# Patient Record
Sex: FEMALE | Race: WHITE | NOT HISPANIC OR LATINO | Employment: OTHER | URBAN - METROPOLITAN AREA
[De-identification: names, ages, dates, MRNs, and addresses within clinical notes are randomized per-mention and may not be internally consistent; named-entity substitution may affect disease eponyms.]

---

## 2017-03-23 ENCOUNTER — OFFICE VISIT (OUTPATIENT)
Dept: FAMILY MEDICINE | Facility: CLINIC | Age: 77
End: 2017-03-23
Payer: COMMERCIAL

## 2017-03-23 VITALS
HEART RATE: 82 BPM | OXYGEN SATURATION: 95 % | DIASTOLIC BLOOD PRESSURE: 84 MMHG | BODY MASS INDEX: 32.18 KG/M2 | SYSTOLIC BLOOD PRESSURE: 128 MMHG | TEMPERATURE: 98.6 F | HEIGHT: 64 IN | WEIGHT: 188.5 LBS

## 2017-03-23 DIAGNOSIS — I10 HYPERTENSION GOAL BP (BLOOD PRESSURE) < 140/90: ICD-10-CM

## 2017-03-23 DIAGNOSIS — M85.80 OSTEOPENIA: ICD-10-CM

## 2017-03-23 DIAGNOSIS — E78.5 HYPERLIPIDEMIA LDL GOAL <130: ICD-10-CM

## 2017-03-23 DIAGNOSIS — R00.2 PALPITATIONS: ICD-10-CM

## 2017-03-23 DIAGNOSIS — E66.9 OBESITY, UNSPECIFIED OBESITY SEVERITY, UNSPECIFIED OBESITY TYPE: ICD-10-CM

## 2017-03-23 DIAGNOSIS — Z00.00 ROUTINE GENERAL MEDICAL EXAMINATION AT A HEALTH CARE FACILITY: Primary | ICD-10-CM

## 2017-03-23 LAB
ALBUMIN SERPL-MCNC: 3.4 G/DL (ref 3.4–5)
ALP SERPL-CCNC: 66 U/L (ref 40–150)
ALT SERPL W P-5'-P-CCNC: 24 U/L (ref 0–50)
ANION GAP SERPL CALCULATED.3IONS-SCNC: 10 MMOL/L (ref 3–14)
AST SERPL W P-5'-P-CCNC: 16 U/L (ref 0–45)
BILIRUB SERPL-MCNC: 0.4 MG/DL (ref 0.2–1.3)
BUN SERPL-MCNC: 16 MG/DL (ref 7–30)
CALCIUM SERPL-MCNC: 9.4 MG/DL (ref 8.5–10.1)
CHLORIDE SERPL-SCNC: 103 MMOL/L (ref 94–109)
CHOLEST SERPL-MCNC: 174 MG/DL
CO2 SERPL-SCNC: 27 MMOL/L (ref 20–32)
CREAT SERPL-MCNC: 0.71 MG/DL (ref 0.52–1.04)
GFR SERPL CREATININE-BSD FRML MDRD: 79 ML/MIN/1.7M2
GLUCOSE SERPL-MCNC: 88 MG/DL (ref 70–99)
HDLC SERPL-MCNC: 61 MG/DL
LDLC SERPL CALC-MCNC: 91 MG/DL
NONHDLC SERPL-MCNC: 113 MG/DL
POTASSIUM SERPL-SCNC: 3.7 MMOL/L (ref 3.4–5.3)
PROT SERPL-MCNC: 7.5 G/DL (ref 6.8–8.8)
SODIUM SERPL-SCNC: 140 MMOL/L (ref 133–144)
TRIGL SERPL-MCNC: 109 MG/DL

## 2017-03-23 PROCEDURE — 99397 PER PM REEVAL EST PAT 65+ YR: CPT | Performed by: FAMILY MEDICINE

## 2017-03-23 PROCEDURE — 80061 LIPID PANEL: CPT | Performed by: FAMILY MEDICINE

## 2017-03-23 PROCEDURE — 80053 COMPREHEN METABOLIC PANEL: CPT | Performed by: FAMILY MEDICINE

## 2017-03-23 PROCEDURE — 36415 COLL VENOUS BLD VENIPUNCTURE: CPT | Performed by: FAMILY MEDICINE

## 2017-03-23 RX ORDER — SIMVASTATIN 20 MG
20 TABLET ORAL AT BEDTIME
Qty: 90 TABLET | Refills: 3 | Status: SHIPPED | OUTPATIENT
Start: 2017-03-23 | End: 2018-04-18

## 2017-03-23 RX ORDER — METOPROLOL SUCCINATE 50 MG/1
50 TABLET, EXTENDED RELEASE ORAL DAILY
Qty: 30 TABLET | Refills: 1 | Status: SHIPPED | OUTPATIENT
Start: 2017-03-23 | End: 2017-04-04

## 2017-03-23 RX ORDER — LISINOPRIL/HYDROCHLOROTHIAZIDE 10-12.5 MG
1 TABLET ORAL DAILY
Qty: 90 TABLET | Refills: 3 | Status: ON HOLD | OUTPATIENT
Start: 2017-03-23 | End: 2018-03-09

## 2017-03-23 NOTE — LETTER
[facility]   [address] [place] [address]  [phone]      March 24, 2017      Hamida Verma  [address] [facility] [address]          Dear Ms. Verma,    The results of your recent lab tests were within normal limits. Enclosed is a copy of these results.  If you have any further questions or problems, please contact our office.    Results for orders placed or performed in visit on 03/23/17   Comprehensive metabolic panel (BMP + Alb, Alk Phos, ALT, AST, Total. Bili, TP)   Result Value Ref Range    Sodium 140 133 - 144 mmol/L    Potassium 3.7 3.4 - 5.3 mmol/L    Chloride 103 94 - 109 mmol/L    Carbon Dioxide 27 20 - 32 mmol/L    Anion Gap 10 3 - 14 mmol/L    Glucose 88 70 - 99 mg/dL    Urea Nitrogen 16 7 - 30 mg/dL    Creatinine 0.71 0.52 - 1.04 mg/dL    GFR Estimate 79 >60 mL/min/1.7m2    GFR Estimate If Black >90   GFR Calc   >60 mL/min/1.7m2    Calcium 9.4 8.5 - 10.1 mg/dL    Bilirubin Total 0.4 0.2 - 1.3 mg/dL    Albumin 3.4 3.4 - 5.0 g/dL    Protein Total 7.5 6.8 - 8.8 g/dL    Alkaline Phosphatase 66 40 - 150 U/L    ALT 24 0 - 50 U/L    AST 16 0 - 45 U/L   Lipid panel reflex to direct LDL   Result Value Ref Range    Cholesterol 174 <200 mg/dL    Triglycerides 109 <150 mg/dL    HDL Cholesterol 61 >49 mg/dL    LDL Cholesterol Calculated 91 <100 mg/dL    Non HDL Cholesterol 113 <130 mg/dL         Sincerely,      Mikala Philip MD/nr

## 2017-03-23 NOTE — MR AVS SNAPSHOT
After Visit Summary   3/23/2017    Hamida Verma    MRN: [id number]           Patient Information     Date Of Birth          1940        Visit Information        Provider Department      3/23/2017 8:20 AM Mikala Philip MD [facility]        Today's Diagnoses     Routine general medical examination at a health care facility    -  1    Hyperlipidemia LDL goal <130        Hypertension goal BP (blood pressure) < 140/90        Palpitations        Osteopenia        Obesity, unspecified obesity severity, unspecified obesity type          Care Instructions    1. Start Metoprolol 50 MG daily  2. If you have an episode of increased heart rate try counting your pulse for a minute so we know how fast your heart is beating.  3. Schedule a bone density scan.  4. Once you complete your Advanced Directive bring it back to the clinic so we can keep a copy on file.  5. Follow up with me in one month.      Preventive Health Recommendations    Female Ages 65 +    Yearly exam:     See your health care provider every year in order to  o Review health changes.   o Discuss preventive care.    o Review your medicines if your doctor has prescribed any.      You no longer need a yearly Pap test unless you've had an abnormal Pap test in the past 10 years. If you have vaginal symptoms, such as bleeding or discharge, be sure to talk with your provider about a Pap test.      Every 1 to 2 years, have a mammogram.  If you are over 69, talk with your health care provider about whether or not you want to continue having screening mammograms.      Every 10 years, have a colonoscopy. Or, have a yearly FIT test (stool test). These exams will check for colon cancer.       Have a cholesterol test every 5 years, or more often if your doctor advises it.       Have a diabetes test (fasting glucose) every three years. If you are at risk for diabetes, you should have this test more often.       At age 65, have a bone  density scan (DEXA) to check for osteoporosis (brittle bone disease).    Shots:    Get a flu shot each year.    Get a tetanus shot every 10 years.    Talk to your doctor about your pneumonia vaccines. There are now two you should receive - Pneumovax (PPSV 23) and Prevnar (PCV 13).    Talk to your doctor about the shingles vaccine.    Talk to your doctor about the hepatitis B vaccine.    Nutrition:     Eat at least 5 servings of fruits and vegetables each day.      Eat whole-grain bread, whole-wheat pasta and brown rice instead of white grains and rice.      Talk to your provider about Calcium and Vitamin D.     Lifestyle    Exercise at least 150 minutes a week (30 minutes a day, 5 days a week). This will help you control your weight and prevent disease.      Limit alcohol to one drink per day.      No smoking.       Wear sunscreen to prevent skin cancer.       See your dentist twice a year for an exam and cleaning.      See your eye doctor every 1 to 2 years to screen for conditions such as glaucoma, macular degeneration and cataracts.        Follow-ups after your visit        Future tests that were ordered for you today     Open Future Orders        Priority Expected Expires Ordered    DX Hip/Pelvis/Spine Routine  3/23/2018 3/23/2017    Lipid with Reflex to Direct LDL Routine 4/22/2017 5/2/2017 3/23/2017            Who to contact     If you have questions or need follow up information about today's clinic visit or your schedule please contact [facility] directly at [phone].  Normal or non-critical lab and imaging results will be communicated to you by MyChart, letter or phone within 4 business days after the clinic has received the results. If you do not hear from us within 7 days, please contact the clinic through MyChart or phone. If you have a critical or abnormal lab result, we will notify you by phone as soon as possible.  Submit refill requests through [employer] or call your pharmacy and  "they will forward the refill request to us. Please allow 3 business days for your refill to be completed.          Additional Information About Your Visit        [employer] Information     [employer] lets you send messages to your doctor, view your test results, renew your prescriptions, schedule appointments and more. To sign up, go to [email or web address] . Click on \"Log in\" on the left side of the screen, which will take you to the Welcome page. Then click on \"Sign up Now\" on the right side of the page.     You will be asked to enter the access code listed below, as well as some personal information. Please follow the directions to create your username and password.     Your access code is: [email or web address]  Expires: 2017  8:43 AM     Your access code will  in 90 days. If you need help or a new code, please call your [place] clinic or [phone].        Care EveryWhere ID     This is your [facility] EveryWhere ID. This could be used by other organizations to access your [place] medical records  [id number]        Your Vitals Were     Pulse Temperature Height Pulse Oximetry BMI (Body Mass Index)       82 98.6  F (37  C) (Oral) 5' 4\" (1.626 m) 95% 32.36 kg/m2        Blood Pressure from Last 3 Encounters:   17 128/84   16 126/78   05/07/15 118/80    Weight from Last 3 Encounters:   17 188 lb 8 oz (85.5 kg)   16 191 lb (86.6 kg)   05/07/15 187 lb 4 oz (84.9 kg)              We Performed the Following     Comprehensive metabolic panel (BMP + Alb, Alk Phos, ALT, AST, Total. Bili, TP)     Lipid panel reflex to direct LDL          Today's Medication Changes          These changes are accurate as of: 3/23/17  8:43 AM.  If you have any questions, ask your nurse or doctor.               Start taking these medicines.        Dose/Directions    metoprolol 50 MG 24 hr tablet   Commonly known as:  TOPROL-XL   Used for:  Hypertension goal BP (blood pressure) < 140/90, Palpitations   Started by:  House, " Mikala COELLO MD        Dose:  50 mg   Take 1 tablet (50 mg) by mouth daily   Quantity:  30 tablet   Refills:  1         These medicines have changed or have updated prescriptions.        Dose/Directions    lisinopril-hydrochlorothiazide 10-12.5 MG per tablet   Commonly known as:  PRINZIDE/ZESTORETIC   This may have changed:  additional instructions   Used for:  Hypertension goal BP (blood pressure) < 140/90   Changed by:  Mikala Philip MD        Dose:  1 tablet   Take 1 tablet by mouth daily Profile Rx: patient will contact pharmacy when needed   Quantity:  90 tablet   Refills:  3       simvastatin 20 MG tablet   Commonly known as:  ZOCOR   This may have changed:  additional instructions   Used for:  Hyperlipidemia LDL goal <130   Changed by:  Mikala Philip MD        Dose:  20 mg   Take 1 tablet (20 mg) by mouth At Bedtime Profile Rx: patient will contact pharmacy when needed   Quantity:  90 tablet   Refills:  3            Where to get your medicines      These medications were sent to [place] [address]  [facility] [address] [facility] [address]     Phone:  [phone]     lisinopril-hydrochlorothiazide 10-12.5 MG per tablet    metoprolol 50 MG 24 hr tablet    simvastatin 20 MG tablet                Primary Care Provider Office Phone # Fax #    Mikala Philip [phone]       [facility] [address]  [facility] [address]        Thank you!     Thank you for choosing [facility]  for your care. Our goal is always to provide you with excellent care. Hearing back from our patients is one way we can continue to improve our services. Please take a few minutes to complete the written survey that you may receive in the mail after your visit with us. Thank you!             Your Updated Medication List - Protect others around you: Learn how to safely use, store and throw away your medicines at www.disposemymeds.org.          This list is  accurate as of: 3/23/17  8:43 AM.  Always use your most recent med list.                   Brand Name Dispense Instructions for use    ASPIR-81 PO      1 TABLET DAILY       B-12 1000 MCG Caps      Take  by mouth.       CALCIUM + D PO      1 tab daily       cholecalciferol 1000 UNITS capsule    vitamin  -D     Take 1 capsule by mouth daily.       FISH OIL      900 mg daily       FLAX SEED OIL PO      1 capsule daily       fluticasone 50 MCG/ACT spray    FLONASE    1 Package    Spray 1-2 sprays into both nostrils daily       lisinopril-hydrochlorothiazide 10-12.5 MG per tablet    PRINZIDE/ZESTORETIC    90 tablet    Take 1 tablet by mouth daily Profile Rx: patient will contact pharmacy when needed       metoprolol 50 MG 24 hr tablet    TOPROL-XL    30 tablet    Take 1 tablet (50 mg) by mouth daily       Milk Thistle 200 MG Caps      Take  by mouth.       olopatadine 0.1 % ophthalmic solution    PATANOL    5 mL    Place 1 drop into both eyes 2 times daily       potassium & sodium phosphates 278-164-250 MG/75ML Solr      Take  by mouth.       simvastatin 20 MG tablet    ZOCOR    90 tablet    Take 1 tablet (20 mg) by mouth At Bedtime Profile Rx: patient will contact pharmacy when needed

## 2017-03-23 NOTE — PATIENT INSTRUCTIONS
1. Start Metoprolol 50 MG daily  2. If you have an episode of increased heart rate try counting your pulse for a minute so we know how fast your heart is beating.  3. Schedule a bone density scan.  4. Once you complete your Advanced Directive bring it back to the clinic so we can keep a copy on file.  5. Follow up with me in one month.      Preventive Health Recommendations    Female Ages 65 +    Yearly exam:     See your health care provider every year in order to  o Review health changes.   o Discuss preventive care.    o Review your medicines if your doctor has prescribed any.      You no longer need a yearly Pap test unless you've had an abnormal Pap test in the past 10 years. If you have vaginal symptoms, such as bleeding or discharge, be sure to talk with your provider about a Pap test.      Every 1 to 2 years, have a mammogram.  If you are over 69, talk with your health care provider about whether or not you want to continue having screening mammograms.      Every 10 years, have a colonoscopy. Or, have a yearly FIT test (stool test). These exams will check for colon cancer.       Have a cholesterol test every 5 years, or more often if your doctor advises it.       Have a diabetes test (fasting glucose) every three years. If you are at risk for diabetes, you should have this test more often.       At age 65, have a bone density scan (DEXA) to check for osteoporosis (brittle bone disease).    Shots:    Get a flu shot each year.    Get a tetanus shot every 10 years.    Talk to your doctor about your pneumonia vaccines. There are now two you should receive - Pneumovax (PPSV 23) and Prevnar (PCV 13).    Talk to your doctor about the shingles vaccine.    Talk to your doctor about the hepatitis B vaccine.    Nutrition:     Eat at least 5 servings of fruits and vegetables each day.      Eat whole-grain bread, whole-wheat pasta and brown rice instead of white grains and rice.      Talk to your provider about Calcium  and Vitamin D.     Lifestyle    Exercise at least 150 minutes a week (30 minutes a day, 5 days a week). This will help you control your weight and prevent disease.      Limit alcohol to one drink per day.      No smoking.       Wear sunscreen to prevent skin cancer.       See your dentist twice a year for an exam and cleaning.      See your eye doctor every 1 to 2 years to screen for conditions such as glaucoma, macular degeneration and cataracts.

## 2017-03-23 NOTE — NURSING NOTE
"Chief Complaint   Patient presents with     Physical     pt is fasting        Initial /84 (Cuff Size: Adult Large)  Pulse 82  Temp 98.6  F (37  C) (Oral)  Ht 5' 4\" (1.626 m)  Wt 188 lb 8 oz (85.5 kg)  SpO2 95%  BMI 32.36 kg/m2 Estimated body mass index is 32.36 kg/(m^2) as calculated from the following:    Height as of this encounter: 5' 4\" (1.626 m).    Weight as of this encounter: 188 lb 8 oz (85.5 kg).  Medication Reconciliation: complete     Alicia Day, NURIS      "

## 2017-03-23 NOTE — PROGRESS NOTES
SUBJECTIVE:                                                            Hamida Verma is a 76 year old female who presents for Preventive Visit.  Are you in the first 12 months of your Medicare Part B coverage?  No    Healthy Habits:    Do you get at least three servings of calcium containing foods daily (dairy, green leafy vegetables, etc.)? no, taking calcium and/or vitamin D supplement: yes -     Amount of exercise or daily activities, outside of work: 7 day(s) per week    Problems taking medications regularly No    Medication side effects: No    Have you had an eye exam in the past two years? yes    Do you see a dentist twice per year? no    Do you have sleep apnea, excessive snoring or daytime drowsiness?no    COGNITIVE SCREEN  1) Repeat 3 items (Banana, Sunrise, Chair)    2) Clock draw: ABNORMAL hands in wrong spot  3) 3 item recall: Recalls 2 objects   Results: ABNORMAL clock, 1-2 items recalled: PROBABLE COGNITIVE IMPAIRMENT, **INFORM PROVIDER**  Mini-CogTM Copyright NOEMI An. Licensed by the author for use in [facility] StemSave; reprinted with permission (bony@[facility]). All rights reserved.      Hyperlipidemia Follow-Up      Rate your low fat/cholesterol diet?: fair    Taking statin?  Yes, no muscle aches from statin    Other lipid medications/supplements?:  Flax seed     Hypertension Follow-up      Outpatient blood pressures are not being checked.    Low Salt Diet: not monitoring salt a couple shakes        Grief - Patient has been going through some grief losing her son to a heart attack in January and her sister had a stroke. She is coping ok.     Palpitations - Notes occasionally her heart starts racing very fast lasting roughly a half hour at the most. She has not checked her pulse. Denies lightheadedness, chest pain, pressure, SOB. It has just start in January and has happened maybe three times since. Does not feel like an irregular heart beat.         Reviewed and updated as needed this visit  by clinical staff  Reviewed and updated as needed this visit by Provider    Social History   Substance Use Topics     Smoking status: Former Smoker     Quit date: 9/1/2000     Smokeless tobacco: Never Used      Comment: quit 6yrs ago     Alcohol use No     The patient does not drink >3 drinks per day nor >7 drinks per week.    Today's PHQ-2 Score:   PHQ-2 ( 1999 Pfizer) 3/23/2017 4/28/2014   Q1: Little interest or pleasure in doing things 0 0   Q2: Feeling down, depressed or hopeless 0 0   PHQ-2 Score 0 0     Do you feel safe in your environment - Yes    Do you have a Health Care Directive?: No: Advance care planning reviewed with patient; information given to patient to review.    Current providers sharing in care for this patient include:   Patient Care Team:  Mikala Philip MD as PCP - General (Family Practice)      Hearing impairment: Yes, Difficulty understanding soft or whispered speech.    Ability to successfully perform activities of daily living: Yes, no assistance needed     Fall risk:  Fallen 2 or more times in the past year?: No  Any fall with injury in the past year?: No    Home safety:  lack of grab bars in the bathroom    The following health maintenance items are reviewed in Epic and correct as of today:  Health Maintenance   Topic Date Due     ADVANCE DIRECTIVE PLANNING Q5 YRS (NO INBASKET)  07/20/2016     CMP Q1 YR (NO INBASKET)  05/23/2017     FALL RISK ASSESSMENT  05/23/2017     LIPID MONITORING Q1 YEAR( NO INBASKET)  05/23/2017     INFLUENZA VACCINE (SYSTEM ASSIGNED)  09/01/2017     TETANUS IMMUNIZATION (SYSTEM ASSIGNED)  05/07/2025     DEXA SCAN SCREENING (SYSTEM ASSIGNED)  Completed     PNEUMOCOCCAL  Completed     Pneumonia Vaccine: completed   Mammogram Screening: Patient over age 75, has elected to stop mammography screening.  History of abnormal Pap smear: NO - Hysterectomy    ROS:   ROS: 10 point ROS neg other than the symptoms noted above in the HPI.    This document serves as a record  of the services and decisions personally performed and made by Mikala Philip MD. It was created on his/her behalf by Elena Hinkle, trained medical scribe. The creation of this document is based the provider's statements to the medical scribes.    Scribe Elena Hinkle, March 23, 2017    BP Readings from Last 3 Encounters:   03/23/17 128/84   05/23/16 126/78   05/07/15 118/80    Wt Readings from Last 3 Encounters:   03/23/17 85.5 kg (188 lb 8 oz)   05/23/16 86.6 kg (191 lb)   05/07/15 84.9 kg (187 lb 4 oz)        Patient Active Problem List   Diagnosis     Symptomatic menopausal or female climacteric states     HYPERLIPIDEMIA LDL GOAL <130     Hypertension goal BP (blood pressure) < 140/90     Knee pain     Obesity     Past Surgical History:   Procedure Laterality Date     HYSTERECTOMY, VAGINAL         Social History   Substance Use Topics     Smoking status: Former Smoker     Quit date: 9/1/2000     Smokeless tobacco: Never Used      Comment: quit 6yrs ago     Alcohol use No     Family History   Problem Relation Age of Onset     CEREBROVASCULAR DISEASE Mother      Eye Disorder Mother      Myocardial Infarction Mother      C.A.D. Father      heart attack     Alcohol/Drug Father      alcohol     Blood Disease Sister      lupus     Depression Sister      DIABETES No family hx of      Breast Cancer No family hx of      Cancer - colorectal No family hx of          Current Outpatient Prescriptions   Medication Sig Dispense Refill     lisinopril-hydrochlorothiazide (PRINZIDE/ZESTORETIC) 10-12.5 MG per tablet Take 1 tablet by mouth daily Profile Rx: patient will contact pharmacy when needed 90 tablet 3     simvastatin (ZOCOR) 20 MG tablet Take 1 tablet (20 mg) by mouth At Bedtime Profile Rx: patient will contact pharmacy when needed 90 tablet 3     Cyanocobalamin (B-12) 1000 MCG CAPS Take  by mouth.       FISH  mg daily        cholecalciferol (VITAMIN  -D) 1000 UNIT capsule Take 1 capsule by mouth daily.        "potassium & sodium phosphates 278-164-250 MG/75ML SOLR Take  by mouth.       Milk Thistle 200 MG CAPS Take  by mouth.       CALCIUM + D OR 1 tab daily       FLAX SEED OIL OR 1 capsule daily       ASPIR-81 OR 1 TABLET DAILY       [DISCONTINUED] simvastatin (ZOCOR) 20 MG tablet Take 1 tablet (20 mg) by mouth At Bedtime 90 tablet 3     [DISCONTINUED] lisinopril-hydrochlorothiazide (PRINZIDE,ZESTORETIC) 10-12.5 MG per tablet Take 1 tablet by mouth daily 90 tablet 3     olopatadine (PATANOL) 0.1 % ophthalmic solution Place 1 drop into both eyes 2 times daily (Patient not taking: Reported on 3/23/2017) 5 mL 3     fluticasone (FLONASE) 50 MCG/ACT nasal spray Spray 1-2 sprays into both nostrils daily (Patient not taking: Reported on 3/23/2017) 1 Package 11     Allergies   Allergen Reactions     Strawberry Flavor      Recent Labs   Lab Test  05/23/16   1056  05/07/15   0824  11/28/14   0806   11/12/14   0814  04/28/14   0952   07/07/09   1359   LDL  103*  104   --    --    --   100   < >   --    HDL  66  70   --    --    --   60   < >   --    TRIG  104  102   --    --    --   122   < >   --    ALT  26   --   22   --   24  30   < >   --    CR  0.77   --   0.75   < >  0.80  0.84   < >   --    GFRESTIMATED  73   --   75   < >  69  66   < >   --    GFRESTBLACK  88   --   >90   GFR Calc     < >  84  80   < >   --    POTASSIUM  4.1   --   4.0   < >  3.1*  4.2   < >   --    TSH   --    --    --    --    --    --    --   0.67    < > = values in this interval not displayed.      OBJECTIVE:                                                            /84 (Cuff Size: Adult Large)  Pulse 82  Temp 98.6  F (37  C) (Oral)  Ht 1.626 m (5' 4\")  Wt 85.5 kg (188 lb 8 oz)  SpO2 95%  BMI 32.36 kg/m2 Estimated body mass index is 32.36 kg/(m^2) as calculated from the following:    Height as of this encounter: 1.626 m (5' 4\").    Weight as of this encounter: 85.5 kg (188 lb 8 oz).  EXAM:   GENERAL APPEARANCE: healthy, " alert and no distress  EYES: Eyes grossly normal to inspection, PERRL and conjunctivae and sclerae normal. Right side cerumen impaction  HENT: ear canals and TM's normal, nose and mouth without ulcers or lesions, oropharynx clear and oral mucous membranes moist  NECK: no adenopathy, no asymmetry, masses, or scars and thyroid normal to palpation  RESP: lungs clear to auscultation - no rales, rhonchi or wheezes  BREAST: normal without masses, tenderness or nipple discharge and no palpable axillary masses or adenopathy  CV: regular rate and rhythm, normal S1 S2, no S3 or S4, no murmur, click or rub, no peripheral edema and peripheral pulses strong  ABDOMEN: soft, nontender, no hepatosplenomegaly, no masses and bowel sounds normal  MS: no musculoskeletal defects are noted and gait is age appropriate without ataxia  SKIN: no suspicious lesions or rashes  NEURO: Normal strength and tone, sensory exam grossly normal, mentation intact and speech normal  PSYCH: mentation appears normal and affect normal/bright    ASSESSMENT / PLAN:                                                            1. Routine general medical examination at a health care facility  She does not want to repeat mammograms for now.     2. Hyperlipidemia LDL goal <130   stable   - Lipid with Reflex to Direct LDL; Future  - simvastatin (ZOCOR) 20 MG tablet; Take 1 tablet (20 mg) by mouth At Bedtime Profile Rx: patient will contact pharmacy when needed  Dispense: 90 tablet; Refill: 3  - Lipid panel reflex to direct LDL    3. Hypertension goal BP (blood pressure) < 140/90  Controlled. Will add metoprolol today give her episodes of palpitations (otherwise asymptomatic and has not felt irregular, she does not want to see cardiology at this time).   - Comprehensive metabolic panel (BMP + Alb, Alk Phos, ALT, AST, Total. Bili, TP)  - lisinopril-hydrochlorothiazide (PRINZIDE/ZESTORETIC) 10-12.5 MG per tablet; Take 1 tablet by mouth daily Profile Rx: patient will  "contact pharmacy when needed  Dispense: 90 tablet; Refill: 3  - metoprolol (TOPROL-XL) 50 MG 24 hr tablet; Take 1 tablet (50 mg) by mouth daily  Dispense: 30 tablet; Refill: 1    4. Palpitations  episodes of palpitations (otherwise asymptomatic and has not felt irregular, she does not want to see cardiology at this time).   - metoprolol (TOPROL-XL) 50 MG 24 hr tablet; Take 1 tablet (50 mg) by mouth daily  Dispense: 30 tablet; Refill: 1    5. Osteopenia  She will schedule DEXA     6. Obesity, unspecified obesity severity, unspecified obesity type     She will f/u with me in a few weeks.     End of Life Planning:  Patient currently has an advanced directive: No.  I have verified the patient's ablity to prepare an advanced directive/make health care decisions.  Literature was provided to assist patient in preparing an advanced directive.    COUNSELING:  Reviewed preventive health counseling, as reflected in patient instructions  Estimated body mass index is 32.36 kg/(m^2) as calculated from the following:    Height as of this encounter: 1.626 m (5' 4\").    Weight as of this encounter: 85.5 kg (188 lb 8 oz).  Weight management plan: diet and exercise   reports that she quit smoking about 16 years ago. She has never used smokeless tobacco.      Appropriate preventive services were discussed with this patient, including applicable screening as appropriate for cardiovascular disease, diabetes, osteopenia/osteoporosis, and glaucoma.  As appropriate for age/gender, discussed screening for colorectal cancer, prostate cancer, breast cancer, and cervical cancer. Checklist reviewing preventive services available has been given to the patient.    Reviewed patients plan of care and provided an AVS. The Basic Care Plan (routine screening as documented in Health Maintenance) for Hamida meets the Care Plan requirement. This Care Plan has been established and reviewed with the Patient.    Counseling Resources:  ATP IV " Guidelines  Pooled Cohorts Equation Calculator  Breast Cancer Risk Calculator  FRAX Risk Assessment  ICSI Preventive Guidelines  Dietary Guidelines for Americans, 2010  USDA's MyPlate  ASA Prophylaxis  Lung CA Screening    The information in this document, created by the medical scribe for me, accurately reflects the services I personally performed and the decisions made by me. I have reviewed and approved this document for accuracy prior to leaving the patient care area. 03/23/17    Mikala Philip MD  [facility]

## 2017-03-28 ENCOUNTER — RADIANT APPOINTMENT (OUTPATIENT)
Dept: BONE DENSITY | Facility: CLINIC | Age: 77
End: 2017-03-28
Attending: FAMILY MEDICINE
Payer: COMMERCIAL

## 2017-03-28 DIAGNOSIS — M85.80 OSTEOPENIA: ICD-10-CM

## 2017-03-28 PROCEDURE — 77085 DXA BONE DENSITY AXL VRT FX: CPT | Performed by: INTERNAL MEDICINE

## 2017-04-04 ENCOUNTER — OFFICE VISIT (OUTPATIENT)
Dept: FAMILY MEDICINE | Facility: CLINIC | Age: 77
End: 2017-04-04
Payer: COMMERCIAL

## 2017-04-04 VITALS
HEART RATE: 66 BPM | WEIGHT: 189.25 LBS | BODY MASS INDEX: 32.31 KG/M2 | RESPIRATION RATE: 16 BRPM | TEMPERATURE: 97.8 F | DIASTOLIC BLOOD PRESSURE: 73 MMHG | OXYGEN SATURATION: 99 % | SYSTOLIC BLOOD PRESSURE: 139 MMHG | HEIGHT: 64 IN

## 2017-04-04 DIAGNOSIS — I10 HYPERTENSION GOAL BP (BLOOD PRESSURE) < 140/90: Primary | ICD-10-CM

## 2017-04-04 DIAGNOSIS — E78.5 HYPERLIPIDEMIA LDL GOAL <130: ICD-10-CM

## 2017-04-04 DIAGNOSIS — M81.0 OSTEOPOROSIS: ICD-10-CM

## 2017-04-04 DIAGNOSIS — R00.2 PALPITATIONS: ICD-10-CM

## 2017-04-04 LAB
DEPRECATED CALCIDIOL+CALCIFEROL SERPL-MC: 37 UG/L (ref 20–75)
ERYTHROCYTE [DISTWIDTH] IN BLOOD BY AUTOMATED COUNT: 13.8 % (ref 10–15)
HCT VFR BLD AUTO: 41.4 % (ref 35–47)
HGB BLD-MCNC: 13.1 G/DL (ref 11.7–15.7)
MCH RBC QN AUTO: 27.7 PG (ref 26.5–33)
MCHC RBC AUTO-ENTMCNC: 31.6 G/DL (ref 31.5–36.5)
MCV RBC AUTO: 88 FL (ref 78–100)
PHOSPHATE SERPL-MCNC: 2.9 MG/DL (ref 2.5–4.5)
PLATELET # BLD AUTO: 455 10E9/L (ref 150–450)
RBC # BLD AUTO: 4.73 10E12/L (ref 3.8–5.2)
WBC # BLD AUTO: 7 10E9/L (ref 4–11)

## 2017-04-04 PROCEDURE — 84100 ASSAY OF PHOSPHORUS: CPT | Performed by: FAMILY MEDICINE

## 2017-04-04 PROCEDURE — 85027 COMPLETE CBC AUTOMATED: CPT | Performed by: FAMILY MEDICINE

## 2017-04-04 PROCEDURE — 36415 COLL VENOUS BLD VENIPUNCTURE: CPT | Performed by: FAMILY MEDICINE

## 2017-04-04 PROCEDURE — 99214 OFFICE O/P EST MOD 30 MIN: CPT | Performed by: FAMILY MEDICINE

## 2017-04-04 PROCEDURE — 82306 VITAMIN D 25 HYDROXY: CPT | Performed by: FAMILY MEDICINE

## 2017-04-04 RX ORDER — METOPROLOL SUCCINATE 50 MG/1
50 TABLET, EXTENDED RELEASE ORAL DAILY
Qty: 90 TABLET | Refills: 3 | Status: SHIPPED | OUTPATIENT
Start: 2017-04-04 | End: 2018-03-06

## 2017-04-04 RX ORDER — ALENDRONATE SODIUM 70 MG/1
70 TABLET ORAL
Qty: 12 TABLET | Refills: 3 | Status: SHIPPED | OUTPATIENT
Start: 2017-04-04 | End: 2018-04-18

## 2017-04-04 NOTE — LETTER
[facility]   [address] [place]   [address]  [phone]    April 6, 2017      Hamida Verma  [address] [facility] [address]              Dear Ms. Verma,    Your lab results have returned.     Your phosphorus and vitamin D levels are normal.    Your blood counts are normal except for the platelet count was just a tiny bit high. Sometime this happens with recent or current illness.  This is not of serious concern at this time, but should be rechecked with your next lab tests.     Results for orders placed or performed in visit on 04/04/17   Vitamin D Deficiency   Result Value Ref Range    Vitamin D Deficiency screening 37 20 - 75 ug/L   Phosphorus   Result Value Ref Range    Phosphorus 2.9 2.5 - 4.5 mg/dL   CBC with platelets   Result Value Ref Range    WBC 7.0 4.0 - 11.0 10e9/L    RBC Count 4.73 3.8 - 5.2 10e12/L    Hemoglobin 13.1 11.7 - 15.7 g/dL    Hematocrit 41.4 35.0 - 47.0 %    MCV 88 78 - 100 fl    MCH 27.7 26.5 - 33.0 pg    MCHC 31.6 31.5 - 36.5 g/dL    RDW 13.8 10.0 - 15.0 %    Platelet Count 455 (H) 150 - 450 10e9/L           Sincerely,    Mikala Philip MD/nr

## 2017-04-04 NOTE — PROGRESS NOTES
"  SUBJECTIVE:                                                    Hamida Verma is a 76 year old female who presents to clinic today for the following health issues:    Hyperlipidemia Follow-Up      Rate your low fat/cholesterol diet?: not monitoring fat; eats what she likes to eat but not as much     Taking statin?  Yes, no muscle aches from statin    Other lipid medications/supplements?:  Flax seed     Hypertension Follow-up      Outpatient blood pressures are not being checked.    Low Salt Diet: no added salt/ on rare occasion       Amount of exercise or physical activity: 6-7 days/week for an average of 30-45 minutes    Problems taking medications regularly: No    Medication side effects: none    Clinic on 3/23/117:   \"Hypertension goal BP (blood pressure) < 140/90  Controlled. Will add metoprolol today give her episodes of palpitations (otherwise asymptomatic and has not felt irregular, she does not want to see cardiology at this time).   4. Palpitations  episodes of palpitations (otherwise asymptomatic and has not felt irregular, she does not want to see cardiology at this time).\"      No palpitations since last visit. Tolerating metoprolol well.          Problem list and histories reviewed & adjusted, as indicated.  Additional history: as documented    Patient Active Problem List   Diagnosis     Symptomatic menopausal or female climacteric states     HYPERLIPIDEMIA LDL GOAL <130     Hypertension goal BP (blood pressure) < 140/90     Knee pain     Obesity     Past Surgical History:   Procedure Laterality Date     HYSTERECTOMY, VAGINAL         Social History   Substance Use Topics     Smoking status: Former Smoker     Quit date: 9/1/2000     Smokeless tobacco: Never Used      Comment: quit 6yrs ago     Alcohol use No     Family History   Problem Relation Age of Onset     CEREBROVASCULAR DISEASE Mother      Eye Disorder Mother      Myocardial Infarction Mother      C.A.D. Father      heart attack     " Alcohol/Drug Father      alcohol     Blood Disease Sister      lupus     Depression Sister      DIABETES No family hx of      Breast Cancer No family hx of      Cancer - colorectal No family hx of          Current Outpatient Prescriptions   Medication Sig Dispense Refill     lisinopril-hydrochlorothiazide (PRINZIDE/ZESTORETIC) 10-12.5 MG per tablet Take 1 tablet by mouth daily Profile Rx: patient will contact pharmacy when needed 90 tablet 3     simvastatin (ZOCOR) 20 MG tablet Take 1 tablet (20 mg) by mouth At Bedtime Profile Rx: patient will contact pharmacy when needed 90 tablet 3     metoprolol (TOPROL-XL) 50 MG 24 hr tablet Take 1 tablet (50 mg) by mouth daily 30 tablet 1     olopatadine (PATANOL) 0.1 % ophthalmic solution Place 1 drop into both eyes 2 times daily 5 mL 3     fluticasone (FLONASE) 50 MCG/ACT nasal spray Spray 1-2 sprays into both nostrils daily 1 Package 11     Cyanocobalamin (B-12) 1000 MCG CAPS Take  by mouth.       FISH  mg daily        cholecalciferol (VITAMIN  -D) 1000 UNIT capsule Take 1 capsule by mouth daily.       potassium & sodium phosphates 278-164-250 MG/75ML SOLR Take  by mouth.       Milk Thistle 200 MG CAPS Take  by mouth.       CALCIUM + D OR 1 tab daily       FLAX SEED OIL OR 1 capsule daily       ASPIR-81 OR 1 TABLET DAILY       Allergies   Allergen Reactions     Strawberry Flavor      Recent Labs   Lab Test  03/23/17   0804  05/23/16   1056  05/07/15   0824  11/28/14   0806   07/07/09   1359   LDL  91  103*  104   --    < >   --    HDL  61  66  70   --    < >   --    TRIG  109  104  102   --    < >   --    ALT  24  26   --   22   < >   --    CR  0.71  0.77   --   0.75   < >   --    GFRESTIMATED  79  73   --   75   < >   --    GFRESTBLACK  >90   GFR Calc    88   --   >90   GFR Calc     < >   --    POTASSIUM  3.7  4.1   --   4.0   < >   --    TSH   --    --    --    --    --   0.67    < > = values in this interval not displayed.      BP  "Readings from Last 3 Encounters:   17 139/73   17 128/84   16 126/78    Wt Readings from Last 3 Encounters:   17 85.8 kg (189 lb 4 oz)   17 85.5 kg (188 lb 8 oz)   16 86.6 kg (191 lb)        Reviewed and updated as needed this visit by clinical staff  Reviewed and updated as needed this visit by Provider    ROS:  Denies chest pain or SOB. See above.    This document serves as a record of the services and decisions personally performed and made by Mikala Philip MD. It was created on his/her behalf by Elena Hinkle, trained medical scribe. The creation of this document is based the provider's statements to the medical scribes.    Scribe Elena Hinkle, 2017  OBJECTIVE:                                                    /73  Pulse 66  Temp 97.8  F (36.6  C) (Oral)  Resp 16  Ht 1.626 m (5' 4\")  Wt 85.8 kg (189 lb 4 oz)  SpO2 99%  BMI 32.48 kg/m2  Body mass index is 32.48 kg/(m^2).  GENERAL: healthy, alert and no distress    Diagnostic Test Results:  Results for orders placed or performed in visit on 17   DX Hip/Pelvis/Spine w Lat Fraction [place]    Narrative    BONE DENSITOMETRY  [facility]  [address] [place] [id number]  3/28/2017      PATIENT: Hamida Verma  CHART: [id number]   : 1940  AGE: 76 year old  SEX: female   REFERRING PROVIDER: Mikala Philip MD        PROCEDURE: Bone density scanning was performed using DXA technology of the   lumbar spine and hip. Scanning was performed on a Lunar Prodigy scanner.   Reporting is completed in the form of a T-score. The T-score represents   the standard deviation from peak bone mass based on a young healthy adult.     REFERENCE T-SCORES:   Normal -1.0 and greater   Osteopenia Between -1.0 and -2.5   Osteoporosis -2.5 and less       RISK FACTORS: Post-menopausal, Follow-up osteopenia  CURRENT TREATMENT: Calcium with Vitamin D     FINDINGS:  Lumbar Spine (L1-L4) T-score: " -1.9  Left Femoral Neck   T-score: -2.6  Right Femoral Neck   T-score: -2.4      Lumbar (L1-L4) BMD: 0.963 Previous: 0.979   Total Hip Mean BMD: 0.747 Previous: 0.793     Comparison is made to another DXA performed on the same Lunar Prodigy   machine on 3/19/2013.     LATERAL VERTEBRAL ASSESSMENT  Procedure:  Vertebral fracture assessment was performed in the lateral   decubitus position using a [employer] Prodigy  densitometer.  Indications for VFA: T-score of -1.0 or worse and age (female>69)  Confounding factors for VFA: Arthritis/degenerative disc disease and rib   shadows.  The LVA scan is interpretable from T10 to L4.    VFA Findings: Using the semi-quantitative analysis of [employer] there was   evidence of no spinal deformity  VFA Impression: Hamida Verma has no vertebral fractures identified on   the VFA.   Further evaluation may be warranted due to presence of confounding factors       IMPRESSION  Osteoporosis  Consider medication intervention    Compared to previous bone densitometry performed on this patient, there is   the suggestion of no significant change of the lumbar spine, and possibly   significant worsening of the (total) hip.      Jason Perez MD           ASSESSMENT/PLAN:                                                    1. Hypertension goal BP (blood pressure) < 140/90  Well controlled. Continue lisinopril-hctz 10-12.5 daily  - metoprolol (TOPROL-XL) 50 MG 24 hr tablet; Take 1 tablet (50 mg) by mouth daily  Dispense: 90 tablet; Refill: 3    2. Hyperlipidemia LDL goal <130  Stable. Continue simvastatin 20mg     3. Palpitations  Controlled. Pt has not had any palpitations since starting the metoprolol.  - metoprolol (TOPROL-XL) 50 MG 24 hr tablet; Take 1 tablet (50 mg) by mouth daily  Dispense: 90 tablet; Refill: 3    4. Osteoporosis  Reviewed DEXA today with pt. Fosamax recommended. Risks/benefits and instructions for taking were discussed. She will start Fosamax. Labs today vitamin D, phos,  CBC. She recently had a normal cmp  - alendronate (FOSAMAX) 70 MG tablet; Take 1 tablet (70 mg) by mouth every 7 days Take 60 minutes before am meal with 8 oz. water. Remain upright for 30 minutes.  Dispense: 12 tablet; Refill: 3    F/u in 6 months.     Patient Instructions   1) Continue your metoprolol  2) Start the fosamax once weekly  3) I will let you know of the results from the labs today. If your vitamin D level is low, I will recommend increasing your daily dose.       The information in this document, created by the medical scribe for me, accurately reflects the services I personally performed and the decisions made by me. I have reviewed and approved this document for accuracy prior to leaving the patient care area. 04/04/17    Mikala Philip MD  [facility]

## 2017-04-04 NOTE — MR AVS SNAPSHOT
"              After Visit Summary   4/4/2017    Hamida Verma    MRN: [id number]           Patient Information     Date Of Birth          1940        Visit Information        Provider Department      4/4/2017 8:00 AM Mikala Philip MD [facility]        Today's Diagnoses     Hypertension goal BP (blood pressure) < 140/90    -  1    Hyperlipidemia LDL goal <130        Palpitations        Osteoporosis          Care Instructions    1) Continue your metoprolol  2) Start the fosamax once weekly  3) I will let you know of the results from the labs today. If your vitamin D level is low, I will recommend increasing your daily dose.         Follow-ups after your visit        Who to contact     If you have questions or need follow up information about today's clinic visit or your schedule please contact [facility] directly at [phone].  Normal or non-critical lab and imaging results will be communicated to you by [employer], letter or phone within 4 business days after the clinic has received the results. If you do not hear from us within 7 days, please contact the clinic through MyChart or phone. If you have a critical or abnormal lab result, we will notify you by phone as soon as possible.  Submit refill requests through [email or web address] or call your pharmacy and they will forward the refill request to us. Please allow 3 business days for your refill to be completed.          Additional Information About Your Visit        MyChart Information     [email or web address] lets you send messages to your doctor, view your test results, renew your prescriptions, schedule appointments and more. To sign up, go to [email or web address] . Click on \"Log in\" on the left side of the screen, which will take you to the Welcome page. Then click on \"Sign up Now\" on the right side of the page.     You will be asked to enter the access code listed below, as well as some personal information. Please follow the directions to " "create your username and password.     Your access code is: [email or web address]  Expires: 2017  8:43 AM     Your access code will  in 90 days. If you need help or a new code, please call your [facility] or [phone].        Care EveryWhere ID     This is your Care EveryWhere ID. This could be used by other organizations to access your [facility] medical records  [id number]        Your Vitals Were     Pulse Temperature Respirations Height Pulse Oximetry BMI (Body Mass Index)    66 97.8  F (36.6  C) (Oral) 16 5' 4\" (1.626 m) 99% 32.48 kg/m2       Blood Pressure from Last 3 Encounters:   17 139/73   17 128/84   16 126/78    Weight from Last 3 Encounters:   17 189 lb 4 oz (85.8 kg)   17 188 lb 8 oz (85.5 kg)   16 191 lb (86.6 kg)              We Performed the Following     CBC with platelets     Phosphorus     Vitamin D Deficiency          Today's Medication Changes          These changes are accurate as of: 17  8:22 AM.  If you have any questions, ask your nurse or doctor.               Start taking these medicines.        Dose/Directions    alendronate 70 MG tablet   Commonly known as:  FOSAMAX   Used for:  Osteoporosis   Started by:  Mikala Philip MD        Dose:  70 mg   Take 1 tablet (70 mg) by mouth every 7 days Take 60 minutes before am meal with 8 oz. water. Remain upright for 30 minutes.   Quantity:  12 tablet   Refills:  3            Where to get your medicines      These medications were sent to [facility] - [place], [place] - [address]  [address] [facility] [address]     Phone:  [phone]     alendronate 70 MG tablet    metoprolol 50 MG 24 hr tablet                Primary Care Provider Office Phone # Fax #    Mikala Philip [phone]       [facility] [address]  [facility] [address]        Thank you!     Thank you for choosing [facility]  for your care. Our goal is always to " provide you with excellent care. Hearing back from our patients is one way we can continue to improve our services. Please take a few minutes to complete the written survey that you may receive in the mail after your visit with us. Thank you!             Your Updated Medication List - Protect others around you: Learn how to safely use, store and throw away your medicines at www.disposemymeds.org.          This list is accurate as of: 4/4/17  8:22 AM.  Always use your most recent med list.                   Brand Name Dispense Instructions for use    alendronate 70 MG tablet    FOSAMAX    12 tablet    Take 1 tablet (70 mg) by mouth every 7 days Take 60 minutes before am meal with 8 oz. water. Remain upright for 30 minutes.       ASPIR-81 PO      1 TABLET DAILY       B-12 1000 MCG Caps      Take  by mouth.       CALCIUM + D PO      1 tab daily       cholecalciferol 1000 UNITS capsule    vitamin  -D     Take 1 capsule by mouth daily.       FISH OIL      900 mg daily       FLAX SEED OIL PO      1 capsule daily       fluticasone 50 MCG/ACT spray    FLONASE    1 Package    Spray 1-2 sprays into both nostrils daily       lisinopril-hydrochlorothiazide 10-12.5 MG per tablet    PRINZIDE/ZESTORETIC    90 tablet    Take 1 tablet by mouth daily Profile Rx: patient will contact pharmacy when needed       metoprolol 50 MG 24 hr tablet    TOPROL-XL    90 tablet    Take 1 tablet (50 mg) by mouth daily       Milk Thistle 200 MG Caps      Take  by mouth.       olopatadine 0.1 % ophthalmic solution    PATANOL    5 mL    Place 1 drop into both eyes 2 times daily       potassium & sodium phosphates 278-164-250 MG/75ML Solr      Take  by mouth.       simvastatin 20 MG tablet    ZOCOR    90 tablet    Take 1 tablet (20 mg) by mouth At Bedtime Profile Rx: patient will contact pharmacy when needed

## 2017-04-04 NOTE — PATIENT INSTRUCTIONS
1) Continue your metoprolol  2) Start the fosamax once weekly  3) I will let you know of the results from the labs today. If your vitamin D level is low, I will recommend increasing your daily dose.

## 2017-10-12 ENCOUNTER — OFFICE VISIT (OUTPATIENT)
Dept: FAMILY MEDICINE | Facility: CLINIC | Age: 77
End: 2017-10-12
Payer: COMMERCIAL

## 2017-10-12 VITALS
OXYGEN SATURATION: 99 % | SYSTOLIC BLOOD PRESSURE: 125 MMHG | HEART RATE: 77 BPM | RESPIRATION RATE: 16 BRPM | WEIGHT: 190.75 LBS | DIASTOLIC BLOOD PRESSURE: 82 MMHG | TEMPERATURE: 97.8 F | BODY MASS INDEX: 32.56 KG/M2 | HEIGHT: 64 IN

## 2017-10-12 DIAGNOSIS — R79.89 ELEVATED PLATELET COUNT: ICD-10-CM

## 2017-10-12 DIAGNOSIS — E78.5 HYPERLIPIDEMIA LDL GOAL <130: ICD-10-CM

## 2017-10-12 DIAGNOSIS — Z00.00 MEDICARE ANNUAL WELLNESS VISIT, SUBSEQUENT: Primary | ICD-10-CM

## 2017-10-12 DIAGNOSIS — E66.9 OBESITY WITHOUT SERIOUS COMORBIDITY, UNSPECIFIED CLASSIFICATION, UNSPECIFIED OBESITY TYPE: ICD-10-CM

## 2017-10-12 DIAGNOSIS — M79.671 PAIN OF RIGHT HEEL: ICD-10-CM

## 2017-10-12 DIAGNOSIS — M81.0 OSTEOPOROSIS WITHOUT CURRENT PATHOLOGICAL FRACTURE, UNSPECIFIED OSTEOPOROSIS TYPE: ICD-10-CM

## 2017-10-12 DIAGNOSIS — I10 HYPERTENSION GOAL BP (BLOOD PRESSURE) < 140/90: ICD-10-CM

## 2017-10-12 DIAGNOSIS — R00.2 PALPITATIONS: ICD-10-CM

## 2017-10-12 PROCEDURE — 99397 PER PM REEVAL EST PAT 65+ YR: CPT | Performed by: FAMILY MEDICINE

## 2017-10-12 RX ORDER — METOPROLOL SUCCINATE 50 MG/1
TABLET, EXTENDED RELEASE ORAL
Qty: 30 TABLET | Refills: 11 | Status: SHIPPED | OUTPATIENT
Start: 2017-10-12 | End: 2018-03-06

## 2017-10-12 NOTE — NURSING NOTE
"Chief Complaint   Patient presents with     Wellness Visit       Initial /82 (BP Location: Left arm, Patient Position: Sitting, Cuff Size: Adult Regular)  Pulse 77  Temp 97.8  F (36.6  C) (Oral)  Resp 16  Ht 5' 4\" (1.626 m)  Wt 190 lb 12 oz (86.5 kg)  LMP  (LMP Unknown)  SpO2 99%  Breastfeeding? No  BMI 32.74 kg/m2 Estimated body mass index is 32.74 kg/(m^2) as calculated from the following:    Height as of this encounter: 5' 4\" (1.626 m).    Weight as of this encounter: 190 lb 12 oz (86.5 kg).  Medication Reconciliation: complete     Kiki Marshall MA      "

## 2017-10-12 NOTE — PROGRESS NOTES
SUBJECTIVE:   Hamida Verma is a 77 year old female who presents for Preventive Visit.  Are you in the first 12 months of your Medicare coverage?  No    Physical   Annual:     Getting at least 3 servings of Calcium per day::  Yes    Bi-annual eye exam::  Yes    Dental care twice a year::  NO    Sleep apnea or symptoms of sleep apnea::  None    Diet::  Regular (no restrictions)    Frequency of exercise::  6-7 days/week    Duration of exercise::  15-30 minutes    Taking medications regularly::  No    Barriers to taking medications::  None    Additional concerns today::  YES    COGNITIVE SCREEN  1) Repeat 3 items (Banana, Sunrise, Chair)    2) Clock draw: NORMAL  3) 3 item recall: Recalls 3 objects  Results: 3 items recalled: COGNITIVE IMPAIRMENT LESS LIKELY  Mini-CogTM Copyright S Nataliya. Licensed by the author for use in [facility]; reprinted with permission (bony@[facility]). All rights reserved.        Right heel pain - For a week and a half to two weeks she has been experiencing left heel pain. She purchased shoe inserts which relieves the pain for the mots part. The longer she is on her feet or towards the end of the day pain returns.    Fosamax - She had troubles taking Fosamax. It was not that she did not tolerate the medication but has had troubles incorporating it into her routine. She has coffee every morning and finds herself drinking her coffee before taking the pill. She has tried putting her pills on top of the  as a reminder and that did not work. Patient does not necessarily want to start injections.     Heart palpitations - She is tolerating metoprolol well and has not had any heart palpitations since being on the medication.     Temperature intolerance - After waking up to pee at night she notices that she feels very hot when getting back into bed. She does not eat prior to bed or drink alcohol. They feel like hot flashes and have been ongoing. Denies feeling sweaty or running  a fever. She sleeps with the window open and has a fan going.      Reviewed and updated as needed this visit by clinical staff  Tobacco  Allergies  Meds  Med Hx  Surg Hx  Fam Hx  Soc Hx      Reviewed and updated as needed this visit by Provider        Social History   Substance Use Topics     Smoking status: Former Smoker     Quit date: 9/1/2000     Smokeless tobacco: Never Used      Comment: quit 6yrs ago     Alcohol use No     The patient does not drink >3 drinks per day nor >7 drinks per week.    Today's PHQ-2 Score:   PHQ-2 ( 1999 Pf[employer]) 3/23/2017   Q1: Little interest or pleasure in doing things 0   Q2: Feeling down, depressed or hopeless 0   PHQ-2 Score 0     Do you feel safe in your environment - Yes    Do you have a Health Care Directive?: No: Advance care planning reviewed with patient; information given to patient to review.    Current providers sharing in care for this patient include: Patient Care Team:  Mikala Philip MD as PCP - General (Family Practice)      Hearing impairment: No    Ability to successfully perform activities of daily living: Yes, no assistance needed     Fall risk:       Home safety:  none identified    The following health maintenance items are reviewed in Epic and correct as of today:  Health Maintenance   Topic Date Due     ADVANCE DIRECTIVE PLANNING Q5 YRS  07/20/2016     INFLUENZA VACCINE (SYSTEM ASSIGNED)  09/01/2017     CMP Q1 YR  03/23/2018     FALL RISK ASSESSMENT  03/23/2018     LIPID MONITORING Q1 YEAR  03/23/2018     TETANUS IMMUNIZATION (SYSTEM ASSIGNED)  05/07/2025     DEXA SCAN SCREENING (SYSTEM ASSIGNED)  Completed     PNEUMOCOCCAL  Completed     BP Readings from Last 3 Encounters:   10/12/17 125/82   04/04/17 139/73   03/23/17 128/84    Wt Readings from Last 3 Encounters:   10/12/17 86.5 kg (190 lb 12 oz)   04/04/17 85.8 kg (189 lb 4 oz)   03/23/17 85.5 kg (188 lb 8 oz)        Patient Active Problem List   Diagnosis     Symptomatic menopausal or female  climacteric states     HYPERLIPIDEMIA LDL GOAL <130     Hypertension goal BP (blood pressure) < 140/90     Knee pain     Obesity     Past Surgical History:   Procedure Laterality Date     HYSTERECTOMY, VAGINAL         Social History   Substance Use Topics     Smoking status: Former Smoker     Quit date: 9/1/2000     Smokeless tobacco: Never Used      Comment: quit 6yrs ago     Alcohol use No     Family History   Problem Relation Age of Onset     CEREBROVASCULAR DISEASE Mother      Eye Disorder Mother      Myocardial Infarction Mother      C.A.D. Father      heart attack     Alcohol/Drug Father      alcohol     Blood Disease Sister      lupus     Depression Sister      DIABETES No family hx of      Breast Cancer No family hx of      Cancer - colorectal No family hx of          Current Outpatient Prescriptions   Medication Sig Dispense Refill     metoprolol (TOPROL-XL) 50 MG 24 hr tablet Take 1 tablet (50 mg) by mouth daily 90 tablet 3     lisinopril-hydrochlorothiazide (PRINZIDE/ZESTORETIC) 10-12.5 MG per tablet Take 1 tablet by mouth daily Profile Rx: patient will contact pharmacy when needed 90 tablet 3     simvastatin (ZOCOR) 20 MG tablet Take 1 tablet (20 mg) by mouth At Bedtime Profile Rx: patient will contact pharmacy when needed 90 tablet 3     olopatadine (PATANOL) 0.1 % ophthalmic solution Place 1 drop into both eyes 2 times daily 5 mL 3     fluticasone (FLONASE) 50 MCG/ACT nasal spray Spray 1-2 sprays into both nostrils daily 1 Package 11     Cyanocobalamin (B-12) 1000 MCG CAPS Take  by mouth.       FISH  mg daily        cholecalciferol (VITAMIN  -D) 1000 UNIT capsule Take 1 capsule by mouth daily.       potassium & sodium phosphates 278-164-250 MG/75ML SOLR Take  by mouth.       Milk Thistle 200 MG CAPS Take  by mouth.       CALCIUM + D OR 1 tab daily       FLAX SEED OIL OR 1 capsule daily       ASPIR-81 OR 1 TABLET DAILY       alendronate (FOSAMAX) 70 MG tablet Take 1 tablet (70 mg) by mouth every  "7 days Take 60 minutes before am meal with 8 oz. water. Remain upright for 30 minutes. (Patient not taking: Reported on 10/12/2017) 12 tablet 3     Allergies   Allergen Reactions     Strawberry Flavor      Recent Labs   Lab Test  03/23/17   0804  05/23/16   1056  05/07/15   0824  11/28/14   0806   LDL  91  103*  104   --    HDL  61  66  70   --    TRIG  109  104  102   --    ALT  24  26   --   22   CR  0.71  0.77   --   0.75   GFRESTIMATED  79  73   --   75   GFRESTBLACK  >90   GFR Calc    88   --   >90   GFR Calc     POTASSIUM  3.7  4.1   --   4.0      Pneumonia Vaccine: COMPLETED  Mammogram Screening: Patient over age 75, has elected to stop mammography screening.  History of abnormal Pap smear: NO - age 65 - see link Cervical Cytology Screening Guidelines Last 3 Pap Results: No results found for: PAP    ROS:   ROS: 10 point ROS neg other than the symptoms noted above in the HPI.    This document serves as a record of the services and decisions personally performed and made by Mikala Philip MD. It was created on his/her behalf by Elena Hinkle, trained medical scribe. The creation of this document is based the provider's statements to the medical scribes.    Scribe Elena Hinkle, October 12, 2017  OBJECTIVE:   /82 (BP Location: Left arm, Patient Position: Sitting, Cuff Size: Adult Regular)  Pulse 77  Temp 97.8  F (36.6  C) (Oral)  Resp 16  Ht 1.626 m (5' 4\")  Wt 86.5 kg (190 lb 12 oz)  LMP  (LMP Unknown)  SpO2 99%  Breastfeeding? No  BMI 32.74 kg/m2 Estimated body mass index is 32.74 kg/(m^2) as calculated from the following:    Height as of this encounter: 1.626 m (5' 4\").    Weight as of this encounter: 86.5 kg (190 lb 12 oz).  EXAM:   GENERAL APPEARANCE: healthy, alert and no distress  EYES: Eyes grossly normal to inspection, PERRL and conjunctivae and sclerae normal  HENT: ear canals and TM's normal, nose and mouth without ulcers or lesions, oropharynx clear " and oral mucous membranes moist  NECK: no adenopathy, no asymmetry, masses, or scars and thyroid normal to palpation  RESP: lungs clear to auscultation - no rales, rhonchi or wheezes  CV: regular rate and rhythm, normal S1 S2, no S3 or S4, no murmur, click or rub, no peripheral edema and peripheral pulses strong  ABDOMEN: soft, nontender, no hepatosplenomegaly, no masses and bowel sounds normal  MS: no musculoskeletal defects are noted and gait is age appropriate without ataxia  SKIN: no suspicious lesions or rashes  NEURO: Normal strength and tone, sensory exam grossly normal, mentation intact and speech normal  PSYCH: mentation appears normal and affect normal/bright  ASSESSMENT / PLAN:   1. Medicare annual wellness visit, subsequent  Pt declines mammo. Fasting labs in the spring. Health maintenance utd     2. Hypertension goal BP (blood pressure) < 140/90  Controlled. Continues metoprolol 50 mg and lisinopril-HCTZ 10-12.5 mg daily.     3. Hyperlipidemia LDL goal <130  Stable. Continues simvastatin 20 mg daily.    4. Obesity without serious comorbidity, unspecified classification, unspecified obesity type  Diet and exercise    5. Elevated platelet count (H)  Only very slightly elevated last check. Suspect reactive. Discussed recheck now vs when she returns for labs in the spring. She will wait until other labs due in the spring.     6. Pain of right heel  Improving with orthotics. Discussed if pain continues to be bothersome or worsens she can schedule with Dr. Vanessa Podiatry for further evaluation.     7. Osteoporosis without current pathological fracture, unspecified osteoporosis type  Stable. She has difficulties remembering to avoid drinking or eating anything on the day that she needs to take her Fosamax dose. Discussed a plan to help her remember to take the med. Continues on Fosamax 70 mg weekly.     Patient Instructions   1. If your heel pain persists schedule with Dr. Vanessa Podiatry.  2. The day before you  are supposed to take your Fosamax have a reminder in your calendar to unplug your . Set your pills on the .   3. Follow up with me in the spring. We will check your fasting labs then.       Preventive Health Recommendations    Female Ages 65 +    Yearly exam:     See your health care provider every year in order to  o Review health changes.   o Discuss preventive care.    o Review your medicines if your doctor has prescribed any.      You no longer need a yearly Pap test unless you've had an abnormal Pap test in the past 10 years. If you have vaginal symptoms, such as bleeding or discharge, be sure to talk with your provider about a Pap test.      Every 1 to 2 years, have a mammogram.  If you are over 69, talk with your health care provider about whether or not you want to continue having screening mammograms.      Every 10 years, have a colonoscopy. Or, have a yearly FIT test (stool test). These exams will check for colon cancer.       Have a cholesterol test every 5 years, or more often if your doctor advises it.       Have a diabetes test (fasting glucose) every three years. If you are at risk for diabetes, you should have this test more often.       At age 65, have a bone density scan (DEXA) to check for osteoporosis (brittle bone disease).    Shots:    Get a flu shot each year.    Get a tetanus shot every 10 years.    Talk to your doctor about your pneumonia vaccines. There are now two you should receive - Pneumovax (PPSV 23) and Prevnar (PCV 13).    Talk to your doctor about the shingles vaccine.    Talk to your doctor about the hepatitis B vaccine.    Nutrition:     Eat at least 5 servings of fruits and vegetables each day.      Eat whole-grain bread, whole-wheat pasta and brown rice instead of white grains and rice.      Talk to your provider about Calcium and Vitamin D.     Lifestyle    Exercise at least 150 minutes a week (30 minutes a day, 5 days a week). This will help you control  "your weight and prevent disease.      Limit alcohol to one drink per day.      No smoking.       Wear sunscreen to prevent skin cancer.       See your dentist twice a year for an exam and cleaning.      See your eye doctor every 1 to 2 years to screen for conditions such as glaucoma, macular degeneration and cataracts.         End of Life Planning:  Patient currently has an advanced directive: No.  I have verified the patient's ablity to prepare an advanced directive/make health care decisions.  Literature was provided to assist patient in preparing an advanced directive.    COUNSELING:  Reviewed preventive health counseling, as reflected in patient instructions  Estimated body mass index is 32.74 kg/(m^2) as calculated from the following:    Height as of this encounter: 1.626 m (5' 4\").    Weight as of this encounter: 86.5 kg (190 lb 12 oz).  Weight management plan: Discussed healthy diet and exercise guidelines and patient will follow up in 12 months in clinic to re-evaluate.   reports that she quit smoking about 17 years ago. She has never used smokeless tobacco.  Appropriate preventive services were discussed with this patient, including applicable screening as appropriate for cardiovascular disease, diabetes, osteopenia/osteoporosis, and glaucoma.  As appropriate for age/gender, discussed screening for colorectal cancer, prostate cancer, breast cancer, and cervical cancer. Checklist reviewing preventive services available has been given to the patient.    Reviewed patients plan of care and provided an AVS. The Basic Care Plan (routine screening as documented in Health Maintenance) for Hamida meets the Care Plan requirement. This Care Plan has been established and reviewed with the Patient.    Counseling Resources:  ATP IV Guidelines  Pooled Cohorts Equation Calculator  Breast Cancer Risk Calculator  FRAX Risk Assessment  ICSI Preventive Guidelines  Dietary Guidelines for Americans, 2010  USDA's MyPlate  ASA " Prophylaxis  Lung CA Screening    The information in this document, created by the medical scribe for me, accurately reflects the services I personally performed and the decisions made by me. I have reviewed and approved this document for accuracy. 10/12/17    Mikala Philip MD  [facility]

## 2017-10-12 NOTE — MR AVS SNAPSHOT
After Visit Summary   10/12/2017    Hamida Verma    MRN: [id number]           Patient Information     Date Of Birth          1940        Visit Information        Provider Department      10/12/2017 8:20 AM Mikala Philip MD [facility]        Today's Diagnoses     Medicare annual wellness visit, subsequent    -  1    Hypertension goal BP (blood pressure) < 140/90        Hyperlipidemia LDL goal <130        Obesity without serious comorbidity, unspecified classification, unspecified obesity type        Elevated platelet count (H)        Pain of right heel        Osteoporosis without current pathological fracture, unspecified osteoporosis type        Need for prophylactic vaccination and inoculation against influenza          Care Instructions    1. If your heel pain persists schedule with Dr. Vanessa Podiatry.  2. The day before you are supposed to take your Fosamax have a reminder in your calendar to unplug your . Set your pills on the .   3. Follow up with me in the spring. We will check your fasting labs then.       Preventive Health Recommendations    Female Ages 65 +    Yearly exam:     See your health care provider every year in order to  o Review health changes.   o Discuss preventive care.    o Review your medicines if your doctor has prescribed any.      You no longer need a yearly Pap test unless you've had an abnormal Pap test in the past 10 years. If you have vaginal symptoms, such as bleeding or discharge, be sure to talk with your provider about a Pap test.      Every 1 to 2 years, have a mammogram.  If you are over 69, talk with your health care provider about whether or not you want to continue having screening mammograms.      Every 10 years, have a colonoscopy. Or, have a yearly FIT test (stool test). These exams will check for colon cancer.       Have a cholesterol test every 5 years, or more often if your doctor advises it.       Have a  diabetes test (fasting glucose) every three years. If you are at risk for diabetes, you should have this test more often.       At age 65, have a bone density scan (DEXA) to check for osteoporosis (brittle bone disease).    Shots:    Get a flu shot each year.    Get a tetanus shot every 10 years.    Talk to your doctor about your pneumonia vaccines. There are now two you should receive - Pneumovax (PPSV 23) and Prevnar (PCV 13).    Talk to your doctor about the shingles vaccine.    Talk to your doctor about the hepatitis B vaccine.    Nutrition:     Eat at least 5 servings of fruits and vegetables each day.      Eat whole-grain bread, whole-wheat pasta and brown rice instead of white grains and rice.      Talk to your provider about Calcium and Vitamin D.     Lifestyle    Exercise at least 150 minutes a week (30 minutes a day, 5 days a week). This will help you control your weight and prevent disease.      Limit alcohol to one drink per day.      No smoking.       Wear sunscreen to prevent skin cancer.       See your dentist twice a year for an exam and cleaning.      See your eye doctor every 1 to 2 years to screen for conditions such as glaucoma, macular degeneration and cataracts.          Follow-ups after your visit        Who to contact     If you have questions or need follow up information about today's clinic visit or your schedule please contact [facility] directly at [phone].  Normal or non-critical lab and imaging results will be communicated to you by MyChart, letter or phone within 4 business days after the clinic has received the results. If you do not hear from us within 7 days, please contact the clinic through MyChart or phone. If you have a critical or abnormal lab result, we will notify you by phone as soon as possible.  Submit refill requests through [employer] or call your pharmacy and they will forward the refill request to us. Please allow 3 business days for your refill to be  "completed.          Additional Information About Your Visit        [employer] Information     [employer] lets you send messages to your doctor, view your test results, renew your prescriptions, schedule appointments and more. To sign up, go to [email or web address] . Click on \"Log in\" on the left side of the screen, which will take you to the Welcome page. Then click on \"Sign up Now\" on the right side of the page.     You will be asked to enter the access code listed below, as well as some personal information. Please follow the directions to create your username and password.     Your access code is: [email or web address]  Expires: 1/10/2018  9:02 AM     Your access code will  in 90 days. If you need help or a new code, please call your [place] clinic or [phone].        Care EveryWhere ID     This is your Care EveryWhere ID. This could be used by other organizations to access your [place] medical records  [id number]        Your Vitals Were     Pulse Temperature Respirations Height Last Period Pulse Oximetry    77 97.8  F (36.6  C) (Oral) 16 5' 4\" (1.626 m) (LMP Unknown) 99%    Breastfeeding? BMI (Body Mass Index)                No 32.74 kg/m2           Blood Pressure from Last 3 Encounters:   10/12/17 125/82   17 139/73   17 128/84    Weight from Last 3 Encounters:   10/12/17 190 lb 12 oz (86.5 kg)   17 189 lb 4 oz (85.8 kg)   17 188 lb 8 oz (85.5 kg)              Today, you had the following     No orders found for display       Primary Care Provider Office Phone # Fax #    Mikala Philip [phone] [address] [facility] [address]        Equal Access to Services     [facility] FRANK : Sushant Garcia, madelaine potts, carlos ramírez. So [facility] [phone].    ATENCIÓN: Si habla español, tiene a ivy disposición servicios gratuitos de asistencia lingüística. Llame al [phone].    We comply with " applicable federal civil rights laws and [place] laws. We do not discriminate on the basis of race, color, national origin, age, disability, sex, sexual orientation, or gender identity.            Thank you!     Thank you for choosing [facility]  for your care. Our goal is always to provide you with excellent care. Hearing back from our patients is one way we can continue to improve our services. Please take a few minutes to complete the written survey that you may receive in the mail after your visit with us. Thank you!             Your Updated Medication List - Protect others around you: Learn how to safely use, store and throw away your medicines at www.disposemymeds.org.          This list is accurate as of: 10/12/17  9:02 AM.  Always use your most recent med list.                   Brand Name Dispense Instructions for use Diagnosis    alendronate 70 MG tablet    FOSAMAX    12 tablet    Take 1 tablet (70 mg) by mouth every 7 days Take 60 minutes before am meal with 8 oz. water. Remain upright for 30 minutes.    Osteoporosis       ASPIR-81 PO      1 TABLET DAILY        B-12 1000 MCG Caps      Take  by mouth.        CALCIUM + D PO      1 tab daily        cholecalciferol 1000 UNITS capsule    vitamin  -D     Take 1 capsule by mouth daily.        FISH OIL      900 mg daily        FLAX SEED OIL PO      1 capsule daily        fluticasone 50 MCG/ACT spray    FLONASE    1 Package    Spray 1-2 sprays into both nostrils daily    Seasonal allergic rhinitis       lisinopril-hydrochlorothiazide 10-12.5 MG per tablet    PRINZIDE/ZESTORETIC    90 tablet    Take 1 tablet by mouth daily Profile Rx: patient will contact pharmacy when needed    Hypertension goal BP (blood pressure) < 140/90       metoprolol 50 MG 24 hr tablet    TOPROL-XL    90 tablet    Take 1 tablet (50 mg) by mouth daily    Hypertension goal BP (blood pressure) < 140/90, Palpitations       Milk Thistle 200 MG Caps      Take  by mouth.         olopatadine 0.1 % ophthalmic solution    PATANOL    5 mL    Place 1 drop into both eyes 2 times daily    Allergic conjunctivitis, bilateral       potassium & sodium phosphates 278-164-250 MG/75ML Solr      Take  by mouth.        simvastatin 20 MG tablet    ZOCOR    90 tablet    Take 1 tablet (20 mg) by mouth At Bedtime Profile Rx: patient will contact pharmacy when needed    Hyperlipidemia LDL goal <130

## 2017-10-12 NOTE — TELEPHONE ENCOUNTER
Metoprolol      Last Written Prescription Date: 4/4/17  Last Fill Quantity: 90, # refills: 3  Last Office Visit with [facility], [facility] or [facility] prescribing provider: 10/12/17  jose sherman         Potassium   Date Value Ref Range Status   03/23/2017 3.7 3.4 - 5.3 mmol/L Final     Creatinine   Date Value Ref Range Status   03/23/2017 0.71 0.52 - 1.04 mg/dL Final     BP Readings from Last 3 Encounters:   10/12/17 125/82   04/04/17 139/73   03/23/17 128/84

## 2017-11-16 NOTE — PATIENT INSTRUCTIONS
1. If your heel pain persists schedule with Dr. Vanessa Podiatry.  2. The day before you are supposed to take your Fosamax have a reminder in your calendar to unplug your . Set your pills on the .   3. Follow up with me in the spring. We will check your fasting labs then.       Preventive Health Recommendations    Female Ages 65 +    Yearly exam:     See your health care provider every year in order to  o Review health changes.   o Discuss preventive care.    o Review your medicines if your doctor has prescribed any.      You no longer need a yearly Pap test unless you've had an abnormal Pap test in the past 10 years. If you have vaginal symptoms, such as bleeding or discharge, be sure to talk with your provider about a Pap test.      Every 1 to 2 years, have a mammogram.  If you are over 69, talk with your health care provider about whether or not you want to continue having screening mammograms.      Every 10 years, have a colonoscopy. Or, have a yearly FIT test (stool test). These exams will check for colon cancer.       Have a cholesterol test every 5 years, or more often if your doctor advises it.       Have a diabetes test (fasting glucose) every three years. If you are at risk for diabetes, you should have this test more often.       At age 65, have a bone density scan (DEXA) to check for osteoporosis (brittle bone disease).    Shots:    Get a flu shot each year.    Get a tetanus shot every 10 years.    Talk to your doctor about your pneumonia vaccines. There are now two you should receive - Pneumovax (PPSV 23) and Prevnar (PCV 13).    Talk to your doctor about the shingles vaccine.    Talk to your doctor about the hepatitis B vaccine.    Nutrition:     Eat at least 5 servings of fruits and vegetables each day.      Eat whole-grain bread, whole-wheat pasta and brown rice instead of white grains and rice.      Talk to your provider about Calcium and Vitamin D.     Lifestyle    Exercise at  least 150 minutes a week (30 minutes a day, 5 days a week). This will help you control your weight and prevent disease.      Limit alcohol to one drink per day.      No smoking.       Wear sunscreen to prevent skin cancer.       See your dentist twice a year for an exam and cleaning.      See your eye doctor every 1 to 2 years to screen for conditions such as glaucoma, macular degeneration and cataracts.   61.2

## 2017-11-30 ENCOUNTER — ALLIED HEALTH/NURSE VISIT (OUTPATIENT)
Dept: NURSING | Facility: CLINIC | Age: 77
End: 2017-11-30
Payer: COMMERCIAL

## 2017-11-30 VITALS
DIASTOLIC BLOOD PRESSURE: 80 MMHG | HEART RATE: 62 BPM | HEIGHT: 64 IN | BODY MASS INDEX: 32.52 KG/M2 | SYSTOLIC BLOOD PRESSURE: 124 MMHG | WEIGHT: 190.5 LBS

## 2017-11-30 DIAGNOSIS — I10 HYPERTENSION GOAL BP (BLOOD PRESSURE) < 150/90: Primary | ICD-10-CM

## 2017-11-30 PROCEDURE — 99207 ZZC NO CHARGE LOS: CPT

## 2017-11-30 NOTE — PATIENT INSTRUCTIONS
Lifestyle changes that can help control high blood pressure:  Even though PGEN is a study to test effectiveness of genetically guided medications for managing high blood pressure, there are several things you can do to ensure your blood pressure stays in good control:    Maintain a healthy weight (BMI<26). A modest amount of weight loss can be helpful    Limit salt intake to under 2400mg daily    Follow the DASH diet (lean meats, low salt, whole grains, lots of fruits/vegies)    Stay active, try to get in 30 minutes of exercise daily.    Manage your daily stress.    Do not smoke cigarettes (or cut back)    Limit alcohol (2 drinks/day for men, 1 drink/day for women)    Patient verbalized understanding of this plan and is will NOT be continuing with this research study

## 2017-11-30 NOTE — MR AVS SNAPSHOT
After Visit Summary   11/30/2017    Hamida Verma    MRN: [id number]           Patient Information     Date Of Birth          1940        Visit Information        Provider Department      11/30/2017 9:30 AM Provider, Alejandro Guillen [facility]        Today's Diagnoses     Hypertension goal BP (blood pressure) < 150/90    -  1      Care Instructions    Lifestyle changes that can help control high blood pressure:  Even though PGEN is a study to test effectiveness of genetically guided medications for managing high blood pressure, there are several things you can do to ensure your blood pressure stays in good control:    Maintain a healthy weight (BMI<26). A modest amount of weight loss can be helpful    Limit salt intake to under 2400mg daily    Follow the DASH diet (lean meats, low salt, whole grains, lots of fruits/vegies)    Stay active, try to get in 30 minutes of exercise daily.    Manage your daily stress.    Do not smoke cigarettes (or cut back)    Limit alcohol (2 drinks/day for men, 1 drink/day for women)    Patient verbalized understanding of this plan and is will NOT be continuing with this research study          Follow-ups after your visit        Who to contact     If you have questions or need follow up information about today's clinic visit or your schedule please contact [facility] directly at [phone].  Normal or non-critical lab and imaging results will be communicated to you by MyChart, letter or phone within 4 business days after the clinic has received the results. If you do not hear from us within 7 days, please contact the clinic through [employer] or phone. If you have a critical or abnormal lab result, we will notify you by phone as soon as possible.  Submit refill requests through [employer] or call your pharmacy and they will forward the refill request to us. Please allow 3 business days for your refill to be completed.          Additional Information  "About Your Visit        MyChart Information     [employer] lets you send messages to your doctor, view your test results, renew your prescriptions, schedule appointments and more. To sign up, go to [email or web address] . Click on \"Log in\" on the left side of the screen, which will take you to the Welcome page. Then click on \"Sign up Now\" on the right side of the page.     You will be asked to enter the access code listed below, as well as some personal information. Please follow the directions to create your username and password.     Your access code is: [email or web address]  Expires: 1/10/2018  8:02 AM     Your access code will  in 90 days. If you need help or a new code, please call your [facility] clinic or [phone].        Care EveryWhere ID     This is your Care EveryWhere ID. This could be used by other organizations to access your [facility] medical records  [id number]        Your Vitals Were     Pulse Height Last Period BMI (Body Mass Index)          62 5' 4\" (1.626 m) (LMP Unknown) 32.7 kg/m2         Blood Pressure from Last 3 Encounters:   17 124/80   10/12/17 125/82   17 139/73    Weight from Last 3 Encounters:   17 190 lb 8 oz (86.4 kg)   10/12/17 190 lb 12 oz (86.5 kg)   17 189 lb 4 oz (85.8 kg)              Today, you had the following     No orders found for display       Primary Care Provider Office Phone # Fax #    Mikala Philip [phone] [address]  [facility] [address]        Equal Access to Services     [facility] : Hadii adams rueda Sodonny, warosalieda luqadaha, qaybta kaalmada kennedi, carlos carvajal . So [facility] [phone].    ATENCIÓN: Si habla español, tiene a ivy disposición servicios gratuitos de asistencia lingüística. Llame al [phone].    We comply with applicable federal civil rights laws and [place] laws. We do not discriminate on the basis of race, color, national origin, age, disability, sex, " sexual orientation, or gender identity.            Thank you!     Thank you for choosing [facility]  for your care. Our goal is always to provide you with excellent care. Hearing back from our patients is one way we can continue to improve our services. Please take a few minutes to complete the written survey that you may receive in the mail after your visit with us. Thank you!             Your Updated Medication List - Protect others around you: Learn how to safely use, store and throw away your medicines at www.disposemymeds.org.          This list is accurate as of: 11/30/17  9:42 AM.  Always use your most recent med list.                   Brand Name Dispense Instructions for use Diagnosis    alendronate 70 MG tablet    FOSAMAX    12 tablet    Take 1 tablet (70 mg) by mouth every 7 days Take 60 minutes before am meal with 8 oz. water. Remain upright for 30 minutes.    Osteoporosis       ASPIR-81 PO      1 TABLET DAILY        B-12 1000 MCG Caps      Take  by mouth.        CALCIUM + D PO      1 tab daily        cholecalciferol 1000 UNITS capsule    vitamin  -D     Take 1 capsule by mouth daily.        FISH OIL      900 mg daily        FLAX SEED OIL PO      1 capsule daily        fluticasone 50 MCG/ACT spray    FLONASE    1 Package    Spray 1-2 sprays into both nostrils daily    Seasonal allergic rhinitis       lisinopril-hydrochlorothiazide 10-12.5 MG per tablet    PRINZIDE/ZESTORETIC    90 tablet    Take 1 tablet by mouth daily Profile Rx: patient will contact pharmacy when needed    Hypertension goal BP (blood pressure) < 140/90       * metoprolol 50 MG 24 hr tablet    TOPROL-XL    90 tablet    Take 1 tablet (50 mg) by mouth daily    Hypertension goal BP (blood pressure) < 140/90, Palpitations       * metoprolol 50 MG 24 hr tablet    TOPROL-XL    30 tablet    TAKE ONE TABLET BY MOUTH EVERY DAY    Hypertension goal BP (blood pressure) < 140/90, Palpitations       Milk Thistle 200 MG Caps      Take   by mouth.        olopatadine 0.1 % ophthalmic solution    PATANOL    5 mL    Place 1 drop into both eyes 2 times daily    Allergic conjunctivitis, bilateral       potassium & sodium phosphates 278-164-250 MG/75ML Solr      Take  by mouth.        simvastatin 20 MG tablet    ZOCOR    90 tablet    Take 1 tablet (20 mg) by mouth At Bedtime Profile Rx: patient will contact pharmacy when needed    Hyperlipidemia LDL goal <130       * Notice:  This list has 2 medication(s) that are the same as other medications prescribed for you. Read the directions carefully, and ask your doctor or other care provider to review them with you.

## 2018-03-01 ENCOUNTER — TELEPHONE (OUTPATIENT)
Dept: FAMILY MEDICINE | Facility: CLINIC | Age: 78
End: 2018-03-01

## 2018-03-01 NOTE — TELEPHONE ENCOUNTER
Pt called with 3 days of symptoms: loose cough, clear runny nose, no fever. Denies SOB, difficulty breathing, pain with inspiration. Has been taking multi-symptom cold relief cough syrup. Advised pt does not sound serious, no influenza. Probably viral URI. Discussed use of steam for symptom relief; advised not combining medications with multi-symptom formula.    Pt to call back if symptoms worsen.    DANIEL LyN, RN  [facility]

## 2018-03-06 ENCOUNTER — APPOINTMENT (OUTPATIENT)
Dept: GENERAL RADIOLOGY | Facility: CLINIC | Age: 78
DRG: 310 | End: 2018-03-06
Attending: EMERGENCY MEDICINE
Payer: COMMERCIAL

## 2018-03-06 ENCOUNTER — HOSPITAL ENCOUNTER (INPATIENT)
Facility: CLINIC | Age: 78
LOS: 3 days | Discharge: HOME | DRG: 310 | End: 2018-03-09
Attending: EMERGENCY MEDICINE | Admitting: INTERNAL MEDICINE
Payer: COMMERCIAL

## 2018-03-06 DIAGNOSIS — I48.91 ATRIAL FIBRILLATION WITH RAPID VENTRICULAR RESPONSE (H): ICD-10-CM

## 2018-03-06 DIAGNOSIS — I10 HYPERTENSION GOAL BP (BLOOD PRESSURE) < 150/90: ICD-10-CM

## 2018-03-06 DIAGNOSIS — J18.9 PNEUMONIA OF BOTH LUNGS DUE TO INFECTIOUS ORGANISM, UNSPECIFIED PART OF LUNG: ICD-10-CM

## 2018-03-06 DIAGNOSIS — R00.0 TACHYCARDIA: ICD-10-CM

## 2018-03-06 LAB
ANION GAP SERPL CALCULATED.3IONS-SCNC: 7 MMOL/L (ref 3–14)
BASOPHILS # BLD AUTO: 0.1 10E9/L (ref 0–0.2)
BASOPHILS NFR BLD AUTO: 0.6 %
BUN SERPL-MCNC: 20 MG/DL (ref 7–30)
CALCIUM SERPL-MCNC: 9 MG/DL (ref 8.5–10.1)
CHLORIDE SERPL-SCNC: 99 MMOL/L (ref 94–109)
CO2 SERPL-SCNC: 29 MMOL/L (ref 20–32)
CREAT SERPL-MCNC: 0.91 MG/DL (ref 0.52–1.04)
DIFFERENTIAL METHOD BLD: ABNORMAL
EOSINOPHIL # BLD AUTO: 0.1 10E9/L (ref 0–0.7)
EOSINOPHIL NFR BLD AUTO: 0.5 %
ERYTHROCYTE [DISTWIDTH] IN BLOOD BY AUTOMATED COUNT: 13 % (ref 10–15)
ERYTHROCYTE [DISTWIDTH] IN BLOOD BY AUTOMATED COUNT: 13.1 % (ref 10–15)
GFR SERPL CREATININE-BSD FRML MDRD: 59 ML/MIN/1.7M2
GLUCOSE SERPL-MCNC: 119 MG/DL (ref 70–99)
HCT VFR BLD AUTO: 38 % (ref 35–47)
HCT VFR BLD AUTO: 40.2 % (ref 35–47)
HGB BLD-MCNC: 12.6 G/DL (ref 11.7–15.7)
HGB BLD-MCNC: 13.6 G/DL (ref 11.7–15.7)
IMM GRANULOCYTES # BLD: 0.2 10E9/L (ref 0–0.4)
IMM GRANULOCYTES NFR BLD: 1.5 %
INTERPRETATION ECG - MUSE: NORMAL
LACTATE BLD-SCNC: 1.4 MMOL/L (ref 0.7–2)
LYMPHOCYTES # BLD AUTO: 1.3 10E9/L (ref 0.8–5.3)
LYMPHOCYTES NFR BLD AUTO: 9 %
MCH RBC QN AUTO: 28.5 PG (ref 26.5–33)
MCH RBC QN AUTO: 28.9 PG (ref 26.5–33)
MCHC RBC AUTO-ENTMCNC: 33.2 G/DL (ref 31.5–36.5)
MCHC RBC AUTO-ENTMCNC: 33.8 G/DL (ref 31.5–36.5)
MCV RBC AUTO: 85 FL (ref 78–100)
MCV RBC AUTO: 86 FL (ref 78–100)
MONOCYTES # BLD AUTO: 1.3 10E9/L (ref 0–1.3)
MONOCYTES NFR BLD AUTO: 9.3 %
NEUTROPHILS # BLD AUTO: 11.1 10E9/L (ref 1.6–8.3)
NEUTROPHILS NFR BLD AUTO: 79.1 %
NRBC # BLD AUTO: 0 10*3/UL
NRBC BLD AUTO-RTO: 0 /100
PLATELET # BLD AUTO: 552 10E9/L (ref 150–450)
PLATELET # BLD AUTO: 620 10E9/L (ref 150–450)
POTASSIUM SERPL-SCNC: 3.4 MMOL/L (ref 3.4–5.3)
PROCALCITONIN SERPL-MCNC: 0.13 NG/ML
RBC # BLD AUTO: 4.42 10E12/L (ref 3.8–5.2)
RBC # BLD AUTO: 4.71 10E12/L (ref 3.8–5.2)
SODIUM SERPL-SCNC: 135 MMOL/L (ref 133–144)
TROPONIN I SERPL-MCNC: <0.015 UG/L (ref 0–0.04)
TSH SERPL DL<=0.005 MIU/L-ACNC: 0.54 MU/L (ref 0.4–4)
WBC # BLD AUTO: 14 10E9/L (ref 4–11)
WBC # BLD AUTO: 15 10E9/L (ref 4–11)

## 2018-03-06 PROCEDURE — 99221 1ST HOSP IP/OBS SF/LOW 40: CPT | Mod: AI | Performed by: INTERNAL MEDICINE

## 2018-03-06 PROCEDURE — 21400004 ZZH R&B CCU CSC INTERMEDIATE

## 2018-03-06 PROCEDURE — 99207 ZZC CDG-HISTORY COMP: MEETS EXP. PROBLEM FOCUSED-DOWN CODED LACK OF ROS: CPT | Performed by: INTERNAL MEDICINE

## 2018-03-06 PROCEDURE — 25000125 ZZHC RX 250: Performed by: EMERGENCY MEDICINE

## 2018-03-06 PROCEDURE — 84145 PROCALCITONIN (PCT): CPT | Performed by: EMERGENCY MEDICINE

## 2018-03-06 PROCEDURE — 84443 ASSAY THYROID STIM HORMONE: CPT | Performed by: EMERGENCY MEDICINE

## 2018-03-06 PROCEDURE — 71046 X-RAY EXAM CHEST 2 VIEWS: CPT

## 2018-03-06 PROCEDURE — 25000128 H RX IP 250 OP 636

## 2018-03-06 PROCEDURE — 96365 THER/PROPH/DIAG IV INF INIT: CPT

## 2018-03-06 PROCEDURE — 99285 EMERGENCY DEPT VISIT HI MDM: CPT | Mod: 25

## 2018-03-06 PROCEDURE — 96368 THER/DIAG CONCURRENT INF: CPT

## 2018-03-06 PROCEDURE — 25000132 ZZH RX MED GY IP 250 OP 250 PS 637: Performed by: INTERNAL MEDICINE

## 2018-03-06 PROCEDURE — 96361 HYDRATE IV INFUSION ADD-ON: CPT

## 2018-03-06 PROCEDURE — 36415 COLL VENOUS BLD VENIPUNCTURE: CPT | Performed by: INTERNAL MEDICINE

## 2018-03-06 PROCEDURE — 85027 COMPLETE CBC AUTOMATED: CPT | Performed by: INTERNAL MEDICINE

## 2018-03-06 PROCEDURE — 96375 TX/PRO/DX INJ NEW DRUG ADDON: CPT

## 2018-03-06 PROCEDURE — 80048 BASIC METABOLIC PNL TOTAL CA: CPT | Performed by: EMERGENCY MEDICINE

## 2018-03-06 PROCEDURE — 27210995 ZZH RX 272: Performed by: EMERGENCY MEDICINE

## 2018-03-06 PROCEDURE — 93005 ELECTROCARDIOGRAM TRACING: CPT

## 2018-03-06 PROCEDURE — 85025 COMPLETE CBC W/AUTO DIFF WBC: CPT | Performed by: EMERGENCY MEDICINE

## 2018-03-06 PROCEDURE — 25000128 H RX IP 250 OP 636: Performed by: EMERGENCY MEDICINE

## 2018-03-06 PROCEDURE — 84484 ASSAY OF TROPONIN QUANT: CPT | Performed by: EMERGENCY MEDICINE

## 2018-03-06 PROCEDURE — 83605 ASSAY OF LACTIC ACID: CPT | Performed by: INTERNAL MEDICINE

## 2018-03-06 RX ORDER — OMEGA-3-ACID ETHYL ESTERS 1 G/1
2 CAPSULE, LIQUID FILLED ORAL DAILY
Status: DISCONTINUED | OUTPATIENT
Start: 2018-03-07 | End: 2018-03-09 | Stop reason: HOSPADM

## 2018-03-06 RX ORDER — DILTIAZEM HYDROCHLORIDE 5 MG/ML
20 INJECTION INTRAVENOUS ONCE
Status: COMPLETED | OUTPATIENT
Start: 2018-03-06 | End: 2018-03-06

## 2018-03-06 RX ORDER — POLYETHYLENE GLYCOL 3350 17 G/17G
17 POWDER, FOR SOLUTION ORAL DAILY PRN
Status: DISCONTINUED | OUTPATIENT
Start: 2018-03-06 | End: 2018-03-09 | Stop reason: HOSPADM

## 2018-03-06 RX ORDER — PROCHLORPERAZINE MALEATE 5 MG
5 TABLET ORAL EVERY 6 HOURS PRN
Status: DISCONTINUED | OUTPATIENT
Start: 2018-03-06 | End: 2018-03-09 | Stop reason: HOSPADM

## 2018-03-06 RX ORDER — OMEGA-3-ACID ETHYL ESTERS 1 G/1
2 CAPSULE, LIQUID FILLED ORAL DAILY
COMMUNITY
End: 2018-04-18

## 2018-03-06 RX ORDER — ONDANSETRON 4 MG/1
4 TABLET, ORALLY DISINTEGRATING ORAL EVERY 6 HOURS PRN
Status: DISCONTINUED | OUTPATIENT
Start: 2018-03-06 | End: 2018-03-09 | Stop reason: HOSPADM

## 2018-03-06 RX ORDER — AZITHROMYCIN 250 MG/1
250 TABLET, FILM COATED ORAL DAILY
Status: DISCONTINUED | OUTPATIENT
Start: 2018-03-07 | End: 2018-03-07

## 2018-03-06 RX ORDER — METHYLSULFONYLMETHANE 500 MG
1 CAPSULE ORAL DAILY
COMMUNITY
End: 2019-12-18

## 2018-03-06 RX ORDER — ACETAMINOPHEN 325 MG/1
650 TABLET ORAL EVERY 4 HOURS PRN
Status: DISCONTINUED | OUTPATIENT
Start: 2018-03-06 | End: 2018-03-09 | Stop reason: HOSPADM

## 2018-03-06 RX ORDER — SIMVASTATIN 20 MG
20 TABLET ORAL AT BEDTIME
Status: DISCONTINUED | OUTPATIENT
Start: 2018-03-07 | End: 2018-03-09 | Stop reason: HOSPADM

## 2018-03-06 RX ORDER — LISINOPRIL/HYDROCHLOROTHIAZIDE 10-12.5 MG
1 TABLET ORAL DAILY
Status: DISCONTINUED | OUTPATIENT
Start: 2018-03-07 | End: 2018-03-08

## 2018-03-06 RX ORDER — ONDANSETRON 2 MG/ML
4 INJECTION INTRAMUSCULAR; INTRAVENOUS EVERY 6 HOURS PRN
Status: DISCONTINUED | OUTPATIENT
Start: 2018-03-06 | End: 2018-03-09 | Stop reason: HOSPADM

## 2018-03-06 RX ORDER — AMOXICILLIN 250 MG
1 CAPSULE ORAL 2 TIMES DAILY PRN
Status: DISCONTINUED | OUTPATIENT
Start: 2018-03-06 | End: 2018-03-09 | Stop reason: HOSPADM

## 2018-03-06 RX ORDER — AMOXICILLIN 250 MG
2 CAPSULE ORAL 2 TIMES DAILY PRN
Status: DISCONTINUED | OUTPATIENT
Start: 2018-03-06 | End: 2018-03-09 | Stop reason: HOSPADM

## 2018-03-06 RX ORDER — PROCHLORPERAZINE 25 MG
12.5 SUPPOSITORY, RECTAL RECTAL EVERY 12 HOURS PRN
Status: DISCONTINUED | OUTPATIENT
Start: 2018-03-06 | End: 2018-03-09 | Stop reason: HOSPADM

## 2018-03-06 RX ORDER — FLUTICASONE PROPIONATE 50 MCG
1-2 SPRAY, SUSPENSION (ML) NASAL DAILY PRN
COMMUNITY
End: 2019-01-05

## 2018-03-06 RX ORDER — NALOXONE HYDROCHLORIDE 0.4 MG/ML
.1-.4 INJECTION, SOLUTION INTRAMUSCULAR; INTRAVENOUS; SUBCUTANEOUS
Status: DISCONTINUED | OUTPATIENT
Start: 2018-03-06 | End: 2018-03-09 | Stop reason: HOSPADM

## 2018-03-06 RX ADMIN — DILTIAZEM HYDROCHLORIDE 10 MG/HR: 5 INJECTION INTRAVENOUS at 21:14

## 2018-03-06 RX ADMIN — ACETAMINOPHEN 650 MG: 325 TABLET, FILM COATED ORAL at 23:38

## 2018-03-06 RX ADMIN — DILTIAZEM HYDROCHLORIDE 20 MG: 5 INJECTION INTRAVENOUS at 19:19

## 2018-03-06 RX ADMIN — AZITHROMYCIN MONOHYDRATE 500 MG: 500 INJECTION, POWDER, LYOPHILIZED, FOR SOLUTION INTRAVENOUS at 18:54

## 2018-03-06 RX ADMIN — SODIUM CHLORIDE 1000 ML: 9 INJECTION, SOLUTION INTRAVENOUS at 17:51

## 2018-03-06 RX ADMIN — Medication 4000 UNITS: at 21:24

## 2018-03-06 RX ADMIN — HEPARIN SODIUM 800 UNITS/HR: 10000 INJECTION, SOLUTION INTRAVENOUS at 21:29

## 2018-03-06 RX ADMIN — CEFTRIAXONE SODIUM 2 G: 10 INJECTION, POWDER, FOR SOLUTION INTRAVENOUS at 18:50

## 2018-03-06 ASSESSMENT — ENCOUNTER SYMPTOMS
LIGHT-HEADEDNESS: 1
SORE THROAT: 0
COUGH: 1
DIZZINESS: 1
RHINORRHEA: 1
FEVER: 0
SHORTNESS OF BREATH: 1

## 2018-03-06 NOTE — IP AVS SNAPSHOT
MRN:[id number]                      After Visit Summary   3/6/2018    Hamida Verma    MRN: [id number]           Thank you!     Thank you for choosing [facility] for your care. Our goal is always to provide you with excellent care. Hearing back from our patients is one way we can continue to improve our services. Please take a few minutes to complete the written survey that you may receive in the mail after you visit with us. Thank you!        Patient Information     Date Of Birth          1940        Designated Caregiver       Most Recent Value    Caregiver    Will someone help with your care after discharge? yes    Name of designated caregiver constanza    Phone number of caregiver n/a [pt does not remember]    Caregiver address see demographic sheet      About your hospital stay     You were admitted on:  March 6, 2018 You last received care in the:  [facility]    You were discharged on:  March 9, 2018        Reason for your hospital stay       Atrial fibrillation                  Who to Call     For medical emergencies, please call 911.  For non-urgent questions about your medical care, please call your primary care provider or clinic, [phone]          Attending Provider     Provider Specialty    Rosamaria Zapien MD Emergency Medicine    Osiris Hill DO Internal Medicine       Primary Care Provider Office Phone # Fax #    Mikala Philip [phone]      After Care Instructions     Activity       Your activity upon discharge: activity as tolerated            Diet       Follow this diet upon discharge: Orders Placed This Encounter      Low Saturated Fat Na <2400 mg                  Follow-up Appointments     Follow-up and recommended labs and tests        Follow up with primary care provider, Mikala Philip MD, within 7 days to evaluate medication change and for hospital follow- up.  No follow up labs or test are  "needed.    Should follow-up with cardiology as planned.  It appears they would like an exercise stress test in 1 week and follow-up in clinic in 2 weeks                  Your next 10 appointments already scheduled     Mar 23, 2018  2:30 PM CDT   Return Visit with NEYL Rosario CNP   [facility] ([facility])    [id number]  [facility] [address] [phone]              Additional Services     Follow-Up with Cardiac Advanced Practice Provider           Follow-Up with Cardiologist                 Future tests that were ordered for you     Exercise Stress test       New flecanide, afib                  Further instructions from your care team       1. Follow-up with cardiology nurse practitioner in 2 week with ECG @[phone]  2. Follow with Dr. Roby Mallory in 3 months @[phone]  If you do not hear from them about this appointment please call [phone].    Pending Results     Date and Time Order Name Status Description    3/9/2018 1400 EKG 12-lead, tracing only Preliminary             Statement of Approval     Ordered          03/09/18 1406  I have reviewed and agree with all the recommendations and orders detailed in this document.  EFFECTIVE NOW     Approved and electronically signed by:  Ruben John DO             Admission Information     Date & Time Provider Department Dept. Phone    3/6/2018 Osiris Hill DO [facility] [phone]      Your Vitals Were     Blood Pressure Pulse Temperature Respirations Height Weight    131/74 (BP Location: Left arm) 143 99.2  F (37.3  C) (Oral) 16 1.626 m (5' 4\") 85.8 kg (189 lb 3.2 oz)    Last Period Pulse Oximetry BMI (Body Mass Index)             (LMP Unknown) 91% 32.48 kg/m2         MyChart Information     [employer] lets you send messages to your doctor, view your test results, renew your prescriptions, schedule appointments " "and more. To sign up, go to [email or web address] . Click on \"Log in\" on the left side of the screen, which will take you to the Welcome page. Then click on \"Sign up Now\" on the right side of the page.     You will be asked to enter the access code listed below, as well as some personal information. Please follow the directions to create your username and password.     Your access code is: [email or web address]  Expires: 2018  1:02 PM     Your access code will  in 90 days. If you need help or a new code, please call your [place] clinic or [phone].        Care EveryWhere ID     This is your Care EveryWhere ID. This could be used by other organizations to access your [place] medical records  [id number]        Equal Access to Services     NESSA MARIE : Sushant Garcia, madelaine potts, chance kolb, carlos carvajal . So [facility] [place] [phone].    ATENCIÓN: Si habla español, tiene a ivy disposición servicios gratuitos de asistencia lingüística. Siomara al [phone].    We comply with applicable federal civil rights laws and [place] laws. We do not discriminate on the basis of race, color, national origin, age, disability, sex, sexual orientation, or gender identity.               Review of your medicines      START taking        Dose / Directions    apixaban ANTICOAGULANT 5 MG tablet   Commonly known as:  ELIQUIS        Dose:  5 mg   Take 1 tablet (5 mg) by mouth 2 times daily   Quantity:  60 tablet   Refills:  0       flecainide 100 MG tablet   Commonly known as:  TAMBOCOR        Dose:  100 mg   Take 1 tablet (100 mg) by mouth every 12 hours   Quantity:  60 tablet   Refills:  0       metoprolol succinate 100 MG 24 hr tablet   Commonly known as:  TOPROL-XL        Dose:  100 mg   Take 1 tablet (100 mg) by mouth 2 times daily   Quantity:  60 tablet   Refills:  0         CONTINUE these medicines which have NOT CHANGED        Dose / Directions    alendronate 70 " MG tablet   Commonly known as:  FOSAMAX   Used for:  Osteoporosis        Dose:  70 mg   Take 1 tablet (70 mg) by mouth every 7 days Take 60 minutes before am meal with 8 oz. water. Remain upright for 30 minutes.   Quantity:  12 tablet   Refills:  3       cholecalciferol 1000 UNITS capsule   Commonly known as:  vitamin  -D        Dose:  1 capsule   Take 1 capsule by mouth daily.   Refills:  0       FLAX SEED OIL PO        1 capsule daily   Refills:  0       fluticasone 50 MCG/ACT spray   Commonly known as:  FLONASE        Dose:  1-2 spray   Spray 1-2 sprays into both nostrils daily as needed for rhinitis or allergies   Refills:  0       HAIR SKIN NAILS PO        Dose:  1 tablet   Take 1 tablet by mouth At Bedtime   Refills:  0       Milk Thistle 200 MG Caps        Dose:  1 capsule   Take 1 capsule by mouth daily   Refills:  0        MG Caps        Dose:  1 capsule   Take 1 capsule by mouth daily   Refills:  0       omega-3 acid ethyl esters 1 G capsule   Commonly known as:  Lovaza        Dose:  2 g   Take 2 g by mouth daily   Refills:  0       potassium & sodium phosphates 278-164-250 MG/75ML Solr        Dose:  1 tablet   Take 1 tablet by mouth daily   Refills:  0       simvastatin 20 MG tablet   Commonly known as:  ZOCOR   Used for:  Hyperlipidemia LDL goal <130        Dose:  20 mg   Take 1 tablet (20 mg) by mouth At Bedtime Profile Rx: patient will contact pharmacy when needed   Quantity:  90 tablet   Refills:  3         STOP taking     ASPIR-81 PO           lisinopril-hydrochlorothiazide 10-12.5 MG per tablet   Commonly known as:  PRINZIDE/ZESTORETIC                Where to get your medicines      These medications were sent to [facility] - [address]  [address] [facility] [address], [place] [address]     Phone:  [phone]     apixaban ANTICOAGULANT 5 MG tablet    flecainide 100 MG tablet    metoprolol succinate 100 MG 24 hr tablet                Protect others around you:  Learn how to safely use, store and throw away your medicines at www.disposemymeds.org.             Medication List: This is a list of all your medications and when to take them. Check marks below indicate your daily home schedule. Keep this list as a reference.      Medications           Morning Afternoon Evening Bedtime As Needed    alendronate 70 MG tablet   Commonly known as:  FOSAMAX   Take 1 tablet (70 mg) by mouth every 7 days Take 60 minutes before am meal with 8 oz. water. Remain upright for 30 minutes.   Next Dose Due:  Resume home meds                                apixaban ANTICOAGULANT 5 MG tablet   Commonly known as:  ELIQUIS   Take 1 tablet (5 mg) by mouth 2 times daily   Last time this was given:  5 mg on 3/9/2018  8:02 AM   Next Dose Due:  3/9/18                                      cholecalciferol 1000 UNITS capsule   Commonly known as:  vitamin  -D   Take 1 capsule by mouth daily.   Next Dose Due:  Resume home meds                                FLAX SEED OIL PO   1 capsule daily   Next Dose Due:  Resume home meds                                flecainide 100 MG tablet   Commonly known as:  TAMBOCOR   Take 1 tablet (100 mg) by mouth every 12 hours   Last time this was given:  200 mg on 3/9/2018  8:05 AM   Next Dose Due:  Tonight at 8pm                                      fluticasone 50 MCG/ACT spray   Commonly known as:  FLONASE   Spray 1-2 sprays into both nostrils daily as needed for rhinitis or allergies   Next Dose Due:  Resume home meds                                HAIR SKIN NAILS PO   Take 1 tablet by mouth At Bedtime   Next Dose Due:  Resume home meds                                metoprolol succinate 100 MG 24 hr tablet   Commonly known as:  TOPROL-XL   Take 1 tablet (100 mg) by mouth 2 times daily   Last time this was given:  100 mg on 3/9/2018  8:02 AM   Next Dose Due:  Tonight                                       Milk Thistle 200 MG Caps   Take 1 capsule by mouth daily   Next Dose  Due:  Resume home meds                                 MG Caps   Take 1 capsule by mouth daily   Next Dose Due:  Resume home meds                                omega-3 acid ethyl esters 1 G capsule   Commonly known as:  Lovaza   Take 2 g by mouth daily   Last time this was given:  2 g on 3/9/2018 10:00 AM   Next Dose Due:  3/10/18                                   potassium & sodium phosphates 278-164-250 MG/75ML Solr   Take 1 tablet by mouth daily   Next Dose Due:  Resume home meds                                simvastatin 20 MG tablet   Commonly known as:  ZOCOR   Take 1 tablet (20 mg) by mouth At Bedtime Profile Rx: patient will contact pharmacy when needed   Last time this was given:  20 mg on 3/8/2018  9:08 PM   Next Dose Due:  3/9/18

## 2018-03-06 NOTE — ED PROVIDER NOTES
History     Chief Complaint:  Shortness of Breath and Dizziness    HPI   Hamida Verma is a 77 year old female with history of hypertension and hyperlipidemia who presents to the emergency department today for evaluation of shortness of breath and dizziness. The patient reports persistent influenza like symptoms of severe productive cough and rhinorrhea for the last two weeks. In the last few days she also developed some shortness of breath. She called her primary care clinic who advised the patient to take some OTC cough medications. Today, the patient states she became very lightheaded and dizzy when she used the restroom. Her family at bedside states the patient has complained of this dizziness and lightheadedness for the last three days, and kept saying it would subside on its own. Today when the patient had her dizziness episode they found the patient to be tachycardic to 104, and noted her blood pressure to be much lower than her baseline. The patient also endorses some pain to her back from the coughing. She states she has been drinking water and fluids as normal at home. The patient denies any fever, sore throat, or urinary symptoms.     Allergies:  No Known Drug Allergies      Medications:    metoprolol (TOPROL-XL) 50 MG 24 hr tablet  alendronate (FOSAMAX) 70 MG tablet  metoprolol (TOPROL-XL) 50 MG 24 hr tablet    Past Medical History:    Hypertension   Hyperlipidemia     Past Surgical History:    Hysterectomy     Family History:    Cerebrovascular disease  Eye disorder  CAD  MI  Lupus     Social History:  The patient was accompanied to the ED by her family.  Smoking Status: Former smoker  Smokeless Tobacco: No  Alcohol Use: No    Marital Status:       Review of Systems   Constitutional: Negative for fever.   HENT: Positive for rhinorrhea. Negative for sore throat.    Respiratory: Positive for cough and shortness of breath.    Genitourinary: Negative.    Neurological: Positive for dizziness and  "light-headedness.   All other systems reviewed and are negative.    Physical Exam     Patient Vitals for the past 24 hrs:   BP Temp Temp src Heart Rate Resp SpO2 Height Weight   03/06/18 1836 134/79 - - 161 18 97 % - -   03/06/18 1819 121/86 - - 152 18 96 % - -   03/06/18 1728 103/79 98.8  F (37.1  C) Oral 87 18 97 % 1.626 m (5' 4\") 81.6 kg (180 lb)      Physical Exam  General: Resting on the bed.  Ears, Nose, Throat:  External ears normal.  Nose normal.    Eyes:  Conjunctivae clear.  Pupils are equal, round, and reactive.   Neck: Normal range of motion.  Neck supple.   CV: Regular rate and rhythm.  No murmurs.      Respiratory: Effort normal and breath sounds normal except mild right upper lobe changes on ausculation.  No wheezing or crackles.   Gastrointestinal: Soft.  No distension. There is no tenderness.  There is no rigidity, no rebound and no guarding.   Musculoskeletal: non tender non edematous   Neuro: Alert. Moving all extremities appropriately.  Normal speech.    Skin: Skin is warm and dry.  No rash noted.     Emergency Department Course     ECG:  Indication: Shortness of breath and Dizziness.  Completed at 1814.  Read at 1833.   Atrial fibrillation with rapid ventricular response with premature ventricular or aberrantly conducted complexes. Nonspecific T wave abnormality. Abnormal ECG.   Rate 157 bpm. KS interval 8. QRS duration 80. QT/QTc 290/468. P-R-T axes * 23 -37.     Imaging:  Radiology findings were communicated with the patient who voiced understanding of the findings.    XR Chest 2 Views:  IMPRESSION: Mild interstitial opacities in the left perihilar region,  also right upper and lower lobes. This may represent asymmetric  pulmonary edema, although pneumonia is not excluded. No significant  pleural effusion. No pneumothorax.  Report per radiology     Laboratory:  Laboratory findings were communicated with the patient who voiced understanding of the findings.    CBC: WBC 14.0 (H), HGB 13.6, PLT " 620 (H)  BMP: Glucose 119 (H), GFR Estimate 59 (L), o/w WNL (Creatinine 0.91)   Troponin (Collected 1744): <0.015   TSH with free T4 reflex: 0.524   Procalcitonin: 0.13    Interventions:  1751 NS Bolus 1,000mL IV   1850 Rocephin 2g IV   1854 Zithromax 500mg IV   1919 Cardizem 20mg IV      Emergency Department Course:  Nursing notes and vitals reviewed.  1739 I performed an exam of the patient as documented above.   EKG obtained in the ED, see results above.    IV was inserted and blood was drawn for laboratory testing, results above.   The patient was sent for a chest x-ray while in the emergency department, results above.    1832 I rechecked the patient and updated her on her blood work results thus far.   1934 I spoke with Dr. Hill of the Hospitalist service regarding patient's presentation, findings, and plan of care.   1941 I rechecked the patient.   I discussed the treatment plan with the patient. They expressed understanding of this plan and consented to admission. I discussed the patient with Dr. Hill, who will admit the patient to a monitored bed for further evaluation and treatment. I personally reviewed the laboratory and imaging results with the Patient and answered all related questions prior to admission.    Impression & Plan      Medical Decision Making:  Hamida Verma is a 77 year old female with history of hypertension presenting with cough and nasal congestion. She also endorses some shortness of breath. Vitals notable for tachycardia but satting normally and afebrile. Broad differential pursued including but not limited to pneumonia, effusions, pneumothorax, ACS, arrhythmia, electrolyte/metabolic abnormality, etc. Overall the patient is well appearing and nontoxic. There is some irregular breath sounds appreciable in the right middle lobe. Chest x-ray is obtained showing mild interstitial opacities in the left perihilar and left and right upper and lower lobes. This may represent edema versus  pneumonia. Clinically patient has productive cough and signs of symptoms suggestive of pneumonia, therefore will be treated as pneumonia. CBC notable for WBC of 14, which is also consistent with a clinical pneumonia. Patient is not hypoxic, nor having a fever therefore does not sepsis or septic shock criteria. BNP shows no acute electrolyte renal or metabolic abnormality. Procalcitonin is 0.13, which would make a local bacterial infection possible which would be consistent with a pneumonia. TSH is WNL. Troponin is normal. Patient has no chest pain. EKG does show afib with RVR. Patient has not previously had a-fib with RVR. Attempted one liter of fluid hydration with no change in rate. The patient was given diltiazem bolus followed by drip. Rate control was improved significantly from rates in the 170s to the low 120s. Goal for heart rate control being less than 110. Continued to treat HR aggressively as it was afib with RVR.  Intervened with dilt as noted above.  Patient's blood pressure is tolerating this well. She is otherwise clinically feeling improved. Patient will be admitted to [facility] with Dr. Hill. Dr. Hill graciously accepted. No other acute events under my care.     Critical Care time was 32 minutes for this patient excluding procedures.      Diagnosis:    ICD-10-CM    1. Tachycardia R00.0 CBC with platelets     Lactic acid level STAT   2. Atrial fibrillation with rapid ventricular response (H) I48.91    3. Hypertension goal BP (blood pressure) < 150/90 I10    4. Pneumonia of both lungs due to infectious organism, unspecified part of lung J18.9        Disposition:  Admitted under Dr. Hill.       Scribe Disclosure:  Abebe YU, am serving as a scribe at 5:35 PM on 3/6/2018 to document services personally performed by Rosamaria Zapien MD based on my observations and the provider's statements to me.    3/6/2018    EMERGENCY DEPARTMENT       Rosamaria Zapien MD  03/06/18 5996

## 2018-03-06 NOTE — IP AVS SNAPSHOT
[facility]    [address] [place] [address]., [address]    [place] [address]    Phone:  [phone]                                       After Visit Summary   3/6/2018    Hamida Verma    MRN: [id number]           After Visit Summary Signature Page     I have received my discharge instructions, and my questions have been answered. I have discussed any challenges I see with this plan with the nurse or doctor.    ..........................................................................................................................................  Patient/Patient Representative Signature      ..........................................................................................................................................  Patient Representative Print Name and Relationship to Patient    ..................................................               ................................................  Date                                            Time    ..........................................................................................................................................  Reviewed by Signature/Title    ...................................................              ..............................................  Date                                                            Time

## 2018-03-07 ENCOUNTER — APPOINTMENT (OUTPATIENT)
Dept: CARDIOLOGY | Facility: CLINIC | Age: 78
DRG: 310 | End: 2018-03-07
Attending: INTERNAL MEDICINE
Payer: COMMERCIAL

## 2018-03-07 LAB
ANION GAP SERPL CALCULATED.3IONS-SCNC: 8 MMOL/L (ref 3–14)
BUN SERPL-MCNC: 13 MG/DL (ref 7–30)
CALCIUM SERPL-MCNC: 8.3 MG/DL (ref 8.5–10.1)
CHLORIDE SERPL-SCNC: 104 MMOL/L (ref 94–109)
CO2 SERPL-SCNC: 26 MMOL/L (ref 20–32)
CREAT SERPL-MCNC: 0.82 MG/DL (ref 0.52–1.04)
ERYTHROCYTE [DISTWIDTH] IN BLOOD BY AUTOMATED COUNT: 13.2 % (ref 10–15)
GFR SERPL CREATININE-BSD FRML MDRD: 68 ML/MIN/1.7M2
GLUCOSE SERPL-MCNC: 115 MG/DL (ref 70–99)
HCT VFR BLD AUTO: 34.2 % (ref 35–47)
HGB BLD-MCNC: 11.5 G/DL (ref 11.7–15.7)
LACTATE BLD-SCNC: 0.7 MMOL/L (ref 0.7–2)
LMWH PPP CHRO-ACNC: <0.1 IU/ML
LMWH PPP CHRO-ACNC: <0.1 IU/ML
MCH RBC QN AUTO: 28.8 PG (ref 26.5–33)
MCHC RBC AUTO-ENTMCNC: 33.6 G/DL (ref 31.5–36.5)
MCV RBC AUTO: 86 FL (ref 78–100)
PLATELET # BLD AUTO: 463 10E9/L (ref 150–450)
POTASSIUM SERPL-SCNC: 3.1 MMOL/L (ref 3.4–5.3)
POTASSIUM SERPL-SCNC: 3.7 MMOL/L (ref 3.4–5.3)
RBC # BLD AUTO: 4 10E12/L (ref 3.8–5.2)
SODIUM SERPL-SCNC: 138 MMOL/L (ref 133–144)
WBC # BLD AUTO: 12.6 10E9/L (ref 4–11)

## 2018-03-07 PROCEDURE — 36415 COLL VENOUS BLD VENIPUNCTURE: CPT | Performed by: INTERNAL MEDICINE

## 2018-03-07 PROCEDURE — 80048 BASIC METABOLIC PNL TOTAL CA: CPT | Performed by: INTERNAL MEDICINE

## 2018-03-07 PROCEDURE — 99222 1ST HOSP IP/OBS MODERATE 55: CPT | Performed by: INTERNAL MEDICINE

## 2018-03-07 PROCEDURE — 21400004 ZZH R&B CCU CSC INTERMEDIATE

## 2018-03-07 PROCEDURE — 25000132 ZZH RX MED GY IP 250 OP 250 PS 637: Performed by: INTERNAL MEDICINE

## 2018-03-07 PROCEDURE — 84132 ASSAY OF SERUM POTASSIUM: CPT | Performed by: INTERNAL MEDICINE

## 2018-03-07 PROCEDURE — 93306 TTE W/DOPPLER COMPLETE: CPT | Mod: 26 | Performed by: INTERNAL MEDICINE

## 2018-03-07 PROCEDURE — 85027 COMPLETE CBC AUTOMATED: CPT | Performed by: INTERNAL MEDICINE

## 2018-03-07 PROCEDURE — 25000128 H RX IP 250 OP 636: Performed by: INTERNAL MEDICINE

## 2018-03-07 PROCEDURE — 25000125 ZZHC RX 250: Performed by: INTERNAL MEDICINE

## 2018-03-07 PROCEDURE — 99233 SBSQ HOSP IP/OBS HIGH 50: CPT | Performed by: INTERNAL MEDICINE

## 2018-03-07 PROCEDURE — 93306 TTE W/DOPPLER COMPLETE: CPT

## 2018-03-07 PROCEDURE — 83605 ASSAY OF LACTIC ACID: CPT | Performed by: INTERNAL MEDICINE

## 2018-03-07 PROCEDURE — 85520 HEPARIN ASSAY: CPT | Performed by: INTERNAL MEDICINE

## 2018-03-07 RX ORDER — POTASSIUM CHLORIDE 7.45 MG/ML
10 INJECTION INTRAVENOUS
Status: DISCONTINUED | OUTPATIENT
Start: 2018-03-07 | End: 2018-03-09 | Stop reason: HOSPADM

## 2018-03-07 RX ORDER — METOPROLOL TARTRATE 25 MG/1
25 TABLET, FILM COATED ORAL
Status: DISCONTINUED | OUTPATIENT
Start: 2018-03-07 | End: 2018-03-07

## 2018-03-07 RX ORDER — MAGNESIUM SULFATE HEPTAHYDRATE 40 MG/ML
4 INJECTION, SOLUTION INTRAVENOUS EVERY 4 HOURS PRN
Status: DISCONTINUED | OUTPATIENT
Start: 2018-03-07 | End: 2018-03-09 | Stop reason: HOSPADM

## 2018-03-07 RX ORDER — METOPROLOL TARTRATE 50 MG
50 TABLET ORAL 2 TIMES DAILY
Status: DISCONTINUED | OUTPATIENT
Start: 2018-03-07 | End: 2018-03-07

## 2018-03-07 RX ORDER — POTASSIUM CHLORIDE 1.5 G/1.58G
20-40 POWDER, FOR SOLUTION ORAL
Status: DISCONTINUED | OUTPATIENT
Start: 2018-03-07 | End: 2018-03-09 | Stop reason: HOSPADM

## 2018-03-07 RX ORDER — POTASSIUM CL/LIDO/0.9 % NACL 10MEQ/0.1L
10 INTRAVENOUS SOLUTION, PIGGYBACK (ML) INTRAVENOUS
Status: DISCONTINUED | OUTPATIENT
Start: 2018-03-07 | End: 2018-03-09 | Stop reason: HOSPADM

## 2018-03-07 RX ORDER — POTASSIUM CHLORIDE 29.8 MG/ML
20 INJECTION INTRAVENOUS
Status: DISCONTINUED | OUTPATIENT
Start: 2018-03-07 | End: 2018-03-09 | Stop reason: HOSPADM

## 2018-03-07 RX ORDER — METOPROLOL TARTRATE 25 MG/1
25 TABLET, FILM COATED ORAL 2 TIMES DAILY
Status: DISCONTINUED | OUTPATIENT
Start: 2018-03-07 | End: 2018-03-07

## 2018-03-07 RX ORDER — POTASSIUM CHLORIDE 1500 MG/1
20-40 TABLET, EXTENDED RELEASE ORAL
Status: DISCONTINUED | OUTPATIENT
Start: 2018-03-07 | End: 2018-03-09 | Stop reason: HOSPADM

## 2018-03-07 RX ADMIN — POTASSIUM CHLORIDE 40 MEQ: 1500 TABLET, EXTENDED RELEASE ORAL at 09:21

## 2018-03-07 RX ADMIN — METOPROLOL TARTRATE 25 MG: 25 TABLET ORAL at 08:31

## 2018-03-07 RX ADMIN — DILTIAZEM HYDROCHLORIDE 5 MG/HR: 5 INJECTION INTRAVENOUS at 20:49

## 2018-03-07 RX ADMIN — LISINOPRIL AND HYDROCHLOROTHIAZIDE 1 TABLET: 12.5; 1 TABLET ORAL at 08:31

## 2018-03-07 RX ADMIN — DILTIAZEM HYDROCHLORIDE 10 MG/HR: 5 INJECTION INTRAVENOUS at 05:31

## 2018-03-07 RX ADMIN — GUAIFENESIN 10 ML: 100 SOLUTION ORAL at 17:20

## 2018-03-07 RX ADMIN — Medication 3000 UNITS: at 04:45

## 2018-03-07 RX ADMIN — APIXABAN 5 MG: 5 TABLET, FILM COATED ORAL at 11:05

## 2018-03-07 RX ADMIN — OMEGA-3-ACID ETHYL ESTERS 2 G: 1 CAPSULE, LIQUID FILLED ORAL at 08:31

## 2018-03-07 RX ADMIN — METOPROLOL SUCCINATE 75 MG: 50 TABLET, EXTENDED RELEASE ORAL at 16:20

## 2018-03-07 RX ADMIN — AZITHROMYCIN 250 MG: 250 TABLET, FILM COATED ORAL at 08:31

## 2018-03-07 RX ADMIN — SIMVASTATIN 20 MG: 20 TABLET, FILM COATED ORAL at 00:33

## 2018-03-07 RX ADMIN — METOPROLOL TARTRATE 25 MG: 25 TABLET ORAL at 00:33

## 2018-03-07 RX ADMIN — SIMVASTATIN 20 MG: 20 TABLET, FILM COATED ORAL at 20:54

## 2018-03-07 RX ADMIN — APIXABAN 5 MG: 5 TABLET, FILM COATED ORAL at 20:53

## 2018-03-07 RX ADMIN — POTASSIUM CHLORIDE 20 MEQ: 1500 TABLET, EXTENDED RELEASE ORAL at 11:53

## 2018-03-07 NOTE — PROGRESS NOTES
Cardiology consult dictated. Asymptomatic afib in setting of URTI, possible pneumonia. CHADS VASc of 4. Rate controlled with IV diltiazem. Anticoagulated with heparin. Will switch to eliquis and oral metoprolol. May spontaneously convert to sinus with treatment of pneumonia if this is the inciting factor. If not, in 4 weeks we can electrically cardiovert without the need for CAROLANN. If difficulty controlling HR or symptomatic with afib then CAROLANN / cardioversion. Thx

## 2018-03-07 NOTE — CONSULTS
Consult Date:  2018      REFERRING PHYSICIAN:  Hospitalist Service.      PRIMARY CARE PHYSICIAN:  Mikala Philip MD.      INDICATION FOR CARDIAC CONSULTATION:  Atrial fibrillation with rapid ventricular response.      It is my pleasure to see your patient, Hamida Verma, who is a very pleasant 77-year-old patient with no past cardiac history other than hypertension and hyperlipidemia.  The patient has been feeling unwell with upper respiratory tract type symptoms over the last 2 weeks.  She has been coughing.  She has been feeling generally weak.  Last night she felt quite dizzy and was afraid to go down the stairs for fear that she might fall.  She noticed as well that if she tried to exert herself over the last 2 days, she was becoming short of breath.  She was not complaining of any chest pains or chest pressure.  She was not complaining of palpitations.  Because she was feeling so unwell, her daughter brought her into the emergency room.  She was found to be in atrial fibrillation with rapid ventricular response.  The last EKG that we have was from , and this showed that she was in sinus rhythm at that stage.  The only time she has ever felt palpitations was in January of last year when her son  from a massive myocardial infarct.  She was given metoprolol for palpitations at that stage, but her palpitations over the few months went away and she has not been taking that medication anymore.  Her ventricular rate was 157 beats per minute in the emergency room.  No ischemic changes were seen.      The patient was started on intravenous heparin and intravenous diltiazem.  Her ventricular rate is now well controlled at approximately 85 beats per minute, and her blood pressure is normal at 107/63.  The patient's CHADs-VASc score is 4:  1 for hypertension, 2 for being over 75 years of age, and 1 for being female.  Therefore, anticoagulation rather than aspirin is indicated for thromboembolic prophylaxis.   Echocardiography has been performed but not read yet.  Her electrolytes were not normal on admission.  Her potassium was mildly low at 3.4, and it is 3.1 this morning.  Her TSH was normal at 0.54.  Magnesium has not been checked that I can see.      PAST MEDICAL HISTORY:  Essential hypertension, hyperlipidemia, history of palpitations as described above and hysterectomy in the past.      FAMILY HISTORY:  A sister had stroke.  Her mother had cerebrovascular disease and a myocardial infarct.  Her father also had a history of coronary artery disease.  Her son at the age of 56  of a myocardial infarct.      SOCIAL HISTORY:  She stopped smoking in .  She does not consume alcohol.  She lives with her daughter and granddaughter.      MEDICATIONS:   1.  Diltiazem intravenously.     2.  Heparin intravenously.   3.  Lisinopril/hydrochlorothiazide 10/12.5 tablet one per day.   4.  Simvastatin 20 mg per day.   5.  Rocephin 2 grams intravenously every 24 hours.   6.  Azithromycin 250 mg daily for 4 days.      ALLERGIES:  STRAWBERRY FLAVORING.      REVIEW OF SYSTEMS:   CONSTITUTIONAL:  Negative.   EYES:  Wears spectacles.   ENT:  Negative.   CARDIOVASCULAR:  As above.   RESPIRATORY:  As above.   GASTROINTESTINAL:  Nil.   GENITOURINARY:  Nil.   MUSCULOSKELETAL:  Nil.   NEUROLOGIC:  Nil.   PSYCHIATRIC:  Nil.   ENDOCRINE:  Nil.   HEMATOLYMPHATIC:  Nil.   ALLERGY/IMMUNOLOGY:  As above.      PHYSICAL EXAMINATION:   GENERAL:  She is a very pleasant female patient in no apparent distress.   VITAL SIGNS:  Her pulse is 89 beats per minute, irregularly irregular in rhythm and volume consistent with atrial fibrillation which is confirmed on the monitor.  Her blood pressure is 107/63.   HEENT:  Unremarkable.  Her jugular venous pulse is not raised.  Her carotids are normal with no bruits.  Her trachea is not deviated.   HEART:  [place] beat is not displaced.  Heart sound 1 is variable, heart sound 2 is normal.  No murmurs heard.    CHEST:  Clear to percussion and auscultation with no added sounds.   ABDOMEN:  Unremarkable with no hepatosplenomegaly, no masses or bruits.   EXTREMITIES:  Pedal pulses are 2+.  No peripheral edema noted.   NEUROLOGIC:  She moves all 4 limbs appropriately with no obvious sensory or motor loss.  She is fully oriented to time, place and person with a normal affect.      LABORATORY INVESTIGATIONS:  Sodium is 138, potassium is 3.1, BUN is 13, creatinine is 0.82, GFR is 68.  Troponin less than 0.015.  TSH normal at 0.54.  Initially white cell count was raised at 15.0, but has now dropped down to 12.6.  Hemoglobin is mildly reduced at 11.5, platelet count is 263,000.      IMPRESSION:   1.  Atrial fibrillation with rapid ventricular response in the setting of an upper respiratory tract infection with possible pneumonia on chest x-ray.  Pneumonias are very potent stimulus and common stimulus for atrial fibrillation.  There is no evidence that this patient has had atrial fibrillation before.  The onset of the atrial fibrillation is unknown.  Her CHADS-VASc score is 4 as described above.   2.  Upper respiratory tract infection with leukocytosis and evidence of possible pneumonia on chest x-ray.   3.  Hypokalemia, which will tend to drive the atrial fibrillation.  She is on potassium replacement protocol.   4.  Essential hypertension.   5.  History of hyperlipidemia.   6.  Family history of coronary artery disease.      PLAN:  I posed 2 options to the patient.  One option is to rate control and anticoagulate for the time being to see if she reverts back to sinus rhythm once her pneumonia has been successfully treated.  We are not sure in this patient  whether the atrial fibrillation is a once-off episode in the setting of a pneumonia or whether we have picked up paroxysmal atrial fibrillation in this patient because she is asymptomatic with the atrial fibrillation that she does not feel palpitations or irregular heartbeats.   We could have the option in 4 weeks' time of electrically cardioverting the patient back to sinus rhythm without having to do a transesophageal echocardiogram.  The other option is to do a transesophageal echocardiogram now and electrically cardiovert.  On discussion with the patient about the 2 options, in the end we favored anticoagulation and rate controlling to see if the patient goes back into sinus rhythm.  If she does not, we then could electrically cardiovert her in 4 weeks' time safely on anticoagulation.  Transesophageal electrocardiography and cardioversion is usually performed in patients who are quite symptomatic with atrial fibrillation or in patients in whom we cannot control the rate with oral medications.  We will switch the patient over to oral metoprolol and to Eliquis and stop the intravenous diltiazem and stop the intravenous heparin.  We will obtain a stress nuclear scan to determine if there is any evidence of ischemia.  Her potassium is being replaced.  This is important because it is a driving stimulus for atrial fibrillation that will prevent her from going back into sinus rhythm.  Finally, we await the results of the echocardiogram to look at cardiac structure.      Many thanks for allowing me to be involved in the care of this extremely nice patient.           NOY COLUNGA MD, [facility]             D: 2018   T: 2018   MT: MOHINDER      Name:     ARTURO BOSWELL   MRN:      [id number]        Account:       [id number]   :      1940           Consult Date:  2018      Document: [id number]       cc: ALPHONSE BEAN MD

## 2018-03-07 NOTE — PHARMACY-ADMISSION MEDICATION HISTORY
Admission medication history interview status for the 3/6/2018  admission is complete. See EPIC admission navigator for prior to admission medications     Medication history source reliability:Moderate    Actions taken by pharmacist (provider contacted, etc):  Spoke w/ patient and her family.      Additional medication history information not noted on PTA med list :None    Medication reconciliation/reorder completed by provider prior to medication history? No    Time spent in this activity: 20 minutes    Prior to Admission medications    Medication Sig Last Dose Taking? Auth Provider   omega-3 acid ethyl esters (LOVAZA) 1 G capsule Take 2 g by mouth daily 3/6/2018 at am Yes Unknown, Entered By History   fluticasone (FLONASE) 50 MCG/ACT spray Spray 1-2 sprays into both nostrils daily as needed for rhinitis or allergies  at prn Yes Unknown, Entered By History   Methylsulfonylmethane (MSM) 500 MG CAPS Take 1 capsule by mouth daily 3/6/2018 at am Yes Unknown, Entered By History   Multiple Vitamins-Minerals (HAIR SKIN NAILS PO) Take 1 tablet by mouth At Bedtime 3/5/2018 at hs Yes Unknown, Entered By History   alendronate (FOSAMAX) 70 MG tablet Take 1 tablet (70 mg) by mouth every 7 days Take 60 minutes before am meal with 8 oz. water. Remain upright for 30 minutes. 2/27/2018 at am Yes Mikala Philip MD   lisinopril-hydrochlorothiazide (PRINZIDE/ZESTORETIC) 10-12.5 MG per tablet Take 1 tablet by mouth daily Profile Rx: patient will contact pharmacy when needed 3/6/2018 at am Yes Mikala Philip MD   simvastatin (ZOCOR) 20 MG tablet Take 1 tablet (20 mg) by mouth At Bedtime Profile Rx: patient will contact pharmacy when needed 3/5/2018 at hs Yes Mikala Philip MD   cholecalciferol (VITAMIN  -D) 1000 UNIT capsule Take 1 capsule by mouth daily. 3/6/2018 at am Yes Reported, Patient   potassium & sodium phosphates 278-164-250 MG/75ML SOLR Take 1 tablet by mouth daily  3/6/2018 at am Yes Reported, Patient   Milk Thistle  200 MG CAPS Take 1 capsule by mouth daily  3/6/2018 at am Yes Reported, Patient   FLAX SEED OIL OR 1 capsule daily 3/6/2018 at am Yes Reported, Patient   ASPIR-81 OR 1 TABLET DAILY 3/6/2018 at am Yes Reported, Patient     Dhara Chopra, PharmD, BCPS

## 2018-03-07 NOTE — PLAN OF CARE
Problem: Patient Care Overview  Goal: Plan of Care/Patient Progress Review  Outcome: No Change  A/Ox 4, vss, a-febrile, denies pain, up with SBA to bathroom, tele afib controlled, Diltiazem gtt stopped, started on metoprolol 50 mg bid, heparin gtt stopped started on Elquis 5 mg bid for anticoagulation, Echo done this morning EF 65% see results, occasional non productive cough, potassium replaced this morning recheck at 1600 this evening.

## 2018-03-07 NOTE — ED NOTES
"[facility]  ED Nurse Handoff Report    ED Chief complaint: Shortness of Breath (Pt reports influenza like symptoms for the past week. Pt reports feeling SOB at this time and intermittent cough) and Dizziness (Pt reports today she felt dizzy and lightheaded. Pt reports that feeling has subsided at this time )      ED Diagnosis:   Final diagnoses:   Tachycardia       Code Status: Full Code    Allergies:   Allergies   Allergen Reactions     Strawberry Flavor        Activity level - Baseline/Home:  Independent    Activity Level - Current:   Stand with Assist     Needed?: No    Isolation: No  Infection: Not Applicable    Bariatric?: No    Vital Signs:   Vitals:    03/06/18 1728 03/06/18 1819 03/06/18 1836   BP: 103/79 121/86 134/79   Resp: 18 18 18   Temp: 98.8  F (37.1  C)     TempSrc: Oral     SpO2: 97% 96% 97%   Weight: 81.6 kg (180 lb)     Height: 1.626 m (5' 4\")         Cardiac Rhythm: ,   Cardiac  Cardiac Rhythm: Atrial fibrillation    Pain level: 0-10 Pain Scale: 0    Is this patient confused?: No     Patient Report: Initial Complaint: Pt. Having flu-like symptoms for the last week. Today SOB, cough and lightheadedness.   Focused Assessment: Lightheaded feeling with position change, no CP, feeling generalized weakness.  Tests Performed:Labs and CXR  Abnormal Results: WBC=14.0, Emp=170  Treatments provided: IV fluids, Diltiazem bolus of 20mg. Diltiazem Gtt. 5mg/hr, Zithromax 500mg IVPB, Rocephin 2Grams IV.     Family Comments: Family present    OBS brochure/video discussed/provided to patient: N/A    ED Medications:   Medications   diltiazem (CARDIZEM) 125 mg in sodium chloride 0.9 % 125 mL infusion (not administered)   0.9% sodium chloride BOLUS (0 mLs Intravenous Stopped 3/6/18 1948)   cefTRIAXone (ROCEPHIN) 2 g in 20 mL SWFI Premix Syringe (2 g Intravenous Given 3/6/18 1850)   azithromycin (ZITHROMAX) 500 mg in sodium chloride 0.9 % 250 mL intermittent infusion (500 mg Intravenous New " Bag 3/6/18 4245)   diltiazem (CARDIZEM) injection 20 mg (20 mg Intravenous Given 3/6/18 1919)       Drips infusing?:  Yes    For the majority of the shift this patient was Green.   Interventions performed were None.    Severe Sepsis OR Septic Shock Diagnosis Present: No      ED NURSE PHONE NUMBER: [phone]

## 2018-03-07 NOTE — PROVIDER NOTIFICATION
MD Notification    Notified Person:  MD    Notified Persons Name: Dr Cárdenas    Notification Date/Time: 3/7/18 12:09a    Notification Interaction:  Talked with Physician    Purpose of Notification: Pt on Dilt 15mg/hr, -150s and no code status ordered please advise    Orders Received: Metoprolol q6h, call if heart rate remains above 130    Comments:

## 2018-03-07 NOTE — H&P
"Admitted:     03/06/2018      DATE OF SERVICE 03/06/2018      PRIMARY CARE PHYSICIAN:  Mkiala Philip MD      CHIEF COMPLAINT:  Dizziness and lightheadedness with shortness of breath.      HISTORY OF PRESENT ILLNESS:  Hamida Verma is a very pleasant 77-year-old female with a past medical history significant for hypertension and hyperlipidemia who presents to the emergency room tonight for evaluation of a constellation of symptoms including shortness of breath, dizziness and lightheadedness.  The patient had been in her usual state of health up until about 2 weeks ago when she developed symptoms of upper respiratory infection including cough, congestion and rhinorrhea.  Her symptoms have continued to linger and have moved into her chest.  In recent days, she endorses a cough that has been productive of brownish sputum and has generally \"felt crappy\".   She denies fevers.  She has been eating and drinking okay, no issues with nausea, vomiting, diarrhea or constipation.  Over the preceding few days she began to notice she has become short of breath both at rest and with exertion.  She denies sensation of chest pain or pressure.  No recent history of palpitations.  She notes she did have a history of palpitations in the past that occurred after the death of her son, but those subsequently resolved and she no longer requires medication.  Over the past day or 2, she has begun to feel dizzy and lightheaded when she gets up to ambulate.  This afternoon after using the bathroom she had to go lie down in bed because of her profound lightheadedness.  It was sometime this afternoon that she began to feel some palpitations.  She ultimately sought evaluation in the emergency room [facility].      In the emergency room, she was seen and evaluated by Dr. Zapien.  She was afebrile and profoundly tachycardic.  EKG confirmed she was in atrial fibrillation with rapid ventricular response.  Her blood pressures have been stable.  O2 sats " have been in the 90s on room air.  Her labs were essentially unremarkable save for a white count of 14.0.  Her BMP, TSH, troponin and procalcitonin were all within normal limits.  A chest x-ray showed mild interstitial opacities in the left perihilar region and right upper and left lower lobes with a differential diagnosis including pneumonia versus pulmonary edema.  In the emergency room, she was initiated on treatment for possible community-acquired pneumonia with Rocephin and azithromycin.  She was given 1 liter of IV fluids.  Due to her persistent rapid rate, she was ultimately initiated on a diltiazem drip.      When I saw the patient in the emergency room, she was resting comfortably and reiterated most of the history as above.  She did not appear to be in any acute distress.  She will be admitted tonight for ongoing management of new onset atrial fibrillation with RVR  in the setting of suspected community-acquired pneumonia.     PAST MEDICAL HISTORY:  1.  Hypertension.   2.  Hyperlipidemia.   3.  History of palpitations.  This occurred after the death of her son.  She was managed with a beta blocker for a short time but says the palpitations resolved and she no longer takes metoprolol.      PAST SURGICAL HISTORY:  Remote hysterectomy.      FAMILY HISTORY:  Her mother had a history of cerebrovascular disease and a myocardial infarction.   Her father had a history of coronary artery disease and alcohol and drug use.      SOCIAL HISTORY:  The patient has a remote history of tobacco use though quit smoking in the year 2000.  She does not consume alcohol.  She lives at home with her daughter and granddaughter.      HOME MEDICATIONS:   Prior to Admission medications    Medication Sig Last Dose Taking? Auth Provider   omega-3 acid ethyl esters (LOVAZA) 1 G capsule Take 2 g by mouth daily 3/6/2018 at am Yes Unknown, Entered By History   fluticasone (FLONASE) 50 MCG/ACT spray Spray 1-2 sprays into both nostrils daily  as needed for rhinitis or allergies  at prn Yes Unknown, Entered By History   Methylsulfonylmethane (MSM) 500 MG CAPS Take 1 capsule by mouth daily 3/6/2018 at am Yes Unknown, Entered By History   Multiple Vitamins-Minerals (HAIR SKIN NAILS PO) Take 1 tablet by mouth At Bedtime 3/5/2018 at hs Yes Unknown, Entered By History   alendronate (FOSAMAX) 70 MG tablet Take 1 tablet (70 mg) by mouth every 7 days Take 60 minutes before am meal with 8 oz. water. Remain upright for 30 minutes. 2/27/2018 at am Yes Mikala Philip MD   lisinopril-hydrochlorothiazide (PRINZIDE/ZESTORETIC) 10-12.5 MG per tablet Take 1 tablet by mouth daily Profile Rx: patient will contact pharmacy when needed 3/6/2018 at am Yes Mikala Philip MD   simvastatin (ZOCOR) 20 MG tablet Take 1 tablet (20 mg) by mouth At Bedtime Profile Rx: patient will contact pharmacy when needed 3/5/2018 at hs Yes Mikala Philip MD   cholecalciferol (VITAMIN  -D) 1000 UNIT capsule Take 1 capsule by mouth daily. 3/6/2018 at am Yes Reported, Patient   potassium & sodium phosphates 278-164-250 MG/75ML SOLR Take 1 tablet by mouth daily  3/6/2018 at am Yes Reported, Patient   Milk Thistle 200 MG CAPS Take 1 capsule by mouth daily  3/6/2018 at am Yes Reported, Patient   FLAX SEED OIL OR 1 capsule daily 3/6/2018 at am Yes Reported, Patient   ASPIR-81 OR 1 TABLET DAILY 3/6/2018 at am Yes Reported, Patient         ALLERGIES:  THE PATIENT IS REPORTEDLY ALLERGIC TO STRAWBERRIES.  THE REACTION IS NOT DOCUMENTED.      REVIEW OF SYSTEMS:  A full 10-point review of systems was obtained and unless otherwise stated as per HPI.      PHYSICAL EXAMINATION:   VITAL SIGNS:  Temperature 98.8, pulse 130, respirations 21, blood pressure 124/110.  O2 sat 96% on room air.   GENERAL:  The patient is a well-nourished, well-developed female who appears her stated age, lying quietly on the gurney.  She is alert and conversing appropriately.  Appears to be in no acute distress.   HEENT:  Pupils  equal and reactive to light and accommodation.  Extraocular movements are intact.  Mucous membranes moist.   CARDIOVASCULAR:  Heart rate is tachycardic with an irregular rhythm, no murmurs, gallops or rubs are appreciated.  Pulses are +2 and symmetric in bilateral upper extremities.  There is no lower extremity edema.   RESPIRATORY:  For the most part, the lungs are clear to auscultation bilaterally.  I do appreciate some mildly diminished air movement towards the lung bases, but no significant crackles or rhonchi.   ABDOMEN:  Soft, nontender, nondistended, positive bowel sounds throughout.   SKIN:  Warm and dry, no rashes, lesions, ecchymosis.      LABORATORY DATA AND IMAGING:  BMP shows sodium 135, potassium 3.4, creatinine 0.91, calcium 9.0, glucose 119.  CBC showed a white count of 14.0, hemoglobin 13.6, a platelet count 620.  Procalcitonin 0.13.  Troponin is negative.  TSH 0.54.      EKG showed atrial fibrillation with rapid ventricular response.      IMAGING:  Chest x-ray showed some mild interstitial opacities in the left perihilar region and right upper and lower lobes with a differential diagnosis including asymmetric pulmonary edema and pneumonia.      ASSESSMENT AND PLAN:  Hamida Verma is a very pleasant 77-year-old female with a past medical history significant for hypertension and hyperlipidemia who presents to the emergency room today for evaluation of symptoms including shortness of breath, dizziness and  lightheadedness.  She was found to be in the context of a recent upper respiratory infection.  She was found to be in atrial fibrillation with RVR with concerns for an underlying community-acquired pneumonia.  She will be admitted to the [facility] for ongoing evaluation and care.         1.  New onset atrial fibrillation with rapid ventricular response.  Uncertain if this was solely precipitated by her recent respiratory illness.  She does endorse a vague history of palpitations.  It sounds  as though this was evaluated -- I see she had a stress echo in 2014 which was essentially unremarkable.  She subsequently stopped taking metoprolol and had no issues with recurrent palpitations.  Her episode tonight occurs in the context of an ongoing upper respiratory illness and now with concerns for community-acquired pneumonia.  Her TSH is normal.  She has been initiated on a diltiazem drip in attempts to achieve adequate rate control.  An echocardiogram has been ordered.  I have placed a consult to Cardiology to assist with ongoing cares.  At this point, I have also initiated anticoagulation with a heparin drip.   2.  Suspected community-acquired pneumonia.  The patient endorsed 2 weeks of upper respiratory symptoms including a dry cough and rhinorrhea.  It has now progressed to the point where over the past several days she has endorsed a cough that has been productive of brownish sputum.  She has a leukocytosis with a white count of 14.0.  Her procalcitonin is 0.13, indicating a low risk of systemic infection.  Her chest x-ray did show some scattered interstitial opacities with differential including a pneumonia versus some pulmonary edema.  In the emergency room, she is not presently hypoxic and O2 sats are in the upper 90s on room air.  She was initiated on Rocephin and azithromycin in the emergency room.  At this juncture, I will have her continue treatment of community-acquired pneumonia with Rocephin and azithromycin.   3.  Hypertension.  It sounds as though she is well managed with a regimen of lisinopril and hydrochlorothiazide.  Awaiting completion of medication reconciliation per pharmacy, but anticipate this medication can be resumed.   4.  Hyperlipidemia.  Chronic and stable on simvastatin, which will be continued once dosing is confirmed per pharmacy.   5.  Deep venous thrombosis prophylaxis.  Heparin drip initiated on admission as above.      CODE STATUS:  I discussed with the patient and her  family at bedside this evening and she wished to be made a FULL CODE.      It is anticipated that she may be here for greater than a 2-midnight stay in order to discern adequate management plan.  She has been admitted under inpatient status.         JOAN RICK DO             D: 2018   T: 2018   MT: MD      Name:     ARTURO BOSWELL   MRN:      [id number]        Account:      [id number]   :      1940        Admitted:     2018                   Document: [id number]       cc: ALPHONSE BEAN MD

## 2018-03-07 NOTE — PROGRESS NOTES
Hospitalist On-Call    Afib with heart rate 150 on IV Dilt at 15 mg/hour    Will add Metoprolol 25 mg qid and monitor heart rate.    Eric Cárdenas  Pager: [phone]  Cell Phone:  [phone]

## 2018-03-07 NOTE — PLAN OF CARE
Problem: Patient Care Overview  Goal: Plan of Care/Patient Progress Review  Outcome: No Change  A&O. VSS. Elevated temp of 99.8, tylenol given. Tele shows AFib CVR. No complaints of pain. Heparin 900units/hr. Dilt 10mg/hr. Up with SBA. Plan is for echo and cards consult. Will continue to monitor.

## 2018-03-07 NOTE — PROGRESS NOTES
[facility]    Hospitalist Progress Note    Assessment & Plan   Hamida Verma is a 77 year old female with a past medical history significant for hypertension and hyperlipidemia who presented to the emergency for evaluation of symptoms including shortness of breath, dizziness and  lightheadedness.  She was found to be in atrial fibrillation with RVR with concerns for an underlying community-acquired pneumonia      New onset atrial fibrillation with rapid ventricular response  Patient has had upper respiratory illness recently and thought this may have precipitated her A fib.  She does endorse a vague history of palpitations and it sounds as though this was evaluated with a stress echo in 2014 which was essentially unremarkable.  She subsequently stopped taking metoprolol and had no issues with recurrent palpitations.  She was initiated on a diltiazem drip with good rate control.   - Echo ordered and is pending  - Cardiology consulted and appreciate their recommendations.  Have switched the patient to PO Metoprolol and started Eliquis for anticoagulation and will obtain nuclear stress test    Possible CAP vs viral URI   The patient endorsed 2 weeks of upper respiratory symptoms including a dry cough and rhinorrhea.  It has progressed to the point where over the past several days she has endorsed a cough that has been productive of brownish sputum.  Upon arrival she had a leukocytosis with a white count of 14.0.  Her procalcitonin is 0.13, indicating a low risk of systemic infection.  Her chest x-ray did show some scattered interstitial opacities with differential including a pneumonia versus some pulmonary edema.  She was initiated on Rocephin and azithromycin in the emergency room.  - Given the low procalcitonin will discontinue antibiotics and continue to monitor.    Hypertension  HLP  Currently well managed  - Continue PTA Lisinopril/HCTZ, Simvastatin     # Pain Assessment:   Current Pain Score  3/7/2018 3/6/2018 3/6/2018   Patient currently in pain? denies denies denies   Pain score (0-10) - - -   Hamida lovelace pain level was assessed and she currently denies pain.      DVT Prophylaxis: Eliquis  Code Status: Full Code    Disposition: Expected discharge in 1-2 days once rate controled and cardiac work-up complete.    Ruben John, DO  Text Page (7am to 6pm)    Interval History   Patient seen and examined.  She is feeling improved but still has a cough.  No chest pain or SOB.  No abdominal pain, N/V/D.    -Data reviewed today: I reviewed all new labs and imaging results over the last 24 hours. I personally reviewed no images or EKG's today.    Physical Exam   Temp: 99.2  F (37.3  C) Temp src: Oral BP: 107/63   Heart Rate: 89 Resp: 18 SpO2: 95 % O2 Device: None (Room air)    Vitals:    03/06/18 1728 03/06/18 2047 03/07/18 0631   Weight: 81.6 kg (180 lb) 86.6 kg (191 lb) 86.3 kg (190 lb 3.2 oz)     Vital Signs with Ranges  Temp:  [98.3  F (36.8  C)-99.8  F (37.7  C)] 99.2  F (37.3  C)  Heart Rate:  [] 89  Resp:  [12-21] 18  BP: ()/() 107/63  SpO2:  [95 %-97 %] 95 %  I/O last 3 completed shifts:  In: 540 [P.O.:540]  Out: 825 [Urine:825]    Constitutional: Awake, alert, cooperative, no apparent distress  Respiratory: Clear to auscultation bilaterally, no crackles or wheezing  Cardiovascular: Irregularly irregular.  No obvious murmurs  GI: Normal bowel sounds, soft, non-distended, non-tender  Skin/Integumen: No rashes, no cyanosis  MSK: No edema     Medications     diltiazem (CARDIZEM) infusion ADULT 10 mg/hr (03/07/18 3349)     - MEDICATION INSTRUCTIONS -       HEParin 900 Units/hr (03/07/18 7868)       metoprolol tartrate  25 mg Oral Q6H     azithromycin  250 mg Oral Daily     cefTRIAXone  2 g Intravenous Q24H     simvastatin  20 mg Oral At Bedtime     omega-3 acid ethyl esters  2 g Oral Daily     lisinopril-hydrochlorothiazide  1 tablet Oral Daily       Data     Recent Labs  Lab 03/07/18  0416  03/06/18 2050 03/06/18  1744   WBC 12.6* 15.0* 14.0*   HGB 11.5* 12.6 13.6   MCV 86 86 85   * 552* 620*     --  135   POTASSIUM 3.1*  --  3.4   CHLORIDE 104  --  99   CO2 26  --  29   BUN 13  --  20   CR 0.82  --  0.91   ANIONGAP 8  --  7   GURWINDER 8.3*  --  9.0   *  --  119*   TROPI  --   --  <0.015       Imaging:   Recent Results (from the past 24 hour(s))   XR Chest 2 Views    Narrative    CHEST TWO VIEWS    3/6/2018 5:59 PM     HISTORY: Shortness of breath. Cough.     COMPARISON: 11/12/2014.      Impression    IMPRESSION: Mild interstitial opacities in the left perihilar region,  also right upper and lower lobes. This may represent asymmetric  pulmonary edema, although pneumonia is not excluded. No significant  pleural effusion. No pneumothorax.    ABDELRAHMAN LOPEZ MD

## 2018-03-08 ENCOUNTER — APPOINTMENT (OUTPATIENT)
Dept: NUCLEAR MEDICINE | Facility: CLINIC | Age: 78
DRG: 310 | End: 2018-03-08
Attending: INTERNAL MEDICINE
Payer: COMMERCIAL

## 2018-03-08 ENCOUNTER — APPOINTMENT (OUTPATIENT)
Dept: CARDIOLOGY | Facility: CLINIC | Age: 78
DRG: 310 | End: 2018-03-08
Attending: INTERNAL MEDICINE
Payer: COMMERCIAL

## 2018-03-08 LAB — LMWH PPP CHRO-ACNC: 1.87 IU/ML

## 2018-03-08 PROCEDURE — 25000132 ZZH RX MED GY IP 250 OP 250 PS 637: Performed by: NURSE PRACTITIONER

## 2018-03-08 PROCEDURE — 34300033 ZZH RX 343: Performed by: INTERNAL MEDICINE

## 2018-03-08 PROCEDURE — 93017 CV STRESS TEST TRACING ONLY: CPT

## 2018-03-08 PROCEDURE — 99233 SBSQ HOSP IP/OBS HIGH 50: CPT | Performed by: INTERNAL MEDICINE

## 2018-03-08 PROCEDURE — 99232 SBSQ HOSP IP/OBS MODERATE 35: CPT | Performed by: INTERNAL MEDICINE

## 2018-03-08 PROCEDURE — 78452 HT MUSCLE IMAGE SPECT MULT: CPT

## 2018-03-08 PROCEDURE — 25000132 ZZH RX MED GY IP 250 OP 250 PS 637: Performed by: INTERNAL MEDICINE

## 2018-03-08 PROCEDURE — 85520 HEPARIN ASSAY: CPT | Performed by: INTERNAL MEDICINE

## 2018-03-08 PROCEDURE — 25000128 H RX IP 250 OP 636: Performed by: INTERNAL MEDICINE

## 2018-03-08 PROCEDURE — 93016 CV STRESS TEST SUPVJ ONLY: CPT | Performed by: INTERNAL MEDICINE

## 2018-03-08 PROCEDURE — 25000125 ZZHC RX 250: Performed by: INTERNAL MEDICINE

## 2018-03-08 PROCEDURE — 93018 CV STRESS TEST I&R ONLY: CPT | Performed by: INTERNAL MEDICINE

## 2018-03-08 PROCEDURE — 21400004 ZZH R&B CCU CSC INTERMEDIATE

## 2018-03-08 PROCEDURE — 40000863 ZZH STATISTIC RADIOLOGY XRAY, US, CT, MAR, NM

## 2018-03-08 PROCEDURE — 78452 HT MUSCLE IMAGE SPECT MULT: CPT | Mod: 26 | Performed by: INTERNAL MEDICINE

## 2018-03-08 PROCEDURE — 36415 COLL VENOUS BLD VENIPUNCTURE: CPT | Performed by: INTERNAL MEDICINE

## 2018-03-08 PROCEDURE — A9502 TC99M TETROFOSMIN: HCPCS | Performed by: INTERNAL MEDICINE

## 2018-03-08 RX ORDER — METOPROLOL SUCCINATE 25 MG/1
25 TABLET, EXTENDED RELEASE ORAL DAILY
Status: COMPLETED | OUTPATIENT
Start: 2018-03-08 | End: 2018-03-08

## 2018-03-08 RX ORDER — AMINOPHYLLINE 25 MG/ML
50-100 INJECTION, SOLUTION INTRAVENOUS
Status: DISCONTINUED | OUTPATIENT
Start: 2018-03-08 | End: 2018-03-08

## 2018-03-08 RX ORDER — ALBUTEROL SULFATE 90 UG/1
2 AEROSOL, METERED RESPIRATORY (INHALATION) EVERY 5 MIN PRN
Status: DISCONTINUED | OUTPATIENT
Start: 2018-03-08 | End: 2018-03-08

## 2018-03-08 RX ORDER — ACYCLOVIR 200 MG/1
0-1 CAPSULE ORAL
Status: DISCONTINUED | OUTPATIENT
Start: 2018-03-08 | End: 2018-03-08

## 2018-03-08 RX ORDER — REGADENOSON 0.08 MG/ML
0.4 INJECTION, SOLUTION INTRAVENOUS ONCE
Status: COMPLETED | OUTPATIENT
Start: 2018-03-08 | End: 2018-03-08

## 2018-03-08 RX ORDER — METOPROLOL SUCCINATE 100 MG/1
100 TABLET, EXTENDED RELEASE ORAL 2 TIMES DAILY
Status: DISCONTINUED | OUTPATIENT
Start: 2018-03-08 | End: 2018-03-09 | Stop reason: HOSPADM

## 2018-03-08 RX ADMIN — OMEGA-3-ACID ETHYL ESTERS 2 G: 1 CAPSULE, LIQUID FILLED ORAL at 12:59

## 2018-03-08 RX ADMIN — LISINOPRIL AND HYDROCHLOROTHIAZIDE 1 TABLET: 12.5; 1 TABLET ORAL at 10:16

## 2018-03-08 RX ADMIN — TETROFOSMIN 27.4 MCI.: 1.38 INJECTION, POWDER, LYOPHILIZED, FOR SOLUTION INTRAVENOUS at 09:30

## 2018-03-08 RX ADMIN — APIXABAN 5 MG: 5 TABLET, FILM COATED ORAL at 10:16

## 2018-03-08 RX ADMIN — APIXABAN 5 MG: 5 TABLET, FILM COATED ORAL at 21:08

## 2018-03-08 RX ADMIN — ACETAMINOPHEN 650 MG: 325 TABLET, FILM COATED ORAL at 13:23

## 2018-03-08 RX ADMIN — METOPROLOL SUCCINATE 100 MG: 100 TABLET, EXTENDED RELEASE ORAL at 21:08

## 2018-03-08 RX ADMIN — METOPROLOL SUCCINATE 75 MG: 50 TABLET, EXTENDED RELEASE ORAL at 10:15

## 2018-03-08 RX ADMIN — DILTIAZEM HYDROCHLORIDE 15 MG/HR: 5 INJECTION INTRAVENOUS at 11:15

## 2018-03-08 RX ADMIN — GUAIFENESIN 10 ML: 100 SOLUTION ORAL at 21:12

## 2018-03-08 RX ADMIN — METOPROLOL SUCCINATE 25 MG: 25 TABLET, EXTENDED RELEASE ORAL at 13:00

## 2018-03-08 RX ADMIN — TETROFOSMIN 10.8 MCI.: 1.38 INJECTION, POWDER, LYOPHILIZED, FOR SOLUTION INTRAVENOUS at 07:25

## 2018-03-08 RX ADMIN — SIMVASTATIN 20 MG: 20 TABLET, FILM COATED ORAL at 21:08

## 2018-03-08 RX ADMIN — REGADENOSON 0.4 MG: 0.08 INJECTION, SOLUTION INTRAVENOUS at 09:25

## 2018-03-08 ASSESSMENT — PAIN DESCRIPTION - DESCRIPTORS: DESCRIPTORS: HEADACHE

## 2018-03-08 NOTE — PLAN OF CARE
Problem: Patient Care Overview  Goal: Plan of Care/Patient Progress Review  Outcome: No Change  A&O. VSS, on RA. Up independently. No complaints of pain or SOB. Tele shows A Fib HR 90-100s. Dilt gtt 10mg/hr. Plan for lexiscan. NPO since midnight. Will continue to monitor.

## 2018-03-08 NOTE — PROGRESS NOTES
0923 To EKG for Lexiscan IV in Rt arm redressed. Good blood return. IV Lexiscan given per protocol. Pt CO racing HB after injection. Resolved after about 5 min. VS at baseline at end.Will return to bed per wc.Report to Rochelle GRANADOS.

## 2018-03-08 NOTE — PROGRESS NOTES
[facility]    Hospitalist Progress Note    Assessment & Plan   Hamida Verma is a 77 year old female with a past medical history significant for hypertension and hyperlipidemia who presented to the emergency for evaluation of symptoms including shortness of breath, dizziness and  lightheadedness.  She was found to be in atrial fibrillation with RVR with concerns for an underlying community-acquired pneumonia      New onset atrial fibrillation with rapid ventricular response  Patient has had upper respiratory illness recently and thought this may have precipitated her A fib.  She does endorse a vague history of palpitations and it sounds as though this was evaluated with a stress echo in 2014 which was essentially unremarkable.  She subsequently stopped taking metoprolol and had no issues with recurrent palpitations.  She was initiated on a diltiazem drip with good rate control. Since then she has been on the Diltiazem drip intermittent.    Echo showed   - Cardiology consulted and appreciate their recommendations.  Toprol XL increased to 100 mg and will be NPO for possible cardioversion tomorrow      Viral URI   The patient endorsed 2 weeks of upper respiratory symptoms including a dry cough and rhinorrhea.  It has progressed to the point where over the past several days she has endorsed a cough that has been productive of brownish sputum.  Upon arrival she had a leukocytosis with a white count of 14.0.  Her procalcitonin is 0.13, indicating a low risk of systemic infection.  Her chest x-ray did show some scattered interstitial opacities with differential including a pneumonia versus some pulmonary edema.  She was initiated on Rocephin and azithromycin in the emergency room.  Given the low procalcitonin will discontinue antibiotics and continue to monitor.  - Continue symptomatic management      Hypertension  HLP  Currently well managed  - Continue PTA Lisinopril/HCTZ, Simvastatin     # Pain  Assessment:   Current Pain Score 3/7/2018 3/7/2018 3/7/2018   Patient currently in pain? denies denies denies   Pain score (0-10) - - -   Hamida lovelace pain level was assessed and she currently denies pain.        DVT Prophylaxis: Eliquis  Code Status: Full Code    Disposition: Expected discharge in 1-2 days once rate/rhythm controlled off of IV medications.  Anticipate discharge to home.    Ruben John, DO  Text Page (7am to 6pm)    Interval History   Patient seen and examined.  She is feeling improved but still notes a cough.  No chest pain or SOB.  No palpitations or light headedness    -Data reviewed today: I reviewed all new labs and imaging results over the last 24 hours. I personally reviewed no images or EKG's today.    Physical Exam   Temp: 98.3  F (36.8  C) Temp src: Oral BP: 130/87 Pulse: 135 Heart Rate: 94 Resp: 16 SpO2: 95 % O2 Device: None (Room air)    Vitals:    03/06/18 1728 03/06/18 2047 03/07/18 0631   Weight: 81.6 kg (180 lb) 86.6 kg (191 lb) 86.3 kg (190 lb 3.2 oz)     Vital Signs with Ranges  Temp:  [98.3  F (36.8  C)-99.9  F (37.7  C)] 98.3  F (36.8  C)  Pulse:  [116-144] 135  Heart Rate:  [] 94  Resp:  [16-22] 16  BP: ()/() 130/87  SpO2:  [93 %-98 %] 95 %  I/O last 3 completed shifts:  In: 200 [P.O.:200]  Out: -     Constitutional: Awake, alert, cooperative, no apparent distress  Respiratory: Clear to auscultation bilaterally, no crackles or wheezing  Cardiovascular: Irregularly irregular, normal S1 and S2, and no murmur noted  GI: Normal bowel sounds, soft, non-distended, non-tender  Skin/Integumen: No rashes, no cyanosis  MSK: No edema     Medications     diltiazem (CARDIZEM) infusion ADULT 15 mg/hr (03/08/18 1115)     - MEDICATION INSTRUCTIONS -         metoprolol succinate  25 mg Oral Daily     metoprolol succinate  100 mg Oral BID     apixaban ANTICOAGULANT  5 mg Oral BID     metoprolol succinate  75 mg Oral Daily     sodium chloride (PF)  10 mL Intravenous Once      simvastatin  20 mg Oral At Bedtime     omega-3 acid ethyl esters  2 g Oral Daily       Data     Recent Labs  Lab 03/07/18  1605 03/07/18  0330 03/06/18  2050 03/06/18  1744   WBC  --  12.6* 15.0* 14.0*   HGB  --  11.5* 12.6 13.6   MCV  --  86 86 85   PLT  --  463* 552* 620*   NA  --  138  --  135   POTASSIUM 3.7 3.1*  --  3.4   CHLORIDE  --  104  --  99   CO2  --  26  --  29   BUN  --  13  --  20   CR  --  0.82  --  0.91   ANIONGAP  --  8  --  7   GURWINDER  --  8.3*  --  9.0   GLC  --  115*  --  119*   TROPI  --   --   --  <0.015       Imaging:   Recent Results (from the past 24 hour(s))   NM Lexiscan stress test (nuc card)    Narrative    GATED MYOCARDIAL PERFUSION SCINTIGRAPHY WITH INTRAVENOUS PHARMACOLOGIC  VASODILATATION LEXISCAN -ONE DAY STUDY     3/8/2018 10:52 AM  ARTURO BOSWELL  77 years  Female  1940.    Indication/Clinical History: Atrial fibrillation    Impression  1.  Myocardial perfusion imaging using single isotope technique  demonstrated a small perfusion defect of mild severity involving the  left ventricular apex which is essentially fixed and most likely  related to breast attenuation. There is no evidence of significant  pharmacological stress-induced ischemia or prior myocardial infarction  on this study.   2. Gated images demonstrated normal left ventricular wall motion.  The  left ventricular systolic function is 73% at rest and 74% on the post  stress images.  3. There is no previous study for comparison.    Procedure  Pharmacologic stress testing was performed with Lexiscan at a rate of  0.08 mg/ml rapid bolus injection, for 15 seconds, 0.4 mg/5ml  intravenously. Low-level exercise was not performed along with the  vasodilator infusion.  The heart rate was 95 at baseline and cortney to  141 beats per minute during the Lexiscan infusion. The rest blood  pressure was 141/74 mmHg and was 151/75 mm Hg during Lexiscan  infusion. The patient experienced no symptoms  during the test.    Myocardial  perfusion imaging was performed at rest, approximately 45  minutes after the injection intravenously of 10.0 mCi of Tc-99m  Myoview. At peak pharmacologic effect, 10-20 seconds after Lexiscan,   the patient was injected intravenously with 27.4 mCi of  Tc-99m  Myoview. The post-stress tomographic imaging was performed  approximately 60 minutes after stress.    EKG Findings  The resting EKG demonstrated atrial fibrillation. The stress EKG  demonstrated no significant ST-T wave changes from baseline.    Tomographic Findings  Overall, the study quality is fair . On the stress images, a small  perfusion defect of mild severity involving the left ventricular apex  is noted. On the rest images, the same perfusion defect is noted and  is actually more prominent . Gated images demonstrated normal left  ventricular wall motion. The left ventricular ejection fraction was  calculated to be 73% at rest and 74% on the post stress images. TID  was absent.    BENJAMIN RAO MD

## 2018-03-08 NOTE — PROGRESS NOTES
[facility]    Cardiology Progress Note    Date of Service (when I saw the patient): 03/08/2018     Assessment & Plan   Hamida Verma is a 77 year old female who was admitted on 3/6/2018 with dizziness and SOB. Possible pneumonia/upper respiratory infection    New afib with RVR in the setting of URI/pneumonia  -ChadsVasc score of 4 (htn, age, sex)  -Eliquis  -EF 65%  Assessment/ Plan:   -Started on toprol XL 75 mg daily and ac. Rate still uncontrolled,   -dilt has been on and off,  D/c overnight for hypotension (89/51)  -Add toprol XL 25 now, then go to 100 mg BID and wean off diltiazem  -NPO after midnight for anticipated DCCV tomorrow    HTN  -Controlled.   -Continues on PTA lisinopril-hctz-stop this to allow for BB increase  -BB added this admission    HLD  zocor    Anemia  -2 point drop in hgb  -Hgb drop from peak of 13.6 on 3/6 to 11.5  Defer to IM      Interval History   Hgb drop to 11.5 , denies melena with stool. No abdominal pain.    Physical Exam   Temp: 98.8  F (37.1  C) Temp src: Oral BP: 145/77 Pulse: 135 Heart Rate: 155 Resp: 16 SpO2: 94 % O2 Device: None (Room air)    Vitals:    03/06/18 1728 03/06/18 2047 03/07/18 0631   Weight: 81.6 kg (180 lb) 86.6 kg (191 lb) 86.3 kg (190 lb 3.2 oz)     Vital Signs with Ranges  Temp:  [97.7  F (36.5  C)-99.9  F (37.7  C)] 98.8  F (37.1  C)  Pulse:  [116-144] 135  Heart Rate:  [] 155  Resp:  [16-22] 16  BP: ()/() 145/77  SpO2:  [93 %-98 %] 94 %  I/O last 3 completed shifts:  In: 200 [P.O.:200]  Out: -     Constitutional     alert and oriented, in no acute distress.     Skin     warm and dry to touch    ENT     no pallor or cyanosis    Neck    Supple, JVP normal, no carotid bruit    Chest     no tenderness to palpation    Lungs  clear to auscultation     Cardiac  regular rhythm, S1 normal, S2 normal, No S3 or S4, no murmurs, no rubs    Abdomen     abdomen soft, bowel sounds normoactive, no hepatosplenomegaly    Extremities  and Back     No edema observed.        Neurological     no gross motor deficits noted, affect appropriate, oriented to time, person and place.    Medications     diltiazem (CARDIZEM) infusion ADULT 15 mg/hr (03/08/18 1030)     - MEDICATION INSTRUCTIONS -         apixaban ANTICOAGULANT  5 mg Oral BID     metoprolol succinate  75 mg Oral Daily     sodium chloride (PF)  10 mL Intravenous Once     simvastatin  20 mg Oral At Bedtime     omega-3 acid ethyl esters  2 g Oral Daily     lisinopril-hydrochlorothiazide  1 tablet Oral Daily       Data   Results for orders placed or performed during the hospital encounter of 03/06/18 (from the past 24 hour(s))   Potassium   Result Value Ref Range    Potassium 3.7 3.4 - 5.3 mmol/L   Lactic acid level STAT   Result Value Ref Range    Lactic Acid 0.7 0.7 - 2.0 mmol/L   Heparin Xa (10a) Level   Result Value Ref Range    Heparin 10A Level 1.87 (HH) IU/mL   NM Lexiscan stress test (nuc card)    Narrative    GATED MYOCARDIAL PERFUSION SCINTIGRAPHY WITH INTRAVENOUS PHARMACOLOGIC  VASODILATATION LEXISCAN -ONE DAY STUDY     3/8/2018 10:52 AM  ARTURO BOSWELL  77 years  Female  1940.    Indication/Clinical History: Atrial fibrillation    Impression  1.  Myocardial perfusion imaging using single isotope technique  demonstrated a small perfusion defect of mild severity involving the  left ventricular apex which is essentially fixed and most likely  related to breast attenuation. There is no evidence of significant  pharmacological stress-induced ischemia or prior myocardial infarction  on this study.   2. Gated images demonstrated normal left ventricular wall motion.  The  left ventricular systolic function is 73% at rest and 74% on the post  stress images.  3. There is no previous study for comparison.    Procedure  Pharmacologic stress testing was performed with Lexiscan at a rate of  0.08 mg/ml rapid bolus injection, for 15 seconds, 0.4 mg/5ml  intravenously. Low-level exercise was  not performed along with the  vasodilator infusion.  The heart rate was 95 at baseline and cortney to  141 beats per minute during the Lexiscan infusion. The rest blood  pressure was 141/74 mmHg and was 151/75 mm Hg during Lexiscan  infusion. The patient experienced no symptoms  during the test.    Myocardial perfusion imaging was performed at rest, approximately 45  minutes after the injection intravenously of 10.0 mCi of Tc-99m  Myoview. At peak pharmacologic effect, 10-20 seconds after Lexiscan,   the patient was injected intravenously with 27.4 mCi of  Tc-99m  Myoview. The post-stress tomographic imaging was performed  approximately 60 minutes after stress.    EKG Findings  The resting EKG demonstrated atrial fibrillation. The stress EKG  demonstrated no significant ST-T wave changes from baseline.    Tomographic Findings  Overall, the study quality is fair . On the stress images, a small  perfusion defect of mild severity involving the left ventricular apex  is noted. On the rest images, the same perfusion defect is noted and  is actually more prominent . Gated images demonstrated normal left  ventricular wall motion. The left ventricular ejection fraction was  calculated to be 73% at rest and 74% on the post stress images. TID  was absent.    MD Carolee OSORIO APRN, CNP  Cardiology  Pager:  [phone]

## 2018-03-08 NOTE — PLAN OF CARE
Problem: Patient Care Overview  Goal: Plan of Care/Patient Progress Review  BP stable. Tele AFib RVR. Restarted Dilt gtt for rate control. Pt denies pain or SOB. Gave PRN cough medicine with reported relief. Plan for Melonie Scan tomorrow. Continue to monitor.

## 2018-03-08 NOTE — PROGRESS NOTES
Per d/c pharmacy Eliquis is $240/mo. Pt can use the voucher for the first month free fill. Pt is not eligible for the co-pay assistance card due to medicare product. Xarelto $240, Pradaxa $300

## 2018-03-08 NOTE — PROVIDER NOTIFICATION
Notified: Dr Aldana - [facility] cardiology    03/07/18 8:23 PM    Notification Interaction: telephone    Purpose of Notification: Pt continues to have increased HR. No PRNs available.     Orders Received: Restart Diltiazem gtt. Cards to evaluate in the AM.

## 2018-03-09 VITALS
OXYGEN SATURATION: 91 % | WEIGHT: 189.2 LBS | BODY MASS INDEX: 32.3 KG/M2 | RESPIRATION RATE: 16 BRPM | HEART RATE: 143 BPM | SYSTOLIC BLOOD PRESSURE: 131 MMHG | HEIGHT: 64 IN | TEMPERATURE: 99.2 F | DIASTOLIC BLOOD PRESSURE: 74 MMHG

## 2018-03-09 LAB
ANION GAP SERPL CALCULATED.3IONS-SCNC: 6 MMOL/L (ref 3–14)
BUN SERPL-MCNC: 13 MG/DL (ref 7–30)
CALCIUM SERPL-MCNC: 8.8 MG/DL (ref 8.5–10.1)
CHLORIDE SERPL-SCNC: 105 MMOL/L (ref 94–109)
CO2 SERPL-SCNC: 26 MMOL/L (ref 20–32)
CREAT SERPL-MCNC: 0.76 MG/DL (ref 0.52–1.04)
GFR SERPL CREATININE-BSD FRML MDRD: 74 ML/MIN/1.7M2
GLUCOSE SERPL-MCNC: 108 MG/DL (ref 70–99)
MAGNESIUM SERPL-MCNC: 2.2 MG/DL (ref 1.6–2.3)
POTASSIUM SERPL-SCNC: 4.2 MMOL/L (ref 3.4–5.3)
SODIUM SERPL-SCNC: 137 MMOL/L (ref 133–144)

## 2018-03-09 PROCEDURE — 25000132 ZZH RX MED GY IP 250 OP 250 PS 637: Performed by: NURSE PRACTITIONER

## 2018-03-09 PROCEDURE — 36415 COLL VENOUS BLD VENIPUNCTURE: CPT | Performed by: INTERNAL MEDICINE

## 2018-03-09 PROCEDURE — 25000132 ZZH RX MED GY IP 250 OP 250 PS 637: Performed by: INTERNAL MEDICINE

## 2018-03-09 PROCEDURE — 40000857 ZZH STATISTIC TEE INCLUDES SEDATION

## 2018-03-09 PROCEDURE — 93005 ELECTROCARDIOGRAM TRACING: CPT

## 2018-03-09 PROCEDURE — 99233 SBSQ HOSP IP/OBS HIGH 50: CPT | Performed by: INTERNAL MEDICINE

## 2018-03-09 PROCEDURE — 99238 HOSP IP/OBS DSCHRG MGMT 30/<: CPT | Performed by: INTERNAL MEDICINE

## 2018-03-09 PROCEDURE — 80048 BASIC METABOLIC PNL TOTAL CA: CPT | Performed by: INTERNAL MEDICINE

## 2018-03-09 PROCEDURE — 83735 ASSAY OF MAGNESIUM: CPT | Performed by: INTERNAL MEDICINE

## 2018-03-09 PROCEDURE — 93010 ELECTROCARDIOGRAM REPORT: CPT | Performed by: INTERNAL MEDICINE

## 2018-03-09 RX ORDER — POTASSIUM CHLORIDE 1500 MG/1
40 TABLET, EXTENDED RELEASE ORAL
Status: DISCONTINUED | OUTPATIENT
Start: 2018-03-09 | End: 2018-03-09 | Stop reason: HOSPADM

## 2018-03-09 RX ORDER — FLECAINIDE ACETATE 100 MG/1
200 TABLET ORAL ONCE
Status: COMPLETED | OUTPATIENT
Start: 2018-03-09 | End: 2018-03-09

## 2018-03-09 RX ORDER — FLECAINIDE ACETATE 100 MG/1
100 TABLET ORAL EVERY 12 HOURS
Qty: 60 TABLET | Refills: 0 | Status: SHIPPED | OUTPATIENT
Start: 2018-03-09 | End: 2018-03-28

## 2018-03-09 RX ORDER — LIDOCAINE HYDROCHLORIDE 40 MG/ML
1.5 SOLUTION TOPICAL ONCE
Status: DISCONTINUED | OUTPATIENT
Start: 2018-03-09 | End: 2018-03-09 | Stop reason: HOSPADM

## 2018-03-09 RX ORDER — GLYCOPYRROLATE 0.2 MG/ML
0.1 INJECTION, SOLUTION INTRAMUSCULAR; INTRAVENOUS ONCE
Status: DISCONTINUED | OUTPATIENT
Start: 2018-03-09 | End: 2018-03-09 | Stop reason: HOSPADM

## 2018-03-09 RX ORDER — SODIUM CHLORIDE 9 MG/ML
1000 INJECTION, SOLUTION INTRAVENOUS CONTINUOUS
Status: DISCONTINUED | OUTPATIENT
Start: 2018-03-09 | End: 2018-03-09 | Stop reason: HOSPADM

## 2018-03-09 RX ORDER — POTASSIUM CHLORIDE 1500 MG/1
20 TABLET, EXTENDED RELEASE ORAL
Status: DISCONTINUED | OUTPATIENT
Start: 2018-03-09 | End: 2018-03-09 | Stop reason: HOSPADM

## 2018-03-09 RX ORDER — DEXTROSE MONOHYDRATE 25 G/50ML
9.5 INJECTION, SOLUTION INTRAVENOUS
Status: DISCONTINUED | OUTPATIENT
Start: 2018-03-09 | End: 2018-03-09 | Stop reason: HOSPADM

## 2018-03-09 RX ORDER — FLECAINIDE ACETATE 100 MG/1
100 TABLET ORAL EVERY 12 HOURS SCHEDULED
Status: DISCONTINUED | OUTPATIENT
Start: 2018-03-09 | End: 2018-03-09 | Stop reason: HOSPADM

## 2018-03-09 RX ORDER — ATROPINE SULFATE 0.1 MG/ML
.5-1 INJECTION INTRAVENOUS
Status: DISCONTINUED | OUTPATIENT
Start: 2018-03-09 | End: 2018-03-09 | Stop reason: HOSPADM

## 2018-03-09 RX ORDER — NALOXONE HYDROCHLORIDE 0.4 MG/ML
.1-.4 INJECTION, SOLUTION INTRAMUSCULAR; INTRAVENOUS; SUBCUTANEOUS
Status: DISCONTINUED | OUTPATIENT
Start: 2018-03-09 | End: 2018-03-09

## 2018-03-09 RX ORDER — FENTANYL CITRATE 50 UG/ML
25 INJECTION, SOLUTION INTRAMUSCULAR; INTRAVENOUS
Status: DISCONTINUED | OUTPATIENT
Start: 2018-03-09 | End: 2018-03-09 | Stop reason: HOSPADM

## 2018-03-09 RX ORDER — FLUMAZENIL 0.1 MG/ML
0.2 INJECTION, SOLUTION INTRAVENOUS
Status: DISCONTINUED | OUTPATIENT
Start: 2018-03-09 | End: 2018-03-09 | Stop reason: HOSPADM

## 2018-03-09 RX ORDER — FENTANYL CITRATE 50 UG/ML
25-50 INJECTION, SOLUTION INTRAMUSCULAR; INTRAVENOUS
Status: DISCONTINUED | OUTPATIENT
Start: 2018-03-09 | End: 2018-03-09 | Stop reason: HOSPADM

## 2018-03-09 RX ORDER — METOPROLOL SUCCINATE 100 MG/1
100 TABLET, EXTENDED RELEASE ORAL 2 TIMES DAILY
Qty: 60 TABLET | Refills: 0 | Status: ON HOLD | OUTPATIENT
Start: 2018-03-09 | End: 2018-03-21

## 2018-03-09 RX ADMIN — FLECAINIDE ACETATE 200 MG: 100 TABLET ORAL at 08:05

## 2018-03-09 RX ADMIN — OMEGA-3-ACID ETHYL ESTERS 2 G: 1 CAPSULE, LIQUID FILLED ORAL at 10:00

## 2018-03-09 RX ADMIN — APIXABAN 5 MG: 5 TABLET, FILM COATED ORAL at 08:02

## 2018-03-09 RX ADMIN — METOPROLOL SUCCINATE 100 MG: 100 TABLET, EXTENDED RELEASE ORAL at 08:02

## 2018-03-09 NOTE — PLAN OF CARE
Problem: Patient Care Overview  Goal: Plan of Care/Patient Progress Review  Outcome: Improving  Pt AO, VSS on RA. Denies pain. Tele A fib with CVR, HR in 70s-90s. Cardizem gtt turned down at 1530 to 5mg/hr, HR maintained in 80s-90s, infusion was stopped at 1745 - HR in 80s-90s still, BP stable. Started metoprolol today. Up indep. NPO at 0000. Continue to monitor.

## 2018-03-09 NOTE — DISCHARGE SUMMARY
"[facility]    Discharge Summary  Hospitalist    Date of Admission:  3/6/2018  Date of Discharge:  3/9/2018  Discharging Provider: Ruben John DO    Discharge Diagnoses   1. Atrial fibrillation, new diagnosis  2. Viral URI   3. HTN  4. HLP    History of Present Illness   Hamida Verma is an 77 year old female who presented evaluation of a constellation of symptoms including shortness of breath, dizziness and lightheadedness.  The patient had been in her usual state of health up until about 2 weeks ago when she developed symptoms of upper respiratory infection including cough, congestion and rhinorrhea.  Her symptoms have continued to linger and have moved into her chest.  In recent days, she endorsed a cough that has been productive of brownish sputum and has generally \"felt crappy\".   She denied fevers.  She had been eating and drinking okay, no issues with nausea, vomiting, diarrhea or constipation.  Over the preceding few days she began to notice she had become short of breath both at rest and with exertion.  She denied any sensation of chest pain or pressure.  No recent history of palpitations.  She noted she did have a history of palpitations in the past that occurred after the death of her son, but those subsequently resolved and she no longer requires medication.  Over the past day or 2, she has begun to feel dizzy and lightheaded when she got up to ambulate.  The afternoon of admission she also began to feel some palpitations.    Hospital Course   Hamida Verma was admitted on 3/6/2018.  The following problems were addressed during her hospitalization:    New onset atrial fibrillation with rapid ventricular response  Patient has had upper respiratory illness recently and thought this may have precipitated her A fib.  She does endorse a vague history of palpitations and it sounds as though this was evaluated with a stress echo in 2014 which was essentially unremarkable.  She " subsequently stopped taking metoprolol and had no issues with recurrent palpitations.  She was initiated on a diltiazem drip with good rate control.  Cardiology was consulted and switched the diltiazem to Toprol xL.  This was adjusted for rate control.  On the day of discharge cardiology was actually planning on cardioversion but she spontaneously converted.  While here she was started on Eliquis for anticoagulation and this will be continued as an outpatient.  Cardiology also started the patient on Flecainide and will follow-up with her as an outpatient.    Viral URI   The patient endorsed 2 weeks of upper respiratory symptoms including a dry cough and rhinorrhea.  It has progressed to the point where over the past several days she has endorsed a cough that has been productive of brownish sputum.  Upon arrival she had a leukocytosis with a white count of 14.0.  Her procalcitonin is 0.13, indicating a low risk of systemic infection.  Her chest x-ray did show some scattered interstitial opacities with differential including a pneumonia versus some pulmonary edema.  She was initiated on Rocephin and azithromycin in the emergency room.  Given the low procalcitonin will discontinue antibiotics and continue to monitor.      # Discharge Pain Plan:   - Patient currently has NO PAIN and is not being prescribed pain medications on discharge.    Ruben John, DO    Significant Results and Procedures   See below    Pending Results   No pending results  Unresulted Labs Ordered in the Past 30 Days of this Admission     No orders found from 1/5/2018 to 3/7/2018.          Code Status   Full Code       Primary Care Physician   Mikala Philip MD    Physical Exam   Temp: 99.2  F (37.3  C) Temp src: Oral BP: 131/74 Pulse: 143 Heart Rate: 84 Resp: 16 SpO2: 91 % O2 Device: None (Room air)    Vitals:    03/06/18 2047 03/07/18 0631 03/09/18 0618   Weight: 86.6 kg (191 lb) 86.3 kg (190 lb 3.2 oz) 85.8 kg (189 lb 3.2 oz)     Vital  Signs with Ranges  Temp:  [98.1  F (36.7  C)-99.2  F (37.3  C)] 99.2  F (37.3  C)  Pulse:  [123-143] 143  Heart Rate:  [] 84  Resp:  [16] 16  BP: (113-136)/(62-89) 131/74  SpO2:  [91 %-95 %] 91 %       Constitutional: Resting comfortably in bed.  NAD  Eyes: EOMI  Respiratory: Clear to auscultation.  No respiratory distress  Cardiovascular: RRR.  No murmurs  GI: bowel sounds present   Musculoskeletal: No peripheral edema       Discharge Disposition   Discharged to home  Condition at discharge: Stable    Consultations This Hospital Stay   CARDIOLOGY IP CONSULT  PHARMACY TO DOSE HEPARIN  PHARMACY LIAISON FOR MEDICATION COVERAGE CONSULT  PHARMACY LIAISON FOR MEDICATION COVERAGE CONSULT    Time Spent on this Encounter   I, Ruben John, personally saw the patient today and spent less than or equal to 30 minutes discharging this patient.    Discharge Orders     Follow-Up with Cardiologist     Follow-Up with Cardiac Advanced Practice Provider     Exercise Stress test   New irene, afib     Reason for your hospital stay   Atrial fibrillation     Follow-up and recommended labs and tests    Follow up with primary care provider, Mikala Philip MD, within 7 days to evaluate medication change and for hospital follow- up.  No follow up labs or test are needed.    Should follow-up with cardiology as planned.  It appears they would like an exercise stress test in 1 week and follow-up in clinic in 2 weeks     Activity   Your activity upon discharge: activity as tolerated     Full Code     Diet   Follow this diet upon discharge: Orders Placed This Encounter     Low Saturated Fat Na <2400 mg       Discharge Medications   Current Discharge Medication List      START taking these medications    Details   flecainide (TAMBOCOR) 100 MG tablet Take 1 tablet (100 mg) by mouth every 12 hours  Qty: 60 tablet, Refills: 0    Associated Diagnoses: Atrial fibrillation with rapid ventricular response (H)      apixaban ANTICOAGULANT  (ELIQUIS) 5 MG tablet Take 1 tablet (5 mg) by mouth 2 times daily  Qty: 60 tablet, Refills: 0    Associated Diagnoses: Atrial fibrillation with rapid ventricular response (H)      metoprolol succinate (TOPROL-XL) 100 MG 24 hr tablet Take 1 tablet (100 mg) by mouth 2 times daily  Qty: 60 tablet, Refills: 0    Associated Diagnoses: Atrial fibrillation with rapid ventricular response (H)         CONTINUE these medications which have NOT CHANGED    Details   omega-3 acid ethyl esters (LOVAZA) 1 G capsule Take 2 g by mouth daily      fluticasone (FLONASE) 50 MCG/ACT spray Spray 1-2 sprays into both nostrils daily as needed for rhinitis or allergies      Methylsulfonylmethane (MSM) 500 MG CAPS Take 1 capsule by mouth daily      Multiple Vitamins-Minerals (HAIR SKIN NAILS PO) Take 1 tablet by mouth At Bedtime      alendronate (FOSAMAX) 70 MG tablet Take 1 tablet (70 mg) by mouth every 7 days Take 60 minutes before am meal with 8 oz. water. Remain upright for 30 minutes.  Qty: 12 tablet, Refills: 3    Associated Diagnoses: Osteoporosis      simvastatin (ZOCOR) 20 MG tablet Take 1 tablet (20 mg) by mouth At Bedtime Profile Rx: patient will contact pharmacy when needed  Qty: 90 tablet, Refills: 3    Associated Diagnoses: Hyperlipidemia LDL goal <130      cholecalciferol (VITAMIN  -D) 1000 UNIT capsule Take 1 capsule by mouth daily.      potassium & sodium phosphates 278-164-250 MG/75ML SOLR Take 1 tablet by mouth daily       Milk Thistle 200 MG CAPS Take 1 capsule by mouth daily       FLAX SEED OIL OR 1 capsule daily         STOP taking these medications       lisinopril-hydrochlorothiazide (PRINZIDE/ZESTORETIC) 10-12.5 MG per tablet Comments:   Reason for Stopping:         ASPIR-81 OR Comments:   Reason for Stopping:             Allergies   Allergies   Allergen Reactions     Strawberry Flavor      Data   Most Recent 3 CBC's:  Recent Labs   Lab Test  03/07/18   0330  03/06/18   2050  03/06/18   1744   WBC  12.6*  15.0*  14.0*    HGB  11.5*  12.6  13.6   MCV  86  86  85   PLT  463*  552*  620*      Most Recent 3 BMP's:  Recent Labs   Lab Test  03/09/18   0817  03/07/18   1605  03/07/18   0330  03/06/18   1744   NA  137   --   138  135   POTASSIUM  4.2  3.7  3.1*  3.4   CHLORIDE  105   --   104  99   CO2  26   --   26  29   BUN  13   --   13  20   CR  0.76   --   0.82  0.91   ANIONGAP  6   --   8  7   GURWINDER  8.8   --   8.3*  9.0   GLC  108*   --   115*  119*     Most Recent 2 LFT's:  Recent Labs   Lab Test  03/23/17   0804  05/23/16   1056   AST  16  15   ALT  24  26   ALKPHOS  66  59   BILITOTAL  0.4  0.4     Most Recent INR's and Anticoagulation Dosing History:  Anticoagulation Dose History     Recent Dosing and Labs Latest Ref Rng & Units 11/12/2014    INR 0.86 - 1.14 1.04        Most Recent 3 Troponin's:  Recent Labs   Lab Test  03/06/18   1744  11/12/14   1212  11/12/14   0814  11/12/14   0813   TROPI  <0.015   --   <0.015  The 99th percentile for upper reference range is 0.045 ug/L.  Troponin values in   the range of 0.045 - 0.120 ug/L may be associated with risks of adverse   clinical events.   Effective 7/30/2014, the reference range for this assay has changed to reflect   new instrumentation/methodology.     --    TROPONIN   --   0.00   --   0.01     Most Recent Cholesterol Panel:  Recent Labs   Lab Test  03/23/17   0804   CHOL  174   LDL  91   HDL  61   TRIG  109     Most Recent 6 Bacteria Isolates From Any Culture (See EPIC Reports for Culture Details):  Recent Labs   Lab Test  03/19/13   1041  02/09/11   1545  01/08/10   1404   CULT  >100,000 colonies/mL Escherichia coli  >100,000 colonies/mL Escherichia coli  >100,000 colonies/mL Escherichia coli     Most Recent TSH, T4 and A1c Labs:  Recent Labs   Lab Test  03/06/18   1744   TSH  0.54     Results for orders placed or performed during the hospital encounter of 03/06/18   XR Chest 2 Views    Narrative    CHEST TWO VIEWS    3/6/2018 5:59 PM     HISTORY: Shortness of breath. Cough.      COMPARISON: 11/12/2014.      Impression    IMPRESSION: Mild interstitial opacities in the left perihilar region,  also right upper and lower lobes. This may represent asymmetric  pulmonary edema, although pneumonia is not excluded. No significant  pleural effusion. No pneumothorax.    ABDELRAHMAN LOPEZ MD   NM Lexiscan stress test (nuc card)    Narrative    GATED MYOCARDIAL PERFUSION SCINTIGRAPHY WITH INTRAVENOUS PHARMACOLOGIC  VASODILATATION LEXISCAN -ONE DAY STUDY     3/8/2018 10:52 AM  ARTURO BOSWELL  77 years  Female  1940.    Indication/Clinical History: Atrial fibrillation    Impression  1.  Myocardial perfusion imaging using single isotope technique  demonstrated a small perfusion defect of mild severity involving the  left ventricular apex which is essentially fixed and most likely  related to breast attenuation. There is no evidence of significant  pharmacological stress-induced ischemia or prior myocardial infarction  on this study.   2. Gated images demonstrated normal left ventricular wall motion.  The  left ventricular systolic function is 73% at rest and 74% on the post  stress images.  3. There is no previous study for comparison.    Procedure  Pharmacologic stress testing was performed with Lexiscan at a rate of  0.08 mg/ml rapid bolus injection, for 15 seconds, 0.4 mg/5ml  intravenously. Low-level exercise was not performed along with the  vasodilator infusion.  The heart rate was 95 at baseline and cortney to  141 beats per minute during the Lexiscan infusion. The rest blood  pressure was 141/74 mmHg and was 151/75 mm Hg during Lexiscan  infusion. The patient experienced no symptoms  during the test.    Myocardial perfusion imaging was performed at rest, approximately 45  minutes after the injection intravenously of 10.0 mCi of Tc-99m  Myoview. At peak pharmacologic effect, 10-20 seconds after Lexiscan,   the patient was injected intravenously with 27.4 mCi of  Tc-99m  Myoview. The post-stress  tomographic imaging was performed  approximately 60 minutes after stress.    EKG Findings  The resting EKG demonstrated atrial fibrillation. The stress EKG  demonstrated no significant ST-T wave changes from baseline.    Tomographic Findings  Overall, the study quality is fair . On the stress images, a small  perfusion defect of mild severity involving the left ventricular apex  is noted. On the rest images, the same perfusion defect is noted and  is actually more prominent . Gated images demonstrated normal left  ventricular wall motion. The left ventricular ejection fraction was  calculated to be 73% at rest and 74% on the post stress images. TID  was absent.    BENJAMIN RAO MD

## 2018-03-09 NOTE — PROGRESS NOTES
Discharge instruction reviewed with patient. All question answered. A/Ox4. Independent ADLs. Voiding. Passing flatus. Tele SR. Afebrile. VSS on RA. Denies pain. Discharge summary, medications (tambocor, eliquis and toprol) and all belongings sent with patient.

## 2018-03-09 NOTE — PROGRESS NOTES
CAROLANN consent obtained. Patient was given robinul, lidocaine spray and gargle.  Shortly after she spontaneously converted into NSR.  CAROLANN and DCCV cancelled.  ECG performed. She will be transported back to inpt room for continued care per hospitalist and cardiology team.

## 2018-03-09 NOTE — PROGRESS NOTES
Pt  Prepared for CAROLANN/ possible DCCV with Dr Aldana --assessment and consent obtained.  Pt given Glycopyrolate, lidocaine gargle and benzocaine spray and pt spontaneously converted to NSR. 12 lead EKG obtained and strip  in chart documenting change in rhythm. Report called to inpatient RN and [place] [facility]  Stable at time of transfer and pt ambulating safely. VSS.

## 2018-03-09 NOTE — PROGRESS NOTES
Cardiology:  March 9, 2018  12:44 PM    Noted that Ms. Verma spontaneously converted to NSR just prior to CAROLANN/DCCV.   Remains in NSR with rate in 80s my visit.  She is feeling well, and would like to go home.    D/W Dr. Mallory, ok for DC home today.  Plan will be to continue flecainide 100mg BID, as well as Toprol XL 100mg BID.  Continue AC with apixaban 5mg BID.  Perform treadmill stress EKG in 1 week, and will f/u with EP cardiology GORGE in 2 weeks. Request in place to see Dr. Roby Mallory in 3 months.      Dorothy Ratliff PA-C  [facility]  Pager [phone]

## 2018-03-09 NOTE — DISCHARGE INSTRUCTIONS
1. Follow-up with cardiology nurse practitioner in 2 week with ECG @[phone]  2. Follow with Dr. Roby Mallory in 3 months @[phone]  If you do not hear from them about this appointment please call [phone].

## 2018-03-09 NOTE — PLAN OF CARE
Problem: Patient Care Overview  Goal: Plan of Care/Patient Progress Review  Outcome: Improving  Pt VSS on RA, HR elevated to 130's with movement but mostly was in the 110's. Tele afib rvr. A&Ox4. Up independently, using bathroom. Pt's IV infiltrated yesterday in right AC, site marked, swelling/pink has spread slightly. Denies pain. Slept. Plan for CAROLANN cardioversion today, pt has been NPO since midnight. Continue to monitor.

## 2018-03-09 NOTE — PROGRESS NOTES
"[facility] Cardiology Progress Note            Interval History:   Atrial fibrillation with rapid ventricular response in the setting of pneumonia.  Despite high-dose metoprolol 100 mg twice a day the ventricular rate is still high.  The echocardiogram showed normal atrial size, normal left ventricular systolic function and no significant valvular heart disease.  The Lexiscan showed no evidence of ischemia.              Medications:       flecainide  200 mg Oral Once     flecainide  100 mg Oral Q12H ZEESHAN     metoprolol succinate  100 mg Oral BID     apixaban ANTICOAGULANT  5 mg Oral BID     simvastatin  20 mg Oral At Bedtime     omega-3 acid ethyl esters  2 g Oral Daily                 Physical Exam:   Blood pressure 113/63, pulse 135, temperature 99.1  F (37.3  C), temperature source Oral, resp. rate 16, height 1.626 m (5' 4\"), weight 85.8 kg (189 lb 3.2 oz), SpO2 95 %, not currently breastfeeding.  Rhythm: Atrial fibrillation with rapid ventricular response   Constitutional:   awake, alert, cooperative, no apparent distress, and appears stated age     Neck:   no jugular venous distension and no carotid bruits     Lungs:   No increased work of breathing, good air exchange, clear to auscultation bilaterally, no crackles or wheezing     Cardiovascular:   irregularly irregular rhythm, no murmur noted, no edema and variable S1 and normal S2.            Data:          Lab Results   Component Value Date     03/07/2018     03/06/2018     03/23/2017    Lab Results   Component Value Date    CHLORIDE 104 03/07/2018    CHLORIDE 99 03/06/2018    CHLORIDE 103 03/23/2017    Lab Results   Component Value Date    BUN 13 03/07/2018    BUN 20 03/06/2018    BUN 16 03/23/2017      Lab Results   Component Value Date    POTASSIUM 3.7 03/07/2018    POTASSIUM 3.1 03/07/2018    POTASSIUM 3.4 03/06/2018    Lab Results   Component Value Date    CO2 26 03/07/2018    CO2 29 03/06/2018    CO2 27 03/23/2017    Lab Results "   Component Value Date    CR 0.82 03/07/2018    CR 0.91 03/06/2018    CR 0.71 03/23/2017        Lab Results   Component Value Date    HGB 11.5 (L) 03/07/2018    HGB 12.6 03/06/2018    HGB 13.6 03/06/2018     Lab Results   Component Value Date     (H) 03/07/2018     (H) 03/06/2018     (H) 03/06/2018     Lab Results   Component Value Date    TROPONIN 0.00 11/12/2014    TROPONIN 0.01 11/12/2014    TROPI <0.015 03/06/2018    TROPI  11/12/2014     <0.015  The 99th percentile for upper reference range is 0.045 ug/L.  Troponin values in   the range of 0.045 - 0.120 ug/L may be associated with risks of adverse   clinical events.   Effective 7/30/2014, the reference range for this assay has changed to reflect   new instrumentation/methodology.            EKG and Imaging results:    I have reviewed recent EKGs and imaging studies.         Assessment and Plan:   Assessment:   Problem List    Atrial fibrillation with rapid ventricular response (H)    * No resolved hospital problems. *       Plan:   We are unable to control the ventricular rate with AV blocking agents.  We will perform transesophageal echocardiography and electrical cardioversion.  We will load with flecainide 200 mg bolus and continue with 100 mg twice a day.  Continue with the metoprolol 100 mg twice a day.  Continue with apixaban.       Attestation:  I have reviewed today's vital signs, notes, medications, labs and imaging.  Care coordination / counseling time: 25 minutes  Total time: 35 minutes         Roby Mallory MD, MD 3/9/2018  7:55 AM

## 2018-03-10 LAB — INTERPRETATION ECG - MUSE: NORMAL

## 2018-03-12 ENCOUNTER — CARE COORDINATION (OUTPATIENT)
Dept: CARDIOLOGY | Facility: CLINIC | Age: 78
End: 2018-03-12

## 2018-03-12 ENCOUNTER — OFFICE VISIT (OUTPATIENT)
Dept: FAMILY MEDICINE | Facility: CLINIC | Age: 78
End: 2018-03-12
Payer: COMMERCIAL

## 2018-03-12 ENCOUNTER — TELEPHONE (OUTPATIENT)
Dept: FAMILY MEDICINE | Facility: CLINIC | Age: 78
End: 2018-03-12

## 2018-03-12 VITALS
TEMPERATURE: 97.4 F | DIASTOLIC BLOOD PRESSURE: 67 MMHG | OXYGEN SATURATION: 93 % | RESPIRATION RATE: 10 BRPM | WEIGHT: 194 LBS | HEART RATE: 55 BPM | SYSTOLIC BLOOD PRESSURE: 139 MMHG | BODY MASS INDEX: 33.3 KG/M2

## 2018-03-12 DIAGNOSIS — I48.91 ATRIAL FIBRILLATION WITH RAPID VENTRICULAR RESPONSE (H): Primary | ICD-10-CM

## 2018-03-12 DIAGNOSIS — I10 HYPERTENSION GOAL BP (BLOOD PRESSURE) < 150/90: ICD-10-CM

## 2018-03-12 DIAGNOSIS — E78.5 HYPERLIPIDEMIA LDL GOAL <130: ICD-10-CM

## 2018-03-12 DIAGNOSIS — J06.9 VIRAL URI: ICD-10-CM

## 2018-03-12 PROCEDURE — 99214 OFFICE O/P EST MOD 30 MIN: CPT | Performed by: FAMILY MEDICINE

## 2018-03-12 NOTE — PROGRESS NOTES
Patient was evaluated by cardiology while inpatient for Atrial fibrillation with rapid ventricular response/DCCV. Called patient and left a VM to discuss any post hospital d/c questions she may have, discuss medication changes, and confirm f/u appts. RN confirmed with patient that she has an apt scheduled on 3/23/18 with GORGE Kasie Alcocer. Patient needs treadmill stress test to be scheduled  Patient advised to call clinic with any cardiac related questions or concerns. RN left phone contact information.

## 2018-03-12 NOTE — MR AVS SNAPSHOT
After Visit Summary   3/12/2018    Hamida Verma    MRN: [id number]           Patient Information     Date Of Birth          1940        Visit Information        Provider Department      3/12/2018 3:00 PM Mikala Philip MD [facility]        Today's Diagnoses     Atrial fibrillation with rapid ventricular response (H)    -  1    Hypertension goal BP (blood pressure) < 150/90        Hyperlipidemia LDL goal <130        Viral URI          Care Instructions    1. Try Mucinex and/or Robitussin-DM for your cold symptoms  2. Let me know if you are starting to feel worse  3. On Wednesday if you are still feeling ill, call the cardiology clinic to let them know before your stress test.  4. Keep your fluids up and rest             Follow-ups after your visit        Your next 10 appointments already scheduled     Mar 15, 2018  3:00 PM CDT   Cardiac Stress Test with SCITMR   [facility] - [facility] ([facility])    [address]  [facility] [address] [phone]           1. Please bring or wear a comfortable two-piece outfit and walking shoes. 2. Stop eating 3 hours before the test. You may drink water or juice. 3. Stop all caffeine 12 hours before the test. This includes coffee, tea, soda pop, chocolate and certain medicines (such as Anacin and Excederin). Also avoid decaf coffee and tea, as these contain small amounts of caffeine. 4. No alcohol, smoking or use of other tobacco products for 12 hours before the test. 5. Refer to your provider instructions to see if you need to stop any medications (such as beta-blockers or nitrates) for this test. 6. For patients with diabetes: - If you take insulin, call your diabetes care team. Ask if you should take a   dose the morning of your test. - If you take diabetes medicine by mouth, don't take it on the morning of your test. Bring it with you to take after the test. (If you have questions,  "call your diabetes care team) 7. When you arrive, please tell us if: -You have diabetes. -You have taken Viagra, Cialis or Levitra in the past 48 hours. 8. For any questions that cannot be answered, please contact the ordering physician            Mar 23, 2018  2:30 PM CDT   Return Visit with NELY Rosario CNP   [facility] ([facility])    [address] [place] [address] [phone]              Who to contact     If you have questions or need follow up information about today's clinic visit or your schedule please contact [facility] directly at [phone].  Normal or non-critical lab and imaging results will be communicated to you by MyChart, letter or phone within 4 business days after the clinic has received the results. If you do not hear from us within 7 days, please contact the clinic through MyChart or phone. If you have a critical or abnormal lab result, we will notify you by phone as soon as possible.  Submit refill requests through [employer] or call your pharmacy and they will forward the refill request to us. Please allow 3 business days for your refill to be completed.          Additional Information About Your Visit        [employer] Information     [employer] lets you send messages to your doctor, view your test results, renew your prescriptions, schedule appointments and more. To sign up, go to [email or web address] . Click on \"Log in\" on the left side of the screen, which will take you to the Welcome page. Then click on \"Sign up Now\" on the right side of the page.     You will be asked to enter the access code listed below, as well as some personal information. Please follow the directions to create your username and password.     Your access code is: [email or web address]  Expires: 2018  2:02 PM     Your access code will  in 90 days. If you need help or a new code, please call your [facility] or " [phone].        Care EveryWhere ID     This is your Care EveryWhere ID. This could be used by other organizations to access your [place] medical records  [id number]        Your Vitals Were     Pulse Temperature Respirations Last Period Pulse Oximetry BMI (Body Mass Index)    55 97.4  F (36.3  C) (Tympanic) 10 (LMP Unknown) 93% 33.3 kg/m2       Blood Pressure from Last 3 Encounters:   03/12/18 139/67   03/09/18 131/74   11/30/17 124/80    Weight from Last 3 Encounters:   03/12/18 88 kg (194 lb)   03/09/18 85.8 kg (189 lb 3.2 oz)   11/30/17 86.4 kg (190 lb 8 oz)              Today, you had the following     No orders found for display       Primary Care Provider Office Phone # Fax #    Mikala Philip [phone]       [address]  [facility] [address]        Equal Access to Services     [facility] : Hadii aad ku hadasho Soomaali, waaxda luqadaha, qaybta kaalmada adeegyada, waxay idiin hayitzn aimee carvajal . So [facility] [phone].    ATENCIÓN: Si habla español, tiene a ivy disposición servicios gratuitos de asistencia lingüística. Llame al [phone].    We comply with applicable federal civil rights laws and [place] laws. We do not discriminate on the basis of race, color, national origin, age, disability, sex, sexual orientation, or gender identity.            Thank you!     Thank you for choosing [facility]  for your care. Our goal is always to provide you with excellent care. Hearing back from our patients is one way we can continue to improve our services. Please take a few minutes to complete the written survey that you may receive in the mail after your visit with us. Thank you!             Your Updated Medication List - Protect others around you: Learn how to safely use, store and throw away your medicines at www.disposemymeds.org.          This list is accurate as of 3/12/18  4:06 PM.  Always use your most recent med list.                   Brand Name  Dispense Instructions for use Diagnosis    alendronate 70 MG tablet    FOSAMAX    12 tablet    Take 1 tablet (70 mg) by mouth every 7 days Take 60 minutes before am meal with 8 oz. water. Remain upright for 30 minutes.    Osteoporosis       apixaban ANTICOAGULANT 5 MG tablet    ELIQUIS    60 tablet    Take 1 tablet (5 mg) by mouth 2 times daily    Atrial fibrillation with rapid ventricular response (H)       cholecalciferol 1000 UNITS capsule    vitamin  -D     Take 1 capsule by mouth daily.        FLAX SEED OIL PO      1 capsule daily        flecainide 100 MG tablet    TAMBOCOR    60 tablet    Take 1 tablet (100 mg) by mouth every 12 hours    Atrial fibrillation with rapid ventricular response (H)       fluticasone 50 MCG/ACT spray    FLONASE     Spray 1-2 sprays into both nostrils daily as needed for rhinitis or allergies        HAIR SKIN NAILS PO      Take 1 tablet by mouth At Bedtime        metoprolol succinate 100 MG 24 hr tablet    TOPROL-XL    60 tablet    Take 1 tablet (100 mg) by mouth 2 times daily    Atrial fibrillation with rapid ventricular response (H)       Milk Thistle 200 MG Caps      Take 1 capsule by mouth daily         MG Caps      Take 1 capsule by mouth daily        omega-3 acid ethyl esters 1 G capsule    Lovaza     Take 2 g by mouth daily        potassium & sodium phosphates 278-164-250 MG/75ML Solr      Take 1 tablet by mouth daily        simvastatin 20 MG tablet    ZOCOR    90 tablet    Take 1 tablet (20 mg) by mouth At Bedtime Profile Rx: patient will contact pharmacy when needed    Hyperlipidemia LDL goal <130

## 2018-03-12 NOTE — PROGRESS NOTES
SUBJECTIVE:   Hamida Verma is a 77 year old female who presents to clinic today for the following health issues:          Hospital Follow-up Visit:    Hospital/Nursing Home/IP Rehab Facility: [facility]  Date of Admission: 3/6/18  Date of Discharge: 3/9/18  Reason(s) for Admission: afib            Problems taking medications regularly:  None       Medication changes since discharge: UTD in Epic        Problems adhering to non-medication therapy:  None    Summary of hospitalization:  [facility] discharge summary reviewed  Diagnostic Tests/Treatments reviewed.  Follow up needed: none  Other Healthcare Providers Involved in Patient s Care:         None  Update since discharge: improved.     Post Discharge Medication Reconciliation: discharge medications reconciled, continue medications without change.  Plan of care communicated with patient     Coding guidelines for this visit:  Type of Medical   Decision Making Face-to-Face Visit       within 7 Days of discharge Face-to-Face Visit        within 14 days of discharge   Moderate Complexity 11085 [id number]   High Complexity 46383 [id number]            Patient has felt exhausted since discharge, even walking short distances fatigues her. She spontaneously converted prior to discharge was resolved in the hospital but she feels like she is getting worse. She denies palpitations. She complains of a cough that has been going since 2/20/18 that has slightly improved. In the hospital she was on antibiotics, but these were discontinued as the cough was thought to be due to a virus. She has shortness of breath that has not improved. Her daughter insisted she go into the hospital due to her cough that had been going for so long with out resolve. She was put on flecainide 100mg and metoprolol 100mg when she was discharged from the hospital. She reports that she will have a stress test at [facility] in 3 days and a follow up with cardiology scheduled.     Problem  list and histories reviewed & adjusted, as indicated.  Additional history: as documented    Patient Active Problem List   Diagnosis     Symptomatic menopausal or female climacteric states     HYPERLIPIDEMIA LDL GOAL <130     Hypertension goal BP (blood pressure) < 140/90     Knee pain     Obesity     Hypertension goal BP (blood pressure) < 150/90     Atrial fibrillation with rapid ventricular response (H)     Past Surgical History:   Procedure Laterality Date     HYSTERECTOMY, VAGINAL      hysterectomy       Social History   Substance Use Topics     Smoking status: Former Smoker     Quit date: 9/1/2000     Smokeless tobacco: Never Used      Comment: quit 6yrs ago     Alcohol use No     Family History   Problem Relation Age of Onset     CEREBROVASCULAR DISEASE Mother      Eye Disorder Mother      Myocardial Infarction Mother      C.A.D. Father      heart attack     Alcohol/Drug Father      alcohol     Blood Disease Sister      lupus     Depression Sister      DIABETES No family hx of      Breast Cancer No family hx of      Cancer - colorectal No family hx of          Current Outpatient Prescriptions   Medication Sig Dispense Refill     flecainide (TAMBOCOR) 100 MG tablet Take 1 tablet (100 mg) by mouth every 12 hours 60 tablet 0     apixaban ANTICOAGULANT (ELIQUIS) 5 MG tablet Take 1 tablet (5 mg) by mouth 2 times daily 60 tablet 0     metoprolol succinate (TOPROL-XL) 100 MG 24 hr tablet Take 1 tablet (100 mg) by mouth 2 times daily 60 tablet 0     omega-3 acid ethyl esters (LOVAZA) 1 G capsule Take 2 g by mouth daily       fluticasone (FLONASE) 50 MCG/ACT spray Spray 1-2 sprays into both nostrils daily as needed for rhinitis or allergies       Methylsulfonylmethane (MSM) 500 MG CAPS Take 1 capsule by mouth daily       Multiple Vitamins-Minerals (HAIR SKIN NAILS PO) Take 1 tablet by mouth At Bedtime       alendronate (FOSAMAX) 70 MG tablet Take 1 tablet (70 mg) by mouth every 7 days Take 60 minutes before am meal  with 8 oz. water. Remain upright for 30 minutes. 12 tablet 3     simvastatin (ZOCOR) 20 MG tablet Take 1 tablet (20 mg) by mouth At Bedtime Profile Rx: patient will contact pharmacy when needed 90 tablet 3     cholecalciferol (VITAMIN  -D) 1000 UNIT capsule Take 1 capsule by mouth daily.       potassium & sodium phosphates 278-164-250 MG/75ML SOLR Take 1 tablet by mouth daily        Milk Thistle 200 MG CAPS Take 1 capsule by mouth daily        FLAX SEED OIL OR 1 capsule daily       Allergies   Allergen Reactions     Strawberry Flavor      Recent Labs   Lab Test  03/09/18   0817  03/07/18   1605  03/07/18   0330  03/06/18   1744  03/23/17   0804  05/23/16   1056  05/07/15   0824  11/28/14   0806   LDL   --    --    --    --   91  103*  104   --    HDL   --    --    --    --   61  66  70   --    TRIG   --    --    --    --   109  104  102   --    ALT   --    --    --    --   24  26   --   22   CR  0.76   --   0.82  0.91  0.71  0.77   --   0.75   GFRESTIMATED  74   --   68  59*  79  73   --   75   GFRESTBLACK  89   --   82  72  >90   GFR Calc    88   --   >90   GFR Calc     POTASSIUM  4.2  3.7  3.1*  3.4  3.7  4.1   --   4.0   TSH   --    --    --   0.54   --    --    --    --       BP Readings from Last 3 Encounters:   03/12/18 139/67   03/09/18 131/74   11/30/17 124/80    Wt Readings from Last 3 Encounters:   03/12/18 88 kg (194 lb)   03/09/18 85.8 kg (189 lb 3.2 oz)   11/30/17 86.4 kg (190 lb 8 oz)        Reviewed and updated as needed this visit by clinical staff  Tobacco  Allergies  Meds  Med Hx  Surg Hx  Fam Hx  Soc Hx      Reviewed and updated as needed this visit by Provider         ROS:  See above    This document serves as a record of the services and decisions personally performed and made by Mikala Philip MD. It was created on his/her behalf by phoebe Lopez medical scribe. The creation of this document is based the provider's statements to the medical  ernestina.    Donell Parrajose Vaca, March 12, 2018    OBJECTIVE:     /67  Pulse 55  Temp 97.4  F (36.3  C) (Tympanic)  Resp 10  Wt 88 kg (194 lb)  LMP  (LMP Unknown)  SpO2 93%  BMI 33.3 kg/m2  Body mass index is 33.3 kg/(m^2).     GENERAL:  alert and no distress  RESP: lungs clear to auscultation - no rales, rhonchi or wheezes  CV: regular rate and rhythm, normal S1 S2   PSYCH: mentation appears normal, fatigued    Diagnostic Test Results:  none     ASSESSMENT/PLAN:     1. Atrial fibrillation, unspecified type (H)  New diagnosis, spontaneously converted to sinus rhythm in hospital. Sounds regular on exam today Metoprolol and flecainide were started in hospital as well as eliquis. ASA was discontinued in hospital. Cardiology recommended stress test in 1 week and follow up in their clinic in two weeks. She has stress test scheduled this week and follow up with cardiology scheduled next week.     2. Hypertension goal BP (blood pressure) < 140/90  Continues on metoprolol started in hospital.  BP controlled  No longer taking prior to admission lisinopril and hctz.     3. Viral URI  Continues to have some cough, though that is improving, and feels very tired. Discussed suportive cares, follow up if worsens or not improving. She asked which medications would be okay to take for her symptoms. Recommended mucinex and robitussin for congestion and cough and tylenol as needed for pain.     4. Hyperlipidemia LDL goal <160  Continues on simvastatin 20mg daily    Patient Instructions   1. Try Mucinex and/or Robitussin-DM for your cold symptoms  2. Let me know if you are starting to feel worse  3. On Wednesday if you are still feeling ill, call the cardiology clinic to let them know before your stress test.  4. Keep your fluids up and rest         The information in this document, created by the medical scribe for me, accurately reflects the services I personally performed and the decisions made by me. I have reviewed  and approved this document for accuracy. 03/12/18      Mikala Philip MD  [facility]

## 2018-03-12 NOTE — PATIENT INSTRUCTIONS
1. Try Mucinex and/or Robitussin-DM for your cold symptoms  2. Let me know if you are starting to feel worse  3. On Wednesday if you are still feeling ill, call the cardiology clinic to let them know before your stress test.  4. Keep your fluids up and rest

## 2018-03-12 NOTE — TELEPHONE ENCOUNTER
Patient has hospital follow up appt at 1500 today with Dr. Philip, which is first business day following hospital discharge.    No outreach phone call will be made.    DANIEL BlankN, RN

## 2018-03-14 ENCOUNTER — APPOINTMENT (OUTPATIENT)
Dept: ULTRASOUND IMAGING | Facility: CLINIC | Age: 78
DRG: 244 | End: 2018-03-14
Attending: INTERNAL MEDICINE
Payer: COMMERCIAL

## 2018-03-14 ENCOUNTER — TRANSFERRED RECORDS (OUTPATIENT)
Dept: HEALTH INFORMATION MANAGEMENT | Facility: CLINIC | Age: 78
End: 2018-03-14

## 2018-03-14 ENCOUNTER — APPOINTMENT (OUTPATIENT)
Dept: GENERAL RADIOLOGY | Facility: CLINIC | Age: 78
DRG: 244 | End: 2018-03-14
Attending: EMERGENCY MEDICINE
Payer: COMMERCIAL

## 2018-03-14 ENCOUNTER — APPOINTMENT (OUTPATIENT)
Dept: CT IMAGING | Facility: CLINIC | Age: 78
DRG: 244 | End: 2018-03-14
Attending: INTERNAL MEDICINE
Payer: COMMERCIAL

## 2018-03-14 ENCOUNTER — HOSPITAL ENCOUNTER (INPATIENT)
Facility: CLINIC | Age: 78
LOS: 7 days | Discharge: HOME | DRG: 244 | End: 2018-03-21
Attending: EMERGENCY MEDICINE | Admitting: INTERNAL MEDICINE
Payer: COMMERCIAL

## 2018-03-14 ENCOUNTER — TELEPHONE (OUTPATIENT)
Dept: FAMILY MEDICINE | Facility: CLINIC | Age: 78
End: 2018-03-14

## 2018-03-14 DIAGNOSIS — R06.02 SOB (SHORTNESS OF BREATH): ICD-10-CM

## 2018-03-14 DIAGNOSIS — E87.79 OTHER HYPERVOLEMIA: ICD-10-CM

## 2018-03-14 DIAGNOSIS — I50.20 SYSTOLIC CONGESTIVE HEART FAILURE, UNSPECIFIED CONGESTIVE HEART FAILURE CHRONICITY: ICD-10-CM

## 2018-03-14 DIAGNOSIS — I48.91 ATRIAL FIBRILLATION WITH RAPID VENTRICULAR RESPONSE (H): Primary | ICD-10-CM

## 2018-03-14 PROBLEM — I50.9 CHF (CONGESTIVE HEART FAILURE) (H): Status: ACTIVE | Noted: 2018-03-14

## 2018-03-14 LAB
ALBUMIN SERPL-MCNC: 2.6 G/DL (ref 3.4–5)
ALP SERPL-CCNC: 94 U/L (ref 40–150)
ALT SERPL W P-5'-P-CCNC: 57 U/L (ref 0–50)
ANION GAP SERPL CALCULATED.3IONS-SCNC: 8 MMOL/L (ref 3–14)
AST SERPL W P-5'-P-CCNC: 17 U/L (ref 0–45)
BASOPHILS # BLD AUTO: 0.1 10E9/L (ref 0–0.2)
BASOPHILS NFR BLD AUTO: 0.6 %
BILIRUB SERPL-MCNC: 0.5 MG/DL (ref 0.2–1.3)
BUN SERPL-MCNC: 12 MG/DL (ref 7–30)
CALCIUM SERPL-MCNC: 8.4 MG/DL (ref 8.5–10.1)
CHLORIDE SERPL-SCNC: 103 MMOL/L (ref 94–109)
CO2 SERPL-SCNC: 25 MMOL/L (ref 20–32)
CREAT SERPL-MCNC: 0.83 MG/DL (ref 0.52–1.04)
DIFFERENTIAL METHOD BLD: ABNORMAL
EOSINOPHIL # BLD AUTO: 0.1 10E9/L (ref 0–0.7)
EOSINOPHIL NFR BLD AUTO: 1 %
ERYTHROCYTE [DISTWIDTH] IN BLOOD BY AUTOMATED COUNT: 13.5 % (ref 10–15)
GFR SERPL CREATININE-BSD FRML MDRD: 66 ML/MIN/1.7M2
GLUCOSE SERPL-MCNC: 112 MG/DL (ref 70–99)
HCT VFR BLD AUTO: 35 % (ref 35–47)
HGB BLD-MCNC: 11.5 G/DL (ref 11.7–15.7)
IMM GRANULOCYTES # BLD: 0.2 10E9/L (ref 0–0.4)
IMM GRANULOCYTES NFR BLD: 1.1 %
INTERPRETATION ECG - MUSE: NORMAL
LACTATE BLD-SCNC: 0.8 MMOL/L (ref 0.7–2)
LYMPHOCYTES # BLD AUTO: 1.3 10E9/L (ref 0.8–5.3)
LYMPHOCYTES NFR BLD AUTO: 9.3 %
MCH RBC QN AUTO: 28.6 PG (ref 26.5–33)
MCHC RBC AUTO-ENTMCNC: 32.9 G/DL (ref 31.5–36.5)
MCV RBC AUTO: 87 FL (ref 78–100)
MONOCYTES # BLD AUTO: 1.1 10E9/L (ref 0–1.3)
MONOCYTES NFR BLD AUTO: 8 %
NEUTROPHILS # BLD AUTO: 11.2 10E9/L (ref 1.6–8.3)
NEUTROPHILS NFR BLD AUTO: 80 %
NRBC # BLD AUTO: 0 10*3/UL
NRBC BLD AUTO-RTO: 0 /100
NT-PROBNP SERPL-MCNC: 1455 PG/ML (ref 0–1800)
PLATELET # BLD AUTO: 665 10E9/L (ref 150–450)
POTASSIUM SERPL-SCNC: 3.6 MMOL/L (ref 3.4–5.3)
PROT SERPL-MCNC: 7 G/DL (ref 6.8–8.8)
RBC # BLD AUTO: 4.02 10E12/L (ref 3.8–5.2)
SODIUM SERPL-SCNC: 136 MMOL/L (ref 133–144)
TROPONIN I SERPL-MCNC: <0.015 UG/L (ref 0–0.04)
WBC # BLD AUTO: 14 10E9/L (ref 4–11)

## 2018-03-14 PROCEDURE — 93971 EXTREMITY STUDY: CPT | Mod: LT

## 2018-03-14 PROCEDURE — 99223 1ST HOSP IP/OBS HIGH 75: CPT | Mod: AI | Performed by: INTERNAL MEDICINE

## 2018-03-14 PROCEDURE — 84484 ASSAY OF TROPONIN QUANT: CPT | Performed by: EMERGENCY MEDICINE

## 2018-03-14 PROCEDURE — 25000132 ZZH RX MED GY IP 250 OP 250 PS 637: Performed by: EMERGENCY MEDICINE

## 2018-03-14 PROCEDURE — 93005 ELECTROCARDIOGRAM TRACING: CPT

## 2018-03-14 PROCEDURE — 25000128 H RX IP 250 OP 636: Performed by: EMERGENCY MEDICINE

## 2018-03-14 PROCEDURE — 83605 ASSAY OF LACTIC ACID: CPT | Performed by: INTERNAL MEDICINE

## 2018-03-14 PROCEDURE — 99222 1ST HOSP IP/OBS MODERATE 55: CPT | Performed by: INTERNAL MEDICINE

## 2018-03-14 PROCEDURE — 25000132 ZZH RX MED GY IP 250 OP 250 PS 637: Performed by: INTERNAL MEDICINE

## 2018-03-14 PROCEDURE — 25000125 ZZHC RX 250: Performed by: INTERNAL MEDICINE

## 2018-03-14 PROCEDURE — 85025 COMPLETE CBC W/AUTO DIFF WBC: CPT | Performed by: EMERGENCY MEDICINE

## 2018-03-14 PROCEDURE — 83880 ASSAY OF NATRIURETIC PEPTIDE: CPT | Performed by: EMERGENCY MEDICINE

## 2018-03-14 PROCEDURE — 71260 CT THORAX DX C+: CPT

## 2018-03-14 PROCEDURE — 99285 EMERGENCY DEPT VISIT HI MDM: CPT | Mod: 25

## 2018-03-14 PROCEDURE — 25000128 H RX IP 250 OP 636: Performed by: INTERNAL MEDICINE

## 2018-03-14 PROCEDURE — 71046 X-RAY EXAM CHEST 2 VIEWS: CPT

## 2018-03-14 PROCEDURE — 12000000 ZZH R&B MED SURG/OB

## 2018-03-14 PROCEDURE — 36415 COLL VENOUS BLD VENIPUNCTURE: CPT | Performed by: INTERNAL MEDICINE

## 2018-03-14 PROCEDURE — 80053 COMPREHEN METABOLIC PANEL: CPT | Performed by: EMERGENCY MEDICINE

## 2018-03-14 PROCEDURE — 96374 THER/PROPH/DIAG INJ IV PUSH: CPT

## 2018-03-14 RX ORDER — FUROSEMIDE 10 MG/ML
20 INJECTION INTRAMUSCULAR; INTRAVENOUS ONCE
Status: COMPLETED | OUTPATIENT
Start: 2018-03-14 | End: 2018-03-14

## 2018-03-14 RX ORDER — FUROSEMIDE 10 MG/ML
20 INJECTION INTRAMUSCULAR; INTRAVENOUS DAILY
Status: DISCONTINUED | OUTPATIENT
Start: 2018-03-15 | End: 2018-03-15

## 2018-03-14 RX ORDER — ONDANSETRON 4 MG/1
4 TABLET, ORALLY DISINTEGRATING ORAL EVERY 6 HOURS PRN
Status: DISCONTINUED | OUTPATIENT
Start: 2018-03-14 | End: 2018-03-21 | Stop reason: HOSPADM

## 2018-03-14 RX ORDER — METOPROLOL SUCCINATE 50 MG/1
50 TABLET, EXTENDED RELEASE ORAL 2 TIMES DAILY
Status: DISCONTINUED | OUTPATIENT
Start: 2018-03-14 | End: 2018-03-15

## 2018-03-14 RX ORDER — SIMVASTATIN 20 MG
20 TABLET ORAL AT BEDTIME
Status: DISCONTINUED | OUTPATIENT
Start: 2018-03-14 | End: 2018-03-21 | Stop reason: HOSPADM

## 2018-03-14 RX ORDER — FLUTICASONE PROPIONATE 50 MCG
1-2 SPRAY, SUSPENSION (ML) NASAL DAILY PRN
Status: DISCONTINUED | OUTPATIENT
Start: 2018-03-14 | End: 2018-03-21 | Stop reason: HOSPADM

## 2018-03-14 RX ORDER — NALOXONE HYDROCHLORIDE 0.4 MG/ML
.1-.4 INJECTION, SOLUTION INTRAMUSCULAR; INTRAVENOUS; SUBCUTANEOUS
Status: DISCONTINUED | OUTPATIENT
Start: 2018-03-14 | End: 2018-03-20

## 2018-03-14 RX ORDER — FLECAINIDE ACETATE 100 MG/1
100 TABLET ORAL EVERY 12 HOURS
Status: DISCONTINUED | OUTPATIENT
Start: 2018-03-14 | End: 2018-03-21 | Stop reason: HOSPADM

## 2018-03-14 RX ORDER — NITROGLYCERIN 80 MG/1
1 PATCH TRANSDERMAL ONCE
Status: COMPLETED | OUTPATIENT
Start: 2018-03-14 | End: 2018-03-14

## 2018-03-14 RX ORDER — METHYLSULFONYLMETHANE 500 MG
1 CAPSULE ORAL DAILY
Status: DISCONTINUED | OUTPATIENT
Start: 2018-03-14 | End: 2018-03-14

## 2018-03-14 RX ORDER — IOPAMIDOL 755 MG/ML
75 INJECTION, SOLUTION INTRAVASCULAR ONCE
Status: COMPLETED | OUTPATIENT
Start: 2018-03-14 | End: 2018-03-14

## 2018-03-14 RX ORDER — ACETAMINOPHEN 325 MG/1
650 TABLET ORAL EVERY 4 HOURS PRN
Status: DISCONTINUED | OUTPATIENT
Start: 2018-03-14 | End: 2018-03-21

## 2018-03-14 RX ORDER — ONDANSETRON 2 MG/ML
4 INJECTION INTRAMUSCULAR; INTRAVENOUS EVERY 6 HOURS PRN
Status: DISCONTINUED | OUTPATIENT
Start: 2018-03-14 | End: 2018-03-21 | Stop reason: HOSPADM

## 2018-03-14 RX ADMIN — SODIUM CHLORIDE 70 ML: 9 INJECTION, SOLUTION INTRAVENOUS at 21:01

## 2018-03-14 RX ADMIN — ACETAMINOPHEN 650 MG: 325 TABLET, FILM COATED ORAL at 17:07

## 2018-03-14 RX ADMIN — NITROGLYCERIN 1 PATCH: 0.4 PATCH TRANSDERMAL at 12:40

## 2018-03-14 RX ADMIN — FLECAINIDE ACETATE 100 MG: 100 TABLET ORAL at 22:17

## 2018-03-14 RX ADMIN — IOPAMIDOL 75 ML: 755 INJECTION, SOLUTION INTRAVENOUS at 21:01

## 2018-03-14 RX ADMIN — FUROSEMIDE 20 MG: 10 INJECTION, SOLUTION INTRAVENOUS at 12:40

## 2018-03-14 RX ADMIN — SIMVASTATIN 20 MG: 20 TABLET, FILM COATED ORAL at 22:17

## 2018-03-14 RX ADMIN — METOPROLOL SUCCINATE 50 MG: 50 TABLET, EXTENDED RELEASE ORAL at 22:31

## 2018-03-14 RX ADMIN — APIXABAN 5 MG: 5 TABLET, FILM COATED ORAL at 22:17

## 2018-03-14 ASSESSMENT — ACTIVITIES OF DAILY LIVING (ADL)
BATHING: 0 - INDEPENDENT
RETIRED_EATING: 0-->INDEPENDENT
TOILETING: 0 - INDEPENDENT
DRESS: 0 - INDEPENDENT
AMBULATION: 0 - INDEPENDENT
EATING: 0 - INDEPENDENT
SWALLOWING: 0-->SWALLOWS FOODS/LIQUIDS WITHOUT DIFFICULTY
TOILETING: 0-->INDEPENDENT
TRANSFERRING: 0 - INDEPENDENT
AMBULATION: 0-->INDEPENDENT
TRANSFERRING: 0-->INDEPENDENT
RETIRED_COMMUNICATION: 0-->UNDERSTANDS/COMMUNICATES WITHOUT DIFFICULTY
COMMUNICATION: 0 - UNDERSTANDS/COMMUNICATES WITHOUT DIFFICULTY
SWALLOWING: 0 - SWALLOWS FOODS/LIQUIDS WITHOUT DIFFICULTY
DRESS: 0-->INDEPENDENT
FALL_HISTORY_WITHIN_LAST_SIX_MONTHS: NO
BATHING: 0-->INDEPENDENT
COGNITION: 0 - NO COGNITION ISSUES REPORTED

## 2018-03-14 ASSESSMENT — ENCOUNTER SYMPTOMS
BACK PAIN: 1
SHORTNESS OF BREATH: 1
FATIGUE: 1
DIFFICULTY URINATING: 0
FEVER: 0
COUGH: 1

## 2018-03-14 NOTE — PLAN OF CARE
Problem: Patient Care Overview  Goal: Plan of Care/Patient Progress Review  Outcome: No Change  Pt is a new admit from ED today, A&OX4, VSS on 2L NC except for tachypnea. Tele is SB. Sepsis fired for high RR and elevated WBC. Lactic check was normal. IV saline locked. Denies SOB, pain, n&V. On low Na+ diet. Cardiac stress test scheduled tomorrow. LS clear, BLE with mild edema. Up independently. D/c anticipated in 1-2 days. Continue to monitor.

## 2018-03-14 NOTE — IP AVS SNAPSHOT
[facility]    [address] [place] [address]., [address]    [place] [address]    Phone:  [phone]                                       After Visit Summary   3/14/2018    Hamida Verma    MRN: [id number]           After Visit Summary Signature Page     I have received my discharge instructions, and my questions have been answered. I have discussed any challenges I see with this plan with the nurse or doctor.    ..........................................................................................................................................  Patient/Patient Representative Signature      ..........................................................................................................................................  Patient Representative Print Name and Relationship to Patient    ..................................................               ................................................  Date                                            Time    ..........................................................................................................................................  Reviewed by Signature/Title    ...................................................              ..............................................  Date                                                            Time

## 2018-03-14 NOTE — IP AVS SNAPSHOT
MRN:[id number]                      After Visit Summary   3/14/2018    Hamida Verma    MRN: [id number]           Thank you!     Thank you for choosing [facility] for your care. Our goal is always to provide you with excellent care. Hearing back from our patients is one way we can continue to improve our services. Please take a few minutes to complete the written survey that you may receive in the mail after you visit with us. Thank you!        Patient Information     Date Of Birth          1940        Designated Caregiver       Most Recent Value    Caregiver    Will someone help with your care after discharge? yes    Name of designated caregiver constanza    Phone number of caregiver [phone]    Caregiver address [address] [place] [address]      About your hospital stay     You were admitted on:  March 14, 2018 You last received care in the:  [facility]    You were discharged on:  March 21, 2018       Who to Call     For medical emergencies, please call 911.  For non-urgent questions about your medical care, please call your primary care provider or clinic, [phone]          Attending Provider     Provider Specialty    Mynor Solomon MD Emergency Medicine    [place], Ruben Zapien DO Internal Medicine       Primary Care Provider Office Phone # Fax #    Mikala Philip [phone]      After Care Instructions     Activity       Your activity upon discharge: activity as tolerated with no driving while on pain medications. Left arm restrictions as recommended by cardiology.            Diet       Follow this diet upon discharge:       Fluid restriction 1500 ML FLUID      Combination Diet Low Saturated Fat Na <2400mg Diet                  Follow-up Appointments     Follow-up and recommended labs and tests        Follow up with primary care provider, Mikala Philip MD, within 7 days to evaluate medication change, to  evaluate after surgery and for hospital follow- up.  The following labs/tests are recommended: Will need to follow up with her BP, HR, Kidney function and volume status.   - Cardiology / EP clinic follow up                  Your next 10 appointments already scheduled     Mar 28, 2018  2:30 PM CDT   Pacemaker Check with NAWAF DE LA CRUZ   [facility] ([facility])    [address]  [facility] [address] [phone] OPT 2            Mar 28, 2018  3:10 PM CDT   Return Visit with NELY Rosario CNP   [facility] ([facility])    [address]  [facility] [address] [phone] OPT 2              Additional Services     Follow-Up with Cardiac Advanced Practice Provider                 Further instructions from your care team       Pacemaker Implant Discharge Instructions     After you go home:      Have an adult stay with you until tomorrow.    You may resume your normal diet.       For 24 hours - due to the sedation you received:    Relax and take it easy.    Do NOT make any important or legal decisions.    Do NOT drive or operate machines at home or at work.    Do NOT drink alcohol.    Care of Chest Incision:      Keep the bandage on at least 3 days. You may remove the dressing on Friday. Change it only if it gets loose or soaked. If you need to change it, use 4x4-inch gauze and a large clear bandage.     If there is a pressure dressing (gauze & tape) - 24 hours after your procedure you may remove ONLY the top dressing. Leave the bottom dressing on.    Leave the strips of tape on. They will fall off on their own, or we will remove them at your first check-up.    Check your wound daily for signs of infection, such as increased redness, severe swelling or draining. Fever may also be a sign of infection. Call us if you see any of these signs.    If there are no signs of infection, you  may shower after the bandage comes off in 3 days. If you take a tub bath, keep the wound dry.    No soaking the incision (swimming pool, bathtub, hot tub) for 2 weeks.    You may have mild to medium pain for 3 to 5 days. Take Acetaminophen (Tylenol) or Ibuprofen (Advil) for the pain. If the pain persists or is severe, call us.    Activity:      For at least 3 weeks: Do not raise your elbow above your shoulder. You can begin to use your arm as it feels comfortable to you.    Do not use arm on implant side to lift more than 10 pounds for 1 week.    In 6 to 8 weeks: You may begin to golf, play tennis, swim and do similar activities.    No driving for one day & limit to necessary driving for one week.    Bleeding:      If you start bleeding from the incision site, sit down and press firmly on the site for 10 minutes.     Once bleeding stops, call [facility] as soon as you can.       Call 911 right away if you have heavy bleeding or bleeding that does not stop.      Medicines:      Take your medications, including blood thinners, unless your provider tells you not to.    If you have stopped any medicines, check with your provider about when to restart them.    Follow Up Appointments:      Follow up with Device Clinic at [facility] in [place] on 3/28/2018 at 2:30 pm.    Call the clinic if:      You have a large or growing hard lump around the site.    The site is red, swollen, hot or tender.    Blood or fluid is draining from the site.    You have chills or a fever greater than 101 F (38 C).    You feel dizzy or light-headed.    Questions or concerns    Telling others about your device:      Before you leave the hospital, you will receive a temporary ID card. A permanent card will be mailed to you about 6 to 8 weeks later. Always carry the ID card with you. It has important details about your device.    You may also get a Medical Alert bracelet or tag that says you have a pacemaker.  Go to [email or web address].      Always tell doctors, dentists and other care providers that you have a device implanted in you.    Let us know before you plan any surgeries. Your care team must take special steps to keep you safe during certain procedures. These steps will depend on the type of device you have. Your provider will need to see your ID card. They may need to call us for instructions.    Device Safety:      Please refer to device  s booklet for further information.        [facility]:    [phone] [facility] (7 days a week)  [phone] - Device Clinic          Discharge Instructions for Pacemaker Implantation  You have had a procedure to insert a pacemaker. Once inside your body, this small electronic device helps keep your heart from beating too slowly. A pacemaker can t fix existing heart problems. But it can help you feel better and have more energy. As you recover, follow all of the instructions you are given, including those below.  Activity    Follow the instructions you are given about limiting your activity.    Do not raise your arm on the incision side above shoulder level for 3 weeks. This gives the device lead wires time to attach securely inside your heart.    Ask your doctor when you can expect to return to work.    You can still exercise. It s good for your body and your heart. Talk with your doctor about an exercise plan.  Other Precautions    Follow your doctor's directions carefully for wound care. You may remove the outer dressing on Friday. Leave the steri-strips in place; these will be removed at your 1 week follow-up. Never put any creams, lotions, or products like peroxide on an incision unless your doctor tells you to.     Before you receive any treatment, tell all health care providers (including your dentist) that you have a pacemaker.    You will be given an ID card that contains information about your pacemaker. Always carry this card with you. You can  show this card if your pacemaker sets off a metal detector. You should also show it to avoid screening with a hand-held security wand.    Keep your cell phone away from your pacemaker. Don t carry the phone in your shirt pocket, even when it s turned off.    Avoid strong magnets. Examples are those used in MRIs or in hand-held security wands.    Avoid strong electrical fields. Examples are those made by radio transmitting towers,  ham  radios, and heavy-duty electrical equipment.    Avoid leaning over the open wright of a running car. A running engine creates an electrical field. Most household and yard appliances will not cause any problems. If you use any large power tools, such as an industrial , talk with your doctor.   Follow-Up    Follow up in the device clinic as scheduled. 3/28/2018 at 2:30    Make regular follow-up appointments with your doctor.  When to Call Your Doctor  Call your doctor immediately if you have any of the following:    Dizziness    Chest pain    Fainting spells    Twitching chest muscles    Rapid pulse or pounding heartbeat    Shortness of breath    Pain around your pacemaker    Fever above 100.4 F (38 C) or other signs of infection (redness, swelling, drainage, or warmth at the incision site)     9693-7879 [employer] [facility]. [address] [place] [address], [place] [id number]. All rights reserved. This information is not intended as a substitute for professional medical care. Always follow your healthcare professional's instructions.                        General Recommendations To Control Heart Failure When You Get Home       Heart Failure Instructions for Patients and Families: Please read and check off each of these important instructions as you do them when you get home.          Weight and Symptoms       ___ Put a scale in your bathroom.    ___ Post a weight chart or calendar next to your scale.    ___ Weigh yourself everyday as soon as you get up in the morning (before  breakfast).  You should only be wearing your pajamas.  Write your weight on the chart/calendar.  ___ Bring your weight chart/calendar with you to all appointments.  ___ Call your doctor or nurse practitioner if you gain 2 pounds (in 1 day) or 5 pounds in (1 week) from your goal  good  weight.  Your good weight is also called your  dry  weight.  Your doctor or nurse will tell you what your good weight should be.    ___ Call your doctor or nurse practitioner if you have shortness of breath that gets worse over time, leg swelling or fatigue.       Medications and Diet       ___ Make sure to take your medication as prescribed.    ___ Bring a current list of your medication and all of your medicine bottles with you to all appointments.    ___ Limit fluids if you still have swelling or shortness of breath, or if your doctor tells you to do so.    ___ Eat less than 2000 mg of sodium (salt) every day. Read food labels, and do not add salt to meals. Remember, if you eat less salt you retain less fluid.  ___ Follow a heart healthy diet that is low in saturated fat.        Activity and Suggested Lifestyle Changes      ___ Stay active. Talk to your doctor about an exercise program that is safe for your heart.  ___ Stop smoking. Reduce alcohol use.      ___ Lose weight if you are overweight. Extra weight puts a lot of stress on the heart.                 Control for Leg Swelling     ___ Keep your legs elevated (raised) as needed for swelling. If swelling is uncomfortable or elevation doesn t help, ask your doctor about using ACE wraps or support stockings.        What is the C.O.R.E. Clinic?  Cardiomyopathy  Optimization  Rehabilitation  Education    The C.O.R.E. Clinic is a heart failure specialty clinic within [facility].  It is an outpatient disease management program that is based on a phase-by-phase approach, which is tailored to each patient s individual needs.  The cardiologist, nurse practitioner, physician  assistant and nurses provide an ongoing outpatient care and treatment plan that guides heart failure and cardiomyopathy patients from evaluation and education to stabilization. This team works with your current primary care doctor and cardiologist to help you:    - Avoid hospitalizations  - Slow the progression of the disease  - Improve length and quality of life  - Know who and when to call if heart failure symptoms appear  - Receive easy access to quality health care and advice  - Better understand your condition and treatment  - Decrease the tremendous cost burden of heart failure care  - Detect future heart problems before they become life threatening                                 Follow-up Appointments     ___ An appointment with the C.O.R.E. Clinic may already have been made for you (see section   Your next appointments already scheduled ).  If you have a question about your appointment, would like to speak with a C.O.R.E. nurse, or would like to become a C.O.R.E. Clinic patient, please call one of the following locations:     - [facility]):                                             [phone]  - [facility]):                                            [phone]  - [facility]):                 [phone]      ___ Your C.O.R.E. Clinic Team will continue to educate you on your heart failure and may adjust medications based on your vital signs, lab work, and how you are feeling.  Therefore, it is very important to bring the following to all C.O.R.E. appointments:    - An accurate list of your medications  - Your medicine bottles  - Your weight chart/calendar                   Pending Results     No orders found from 3/12/2018 to 3/15/2018.            Statement of Approval     Ordered          03/21/18 1129  I have reviewed and agree with all the recommendations and orders detailed in this document.  EFFECTIVE NOW   "   Approved and electronically signed by:  Eugene Rocha MD             Admission Information     Date & Time Provider Department Dept. Phone    3/14/2018 Ruben John ZapienDO [facility] [phone]      Your Vitals Were     Blood Pressure Pulse Temperature Respirations Weight Last Period    101 (BP Location: Right arm) 105 97.7  F (36.5  C) (Oral) 16 83.7 kg (184 lb 8.4 oz) (LMP Unknown)    Pulse Oximetry BMI (Body Mass Index)                93% 31.67 kg/m2          MyChart Information     [facility] lets you send messages to your doctor, view your test results, renew your prescriptions, schedule appointments and more. To sign up, go to [email or web address] . Click on \"Log in\" on the left side of the screen, which will take you to the Welcome page. Then click on \"Sign up Now\" on the right side of the page.     You will be asked to enter the access code listed below, as well as some personal information. Please follow the directions to create your username and password.     Your access code is: [email or web address]  Expires: 2018  2:02 PM     Your access code will  in 90 days. If you need help or a new code, please call your [facility] clinic or [phone].        Care EveryWhere ID     This is your Care EveryWhere ID. This could be used by other organizations to access your [facility] medical records  [id number]        Equal Access to Services     [facility] FRANK : Hadii adams moultono Sodonny, waaxda luqadaha, qaybta kaalmada kennedi, carlos carvajal . So [facility] [phone].    ATENCIÓN: Si habla español, tiene a ivy disposición servicios gratuitos de asistencia lingüística. Llame al [phone].    We comply with applicable federal civil rights laws and [place] laws. We do not discriminate on the basis of race, color, national origin, age, disability, sex, sexual orientation, or gender identity.               Review of your " medicines      START taking        Dose / Directions    acetaminophen 325 MG tablet   Commonly known as:  TYLENOL   Used for:  Atrial fibrillation with rapid ventricular response (H)        Dose:  650 mg   Take 2 tablets (650 mg) by mouth every 4 hours as needed for mild pain or fever   Quantity:  100 tablet   Refills:  0       albuterol 108 (90 BASE) MCG/ACT Inhaler   Commonly known as:  PROAIR HFA/PROVENTIL HFA/VENTOLIN HFA   Used for:  SOB (shortness of breath)        Dose:  2 puff   Inhale 2 puffs into the lungs 4 times daily as needed for other (dyspnea)   Quantity:  1 Inhaler   Refills:  3       furosemide 20 MG tablet   Commonly known as:  LASIX        Dose:  20 mg   Start taking on:  3/22/2018   Take 1 tablet (20 mg) by mouth daily   Quantity:  30 tablet   Refills:  3       lisinopril 2.5 MG tablet   Commonly known as:  PRINIVIL/Zestril        Dose:  2.5 mg   Start taking on:  3/22/2018   Take 1 tablet (2.5 mg) by mouth daily   Quantity:  30 tablet   Refills:  3       oxyCODONE IR 5 MG tablet   Commonly known as:  ROXICODONE   Used for:  Atrial fibrillation with rapid ventricular response (H)        Dose:  5 mg   Take 1 tablet (5 mg) by mouth every 6 hours as needed for moderate to severe pain or severe pain maximum 6 tablet(s) per day   Quantity:  12 tablet   Refills:  0         CONTINUE these medicines which may have CHANGED, or have new prescriptions. If we are uncertain of the size of tablets/capsules you have at home, strength may be listed as something that might have changed.        Dose / Directions    metoprolol succinate 25 MG 24 hr tablet   Commonly known as:  TOPROL-XL   This may have changed:    - medication strength  - how much to take   Used for:  Atrial fibrillation with rapid ventricular response (H)        Dose:  37.5 mg   Take 1.5 tablets (37.5 mg) by mouth 2 times daily   Quantity:  90 tablet   Refills:  3         CONTINUE these medicines which have NOT CHANGED        Dose / Directions     alendronate 70 MG tablet   Commonly known as:  FOSAMAX   Used for:  Osteoporosis        Dose:  70 mg   Take 1 tablet (70 mg) by mouth every 7 days Take 60 minutes before am meal with 8 oz. water. Remain upright for 30 minutes.   Quantity:  12 tablet   Refills:  3       apixaban ANTICOAGULANT 5 MG tablet   Commonly known as:  ELIQUIS   Used for:  Atrial fibrillation with rapid ventricular response (H)        Dose:  5 mg   Take 1 tablet (5 mg) by mouth 2 times daily   Quantity:  60 tablet   Refills:  0       cholecalciferol 1000 UNITS capsule   Commonly known as:  vitamin  -D        Dose:  1 capsule   Take 1 capsule by mouth daily.   Refills:  0       FLAX SEED OIL PO        1 capsule daily   Refills:  0       flecainide 100 MG tablet   Commonly known as:  TAMBOCOR   Used for:  Atrial fibrillation with rapid ventricular response (H)        Dose:  100 mg   Take 1 tablet (100 mg) by mouth every 12 hours   Quantity:  60 tablet   Refills:  0       fluticasone 50 MCG/ACT spray   Commonly known as:  FLONASE        Dose:  1-2 spray   Spray 1-2 sprays into both nostrils daily as needed for rhinitis or allergies   Refills:  0       HAIR SKIN NAILS PO        Dose:  1 tablet   Take 1 tablet by mouth At Bedtime   Refills:  0       Milk Thistle 200 MG Caps        Dose:  1 capsule   Take 1 capsule by mouth daily   Refills:  0        MG Caps        Dose:  1 capsule   Take 1 capsule by mouth daily   Refills:  0       omega-3 acid ethyl esters 1 G capsule   Commonly known as:  Lovaza        Dose:  2 g   Take 2 g by mouth daily   Refills:  0       potassium & sodium phosphates 278-164-250 MG/75ML Solr        Dose:  1 tablet   Take 1 tablet by mouth daily   Refills:  0       simvastatin 20 MG tablet   Commonly known as:  ZOCOR   Used for:  Hyperlipidemia LDL goal <130        Dose:  20 mg   Take 1 tablet (20 mg) by mouth At Bedtime Profile Rx: patient will contact pharmacy when needed   Quantity:  90 tablet   Refills:  3             Where to get your medicines      These medications were sent to [facility] [place], [place] - [address]  [address], [place] [address]     Phone:  [phone]     acetaminophen 325 MG tablet    albuterol 108 (90 BASE) MCG/ACT Inhaler    furosemide 20 MG tablet    lisinopril 2.5 MG tablet    metoprolol succinate 25 MG 24 hr tablet         Some of these will need a paper prescription and others can be bought over the counter. Ask your nurse if you have questions.     Bring a paper prescription for each of these medications     oxyCODONE IR 5 MG tablet                Protect others around you: Learn how to safely use, store and throw away your medicines at www.disposemymeds.org.        Information about OPIOIDS     PRESCRIPTION OPIOIDS: WHAT YOU NEED TO KNOW    Prescription opioids can be used to help relieve moderate to severe pain and are often prescribed following a surgery or injury, or for certain health conditions. These medications can be an important part of treatment but also come with serious risks. It is important to work with your health care provider to make sure you are getting the safest, most effective care.    WHAT ARE THE RISKS AND SIDE EFFECTS OF OPIOID USE?  Prescription opioids carry serious risks of addiction and overdose, especially with prolonged use. An opioid overdose, often marked by slowed breathing can cause sudden death. The use of prescription opioids can have a number of side effects as well, even when taken as directed:      Tolerance - meaning you might need to take more of a medication for the same pain relief    Physical dependence - meaning you have symptoms of withdrawal when a medication is stopped    Increased sensitivity to pain    Constipation    Nausea, vomiting, and dry mouth    Sleepiness and dizziness    Confusion    Depression    Low levels of testosterone that can result in lower sex drive, energy, and strength    Itching and  sweating    RISKS ARE GREATER WITH:    History of drug misuse, substance use disorder, or overdose    Mental health conditions (such as depression or anxiety)    Sleep apnea    Older age (65 years or older)    Pregnancy    Avoid alcohol while taking prescription opioids.   Also, unless specifically advised by your health care provider, medications to avoid include:    Benzodiazepines (such as Xanax or Valium)    Muscle relaxants (such as Soma or Flexeril)    Hypnotics (such as Ambien or Lunesta)    Other prescription opioids    KNOW YOUR OPTIONS:  Talk to your health care provider about ways to manage your pain that do not involve prescription opioids. Some of these options may actually work better and have fewer risks and side effects:    Pain relievers such as acetaminophen, ibuprofen, and naproxen    Some medications that are also used for depression or seizures    Physical therapy and exercise    Cognitive behavioral therapy, a psychological, goal-directed approach, in which patients learn how to modify physical, behavioral, and emotional triggers of pain and stress    IF YOU ARE PRESCRIBED OPIOIDS FOR PAIN:    Never take opioids in greater amounts or more often than prescribed    Follow up with your primary health care provider and work together to create a plan on how to manage your pain.    Talk about ways to help manage your pain that do not involve prescription opioids    Talk about all concerns and side effects    Help prevent misuse and abuse    Never sell or share prescription opioids    Never use another person's prescription opioids    Store prescription opioids in a secure place and out of reach of others (this may include visitors, children, friends, and family)    Visit www.cdc.gov/drugoverdose to learn about risks of opioid abuse and overdose    If you believe you may be struggling with addiction, tell your health care provider and ask for guidance or call [facility]'s National Helpline at  6-424-019-HELP    LEARN MORE / www.cdc.gov/drugoverdose/prescribing/guideline.html    Safely dispose of unused prescription opioids: Find your local drug take-back programs and more information about the importance of safe disposal at www.doseofreality.[place].gov             Medication List: This is a list of all your medications and when to take them. Check marks below indicate your daily home schedule. Keep this list as a reference.      Medications           Morning Afternoon Evening Bedtime As Needed    acetaminophen 325 MG tablet   Commonly known as:  TYLENOL   Take 2 tablets (650 mg) by mouth every 4 hours as needed for mild pain or fever   Last time this was given:  650 mg on 3/20/2018  2:06 PM                                albuterol 108 (90 BASE) MCG/ACT Inhaler   Commonly known as:  PROAIR HFA/PROVENTIL HFA/VENTOLIN HFA   Inhale 2 puffs into the lungs 4 times daily as needed for other (dyspnea)                                alendronate 70 MG tablet   Commonly known as:  FOSAMAX   Take 1 tablet (70 mg) by mouth every 7 days Take 60 minutes before am meal with 8 oz. water. Remain upright for 30 minutes.                                apixaban ANTICOAGULANT 5 MG tablet   Commonly known as:  ELIQUIS   Take 1 tablet (5 mg) by mouth 2 times daily   Last time this was given:  5 mg on 3/21/2018  8:25 AM                                cholecalciferol 1000 UNITS capsule   Commonly known as:  vitamin  -D   Take 1 capsule by mouth daily.                                FLAX SEED OIL PO   1 capsule daily                                flecainide 100 MG tablet   Commonly known as:  TAMBOCOR   Take 1 tablet (100 mg) by mouth every 12 hours   Last time this was given:  100 mg on 3/21/2018  8:25 AM                                fluticasone 50 MCG/ACT spray   Commonly known as:  FLONASE   Spray 1-2 sprays into both nostrils daily as needed for rhinitis or allergies                                furosemide 20 MG tablet    Commonly known as:  LASIX   Take 1 tablet (20 mg) by mouth daily   Start taking on:  3/22/2018   Last time this was given:  20 mg on 3/21/2018  8:26 AM                                HAIR SKIN NAILS PO   Take 1 tablet by mouth At Bedtime                                lisinopril 2.5 MG tablet   Commonly known as:  PRINIVIL/Zestril   Take 1 tablet (2.5 mg) by mouth daily   Start taking on:  3/22/2018   Last time this was given:  2.5 mg on 3/21/2018  8:25 AM                                metoprolol succinate 25 MG 24 hr tablet   Commonly known as:  TOPROL-XL   Take 1.5 tablets (37.5 mg) by mouth 2 times daily   Last time this was given:  37.5 mg on 3/21/2018  8:25 AM                                Milk Thistle 200 MG Caps   Take 1 capsule by mouth daily                                 MG Caps   Take 1 capsule by mouth daily                                omega-3 acid ethyl esters 1 G capsule   Commonly known as:  Lovaza   Take 2 g by mouth daily                                oxyCODONE IR 5 MG tablet   Commonly known as:  ROXICODONE   Take 1 tablet (5 mg) by mouth every 6 hours as needed for moderate to severe pain or severe pain maximum 6 tablet(s) per day                                potassium & sodium phosphates 278-164-250 MG/75ML Solr   Take 1 tablet by mouth daily                                simvastatin 20 MG tablet   Commonly known as:  ZOCOR   Take 1 tablet (20 mg) by mouth At Bedtime Profile Rx: patient will contact pharmacy when needed   Last time this was given:  20 mg on 3/20/2018  9:00 PM                                          More Information      Managing Your Fluid Intake  If you have heart, kidney or liver problems, you may need to limit your fluid intake. The more fluid you take in each day, the more your body may hold on to it. This can lead to weight gain and edema (fluid in your tissues).  If you gain too much fluid weight, it may cause stress to your organs. Ask your  doctor:  When is the best time to weigh myself?  How much fluid weight gain is too much?  When should I call the doctor?  Limit your fluids to _____ ounces or _____ cups each day. You should count the following toward your daily fluid intake:  Water, ice, juice, soda pop, coffee, tea, Popsicles  Milk, cream, ice cream, sherbet, sorbet  Alcohol, beer, wine  Watermelon (1 cup cubes =   cup fluid)  Soup  Measuring fluids  1 liter = 1 quart = 4 cups = 32 ounces = 1000 ml (milliliters)  1 pint = 2 cups = 16 ounces = 500 ml  1 cup = 8 ounces = 250 ml = 16 tablespoons  1 ounce = 30 ml = 2 tablespoons  Tips    Plan your fluids around meals, snacks and medicine. When you can, take medicine with your mealtime liquids.    Measure your fluids and keep a record of how much you drink. Use small cups or glasses and measure how much they hold.    Avoid foods that are high in sodium (salt). These will make you more thirsty.    Suck on ice cubes when you feel thirsty. Melt one ice cube to see how much fluid it contains. (One cube is usually 1 ounce, or 2 tablespoons of fluid.)    Use sour hard candies or gum to moisten your mouth and decrease your thirst.    Add a little lemon or lime juice to water or ice. The sour taste will help quench your thirst.    Freeze fruits for snacks to help your thirst. Try grapes, peaches, berries and bananas.    Try a mouth spray to moisten your mouth. Or rinse your mouth with water or chilled mouthwash (do not swallow it).  For informational purposes only. Not to replace the advice of your health care provider.  Copyright   2004 [employer]. All rights reserved. [employer] 428533 - REV 11/15.            Eating Heart-Healthy Foods  Eating has a big impact on your heart health. In fact, eating healthier can improve several of your heart risks at once. For instance, it helps you manage weight, cholesterol, and blood pressure. Here are ideas to help you make heart-healthy changes without  giving up all the foods and flavors you love.  Getting started    Talk with your health care provider about eating plans, such as the DASH or Mediterranean diet. You may also be referred to a dietitian.    Change a few things at a time. Give yourself time to get used to a few eating changes before adding more.    Work to create a tasty, healthy eating plan that you can stick to for the rest of your life.    Goals for healthy eating  Below are some tips to improve your eating habits:    Limit saturated fats and trans fats. Saturated fats raise your levels of cholesterol, so keep these fats to a minimum. They are found in foods such as fatty meats, whole milk, cheese, and palm and coconut oils. Avoid trans fats because they lower good cholesterol as well as raise bad cholesterol. Trans fats are most often found in processed foods.    Reduce sodium (salt) intake. Eating too much salt may increase your blood pressure. Limit your sodium intake to 2,300 milligrams (mg) per day, or less if your health care provider recommends it. Dining out less often and eating fewer processed foods are two great ways to decrease the amount of salt you consume.    Managing calories. A calorie is a unit of energy. Your body burns calories for fuel, but if you eat more calories than your body burns, the extras are stored as fat. Your health care provider can help you create a diet plan to manage your calories. This will likely include eating healthier foods as well as exercising regularly. To help you track your progress, keep a diary to record what you eat and how often you exercise.  Choose the right foods  Aim to make these foods staples of your diet. If you have diabetes, you may have different recommendations than what is listed here:    Fruits and vegetable provide plenty of nutrients without a lot of calories. At meals, fill half your plate with these foods. Split the other half of your plate between whole grains and lean  protein.    Whole grains are high in fiber and rich in vitamins and nutrients. Good choices include whole-wheat bread, pasta, and brown rice.    Lean proteins give you nutrition with less fat. Good choices include fish, skinless chicken, and beans.    Low-fat or nonfat dairy provides nutrients without a lot of fat. Try low-fat or nonfat milk, cheese, or yogurt.    Healthy fats can be good for you in small amounts. These are unsaturated fats, such as olive oil, nuts, and fish. Try to have at least 2 servings per week of fatty fish such as salmon, sardines, mackerel, rainbow trout, and albacore tuna. These contain omega-3 fatty acids, which are good for your heart. Flaxseed is another source of a heart-healthy fat.  More on heart healthy eating    Read food labels  Healthy eating starts at the grocery store. Be sure to pay attention to food labels on packaged foods. Look for products that are high in fiber and protein, and low in saturated fat, cholesterol, and sodium. Avoid products that contain trans fat. And pay close attention to serving size. For instance, if you plan to eat two servings, double all the numbers on the label.  Prepare food right  A key part of healthy cooking is cutting down on added fat and salt. Look on the internet for lower-fat, lower-sodium recipes. Also, try these tips:    Remove fat from meat and skin from poultry before cooking.    Skim fat from the surface of soups and sauces.    Broil, boil, bake, steam, grill, and microwave food without added fats.    Choose ingredients that spice up your food without adding calories, fat, or sodium. Try these items: horseradish, hot sauce, lemon, mustard, nonfat salad dressings, and vinegar. For salt-free herbs and spices, try basil, cilantro, cinnamon, pepper, and rosemary.  Date Last Reviewed: 6/25/2015 2000-2017 [employer]. [address] [place] [address], [place] [address]. All rights reserved. This information is not intended as a  substitute for professional medical care. Always follow your healthcare professional's instructions.                Heart Failure: Tracking Your Weight  You have a condition called heart failure. When you have heart failure, a sudden weight gain or a steady rise in weight is a warning sign that your body is retaining too much water and salt. This could mean your heart failure is getting worse. If left untreated, it can cause problems for your lungs and result in shortness of breath. Weighing yourself each day is the best way to know if you re retaining water. If your weight goes up quickly, call your doctor. You will be given instructions on how to get rid of the excess water. You will likely need medicines and to avoid salt. This will help your heart work better.  Call your doctor if you gain more than 2 pounds in 1 day, more than 5 pounds in 1 week, or whatever weight gain you were told to report by your doctor. This is often a sign of worsening heart failure and needs to be evaluated and treated. Your doctor will tell you what to do next.   Tips for weighing yourself      Weigh yourself at the same time each morning, wearing the same clothes. Weigh yourself after urinating and before eating.    Use the same scale each day. Make sure the numbers are easy to read. Put the scale on a flat, hard surface -- not on a rug or carpet.    Do not stop weighing yourself. If you forget one day, weigh again the next morning.  How to use your weight chart    Keep your weight chart near the scale. Write your weight on the chart as soon as you get off the scale.    Fill in the month and the start date on the chart. Then write down your weight each day. Your chart will look like this:      If you miss a day, leave the space blank. Weigh yourself the next day and write your weight in the next space.    Take your weight chart with you when you go to see your doctor.  Date Last Reviewed: 3/20/2016    9214-2394 [employer]  [employer]. [address] [place] [address]. All rights reserved. This information is not intended as a substitute for professional medical care. Always follow your healthcare professional's instructions.                Atrial Fibrillation    Atrial fibrillation is a condition in which the heart beats in an irregular pattern. It is caused by a problem in the heart's electrical pathways. It can be a sign of heart disease or other health problems that affect the heart.  Heart palpitations are the most common symptom of atrial fibrillation. This is the feeling that your heart is fluttering, beating fast, hard, or irregular. When the heart beats too fast, it doesn't pump blood very well. This can cause other symptoms like anxiety, fatigue, shortness of breath, chest pain, dizziness, or fainting. Atrial fibrillation may come and go. It can last from a few hours to a couple of days. Or, it may become chronic, lasting for months at a time or even become permanent.  Atrial fibrillation may be caused by heart disease or other conditions in the body that affect the heart:    Coronary artery disease (atherosclerosis)    High blood pressure    Disease of the heart valves    Enlarged heart    Heart failure  Atrial fibrillation can also occur without heart disease because of:    Overactive thyroid (hyperthyroid)    Chronic lung disease (COPD, emphysema, bronchitis)    Heavy alcohol use    Cardiac stimulants like cocaine, amphetamines, diet pills, certain decongestant cold medicines, caffeine, or nicotine    Infection    Blood clot in the lung (pulmonary embolus)    Diabetes    Chronic kidney disease    Obesity    Extreme athletic conditioning  Treating or removing these causes will help your treatment for atrial fibrillation. It will also make it less likely for the atrial fibrillation to come back.  Atrial fibrillation can alternate back and forth with another abnormal rhythm called atrial flutter. Atrial flutter is a more  regular heart rhythm and is also associated with an increased stroke risk. Proper treatment can lower your risk for stroke.  Home care  Follow these guidelines when caring for yourself at home:    Go back to your usual activities as soon as you are feeling back to normal.    If you smoke, stop smoking. Contact your healthcare provider or a local stop-smoking program for help.    Don't use stimulants like alcohol, cocaine, amphetamines, diet pills, certain decongestant cold medicines, caffeine, or nicotine.    If your provider prescribed medicine to stop atrial fibrillation from coming back, take it exactly as directed. Some medicines must be taken every day, not just when you have symptoms. This will help them work as they should.    If you were prescribed warfarin to lower your risk for stroke, have your blood tested on a regular basis as advised by your provider. This will make sure you are getting the dose that is right for you. It also lower your risk for side effects.  Follow-up care  Follow up with your healthcare provider, or as advised.  When to seek medical advice  Call your healthcare provider right away if any of these following occur:    Shortness of breath or swelling in the legs gets worse    Unexpected weight gain    Chest pain or the sense that your heart is fluttering or beating fast or hard (palpitations)    Any sign of bleeding if you are on a blood thinner     Pain, redness, or swelling in one leg  Also call your provider right away if you have these signs of stroke:    Weakness of an arm or leg or one side of the face    Difficulty with speech or vision    Extreme drowsiness, confusion, dizziness, or fainting  Date Last Reviewed: 5/1/2016 2000-2017 [employer]. [address] [place] [address], [place] [address]. All rights reserved. This information is not intended as a substitute for professional medical care. Always follow your healthcare professional's instructions.

## 2018-03-14 NOTE — PLAN OF CARE
RECEIVING UNIT ED HANDOFF REVIEW    ED Nurse Handoff Report was reviewed by: Philipp Mcqueen on March 14, 2018 at 12:51 PM

## 2018-03-14 NOTE — ED NOTES
[facility]  ED Nurse Handoff Report    ED Chief complaint: Shortness of Breath (recently discharged for pneumonia now has orthopnea; edema in left ext. )      ED Diagnosis:   Final diagnoses:   Other hypervolemia       Code Status: Full Code    Allergies:   Allergies   Allergen Reactions     Strawberry Flavor        Activity level - Baseline/Home:  Independent    Activity Level - Current:   Stand with Assist     Needed?: No    Isolation: NO  Infection: Not Applicable    Bariatric?: No    Vital Signs:   Vitals:    03/14/18 1113 03/14/18 1200 03/14/18 1215 03/14/18 1230   BP:  154/72  161/82   Pulse:       Resp:  22 23 10   Temp:       TempSrc:       SpO2: 92% 92% 94% 94%       Cardiac Rhythm: ,        Pain level:      Is this patient confused?: No     Patient Report: Initial Complaint: SOB  Focused Assessment: pt recently was hospitalized with pneumonia and afib. Currently pt is in sinus rhythm, but is being treated with Afib. Today she comes in with room O2 sats in the upper 80s, edema of the left foot and trouble breathing while laying flat. She is on 2L NC and sats in the mid 90s. Lasix and nitro will be given for CHF  Tests Performed: CXR, labs  Abnormal Results: see results  Treatments provided: meds and monitor    Family Comments: daughter and grand daughter are at bedside and melody caring and supportive    OBS brochure/video discussed/provided to patient: N/A    ED Medications:   Medications   nitroGLYcerin (NITRODUR) 0.4 MG/HR 24 hr patch 1 patch (not administered)   furosemide (LASIX) injection 20 mg (not administered)       Drips infusing?:  No    For the majority of the shift this patient was Green.   Interventions performed were     Severe Sepsis OR Septic Shock Diagnosis Present: No      ED NURSE PHONE NUMBER: [phone]

## 2018-03-14 NOTE — H&P
[facility]    History and Physical  Hospitalist       Date of Admission:  3/14/2018    Assessment & Plan   Hamida Verma is a 77 year old female who was recently admitted from 3/6 to 3/9 for new onset atrial fibrillation thought to be due to recent URI and converted to NSR prior to discharge and was discharged on Flecainide and Toprol  mg BID.  She presented on 3/14 with increasing fatigue, SOB and orthopnea suspicious for heart failure exacerbation     Suspected new congestive heart failure  Patient actually just had an echo during previous admission with an EF of 65% but unable to assess diastolic function due to being in atrial fibrillation at this time.  This could be related to diastolic dysfunction but I wonder if the newly started Flecainide are causing her edema.  History is consistent with CHF with lower extremity edema, orthopnea and a CXR with interstitial edema and bilateral pleural effusions.  In the ED she was given 20 mg of IV Lasix.  - Admission on telemetry  - Strict I/Os and daily weights  - Will repeat echo as patient is now in sinus to evaluate diastolic function  - Will continue Lasix 20 mg IV daily as patient is Lasix naive   - Repeat BMP in the AM    Paroxysmal atrial fibrillation   Sinus bradycardia  Patient was diagnosed with new onset A fib during last admission and converted to NSR.  She was discharged on Flecainide and Toprol  mg BID.  Today she is noted to be in sinus bradycardia  - Decreased Toprol XL to 50 mg BID   - Continue Flecainide at this time  - Continue Eliquis  - Cardiology consulted to help with further assessment of the likely iatrogenic bradycardia and new onset fluid overload    Fatigue  This may be related to the sinus bradycardia as well as the large doses of Toprol XL she is now (100 mg BID).   - Decreased Toprol as above  - PT/OT consulted    HLP   - Continue PTA Statin     # Pain Assessment:   Current Pain Score 3/9/2018 3/9/2018  3/8/2018   Patient currently in pain? denies denies denies   Pain score (0-10) - - -   Pain location - - -   Pain descriptors - - -   Hamida lovelace pain level was assessed and she currently denies pain.        DVT Prophylaxis: Eliquis  Code Status: Full Code    Disposition: Expected discharge in 2-3 days once work-up completed and fluid status improves.  PT/OT will be evaluating the patient and will await their recommendations regarding TCU.    Ruben John,     Primary Care Physician   Mikala Philip MD    Chief Complaint   Fatigue and shortness of breath    History is obtained from the patient    History of Present Illness   Hamida Verma is a 77 year old female who was recently discharged from the hospital after being admitted for new onset atrial fibrillation thought to be related to viral URI who converted on her own while here presented with increasing fatigue and shortness of breath.  Patient stated that since discharge she has been having gradually worsening fatigue to the point she now needs assistance with ADLs.  Over the past couple of days she also began to have worsening shortness of breath and orthopnea to the point that she was unable to sleep laying down last night because her shortness of breath was so severe and she had to go downstairs and sit in her chair to sleep.  She also has noticed increased lower extremity swelling, left>right, and has had non-productive cough that has been present for the past couple of weeks.  Denies any fevers, chills, chest pain, palpitations.   Also denies any abdominal pain, N/V/D, problems with urination including dysuria, leg pain, light headedness, focal weakness, numbness/tingling or headaches.      In the ED the patient was seen by Dr. Solomon.  Lab work was fairly unremarkable other than mild leukocytosis of 14.0 and a hemoglobin of 11.0 with normal electrolytes and Cr.  Her BNP was on the high side but still not above normal at 1455 and her troponin was  negative.  A CXR was suggestive of pulmonary edema and small bilateral pleural effusions.  An EKG was also obtained with sinus bradycardia but otherwise had no signs of acute ischemia.  There was concern for CHF and the patient was given a dose of IV Lasix in the ED.     Past Medical History    I have reviewed this patient's medical history and updated it with pertinent information if needed.   Past Medical History:   Diagnosis Date     Atrial fibrillation (H)      Essential hypertension, benign      HYPERLIPIDEMIA NEC/NOS 3/30/2006       Past Surgical History   I have reviewed this patient's surgical history and updated it with pertinent information if needed.  Past Surgical History:   Procedure Laterality Date     HYSTERECTOMY, VAGINAL      hysterectomy       Prior to Admission Medications   Prior to Admission Medications   Prescriptions Last Dose Informant Patient Reported? Taking?   FLAX SEED OIL OR  Self Yes No   Si capsule daily   Methylsulfonylmethane (MSM) 500 MG CAPS  Self Yes No   Sig: Take 1 capsule by mouth daily   Milk Thistle 200 MG CAPS  Self Yes No   Sig: Take 1 capsule by mouth daily    Multiple Vitamins-Minerals (HAIR SKIN NAILS PO)  Self Yes No   Sig: Take 1 tablet by mouth At Bedtime   alendronate (FOSAMAX) 70 MG tablet  Self No No   Sig: Take 1 tablet (70 mg) by mouth every 7 days Take 60 minutes before am meal with 8 oz. water. Remain upright for 30 minutes.   apixaban ANTICOAGULANT (ELIQUIS) 5 MG tablet   No No   Sig: Take 1 tablet (5 mg) by mouth 2 times daily   cholecalciferol (VITAMIN  -D) 1000 UNIT capsule  Self Yes No   Sig: Take 1 capsule by mouth daily.   flecainide (TAMBOCOR) 100 MG tablet   No No   Sig: Take 1 tablet (100 mg) by mouth every 12 hours   fluticasone (FLONASE) 50 MCG/ACT spray  Self Yes No   Sig: Spray 1-2 sprays into both nostrils daily as needed for rhinitis or allergies   metoprolol succinate (TOPROL-XL) 100 MG 24 hr tablet   No No   Sig: Take 1 tablet (100 mg) by  mouth 2 times daily   omega-3 acid ethyl esters (LOVAZA) 1 G capsule  Self Yes No   Sig: Take 2 g by mouth daily   potassium & sodium phosphates 278-164-250 MG/75ML SOLR  Self Yes No   Sig: Take 1 tablet by mouth daily    simvastatin (ZOCOR) 20 MG tablet  Self No No   Sig: Take 1 tablet (20 mg) by mouth At Bedtime Profile Rx: patient will contact pharmacy when needed      Facility-Administered Medications: None     Allergies   Allergies   Allergen Reactions     Strawberry Flavor        Social History   I have reviewed this patient's social history and updated it with pertinent information if needed. Hamida Verma  reports that she quit smoking about 17 years ago. She has never used smokeless tobacco. She reports that she does not drink alcohol or use illicit drugs.    Family History   I have reviewed this patient's family history and updated it with pertinent information if needed.   Family History   Problem Relation Age of Onset     CEREBROVASCULAR DISEASE Mother      Eye Disorder Mother      Myocardial Infarction Mother      C.A.D. Father      heart attack     Alcohol/Drug Father      alcohol     Blood Disease Sister      lupus     Depression Sister      DIABETES No family hx of      Breast Cancer No family hx of      Cancer - colorectal No family hx of        Review of Systems   The 10 point Review of Systems is negative other than noted in the HPI    Physical Exam   Temp: 98.2  F (36.8  C) Temp src: Oral BP: 161/82 Pulse: 55 Heart Rate: 53 Resp: 10 SpO2: 94 % O2 Device: Nasal cannula Oxygen Delivery: 2 LPM  Vital Signs with Ranges  Temp:  [98.2  F (36.8  C)] 98.2  F (36.8  C)  Pulse:  [55] 55  Heart Rate:  [51-53] 53  Resp:  [10-26] 10  BP: (136-161)/(72-82) 161/82  SpO2:  [88 %-94 %] 94 %  0 lbs 0 oz    Constitutional: Awake, alert, cooperative, no apparent distress.  Eyes: Conjunctiva and pupils examined and normal.  HEENT: Moist mucous membranes, normal dentition.  Respiratory: Faint crackles at bilateral  bases.  No wheezing.  No respiratory distress  Cardiovascular: Bradycardia, normal S1 and S2, and no murmur noted.  GI: Soft, non-distended, non-tender, normal bowel sounds.  Lymph/Hematologic: No anterior cervical or supraclavicular adenopathy.  Skin: No rashes, no cyanosis  Musculoskeletal: 1-2+ Pitting edema to left lower extremity and 1+ pitting edema to right lower extremity.  No joint swelling, erythema or tenderness.  Neurologic: Cranial nerves 2-12 intact, normal strength and sensation.  Psychiatric: Alert, oriented to person, place and time, no obvious anxiety or depression.    Data   Data reviewed today:  I personally reviewed   CXR:   Interstitial opacities and small bilateral pleural effusions  EKG:  Sinus bradycardia.  No concerning ST or T wave changes.  When compared to previous NSR has been replaced by sinus bradycardia    Recent Labs  Lab 03/14/18  1049 03/09/18  0817 03/07/18  1605   WBC 14.0*  --   --    HGB 11.5*  --   --    MCV 87  --   --    *  --   --     137  --    POTASSIUM 3.6 4.2 3.7   CHLORIDE 103 105  --    CO2 25 26  --    BUN 12 13  --    CR 0.83 0.76  --    ANIONGAP 8 6  --    GURWINDER 8.4* 8.8  --    * 108*  --    ALBUMIN 2.6*  --   --    PROTTOTAL 7.0  --   --    BILITOTAL 0.5  --   --    ALKPHOS 94  --   --    ALT 57*  --   --    AST 17  --   --    TROPI <0.015  --   --        Imaging:  Recent Results (from the past 24 hour(s))   XR Chest 2 Views    Narrative    XR CHEST 2 VW 3/14/2018 11:33 AM    HISTORY: Possible CHF.    COMPARISON: 3/6/2018    FINDINGS: Interstitial edema and small bilateral pleural effusions.  Mild cardiomegaly.      Impression    IMPRESSION: Edema.    NIGHAT HAIR MD

## 2018-03-14 NOTE — TELEPHONE ENCOUNTER
I discussed with  and she would like patient to go to ED.  I called Hamida and gave her this msg and she will go to [facility] ED now.    Adilson GRANADOS

## 2018-03-14 NOTE — PHARMACY
"The following home medications were NOT continued on inpatient admission per \"Discontinuation of nonessential home medications during hospitalization\" policy: MSM    If a therapeutic holiday is deemed inappropriate per the prescriber, please notify the pharmacist regarding the medication order.    The pharmacist is available to answer any questions and/or concerns the patient may have regarding discontinuation of non-essential medications.    Please ensure that these medications are restarted as needed upon discharge via the medication reconciliation discharge process and included on the discharge medication reconciliation report.    Thank you,  Mindi Barney    "

## 2018-03-14 NOTE — TELEPHONE ENCOUNTER
Reason for call:  Patient reporting a symptom    Symptom or request: Left leg/foot is swollen ,Very fatigue    Duration (how long have symptoms been present):       Have you been treated for this before? NA    Additional comments: Patients grand daughter would like a call today to discuss symptoms. Patient was suppose to let PCP if symptoms worsen. Please advise, thank you!    Phone Number patient can be reached at:  Home number on file [phone] (home)    Best Time:  anytime    Can we leave a detailed message on this number:  YES    Call taken on 3/14/2018 at 8:00 AM by Noris Lovell

## 2018-03-14 NOTE — PHARMACY-ADMISSION MEDICATION HISTORY
Admission medication history interview status for the 3/14/2018  admission is complete. See EPIC admission navigator for prior to admission medications     Medication history source reliability:Good    Actions taken by pharmacist (provider contacted, etc):None     Additional medication history information not noted on PTA med list :None    Medication reconciliation/reorder completed by provider prior to medication history? Yes    Time spent in this activity: 15 minutes     She was in discharged from [facility] on 3/6/18, since then she started on three new medications, apixaban, flecainide and metoprolol.    Prior to Admission medications    Medication Sig Last Dose Taking? Auth Provider   flecainide (TAMBOCOR) 100 MG tablet Take 1 tablet (100 mg) by mouth every 12 hours 3/14/2018 at am Yes Ruben John DO   apixaban ANTICOAGULANT (ELIQUIS) 5 MG tablet Take 1 tablet (5 mg) by mouth 2 times daily 3/14/2018 at am Yes Ruben John DO   metoprolol succinate (TOPROL-XL) 100 MG 24 hr tablet Take 1 tablet (100 mg) by mouth 2 times daily 3/14/2018 at am Yes Ruben John,    omega-3 acid ethyl esters (LOVAZA) 1 G capsule Take 2 g by mouth daily 3/14/2018 at am Yes Unknown, Entered By History   fluticasone (FLONASE) 50 MCG/ACT spray Spray 1-2 sprays into both nostrils daily as needed for rhinitis or allergies prn Yes Unknown, Entered By History   Methylsulfonylmethane (MSM) 500 MG CAPS Take 1 capsule by mouth daily 3/14/2018 at am Yes Unknown, Entered By History   Multiple Vitamins-Minerals (HAIR SKIN NAILS PO) Take 1 tablet by mouth At Bedtime 3/13/2018 at hs Yes Unknown, Entered By History   alendronate (FOSAMAX) 70 MG tablet Take 1 tablet (70 mg) by mouth every 7 days Take 60 minutes before am meal with 8 oz. water. Remain upright for 30 minutes. 3/14/2018 at am Yes Mikala Philip MD   simvastatin (ZOCOR) 20 MG tablet Take 1 tablet (20 mg) by mouth At Bedtime Profile Rx: patient will contact  pharmacy when needed 3/13/2018 at  Yes House, Mikala COELLO MD   cholecalciferol (VITAMIN  -D) 1000 UNIT capsule Take 1 capsule by mouth daily. 3/14/2018 at am Yes Reported, Patient   potassium & sodium phosphates 278-164-250 MG/75ML SOLR Take 1 tablet by mouth daily  3/14/2018 at am Yes Reported, Patient   Milk Thistle 200 MG CAPS Take 1 capsule by mouth daily  3/14/2018 at am Yes Reported, Patient   FLAX SEED OIL OR 1 capsule daily 3/14/2018 at am Yes Reported, Patient

## 2018-03-14 NOTE — CONSULTS
REASON FOR ASSESSMENT:  CHF Consult for 2 gm NA Diet Education    NUTRITION HISTORY:    Information obtained from:  Patient, daughter, and granddaughter    Living situation:   Lives in a house with daughter and granddaughter    Grocery shopping:  Patient and granddtr    Meal preparation:  Patient and granddtr    Breakfast:  Toast or oatmeal    Lunch:  May skip it or have a deli sandwich    Dinner:   Pork, cream soup, veggies, corn    Previous diet instructions:  No, new CHF      CURRENT DIET:  2 gram Sodium    NUTRITION DIAGNOSIS:  Food- and nutrition-related knowledge deficit R/t no previous knowledge of low-sodium diet AEB pt report of no previous formal low-sodium nutrition education      INTERVENTIONS:    Nutrition Prescription:  Continue low sodium diet    Implementation:    Assessed learning needs, learning preferences, and willingness to learn    Nutrition Education (Content):  a) Provided handouts:  1) Tips for Low Na Diet  2) Label Reading  3) Low Na Foods/Drinks  4) Seasoning Your Food Without Salt  5) Low Na Recipe Booklet  b) Discussed rational for limiting Na for CHF and stressed importance of following 2 gm Na guidelines   c) Encouraged patient to keep a daily food record    Nutrition Education (Application):  a) Discussed current eating habits and recommended alternative food choices    Anticipated good compliance    Diet Education - refer to Education Flowsheet    Goals:    Patient verbalizes understanding of diet by nodding in agreement and asking questions when prompted    All of the above goals met during the education session    Follow Up:    Provided RD contact information for future questions.    Recommend Out-Patient Nutrition Referral, if further diet instructions are needed.    Hodan German MNT Dietetic Intern

## 2018-03-14 NOTE — TELEPHONE ENCOUNTER
"  --I put patient on your schedule today 1 pm.    --Let me know if you want a different plan.      PATIENT COMPLAINS OF :  --Gradual onset of severe left ankle swelling  that is moving into her foot x last 24 hours.  --Left ankle is warmer than right ankle.  --Patient says she continues to cough a lot.  --During the night she felt she could not take a deep breath so slept the rest of the night in a chair.  --She has laid down on her bed since and was able to breath ok, and at this time is breathing ok.  --Continues to have back pain that is unchanged since being in the hospital.  --Patient's daughter and granddaughter say they are very concerned about her being \"much more exhausted \" than when she was discharged from hospital.  \"so weak since out of hospital.\"    DENIES:  --No redness in left ankle.  --No weeping skin in left ankle,.  --No pain in left ankle.  --Can walk on ankle.  --Does not have a thermometer.  PLAN :   ---appointment today but will ask  for input as patient just seen two days ago.    Protocol consulted - Ankle Problem.  Rochelle Victoria Telephone Triage Protocols For Nurses 5th Edition guideline.    Lucina Ricardo RN          "

## 2018-03-14 NOTE — ED PROVIDER NOTES
History     Chief Complaint:  Cough    HPI   Hamida Verma is a 77 year old female with a history of hypertension, hyperlipidemia, and atrial fibrillation, anticoagulated on Eliquis, who presents with a cough. The patient was hospitalized here from 3/6-3/9 for pneumonia and new onset of atrial fibrillation with RVR. She spontaneously converted into sinus rhythm, so the planned cardioversion was not completed. She was given IV Rocephin and azithromycin for her pneumonia. The patient was discharged home and started on metoprolol, Tambocor, and Eliquis. She was instructed to stop taking lisinopril and aspirin. The patient reports she was feeling better when she was discharged but has been having worsening progression of her cough and now has orthopnea. She states she feels like she has congestion in her chest but notes that she is unable to cough anything up. She also endorses some back pain under her shoulder blades. Her daughters report that the patient's legs have been swelling, that she has no energy, and that her blood pressure has been running slightly higher than normal. The patient denies fever or urinary symptoms.     Allergies:  No known drug allergies     Medications:    Tambocor  Eliquis  Metoprolol succinate  Lovaza  Flonase  Fosamax  Zocor  Potassium and sodium phosphates    Past Medical History:    Hypertension  Hyperlipidemia  Atrial fibrillation with RVR    Past Surgical History:    Hysterectomy    Family History:    Cerebrovascular disease  Eye disorder  MI  CAD  Alcohol abuse  Lupus  Depression    Social History:  Smoking status: Former smoker  Alcohol use: No   Marital Status:   [5]     Review of Systems   Constitutional: Positive for fatigue. Negative for fever.   Respiratory: Positive for cough and shortness of breath.    Cardiovascular: Positive for leg swelling.   Endocrine: Negative for polyuria.   Genitourinary: Negative for difficulty urinating.   Musculoskeletal: Positive for back  pain.   All other systems reviewed and are negative.    Physical Exam     Patient Vitals for the past 24 hrs:   BP Temp Temp src Pulse Heart Rate Resp SpO2   03/14/18 1230 161/82 - - - 53 10 94 %   03/14/18 1215 - - - - 51 23 94 %   03/14/18 1200 154/72 - - - 52 22 92 %   03/14/18 1113 - - - - - - 92 %   03/14/18 1110 - - - - 53 26 (!) 88 %   03/14/18 1035 136/75 98.2  F (36.8  C) Oral 55 - 16 91 %        Physical Exam  Vitals: reviewed by me  General: Pt seen on [facility], cooperative, and alert to conversation mildly frail-appearing.  Eyes: Tracking well, clear conjunctiva BL  ENT: MMM, midline trachea.   Lungs: Does have mild respiratory distress as is evidenced by mild accessory muscle use and subcostal retractions.  Lungs with faint crackles in bases only on auscultation.  No wheezing.  CV: Rate as above, no murmurs heard on auscultation.  Abd: Soft, non tender, no guarding, no rebound. Non distended  MSK:  1+ symmetric bilateral lower extremity pitting edema, no joint effusion.  No evidence of trauma  Skin: No rash, normal turgor and temperature  Neuro: Clear speech and no facial droop.  Moving all extremities spontaneously  Psych: Not RIS, no e/o AH/VH      Emergency Department Course   ECG (10:56:43):  Rate 53 bpm. MI interval 154. QRS duration 90. QT/QTc 432/405. P-R-T axes 64 33 29. Sinus bradycardia. Otherwise normal ECG. Interpreted at 1101 by Mynor Solomon MD.     Imaging:  Radiographic findings were communicated with the patient who voiced understanding of the findings.    XR Chest 2 Views:  Edema.  As read by Radiology.     Laboratory:  CBC: WBC 14.0 (H), HGB 11.5 (L),  (H)  CMP: Glucose 112 (H), Calcium 8.4 (L), Albumin 2.6 (L), ALT 57 (H), o/w WNL (Creatinine 0.83)  BNP: 1455  Troponin: <0.015    Interventions:  1240: Nitroglycerin patch transdermal  1240: Lasix 20 mg IV     Emergency Department Course:  Past medical records, nursing notes, and vitals  reviewed.  1043: I performed an exam of the patient and obtained history, as documented above.   IV inserted and blood drawn.  The patient was sent for a chest x-ray while in the emergency department, findings above.   EKG obtained, results above.    1220: I rechecked the patient. Explained findings to the patient.    1223: I spoke to Dr. John of the hospitalist service who accepts the patient for admission.     Findings and plan explained to the Patient who consents to admission.     Discussed the patient with Dr. John, who will admit the patient to a medicine bed for further monitoring, evaluation, and treatment.      Impression & Plan    Medical Decision Making:  Hamida Verma is a 77 year old female who presents to the with shortness of breath, found to be in fluid overload. Unclear if this is worsening CHF, though this would be atypical given her recent echo that was unremarkable last week and her hospitalization for a fib, more likely she may be hypoalbuminemic as reflected by her labs today. She does have fluid overload in her legs as well and her lungs, which is prompting some degree of oxygen desaturation. With this in mind, and patient needing supplemental oxygen to stay in the 90s, I do feel as though she should be admitted for possible diuresis and evaluation of her hypoalbuminemia. I have also ordered in addition to Lasix a nitro patch for shortness of breath. Patient has no evidence of ACS at this point, or CHF. Her BNP is elevated but it is not considered to be abnormal. Her troponin is also within normal limits and she has no pain. White count was elevated as before, likely due to a stress response, as she is not tachycardic and has no fever or infectious symptoms or infiltrates on chest x-ray. I have admitted to the care of Dr. John who has generously agreed to accept. I will monitor the patient very carefully until bed is available.     Diagnosis:    ICD-10-CM   1. Other hypervolemia  E87.79     Disposition:  Admitted to medicine    Ai Guy  3/14/2018    EMERGENCY DEPARTMENT    I, Ai Guy, am serving as a scribe at 10:43 AM on 3/14/2018 to document services personally performed by Mynor Solomon MD based on my observations and the provider's statements to me.       Mynor Solomon MD  03/14/18 1737

## 2018-03-15 ENCOUNTER — APPOINTMENT (OUTPATIENT)
Dept: CARDIOLOGY | Facility: CLINIC | Age: 78
DRG: 244 | End: 2018-03-15
Attending: INTERNAL MEDICINE
Payer: COMMERCIAL

## 2018-03-15 ENCOUNTER — APPOINTMENT (OUTPATIENT)
Dept: OCCUPATIONAL THERAPY | Facility: CLINIC | Age: 78
DRG: 244 | End: 2018-03-15
Attending: INTERNAL MEDICINE
Payer: COMMERCIAL

## 2018-03-15 LAB
ANION GAP SERPL CALCULATED.3IONS-SCNC: 4 MMOL/L (ref 3–14)
ANION GAP SERPL CALCULATED.3IONS-SCNC: 9 MMOL/L (ref 3–14)
BUN SERPL-MCNC: 11 MG/DL (ref 7–30)
BUN SERPL-MCNC: 14 MG/DL (ref 7–30)
CALCIUM SERPL-MCNC: 8.3 MG/DL (ref 8.5–10.1)
CALCIUM SERPL-MCNC: 8.6 MG/DL (ref 8.5–10.1)
CHLORIDE SERPL-SCNC: 103 MMOL/L (ref 94–109)
CHLORIDE SERPL-SCNC: 105 MMOL/L (ref 94–109)
CO2 SERPL-SCNC: 25 MMOL/L (ref 20–32)
CO2 SERPL-SCNC: 28 MMOL/L (ref 20–32)
CREAT SERPL-MCNC: 0.76 MG/DL (ref 0.52–1.04)
CREAT SERPL-MCNC: 0.83 MG/DL (ref 0.52–1.04)
GFR SERPL CREATININE-BSD FRML MDRD: 66 ML/MIN/1.7M2
GFR SERPL CREATININE-BSD FRML MDRD: 74 ML/MIN/1.7M2
GLUCOSE SERPL-MCNC: 114 MG/DL (ref 70–99)
GLUCOSE SERPL-MCNC: 127 MG/DL (ref 70–99)
LACTATE BLD-SCNC: 0.9 MMOL/L (ref 0.7–2)
MAGNESIUM SERPL-MCNC: 2.4 MG/DL (ref 1.6–2.3)
POTASSIUM SERPL-SCNC: 3.5 MMOL/L (ref 3.4–5.3)
POTASSIUM SERPL-SCNC: 4 MMOL/L (ref 3.4–5.3)
SODIUM SERPL-SCNC: 137 MMOL/L (ref 133–144)
SODIUM SERPL-SCNC: 137 MMOL/L (ref 133–144)
TSH SERPL DL<=0.005 MIU/L-ACNC: 0.64 MU/L (ref 0.4–4)

## 2018-03-15 PROCEDURE — 80048 BASIC METABOLIC PNL TOTAL CA: CPT | Performed by: INTERNAL MEDICINE

## 2018-03-15 PROCEDURE — 93325 DOPPLER ECHO COLOR FLOW MAPG: CPT

## 2018-03-15 PROCEDURE — 97110 THERAPEUTIC EXERCISES: CPT | Mod: GO

## 2018-03-15 PROCEDURE — 99232 SBSQ HOSP IP/OBS MODERATE 35: CPT | Performed by: INTERNAL MEDICINE

## 2018-03-15 PROCEDURE — 25000128 H RX IP 250 OP 636: Performed by: INTERNAL MEDICINE

## 2018-03-15 PROCEDURE — 83735 ASSAY OF MAGNESIUM: CPT | Performed by: INTERNAL MEDICINE

## 2018-03-15 PROCEDURE — 99356 ZZC PROLONGED SERV,INPATIENT,1ST HR: CPT | Performed by: NURSE PRACTITIONER

## 2018-03-15 PROCEDURE — 83605 ASSAY OF LACTIC ACID: CPT | Performed by: INTERNAL MEDICINE

## 2018-03-15 PROCEDURE — 93325 DOPPLER ECHO COLOR FLOW MAPG: CPT | Mod: 26 | Performed by: INTERNAL MEDICINE

## 2018-03-15 PROCEDURE — 99357 ZZC PROLONGED SERV,INPATIENT,EA ADD 1/2: CPT | Performed by: NURSE PRACTITIONER

## 2018-03-15 PROCEDURE — 97165 OT EVAL LOW COMPLEX 30 MIN: CPT | Mod: GO

## 2018-03-15 PROCEDURE — 36415 COLL VENOUS BLD VENIPUNCTURE: CPT | Performed by: INTERNAL MEDICINE

## 2018-03-15 PROCEDURE — 84443 ASSAY THYROID STIM HORMONE: CPT | Performed by: NURSE PRACTITIONER

## 2018-03-15 PROCEDURE — 80048 BASIC METABOLIC PNL TOTAL CA: CPT | Performed by: NURSE PRACTITIONER

## 2018-03-15 PROCEDURE — 93308 TTE F-UP OR LMTD: CPT | Mod: 26 | Performed by: INTERNAL MEDICINE

## 2018-03-15 PROCEDURE — 84443 ASSAY THYROID STIM HORMONE: CPT | Performed by: INTERNAL MEDICINE

## 2018-03-15 PROCEDURE — 25000125 ZZHC RX 250

## 2018-03-15 PROCEDURE — 93321 DOPPLER ECHO F-UP/LMTD STD: CPT | Mod: 26 | Performed by: INTERNAL MEDICINE

## 2018-03-15 PROCEDURE — 25000125 ZZHC RX 250: Performed by: NURSE PRACTITIONER

## 2018-03-15 PROCEDURE — 93005 ELECTROCARDIOGRAM TRACING: CPT

## 2018-03-15 PROCEDURE — 93010 ELECTROCARDIOGRAM REPORT: CPT | Performed by: INTERNAL MEDICINE

## 2018-03-15 PROCEDURE — 25000132 ZZH RX MED GY IP 250 OP 250 PS 637: Performed by: INTERNAL MEDICINE

## 2018-03-15 PROCEDURE — 21000001 ZZH R&B HEART CARE

## 2018-03-15 PROCEDURE — 25000128 H RX IP 250 OP 636: Performed by: NURSE PRACTITIONER

## 2018-03-15 PROCEDURE — 40000133 ZZH STATISTIC OT WARD VISIT

## 2018-03-15 RX ORDER — DILTIAZEM HYDROCHLORIDE 5 MG/ML
10 INJECTION INTRAVENOUS ONCE
Status: COMPLETED | OUTPATIENT
Start: 2018-03-15 | End: 2018-03-15

## 2018-03-15 RX ORDER — FUROSEMIDE 10 MG/ML
20 INJECTION INTRAMUSCULAR; INTRAVENOUS ONCE
Status: COMPLETED | OUTPATIENT
Start: 2018-03-15 | End: 2018-03-15

## 2018-03-15 RX ORDER — METOPROLOL TARTRATE 1 MG/ML
5 INJECTION, SOLUTION INTRAVENOUS ONCE
Status: COMPLETED | OUTPATIENT
Start: 2018-03-15 | End: 2018-03-15

## 2018-03-15 RX ORDER — METOPROLOL SUCCINATE 50 MG/1
50 TABLET, EXTENDED RELEASE ORAL DAILY
Status: DISCONTINUED | OUTPATIENT
Start: 2018-03-15 | End: 2018-03-16

## 2018-03-15 RX ORDER — METOPROLOL TARTRATE 1 MG/ML
INJECTION, SOLUTION INTRAVENOUS
Status: COMPLETED
Start: 2018-03-15 | End: 2018-03-15

## 2018-03-15 RX ORDER — LISINOPRIL 10 MG/1
10 TABLET ORAL DAILY
Status: DISCONTINUED | OUTPATIENT
Start: 2018-03-15 | End: 2018-03-17

## 2018-03-15 RX ORDER — FUROSEMIDE 10 MG/ML
20 INJECTION INTRAMUSCULAR; INTRAVENOUS
Status: DISCONTINUED | OUTPATIENT
Start: 2018-03-15 | End: 2018-03-17

## 2018-03-15 RX ADMIN — ACETAMINOPHEN 650 MG: 325 TABLET, FILM COATED ORAL at 03:46

## 2018-03-15 RX ADMIN — DILTIAZEM HYDROCHLORIDE 5 MG/HR: 5 INJECTION, SOLUTION INTRAVENOUS at 21:38

## 2018-03-15 RX ADMIN — METOPROLOL TARTRATE 5 MG: 5 INJECTION INTRAVENOUS at 20:13

## 2018-03-15 RX ADMIN — FUROSEMIDE 20 MG: 10 INJECTION, SOLUTION INTRAVENOUS at 08:42

## 2018-03-15 RX ADMIN — METOPROLOL TARTRATE 5 MG: 1 INJECTION, SOLUTION INTRAVENOUS at 20:13

## 2018-03-15 RX ADMIN — METOPROLOL TARTRATE 5 MG: 5 INJECTION INTRAVENOUS at 20:27

## 2018-03-15 RX ADMIN — LISINOPRIL 10 MG: 10 TABLET ORAL at 22:55

## 2018-03-15 RX ADMIN — APIXABAN 5 MG: 5 TABLET, FILM COATED ORAL at 22:51

## 2018-03-15 RX ADMIN — METOPROLOL SUCCINATE 50 MG: 50 TABLET, EXTENDED RELEASE ORAL at 22:52

## 2018-03-15 RX ADMIN — METOROPROLOL TARTRATE 5 MG: 5 INJECTION, SOLUTION INTRAVENOUS at 20:05

## 2018-03-15 RX ADMIN — FLECAINIDE ACETATE 100 MG: 100 TABLET ORAL at 08:42

## 2018-03-15 RX ADMIN — FUROSEMIDE 20 MG: 10 INJECTION, SOLUTION INTRAVENOUS at 17:17

## 2018-03-15 RX ADMIN — APIXABAN 5 MG: 5 TABLET, FILM COATED ORAL at 08:42

## 2018-03-15 RX ADMIN — METOPROLOL TARTRATE 5 MG: 1 INJECTION, SOLUTION INTRAVENOUS at 20:27

## 2018-03-15 RX ADMIN — FUROSEMIDE 20 MG: 10 INJECTION, SOLUTION INTRAVENOUS at 20:26

## 2018-03-15 RX ADMIN — DILTIAZEM HYDROCHLORIDE 10 MG: 5 INJECTION INTRAVENOUS at 21:38

## 2018-03-15 RX ADMIN — FLECAINIDE ACETATE 100 MG: 100 TABLET ORAL at 22:50

## 2018-03-15 RX ADMIN — CHOLECALCIFEROL TAB 25 MCG (1000 UNIT) 1000 UNITS: 25 TAB at 08:42

## 2018-03-15 RX ADMIN — SIMVASTATIN 20 MG: 20 TABLET, FILM COATED ORAL at 22:51

## 2018-03-15 ASSESSMENT — ACTIVITIES OF DAILY LIVING (ADL): PREVIOUS_RESPONSIBILITIES: MEAL PREP;HOUSEKEEPING;LAUNDRY;SHOPPING;MEDICATION MANAGEMENT;FINANCES;DRIVING

## 2018-03-15 NOTE — PROVIDER NOTIFICATION
Paged Dr. Hayes 3/14/18 at [phone].    Patient is due for 50 mg Metoprolol XR.  HR is 59.  Has been bradycardic.  Does not have parameters.  PTA dose was 100 mg.  Given for rate control.  Tele: Sinus dysrhythmia, bradycardic.    Addendum: Spoke with MD.  New parameters to hold if HR less than 50.

## 2018-03-15 NOTE — PLAN OF CARE
Problem: Patient Care Overview  Goal: Plan of Care/Patient Progress Review  Outcome: No Change  Patient is alert and oriented.  Up independently in room.  Denies pain.  Tele: SB.  Lung sounds clear, 91% 2 lpm NC.  Increased SOB with activity.  Trace edema to bilateral ankles.

## 2018-03-15 NOTE — PLAN OF CARE
"Problem: Patient Care Overview  Goal: Plan of Care/Patient Progress Review  OT/CR: Evaluation and treatment initiated. Pt lives with her daughter and granddaughter. Prior pt independent in all I/ADLs. Pt admitted with \"Suspected decompensated diastolic CHF\" per chart.   Discharge Planner OT   Patient plan for discharge: Home  Current status: Pt ambulated in the hallway for 100 feet x 2 with SBA-modified independence. Pt demonstrated some fatigue and some SOB, however tolerated session well. Pt on 3L oxygen during session, oxygen 89% and pt able to recover with seated rest break.   Barriers to return to prior living situation: oxygen needs  Recommendations for discharge: Home with assist for I/ADLs as needed  Rationale for recommendations: Pt continues to make progress in established goals. Pt will have family assist as needed        Entered by: Lizeth Zaldivar 03/15/2018 2:05 PM          "

## 2018-03-15 NOTE — PLAN OF CARE
Problem: Patient Care Overview  Goal: Plan of Care/Patient Progress Review  Discharge Planner PT   Patient plan for discharge: home with daughter and granddaughter assist  Current status: Received PT eval orders. Per chart and conversation with evaluating OT, pt SBA with mobility including ambulation.  On supplemental O2.  Barriers to return to prior living situation: Stairs, endurance- OT/CR to continue to address both.  Pt will have family support at home from daughter and granddaughter as needed.  Recommendations for discharge: defer to OT/CR for disch recs.   Rationale for recommendations: No inpatient PT needs identified in addition to OT/CR following for endurance and stairs.  PT eval deferred.       Entered by: Nury Rodriguez 03/15/2018 2:33 PM

## 2018-03-15 NOTE — PROGRESS NOTES
[facility]  Cardiology Progress Note    Outpatient cardiologist: francisco f/u with Dr. Mauri Chase    Date of Service (when I saw the patient): 03/15/2018    Summary: Hamida Verma is a 77 year old female with history of recently diagnosed paroxsymal atrial fibrillation, hypertension, hyperlipidemia who was admitted on 3/14/2018 with shortness of breath and dizziness. She was recently admitted 3/6/18-3/9/18 with new atrial fibrillation with RVR. She had a recent upper respiratory infection which was felt to have triggered this. Cardiology was consulted. Cardioversion was planned but she converted to NSR prior to this. She was started on metoprolol, flecainide, and Eliquis for anticoagulation. A nuclear Lexiscan was done which showed no evidence of ischemia. Echocardiogram showed preserved LVEF with normal atrial size and no significant valvular disease. She was readmitted with increasing fatigue, shortness of breath and orthopnea. Noted to be in sinus bradycardia on admission. NTproBNP was 1455. CXR shows bilateral pleural effusions and interstitial infiltrates. Her metoprolol dose was decreased and IV lasix was given.   Interval History   Her breathing is better today. She was able to lie flat in bed last night and sleep which she would have been unable to do before. She remains on oxygen. O2 sats dropped to 85% on room air this morning. No dizziness or lightheadedness. Her HR remains in the high 50s this morning despite not receiving her morning metoprolol dose.    Assessment & Plan   1.  Shortness of breath, orthopnea. Concerning for congestive heart failure exacerbation. She seems to be improving with IV lasix. Her NTproBNP is not impressive but CXR and chest CT show bilateral pleural effusions and interstitial infiltrates. Chest CT also shows some mild to moderate emphysematous changes in the lungs. She was a smoker. Echo on 3/7 showed preserved LVEF. Diastolic dysfunction was unable to be  assess due to atrial fibrillation. A repeat echocardiogram has been ordered for today. Medications may be playing a role as well.  2.  Paroxsymal atrial fibrillation. Currently on flecainide and Toprolol XL. Toprolol dose decreased to 50 mg twice daily due to bradycardia.   -  On Eliquis for stroke prophylaxis.   3.  LLE swelling. US negative for DVT.    Recommendations:   1.  Continue diuresis with IV lasix. BMP is stable today.  2.  Await echo results.  3.  Should assess HR response with activity prior to discharge.   4.  Monitor HRs today - may need to adjust Toprolol dose further.  5.  She has an outpatient exercise stress test ordered for today to assess for exercise induced proarrhythmias from flecainide. This will need to be reschedule.    Yue Lopez PA-C      Physical Exam   Temp: 97.8  F (36.6  C) Temp src: Oral BP: 147/72 Pulse: 55 Heart Rate: 54 Resp: 16 SpO2: 91 % O2 Device: Nasal cannula Oxygen Delivery: 2 LPM  Vitals:    03/15/18 0643   Weight: 86.5 kg (190 lb 12.8 oz)     Vital Signs with Ranges  Temp:  [97.8  F (36.6  C)-98.2  F (36.8  C)] 97.8  F (36.6  C)  Pulse:  [55] 55  Heart Rate:  [51-59] 54  Resp:  [10-26] 16  BP: (121-161)/(56-82) 147/72  SpO2:  [88 %-94 %] 91 %  03/10 0700 - 03/15 0659  In: 1000 [P.O.:1000]  Out: 800 [Urine:800]  Net: 200    Constitutional: NAD.   Respiratory: Breath sounds are decreased at the bases but otherwise fairly clear.   Cardiovascular: RRR, s1s2, no murmur. JVP is normal at 30 degrees.  GI: soft, BS+  Skin: warm, no rashes  Musculoskeletal: Moving all extremities. 1+ LLE edema.   Neurologic: Alert, oriented x 3  Neuropsychiatric: Normal affect     Data     Recent Labs  Lab 03/15/18  0850 03/14/18  1049 03/09/18  0817   WBC  --  14.0*  --    HGB  --  11.5*  --    MCV  --  87  --    PLT  --  665*  --     136 137   POTASSIUM 4.0 3.6 4.2   CHLORIDE 105 103 105   CO2 28 25 26   BUN 11 12 13   CR 0.76 0.83 0.76   ANIONGAP 4 8 6   GURWINDER 8.3* 8.4* 8.8   GLC  127* 112* 108*   ALBUMIN  --  2.6*  --    PROTTOTAL  --  7.0  --    BILITOTAL  --  0.5  --    ALKPHOS  --  94  --    ALT  --  57*  --    AST  --  17  --    TROPI  --  <0.015  --      Echocardiogram pending     Tele sinus, HR 50s.    Medications     - MEDICATION INSTRUCTIONS -       - MEDICATION INSTRUCTIONS -         apixaban ANTICOAGULANT  5 mg Oral BID     cholecalciferol  1,000 Units Oral Daily     flecainide  100 mg Oral Q12H     metoprolol succinate  50 mg Oral BID     simvastatin  20 mg Oral At Bedtime     furosemide  20 mg Intravenous Daily

## 2018-03-15 NOTE — CONSULTS
Consult Date:  03/14/2018      CHIEF COMPLAINT:  Shortness of breath and his left lower extremity swelling.      HISTORY OF PRESENT ILLNESS:  Ms. Hamida Verma is a 77-year-old woman with history of hypertension and dyslipidemia who presented to [facility] last week with symptoms of shortness of breath and dizziness.  She was discovered to have atrial fibrillation with rapid ventricular response and admitted to the hospital for evaluation.  She had been experiencing upper respiratory infection symptoms for a couple of weeks before she came into the hospital and it was assumed that she had atrial fibrillation develop as a result of this lung infection.  She was started on metoprolol, Eliquis and flecainide and converted spontaneously back to sinus rhythm as she was preparing to undergo a CAROLANN cardioversion.  That procedure was never performed.  She was discharged 5 days ago and was feeling well.  However, when she got home, she was feeling very tired and fatigued and had no appetite.  Over the course of the last 48 hours, she developed shortness of breath, which seemed to affect her most when she tried to lie down last night to sleep.  She had difficulty breathing in bed and had to get up and sit in a chair to breathe better.  In the morning, her granddaughter, who is an EMT, noticed that her left leg and foot were quite swollen and became concerned, called her primary care office and brought her into [facility].      A chest x-ray was performed, which I have reviewed.  It actually looks quite a bit worse than the one she had last week when she was here.  There appear to be bilateral pleural effusions and interstitial infiltrates, although it is not clear to me whether this indeed represents congestive heart failure, but it certainly is concerning for that issue.  Her ECG is normal, showing sinus rhythm, and she has not felt any palpitations or rapid heart beating since she went home.   She had a stress test on 03/08/2018 with Lexiscan and nuclear perfusion imaging, which was thought to be normal with an ejection fraction of 74%.  In addition, she had an echocardiogram on 03/07/2018 showing normal left ventricular size and function, with an ejection fraction of 65%, mild to moderate mitral regurgitation and moderate tricuspid regurgitation and normal estimated right ventricular systolic pressures.      Since returning home, she has been taking high-dose metoprolol  mg p.o. b.i.d., as well as Eliquis 5 mg b.i.d. and flecainide 100 mg p.o. b.i.d.  She has also continued to take her usual simvastatin 20 mg p.o. at bedtime.      PHYSICAL EXAMINATION:   VITAL SIGNS:  On examination today, her blood pressure was 138/62, heart rate 58, respiratory rate 20 and oxygen saturation 92% on 2 liters nasal cannula.   GENERAL:  She appears quite comfortable with the examination and is breathing easily.  She is in no acute distress.   LUNGS:  Demonstrate decreased breath sounds at the bases, but are otherwise clear.   HEENT:  Unremarkable with moist mucous membranes and no xanthoma.  I do not clearly see any significant jugular venous distention.  She has no carotid bruits.   HEART:  Rhythm is regular without cardiac murmur.   ABDOMEN:  Soft and nontender with no masses or hepatomegaly.   EXTREMITIES:  She has 2+ left distal lower extremity pitting edema and no edema at all on the right.   SKIN:  Warm and dry and intact.   NEUROLOGIC:  She is alert and oriented with no gross motor defects.      PAST MEDICAL HISTORY:  Includes paroxysmal atrial fibrillation, hypertension, dyslipidemia and hysterectomy.      MEDICATIONS:  Reported above.      ALLERGIES:  INCLUDE STRAWBERRY-FLAVORED ITEMS.      SOCIAL HISTORY:  She quit smoking 18 years ago and does not use alcohol.      FAMILY HISTORY:  Significant for a heart attack in her father.  Her mother also had a heart attack.      REVIEW OF SYSTEMS:  Complete review  of systems was performed and is negative except what is noted above in history of present illness.      LABORATORY DATA:  Studies include a persistently elevated platelet count of 665,000, WBC is 14.0, hemoglobin 11.5.  NT-proBNP is essentially normal at 1455, troponin less than 0.015.  Lactic acid normal at 0.8, albumin decreased at 2.6, AST 57, ALT 17, creatinine 0.83, BUN 12, potassium 3.6.      A 12-lead ECG demonstrates sinus rhythm without abnormality.  I reviewed the EKG personally.      IMPRESSION:  Ms. Hamida Verma is a 77-year-old woman with a history of recent pneumonia, which likely contributed to development of atrial fibrillation with rapid ventricular response, which caused worsening shortness of breath and dizziness.  She was admitted last week and eventually converted spontaneously back to sinus rhythm and is maintained on flecainide, Eliquis and metoprolol since then.  Unfortunately, she has only been home for 5 days and came back to the hospital with worsening shortness of breath while lying flat in bed and unilateral left lower extremity edema.  Her chest x-ray is abnormal, showing some signs of interstitial infiltrates and effusions concerning for congestive heart failure.  However, her NT-proBNP is normal and her echocardiogram and Lexiscan nuclear perfusion stress test are both normal as well.  She certainly could have diastolic dysfunction heart failure, but this is not entirely clear at this point.  She has been treated with some diuretic therapy and seems to be somewhat better, although has not really tried to lie flat yet.  She seems to be maintaining sinus rhythm.  I doubt that this is all due to any recurrent atrial fibrillation issues.      Even though she is on Eliquis, I am concerned about the possibility that she might have a left lower extremity DVT.  I will go ahead and check an ultrasound of her leg.  She was a smoker and I am not entirely convinced that this is congestive heart  failure, and therefore I think a chest CT might be helpful to get more information to sort that issue out further.  I agree with decreasing her metoprolol XL from 100 b.i.d. to 50 b.i.d. to help prevent such significant bradycardia and continuation of the Eliquis on flecainide for now.  I will reevaluate her situation tomorrow, but I am not inclined to make any other additional changes.  She is already getting treated with IV Lasix diuresis.         HARLEY NASH MD, [facility]             D: 2018   T: 2018   MT: [id number]      Name:     ARTURO BOSWELL   MRN:      [id number]        Account:       [id number]   :      1940           Consult Date:  2018      Document: [id number]       cc: ALPHONSE BEAN MD

## 2018-03-15 NOTE — PROGRESS NOTES
03/15/18 1330   Quick Adds   Type of Visit Initial Occupational Therapy Evaluation   Living Environment   Lives With child(isidoro), adult  (daughter and granddaughter )   Living Arrangements house  (2 story home )   Home Accessibility bed and bath are not on the first floor;tub/shower is not walk in   Number of Stairs to Enter Home 3   Number of Stairs Within Home 22  (12 up and 10 down)   Transportation Available car   Living Environment Comment granddaughter and Daughter able to assist as needed    Self-Care   Usual Activity Tolerance good   Current Activity Tolerance moderate   Regular Exercise no   Equipment Currently Used at Home none   Functional Level Prior   Ambulation 0-->independent   Transferring 0-->independent   Toileting 0-->independent   Bathing 0-->independent   Dressing 0-->independent   Fall history within last six months no   Which of the above functional risks had a recent onset or change? none   General Information   Onset of Illness/Injury or Date of Surgery - Date 03/14/18   Referring Physician Ruben John, DO   Patient/Family Goals Statement Home   Additional Occupational Profile Info/Pertinent History of Current Problem Per chart: Hamida Verma is a 77 year old female who was recently admitted from 3/6 to 3/9 for new onset atrial fibrillation thought to be due to recent URI and converted to NSR prior to discharge and was discharged on Flecainide and Toprol  mg BID.  She presented on 3/14 with increasing fatigue, SOB and orthopnea suspicious for heart failure exacerbation    Precautions/Limitations fall precautions   Heart Disease Risk Factors Race;Age;Gender;Medical history;Lack of physical activity   Cognitive Status Examination   Orientation orientation to person, place and time   Level of Consciousness alert   Visual Perception   Visual Perception Wears glasses   Sensory Examination   Sensory Quick Adds No deficits were identified   Pain Assessment   Patient Currently  "in Pain No   Mobility   Bed Mobility Bed mobility skill: Sit to supine;Bed mobility skill: Supine to sit   Bed Mobility Skill: Sit to Supine   Level of Towns: Sit/Supine independent   Bed Mobility Skill: Supine to Sit   Level of Towns: Supine/Sit independent   Transfer Skills   Transfer Transfer Safety Analysis Bed/Chair;Transfer Skill: Stand to Sit;Transfer Safety Analysis Sit/Stand   Transfer Skill: Bed to Chair/Chair to Bed   Level of Towns: Bed to Chair stand-by assist   Transfer Skill: Sit to Stand   Level of Towns: Sit/Stand independent   Toilet Transfer   Toilet Transfer Toilet Transfer Skill;Toilet Transfer Safety Analysis   Transfer Skill: Toilet Transfer   Level of Towns: Toilet stand-by assist   Instrumental Activities of Daily Living (IADL)   Previous Responsibilities meal prep;housekeeping;laundry;shopping;medication management;finances;driving   General Therapy Interventions   Planned Therapy Interventions ADL retraining;IADL retraining;home program guidelines;progressive activity/exercise;risk factor education;transfer training;strengthening   Clinical Impression   Criteria for Skilled Therapeutic Interventions Met yes, treatment indicated   OT Diagnosis Decreased endurance for I/ADLs    Influenced by the following impairments Decreased endurance for I/ADLs    Assessment of Occupational Performance 1-3 Performance Deficits   Identified Performance Deficits Decreased endurance for I/ADLs  (dressing, bathing, toileting)   Clinical Decision Making (Complexity) Low complexity   Therapy Frequency daily   Predicted Duration of Therapy Intervention (days/wks) 3 days   Anticipated Discharge Disposition Home with Assist   Risks and Benefits of Treatment have been explained. Yes   Patient, Family & other staff in agreement with plan of care Yes   [facility] AM-PAC TM \"6 Clicks\"   2016, Trustees of [facility], under license to [employer].  All rights reserved. " "  6 Clicks Short Forms Daily Activity Inpatient Short Form   [facility] AM-PAC  \"6 Clicks\" Daily Activity Inpatient Short Form   1. Putting on and taking off regular lower body clothing? 4 - None   2. Bathing (including washing, rinsing, drying)? 4 - None   3. Toileting, which includes using toilet, bedpan or urinal? 4 - None   4. Putting on and taking off regular upper body clothing? 4 - None   5. Taking care of personal grooming such as brushing teeth? 4 - None   6. Eating meals? 4 - None   Daily Activity Raw Score (Score out of 24.Lower scores equate to lower levels of function) 24   Total Evaluation Time   Total Evaluation Time (Minutes) 8     "

## 2018-03-15 NOTE — PLAN OF CARE
Problem: Patient Care Overview  Goal: Plan of Care/Patient Progress Review  Outcome: No Change  A&OX4, VSS on 3L O2 sating low 90s. Desats to 85% on RA. Ju[place]ino at times. Denies pain, n&V, and SOB. SALAZAR noted. Up independent. On 2 g Na+ diet with 1500 Fl restriction added this shift. Lasix increased to BID now. Cardiology following. Echo completed today. D/c possible tomorrow pending progress. PIV saline locked.Continue to monitor.

## 2018-03-15 NOTE — PROGRESS NOTES
[facility]    Hospitalist Progress Note    Date of Service (when I saw the patient): 03/15/2018    Assessment & Plan   Hamida Verma is a 77 year old female who was recently admitted from 3/6 to 3/9 for new onset atrial fibrillation thought to be due to recent URI and converted to NSR prior to discharge and was discharged on Flecainide and Toprol  mg BID.  She presented on 3/14 with increasing fatigue, SOB and orthopnea suspicious for heart failure exacerbation      Suspected decompensated diastolic CHF:  History is consistent with CHF with lower extremity edema, orthopnea and a CXR with interstitial edema and bilateral pleural effusions. Patient actually just had an echo during previous admission with an EF of 65% but unable to assess diastolic function due to being in atrial fibrillation at this time.  In the ED she was given 20 mg of IV Lasix.  - Cardiology following  - Increase Lasix to 20mg IV BID  - BMP stable 3/15, recheck in AM  - Strict I/Os and daily weights  - repeat limited echo pending  - low sodium diet, 1500ml fluid restriction  - post-pone OP stress test  - wean oxygen for sats 92%     Paroxysmal atrial fibrillation   Sinus bradycardia  Patient was diagnosed with new onset A fib during last admission and converted to NSR.  She was discharged on Flecainide and Toprol  mg BID.  Noted sinus joycelyn 50's.  - Cardiology following as above  - c/w decreased Toprol XL 50 mg BID   - Tele  - PTA Flecainide  - PTA Eliquis     Fatigue  This may be related to cardiac issues as above. TSH last admission normal.  - PT/OT consulted  - management as outlined above     HLP   - PTA Statin     DVT Prophylaxis: NOAC.  Code Status: Full Code    Disposition: Expected discharge 3/16 days once diuresed, breathing better and off oxygen.    Sary Williamson MD    Interval History   States breathing improved today. Denies chest pain/dizziness/fevers/cough. Quit tobacco many years ago, does not use  oxygen at home.    -Data reviewed today: I reviewed all new labs and imaging results over the last 24 hours. I personally reviewed no images or EKG's today.    Physical Exam   Temp: 97.8  F (36.6  C) Temp src: Oral BP: 147/72   Heart Rate: 54 Resp: 16 SpO2: (!) 85 % O2 Device: None (Room air) Oxygen Delivery: 2 LPM  Vitals:    03/15/18 0643   Weight: 86.5 kg (190 lb 12.8 oz)     Vital Signs with Ranges  Temp:  [97.8  F (36.6  C)-98.1  F (36.7  C)] 97.8  F (36.6  C)  Heart Rate:  [51-59] 54  Resp:  [16-22] 16  BP: (121-147)/(56-72) 147/72  SpO2:  [85 %-92 %] 85 %  I/O last 3 completed shifts:  In: 1000 [P.O.:1000]  Out: 800 [Urine:800]    Constitutional: Awake, alert, cooperative, no apparent distress  Respiratory: Clear to auscultation bilaterally, no crackles or wheezing  Cardiovascular: Regular rate and rhythm, normal S1 and S2, and no murmur noted  GI: Normal bowel sounds, soft, non-distended, non-tender  Skin/Integumen: No rashes, no cyanosis, trace ankle edema b/l  Other: Follows commands, moves all 4 ext.    Medications     - MEDICATION INSTRUCTIONS -       - MEDICATION INSTRUCTIONS -         furosemide  20 mg Intravenous BID     apixaban ANTICOAGULANT  5 mg Oral BID     cholecalciferol  1,000 Units Oral Daily     flecainide  100 mg Oral Q12H     metoprolol succinate  50 mg Oral BID     simvastatin  20 mg Oral At Bedtime       Data     Recent Labs  Lab 03/15/18  0850 03/14/18  1049 03/09/18  0817   WBC  --  14.0*  --    HGB  --  11.5*  --    MCV  --  87  --    PLT  --  665*  --     136 137   POTASSIUM 4.0 3.6 4.2   CHLORIDE 105 103 105   CO2 28 25 26   BUN 11 12 13   CR 0.76 0.83 0.76   ANIONGAP 4 8 6   GURWINDER 8.3* 8.4* 8.8   * 112* 108*   ALBUMIN  --  2.6*  --    PROTTOTAL  --  7.0  --    BILITOTAL  --  0.5  --    ALKPHOS  --  94  --    ALT  --  57*  --    AST  --  17  --    TROPI  --  <0.015  --        Recent Results (from the past 24 hour(s))   US Lower Extremity Venous Duplex Left    Narrative     ULTRASOUND VENOUS LOWER EXTREMITY UNILATERAL LEFT  3/14/2018 6:12 PM     HISTORY: Unilateral left lower extremity edema.    COMPARISON: None.    TECHNIQUE: Ultrasound gray scale, Color Doppler flow, and spectral  Doppler waveform analysis performed.    FINDINGS: The left common femoral, superficial femoral, popliteal and  posterior tibial veins are patent and fully compressible and  demonstrate normal venous Doppler flow. The visualized greater  saphenous vein is negative for thrombus. For comparison the right  common femoral vein was evaluated and was unremarkable.      Impression    IMPRESSION: No deep venous thrombosis demonstrated.    ANH HOWARD MD   CT Chest w Contrast    Narrative    CT CHEST WITH CONTRAST   3/14/2018 9:02 PM     HISTORY: Dyspnea.    COMPARISON: None.    TECHNIQUE: Following the uneventful administration of 75 mL Isovue  -370 intravenous contrast, helical sections were acquired through the  lungs. Coronal reconstructions were generated. Radiation dose for this  scan was reduced using automated exposure control, adjustment of the  mA and/or kV according to the patient's size, or iterative  reconstruction technique.    FINDINGS: Mild to moderate emphysematous changes in the lungs. Small  bilateral pleural effusion with adjacent atelectasis. Apparent mild  interstitial thickening throughout both lungs. No pericardial  effusion. A few nonspecific borderline-enlarged mediastinal lymph  nodes. Mild cardiomegaly. Atherosclerotic calcification in the  thoracic aorta and coronary arteries.    Scan through the upper abdomen is significant for a tiny  low-attenuation lesion in the liver that is too small to characterize.  Mild multifocal bilateral renal atrophy. 3.5 cm cyst in the upper pole  of the right kidney. 0.2 cm nonobstructing calculus in the upper pole  of the left kidney. 0.5 cm gallstone in the gallbladder.      Impression    IMPRESSION:   1. Small bilateral pleural effusions with adjacent  atelectasis.  2. Apparent mild interstitial thickening throughout both lungs. This  is equivocal for interstitial pulmonary edema.  3. Mild to moderate emphysematous changes in the lungs.  4. A few nonspecific borderline enlarged mediastinal lymph nodes.    CHUY GOFF MD

## 2018-03-16 ENCOUNTER — APPOINTMENT (OUTPATIENT)
Dept: OCCUPATIONAL THERAPY | Facility: CLINIC | Age: 78
DRG: 244 | End: 2018-03-16
Payer: COMMERCIAL

## 2018-03-16 LAB
ANION GAP SERPL CALCULATED.3IONS-SCNC: 7 MMOL/L (ref 3–14)
BUN SERPL-MCNC: 11 MG/DL (ref 7–30)
CALCIUM SERPL-MCNC: 8.6 MG/DL (ref 8.5–10.1)
CHLORIDE SERPL-SCNC: 105 MMOL/L (ref 94–109)
CO2 SERPL-SCNC: 28 MMOL/L (ref 20–32)
CREAT SERPL-MCNC: 0.84 MG/DL (ref 0.52–1.04)
ERYTHROCYTE [DISTWIDTH] IN BLOOD BY AUTOMATED COUNT: 13.8 % (ref 10–15)
GFR SERPL CREATININE-BSD FRML MDRD: 65 ML/MIN/1.7M2
GLUCOSE SERPL-MCNC: 116 MG/DL (ref 70–99)
HCT VFR BLD AUTO: 39.4 % (ref 35–47)
HGB BLD-MCNC: 13 G/DL (ref 11.7–15.7)
INTERPRETATION ECG - MUSE: NORMAL
LACTATE BLD-SCNC: 0.7 MMOL/L (ref 0.7–2)
MCH RBC QN AUTO: 28.5 PG (ref 26.5–33)
MCHC RBC AUTO-ENTMCNC: 33 G/DL (ref 31.5–36.5)
MCV RBC AUTO: 86 FL (ref 78–100)
PLATELET # BLD AUTO: 797 10E9/L (ref 150–450)
POTASSIUM SERPL-SCNC: 3.7 MMOL/L (ref 3.4–5.3)
RBC # BLD AUTO: 4.56 10E12/L (ref 3.8–5.2)
SODIUM SERPL-SCNC: 140 MMOL/L (ref 133–144)
WBC # BLD AUTO: 15.8 10E9/L (ref 4–11)

## 2018-03-16 PROCEDURE — 25000132 ZZH RX MED GY IP 250 OP 250 PS 637: Performed by: INTERNAL MEDICINE

## 2018-03-16 PROCEDURE — 25000128 H RX IP 250 OP 636: Performed by: INTERNAL MEDICINE

## 2018-03-16 PROCEDURE — 21000001 ZZH R&B HEART CARE

## 2018-03-16 PROCEDURE — 80048 BASIC METABOLIC PNL TOTAL CA: CPT | Performed by: INTERNAL MEDICINE

## 2018-03-16 PROCEDURE — 83605 ASSAY OF LACTIC ACID: CPT | Performed by: INTERNAL MEDICINE

## 2018-03-16 PROCEDURE — 99233 SBSQ HOSP IP/OBS HIGH 50: CPT | Performed by: INTERNAL MEDICINE

## 2018-03-16 PROCEDURE — 40000133 ZZH STATISTIC OT WARD VISIT: Performed by: OCCUPATIONAL THERAPIST

## 2018-03-16 PROCEDURE — 99207 ZZC CDG-MDM COMPONENT: MEETS MODERATE - UP CODED: CPT | Performed by: INTERNAL MEDICINE

## 2018-03-16 PROCEDURE — 97110 THERAPEUTIC EXERCISES: CPT | Mod: GO | Performed by: OCCUPATIONAL THERAPIST

## 2018-03-16 PROCEDURE — 85027 COMPLETE CBC AUTOMATED: CPT | Performed by: INTERNAL MEDICINE

## 2018-03-16 PROCEDURE — 99232 SBSQ HOSP IP/OBS MODERATE 35: CPT | Performed by: INTERNAL MEDICINE

## 2018-03-16 PROCEDURE — 36415 COLL VENOUS BLD VENIPUNCTURE: CPT | Performed by: INTERNAL MEDICINE

## 2018-03-16 RX ORDER — METOPROLOL SUCCINATE 50 MG/1
50 TABLET, EXTENDED RELEASE ORAL DAILY
Status: DISCONTINUED | OUTPATIENT
Start: 2018-03-16 | End: 2018-03-18

## 2018-03-16 RX ADMIN — METOPROLOL SUCCINATE 50 MG: 50 TABLET, EXTENDED RELEASE ORAL at 12:12

## 2018-03-16 RX ADMIN — APIXABAN 5 MG: 5 TABLET, FILM COATED ORAL at 21:07

## 2018-03-16 RX ADMIN — FLECAINIDE ACETATE 100 MG: 100 TABLET ORAL at 09:52

## 2018-03-16 RX ADMIN — LISINOPRIL 10 MG: 10 TABLET ORAL at 09:52

## 2018-03-16 RX ADMIN — CHOLECALCIFEROL TAB 25 MCG (1000 UNIT) 1000 UNITS: 25 TAB at 09:52

## 2018-03-16 RX ADMIN — FUROSEMIDE 20 MG: 10 INJECTION, SOLUTION INTRAVENOUS at 18:17

## 2018-03-16 RX ADMIN — FUROSEMIDE 20 MG: 10 INJECTION, SOLUTION INTRAVENOUS at 09:50

## 2018-03-16 RX ADMIN — FLECAINIDE ACETATE 100 MG: 100 TABLET ORAL at 21:07

## 2018-03-16 RX ADMIN — APIXABAN 5 MG: 5 TABLET, FILM COATED ORAL at 09:52

## 2018-03-16 RX ADMIN — SIMVASTATIN 20 MG: 20 TABLET, FILM COATED ORAL at 21:07

## 2018-03-16 NOTE — PROGRESS NOTES
Daughter, Delores, called and updated regarding A-Fib RVR and transfer to heart center.  Patient remains asymptomatic.  5mg IV Metoprolol x3 and 20mg IV Lasix x1 with 500 mL output.    Addendum: Total of 1400 mL out over last hour since IV Lasix.

## 2018-03-16 NOTE — PLAN OF CARE
Problem: Patient Care Overview  Goal: Plan of Care/Patient Progress Review  Outcome: Improving  Patient A&O. VSS on 3 L NC. Diltiazem drip 5 mg/hr. Tele: A fib RVR, low 100s. Denies pain. Up SBA to bathroom. Voiding adequately. Low sodium diet.     250 mL intake this shift. 1500 mL fluid restriction.

## 2018-03-16 NOTE — PROGRESS NOTES
[facility]  Cardiology Progress Note    Outpatient cardiologist: francisco f/u with Dr. Mauri Chase    Date of Service (when I saw the patient): 03/16/2018    Summary: Hamida Verma is a 77 year old female with history of recently diagnosed paroxsymal atrial fibrillation, hypertension, hyperlipidemia who was admitted on 3/14/2018 with shortness of breath and dizziness. She was recently admitted 3/6/18-3/9/18 with new atrial fibrillation with RVR. She had a recent upper respiratory infection which was felt to have triggered this. Cardiology was consulted. Cardioversion was planned but she converted to NSR prior to this. She was started on metoprolol, flecainide, and Eliquis for anticoagulation. A nuclear Lexiscan was done which showed no evidence of ischemia. Echocardiogram showed preserved LVEF with normal atrial size and no significant valvular disease. She was readmitted with increasing fatigue, shortness of breath and orthopnea. Noted to be in sinus bradycardia on admission. NTproBNP was 1455. CXR shows bilateral pleural effusions and interstitial infiltrates. Her metoprolol dose was decreased and IV lasix was given.     Interval History      She had persistent hypoxia yesterday and was continuing diuresis, then went back into afib wt RVR 's last nigh, given IV metoprolol 15 mg, and [place] [facility] [place]. Dilt is off now, metoprolol XL 50 mg given last night at 22:52. Nothing this am.     Assessment & Plan   1.  Shortness of breath, orthopnea. In part due to congestive heart failure exacerbation. Some COPD findings on CT chest, severity unclear. She seems to be improving with IV lasix. Her NTproBNP was not impressive but CXR and chest CT show bilateral pleural effusions and interstitial infiltrates. She was a smoker. Echo on 3/7 showed preserved LVEF. Diastolic dysfunction was unable to be assess due to atrial fibrillation. A repeat echocardiogram done yesterday suggests  increased LV filling pressures with moderate pulmonary hypertension.   2.  Paroxsymal atrial fibrillation. Currently on flecainide and Eliquis for stroke prophylaxis. HR was slow in low 50's on Metoprolol XL 50 bid when in SR, but fast in 150's when she went back into afib, suggesting some signs of sick sinus syndrome.   3.  LLE swelling. US negative for DVT. Probably due to CHF    Recommendations:   1.  Continue diuresis with IV lasix. She remains on O2 by NC  2.  Lisinopril added for afterload reduction yesterday.   3.  Restart Metoprolol XL 50 mg daily and give it if HR > 50. Continue flecainide for now.  4.  Should assess HR response with activity prior to discharge.   5.  She has an outpatient exercise stress test ordered for today to assess for exercise induced proarrhythmias from flecainide. This will need to be reschedule.  6. Consider further pulmonary evaluation for severity of COPD.  7. Issues of sick sinus syndrome discussed. I am not yet ready to commit her to a pacemaker. I would suggest trying some different medication combinations with metoprolol XL and diltiazem over the weekend to achieve adequate rate control and consider pacemaker again on Monday if HR control is not adequate.       HARLEY NASH MD      Physical Exam   Temp: 98  F (36.7  C) Temp src: Oral BP: 127/71   Heart Rate: 98 Resp: 18 SpO2: 95 % O2 Device: Nasal cannula Oxygen Delivery: 1.5 LPM  Vitals:    03/15/18 0643 03/16/18 0500   Weight: 86.5 kg (190 lb 12.8 oz) 84.3 kg (185 lb 12.8 oz)     Vital Signs with Ranges  Temp:  [97.8  F (36.6  C)-98.6  F (37  C)] 98  F (36.7  C)  Heart Rate:  [] 98  Resp:  [16-28] 18  BP: (107-165)/() 127/71  SpO2:  [89 %-96 %] 95 %  03/11 0700 - 03/16 0659  In: 2060 [P.O.:2060]  Out: 6050 [Urine:6050]  Net: -3990    Constitutional: NAD.   Respiratory: Breath sounds are decreased at the bases but otherwise fairly clear.   Cardiovascular: RRR, s1s2, no murmur. JVP is normal at 30  degrees.  GI: soft, BS+  Skin: warm, no rashes  Musculoskeletal: Moving all extremities. 1+ LLE edema.   Neurologic: Alert, oriented x 3  Neuropsychiatric: Normal affect     Data     Recent Labs  Lab 03/16/18  0555 03/15/18  2025 03/15/18  0850 03/14/18  1049   WBC 15.8*  --   --  14.0*   HGB 13.0  --   --  11.5*   MCV 86  --   --  87   *  --   --  665*    137 137 136   POTASSIUM 3.7 3.5 4.0 3.6   CHLORIDE 105 103 105 103   CO2 28 25 28 25   BUN 11 14 11 12   CR 0.84 0.83 0.76 0.83   ANIONGAP 7 9 4 8   GURWINDER 8.6 8.6 8.3* 8.4*   * 114* 127* 112*   ALBUMIN  --   --   --  2.6*   PROTTOTAL  --   --   --  7.0   BILITOTAL  --   --   --  0.5   ALKPHOS  --   --   --  94   ALT  --   --   --  57*   AST  --   --   --  17   TROPI  --   --   --  <0.015     Echocardiogram pending     Tele sinus, HR 50s.    Medications     diltiazem (CARDIZEM) infusion ADULT Stopped (03/16/18 0953)     - MEDICATION INSTRUCTIONS -       - MEDICATION INSTRUCTIONS -         furosemide  20 mg Intravenous BID     sodium chloride (PF)  3 mL Intracatheter Q8H     lisinopril  10 mg Oral Daily     apixaban ANTICOAGULANT  5 mg Oral BID     cholecalciferol  1,000 Units Oral Daily     flecainide  100 mg Oral Q12H     simvastatin  20 mg Oral At Bedtime

## 2018-03-16 NOTE — PLAN OF CARE
Problem: Cardiac: Heart Failure (Adult)  Goal: Signs and Symptoms of Listed Potential Problems Will be Absent, Minimized or Managed (Cardiac: Heart Failure)  Signs and symptoms of listed potential problems will be absent, minimized or managed by discharge/transition of care (reference Cardiac: Heart Failure (Adult) CPG).   Outcome: Improving  Heart Failure Care Pathway  GOALS TO BE MET BEFORE DISCHARGE:    1. Decrease congestion and/or edema with diuretic therapy to achieve near      optimal volume status.            Dyspnea improved:  Yes            Edema improved:     Yes        Net I/O and Weights since admission:          03/11 0700 - 03/16 0659  In: 2060 [P.O.:2060]  Out: 6050 [Urine:6050]  Net: -3990            Vitals:    03/15/18 0643 03/16/18 0500   Weight: 86.5 kg (190 lb 12.8 oz) 84.3 kg (185 lb 12.8 oz)       2.  O2 sats > 92% on RA or at prior home O2 therapy level.          Current oxygenation status:       SpO2: 97 %         O2 Device: Nasal cannula,  Oxygen Delivery: 1 LPM         Able to wean O2 this shift to keep sats > 92%:  No, we were able to decrease O2 to 1.5L from 3L. Patient tolerating.       Does patient use Home O2? No    3.  Tolerates ambulation and mobility near baseline: No, please explain: will ambulate patient next shift. Ambulating in room independently.        How many times did the patient ambulate with nursing staff this shift? 1    Please review the Heart Failure Care Pathway for additional HF goal outcomes.    Brianna Wiggins RN  3/16/2018

## 2018-03-16 NOTE — PLAN OF CARE
Problem: Cardiac: Heart Failure (Adult)  Goal: Signs and Symptoms of Listed Potential Problems Will be Absent, Minimized or Managed (Cardiac: Heart Failure)  Signs and symptoms of listed potential problems will be absent, minimized or managed by discharge/transition of care (reference Cardiac: Heart Failure (Adult) CPG).   Outcome: Improving  Pt arrived after 2200 from [facility]. A/o x4. VSS. 3LPM mid to low 90's. Minimal SALAZAR. TELE Afib RVR, rate slowed down to low 100's- 115's. Asymptomatic. Denies pain. IV Dilt running. Up SBA to bathroom. Did IV diuresis on 66. Plan pending on pt progress.     Heart Failure Care Pathway  GOALS TO BE MET BEFORE DISCHARGE:    1. Decrease congestion and/or edema with diuretic therapy to achieve near      optimal volume status.            Dyspnea improved:  Yes            Edema improved:     Yes        Net I/O and Weights since admission:          03/10 2300 - 03/15 2259  In: 1810 [P.O.:1810]  Out: 5000 [Urine:5000]  Net: -3190            Vitals:    03/15/18 0643   Weight: 86.5 kg (190 lb 12.8 oz)       2.  O2 sats > 92% on RA or at prior home O2 therapy level.          Current oxygenation status:       SpO2: 93 %         O2 Device: Nasal cannula,  Oxygen Delivery: 3 LPM         Able to wean O2 this shift to keep sats > 92%:  No, please explain: RRT on 66 than transferred to obs       Does patient use Home O2? No    3.  Tolerates ambulation and mobility near baseline: Yes        How many times did the patient ambulate with nursing staff this shift? 3    Please review the Heart Failure Care Pathway for additional HF goal outcomes.    Judith Lew RN  3/15/2018

## 2018-03-16 NOTE — PROGRESS NOTES
[facility]    Hospitalist Progress Note    Date of Service (when I saw the patient): 03/16/2018    Assessment & Plan   Hamida Verma is a 77 year old female who was recently admitted from 3/6 to 3/9 for new onset atrial fibrillation thought to be due to recent URI and converted to NSR prior to discharge and was discharged on Flecainide and Toprol  mg BID.  She presented on 3/14 with increasing fatigue, SOB and orthopnea suspicious for heart failure exacerbation       Suspected decompensated diastolic CHF:  History is consistent with CHF with lower extremity edema, orthopnea and a CXR with interstitial edema and bilateral pleural effusions. Patient actually just had an echo during previous admission with an EF of 65% but unable to assess diastolic function due to being in atrial fibrillation at this time.  In the ED she was given 20 mg of IV Lasix.  - Cardiology following  - c/w Lasix to 20mg IV BID  - BMP stable 3/16, recheck in AM  - Strict I/Os and daily weights  - repeat limited echo EF60-65%, mod pulm HTN  - low sodium diet, 1500ml fluid restriction  - wean oxygen for sats 92%      Afib RVR  Sinus bradycardia  Patient was diagnosed with new onset A fib during last admission and converted to NSR.  She was discharged on Flecainide and Toprol  mg BID.  Noted sinus joycelyn 50's. Metoprolol being reduced due to bradycardia, however 3/15 evening developed Afib RVR up to 150's, refractory to IV BB. [place] [facility] [place]iltiazem gtt.  - Cardiology following as above  - c/w diltiazem gtt, discontinue AM Toprol until Cards sees  - ? EP consult  - PTA Flecainide  - PTA Eliquis  - Tele      Fatigue  This may be related to cardiac issues as above. TSH last admission normal.  - PT/OT consulted  - management as outlined above      HLP   - PTA Statin     DVT Prophylaxis: eliquis  Code Status: Full Code    Disposition: Expected discharge in 1-2 days once HR controlled on po meds, pending cardiology  plan.    Sary Williamson MD    Interval History   RRT called last night due to afib RVR, events noted. Patient endorses palpitations that are now resolved. Denies chest pain, SOB, dizziness, LOC. Grateful for staff on 6 prior to transfer.    -Data reviewed today: I reviewed all new labs and imaging results over the last 24 hours. I personally reviewed no images or EKG's today.    Physical Exam   Temp: 98  F (36.7  C) Temp src: Oral BP: 127/71   Heart Rate: 98 Resp: 18 SpO2: 96 % O2 Device: Nasal cannula Oxygen Delivery: 3 LPM  Vitals:    03/15/18 0643 03/16/18 0500   Weight: 86.5 kg (190 lb 12.8 oz) 84.3 kg (185 lb 12.8 oz)     Vital Signs with Ranges  Temp:  [97.8  F (36.6  C)-98.6  F (37  C)] 98  F (36.7  C)  Heart Rate:  [] 98  Resp:  [16-28] 18  BP: (107-165)/() 127/71  SpO2:  [85 %-96 %] 96 %  I/O last 3 completed shifts:  In: 1060 [P.O.:1060]  Out: 5250 [Urine:5250]    Constitutional: Awake, alert, cooperative, no apparent distress  Respiratory: Clear to auscultation bilaterally, no crackles or wheezing  Cardiovascular: Irregular rhythm, mildly tachy, normal S1 and S2, and no murmur noted  GI: Normal bowel sounds, soft, non-distended, non-tender  Skin/Integumen: No rashes, no cyanosis, no edema  Other: Follows commands, moves all 4 ext.    Medications     diltiazem (CARDIZEM) infusion ADULT 5 mg/hr (03/16/18 0042)     - MEDICATION INSTRUCTIONS -       - MEDICATION INSTRUCTIONS -         furosemide  20 mg Intravenous BID     sodium chloride (PF)  3 mL Intracatheter Q8H     lisinopril  10 mg Oral Daily     apixaban ANTICOAGULANT  5 mg Oral BID     cholecalciferol  1,000 Units Oral Daily     flecainide  100 mg Oral Q12H     simvastatin  20 mg Oral At Bedtime       Data     Recent Labs  Lab 03/16/18  0555 03/15/18  2025 03/15/18  0850 03/14/18  1049   WBC 15.8*  --   --  14.0*   HGB 13.0  --   --  11.5*   MCV 86  --   --  87   *  --   --  665*    137 137 136   POTASSIUM 3.7 3.5 4.0 3.6    CHLORIDE 105 103 105 103   CO2 28 25 28 25   BUN 11 14 11 12   CR 0.84 0.83 0.76 0.83   ANIONGAP 7 9 4 8   GURWINDER 8.6 8.6 8.3* 8.4*   * 114* 127* 112*   ALBUMIN  --   --   --  2.6*   PROTTOTAL  --   --   --  7.0   BILITOTAL  --   --   --  0.5   ALKPHOS  --   --   --  94   ALT  --   --   --  57*   AST  --   --   --  17   TROPI  --   --   --  <0.015       No results found for this or any previous visit (from the past 24 hour(s)).

## 2018-03-16 NOTE — PLAN OF CARE
Problem: Patient Care Overview  Goal: Plan of Care/Patient Progress Review  Discharge Planner OT   Patient plan for discharge: home  Current status: pt ambulated approx 270 ft with supervision and completed 5 stairs with fatigue and tachy HR and A-fib, see vital sign flow sheet for details. Pt educated in CHF self care concepts, pt aware of some of them and provided with handout.   Barriers to return to prior living situation: decreased activity tolerance, no concerns with family A with IADL's as needed.   Recommendations for discharge: home with family A as needed with ADL/IADL's ie heavier cleaning, yard work, etc.   Rationale for recommendations: family A as needed. Pt making good progress towards goals.        Entered by: Rosamaria Escalante 03/16/2018 3:21 PM

## 2018-03-16 NOTE — PLAN OF CARE
Problem: Patient Care Overview  Goal: Plan of Care/Patient Progress Review  Outcome: Declining  A&O x4.  Independent.  Denies pain.  VSS on 2 LPM via NC initially.  Tele: Sinus joycelyn.  Then, around 1945, patient went into A-Fib RVR with ongoing HTN.  Asymptomatic.  Transferring to [facility] [place].  Daughter aware.

## 2018-03-17 LAB
ANION GAP SERPL CALCULATED.3IONS-SCNC: 6 MMOL/L (ref 3–14)
BUN SERPL-MCNC: 15 MG/DL (ref 7–30)
CALCIUM SERPL-MCNC: 8.3 MG/DL (ref 8.5–10.1)
CHLORIDE SERPL-SCNC: 103 MMOL/L (ref 94–109)
CO2 SERPL-SCNC: 29 MMOL/L (ref 20–32)
CREAT SERPL-MCNC: 1.03 MG/DL (ref 0.52–1.04)
ERYTHROCYTE [DISTWIDTH] IN BLOOD BY AUTOMATED COUNT: 13.9 % (ref 10–15)
GFR SERPL CREATININE-BSD FRML MDRD: 52 ML/MIN/1.7M2
GLUCOSE SERPL-MCNC: 107 MG/DL (ref 70–99)
HCT VFR BLD AUTO: 37.7 % (ref 35–47)
HGB BLD-MCNC: 12.3 G/DL (ref 11.7–15.7)
MCH RBC QN AUTO: 28.5 PG (ref 26.5–33)
MCHC RBC AUTO-ENTMCNC: 32.6 G/DL (ref 31.5–36.5)
MCV RBC AUTO: 88 FL (ref 78–100)
PLATELET # BLD AUTO: 721 10E9/L (ref 150–450)
POTASSIUM SERPL-SCNC: 3.9 MMOL/L (ref 3.4–5.3)
RBC # BLD AUTO: 4.31 10E12/L (ref 3.8–5.2)
SODIUM SERPL-SCNC: 138 MMOL/L (ref 133–144)
WBC # BLD AUTO: 11.4 10E9/L (ref 4–11)

## 2018-03-17 PROCEDURE — 80048 BASIC METABOLIC PNL TOTAL CA: CPT | Performed by: INTERNAL MEDICINE

## 2018-03-17 PROCEDURE — 21000001 ZZH R&B HEART CARE

## 2018-03-17 PROCEDURE — 25000132 ZZH RX MED GY IP 250 OP 250 PS 637: Performed by: INTERNAL MEDICINE

## 2018-03-17 PROCEDURE — 99233 SBSQ HOSP IP/OBS HIGH 50: CPT | Performed by: INTERNAL MEDICINE

## 2018-03-17 PROCEDURE — 99232 SBSQ HOSP IP/OBS MODERATE 35: CPT | Performed by: INTERNAL MEDICINE

## 2018-03-17 PROCEDURE — 36415 COLL VENOUS BLD VENIPUNCTURE: CPT | Performed by: INTERNAL MEDICINE

## 2018-03-17 PROCEDURE — 85027 COMPLETE CBC AUTOMATED: CPT | Performed by: INTERNAL MEDICINE

## 2018-03-17 RX ORDER — FUROSEMIDE 20 MG
20 TABLET ORAL DAILY
Status: DISCONTINUED | OUTPATIENT
Start: 2018-03-17 | End: 2018-03-21 | Stop reason: HOSPADM

## 2018-03-17 RX ORDER — LISINOPRIL 5 MG/1
5 TABLET ORAL DAILY
Status: DISCONTINUED | OUTPATIENT
Start: 2018-03-17 | End: 2018-03-18

## 2018-03-17 RX ADMIN — FLECAINIDE ACETATE 100 MG: 100 TABLET ORAL at 21:02

## 2018-03-17 RX ADMIN — ACETAMINOPHEN 650 MG: 325 TABLET, FILM COATED ORAL at 10:13

## 2018-03-17 RX ADMIN — METOPROLOL SUCCINATE 50 MG: 50 TABLET, EXTENDED RELEASE ORAL at 09:22

## 2018-03-17 RX ADMIN — SIMVASTATIN 20 MG: 20 TABLET, FILM COATED ORAL at 21:02

## 2018-03-17 RX ADMIN — APIXABAN 5 MG: 5 TABLET, FILM COATED ORAL at 21:02

## 2018-03-17 RX ADMIN — CHOLECALCIFEROL TAB 25 MCG (1000 UNIT) 1000 UNITS: 25 TAB at 09:22

## 2018-03-17 RX ADMIN — FLECAINIDE ACETATE 100 MG: 100 TABLET ORAL at 09:22

## 2018-03-17 RX ADMIN — APIXABAN 5 MG: 5 TABLET, FILM COATED ORAL at 09:22

## 2018-03-17 RX ADMIN — FUROSEMIDE 20 MG: 20 TABLET ORAL at 10:13

## 2018-03-17 NOTE — PROGRESS NOTES
Cardiology Progress Note    Outpatient cardiologist: francisco f/u with Dr. Mauri Chase    Date of Service (when I saw the patient): 03/17/2018    Summary:   Ms. Hamida Verma is a 78 yo woman with recently diagnosed paroxsymal atrial fibrillation, hypertension, hyperlipidemia. Re-admitted on 3/14/2018 with shortness of breath and dizziness.   She was discharged 5 days earlier after treated for new atrial fibrillation with RVR. Recent upper respiratory infection which was felt to trigger.  Cardioversion planned but converted to NSR; started on metoprolol, flecainide, and Eliquis for anticoagulation.  Nuclear Lexiscan was done which showed no evidence of ischemia. Echocardiogram showed preserved LVEF with normal atrial size and no significant valvular disease.   Readmitted with fatigue, shortness of breath and orthopnea. Sinus bradycardia on admission. CXR with bilateral pleural effusions and interstitial infiltrates. Metoprolol dose was decreased; IV lasix used and PAF with RVR returned.  Now in AF with rate controlled..     Interval History      Feels well, no dyspnea, chest pain or palpitations.     Assessment & Plan   1.  Dyspnea - in part due to congestive heart failure exacerbation.   COPD findings on CT chest, severity unclear but improving with IV lasix.   CXR and chest CT show bilateral pleural effusions and interstitial infiltrates. She was a smoker.   Echo on 3/7 showed preserved LVEF. Diastolic dysfunction was unable to be assess due to atrial fibrillation. A repeat echocardiogram done yesterday suggests increased LV filling pressures with moderate pulmonary hypertension.   2.  Paroxsymal atrial fibrillation - currently on flecainide and Eliquis for stroke prophylaxis. HR was slow in low 50's on Metoprolol XL 50 bid when in SR, but fast in 150's when she went back into afib, suggesting some signs of sick sinus syndrome.   3.  LLE swelling. US negative for DVT. Probably due to CHF    Recommendations:   1.   Continue diuresis with IV lasix now changed to oral 20 mg daily.    2.  Lisinopril added for afterload reduction yesterday (held this AM for BP).   3.  Metoprolol XL 50 mg daily and give it if HR > 50. Continue flecainide for now.  4.  Should assess HR response with activity prior to discharge.   5.  She needs outpatient exercise stress test for exercise induced proarrhythmias from flecainide.   6.  Recommend pulmonary evaluation for severity of COPD.  7.  Bradycardia, tachycardia syndrome - not yet ready to commit to a pacemaker. Metoprolol XL and diltiazem over this weekend to achieve adequate rate control.  EP to see and consider pacemaker again on Monday.        Physical Exam   Temp: 97.7  F (36.5  C) Temp src: Oral BP: 93/61   Heart Rate: 116 Resp: 16 SpO2: 92 % O2 Device: None (Room air) Oxygen Delivery: 1 LPM  Vitals:    03/15/18 0643 03/16/18 0500 03/17/18 0500   Weight: 86.5 kg (190 lb 12.8 oz) 84.3 kg (185 lb 12.8 oz) 83.5 kg (184 lb)     Vital Signs with Ranges  Temp:  [97.7  F (36.5  C)-98.5  F (36.9  C)] 97.7  F (36.5  C)  Heart Rate:  [] 116  Resp:  [16-18] 16  BP: ()/(54-77) 93/61  SpO2:  [88 %-94 %] 92 %  03/12 1500 - 03/17 1459  In: 3095 [P.O.:3095]  Out: 7600 [Urine:7600]  Net: -4505    Constitutional: NAD, alert and oriented..   Respiratory: Breath sounds are decreased at the bases but otherwise clear; breathing not labored.   Cardiovascular: irregularly irregular S1, S2 and no S3, no murmur.    Skin: warm, no rashes  Musculoskeletal:  No edema.   Neurologic:  Facies symmetric, non-focal.  Neuropsychiatric: Normal affect     Data     Recent Labs  Lab 03/17/18  0550 03/16/18  0555 03/15/18  2025  03/14/18  1049   WBC 11.4* 15.8*  --   --  14.0*   HGB 12.3 13.0  --   --  11.5*   MCV 88 86  --   --  87   * 797*  --   --  665*    140 137  < > 136   POTASSIUM 3.9 3.7 3.5  < > 3.6   CHLORIDE 103 105 103  < > 103   CO2 29 28 25  < > 25   BUN 15 11 14  < > 12   CR 1.03 0.84 0.83   < > 0.83   ANIONGAP 6 7 9  < > 8   GURWINDER 8.3* 8.6 8.6  < > 8.4*   * 116* 114*  < > 112*   ALBUMIN  --   --   --   --  2.6*   PROTTOTAL  --   --   --   --  7.0   BILITOTAL  --   --   --   --  0.5   ALKPHOS  --   --   --   --  94   ALT  --   --   --   --  57*   AST  --   --   --   --  17   TROPI  --   --   --   --  <0.015   < > = values in this interval not displayed.      Medications     diltiazem (CARDIZEM) infusion ADULT Stopped (03/16/18 0953)     - MEDICATION INSTRUCTIONS -       - MEDICATION INSTRUCTIONS -         furosemide  20 mg Oral Daily     lisinopril  5 mg Oral Daily     metoprolol succinate  50 mg Oral Daily     sodium chloride (PF)  3 mL Intracatheter Q8H     apixaban ANTICOAGULANT  5 mg Oral BID     cholecalciferol  1,000 Units Oral Daily     flecainide  100 mg Oral Q12H     simvastatin  20 mg Oral At Bedtime

## 2018-03-17 NOTE — PROGRESS NOTES
[facility]    Hospitalist Progress Note    Date of Service (when I saw the patient): 03/17/2018    Assessment & Plan   Hamida Verma is a 77 year old female who was recently admitted from 3/6 to 3/9 for new onset atrial fibrillation thought to be due to recent URI and converted to NSR prior to discharge and was discharged on Flecainide and Toprol  mg BID. She presented on 3/14 with increasing fatigue, SOB and orthopnea suspicious for heart failure exacerbation       Decompensated diastolic CHF   History is consistent with CHF with lower extremity edema, orthopnea and a CXR with interstitial edema and bilateral pleural effusions. Patient actually just had an echo during previous admission with an EF of 65% but unable to assess diastolic function due to being in atrial fibrillation at this time.  In the ED she was given 20 mg of IV Lasix.  - Cardiology following  - Treated with Lasix to 20mg IV BID   - Repeat limited echo EF60-65%, mod pulm HTN  - Lisinopril 10 mg added 3/15. BP's a little soft 3/17 am. Will reduce to 5 mg daily.     - Cr up 0.8->1.0 today. Weights down 6 lbs and net neg 4.5 L since admit. Will transition to po today 3/17.   - low sodium diet, 1500ml fluid restriction  - O2 weaned off 3/17.       Afib RVR  Sinus bradycardia  Patient was diagnosed with new onset A fib during last admission and converted to NSR.  She was discharged on Flecainide and Toprol  mg BID.  Noted sinus joycelyn 50's. Metoprolol being reduced due to bradycardia, however 3/15 evening developed Afib RVR up to 150's, refractory to IV BB. Transferred to [facility] on Diltiazem gtt.  - Cardiology following as above  - Toprol-XL 50 mg daily  - C/w PTA Flecainide  - HR's  in last 24hr.   - PTA Eliquis  - Tele    Dizziness  Felt a little light headed am 3/17 after going to the bathroom. Was noted to still have a nitro patch on (placed the 14th) and this was removed. Doubt this was the cause.  BP's a little on  the lower side and may be a little overmedicated and causing some orthostasis.    - Adjustments made above.     - Tele.      Fatigue  This may be related to cardiac issues as above. TSH last admission normal.  - PT/OT consulted  - management as outlined above      HLP   - PTA Statin        # Pain Assessment:   Current Pain Score 3/17/2018 3/17/2018 3/16/2018   Patient currently in pain? denies denies denies   Pain score (0-10) - - -   Pain location - - -   Pain descriptors - - -   Hamida lovelace pain level was assessed and she currently denies pain.        DVT Prophylaxis: Eliquis  Code Status: Full Code    Disposition: Follow BP's and HR's and adjust regimen as needed. Adjustments made today as above and will follow. Diuretic adjusted to po today.        Eugene Rocha       Interval History   No overnight events but did feel a little dizzy this am as described above. No CP or palp. Stable on RA this am.     -Data reviewed today: I reviewed all new labs and imaging results over the last 24 hours. I personally reviewed no images or EKG's today.    Physical Exam   Temp: 98.2  F (36.8  C) Temp src: Oral BP: 92/55   Heart Rate: 103 Resp: 16 SpO2: 94 % O2 Device: Nasal cannula Oxygen Delivery: 1 LPM  Vitals:    03/15/18 0643 03/16/18 0500 03/17/18 0500   Weight: 86.5 kg (190 lb 12.8 oz) 84.3 kg (185 lb 12.8 oz) 83.5 kg (184 lb)     Vital Signs with Ranges  Temp:  [98  F (36.7  C)-98.5  F (36.9  C)] 98.2  F (36.8  C)  Heart Rate:  [] 103  Resp:  [16-18] 16  BP: ()/(54-80) 92/55  SpO2:  [88 %-97 %] 94 %  I/O last 3 completed shifts:  In: 855 [P.O.:855]  Out: 1450 [Urine:1450]    Gen: Patient in no acute distress.  Appears comfortable.  Heart:  S1S2+, regular rate, A-Fib, No murmurs.  Lungs:  Clear to auscultation, no wheezing, decreased at bases with very faint coarse sounds at left base.   Abdomen:  Soft, non tender, non distended, bowel sounds positive.  Extremities:  Trace LE edema.    Medications      diltiazem (CARDIZEM) infusion ADULT Stopped (03/16/18 0953)     - MEDICATION INSTRUCTIONS -       - MEDICATION INSTRUCTIONS -         metoprolol succinate  50 mg Oral Daily     furosemide  20 mg Intravenous BID     sodium chloride (PF)  3 mL Intracatheter Q8H     lisinopril  10 mg Oral Daily     apixaban ANTICOAGULANT  5 mg Oral BID     cholecalciferol  1,000 Units Oral Daily     flecainide  100 mg Oral Q12H     simvastatin  20 mg Oral At Bedtime       Data     Recent Labs  Lab 03/17/18  0550 03/16/18  0555 03/15/18  2025  03/14/18  1049   WBC 11.4* 15.8*  --   --  14.0*   HGB 12.3 13.0  --   --  11.5*   MCV 88 86  --   --  87   * 797*  --   --  665*    140 137  < > 136   POTASSIUM 3.9 3.7 3.5  < > 3.6   CHLORIDE 103 105 103  < > 103   CO2 29 28 25  < > 25   BUN 15 11 14  < > 12   CR 1.03 0.84 0.83  < > 0.83   ANIONGAP 6 7 9  < > 8   GURWINDER 8.3* 8.6 8.6  < > 8.4*   * 116* 114*  < > 112*   ALBUMIN  --   --   --   --  2.6*   PROTTOTAL  --   --   --   --  7.0   BILITOTAL  --   --   --   --  0.5   ALKPHOS  --   --   --   --  94   ALT  --   --   --   --  57*   AST  --   --   --   --  17   TROPI  --   --   --   --  <0.015   < > = values in this interval not displayed.    No results found for this or any previous visit (from the past 24 hour(s)).

## 2018-03-17 NOTE — PLAN OF CARE
"OT/CR: Pt declined participation in therapy session at this time due to feeling dizzy. Therapist requested to return later, but patient stated \"today is going to be a busy day and I have family coming out of state just to see me.\" Patient is agreeable to ambulating in hallway with staff. RN was present and is aware.   "

## 2018-03-17 NOTE — PLAN OF CARE
Problem: Patient Care Overview  Goal: Plan of Care/Patient Progress Review  Outcome: No Change  A&O, up independently to BR. HR 's, otherwise VSS on 1L NC sating 92-94%. Denies pain/SOB. Slept between cares. Tele: A-fib CVR/RVR. Continue to monitor.

## 2018-03-18 LAB
ANION GAP SERPL CALCULATED.3IONS-SCNC: 8 MMOL/L (ref 3–14)
BUN SERPL-MCNC: 21 MG/DL (ref 7–30)
CALCIUM SERPL-MCNC: 8.1 MG/DL (ref 8.5–10.1)
CHLORIDE SERPL-SCNC: 105 MMOL/L (ref 94–109)
CO2 SERPL-SCNC: 26 MMOL/L (ref 20–32)
CREAT SERPL-MCNC: 0.82 MG/DL (ref 0.52–1.04)
GFR SERPL CREATININE-BSD FRML MDRD: 67 ML/MIN/1.7M2
GLUCOSE SERPL-MCNC: 102 MG/DL (ref 70–99)
POTASSIUM SERPL-SCNC: 3.5 MMOL/L (ref 3.4–5.3)
SODIUM SERPL-SCNC: 139 MMOL/L (ref 133–144)

## 2018-03-18 PROCEDURE — 99232 SBSQ HOSP IP/OBS MODERATE 35: CPT | Performed by: INTERNAL MEDICINE

## 2018-03-18 PROCEDURE — 25000132 ZZH RX MED GY IP 250 OP 250 PS 637: Performed by: INTERNAL MEDICINE

## 2018-03-18 PROCEDURE — 80048 BASIC METABOLIC PNL TOTAL CA: CPT | Performed by: INTERNAL MEDICINE

## 2018-03-18 PROCEDURE — 21000001 ZZH R&B HEART CARE

## 2018-03-18 PROCEDURE — 99233 SBSQ HOSP IP/OBS HIGH 50: CPT | Performed by: INTERNAL MEDICINE

## 2018-03-18 PROCEDURE — 36415 COLL VENOUS BLD VENIPUNCTURE: CPT | Performed by: INTERNAL MEDICINE

## 2018-03-18 RX ORDER — LISINOPRIL 2.5 MG/1
2.5 TABLET ORAL DAILY
Status: DISCONTINUED | OUTPATIENT
Start: 2018-03-19 | End: 2018-03-21 | Stop reason: HOSPADM

## 2018-03-18 RX ADMIN — LISINOPRIL 5 MG: 5 TABLET ORAL at 08:25

## 2018-03-18 RX ADMIN — APIXABAN 5 MG: 5 TABLET, FILM COATED ORAL at 08:24

## 2018-03-18 RX ADMIN — SIMVASTATIN 20 MG: 20 TABLET, FILM COATED ORAL at 21:03

## 2018-03-18 RX ADMIN — METOPROLOL SUCCINATE 50 MG: 50 TABLET, EXTENDED RELEASE ORAL at 08:26

## 2018-03-18 RX ADMIN — CHOLECALCIFEROL TAB 25 MCG (1000 UNIT) 1000 UNITS: 25 TAB at 08:25

## 2018-03-18 RX ADMIN — FUROSEMIDE 20 MG: 20 TABLET ORAL at 08:24

## 2018-03-18 RX ADMIN — FLECAINIDE ACETATE 100 MG: 100 TABLET ORAL at 10:13

## 2018-03-18 RX ADMIN — APIXABAN 5 MG: 5 TABLET, FILM COATED ORAL at 21:03

## 2018-03-18 RX ADMIN — FLECAINIDE ACETATE 100 MG: 100 TABLET ORAL at 21:03

## 2018-03-18 NOTE — PLAN OF CARE
Problem: Patient Care Overview  Goal: Plan of Care/Patient Progress Review  Outcome: Improving  Remains in Afib rate is 80- 115 (earlier in shift). Alert and oriented, V.S.S., vding in large amounts,Fluid restriction of 1500 cc and patient is very compliant.EP consult in AM 3/18.

## 2018-03-18 NOTE — PLAN OF CARE
Problem: Patient Care Overview  Goal: Plan of Care/Patient Progress Review  Outcome: No Change  Lisinopril held this morning d/t low BP.  Pt on RA all day w/ sats 92-95%.  Up in room Ind, diuresing well.  Denies pain.  Family updated at bedside.  Plan: continue med titration vs PPM.

## 2018-03-18 NOTE — PLAN OF CARE
Problem: Patient Care Overview  Goal: Plan of Care/Patient Progress Review  Outcome: No Change  Pt A&O. Pt has been resting w/out any complaints. Up independently in room. Afib w/ HR between 70s-110s on tele. VSS. Pt on fluid restriction.  Possible EP consult on Monday. Will continue to monitor pt.   Heart Failure Care Pathway  GOALS TO BE MET BEFORE DISCHARGE:    1. Decrease congestion and/or edema with diuretic therapy to achieve near      optimal volume status.            Dyspnea improved:  Yes            Edema improved:     Yes        Net I/O and Weights since admission:          03/12 2300 - 03/17 2259  In: 3335 [P.O.:3335]  Out: 7600 [Urine:7600]  Net: -4265            Vitals:    03/15/18 0643 03/16/18 0500 03/17/18 0500   Weight: 86.5 kg (190 lb 12.8 oz) 84.3 kg (185 lb 12.8 oz) 83.5 kg (184 lb)       2.  O2 sats > 92% on RA or at prior home O2 therapy level.          Current oxygenation status:       SpO2: 92 %         O2 Device: None (Room air),  Oxygen Delivery: 1 LPM         Able to wean O2 this shift to keep sats > 92%:  NA       Does patient use Home O2? No    3.  Tolerates ambulation and mobility near baseline: Yes        How many times did the patient ambulate with nursing staff this shift? 2    Please review the Heart Failure Care Pathway for additional HF goal outcomes.    Aaron Lewis RN  3/18/2018

## 2018-03-18 NOTE — PROGRESS NOTES
[facility]    Hospitalist Progress Note    Date of Service (when I saw the patient): 03/18/2018    Assessment & Plan   Hamida Verma is a 77 year old female who was recently admitted from 3/6 to 3/9 for new onset atrial fibrillation thought to be due to recent URI and converted to NSR prior to discharge and was discharged on Flecainide and Toprol  mg BID. She presented on 3/14 with increasing fatigue, SOB and orthopnea suspicious for heart failure exacerbation       Decompensated diastolic CHF   History is consistent with CHF with lower extremity edema, orthopnea and a CXR with interstitial edema and bilateral pleural effusions. Patient actually just had an echo during previous admission with an EF of 65% but unable to assess diastolic function due to being in atrial fibrillation at this time.  In the ED she was given 20 mg of IV Lasix.  - Cardiology following  - Treated with Lasix to 20mg IV BID   - Repeat limited echo EF60-65%, mod pulm HTN  - Lisinopril 10 mg added 3/15. BP's a little soft the morning of 3/17 associated with some lightheadedness. Dose reduced to 5 mg daily. No recurrent sx's since.      - Weights down 6 lbs and net neg 5.4 L since admit.  Transitioned to lasix 30 mg po daily 3/17.   - low sodium diet, 1500ml fluid restriction  - O2 weaned off 3/17.       Afib RVR  Sinus bradycardia  Patient was diagnosed with new onset A fib during last admission and converted to NSR.  She was discharged on Flecainide and Toprol  mg BID.  Noted sinus joycelyn 50's. Metoprolol being reduced due to bradycardia, however 3/15 evening developed Afib RVR up to 150's, refractory to IV BB. Transferred to [facility] on Diltiazem gtt.  - Cardiology following as above.  - Now off Dilt gtt.   - Continues on Toprol-XL 50 mg daily and PTA Flecainide.   - HR's  in last 24hr.   - Ep follow up on Monday. PPM being considered.   - C/w PTA Eliquis  - Tele    Dizziness- Resolved.   Felt a little light  headed am 3/17 after going to the bathroom. Was noted to still have a nitro patch on (placed the 14th) and this was removed. Doubt this was the cause.  BP's a little on the lower side and may be a little overmedicated and causing some orthostasis.    - Adjustments made above.   - No sx's 3/18.      - Tele.      Fatigue  This may be related to cardiac issues as above. TSH last admission normal.  - PT/OT consulted  - management as outlined above      HLP   - PTA Statin        # Pain Assessment:   Current Pain Score 3/17/2018 3/17/2018 3/17/2018   Patient currently in pain? denies denies denies   Pain score (0-10) - - -   Pain location - - -   Pain descriptors - - -   Hamida lovelace pain level was assessed and she currently denies pain.        DVT Prophylaxis: Eliquis  Code Status: Full Code    Disposition: Continue with current rate control plan per cards. Follow BP and HR and adjust meds as needed. EP follow up planned for Monday. Continue with current oral diuretic.       Eugene Rocha       Interval History   No acute overnight events. HR's 90's-118 in last 24 hours. Asymptomatic.     -Data reviewed today: I reviewed all new labs and imaging results over the last 24 hours. I personally reviewed no images or EKG's today.    Physical Exam   Temp: 98.7  F (37.1  C) Temp src: Oral BP: 120/68 Pulse: 85 Heart Rate: 101 Resp: 18 SpO2: 95 % O2 Device: None (Room air)    Vitals:    03/16/18 0500 03/17/18 0500 03/18/18 0500   Weight: 84.3 kg (185 lb 12.8 oz) 83.5 kg (184 lb) 83.9 kg (185 lb)     Vital Signs with Ranges  Temp:  [97.7  F (36.5  C)-99  F (37.2  C)] 98.7  F (37.1  C)  Pulse:  [85] 85  Heart Rate:  [] 101  Resp:  [16-18] 18  BP: ()/(61-77) 120/68  SpO2:  [92 %-95 %] 95 %  I/O last 3 completed shifts:  In: 495 [P.O.:495]  Out: 1350 [Urine:1350]    Gen: Patient in no acute distress.  Appears comfortable.  Heart:  S1S2+, regular rate, A-Fib, No murmurs.  Lungs:  Clear to auscultation, no wheezing,  decreased at bases, no rales.   Abdomen:  Soft, non tender, non distended, bowel sounds positive.  Extremities:  No LE edema.     Medications     diltiazem (CARDIZEM) infusion ADULT Stopped (03/16/18 0953)     - MEDICATION INSTRUCTIONS -       - MEDICATION INSTRUCTIONS -         furosemide  20 mg Oral Daily     lisinopril  5 mg Oral Daily     metoprolol succinate  50 mg Oral Daily     sodium chloride (PF)  3 mL Intracatheter Q8H     apixaban ANTICOAGULANT  5 mg Oral BID     cholecalciferol  1,000 Units Oral Daily     flecainide  100 mg Oral Q12H     simvastatin  20 mg Oral At Bedtime       Data     Recent Labs  Lab 03/18/18  0550 03/17/18  0550 03/16/18  0555  03/14/18  1049   WBC  --  11.4* 15.8*  --  14.0*   HGB  --  12.3 13.0  --  11.5*   MCV  --  88 86  --  87   PLT  --  721* 797*  --  665*    138 140  < > 136   POTASSIUM 3.5 3.9 3.7  < > 3.6   CHLORIDE 105 103 105  < > 103   CO2 26 29 28  < > 25   BUN 21 15 11  < > 12   CR 0.82 1.03 0.84  < > 0.83   ANIONGAP 8 6 7  < > 8   GURWINDER 8.1* 8.3* 8.6  < > 8.4*   * 107* 116*  < > 112*   ALBUMIN  --   --   --   --  2.6*   PROTTOTAL  --   --   --   --  7.0   BILITOTAL  --   --   --   --  0.5   ALKPHOS  --   --   --   --  94   ALT  --   --   --   --  57*   AST  --   --   --   --  17   TROPI  --   --   --   --  <0.015   < > = values in this interval not displayed.    No results found for this or any previous visit (from the past 24 hour(s)).

## 2018-03-18 NOTE — PROGRESS NOTES
"Cardiology Progress Note    Outpatient cardiologist: francisco f/u with Dr. Mauri Chase    Date of Service (when I saw the patient): 03/18/2018    Summary:   Ms. Hamida Verma is a 76 yo woman with recently diagnosed paroxsymal atrial fibrillation, hypertension, hyperlipidemia. Re-admitted on 3/14/2018 with shortness of breath and dizziness.   She was discharged 5 days earlier after treated for new atrial fibrillation with RVR. Recent upper respiratory infection which was felt to be the trigger.  Cardioversion planned but spontaneously converted to NSR; started on metoprolol, flecainide, and Eliquis for anticoagulation.  Nuclear Lexiscan stress done without ischemia. Echocardiogram with normal LVEF, normal atrial size and no significant valvular disease.   Readmitted with fatigue, shortness of breath and orthopnea. Sinus bradycardia on admission. CXR with bilateral pleural effusions and interstitial infiltrates. Metoprolol dose  Decreased due to bradycardia; IV lasix used and PAF with RVR returned.  Now in AF with rate controlled..     Interval History      Feels well, no dyspnea, chest pain or palpitations.     Assessment & Plan   1.  Dyspnea - in part due to congestive heart failure exacerbation.   \"Mild to moderate\" emphysematous changes on CT chest, CXR and chest CT show bilateral small pleural effusions and interstitial infiltrates. She was a smoker.   Echo on 3/7 showed preserved LVEF. Diastolic dysfunction was unable to be assess due to atrial fibrillation. A repeat echocardiogram done yesterday suggests increased LV filling pressures with moderate pulmonary hypertension.   2.  Paroxsymal atrial fibrillation - currently on flecainide and Eliquis for stroke prophylaxis. HR was slow in low 50's on Metoprolol XL 50 bid when in SR, but fast in 150's when she went back into afib, suggesting some signs of sick sinus syndrome.   3.  LLE swelling. US negative for DVT. Probably due to CHF    Recommendations:   1.  " Continue diuresis now changed to oral 20 mg daily.    2.  Lisinopril added for afterload reduction.    3.  HR still rapid, increased metoprolol XL to 37.5 mg BID and give it if HR > 50. Continue flecainide for now.  4.  Should assess HR response with activity prior to discharge.   5.  She needs exercise stress test for exercise induced proarrhythmias from flecainide - will schedule for AM.   6.  Recommend pulmonary evaluation for severity of COPD.  7.  Bradycardia, tachycardia syndrome - not yet ready to commit to a pacemaker. Metoprolol XL and diltiazem over this weekend to achieve adequate rate control.  Plan on Friday was EP to see (and consider pacemaker again on Monday).  8. If remains asymptomatic, even with AF could discharge and return for cardioversion in one month if still in AF.        Physical Exam   Temp: 98.5  F (36.9  C) Temp src: Oral BP: 105/74 Pulse: 85 Heart Rate: 96 Resp: 18 SpO2: 94 % O2 Device: None (Room air)    Vitals:    03/15/18 0643 03/16/18 0500 03/17/18 0500 03/18/18 0500   Weight: 86.5 kg (190 lb 12.8 oz) 84.3 kg (185 lb 12.8 oz) 83.5 kg (184 lb) 83.9 kg (185 lb)     Vital Signs with Ranges  Temp:  [98  F (36.7  C)-99  F (37.2  C)] 98.5  F (36.9  C)  Pulse:  [85] 85  Heart Rate:  [] 96  Resp:  [16-18] 18  BP: (101-125)/(67-77) 105/74  SpO2:  [92 %-95 %] 94 %  03/13 0700 - 03/18 0659  In: 3410 [P.O.:3410]  Out: 8850 [Urine:8850]  Net: -5440    Constitutional: NAD, alert and oriented..   Respiratory: Breath sounds are decreased at the bases but otherwise clear; breathing not labored.   Cardiovascular: irregularly irregular S1, S2 and no S3, no murmur.    Skin: warm, no rashes  Musculoskeletal:  No edema.   Neurologic:  Facies symmetric, non-focal.  Neuropsychiatric: Normal affect     Data     Recent Labs  Lab 03/18/18  0550 03/17/18  0550 03/16/18  0555  03/14/18  1049   WBC  --  11.4* 15.8*  --  14.0*   HGB  --  12.3 13.0  --  11.5*   MCV  --  88 86  --  87   PLT  --  721* 797*  --   665*    138 140  < > 136   POTASSIUM 3.5 3.9 3.7  < > 3.6   CHLORIDE 105 103 105  < > 103   CO2 26 29 28  < > 25   BUN 21 15 11  < > 12   CR 0.82 1.03 0.84  < > 0.83   ANIONGAP 8 6 7  < > 8   GURWINDER 8.1* 8.3* 8.6  < > 8.4*   * 107* 116*  < > 112*   ALBUMIN  --   --   --   --  2.6*   PROTTOTAL  --   --   --   --  7.0   BILITOTAL  --   --   --   --  0.5   ALKPHOS  --   --   --   --  94   ALT  --   --   --   --  57*   AST  --   --   --   --  17   TROPI  --   --   --   --  <0.015   < > = values in this interval not displayed.      Medications     - MEDICATION INSTRUCTIONS -       - MEDICATION INSTRUCTIONS -         [START ON 3/19/2018] lisinopril  2.5 mg Oral Daily     [START ON 3/19/2018] metoprolol succinate  37.5 mg Oral BID     furosemide  20 mg Oral Daily     sodium chloride (PF)  3 mL Intracatheter Q8H     apixaban ANTICOAGULANT  5 mg Oral BID     cholecalciferol  1,000 Units Oral Daily     flecainide  100 mg Oral Q12H     simvastatin  20 mg Oral At Bedtime

## 2018-03-19 LAB
INR PPP: 1.31 (ref 0.86–1.14)
MAGNESIUM SERPL-MCNC: 2.5 MG/DL (ref 1.6–2.3)
POTASSIUM SERPL-SCNC: 3.7 MMOL/L (ref 3.4–5.3)

## 2018-03-19 PROCEDURE — 93005 ELECTROCARDIOGRAM TRACING: CPT

## 2018-03-19 PROCEDURE — 93010 ELECTROCARDIOGRAM REPORT: CPT | Performed by: INTERNAL MEDICINE

## 2018-03-19 PROCEDURE — 99232 SBSQ HOSP IP/OBS MODERATE 35: CPT | Performed by: INTERNAL MEDICINE

## 2018-03-19 PROCEDURE — 36415 COLL VENOUS BLD VENIPUNCTURE: CPT | Performed by: NURSE PRACTITIONER

## 2018-03-19 PROCEDURE — 21000001 ZZH R&B HEART CARE

## 2018-03-19 PROCEDURE — 80162 ASSAY OF DIGOXIN TOTAL: CPT | Performed by: NURSE PRACTITIONER

## 2018-03-19 PROCEDURE — 25000132 ZZH RX MED GY IP 250 OP 250 PS 637: Performed by: INTERNAL MEDICINE

## 2018-03-19 PROCEDURE — 84132 ASSAY OF SERUM POTASSIUM: CPT | Performed by: NURSE PRACTITIONER

## 2018-03-19 PROCEDURE — 83735 ASSAY OF MAGNESIUM: CPT | Performed by: NURSE PRACTITIONER

## 2018-03-19 PROCEDURE — 99233 SBSQ HOSP IP/OBS HIGH 50: CPT | Mod: 24 | Performed by: INTERNAL MEDICINE

## 2018-03-19 PROCEDURE — 85610 PROTHROMBIN TIME: CPT | Performed by: NURSE PRACTITIONER

## 2018-03-19 RX ORDER — GLYCOPYRROLATE 0.2 MG/ML
0.1 INJECTION, SOLUTION INTRAMUSCULAR; INTRAVENOUS ONCE
Status: DISCONTINUED | OUTPATIENT
Start: 2018-03-20 | End: 2018-03-20 | Stop reason: HOSPADM

## 2018-03-19 RX ORDER — FENTANYL CITRATE 50 UG/ML
25-50 INJECTION, SOLUTION INTRAMUSCULAR; INTRAVENOUS
Status: DISCONTINUED | OUTPATIENT
Start: 2018-03-20 | End: 2018-03-20 | Stop reason: HOSPADM

## 2018-03-19 RX ORDER — POTASSIUM CHLORIDE 1500 MG/1
20 TABLET, EXTENDED RELEASE ORAL
Status: DISCONTINUED | OUTPATIENT
Start: 2018-03-20 | End: 2018-03-20 | Stop reason: HOSPADM

## 2018-03-19 RX ORDER — FLUMAZENIL 0.1 MG/ML
0.2 INJECTION, SOLUTION INTRAVENOUS
Status: DISCONTINUED | OUTPATIENT
Start: 2018-03-20 | End: 2018-03-20 | Stop reason: HOSPADM

## 2018-03-19 RX ORDER — POTASSIUM CHLORIDE 1500 MG/1
20 TABLET, EXTENDED RELEASE ORAL
Status: DISCONTINUED | OUTPATIENT
Start: 2018-03-19 | End: 2018-03-20 | Stop reason: HOSPADM

## 2018-03-19 RX ORDER — LIDOCAINE 40 MG/G
CREAM TOPICAL
Status: DISCONTINUED | OUTPATIENT
Start: 2018-03-19 | End: 2018-03-20 | Stop reason: HOSPADM

## 2018-03-19 RX ORDER — ATROPINE SULFATE 0.1 MG/ML
.5-1 INJECTION INTRAVENOUS
Status: DISCONTINUED | OUTPATIENT
Start: 2018-03-20 | End: 2018-03-20 | Stop reason: HOSPADM

## 2018-03-19 RX ORDER — SODIUM CHLORIDE 9 MG/ML
1000 INJECTION, SOLUTION INTRAVENOUS CONTINUOUS
Status: DISCONTINUED | OUTPATIENT
Start: 2018-03-20 | End: 2018-03-20 | Stop reason: HOSPADM

## 2018-03-19 RX ORDER — FENTANYL CITRATE 50 UG/ML
25 INJECTION, SOLUTION INTRAMUSCULAR; INTRAVENOUS
Status: DISCONTINUED | OUTPATIENT
Start: 2018-03-20 | End: 2018-03-20 | Stop reason: HOSPADM

## 2018-03-19 RX ORDER — NALOXONE HYDROCHLORIDE 0.4 MG/ML
.1-.4 INJECTION, SOLUTION INTRAMUSCULAR; INTRAVENOUS; SUBCUTANEOUS
Status: DISCONTINUED | OUTPATIENT
Start: 2018-03-20 | End: 2018-03-20 | Stop reason: HOSPADM

## 2018-03-19 RX ORDER — POTASSIUM CHLORIDE 1500 MG/1
40 TABLET, EXTENDED RELEASE ORAL
Status: DISCONTINUED | OUTPATIENT
Start: 2018-03-19 | End: 2018-03-20 | Stop reason: HOSPADM

## 2018-03-19 RX ORDER — POTASSIUM CHLORIDE 1500 MG/1
40 TABLET, EXTENDED RELEASE ORAL
Status: DISCONTINUED | OUTPATIENT
Start: 2018-03-20 | End: 2018-03-20 | Stop reason: HOSPADM

## 2018-03-19 RX ORDER — ALBUTEROL SULFATE 90 UG/1
2 AEROSOL, METERED RESPIRATORY (INHALATION) 4 TIMES DAILY PRN
Status: DISCONTINUED | OUTPATIENT
Start: 2018-03-19 | End: 2018-03-21 | Stop reason: HOSPADM

## 2018-03-19 RX ADMIN — CHOLECALCIFEROL TAB 25 MCG (1000 UNIT) 1000 UNITS: 25 TAB at 09:28

## 2018-03-19 RX ADMIN — Medication 37.5 MG: at 21:11

## 2018-03-19 RX ADMIN — FLECAINIDE ACETATE 100 MG: 100 TABLET ORAL at 21:11

## 2018-03-19 RX ADMIN — FLECAINIDE ACETATE 100 MG: 100 TABLET ORAL at 09:28

## 2018-03-19 RX ADMIN — APIXABAN 5 MG: 5 TABLET, FILM COATED ORAL at 09:27

## 2018-03-19 RX ADMIN — APIXABAN 5 MG: 5 TABLET, FILM COATED ORAL at 21:11

## 2018-03-19 RX ADMIN — FUROSEMIDE 20 MG: 20 TABLET ORAL at 09:27

## 2018-03-19 RX ADMIN — LISINOPRIL 2.5 MG: 2.5 TABLET ORAL at 09:28

## 2018-03-19 RX ADMIN — SIMVASTATIN 20 MG: 20 TABLET, FILM COATED ORAL at 21:11

## 2018-03-19 RX ADMIN — Medication 37.5 MG: at 09:27

## 2018-03-19 NOTE — PROGRESS NOTES
[facility]  EP Cardiology Progress Note  Date of Service: 03/19/2018  Primary Cardiologist: Mauri Chase and Shanika    Assessment & Plan    Hamida Verma is a 77 year old female with past medical history significant for paroxsymal atrial fibrillation, HTN, hyperlipidemia admitted on 3/14/2018 with SOB, orthopnea, and dizziness and found to be in sinus bradycardia. While in the ED on 3/6 she was found to be in new atrial fibrillation with RVR while being treated for upper respiratory infection and was started on metoprolol, flecainide, Eliquis.  Lexiscan done without ischemia.  While being sedated for CAROLANN she spontaneously converted.   While hospitalized she was loaded with flecainide 200 mg daily then 100 mg BID along with 100 mg metoprolol succinate BID.   ECHO with normal EF, normal atrial size and no valvular disease.  She returned to the hospital on 3/14 with HF symptoms of orthopnea and LE edema; found to be in bradycardia subsequently her metoprolol XL was decreased to 50 mg BID and then 37.5 mg BID.   This admission her CXR showed bilateral pleural effusions and intestinal infiltrates, back in atrial fibrillation, and started on IV lasix.      Interval History  Spoke with patient regarding her atrial fibrillation. She does not know she is in atrial fibrillation, she denies headaches, dizziness, syncope, angina, dyspnea at rest or with exertion, palpitations, orthopnea, PND, abdominal pain, pedal edema, claudication, or any new numbness/weakness, hematuria, hematochezia, and epistaxis.    VS reviewed HR  bpm, afebrile, -112/61-74; oxygen saturation 94-96% on room air.  Weight down 5# since admission.  Per Dr. Fine's note, signs of sick sinus syndrome as she had HR in the 50s in Sinus rhythm while on metoprolol XL 50 mg BID and in the 150s in atrial fibrillatioin.  On 3/15 she went into atrial fibrillation with RVR refractory to IV BB and started on diltiazem gtt.   Her chest CT  (3/14) showed mild interstitial thickening equivocal for interstitial pulmonary edema, mild to moderate     Assessment/Plan:  1. HF exacerbation - dyspnea  due to CHF with preserved EF, however diastolic dysfunction unable to be assessed due to atrial fibrillation.  Repeat ECHO completed with increased LV filling pressures with moderate pulmonary HTN.       Continue with lasix 20 mg    Continue lisinopril 2.5 mg daily for afterload reduction    Exercise stress test for flecainide induced proarrhythmia    Pulmonary evaluation for severity of COPD    Questionable joycelyn-tachy syndrome however per note, pateinet not ready to commit to pacemaker.    2. Paroxsymal Atrial fibrillation -     anticoagulation indefinately for CHADSVASC 5 (age++, female, HTN, HF) currently on apixaban 5 mg BID    for rhythm and rate control on flecainide 100 mg BID and metoprolol XL 37.5 BID       TSH normal    Exercise stress test for flecainide induced proarrhythmia scheduled today which we will cancel at this point    Recommend CAROLANN and DCCV today; discussed with family and patient possibility of amiodarone for rhythm and rate and a  AV node ablation and ppm    Recommend pulmonary evalatuion for severity of COPD.      NELY Alvarez [facility]  Text Page  (M-F 7:30 am - 4:00 pm)      Physical Exam   Temp: 97.2  F (36.2  C) Temp src: Oral BP: 110/68 Pulse: 85 Heart Rate: 108 Resp: 16 SpO2: 95 % O2 Device: None (Room air)    Vitals:    03/17/18 0500 03/18/18 0500 03/19/18 0308   Weight: 83.5 kg (184 lb) 83.9 kg (185 lb) 84.1 kg (185 lb 8 oz)       GEN:  In general, this is a well nourished female in no acute distress.  Patient ambulatory.  HEENT:  Pupils normal size, equal, and nround. Sclerae nonicteric. Clear oropharynx. Mucous membranes moist,  no cyanosis.  NECK: Supple, no asymmetry, masses, or scars appreciated. Trachea midline.  C/V:  Irregularly irregular rate and rhythm, no murmur, rub or gallop. No S3 or RV heave.    RESP: Respirations are unlabored. No use of accessory muscles. Clear to auscultation bilaterally without wheezing, rales, or rhonchi.  GI: Abdomen soft, nontender, nondistended, bowel sounds normal.  EXTREM: No LE edema. No cyanosis or clubbing.  VASC: Radial and dorsalis pedis are normal in volumes and symmetric bilaterally.   NEURO: Alert and oriented, cooperative.. No obvious focal deficits.   PSYCH: Normal affect.  SKIN: Warm and dry. No rashes or petechiae appreciated.       Medications     - MEDICATION INSTRUCTIONS -       - MEDICATION INSTRUCTIONS -         lisinopril  2.5 mg Oral Daily     metoprolol succinate  37.5 mg Oral BID     furosemide  20 mg Oral Daily     sodium chloride (PF)  3 mL Intracatheter Q8H     apixaban ANTICOAGULANT  5 mg Oral BID     cholecalciferol  1,000 Units Oral Daily     flecainide  100 mg Oral Q12H     simvastatin  20 mg Oral At Bedtime       Data   Most Recent 3 CBC's:  Recent Labs   Lab Test  03/17/18   0550  03/16/18   0555  03/14/18   1049   WBC  11.4*  15.8*  14.0*   HGB  12.3  13.0  11.5*   MCV  88  86  87   PLT  721*  797*  665*     Most Recent 3 BMP's:  Recent Labs   Lab Test  03/18/18   0550  03/17/18   0550  03/16/18   0555   NA  139  138  140   POTASSIUM  3.5  3.9  3.7   CHLORIDE  105  103  105   CO2  26  29  28   BUN  21  15  11   CR  0.82  1.03  0.84   ANIONGAP  8  6  7   GURWINDER  8.1*  8.3*  8.6   GLC  102*  107*  116*     Last 24 hours labs reviewed   EKG: (reviewed personally) March 15 - atrial fibrillation with ventricular rate of 135 bpm.  EKG today atrial fibrillation with ventricular rate of 130 bpm.      Imaging:  Chest CT on 3/14 showed Small bilateral pleural effusions with adjacent atelectasis, Apparent mild interstitial thickening throughout both lungs; equivocal for interstitial pulmonary edema, mild to moderate emphysematous changes in the lungs.A few nonspecific borderline enlarged mediastinal lymph nodes.    Tele: atrial fibrillation     Echo:  EF 60-65%;  right ventricular systolic pressure is elevated consistent with moderate pulmonary HTN.  Left atrium is mildly to moderately dilated and right atrium is mildly dilated.  Moderate mitral regurgitation    Last ischemic eval: LONA landaiscan no stress-induced ischemia or prior MI; EF 73% at rest and 74% post stress.      Device: n/a

## 2018-03-19 NOTE — PLAN OF CARE
Problem: Patient Care Overview  Goal: Plan of Care/Patient Progress Review  BP stable. Tele AFib CVR/RVR. Denies pain, SOB or dizziness. Up independently in room. On PO lasix. EP to see tomorrow. Continue to monitor.

## 2018-03-19 NOTE — PROGRESS NOTES
"SPIRITUAL HEALTH SERVICES Progress Note  [facility]    Initial visit per LOS.  Pt politely declined offer of a SH visit and reported that her own  \"Avelina\" (from [facility]) has been visiting her (and visited today too).  SH affirmed this ministry and also offered SH availability if ever that could be helpful too.                                                                                                                                           Ángel Faye M.Div., [facility]  Staff   Pager [phone]    "

## 2018-03-19 NOTE — PROGRESS NOTES
[facility]    Hospitalist Progress Note    Date of Service (when I saw the patient): 03/19/2018    Assessment & Plan   Hamida Verma is a 77 year old female who was recently admitted from 3/6 to 3/9 for new onset atrial fibrillation thought to be due to recent URI and converted to NSR prior to discharge and was discharged on Flecainide and Toprol  mg BID. She presented on 3/14 with increasing fatigue, SOB and orthopnea suspicious for heart failure exacerbation       Decompensated diastolic CHF   History is consistent with CHF with lower extremity edema, orthopnea and a CXR with interstitial edema and bilateral pleural effusions. Patient actually just had an echo during previous admission with an EF of 65% but unable to assess diastolic function due to being in atrial fibrillation at this time.  In the ED she was given 20 mg of IV Lasix.  - Cardiology following  - Treated with Lasix to 20mg IV BID   - Repeat limited echo EF60-65%, mod pulm HTN  - Lisinopril 10 mg added 3/15. BP's a little soft the morning of 3/17 associated with some lightheadedness. Dose reduced to 5 mg daily and then to 2.5 mg daily 3/19.  No recurrent sx's since.      - Weights down 6 lbs and net neg 5.4 L since admit.  Transitioned to lasix 20 mg po daily 3/17.   - low sodium diet, 1500ml fluid restriction  - O2 weaned off 3/17.       Afib RVR  Sinus bradycardia  Patient was diagnosed with new onset A fib during last admission and converted to NSR.  She was discharged on Flecainide and Toprol  mg BID.  Noted sinus joycelyn 50's. Metoprolol being reduced due to bradycardia, however 3/15 evening developed Afib RVR up to 150's, refractory to IV BB. Transferred to [facility] on Diltiazem gtt.  - Cardiology following as above.  - Now off Dilt gtt.   - Continues on Toprol-XL 50-->37.5 mg daily as of 3/19 and PTA Flecainide.   - Ep following. Plan is for CAROLANN and DCCV 3/20.   - C/w PTA Eliquis  - Tele    Dizziness- Resolved.   Felt a  little light headed am 3/17 after going to the bathroom. Was noted to still have a nitro patch on (placed the 14th) and this was removed. Doubt this was the cause.  BP's a little on the lower side and may be a little overmedicated and causing some orthostasis.    - Adjustments made above.   - No sx's 3/18.      - Tele.    Suspected COPD  Emphysematous changes seen on CT and known significant smoking hx (60 pack years) who quit 18 years ago. No formal dx of COPD or reported exacerbations. No previous treatment required.    - Will need formal PFT's completed as an outpt.   - Prn Albuterol in place.         Fatigue  This may be related to cardiac issues as above. TSH last admission normal.  - PT/OT consulted  - management as outlined above      HLP   - PTA Statin        # Pain Assessment:   Current Pain Score 3/19/2018 3/19/2018 3/19/2018   Patient currently in pain? denies denies denies   Pain score (0-10) - - -   Pain location - - -   Pain descriptors - - -   Hamida lovelace pain level was assessed and she currently denies pain.        DVT Prophylaxis: Eliquis  Code Status: Full Code    Disposition: Continue with current rate control plan per cards. DCCV with CAROLANN planned for tomorrow.       Eugene Rocha       Interval History   No acute overnight events.  Asymptomatic. Seen by EP today and cardioversion planned for tomorrow.  Happy to have a plan in place.      -Data reviewed today: I reviewed all new labs and imaging results over the last 24 hours. I personally reviewed no images or EKG's today.    Physical Exam   Temp: 97.9  F (36.6  C) Temp src: Oral BP: 111/78   Heart Rate: 111 Resp: 16 SpO2: 94 % O2 Device: None (Room air)    Vitals:    03/17/18 0500 03/18/18 0500 03/19/18 0308   Weight: 83.5 kg (184 lb) 83.9 kg (185 lb) 84.1 kg (185 lb 8 oz)     Vital Signs with Ranges  Temp:  [97.2  F (36.2  C)-98.3  F (36.8  C)] 97.9  F (36.6  C)  Heart Rate:  [102-112] 111  Resp:  [16-18] 16  BP: (107-133)/(61-81)  111/78  SpO2:  [94 %-96 %] 94 %  I/O last 3 completed shifts:  In: 1330 [P.O.:1330]  Out: 1950 [Urine:1950]    Gen: Patient in no acute distress.  Appears comfortable.  Heart:  S1S2+, tachy, A-Fib, No murmurs.  Lungs:  Clear to auscultation, no wheezing, decreased at bases, no rales.   Abdomen:  Soft, non tender, non distended, bowel sounds positive.  Extremities:  No LE edema.     Medications     - MEDICATION INSTRUCTIONS -       - MEDICATION INSTRUCTIONS -       sodium chloride       - MEDICATION INSTRUCTIONS -       - MEDICATION INSTRUCTIONS -         sodium chloride (PF)  3 mL Intracatheter Q8H     glycopyrrolate  0.1 mg Intravenous Once     Benzocaine  1-4 spray Mouth/Throat Once     midazolam  0.5-1 mg Intravenous Once within 24 hrs     fentaNYL  25-50 mcg Intravenous Once within 24 hrs     sodium chloride (PF)  3 mL Intravenous Q8H     lisinopril  2.5 mg Oral Daily     metoprolol succinate  37.5 mg Oral BID     furosemide  20 mg Oral Daily     sodium chloride (PF)  3 mL Intracatheter Q8H     apixaban ANTICOAGULANT  5 mg Oral BID     cholecalciferol  1,000 Units Oral Daily     flecainide  100 mg Oral Q12H     simvastatin  20 mg Oral At Bedtime       Data     Recent Labs  Lab 03/19/18  1055 03/18/18  0550 03/17/18  0550 03/16/18  0555  03/14/18  1049   WBC  --   --  11.4* 15.8*  --  14.0*   HGB  --   --  12.3 13.0  --  11.5*   MCV  --   --  88 86  --  87   PLT  --   --  721* 797*  --  665*   INR 1.31*  --   --   --   --   --    NA  --  139 138 140  < > 136   POTASSIUM 3.7 3.5 3.9 3.7  < > 3.6   CHLORIDE  --  105 103 105  < > 103   CO2  --  26 29 28  < > 25   BUN  --  21 15 11  < > 12   CR  --  0.82 1.03 0.84  < > 0.83   ANIONGAP  --  8 6 7  < > 8   GURWINDER  --  8.1* 8.3* 8.6  < > 8.4*   GLC  --  102* 107* 116*  < > 112*   ALBUMIN  --   --   --   --   --  2.6*   PROTTOTAL  --   --   --   --   --  7.0   BILITOTAL  --   --   --   --   --  0.5   ALKPHOS  --   --   --   --   --  94   ALT  --   --   --   --   --  57*    AST  --   --   --   --   --  17   TROPI  --   --   --   --   --  <0.015   < > = values in this interval not displayed.    No results found for this or any previous visit (from the past 24 hour(s)).

## 2018-03-19 NOTE — PLAN OF CARE
Problem: Cardiac: Heart Failure (Adult)  Goal: Signs and Symptoms of Listed Potential Problems Will be Absent, Minimized or Managed (Cardiac: Heart Failure)  Signs and symptoms of listed potential problems will be absent, minimized or managed by discharge/transition of care (reference Cardiac: Heart Failure (Adult) CPG).   Outcome: No Change  Heart Failure Care Pathway  GOALS TO BE MET BEFORE DISCHARGE:    1. Decrease congestion and/or edema with diuretic therapy to achieve near      optimal volume status.            Dyspnea improved:  Yes            Edema improved:     Yes        Net I/O and Weights since admission:          03/14 0700 - 03/19 0659  In: 4740 [P.O.:4740]  Out: 94018 [Urine:31218]  Net: -6060            Vitals:    03/15/18 0643 03/16/18 0500 03/17/18 0500 03/18/18 0500   Weight: 86.5 kg (190 lb 12.8 oz) 84.3 kg (185 lb 12.8 oz) 83.5 kg (184 lb) 83.9 kg (185 lb)    03/19/18 0308   Weight: 84.1 kg (185 lb 8 oz)       2.  O2 sats > 92% on RA or at prior home O2 therapy level.          Current oxygenation status:       SpO2: 94 %         O2 Device: None (Room air),            Able to wean O2 this shift to keep sats > 92%:  Yes       Does patient use Home O2? No    3.  Tolerates ambulation and mobility near baseline: Yes        How many times did the patient ambulate with nursing staff this shift? 2. Independent in room    Please review the Heart Failure Care Pathway for additional HF goal outcomes.    A/Ox4. LS diminished. Independent ADLs. -110s. Denies chest pain/palpitation/SOB. Tele A. Fib w/ RVR. NPO after midnight for cardioversion/CAROLANN tomorrow AM. 1500ml fluid restriction. Voiding. Passing flatus. Afebrile. VSS on RA, except tachycardia. Denies pain. Discharge pending.     Christina Lovelace RN  3/19/2018

## 2018-03-19 NOTE — PLAN OF CARE
Problem: Patient Care Overview  Goal: Plan of Care/Patient Progress Review  Outcome: No Change  Pt A&O. Pt is resting w/out any complaints. Up independently in room. Afib w/ CVR on tele. VSS. Plan for EP consult and stress test today. Pt NPO. Will continue to monitor pt.

## 2018-03-19 NOTE — PLAN OF CARE
Problem: Patient Care Overview  Goal: Plan of Care/Patient Progress Review  OT/CR: Attempted OT/CR, pt declined was NPO but decided not do stress test or other test today and wants to eat before therapy, pt explained may not be able to come back but to go for a walk with nursing, pt agreed.

## 2018-03-20 ENCOUNTER — APPOINTMENT (OUTPATIENT)
Dept: CARDIOLOGY | Facility: CLINIC | Age: 78
DRG: 244 | End: 2018-03-20
Attending: NURSE PRACTITIONER
Payer: COMMERCIAL

## 2018-03-20 ENCOUNTER — APPOINTMENT (OUTPATIENT)
Dept: OCCUPATIONAL THERAPY | Facility: CLINIC | Age: 78
DRG: 244 | End: 2018-03-20
Payer: COMMERCIAL

## 2018-03-20 LAB
MAGNESIUM SERPL-MCNC: 2.4 MG/DL (ref 1.6–2.3)
POTASSIUM SERPL-SCNC: 4 MMOL/L (ref 3.4–5.3)

## 2018-03-20 PROCEDURE — 99152 MOD SED SAME PHYS/QHP 5/>YRS: CPT | Performed by: INTERNAL MEDICINE

## 2018-03-20 PROCEDURE — 99232 SBSQ HOSP IP/OBS MODERATE 35: CPT | Performed by: INTERNAL MEDICINE

## 2018-03-20 PROCEDURE — 97535 SELF CARE MNGMENT TRAINING: CPT | Mod: GO | Performed by: OCCUPATIONAL THERAPIST

## 2018-03-20 PROCEDURE — 99152 MOD SED SAME PHYS/QHP 5/>YRS: CPT

## 2018-03-20 PROCEDURE — 27210995 ZZH RX 272: Performed by: NURSE PRACTITIONER

## 2018-03-20 PROCEDURE — 33208 INSRT HEART PM ATRIAL & VENT: CPT

## 2018-03-20 PROCEDURE — 84132 ASSAY OF SERUM POTASSIUM: CPT | Performed by: INTERNAL MEDICINE

## 2018-03-20 PROCEDURE — 21000001 ZZH R&B HEART CARE

## 2018-03-20 PROCEDURE — 27210784 ZZH KIT PACEMAKER CR8

## 2018-03-20 PROCEDURE — C1785 PMKR, DUAL, RATE-RESP: HCPCS

## 2018-03-20 PROCEDURE — 02H63JZ INSERTION OF PACEMAKER LEAD INTO RIGHT ATRIUM, PERCUTANEOUS APPROACH: ICD-10-PCS | Performed by: INTERNAL MEDICINE

## 2018-03-20 PROCEDURE — 25000132 ZZH RX MED GY IP 250 OP 250 PS 637: Performed by: INTERNAL MEDICINE

## 2018-03-20 PROCEDURE — 36415 COLL VENOUS BLD VENIPUNCTURE: CPT | Performed by: INTERNAL MEDICINE

## 2018-03-20 PROCEDURE — 27210886 ZZH ACCESSORY CR5

## 2018-03-20 PROCEDURE — 25000128 H RX IP 250 OP 636: Performed by: INTERNAL MEDICINE

## 2018-03-20 PROCEDURE — 25000125 ZZHC RX 250: Performed by: INTERNAL MEDICINE

## 2018-03-20 PROCEDURE — 27210795 ZZH PAD DEFIB QUICK CR4

## 2018-03-20 PROCEDURE — 83735 ASSAY OF MAGNESIUM: CPT | Performed by: INTERNAL MEDICINE

## 2018-03-20 PROCEDURE — 99153 MOD SED SAME PHYS/QHP EA: CPT

## 2018-03-20 PROCEDURE — 40000133 ZZH STATISTIC OT WARD VISIT: Performed by: OCCUPATIONAL THERAPIST

## 2018-03-20 PROCEDURE — C1898 LEAD, PMKR, OTHER THAN TRANS: HCPCS

## 2018-03-20 PROCEDURE — C1769 GUIDE WIRE: HCPCS

## 2018-03-20 PROCEDURE — 99233 SBSQ HOSP IP/OBS HIGH 50: CPT | Mod: 57 | Performed by: INTERNAL MEDICINE

## 2018-03-20 PROCEDURE — 25000128 H RX IP 250 OP 636: Performed by: NURSE PRACTITIONER

## 2018-03-20 PROCEDURE — 02HK3JZ INSERTION OF PACEMAKER LEAD INTO RIGHT VENTRICLE, PERCUTANEOUS APPROACH: ICD-10-PCS | Performed by: INTERNAL MEDICINE

## 2018-03-20 PROCEDURE — 97110 THERAPEUTIC EXERCISES: CPT | Mod: GO | Performed by: OCCUPATIONAL THERAPIST

## 2018-03-20 PROCEDURE — 33208 INSRT HEART PM ATRIAL & VENT: CPT | Mod: KX | Performed by: INTERNAL MEDICINE

## 2018-03-20 PROCEDURE — 0JH606Z INSERTION OF PACEMAKER, DUAL CHAMBER INTO CHEST SUBCUTANEOUS TISSUE AND FASCIA, OPEN APPROACH: ICD-10-PCS | Performed by: INTERNAL MEDICINE

## 2018-03-20 RX ORDER — KETOROLAC TROMETHAMINE 30 MG/ML
15 INJECTION, SOLUTION INTRAMUSCULAR; INTRAVENOUS
Status: DISCONTINUED | OUTPATIENT
Start: 2018-03-20 | End: 2018-03-20 | Stop reason: HOSPADM

## 2018-03-20 RX ORDER — FENTANYL CITRATE 50 UG/ML
25-50 INJECTION, SOLUTION INTRAMUSCULAR; INTRAVENOUS
Status: DISCONTINUED | OUTPATIENT
Start: 2018-03-20 | End: 2018-03-20 | Stop reason: HOSPADM

## 2018-03-20 RX ORDER — OXYCODONE AND ACETAMINOPHEN 5; 325 MG/1; MG/1
1 TABLET ORAL EVERY 4 HOURS PRN
Status: DISCONTINUED | OUTPATIENT
Start: 2018-03-20 | End: 2018-03-21 | Stop reason: HOSPADM

## 2018-03-20 RX ORDER — LIDOCAINE 40 MG/G
CREAM TOPICAL
Status: DISCONTINUED | OUTPATIENT
Start: 2018-03-20 | End: 2018-03-21 | Stop reason: HOSPADM

## 2018-03-20 RX ORDER — HEPARIN SODIUM 1000 [USP'U]/ML
1000-10000 INJECTION, SOLUTION INTRAVENOUS; SUBCUTANEOUS EVERY 5 MIN PRN
Status: DISCONTINUED | OUTPATIENT
Start: 2018-03-20 | End: 2018-03-20 | Stop reason: HOSPADM

## 2018-03-20 RX ORDER — ACETAMINOPHEN 325 MG/1
650 TABLET ORAL EVERY 4 HOURS PRN
Status: DISCONTINUED | OUTPATIENT
Start: 2018-03-20 | End: 2018-03-21 | Stop reason: HOSPADM

## 2018-03-20 RX ORDER — ATROPINE SULFATE 0.1 MG/ML
.5-1 INJECTION INTRAVENOUS
Status: DISCONTINUED | OUTPATIENT
Start: 2018-03-20 | End: 2018-03-20 | Stop reason: HOSPADM

## 2018-03-20 RX ORDER — BUPIVACAINE HYDROCHLORIDE 2.5 MG/ML
10-30 INJECTION, SOLUTION EPIDURAL; INFILTRATION; INTRACAUDAL
Status: DISCONTINUED | OUTPATIENT
Start: 2018-03-20 | End: 2018-03-20 | Stop reason: HOSPADM

## 2018-03-20 RX ORDER — LIDOCAINE HYDROCHLORIDE AND EPINEPHRINE 10; 10 MG/ML; UG/ML
10-30 INJECTION, SOLUTION INFILTRATION; PERINEURAL
Status: DISCONTINUED | OUTPATIENT
Start: 2018-03-20 | End: 2018-03-20 | Stop reason: HOSPADM

## 2018-03-20 RX ORDER — CEFAZOLIN SODIUM 2 G/100ML
2 INJECTION, SOLUTION INTRAVENOUS ONCE
Status: COMPLETED | OUTPATIENT
Start: 2018-03-20 | End: 2018-03-20

## 2018-03-20 RX ORDER — DOBUTAMINE HYDROCHLORIDE 200 MG/100ML
5-40 INJECTION INTRAVENOUS CONTINUOUS PRN
Status: DISCONTINUED | OUTPATIENT
Start: 2018-03-20 | End: 2018-03-20 | Stop reason: HOSPADM

## 2018-03-20 RX ORDER — LORAZEPAM 2 MG/ML
.5-2 INJECTION INTRAMUSCULAR EVERY 10 MIN PRN
Status: DISCONTINUED | OUTPATIENT
Start: 2018-03-20 | End: 2018-03-20 | Stop reason: HOSPADM

## 2018-03-20 RX ORDER — SODIUM CHLORIDE 450 MG/100ML
INJECTION, SOLUTION INTRAVENOUS CONTINUOUS
Status: DISCONTINUED | OUTPATIENT
Start: 2018-03-20 | End: 2018-03-20 | Stop reason: HOSPADM

## 2018-03-20 RX ORDER — FLUMAZENIL 0.1 MG/ML
0.2 INJECTION, SOLUTION INTRAVENOUS
Status: DISCONTINUED | OUTPATIENT
Start: 2018-03-20 | End: 2018-03-20 | Stop reason: HOSPADM

## 2018-03-20 RX ORDER — IBUTILIDE FUMARATE 1 MG/10ML
0.01 INJECTION, SOLUTION INTRAVENOUS
Status: DISCONTINUED | OUTPATIENT
Start: 2018-03-20 | End: 2018-03-20 | Stop reason: HOSPADM

## 2018-03-20 RX ORDER — NALOXONE HYDROCHLORIDE 0.4 MG/ML
0.4 INJECTION, SOLUTION INTRAMUSCULAR; INTRAVENOUS; SUBCUTANEOUS EVERY 5 MIN PRN
Status: DISCONTINUED | OUTPATIENT
Start: 2018-03-20 | End: 2018-03-20 | Stop reason: HOSPADM

## 2018-03-20 RX ORDER — CEFAZOLIN SODIUM 2 G/100ML
2 INJECTION, SOLUTION INTRAVENOUS
Status: COMPLETED | OUTPATIENT
Start: 2018-03-20 | End: 2018-03-20

## 2018-03-20 RX ORDER — ONDANSETRON 2 MG/ML
4 INJECTION INTRAMUSCULAR; INTRAVENOUS EVERY 4 HOURS PRN
Status: DISCONTINUED | OUTPATIENT
Start: 2018-03-20 | End: 2018-03-20 | Stop reason: HOSPADM

## 2018-03-20 RX ORDER — LIDOCAINE HYDROCHLORIDE 10 MG/ML
10-30 INJECTION, SOLUTION EPIDURAL; INFILTRATION; INTRACAUDAL; PERINEURAL
Status: DISCONTINUED | OUTPATIENT
Start: 2018-03-20 | End: 2018-03-20 | Stop reason: HOSPADM

## 2018-03-20 RX ORDER — PROTAMINE SULFATE 10 MG/ML
1-5 INJECTION, SOLUTION INTRAVENOUS
Status: DISCONTINUED | OUTPATIENT
Start: 2018-03-20 | End: 2018-03-20 | Stop reason: HOSPADM

## 2018-03-20 RX ORDER — ADENOSINE 3 MG/ML
6-12 INJECTION, SOLUTION INTRAVENOUS EVERY 5 MIN PRN
Status: DISCONTINUED | OUTPATIENT
Start: 2018-03-20 | End: 2018-03-20 | Stop reason: HOSPADM

## 2018-03-20 RX ORDER — DIPHENHYDRAMINE HYDROCHLORIDE 50 MG/ML
25-50 INJECTION INTRAMUSCULAR; INTRAVENOUS
Status: DISCONTINUED | OUTPATIENT
Start: 2018-03-20 | End: 2018-03-20 | Stop reason: HOSPADM

## 2018-03-20 RX ORDER — IBUTILIDE FUMARATE 1 MG/10ML
1 INJECTION, SOLUTION INTRAVENOUS
Status: DISCONTINUED | OUTPATIENT
Start: 2018-03-20 | End: 2018-03-20 | Stop reason: HOSPADM

## 2018-03-20 RX ORDER — NALOXONE HYDROCHLORIDE 0.4 MG/ML
.1-.4 INJECTION, SOLUTION INTRAMUSCULAR; INTRAVENOUS; SUBCUTANEOUS
Status: DISCONTINUED | OUTPATIENT
Start: 2018-03-20 | End: 2018-03-21 | Stop reason: HOSPADM

## 2018-03-20 RX ORDER — PROTAMINE SULFATE 10 MG/ML
5-40 INJECTION, SOLUTION INTRAVENOUS EVERY 10 MIN PRN
Status: DISCONTINUED | OUTPATIENT
Start: 2018-03-20 | End: 2018-03-20 | Stop reason: HOSPADM

## 2018-03-20 RX ORDER — LIDOCAINE 40 MG/G
CREAM TOPICAL
Status: DISCONTINUED | OUTPATIENT
Start: 2018-03-20 | End: 2018-03-20 | Stop reason: HOSPADM

## 2018-03-20 RX ORDER — FUROSEMIDE 10 MG/ML
20-100 INJECTION INTRAMUSCULAR; INTRAVENOUS
Status: DISCONTINUED | OUTPATIENT
Start: 2018-03-20 | End: 2018-03-20 | Stop reason: HOSPADM

## 2018-03-20 RX ORDER — PROMETHAZINE HYDROCHLORIDE 25 MG/ML
6.25-25 INJECTION, SOLUTION INTRAMUSCULAR; INTRAVENOUS EVERY 4 HOURS PRN
Status: DISCONTINUED | OUTPATIENT
Start: 2018-03-20 | End: 2018-03-20 | Stop reason: HOSPADM

## 2018-03-20 RX ORDER — MORPHINE SULFATE 2 MG/ML
1-2 INJECTION, SOLUTION INTRAMUSCULAR; INTRAVENOUS EVERY 5 MIN PRN
Status: DISCONTINUED | OUTPATIENT
Start: 2018-03-20 | End: 2018-03-20 | Stop reason: HOSPADM

## 2018-03-20 RX ADMIN — SODIUM CHLORIDE: 4.5 INJECTION, SOLUTION INTRAVENOUS at 09:42

## 2018-03-20 RX ADMIN — CEFAZOLIN SODIUM 2 G: 2 INJECTION, SOLUTION INTRAVENOUS at 09:55

## 2018-03-20 RX ADMIN — OXYCODONE HYDROCHLORIDE AND ACETAMINOPHEN 1 TABLET: 5; 325 TABLET ORAL at 23:40

## 2018-03-20 RX ADMIN — FUROSEMIDE 20 MG: 20 TABLET ORAL at 09:10

## 2018-03-20 RX ADMIN — FLECAINIDE ACETATE 100 MG: 100 TABLET ORAL at 21:00

## 2018-03-20 RX ADMIN — FENTANYL CITRATE 50 MCG: 50 INJECTION, SOLUTION INTRAMUSCULAR; INTRAVENOUS at 10:00

## 2018-03-20 RX ADMIN — LISINOPRIL 2.5 MG: 2.5 TABLET ORAL at 09:10

## 2018-03-20 RX ADMIN — MIDAZOLAM 1 MG: 1 INJECTION INTRAMUSCULAR; INTRAVENOUS at 10:00

## 2018-03-20 RX ADMIN — ACETAMINOPHEN 650 MG: 325 TABLET, FILM COATED ORAL at 14:06

## 2018-03-20 RX ADMIN — FENTANYL CITRATE 50 MCG: 50 INJECTION, SOLUTION INTRAMUSCULAR; INTRAVENOUS at 10:08

## 2018-03-20 RX ADMIN — MIDAZOLAM 1 MG: 1 INJECTION INTRAMUSCULAR; INTRAVENOUS at 10:07

## 2018-03-20 RX ADMIN — SIMVASTATIN 20 MG: 20 TABLET, FILM COATED ORAL at 21:00

## 2018-03-20 RX ADMIN — CHOLECALCIFEROL TAB 25 MCG (1000 UNIT) 1000 UNITS: 25 TAB at 09:10

## 2018-03-20 RX ADMIN — Medication 37.5 MG: at 21:00

## 2018-03-20 RX ADMIN — Medication 37.5 MG: at 09:10

## 2018-03-20 RX ADMIN — APIXABAN 5 MG: 5 TABLET, FILM COATED ORAL at 21:00

## 2018-03-20 RX ADMIN — Medication 25000 UNITS: at 10:33

## 2018-03-20 RX ADMIN — APIXABAN 5 MG: 5 TABLET, FILM COATED ORAL at 09:10

## 2018-03-20 RX ADMIN — FLECAINIDE ACETATE 100 MG: 100 TABLET ORAL at 09:10

## 2018-03-20 RX ADMIN — OXYCODONE HYDROCHLORIDE AND ACETAMINOPHEN 1 TABLET: 5; 325 TABLET ORAL at 14:45

## 2018-03-20 RX ADMIN — MIDAZOLAM 1 MG: 1 INJECTION INTRAMUSCULAR; INTRAVENOUS at 10:16

## 2018-03-20 RX ADMIN — OXYCODONE HYDROCHLORIDE AND ACETAMINOPHEN 1 TABLET: 5; 325 TABLET ORAL at 18:33

## 2018-03-20 RX ADMIN — CEFAZOLIN SODIUM 2 G: 2 INJECTION, SOLUTION INTRAVENOUS at 20:59

## 2018-03-20 ASSESSMENT — PAIN DESCRIPTION - DESCRIPTORS
DESCRIPTORS: SORE
DESCRIPTORS: ACHING
DESCRIPTORS: ACHING
DESCRIPTORS: SORE
DESCRIPTORS: ACHING
DESCRIPTORS: DULL;SORE

## 2018-03-20 NOTE — PROGRESS NOTES
[facility]    Hospitalist Progress Note    Date of Service (when I saw the patient): 03/20/2018    Assessment & Plan   Hamida Verma is a 77 year old female who was recently admitted from 3/6 to 3/9 for new onset atrial fibrillation thought to be due to recent URI and converted to NSR prior to discharge and was discharged on Flecainide and Toprol  mg BID. She presented on 3/14 with increasing fatigue, SOB and orthopnea suspicious for heart failure exacerbation       Decompensated diastolic CHF   History is consistent with CHF with lower extremity edema, orthopnea and a CXR with interstitial edema and bilateral pleural effusions. Patient actually just had an echo during previous admission with an EF of 65% but unable to assess diastolic function due to being in atrial fibrillation at this time.  In the ED she was given 20 mg of IV Lasix.  - Cardiology following  - Treated with Lasix to 20mg IV BID   - Repeat limited echo EF60-65%, mod pulm HTN  - Lisinopril 10 mg added 3/15. BP's a little soft the morning of 3/17 associated with some lightheadedness. Dose reduced to 5 mg daily and then to 2.5 mg daily 3/19.  No recurrent sx's since.      -Transitioned to lasix 20 mg po daily 3/17.  - low sodium diet, 1500ml fluid restriction  - O2 weaned off 3/17.       Afib RVR  Sinus bradycardia  Patient was diagnosed with new onset A fib during last admission and converted to NSR.  She was discharged on Flecainide and Toprol  mg BID.  Noted sinus joycelyn 50's. Metoprolol being reduced due to bradycardia, however 3/15 evening developed Afib RVR up to 150's, refractory to IV BB. Transferred to [facility] on Diltiazem gtt.  - Cardiology / EP following as above.  - Now off Dilt gtt.   - Continues on Toprol-XL 50-->37.5 mg bid as of 3/19 and PTA Flecainide.   - Ep following. Plan is for CAROLANN and DCCV 3/20.   - C/w PTA Eliquis  - Planned for Cardioversion/CAROLANN but had a 3.4 sec pause early am 3/20. Underwent dual  changer PPM placement 3/20.  - CXR and interrogation in am.         Dizziness- Resolved.   Felt a little light headed am 3/17 after going to the bathroom. Was noted to still have a nitro patch on (placed the 14th) and this was removed. Doubt this was the cause.  BP's a little on the lower side and may be a little overmedicated and causing some orthostasis.    - Adjustments made above.   - No sx's 3/18.      - Tele.    Suspected COPD  Emphysematous changes seen on CT and known significant smoking hx (60 pack years) who quit 18 years ago. No formal dx of COPD or reported exacerbations. No previous treatment required.    - Will need formal PFT's completed as an outpt.   - Prn Albuterol in place.         Fatigue  This may be related to cardiac issues as above. TSH last admission normal.  - PT/OT consulted  - management as outlined above      HLP   - PTA Statin        # Pain Assessment:   Current Pain Score 3/20/2018 3/20/2018 3/20/2018   Patient currently in pain? yes yes yes   Pain score (0-10) 5 5 5   Pain location Chest - Chest   Pain descriptors Aching - Aching   Hamida lovelace pain level was assessed and she currently denies pain.      DVT Prophylaxis: Eliquis  Code Status: Full Code       Disposition: S/P PPM today.  CXR and Interrogation in am.  Anticipate d/c home once completed.       Eugene Rocha       Interval History   3.4 sec pause overnight prompting PPM placement today.  Some pain but overall feels well.      -Data reviewed today: I reviewed all new labs and imaging results over the last 24 hours. I personally reviewed no images or EKG's today.    Physical Exam   Temp: 97.3  F (36.3  C) Temp src: Axillary BP: 97/60 Pulse: 105 Heart Rate: 60 Resp: 18 SpO2: 95 % O2 Device: None (Room air)    Vitals:    03/18/18 0500 03/19/18 0308 03/20/18 0500   Weight: 83.9 kg (185 lb) 84.1 kg (185 lb 8 oz) 83.9 kg (184 lb 14.4 oz)     Vital Signs with Ranges  Temp:  [97.3  F (36.3  C)-98  F (36.7  C)] 97.3  F (36.3   C)  Pulse:  [105] 105  Heart Rate:  [] 60  Resp:  [16-18] 18  BP: ()/(53-87) 97/60  SpO2:  [93 %-98 %] 95 %  I/O last 3 completed shifts:  In: 1320 [P.O.:1320]  Out: 1300 [Urine:1300]    Gen: Patient in no acute distress.  Appears comfortable.  Heart:  S1S2+, regular rate, No murmurs. LACW PPM in place. CDI.   Lungs:  Clear to auscultation, no wheezing, decreased at bases, no rales.   Abdomen:  Soft, non tender, non distended, bowel sounds positive.  Extremities:  Trace LE edema.     Medications     - MEDICATION INSTRUCTIONS -       - MEDICATION INSTRUCTIONS -         sodium chloride (PF)  3 mL Intracatheter Q8H     ceFAZolin  2 g Intravenous Once     lisinopril  2.5 mg Oral Daily     metoprolol succinate  37.5 mg Oral BID     furosemide  20 mg Oral Daily     sodium chloride (PF)  3 mL Intracatheter Q8H     apixaban ANTICOAGULANT  5 mg Oral BID     cholecalciferol  1,000 Units Oral Daily     flecainide  100 mg Oral Q12H     simvastatin  20 mg Oral At Bedtime       Data     Recent Labs  Lab 03/20/18  0535 03/19/18  1055 03/18/18  0550 03/17/18  0550 03/16/18  0555  03/14/18  1049   WBC  --   --   --  11.4* 15.8*  --  14.0*   HGB  --   --   --  12.3 13.0  --  11.5*   MCV  --   --   --  88 86  --  87   PLT  --   --   --  721* 797*  --  665*   INR  --  1.31*  --   --   --   --   --    NA  --   --  139 138 140  < > 136   POTASSIUM 4.0 3.7 3.5 3.9 3.7  < > 3.6   CHLORIDE  --   --  105 103 105  < > 103   CO2  --   --  26 29 28  < > 25   BUN  --   --  21 15 11  < > 12   CR  --   --  0.82 1.03 0.84  < > 0.83   ANIONGAP  --   --  8 6 7  < > 8   GURWINDER  --   --  8.1* 8.3* 8.6  < > 8.4*   GLC  --   --  102* 107* 116*  < > 112*   ALBUMIN  --   --   --   --   --   --  2.6*   PROTTOTAL  --   --   --   --   --   --  7.0   BILITOTAL  --   --   --   --   --   --  0.5   ALKPHOS  --   --   --   --   --   --  94   ALT  --   --   --   --   --   --  57*   AST  --   --   --   --   --   --  17   TROPI  --   --   --   --   --   --   <0.015   < > = values in this interval not displayed.    No results found for this or any previous visit (from the past 24 hour(s)).

## 2018-03-20 NOTE — PLAN OF CARE
Problem: Patient Care Overview  Goal: Plan of Care/Patient Progress Review  Outcome: Improving  Heart Failure Care Pathway  GOALS TO BE MET BEFORE DISCHARGE:    1. Decrease congestion and/or edema with diuretic therapy to achieve near      optimal volume status.            Dyspnea improved:  Yes            Edema improved:     Yes        Net I/O and Weights since admission:          03/14 2300 - 03/19 2259  In: 5060 [P.O.:5060]  Out: 61445 [Urine:42314]  Net: -5990            Vitals:    03/15/18 0643 03/16/18 0500 03/17/18 0500 03/18/18 0500   Weight: 86.5 kg (190 lb 12.8 oz) 84.3 kg (185 lb 12.8 oz) 83.5 kg (184 lb) 83.9 kg (185 lb)    03/19/18 0308 03/20/18 0500   Weight: 84.1 kg (185 lb 8 oz) 83.9 kg (184 lb 14.4 oz)       2.  O2 sats > 92% on RA or at prior home O2 therapy level.          Current oxygenation status:       SpO2: 93 %         O2 Device: None (Room air),            Able to wean O2 this shift to keep sats > 92%:  NA, Pt on room air        Does patient use Home O2? No    3.  Tolerates ambulation and mobility near baseline: Yes        How many times did the patient ambulate with nursing staff this shift? 1    Please review the Heart Failure Care Pathway for additional HF goal outcomes.    Jennie Green RN  3/20/2018     A&O. VSS on room air. Tele: A fib CVR, HR 90's. At 5:18 AM pt had 3.4 second pause and converted to NSR w/ HR of 60. Independent in room. No c/o of SOB or CP overnight. Original plan prior to converting to NSR, CAROLANN/cardioversion today. NPO since midnight, pt remains NPO. Will continue to monitor rhythm.

## 2018-03-20 NOTE — PLAN OF CARE
Problem: Patient Care Overview  Goal: Plan of Care/Patient Progress Review  Discharge Planner OT   Patient plan for discharge: home  Current status: pt ambulated approx 300 ft with SBA and slight fatigue and stable CV response, see vital sign flow sheet for details. Pt educated in PPM precautions with handout following implantation.   Barriers to return to prior living situation: PPM  Recommendations for discharge: home with A with IADL's as needed ie driving per MD following PPM, heavier cleaning.   Rationale for recommendations: pt making good progress towards goals. A with IADL's as above to insure PPM precautions.        Entered by: Rosamaria Escalante 03/20/2018 3:24 PM

## 2018-03-20 NOTE — PROGRESS NOTES
[facility]  EP Cardiology Progress Note  Date of Service: 03/20/2018  Primary Cardiologist: Mauri Chase and Shanika    Assessment & Plan    Hamida Verma is a 77 year old female with past medical history significant for paroxsymal atrial fibrillation, HTN, hyperlipidemia admitted on 3/14/2018 with SOB, orthopnea, and dizziness and found to be in sinus bradycardia. While in the ED on 3/6 she was found to be in new atrial fibrillation with RVR while being treated for upper respiratory infection and was started on metoprolol, flecainide, Eliquis.  Lexiscan completed on 3/6/18 showed no ischemia.  During hospitalization on 3/6-3/9 she was loaded with flecainide 200 mg daily then 100 mg BID along with 100 mg metoprolol succinate BID and while being sedated for CAROLANN on 3/7/18 she spontaneously converted.    She returned to the hospital on 3/14 with HF symptoms of orthopnea and LE edema; found to be in bradycardia subsequently her metoprolol XL was decreased to 50 mg BID and then 37.5 mg BID.  ECHO with normal EF, normal atrial size and no valvular disease.  Her CXR showed bilateral pleural effusions and intestinal infiltrates, back in atrial fibrillation, and started on IV lasix.   Her chest CT (3/14) showed mild interstitial thickening equivocal for interstitial pulmonary edema, mild to moderate.  Per Dr. Fine's note, signs of sick sinus syndrome as she had HR in the 50s in Sinus rhythm while on metoprolol XL 50 mg BID and in the 150s in atrial fibrillatioin.  On 3/15 she went into atrial fibrillation with RVR refractory to IV BB and started on diltiazem gtt.      Interval History  Patient was scheduled for a DCCV and CAROLANN today however at 5:18 she had a 3.4 second pause and converted to sinus rhythm with Hr in the 60s.  Reviewed VS and she is Afibrile, -138/65-81, oxygen saturation 94-98 on room air.  Weight down 6# since admission.  Potassium and magnesium normal.  Spoke with patient this morning  and she feels less SOB in normal sinus rhythm.  She is willing to have ppm today and consent was completed.        Assessment/Plan:  1. HF exacerbation - dyspnea  due to CHF with preserved EF, however diastolic dysfunction unable to be assessed due to atrial fibrillation.  Repeat ECHO during this completed with increased LV filling pressures with moderate pulmonary HTN.       Continue with lasix 20 mg    Continue lisinopril 2.5 mg daily for afterload reduction    Pulmonary evaluation for severity of COPD    Questionable joycelyn-tachy syndrome however per note, pateinet not ready to commit to pacemaker.          2. Paroxsymal Atrial fibrillation -     anticoagulation indefinately for CHADSVASC 5 (age++, female, HTN, HF) currently on apixaban 5 mg BID    for rhythm and rate control on flecainide 100 mg BID and metoprolol XL 37.5 BID       TSH normal    Exercise stress test for flecainide induced proarrhythmia scheduled today which we will cancel at this point.      Spoke with Dr. Stauffer and recommended a ppm due to the 3.4 second pause and will allow to increase her medications to prevent further atrial fibrillation.   Procedural risks of pacemaker implantation were discussed in detail and include such concerns as:  Internal bleeding, collapsed lung, localized bleeding and bruising, infection (immediate and long-term) and need to re-position pacemaker lead(s). We also reviewed use of moderate sedation and local ansethetic. Patient voices understanding of the reasons for recommending pacemaker placement and denies any questions at this time. Consent was signed.  Pre-procedure labs, medication instructions and NPO status were given as well.    If she has a reoccurrence of atrial fibrillation. discussed with family and patient possibility of amiodarone for rhythm and rate and a  AV node ablation and ppm    Recommend pulmonary evalatuion for severity of COPD.      NELY Alvarez CNP  [facility]  Text Page  (M-F 7:30  am - 4:00 pm)      Physical Exam   Temp: 97.6  F (36.4  C) Temp src: Oral BP: 112/65 Pulse: 105 Heart Rate: 61 Resp: 16 SpO2: 94 % O2 Device: None (Room air)    Vitals:    03/18/18 0500 03/19/18 0308 03/20/18 0500   Weight: 83.9 kg (185 lb) 84.1 kg (185 lb 8 oz) 83.9 kg (184 lb 14.4 oz)       GEN:  In general, this is a well nourished female in no acute distress.  Patient ambulatory.  HEENT:  Pupils normal size, equal, and nround. Sclerae nonicteric. Clear oropharynx. Mucous membranes moist,  no cyanosis.  NECK: Supple, no asymmetry, masses, or scars appreciated. Trachea midline.  C/V: Regular rhythm and regular rate and rhythm, no murmur, rub or gallop. No S3 or RV heave.   RESP: Respirations are unlabored. No use of accessory muscles. Clear to auscultation bilaterally without wheezing, rales, or rhonchi.  GI: Abdomen soft, nontender, nondistended, bowel sounds normal.  EXTREM: No LE edema. No cyanosis or clubbing.  VASC: Radial and dorsalis pedis are normal in volumes and symmetric bilaterally.   NEURO: Alert and oriented, cooperative.. No obvious focal deficits.   PSYCH: Normal affect.  SKIN: Warm and dry. No rashes or petechiae appreciated.       Medications     - MEDICATION INSTRUCTIONS -       - MEDICATION INSTRUCTIONS -       sodium chloride       - MEDICATION INSTRUCTIONS -       - MEDICATION INSTRUCTIONS -         glycopyrrolate  0.1 mg Intravenous Once     Benzocaine  1-4 spray Mouth/Throat Once     midazolam  0.5-1 mg Intravenous Once within 24 hrs     fentaNYL  25-50 mcg Intravenous Once within 24 hrs     sodium chloride (PF)  3 mL Intravenous Q8H     lisinopril  2.5 mg Oral Daily     metoprolol succinate  37.5 mg Oral BID     furosemide  20 mg Oral Daily     sodium chloride (PF)  3 mL Intracatheter Q8H     apixaban ANTICOAGULANT  5 mg Oral BID     cholecalciferol  1,000 Units Oral Daily     flecainide  100 mg Oral Q12H     simvastatin  20 mg Oral At Bedtime       Data   Most Recent 3 CBC's:  Recent Labs    Lab Test  03/17/18   0550  03/16/18   0555  03/14/18   1049   WBC  11.4*  15.8*  14.0*   HGB  12.3  13.0  11.5*   MCV  88  86  87   PLT  721*  797*  665*     Most Recent 3 BMP's:  Recent Labs   Lab Test  03/20/18   0535  03/19/18   1055  03/18/18   0550  03/17/18   0550  03/16/18   0555   NA   --    --   139  138  140   POTASSIUM  4.0  3.7  3.5  3.9  3.7   CHLORIDE   --    --   105  103  105   CO2   --    --   26  29  28   BUN   --    --   21  15  11   CR   --    --   0.82  1.03  0.84   ANIONGAP   --    --   8  6  7   GURWINDER   --    --   8.1*  8.3*  8.6   GLC   --    --   102*  107*  116*     Last 24 hours labs reviewed   EKG: (reviewed personally) March 15 - atrial fibrillation with ventricular rate of 135 bpm.  EKG today atrial fibrillation with ventricular rate of 130 bpm.      Imaging:  Chest CT on 3/14 showed Small bilateral pleural effusions with adjacent atelectasis, Apparent mild interstitial thickening throughout both lungs; equivocal for interstitial pulmonary edema, mild to moderate emphysematous changes in the lungs.A few nonspecific borderline enlarged mediastinal lymph nodes.    Tele: atrial fibrillation with conversion to sinus rhythm.       Echo:  EF 60-65%; right ventricular systolic pressure is elevated consistent with moderate pulmonary HTN.  Left atrium is mildly to moderately dilated and right atrium is mildly dilated.  Moderate mitral regurgitation    Last ischemic eval: NM lexiscan no stress-induced ischemia or prior MI; EF 73% at rest and 74% post stress.      Device: n/a

## 2018-03-20 NOTE — PROCEDURES
Dictated.    Successful dual-chamber pacemaker implantation (SJM).    Programmed DDDR 60/120 ppm.   No apparent complication.    EBL = 15 cc.      Plan:  - CXR and device interrogation in the am  - routine post PM care

## 2018-03-20 NOTE — PLAN OF CARE
Problem: Cardiac: Heart Failure (Adult)  Goal: Signs and Symptoms of Listed Potential Problems Will be Absent, Minimized or Managed (Cardiac: Heart Failure)  Signs and symptoms of listed potential problems will be absent, minimized or managed by discharge/transition of care (reference Cardiac: Heart Failure (Adult) CPG).   Outcome: No Change    A/o x4. VSS. RA. IV SL. Tolerating 2gm and 1500 ml fluid restriction. Voiding. TELE AFIB CVR. HR 90's to 110's. Asymptomatic. BLE edema improved. Up independently in room. Plan to CAROLANN/DCCV tomorrow, will be NPO at midnight. Consent signed. Discharge instructions attached in nursing marcellus.     Heart Failure Care Pathway  GOALS TO BE MET BEFORE DISCHARGE:    1. Decrease congestion and/or edema with diuretic therapy to achieve near      optimal volume status.            Dyspnea improved:  Yes            Edema improved:     Yes        Net I/O and Weights since admission:          03/14 1500 - 03/19 1459  In: 5160 [P.O.:5160]  Out: 44442 [Urine:64227]  Net: -6190            Vitals:    03/15/18 0643 03/16/18 0500 03/17/18 0500 03/18/18 0500   Weight: 86.5 kg (190 lb 12.8 oz) 84.3 kg (185 lb 12.8 oz) 83.5 kg (184 lb) 83.9 kg (185 lb)    03/19/18 0308   Weight: 84.1 kg (185 lb 8 oz)       2.  O2 sats > 92% on RA or at prior home O2 therapy level.          Current oxygenation status:       SpO2: 94 %         O2 Device: None (Room air),            Able to wean O2 this shift to keep sats > 92%:  Yes       Does patient use Home O2? No    3.  Tolerates ambulation and mobility near baseline: Yes        How many times did the patient ambulate with nursing staff this shift? 3    Please review the Heart Failure Care Pathway for additional HF goal outcomes.    Judith Lew RN  3/19/2018

## 2018-03-20 NOTE — PLAN OF CARE
Problem: Cardiac: Heart Failure (Adult)  Goal: Signs and Symptoms of Listed Potential Problems Will be Absent, Minimized or Managed (Cardiac: Heart Failure)  Signs and symptoms of listed potential problems will be absent, minimized or managed by discharge/transition of care (reference Cardiac: Heart Failure (Adult) CPG).   Outcome: No Change  Heart Failure Care Pathway  GOALS TO BE MET BEFORE DISCHARGE:    1. Decrease congestion and/or edema with diuretic therapy to achieve near      optimal volume status.            Dyspnea improved:  Yes            Edema improved:     Yes        Net I/O and Weights since admission:          03/15 1500 - 03/20 1459  In: 5320 [P.O.:5320]  Out: 10556 [Urine:13267]  Net: -5480            Vitals:    03/15/18 0643 03/16/18 0500 03/17/18 0500 03/18/18 0500   Weight: 86.5 kg (190 lb 12.8 oz) 84.3 kg (185 lb 12.8 oz) 83.5 kg (184 lb) 83.9 kg (185 lb)    03/19/18 0308 03/20/18 0500   Weight: 84.1 kg (185 lb 8 oz) 83.9 kg (184 lb 14.4 oz)       2.  O2 sats > 92% on RA or at prior home O2 therapy level.          Current oxygenation status:       SpO2: 93 %         O2 Device: None (Room air),            Able to wean O2 this shift to keep sats > 92%:  NA       Does patient use Home O2? No    3.  Tolerates ambulation and mobility near baseline: Yes        How many times did the patient ambulate with nursing staff this shift? 3; pt independent ad gina in room    Please review the Heart Failure Care Pathway for additional HF goal outcomes.    VSS. AAOx4. Underwent PPM implantation w/ DDDR 60/120 settings. L chest wall dressing is CDI. Pt c/o tenderness at site w/ relief with percocet and ice. Plan is CXR, device interrogation, and PM teaching tomorrow before possible discharge. Continue to monitor.    Katerina Rojas RN  3/20/2018

## 2018-03-21 ENCOUNTER — APPOINTMENT (OUTPATIENT)
Dept: OCCUPATIONAL THERAPY | Facility: CLINIC | Age: 78
DRG: 244 | End: 2018-03-21
Payer: COMMERCIAL

## 2018-03-21 ENCOUNTER — APPOINTMENT (OUTPATIENT)
Dept: GENERAL RADIOLOGY | Facility: CLINIC | Age: 78
DRG: 244 | End: 2018-03-21
Attending: INTERNAL MEDICINE
Payer: COMMERCIAL

## 2018-03-21 VITALS
OXYGEN SATURATION: 93 % | WEIGHT: 184.53 LBS | SYSTOLIC BLOOD PRESSURE: 101 MMHG | TEMPERATURE: 97.7 F | BODY MASS INDEX: 31.67 KG/M2 | HEART RATE: 105 BPM | RESPIRATION RATE: 16 BRPM | DIASTOLIC BLOOD PRESSURE: 55 MMHG

## 2018-03-21 LAB — INTERPRETATION ECG - MUSE: NORMAL

## 2018-03-21 PROCEDURE — 97535 SELF CARE MNGMENT TRAINING: CPT | Mod: GO

## 2018-03-21 PROCEDURE — 25000132 ZZH RX MED GY IP 250 OP 250 PS 637: Performed by: INTERNAL MEDICINE

## 2018-03-21 PROCEDURE — 99232 SBSQ HOSP IP/OBS MODERATE 35: CPT | Mod: 24 | Performed by: INTERNAL MEDICINE

## 2018-03-21 PROCEDURE — 99239 HOSP IP/OBS DSCHRG MGMT >30: CPT | Performed by: INTERNAL MEDICINE

## 2018-03-21 PROCEDURE — 40000986 XR CHEST 2 VW

## 2018-03-21 PROCEDURE — 40000133 ZZH STATISTIC OT WARD VISIT

## 2018-03-21 PROCEDURE — 97530 THERAPEUTIC ACTIVITIES: CPT | Mod: GO

## 2018-03-21 RX ORDER — FUROSEMIDE 20 MG
20 TABLET ORAL DAILY
Qty: 30 TABLET | Refills: 3 | Status: SHIPPED | OUTPATIENT
Start: 2018-03-22 | End: 2018-06-12

## 2018-03-21 RX ORDER — METOPROLOL SUCCINATE 25 MG/1
37.5 TABLET, EXTENDED RELEASE ORAL 2 TIMES DAILY
Qty: 90 TABLET | Refills: 3 | Status: SHIPPED | OUTPATIENT
Start: 2018-03-21 | End: 2018-03-28

## 2018-03-21 RX ORDER — ACETAMINOPHEN 325 MG/1
650 TABLET ORAL EVERY 4 HOURS PRN
Qty: 100 TABLET | Refills: 0 | Status: SHIPPED | OUTPATIENT
Start: 2018-03-21 | End: 2019-02-21

## 2018-03-21 RX ORDER — OXYCODONE HYDROCHLORIDE 5 MG/1
5 TABLET ORAL EVERY 6 HOURS PRN
Qty: 12 TABLET | Refills: 0 | Status: SHIPPED | OUTPATIENT
Start: 2018-03-21 | End: 2019-02-21

## 2018-03-21 RX ORDER — ALBUTEROL SULFATE 90 UG/1
2 AEROSOL, METERED RESPIRATORY (INHALATION) 4 TIMES DAILY PRN
Qty: 1 INHALER | Refills: 3 | Status: SHIPPED | OUTPATIENT
Start: 2018-03-21 | End: 2018-12-19

## 2018-03-21 RX ORDER — LISINOPRIL 2.5 MG/1
2.5 TABLET ORAL DAILY
Qty: 30 TABLET | Refills: 3 | Status: SHIPPED | OUTPATIENT
Start: 2018-03-22 | End: 2018-04-18

## 2018-03-21 RX ORDER — POLYETHYLENE GLYCOL 3350 17 G/17G
1 POWDER, FOR SOLUTION ORAL DAILY
Qty: 85 G | Refills: 0 | Status: SHIPPED | OUTPATIENT
Start: 2018-03-21 | End: 2019-02-21

## 2018-03-21 RX ADMIN — CHOLECALCIFEROL TAB 25 MCG (1000 UNIT) 1000 UNITS: 25 TAB at 08:26

## 2018-03-21 RX ADMIN — FLECAINIDE ACETATE 100 MG: 100 TABLET ORAL at 08:25

## 2018-03-21 RX ADMIN — LISINOPRIL 2.5 MG: 2.5 TABLET ORAL at 08:25

## 2018-03-21 RX ADMIN — OXYCODONE HYDROCHLORIDE AND ACETAMINOPHEN 1 TABLET: 5; 325 TABLET ORAL at 09:11

## 2018-03-21 RX ADMIN — OXYCODONE HYDROCHLORIDE AND ACETAMINOPHEN 1 TABLET: 5; 325 TABLET ORAL at 03:45

## 2018-03-21 RX ADMIN — Medication 37.5 MG: at 08:25

## 2018-03-21 RX ADMIN — FUROSEMIDE 20 MG: 20 TABLET ORAL at 08:26

## 2018-03-21 RX ADMIN — APIXABAN 5 MG: 5 TABLET, FILM COATED ORAL at 08:25

## 2018-03-21 NOTE — PLAN OF CARE
Problem: Cardiac: Heart Failure (Adult)  Goal: Signs and Symptoms of Listed Potential Problems Will be Absent, Minimized or Managed (Cardiac: Heart Failure)  Signs and symptoms of listed potential problems will be absent, minimized or managed by discharge/transition of care (reference Cardiac: Heart Failure (Adult) CPG).   Outcome: No Change  Pt alert, oriented and able to initiate needs appropriately.  Pt is independent in room and tolerates activity well.  Pt is also maintaining limited left arm movement r/t to pacemaker placement.  Pt remains 100% A-paced and incisional site C/D/I.  Pt c/o minimal pain in incisional site and obtains relief with ice pack and pain medication.  BP low at 1900- 87/50 but when rechecked, BP within normal limits.  Pt asymptomatic.  No further issues noted.

## 2018-03-21 NOTE — PROGRESS NOTES
A follow-up appointment has been made for this patient.     Mar 26, 2018 10:00 AM CDT   Office Visit with Mikala Philip MD   [facility]    [address] [place] [id number] [phone]

## 2018-03-21 NOTE — PROGRESS NOTES
Discharge instructions explained and questions answered. Sent home with belongings and discharge medications.

## 2018-03-21 NOTE — PROGRESS NOTES
Device Discharge     Dressing:  Clean, dry, and intact  Chest XR reviewed:  Yes  Pneumo present?:  No   Adequate placement of leads?: Yes  Lead Measurements per Device Rep:  WNL  7 day check scheduled for: Previously scheduled for 3/28/2018 at 2:30    Education Provided:  Avoid raising left arm above the level of the shoulder for 3 weeks.  Do not shower until outer occlusive dressing has been removed.  Remove outer dressing on Friday.  Leave steri-strips in place, these will be removed at the 1 week check.   Call Device Clinic with increased swelling, drainage, or fever > 101 degrees.   Recommended no driving for 1 week if PPM   Temporary ID card provided as well as [facility] booklet.   Reviewed merlin monitor and instructed to set up next to bedside  Follow-up with the Device Clinic as scheduled.     Patient/family verbalized understanding of all instructions. DARWIN Jorgensen

## 2018-03-21 NOTE — PLAN OF CARE
Problem: Patient Care Overview  Goal: Plan of Care/Patient Progress Review  Outcome: No Change  Heart Failure Care Pathway  GOALS TO BE MET BEFORE DISCHARGE:    1. Decrease congestion and/or edema with diuretic therapy to achieve near      optimal volume status.            Dyspnea improved:  Yes            Edema improved:     Yes        Net I/O and Weights since admission:          03/15 2300 - 03/20 2259  In: 5150 [P.O.:5150]  Out: 7650 [Urine:7650]  Net: -2500            Vitals:    03/15/18 0643 03/16/18 0500 03/17/18 0500 03/18/18 0500   Weight: 86.5 kg (190 lb 12.8 oz) 84.3 kg (185 lb 12.8 oz) 83.5 kg (184 lb) 83.9 kg (185 lb)    03/19/18 0308 03/20/18 0500   Weight: 84.1 kg (185 lb 8 oz) 83.9 kg (184 lb 14.4 oz)       2.  O2 sats > 92% on RA or at prior home O2 therapy level.          Current oxygenation status:       SpO2: 95 %         O2 Device: None (Room air),            Able to wean O2 this shift to keep sats > 92%:  NA       Does patient use Home O2? No    3.  Tolerates ambulation and mobility near baseline: Yes        How many times did the patient ambulate with nursing staff this shift? 1    Please review the Heart Failure Care Pathway for additional HF goal outcomes.    A&O. VSS, on RA. No SOB reported. Pt has incision pain, PRN Percocet and ice pack given. PPM site CDI. Tele shows 100% A paced.  IV SL. Pt ambulates independently. Plan is for chest xray and discharge today. Will continue to monitor.     Mauro Currie RN  3/21/2018

## 2018-03-21 NOTE — PLAN OF CARE
Problem: Patient Care Overview  Goal: Plan of Care/Patient Progress Review  Discharge Planner OT   Patient plan for discharge: home  Current status: Pt able to complete approx 400 ft of functional mobility with stable CV response. Pt able to complete 10 steps with HR varied from , noted noisy signal with leads. Pt educated on CHF management/flowsheet and walking program. IND in room to complete g/h at sink, reports IND with toileting, LB dressing, etc.   Barriers to return to prior living situation: PPM  Recommendations for discharge: home with A with IADL's as needed ie driving per MD following PPM, heavier cleaning.   Rationale for recommendations: pt making good progress towards goals. A with IADL's as above to insure PPM precautions.        Entered by: Zoe Henry 03/21/2018 8:04 AM         Occupational Therapy Discharge Summary    Reason for therapy discharge:    Discharged to home.    Progress towards therapy goal(s). See goals on Care Plan in [facility] electronic health record for goal details.  Goals partially met.  Barriers to achieving goals:   discharge from facility.    Therapy recommendation(s):    No further therapy is recommended.

## 2018-03-21 NOTE — DISCHARGE INSTRUCTIONS
Pacemaker Implant Discharge Instructions     After you go home:      Have an adult stay with you until tomorrow.    You may resume your normal diet.       For 24 hours - due to the sedation you received:    Relax and take it easy.    Do NOT make any important or legal decisions.    Do NOT drive or operate machines at home or at work.    Do NOT drink alcohol.    Care of Chest Incision:      Keep the bandage on at least 3 days. You may remove the dressing on Friday. Change it only if it gets loose or soaked. If you need to change it, use 4x4-inch gauze and a large clear bandage.     If there is a pressure dressing (gauze & tape) - 24 hours after your procedure you may remove ONLY the top dressing. Leave the bottom dressing on.    Leave the strips of tape on. They will fall off on their own, or we will remove them at your first check-up.    Check your wound daily for signs of infection, such as increased redness, severe swelling or draining. Fever may also be a sign of infection. Call us if you see any of these signs.    If there are no signs of infection, you may shower after the bandage comes off in 3 days. If you take a tub bath, keep the wound dry.    No soaking the incision (swimming pool, bathtub, hot tub) for 2 weeks.    You may have mild to medium pain for 3 to 5 days. Take Acetaminophen (Tylenol) or Ibuprofen (Advil) for the pain. If the pain persists or is severe, call us.    Activity:      For at least 3 weeks: Do not raise your elbow above your shoulder. You can begin to use your arm as it feels comfortable to you.    Do not use arm on implant side to lift more than 10 pounds for 1 week.    In 6 to 8 weeks: You may begin to golf, play tennis, swim and do similar activities.    No driving for one day & limit to necessary driving for one week.    Bleeding:      If you start bleeding from the incision site, sit down and press firmly on the site for 10 minutes.     Once bleeding stops, call [facility] as  soon as you can.       Call 911 right away if you have heavy bleeding or bleeding that does not stop.      Medicines:      Take your medications, including blood thinners, unless your provider tells you not to.    If you have stopped any medicines, check with your provider about when to restart them.    Follow Up Appointments:      Follow up with Device Clinic at [facility] in [place] on 3/28/2018 at 2:30 pm.    Call the clinic if:      You have a large or growing hard lump around the site.    The site is red, swollen, hot or tender.    Blood or fluid is draining from the site.    You have chills or a fever greater than 101 F (38 C).    You feel dizzy or light-headed.    Questions or concerns    Telling others about your device:      Before you leave the hospital, you will receive a temporary ID card. A permanent card will be mailed to you about 6 to 8 weeks later. Always carry the ID card with you. It has important details about your device.    You may also get a Medical Alert bracelet or tag that says you have a pacemaker.  Go to [email or web address].     Always tell doctors, dentists and other care providers that you have a device implanted in you.    Let us know before you plan any surgeries. Your care team must take special steps to keep you safe during certain procedures. These steps will depend on the type of device you have. Your provider will need to see your ID card. They may need to call us for instructions.    Device Safety:      Please refer to device  s booklet for further information.        [facility]:    [phone] [facility] (7 days a week)  [phone] - Device Clinic          Discharge Instructions for Pacemaker Implantation  You have had a procedure to insert a pacemaker. Once inside your body, this small electronic device helps keep your heart from beating too slowly. A pacemaker can t fix existing heart problems. But it can help you feel  better and have more energy. As you recover, follow all of the instructions you are given, including those below.  Activity    Follow the instructions you are given about limiting your activity.    Do not raise your arm on the incision side above shoulder level for 3 weeks. This gives the device lead wires time to attach securely inside your heart.    Ask your doctor when you can expect to return to work.    You can still exercise. It s good for your body and your heart. Talk with your doctor about an exercise plan.  Other Precautions    Follow your doctor's directions carefully for wound care. You may remove the outer dressing on Friday. Leave the steri-strips in place; these will be removed at your 1 week follow-up. Never put any creams, lotions, or products like peroxide on an incision unless your doctor tells you to.     Before you receive any treatment, tell all health care providers (including your dentist) that you have a pacemaker.    You will be given an ID card that contains information about your pacemaker. Always carry this card with you. You can show this card if your pacemaker sets off a metal detector. You should also show it to avoid screening with a hand-held security wand.    Keep your cell phone away from your pacemaker. Don t carry the phone in your shirt pocket, even when it s turned off.    Avoid strong magnets. Examples are those used in MRIs or in hand-held security wands.    Avoid strong electrical fields. Examples are those made by radio transmitting towers,  ham  radios, and heavy-duty electrical equipment.    Avoid leaning over the open wright of a running car. A running engine creates an electrical field. Most household and yard appliances will not cause any problems. If you use any large power tools, such as an industrial , talk with your doctor.   Follow-Up    Follow up in the device clinic as scheduled. 3/28/2018 at 2:30    Make regular follow-up appointments with your  doctor.  When to Call Your Doctor  Call your doctor immediately if you have any of the following:    Dizziness    Chest pain    Fainting spells    Twitching chest muscles    Rapid pulse or pounding heartbeat    Shortness of breath    Pain around your pacemaker    Fever above 100.4 F (38 C) or other signs of infection (redness, swelling, drainage, or warmth at the incision site)     8961-9815 [employer]. [address] [place], [place] [id number]. All rights reserved. This information is not intended as a substitute for professional medical care. Always follow your healthcare professional's instructions.

## 2018-03-21 NOTE — PROGRESS NOTES
Cardiac Electrophysiology Progress Note          Assessment and Plan:     CHF (congestive heart failure) (H), diastolic due to recurrent AF with RVR, converted spontaneously with significant pause prompting ppm implantation. Nl device check and no pneumo on cxr. In nsr past 36 hours.   Continue with Flecainide, Eliquis and metoprolol. Will arrange for f/u. Ok to be discharged from our perspective.                 Interval History:   doing well; no cp, sob, n/v/d, or abd pain.              Review of Systems:   As per subjective, otherwise 5 systems reviewed and negative.           Physical Exam:   Blood pressure 111/67, pulse 105, temperature 97.5  F (36.4  C), temperature source Oral, resp. rate 16, weight 83.7 kg (184 lb 8.4 oz), SpO2 95 %, not currently breastfeeding.          Intake/Output Summary (Last 24 hours) at 03/21/18 1103  Last data filed at 03/21/18 0900   Gross per 24 hour   Intake             1100 ml   Output             1200 ml   Net             -100 ml       Constitutional:   NAD   Skin:   Warm and dry   Head:   Nontraumatic   Neck:   Supple, symmetrical, trachea midline, no adenopathy, thyroid symmetric, not enlarged and no tenderness, skin normal   Lungs:   normal   Cardiovascular:   Normal apical impulse, regular rate and rhythm, normal S1 and S2, no S3 or S4, and no murmur noted    Abdomen:   Benign   Extremities and Back:   No edema   Neurological:   Grossly nonfocal            Medications:       sodium chloride (PF)  3 mL Intracatheter Q8H     lisinopril  2.5 mg Oral Daily     metoprolol succinate  37.5 mg Oral BID     furosemide  20 mg Oral Daily     apixaban ANTICOAGULANT  5 mg Oral BID     cholecalciferol  1,000 Units Oral Daily     flecainide  100 mg Oral Q12H     simvastatin  20 mg Oral At Bedtime     Admission on 03/06/2018, Discharged on 03/09/2018   Component Date Value Ref Range Status     WBC 03/06/2018 14.0* 4.0 - 11.0 10e9/L Final     RBC Count 03/06/2018 4.71  3.8 - 5.2 10e12/L  Final     Hemoglobin 03/06/2018 13.6  11.7 - 15.7 g/dL Final     Hematocrit 03/06/2018 40.2  35.0 - 47.0 % Final     MCV 03/06/2018 85  78 - 100 fl Final     MCH 03/06/2018 28.9  26.5 - 33.0 pg Final     MCHC 03/06/2018 33.8  31.5 - 36.5 g/dL Final     RDW 03/06/2018 13.0  10.0 - 15.0 % Final     Platelet Count 03/06/2018 620* 150 - 450 10e9/L Final     Diff Method 03/06/2018 Automated Method   Final     % Neutrophils 03/06/2018 79.1  % Final     % Lymphocytes 03/06/2018 9.0  % Final     % Monocytes 03/06/2018 9.3  % Final     % Eosinophils 03/06/2018 0.5  % Final     % Basophils 03/06/2018 0.6  % Final     % Immature Granulocytes 03/06/2018 1.5  % Final     Nucleated RBCs 03/06/2018 0  0 /100 Final     Absolute Neutrophil 03/06/2018 11.1* 1.6 - 8.3 10e9/L Final     Absolute Lymphocytes 03/06/2018 1.3  0.8 - 5.3 10e9/L Final     Absolute Monocytes 03/06/2018 1.3  0.0 - 1.3 10e9/L Final     Absolute Eosinophils 03/06/2018 0.1  0.0 - 0.7 10e9/L Final     Absolute Basophils 03/06/2018 0.1  0.0 - 0.2 10e9/L Final     Abs Immature Granulocytes 03/06/2018 0.2  0 - 0.4 10e9/L Final     Absolute Nucleated RBC 03/06/2018 0.0   Final     Sodium 03/06/2018 135  133 - 144 mmol/L Final     Potassium 03/06/2018 3.4  3.4 - 5.3 mmol/L Final     Chloride 03/06/2018 99  94 - 109 mmol/L Final     Carbon Dioxide 03/06/2018 29  20 - 32 mmol/L Final     Anion Gap 03/06/2018 7  3 - 14 mmol/L Final     Glucose 03/06/2018 119* 70 - 99 mg/dL Final     Urea Nitrogen 03/06/2018 20  7 - 30 mg/dL Final     Creatinine 03/06/2018 0.91  0.52 - 1.04 mg/dL Final     GFR Estimate 03/06/2018 59* >60 mL/min/1.7m2 Final    Non  GFR Calc     GFR Estimate If Black 03/06/2018 72  >60 mL/min/1.7m2 Final    African American GFR Calc     Calcium 03/06/2018 9.0  8.5 - 10.1 mg/dL Final     Interpretation ECG 03/06/2018 Click View Image link to view waveform and result   Final     Procalcitonin 03/06/2018 0.13  ng/ml Final    Comment: 0.05-0.24  ng/ml Low risk of systemic bacterial infection. Local bacterial   infection possible.  Recommendation: Assess other clinical features of   infection. Discourage antibiotics unless strong clinical suspicion for serious   infection.       Troponin I ES 03/06/2018 <0.015  0.000 - 0.045 ug/L Final    Comment: The 99th percentile for upper reference range is 0.045 ug/L.  Troponin values   in the range of 0.045 - 0.120 ug/L may be associated with risks of adverse   clinical events.       TSH 03/06/2018 0.54  0.40 - 4.00 mU/L Final     WBC 03/06/2018 15.0* 4.0 - 11.0 10e9/L Final     RBC Count 03/06/2018 4.42  3.8 - 5.2 10e12/L Final     Hemoglobin 03/06/2018 12.6  11.7 - 15.7 g/dL Final     Hematocrit 03/06/2018 38.0  35.0 - 47.0 % Final     MCV 03/06/2018 86  78 - 100 fl Final     MCH 03/06/2018 28.5  26.5 - 33.0 pg Final     MCHC 03/06/2018 33.2  31.5 - 36.5 g/dL Final     RDW 03/06/2018 13.1  10.0 - 15.0 % Final     Platelet Count 03/06/2018 552* 150 - 450 10e9/L Final     Lactic Acid 03/06/2018 1.4  0.7 - 2.0 mmol/L Final     Sodium 03/07/2018 138  133 - 144 mmol/L Final     Potassium 03/07/2018 3.1* 3.4 - 5.3 mmol/L Final     Chloride 03/07/2018 104  94 - 109 mmol/L Final     Carbon Dioxide 03/07/2018 26  20 - 32 mmol/L Final     Anion Gap 03/07/2018 8  3 - 14 mmol/L Final     Glucose 03/07/2018 115* 70 - 99 mg/dL Final     Urea Nitrogen 03/07/2018 13  7 - 30 mg/dL Final     Creatinine 03/07/2018 0.82  0.52 - 1.04 mg/dL Final     GFR Estimate 03/07/2018 68  >60 mL/min/1.7m2 Final    Non  GFR Calc     GFR Estimate If Black 03/07/2018 82  >60 mL/min/1.7m2 Final    African American GFR Calc     Calcium 03/07/2018 8.3* 8.5 - 10.1 mg/dL Final     WBC 03/07/2018 12.6* 4.0 - 11.0 10e9/L Final     RBC Count 03/07/2018 4.00  3.8 - 5.2 10e12/L Final     Hemoglobin 03/07/2018 11.5* 11.7 - 15.7 g/dL Final     Hematocrit 03/07/2018 34.2* 35.0 - 47.0 % Final     MCV 03/07/2018 86  78 - 100 fl Final     MCH  2018 28.8  26.5 - 33.0 pg Final     MCHC 2018 33.6  31.5 - 36.5 g/dL Final     RDW 2018 13.2  10.0 - 15.0 % Final     Platelet Count 2018 463* 150 - 450 10e9/L Final     Heparin 10A Level 2018 <0.10  IU/mL Final    Comment: Therapeutic Range:    UFH:   0.15-0.35 IU/mL for low             intensity dosing           0.30-0.70 IU/mL for high             intensity dosing for             DVT and PE    Enoxaparin: If administered             once daily with dose             1.5mg/k.0-2.0 IU/mL                If administered             twice daily with dose             1mg/k.60-1.0 IU/mL       Heparin 10A Level 2018 <0.10  IU/mL Final    Comment: Therapeutic Range:    UFH:   0.15-0.35 IU/mL for low             intensity dosing           0.30-0.70 IU/mL for high             intensity dosing for             DVT and PE    Enoxaparin: If administered             once daily with dose             1.5mg/k.0-2.0 IU/mL                If administered             twice daily with dose             1mg/k.60-1.0 IU/mL       Potassium 2018 3.7  3.4 - 5.3 mmol/L Final     Lactic Acid 2018 0.7  0.7 - 2.0 mmol/L Final     Heparin 10A Level 2018 1.87* IU/mL Final    Comment: Therapeutic Range:    UFH:   0.15-0.35 IU/mL for low             intensity dosing           0.30-0.70 IU/mL for high             intensity dosing for             DVT and PE    Enoxaparin: If administered             once daily with dose             1.5mg/k.0-2.0 IU/mL                If administered             twice daily with dose             1mg/k.60-1.0 IU/mL  Critical Value:  Critical value if patient is receiving  unfractionated heparin: >1 IU/mL, critical  range does not apply if patient is receiving  low molecular weight heparin.  Critical Value called to and read back by  BELLA JUAREZ [place] [facility] [facility] BY ALK       Sodium 2018 137  133 - 144 mmol/L Final     Potassium 2018  4.2  3.4 - 5.3 mmol/L Final     Chloride 03/09/2018 105  94 - 109 mmol/L Final     Carbon Dioxide 03/09/2018 26  20 - 32 mmol/L Final     Anion Gap 03/09/2018 6  3 - 14 mmol/L Final     Glucose 03/09/2018 108* 70 - 99 mg/dL Final     Urea Nitrogen 03/09/2018 13  7 - 30 mg/dL Final     Creatinine 03/09/2018 0.76  0.52 - 1.04 mg/dL Final     GFR Estimate 03/09/2018 74  >60 mL/min/1.7m2 Final    Non  GFR Calc     GFR Estimate If Black 03/09/2018 89  >60 mL/min/1.7m2 Final    African American GFR Calc     Calcium 03/09/2018 8.8  8.5 - 10.1 mg/dL Final     Magnesium 03/09/2018 2.2  1.6 - 2.3 mg/dL Final     Interpretation ECG 03/09/2018 Click View Image link to view waveform and result   Final                  Roe Voss MD

## 2018-03-21 NOTE — DISCHARGE SUMMARY
[facility]    Discharge Summary  Hospitalist    Date of Admission:  3/14/2018  Date of Discharge:  3/21/2018  Discharging Provider: Eugene Rocha  Date of Service (when I saw the patient): 03/21/18    Discharge Diagnoses     Decompensated diastolic CHF   Afib RVR  Sinus bradycardia with 3.4 sec pause - S/p PPM 3/20  Dizziness  Fatigue  Suspected COPD    History of Present Illness   Hamida Verma is a 77 year old female who was recently admitted from 3/6 to 3/9 for new onset atrial fibrillation thought to be due to recent URI and converted to NSR prior to discharge and was discharged on Flecainide and Toprol  mg BID. She presented on 3/14 with increasing fatigue, SOB and orthopnea suspicious for heart failure exacerbatio    Hospital Course   Hamida Verma was admitted on 3/14/2018.  The following problems were addressed during her hospitalization:        Decompensated diastolic CHF   History is consistent with CHF with lower extremity edema, orthopnea and a CXR with interstitial edema and bilateral pleural effusions. Patient actually just had an echo during previous admission with an EF of 65% but unable to assess diastolic function due to being in atrial fibrillation at this time.  In the ED she was given 20 mg of IV Lasix. Cardiology consulted. Treated with Lasix to 20mg IV BID.  Repeat limited echo EF60-65%, mod pulm HTN.  Lisinopril 10 mg added 3/15. BP's a little soft the morning of 3/17 associated with some lightheadedness. Dose reduced to 5 mg daily and then to 2.5 mg daily 3/19.  No recurrent sx's since. Transitioned to lasix 20 mg po daily 3/17. low sodium diet, 1500ml fluid restriction encouraged. O2 weaned off 3/17. Close follow up with Cardiology arranged on discharge.       Afib RVR  Sinus bradycardia  Patient was diagnosed with new onset A fib during last admission and converted to NSR.  She was discharged on Flecainide and Toprol  mg BID.  Noted sinus joycelyn  50's. Metoprolol being reduced due to bradycardia, however 3/15 evening developed Afib RVR up to 150's, refractory to IV BB. Transferred to [facility] [place] on Diltiazem gtt. Cardiology / EP following.  Continued on Toprol-XL 50-->37.5 mg bid as of 3/19 and PTA Flecainide. Planned for Cardioversion/CAROLANN but had a 3.4 sec pause early am 3/20. Underwent dual changer PPM placement 3/20. Continued on PTA Eliquis.  Close device clinic follow up arranged on discharge.         Dizziness- Resolved.   Felt a little light headed am 3/17 after going to the bathroom. Was noted to still have a nitro patch on (placed the 14th) and this was removed. Doubt this was the cause.  BP's a little on the lower side and may be a little overmedicated and causing some orthostasis. Adjustments made above. No sx's 3/18.        Suspected COPD  Emphysematous changes seen on CT and known significant smoking hx (60 pack years) who quit 18 years ago. No formal dx of COPD or reported exacerbations. No previous treatment required. Will need formal PFT's completed as an outpt. Prn Albuterol in place.         Fatigue  Likely related to cardiac issues as above. TSH last admission normal. PT/OT consulted. To o continue with therapies on discharge.       HLP   - PTA Statin     # Discharge Pain Plan:  - During her hospitalization, Hamida experienced some pain due to her PPM placement.  The pain plan for discharge was discussed with Hamida and the plan was created in a collaborative fashion.    - Opioids prescribed on discharge: Oxycodone.   - Duration of opioids after discharge: Per CDC opioid prescribing guidelines, a 3 day prescription of opioids was provided.  - Bowel regimen: Miralax      Eugene Rocha        Significant Results and Procedures   See below    Pending Results   These results will be followed up by PCP, Cardiology  Unresulted Labs Ordered in the Past 30 Days of this Admission     No orders found from 1/13/2018 to 3/15/2018.          Code Status    Full Code       Primary Care Physician   Mikala Philip MD    Physical Exam   Temp: 97.7  F (36.5  C) Temp src: Oral BP: 101/55   Heart Rate: 60 Resp: 16 SpO2: 93 % O2 Device: None (Room air)    Vitals:    03/19/18 0308 03/20/18 0500 03/21/18 0500   Weight: 84.1 kg (185 lb 8 oz) 83.9 kg (184 lb 14.4 oz) 83.7 kg (184 lb 8.4 oz)     Vital Signs with Ranges  Temp:  [97.3  F (36.3  C)-98.1  F (36.7  C)] 97.7  F (36.5  C)  Heart Rate:  [60-69] 60  Resp:  [16-18] 16  BP: ()/(50-87) 101/55  SpO2:  [93 %-95 %] 93 %  I/O last 3 completed shifts:  In: 1100 [P.O.:1100]  Out: 1000 [Urine:1000]    Gen: Patient in no acute distress.  Appears comfortable.  Heart:  S1S2+, regular rate and rhythm, No murmurs.  Lungs:  Clear to auscultation, no wheezing, no rales.   Abdomen:  Soft, non tender, non distended, bowel sounds positive.  Extremities:  No edema.    Discharge Disposition   Discharged to home  Condition at discharge: Stable    Consultations This Hospital Stay   CORE CLINIC EVALUATION IP CONSULT  CARDIAC REHAB IP CONSULT  CARE COORDINATOR IP CONSULT  NUTRITION SERVICES ADULT IP CONSULT  CARDIOLOGY IP CONSULT  PHYSICAL THERAPY ADULT IP CONSULT  OCCUPATIONAL THERAPY ADULT IP CONSULT    Time Spent on this Encounter   I, Eugene Rocha, personally saw the patient today and spent greater than 30 minutes discharging this patient.    Discharge Orders     Follow-Up with Cardiac Advanced Practice Provider     Follow-up and recommended labs and tests    Follow up with primary care provider, Mikala Philip MD, within 7 days to evaluate medication change, to evaluate after surgery and for hospital follow- up.  The following labs/tests are recommended: Will need to follow up with her BP, HR, Kidney function and volume status.  - Cardiology / EP clinic follow up     Activity   Your activity upon discharge: activity as tolerated with no driving while on pain medications. Left arm restrictions as recommended by cardiology.      Full Code     Diet   Follow this diet upon discharge:      Fluid restriction 1500 ML FLUID     Combination Diet Low Saturated Fat Na <2400mg Diet       Discharge Medications   Current Discharge Medication List      START taking these medications    Details   acetaminophen (TYLENOL) 325 MG tablet Take 2 tablets (650 mg) by mouth every 4 hours as needed for mild pain or fever  Qty: 100 tablet, Refills: 0    Associated Diagnoses: Atrial fibrillation with rapid ventricular response (H)      albuterol (PROAIR HFA/PROVENTIL HFA/VENTOLIN HFA) 108 (90 BASE) MCG/ACT Inhaler Inhale 2 puffs into the lungs 4 times daily as needed for other (dyspnea)  Qty: 1 Inhaler, Refills: 3    Associated Diagnoses: SOB (shortness of breath)      lisinopril (PRINIVIL/ZESTRIL) 2.5 MG tablet Take 1 tablet (2.5 mg) by mouth daily  Qty: 30 tablet, Refills: 3    Comments: Hold for sbp < 100  Associated Diagnoses: Systolic congestive heart failure, unspecified congestive heart failure chronicity (H)      furosemide (LASIX) 20 MG tablet Take 1 tablet (20 mg) by mouth daily  Qty: 30 tablet, Refills: 3    Associated Diagnoses: Systolic congestive heart failure, unspecified congestive heart failure chronicity (H)      oxyCODONE IR (ROXICODONE) 5 MG tablet Take 1 tablet (5 mg) by mouth every 6 hours as needed for moderate to severe pain or severe pain maximum 6 tablet(s) per day  Qty: 12 tablet, Refills: 0    Associated Diagnoses: Atrial fibrillation with rapid ventricular response (H)      polyethylene glycol (MIRALAX) powder Take 17 g (1 capful) by mouth daily for 5 days  Qty: 85 g, Refills: 0    Comments: To use with Oxycodone to prevent constipation.  Hold for loose stools.  Associated Diagnoses: Atrial fibrillation with rapid ventricular response (H)         CONTINUE these medications which have CHANGED    Details   metoprolol succinate (TOPROL-XL) 25 MG 24 hr tablet Take 1.5 tablets (37.5 mg) by mouth 2 times daily  Qty: 90 tablet, Refills: 3     Comments: Hold for sbp < 100 or HR < 60  Associated Diagnoses: Atrial fibrillation with rapid ventricular response (H); Systolic congestive heart failure, unspecified congestive heart failure chronicity (H)         CONTINUE these medications which have NOT CHANGED    Details   flecainide (TAMBOCOR) 100 MG tablet Take 1 tablet (100 mg) by mouth every 12 hours  Qty: 60 tablet, Refills: 0    Associated Diagnoses: Atrial fibrillation with rapid ventricular response (H)      apixaban ANTICOAGULANT (ELIQUIS) 5 MG tablet Take 1 tablet (5 mg) by mouth 2 times daily  Qty: 60 tablet, Refills: 0    Associated Diagnoses: Atrial fibrillation with rapid ventricular response (H)      omega-3 acid ethyl esters (LOVAZA) 1 G capsule Take 2 g by mouth daily      fluticasone (FLONASE) 50 MCG/ACT spray Spray 1-2 sprays into both nostrils daily as needed for rhinitis or allergies      Methylsulfonylmethane (MSM) 500 MG CAPS Take 1 capsule by mouth daily      Multiple Vitamins-Minerals (HAIR SKIN NAILS PO) Take 1 tablet by mouth At Bedtime      alendronate (FOSAMAX) 70 MG tablet Take 1 tablet (70 mg) by mouth every 7 days Take 60 minutes before am meal with 8 oz. water. Remain upright for 30 minutes.  Qty: 12 tablet, Refills: 3    Associated Diagnoses: Osteoporosis      simvastatin (ZOCOR) 20 MG tablet Take 1 tablet (20 mg) by mouth At Bedtime Profile Rx: patient will contact pharmacy when needed  Qty: 90 tablet, Refills: 3    Associated Diagnoses: Hyperlipidemia LDL goal <130      cholecalciferol (VITAMIN  -D) 1000 UNIT capsule Take 1 capsule by mouth daily.      potassium & sodium phosphates 278-164-250 MG/75ML SOLR Take 1 tablet by mouth daily       Milk Thistle 200 MG CAPS Take 1 capsule by mouth daily       FLAX SEED OIL OR 1 capsule daily           Allergies   Allergies   Allergen Reactions     Strawberry Flavor      Data   Most Recent 3 CBC's:  Recent Labs   Lab Test  03/17/18   0550  03/16/18   0555  03/14/18   1049   WBC   11.4*  15.8*  14.0*   HGB  12.3  13.0  11.5*   MCV  88  86  87   PLT  721*  797*  665*      Most Recent 3 BMP's:  Recent Labs   Lab Test  03/20/18   0535  03/19/18   1055  03/18/18   0550  03/17/18   0550  03/16/18   0555   NA   --    --   139  138  140   POTASSIUM  4.0  3.7  3.5  3.9  3.7   CHLORIDE   --    --   105  103  105   CO2   --    --   26  29  28   BUN   --    --   21  15  11   CR   --    --   0.82  1.03  0.84   ANIONGAP   --    --   8  6  7   GURWINDER   --    --   8.1*  8.3*  8.6   GLC   --    --   102*  107*  116*     Most Recent 2 LFT's:  Recent Labs   Lab Test  03/14/18   1049  03/23/17   0804   AST  17  16   ALT  57*  24   ALKPHOS  94  66   BILITOTAL  0.5  0.4     Most Recent INR's and Anticoagulation Dosing History:  Anticoagulation Dose History     Recent Dosing and Labs Latest Ref Rng & Units 11/12/2014 3/19/2018    INR 0.86 - 1.14 1.04 1.31(H)        Most Recent 3 Troponin's:  Recent Labs   Lab Test  03/14/18   1049  03/06/18   1744  11/12/14   1212  11/12/14   0814  11/12/14   0813   TROPI  <0.015  <0.015   --   <0.015  The 99th percentile for upper reference range is 0.045 ug/L.  Troponin values in   the range of 0.045 - 0.120 ug/L may be associated with risks of adverse   clinical events.   Effective 7/30/2014, the reference range for this assay has changed to reflect   new instrumentation/methodology.     --    TROPONIN   --    --   0.00   --   0.01     Most Recent Cholesterol Panel:  Recent Labs   Lab Test  03/23/17   0804   CHOL  174   LDL  91   HDL  61   TRIG  109     Most Recent 6 Bacteria Isolates From Any Culture (See EPIC Reports for Culture Details):  Recent Labs   Lab Test  03/19/13   1041  02/09/11   1545  01/08/10   1404   CULT  >100,000 colonies/mL Escherichia coli  >100,000 colonies/mL Escherichia coli  >100,000 colonies/mL Escherichia coli     Most Recent TSH, T4 and A1c Labs:  Recent Labs   Lab Test  03/15/18   2025   TSH  0.64       Results for orders placed or performed during the  hospital encounter of 03/14/18   XR Chest 2 Views    Narrative    XR CHEST 2 VW 3/14/2018 11:33 AM    HISTORY: Possible CHF.    COMPARISON: 3/6/2018    FINDINGS: Interstitial edema and small bilateral pleural effusions.  Mild cardiomegaly.      Impression    IMPRESSION: Edema.    NIGHAT HAIR MD   US Lower Extremity Venous Duplex Left    Narrative    ULTRASOUND VENOUS LOWER EXTREMITY UNILATERAL LEFT  3/14/2018 6:12 PM     HISTORY: Unilateral left lower extremity edema.    COMPARISON: None.    TECHNIQUE: Ultrasound gray scale, Color Doppler flow, and spectral  Doppler waveform analysis performed.    FINDINGS: The left common femoral, superficial femoral, popliteal and  posterior tibial veins are patent and fully compressible and  demonstrate normal venous Doppler flow. The visualized greater  saphenous vein is negative for thrombus. For comparison the right  common femoral vein was evaluated and was unremarkable.      Impression    IMPRESSION: No deep venous thrombosis demonstrated.    ANH HOWARD MD   CT Chest w Contrast    Narrative    CT CHEST WITH CONTRAST   3/14/2018 9:02 PM     HISTORY: Dyspnea.    COMPARISON: None.    TECHNIQUE: Following the uneventful administration of 75 mL Isovue  -370 intravenous contrast, helical sections were acquired through the  lungs. Coronal reconstructions were generated. Radiation dose for this  scan was reduced using automated exposure control, adjustment of the  mA and/or kV according to the patient's size, or iterative  reconstruction technique.    FINDINGS: Mild to moderate emphysematous changes in the lungs. Small  bilateral pleural effusion with adjacent atelectasis. Apparent mild  interstitial thickening throughout both lungs. No pericardial  effusion. A few nonspecific borderline-enlarged mediastinal lymph  nodes. Mild cardiomegaly. Atherosclerotic calcification in the  thoracic aorta and coronary arteries.    Scan through the upper abdomen is significant for a  tiny  low-attenuation lesion in the liver that is too small to characterize.  Mild multifocal bilateral renal atrophy. 3.5 cm cyst in the upper pole  of the right kidney. 0.2 cm nonobstructing calculus in the upper pole  of the left kidney. 0.5 cm gallstone in the gallbladder.      Impression    IMPRESSION:   1. Small bilateral pleural effusions with adjacent atelectasis.  2. Apparent mild interstitial thickening throughout both lungs. This  is equivocal for interstitial pulmonary edema.  3. Mild to moderate emphysematous changes in the lungs.  4. A few nonspecific borderline enlarged mediastinal lymph nodes.    CHUY GOFF MD   X-ray Chest 2 vws*    Narrative    CHEST TWO VIEWS 3/21/2018 8:55 AM     HISTORY: Check for pneumothorax and lead position.    COMPARISON: 3/14/2018.    FINDINGS: Interval placement of left-sided pacer device. Leads are  projected over the anterior right ventricle and the anterior right  atrium. No pneumothorax on either side. There is a small left pleural  effusion with minor basilar atelectasis.       Impression    IMPRESSION: Left-sided pacer placement as above. No pneumothorax.     VILMA DURHAM MD

## 2018-03-22 ENCOUNTER — TELEPHONE (OUTPATIENT)
Dept: FAMILY MEDICINE | Facility: CLINIC | Age: 78
End: 2018-03-22

## 2018-03-22 ENCOUNTER — CARE COORDINATION (OUTPATIENT)
Dept: CARDIOLOGY | Facility: CLINIC | Age: 78
End: 2018-03-22

## 2018-03-22 DIAGNOSIS — I50.20 SYSTOLIC CONGESTIVE HEART FAILURE, UNSPECIFIED CONGESTIVE HEART FAILURE CHRONICITY: Primary | ICD-10-CM

## 2018-03-22 NOTE — PROGRESS NOTES
Patient was evaluated by cardiology while inpatient for CHF, afib with RVR (s/p PPM placement). Called patient to discuss any post hospital d/c questions she may have, review medication changes, and confirm f/u appts.Patient denied any questions regarding new medications or changes to some of her current medications that she was taking prior to admission. Patient denied any SOB, chest pain, or light headedness. RN confirmed with patient that she has an apt scheduled on 3/28/18 with GORGE Kasie Alcocer (2:30pm device check, and 3:10pm with GORGE). Patient advised to call clinic with any cardiac related questions or concerns prior to her apt on 3/28/18. Patient verbalized understanding and agreed with plan.

## 2018-03-22 NOTE — PROGRESS NOTES
CORE Consult received from Hospitalist. Pt discharged yesterday. Reviewed chart, pt admitted with CHF and Afib/bradycardia. PPM implanted 3/20/18. Pt followed by Cardiology as inpatient. Pt has appt scheduled with EP GORGE Kasie Alcocer NP on 3/28/18 for hospital follow up. Discussed pt with ARIAN Ferro. CHF thought mostly related to Afib. Kasie will see pt on 3/28 and decide if CORE is needed at that visit. DARWIN Joshi 8:48 AM 03/22/18

## 2018-03-23 ENCOUNTER — PATIENT OUTREACH (OUTPATIENT)
Dept: CARE COORDINATION | Facility: CLINIC | Age: 78
End: 2018-03-23

## 2018-03-23 NOTE — TELEPHONE ENCOUNTER
"ED/Discharge Protocol    \"Hi, my name is HERON Sanches RN, a registered nurse, and I am calling on behalf of Dr. Philip's office at [place].  I am calling to follow up and see how things are going for you after your recent visit.\"    \"I see that you were in the (ER/UC/IP) and discharged 3/21/18    How are you doing now that you are home?\" fine though a little tired    Is patient experiencing symptoms that may require a hospital visit?  no    Discharge Instructions    \"Let's review your discharge instructions.  What is/are the follow-up recommendations?  Pt. Response: I made my follow-up appointment    \"Were you instructed to make a follow-up appointment?\"  Pt. Response: Yes.  Has appointment been made?   Yes, has appointment for Monday      \"When you see the provider, I would recommend that you bring your discharge instructions with you.    Medications    \"How many new medications are you on since your hospitalization/ED visit?\"    3 - Lisinopril, Lasix, 3 days of pain meds and Miralax  \"How many of your current medicines changed (dose, timing, name, etc.) while you were in the hospital/ED visit?\"   0-1 - Metoprolol  \"Do you have questions about your medications?\"   No  \"Were you newly diagnosed with heart failure, COPD, diabetes or did you have a heart attack?\"   No  For patients on insulin: \"Did you start on insulin in the hospital or did you have your insulin dose changed?\"   No    Medication reconciliation completed? No, due to Patient not sure what meds were started.  States her daughter is managing medication set up for her.      Was MTM referral placed (*Make sure to put transitions as reason for referral)?   No - order is pended though    Call Summary    \"Do you have any questions or concerns about your condition or care plan at the moment?\"    No  Triage nurse advice given: Bring discharge paperwork with you to your appoitnment    Patient was in ER  in the past year (assess appropriateness of ER visits.)  " "    \"If you have questions or things don't continue to improve, we encourage you contact us through the main clinic number,  [phone].  Even if the clinic is not open, triage nurses are available 24/7 to help you.     We would like you to know that our clinic has extended hours (provide information).  We also have urgent care (provide details on closest location and hours/contact info)\"      \"Thank you for your time and take care!\"      "

## 2018-03-23 NOTE — TELEPHONE ENCOUNTER
ED / Discharge Outreach Protocol    Patient Contact    Attempt # 1    Was call answered?  No.  Left message on voicemail with information to call triage back.  DANIEL BlankN, RN

## 2018-03-26 ENCOUNTER — OFFICE VISIT (OUTPATIENT)
Dept: FAMILY MEDICINE | Facility: CLINIC | Age: 78
End: 2018-03-26
Payer: COMMERCIAL

## 2018-03-26 VITALS
TEMPERATURE: 97.8 F | RESPIRATION RATE: 12 BRPM | DIASTOLIC BLOOD PRESSURE: 58 MMHG | WEIGHT: 186 LBS | BODY MASS INDEX: 31.93 KG/M2 | SYSTOLIC BLOOD PRESSURE: 96 MMHG | HEART RATE: 66 BPM | OXYGEN SATURATION: 97 %

## 2018-03-26 DIAGNOSIS — I50.20 SYSTOLIC CONGESTIVE HEART FAILURE, UNSPECIFIED CONGESTIVE HEART FAILURE CHRONICITY: Primary | ICD-10-CM

## 2018-03-26 DIAGNOSIS — I48.91 ATRIAL FIBRILLATION WITH RAPID VENTRICULAR RESPONSE (H): ICD-10-CM

## 2018-03-26 DIAGNOSIS — Z87.891 PERSONAL HISTORY OF TOBACCO USE, PRESENTING HAZARDS TO HEALTH: ICD-10-CM

## 2018-03-26 DIAGNOSIS — Z95.0 S/P PLACEMENT OF CARDIAC PACEMAKER: ICD-10-CM

## 2018-03-26 DIAGNOSIS — I10 HYPERTENSION GOAL BP (BLOOD PRESSURE) < 140/90: ICD-10-CM

## 2018-03-26 DIAGNOSIS — E78.5 HYPERLIPIDEMIA LDL GOAL <130: ICD-10-CM

## 2018-03-26 PROCEDURE — 99495 TRANSJ CARE MGMT MOD F2F 14D: CPT | Performed by: FAMILY MEDICINE

## 2018-03-26 PROCEDURE — 80048 BASIC METABOLIC PNL TOTAL CA: CPT | Performed by: FAMILY MEDICINE

## 2018-03-26 PROCEDURE — 36415 COLL VENOUS BLD VENIPUNCTURE: CPT | Performed by: FAMILY MEDICINE

## 2018-03-26 NOTE — PATIENT INSTRUCTIONS
1. Avoid drinking water before bed and elevate your legs for an hour after dinner to help reduce nighttime urination. You may also try mild compression socks from the pharmacy for ankle swelling.   2. Continue walking daily and elevating your legs while sitting. Try to avoid standing for long periods of time.  3. Avoid processed foods and pay attention to the amount of salt in the foods you buy, try not to salt food while cooking.    4. Schedule your lung function test.

## 2018-03-26 NOTE — LETTER
March 27, 2018      Hamida Verma  [address]  [facility] [address]        Dear ,    We are writing to inform you of your test results.    Your lab results have returned and looked good. Your calcium was just a tiny bit low. I recommend rechecking this at your next visit with me. Please let me know if you have any questions or concerns.    Resulted Orders   Basic metabolic panel   Result Value Ref Range    Sodium 143 133 - 144 mmol/L    Potassium 3.5 3.4 - 5.3 mmol/L    Chloride 107 94 - 109 mmol/L    Carbon Dioxide 31 20 - 32 mmol/L    Anion Gap 5 3 - 14 mmol/L    Glucose 76 70 - 99 mg/dL      Comment:      Non Fasting    Urea Nitrogen 11 7 - 30 mg/dL    Creatinine 0.83 0.52 - 1.04 mg/dL    GFR Estimate 66 >60 mL/min/1.7m2      Comment:      Non  GFR Calc    GFR Estimate If Black 80 >60 mL/min/1.7m2      Comment:       GFR Calc    Calcium 8.4 (L) 8.5 - 10.1 mg/dL     If you have any questions or concerns, please call the clinic at the number listed above.     Sincerely,    Mikala Philip MD/nr

## 2018-03-26 NOTE — MR AVS SNAPSHOT
After Visit Summary   3/26/2018    Hamida Verma    MRN: [id number]           Patient Information     Date Of Birth          1940        Visit Information        Provider Department      3/26/2018 10:00 AM Mikala Philip MD [facility] [place] 2]        Today's Diagnoses     Systolic congestive heart failure, unspecified congestive heart failure chronicity (H)    -  1    Atrial fibrillation with rapid ventricular response (H)        S/P placement of cardiac pacemaker        Hypertension goal BP (blood pressure) < 140/90        Hyperlipidemia LDL goal <130        Personal history of tobacco use, presenting hazards to health          Care Instructions    1. Avoid drinking water before bed and elevate your legs for an hour after dinner to help reduce nighttime urination. You may also try mild compression socks from the pharmacy for ankle swelling.   2. Continue walking daily and elevating your legs while sitting. Try to avoid standing for long periods of time.  3. Avoid processed foods and pay attention to the amount of salt in the foods you buy, try not to salt food while cooking.    4. Schedule your lung function test.           Follow-ups after your visit        Your next 10 appointments already scheduled     Mar 28, 2018  2:30 PM CDT   Pacemaker Check with NAWAF DE LA CRUZ   [facility] ([facility])    [facility] [address] [address] [phone] OPT 2            Mar 28, 2018  3:10 PM CDT   Return Visit with NELY Rosario CNP   [facility] ([facility])    [address] [address] [phone] OPT 2              Future tests that were ordered for you today     Open Future Orders        Priority Expected Expires Ordered    General PFT Lab (Please always keep checked) Routine  3/26/2019 3/26/2018    Pulmonary Function Test Routine   "3/26/2019 3/26/2018            Who to contact     If you have questions or need follow up information about today's clinic visit or your schedule please contact [facility] GUDELIA directly at [phone].  Normal or non-critical lab and imaging results will be communicated to you by MyChart, letter or phone within 4 business days after the clinic has received the results. If you do not hear from us within 7 days, please contact the clinic through [employer] or phone. If you have a critical or abnormal lab result, we will notify you by phone as soon as possible.  Submit refill requests through [employer] or call your pharmacy and they will forward the refill request to us. Please allow 3 business days for your refill to be completed.          Additional Information About Your Visit        [employer] Information     [employer] lets you send messages to your doctor, view your test results, renew your prescriptions, schedule appointments and more. To sign up, go to [email or web address] . Click on \"Log in\" on the left side of the screen, which will take you to the Welcome page. Then click on \"Sign up Now\" on the right side of the page.     You will be asked to enter the access code listed below, as well as some personal information. Please follow the directions to create your username and password.     Your access code is: [email or web address]  Expires: 2018  2:02 PM     Your access code will  in 90 days. If you need help or a new code, please call your [place] clinic or [phone].        Care EveryWhere ID     This is your Care EveryWhere ID. This could be used by other organizations to access your [place] medical records  [id number]        Your Vitals Were     Pulse Temperature Respirations Last Period Pulse Oximetry BMI (Body Mass Index)    66 97.8  F (36.6  C) (Oral) 12 (LMP Unknown) 97% 31.93 kg/m2       Blood Pressure from Last 3 Encounters:   18 96/58   18 101/55   18 139/67    Weight " from Last 3 Encounters:   03/26/18 186 lb (84.4 kg)   03/21/18 184 lb 8.4 oz (83.7 kg)   03/12/18 194 lb (88 kg)              We Performed the Following     Basic metabolic panel        Primary Care Provider Office Phone # Fax #    Mikala Philip [phone] [address]  [facility] [address]        Equal Access to Services     [facility] FarmDANAY : Hadii aad ku hadasho Soomaali, waaxda luqadaha, qaybta kaalmada adeegyada, waxay idiin hayaan adeeg khnigelsh la'aan . So [facility] [phone].    ATENCIÓN: Si habla español, tiene a ivy disposición servicios gratuitos de asistencia lingüística. Llame al [phone].    We comply with applicable federal civil rights laws and [place] laws. We do not discriminate on the basis of race, color, national origin, age, disability, sex, sexual orientation, or gender identity.            Thank you!     Thank you for choosing [facility]  for your care. Our goal is always to provide you with excellent care. Hearing back from our patients is one way we can continue to improve our services. Please take a few minutes to complete the written survey that you may receive in the mail after your visit with us. Thank you!             Your Updated Medication List - Protect others around you: Learn how to safely use, store and throw away your medicines at www.disposemymeds.org.          This list is accurate as of 3/26/18 10:44 AM.  Always use your most recent med list.                   Brand Name Dispense Instructions for use Diagnosis    acetaminophen 325 MG tablet    TYLENOL    100 tablet    Take 2 tablets (650 mg) by mouth every 4 hours as needed for mild pain or fever    Atrial fibrillation with rapid ventricular response (H)       albuterol 108 (90 BASE) MCG/ACT Inhaler    PROAIR HFA/PROVENTIL HFA/VENTOLIN HFA    1 Inhaler    Inhale 2 puffs into the lungs 4 times daily as needed for other (dyspnea)    SOB (shortness of breath)       alendronate 70 MG  tablet    FOSAMAX    12 tablet    Take 1 tablet (70 mg) by mouth every 7 days Take 60 minutes before am meal with 8 oz. water. Remain upright for 30 minutes.    Osteoporosis       apixaban ANTICOAGULANT 5 MG tablet    ELIQUIS    60 tablet    Take 1 tablet (5 mg) by mouth 2 times daily    Atrial fibrillation with rapid ventricular response (H)       cholecalciferol 1000 UNITS capsule    vitamin  -D     Take 1 capsule by mouth daily.        FLAX SEED OIL PO      1 capsule daily        flecainide 100 MG tablet    TAMBOCOR    60 tablet    Take 1 tablet (100 mg) by mouth every 12 hours    Atrial fibrillation with rapid ventricular response (H)       fluticasone 50 MCG/ACT spray    FLONASE     Spray 1-2 sprays into both nostrils daily as needed for rhinitis or allergies        furosemide 20 MG tablet    LASIX    30 tablet    Take 1 tablet (20 mg) by mouth daily    Systolic congestive heart failure, unspecified congestive heart failure chronicity (H)       HAIR SKIN NAILS PO      Take 1 tablet by mouth At Bedtime        lisinopril 2.5 MG tablet    PRINIVIL/Zestril    30 tablet    Take 1 tablet (2.5 mg) by mouth daily    Systolic congestive heart failure, unspecified congestive heart failure chronicity (H)       metoprolol succinate 25 MG 24 hr tablet    TOPROL-XL    90 tablet    Take 1.5 tablets (37.5 mg) by mouth 2 times daily    Atrial fibrillation with rapid ventricular response (H), Systolic congestive heart failure, unspecified congestive heart failure chronicity (H)       Milk Thistle 200 MG Caps      Take 1 capsule by mouth daily         MG Caps      Take 1 capsule by mouth daily        omega-3 acid ethyl esters 1 G capsule    Lovaza     Take 2 g by mouth daily        polyethylene glycol powder    MIRALAX    85 g    Take 17 g (1 capful) by mouth daily for 5 days    Atrial fibrillation with rapid ventricular response (H)       potassium & sodium phosphates 278-164-250 MG/75ML Solr      Take 1 tablet by mouth  daily        simvastatin 20 MG tablet    ZOCOR    90 tablet    Take 1 tablet (20 mg) by mouth At Bedtime Profile Rx: patient will contact pharmacy when needed    Hyperlipidemia LDL goal <130

## 2018-03-26 NOTE — PROGRESS NOTES
SUBJECTIVE:   Hamida Verma is a 77 year old female who presents to clinic today for the following health issues:      Hospital Follow-up Visit:    Hospital/Nursing Home/IP Rehab Facility: [facility]  Date of Admission: 3/14/18  Date of Discharge: 3/21/18  Reason(s) for Admission: CHF            Problems taking medications regularly:  None       Medication changes since discharge: UTD in Epic       Problems adhering to non-medication therapy:  None    Summary of hospitalization:  [facility] discharge summary reviewed  Diagnostic Tests/Treatments reviewed.  Follow up needed: cardiology, bmp, pfts  Other Healthcare Providers Involved in Patient s Care:        cardiology  Update since discharge: improved.      Post Discharge Medication Reconciliation: discharge medications reconciled, continue medications without change.  Plan of care communicated with patient     Coding guidelines for this visit:  Type of Medical   Decision Making Face-to-Face Visit       within 7 Days of discharge Face-to-Face Visit        within 14 days of discharge   Moderate Complexity [id number]   High Complexity [id number]          Problem list and histories reviewed & adjusted, as indicated.  Additional history: as documented    Patient reports to clinic with her granddaughter. She notes her fatigue has greatly decreased and is feeling much better. She gets tired after walking for some time, much better. (She was walking around [place] with granddaughter yesterday and had to sit down when she got fatigued.) She reports her pacemaker surgical insertion site is improving. She is taking her medications as directed. Denies lightheadedenss, chest pain, sob, significant woo, orthopnea, PND, worsening leg swelling or increase in weight.     Granddaughter is concerned about her grandmother's left leg swelling intermittently. Denies swelling or redness since then. She has been walking when possible and when sitting she  is elevating her legs.    She reports waking up to urinate at night once. She is weighing herself regularly at home. Reports eating a bit of [employer] after being in the hospital for a week and reports that it was just once and will not be doing it again. Otherwise she is adjusting to a low salt diet. She is also lowering her food intake as well.     She sleeps with one pillow on a flat bed. Sometimes she will use a second pillow to place on top of her, not to prop herself up.    Patient Active Problem List   Diagnosis     Symptomatic menopausal or female climacteric states     HYPERLIPIDEMIA LDL GOAL <130     Hypertension goal BP (blood pressure) < 140/90     Knee pain     Obesity     Hypertension goal BP (blood pressure) < 150/90     Atrial fibrillation with rapid ventricular response (H)     CHF (congestive heart failure) (H)     Past Surgical History:   Procedure Laterality Date     HYSTERECTOMY, VAGINAL      hysterectomy       Social History   Substance Use Topics     Smoking status: Former Smoker     Quit date: 9/1/2000     Smokeless tobacco: Never Used      Comment: quit 6yrs ago     Alcohol use No     Family History   Problem Relation Age of Onset     CEREBROVASCULAR DISEASE Mother      Eye Disorder Mother      Myocardial Infarction Mother      C.A.D. Father      heart attack     Alcohol/Drug Father      alcohol     Blood Disease Sister      lupus     Depression Sister      DIABETES No family hx of      Breast Cancer No family hx of      Cancer - colorectal No family hx of          Current Outpatient Prescriptions   Medication Sig Dispense Refill     acetaminophen (TYLENOL) 325 MG tablet Take 2 tablets (650 mg) by mouth every 4 hours as needed for mild pain or fever 100 tablet 0     albuterol (PROAIR HFA/PROVENTIL HFA/VENTOLIN HFA) 108 (90 BASE) MCG/ACT Inhaler Inhale 2 puffs into the lungs 4 times daily as needed for other (dyspnea) 1 Inhaler 3     lisinopril (PRINIVIL/ZESTRIL) 2.5 MG tablet Take 1 tablet  (2.5 mg) by mouth daily 30 tablet 3     metoprolol succinate (TOPROL-XL) 25 MG 24 hr tablet Take 1.5 tablets (37.5 mg) by mouth 2 times daily 90 tablet 3     furosemide (LASIX) 20 MG tablet Take 1 tablet (20 mg) by mouth daily 30 tablet 3     polyethylene glycol (MIRALAX) powder Take 17 g (1 capful) by mouth daily for 5 days 85 g 0     flecainide (TAMBOCOR) 100 MG tablet Take 1 tablet (100 mg) by mouth every 12 hours 60 tablet 0     apixaban ANTICOAGULANT (ELIQUIS) 5 MG tablet Take 1 tablet (5 mg) by mouth 2 times daily 60 tablet 0     omega-3 acid ethyl esters (LOVAZA) 1 G capsule Take 2 g by mouth daily       fluticasone (FLONASE) 50 MCG/ACT spray Spray 1-2 sprays into both nostrils daily as needed for rhinitis or allergies       Methylsulfonylmethane (MSM) 500 MG CAPS Take 1 capsule by mouth daily       Multiple Vitamins-Minerals (HAIR SKIN NAILS PO) Take 1 tablet by mouth At Bedtime       alendronate (FOSAMAX) 70 MG tablet Take 1 tablet (70 mg) by mouth every 7 days Take 60 minutes before am meal with 8 oz. water. Remain upright for 30 minutes. 12 tablet 3     simvastatin (ZOCOR) 20 MG tablet Take 1 tablet (20 mg) by mouth At Bedtime Profile Rx: patient will contact pharmacy when needed 90 tablet 3     cholecalciferol (VITAMIN  -D) 1000 UNIT capsule Take 1 capsule by mouth daily.       potassium & sodium phosphates 278-164-250 MG/75ML SOLR Take 1 tablet by mouth daily        Milk Thistle 200 MG CAPS Take 1 capsule by mouth daily        FLAX SEED OIL OR 1 capsule daily       Allergies   Allergen Reactions     Strawberry Flavor      Recent Labs   Lab Test  03/20/18   0535  03/19/18   1055  03/18/18   0550  03/17/18   0550   03/15/18   2025   03/14/18   1049   03/06/18   1744  03/23/17   0804  05/23/16   1056  05/07/15   0824   LDL   --    --    --    --    --    --    --    --    --    --   91  103*  104   HDL   --    --    --    --    --    --    --    --    --    --   61  66  70   TRIG   --    --    --    --     --    --    --    --    --    --   109  104  102   ALT   --    --    --    --    --    --    --   57*   --    --   24  26   --    CR   --    --   0.82  1.03   < >  0.83   < >  0.83   < >  0.91  0.71  0.77   --    GFRESTIMATED   --    --   67  52*   < >  66   < >  66   < >  59*  79  73   --    GFRESTBLACK   --    --   81  63   < >  80   < >  80   < >  72  >90   GFR Calc    88   --    POTASSIUM  4.0  3.7  3.5  3.9   < >  3.5   < >  3.6   < >  3.4  3.7  4.1   --    TSH   --    --    --    --    --   0.64   --    --    --   0.54   --    --    --     < > = values in this interval not displayed.      BP Readings from Last 3 Encounters:   03/26/18 96/58   03/21/18 101/55   03/12/18 139/67    Wt Readings from Last 3 Encounters:   03/26/18 84.4 kg (186 lb)   03/21/18 83.7 kg (184 lb 8.4 oz)   03/12/18 88 kg (194 lb)        Reviewed and updated as needed this visit by clinical staff  Tobacco  Allergies  Med Hx  Surg Hx  Fam Hx  Soc Hx      Reviewed and updated as needed this visit by Provider       ROS:  See above    This document serves as a record of the services and decisions personally performed and made by Mikala Philip MD. It was created on his/her behalf by Cookie Vaca, trained medical scribe. The creation of this document is based the provider's statements to the medical scribes.    Scribraghu Vaca, March 26, 2018    OBJECTIVE:     BP 96/58  Pulse 66  Temp 97.8  F (36.6  C) (Oral)  Resp 12  Wt 84.4 kg (186 lb)  LMP  (LMP Unknown)  SpO2 97%  BMI 31.93 kg/m2  Body mass index is 31.93 kg/(m^2).     GENERAL: alert and no distress  RESP: lungs clear to auscultation - no rales, rhonchi or wheezes  CV: regular rate and rhythm, normal S1 S2,  no murmur, click or rub, trace RLE edema, 1+ LLE     Diagnostic Test Results:  none     ASSESSMENT/PLAN:     1. Systolic congestive heart failure, unspecified congestive heart failure chronicity (H)  Much improved since her hospitalization! Energy  is improving. Denies HF symptoms today. Leg swelling has gone down and weight stable. Will continue current medications.   - Basic metabolic panel    2. Atrial fibrillation with rapid ventricular response (H)  3. S/P placement of cardiac pacemaker  Doing well post pacer placement and has pacer check and cardiology appointment scheduled. Has not needed to use medication for pain post procedure.   Continues on eliquis, flecainide, toprol.     4. Hypertension goal BP (blood pressure) < 140/90  Controlled, no changes today   Continues on lisinopril 2.5mg daily  Metoprolol 37.5mg bid  Furosemide 20mg daily.     5. Hyperlipidemia LDL goal <130  Stable, no changes   Continues on simvastatin 20mg daily    6. Personal history of tobacco use, presenting hazards to health  Emphysematous changes on CT with 60 pack year smoking history, now tobacco free x 18 years. PFTs recommended.   - General PFT Lab (Please always keep checked); Future  - Pulmonary Function Test; Future    1. Avoid drinking water before bed and elevate your legs for an hour after dinner to help reduce nighttime urination. You may also try mild compression socks from the pharmacy for ankle swelling.   2. Continue walking daily and elevating your legs while sitting. Try to avoid standing for long periods of time.  3. Avoid processed foods and pay attention to the amount of salt in the foods you buy, try not to salt food while cooking.    4. Schedule your lung function test.     The information in this document, created by the medical scribe for me, accurately reflects the services I personally performed and the decisions made by me. I have reviewed and approved this document for accuracy. 03/26/18    Mikala Philip MD  [facility]

## 2018-03-27 LAB
ANION GAP SERPL CALCULATED.3IONS-SCNC: 5 MMOL/L (ref 3–14)
BUN SERPL-MCNC: 11 MG/DL (ref 7–30)
CALCIUM SERPL-MCNC: 8.4 MG/DL (ref 8.5–10.1)
CHLORIDE SERPL-SCNC: 107 MMOL/L (ref 94–109)
CO2 SERPL-SCNC: 31 MMOL/L (ref 20–32)
CREAT SERPL-MCNC: 0.83 MG/DL (ref 0.52–1.04)
GFR SERPL CREATININE-BSD FRML MDRD: 66 ML/MIN/1.7M2
GLUCOSE SERPL-MCNC: 76 MG/DL (ref 70–99)
POTASSIUM SERPL-SCNC: 3.5 MMOL/L (ref 3.4–5.3)
SODIUM SERPL-SCNC: 143 MMOL/L (ref 133–144)

## 2018-03-28 ENCOUNTER — OFFICE VISIT (OUTPATIENT)
Dept: CARDIOLOGY | Facility: CLINIC | Age: 78
End: 2018-03-28
Payer: COMMERCIAL

## 2018-03-28 ENCOUNTER — ALLIED HEALTH/NURSE VISIT (OUTPATIENT)
Dept: CARDIOLOGY | Facility: CLINIC | Age: 78
End: 2018-03-28
Payer: COMMERCIAL

## 2018-03-28 VITALS
HEIGHT: 64 IN | HEART RATE: 64 BPM | WEIGHT: 186 LBS | SYSTOLIC BLOOD PRESSURE: 124 MMHG | DIASTOLIC BLOOD PRESSURE: 80 MMHG | BODY MASS INDEX: 31.76 KG/M2

## 2018-03-28 DIAGNOSIS — R42 DIZZINESS: ICD-10-CM

## 2018-03-28 DIAGNOSIS — I50.20 SYSTOLIC CONGESTIVE HEART FAILURE, UNSPECIFIED CONGESTIVE HEART FAILURE CHRONICITY: ICD-10-CM

## 2018-03-28 DIAGNOSIS — I48.91 ATRIAL FIBRILLATION WITH RAPID VENTRICULAR RESPONSE (H): ICD-10-CM

## 2018-03-28 DIAGNOSIS — I10 HYPERTENSION GOAL BP (BLOOD PRESSURE) < 150/90: ICD-10-CM

## 2018-03-28 DIAGNOSIS — Z95.0 CARDIAC PACEMAKER IN SITU: Primary | ICD-10-CM

## 2018-03-28 DIAGNOSIS — I49.5 SICK SINUS SYNDROME (H): ICD-10-CM

## 2018-03-28 PROCEDURE — 99214 OFFICE O/P EST MOD 30 MIN: CPT | Mod: 25 | Performed by: NURSE PRACTITIONER

## 2018-03-28 PROCEDURE — 93280 PM DEVICE PROGR EVAL DUAL: CPT | Performed by: INTERNAL MEDICINE

## 2018-03-28 RX ORDER — METOPROLOL SUCCINATE 50 MG/1
50 TABLET, EXTENDED RELEASE ORAL 2 TIMES DAILY
Qty: 180 TABLET | Refills: 3 | Status: SHIPPED | OUTPATIENT
Start: 2018-03-28 | End: 2019-05-02

## 2018-03-28 NOTE — LETTER
3/28/2018    Mikala Philip MD, MD  [address]  [facility] [address]    RE: Hamida Verma       Dear Colleague,    I had the pleasure of seeing Hamida Verma in the [facility].    HPI:  Hamida Verma is a 77 year old female is a patient of Dr. Voss with past medical history significant for paroxysmal atrial fibrillation, HTN, hyperlipidemia admitted on 3/14/2018 with SOB, orthopnea, and dizziness and found to be in sinus bradycardia. She was first diagnosised with atrial fibrillation 2017 after her son  and started on a BB.  She is unsure if she remained in atrial fibrillation or sinus for the past year.  On 3/6 she was found to be in atrial fibrillation with RVR while being treated for upper respiratory infection and was started on metoprolol, flecainide, and Eliquis. She underwent a Lexiscan on 3/6/18 which showed no ischemia.   She was subsequently loaded with flecainide 200 mg daily then 100 mg BID along with 100 mg metoprolol succinate BID and while being sedated for CAROLANN on 3/7/18 she had a 3.4 second pause and then converted to sinus rhythm in the 60s.  She returned to the hospital on 3/14 with HF symptoms of orthopnea and LE edema; found to be in bradycardia subsequently her metoprolol XL was decreased to 50 mg BID and then 37.5 mg BID and she was started on lasix.  ECHO  (3/14) showed with EF 60-65%, left atrium mild to moderately dilated, right atrium mildly dilated, mild to moderate MR and TR.  Her chest CT (3/14) showed mild interstitial thickening equivocal for interstitial pulmonary edema, mild to moderate.      She saw her primary, DR. Philip on 3/26 and was much improved other than intermittent lower extremity swelling after a high sodium meal and her BP was 96/58.      Today Hamida Verma presents stating she feels well.  Having intermittent palpitations. Her home weight has been 184-186, home -131/70-84, and HR .  Sleeps flat in bed with  1 pillows under head.  Denies chest pain or pressure, headaches, dizziness, syncope, angina, dyspnea at rest or with exertion, dry cough, palpitations, orthopnea, PND, abdominal pain, abdominal edema, pedal edema, or claudication.  Denies easy bruising or bleeding, hematuria, hematochezia, and epistaxis. Denies signs/symptoms of stroke such as visual disturbance, difficulty speaking, facial drooping, confusion, problems with gait, or any new numbness or weakness.    Today s Device check:  Shows AP 59%, V paced 1.7% she is in atrial fibrillation 30% of the time longest lasting 12-24 hours.        ASSESSMENT AND PLAN      Paroxsymal Atrial fibrillation    She is in atrial fibrillation today and isn't aware of it   anticoagulation indefinately for CHADSVASC 5 (age++, female, HTN, HF) currently on apixaban 5 mg BID   Discontinue flecainide    Increase  metoprolol XL from 37.5 BID to 50 mg BID     Sick sinus node dysfunction s/p [employer] Keron dual lead permanent pacemaker on 3/20/18  Follow with device RNs as scheduled.  Normal device function      HFpEF   euvolemic   Continue with lasix 20 mg   Continue lisinopril 2.5 mg daily    Pulmonary evaluation for severity of COPD pending     HTN  Controlled  Lisinopril 2.5 mg   Lasix 20 mg daily - bmp on 3/26 was WNL   Asked patient to eat high potassium foods    Syncope/Dizziness    Resolved    Will have her follow up with Dr. Voss in 1 month     Patient expresses understanding and agreement with the plan.     I appreciate the chance to help with Hamida Verma Please let me know if you have any questions or concerns.    Kasie Alcocer, APRN, CNP    This note was completed in part using Dragon voice recognition software. Although reviewed after completion, some word and grammatical errors may occur.    Orders Placed This Encounter   Procedures     Follow-Up with Electrophysiologist     Orders Placed This Encounter   Medications     metoprolol succinate (TOPROL-XL) 50 MG 24 hr  tablet     Sig: Take 1 tablet (50 mg) by mouth 2 times daily     Dispense:  180 tablet     Refill:  3     Pt will call when she needs a refill     Medications Discontinued During This Encounter   Medication Reason     flecainide (TAMBOCOR) 100 MG tablet      metoprolol succinate (TOPROL-XL) 25 MG 24 hr tablet Reorder         Encounter Diagnoses   Name Primary?     Atrial fibrillation with rapid ventricular response (H)      Hypertension goal BP (blood pressure) < 150/90      Systolic congestive heart failure, unspecified congestive heart failure chronicity (H)        CURRENT MEDICATIONS:  Current Outpatient Prescriptions   Medication Sig Dispense Refill     metoprolol succinate (TOPROL-XL) 50 MG 24 hr tablet Take 1 tablet (50 mg) by mouth 2 times daily 180 tablet 3     acetaminophen (TYLENOL) 325 MG tablet Take 2 tablets (650 mg) by mouth every 4 hours as needed for mild pain or fever 100 tablet 0     albuterol (PROAIR HFA/PROVENTIL HFA/VENTOLIN HFA) 108 (90 BASE) MCG/ACT Inhaler Inhale 2 puffs into the lungs 4 times daily as needed for other (dyspnea) 1 Inhaler 3     lisinopril (PRINIVIL/ZESTRIL) 2.5 MG tablet Take 1 tablet (2.5 mg) by mouth daily 30 tablet 3     furosemide (LASIX) 20 MG tablet Take 1 tablet (20 mg) by mouth daily 30 tablet 3     apixaban ANTICOAGULANT (ELIQUIS) 5 MG tablet Take 1 tablet (5 mg) by mouth 2 times daily 60 tablet 0     omega-3 acid ethyl esters (LOVAZA) 1 G capsule Take 2 g by mouth daily       fluticasone (FLONASE) 50 MCG/ACT spray Spray 1-2 sprays into both nostrils daily as needed for rhinitis or allergies       Methylsulfonylmethane (MSM) 500 MG CAPS Take 1 capsule by mouth daily       Multiple Vitamins-Minerals (HAIR SKIN NAILS PO) Take 1 tablet by mouth At Bedtime       alendronate (FOSAMAX) 70 MG tablet Take 1 tablet (70 mg) by mouth every 7 days Take 60 minutes before am meal with 8 oz. water. Remain upright for 30 minutes. 12 tablet 3     simvastatin (ZOCOR) 20 MG tablet Take  1 tablet (20 mg) by mouth At Bedtime Profile Rx: patient will contact pharmacy when needed 90 tablet 3     cholecalciferol (VITAMIN  -D) 1000 UNIT capsule Take 1 capsule by mouth daily.       potassium & sodium phosphates 278-164-250 MG/75ML SOLR Take 1 tablet by mouth daily        Milk Thistle 200 MG CAPS Take 1 capsule by mouth daily        FLAX SEED OIL OR 1 capsule daily       [DISCONTINUED] metoprolol succinate (TOPROL-XL) 25 MG 24 hr tablet Take 1.5 tablets (37.5 mg) by mouth 2 times daily 90 tablet 3       ALLERGIES     Allergies   Allergen Reactions     Strawberry Flavor        PAST MEDICAL HISTORY:  Past Medical History:   Diagnosis Date     Atrial fibrillation (H)      Essential hypertension, benign      HYPERLIPIDEMIA NEC/NOS 3/30/2006       PAST SURGICAL HISTORY:  Past Surgical History:   Procedure Laterality Date     HYSTERECTOMY, VAGINAL      hysterectomy       FAMILY HISTORY:  Family History   Problem Relation Age of Onset     CEREBROVASCULAR DISEASE Mother      Eye Disorder Mother      Myocardial Infarction Mother      C.A.D. Father      heart attack     Alcohol/Drug Father      alcohol     Blood Disease Sister      lupus     Depression Sister      DIABETES No family hx of      Breast Cancer No family hx of      Cancer - colorectal No family hx of        SOCIAL HISTORY:  Social History     Social History     Marital status:      Spouse name: N/A     Number of children: N/A     Years of education: N/A     Social History Main Topics     Smoking status: Former Smoker     Start date: 10/28/1955     Quit date: 9/1/2000     Smokeless tobacco: Never Used      Comment: quit 6yrs ago     Alcohol use No     Drug use: No     Sexual activity: No     Other Topics Concern     Parent/Sibling W/ Cabg, Mi Or Angioplasty Before 65f 55m? No     Social History Narrative    Balanced Diet - Yes    Osteoporosis Prevention Measures - Dairy servings per day: 2    Regular Exercise -  Yes Describe walk, but not recently  "due to weather    Dental Exam - Yes    Eye Exam -No    Self Breast Exam - Yes    Abuse: Current or Past (Physical, Sexual or Emotional)- No    Do you feel safe in your environment - Yes    Guns stored in the home - No    Sunscreen used - Yes    Seatbelts used - Yes    Lipids -  1/10    Glucose -  1/10    Colon Cancer Screening - Yes, 7/21/08    Hemoccults - NO    Pap Test -  Many yrs ago, has hysterectomy    Do you have any concerns about STD's -  No    Mammography - 6/18/08    DEXA - 4/13/06    Immunizations reviewed and up to date - No    Jonathan Marshall MA                   Review of Systems:  Skin:  Negative     Eyes:  Positive for glasses  ENT:  Negative    Respiratory:  Negative    Cardiovascular:    Positive for;palpitations  Gastroenterology: Negative    Genitourinary:  not assessed    Musculoskeletal:  Negative    Neurologic:  Negative    Psychiatric:  Negative    Heme/Lymph/Imm:       Endocrine:  Negative      Physical Exam:  Vitals: /80  Pulse 64  Ht 1.626 m (5' 4.02\")  Wt 84.4 kg (186 lb)  LMP  (LMP Unknown)  BMI 31.91 kg/m2    Constitutional:  cooperative, alert and oriented, well developed, well nourished, in no acute distress        Skin:  warm and dry to the touch, no apparent skin lesions or masses noted        Head:  normocephalic, no masses or lesions        Eyes:           ENT:           Neck:  carotid pulses are full and equal bilaterally, JVP normal, no carotid bruit        Chest:  normal breath sounds, clear to auscultation, normal A-P diameter, normal symmetry, normal respiratory excursion, no use of accessory muscles        Cardiac:   irregular rhythm;irregularly irregular rhythm                Abdomen:  abdomen soft        Vascular:       right radial artery;2+       right dorsalis pedis artery;2+     left radial artery;2+       left dorsalis pedis artery;2+          Extremities and Back:  no deformities, clubbing, cyanosis, erythema observed;no edema        Neurological:  no gross " motor deficits          Recent Lab Results:  LIPID RESULTS:  Lab Results   Component Value Date    CHOL 174 03/23/2017    HDL 61 03/23/2017    LDL 91 03/23/2017    TRIG 109 03/23/2017    CHOLHDLRATIO 2.8 05/07/2015       LIVER ENZYME RESULTS:  Lab Results   Component Value Date    AST 17 03/14/2018    ALT 57 (H) 03/14/2018       CBC RESULTS:  Lab Results   Component Value Date    WBC 11.4 (H) 03/17/2018    RBC 4.31 03/17/2018    HGB 12.3 03/17/2018    HCT 37.7 03/17/2018    MCV 88 03/17/2018    MCH 28.5 03/17/2018    MCHC 32.6 03/17/2018    RDW 13.9 03/17/2018     (H) 03/17/2018       BMP RESULTS:  Lab Results   Component Value Date     03/26/2018    POTASSIUM 3.5 03/26/2018    CHLORIDE 107 03/26/2018    CO2 31 03/26/2018    ANIONGAP 5 03/26/2018    GLC 76 03/26/2018    BUN 11 03/26/2018    CR 0.83 03/26/2018    GFRESTIMATED 66 03/26/2018    GFRESTBLACK 80 03/26/2018    GURWINDER 8.4 (L) 03/26/2018        A1C RESULTS:  No results found for: A1C    INR RESULTS:  Lab Results   Component Value Date    INR 1.31 (H) 03/19/2018    INR 1.04 11/12/2014           CC  Mikala Philip MD  [address]  [place] [id number]                  Thank you for allowing me to participate in the care of your patient.      Sincerely,     NELY Alvarez [facility]    cc:   Mikala Philip MD  [address] [place] [address]

## 2018-03-28 NOTE — PROGRESS NOTES
[employer]eron Assurity 7-10 day Post Pacemaker Device Check  AP: 59 % : 1.7 %  Mode: DDDR 60        Underlying Rhythm: A. Fib with variable ventricular rates up to 110's bpm  Heart Rate: Stable with adequate variability  Sensing: Stable    Pacing Threshold: NA in RA, stable RV    Impedance: Stable  Battery Status: 10.6 yrs at 3.11 V  Incision/Complications: Steri strips removed. Edges of infection well approximated without signs of infection. Reminded to not raise left arm above shoulder height x 2 more weeks.  Atrial Arrhythmia: 35 mode switches detected, comprising 30% of the total time. Longest lasting 12-24 hrs. Takes Eliquis.  Ventricular Arrhythmia: None  Setting Change: RA auto capture programmed off.    Care Plan: Scheduled to see Kasie today. 6 week check scheduled for June. No complaints. MARKELL Emmanuel RN.

## 2018-03-28 NOTE — MR AVS SNAPSHOT
After Visit Summary   3/28/2018    Hamida Verma    MRN: [id number]           Patient Information     Date Of Birth          1940        Visit Information        Provider Department      3/28/2018 2:30 PM MCCRACKEN DCRN [facility]        Today's Diagnoses     Cardiac pacemaker in situ    -  1       Follow-ups after your visit        Your next 10 appointments already scheduled     Mar 28, 2018  3:10 PM CDT   Return Visit with NELY Rosario CNP   [facility] ([facility])    [address] [place], [place] [id number]  [facility] [address] [phone] OPT 2            Apr 02, 2018  4:30 PM CDT   TELEMEDICINE with Rosamaria Majano [facility] ([facility])    [address] [place] [address] [phone]           Note: this is not an onsite visit; there is no need to come to the facility.            Jun 28, 2018  8:15 AM CDT   Pacemaker Check with MCCRACKEN DCR2   [facility] ([facility])    [address] [id number]  [facility] [address] [phone] OPT 2              Who to contact     If you have questions or need follow up information about today's clinic visit or your schedule please contact [facility] directly at [phone].  Normal or non-critical lab and imaging results will be communicated to you by MyChart, letter or phone within 4 business days after the clinic has received the results. If you do not hear from us within 7 days, please contact the clinic through MyChart or phone. If you have a critical or abnormal lab result, we will notify you by phone as soon as possible.  Submit refill requests through [employer] or call your pharmacy and they will forward the refill request to us. Please allow 3 business days for your refill to be completed.           "Additional Information About Your Visit        MyChart Information     [employer] lets you send messages to your doctor, view your test results, renew your prescriptions, schedule appointments and more. To sign up, go to [email or web address] . Click on \"Log in\" on the left side of the screen, which will take you to the Welcome page. Then click on \"Sign up Now\" on the right side of the page.     You will be asked to enter the access code listed below, as well as some personal information. Please follow the directions to create your username and password.     Your access code is: [email or web address]  Expires: 2018  2:02 PM     Your access code will  in 90 days. If you need help or a new code, please call your [facility] clinic or [phone].        Care EveryWhere ID     This is your Care EveryWhere ID. This could be used by other organizations to access your [facility] medical records  [id number]        Your Vitals Were     Last Period                   (LMP Unknown)            Blood Pressure from Last 3 Encounters:   18 96/58   18 101/55   18 139/67    Weight from Last 3 Encounters:   18 84.4 kg (186 lb)   18 83.7 kg (184 lb 8.4 oz)   18 88 kg (194 lb)              We Performed the Following     PM DEVICE PROGRAMMING EVAL, DUAL LEAD PACER (82032)        Primary Care Provider Office Phone # Fax #    Mikala Philip [phone] [address] [place] [id number]        Equal Access to Services     [facility]: Hadii adams rueda Sodonny, waaxda luqadaha, qaybta kaalmacarlos dickey . [facility] [phone].    ATENCIÓN: Si habla español, tiene a ivy disposición servicios gratuitos de asistencia lingüística. Llame al [phone].    We comply with applicable federal civil rights laws and [place] laws. We do not discriminate on the basis of race, color, national origin, age, disability, sex, sexual orientation, " or gender identity.            Thank you!     Thank you for choosing [facility]  for your care. Our goal is always to provide you with excellent care. Hearing back from our patients is one way we can continue to improve our services. Please take a few minutes to complete the written survey that you may receive in the mail after your visit with us. Thank you!             Your Updated Medication List - Protect others around you: Learn how to safely use, store and throw away your medicines at www.disposemymeds.org.          This list is accurate as of 3/28/18  3:01 PM.  Always use your most recent med list.                   Brand Name Dispense Instructions for use Diagnosis    acetaminophen 325 MG tablet    TYLENOL    100 tablet    Take 2 tablets (650 mg) by mouth every 4 hours as needed for mild pain or fever    Atrial fibrillation with rapid ventricular response (H)       albuterol 108 (90 BASE) MCG/ACT Inhaler    PROAIR HFA/PROVENTIL HFA/VENTOLIN HFA    1 Inhaler    Inhale 2 puffs into the lungs 4 times daily as needed for other (dyspnea)    SOB (shortness of breath)       alendronate 70 MG tablet    FOSAMAX    12 tablet    Take 1 tablet (70 mg) by mouth every 7 days Take 60 minutes before am meal with 8 oz. water. Remain upright for 30 minutes.    Osteoporosis       apixaban ANTICOAGULANT 5 MG tablet    ELIQUIS    60 tablet    Take 1 tablet (5 mg) by mouth 2 times daily    Atrial fibrillation with rapid ventricular response (H)       cholecalciferol 1000 UNITS capsule    vitamin  -D     Take 1 capsule by mouth daily.        FLAX SEED OIL PO      1 capsule daily        flecainide 100 MG tablet    TAMBOCOR    60 tablet    Take 1 tablet (100 mg) by mouth every 12 hours    Atrial fibrillation with rapid ventricular response (H)       fluticasone 50 MCG/ACT spray    FLONASE     Spray 1-2 sprays into both nostrils daily as needed for rhinitis or allergies        furosemide 20 MG  tablet    LASIX    30 tablet    Take 1 tablet (20 mg) by mouth daily    Systolic congestive heart failure, unspecified congestive heart failure chronicity (H)       HAIR SKIN NAILS PO      Take 1 tablet by mouth At Bedtime        lisinopril 2.5 MG tablet    PRINIVIL/Zestril    30 tablet    Take 1 tablet (2.5 mg) by mouth daily    Systolic congestive heart failure, unspecified congestive heart failure chronicity (H)       metoprolol succinate 25 MG 24 hr tablet    TOPROL-XL    90 tablet    Take 1.5 tablets (37.5 mg) by mouth 2 times daily    Atrial fibrillation with rapid ventricular response (H), Systolic congestive heart failure, unspecified congestive heart failure chronicity (H)       Milk Thistle 200 MG Caps      Take 1 capsule by mouth daily         MG Caps      Take 1 capsule by mouth daily        omega-3 acid ethyl esters 1 G capsule    Lovaza     Take 2 g by mouth daily        potassium & sodium phosphates 278-164-250 MG/75ML Solr      Take 1 tablet by mouth daily        simvastatin 20 MG tablet    ZOCOR    90 tablet    Take 1 tablet (20 mg) by mouth At Bedtime Profile Rx: patient will contact pharmacy when needed    Hyperlipidemia LDL goal <130

## 2018-03-28 NOTE — PROGRESS NOTES
HPI:  Hamida Verma is a 77 year old female is a patient of Dr. Voss with past medical history significant for paroxysmal atrial fibrillation, HTN, hyperlipidemia admitted on 3/14/2018 with SOB, orthopnea, and dizziness and found to be in sinus bradycardia. She was first diagnosised with atrial fibrillation 2017 after her son  and started on a BB.  She is unsure if she remained in atrial fibrillation or sinus for the past year.  On 3/6 she was found to be in atrial fibrillation with RVR while being treated for upper respiratory infection and was started on metoprolol, flecainide, and Eliquis. She underwent a Lexiscan on 3/6/18 which showed no ischemia.  She was subsequently loaded with flecainide 200 mg daily then 100 mg BID along with 100 mg metoprolol succinate BID and while being sedated for CAROLANN on 3/7/18 she had a 3.4 second pause and then converted to sinus rhythm in the 60s.  She returned to the hospital on 3/14 with HF symptoms of orthopnea and LE edema; found to be in bradycardia subsequently her metoprolol XL was decreased to 50 mg BID and then 37.5 mg BID and she was started on lasix.  ECHO (3/14) showed with EF 60-65%, left atrium mild to moderately dilated, right atrium mildly dilated, mild to moderate MR and TR.  Her chest CT (3/14) showed mild interstitial thickening equivocal for interstitial pulmonary edema, mild to moderate.      She saw her primary, DR. Philip on 3/26 and was much improved other than intermittent lower extremity swelling after a high sodium meal and her BP was 96/58.      Today Hamida Verma presents stating she feels well.  Having intermittent palpitations. Her home weight has been 184-186, home -131/70-84, and HR .  Sleeps flat in bed with 1 pillows under head.  Denies chest pain or pressure, headaches, dizziness, syncope, angina, dyspnea at rest or with exertion, dry cough, palpitations, orthopnea, PND, abdominal pain, abdominal edema, pedal edema, or  claudication.  Denies easy bruising or bleeding, hematuria, hematochezia, and epistaxis. Denies signs/symptoms of stroke such as visual disturbance, difficulty speaking, facial drooping, confusion, problems with gait, or any new numbness or weakness.    Today s Device check:  Shows AP 59%, V paced 1.7% she is in atrial fibrillation 30% of the time longest lasting 12-24 hours.        ASSESSMENT AND PLAN      Paroxsymal Atrial fibrillation    She is in atrial fibrillation today and isn't aware of it   anticoagulation indefinately for CHADSVASC 5 (age++, female, HTN, HF) currently on apixaban 5 mg BID   Discontinue flecainide    Increase  metoprolol XL from 37.5 BID to 50 mg BID     Sick sinus node dysfunction s/p [employer] dual lead permanent pacemaker on 3/20/18  Follow with device RNs as scheduled.  Normal device function      HFpEF   euvolemic   Continue with lasix 20 mg   Continue lisinopril 2.5 mg daily    Pulmonary evaluation for severity of COPD pending     HTN  Controlled  Lisinopril 2.5 mg   Lasix 20 mg daily - bmp on 3/26 was WNL   Asked patient to eat high potassium foods    Syncope/Dizziness    Resolved    Will have her follow up with Dr. Voss in 1 month     Patient expresses understanding and agreement with the plan.     I appreciate the chance to help with Hamida Verma Please let me know if you have any questions or concerns.    NELY Angulo, CNP    This note was completed in part using Dragon voice recognition software. Although reviewed after completion, some word and grammatical errors may occur.    Orders Placed This Encounter   Procedures     Follow-Up with Electrophysiologist     Orders Placed This Encounter   Medications     metoprolol succinate (TOPROL-XL) 50 MG 24 hr tablet     Sig: Take 1 tablet (50 mg) by mouth 2 times daily     Dispense:  180 tablet     Refill:  3     Pt will call when she needs a refill     Medications Discontinued During This Encounter   Medication Reason      flecainide (TAMBOCOR) 100 MG tablet      metoprolol succinate (TOPROL-XL) 25 MG 24 hr tablet Reorder         Encounter Diagnoses   Name Primary?     Atrial fibrillation with rapid ventricular response (H)      Hypertension goal BP (blood pressure) < 150/90      Systolic congestive heart failure, unspecified congestive heart failure chronicity (H)        CURRENT MEDICATIONS:  Current Outpatient Prescriptions   Medication Sig Dispense Refill     metoprolol succinate (TOPROL-XL) 50 MG 24 hr tablet Take 1 tablet (50 mg) by mouth 2 times daily 180 tablet 3     acetaminophen (TYLENOL) 325 MG tablet Take 2 tablets (650 mg) by mouth every 4 hours as needed for mild pain or fever 100 tablet 0     albuterol (PROAIR HFA/PROVENTIL HFA/VENTOLIN HFA) 108 (90 BASE) MCG/ACT Inhaler Inhale 2 puffs into the lungs 4 times daily as needed for other (dyspnea) 1 Inhaler 3     lisinopril (PRINIVIL/ZESTRIL) 2.5 MG tablet Take 1 tablet (2.5 mg) by mouth daily 30 tablet 3     furosemide (LASIX) 20 MG tablet Take 1 tablet (20 mg) by mouth daily 30 tablet 3     apixaban ANTICOAGULANT (ELIQUIS) 5 MG tablet Take 1 tablet (5 mg) by mouth 2 times daily 60 tablet 0     omega-3 acid ethyl esters (LOVAZA) 1 G capsule Take 2 g by mouth daily       fluticasone (FLONASE) 50 MCG/ACT spray Spray 1-2 sprays into both nostrils daily as needed for rhinitis or allergies       Methylsulfonylmethane (MSM) 500 MG CAPS Take 1 capsule by mouth daily       Multiple Vitamins-Minerals (HAIR SKIN NAILS PO) Take 1 tablet by mouth At Bedtime       alendronate (FOSAMAX) 70 MG tablet Take 1 tablet (70 mg) by mouth every 7 days Take 60 minutes before am meal with 8 oz. water. Remain upright for 30 minutes. 12 tablet 3     simvastatin (ZOCOR) 20 MG tablet Take 1 tablet (20 mg) by mouth At Bedtime Profile Rx: patient will contact pharmacy when needed 90 tablet 3     cholecalciferol (VITAMIN  -D) 1000 UNIT capsule Take 1 capsule by mouth daily.       potassium & sodium  phosphates 278-164-250 MG/75ML SOLR Take 1 tablet by mouth daily        Milk Thistle 200 MG CAPS Take 1 capsule by mouth daily        FLAX SEED OIL OR 1 capsule daily       [DISCONTINUED] metoprolol succinate (TOPROL-XL) 25 MG 24 hr tablet Take 1.5 tablets (37.5 mg) by mouth 2 times daily 90 tablet 3       ALLERGIES     Allergies   Allergen Reactions     Strawberry Flavor        PAST MEDICAL HISTORY:  Past Medical History:   Diagnosis Date     Atrial fibrillation (H)      Essential hypertension, benign      HYPERLIPIDEMIA NEC/NOS 3/30/2006       PAST SURGICAL HISTORY:  Past Surgical History:   Procedure Laterality Date     HYSTERECTOMY, VAGINAL      hysterectomy       FAMILY HISTORY:  Family History   Problem Relation Age of Onset     CEREBROVASCULAR DISEASE Mother      Eye Disorder Mother      Myocardial Infarction Mother      C.A.D. Father      heart attack     Alcohol/Drug Father      alcohol     Blood Disease Sister      lupus     Depression Sister      DIABETES No family hx of      Breast Cancer No family hx of      Cancer - colorectal No family hx of        SOCIAL HISTORY:  Social History     Social History     Marital status:      Spouse name: N/A     Number of children: N/A     Years of education: N/A     Social History Main Topics     Smoking status: Former Smoker     Start date: 10/28/1955     Quit date: 9/1/2000     Smokeless tobacco: Never Used      Comment: quit 6yrs ago     Alcohol use No     Drug use: No     Sexual activity: No     Other Topics Concern     Parent/Sibling W/ Cabg, Mi Or Angioplasty Before 65f 55m? No     Social History Narrative    Balanced Diet - Yes    Osteoporosis Prevention Measures - Dairy servings per day: 2    Regular Exercise -  Yes Describe walk, but not recently due to weather    Dental Exam - Yes    Eye Exam -No    Self Breast Exam - Yes    Abuse: Current or Past (Physical, Sexual or Emotional)- No    Do you feel safe in your environment - Yes    Guns stored in the  "home - No    Sunscreen used - Yes    Seatbelts used - Yes    Lipids -  1/10    Glucose -  1/10    Colon Cancer Screening - Yes, 7/21/08    Hemoccults - NO    Pap Test -  Many yrs ago, has hysterectomy    Do you have any concerns about STD's -  No    Mammography - 6/18/08    DEXA - 4/13/06    Immunizations reviewed and up to date - No    Jonathan Marshall MA                   Review of Systems:  Skin:  Negative     Eyes:  Positive for glasses  ENT:  Negative    Respiratory:  Negative    Cardiovascular:    Positive for;palpitations  Gastroenterology: Negative    Genitourinary:  not assessed    Musculoskeletal:  Negative    Neurologic:  Negative    Psychiatric:  Negative    Heme/Lymph/Imm:       Endocrine:  Negative      Physical Exam:  Vitals: /80  Pulse 64  Ht 1.626 m (5' 4.02\")  Wt 84.4 kg (186 lb)  LMP  (LMP Unknown)  BMI 31.91 kg/m2    Constitutional:  cooperative, alert and oriented, well developed, well nourished, in no acute distress        Skin:  warm and dry to the touch, no apparent skin lesions or masses noted        Head:  normocephalic, no masses or lesions        Eyes:           ENT:           Neck:  carotid pulses are full and equal bilaterally, JVP normal, no carotid bruit        Chest:  normal breath sounds, clear to auscultation, normal A-P diameter, normal symmetry, normal respiratory excursion, no use of accessory muscles        Cardiac:   irregular rhythm;irregularly irregular rhythm                Abdomen:  abdomen soft        Vascular:       right radial artery;2+       right dorsalis pedis artery;2+     left radial artery;2+       left dorsalis pedis artery;2+          Extremities and Back:  no deformities, clubbing, cyanosis, erythema observed;no edema        Neurological:  no gross motor deficits          Recent Lab Results:  LIPID RESULTS:  Lab Results   Component Value Date    CHOL 174 03/23/2017    HDL 61 03/23/2017    LDL 91 03/23/2017    TRIG 109 03/23/2017    CHOLHDLRATIO 2.8 " 05/07/2015       LIVER ENZYME RESULTS:  Lab Results   Component Value Date    AST 17 03/14/2018    ALT 57 (H) 03/14/2018       CBC RESULTS:  Lab Results   Component Value Date    WBC 11.4 (H) 03/17/2018    RBC 4.31 03/17/2018    HGB 12.3 03/17/2018    HCT 37.7 03/17/2018    MCV 88 03/17/2018    MCH 28.5 03/17/2018    MCHC 32.6 03/17/2018    RDW 13.9 03/17/2018     (H) 03/17/2018       BMP RESULTS:  Lab Results   Component Value Date     03/26/2018    POTASSIUM 3.5 03/26/2018    CHLORIDE 107 03/26/2018    CO2 31 03/26/2018    ANIONGAP 5 03/26/2018    GLC 76 03/26/2018    BUN 11 03/26/2018    CR 0.83 03/26/2018    GFRESTIMATED 66 03/26/2018    GFRESTBLACK 80 03/26/2018    GURWINDER 8.4 (L) 03/26/2018        A1C RESULTS:  No results found for: A1C    INR RESULTS:  Lab Results   Component Value Date    INR 1.31 (H) 03/19/2018    INR 1.04 11/12/2014           CC  Mikala Philip MD  [id number] [address]

## 2018-03-28 NOTE — PATIENT INSTRUCTIONS
Stop flecainide  Increase metoprolol XL 50 mg twice a day  Follow up with Eh in about 1 month         You will receive all normal lab and testing results via [employer] or mail if not reviewed in clinic today. Please contact our office if you need assistance with setting up [email or web address].    If you need a medication refill please contact your pharmacy. Please allow 3 business days for your refill to be completed.     As always, thank you for trusting us with your health care needs!    If you have any questions regarding your visit please contact your care team:   Cardiology  Telephone Number    Afib RNs  Nena Lopez, and Brielle [phone]     Call for Electrophysiology procedure scheduling concerns  Jessica Matthewter [phone]   Device Clinic (Pacemakers, ICDs, Loop)   RN's :      Ness Conrad Eva, SIGRID Goncalves, Stefania Contreras During business hours:   [phone]

## 2018-03-28 NOTE — LETTER
3/28/2018    Mikala Philip MD, MD  [address]  [facility] [address]    RE: Hamida Verma       Dear Colleague,    I had the pleasure of seeing Hamida Verma in the [facility].    HPI:  Hamida Verma is a 77 year old female is a patient of Dr. Voss with past medical history significant for paroxysmal atrial fibrillation, HTN, hyperlipidemia admitted on 3/14/2018 with SOB, orthopnea, and dizziness and found to be in sinus bradycardia. She was first diagnosised with atrial fibrillation 2017 after her son  and started on a BB.  She is unsure if she remained in atrial fibrillation or sinus for the past year.  On 3/6 she was found to be in atrial fibrillation with RVR while being treated for upper respiratory infection and was started on metoprolol, flecainide, and Eliquis. She underwent a Lexiscan on 3/6/18 which showed no ischemia.   She was subsequently loaded with flecainide 200 mg daily then 100 mg BID along with 100 mg metoprolol succinate BID and while being sedated for CAROLANN on 3/7/18 she had a 3.4 second pause and then converted to sinus rhythm in the 60s.  She returned to the hospital on 3/14 with HF symptoms of orthopnea and LE edema; found to be in bradycardia subsequently her metoprolol XL was decreased to 50 mg BID and then 37.5 mg BID and she was started on lasix.  ECHO  (3/14) showed with EF 60-65%, left atrium mild to moderately dilated, right atrium mildly dilated, mild to moderate MR and TR.  Her chest CT (3/14) showed mild interstitial thickening equivocal for interstitial pulmonary edema, mild to moderate.      She saw her primary, DR. Philip on 3/26 and was much improved other than intermittent lower extremity swelling after a high sodium meal and her BP was 96/58.      Today Hamida Verma presents stating she feels well.  Having intermittent palpitations. Her home weight has been 184-186, home -131/70-84, and HR .  Sleeps flat in bed with  1 pillows under head.  Denies chest pain or pressure, headaches, dizziness, syncope, angina, dyspnea at rest or with exertion, dry cough, palpitations, orthopnea, PND, abdominal pain, abdominal edema, pedal edema, or claudication.  Denies easy bruising or bleeding, hematuria, hematochezia, and epistaxis. Denies signs/symptoms of stroke such as visual disturbance, difficulty speaking, facial drooping, confusion, problems with gait, or any new numbness or weakness.    Today s Device check:  Shows AP 59%, V paced 1.7% she is in atrial fibrillation 30% of the time longest lasting 12-24 hours.        ASSESSMENT AND PLAN      Paroxsymal Atrial fibrillation    She is in atrial fibrillation today and isn't aware of it   anticoagulation indefinately for CHADSVASC 5 (age++, female, HTN, HF) currently on apixaban 5 mg BID   Discontinue flecainide    Increase  metoprolol XL from 37.5 BID to 50 mg BID     Sick sinus node dysfunction s/p [employer] dual lead permanent pacemaker on 3/20/18  Follow with device RNs as scheduled.  Normal device function      HFpEF   euvolemic   Continue with lasix 20 mg   Continue lisinopril 2.5 mg daily    Pulmonary evaluation for severity of COPD pending     HTN  Controlled  Lisinopril 2.5 mg   Lasix 20 mg daily - bmp on 3/26 was WNL   Asked patient to eat high potassium foods    Syncope/Dizziness    Resolved    Will have her follow up with Dr. Voss in 1 month     Patient expresses understanding and agreement with the plan.     I appreciate the chance to help with Hamida Verma Please let me know if you have any questions or concerns.    Kasie Alcocer, APRN, CNP    This note was completed in part using Dragon voice recognition software. Although reviewed after completion, some word and grammatical errors may occur.    Orders Placed This Encounter   Procedures     Follow-Up with Electrophysiologist     Orders Placed This Encounter   Medications     metoprolol succinate (TOPROL-XL) 50 MG 24 hr  tablet     Sig: Take 1 tablet (50 mg) by mouth 2 times daily     Dispense:  180 tablet     Refill:  3     Pt will call when she needs a refill     Medications Discontinued During This Encounter   Medication Reason     flecainide (TAMBOCOR) 100 MG tablet      metoprolol succinate (TOPROL-XL) 25 MG 24 hr tablet Reorder         Encounter Diagnoses   Name Primary?     Atrial fibrillation with rapid ventricular response (H)      Hypertension goal BP (blood pressure) < 150/90      Systolic congestive heart failure, unspecified congestive heart failure chronicity (H)        CURRENT MEDICATIONS:  Current Outpatient Prescriptions   Medication Sig Dispense Refill     metoprolol succinate (TOPROL-XL) 50 MG 24 hr tablet Take 1 tablet (50 mg) by mouth 2 times daily 180 tablet 3     acetaminophen (TYLENOL) 325 MG tablet Take 2 tablets (650 mg) by mouth every 4 hours as needed for mild pain or fever 100 tablet 0     albuterol (PROAIR HFA/PROVENTIL HFA/VENTOLIN HFA) 108 (90 BASE) MCG/ACT Inhaler Inhale 2 puffs into the lungs 4 times daily as needed for other (dyspnea) 1 Inhaler 3     lisinopril (PRINIVIL/ZESTRIL) 2.5 MG tablet Take 1 tablet (2.5 mg) by mouth daily 30 tablet 3     furosemide (LASIX) 20 MG tablet Take 1 tablet (20 mg) by mouth daily 30 tablet 3     apixaban ANTICOAGULANT (ELIQUIS) 5 MG tablet Take 1 tablet (5 mg) by mouth 2 times daily 60 tablet 0     omega-3 acid ethyl esters (LOVAZA) 1 G capsule Take 2 g by mouth daily       fluticasone (FLONASE) 50 MCG/ACT spray Spray 1-2 sprays into both nostrils daily as needed for rhinitis or allergies       Methylsulfonylmethane (MSM) 500 MG CAPS Take 1 capsule by mouth daily       Multiple Vitamins-Minerals (HAIR SKIN NAILS PO) Take 1 tablet by mouth At Bedtime       alendronate (FOSAMAX) 70 MG tablet Take 1 tablet (70 mg) by mouth every 7 days Take 60 minutes before am meal with 8 oz. water. Remain upright for 30 minutes. 12 tablet 3     simvastatin (ZOCOR) 20 MG tablet Take  1 tablet (20 mg) by mouth At Bedtime Profile Rx: patient will contact pharmacy when needed 90 tablet 3     cholecalciferol (VITAMIN  -D) 1000 UNIT capsule Take 1 capsule by mouth daily.       potassium & sodium phosphates 278-164-250 MG/75ML SOLR Take 1 tablet by mouth daily        Milk Thistle 200 MG CAPS Take 1 capsule by mouth daily        FLAX SEED OIL OR 1 capsule daily       [DISCONTINUED] metoprolol succinate (TOPROL-XL) 25 MG 24 hr tablet Take 1.5 tablets (37.5 mg) by mouth 2 times daily 90 tablet 3       ALLERGIES     Allergies   Allergen Reactions     Strawberry Flavor        PAST MEDICAL HISTORY:  Past Medical History:   Diagnosis Date     Atrial fibrillation (H)      Essential hypertension, benign      HYPERLIPIDEMIA NEC/NOS 3/30/2006       PAST SURGICAL HISTORY:  Past Surgical History:   Procedure Laterality Date     HYSTERECTOMY, VAGINAL      hysterectomy       FAMILY HISTORY:  Family History   Problem Relation Age of Onset     CEREBROVASCULAR DISEASE Mother      Eye Disorder Mother      Myocardial Infarction Mother      C.A.D. Father      heart attack     Alcohol/Drug Father      alcohol     Blood Disease Sister      lupus     Depression Sister      DIABETES No family hx of      Breast Cancer No family hx of      Cancer - colorectal No family hx of        SOCIAL HISTORY:  Social History     Social History     Marital status:      Spouse name: N/A     Number of children: N/A     Years of education: N/A     Social History Main Topics     Smoking status: Former Smoker     Start date: 10/28/1955     Quit date: 9/1/2000     Smokeless tobacco: Never Used      Comment: quit 6yrs ago     Alcohol use No     Drug use: No     Sexual activity: No     Other Topics Concern     Parent/Sibling W/ Cabg, Mi Or Angioplasty Before 65f 55m? No     Social History Narrative    Balanced Diet - Yes    Osteoporosis Prevention Measures - Dairy servings per day: 2    Regular Exercise -  Yes Describe walk, but not recently  "due to weather    Dental Exam - Yes    Eye Exam -No    Self Breast Exam - Yes    Abuse: Current or Past (Physical, Sexual or Emotional)- No    Do you feel safe in your environment - Yes    Guns stored in the home - No    Sunscreen used - Yes    Seatbelts used - Yes    Lipids -  1/10    Glucose -  1/10    Colon Cancer Screening - Yes, 7/21/08    Hemoccults - NO    Pap Test -  Many yrs ago, has hysterectomy    Do you have any concerns about STD's -  No    Mammography - 6/18/08    DEXA - 4/13/06    Immunizations reviewed and up to date - No    Jonathan Marshall MA                   Review of Systems:  Skin:  Negative     Eyes:  Positive for glasses  ENT:  Negative    Respiratory:  Negative    Cardiovascular:    Positive for;palpitations  Gastroenterology: Negative    Genitourinary:  not assessed    Musculoskeletal:  Negative    Neurologic:  Negative    Psychiatric:  Negative    Heme/Lymph/Imm:       Endocrine:  Negative      Physical Exam:  Vitals: /80  Pulse 64  Ht 1.626 m (5' 4.02\")  Wt 84.4 kg (186 lb)  LMP  (LMP Unknown)  BMI 31.91 kg/m2    Constitutional:  cooperative, alert and oriented, well developed, well nourished, in no acute distress        Skin:  warm and dry to the touch, no apparent skin lesions or masses noted        Head:  normocephalic, no masses or lesions        Eyes:           ENT:           Neck:  carotid pulses are full and equal bilaterally, JVP normal, no carotid bruit        Chest:  normal breath sounds, clear to auscultation, normal A-P diameter, normal symmetry, normal respiratory excursion, no use of accessory muscles        Cardiac:   irregular rhythm;irregularly irregular rhythm                Abdomen:  abdomen soft        Vascular:       right radial artery;2+       right dorsalis pedis artery;2+     left radial artery;2+       left dorsalis pedis artery;2+          Extremities and Back:  no deformities, clubbing, cyanosis, erythema observed;no edema        Neurological:  no gross " motor deficits          Recent Lab Results:  LIPID RESULTS:  Lab Results   Component Value Date    CHOL 174 03/23/2017    HDL 61 03/23/2017    LDL 91 03/23/2017    TRIG 109 03/23/2017    CHOLHDLRATIO 2.8 05/07/2015       LIVER ENZYME RESULTS:  Lab Results   Component Value Date    AST 17 03/14/2018    ALT 57 (H) 03/14/2018       CBC RESULTS:  Lab Results   Component Value Date    WBC 11.4 (H) 03/17/2018    RBC 4.31 03/17/2018    HGB 12.3 03/17/2018    HCT 37.7 03/17/2018    MCV 88 03/17/2018    MCH 28.5 03/17/2018    MCHC 32.6 03/17/2018    RDW 13.9 03/17/2018     (H) 03/17/2018       BMP RESULTS:  Lab Results   Component Value Date     03/26/2018    POTASSIUM 3.5 03/26/2018    CHLORIDE 107 03/26/2018    CO2 31 03/26/2018    ANIONGAP 5 03/26/2018    GLC 76 03/26/2018    BUN 11 03/26/2018    CR 0.83 03/26/2018    GFRESTIMATED 66 03/26/2018    GFRESTBLACK 80 03/26/2018    GURWINDER 8.4 (L) 03/26/2018        A1C RESULTS:  No results found for: A1C    INR RESULTS:  Lab Results   Component Value Date    INR 1.31 (H) 03/19/2018    INR 1.04 11/12/2014             Thank you for allowing me to participate in the care of your patient.    Sincerely,     NELY Alvarez CNP     [facility]

## 2018-03-28 NOTE — MR AVS SNAPSHOT
After Visit Summary   3/28/2018    Hamida Verma    MRN: [id number]           Patient Information     Date Of Birth          1940        Visit Information        Provider Department      3/28/2018 3:10 PM Kasie Alcocer APRN [facility]        Today's Diagnoses     Atrial fibrillation with rapid ventricular response (H)        Hypertension goal BP (blood pressure) < 150/90        Systolic congestive heart failure, unspecified congestive heart failure chronicity (H)          Care Instructions    Stop flecainide  Increase metoprolol XL 50 mg twice a day  Follow up with Eh in about 1 month         You will receive all normal lab and testing results via [employer] or mail if not reviewed in clinic today. Please contact our office if you need assistance with setting up MyChart.    If you need a medication refill please contact your pharmacy. Please allow 3 business days for your refill to be completed.     As always, thank you for trusting us with your health care needs!    If you have any questions regarding your visit please contact your care team:   Cardiology  Telephone Number    Afib RNs  Nena Lopez, and Brielle [phone]     Call for Electrophysiology procedure scheduling concerns  Jessica Carrero [phone]   Device Clinic (Pacemakers, ICDs, Loop)   RN's :      Ness Conrad Eva, SIGRID Goncalves, Stefania Contreras During business hours:   [phone]                     Follow-ups after your visit        Additional Services     Follow-Up with Electrophysiologist                 Your next 10 appointments already scheduled     Apr 02, 2018  4:30 PM CDT   TELEMEDICINE with Rosamaria Majano RPH   [facility] ([facility])    [address] [place] [id number] [phone]           Note: this is not an onsite visit; there is no need to come to the facility.            Jun 28, 2018  8:15 AM CDT   Pacemaker  "Check with MCCRACKEN DCR2   [facility] ([facility])    [id number]  [facility] [address] [phone] OPT 2              Future tests that were ordered for you today     Open Future Orders        Priority Expected Expires Ordered    Follow-Up with Electrophysiologist Routine 2018 3/28/2019 3/28/2018            Who to contact     If you have questions or need follow up information about today's clinic visit or your schedule please contact [facility] directly at [phone].  Normal or non-critical lab and imaging results will be communicated to you by [employer], letter or phone within 4 business days after the clinic has received the results. If you do not hear from us within 7 days, please contact the clinic through [employer] or phone. If you have a critical or abnormal lab result, we will notify you by phone as soon as possible.  Submit refill requests through [employer] or call your pharmacy and they will forward the refill request to us. Please allow 3 business days for your refill to be completed.          Additional Information About Your Visit        MyChart Information     [employer] lets you send messages to your doctor, view your test results, renew your prescriptions, schedule appointments and more. To sign up, go to [email or web address] . Click on \"Log in\" on the left side of the screen, which will take you to the Welcome page. Then click on \"Sign up Now\" on the right side of the page.     You will be asked to enter the access code listed below, as well as some personal information. Please follow the directions to create your username and password.     Your access code is: [email or web address]  Expires: 2018  2:02 PM     Your access code will  in 90 days. If you need help or a new code, please call your [place] clinic or [phone].        Care EveryWhere ID     This is your Care EveryWhere ID. This " "could be used by other organizations to access your [facility] medical records  [id number]        Your Vitals Were     Pulse Height Last Period BMI (Body Mass Index)          64 1.626 m (5' 4.02\") (LMP Unknown) 31.91 kg/m2         Blood Pressure from Last 3 Encounters:   03/28/18 124/80   03/26/18 96/58   03/21/18 101/55    Weight from Last 3 Encounters:   03/28/18 84.4 kg (186 lb)   03/26/18 84.4 kg (186 lb)   03/21/18 83.7 kg (184 lb 8.4 oz)              We Performed the Following     Follow-Up with Cardiac Advanced Practice Provider          Today's Medication Changes          These changes are accurate as of 3/28/18  3:42 PM.  If you have any questions, ask your nurse or doctor.               These medicines have changed or have updated prescriptions.        Dose/Directions    metoprolol succinate 50 MG 24 hr tablet   Commonly known as:  TOPROL-XL   This may have changed:    - medication strength  - how much to take   Used for:  Atrial fibrillation with rapid ventricular response (H), Systolic congestive heart failure, unspecified congestive heart failure chronicity (H)   Changed by:  Kasie Alcocer APRN CNP        Dose:  50 mg   Take 1 tablet (50 mg) by mouth 2 times daily   Quantity:  180 tablet   Refills:  3         Stop taking these medicines if you haven't already. Please contact your care team if you have questions.     flecainide 100 MG tablet   Commonly known as:  TAMBOCOR   Stopped by:  Kasie Alcocer APRN CNP                Where to get your medicines      These medications were sent to [facility] [place], [place] - [address]  [address] [facility] [address]     Phone:  [phone]     metoprolol succinate 50 MG 24 hr tablet                Primary Care Provider Office Phone # Fax #    Mikala Philip [phone]       [address]  [facility] [address]        Equal Access to Services     NESSA MARIE AH: Sushant rueda " Jose, warosalieda ludelfinoadaha, jorgeta kanohemi kolb, carlos yu francesasuncion palmageorge coleen. So [facility] [phone].    ATENCIÓN: Si carlos sena, tiene a ivy disposición servicios gratuitos de asistencia lingüística. Siomara al [phone].    We comply with applicable federal civil rights laws and [place] laws. We do not discriminate on the basis of race, color, national origin, age, disability, sex, sexual orientation, or gender identity.            Thank you!     Thank you for choosing [facility]  for your care. Our goal is always to provide you with excellent care. Hearing back from our patients is one way we can continue to improve our services. Please take a few minutes to complete the written survey that you may receive in the mail after your visit with us. Thank you!             Your Updated Medication List - Protect others around you: Learn how to safely use, store and throw away your medicines at www.disposemymeds.org.          This list is accurate as of 3/28/18  3:42 PM.  Always use your most recent med list.                   Brand Name Dispense Instructions for use Diagnosis    acetaminophen 325 MG tablet    TYLENOL    100 tablet    Take 2 tablets (650 mg) by mouth every 4 hours as needed for mild pain or fever    Atrial fibrillation with rapid ventricular response (H)       albuterol 108 (90 BASE) MCG/ACT Inhaler    PROAIR HFA/PROVENTIL HFA/VENTOLIN HFA    1 Inhaler    Inhale 2 puffs into the lungs 4 times daily as needed for other (dyspnea)    SOB (shortness of breath)       alendronate 70 MG tablet    FOSAMAX    12 tablet    Take 1 tablet (70 mg) by mouth every 7 days Take 60 minutes before am meal with 8 oz. water. Remain upright for 30 minutes.    Osteoporosis       apixaban ANTICOAGULANT 5 MG tablet    ELIQUIS    60 tablet    Take 1 tablet (5 mg) by mouth 2 times daily    Atrial fibrillation with rapid ventricular response (H)       cholecalciferol 1000  UNITS capsule    vitamin  -D     Take 1 capsule by mouth daily.        FLAX SEED OIL PO      1 capsule daily        fluticasone 50 MCG/ACT spray    FLONASE     Spray 1-2 sprays into both nostrils daily as needed for rhinitis or allergies        furosemide 20 MG tablet    LASIX    30 tablet    Take 1 tablet (20 mg) by mouth daily    Systolic congestive heart failure, unspecified congestive heart failure chronicity (H)       HAIR SKIN NAILS PO      Take 1 tablet by mouth At Bedtime        lisinopril 2.5 MG tablet    PRINIVIL/Zestril    30 tablet    Take 1 tablet (2.5 mg) by mouth daily    Systolic congestive heart failure, unspecified congestive heart failure chronicity (H)       metoprolol succinate 50 MG 24 hr tablet    TOPROL-XL    180 tablet    Take 1 tablet (50 mg) by mouth 2 times daily    Atrial fibrillation with rapid ventricular response (H), Systolic congestive heart failure, unspecified congestive heart failure chronicity (H)       Milk Thistle 200 MG Caps      Take 1 capsule by mouth daily         MG Caps      Take 1 capsule by mouth daily        omega-3 acid ethyl esters 1 G capsule    Lovaza     Take 2 g by mouth daily        potassium & sodium phosphates 278-164-250 MG/75ML Solr      Take 1 tablet by mouth daily        simvastatin 20 MG tablet    ZOCOR    90 tablet    Take 1 tablet (20 mg) by mouth At Bedtime Profile Rx: patient will contact pharmacy when needed    Hyperlipidemia LDL goal <130

## 2018-04-09 ENCOUNTER — TELEPHONE (OUTPATIENT)
Dept: FAMILY MEDICINE | Facility: CLINIC | Age: 78
End: 2018-04-09

## 2018-04-09 DIAGNOSIS — I48.91 ATRIAL FIBRILLATION WITH RAPID VENTRICULAR RESPONSE (H): ICD-10-CM

## 2018-04-09 NOTE — TELEPHONE ENCOUNTER
Dr. Philip-Please review and advise.  Writer planned on recommending patient follow up with cardiology regarding anticoagulant alternatives.    Thank you!  DANIEL BlankN, RN

## 2018-04-09 NOTE — TELEPHONE ENCOUNTER
Patient requested a refill on her eliquis 5 mg tablet and asked that the refill request be sent to Dr. Philip. She also mentioned that the medication would be expensive the next time she filled it (she had a one time copay voucher for the first time she filled) and asked if there was a cheaper medication that could be prescribed.

## 2018-04-09 NOTE — TELEPHONE ENCOUNTER
Reason for Call:  Other prescription- alternative apixaban ANTICOAGULANT (ELIQUIS) 5 MG tablet    Detailed comments: Patient states that rx costs $507, she is wondering if there is a cheaper alternative. Patient would like to hear back today if possible. Thanks!    Phone Number Patient can be reached at: Home number on file [phone] (home)    Best Time: anytime    Can we leave a detailed message on this number? NO    Call taken on 4/9/2018 at 9:16 AM by Noris Lovell

## 2018-04-09 NOTE — TELEPHONE ENCOUNTER
I called patient and told her  would like her to call [employer] for plan.  Patient agrees to do so.    Adilson GRANADOS

## 2018-04-10 ENCOUNTER — TELEPHONE (OUTPATIENT)
Dept: CARDIOLOGY | Facility: CLINIC | Age: 78
End: 2018-04-10

## 2018-04-10 NOTE — TELEPHONE ENCOUNTER
Spoke to patient today in response to her concerns over the cost of Eliquis. She was released form the hospital she got a coupon for 30 days of eliquis. Her co pay for this now is 507.00 through [employer]. I told her that I will request a PA to be submitted today and will leave her one month supply to  tomorrow afternoon. I requested that she call us in 7-10 days if she has not heard about the status of the PA. She expressed appreciation for our help. Arvin

## 2018-04-10 NOTE — TELEPHONE ENCOUNTER
I callled [employer] @ [phone] and rep states that patient has a deductible that has not been met. She has a remaining $81.09 due and that's why her copay is $121 for a 30 day supply picking up at a retail pharmacy. But once met her copay will be $40. Rep also informed me that if patient went through a mailorder pharmacy her copay would be $151 for a 90 day supply and once deductible is met her copay is $70 for a 90 day supply.    Central Prior Authorization Team   Phone: [phone]      PA Initiation    Medication: Eliquis 5mg tablet- Tier Exception Not Needed-Deductible Not Met  Insurance Company: [employer] - Phone [phone] Fax [phone]  Pharmacy Filling the Rx: [facility]  MYRIAM THOMAS - [address]  Filling Pharmacy Phone: [phone]  Filling Pharmacy Fax:    Start Date: 4/10/2018

## 2018-04-10 NOTE — TELEPHONE ENCOUNTER
Phone call to patient to explain the findings from the PA team call to [facility]. Patient expressed satisfaction. I told her to call [facility] at [phone] to see if she can sign up for a mail order program. She agreed to do so and she will let us know what she decides on. Arvin

## 2018-04-18 ENCOUNTER — OFFICE VISIT (OUTPATIENT)
Dept: PHARMACY | Facility: CLINIC | Age: 78
End: 2018-04-18
Attending: FAMILY MEDICINE
Payer: COMMERCIAL

## 2018-04-18 VITALS
DIASTOLIC BLOOD PRESSURE: 82 MMHG | HEART RATE: 60 BPM | OXYGEN SATURATION: 95 % | WEIGHT: 186 LBS | BODY MASS INDEX: 31.91 KG/M2 | SYSTOLIC BLOOD PRESSURE: 143 MMHG

## 2018-04-18 DIAGNOSIS — E63.9 NUTRITIONAL DEFICIENCY: ICD-10-CM

## 2018-04-18 DIAGNOSIS — I48.91 ATRIAL FIBRILLATION WITH RAPID VENTRICULAR RESPONSE (H): ICD-10-CM

## 2018-04-18 DIAGNOSIS — I10 HYPERTENSION GOAL BP (BLOOD PRESSURE) < 140/90: Primary | ICD-10-CM

## 2018-04-18 DIAGNOSIS — I50.20 SYSTOLIC CONGESTIVE HEART FAILURE, UNSPECIFIED CONGESTIVE HEART FAILURE CHRONICITY: ICD-10-CM

## 2018-04-18 DIAGNOSIS — E78.5 HYPERLIPIDEMIA LDL GOAL <130: ICD-10-CM

## 2018-04-18 DIAGNOSIS — M81.0 OSTEOPOROSIS: ICD-10-CM

## 2018-04-18 DIAGNOSIS — M81.0 AGE RELATED OSTEOPOROSIS, UNSPECIFIED PATHOLOGICAL FRACTURE PRESENCE: ICD-10-CM

## 2018-04-18 PROCEDURE — 99607 MTMS BY PHARM ADDL 15 MIN: CPT | Performed by: PHARMACIST

## 2018-04-18 PROCEDURE — 99605 MTMS BY PHARM NP 15 MIN: CPT | Performed by: PHARMACIST

## 2018-04-18 RX ORDER — LISINOPRIL 5 MG/1
5 TABLET ORAL DAILY
Qty: 30 TABLET | Refills: 1 | Status: SHIPPED | OUTPATIENT
Start: 2018-04-18 | End: 2018-05-02

## 2018-04-18 RX ORDER — SIMVASTATIN 20 MG
20 TABLET ORAL AT BEDTIME
Qty: 90 TABLET | Refills: 3 | Status: SHIPPED | OUTPATIENT
Start: 2018-04-18 | End: 2018-10-25

## 2018-04-18 RX ORDER — KRILL/OM-3/DHA/EPA/PHOSPHO/AST 500MG-86MG
1 CAPSULE ORAL DAILY
COMMUNITY
End: 2020-09-22 | Stop reason: ALTCHOICE

## 2018-04-18 RX ORDER — ALENDRONATE SODIUM 70 MG/1
70 TABLET ORAL
Qty: 12 TABLET | Refills: 3 | Status: SHIPPED | OUTPATIENT
Start: 2018-04-18 | End: 2019-02-21

## 2018-04-18 NOTE — PATIENT INSTRUCTIONS
Recommendations from today's MTM visit:                                                    MTM (medication therapy management) is a service provided by a clinical pharmacist designed to help you get the most of out of your medicines.   Today we reviewed what your medicines are for, how to know if they are working, that your medicines are safe and how to make your medicine regimen as easy as possible.     1. Schedule pulmonary function test.  [phone]    2. CALCIUM - add a supplement if you are not getting 1200 mg of calcium daily. Separate it from the multivitamin.     Recommendations:  Postmenopausal women: 1200mg/day    Dietary sources: These also contain vitamin D  Milk                            8 oz            300 mg  Yogurt                          1 cup           400 mg  Hard cheese                     1.5 oz          300 mg  Cottage cheese                  2 cup           300 mg  Orange juice with Calcium       8 oz            300 mg  Low fat dairy sources are recommended    3. Call the cardiologist office to get a refill on the Eliquis and ask for a 90 day supply to be sent to the [facility] pharmacy.     4. Consider rechecking your liver enzymes as one was slightly elevated in the hospital.     5. Talk to Dr. Voss about the benefits of being on a statin and if he thinks you should continue.    6. Increase lisinopril to 5 mg once daily    7. Consider buying an arm monitor. Omron is a good brand. See what the pharmacy has and what they recommend too.    8. Consider getting your meds filled at our mail order pharmacy to save money.     Next MTM visit: 5/2/158 at 8am     To schedule another MTM appointment, please call the clinic directly or you may call the MTM scheduling line at [phone] or toll-free at [phone].     My Clinical Pharmacist's contact information:                                                      It was a pleasure seeing you today!  Please feel free to contact me with  any questions or concerns you have.      Bing Majano, PharmD  Medication Therapy Management Pharmacist  [facility] - Monday 9:30 - 6:00 and Wednesday 7:30 - 4:00  Phone: [phone] - direct clinic line    You may receive a survey about the MTM services you received.  I would appreciate your feedback to help me serve you better in the future. Please fill it out and return it when you can. Your comments will be anonymous.

## 2018-04-18 NOTE — Clinical Note
I saw Hamida today for a SUSY visit. Her blood pressure was slightly elevated above 140 so I increased the lisinopril to 5 mg once daily and scheduled her to come back to see me for recheck and BMP in 2 weeks. Please let me know if you are ok with this change.  Thank you  Bing Majano, PharmD Medication Therapy Management Pharmacist [facility] - Monday 9:30 - 6:00 and Wednesday 7:30 - 4:00 Phone: [phone] - direct clinic line

## 2018-04-18 NOTE — MR AVS SNAPSHOT
After Visit Summary   4/18/2018    Hamida Verma    MRN: [id number]           Patient Information     Date Of Birth          1940        Visit Information        Provider Department      4/18/2018 8:00 AM Rosamaria Majano [facility] MTM        Today's Diagnoses     Osteoporosis    -  1    Hyperlipidemia LDL goal <130        Systolic congestive heart failure, unspecified congestive heart failure chronicity (H)          Care Instructions    Recommendations from today's MTM visit:                                                    MTM (medication therapy management) is a service provided by a clinical pharmacist designed to help you get the most of out of your medicines.   Today we reviewed what your medicines are for, how to know if they are working, that your medicines are safe and how to make your medicine regimen as easy as possible.     1. Schedule pulmonary function test.  [phone]    2. CALCIUM - add a supplement if you are not getting 1200 mg of calcium daily. Separate it from the multivitamin.     Recommendations:  Postmenopausal women: 1200mg/day    Dietary sources: These also contain vitamin D  Milk                            8 oz            300 mg  Yogurt                          1 cup           400 mg  Hard cheese                     1.5 oz          300 mg  Cottage cheese                  2 cup           300 mg  Orange juice with Calcium       8 oz            300 mg  Low fat dairy sources are recommended    3. Call the cardiologist office to get a refill on the Eliquis and ask for a 90 day supply to be sent to the [place] [facility].     4. Consider rechecking your liver enzymes as one was slightly elevated in the hospital.     5. Talk to Dr. Voss about the benefits of being on a statin and if he thinks you should continue.    6. Increase lisinopril to 5 mg once daily    7. Consider buying an arm monitor. Omron is a good brand. See what the pharmacy  has and what they recommend too.    8. Consider getting your meds filled at our mail order pharmacy to save money.     Next MTM visit: 5/2/158 at 8am     To schedule another MT appointment, please call the clinic directly or you may call the MT scheduling line at [phone] or toll-free at [phone].     My Clinical Pharmacist's contact information:                                                      It was a pleasure seeing you today!  Please feel free to contact me with any questions or concerns you have.      Bing Majano PharmD  Medication Therapy Management Pharmacist  [facility] - Monday 9:30 - 6:00 and Wednesday 7:30 - 4:00  Phone: [phone] - direct clinic line    You may receive a survey about the [facility] services you received.  I would appreciate your feedback to help me serve you better in the future. Please fill it out and return it when you can. Your comments will be anonymous.              Follow-ups after your visit        Your next 10 appointments already scheduled     May 02, 2018  8:00 AM CDT   SHORT with Rosamaria Majano [facility] ([facility])    [address] [place] [id number] [phone]            May 02, 2018  8:30 AM CDT   LAB with  LAB   [facility] ([facility])    [address] [place] [id number] [phone]           Please do not eat 10-12 hours before your appointment if you are coming in fasting for labs on lipids, cholesterol, or glucose (sugar). This does not apply to pregnant women. Water, hot tea and black coffee (with nothing added) are okay. Do not drink other fluids, diet soda or chew gum.            May 29, 2018  9:45 AM CDT   [facility] EP RETURN with Roe Rene MD   [facility] ([facility])    [address] [place] [address] [address]  [facility] [address]   [phone] OPT 2            Jun 28, 2018  8:15 AM CDT  "  Pacemaker Check with MCCRACKEN DCR2   [facility] ([facility])    [address] [facility] [address]  [facility] [address] [phone] OPT 2              Who to contact     If you have questions or need follow up information about today's clinic visit or your schedule please contact [facility] directly at [phone].  Normal or non-critical lab and imaging results will be communicated to you by [employer], letter or phone within 4 business days after the clinic has received the results. If you do not hear from us within 7 days, please contact the clinic through [employer] or phone. If you have a critical or abnormal lab result, we will notify you by phone as soon as possible.  Submit refill requests through [email or web address] or call your pharmacy and they will forward the refill request to us. Please allow 3 business days for your refill to be completed.          Additional Information About Your Visit        [employer] Information     [email or web address] lets you send messages to your doctor, view your test results, renew your prescriptions, schedule appointments and more. To sign up, go to [email or web address] . Click on \"Log in\" on the left side of the screen, which will take you to the Welcome page. Then click on \"Sign up Now\" on the right side of the page.     You will be asked to enter the access code listed below, as well as some personal information. Please follow the directions to create your username and password.     Your access code is: [email or web address]  Expires: 2018  2:02 PM     Your access code will  in 90 days. If you need help or a new code, please call your [facility] clinic or [phone].        Care EveryWhere ID     This is your Care EveryWhere ID. This could be used by other organizations to access your [facility] medical records  [id number]        Your Vitals Were     Pulse Last Period Pulse Oximetry BMI (Body Mass Index)          60 (LMP Unknown) 95% " 31.91 kg/m2         Blood Pressure from Last 3 Encounters:   04/18/18 143/82   03/28/18 124/80   03/26/18 96/58    Weight from Last 3 Encounters:   04/18/18 186 lb (84.4 kg)   03/28/18 186 lb (84.4 kg)   03/26/18 186 lb (84.4 kg)              Today, you had the following     No orders found for display         Today's Medication Changes          These changes are accurate as of 4/18/18  9:05 AM.  If you have any questions, ask your nurse or doctor.               These medicines have changed or have updated prescriptions.        Dose/Directions    lisinopril 5 MG tablet   Commonly known as:  PRINIVIL/ZESTRIL   This may have changed:    - medication strength  - how much to take   Used for:  Systolic congestive heart failure, unspecified congestive heart failure chronicity (H)        Dose:  5 mg   Take 1 tablet (5 mg) by mouth daily   Quantity:  30 tablet   Refills:  1            Where to get your medicines      These medications were [facility] to [facility] [address]  [address] [facility] [address]     Phone:  [phone]     alendronate 70 MG tablet    lisinopril 5 MG tablet    simvastatin 20 MG tablet                Primary Care Provider Office Phone # Fax #    Mikala Philip [phone]       [address]  [facility] [address]        Equal Access to Services     NESSA MARIE : Hadii adams pena hadlisetteo Sodonny, waaxda luqadaha, qaybta kaalmada kennedi, carlos carvajal . So [facility] [phone].    ATENCIÓN: Si habla español, tiene a ivy disposición servicios gratuitos de asistencia lingüística. Siomara west [phone].    We comply with applicable federal civil rights laws and [place] laws. We do not discriminate on the basis of race, color, national origin, age, disability, sex, sexual orientation, or gender identity.            Thank you!     Thank you for choosing [facility]  for your care. Our goal is always  to provide you with excellent care. Hearing back from our patients is one way we can continue to improve our services. Please take a few minutes to complete the written survey that you may receive in the mail after your visit with us. Thank you!             Your Updated Medication List - Protect others around you: Learn how to safely use, store and throw away your medicines at www.disposemymeds.org.          This list is accurate as of 4/18/18  9:05 AM.  Always use your most recent med list.                   Brand Name Dispense Instructions for use Diagnosis    albuterol 108 (90 Base) MCG/ACT Inhaler    PROAIR HFA/PROVENTIL HFA/VENTOLIN HFA    1 Inhaler    Inhale 2 puffs into the lungs 4 times daily as needed for other (dyspnea)    SOB (shortness of breath)       alendronate 70 MG tablet    FOSAMAX    12 tablet    Take 1 tablet (70 mg) by mouth every 7 days Take 60 minutes before am meal with 8 oz. water. Remain upright for 30 minutes.    Osteoporosis       apixaban ANTICOAGULANT 5 MG tablet    ELIQUIS    60 tablet    Take 1 tablet (5 mg) by mouth 2 times daily    Atrial fibrillation with rapid ventricular response (H)       cholecalciferol 1000 units capsule    vitamin  -D     Take 1 capsule by mouth daily.        FLAX SEED OIL PO      1 capsule daily        fluticasone 50 MCG/ACT spray    FLONASE     Spray 1-2 sprays into both nostrils daily as needed for rhinitis or allergies        furosemide 20 MG tablet    LASIX    30 tablet    Take 1 tablet (20 mg) by mouth daily    Systolic congestive heart failure, unspecified congestive heart failure chronicity (H)       HAIR SKIN NAILS PO      Take 1 tablet by mouth At Bedtime        Krill Oil 500 MG Caps      Take 1 capsule by mouth daily        lisinopril 5 MG tablet    PRINIVIL/ZESTRIL    30 tablet    Take 1 tablet (5 mg) by mouth daily    Systolic congestive heart failure, unspecified congestive heart failure chronicity (H)       metoprolol succinate 50 MG 24 hr  tablet    TOPROL-XL    180 tablet    Take 1 tablet (50 mg) by mouth 2 times daily    Atrial fibrillation with rapid ventricular response (H), Systolic congestive heart failure, unspecified congestive heart failure chronicity (H)       Milk Thistle 200 MG Caps      Take 1 capsule by mouth daily         MG Caps      Take 1 capsule by mouth daily        potassium & sodium phosphates 278-164-250 MG/75ML Solr      Take 1 tablet by mouth daily        simvastatin 20 MG tablet    ZOCOR    90 tablet    Take 1 tablet (20 mg) by mouth At Bedtime Profile Rx: patient will contact pharmacy when needed    Hyperlipidemia LDL goal <130

## 2018-04-18 NOTE — PROGRESS NOTES
SUBJECTIVE/OBJECTIVE:                Hamida Verma is a 77 year old female coming in for a transitions of care visit.  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     Chief Complaint: Reports her blood pressure has been running a little higher.    Allergies/ADRs: Reviewed in Epic  Tobacco: History of tobacco dependence - quit 2000   Alcohol: not currently using  Caffeine: 2-3 cups/day of coffee  Activity: Has been better since getting of the hospital.  PMH: Reviewed in Epic    Medication Adherence/Access:  Patient uses pill box(es).  Patient takes medications 2 time(s) per day.  Per patient, misses medication 0 times per week.   Medication barriers: none.   The patient fills medications at [facility]: YES.    Paroxysmal afib:  Current medications include: Eliquis 5 mg BID and metoprolol succinate 50 mg twice daily (increased on 3/28/18).  Per cardiology not from 3/28/18 she will need anticoagulation indefinitely for CHADSVASC 5 (age++, female, HTN, HF).    HFpEF/HTN: Current medications include metoprolol succinate 50 mg twice daily, lisinopril 2.5 mg once daily and furosemide 20mg once daily.  Patient reports no current medication side effects.   She checks her blood pressure at home every morning and evening. Morning runs 140's/60's but she did have a reading of 164/78 this morning. She uses a wrist cuff. She is resting for at least 5 minutes before checking.  ECHO:  Date 3/14/18, EF 60-65%  Pt is not complaining of sx of HF.    Pt is measuring daily weights and reports stable.   Patient does self-monitor BP. See above.   Pt is following a low sodium diet, is avoiding EtOH.    Osteoporosis: Current therapy includes: alendronate (Fosamax) 70mg weekly (Pt has been on current therapy for less than a years) and vitamin D 1000 IU daily - unsure of dose. She was on calcium  prior to starting the alendronate but was told she could stop taking it. Pt is not experiencing side effects.  Pt is getting the following sources of dietary calcium: drinks lactacid, cheese and green leafy vegetables.  Last vitamin D level: 4/4/17 and it was 37  DEXA History: 3/28/17  Risk factors: post-menopausal    Supplements:  Current medications include: flaxseed oil once capsule daily to help her stay regular, multivitamin once daily, krill oil once daily, MSM for joints and feel this works and milk thistle once daily to help with sinuses.      Hyperlipidemia: Current therapy includes simvastatin 20 mg once daily.  Pt reports no significant myalgias or other side effects but states that occasionally she will have tingling in her legs at night.   The 10-year ASCVD risk score ([place] MCNULTY Jr, et al., 2013) is: 27.2%    Values used to calculate the score:      Age: 77 years      Sex: Female      Is Non- : No      Diabetic: No      Tobacco smoker: No      Systolic Blood Pressure: 124 mmHg      Is BP treated: Yes      HDL Cholesterol: 61 mg/dL      Total Cholesterol: 174 mg/dL    Current labs include:  BP Readings from Last 3 Encounters:   03/28/18 124/80   03/26/18 96/58   03/21/18 101/55     Today's Vitals: /82 (BP Location: Right arm)  Pulse 60  Wt 186 lb (84.4 kg)  LMP  (LMP Unknown)  SpO2 95%  BMI 31.91 kg/m2    Lab Results   Component Value Date    CHOL 174 03/23/2017     Lab Results   Component Value Date    TRIG 109 03/23/2017     Lab Results   Component Value Date    HDL 61 03/23/2017     Lab Results   Component Value Date    LDL 91 03/23/2017       Liver Function Studies -   Recent Labs   Lab Test  03/14/18   1049   PROTTOTAL  7.0   ALBUMIN  2.6*   BILITOTAL  0.5   ALKPHOS  94   AST  17   ALT  57*       Lab Results   Component Value Date    UCRR 178 04/28/2014    MICROL 28 04/28/2014    UMALCR 15.73 04/28/2014       Last Basic Metabolic Panel:  Lab Results   Component Value  Date     03/26/2018      Lab Results   Component Value Date    POTASSIUM 3.5 03/26/2018     Lab Results   Component Value Date    CHLORIDE 107 03/26/2018     Lab Results   Component Value Date    BUN 11 03/26/2018     Lab Results   Component Value Date    CR 0.83 03/26/2018     GFR Estimate   Date Value Ref Range Status   03/26/2018 66 >60 mL/min/1.7m2 Final     Comment:     Non  GFR Calc   03/18/2018 67 >60 mL/min/1.7m2 Final     Comment:     Non  GFR Calc   03/17/2018 52 (L) >60 mL/min/1.7m2 Final     Comment:     Non  GFR Calc     Most Recent Immunizations   Administered Date(s) Administered     Influenza (H1N1) 01/28/2010     Influenza (IIV3) PF 10/09/2013     Pneumo Conj 13-V (2010&after) 05/07/2015     Pneumococcal 23 valent 10/26/2006     TD (ADULT, 7+) 07/28/2011     TDAP Vaccine (Adacel) 05/07/2015     Zoster vaccine, live 03/19/2013       ASSESSMENT:                 Current medications were reviewed today.      Medication Adherence: excellent, no issues identified    Paroxysmal afib:  Stable. Discussed having her fill at [facility] to reduce cost of Eliquis since 3 mo supply through mail order is $70.    HFrEF/HTN: Needs Improvement. Patient is not meeting BP goal of < 140/90mmHg. Current weight is stable and diuretic dose is appropriate. Pt is appropriately avoiding NSAID's, diltiazem/verapamil, and pioglitazone. Pt would benefit from ACE inhibitor: increase dose. Will make sure both PCP and cardiology are ok with this change. Potassium is on lower end of normal and renal function is good.    Osteoporosis: Needs Improvement. Pt is not meeting RDI of calcium 1200mg/day. Pt would benefit from:additional supplementation of calcium if she is not getting recommended daily amount.    Supplements:  Stable    Hyperlipidemia: Stable. Suggested she discuss statin use with her cardiologist.    PLAN:                1. Schedule pulmonary function  test.  [phone]    2. CALCIUM - add a supplement if you are not getting 1200 mg of calcium daily. Separate it from the multivitamin.     Recommendations:  Postmenopausal women: 1200mg/day    Dietary sources: These also contain vitamin D  Milk                            8 oz            300 mg  Yogurt                          1 cup           400 mg  Hard cheese                     1.5 oz          300 mg  Cottage cheese                  2 cup           300 mg  Orange juice with Calcium       8 oz            300 mg  Low fat dairy sources are recommended    3. Call the cardiologist office to get a refill on the Eliquis and ask for a 90 day supply to be sent to the [facility].     4. Will recheck your liver enzymes at next visit.     5. Talk to Dr. Voss about the benefits of being on a statin and if he thinks you should continue.    6. Increase lisinopril to 5 mg once daily    7. Consider buying an arm monitor. Omron is a good brand. See what the pharmacy has and what they recommend too.    I spent 60 minutes with this patient today. All changes were made via collaborative practice agreement with Mikala Philip. A copy of the visit note was provided to the patient's primary care provider.    Will follow up in 2 weeks.    The patient was given a summary of these recommendations as an after visit summary.    Bing Majano, HenrryD  Medication Therapy Management Pharmacist    Chart documentation was completed in part with Dragon voice-recognition software. Even though reviewed, some grammatical, spelling, and word errors may remain.

## 2018-04-20 DIAGNOSIS — I48.91 ATRIAL FIBRILLATION WITH RAPID VENTRICULAR RESPONSE (H): ICD-10-CM

## 2018-04-27 DIAGNOSIS — I48.91 ATRIAL FIBRILLATION WITH RAPID VENTRICULAR RESPONSE (H): ICD-10-CM

## 2018-05-02 ENCOUNTER — OFFICE VISIT (OUTPATIENT)
Dept: PHARMACY | Facility: CLINIC | Age: 78
End: 2018-05-02
Payer: COMMERCIAL

## 2018-05-02 ENCOUNTER — TELEPHONE (OUTPATIENT)
Dept: PHARMACY | Facility: CLINIC | Age: 78
End: 2018-05-02

## 2018-05-02 VITALS
WEIGHT: 185 LBS | OXYGEN SATURATION: 97 % | SYSTOLIC BLOOD PRESSURE: 146 MMHG | BODY MASS INDEX: 31.74 KG/M2 | HEART RATE: 63 BPM | DIASTOLIC BLOOD PRESSURE: 77 MMHG

## 2018-05-02 DIAGNOSIS — I10 HYPERTENSION GOAL BP (BLOOD PRESSURE) < 140/90: Primary | ICD-10-CM

## 2018-05-02 DIAGNOSIS — I10 HYPERTENSION GOAL BP (BLOOD PRESSURE) < 140/90: ICD-10-CM

## 2018-05-02 DIAGNOSIS — E78.5 HYPERLIPIDEMIA LDL GOAL <130: ICD-10-CM

## 2018-05-02 DIAGNOSIS — I50.20 SYSTOLIC CONGESTIVE HEART FAILURE, UNSPECIFIED CONGESTIVE HEART FAILURE CHRONICITY: ICD-10-CM

## 2018-05-02 LAB
ALBUMIN SERPL-MCNC: 3.6 G/DL (ref 3.4–5)
ALP SERPL-CCNC: 46 U/L (ref 40–150)
ALT SERPL W P-5'-P-CCNC: 25 U/L (ref 0–50)
ANION GAP SERPL CALCULATED.3IONS-SCNC: 9 MMOL/L (ref 3–14)
AST SERPL W P-5'-P-CCNC: 16 U/L (ref 0–45)
BILIRUB DIRECT SERPL-MCNC: 0.1 MG/DL (ref 0–0.2)
BILIRUB SERPL-MCNC: 0.5 MG/DL (ref 0.2–1.3)
BUN SERPL-MCNC: 14 MG/DL (ref 7–30)
CALCIUM SERPL-MCNC: 9.4 MG/DL (ref 8.5–10.1)
CHLORIDE SERPL-SCNC: 106 MMOL/L (ref 94–109)
CO2 SERPL-SCNC: 27 MMOL/L (ref 20–32)
CREAT SERPL-MCNC: 0.81 MG/DL (ref 0.52–1.04)
GFR SERPL CREATININE-BSD FRML MDRD: 69 ML/MIN/1.7M2
GLUCOSE SERPL-MCNC: 91 MG/DL (ref 70–99)
POTASSIUM SERPL-SCNC: 4.1 MMOL/L (ref 3.4–5.3)
PROT SERPL-MCNC: 7.3 G/DL (ref 6.8–8.8)
SODIUM SERPL-SCNC: 142 MMOL/L (ref 133–144)

## 2018-05-02 PROCEDURE — 36415 COLL VENOUS BLD VENIPUNCTURE: CPT | Performed by: FAMILY MEDICINE

## 2018-05-02 PROCEDURE — 80076 HEPATIC FUNCTION PANEL: CPT | Performed by: FAMILY MEDICINE

## 2018-05-02 PROCEDURE — 80048 BASIC METABOLIC PNL TOTAL CA: CPT | Performed by: FAMILY MEDICINE

## 2018-05-02 PROCEDURE — 99607 MTMS BY PHARM ADDL 15 MIN: CPT | Performed by: PHARMACIST

## 2018-05-02 PROCEDURE — 99606 MTMS BY PHARM EST 15 MIN: CPT | Performed by: PHARMACIST

## 2018-05-02 RX ORDER — LISINOPRIL 10 MG/1
10 TABLET ORAL DAILY
Qty: 30 TABLET | Refills: 1 | Status: SHIPPED | OUTPATIENT
Start: 2018-05-02 | End: 2018-06-11

## 2018-05-02 NOTE — MR AVS SNAPSHOT
After Visit Summary   5/2/2018    Hmaida Verma    MRN: [id number]           Patient Information     Date Of Birth          1940        Visit Information        Provider Department      5/2/2018 8:00 AM Rosamaria Majano RPH [facility] MTM        Care Instructions    Recommendations from today's MTM visit:                                                      1. See how much calcium is in the multivitamin you are taking and make sure between that and what you are eating you are getting 1200 mg of calcium and 1000 IU vitamin D daily.     2. Call me when you get home to let me know how much lisinopril you have been taking. [phone]    Next MTM visit: TBD    To schedule another MTM appointment, please call the clinic directly or you may call the MTM scheduling line at [phone] or toll-free at [phone].     My Clinical Pharmacist's contact information:                                                      It was a pleasure seeing you today!  Please feel free to contact me with any questions or concerns you have.      Bing Majano, PharmD  Medication Therapy Management Pharmacist  [facility] - Monday 9:30 - 6:00 and Wednesday 7:30 - 4:00  Phone: [phone] - direct clinic line    You may receive a survey about the MTM services you received.  I would appreciate your feedback to help me serve you better in the future. Please fill it out and return it when you can. Your comments will be anonymous.                    Follow-ups after your visit        Your next 10 appointments already scheduled     May 02, 2018  8:30 AM CDT   LAB with  LAB   [facility] ([facility])    [address] [place] [address] [phone]           Please do not eat 10-12 hours before your appointment if you are coming in fasting for labs on lipids, cholesterol, or glucose (sugar). This does not apply to pregnant women. Water, hot tea and black  "coffee (with nothing added) are okay. Do not drink other fluids, diet soda or chew gum.            May 29, 2018  9:45 AM CDT   [facility] EP RETURN with Roe Rene MD   [facility] ([facility])    [address]  [facility] [address] [phone] OPT 2            May 29, 2018  2:00 PM CDT   Pulmonary Function with  Pulmonary Rehab 1   [facility] ([facility])    [address]  [facility] [address] [phone]           No Inhalers for 6 hours prior to test No Smoking 2 hours prior to test            Jun 28, 2018  8:15 AM CDT   Pacemaker Check with [facility] DCR2   [facility] ([facility])    [address]  [facility] [address] [phone] OPT 2              Who to contact     If you have questions or need follow up information about today's clinic visit or your schedule please contact [facility] MT directly at [phone].  Normal or non-critical lab and imaging results will be communicated to you by [employer], letter or phone within 4 business days after the clinic has received the results. If you do not hear from us within 7 days, please contact the clinic through [employer] or phone. If you have a critical or abnormal lab result, we will notify you by phone as soon as possible.  Submit refill requests through [employer] or call your pharmacy and they will forward the refill request to us. Please allow 3 business days for your refill to be completed.          Additional Information About Your Visit        [employer] Information     [employer] lets you send messages to your doctor, view your test results, renew your prescriptions, schedule appointments and more. To sign up, go to [email or web address] . Click on \"Log in\" on the left side of the screen, which will take you to the Welcome page. Then click on \"Sign up Now\" on the right " side of the page.     You will be asked to enter the access code listed below, as well as some personal information. Please follow the directions to create your username and password.     Your access code is: [email or web address]  Expires: 2018  2:02 PM     Your access code will  in 90 days. If you need help or a new code, please call your [facility] clinic or [phone].        Care EveryWhere ID     This is your Care EveryWhere ID. This could be used by other organizations to access your [facility] medical records  [id number]        Your Vitals Were     Pulse Last Period Pulse Oximetry BMI (Body Mass Index)          63 (LMP Unknown) 97% 31.74 kg/m2         Blood Pressure from Last 3 Encounters:   18 146/77   18 143/82   18 124/80    Weight from Last 3 Encounters:   18 185 lb (83.9 kg)   18 186 lb (84.4 kg)   18 186 lb (84.4 kg)              Today, you had the following     No orders found for display       Primary Care Provider Office Phone # Fax #    Mikala Philip [phone]       [address]  [facility] [address]        Equal Access to Services     NESSA MARIE : Hadii aad ku hadasho Soomaali, waaxda luqadaha, qaybta kaalmada adeegyada, waxay idiin hayitzn aimee sexton lataz . So [facility] [phone].    ATENCIÓN: Si habla español, tiene a ivy disposición servicios gratuitos de asistencia lingüística. Llame al [phone].    We comply with applicable federal civil rights laws and [place] laws. We do not discriminate on the basis of race, color, national origin, age, disability, sex, sexual orientation, or gender identity.            Thank you!     Thank you for choosing [facility]  for your care. Our goal is always to provide you with excellent care. Hearing back from our patients is one way we can continue to improve our services. Please take a few minutes to complete the written survey that you may receive in the mail after your  visit with us. Thank you!             Your Updated Medication List - Protect others around you: Learn how to safely use, store and throw away your medicines at www.disposemymeds.org.          This list is accurate as of 5/2/18  8:26 AM.  Always use your most recent med list.                   Brand Name Dispense Instructions for use Diagnosis    albuterol 108 (90 Base) MCG/ACT Inhaler    PROAIR HFA/PROVENTIL HFA/VENTOLIN HFA    1 Inhaler    Inhale 2 puffs into the lungs 4 times daily as needed for other (dyspnea)    SOB (shortness of breath)       alendronate 70 MG tablet    FOSAMAX    12 tablet    Take 1 tablet (70 mg) by mouth every 7 days Take 60 minutes before am meal with 8 oz. water. Remain upright for 30 minutes.    Osteoporosis       apixaban ANTICOAGULANT 5 MG tablet    ELIQUIS    180 tablet    Take 1 tablet (5 mg) by mouth 2 times daily    Atrial fibrillation with rapid ventricular response (H)       cholecalciferol 1000 units capsule    vitamin  -D     Take 1 capsule by mouth daily.        FLAX SEED OIL PO      1 capsule daily        fluticasone 50 MCG/ACT spray    FLONASE     Spray 1-2 sprays into both nostrils daily as needed for rhinitis or allergies        furosemide 20 MG tablet    LASIX    30 tablet    Take 1 tablet (20 mg) by mouth daily    Systolic congestive heart failure, unspecified congestive heart failure chronicity (H)       HAIR SKIN NAILS PO      Take 1 tablet by mouth At Bedtime        Krill Oil 500 MG Caps      Take 1 capsule by mouth daily        lisinopril 5 MG tablet    PRINIVIL/ZESTRIL    30 tablet    Take 1 tablet (5 mg) by mouth daily    Systolic congestive heart failure, unspecified congestive heart failure chronicity (H)       metoprolol succinate 50 MG 24 hr tablet    TOPROL-XL    180 tablet    Take 1 tablet (50 mg) by mouth 2 times daily    Atrial fibrillation with rapid ventricular response (H), Systolic congestive heart failure, unspecified congestive heart failure chronicity  (H)       Milk Thistle 200 MG Caps      Take 1 capsule by mouth daily         MG Caps      Take 1 capsule by mouth daily        potassium & sodium phosphates 278-164-250 MG/75ML Solr      Take 1 tablet by mouth daily        simvastatin 20 MG tablet    ZOCOR    90 tablet    Take 1 tablet (20 mg) by mouth At Bedtime Profile Rx: patient will contact pharmacy when needed    Hyperlipidemia LDL goal <130

## 2018-05-02 NOTE — PATIENT INSTRUCTIONS
Recommendations from today's MTM visit:                                                      1. See how much calcium is in the multivitamin you are taking and make sure between that and what you are eating you are getting 1200 mg of calcium and 1000 IU vitamin D daily.     2. Call me when you get home to let me know how much lisinopril you have been taking. [phone]    Next MTM visit: 6/4/18 at 10am with lab at 9:45    To schedule another MTM appointment, please call the clinic directly or you may call the MTM scheduling line at [phone] or toll-free at [phone].     My Clinical Pharmacist's contact information:                                                      It was a pleasure seeing you today!  Please feel free to contact me with any questions or concerns you have.      Bing Majano, PharmD  Medication Therapy Management Pharmacist  [facility] - Monday 9:30 - 6:00 and Wednesday 7:30 - 4:00  Phone: [phone] - direct clinic line    You may receive a survey about the MT services you received.  I would appreciate your feedback to help me serve you better in the future. Please fill it out and return it when you can. Your comments will be anonymous.

## 2018-05-02 NOTE — Clinical Note
BP was still up today so increased lisinopril to 10 mg and following up with me in about 4 weeks. She had her BMP checked today so if that looks ok then I think 4 weeks is ok. Let me know if you think differently.  Thanks  Bing

## 2018-05-02 NOTE — TELEPHONE ENCOUNTER
I called patient to give her directions to increase lisinopril to 10 mg once daily but I had to leave a message.  The patient did tell me it was okay to leave a detailed message on her voicemail at today's visit.  I did advise her to  the prescription for the 10 mg lisinopril tablets and take 1 every day.  I informed her that I have already scheduled her a follow-up visit with me in about 4 weeks on June 4 at 10:00 in the morning with labs scheduled at 945 just prior to my visit.   Will wrote to PCP as an FYI.    Bing Majano, HenrryD  Medication Therapy Management Pharmacist    Chart documentation was completed in part with Dragon voice-recognition software. Even though reviewed, some grammatical, spelling, and word errors may remain.

## 2018-05-02 NOTE — PROGRESS NOTES
Patient called me back and left a message saying that she is taking 20 mg of furosemide once daily and lisinopril 5 mg once daily.  Based on this information I will increase her lisinopril dose to 10 mg once daily and ask that she fill the 10 mg tablets and take 1 daily.  I feel like it may be too confusing for her to take multiple tablets especially now that she has 2.5 mg tablets and 5 mg tablets of lisinopril at home.  We will have her follow-up in 3-4 weeks for blood pressure check and also BMP.    Bing Majano, PharmD  Medication Therapy Management Pharmacist    Chart documentation was completed in part with Dragon voice-recognition software. Even though reviewed, some grammatical, spelling, and word errors may remain.

## 2018-05-02 NOTE — PROGRESS NOTES
SUBJECTIVE/OBJECTIVE:                Hamida Verma is a 77 year old female coming in for a follow-up visit for Medication Therapy Management.  She was referred to me from Dr. Mikala Philip.     Chief Complaint: Follow up from our visit on 4/18/18.  No concerns today.     - She scheduled her PFT     Tobacco: No tobacco use  Alcohol: not currently using    Medication Adherence/Access:  no issues reported    HFpEF/HTN: Current medications include metoprolol succinate 50 mg twice daily, lisinopril 5 mg once daily (increased at last visit) and furosemide 20mg once daily (although she thinks she might be taking 2 tablets daily).  She is not sure if she is taking one or two of the 5 mg lisinopril tablets. Patient reports no current medication side effects.   She checks her blood pressure at home every morning and evening. Morning runs 135-48/62-81 and in the evening is about the same. She reports that half the time her systolic will be less than 140.  She did get a new arm cuff. She is resting for at least 5 minutes before checking. Her last cup of coffee was about 7:40. She reports no chest pain or shortness of breath.  ECHO:  Date 3/14/18, EF 60-65%  Pt is not complaining of sx of HF.    Pt is measuring daily weights and reports stable.   Patient does self-monitor BP. See above.   Pt is following a low sodium diet, is avoiding EtOH.    Current labs include:  Today's Vitals: /77  Pulse 63  Wt 185 lb (83.9 kg)  LMP  (LMP Unknown)  SpO2 97%  BMI 31.74 kg/m2     BP Readings from Last 3 Encounters:   04/18/18 143/82   03/28/18 124/80   03/26/18 96/58       Liver Function Studies -   Recent Labs   Lab Test  03/14/18   1049   PROTTOTAL  7.0   ALBUMIN  2.6*   BILITOTAL  0.5   ALKPHOS  94   AST  17   ALT  57*     Last Basic Metabolic Panel:  Lab Results   Component Value Date     03/26/2018      Lab Results   Component Value Date    POTASSIUM 3.5 03/26/2018     Lab Results   Component Value Date    CHLORIDE 107  03/26/2018     Lab Results   Component Value Date    BUN 11 03/26/2018     Lab Results   Component Value Date    CR 0.83 03/26/2018     GFR Estimate   Date Value Ref Range Status   03/26/2018 66 >60 mL/min/1.7m2 Final     Comment:     Non  GFR Calc   03/18/2018 67 >60 mL/min/1.7m2 Final     Comment:     Non  GFR Calc   03/17/2018 52 (L) >60 mL/min/1.7m2 Final     Comment:     Non  GFR Calc     Most Recent Immunizations   Administered Date(s) Administered     Influenza (H1N1) 01/28/2010     Influenza (IIV3) PF 10/09/2013     Pneumo Conj 13-V (2010&after) 05/07/2015     Pneumococcal 23 valent 10/26/2006     TD (ADULT, 7+) 07/28/2011     TDAP Vaccine (Adacel) 05/07/2015     Zoster vaccine, live 03/19/2013       ASSESSMENT:              Current medications were reviewed today as discussed above.      Medication Adherence: excellent, no issues identified    HFpEF/HTN: Needs Improvement. Patient is not meeting BP goal of < 140/90mmHg. Current weight is stable and diuretic dose is appropriate. Pt is appropriately avoiding NSAID's, diltiazem/verapamil, and pioglitazone. Pt would benefit from ACE inhibitor: increase dose. Since patient is not sure what dose she is currently taking will wait to decide on dose until she calls me to confirm. She will also confirm her furosemide dose as well. Checking BMP today.    PLAN:                  1. See how much calcium is in the multivitamin you are taking and make sure between that and what you are eating you are getting 1200 mg of calcium and 1000 IU vitamin D daily.     2. Call me when you get home to let me know how much lisinopril you have been taking.     I spent 30 minutes with this patient today. All changes were made via collaborative practice agreement with Mikala Philip. A copy of the visit note was provided to the patient's primary care provider.     Will follow up in 2-4 weeks.    The patient was given a summary of these  recommendations as an after visit summary.    Bing Majano PharmD  Medication Therapy Management Pharmacist    Chart documentation was completed in part with Dragon voice-recognition software. Even though reviewed, some grammatical, spelling, and word errors may remain.

## 2018-05-02 NOTE — PLAN OF CARE
Problem: Patient Care Overview  Goal: Plan of Care/Patient Progress Review  Outcome: No Change  A&O x4.  Independent in room.  Tolerates 2gm NA, no caffeine diet.  VSS on 2 LPM via NC.  IS instruction provided, encouraged, and completed.  SALAZAR.  LS clear.  Tylenol x1 for headache effective.  Strict I&O.  Tele: SD with bradycardia.  Cardiology consulted this evening.  LLE US negative for DVT and Chest CT with contrast completed.  Stress test tomorrow.       detailed exam

## 2018-05-29 ENCOUNTER — OFFICE VISIT (OUTPATIENT)
Dept: CARDIOLOGY | Facility: CLINIC | Age: 78
End: 2018-05-29
Attending: NURSE PRACTITIONER
Payer: COMMERCIAL

## 2018-05-29 VITALS
WEIGHT: 184 LBS | SYSTOLIC BLOOD PRESSURE: 160 MMHG | DIASTOLIC BLOOD PRESSURE: 86 MMHG | BODY MASS INDEX: 31.41 KG/M2 | HEIGHT: 64 IN

## 2018-05-29 DIAGNOSIS — I48.91 ATRIAL FIBRILLATION WITH RAPID VENTRICULAR RESPONSE (H): ICD-10-CM

## 2018-05-29 PROCEDURE — 99214 OFFICE O/P EST MOD 30 MIN: CPT | Performed by: INTERNAL MEDICINE

## 2018-05-29 PROCEDURE — 93000 ELECTROCARDIOGRAM COMPLETE: CPT | Performed by: INTERNAL MEDICINE

## 2018-05-29 NOTE — LETTER
5/29/2018    Mikala Philip MD, MD  [address]  [facility] [address]    RE: Hamida Verma       Dear Colleague,    I had the pleasure of seeing Hamida Verma in the [facility].    HPI and Plan:   Dear Dr. Blevins:    Thank you for allowing me to participate in care of this very delightful patient.  As you know, Hamida is a 77-year-old female who was diagnosed with symptomatic atrial fibrillation in the setting of an upper respiratory infection.  She is known to have normal cardiac structure and function and have a negative stress test.   flecainide was eventually prescribed along with Eliquis.  Unfortunately patient was hospitalized again for CHF decompensation and associate with preserved LV systolic function and noted to be in atrial fibrillation with rapid ventricular response a week or 2 later.  She subsequently converted but had substantial pauses of 3-4 seconds.  In light of that pacemaker was implanted.      Patient was scheduled to have routine device check and noted atrial collision but it was about 30% of the time without much symptoms.  By stopping flecainide and increased It was decided to pursue rate control strategy by stopping flecainide and increase Toprol.    Patient is otherwise doing well.  She denies having shortness of breath, palpitations or chest discomfort.  She seemed to tolerate a higher dose of metoprolol well at this point in time.  Her blood pressure is elevated and I repeated again to confirm it.  I asked her to follow-up with you regarding these issues as she may need to have her metoprolol increase or lisinopril.  Patient can come back to see 1 of our advanced practice provider in a year and see me every other year.    Orders Placed This Encounter   Procedures     Follow-Up with Cardiac Advanced Practice Provider     EKG 12-lead complete w/read - Clinics (performed today)       No orders of the defined types were placed in this  encounter.      There are no discontinued medications.      Encounter Diagnosis   Name Primary?     Atrial fibrillation with rapid ventricular response (H)        CURRENT MEDICATIONS:  Current Outpatient Prescriptions   Medication Sig Dispense Refill     albuterol (PROAIR HFA/PROVENTIL HFA/VENTOLIN HFA) 108 (90 BASE) MCG/ACT Inhaler Inhale 2 puffs into the lungs 4 times daily as needed for other (dyspnea) (Patient not taking: Reported on 4/18/2018) 1 Inhaler 3     alendronate (FOSAMAX) 70 MG tablet Take 1 tablet (70 mg) by mouth every 7 days Take 60 minutes before am meal with 8 oz. water. Remain upright for 30 minutes. 12 tablet 3     apixaban ANTICOAGULANT (ELIQUIS) 5 MG tablet Take 1 tablet (5 mg) by mouth 2 times daily 180 tablet 3     cholecalciferol (VITAMIN  -D) 1000 UNIT capsule Take 1 capsule by mouth daily.       FLAX SEED OIL OR 1 capsule daily       fluticasone (FLONASE) 50 MCG/ACT spray Spray 1-2 sprays into both nostrils daily as needed for rhinitis or allergies       furosemide (LASIX) 20 MG tablet Take 1 tablet (20 mg) by mouth daily 30 tablet 3     Krill Oil 500 MG CAPS Take 1 capsule by mouth daily       lisinopril (PRINIVIL/ZESTRIL) 10 MG tablet Take 1 tablet (10 mg) by mouth daily 30 tablet 1     Methylsulfonylmethane (MSM) 500 MG CAPS Take 1 capsule by mouth daily       metoprolol succinate (TOPROL-XL) 50 MG 24 hr tablet Take 1 tablet (50 mg) by mouth 2 times daily 180 tablet 3     Milk Thistle 200 MG CAPS Take 1 capsule by mouth daily        Multiple Vitamins-Minerals (HAIR SKIN NAILS PO) Take 1 tablet by mouth At Bedtime       potassium & sodium phosphates 278-164-250 MG/75ML SOLR Take 1 tablet by mouth daily        simvastatin (ZOCOR) 20 MG tablet Take 1 tablet (20 mg) by mouth At Bedtime Profile Rx: patient will contact pharmacy when needed 90 tablet 3       ALLERGIES     Allergies   Allergen Reactions     Strawberry Flavor        PAST MEDICAL HISTORY:  Past Medical History:   Diagnosis Date      Atrial fibrillation (H)      Essential hypertension, benign      HYPERLIPIDEMIA NEC/NOS 3/30/2006       PAST SURGICAL HISTORY:  Past Surgical History:   Procedure Laterality Date     HYSTERECTOMY, VAGINAL      hysterectomy       FAMILY HISTORY:  Family History   Problem Relation Age of Onset     CEREBROVASCULAR DISEASE Mother      Eye Disorder Mother      Myocardial Infarction Mother      C.A.D. Father      heart attack     Alcohol/Drug Father      alcohol     Blood Disease Sister      lupus     Depression Sister      DIABETES No family hx of      Breast Cancer No family hx of      Cancer - colorectal No family hx of        SOCIAL HISTORY:  Social History     Social History     Marital status:      Spouse name: N/A     Number of children: N/A     Years of education: N/A     Social History Main Topics     Smoking status: Former Smoker     Start date: 10/28/1955     Quit date: 9/1/2000     Smokeless tobacco: Never Used      Comment: quit 6yrs ago     Alcohol use No     Drug use: No     Sexual activity: No     Other Topics Concern     Parent/Sibling W/ Cabg, Mi Or Angioplasty Before 65f 55m? No     Social History Narrative    Balanced Diet - Yes    Osteoporosis Prevention Measures - Dairy servings per day: 2    Regular Exercise -  Yes Describe walk, but not recently due to weather    Dental Exam - Yes    Eye Exam -No    Self Breast Exam - Yes    Abuse: Current or Past (Physical, Sexual or Emotional)- No    Do you feel safe in your environment - Yes    Guns stored in the home - No    Sunscreen used - Yes    Seatbelts used - Yes    Lipids -  1/10    Glucose -  1/10    Colon Cancer Screening - Yes, 7/21/08    Hemoccults - NO    Pap Test -  Many yrs ago, has hysterectomy    Do you have any concerns about STD's -  No    Mammography - 6/18/08    DEXA - 4/13/06    Immunizations reviewed and up to date - No    Jonathan Marshall MA                   Review of Systems:  Skin:  Negative       Eyes:  Positive for glasses   "  ENT:  Negative      Respiratory:  Negative       Cardiovascular:    Positive for;palpitations    Gastroenterology: Negative      Genitourinary:  not assessed      Musculoskeletal:  Negative      Neurologic:  Negative      Psychiatric:  Negative      Heme/Lymph/Imm:         Endocrine:  Negative        Physical Exam:  Vitals: /86  Ht 1.626 m (5' 4\")  Wt 83.5 kg (184 lb)  LMP  (LMP Unknown)  BMI 31.58 kg/m2    Constitutional:  cooperative, alert and oriented, well developed, well nourished, in no acute distress        Skin:  warm and dry to the touch, no apparent skin lesions or masses noted          Head:  normocephalic, no masses or lesions        Eyes:  pupils equal and round, conjunctivae and lids unremarkable, sclera white, no xanthalasma, EOMS intact, no nystagmus        Lymph:No Cervical lymphadenopathy present     ENT:  no pallor or cyanosis, dentition good        Neck:  carotid pulses are full and equal bilaterally, JVP normal, no carotid bruit        Respiratory:  normal breath sounds, clear to auscultation, normal A-P diameter, normal symmetry, normal respiratory excursion, no use of accessory muscles         Cardiac: regular rhythm, normal S1/S2, no S3 or S4, apical impulse not displaced, no murmurs, gallops or rubs                                                         GI:  abdomen soft, non-tender, BS normoactive, no mass, no HSM, no bruits        Extremities and Muscular Skeletal:  no deformities, clubbing, cyanosis, erythema observed              Neurological:  no gross motor deficits        Psych:  Alert and Oriented x 3        Thank you for allowing me to participate in the care of your patient.    Sincerely,     Roe Rene MD     [facility]    "

## 2018-05-29 NOTE — MR AVS SNAPSHOT
After Visit Summary   5/29/2018    Hamida Verma    MRN: [id number]           Patient Information     Date Of Birth          1940        Visit Information        Provider Department      5/29/2018 9:45 AM Roe Voss MD [facility]        Today's Diagnoses     Atrial fibrillation with rapid ventricular response (H)           Follow-ups after your visit        Your next 10 appointments already scheduled     May 29, 2018  2:00 PM CDT   Pulmonary Function with  Pulmonary Rehab 1   [facility] ([facility])    [address]  [facility] [address]   [phone]           No Inhalers for 6 hours prior to test No Smoking 2 hours prior to test            Jun 04, 2018  7:45 AM CDT   Return Visit with Roby Mallory MD   [facility] ([facility])    [address] [place] [address] [address]  [facility] [id number] [phone] OPT 2            Jun 04, 2018  9:45 AM CDT   LAB with  LAB   [facility] ([facility])    [address] [place] [id number] [phone]           Please do not eat 10-12 hours before your appointment if you are coming in fasting for labs on lipids, cholesterol, or glucose (sugar). This does not apply to pregnant women. Water, hot tea and black coffee (with nothing added) are okay. Do not drink other fluids, diet soda or chew gum.            Jun 11, 2018 10:00 AM CDT   SHORT with Rosamaria Majano RPH   [facility] ([facility])    [address] [place] [id number] [phone]            Jun 28, 2018  8:15 AM CDT   Pacemaker Check with MCCRACKEN DCR2   [facility] ([facility])    [address] [place] [id number] [phone] OPT 2              Who to contact     If you have questions  "or need follow up information about today's clinic visit or your schedule please contact [facility] directly at [phone].  Normal or non-critical lab and imaging results will be communicated to you by MyChart, letter or phone within 4 business days after the clinic has received the results. If you do not hear from us within 7 days, please contact the clinic through [employer] or phone. If you have a critical or abnormal lab result, we will notify you by phone as soon as possible.  Submit refill requests through [employer] or call your pharmacy and they will forward the refill request to us. Please allow 3 business days for your refill to be completed.          Additional Information About Your Visit        StoreAge[employer]Celles Information     [employer]aver lets you send messages to your doctor, view your test results, renew your prescriptions, schedule appointments and more. To sign up, go to [email or web address] . Click on \"Log in\" on the left side of the screen, which will take you to the Welcome page. Then click on \"Sign up Now\" on the right side of the page.     You will be asked to enter the access code listed below, as well as some personal information. Please follow the directions to create your username and password.     Your access code is: [email or web address]  Expires: 2018  2:02 PM     Your access code will  in 90 days. If you need help or a new code, please call your [facility] clinic or [phone].        Care EveryWhere ID     This is your Care EveryWhere ID. This could be used by other organizations to access your [facility] medical records  [id number]        Your Vitals Were     Height Last Period BMI (Body Mass Index)             1.626 m (5' 4\") (LMP Unknown) 31.58 kg/m2          Blood Pressure from Last 3 Encounters:   18 160/86   18 146/77   18 143/82    Weight from Last 3 Encounters:   18 83.5 kg (184 lb)   18 83.9 kg (185 lb)   18 84.4 " kg (186 lb)              We Performed the Following     EKG 12-lead complete w/read - Clinics (performed today)     Follow-Up with Electrophysiologist        Primary Care Provider Office Phone # Fax #    Mikala Philip [phone] [address]  [facility] [address]        Equal Access to Services     NESSA MARIE : Hadii aad ku hadasho Soomaali, waaxda luqadaha, qaybta kaalmada adeegyada, waxay idiin hayaan adeeg khnigelsh laRachelaan . So [facility] [phone].    ATENCIÓN: Si habla español, tiene a ivy disposición servicios gratuitos de asistencia lingüística. Llame al [phone].    We comply with applicable federal civil rights laws and [place] laws. We do not discriminate on the basis of race, color, national origin, age, disability, sex, sexual orientation, or gender identity.            Thank you!     Thank you for choosing [facility]  for your care. Our goal is always to provide you with excellent care. Hearing back from our patients is one way we can continue to improve our services. Please take a few minutes to complete the written survey that you may receive in the mail after your visit with us. Thank you!             Your Updated Medication List - Protect others around you: Learn how to safely use, store and throw away your medicines at www.disposemymeds.org.          This list is accurate as of 5/29/18 11:04 AM.  Always use your most recent med list.                   Brand Name Dispense Instructions for use Diagnosis    albuterol 108 (90 Base) MCG/ACT Inhaler    PROAIR HFA/PROVENTIL HFA/VENTOLIN HFA    1 Inhaler    Inhale 2 puffs into the lungs 4 times daily as needed for other (dyspnea)    SOB (shortness of breath)       alendronate 70 MG tablet    FOSAMAX    12 tablet    Take 1 tablet (70 mg) by mouth every 7 days Take 60 minutes before am meal with 8 oz. water. Remain upright for 30 minutes.    Osteoporosis       apixaban ANTICOAGULANT 5 MG  tablet    ELIQUIS    180 tablet    Take 1 tablet (5 mg) by mouth 2 times daily    Atrial fibrillation with rapid ventricular response (H)       cholecalciferol 1000 units capsule    vitamin  -D     Take 1 capsule by mouth daily.        FLAX SEED OIL PO      1 capsule daily        fluticasone 50 MCG/ACT spray    FLONASE     Spray 1-2 sprays into both nostrils daily as needed for rhinitis or allergies        furosemide 20 MG tablet    LASIX    30 tablet    Take 1 tablet (20 mg) by mouth daily    Systolic congestive heart failure, unspecified congestive heart failure chronicity (H)       HAIR SKIN NAILS PO      Take 1 tablet by mouth At Bedtime        Krill Oil 500 MG Caps      Take 1 capsule by mouth daily        lisinopril 10 MG tablet    PRINIVIL/ZESTRIL    30 tablet    Take 1 tablet (10 mg) by mouth daily    Systolic congestive heart failure, unspecified congestive heart failure chronicity (H)       metoprolol succinate 50 MG 24 hr tablet    TOPROL-XL    180 tablet    Take 1 tablet (50 mg) by mouth 2 times daily    Atrial fibrillation with rapid ventricular response (H), Systolic congestive heart failure, unspecified congestive heart failure chronicity (H)       Milk Thistle 200 MG Caps      Take 1 capsule by mouth daily         MG Caps      Take 1 capsule by mouth daily        potassium & sodium phosphates 278-164-250 MG/75ML Solr      Take 1 tablet by mouth daily        simvastatin 20 MG tablet    ZOCOR    90 tablet    Take 1 tablet (20 mg) by mouth At Bedtime Profile Rx: patient will contact pharmacy when needed    Hyperlipidemia LDL goal <130

## 2018-05-29 NOTE — LETTER
5/29/2018    Mikala Philip MD, MD  [address]  [facility] [address]    RE: Hamida Verma       Dear Colleague,    I had the pleasure of seeing Hamida Verma in the [facility].    HPI and Plan:   Dear Dr. Blevins:    Thank you for allowing me to participate in care of this very delightful patient.  As you know, Hamida is a 77-year-old female who was diagnosed with symptomatic atrial fibrillation in the setting of an upper respiratory infection.  She is known to have normal cardiac structure and function and have a negative stress test.   flecainide was eventually prescribed along with Eliquis.  Unfortunately patient was hospitalized again for CHF decompensation and associate with preserved LV systolic function and noted to be in atrial fibrillation with rapid ventricular response a week or 2 later.  She subsequently converted but had substantial pauses of 3-4 seconds.  In light of that pacemaker was implanted.      Patient was scheduled to have routine device check and noted atrial collision but it was about 30% of the time without much symptoms.  By stopping flecainide and increased It was decided to pursue rate control strategy by stopping flecainide and increase Toprol.    Patient is otherwise doing well.  She denies having shortness of breath, palpitations or chest discomfort.  She seemed to tolerate a higher dose of metoprolol well at this point in time.  Her blood pressure is elevated and I repeated again to confirm it.  I asked her to follow-up with you regarding these issues as she may need to have her metoprolol increase or lisinopril.  Patient can come back to see 1 of our advanced practice provider in a year and see me every other year.    Orders Placed This Encounter   Procedures     Follow-Up with Cardiac Advanced Practice Provider     EKG 12-lead complete w/read - Clinics (performed today)       No orders of the defined types were placed in this  encounter.      There are no discontinued medications.      Encounter Diagnosis   Name Primary?     Atrial fibrillation with rapid ventricular response (H)        CURRENT MEDICATIONS:  Current Outpatient Prescriptions   Medication Sig Dispense Refill     albuterol (PROAIR HFA/PROVENTIL HFA/VENTOLIN HFA) 108 (90 BASE) MCG/ACT Inhaler Inhale 2 puffs into the lungs 4 times daily as needed for other (dyspnea) (Patient not taking: Reported on 4/18/2018) 1 Inhaler 3     alendronate (FOSAMAX) 70 MG tablet Take 1 tablet (70 mg) by mouth every 7 days Take 60 minutes before am meal with 8 oz. water. Remain upright for 30 minutes. 12 tablet 3     apixaban ANTICOAGULANT (ELIQUIS) 5 MG tablet Take 1 tablet (5 mg) by mouth 2 times daily 180 tablet 3     cholecalciferol (VITAMIN  -D) 1000 UNIT capsule Take 1 capsule by mouth daily.       FLAX SEED OIL OR 1 capsule daily       fluticasone (FLONASE) 50 MCG/ACT spray Spray 1-2 sprays into both nostrils daily as needed for rhinitis or allergies       furosemide (LASIX) 20 MG tablet Take 1 tablet (20 mg) by mouth daily 30 tablet 3     Krill Oil 500 MG CAPS Take 1 capsule by mouth daily       lisinopril (PRINIVIL/ZESTRIL) 10 MG tablet Take 1 tablet (10 mg) by mouth daily 30 tablet 1     Methylsulfonylmethane (MSM) 500 MG CAPS Take 1 capsule by mouth daily       metoprolol succinate (TOPROL-XL) 50 MG 24 hr tablet Take 1 tablet (50 mg) by mouth 2 times daily 180 tablet 3     Milk Thistle 200 MG CAPS Take 1 capsule by mouth daily        Multiple Vitamins-Minerals (HAIR SKIN NAILS PO) Take 1 tablet by mouth At Bedtime       potassium & sodium phosphates 278-164-250 MG/75ML SOLR Take 1 tablet by mouth daily        simvastatin (ZOCOR) 20 MG tablet Take 1 tablet (20 mg) by mouth At Bedtime Profile Rx: patient will contact pharmacy when needed 90 tablet 3       ALLERGIES     Allergies   Allergen Reactions     Strawberry Flavor        PAST MEDICAL HISTORY:  Past Medical History:   Diagnosis Date      Atrial fibrillation (H)      Essential hypertension, benign      HYPERLIPIDEMIA NEC/NOS 3/30/2006       PAST SURGICAL HISTORY:  Past Surgical History:   Procedure Laterality Date     HYSTERECTOMY, VAGINAL      hysterectomy       FAMILY HISTORY:  Family History   Problem Relation Age of Onset     CEREBROVASCULAR DISEASE Mother      Eye Disorder Mother      Myocardial Infarction Mother      C.A.D. Father      heart attack     Alcohol/Drug Father      alcohol     Blood Disease Sister      lupus     Depression Sister      DIABETES No family hx of      Breast Cancer No family hx of      Cancer - colorectal No family hx of        SOCIAL HISTORY:  Social History     Social History     Marital status:      Spouse name: N/A     Number of children: N/A     Years of education: N/A     Social History Main Topics     Smoking status: Former Smoker     Start date: 10/28/1955     Quit date: 9/1/2000     Smokeless tobacco: Never Used      Comment: quit 6yrs ago     Alcohol use No     Drug use: No     Sexual activity: No     Other Topics Concern     Parent/Sibling W/ Cabg, Mi Or Angioplasty Before 65f 55m? No     Social History Narrative    Balanced Diet - Yes    Osteoporosis Prevention Measures - Dairy servings per day: 2    Regular Exercise -  Yes Describe walk, but not recently due to weather    Dental Exam - Yes    Eye Exam -No    Self Breast Exam - Yes    Abuse: Current or Past (Physical, Sexual or Emotional)- No    Do you feel safe in your environment - Yes    Guns stored in the home - No    Sunscreen used - Yes    Seatbelts used - Yes    Lipids -  1/10    Glucose -  1/10    Colon Cancer Screening - Yes, 7/21/08    Hemoccults - NO    Pap Test -  Many yrs ago, has hysterectomy    Do you have any concerns about STD's -  No    Mammography - 6/18/08    DEXA - 4/13/06    Immunizations reviewed and up to date - No    Jonathan Marshall MA                   Review of Systems:  Skin:  Negative       Eyes:  Positive for glasses   "  ENT:  Negative      Respiratory:  Negative       Cardiovascular:    Positive for;palpitations    Gastroenterology: Negative      Genitourinary:  not assessed      Musculoskeletal:  Negative      Neurologic:  Negative      Psychiatric:  Negative      Heme/Lymph/Imm:         Endocrine:  Negative        Physical Exam:  Vitals: /86  Ht 1.626 m (5' 4\")  Wt 83.5 kg (184 lb)  LMP  (LMP Unknown)  BMI 31.58 kg/m2    Constitutional:  cooperative, alert and oriented, well developed, well nourished, in no acute distress        Skin:  warm and dry to the touch, no apparent skin lesions or masses noted          Head:  normocephalic, no masses or lesions        Eyes:  pupils equal and round, conjunctivae and lids unremarkable, sclera white, no xanthalasma, EOMS intact, no nystagmus        Lymph:No Cervical lymphadenopathy present     ENT:  no pallor or cyanosis, dentition good        Neck:  carotid pulses are full and equal bilaterally, JVP normal, no carotid bruit        Respiratory:  normal breath sounds, clear to auscultation, normal A-P diameter, normal symmetry, normal respiratory excursion, no use of accessory muscles         Cardiac: regular rhythm, normal S1/S2, no S3 or S4, apical impulse not displaced, no murmurs, gallops or rubs                                                         GI:  abdomen soft, non-tender, BS normoactive, no mass, no HSM, no bruits        Extremities and Muscular Skeletal:  no deformities, clubbing, cyanosis, erythema observed              Neurological:  no gross motor deficits        Psych:  Alert and Oriented x 3        CC  NELY Rosario CNP  [address]  [place] [address]                Thank you for allowing me to participate in the care of your patient.      Sincerely,     Roe Rene MD     [facility]    cc:   NELY Rosario CNP  [address] [place]  [place] [address]        "

## 2018-05-29 NOTE — PROGRESS NOTES
HPI and Plan:   Dear Dr. Blevins:    Thank you for allowing me to participate in care of this very delightful patient.  As you know, Hamida is a 77-year-old female who was diagnosed with symptomatic atrial fibrillation in the setting of an upper respiratory infection.  She is known to have normal cardiac structure and function and have a negative stress test.   flecainide was eventually prescribed along with Eliquis.  Unfortunately patient was hospitalized again for CHF decompensation and associate with preserved LV systolic function and noted to be in atrial fibrillation with rapid ventricular response a week or 2 later.  She subsequently converted but had substantial pauses of 3-4 seconds.  In light of that pacemaker was implanted.      Patient was scheduled to have routine device check and noted atrial collision but it was about 30% of the time without much symptoms.  By stopping flecainide and increased It was decided to pursue rate control strategy by stopping flecainide and increase Toprol.    Patient is otherwise doing well.  She denies having shortness of breath, palpitations or chest discomfort.  She seemed to tolerate a higher dose of metoprolol well at this point in time.  Her blood pressure is elevated and I repeated again to confirm it.  I asked her to follow-up with you regarding these issues as she may need to have her metoprolol increase or lisinopril.  Patient can come back to see 1 of our advanced practice provider in a year and see me every other year.    Orders Placed This Encounter   Procedures     Follow-Up with Cardiac Advanced Practice Provider     EKG 12-lead complete w/read - Clinics (performed today)       No orders of the defined types were placed in this encounter.      There are no discontinued medications.      Encounter Diagnosis   Name Primary?     Atrial fibrillation with rapid ventricular response (H)        CURRENT MEDICATIONS:  Current Outpatient Prescriptions   Medication Sig  Dispense Refill     albuterol (PROAIR HFA/PROVENTIL HFA/VENTOLIN HFA) 108 (90 BASE) MCG/ACT Inhaler Inhale 2 puffs into the lungs 4 times daily as needed for other (dyspnea) (Patient not taking: Reported on 4/18/2018) 1 Inhaler 3     alendronate (FOSAMAX) 70 MG tablet Take 1 tablet (70 mg) by mouth every 7 days Take 60 minutes before am meal with 8 oz. water. Remain upright for 30 minutes. 12 tablet 3     apixaban ANTICOAGULANT (ELIQUIS) 5 MG tablet Take 1 tablet (5 mg) by mouth 2 times daily 180 tablet 3     cholecalciferol (VITAMIN  -D) 1000 UNIT capsule Take 1 capsule by mouth daily.       FLAX SEED OIL OR 1 capsule daily       fluticasone (FLONASE) 50 MCG/ACT spray Spray 1-2 sprays into both nostrils daily as needed for rhinitis or allergies       furosemide (LASIX) 20 MG tablet Take 1 tablet (20 mg) by mouth daily 30 tablet 3     Krill Oil 500 MG CAPS Take 1 capsule by mouth daily       lisinopril (PRINIVIL/ZESTRIL) 10 MG tablet Take 1 tablet (10 mg) by mouth daily 30 tablet 1     Methylsulfonylmethane (MSM) 500 MG CAPS Take 1 capsule by mouth daily       metoprolol succinate (TOPROL-XL) 50 MG 24 hr tablet Take 1 tablet (50 mg) by mouth 2 times daily 180 tablet 3     Milk Thistle 200 MG CAPS Take 1 capsule by mouth daily        Multiple Vitamins-Minerals (HAIR SKIN NAILS PO) Take 1 tablet by mouth At Bedtime       potassium & sodium phosphates 278-164-250 MG/75ML SOLR Take 1 tablet by mouth daily        simvastatin (ZOCOR) 20 MG tablet Take 1 tablet (20 mg) by mouth At Bedtime Profile Rx: patient will contact pharmacy when needed 90 tablet 3       ALLERGIES     Allergies   Allergen Reactions     Strawberry Flavor        PAST MEDICAL HISTORY:  Past Medical History:   Diagnosis Date     Atrial fibrillation (H)      Essential hypertension, benign      HYPERLIPIDEMIA NEC/NOS 3/30/2006       PAST SURGICAL HISTORY:  Past Surgical History:   Procedure Laterality Date     HYSTERECTOMY, VAGINAL      hysterectomy        FAMILY HISTORY:  Family History   Problem Relation Age of Onset     CEREBROVASCULAR DISEASE Mother      Eye Disorder Mother      Myocardial Infarction Mother      C.A.D. Father      heart attack     Alcohol/Drug Father      alcohol     Blood Disease Sister      lupus     Depression Sister      DIABETES No family hx of      Breast Cancer No family hx of      Cancer - colorectal No family hx of        SOCIAL HISTORY:  Social History     Social History     Marital status:      Spouse name: N/A     Number of children: N/A     Years of education: N/A     Social History Main Topics     Smoking status: Former Smoker     Start date: 10/28/1955     Quit date: 9/1/2000     Smokeless tobacco: Never Used      Comment: quit 6yrs ago     Alcohol use No     Drug use: No     Sexual activity: No     Other Topics Concern     Parent/Sibling W/ Cabg, Mi Or Angioplasty Before 65f 55m? No     Social History Narrative    Balanced Diet - Yes    Osteoporosis Prevention Measures - Dairy servings per day: 2    Regular Exercise -  Yes Describe walk, but not recently due to weather    Dental Exam - Yes    Eye Exam -No    Self Breast Exam - Yes    Abuse: Current or Past (Physical, Sexual or Emotional)- No    Do you feel safe in your environment - Yes    Guns stored in the home - No    Sunscreen used - Yes    Seatbelts used - Yes    Lipids -  1/10    Glucose -  1/10    Colon Cancer Screening - Yes, 7/21/08    Hemoccults - NO    Pap Test -  Many yrs ago, has hysterectomy    Do you have any concerns about STD's -  No    Mammography - 6/18/08    DEXA - 4/13/06    Immunizations reviewed and up to date - No    Jonathan Marshall MA                   Review of Systems:  Skin:  Negative       Eyes:  Positive for glasses    ENT:  Negative      Respiratory:  Negative       Cardiovascular:    Positive for;palpitations    Gastroenterology: Negative      Genitourinary:  not assessed      Musculoskeletal:  Negative      Neurologic:  Negative     "  Psychiatric:  Negative      Heme/Lymph/Imm:         Endocrine:  Negative        Physical Exam:  Vitals: /86  Ht 1.626 m (5' 4\")  Wt 83.5 kg (184 lb)  LMP  (LMP Unknown)  BMI 31.58 kg/m2    Constitutional:  cooperative, alert and oriented, well developed, well nourished, in no acute distress        Skin:  warm and dry to the touch, no apparent skin lesions or masses noted          Head:  normocephalic, no masses or lesions        Eyes:  pupils equal and round, conjunctivae and lids unremarkable, sclera white, no xanthalasma, EOMS intact, no nystagmus        Lymph:No Cervical lymphadenopathy present     ENT:  no pallor or cyanosis, dentition good        Neck:  carotid pulses are full and equal bilaterally, JVP normal, no carotid bruit        Respiratory:  normal breath sounds, clear to auscultation, normal A-P diameter, normal symmetry, normal respiratory excursion, no use of accessory muscles         Cardiac: regular rhythm, normal S1/S2, no S3 or S4, apical impulse not displaced, no murmurs, gallops or rubs                                                         GI:  abdomen soft, non-tender, BS normoactive, no mass, no HSM, no bruits        Extremities and Muscular Skeletal:  no deformities, clubbing, cyanosis, erythema observed              Neurological:  no gross motor deficits        Psych:  Alert and Oriented x 3        CC  Kasie Alcocer, APRN CNP  [address]  [place] [address]              "

## 2018-05-30 ENCOUNTER — TELEPHONE (OUTPATIENT)
Dept: CARDIOLOGY | Facility: CLINIC | Age: 78
End: 2018-05-30

## 2018-05-30 NOTE — TELEPHONE ENCOUNTER
Pt called back, states she is feeling fine & does not think she needs to see Dr. Mallory 5 days. Apt cancelled. Pt advised to call with any questions or concerns. LPenfield RN

## 2018-05-30 NOTE — TELEPHONE ENCOUNTER
Called pt - left message on pt's voice mail regarding OV 06/04/2018 with Dr. Mallory.  Pt saw Dr. Voss 05/29/2018 and his note states follow up with GORGE in one year.  Asked pt to call me to confirm she is not coming to OV 06/04/2018

## 2018-06-11 ENCOUNTER — OFFICE VISIT (OUTPATIENT)
Dept: PHARMACY | Facility: CLINIC | Age: 78
End: 2018-06-11
Payer: COMMERCIAL

## 2018-06-11 VITALS
DIASTOLIC BLOOD PRESSURE: 75 MMHG | WEIGHT: 185 LBS | BODY MASS INDEX: 31.76 KG/M2 | OXYGEN SATURATION: 96 % | SYSTOLIC BLOOD PRESSURE: 159 MMHG | HEART RATE: 60 BPM

## 2018-06-11 DIAGNOSIS — I50.20 SYSTOLIC CONGESTIVE HEART FAILURE, UNSPECIFIED CONGESTIVE HEART FAILURE CHRONICITY: ICD-10-CM

## 2018-06-11 DIAGNOSIS — I10 HYPERTENSION GOAL BP (BLOOD PRESSURE) < 140/90: Primary | ICD-10-CM

## 2018-06-11 PROCEDURE — 36415 COLL VENOUS BLD VENIPUNCTURE: CPT | Performed by: FAMILY MEDICINE

## 2018-06-11 PROCEDURE — 99607 MTMS BY PHARM ADDL 15 MIN: CPT | Performed by: PHARMACIST

## 2018-06-11 PROCEDURE — 99606 MTMS BY PHARM EST 15 MIN: CPT | Performed by: PHARMACIST

## 2018-06-11 PROCEDURE — 80048 BASIC METABOLIC PNL TOTAL CA: CPT | Performed by: FAMILY MEDICINE

## 2018-06-11 RX ORDER — LISINOPRIL 20 MG/1
20 TABLET ORAL DAILY
Qty: 30 TABLET | Refills: 1 | Status: SHIPPED | OUTPATIENT
Start: 2018-06-11 | End: 2018-08-15

## 2018-06-11 NOTE — PATIENT INSTRUCTIONS
Recommendations from today's MTM visit:                                                      1. Contact Dr. Voss's office and request an updated prescription for the furosemide.  The number is [phone]    2. We may need you to stop the over the counter potassium if you potassium level gets too high.    3. Bring your blood pressure cuff in next visit so we can make sure it is accurate.     4. Increase lisinopril to 20 mg once daily    5. Stop at lab to have your kidney function checked.    Next MTM visit: 7/11/18 at 9:30 - if you dont see your blood pressures decreasing at home on the higher dose of lisinopril I would like you to schedule with Dr. Philip before you leave on your trip.    To schedule another MTM appointment, please call the clinic directly or you may call the MTM scheduling line at [phone] or toll-free at [phone].     My Clinical Pharmacist's contact information:                                                      It was a pleasure seeing you today!  Please feel free to contact me with any questions or concerns you have.      Bing Majano, PharmD  Medication Therapy Management Pharmacist  [facility] - Monday 9:30 - 6:00 and Wednesday 7:30 - 4:00  Phone: [phone] - direct clinic line    You may receive a survey about the MT services you received.  I would appreciate your feedback to help me serve you better in the future. Please fill it out and return it when you can. Your comments will be anonymous.

## 2018-06-11 NOTE — LETTER
June 14, 2018      Hamida Verma  [address]  [facility] [address]        Dear ,    We are writing to inform you of your test results.    Normal Results.     Resulted Orders   Basic metabolic panel   Result Value Ref Range    Sodium 139 133 - 144 mmol/L    Potassium 4.1 3.4 - 5.3 mmol/L    Chloride 103 94 - 109 mmol/L    Carbon Dioxide 28 20 - 32 mmol/L    Anion Gap 8 3 - 14 mmol/L    Glucose 80 70 - 99 mg/dL      Comment:      Non Fasting    Urea Nitrogen 17 7 - 30 mg/dL    Creatinine 0.78 0.52 - 1.04 mg/dL    GFR Estimate 71 >60 mL/min/1.7m2      Comment:      Non  GFR Calc    GFR Estimate If Black 86 >60 mL/min/1.7m2      Comment:       GFR Calc    Calcium 9.4 8.5 - 10.1 mg/dL     If you have any questions or concerns, please call the clinic at the number listed above.     Sincerely,    Mikala Philip MD/nr

## 2018-06-11 NOTE — MR AVS SNAPSHOT
After Visit Summary   6/11/2018    Hamida Verma    MRN: [id number]           Patient Information     Date Of Birth          1940        Visit Information        Provider Department      6/11/2018 10:00 AM [facility]ano [facility]        Today's Diagnoses     Systolic congestive heart failure, unspecified congestive heart failure chronicity (H)          Care Instructions    Recommendations from today's MT visit:                                                      1. Contact Dr. Voss's office and request an updated prescription for the furosemide.  The number is [phone]    2. We may need you to stop the over the counter potassium if you potassium level gets too high.    3. Bring your blood pressure cuff in next visit so we can make sure it is accurate.     4. Increase lisinopril to 20 mg once daily    5. Stop at lab to have your kidney function checked.    Next MTM visit: 7/11/18 at 9:30 - if you dont see your blood pressures decreasing at home on the higher dose of lisinopril I would like you to schedule with Dr. Philip before you leave on your trip.    To schedule another MTM appointment, please call the clinic directly or you may call the MTM scheduling line at [phone] or toll-free at [phone].     My Clinical Pharmacist's contact information:                                                      It was a pleasure seeing you today!  Please feel free to contact me with any questions or concerns you have.      Bing Majano, Cornelio  Medication Therapy Management Pharmacist  [facility] - Monday 9:30 - 6:00 and Wednesday 7:30 - 4:00  Phone: [phone] - direct clinic line    You may receive a survey about the [facility] services you received.  I would appreciate your feedback to help me serve you better in the future. Please fill it out and return it when you can. Your comments will be anonymous.              Follow-ups after your visit        Your next 10  appointments already scheduled     Jun 18, 2018  9:00 AM CDT   Pacemaker Check with MCCRACKEN DCR2   [facility] ([facility])    [address]  [facility] [id number] [phone] OPT 2            Jul 11, 2018  9:30 AM CDT   SHORT with Rosamaria Majano RPH   [facility] MT ([facility])    [address] [place] [address] [phone]              Who to contact     If you have questions or need follow up information about today's clinic visit or your schedule please contact [facility] directly at [phone].  Normal or non-critical lab and imaging results will be communicated to you by MyChart, letter or phone within 4 business days after the clinic has received the results. If you do not hear from us within 7 days, please contact the clinic through MyChart or phone. If you have a critical or abnormal lab result, we will notify you by phone as soon as possible.  Submit refill requests through [employer] or call your pharmacy and they will forward the refill request to us. Please allow 3 business days for your refill to be completed.          Additional Information About Your Visit        Care EveryWhere ID     This is your Care EveryWhere ID. This could be used by other organizations to access your [facility] medical records  [id number]        Your Vitals Were     Pulse Last Period Pulse Oximetry BMI (Body Mass Index)          60 (LMP Unknown) 96% 31.76 kg/m2         Blood Pressure from Last 3 Encounters:   06/11/18 159/75   05/29/18 160/86   05/02/18 146/77    Weight from Last 3 Encounters:   06/11/18 185 lb (83.9 kg)   05/29/18 184 lb (83.5 kg)   05/02/18 185 lb (83.9 kg)              Today, you had the following     No orders found for display         Today's Medication Changes          These changes are accurate as of 6/11/18 10:39 AM.  If you have any questions, ask your nurse or doctor.                These medicines have changed or have updated prescriptions.        Dose/Directions    lisinopril 20 MG tablet   Commonly known as:  PRINIVIL/ZESTRIL   This may have changed:    - medication strength  - how much to take   Used for:  Systolic congestive heart failure, unspecified congestive heart failure chronicity (H)   Changed by:  Rosamaria Majano RPH        Dose:  20 mg   Take 1 tablet (20 mg) by mouth daily   Quantity:  30 tablet   Refills:  1            Where to get your medicines      These medications were sent to [facility] [place], [place] - [address]  [address], [facility] [address]     Phone:  [phone]     lisinopril 20 MG tablet                Primary Care Provider Office Phone # Fax #    Mikala Philip [phone]       [address]  [facility] [address]        Equal Access to Services     [facility] : Hadii adams pena hadasho Sodonny, waaxda luqadaha, qaybta kaalmada aderhettyabrittany, carlos carvajal . So [facility] [phone].    ATENCIÓN: Si habla español, tiene a ivy disposición servicios gratuitos de asistencia lingüística. Llame al [phone].    We comply with applicable federal civil rights laws and [place] laws. We do not discriminate on the basis of race, color, national origin, age, disability, sex, sexual orientation, or gender identity.            Thank you!     Thank you for choosing [facility]  for your care. Our goal is always to provide you with excellent care. Hearing back from our patients is one way we can continue to improve our services. Please take a few minutes to complete the written survey that you may receive in the mail after your visit with us. Thank you!             Your Updated Medication List - Protect others around you: Learn how to safely use, store and throw away your medicines at www.disposemymeds.org.          This list is accurate as of 6/11/18 10:39 AM.  Always use  your most recent med list.                   Brand Name Dispense Instructions for use Diagnosis    albuterol 108 (90 Base) MCG/ACT Inhaler    PROAIR HFA/PROVENTIL HFA/VENTOLIN HFA    1 Inhaler    Inhale 2 puffs into the lungs 4 times daily as needed for other (dyspnea)    SOB (shortness of breath)       alendronate 70 MG tablet    FOSAMAX    12 tablet    Take 1 tablet (70 mg) by mouth every 7 days Take 60 minutes before am meal with 8 oz. water. Remain upright for 30 minutes.    Osteoporosis       apixaban ANTICOAGULANT 5 MG tablet    ELIQUIS    180 tablet    Take 1 tablet (5 mg) by mouth 2 times daily    Atrial fibrillation with rapid ventricular response (H)       cholecalciferol 1000 units capsule    vitamin  -D     Take 1 capsule by mouth daily.        FLAX SEED OIL PO      1 capsule daily        fluticasone 50 MCG/ACT spray    FLONASE     Spray 1-2 sprays into both nostrils daily as needed for rhinitis or allergies        furosemide 20 MG tablet    LASIX    30 tablet    Take 1 tablet (20 mg) by mouth daily    Systolic congestive heart failure, unspecified congestive heart failure chronicity (H)       HAIR SKIN NAILS PO      Take 1 tablet by mouth At Bedtime        Krill Oil 500 MG Caps      Take 1 capsule by mouth daily        lisinopril 20 MG tablet    PRINIVIL/ZESTRIL    30 tablet    Take 1 tablet (20 mg) by mouth daily    Systolic congestive heart failure, unspecified congestive heart failure chronicity (H)       metoprolol succinate 50 MG 24 hr tablet    TOPROL-XL    180 tablet    Take 1 tablet (50 mg) by mouth 2 times daily    Atrial fibrillation with rapid ventricular response (H), Systolic congestive heart failure, unspecified congestive heart failure chronicity (H)       Milk Thistle 200 MG Caps      Take 1 capsule by mouth daily         MG Caps      Take 1 capsule by mouth daily        potassium & sodium phosphates 278-164-250 MG/75ML Solr      Take 1 tablet by mouth daily        simvastatin 20  MG tablet    ZOCOR    90 tablet    Take 1 tablet (20 mg) by mouth At Bedtime Profile Rx: patient will contact pharmacy when needed    Hyperlipidemia LDL goal <130

## 2018-06-11 NOTE — Clinical Note
BP still high today so increased lisinopril to 20 mg. I told her that if she doesn't notice a decrease in bp at home a couple of weeks to schedule with you. I have her coming back to see me in about a month.  Bing

## 2018-06-11 NOTE — PROGRESS NOTES
SUBJECTIVE/OBJECTIVE:                Hamida Verma is a 77 year old female coming in for a follow-up visit for Medication Therapy Management.  She was referred to me from Dr. Mikala Philip.     Chief Complaint: Follow up from our visit on 5/2/18.  Sometimes the bottom of her left foot will go numb for about 15-20 minutes and then goes away.  This will happen usually in the afternoon and very random. Unsure what is causing that.  She also mentions that she continues to have infrequent silent migraines.     Tobacco: No tobacco use  Alcohol: not currently using    Medication Adherence/Access:  no issues reported    HFpEF/HTN: Current medications include metoprolol succinate 50 mg twice daily and lisinopril 10 mg once daily (increased at last visit).  Patient reports no current medication side effects.   She checks her blood pressure at home every morning and evening. Morning runs 130's-154/30-40?'s and in the evening its lower but cant recall readings. She did get a new arm cuff. She is resting for at least 5 minutes before checking. Her last cup of coffee was about 9:30 this morning. She reports no chest pain or shortness of breath.  ECHO:  Date 3/14/18, EF 60-65%  Pt is not complaining of sx of HF.    Pt is measuring daily weights and reports stable. (183-184 lbs at home)  Patient does self-monitor BP. See above.   Pt is following a low sodium diet, is avoiding EtOH.    Current labs include:  Today's Vitals: /75  Pulse 60  Wt 185 lb (83.9 kg)  LMP  (LMP Unknown)  SpO2 96%  BMI 31.76 kg/m2     BP Readings from Last 3 Encounters:   05/29/18 160/86   05/02/18 146/77   04/18/18 143/82     Liver Function Studies -   Recent Labs   Lab Test  05/02/18   0826   PROTTOTAL  7.3   ALBUMIN  3.6   BILITOTAL  0.5   ALKPHOS  46   AST  16   ALT  25     Lab Results   Component Value Date    UCRR 178 04/28/2014    MICROL 28 04/28/2014    UMALCR 15.73 04/28/2014       Last Basic Metabolic Panel:  Lab Results   Component  Value Date     05/02/2018      Lab Results   Component Value Date    POTASSIUM 4.1 05/02/2018     Lab Results   Component Value Date    CHLORIDE 106 05/02/2018     Lab Results   Component Value Date    BUN 14 05/02/2018     Lab Results   Component Value Date    CR 0.81 05/02/2018     GFR Estimate   Date Value Ref Range Status   05/02/2018 69 >60 mL/min/1.7m2 Final     Comment:     Non  GFR Calc   03/26/2018 66 >60 mL/min/1.7m2 Final     Comment:     Non  GFR Calc   03/18/2018 67 >60 mL/min/1.7m2 Final     Comment:     Non  GFR Calc     Most Recent Immunizations   Administered Date(s) Administered     Influenza (H1N1) 01/28/2010     Influenza (IIV3) PF 10/09/2013     Pneumo Conj 13-V (2010&after) 05/07/2015     Pneumococcal 23 valent 10/26/2006     TD (ADULT, 7+) 07/28/2011     TDAP Vaccine (Adacel) 05/07/2015     Zoster vaccine, live 03/19/2013       ASSESSMENT:              Current medications were reviewed today as discussed above.      Medication Adherence: excellent, no issues identified    HFpEF/HTN: Needs Improvement. Patient is not meeting BP goal of < 140/90mmHg. Current weight is stable and diuretic dose is appropriate but asked that she reach out to Dr. Voss, cardiologist to get a new prescription for furosemide. Pt is appropriately avoiding NSAID's, diltiazem/verapamil, and pioglitazone. Pt would benefit from ACE inhibitor: increase dose.     PLAN:                  1. Contact Dr. Voss's office and request an updated prescription for the furosemide.  The number is [phone]    2. We may need you to stop the over the counter potassium if you potassium level gets too high.    3. Bring your blood pressure cuff in next visit so we can make sure it is accurate.     4. Increase lisinopril to 20 mg once daily    5. Stop at lab to have your kidney function checked.    I spent 30 minutes with this patient today. All changes were made via collaborative  practice agreement with Mikala Philip A copy of the visit note was provided to the patient's primary care provider.     Will follow up in 1 month or sooner if needed.    The patient was given a summary of these recommendations as an after visit summary.    Bing Majano PharmD  Medication Therapy Management Pharmacist    Chart documentation was completed in part with Dragon voice-recognition software. Even though reviewed, some grammatical, spelling, and word errors may remain.

## 2018-06-11 NOTE — Clinical Note
Dr. Voss,   Just FYI I increased her lisinopril today.  Bing Majano, PharmD Medication Therapy Management Pharmacist [facility] - Monday 9:30 - 6:00 and Wednesday 7:30 - 4:00

## 2018-06-12 ENCOUNTER — TELEPHONE (OUTPATIENT)
Dept: CARDIOLOGY | Facility: CLINIC | Age: 78
End: 2018-06-12

## 2018-06-12 DIAGNOSIS — I50.20 SYSTOLIC CONGESTIVE HEART FAILURE, UNSPECIFIED CONGESTIVE HEART FAILURE CHRONICITY: ICD-10-CM

## 2018-06-12 RX ORDER — FUROSEMIDE 20 MG
20 TABLET ORAL DAILY
Qty: 30 TABLET | Refills: 5 | Status: SHIPPED | OUTPATIENT
Start: 2018-06-12 | End: 2019-01-16

## 2018-06-12 NOTE — TELEPHONE ENCOUNTER
Pt calling and stating that Dr Voss had told pt that when she has edema in her LE she can take an extra lasix. This is not in his last OV with pt, but thus this can not be okayed. Explained that Dr Voss is gone, but Kasie who saw that pt in the hospital will be back in the office on Thursday and this can be discussed with her.  Pt stated understanding. Pt also needed her daily lasix prescription sent to her local pharmacy as she is almost out.  Escript sent.  Nusrat

## 2018-06-13 LAB
ANION GAP SERPL CALCULATED.3IONS-SCNC: 8 MMOL/L (ref 3–14)
BUN SERPL-MCNC: 17 MG/DL (ref 7–30)
CALCIUM SERPL-MCNC: 9.4 MG/DL (ref 8.5–10.1)
CHLORIDE SERPL-SCNC: 103 MMOL/L (ref 94–109)
CO2 SERPL-SCNC: 28 MMOL/L (ref 20–32)
CREAT SERPL-MCNC: 0.78 MG/DL (ref 0.52–1.04)
GFR SERPL CREATININE-BSD FRML MDRD: 71 ML/MIN/1.7M2
GLUCOSE SERPL-MCNC: 80 MG/DL (ref 70–99)
POTASSIUM SERPL-SCNC: 4.1 MMOL/L (ref 3.4–5.3)
SODIUM SERPL-SCNC: 139 MMOL/L (ref 133–144)

## 2018-06-15 NOTE — TELEPHONE ENCOUNTER
I phoned patient with Kasie's recommendation of using support stockings DAILY and elevating legs rather than taking extra Lasix. She is willing to try this. Arvin

## 2018-06-18 ENCOUNTER — ALLIED HEALTH/NURSE VISIT (OUTPATIENT)
Dept: CARDIOLOGY | Facility: CLINIC | Age: 78
End: 2018-06-18
Payer: COMMERCIAL

## 2018-06-18 DIAGNOSIS — Z95.0 CARDIAC PACEMAKER IN SITU: ICD-10-CM

## 2018-06-18 DIAGNOSIS — I49.5 SICK SINUS SYNDROME (H): Primary | ICD-10-CM

## 2018-06-18 PROCEDURE — 93280 PM DEVICE PROGR EVAL DUAL: CPT | Performed by: INTERNAL MEDICINE

## 2018-06-18 NOTE — PROGRESS NOTES
[employer] Assurity (D) Pacemaker Device Check    AP: 44 % : <1 %  Mode: DDDR         Underlying Rhythm: SR 60s   Heart Rate: stable with adequate variability   Sensing: WNL    Pacing Threshold: WNL   Impedance: WNL  Battery Status: estimated 10.8-11.1 years longevity remaining   Device Site: remains well healed with no retained sutures   Atrial Arrhythmia: Since 3/28 there have been 21 episodes logged as AMS, 3 with EGMs - all show AF, durations range from 14 seconds to 1 day 18 hours 37 minutes. She remains on eliquis and does not notice her AF. Total burden 6.6%   Ventricular Arrhythmia: 15 HVR, EGMs for 12 - all show RVR with rates in the 170s - 180s, durations range from 19 seconds to 23 minutes. AF histogram shows rates greater than 110 about 30-35% of the time. She does take metoprolol 50 mg bid  Setting Change: no changes made today    Care Plan: Begin remote checks on 9/24/2018. She has orders for EP in May 2019. Message sent to Dr Voss regarding rate control. Roe Lopez MD Powell, Stephanie, RN                     I would restart flecainide 100 mg bid. Thanks, qp         Called patient to update her with Dr Voss's recommendations, verbalized understanding. Prescription sent to [facility] in [place]. SK

## 2018-06-18 NOTE — MR AVS SNAPSHOT
After Visit Summary   6/18/2018    Hamida Verma    MRN: [id number]           Patient Information     Date Of Birth          1940        Visit Information        Provider Department      6/18/2018 9:00 AM MCCRACKEN DCR2 [facility]        Today's Diagnoses     Sick sinus syndrome (H)    -  1    Cardiac pacemaker in situ           Follow-ups after your visit        Your next 10 appointments already scheduled     Jul 11, 2018  9:15 AM CDT   LAB with  [facility]    [address] [place] [id number] [phone]           Please do not eat 10-12 hours before your appointment if you are coming in fasting for labs on lipids, cholesterol, or glucose (sugar). This does not apply to pregnant women. Water, hot tea and black coffee (with nothing added) are okay. Do not drink other fluids, diet soda or chew gum.            Jul 11, 2018  9:30 AM CDT   SHORT with Rosamaria Majano [facility] ([facility]    [address] [place] [id number] [phone]            Sep 24, 2018 10:00 AM CDT   Remote PPM Check with MCCRACKEN TECH1   [facility] ([facility])    [address] [place] [address] [address]  [facility] [address] [phone] OPT 2           This appointment is for a remote check of your pacemaker.  This is not an appointment at the office.              Who to contact     If you have questions or need follow up information about today's clinic visit or your schedule please contact [facility] directly at [phone].  Normal or non-critical lab and imaging results will be communicated to you by MyChart, letter or phone within 4 business days after the clinic has received the results. If you do not hear from us within 7 days, please contact the clinic through MyChart or phone. If  you have a critical or abnormal lab result, we will notify you by phone as soon as possible.  Submit refill requests through [employer] or call your pharmacy and they will forward the refill request to us. Please allow 3 business days for your refill to be completed.          Additional Information About Your Visit        Care EveryWhere ID     This is your Care EveryWhere ID. This could be used by other organizations to access your [place] medical records  [id number]        Your Vitals Were     Last Period                   (LMP Unknown)            Blood Pressure from Last 3 Encounters:   06/11/18 159/75   05/29/18 160/86   05/02/18 146/77    Weight from Last 3 Encounters:   06/11/18 83.9 kg (185 lb)   05/29/18 83.5 kg (184 lb)   05/02/18 83.9 kg (185 lb)              We Performed the Following     PM DEVICE PROGRAMMING EVAL, DUAL LEAD PACER ([id number])        Primary Care Provider Office Phone # Fax #    Mikala Philip [phone] [address] [place] [address]        Equal Access to Services     [facility]: Hadii aad ku hadasho Soomaali, waaxda luqadaha, qaybta kaalmada adeegyabrittany, waxrayo carvajal . So [facility] [phone].    ATENCIÓN: Si habla español, tiene a ivy disposición servicios gratuitos de asistencia lingüística. Terryame al [phone].    We comply with applicable federal civil rights laws and [place] laws. We do not discriminate on the basis of race, color, national origin, age, disability, sex, sexual orientation, or gender identity.            Thank you!     Thank you for choosing [facility]  for your care. Our goal is always to provide you with excellent care. Hearing back from our patients is one way we can continue to improve our services. Please take a few minutes to complete the written survey that you may receive in the mail after your visit with us. Thank you!             Your Updated Medication List -  Protect others around you: Learn how to safely use, store and throw away your medicines at www.disposemymeds.org.          This list is accurate as of 6/18/18  9:17 AM.  Always use your most recent med list.                   Brand Name Dispense Instructions for use Diagnosis    albuterol 108 (90 Base) MCG/ACT Inhaler    PROAIR HFA/PROVENTIL HFA/VENTOLIN HFA    1 Inhaler    Inhale 2 puffs into the lungs 4 times daily as needed for other (dyspnea)    SOB (shortness of breath)       alendronate 70 MG tablet    FOSAMAX    12 tablet    Take 1 tablet (70 mg) by mouth every 7 days Take 60 minutes before am meal with 8 oz. water. Remain upright for 30 minutes.    Osteoporosis       apixaban ANTICOAGULANT 5 MG tablet    ELIQUIS    180 tablet    Take 1 tablet (5 mg) by mouth 2 times daily    Atrial fibrillation with rapid ventricular response (H)       cholecalciferol 1000 units capsule    vitamin  -D     Take 1 capsule by mouth daily.        FLAX SEED OIL PO      1 capsule daily        fluticasone 50 MCG/ACT spray    FLONASE     Spray 1-2 sprays into both nostrils daily as needed for rhinitis or allergies        furosemide 20 MG tablet    LASIX    30 tablet    Take 1 tablet (20 mg) by mouth daily    Systolic congestive heart failure, unspecified congestive heart failure chronicity (H)       HAIR SKIN NAILS PO      Take 1 tablet by mouth At Bedtime        Krill Oil 500 MG Caps      Take 1 capsule by mouth daily        lisinopril 20 MG tablet    PRINIVIL/ZESTRIL    30 tablet    Take 1 tablet (20 mg) by mouth daily    Systolic congestive heart failure, unspecified congestive heart failure chronicity (H)       metoprolol succinate 50 MG 24 hr tablet    TOPROL-XL    180 tablet    Take 1 tablet (50 mg) by mouth 2 times daily    Atrial fibrillation with rapid ventricular response (H), Systolic congestive heart failure, unspecified congestive heart failure chronicity (H)       Milk Thistle 200 MG Caps      Take 1 capsule by mouth  daily         MG Caps      Take 1 capsule by mouth daily        potassium & sodium phosphates 278-164-250 MG/75ML Solr      Take 1 tablet by mouth daily        simvastatin 20 MG tablet    ZOCOR    90 tablet    Take 1 tablet (20 mg) by mouth At Bedtime Profile Rx: patient will contact pharmacy when needed    Hyperlipidemia LDL goal <130

## 2018-06-19 RX ORDER — FLECAINIDE ACETATE 100 MG/1
100 TABLET ORAL 2 TIMES DAILY
Qty: 90 TABLET | Refills: 3 | Status: SHIPPED | OUTPATIENT
Start: 2018-06-19 | End: 2019-01-18

## 2018-06-22 ENCOUNTER — TELEPHONE (OUTPATIENT)
Dept: CARDIOLOGY | Facility: CLINIC | Age: 78
End: 2018-06-22

## 2018-06-22 NOTE — TELEPHONE ENCOUNTER
Pt was called by Device Nurse and per Dr Voss was to restart Flecainide, which had been stopped at OV with Kas[facility].  Pt does not want to restart medication, because she is going to [place] for 2 weeks and does not want to have any problems.  Pt had no issues while taking the medications.  Explained to pt that this writer needs to discuss this with Dr Voss. Pt states understanding. JNelsonRN

## 2018-06-29 NOTE — TELEPHONE ENCOUNTER
LM that it was ok to have waited to start the Flecainide while she was away in [place], which I believe pt is now.  Asked that pt call when she gets back prior to starting the Flecainide. Nusrat

## 2018-07-10 ENCOUNTER — TELEPHONE (OUTPATIENT)
Dept: CARDIOLOGY | Facility: CLINIC | Age: 78
End: 2018-07-10

## 2018-07-10 DIAGNOSIS — I48.91 ATRIAL FIBRILLATION (H): Primary | ICD-10-CM

## 2018-07-10 NOTE — TELEPHONE ENCOUNTER
Patient called in to say that she has returned from her [place] trip and is ready to begin Flecainide 100 MG BID which was prescribed by Dr. Voss on 5/29. I advised her to start it either tonight ort tomorrow and that she will need to return to clinic for an EKG. She will return Monday at 9:30 for this. Her Lisinopril was recently increased to 20 MG daily. She is also on Toprol 50 MG BID. She had no issues with afib while on her trip and her BP was fine as well.Arvin

## 2018-07-11 ENCOUNTER — OFFICE VISIT (OUTPATIENT)
Dept: PHARMACY | Facility: CLINIC | Age: 78
End: 2018-07-11
Payer: COMMERCIAL

## 2018-07-11 VITALS
DIASTOLIC BLOOD PRESSURE: 74 MMHG | WEIGHT: 186 LBS | HEART RATE: 60 BPM | BODY MASS INDEX: 31.93 KG/M2 | OXYGEN SATURATION: 98 % | SYSTOLIC BLOOD PRESSURE: 144 MMHG

## 2018-07-11 DIAGNOSIS — I10 HYPERTENSION GOAL BP (BLOOD PRESSURE) < 140/90: ICD-10-CM

## 2018-07-11 DIAGNOSIS — E78.5 HYPERLIPIDEMIA LDL GOAL <130: ICD-10-CM

## 2018-07-11 DIAGNOSIS — I10 HYPERTENSION GOAL BP (BLOOD PRESSURE) < 140/90: Primary | ICD-10-CM

## 2018-07-11 LAB
ANION GAP SERPL CALCULATED.3IONS-SCNC: 9 MMOL/L (ref 3–14)
BUN SERPL-MCNC: 14 MG/DL (ref 7–30)
CALCIUM SERPL-MCNC: 9 MG/DL (ref 8.5–10.1)
CHLORIDE SERPL-SCNC: 107 MMOL/L (ref 94–109)
CHOLEST SERPL-MCNC: 171 MG/DL
CO2 SERPL-SCNC: 25 MMOL/L (ref 20–32)
CREAT SERPL-MCNC: 0.81 MG/DL (ref 0.52–1.04)
GFR SERPL CREATININE-BSD FRML MDRD: 69 ML/MIN/1.7M2
GLUCOSE SERPL-MCNC: 98 MG/DL (ref 70–99)
HDLC SERPL-MCNC: 69 MG/DL
LDLC SERPL CALC-MCNC: 84 MG/DL
NONHDLC SERPL-MCNC: 102 MG/DL
POTASSIUM SERPL-SCNC: 4.2 MMOL/L (ref 3.4–5.3)
SODIUM SERPL-SCNC: 141 MMOL/L (ref 133–144)
TRIGL SERPL-MCNC: 88 MG/DL

## 2018-07-11 PROCEDURE — 36415 COLL VENOUS BLD VENIPUNCTURE: CPT | Performed by: FAMILY MEDICINE

## 2018-07-11 PROCEDURE — 99606 MTMS BY PHARM EST 15 MIN: CPT | Performed by: PHARMACIST

## 2018-07-11 PROCEDURE — 80048 BASIC METABOLIC PNL TOTAL CA: CPT | Performed by: FAMILY MEDICINE

## 2018-07-11 PROCEDURE — 99607 MTMS BY PHARM ADDL 15 MIN: CPT | Performed by: PHARMACIST

## 2018-07-11 PROCEDURE — 80061 LIPID PANEL: CPT | Performed by: FAMILY MEDICINE

## 2018-07-11 NOTE — LETTER
July 12, 2018      Hamida Verma  [address]  [facility] [address]        Dear ,    We are writing to inform you of your test results.    Normal results.    Resulted Orders   Basic metabolic panel   Result Value Ref Range    Sodium 141 133 - 144 mmol/L    Potassium 4.2 3.4 - 5.3 mmol/L    Chloride 107 94 - 109 mmol/L    Carbon Dioxide 25 20 - 32 mmol/L    Anion Gap 9 3 - 14 mmol/L    Glucose 98 70 - 99 mg/dL      Comment:      Fasting specimen    Urea Nitrogen 14 7 - 30 mg/dL    Creatinine 0.81 0.52 - 1.04 mg/dL    GFR Estimate 69 >60 mL/min/1.7m2      Comment:      Non  GFR Calc    GFR Estimate If Black 83 >60 mL/min/1.7m2      Comment:       GFR Calc    Calcium 9.0 8.5 - 10.1 mg/dL   Lipid panel reflex to direct LDL Fasting   Result Value Ref Range    Cholesterol 171 <200 mg/dL    Triglycerides 88 <150 mg/dL      Comment:      Fasting specimen    HDL Cholesterol 69 >49 mg/dL    LDL Cholesterol Calculated 84 <100 mg/dL      Comment:      Desirable:       <100 mg/dl    Non HDL Cholesterol 102 <130 mg/dL       If you have any questions or concerns, please call the clinic at the number listed above.       Sincerely,    Mikala Philip MD/nr

## 2018-07-11 NOTE — PROGRESS NOTES
SUBJECTIVE/OBJECTIVE:                Hamida Verma is a 77 year old female coming in for a follow-up visit for Medication Therapy Management.  She was referred to me from Dr. Mikala Philip.     Chief Complaint: Follow up from our visit on 6/11/18.  No concerns today.    - Per Dr. Voss cardiologist, pt was to restart flecainide 100 mg twice daily for A Fib and she did do this last night. She tolerated her first dose well, no side effects.    Tobacco: No tobacco use  Alcohol: not currently using    Medication Adherence/Access:  no issues reported    HFpEF/HTN: Current medications include metoprolol succinate 50 mg twice daily, lisinopril 20 mg once daily (increased at last visit) and furosemide 20 mg daily.  Patient reports no current medication side effects.   She checks her blood pressure at home every morning and evenings. Morning runs 119-148/70-80's and evening readings: 110-130's/60-70's. She did get a new arm cuff. She is resting for at least 5 minutes before checking. No caffeine this morning. She has not taken any of her pills this morning because was fasting for blood work. She reports no chest pain or shortness of breath.  ECHO:  Date 3/14/18, EF 60-65%  Pt is not complaining of sx of HF.    Pt is measuring daily weights and reports stable. (183-184 lbs at home)  Patient does self-monitor BP. See above.   Pt is following a low sodium diet, is avoiding EtOH.    Current labs include:  Today's Vitals: /74  Pulse 60  Wt 186 lb (84.4 kg)  LMP  (LMP Unknown)  SpO2 98%  BMI 31.93 kg/m2     BP Readings from Last 3 Encounters:   06/11/18 159/75   05/29/18 160/86   05/02/18 146/77     Liver Function Studies -   Recent Labs   Lab Test  05/02/18   0826   PROTTOTAL  7.3   ALBUMIN  3.6   BILITOTAL  0.5   ALKPHOS  46   AST  16   ALT  25       Lab Results   Component Value Date    UCRR 178 04/28/2014    MICROL 28 04/28/2014    UMALCR 15.73 04/28/2014       Last Basic Metabolic Panel:  Lab Results   Component  Value Date     06/11/2018      Lab Results   Component Value Date    POTASSIUM 4.1 06/11/2018     Lab Results   Component Value Date    CHLORIDE 103 06/11/2018     Lab Results   Component Value Date    BUN 17 06/11/2018     Lab Results   Component Value Date    CR 0.78 06/11/2018     GFR Estimate   Date Value Ref Range Status   06/11/2018 71 >60 mL/min/1.7m2 Final     Comment:     Non  GFR Calc   05/02/2018 69 >60 mL/min/1.7m2 Final     Comment:     Non  GFR Calc   03/26/2018 66 >60 mL/min/1.7m2 Final     Comment:     Non  GFR Calc     Most Recent Immunizations   Administered Date(s) Administered     Influenza (H1N1) 01/28/2010     Influenza (IIV3) PF 10/09/2013     Pneumo Conj 13-V (2010&after) 05/07/2015     Pneumococcal 23 valent 10/26/2006     TD (ADULT, 7+) 07/28/2011     TDAP Vaccine (Adacel) 05/07/2015     Zoster vaccine, live 03/19/2013       ASSESSMENT:              Current medications were reviewed today as discussed above.      Medication Adherence: excellent, no issues identified but I did tell her that she always needs to take her medicines even if she is fasting for labs.    HFpEF/HTN: Improved. Patient is not meeting BP goal of < 140/90mmHg, however she did not take her medicine this morning. Current weight is stable and diuretic dose is appropriate. Pt is appropriately avoiding NSAID's, diltiazem/verapamil, and pioglitazone. Pt would benefit from no changes in current therapy today but have blood pressure checked at cardiology visit on Monday and see if they think lisinopril should be increased.  I hesitate increasing today since she didn't take her medicine this morning and she is almost to goal. I would recommend that lisinopril be increased to 30 or 40 mg daily if still elevated on Monday.  See plan for follow depending on action taken on Monday.     PLAN:                  1. Make sure to have your blood pressure checked when you go to your  heart appointment on 7/16 and discuss with the provider you see if they think you should increase your lisinopril based on your blood pressure that day and your last kidney function lab.    2. If they decide to increase your medicine on Monday follow up with me in 3-4 weeks. If they don't make changes and your blood pressure is at goal less 140/90 than you can schedule with Dr. Pihlip in October for your annual exam.  If you notice your blood pressures start to increase between now and October schedule with me.     3. You can follow up with me in three months and schedule on same day as Dr. Philip.     4. I ordered FLP to be done today since she is showing due for annual check.    I spent 30 minutes with this patient today. A copy of the visit note was provided to the patient's primary care provider.     Will follow up in 3 months or sooner if med changes occur or bp increases.    The patient was given a summary of these recommendations as an after visit summary.    Bing Majano PharmD  Medication Therapy Management Pharmacist    Chart documentation was completed in part with Dragon voice-recognition software. Even though reviewed, some grammatical, spelling, and word errors may remain.

## 2018-07-11 NOTE — Clinical Note
BP improved but not quite to goal but she didn't take her meds this morning because she was fasting for labs. PM readings are lower and all at goal. I didn't make changes today and asked that she discuss her bp with cardiologist at [facility] on Monday.  See note for details. BMP and FLP done today.  Bing

## 2018-07-11 NOTE — PATIENT INSTRUCTIONS
Recommendations from today's MTM visit:                                                      1. Make sure to have your blood pressure checked when you go to your heart appointment on 7/16 and discuss with the provider you see if they think you should increase your lisinopril based on your blood pressure that day and your last kidney function lab.    2. If they decide to increase your medicine on Monday follow up with me in 3-4 weeks. If they don't make changes and your blood pressure is at goal less 140/90 than you can schedule with Dr. Philip in October for your annual exam.  If you notice your blood pressures start to increase between now and October schedule with me.     3. You can follow up with me in three months and schedule on same day as Dr. Philip.     Next MTM visit: 3 months or sooner if med changes or blood pressure starts to increase.    To schedule another MTM appointment, please call the clinic directly or you may call the MTM scheduling line at [phone] or toll-free at [phone].     My Clinical Pharmacist's contact information:                                                      It was a pleasure seeing you today!  Please feel free to contact me with any questions or concerns you have.      Bing Majano, PharmD  Medication Therapy Management Pharmacist  [facility] - Monday 9:30 - 6:00 and Wednesday 7:30 - 4:00  Phone: [phone] - direct clinic line    You may receive a survey about the MT services you received.  I would appreciate your feedback to help me serve you better in the future. Please fill it out and return it when you can. Your comments will be anonymous.      My healthcare goals:                                                      Goals for patients with hypertension (high blood pressure):  Your blood pressure should be less than <140/90.   Today, yours was   BP Readings from Last 1 Encounters:   07/11/18 144/74   .  Things you can do to help lower your blood  pressure:  1) Take your medications as directed  2) Regular exercise- Aim for at least 30 minutes of daily physical activity.  3) Weight loss if overweight- losing as little as 10 lbs. can reduce blood pressure  4) Avoid excess dietary sodium (salt).  You should not consume more than 1500mg of sodium per day.  Following this restriction can lower your blood pressure as much as adding another medication.  -Foods high in sodium include: ham, kiser, deli meats, canned soups and vegetables, frozen meals, gravy, chips, cheeses, fast food, bread.  -1 teaspoon of table salt is about 2300mg of sodium.  5) Avoid alcohol- if you drink, please do so in moderation (no more than 1 drink per day for women or 2 drinks per day for men).  6) Quit smoking- Tobacco injures vessel walls and speeds up the process of hardening of the arteries, making it harder to control your blood pressure.  7) Look for ways to reduce stress in your life.  8)  Try and get plenty of sleep.  9) Consider the DASH diet.  This diet can lower your blood pressure as much as adding another medication if followed correctly.   a. The DASH diet is high in fruits and vegetables, whole grains, fat-free or low-fat milk products, fish and poultry, beans, seeds and nuts.  b. The DASH diet is low in salt, sodium, sugar, sweets, high-sugar drinks, red meat, saturated fat and trans fats.  c. For more information on the DASH diet, visit the National Heart, Lung and Blood [place] at http://www.nhlbi.nih.gov

## 2018-07-11 NOTE — MR AVS SNAPSHOT
After Visit Summary   7/11/2018    Hamida Verma    MRN: [id number]           Patient Information     Date Of Birth          1940        Visit Information        Provider Department      7/11/2018 9:30 AM Rosamaria Majano [facility] MTM        Today's Diagnoses     Hypertension goal BP (blood pressure) < 140/90    -  1    Hyperlipidemia LDL goal <130          Care Instructions    Recommendations from today's MTM visit:                                                      1. Make sure to have your blood pressure checked when you go to your heart appointment on 7/16 and discuss with the provider you see if they think you should increase your lisinopril based on your blood pressure that day and your last kidney function lab.    2. If they decide to increase your medicine on Monday follow up with me in 3-4 weeks. If they don't make changes and your blood pressure is at goal less 140/90 than you can schedule with Dr. Philip in October for your annual exam.  If you notice your blood pressures start to increase between now and October schedule with me.     3. You can follow up with me in three months and schedule on same day as Dr. Philip.     Next MTM visit: 3 months or sooner if med changes or blood pressure starts to increase.    To schedule another MTM appointment, please call the clinic directly or you may call the MTM scheduling line at [phone] or toll-free at [phone].     My Clinical Pharmacist's contact information:                                                      It was a pleasure seeing you today!  Please feel free to contact me with any questions or concerns you have.      Bing Majano, PharmD  Medication Therapy Management Pharmacist  [facility] - Monday 9:30 - 6:00 and Wednesday 7:30 - 4:00  Phone: [phone] - direct clinic line    You may receive a survey about the MT services you received.  I would appreciate your feedback to help me serve you  better in the future. Please fill it out and return it when you can. Your comments will be anonymous.      My healthcare goals:                                                      Goals for patients with hypertension (high blood pressure):  Your blood pressure should be less than <140/90.   Today, yours was   BP Readings from Last 1 Encounters:   07/11/18 144/74   .  Things you can do to help lower your blood pressure:  1) Take your medications as directed  2) Regular exercise- Aim for at least 30 minutes of daily physical activity.  3) Weight loss if overweight- losing as little as 10 lbs. can reduce blood pressure  4) Avoid excess dietary sodium (salt).  You should not consume more than 1500mg of sodium per day.  Following this restriction can lower your blood pressure as much as adding another medication.  -Foods high in sodium include: ham, kiser, deli meats, canned soups and vegetables, frozen meals, gravy, chips, cheeses, fast food, bread.  -1 teaspoon of table salt is about 2300mg of sodium.  5) Avoid alcohol- if you drink, please do so in moderation (no more than 1 drink per day for women or 2 drinks per day for men).  6) Quit smoking- Tobacco injures vessel walls and speeds up the process of hardening of the arteries, making it harder to control your blood pressure.  7) Look for ways to reduce stress in your life.  8)  Try and get plenty of sleep.  9) Consider the DASH diet.  This diet can lower your blood pressure as much as adding another medication if followed correctly.   a. The DASH diet is high in fruits and vegetables, whole grains, fat-free or low-fat milk products, fish and poultry, beans, seeds and nuts.  b. The DASH diet is low in salt, sodium, sugar, sweets, high-sugar drinks, red meat, saturated fat and trans fats.  c. For more information on the DASH diet, visit the National Heart, Lung and Blood [place] at http://www.nhlbi.nih.gov                      Follow-ups after your visit         Your next 10 appointments already scheduled     Jul 16, 2018  9:30 AM CDT   ecg with [facility]IT[facility]   [facility])    [address]  [place] [address] [phone] OPT 2            Sep 24, 2018 10:00 AM CDT   Remote PPM Check with [facility] TECH1   [facility])    [address]  [place] [address] [phone] OPT 2           This appointment is for a remote check of your pacemaker.  This is not an appointment at the office.              Future tests that were ordered for you today     Open Future Orders        Priority Expected Expires Ordered    EKG 12-lead complete w/read - Clinics (performed today) Routine 7/16/2018 7/30/2018 7/10/2018            Who to contact     If you have questions or need follow up information about today's clinic visit or your schedule please contact [facility] directly at [phone].  Normal or non-critical lab and imaging results will be communicated to you by MyChart, letter or phone within 4 business days after the clinic has received the results. If you do not hear from us within 7 days, please contact the clinic through MyChart or phone. If you have a critical or abnormal lab result, we will notify you by phone as soon as possible.  Submit refill requests through [employer] or call your pharmacy and they will forward the refill request to us. Please allow 3 business days for your refill to be completed.          Additional Information About Your Visit        Care EveryWhere ID     This is your Care EveryWhere ID. This could be used by other organizations to access your [place] medical records  [id number]        Your Vitals Were     Pulse Last Period Pulse Oximetry BMI (Body Mass Index)          60 (LMP Unknown) 98% 31.93 kg/m2         Blood Pressure from Last 3 Encounters:   07/11/18 144/74    06/11/18 159/75   05/29/18 160/86    Weight from Last 3 Encounters:   07/11/18 186 lb (84.4 kg)   06/11/18 185 lb (83.9 kg)   05/29/18 184 lb (83.5 kg)               Primary Care Provider Office Phone # Fax #    Mikala COELLO MD [place] [phone]       [address]  [facility] [address]        Equal Access to Services     NESSA MARIE : Hadii aad ku hadasho Soomaali, waaxda luqadaha, qaybta kaalmada adeegyada, waxay idiin hayaan adeeg francesnigelkelvin la'cm . So [facility] [phone].    ATENCIÓN: Si habla español, tiene a ivy disposición servicios gratuitos de asistencia lingüística. [facility] [phone].    We comply with applicable federal civil rights laws and [place] laws. We do not discriminate on the basis of race, color, national origin, age, disability, sex, sexual orientation, or gender identity.            Thank you!     Thank you for choosing [facility]  for your care. Our goal is always to provide you with excellent care. Hearing back from our patients is one way we can continue to improve our services. Please take a few minutes to complete the written survey that you may receive in the mail after your visit with us. Thank you!             Your Updated Medication List - Protect others around you: Learn how to safely use, store and throw away your medicines at www.disposemymeds.org.          This list is accurate as of 7/11/18  9:42 AM.  Always use your most recent med list.                   Brand Name Dispense Instructions for use Diagnosis    albuterol 108 (90 Base) MCG/ACT Inhaler    PROAIR HFA/PROVENTIL HFA/VENTOLIN HFA    1 Inhaler    Inhale 2 puffs into the lungs 4 times daily as needed for other (dyspnea)    SOB (shortness of breath)       alendronate 70 MG tablet    FOSAMAX    12 tablet    Take 1 tablet (70 mg) by mouth every 7 days Take 60 minutes before am meal with 8 oz. water. Remain upright for 30 minutes.    Osteoporosis       apixaban ANTICOAGULANT 5 MG tablet     ELIQUIS    180 tablet    Take 1 tablet (5 mg) by mouth 2 times daily    Atrial fibrillation with rapid ventricular response (H)       cholecalciferol 1000 units capsule    vitamin  -D     Take 1 capsule by mouth daily.        FLAX SEED OIL PO      1 capsule daily        flecainide 100 MG tablet    TAMBOCOR    90 tablet    Take 1 tablet (100 mg) by mouth 2 times daily    Sick sinus syndrome (H), Cardiac pacemaker in situ       fluticasone 50 MCG/ACT spray    FLONASE     Spray 1-2 sprays into both nostrils daily as needed for rhinitis or allergies        furosemide 20 MG tablet    LASIX    30 tablet    Take 1 tablet (20 mg) by mouth daily    Systolic congestive heart failure, unspecified congestive heart failure chronicity (H)       HAIR SKIN NAILS PO      Take 1 tablet by mouth At Bedtime        Krill Oil 500 MG Caps      Take 1 capsule by mouth daily        lisinopril 20 MG tablet    PRINIVIL/ZESTRIL    30 tablet    Take 1 tablet (20 mg) by mouth daily    Systolic congestive heart failure, unspecified congestive heart failure chronicity (H)       metoprolol succinate 50 MG 24 hr tablet    TOPROL-XL    180 tablet    Take 1 tablet (50 mg) by mouth 2 times daily    Atrial fibrillation with rapid ventricular response (H), Systolic congestive heart failure, unspecified congestive heart failure chronicity (H)       Milk Thistle 200 MG Caps      Take 1 capsule by mouth daily         MG Caps      Take 1 capsule by mouth daily        potassium & sodium phosphates 278-164-250 MG/75ML Solr      Take 1 tablet by mouth daily        simvastatin 20 MG tablet    ZOCOR    90 tablet    Take 1 tablet (20 mg) by mouth At Bedtime Profile Rx: patient will contact pharmacy when needed    Hyperlipidemia LDL goal <130

## 2018-07-16 ENCOUNTER — CARE COORDINATION (OUTPATIENT)
Dept: CARDIOLOGY | Facility: CLINIC | Age: 78
End: 2018-07-16

## 2018-07-16 DIAGNOSIS — I48.91 ATRIAL FIBRILLATION (H): ICD-10-CM

## 2018-07-16 PROCEDURE — 93000 ELECTROCARDIOGRAM COMPLETE: CPT | Performed by: INTERNAL MEDICINE

## 2018-07-16 NOTE — PROGRESS NOTES
Patient in for flecainide EKG. She started flecainide 100 MG BID on 7/12. EKG reviewed by SONIA Gunn showing SR at 60 bpm with QRS 96 msec and  msec. Previous EKG from 5/29/18 showed SR at 63 bpm and QRS 90 msec with MO of 158 msec. Patient states she feels 'good'. She keeps meticulous records of her BP/HR. She keeps physically active, gets good sleep and avoids alcohol and caffeine. She returns May 2019 for [facility] visit. Arvin

## 2018-07-24 ENCOUNTER — TRANSFERRED RECORDS (OUTPATIENT)
Dept: HEALTH INFORMATION MANAGEMENT | Facility: CLINIC | Age: 78
End: 2018-07-24

## 2018-08-15 DIAGNOSIS — I50.20 SYSTOLIC CONGESTIVE HEART FAILURE, UNSPECIFIED CONGESTIVE HEART FAILURE CHRONICITY: ICD-10-CM

## 2018-08-16 NOTE — TELEPHONE ENCOUNTER
"Requested Prescriptions   Pending Prescriptions Disp Refills     lisinopril (PRINIVIL/ZESTRIL) 20 MG tablet [Pharmacy Med Name: LISINOPRIL 20MG TABS]  Last Written Prescription Date:  6/11/2018  Last Fill Quantity: 30 tablet,  # refills: 1   Last Office Visit: 3/26/2018   Future Office Visit:      30 tablet 1     Sig: TAKE ONE TABLET BY MOUTH EVERY DAY    ACE Inhibitors (Including Combos) Protocol Failed    8/15/2018  4:01 PM       Failed - Blood pressure under 140/90 in past 12 months    BP Readings from Last 3 Encounters:   07/11/18 144/74   06/11/18 159/75   05/29/18 160/86          Passed - Recent (12 mo) or future (30 days) visit within the authorizing provider's specialty    Patient had office visit in the last 12 months or has a visit in the next 30 days with authorizing provider or within the authorizing provider's specialty.  See \"Patient Info\" tab in inbasket, or \"Choose Columns\" in Meds & Orders section of the refill encounter.           Passed - Patient is age 18 or older       Passed - No active pregnancy on record       Passed - Normal serum creatinine on file in past 12 months    Recent Labs   Lab Test  07/11/18   0907   CR  0.81          Passed - Normal serum potassium on file in past 12 months    Recent Labs   Lab Test  07/11/18   0907   POTASSIUM  4.2          Passed - No positive pregnancy test in past 12 months          "

## 2018-08-16 NOTE — TELEPHONE ENCOUNTER
Pt states she will be in 8.17.18 to  from pharmacy and would like it to be ready as she only has one pill left for tomorrow. Please Advise if needed. Thanks

## 2018-08-17 RX ORDER — LISINOPRIL 20 MG/1
TABLET ORAL
Qty: 30 TABLET | Refills: 0 | Status: SHIPPED | OUTPATIENT
Start: 2018-08-17 | End: 2018-09-07

## 2018-08-17 NOTE — TELEPHONE ENCOUNTER
Routing refill request to provider for review/approval because:  Blood pressure above threshold.  Vivien Sanches RN

## 2018-09-07 DIAGNOSIS — I50.20 SYSTOLIC CONGESTIVE HEART FAILURE, UNSPECIFIED CONGESTIVE HEART FAILURE CHRONICITY: ICD-10-CM

## 2018-09-07 NOTE — TELEPHONE ENCOUNTER
"Requested Prescriptions   Pending Prescriptions Disp Refills     lisinopril (PRINIVIL/ZESTRIL) 20 MG tablet [Pharmacy Med Name: LISINOPRIL 20MG TABS]  Last Written Prescription Date:  8/17/2018  Last Fill Quantity: 30 tabs,  # refills: 0   Last office visit: 7/11/2018 with prescribing provider:  House   Future Office Visit:     30 tablet 0     Sig: TAKE ONE TABLET BY MOUTH EVERY DAY    ACE Inhibitors (Including Combos) Protocol Failed    9/7/2018  3:08 PM       Failed - Blood pressure under 140/90 in past 12 months    BP Readings from Last 3 Encounters:   07/11/18 144/74   06/11/18 159/75   05/29/18 160/86                Passed - Recent (12 mo) or future (30 days) visit within the authorizing provider's specialty    Patient had office visit in the last 12 months or has a visit in the next 30 days with authorizing provider or within the authorizing provider's specialty.  See \"Patient Info\" tab in inbasket, or \"Choose Columns\" in Meds & Orders section of the refill encounter.           Passed - Patient is age 18 or older       Passed - No active pregnancy on record       Passed - Normal serum creatinine on file in past 12 months    Recent Labs   Lab Test  07/11/18   0907   CR  0.81            Passed - Normal serum potassium on file in past 12 months    Recent Labs   Lab Test  07/11/18   0907   POTASSIUM  4.2            Passed - No positive pregnancy test in past 12 months          "

## 2018-09-11 NOTE — TELEPHONE ENCOUNTER
Routing refill request to provider for review/approval because:  BP elevated and was not rechecked at 7/16/18 cardiology appt, per review of 7/11/18 MTM office visit note      Dr. Philip/Covering providers-Please sign if agree.    Thank you!  YVROSE Pineda, BSN, RN

## 2018-09-12 RX ORDER — LISINOPRIL 20 MG/1
TABLET ORAL
Qty: 30 TABLET | Refills: 0 | Status: SHIPPED | OUTPATIENT
Start: 2018-09-12 | End: 2018-10-02 | Stop reason: DRUGHIGH

## 2018-09-19 ENCOUNTER — TRANSFERRED RECORDS (OUTPATIENT)
Dept: HEALTH INFORMATION MANAGEMENT | Facility: CLINIC | Age: 78
End: 2018-09-19

## 2018-09-24 ENCOUNTER — ALLIED HEALTH/NURSE VISIT (OUTPATIENT)
Dept: CARDIOLOGY | Facility: CLINIC | Age: 78
End: 2018-09-24
Payer: COMMERCIAL

## 2018-09-24 DIAGNOSIS — Z95.0 CARDIAC PACEMAKER IN SITU: Primary | ICD-10-CM

## 2018-09-24 PROCEDURE — 93296 REM INTERROG EVL PM/IDS: CPT | Performed by: INTERNAL MEDICINE

## 2018-09-24 PROCEDURE — 93294 REM INTERROG EVL PM/LDLS PM: CPT | Performed by: INTERNAL MEDICINE

## 2018-09-24 NOTE — PROGRESS NOTES
[employer] (D) Remote PPM Device Check  AP: 82 % : <1 %  Mode: DDDR        Presenting Rhythm: AP/VS, AS/VS  Heart Rate: Adequate rates per histogram  Sensing: Stable    Pacing Threshold: Stable    Impedance: Stable  Battery Status: 10.4 years  Atrial Arrhythmia: No mode switch episodes. 1 PMT occurred, no EGM.   Ventricular Arrhythmia: None     Care Plan: F/u PPM Merlin q 3 months. Gave patient results over the phone. Opal,CVT

## 2018-09-24 NOTE — MR AVS SNAPSHOT
After Visit Summary   9/24/2018    Hamida Verma    MRN: [id number]           Patient Information     Date Of Birth          1940        Visit Information        Provider Department      9/24/2018 10:00 AM [facility] TECH1 [facility]        Today's Diagnoses     Cardiac pacemaker in situ    -  1       Follow-ups after your visit        Your next 10 appointments already scheduled     Sep 24, 2018 10:00 AM CDT   Remote PPM Check with [facility] TECH1   [facility] ([facility])    [address] [place] [id number] [phone] OPT 2           This appointment is for a remote check of your pacemaker.  This is not an appointment at the office.            Oct 02, 2018  9:20 AM CDT   Pre-Op physical with Mikala Philip MD   [facility]    [address] [place] [id number]   [phone]            Dec 31, 2018  2:15 PM CST   Remote PPM Check with [facility] TECH1   [facility] ([facility])    [address] [place] [id number] [phone] OPT 2           This appointment is for a remote check of your pacemaker.  This is not an appointment at the office.              Who to contact     If you have questions or need follow up information about today's clinic visit or your schedule please contact [facility] directly at [phone].  Normal or non-critical lab and imaging results will be communicated to you by MyChart, letter or phone within 4 business days after the clinic has received the results. If you do not hear from us within 7 days, please contact the clinic through MyChart or phone. If you have a critical or abnormal lab result, we will notify you by phone as soon as possible.  Submit refill requests through [employer] or call your pharmacy and they will  "forward the refill request to us. Please allow 3 business days for your refill to be completed.          Additional Information About Your Visit        [employer] Information     [employer] lets you send messages to your doctor, view your test results, renew your prescriptions, schedule appointments and more. To sign up, go to [email or web address] . Click on \"Log in\" on the left side of the screen, which will take you to the Welcome page. Then click on \"Sign up Now\" on the right side of the page.     You will be asked to enter the access code listed below, as well as some personal information. Please follow the directions to create your username and password.     Your access code is: [email or web address]  Expires: 2018  8:37 AM     Your access code will  in 90 days. If you need help or a new code, please call your [facility] clinic or [phone].        Care EveryWhere ID     This is your Care EveryWhere ID. This could be used by other organizations to access your [facility] medical records  [id number]        Your Vitals Were     Last Period                   (LMP Unknown)            Blood Pressure from Last 3 Encounters:   18 144/74   18 159/75   18 160/86    Weight from Last 3 Encounters:   18 84.4 kg (186 lb)   18 83.9 kg (185 lb)   18 83.5 kg (184 lb)              We Performed the Following     INTERROGATION DEVICE EVAL REMOTE, PACER/ICD (11764)     PM DEVICE INTERROGATE REMOTE (03803)        Primary Care Provider Office Phone # Fax #    Mikala Philip [phone] [address] [facility] [address]        Equal Access to Services     [facility] campusDANAY : Hadwillem Garcia, madelaine potts, chance kaalmabrittany kolb, carlos horne. So [facility] [phone].    ATENCIÓN: Si habla español, tiene a ivy disposición servicios gratuitos de asistencia lingüística. Llame al [phone].    We comply with applicable federal civil " rights laws and [place] laws. We do not discriminate on the basis of race, color, national origin, age, disability, sex, sexual orientation, or gender identity.            Thank you!     Thank you for choosing [facility]  for your care. Our goal is always to provide you with excellent care. Hearing back from our patients is one way we can continue to improve our services. Please take a few minutes to complete the written survey that you may receive in the mail after your visit with us. Thank you!             Your Updated Medication List - Protect others around you: Learn how to safely use, store and throw away your medicines at www.disposemymeds.org.          This list is accurate as of 9/24/18  8:37 AM.  Always use your most recent med list.                   Brand Name Dispense Instructions for use Diagnosis    albuterol 108 (90 Base) MCG/ACT inhaler    PROAIR HFA/PROVENTIL HFA/VENTOLIN HFA    1 Inhaler    Inhale 2 puffs into the lungs 4 times daily as needed for other (dyspnea)    SOB (shortness of breath)       alendronate 70 MG tablet    FOSAMAX    12 tablet    Take 1 tablet (70 mg) by mouth every 7 days Take 60 minutes before am meal with 8 oz. water. Remain upright for 30 minutes.    Osteoporosis       apixaban ANTICOAGULANT 5 MG tablet    ELIQUIS    180 tablet    Take 1 tablet (5 mg) by mouth 2 times daily    Atrial fibrillation with rapid ventricular response (H)       cholecalciferol 1000 units capsule    vitamin  -D     Take 1 capsule by mouth daily.        FLAX SEED OIL PO      1 capsule daily        flecainide 100 MG tablet    TAMBOCOR    90 tablet    Take 1 tablet (100 mg) by mouth 2 times daily    Sick sinus syndrome (H), Cardiac pacemaker in situ       fluticasone 50 MCG/ACT spray    FLONASE     Spray 1-2 sprays into both nostrils daily as needed for rhinitis or allergies        furosemide 20 MG tablet    LASIX    30 tablet    Take 1 tablet (20 mg) by mouth  daily    Systolic congestive heart failure, unspecified congestive heart failure chronicity (H)       HAIR SKIN NAILS PO      Take 1 tablet by mouth At Bedtime        Krill Oil 500 MG Caps      Take 1 capsule by mouth daily        lisinopril 20 MG tablet    PRINIVIL/ZESTRIL    30 tablet    TAKE ONE TABLET BY MOUTH EVERY DAY    Systolic congestive heart failure, unspecified congestive heart failure chronicity (H)       metoprolol succinate 50 MG 24 hr tablet    TOPROL-XL    180 tablet    Take 1 tablet (50 mg) by mouth 2 times daily    Atrial fibrillation with rapid ventricular response (H), Systolic congestive heart failure, unspecified congestive heart failure chronicity (H)       Milk Thistle 200 MG Caps      Take 1 capsule by mouth daily         MG Caps      Take 1 capsule by mouth daily        potassium & sodium phosphates 278-164-250 MG/75ML Solr      Take 1 tablet by mouth daily        simvastatin 20 MG tablet    ZOCOR    90 tablet    Take 1 tablet (20 mg) by mouth At Bedtime Profile Rx: patient will contact pharmacy when needed    Hyperlipidemia LDL goal <130

## 2018-10-02 ENCOUNTER — OFFICE VISIT (OUTPATIENT)
Dept: FAMILY MEDICINE | Facility: CLINIC | Age: 78
End: 2018-10-02
Payer: COMMERCIAL

## 2018-10-02 VITALS
DIASTOLIC BLOOD PRESSURE: 78 MMHG | SYSTOLIC BLOOD PRESSURE: 147 MMHG | WEIGHT: 189 LBS | BODY MASS INDEX: 32.44 KG/M2 | TEMPERATURE: 98 F | RESPIRATION RATE: 16 BRPM | OXYGEN SATURATION: 95 % | HEART RATE: 62 BPM

## 2018-10-02 DIAGNOSIS — H26.9 CATARACT, UNSPECIFIED CATARACT TYPE, UNSPECIFIED LATERALITY: ICD-10-CM

## 2018-10-02 DIAGNOSIS — I48.91 ATRIAL FIBRILLATION WITH RAPID VENTRICULAR RESPONSE (H): ICD-10-CM

## 2018-10-02 DIAGNOSIS — I10 HYPERTENSION GOAL BP (BLOOD PRESSURE) < 140/90: ICD-10-CM

## 2018-10-02 DIAGNOSIS — E78.5 HYPERLIPIDEMIA LDL GOAL <130: ICD-10-CM

## 2018-10-02 DIAGNOSIS — J34.89 RHINORRHEA: ICD-10-CM

## 2018-10-02 DIAGNOSIS — I50.9 CONGESTIVE HEART FAILURE, UNSPECIFIED HF CHRONICITY, UNSPECIFIED HEART FAILURE TYPE (H): ICD-10-CM

## 2018-10-02 DIAGNOSIS — Z01.818 PREOP GENERAL PHYSICAL EXAM: Primary | ICD-10-CM

## 2018-10-02 PROCEDURE — 99214 OFFICE O/P EST MOD 30 MIN: CPT | Performed by: FAMILY MEDICINE

## 2018-10-02 RX ORDER — LISINOPRIL 40 MG/1
40 TABLET ORAL DAILY
Qty: 90 TABLET | Refills: 3 | Status: SHIPPED | OUTPATIENT
Start: 2018-10-02 | End: 2019-10-29

## 2018-10-02 NOTE — PROGRESS NOTES
[facility] [place]  [address] [place] [id number] [phone]  Dept: [phone]    PRE-OP EVALUATION:  Today's date: 10/2/2018    Hamida Verma (: 1940) presents for pre-operative evaluation assessment as requested by Dr. Ángel Adair.  She requires evaluation and anesthesia risk assessment prior to undergoing surgery/procedure for treatment of left eye cataract surgery .    Proposed Surgery/ Procedure: left eye cataract surgery [facility] eye ph:[phone]  Date of Surgery/ Procedure: 10/11/2018  Time of Surgery/ Procedure: 11:30am  Hospital/Surgical Facility: [facility]  Fax number for surgical facility: ?  Primary Physician: Mikala Philip  Type of Anesthesia Anticipated: to be determined    Patient has a Health Care Directive or Living Will:  NO    1. NO - Do you have a history of heart attack, stroke, stent, bypass or surgery on an artery in the head, neck, heart or legs?  2. NO - Do you ever have any pain or discomfort in your chest?  3. YES - DO YOU HAVE A HISTORY OF HEART FAILURE    4. YES - ARE YOUR TROUBLED BY SHORTNESS OF BREATH WHEN WALKING ON THE LEVEL, UP A SLIGHT HILL OR AT NIGHT? Sometimes when going uphill   5. YES - DO YOU CURRENTLY HAVE A COLD, BRONCHITIS OR OTHER RESPIRATORY INFECTION? She has had a runny nose, coughing   6. YES - DO YOU HAVE A COUGH, SHORTNESS OF BREATH OR WHEEZING? As above   7. NO - Do you sometimes get pains in the calves of your legs when you walk?  8. NO - Do you or anyone in your family have previous history of blood clots?  9. NO - Do you or does anyone in your family have a serious bleeding problem such as prolonged bleeding following surgeries or cuts?  10. NO - Have you ever had problems with anemia or been told to take iron pills?  11. NO - Have you had any abnormal blood loss such as black, tarry or bloody stools, or abnormal vaginal bleeding?  12. NO - Have you ever had a blood transfusion?  13. NO - Have  you or any of your relatives ever had problems with anesthesia?  14. NO - Do you have sleep apnea, excessive snoring or daytime drowsiness?  15. NO - Do you have any prosthetic heart valves?  16. NO - Do you have prosthetic joints?  17. NO - Is there any chance that you may be pregnant?      HPI:     HPI related to upcoming procedure: Hamida Verma is a 78 year old female who presents today for preop for cataract procedure.     She notes she feels like she might be getting a cold. Has had runny nose and cough and sneezing for couple of days, not getting worse, no fever. She does not know if she has allergies. Has had sinus problems in the past, but they improved after smoking cessation years ago.     A-FIB - Patient has a  history of chronic A-fib currently on rate and rhythm control. Current treatment regimen includes Apixaban for stroke prevention and denies significant symptoms of lightheadedness, palpitations or dyspnea.  She is s/p pacemaker implantation in 3/18.                                                                                                                                                                            .  CHF - Patient has a  history of HF. Exacerbating conditions include hypertension and atrial fibrilation. Currently the patient's condition is same. Current treatment regimen includes ACE inhibitor, beta blocker and diuretic. The patient denies chest pain, edema, orthopnea, SOB or recent weight gain. Last Echocardiogram 3/14/18, EKG 7/16/18.                                                                                                                                                                               .  HYPERLIPIDEMIA - Patient has a  history of significant Hyperlipidemia requiring medication for treatment with recent good control. Patient reports no problems or side effects with the medication.                                                                                                                                                        .  HYPERTENSION - Patient has  history of HTN , currently denies any symptoms referable to elevated blood pressure. Specifically denies chest pain, palpitations, dyspnea, orthopnea, PND or peripheral edema. Blood pressure readings have been in elevated range. Current medication regimen is as listed below. Patient denies any side effects of medication.       Home pressures have been 127/55 to 154/81.                       BP Readings from Last 6 Encounters:   10/02/18 147/78   07/11/18 144/74   06/11/18 159/75   05/29/18 160/86   05/02/18 146/77   04/18/18 143/82                                                                                                                                                                   .    MEDICAL HISTORY:     Patient Active Problem List    Diagnosis Date Noted     CHF (congestive heart failure) (H) 03/14/2018     Priority: Medium     Atrial fibrillation with rapid ventricular response (H) 03/06/2018     Priority: Medium     Obesity 11/25/2014     Priority: Medium     Knee pain 04/30/2013     Priority: Medium     Hypertension goal BP (blood pressure) < 140/90 02/21/2011     Priority: Medium     HYPERLIPIDEMIA LDL GOAL <130 05/09/2010     Priority: Medium     Symptomatic menopausal or female climacteric states 03/30/2006     Priority: Medium      Past Medical History:   Diagnosis Date     Atrial fibrillation (H)      Essential hypertension, benign      HYPERLIPIDEMIA NEC/NOS 3/30/2006     Past Surgical History:   Procedure Laterality Date     HYSTERECTOMY, VAGINAL      hysterectomy     Current Outpatient Prescriptions   Medication Sig Dispense Refill     alendronate (FOSAMAX) 70 MG tablet Take 1 tablet (70 mg) by mouth every 7 days Take 60 minutes before am meal with 8 oz. water. Remain upright for 30 minutes. 12 tablet 3     apixaban ANTICOAGULANT (ELIQUIS) 5 MG tablet Take 1 tablet (5 mg) by mouth 2 times  daily 180 tablet 3     cholecalciferol (VITAMIN  -D) 1000 UNIT capsule Take 1 capsule by mouth daily.       FLAX SEED OIL OR 1 capsule daily       flecainide (TAMBOCOR) 100 MG tablet Take 1 tablet (100 mg) by mouth 2 times daily 90 tablet 3     furosemide (LASIX) 20 MG tablet Take 1 tablet (20 mg) by mouth daily 30 tablet 5     Krill Oil 500 MG CAPS Take 1 capsule by mouth daily       lisinopril (PRINIVIL/ZESTRIL) 20 MG tablet TAKE ONE TABLET BY MOUTH EVERY DAY 30 tablet 0     Methylsulfonylmethane (MSM) 500 MG CAPS Take 1 capsule by mouth daily       metoprolol succinate (TOPROL-XL) 50 MG 24 hr tablet Take 1 tablet (50 mg) by mouth 2 times daily 180 tablet 3     Milk Thistle 200 MG CAPS Take 1 capsule by mouth daily        Multiple Vitamins-Minerals (HAIR SKIN NAILS PO) Take 1 tablet by mouth At Bedtime       Multiple Vitamins-Minerals (ICAPS AREDS 2) CAPS        potassium & sodium phosphates 278-164-250 MG/75ML SOLR Take 1 tablet by mouth daily        simvastatin (ZOCOR) 20 MG tablet Take 1 tablet (20 mg) by mouth At Bedtime Profile Rx: patient will contact pharmacy when needed 90 tablet 3     albuterol (PROAIR HFA/PROVENTIL HFA/VENTOLIN HFA) 108 (90 BASE) MCG/ACT Inhaler Inhale 2 puffs into the lungs 4 times daily as needed for other (dyspnea) (Patient not taking: Reported on 4/18/2018) 1 Inhaler 3     fluticasone (FLONASE) 50 MCG/ACT spray Spray 1-2 sprays into both nostrils daily as needed for rhinitis or allergies       OTC products: None, except as noted above    Allergies   Allergen Reactions     Strawberry Flavor       Latex Allergy: NO    Social History   Substance Use Topics     Smoking status: Former Smoker     Start date: 10/28/1955     Quit date: 9/1/2000     Smokeless tobacco: Never Used      Comment: quit 6yrs ago     Alcohol use No     History   Drug Use No       REVIEW OF SYSTEMS:    ROS: 10 point ROS neg other than the symptoms noted above in the HPI.     EXAM:   /78 (BP Location: Left arm,  Patient Position: Sitting, Cuff Size: Adult Large)  Pulse 62  Temp 98  F (36.7  C) (Oral)  Resp 16  Wt 189 lb (85.7 kg)  LMP  (LMP Unknown)  SpO2 95%  BMI 32.44 kg/m2   Wt Readings from Last 5 Encounters:   10/02/18 189 lb (85.7 kg)   07/11/18 186 lb (84.4 kg)   06/11/18 185 lb (83.9 kg)   05/29/18 184 lb (83.5 kg)   05/02/18 185 lb (83.9 kg)        GENERAL APPEARANCE: healthy, alert and no distress     EYES: EOMI, PERRL     HENT: ear canals and TM's normal and nose and mouth without ulcers or lesions     NECK: no adenopathy, no asymmetry, masses, or scars and thyroid normal to palpation     RESP: lungs clear to auscultation - no rales, rhonchi or wheezes     CV: regular rates and rhythm, normal S1 S2, no S3 or S4 and no murmur, click or rub     ABDOMEN:  soft, nontender, no HSM or masses and bowel sounds normal     MS: extremities normal- no gross deformities noted      SKIN: no suspicious lesions or rashes     NEURO: Normal strength and tone, sensory exam grossly normal, mentation intact and speech normal     PSYCH: mentation appears normal. and affect normal/bright     LYMPHATICS: No cervical adenopathy    DIAGNOSTICS:   No labs or EKG required for low risk surgery (cataract, skin procedure, breast biopsy, etc)    Recent Labs   Lab Test  07/11/18   0907  06/11/18   1042   03/19/18   1055   03/17/18   0550  03/16/18   0555   11/12/14   0814   HGB   --    --    --    --    --   12.3  13.0   < >  14.1   PLT   --    --    --    --    --   721*  797*   < >  405   INR   --    --    --   1.31*   --    --    --    --   1.04   NA  141  139   < >   --    < >  138  140   < >  140   POTASSIUM  4.2  4.1   < >  3.7   < >  3.9  3.7   < >  3.1*   CR  0.81  0.78   < >   --    < >  1.03  0.84   < >  0.80    < > = values in this interval not displayed.        IMPRESSION:   Reason for surgery/procedure: cataract  Diagnosis/reason for consult: preop for cataract procedure    The proposed surgical procedure is considered LOW  risk.    REVISED CARDIAC RISK INDEX  The patient has the following serious cardiovascular risks for perioperative complications such as (MI, PE, VFib and 3  AV Block):  Congestive Heart Failure (pulmonary edema, PND, s3 sharyn, CXR with pulmonary congestion, basilar rales)  INTERPRETATION: 1 risks: Class II (low risk - 0.9% complication rate)    The patient has the following additional risks for perioperative complications:  No identified additional risks      ICD-10-CM    1. Preop general physical exam Z01.818    2. Cataract, unspecified cataract type, unspecified laterality H26.9    3. Hypertension goal BP (blood pressure) < 140/90 I10 Mildly elevated today and she has taken her medications this morning. Home pressures also into the 140s and 150s. Will increase lisinopril to 40 mg daily as was the plan with Rosamaria Murray, Pharm.D.  She will follow-up with Rosamaria Murray in 3-4 weeks.   4. Hyperlipidemia LDL goal <130 E78.5 stable   5. Atrial fibrillation with rapid ventricular response (H) I48.91 stable   6. Congestive heart failure, unspecified HF chronicity, unspecified heart failure type (H) I50.9 stable   7. Rhinorrhea with and mild cough with post nasal drip. Otherwise not feeling ill. No fever. ?allergies vs beginning of URI. I recommended she reschedule the surgery if she is feeling ill. Today exam is normal.       RECOMMENDATIONS:       Cardiovascular Risk  Patient is already on a Beta Blocker. Continue Betablocker therapy after surgery, using Beta blocker order set as necessary for NPO status.      --Patient is to take all scheduled medications on the day of surgery EXCEPT for modifications listed below.    Anticoagulant or Antiplatelet Medication Use  Bleeding risk is low for this procedure (e.g. dental, skin, cataract)  However, her ophthalmologist has recommended she ask about cutting back or stopping eliquis for a few days. I have recommended she check with her cardiologist for recommendation  regarding Eliquis.      Patient Instructions     Before Your Surgery      Call your surgeon if there is any change in your health. This includes signs of a cold or flu (such as a sore throat, runny nose, cough, rash or fever).    Do not smoke, drink alcohol or take over the counter medicine (unless your surgeon or primary care doctor tells you to) for the 24 hours before and after surgery.    If you take prescribed drugs: Follow your doctor s orders about which medicines to take and which to stop until after surgery.    Eating and drinking prior to surgery: follow the instructions from your surgeon    Take a shower or bath the night before surgery. Use the soap your surgeon gave you to gently clean your skin. If you do not have soap from your surgeon, use your regular soap. Do not shave or scrub the surgery site.  Wear clean pajamas and have clean sheets on your bed.     Return to see Rosamaria Majano PharmD in 3-4 weeks    Increase your lisinopril dose to 40mg daily (prescription is at the pharmacy)    Check with your cardiologist about cutting back on or stopping the eliquis before the procedure.     If you feel like you have a cold or any illness at the time of the procedure, you should call your doctor to reschedule the procedure.     I recommend trying the flonase to see if this helps you nasal symptoms and sneezing.            APPROVAL GIVEN to proceed with proposed procedure, without further diagnostic evaluation       Signed Electronically by: Mikala Philip MD     Copy of this evaluation report is provided to requesting physician.    [place] Preop Guidelines    Revised Cardiac Risk Index

## 2018-10-02 NOTE — PATIENT INSTRUCTIONS
Before Your Surgery      Call your surgeon if there is any change in your health. This includes signs of a cold or flu (such as a sore throat, runny nose, cough, rash or fever).    Do not smoke, drink alcohol or take over the counter medicine (unless your surgeon or primary care doctor tells you to) for the 24 hours before and after surgery.    If you take prescribed drugs: Follow your doctor s orders about which medicines to take and which to stop until after surgery.    Eating and drinking prior to surgery: follow the instructions from your surgeon    Take a shower or bath the night before surgery. Use the soap your surgeon gave you to gently clean your skin. If you do not have soap from your surgeon, use your regular soap. Do not shave or scrub the surgery site.  Wear clean pajamas and have clean sheets on your bed.     Return to see Rosamaria Majano PharmD in 3-4 weeks    Increase your lisinopril dose to 40mg daily (prescription is at the pharmacy)    Check with your cardiologist about cutting back on or stopping the eliquis before the procedure.     If you feel like you have a cold or any illness at the time of the procedure, you should call your doctor to reschedule the procedure.     I recommend trying the flonase to see if this helps you nasal symptoms and sneezing.

## 2018-10-02 NOTE — MR AVS SNAPSHOT
After Visit Summary   10/2/2018    Hamida Verma    MRN: [id number]           Patient Information     Date Of Birth          1940        Visit Information        Provider Department      10/2/2018 9:20 AM Mikala Philip MD [facility]        Today's Diagnoses     Preop general physical exam    -  1    Cataract, unspecified cataract type, unspecified laterality        Hypertension goal BP (blood pressure) < 140/90        Hyperlipidemia LDL goal <130        Atrial fibrillation with rapid ventricular response (H)        Congestive heart failure, unspecified HF chronicity, unspecified heart failure type (H)          Care Instructions      Before Your Surgery      Call your surgeon if there is any change in your health. This includes signs of a cold or flu (such as a sore throat, runny nose, cough, rash or fever).    Do not smoke, drink alcohol or take over the counter medicine (unless your surgeon or primary care doctor tells you to) for the 24 hours before and after surgery.    If you take prescribed drugs: Follow your doctor s orders about which medicines to take and which to stop until after surgery.    Eating and drinking prior to surgery: follow the instructions from your surgeon    Take a shower or bath the night before surgery. Use the soap your surgeon gave you to gently clean your skin. If you do not have soap from your surgeon, use your regular soap. Do not shave or scrub the surgery site.  Wear clean pajamas and have clean sheets on your bed.     Return to see Rosamaria Majano PharmD in 3-4 weeks    Increase your lisinopril dose to 40mg daily (prescription is at the pharmacy)    Check with your cardiologist about cutting back on or stopping the eliquis before the procedure.     If you feel like you have a cold or any illness at the time of the procedure, you should call your doctor to reschedule the procedure.     I recommend trying the flonase to see if this helps you  "nasal symptoms and sneezing.           Follow-ups after your visit        Follow-up notes from your care team     Return in about 3 weeks (around 10/23/2018) for BP Recheck.      Your next 10 appointments already scheduled     Dec 31, 2018  2:15 PM CST   Remote PPM Check with [facility] TECH1   [facility] ([facility])    [address] [place], [place] [address]  [place] [id number] [phone] OPT 2           This appointment is for a remote check of your pacemaker.  This is not an appointment at the office.              Who to contact     If you have questions or need follow up information about today's clinic visit or your schedule please contact [facility] directly at [phone].  Normal or non-critical lab and imaging results will be communicated to you by Purplehart, letter or phone within 4 business days after the clinic has received the results. If you do not hear from us within 7 days, please contact the clinic through Purplehart or phone. If you have a critical or abnormal lab result, we will notify you by phone as soon as possible.  Submit refill requests through Konnects or call your pharmacy and they will forward the refill request to us. Please allow 3 business days for your refill to be completed.          Additional Information About Your Visit        Konnects Information     Konnects lets you send messages to your doctor, view your test results, renew your prescriptions, schedule appointments and more. To sign up, go to [email or web address] . Click on \"Log in\" on the left side of the screen, which will take you to the Welcome page. Then click on \"Sign up Now\" on the right side of the page.     You will be asked to enter the access code listed below, as well as some personal information. Please follow the directions to create your username and password.     Your access code is: [email or web address]  Expires: 2018  8:37 AM     Your access code will  in " 90 days. If you need help or a new code, please call your [place] clinic or [phone].        Care EveryWhere ID     This is your Care EveryWhere ID. This could be used by other organizations to access your [place] medical records  [id number]        Your Vitals Were     Pulse Temperature Respirations Last Period Pulse Oximetry BMI (Body Mass Index)    62 98  F (36.7  C) (Oral) 16 (LMP Unknown) 95% 32.44 kg/m2       Blood Pressure from Last 3 Encounters:   10/02/18 147/78   07/11/18 144/74   06/11/18 159/75    Weight from Last 3 Encounters:   10/02/18 189 lb (85.7 kg)   07/11/18 186 lb (84.4 kg)   06/11/18 185 lb (83.9 kg)              Today, you had the following     No orders found for display         Today's Medication Changes          These changes are accurate as of 10/2/18 10:08 AM.  If you have any questions, ask your nurse or doctor.               These medicines have changed or have updated prescriptions.        Dose/Directions    lisinopril 40 MG tablet   Commonly known as:  PRINIVIL/ZESTRIL   This may have changed:    - medication strength  - See the new instructions.   Used for:  Hypertension goal BP (blood pressure) < 140/90   Changed by:  Mikala Philip MD        Dose:  40 mg   Take 1 tablet (40 mg) by mouth daily   Quantity:  90 tablet   Refills:  3            Where to get your medicines      These medications were sent to [facility] [address]  [address] [facility] [address]     Phone:  [phone]     lisinopril 40 MG tablet                Primary Care Provider Office Phone # Fax #    Mikala Philip [phone]       [address]  [facility] [address]        Equal Access to Services     NESSA MARIE : Sushant Garcia, madelaine potts, qadanielle kaalcarlos ernandez. [facility] [phone].    ATENCIÓN: Si habla español, tiene a ivy disposición servicios gratuitos de  kath lingüística. Siomara al [phone].    We comply with applicable federal civil rights laws and [place] laws. We do not discriminate on the basis of race, color, national origin, age, disability, sex, sexual orientation, or gender identity.            Thank you!     Thank you for choosing [facility]  for your care. Our goal is always to provide you with excellent care. Hearing back from our patients is one way we can continue to improve our services. Please take a few minutes to complete the written survey that you may receive in the mail after your visit with us. Thank you!             Your Updated Medication List - Protect others around you: Learn how to safely use, store and throw away your medicines at www.disposemymeds.org.          This list is accurate as of 10/2/18 10:08 AM.  Always use your most recent med list.                   Brand Name Dispense Instructions for use Diagnosis    albuterol 108 (90 Base) MCG/ACT inhaler    PROAIR HFA/PROVENTIL HFA/VENTOLIN HFA    1 Inhaler    Inhale 2 puffs into the lungs 4 times daily as needed for other (dyspnea)    SOB (shortness of breath)       alendronate 70 MG tablet    FOSAMAX    12 tablet    Take 1 tablet (70 mg) by mouth every 7 days Take 60 minutes before am meal with 8 oz. water. Remain upright for 30 minutes.    Osteoporosis       apixaban ANTICOAGULANT 5 MG tablet    ELIQUIS    180 tablet    Take 1 tablet (5 mg) by mouth 2 times daily    Atrial fibrillation with rapid ventricular response (H)       cholecalciferol 1000 units capsule    vitamin  -D     Take 1 capsule by mouth daily.        FLAX SEED OIL PO      1 capsule daily        flecainide 100 MG tablet    TAMBOCOR    90 tablet    Take 1 tablet (100 mg) by mouth 2 times daily    Sick sinus syndrome (H), Cardiac pacemaker in situ       fluticasone 50 MCG/ACT spray    FLONASE     Spray 1-2 sprays into both nostrils daily as needed for rhinitis or allergies        furosemide 20 MG  tablet    LASIX    30 tablet    Take 1 tablet (20 mg) by mouth daily    Systolic congestive heart failure, unspecified congestive heart failure chronicity       * HAIR SKIN NAILS PO      Take 1 tablet by mouth At Bedtime        * ICAPS AREDS 2 Caps           Krill Oil 500 MG Caps      Take 1 capsule by mouth daily        lisinopril 40 MG tablet    PRINIVIL/ZESTRIL    90 tablet    Take 1 tablet (40 mg) by mouth daily    Hypertension goal BP (blood pressure) < 140/90       metoprolol succinate 50 MG 24 hr tablet    TOPROL-XL    180 tablet    Take 1 tablet (50 mg) by mouth 2 times daily    Atrial fibrillation with rapid ventricular response (H), Systolic congestive heart failure, unspecified congestive heart failure chronicity       Milk Thistle 200 MG Caps      Take 1 capsule by mouth daily         MG Caps      Take 1 capsule by mouth daily        potassium & sodium phosphates 278-164-250 MG/75ML Solr      Take 1 tablet by mouth daily        simvastatin 20 MG tablet    ZOCOR    90 tablet    Take 1 tablet (20 mg) by mouth At Bedtime Profile Rx: patient will contact pharmacy when needed    Hyperlipidemia LDL goal <130       * Notice:  This list has 2 medication(s) that are the same as other medications prescribed for you. Read the directions carefully, and ask your doctor or other care provider to review them with you.

## 2018-10-03 ENCOUNTER — TELEPHONE (OUTPATIENT)
Dept: CARDIOLOGY | Facility: CLINIC | Age: 78
End: 2018-10-03

## 2018-10-03 NOTE — TELEPHONE ENCOUNTER
Pt going to have cataract surgery and was asked to call and see if pt should hold her Eliquis prior to procedure.  Explained to pt that this writer does not know if she needs to hold prior to surgery, but if it is needed she may hold for 2-3 days.  Pt has no history of CVA or Valve replacement.  No further questions. Nusrat

## 2018-10-11 ENCOUNTER — HOSPITAL ENCOUNTER (INPATIENT)
Facility: CLINIC | Age: 78
LOS: 3 days | Discharge: HOME | DRG: 291 | End: 2018-10-14
Attending: NURSE PRACTITIONER | Admitting: INTERNAL MEDICINE
Payer: COMMERCIAL

## 2018-10-11 ENCOUNTER — APPOINTMENT (OUTPATIENT)
Dept: GENERAL RADIOLOGY | Facility: CLINIC | Age: 78
DRG: 291 | End: 2018-10-11
Attending: NURSE PRACTITIONER
Payer: COMMERCIAL

## 2018-10-11 DIAGNOSIS — R09.02 HYPOXIA: ICD-10-CM

## 2018-10-11 DIAGNOSIS — I50.32 CHRONIC DIASTOLIC CONGESTIVE HEART FAILURE (H): Primary | ICD-10-CM

## 2018-10-11 PROBLEM — I50.9 ACUTE CHF (CONGESTIVE HEART FAILURE) (H): Status: ACTIVE | Noted: 2018-10-11

## 2018-10-11 LAB
ANION GAP SERPL CALCULATED.3IONS-SCNC: 7 MMOL/L (ref 3–14)
BASOPHILS # BLD AUTO: 0.1 10E9/L (ref 0–0.2)
BASOPHILS NFR BLD AUTO: 0.8 %
BUN SERPL-MCNC: 8 MG/DL (ref 7–30)
CALCIUM SERPL-MCNC: 8.4 MG/DL (ref 8.5–10.1)
CHLORIDE SERPL-SCNC: 106 MMOL/L (ref 94–109)
CO2 SERPL-SCNC: 29 MMOL/L (ref 20–32)
CREAT SERPL-MCNC: 0.65 MG/DL (ref 0.52–1.04)
DIFFERENTIAL METHOD BLD: NORMAL
EOSINOPHIL # BLD AUTO: 0.2 10E9/L (ref 0–0.7)
EOSINOPHIL NFR BLD AUTO: 1.7 %
ERYTHROCYTE [DISTWIDTH] IN BLOOD BY AUTOMATED COUNT: 14.9 % (ref 10–15)
GFR SERPL CREATININE-BSD FRML MDRD: 87 ML/MIN/1.7M2
GLUCOSE SERPL-MCNC: 99 MG/DL (ref 70–99)
HCT VFR BLD AUTO: 38.3 % (ref 35–47)
HGB BLD-MCNC: 12.3 G/DL (ref 11.7–15.7)
IMM GRANULOCYTES # BLD: 0.1 10E9/L (ref 0–0.4)
IMM GRANULOCYTES NFR BLD: 0.8 %
INTERPRETATION ECG - MUSE: NORMAL
LYMPHOCYTES # BLD AUTO: 1.2 10E9/L (ref 0.8–5.3)
LYMPHOCYTES NFR BLD AUTO: 13 %
MCH RBC QN AUTO: 27.6 PG (ref 26.5–33)
MCHC RBC AUTO-ENTMCNC: 32.1 G/DL (ref 31.5–36.5)
MCV RBC AUTO: 86 FL (ref 78–100)
MONOCYTES # BLD AUTO: 0.8 10E9/L (ref 0–1.3)
MONOCYTES NFR BLD AUTO: 9.4 %
NEUTROPHILS # BLD AUTO: 6.6 10E9/L (ref 1.6–8.3)
NEUTROPHILS NFR BLD AUTO: 74.3 %
NRBC # BLD AUTO: 0 10*3/UL
NRBC BLD AUTO-RTO: 0 /100
NT-PROBNP SERPL-MCNC: 1117 PG/ML (ref 0–1800)
PLATELET # BLD AUTO: 401 10E9/L (ref 150–450)
POTASSIUM SERPL-SCNC: 3.4 MMOL/L (ref 3.4–5.3)
RBC # BLD AUTO: 4.46 10E12/L (ref 3.8–5.2)
SODIUM SERPL-SCNC: 142 MMOL/L (ref 133–144)
TROPONIN I SERPL-MCNC: <0.015 UG/L (ref 0–0.04)
WBC # BLD AUTO: 8.9 10E9/L (ref 4–11)

## 2018-10-11 PROCEDURE — 80048 BASIC METABOLIC PNL TOTAL CA: CPT | Performed by: NURSE PRACTITIONER

## 2018-10-11 PROCEDURE — 93005 ELECTROCARDIOGRAM TRACING: CPT

## 2018-10-11 PROCEDURE — 25000128 H RX IP 250 OP 636: Performed by: NURSE PRACTITIONER

## 2018-10-11 PROCEDURE — 85025 COMPLETE CBC W/AUTO DIFF WBC: CPT | Performed by: NURSE PRACTITIONER

## 2018-10-11 PROCEDURE — 83880 ASSAY OF NATRIURETIC PEPTIDE: CPT | Performed by: NURSE PRACTITIONER

## 2018-10-11 PROCEDURE — 21400004 ZZH R&B CCU CSC INTERMEDIATE

## 2018-10-11 PROCEDURE — 25000128 H RX IP 250 OP 636: Performed by: INTERNAL MEDICINE

## 2018-10-11 PROCEDURE — 99285 EMERGENCY DEPT VISIT HI MDM: CPT | Mod: 25

## 2018-10-11 PROCEDURE — 99223 1ST HOSP IP/OBS HIGH 75: CPT | Mod: AI | Performed by: INTERNAL MEDICINE

## 2018-10-11 PROCEDURE — 84484 ASSAY OF TROPONIN QUANT: CPT | Performed by: NURSE PRACTITIONER

## 2018-10-11 PROCEDURE — 71046 X-RAY EXAM CHEST 2 VIEWS: CPT

## 2018-10-11 PROCEDURE — 25000132 ZZH RX MED GY IP 250 OP 250 PS 637: Performed by: INTERNAL MEDICINE

## 2018-10-11 PROCEDURE — 96374 THER/PROPH/DIAG INJ IV PUSH: CPT

## 2018-10-11 RX ORDER — POTASSIUM CHLORIDE 7.45 MG/ML
10 INJECTION INTRAVENOUS
Status: DISCONTINUED | OUTPATIENT
Start: 2018-10-11 | End: 2018-10-14 | Stop reason: HOSPADM

## 2018-10-11 RX ORDER — FLUTICASONE PROPIONATE 50 MCG
1-2 SPRAY, SUSPENSION (ML) NASAL DAILY PRN
Status: DISCONTINUED | OUTPATIENT
Start: 2018-10-11 | End: 2018-10-14 | Stop reason: HOSPADM

## 2018-10-11 RX ORDER — ALBUTEROL SULFATE 5 MG/ML
2.5 SOLUTION RESPIRATORY (INHALATION)
Status: DISCONTINUED | OUTPATIENT
Start: 2018-10-11 | End: 2018-10-14 | Stop reason: HOSPADM

## 2018-10-11 RX ORDER — POLYETHYLENE GLYCOL 3350 17 G/17G
17 POWDER, FOR SOLUTION ORAL DAILY PRN
Status: DISCONTINUED | OUTPATIENT
Start: 2018-10-11 | End: 2018-10-14 | Stop reason: HOSPADM

## 2018-10-11 RX ORDER — POTASSIUM CHLORIDE 1500 MG/1
20-40 TABLET, EXTENDED RELEASE ORAL
Status: DISCONTINUED | OUTPATIENT
Start: 2018-10-11 | End: 2018-10-14 | Stop reason: HOSPADM

## 2018-10-11 RX ORDER — PROCHLORPERAZINE MALEATE 5 MG
5 TABLET ORAL EVERY 6 HOURS PRN
Status: DISCONTINUED | OUTPATIENT
Start: 2018-10-11 | End: 2018-10-14 | Stop reason: HOSPADM

## 2018-10-11 RX ORDER — ACETAMINOPHEN 325 MG/1
650 TABLET ORAL EVERY 4 HOURS PRN
Status: DISCONTINUED | OUTPATIENT
Start: 2018-10-11 | End: 2018-10-14 | Stop reason: HOSPADM

## 2018-10-11 RX ORDER — ONDANSETRON 4 MG/1
4 TABLET, ORALLY DISINTEGRATING ORAL EVERY 6 HOURS PRN
Status: DISCONTINUED | OUTPATIENT
Start: 2018-10-11 | End: 2018-10-14 | Stop reason: HOSPADM

## 2018-10-11 RX ORDER — ONDANSETRON 2 MG/ML
4 INJECTION INTRAMUSCULAR; INTRAVENOUS EVERY 6 HOURS PRN
Status: DISCONTINUED | OUTPATIENT
Start: 2018-10-11 | End: 2018-10-14 | Stop reason: HOSPADM

## 2018-10-11 RX ORDER — FUROSEMIDE 10 MG/ML
40 INJECTION INTRAMUSCULAR; INTRAVENOUS ONCE
Status: COMPLETED | OUTPATIENT
Start: 2018-10-11 | End: 2018-10-11

## 2018-10-11 RX ORDER — METOPROLOL SUCCINATE 50 MG/1
50 TABLET, EXTENDED RELEASE ORAL 2 TIMES DAILY
Status: DISCONTINUED | OUTPATIENT
Start: 2018-10-11 | End: 2018-10-14 | Stop reason: HOSPADM

## 2018-10-11 RX ORDER — FLECAINIDE ACETATE 100 MG/1
100 TABLET ORAL 2 TIMES DAILY
Status: DISCONTINUED | OUTPATIENT
Start: 2018-10-11 | End: 2018-10-14 | Stop reason: HOSPADM

## 2018-10-11 RX ORDER — SIMVASTATIN 20 MG
20 TABLET ORAL AT BEDTIME
Status: DISCONTINUED | OUTPATIENT
Start: 2018-10-11 | End: 2018-10-14 | Stop reason: HOSPADM

## 2018-10-11 RX ORDER — POTASSIUM CL/LIDO/0.9 % NACL 10MEQ/0.1L
10 INTRAVENOUS SOLUTION, PIGGYBACK (ML) INTRAVENOUS
Status: DISCONTINUED | OUTPATIENT
Start: 2018-10-11 | End: 2018-10-14 | Stop reason: HOSPADM

## 2018-10-11 RX ORDER — POTASSIUM CHLORIDE 29.8 MG/ML
20 INJECTION INTRAVENOUS
Status: DISCONTINUED | OUTPATIENT
Start: 2018-10-11 | End: 2018-10-14 | Stop reason: HOSPADM

## 2018-10-11 RX ORDER — AMOXICILLIN 250 MG
1 CAPSULE ORAL 2 TIMES DAILY PRN
Status: DISCONTINUED | OUTPATIENT
Start: 2018-10-11 | End: 2018-10-14 | Stop reason: HOSPADM

## 2018-10-11 RX ORDER — MAGNESIUM SULFATE HEPTAHYDRATE 40 MG/ML
4 INJECTION, SOLUTION INTRAVENOUS EVERY 4 HOURS PRN
Status: DISCONTINUED | OUTPATIENT
Start: 2018-10-11 | End: 2018-10-14 | Stop reason: HOSPADM

## 2018-10-11 RX ORDER — ALBUTEROL SULFATE 90 UG/1
2 AEROSOL, METERED RESPIRATORY (INHALATION) 4 TIMES DAILY PRN
Status: DISCONTINUED | OUTPATIENT
Start: 2018-10-11 | End: 2018-10-14 | Stop reason: HOSPADM

## 2018-10-11 RX ORDER — AMOXICILLIN 250 MG
2 CAPSULE ORAL 2 TIMES DAILY PRN
Status: DISCONTINUED | OUTPATIENT
Start: 2018-10-11 | End: 2018-10-14 | Stop reason: HOSPADM

## 2018-10-11 RX ORDER — FUROSEMIDE 10 MG/ML
20 INJECTION INTRAMUSCULAR; INTRAVENOUS
Status: DISCONTINUED | OUTPATIENT
Start: 2018-10-11 | End: 2018-10-14 | Stop reason: HOSPADM

## 2018-10-11 RX ORDER — METHYLSULFONYLMETHANE 500 MG
1 CAPSULE ORAL DAILY
Status: DISCONTINUED | OUTPATIENT
Start: 2018-10-11 | End: 2018-10-11 | Stop reason: CLARIF

## 2018-10-11 RX ORDER — POTASSIUM CHLORIDE 1.5 G/1.58G
20-40 POWDER, FOR SOLUTION ORAL
Status: DISCONTINUED | OUTPATIENT
Start: 2018-10-11 | End: 2018-10-14 | Stop reason: HOSPADM

## 2018-10-11 RX ORDER — NALOXONE HYDROCHLORIDE 0.4 MG/ML
.1-.4 INJECTION, SOLUTION INTRAMUSCULAR; INTRAVENOUS; SUBCUTANEOUS
Status: DISCONTINUED | OUTPATIENT
Start: 2018-10-11 | End: 2018-10-14 | Stop reason: HOSPADM

## 2018-10-11 RX ORDER — PROCHLORPERAZINE 25 MG
12.5 SUPPOSITORY, RECTAL RECTAL EVERY 12 HOURS PRN
Status: DISCONTINUED | OUTPATIENT
Start: 2018-10-11 | End: 2018-10-14 | Stop reason: HOSPADM

## 2018-10-11 RX ORDER — LABETALOL HYDROCHLORIDE 5 MG/ML
10 INJECTION, SOLUTION INTRAVENOUS
Status: DISCONTINUED | OUTPATIENT
Start: 2018-10-11 | End: 2018-10-14 | Stop reason: HOSPADM

## 2018-10-11 RX ORDER — LISINOPRIL 40 MG/1
40 TABLET ORAL DAILY
Status: DISCONTINUED | OUTPATIENT
Start: 2018-10-12 | End: 2018-10-14 | Stop reason: HOSPADM

## 2018-10-11 RX ADMIN — FUROSEMIDE 40 MG: 10 INJECTION, SOLUTION INTRAVENOUS at 16:22

## 2018-10-11 RX ADMIN — FUROSEMIDE 20 MG: 10 INJECTION, SOLUTION INTRAVENOUS at 19:00

## 2018-10-11 RX ADMIN — METOPROLOL SUCCINATE 50 MG: 50 TABLET, EXTENDED RELEASE ORAL at 21:44

## 2018-10-11 RX ADMIN — SIMVASTATIN 20 MG: 20 TABLET, FILM COATED ORAL at 21:44

## 2018-10-11 RX ADMIN — APIXABAN 5 MG: 5 TABLET, FILM COATED ORAL at 21:44

## 2018-10-11 RX ADMIN — FLECAINIDE ACETATE 100 MG: 100 TABLET ORAL at 21:44

## 2018-10-11 RX ADMIN — ACETAMINOPHEN 650 MG: 325 TABLET, FILM COATED ORAL at 21:46

## 2018-10-11 ASSESSMENT — PAIN DESCRIPTION - DESCRIPTORS: DESCRIPTORS: HEADACHE

## 2018-10-11 ASSESSMENT — ENCOUNTER SYMPTOMS
UNEXPECTED WEIGHT CHANGE: 0
FEVER: 0
SHORTNESS OF BREATH: 1
COUGH: 1

## 2018-10-11 ASSESSMENT — ACTIVITIES OF DAILY LIVING (ADL): ADLS_ACUITY_SCORE: 9

## 2018-10-11 NOTE — ED PROVIDER NOTES
"  History     Chief Complaint:  Shortness of Breath    HPI   Hamida Verma is a 78 year old female, anticoagulated on Eliquis, with a history of atrial fibrillation, hyperlipidemia, CHF, and HTN who presents to the ED for evaluation of hypoxia. This morning, she was at the [facility] prepping for a left cataract surgery when the staff noted that her oxygen sats were low at 89%, and she was slightly hypertensive. Not wanting to operate if there was something wrong, the surgeon referred her to the ED for evaluation. Here in the ED, she denies any atypical shortness of breath. She also denies any chest pain or cold symptoms, however she does report a slight cough which is more of a \"tickle.\" She reports that her weights have been stable recently, and she denies any history of hypoxia. She reports that her left leg always has more edema than her right, and she has not noted any drastic changes. Her pre-op physical was a week ago, and her oxygen level was okay at that time. Of note, she had an ECHO done most recently in March 2018 when she was hospitalized for a-fib with RVR; the interpretation summary is noted below. She also did not take her dose of Eliquis this morning, as requested by her eye surgeon, but has taken her usual doses prior to today.     ECHO from 3/14/2018:   The visual ejection fraction is estimated at 60-65%.  Right ventricular systolic pressure is elevated, consistent with moderate  pulmonary hypertension.  Limited views were obtained    Allergies:  NKDA    Medications:    Alendronate  Eliquis  Flecainide  Furosemide  Lisinopril  Methylsulfonylmethane  Metoprolol  Simvastatin    Past Medical History:    Pacemaker ([employer] DDDR)  A-fib  CHF  HTN  Hyperlipidemia    Past Surgical History:    Hysterectomy    Family History:    CVD  Eye disorder  CAD  MI  Alcoholism  Lupus  Depression    Social History:  Marital Status:   [5]  Former smoker. Quit 2000  Negative for alcohol use. " "    Review of Systems   Constitutional: Negative for fever and unexpected weight change.   Respiratory: Positive for cough and shortness of breath.    Cardiovascular: Negative for chest pain and leg swelling.   All other systems reviewed and are negative.      Physical Exam     Patient Vitals for the past 24 hrs:   BP Temp Temp src Pulse Heart Rate Resp SpO2 Height   10/11/18 1630 161/74 - - - 60 22 95 % -   10/11/18 1600 151/75 - - - 62 (!) 32 94 % -   10/11/18 1530 163/76 - - - 62 28 - -   10/11/18 1500 171/71 - - - 64 19 95 % -   10/11/18 1448 - - - - 61 14 95 % -   10/11/18 1444 - - - - 63 12 (!) 85 % -   10/11/18 1430 170/83 - - - 64 28 95 % -   10/11/18 1330 168/83 - - - 64 25 96 % -   10/11/18 1300 (!) 150/99 - - - 64 22 92 % -   10/11/18 1231 193/80 98.8  F (37.1  C) Oral 60 - 18 (!) 87 % 1.626 m (5' 4\")   10/11/18 1230 185/77 - - - 66 24 94 % -     Physical Exam  Nursing notes reviewed. Vitals reviewed.  General: Alert. Well kept.  Eyes:  Conjunctiva non-injected, non-icteric. Left pupil 4 mm. Right pupil 2mm and reactive.  Neck/Throat: Moist mucous membranes. Normal voice.  Cardiac: Regular rhythm. Normal heart sounds with no murmur/rubs/click. 1+ left lower extremity edema.   Pulmonary: Clear and equal breath sounds bilaterally. No crackles/rales. No wheezing  Abdomen: Soft. Non-distended. Non-tender to palpation. No masses. No guarding or rebound.  Musculoskeletal: Normal gross range of motion of all 4 extremities.    Neurological: Alert and oriented x4.   Skin: Warm and dry without rashes or petechiae. Normal appearance of visualized exposed skin.  Psych: Affect normal. Good eye contact.      Emergency Department Course   ECG:  Indication: Shortness of Breath  Time: 1302  Vent. Rate 64 bpm. NV interval 184. QRS duration 98. QT/QTc 448/462. P-R-T axis 67 66 52.  Normal sinus rhythm. Normal ECG. Read time: 1310.    Imaging:  Radiographic findings were communicated with the patient who voiced understanding " of the findings.    XR Chest 2 views:   Lung volumes are unchanged. Left basilar atelectasis has  improved. There is persistent bibasilar opacity at the posterior  costophrenic angles likely representing residual atelectasis. Probable  small bilateral pleural effusions, new compared to 3/21/2018. Mild to  moderate pulmonary vascular congestion is present. Cardiac silhouette  is enlarged, unchanged. Dual lead cardiac pacing device is unchanged, As per radiology.     Laboratory:  CBC: WBC: 8.9, HGB: 12.3, PLT: 401  BMP:  Calcium: 8.4 (L), o/w WNL (Creatinine: 0.65)    1324 Troponin: <0.015  BNP: 1117    Interventions:  1622 Lasix, 40 mg, IV    Emergency Department Course:  Nursing notes and vitals reviewed. (1245) I performed an exam of the patient as documented above.      IV inserted. Medicine administered as documented above. Blood drawn. This was sent to the lab for further testing, results above.     The patient was sent for a chest X-ray while in the emergency department, findings above.      I shared service with Dr. Coello for this patient.    (1610) I rechecked the patient and discussed the results of her workup thus far.      (1651) I consulted with Dr. Lan of the hospitalist services. He is in agreement to accept the patient for admission.    (1706) I updated the patient's eye surgeon on the results of the patient's work up here in the ED, per the patient request, and our plan of care going forward.     Findings and plan explained to the Patient who consents to admission. Discussed the patient with Dr. Lan, who will admit the patient to a medical bed for further monitoring, evaluation, and treatment.     I personally reviewed the laboratory results with the Patient and answered all related questions prior to admission.     Impression & Plan      Medical Decision Making:  Hamida Verma is a 78 year old female who presents for evaluation of hypoxia from the eye clinic where she was having cataract  surgery today. The surgery was not completed secondary to hypoxia prior to the procedure. On examination, she does have diminished bilateral bases, but is speaking in full sentences. She has 1+ left lower extremity edema with no right lower extremity edema. She notes this is worse than normal, but she always has left greater than right edema. She has noted over the last two days increasing coughing when trying to lay flat in bed. She has an associated recent URI last week, but felt it had resolved. She shows no signs of infectious process with WBC and no signs of pneumonia on her chest x-ray. EKG shows sinus rhythm with a troponin within the normal range. She does have a history of paroxysmal atrial fibrillation and is currently on flecainide and metoprolol with DDDR pacemaker. She is on apixaban for her paroxysmal a fib and has been taking this continuously, just not taking on her last dose today as she was scheduled for surgery. She had a normal CT in March of this year and PE is considered unlikely. Her BNP shows 1117, but chest x-ray does show bilateral pleural effusions which are new from March 2018 and pulmonary vascular congestion is present. She does have underlying pulmonary hypertension, as found on an ECHO in 2018. The patient was ambulated off oxygen in the emergency department, and saturation is at 86-87%. She does not use oxygen at home. With this new hypoxia and increasing pleural effusion and vascular congestion, I did give the patient 40 mg of IV lasix and discussed admission with Dr. Lan who accepts for further evaluation and management. The patient is in agreement with this plan.    Diagnosis:    ICD-10-CM    1. Hypoxia R09.02        Disposition:  Admitted to Dr. Lan    Scribe Disclosure:  I, Adriana Quach, am serving as a scribe on 10/11/2018 at 12:45 PM to personally document services performed by Chey Mustafa CNP based on my observations and the provider's statements to me.       Adriana  Philomena  10/11/2018    EMERGENCY DEPARTMENT       Chey Mustafa, CNP  10/11/18 [id number]

## 2018-10-11 NOTE — IP AVS SNAPSHOT
[facility]    [address] [place] [address]., [address]    [place] [address]    Phone:  [phone]                                       After Visit Summary   10/11/2018    Hamida Verma    MRN: [id number]           After Visit Summary Signature Page     I have received my discharge instructions, and my questions have been answered. I have discussed any challenges I see with this plan with the nurse or doctor.    ..........................................................................................................................................  Patient/Patient Representative Signature      ..........................................................................................................................................  Patient Representative Print Name and Relationship to Patient    ..................................................               ................................................  Date                                   Time    ..........................................................................................................................................  Reviewed by Signature/Title    ...................................................              ..............................................  Date                                               Time          22EPIC Rev 08/18

## 2018-10-11 NOTE — IP AVS SNAPSHOT
MRN:[id number]                      After Visit Summary   10/11/2018    Hamida Verma    MRN: [id number]           Thank you!     Thank you for choosing [facility] for your care. Our goal is always to provide you with excellent care. Hearing back from our patients is one way we can continue to improve our services. Please take a few minutes to complete the written survey that you may receive in the mail after you visit with us. Thank you!        Patient Information     Date Of Birth          1940        Designated Caregiver       Most Recent Value    Caregiver    Will someone help with your care after discharge? yes    Name of designated caregiver Delores Batista (zoë)    Phone number of caregiver [phone]    Caregiver address same as patient      About your hospital stay     You were admitted on:  October 11, 2018 You last received care in the:  [facility]    You were discharged on:  October 14, 2018        Reason for your hospital stay       You were admitted to the hospital secondary to heart failure exacerbation.                  Who to Call     For medical emergencies, please call 911.  For non-urgent questions about your medical care, please call your primary care provider or clinic, [phone]          Attending Provider     Provider Specialty    Chey Mustafa, CNP Nurse Practitioner - Family    Micki Lan MD Internal Medicine    Mary Matos MD Internal Medicine       Primary Care Provider Office Phone # Fax #    Mikala Philip [phone]      After Care Instructions     Activity       Your activity upon discharge: activity as tolerated and no driving for today            Diet       Follow this diet upon discharge: Orders Placed This Encounter      2 Gram Sodium Diet No Caffeine for 24 hours (once tests completed, may have caffeine)              Discharge Instructions       Please continue with all the Cardiac med's and  daily weight , you will also be called from [facility] to follow up closely on the heart failure  [facility] appointment has been scheduled. See below                  Follow-up Appointments     Follow-up and recommended labs and tests       Follow up with primary care provider, Mikala Philip MD, within 7 days to evaluate treatment change and for hospital follow- up.  The following labs/tests are recommended: BMP.  You will need to call Monday to schedule this follow up Appointment  Dr Philip- [phone]                  Your next 10 appointments already scheduled     Oct 24, 2018  8:00 AM CDT   SHORT with Rosamaria Majano [facility] ([facility])    [address] [place] [id number] [phone]            Oct 25, 2018  8:00 AM CDT   LAB with [facility]   [facility] [place] ([facility])    [address] [place] [id number] [phone]           Please do not eat 10-12 hours before your appointment if you are coming in fasting for labs on lipids, cholesterol, or glucose (sugar). This does not apply to pregnant women. Water, hot tea and black coffee (with nothing added) are okay. Do not drink other fluids, diet soda or chew gum.            Oct 25, 2018  8:50 AM CDT   CORE Enrollment with Adriana Ferrell PA-C   [facility] ([facility])    [address] [place] [id number] [phone] OPT 2            Dec 31, 2018  2:15 PM CST   Remote PPM Check with [facility] TECH1   [facility] ([facility])    [address] [place] [id number] [phone] OPT 2           This appointment is for a remote check of your pacemaker.  This is not an appointment at the office.              Additional Services     Follow-Up with [facility]                 Future tests that were ordered  for you     Basic metabolic panel           N terminal pro BNP outpatient           TSH with free T4 reflex       Last Lab Result: TSH (mU/L)       Date                     Value                 03/15/2018               0.64             ----------                  General Recommendations To Control Heart Failure When You Get Home     Heart Failure Instructions for Patients and Families: Please read and check off each of these important instructions as you do them when you get home.     Weight and Symptoms    ___ Put a scale in your bathroom.    ___ Post a weight chart or calendar next to your scale.    ___ Weigh yourself everyday as soon as you get up in the morning (before breakfast).  You should only be wearing your pajamas.  Write your weight on the chart/calendar.    ___ Bring your weight chart/calendar with you to all appointments.    ___ Call your doctor or nurse practitioner if you gain 2 pounds (in 1 day) or 5 pounds in (1 week) from your goal  good  weight.  Your good weight is also called your  dry  weight.  Your doctor or nurse will tell you what your good weight should be.    ___ Call your doctor or nurse practitioner if you have shortness of breath that gets worse over time, leg swelling or fatigue.    Medications and Diet    ___ Make sure to take your medication as prescribed.    ___ Bring a current list of your medication and all of your medicine bottles with you to all appointments.    ___ Limit fluids if you still have swelling or shortness of breath, or if your doctor tells you to do so.      ___ Eat less than 2000 mg of sodium (salt) every day. Read food labels, and do not add salt to meals. Remember, if you eat less salt you retain less fluid.    ___ Follow a heart healthy diet that is low in saturated fat.         Activity and Suggested Lifestyle Changes   ___ Stay active. Talk to your doctor about an exercise program that is safe for your heart.    ___ Stop smoking. Reduce alcohol use.      ___  Lose weight if you are overweight. Extra weight puts a lot of stress on the heart.    Control for Leg Swelling  ___ Keep your legs elevated (raised) as needed for swelling. If swelling is uncomfortable or elevation doesn t help, ask your doctor about using ACE wraps or support stockings.      What is the [facility]?     Cardiomyopathy  Optimization  Rehabilitation  Education    The [facility] Clinic is a heart failure specialty clinic within [facility].  It is an outpatient disease management program that is based on a phase-by-phase approach, which is tailored to each patient s individual needs.  The cardiologist, nurse practitioner, physician assistant and nurses provide an ongoing outpatient care and treatment plan that guides heart failure and cardiomyopathy patients from evaluation and education to stabilization. This team works with your current primary care doctor and cardiologist to help you:      Avoid hospitalizations    Slow the progression of the disease    Improve length and quality of life    Know who and when to call if heart failure symptoms appear    Receive easy access to quality health care and advice    Better understand your condition and treatment    Decrease the tremendous cost burden of heart failure care    Detect future heart problems before they become life threatening    Your [facility] Clinic Team will continue to educate you on your heart failure and may adjust medications based on your vital signs, lab work, and how you are feeling.  Therefore, it is very important to bring the following to all [facility].R[facility] appointments:    - An accurate list of your medications  - Your medicine bottles  - Your weight chart/calendar    [facility]):    [phone]  [facility]):    [phone]  [facility] [place] [facility]):  [phone]        Pending Results     No orders found from 10/9/2018 to 10/12/2018.           "  Statement of Approval     Ordered          10/14/18 1131  I have reviewed and agree with all the recommendations and orders detailed in this document.  EFFECTIVE NOW     Approved and electronically signed by:  Mary Matos MD             Admission Information     Date & Time Provider Department Dept. Phone    10/11/2018 Mary Matos MD [facility] [phone]      Your Vitals Were     Blood Pressure Pulse Temperature Respirations Height Weight    142/74 (BP Location: Right arm) 60 97.6  F (36.4  C) (Oral) 16 1.626 m (5' 4\") 81.7 kg (180 lb 1.6 oz)    Last Period Pulse Oximetry BMI (Body Mass Index)             (LMP Unknown) 94% 30.91 kg/m2         [employer] Information     [employer] lets you send messages to your doctor, view your test results, renew your prescriptions, schedule appointments and more. To sign up, go to [email or web address] . Click on \"Log in\" on the left side of the screen, which will take you to the Welcome page. Then click on \"Sign up Now\" on the right side of the page.     You will be asked to enter the access code listed below, as well as some personal information. Please follow the directions to create your username and password.     Your access code is: [email or web address]  Expires: 2018  8:37 AM     Your access code will  in 90 days. If you need help or a new code, please call your [facility] clinic or [phone].        Care EveryWhere ID     This is your Care EveryWhere ID. This could be used by other organizations to access your [facility] medical records  [id number]        Equal Access to Services     GORGE MARIE : madelaine Hawkins, carlos ramírez. So [facility] [phone].    ATENCIÓN: Si habla español, tiene a ivy disposición servicios gratuitos de asistencia lingüística. Llame al [phone].    We comply with applicable federal civil rights laws and [place] laws. " We do not discriminate on the basis of race, color, national origin, age, disability, sex, sexual orientation, or gender identity.               Review of your medicines      CONTINUE these medicines which have NOT CHANGED        Dose / Directions    albuterol 108 (90 Base) MCG/ACT inhaler   Commonly known as:  PROAIR HFA/PROVENTIL HFA/VENTOLIN HFA   Used for:  SOB (shortness of breath)        Dose:  2 puff   Inhale 2 puffs into the lungs 4 times daily as needed for other (dyspnea)   Quantity:  1 Inhaler   Refills:  3       alendronate 70 MG tablet   Commonly known as:  FOSAMAX   Used for:  Osteoporosis        Dose:  70 mg   Take 1 tablet (70 mg) by mouth every 7 days Take 60 minutes before am meal with 8 oz. water. Remain upright for 30 minutes.   Quantity:  12 tablet   Refills:  3       apixaban ANTICOAGULANT 5 MG tablet   Commonly known as:  ELIQUIS   Used for:  Atrial fibrillation with rapid ventricular response (H)        Dose:  5 mg   Take 1 tablet (5 mg) by mouth 2 times daily   Quantity:  180 tablet   Refills:  3       cholecalciferol 1000 units capsule   Commonly known as:  vitamin  -D        Dose:  1 capsule   Take 1 capsule by mouth daily.   Refills:  0       FLAX SEED OIL PO        1 capsule daily   Refills:  0       flecainide 100 MG tablet   Commonly known as:  TAMBOCOR   Used for:  Sick sinus syndrome (H), Cardiac pacemaker in situ        Dose:  100 mg   Take 1 tablet (100 mg) by mouth 2 times daily   Quantity:  90 tablet   Refills:  3       fluticasone 50 MCG/ACT spray   Commonly known as:  FLONASE        Dose:  1-2 spray   Spray 1-2 sprays into both nostrils daily as needed for rhinitis or allergies   Refills:  0       furosemide 20 MG tablet   Commonly known as:  LASIX   Used for:  Systolic congestive heart failure, unspecified congestive heart failure chronicity        Dose:  20 mg   Take 1 tablet (20 mg) by mouth daily   Quantity:  30 tablet   Refills:  5       * HAIR SKIN NAILS PO        Dose:   1 tablet   Take 1 tablet by mouth At Bedtime   Refills:  0       * ICAPS AREDS 2 Caps        Take by mouth daily   Refills:  0       Krill Oil 500 MG Caps        Dose:  1 capsule   Take 1 capsule by mouth daily   Refills:  0       lisinopril 40 MG tablet   Commonly known as:  PRINIVIL/ZESTRIL   Used for:  Hypertension goal BP (blood pressure) < 140/90        Dose:  40 mg   Take 1 tablet (40 mg) by mouth daily   Quantity:  90 tablet   Refills:  3       metoprolol succinate 50 MG 24 hr tablet   Commonly known as:  TOPROL-XL   Used for:  Atrial fibrillation with rapid ventricular response (H), Systolic congestive heart failure, unspecified congestive heart failure chronicity        Dose:  50 mg   Take 1 tablet (50 mg) by mouth 2 times daily   Quantity:  180 tablet   Refills:  3       Milk Thistle 200 MG Caps        Dose:  1 capsule   Take 1 capsule by mouth daily   Refills:  0        MG Caps        Dose:  1 capsule   Take 1 capsule by mouth daily   Refills:  0       potassium & sodium phosphates 278-164-250 MG/75ML Solr        Dose:  1 tablet   Take 1 tablet by mouth daily   Refills:  0       simvastatin 20 MG tablet   Commonly known as:  ZOCOR   Used for:  Hyperlipidemia LDL goal <130        Dose:  20 mg   Take 1 tablet (20 mg) by mouth At Bedtime Profile Rx: patient will contact pharmacy when needed   Quantity:  90 tablet   Refills:  3       * Notice:  This list has 2 medication(s) that are the same as other medications prescribed for you. Read the directions carefully, and ask your doctor or other care provider to review them with you.             Protect others around you: Learn how to safely use, store and throw away your medicines at www.disposemymeds.org.             Medication List: This is a list of all your medications and when to take them. Check marks below indicate your daily home schedule. Keep this list as a reference.      Medications           Morning Afternoon Evening Bedtime As Needed     albuterol 108 (90 Base) MCG/ACT inhaler   Commonly known as:  PROAIR HFA/PROVENTIL HFA/VENTOLIN HFA   Inhale 2 puffs into the lungs 4 times daily as needed for other (dyspnea)                                   alendronate 70 MG tablet   Commonly known as:  FOSAMAX   Take 1 tablet (70 mg) by mouth every 7 days Take 60 minutes before am meal with 8 oz. water. Remain upright for 30 minutes.                                   apixaban ANTICOAGULANT 5 MG tablet   Commonly known as:  ELIQUIS   Take 1 tablet (5 mg) by mouth 2 times daily   Last time this was given:  5 mg on 10/14/2018  8:07 AM   Next Dose Due:  10/14/2018 PM                                      cholecalciferol 1000 units capsule   Commonly known as:  vitamin  -D   Take 1 capsule by mouth daily.   Next Dose Due:  10/15/2018 AM                                   FLAX SEED OIL PO   1 capsule daily   Next Dose Due:  10/15/2018 AM                                     flecainide 100 MG tablet   Commonly known as:  TAMBOCOR   Take 1 tablet (100 mg) by mouth 2 times daily   Last time this was given:  100 mg on 10/14/2018  8:07 AM   Next Dose Due:  10/14/2018 PM                                      fluticasone 50 MCG/ACT spray   Commonly known as:  FLONASE   Spray 1-2 sprays into both nostrils daily as needed for rhinitis or allergies                                   furosemide 20 MG tablet   Commonly known as:  LASIX   Take 1 tablet (20 mg) by mouth daily   Next Dose Due:  10/15/18 AM                                   * HAIR SKIN NAILS PO   Take 1 tablet by mouth At Bedtime   Next Dose Due:  10/14/2018 PM                                   * ICAPS AREDS 2 Caps   Take by mouth daily   Next Dose Due:  10/15/2018 AM                                   Krill Oil 500 MG Caps   Take 1 capsule by mouth daily   Next Dose Due:  10/15/18 AM                                     lisinopril 40 MG tablet   Commonly known as:  PRINIVIL/ZESTRIL   Take 1 tablet (40 mg) by mouth daily    Last time this was given:  40 mg on 10/14/2018  8:07 AM   Next Dose Due:  10/15/18 PM                                   metoprolol succinate 50 MG 24 hr tablet   Commonly known as:  TOPROL-XL   Take 1 tablet (50 mg) by mouth 2 times daily   Last time this was given:  50 mg on 10/14/2018  8:07 AM   Next Dose Due:  10/14/2018 PM                                      Milk Thistle 200 MG Caps   Take 1 capsule by mouth daily   Next Dose Due:  10/15/18 AM                                    MG Caps   Take 1 capsule by mouth daily   Next Dose Due:  10/15/18 AM                                   potassium & sodium phosphates 278-164-250 MG/75ML Solr   Take 1 tablet by mouth daily   Next Dose Due:  10/15/18 AM                                   simvastatin 20 MG tablet   Commonly known as:  ZOCOR   Take 1 tablet (20 mg) by mouth At Bedtime Profile Rx: patient will contact pharmacy when needed   Last time this was given:  20 mg on 10/13/2018  7:41 PM                                   * Notice:  This list has 2 medication(s) that are the same as other medications prescribed for you. Read the directions carefully, and ask your doctor or other care provider to review them with you.

## 2018-10-11 NOTE — PLAN OF CARE
Problem: Patient Care Overview  Goal: Plan of Care/Patient Progress Review  Outcome: No Change  Patient is alert and oriented times 4.  cms is intact. No co pain. Patient is up SBA/Indep.  pt voiding per BR. NSR. VSS /80, HR 77 on 3 L O2 at 93%. Denies SOB. Lungs fine crackles with diminished bases. Got Lasix in ED, voiding well. Continue to wean O2 as able. Echo in am.

## 2018-10-11 NOTE — ED PROVIDER NOTES
Emergency Department Attending Supervision Note  10/11/2018  3:56 PM      I evaluated this patient in conjunction with SKYLER Mustafa CNP      Briefly, the patient presented with  Shortness of breath for 2 days, new hypoxia.  Denies orthopnea, PND.  Denies chset pain, fevers, chills, n/v, abd pain.      On my exam, Pt is nontox appearance, RRR, intact bilat rad pulses, bibasilar rales.    EKG, labs and CXR as noted in CNP note.  I agree with interpretation.  Given pt's new oxygen requirement, pt admitted for further evaluation.  Possible CHF given pt's hx, CXR findings of vasc congestion.  PE less likely given pt is anticoagulated.    My impression is hypoxia, dyspnea        Diagnosis  No diagnosis found.      Chey Mustafa, Shahab Wolf MD  10/12/18 9412

## 2018-10-11 NOTE — PHARMACY-ADMISSION MEDICATION HISTORY
Admission medication history interview status for the 10/11/2018  admission is complete. See EPIC admission navigator for prior to admission medications     Medication history source reliability:Good    Actions taken by pharmacist (provider contacted, etc):None     Additional medication history information not noted on PTA med list :None    Medication reconciliation/reorder completed by provider prior to medication history? No    Time spent in this activity: 15min    Prior to Admission medications    Medication Sig Last Dose Taking? Auth Provider   albuterol (PROAIR HFA/PROVENTIL HFA/VENTOLIN HFA) 108 (90 BASE) MCG/ACT Inhaler Inhale 2 puffs into the lungs 4 times daily as needed for other (dyspnea) prn Yes Eugene Rocha MD   alendronate (FOSAMAX) 70 MG tablet Take 1 tablet (70 mg) by mouth every 7 days Take 60 minutes before am meal with 8 oz. water. Remain upright for 30 minutes. 10/10/2018 Yes Mikala Philip MD   apixaban ANTICOAGULANT (ELIQUIS) 5 MG tablet Take 1 tablet (5 mg) by mouth 2 times daily 10/10/2018 at pm Yes Kasie Alcocer APRN CNP   cholecalciferol (VITAMIN  -D) 1000 UNIT capsule Take 1 capsule by mouth daily. 10/10/2018 at am Yes Reported, Patient   FLAX SEED OIL OR 1 capsule daily 10/10/2018 at am Yes Reported, Patient   flecainide (TAMBOCOR) 100 MG tablet Take 1 tablet (100 mg) by mouth 2 times daily 10/11/2018 at am Yes Roe Voss MD   fluticasone (FLONASE) 50 MCG/ACT spray Spray 1-2 sprays into both nostrils daily as needed for rhinitis or allergies 10/10/2018 at Unknown time Yes Unknown, Entered By History   furosemide (LASIX) 20 MG tablet Take 1 tablet (20 mg) by mouth daily 10/11/2018 at am Yes Roe Voss MD   Krill Oil 500 MG CAPS Take 1 capsule by mouth daily 10/10/2018 at Unknown time Yes Reported, Patient   lisinopril (PRINIVIL/ZESTRIL) 40 MG tablet Take 1 tablet (40 mg) by mouth daily 10/11/2018 at am Yes Mikala Philip MD   Methylsulfonylmethane  (MSM) 500 MG CAPS Take 1 capsule by mouth daily 10/10/2018 at Unknown time Yes Unknown, Entered By History   metoprolol succinate (TOPROL-XL) 50 MG 24 hr tablet Take 1 tablet (50 mg) by mouth 2 times daily 10/11/2018 at am Yes Kasie Alcocer APRN CNP   Milk Thistle 200 MG CAPS Take 1 capsule by mouth daily  10/10/2018 at Unknown time Yes Reported, Patient   Multiple Vitamins-Minerals (HAIR SKIN NAILS PO) Take 1 tablet by mouth At Bedtime 10/10/2018 at hs Yes Unknown, Entered By History   Multiple Vitamins-Minerals (ICAPS AREDS 2) CAPS Take by mouth daily  10/10/2018 at Unknown time Yes Reported, Patient   potassium & sodium phosphates 278-164-250 MG/75ML SOLR Take 1 tablet by mouth daily  10/10/2018 at am Yes Reported, Patient   simvastatin (ZOCOR) 20 MG tablet Take 1 tablet (20 mg) by mouth At Bedtime Profile Rx: patient will contact pharmacy when needed 10/10/2018 at hs Yes Mikala Philip MD

## 2018-10-11 NOTE — ED NOTES
[facility]  ED Nurse Handoff Report    ED Chief complaint: Shortness of Breath (pt was at [facility] to have catartact surgery done and it was ntoed that pt was SOB and had RA sats of 89%. Pt hypertensive as well. Pt denies SOB at this time. NO CP. )      ED Diagnosis:   Final diagnoses:   Hypoxia       Code Status: Full Code    Allergies:   Allergies   Allergen Reactions     Strawberry Flavor        Activity level - Baseline/Home:  Independent    Activity Level - Current:   Stand with Assist     Needed?: No    Isolation: No  Infection: Not Applicable  Bariatric?: No    Vital Signs:   Vitals:    10/11/18 1444 10/11/18 1448 10/11/18 1500 10/11/18 1530   BP:   171/71 163/76   Pulse:       Resp: 12 14 19 28   Temp:       TempSrc:       SpO2: (!) 85% 95% 95%    Height:           Cardiac Rhythm: ,        Pain level: 0-10 Pain Scale: 0    Is this patient confused?: No   [facility] - Suicide Severity Rating Scale Completed?  Yes  If yes, what color did the patient score?  White    Patient Report: Initial Complaint: SOB- low o2 sats  Focused Assessment: Pt was at [facility] for cataract surgery and pt was noted to be SOB and oxygen levels in mid 80s. Pt states has had increased SOB over past two days and granddaughter noted pt to have an increased cough last night. Pt denies SOB, or CP in the ED. Pt RA oxygen saturations 85%patient placed on 3L NC. Pt received 40 mg furosemide IVP.  LT pupil dilated from eye [place].   Tests Performed: labs and CXR  Abnormal Results:   Treatments provided: as above    Family Comments: granddaughter at bedside    OBS brochure/video discussed/provided to patient/family: N/A              Name of person given brochure if not patient:               Relationship to patient:     ED Medications:   Medications   furosemide (LASIX) injection 40 mg (40 mg Intravenous Given 10/11/18 1622)       Drips infusing?:  No    For the majority of the shift  this patient was Green.   Interventions performed were .    Severe Sepsis OR Septic Shock Diagnosis Present: No    To be done/followed up on inpatient unit:      ED NURSE PHONE NUMBER: *[phone]

## 2018-10-11 NOTE — H&P
Admitted:     10/11/2018      PRIMARY CARE PROVIDER:  Mikala Philip MD      CHIEF COMPLAINT:  Shortness of breath.      HISTORY OF PRESENT ILLNESS:  Ms. Hamida Verma is a delightful 78-year-old  lady with a past medical history notable for atrial fibrillation, sick sinus syndrome, status post permanent pacemaker, hypertension, dyslipidemia, moderate pulmonary hypertension and heart failure with preserved ejection fraction, who has presented to the Emergency Department for evaluation of shortness of breath.  Earlier this morning she was at the [facility] for cataract surgery when the staff noted that her oxygen saturation was low at 88-89% and she was slightly hypertensive.  The surgery was canceled and the surgeon referred her to the Emergency Department for evaluation.  On direct questioning, she complains of increasing shortness of breath for the past 2 days along with a dry cough.  She reported no fever, chills, chest pain, sputum, nausea, vomiting or other complaints.  She states her weight has been somewhat stable with only 1-pound variation recently.  She does report some left leg swelling since the placement of a permanent pacemaker.  She is compliant with her medications as well as a low-salt diet.      In the Emergency Department, the patient has been evaluated by Chey Mustafa CNP, and the workup in the ER shows a mildly elevated BNP of 1117.  Troponin I is less than 0.015.  She has normal hemoglobin of 12.3 and white count of 8.9.  The electrocardiogram reveals a normal sinus rhythm with a rate of 64 beats per minute.  On the monitor, however, she is in a V-paced rhythm.  The chest x-ray by my review shows mild to moderate pulmonary vascular congestion and enlarged cardiac silhouette.  There is a probable small bilateral pleural effusion as read by the radiologist.  It also shows persistent bibasilar opacity in the posterior costophrenic angles, likely representing residual  atelectasis.  In the ER, the patient has been treated with intravenous furosemide 40 mg x 1.  She is being admitted to the hospital under inpatient status.  Notably, in the ER, the patient has remained hypoxic with oxygen saturation around 87-88% without oxygen.  She is currently on 3 liters and saturating 95-96%.      PAST MEDICAL HISTORY:   1.  Hypertension.   2.  Dyslipidemia.   3.  Paroxysmal atrial fibrillation diagnosed in 2018, on chronic anticoagulation therapy with apixaban.   4.  Sick sinus syndrome, status post permanent pacemaker in 2018.   5.  Chronic heart failure with preserved ejection fraction, EF 60-65% by echocardiogram in 2018.   6.  Moderate pulmonary hypertension.      PAST SURGICAL HISTORY:  Hysterectomy.      FAMILY HISTORY:  Mother had a history of CVA and myocardial infarction.  Father had a history of coronary artery disease and alcohol and drug use.      SOCIAL HISTORY:  The patient is .  She has a longstanding history of smoking 1-1/2 packs a day for approximately 45 years but quit in .  She denies drinking alcohol.  She ambulates without any assistive devices.      MEDICATIONS:    Prior to Admission Medications   Prescriptions Last Dose Informant Patient Reported? Taking?   FLAX SEED OIL OR 10/10/2018 at am Self Yes Yes   Si capsule daily   Krill Oil 500 MG CAPS 10/10/2018 at Unknown time  Yes Yes   Sig: Take 1 capsule by mouth daily   Methylsulfonylmethane (MSM) 500 MG CAPS 10/10/2018 at Unknown time Self Yes Yes   Sig: Take 1 capsule by mouth daily   Milk Thistle 200 MG CAPS 10/10/2018 at Unknown time Self Yes Yes   Sig: Take 1 capsule by mouth daily    Multiple Vitamins-Minerals (HAIR SKIN NAILS PO) 10/10/2018 at hs Self Yes Yes   Sig: Take 1 tablet by mouth At Bedtime   Multiple Vitamins-Minerals (ICAPS AREDS 2) CAPS 10/10/2018 at Unknown time  Yes Yes   Sig: Take by mouth daily    albuterol (PROAIR HFA/PROVENTIL HFA/VENTOLIN HFA) 108 (90 BASE) MCG/ACT  Inhaler prn  No Yes   Sig: Inhale 2 puffs into the lungs 4 times daily as needed for other (dyspnea)   alendronate (FOSAMAX) 70 MG tablet 10/10/2018  No Yes   Sig: Take 1 tablet (70 mg) by mouth every 7 days Take 60 minutes before am meal with 8 oz. water. Remain upright for 30 minutes.   apixaban ANTICOAGULANT (ELIQUIS) 5 MG tablet 10/10/2018 at pm  No Yes   Sig: Take 1 tablet (5 mg) by mouth 2 times daily   cholecalciferol (VITAMIN  -D) 1000 UNIT capsule 10/10/2018 at am Self Yes Yes   Sig: Take 1 capsule by mouth daily.   flecainide (TAMBOCOR) 100 MG tablet 10/11/2018 at am  No Yes   Sig: Take 1 tablet (100 mg) by mouth 2 times daily   fluticasone (FLONASE) 50 MCG/ACT spray 10/10/2018 at Unknown time Self Yes Yes   Sig: Spray 1-2 sprays into both nostrils daily as needed for rhinitis or allergies   furosemide (LASIX) 20 MG tablet 10/11/2018 at am  No Yes   Sig: Take 1 tablet (20 mg) by mouth daily   lisinopril (PRINIVIL/ZESTRIL) 40 MG tablet 10/11/2018 at am  No Yes   Sig: Take 1 tablet (40 mg) by mouth daily   metoprolol succinate (TOPROL-XL) 50 MG 24 hr tablet 10/11/2018 at am  No Yes   Sig: Take 1 tablet (50 mg) by mouth 2 times daily   potassium & sodium phosphates 278-164-250 MG/75ML SOLR 10/10/2018 at am Self Yes Yes   Sig: Take 1 tablet by mouth daily    simvastatin (ZOCOR) 20 MG tablet 10/10/2018 at hs  No Yes   Sig: Take 1 tablet (20 mg) by mouth At Bedtime Profile Rx: patient will contact pharmacy when needed      Facility-Administered Medications: None        ALLERGIES AND INTOLERANCES:  NONE.      REVIEW OF SYSTEMS:  A 10-point review of system was performed thoroughly and it was negative except as stated in history of present illness.      PHYSICAL EXAMINATION:   GENERAL:  This patient is a very pleasant elderly lady who is in no acute distress.   VITAL SIGNS:  Blood pressure 161/76, heart rate 61, respiratory rate 16, temperature 98.8 degrees Fahrenheit, oxygen saturation 96% on 3 liters.   HEENT:   The pupils are round, equal and reactive to light bilaterally.  There is no conjunctival pallor or scleral icterus.  The oral mucosa appears moist.   NECK:  Supple.  I could not appreciate elevated JVP.   LUNGS:  Clear to auscultation bilaterally with no wheezing or rhonchi.   CARDIOVASCULAR:  There is normal S1 and S2 with regular rate and rhythm.   ABDOMEN:  Soft, nontender, nondistended.  Bowel sounds present.   EXTREMITIES:  There is trace to 1+ left ankle edema.  There is no joint swelling or calf tenderness.   SKIN:  There is no jaundice, cyanosis or acute rash.   NEUROLOGIC:  She is awake, alert, oriented x 3.  There is no focal deficit on gross examination.      LABORATORY DATA:   1.  Chemistry profile:  Sodium 142, potassium 3.4, BUN 8, creatinine 0.65, calcium 8.4, BNP 1117, glucose 99.  Troponin I less than 0.015.   2.  Hematology profile:  White count 8.9, hemoglobin 12.3, platelet count 401,000, MCV 86 with differential of 74.3% neutrophils.      ASSESSMENT AND PLAN:  Ms. Hamida Verma is a delightful 78-year-old  lady with a past medical history notable for hypertension, dyslipidemia, atrial fibrillation, heart failure with preserved ejection fraction, moderate pulmonary hypertension, sick sinus syndrome, status post permanent pacemaker, who has presented with increasing shortness of breath for 2 days as well as mild hypoxia.   1.  Probable acute decompensation of heart failure with preserved ejection fraction:  The patient has known history of chronic diastolic heart failure with the last LV ejection fraction of 60-65% as well as moderate pulmonary hypertension by echocardiogram in 03/2018.  At home, she is on furosemide 20 mg p.o. daily, lisinopril 40 mg p.o. daily and metoprolol XL 50 mg p.o. b.i.d.  She has mildly elevated BNP of 1117 and pulmonary vascular congestion on the chest x-ray.  In the ER, the patient has received 40 mg IV furosemide.  Management as below.    A.  IV furosemide 20  mg b.i.d.     B.  Activate standard CHF protocol for strict input, output, daily weight.     C.  Close monitoring of renal function and electrolytes.   D.  Telemetry.   E.  Low suspicion for pulmonary embolism in view of anticoagulation therapy with apixaban.   F.  Pulmonary function test to rule out possibility of underlying COPD contributing to her hypoxia.   G.  Continue with pulse oximetry, supplemental oxygen and wean as able.   H.  Echocardiogram.   I.  P.r.n. Albuterol neb available.   2.  Paroxysmal atrial fibrillation/sick sinus syndrome, status post permanent pacemaker in 03/2018:  The electrocardiogram in the ER revealed normal sinus rhythm; however, on the monitor, she has a paced rhythm.  The rate is controlled.  She is chronically anticoagulated with apixaban which we will continue.  I will also continue her prior to admission Toprol XL 50 mg p.o. b.i.d and Tambocor 100 mg po bid .   3.  Hypertension:  The blood pressure is moderately elevated.  At home, she is on furosemide 20 mg p.o. daily, metoprolol XL 50 mg p.o. b.i.d. and lisinopril 40 mg p.o. daily.  I will continue her furosemide, lisinopril and metoprolol and diurese with IV furosemide as outlined above.  I will have IV labetalol available p.r.n. for systolic blood pressure more than 170.  If the patient continues to have persistently elevated blood pressure, her antihypertensive regimen will be modified.   4.  Dyslipidemia:  She will continue on her prior to admission simvastatin 20 mg p.o. at bedtime.   5.  Moderate pulmonary hypertension:  Based on echo finding in 03/2018. An echo will be repeated as outlined above.   6.  Deep venous thrombosis prophylaxis:  Apixaban.   7.  Code status:  Code status was discussed with the patient and the patient is full code.   8.  Disposition:  Anticipate at least 2 days of inpatient management.        I would like to thank Dr. Mikala Philip for allowing the Hospitalist Service to participate in the care of  this patient.         ASHKAN LIU MD             D: 10/11/2018   T: 10/11/2018   MT: LAURIE      Name:     ARTURO BOSWELL   MRN:      [id number]        Account:      [id number]   :      1940        Admitted:     10/11/2018                   Document: [id number]       cc: Mikala Philip MD

## 2018-10-12 ENCOUNTER — APPOINTMENT (OUTPATIENT)
Dept: PHYSICAL THERAPY | Facility: CLINIC | Age: 78
DRG: 291 | End: 2018-10-12
Attending: INTERNAL MEDICINE
Payer: COMMERCIAL

## 2018-10-12 ENCOUNTER — APPOINTMENT (OUTPATIENT)
Dept: CARDIOLOGY | Facility: CLINIC | Age: 78
DRG: 291 | End: 2018-10-12
Attending: INTERNAL MEDICINE
Payer: COMMERCIAL

## 2018-10-12 LAB
ANION GAP SERPL CALCULATED.3IONS-SCNC: 6 MMOL/L (ref 3–14)
BUN SERPL-MCNC: 9 MG/DL (ref 7–30)
CALCIUM SERPL-MCNC: 8.1 MG/DL (ref 8.5–10.1)
CHLORIDE SERPL-SCNC: 104 MMOL/L (ref 94–109)
CO2 SERPL-SCNC: 30 MMOL/L (ref 20–32)
CREAT SERPL-MCNC: 0.61 MG/DL (ref 0.52–1.04)
GFR SERPL CREATININE-BSD FRML MDRD: >90 ML/MIN/1.7M2
GLUCOSE SERPL-MCNC: 99 MG/DL (ref 70–99)
MAGNESIUM SERPL-MCNC: 2.3 MG/DL (ref 1.6–2.3)
POTASSIUM SERPL-SCNC: 3.2 MMOL/L (ref 3.4–5.3)
POTASSIUM SERPL-SCNC: 3.9 MMOL/L (ref 3.4–5.3)
SODIUM SERPL-SCNC: 140 MMOL/L (ref 133–144)

## 2018-10-12 PROCEDURE — 93306 TTE W/DOPPLER COMPLETE: CPT

## 2018-10-12 PROCEDURE — 40000193 ZZH STATISTIC PT WARD VISIT

## 2018-10-12 PROCEDURE — 84132 ASSAY OF SERUM POTASSIUM: CPT | Performed by: PHYSICIAN ASSISTANT

## 2018-10-12 PROCEDURE — 36415 COLL VENOUS BLD VENIPUNCTURE: CPT | Performed by: INTERNAL MEDICINE

## 2018-10-12 PROCEDURE — 25000128 H RX IP 250 OP 636: Performed by: INTERNAL MEDICINE

## 2018-10-12 PROCEDURE — 21400004 ZZH R&B CCU CSC INTERMEDIATE

## 2018-10-12 PROCEDURE — 93306 TTE W/DOPPLER COMPLETE: CPT | Mod: 26 | Performed by: INTERNAL MEDICINE

## 2018-10-12 PROCEDURE — 36415 COLL VENOUS BLD VENIPUNCTURE: CPT | Performed by: PHYSICIAN ASSISTANT

## 2018-10-12 PROCEDURE — 99233 SBSQ HOSP IP/OBS HIGH 50: CPT | Performed by: INTERNAL MEDICINE

## 2018-10-12 PROCEDURE — 83735 ASSAY OF MAGNESIUM: CPT | Performed by: INTERNAL MEDICINE

## 2018-10-12 PROCEDURE — 25000132 ZZH RX MED GY IP 250 OP 250 PS 637: Performed by: INTERNAL MEDICINE

## 2018-10-12 PROCEDURE — 97110 THERAPEUTIC EXERCISES: CPT | Mod: GP

## 2018-10-12 PROCEDURE — 80048 BASIC METABOLIC PNL TOTAL CA: CPT | Performed by: INTERNAL MEDICINE

## 2018-10-12 PROCEDURE — 97161 PT EVAL LOW COMPLEX 20 MIN: CPT | Mod: GP

## 2018-10-12 RX ADMIN — SIMVASTATIN 20 MG: 20 TABLET, FILM COATED ORAL at 21:05

## 2018-10-12 RX ADMIN — FLECAINIDE ACETATE 100 MG: 100 TABLET ORAL at 08:01

## 2018-10-12 RX ADMIN — METOPROLOL SUCCINATE 50 MG: 50 TABLET, EXTENDED RELEASE ORAL at 08:01

## 2018-10-12 RX ADMIN — APIXABAN 5 MG: 5 TABLET, FILM COATED ORAL at 21:05

## 2018-10-12 RX ADMIN — POTASSIUM CHLORIDE 20 MEQ: 1500 TABLET, EXTENDED RELEASE ORAL at 09:59

## 2018-10-12 RX ADMIN — LISINOPRIL 40 MG: 40 TABLET ORAL at 08:01

## 2018-10-12 RX ADMIN — APIXABAN 5 MG: 5 TABLET, FILM COATED ORAL at 08:01

## 2018-10-12 RX ADMIN — FLECAINIDE ACETATE 100 MG: 100 TABLET ORAL at 21:05

## 2018-10-12 RX ADMIN — POTASSIUM CHLORIDE 40 MEQ: 1500 TABLET, EXTENDED RELEASE ORAL at 08:01

## 2018-10-12 RX ADMIN — FUROSEMIDE 20 MG: 10 INJECTION, SOLUTION INTRAVENOUS at 15:22

## 2018-10-12 RX ADMIN — FUROSEMIDE 20 MG: 10 INJECTION, SOLUTION INTRAVENOUS at 08:00

## 2018-10-12 RX ADMIN — METOPROLOL SUCCINATE 50 MG: 50 TABLET, EXTENDED RELEASE ORAL at 21:05

## 2018-10-12 ASSESSMENT — ACTIVITIES OF DAILY LIVING (ADL)
ADLS_ACUITY_SCORE: 9

## 2018-10-12 NOTE — CONSULTS
[facility] evaluation referral received from Dr. Lan. Patient is not currently established in the [facility]. Cardiology not consulted this admission. Nutrition consult noted.    Hospital nurse, please give pt [facility]/HF education.     [facility] appointment made for:       We look forward to seeing Hamida in the clinic.   Please call with questions.     Charlotte Garrett RN  [facility] RN Care Coordinator  [facility]  [phone]    [facility]: Cardiomyopathy, Optimization, Rehabilitation, Education   The [facility] is a heart failure specialty clinic within the [facility] where you will work with your cardiologist, nurse practitioners, physician assistants and registered nurses who specialize in heart failure care. They are dedicated to helping patients with heart failure to carefully adjust medications, receive education, and learn who and when to call if symptoms develop. They specialize in helping you better understand your condition, slow the progression of your disease, improve the length and quality of your life, help you detect future heart problems before they become life threatening, and avoid hospitalizations.

## 2018-10-12 NOTE — PLAN OF CARE
Problem: Patient Care Overview  Goal: Plan of Care/Patient Progress Review  Discharge Planner PT   Patient plan for discharge: Home  Current status: Pt is 78 year old female adm on 10/11/18 with hypoxia, admitted for management of acute decompensation of heart failure. At baseline pt is is independent without assistive device, lives with daughter and grand-daughter in 2 story home with three steps to enter. CR and PT orders received, evaluation completed, treatment initiated. Pt is independent with bed mobility and transfers, able to ambulate with steady gait and SBA, becomes SOB with ambulation and O2 sats decreased to 79% on room air, able to recover to 93% on 3 L O2 with 2 mins seated rest. Pt provided with education packet on CHF.  Barriers to return to prior living situation: Decreased activity tolerance, stairs  Recommendations for discharge: Home  Rationale for recommendations: Anticipate pt will achieve mobility level necessary for discharge to to home. Recommend pt ambulate 3x/day with nursing staff to prevent further deconditioning and begin increasing endurance. CR/PT will follow 2x/week for education, activity tolerance, and stairs. May benefit from outpatient cardiac rehab program if she qualifies or WEL program if interested in supervised exercise program to maximize functional outcomes.       Entered by: Zoe Abbott 10/12/2018 2:41 PM

## 2018-10-12 NOTE — PLAN OF CARE
Problem: Patient Care Overview  Goal: Plan of Care/Patient Progress Review  Outcome: Improving  Heart Failure Care Pathway  GOALS TO BE MET BEFORE DISCHARGE:    1. Decrease congestion and/or edema with diuretic therapy to achieve near      optimal volume status.            Dyspnea improved:  No, please explain: Unable to wean pt down on O2, pt sats high 70s on RA w/ activity.             Edema improved:     Yes        Net I/O and Weights since admission:          09/12 1500 - 10/12 1459  In: 603 [P.O.:600; I.V.:3]  Out: 2275 [Urine:2275]  Net: -1672            Vitals:    10/12/18 0625   Weight: 81.2 kg (179 lb)       2.  O2 sats > 92% on RA or at prior home O2 therapy level.          Current oxygenation status:       SpO2: 90 %         O2 Device: Nasal cannula,  Oxygen Delivery: 3 LPM         Able to wean O2 this shift to keep sats > 92%:  No, please explain: See above       Does patient use Home O2? No    3.  Tolerates ambulation and mobility near baseline: No, please explain: Shortness of breath with activity        How many times did the patient ambulate with nursing staff this shift? 2    Please review the Heart Failure Care Pathway for additional HF goal outcomes.      Zoe Alford RN  10/12/2018

## 2018-10-12 NOTE — PROGRESS NOTES
[facility]    Hospitalist Progress Note    Assessment & Plan   Ms. Hamida Verma is a delightful 78-year-old  lady with a past medical history notable for hypertension, dyslipidemia, atrial fibrillation, heart failure with preserved ejection fraction, moderate pulmonary hypertension, sick sinus syndrome, status post permanent pacemaker, who has presented with increasing shortness of breath for 2 days as well as mild hypoxia and was admitted to the [facility] on 10/11/18 for probable acute diastolic CHF exacerbation.     Probable acute decompensation of heart failure with preserved ejection fraction  Remote Smoking hx, rule out COPD   The patient has known history of chronic diastolic heart failure with the last LV ejection fraction of 60-65% as well as moderate pulmonary hypertension by echocardiogram in 03/2018.  At home, she is on furosemide 20 mg p.o. daily, lisinopril 40 mg p.o. daily and metoprolol XL 50 mg p.o. b.i.d.  She has mildly elevated BNP of 1117 and pulmonary vascular congestion on the chest x-ray.  In the ER, the patient received 40 mg IV furosemide. Remains on 3L O2, on 2L dropped to 88%.   - ECHO 10/12/18: LV mild to mod dilation, TR 1-2+, normal systolic vxn, EF 55-60%, LV diastolic fxn indeterminate. No RWMA. No thrombus.   - IV furosemide 20 mg BID (PTA Lasix PO 20 mg daily)  - Activate standard CHF protocol for strict input, output, daily weight.     - Close monitoring of renal function and electrolytes, repleting K+ this AM for K+ 3.2 likely r/t IV Lasix  - Telemetry  - Low suspicion for pulmonary embolism in view of anticoagulation therapy with apixaban.   - Pulmonary function test to rule out possibility of underlying COPD contributing to her hypoxia.   - Continue with pulse oximetry, supplemental oxygen and wean as able  - P.r.n. Albuterol neb available  - Repeat BMP in AM    Intake/Output Summary (Last 24 hours) at 10/12/18 1327  Last data filed at 10/12/18 1200   Gross  per 24 hour   Intake              603 ml   Output             2275 ml   Net            -1672 ml     Vitals:    10/12/18 0625   Weight: 81.2 kg (179 lb)     Paroxysmal atrial fibrillation/sick sinus syndrome, status post permanent pacemaker in 03/2018:    The electrocardiogram in the ER revealed normal sinus rhythm; however, on the monitor, she has a paced rhythm.  The rate is controlled.  She is chronically anticoagulated with apixaban which we will continue.  - Continue PTA Eliquis    - Continue her prior to admission Toprol XL 50 mg p.o. b.i.d and Tambocor 100 mg po bid .     Hypertension:    The blood pressure is moderately elevated.  At home, she is on furosemide 20 mg p.o. daily, metoprolol XL 50 mg p.o. b.i.d. and lisinopril 40 mg p.o. daily. BPs improved today SBP 120s-140s. Resumed on meds below.  - Continue PTA lisinopril and metoprolol   - IV labetalol available p.r.n. for systolic blood pressure more than 170.   - If the patient continues to have persistently elevated blood pressure, her antihypertensive regimen will be modified.     Dyslipidemia:    She will continue on her prior to admission simvastatin 20 mg p.o. at bedtime.     Moderate pulmonary hypertension:    Based on echo finding in 03/2018. Repeat ECHO 10/12/18 does not suggest pulm HTN.     DVT Prophylaxis: eliquis  Code Status: Full Code    Disposition: Expected discharge in 1-2 days once hypoxia improves and adequately diuresed.    This patient was discussed with Dr. Matos of the Hospitalist Service who agrees with current plans as outlined above.    Diane York PA-C  Hospitalist Physician Assistant  Text Page (7am to 6pm)  Interval History   Feeling well today, asymptomatic other than hypoxia with need for 3L NC. Denies CP, palpitations, N/V, lightheadedness, dizziness. States leg swelling looks better than on admission. Denies eating salty foods PTA and reports med compliance. No c/o, understands plan.     -Data reviewed today: I  reviewed all new labs and imaging results over the last 24 hours. I personally reviewed no images or EKG's today.    Physical Exam   Temp: 98.7  F (37.1  C) Temp src: Oral BP: 127/69   Heart Rate: 61 Resp: 20 SpO2: 90 % O2 Device: Nasal cannula Oxygen Delivery: 3 LPM  Vitals:    10/12/18 0625   Weight: 81.2 kg (179 lb)     Vital Signs with Ranges  Temp:  [97.9  F (36.6  C)-98.7  F (37.1  C)] 98.7  F (37.1  C)  Heart Rate:  [60-73] 61  Resp:  [12-32] 20  BP: (127-171)/(69-83) 127/69  SpO2:  [85 %-95 %] 90 %  I/O last 3 completed shifts:  In: 120 [P.O.:120]  Out: 1675 [Urine:1675]    Constitutional: Awake, alert, cooperative, no apparent distress  HEENT: Extraocular movements intact. Pupils equal round reactive to light. Oropharynx clear without exudates.  Respiratory: Clear to auscultation bilateral upper fields with course rales in the bases, no wheezing  or rhonchi. 3L NC in place without increased work of breathing or audible wheezing.  Cardiovascular: Regular rate and rhythm, normal S1 and S2, and no murmur noted  GI: Normal bowel sounds, soft, non-distended, non-tender  Skin/Integumen: No rashes, no cyanosis, trace to 1+ BLE edema    Medications     Continuing ACE inhibitor/ARB/ARNI from home medication list OR ACE inhibitor/ARB order already placed during this visit       - MEDICATION INSTRUCTIONS -       - MEDICATION INSTRUCTIONS -         apixaban ANTICOAGULANT  5 mg Oral BID     flecainide  100 mg Oral BID     furosemide  20 mg Intravenous BID     lisinopril  40 mg Oral Daily     metoprolol succinate  50 mg Oral BID     simvastatin  20 mg Oral At Bedtime     sodium chloride (PF)  3 mL Intracatheter Q8H       Data     Recent Labs  Lab 10/12/18  0621 10/11/18  1324   WBC  --  8.9   HGB  --  12.3   MCV  --  86   PLT  --  401    142   POTASSIUM 3.2* 3.4   CHLORIDE 104 106   CO2 30 29   BUN 9 8   CR 0.61 0.65   ANIONGAP 6 7   GURWINDER 8.1* 8.4*   GLC 99 99   TROPI  --  <0.015       Imaging:   Recent Results  (from the past 24 hour(s))   XR Chest 2 Views    Narrative    CHEST TWO VIEWS  10/11/2018 2:05 PM     HISTORY: Chest pain.     COMPARISON: 3/21/2018      Impression    IMPRESSION: Lung volumes are unchanged. Left basilar atelectasis has  improved. There is persistent bibasilar opacity at the posterior  costophrenic angles likely representing residual atelectasis. Probable  small bilateral pleural effusions, new compared to 3/21/2018. Mild to  moderate pulmonary vascular congestion is present. Cardiac silhouette  is enlarged, unchanged. Dual lead cardiac pacing device is unchanged.    HARLEY CONNOR MD

## 2018-10-12 NOTE — PLAN OF CARE
Problem: Patient Care Overview  Goal: Plan of Care/Patient Progress Review  Outcome: No Change  Pt A&Ox4, VSS, Tele-NSR. Up with stand-by assist. Pt denies CP, get SALAZAR, stable on 3L via NC. Pt has 1-2+ edema. IV saline locked. Plan for pt to have a new ECHO today as well as a pulmonary lung function test and to continue to diurese.

## 2018-10-12 NOTE — CONSULTS
REASON FOR ASSESSMENT:  CHF Consult for 2 gm NA Diet Education    NUTRITION HISTORY:    Information obtained from: Patient  - Pt reports she has been following a low sodium diet since March, and it is difficult sometimes as she loves salty foods  -pt reports she is lactose intolerant so she doesn't drink milk or eat yogurt, but does sometimes have a small amount of cheese.      Living situation:   Lives with adult daughter and grandchildren    Grocery shopping:  Pt shops along with daughter and granddaughter    Meal preparation:  Pt prepares her own breakfast and lunch, takes turns cooking supper between herself, daughter, and granddaughter    Breakfast:  Typically toast with jelly, a little cheese, 2 cups of coffee    Lunch:  Often a sandwich on whole wheat bread with meat, lettuce, and sometimes pickles    Dinner:   Usually a pasta dish with chicken and veggies, or pork chops and potatoes.      Previous diet instructions:  Low sodium      CURRENT DIET:  2 gram Sodium     NUTRITION DIAGNOSIS:  No nutrition diagnosis identified at this time.       INTERVENTIONS:    Nutrition Prescription:  Continue 2 gram sodium diet restriction    Implementation:    Assessed learning needs, learning preferences, and willingness to learn    Nutrition Education (Content):  a) Provided handouts:  1) Seasoning Your Food Without Salt  2) Low Na Recipe Booklet  b) Discussed rational for limiting Na for CHF and stressed importance of following 2 gm Na guidelines   c) Encouraged patient to keep a daily food record    Nutrition Education (Application):  a) Discussed current eating habits and recommended alternative food choices    Anticipated good compliance    Diet Education - refer to Education Flowsheet    Goals:    Patient verbalizes understanding of diet by asking appropriate questions and identifying high sodium foods to avoid.      All of the above goals met during the education session    Follow Up:    Provided RD contact  information for future questions.    Recommend Out-Patient Nutrition Referral, if further diet instructions are needed.        Soila Huffman  Dietetic Intern

## 2018-10-12 NOTE — PROGRESS NOTES
10/12/18 1427   Quick Adds   Type of Visit Initial PT Evaluation   Living Environment   Lives With child(isidoro), adult;grandchild(isidoro)   Living Arrangements house   Home Accessibility bed not on first floor;stairs to enter home;stairs within home   Number of Stairs to Enter Home 3   Number of Stairs Within Home 12   Stair Railings at Home inside, present on right side;outside, present at both sides   Transportation Available car   Living Environment Comment Pt lives with daughter and grand-daughter in two story home, bedroom on second floor   Self-Care   Dominant Hand right   Usual Activity Tolerance good   Current Activity Tolerance fair   Regular Exercise yes   Activity/Exercise Type walking   Exercise Amount/Frequency 20 mins;3-5 times/wk   Equipment Currently Used at Home none   Functional Level Prior   Ambulation 0-->independent   Toileting 0-->independent   Fall history within last six months no   General Information   Onset of Illness/Injury or Date of Surgery - Date 10/11/18   Referring Physician Micki Lan MD   Patient/Family Goals Statement To go home   Pertinent History of Current Problem (include personal factors and/or comorbidities that impact the POC) Pt is 78 year old female adm on 10/11/18 due to hypoxia at eye [place] where pt presented for cataract surgery. Pt admitted to hospital for management of acute decompensation of heart failure. Pt was recently hospitalized in March 2018 with a-fib with RVR and has a PMH of a-fib, incr chol, CHF, and HTN.   Precautions/Limitations no known precautions/limitations   Heart Disease Risk Factors High blood pressure;Dislipidemia;Overweight;Medical history;Age   General Info Comments Activity: up with assist   Cognitive Status Examination   Orientation orientation to person, place and time   Level of Consciousness alert   Follows Commands and Answers Questions 100% of the time   Pain Assessment   Patient Currently in Pain No   Integumentary/Edema  "  Integumentary/Edema Comments B LE noted to have increased swelling   Posture    Posture Not impaired   Range of Motion (ROM)   ROM Comment B LE ROM WFL   Strength   Strength Comments B LE strength grossly 3/5   Bed Mobility   Bed Mobility Comments Independent   Transfer Skills   Transfer Comments Independent   Gait   Gait Comments SBA   Balance   Balance Comments Good   Sensory Examination   Sensory Perception Comments Denies numbness/tingling   General Therapy Interventions   Planned Therapy Interventions gait training;risk factor education;home program guidelines;progressive activity/exercise   Clinical Impression   Criteria for Skilled Therapeutic Intervention yes, treatment indicated   PT Diagnosis Impaired aerobic exercise capacity   Influenced by the following impairments Decreased activity tolerance   Functional limitations due to impairments Decreased ability to participate in daily tasks   Clinical Presentation Stable/Uncomplicated   Clinical Presentation Rationale Current status, [facility]   Clinical Decision Making (Complexity) Low complexity   Therapy Frequency` 2 times/week   Predicted Duration of Therapy Intervention (days/wks) 5 days   Anticipated Discharge Disposition Home   Risk & Benefits of therapy have been explained Yes   Patient, Family & other staff in agreement with plan of care Yes   Clinical Impression Comments Pt is mobilizing well but demonstrating decreased activity tolerance. Recommend pt ambulate 3x/day with nursing staff if needing O2, on own or with family if able to be weaned off O2 to prevent deconditioning during hospital stay and begin improving aerobic activity tolerance. Will follow up for skilled PT/CR 2x/week for stair training and education.   [facility] [employer] \"6 Clicks\"   2016, Trustees of [facility], under license to [employer].  All rights reserved.   6 Clicks Short Forms Basic Mobility Inpatient Short Form   [facility] AM-PAC  \"6 Clicks\" V.2 Basic " Mobility Inpatient Short Form   1. Turning from your back to your side while in a flat bed without using bedrails? 4 - None   2. Moving from lying on your back to sitting on the side of a flat bed without using bedrails? 4 - None   3. Moving to and from a bed to a chair (including a wheelchair)? 4 - None   4. Standing up from a chair using your arms (e.g., wheelchair, or bedside chair)? 4 - None   5. To walk in hospital room? 4 - None   6. Climbing 3-5 steps with a railing? 4 - None   Basic Mobility Raw Score (Score out of 24.Lower scores equate to lower levels of function) 24   Total Evaluation Time   Total Evaluation Time (Minutes) 10

## 2018-10-13 LAB
ANION GAP SERPL CALCULATED.3IONS-SCNC: 5 MMOL/L (ref 3–14)
BUN SERPL-MCNC: 13 MG/DL (ref 7–30)
CALCIUM SERPL-MCNC: 8.3 MG/DL (ref 8.5–10.1)
CHLORIDE SERPL-SCNC: 103 MMOL/L (ref 94–109)
CO2 SERPL-SCNC: 30 MMOL/L (ref 20–32)
CREAT SERPL-MCNC: 0.67 MG/DL (ref 0.52–1.04)
GFR SERPL CREATININE-BSD FRML MDRD: 85 ML/MIN/1.7M2
GLUCOSE SERPL-MCNC: 97 MG/DL (ref 70–99)
POTASSIUM SERPL-SCNC: 4.1 MMOL/L (ref 3.4–5.3)
SODIUM SERPL-SCNC: 138 MMOL/L (ref 133–144)

## 2018-10-13 PROCEDURE — 99233 SBSQ HOSP IP/OBS HIGH 50: CPT | Performed by: INTERNAL MEDICINE

## 2018-10-13 PROCEDURE — 21400004 ZZH R&B CCU CSC INTERMEDIATE

## 2018-10-13 PROCEDURE — 36415 COLL VENOUS BLD VENIPUNCTURE: CPT | Performed by: PHYSICIAN ASSISTANT

## 2018-10-13 PROCEDURE — 80048 BASIC METABOLIC PNL TOTAL CA: CPT | Performed by: PHYSICIAN ASSISTANT

## 2018-10-13 PROCEDURE — 25000128 H RX IP 250 OP 636: Performed by: INTERNAL MEDICINE

## 2018-10-13 PROCEDURE — 25000132 ZZH RX MED GY IP 250 OP 250 PS 637: Performed by: INTERNAL MEDICINE

## 2018-10-13 RX ADMIN — APIXABAN 5 MG: 5 TABLET, FILM COATED ORAL at 08:21

## 2018-10-13 RX ADMIN — FUROSEMIDE 20 MG: 10 INJECTION, SOLUTION INTRAVENOUS at 16:15

## 2018-10-13 RX ADMIN — SIMVASTATIN 20 MG: 20 TABLET, FILM COATED ORAL at 19:41

## 2018-10-13 RX ADMIN — FUROSEMIDE 20 MG: 10 INJECTION, SOLUTION INTRAVENOUS at 08:22

## 2018-10-13 RX ADMIN — APIXABAN 5 MG: 5 TABLET, FILM COATED ORAL at 19:41

## 2018-10-13 RX ADMIN — FLECAINIDE ACETATE 100 MG: 100 TABLET ORAL at 19:41

## 2018-10-13 RX ADMIN — METOPROLOL SUCCINATE 50 MG: 50 TABLET, EXTENDED RELEASE ORAL at 08:22

## 2018-10-13 RX ADMIN — METOPROLOL SUCCINATE 50 MG: 50 TABLET, EXTENDED RELEASE ORAL at 19:41

## 2018-10-13 RX ADMIN — LISINOPRIL 40 MG: 40 TABLET ORAL at 08:22

## 2018-10-13 RX ADMIN — FLECAINIDE ACETATE 100 MG: 100 TABLET ORAL at 08:22

## 2018-10-13 ASSESSMENT — ACTIVITIES OF DAILY LIVING (ADL)
ADLS_ACUITY_SCORE: 9

## 2018-10-13 NOTE — PLAN OF CARE
Problem: Patient Care Overview  Goal: Plan of Care/Patient Progress Review  Outcome: Improving  Heart Failure Care Pathway  GOALS TO BE MET BEFORE DISCHARGE:    1. Decrease congestion and/or edema with diuretic therapy to achieve near      optimal volume status.            Dyspnea improved:  Yes            Edema improved:     Yes        Net I/O and Weights since admission:          09/13 2300 - 10/13 2259  In: 1336 [P.O.:1320; I.V.:16]  Out: 5525 [Urine:5525]  Net: -4189            Vitals:    10/12/18 0625 10/13/18 0253   Weight: 81.2 kg (179 lb) 81.1 kg (178 lb 14.4 oz)       2.  O2 sats > 92% on RA or at prior home O2 therapy level.          Current oxygenation status:       SpO2: 96 %         O2 Device: Nasal cannula,  Oxygen Delivery: 1 LPM         Able to wean O2 this shift to keep sats > 92%:  Yes, down to 1L per NC       Does patient use Home O2? No    3.  Tolerates ambulation and mobility near baseline: No, please explain: Pt still having SOB w/ activity, ambulating to bathroom comfortably on 1L per NC        How many times did the patient ambulate with nursing staff this shift? 4 up to bathroom    VSS. Unable to wean patient down to RA, would dip to the high 80s on RA at rest, staying in the low 90s on 1L per NC.  Pt denies any pain. SOB and edema improved per pt. Pt with good urine output.     Please review the Heart Failure Care Pathway for additional HF goal outcomes.    Zoe Alford RN  10/13/2018

## 2018-10-13 NOTE — PLAN OF CARE
Problem: Patient Care Overview  Goal: Plan of Care/Patient Progress Review  Outcome: No Change  Heart Failure Care Pathway  GOALS TO BE MET BEFORE DISCHARGE:    1. Decrease congestion and/or edema with diuretic therapy to achieve near      optimal volume status.            Dyspnea improved: yes, O2 weaned to 2L            Edema improved:     Yes        Net I/O and Weights since admission:          09/13 0700 - 10/13 0659  In: 846 [P.O.:840; I.V.:6]  Out: 4275 [Urine:4275]  Net: -3429            Vitals:    10/12/18 0625 10/13/18 0253   Weight: 81.2 kg (179 lb) 81.1 kg (178 lb 14.4 oz)       2.  O2 sats > 92% on RA or at prior home O2 therapy level.          Current oxygenation status:       SpO2: 97 %         O2 Device: Nasal cannula,  Oxygen Delivery: 3 LPM         Able to wean O2 this shift to keep sats > 92%: Yes, 02 2L       Does patient use Home O2? No    3.  Tolerates ambulation and mobility near baseline: Yes        How many times did the patient ambulate with nursing staff this shift? 2, ambulate to bathroom     Please review the Heart Failure Care Pathway for additional HF goal outcomes.    Alison Negrete RN  10/13/2018     Pt. A&O x4. VSS. Denies Pain. Up w/ SBA. Tele is 100% A paced, HR 60's.  Plan is continue to monitor.

## 2018-10-13 NOTE — PROGRESS NOTES
[facility]    Hospitalist Progress Note :     Cumulative Summary: Patient is a very pleasant 78-year-old  female with past medical history significant for essential hypertension, dyslipidemia, atrial fibrillation on anticoagulation, history of heart failure with preserved ejection fraction, moderate pulmonary hypertension, sick sinus syndrome status post permanent pacemaker placement who was admitted on October 11 with shortness of breath which was thought to be secondary to acute decompensation of diastolic heart failure.    Assessment & Plan     Active Problems:  Probable acute decompensation of heart failure with preserved ejection fraction  Remote Smoking hx, rule out COPD   The patient has known history of chronic diastolic heart failure with the last LV ejection fraction of 60-65% as well as moderate pulmonary hypertension by echocardiogram in 03/2018.  At home, she is on furosemide 20 mg p.o. daily, lisinopril 40 mg p.o. daily and metoprolol XL 50 mg p.o. b.i.d.  She has mildly elevated BNP of 1117 and pulmonary vascular congestion on the chest x-ray.  In the ER, the patient received 40 mg IV furosemide. Remains on 3L O2, on 2L dropped to 88%.   - ECHO 10/12/18: LV mild to mod dilation, TR 1-2+, normal systolic vxn, EF 55-60%, LV diastolic fxn indeterminate. No RWMA. No thrombus.     - IV furosemide 20 mg BID (PTA Lasix PO 20 mg daily)  - Activate standard CHF protocol for strict input, output, daily weight.     - Close monitoring of renal function and electrolytes, repleting K+ this AM for K+ 3.2 likely r/t IV Lasix  - Telemetry  - Low suspicion for pulmonary embolism in view of anticoagulation therapy with apixaban.   - Pulmonary function test to rule out possibility of underlying COPD contributing to her hypoxia.   - Continue with pulse oximetry, supplemental oxygen and wean as able  - P.r.n. Albuterol neb available  - Repeat BMP in AM    Paroxysmal atrial fibrillation/sick sinus  syndrome, status post permanent pacemaker in 03/2018:    The electrocardiogram in the ER revealed normal sinus rhythm; however, on the monitor, she has a paced rhythm.  The rate is controlled.  She is chronically anticoagulated with apixaban which we will continue.  - Continue PTA Eliquis    - Continue her prior to admission Toprol XL 50 mg p.o. b.i.d and Tambocor 100 mg po bid .   - out patient follow up with EP     Hypertension:    The blood pressure is moderately elevated.  At home, she is on furosemide 20 mg p.o. daily, metoprolol XL 50 mg p.o. b.i.d. and lisinopril 40 mg p.o. daily. BPs improved today SBP 120s-140s. Resumed on meds below.  - Continue PTA lisinopril and metoprolol   - IV labetalol available p.r.n. for systolic blood pressure more than 170.   - If the patient continues to have persistently elevated blood pressure, her antihypertensive regimen will be modified.      Dyslipidemia:    She will continue on her prior to admission simvastatin 20 mg p.o. at bedtime.      Moderate pulmonary hypertension:    Based on echo finding in 03/2018. Repeat ECHO 10/12/18 does not suggest pulm HTN.     DVT Prophylaxis: on Eliquis  Code Status: Full Code    Disposition: Expected discharge in tomorrow if continues to do well clinically and is able to wean off from oxygen     Mary Matos MD, FACP  Text Page (7am - 6pm)    Interval History   Patient was seen and examined, feeling better , denying chest pain or palpitations , did not get good night sleep , we discussed about giving her earlier med's at the night time.  She also talked about sudden death of her 57 year old son last year , emotional support was provided.    -Data reviewed today: I reviewed all new labs and imaging results over the last 24 hours.    I personally reviewed no images or EKG's today.    Physical Exam   Temp: 98.1  F (36.7  C) Temp src: Oral BP: 123/51 Pulse: 60 Heart Rate: 61 Resp: 18 SpO2: 98 % O2 Device: Nasal cannula Oxygen Delivery: 2  LPM  Vitals:    10/12/18 0625 10/13/18 0253   Weight: 81.2 kg (179 lb) 81.1 kg (178 lb 14.4 oz)     Vital Signs with Ranges  Temp:  [97.8  F (36.6  C)-98.7  F (37.1  C)] 98.1  F (36.7  C)  Pulse:  [60] 60  Heart Rate:  [60-64] 61  Resp:  [18-20] 18  BP: (123-160)/(51-78) 123/51  SpO2:  [90 %-98 %] 98 %  I/O last 3 completed shifts:  In: 726 [P.O.:720; I.V.:6]  Out: 2600 [Urine:2600]    GENERAL: Alert , awake and oriented. NAD. Conversational, appropriate. Wearing nasal cannula   HEENT: Normocephalic. EOMI. No icterus or injection. Nares normal.   LUNGS: Clear to auscultation. No dyspnea at rest.   HEART: Regular rate. Extremities perfused.   ABDOMEN: Soft, nontender, and nondistended. Positive bowel sounds.   EXTREMITIES: No LE edema noted.   NEUROLOGIC: Moves extremities x4 on command. No acute focal neurologic abnormalities noted.     Medications     Continuing ACE inhibitor/ARB/ARNI from home medication list OR ACE inhibitor/ARB order already placed during this visit       - MEDICATION INSTRUCTIONS -       - MEDICATION INSTRUCTIONS -         apixaban ANTICOAGULANT  5 mg Oral BID     flecainide  100 mg Oral BID     furosemide  20 mg Intravenous BID     lisinopril  40 mg Oral Daily     metoprolol succinate  50 mg Oral BID     simvastatin  20 mg Oral At Bedtime     sodium chloride (PF)  3 mL Intracatheter Q8H       Data     Recent Labs  Lab 10/13/18  0600 10/12/18  1403 10/12/18  0621 10/11/18  1324   WBC  --   --   --  8.9   HGB  --   --   --  12.3   MCV  --   --   --  86   PLT  --   --   --  401     --  140 142   POTASSIUM 4.1 3.9 3.2* 3.4   CHLORIDE 103  --  104 106   CO2 30  --  30 29   BUN 13  --  9 8   CR 0.67  --  0.61 0.65   ANIONGAP 5  --  6 7   GURWINDER 8.3*  --  8.1* 8.4*   GLC 97  --  99 99   TROPI  --   --   --  <0.015       Imaging:   No results found for this or any previous visit (from the past 24 hour(s)).

## 2018-10-14 VITALS
HEIGHT: 64 IN | RESPIRATION RATE: 16 BRPM | OXYGEN SATURATION: 94 % | SYSTOLIC BLOOD PRESSURE: 142 MMHG | WEIGHT: 180.1 LBS | DIASTOLIC BLOOD PRESSURE: 74 MMHG | BODY MASS INDEX: 30.75 KG/M2 | TEMPERATURE: 97.6 F | HEART RATE: 60 BPM

## 2018-10-14 LAB
ANION GAP SERPL CALCULATED.3IONS-SCNC: 6 MMOL/L (ref 3–14)
BUN SERPL-MCNC: 15 MG/DL (ref 7–30)
CALCIUM SERPL-MCNC: 8.2 MG/DL (ref 8.5–10.1)
CHLORIDE SERPL-SCNC: 103 MMOL/L (ref 94–109)
CO2 SERPL-SCNC: 30 MMOL/L (ref 20–32)
CREAT SERPL-MCNC: 0.7 MG/DL (ref 0.52–1.04)
GFR SERPL CREATININE-BSD FRML MDRD: 81 ML/MIN/1.7M2
GLUCOSE SERPL-MCNC: 101 MG/DL (ref 70–99)
POTASSIUM SERPL-SCNC: 3.8 MMOL/L (ref 3.4–5.3)
SODIUM SERPL-SCNC: 139 MMOL/L (ref 133–144)

## 2018-10-14 PROCEDURE — 25000132 ZZH RX MED GY IP 250 OP 250 PS 637: Performed by: INTERNAL MEDICINE

## 2018-10-14 PROCEDURE — 99238 HOSP IP/OBS DSCHRG MGMT 30/<: CPT | Performed by: INTERNAL MEDICINE

## 2018-10-14 PROCEDURE — 36415 COLL VENOUS BLD VENIPUNCTURE: CPT | Performed by: INTERNAL MEDICINE

## 2018-10-14 PROCEDURE — 80048 BASIC METABOLIC PNL TOTAL CA: CPT | Performed by: INTERNAL MEDICINE

## 2018-10-14 PROCEDURE — 25000128 H RX IP 250 OP 636: Performed by: INTERNAL MEDICINE

## 2018-10-14 RX ADMIN — METOPROLOL SUCCINATE 50 MG: 50 TABLET, EXTENDED RELEASE ORAL at 08:07

## 2018-10-14 RX ADMIN — APIXABAN 5 MG: 5 TABLET, FILM COATED ORAL at 08:07

## 2018-10-14 RX ADMIN — FLECAINIDE ACETATE 100 MG: 100 TABLET ORAL at 08:07

## 2018-10-14 RX ADMIN — LISINOPRIL 40 MG: 40 TABLET ORAL at 08:07

## 2018-10-14 RX ADMIN — FUROSEMIDE 20 MG: 10 INJECTION, SOLUTION INTRAVENOUS at 08:07

## 2018-10-14 ASSESSMENT — ACTIVITIES OF DAILY LIVING (ADL)
ADLS_ACUITY_SCORE: 9

## 2018-10-14 NOTE — PROGRESS NOTES
Pt denied pain. vitals stable. IV removed w/o s/s of infection. Reviewed/gave d/c instructions including medications and follow ups. Pt given HF learning packet, and reviewed information with, questions answered. Leaving unit with all belongings. No new prescriptions ordered. Transport provided by Family, leaving unit via wheel chair.

## 2018-10-14 NOTE — DISCHARGE SUMMARY
[facility]    Discharge Summary  Hospitalist    Date of Admission:  10/11/2018  Date of Discharge:  10/14/2018  Discharging Provider: Mary Matos MD,FACP    Discharge Diagnoses :  Active Problems:  Acute on chronic heart failure exacerbation with preserved ejection fraction  Remote history of smoking.  Probable underlying COPD.  Paroxysmal atrial fibrillation with sick sinus syndrome, status post permanent pacemaker placement in March 2018.  Essential hypertension.  Dyslipidemia.  Moderate pulmonary hypertension.    History of Present Illness   As Per Admitting MD: Ms. Hamida Verma is a delightful 78-year-old  lady with a past medical history notable for atrial fibrillation, sick sinus syndrome, status post permanent pacemaker, hypertension, dyslipidemia, moderate pulmonary hypertension and heart failure with preserved ejection fraction, who has presented to the Emergency Department for evaluation of shortness of breath.  Earlier this morning she was at the [facility] for cataract surgery when the staff noted that her oxygen saturation was low at 88-89% and she was slightly hypertensive.  The surgery was canceled and the surgeon referred her to the Emergency Department for evaluation.  On direct questioning, she complains of increasing shortness of breath for the past 2 days along with a dry cough.  She reported no fever, chills, chest pain, sputum, nausea, vomiting or other complaints.  She states her weight has been somewhat stable with only 1-pound variation recently.  She does report some left leg swelling since the placement of a permanent pacemaker.  She is compliant with her medications as well as a low-salt diet.       In the Emergency Department, the patient has been evaluated by Chey Mustafa CNP, and the workup in the ER shows a mildly elevated BNP of 1117.  Troponin I is less than 0.015.  She has normal hemoglobin of 12.3 and white count of 8.9.  The  electrocardiogram reveals a normal sinus rhythm with a rate of 64 beats per minute.  On the monitor, however, she is in a V-paced rhythm.  The chest x-ray by my review shows mild to moderate pulmonary vascular congestion and enlarged cardiac silhouette.  There is a probable small bilateral pleural effusion as read by the radiologist.  It also shows persistent bibasilar opacity in the posterior costophrenic angles, likely representing residual atelectasis.  In the ER, the patient has been treated with intravenous furosemide 40 mg x 1.  She is being admitted to the hospital under inpatient status.  Notably, in the ER, the patient has remained hypoxic with oxygen saturation around 87-88% without oxygen.  She is currently on 3 liters and saturating 95-96%.         Hospital Course   Hamida Verma was admitted on 10/11/2018.  The following problems were addressed during her hospitalization:    Cumulative Summary: Patient is a very pleasant 78-year-old  female with past medical history significant for essential hypertension, dyslipidemia, atrial fibrillation on anticoagulation, history of heart failure with preserved ejection fraction, moderate pulmonary hypertension, sick sinus syndrome status post permanent pacemaker placement who was admitted on October 11 with shortness of breath which was thought to be secondary to acute decompensation of diastolic heart failure.   Assessment & Plan:   Active Problems:  Acute on chronic decompensation of heart failure with preserved ejection fraction:  Remote Smoking hx, rule out COPD   Hypertension:    Patient has known history of chronic diastolic heart failure with the last LV ejection fraction of 60-65% as well as moderate pulmonary hypertension by echocardiogram in 03/2018.   At home, she is on furosemide 20 mg p.o. daily, lisinopril 40 mg p.o. daily and metoprolol XL 50 mg p.o. b.i.d.  She has mildly elevated BNP of 1117 and pulmonary vascular congestion on the chest  x-ray.  In the ER, the patient received 40 mg IV furosemide. She initially required 3 liters of Oxygen due to Acute hypoxia and resp failure   -ECHO was done during the hospitalization which showed LV mild to mod dilation, TR 1-2+, normal systolic vxn, EF 55-60%, LV diastolic fxn indeterminate. No RWMA. No thrombus.     --She did receive IV Lasix 20 mg p.o. twice daily and was slowly able to get weaned off from oxygen, her creatinine and electrolytes were stable.  --She was also monitored for her weight and strict intake and output.  She did not have any evidence of ischemic event exacerbating her symptoms.  She was continued on her anticoagulation with apixaban.  --She is also established with [facility] and is planned to follow-up with cardiology as an outpatient, she will be discharged on 20 mg of Lasix and she will continue to take her apixaban, metoprolol and lisinopril along with her flecainide after the discharge.  -- She is advised to get outpatient pulmonary function testing for proper diagnosis of COPD due to high probability.     Paroxysmal atrial fibrillation/sick sinus syndrome, status post permanent pacemaker in 03/2018:    The electrocardiogram in the ER revealed normal sinus rhythm; however, on the monitor, she has a paced rhythm.  The rate is controlled.  She is chronically anticoagulated with apixaban which we will continue.    --Continue patient on PTA Eliquis, Toprol-XL 50 mg p.o. twice daily and flecainide 100 mg p.o. twice daily at discharge  -- Outpatient follow-up with the EP.        Dyslipidemia:    She will continue on her prior to admission simvastatin 20 mg p.o. at bedtime.       Moderate pulmonary hypertension:    Based on echo finding in 03/2018. Repeat ECHO 10/12/18 does not suggest pulm HTN.     Patient was seen and examined on the day of discharge ,she is feeling well, does not have any complaints , I did review the discharge medications and instructions with the patient and plan  for her to follow up with the PCP after the hospitalization .patient was in agreement , she is discharged in stable condition to her home.    Mary Matos MD, FACP    Significant Results and Procedures    As Below.    Pending Results   NONE    Unresulted Labs Ordered in the Past 30 Days of this Admission     No orders found from 8/12/2018 to 10/12/2018.          Code Status   Full Code       Primary Care Physician   Mikala Philip MD    Physical Exam   Temp: 97.6  F (36.4  C) Temp src: Oral BP: 142/74 Pulse: 60 Heart Rate: 61 Resp: 16 SpO2: 94 % O2 Device: None (Room air) Oxygen Delivery: 1 LPM  Vitals:    10/12/18 0625 10/13/18 0253 10/14/18 0500   Weight: 81.2 kg (179 lb) 81.1 kg (178 lb 14.4 oz) 81.7 kg (180 lb 1.6 oz)     Vital Signs with Ranges  Temp:  [97.6  F (36.4  C)-98.5  F (36.9  C)] 97.6  F (36.4  C)  Pulse:  [60] 60  Heart Rate:  [61-62] 61  Resp:  [16] 16  BP: (130-144)/(69-81) 142/74  SpO2:  [88 %-96 %] 94 %  I/O last 3 completed shifts:  In: 490 [P.O.:480; I.V.:10]  Out: 3500 [Urine:3500]    Constitutional: Awake, alert, cooperative, no apparent distress, sitting in bed, looks well, eating breakfast and is on room air today  Respiratory: Clear to auscultation bilaterally, no crackles or wheezing  Cardiovascular: Regular rate and rhythm, normal S1 and S2, and no murmur noted  GI: Normal bowel sounds, soft, non-distended, non-tender  Skin/Integumen: No rashes, no cyanosis, no edema    Discharge Disposition   Discharged to home  Condition at discharge: Stable    Consultations This Hospital Stay   [facility] EVALUATION IP CONSULT  CARDIAC REHAB IP CONSULT  CARE COORDINATOR IP CONSULT  NUTRITION SERVICES ADULT IP CONSULT  PHYSICAL THERAPY ADULT IP CONSULT    Time Spent on this Encounter   Mary YU, personally saw the patient today and spent less than or equal to 30 minutes discharging this patient.    Discharge Orders     Basic metabolic panel     N terminal pro BNP outpatient     TSH with free T4  reflex   Last Lab Result: TSH (mU/L)      Date                     Value                03/15/2018               0.64             ----------     Follow-Up with [facility]     Reason for your hospital stay   You were admitted to the hospital secondary to heart failure exacerbation.     Follow-up and recommended labs and tests   Follow up with primary care provider, Mikala Philip MD, within 7 days to evaluate treatment change and for hospital follow- up.  The following labs/tests are recommended: BMP.     Activity   Your activity upon discharge: activity as tolerated and no driving for today     Discharge Instructions   Please continue with all the Cardiac med's and daily weight , you will also be called from [facility] to follow up closely on the heart failure     Full Code     Diet   Follow this diet upon discharge: Orders Placed This Encounter     2 Gram Sodium Diet No Caffeine for 24 hours (once tests completed, may have caffeine)         Discharge Medications   Current Discharge Medication List      CONTINUE these medications which have NOT CHANGED    Details   albuterol (PROAIR HFA/PROVENTIL HFA/VENTOLIN HFA) 108 (90 BASE) MCG/ACT Inhaler Inhale 2 puffs into the lungs 4 times daily as needed for other (dyspnea)  Qty: 1 Inhaler, Refills: 3    Associated Diagnoses: SOB (shortness of breath)      alendronate (FOSAMAX) 70 MG tablet Take 1 tablet (70 mg) by mouth every 7 days Take 60 minutes before am meal with 8 oz. water. Remain upright for 30 minutes.  Qty: 12 tablet, Refills: 3    Associated Diagnoses: Osteoporosis      apixaban ANTICOAGULANT (ELIQUIS) 5 MG tablet Take 1 tablet (5 mg) by mouth 2 times daily  Qty: 180 tablet, Refills: 3    Associated Diagnoses: Atrial fibrillation with rapid ventricular response (H)      cholecalciferol (VITAMIN  -D) 1000 UNIT capsule Take 1 capsule by mouth daily.      FLAX SEED OIL OR 1 capsule daily      flecainide (TAMBOCOR) 100 MG tablet Take 1 tablet (100 mg) by mouth 2  times daily  Qty: 90 tablet, Refills: 3    Associated Diagnoses: Sick sinus syndrome (H); Cardiac pacemaker in situ      fluticasone (FLONASE) 50 MCG/ACT spray Spray 1-2 sprays into both nostrils daily as needed for rhinitis or allergies      furosemide (LASIX) 20 MG tablet Take 1 tablet (20 mg) by mouth daily  Qty: 30 tablet, Refills: 5    Associated Diagnoses: Systolic congestive heart failure, unspecified congestive heart failure chronicity      Krill Oil 500 MG CAPS Take 1 capsule by mouth daily      lisinopril (PRINIVIL/ZESTRIL) 40 MG tablet Take 1 tablet (40 mg) by mouth daily  Qty: 90 tablet, Refills: 3    Associated Diagnoses: Hypertension goal BP (blood pressure) < 140/90      Methylsulfonylmethane (MSM) 500 MG CAPS Take 1 capsule by mouth daily      metoprolol succinate (TOPROL-XL) 50 MG 24 hr tablet Take 1 tablet (50 mg) by mouth 2 times daily  Qty: 180 tablet, Refills: 3    Comments: Pt will call when she needs a refill  Associated Diagnoses: Atrial fibrillation with rapid ventricular response (H); Systolic congestive heart failure, unspecified congestive heart failure chronicity      Milk Thistle 200 MG CAPS Take 1 capsule by mouth daily       !! Multiple Vitamins-Minerals (HAIR SKIN NAILS PO) Take 1 tablet by mouth At Bedtime      !! Multiple Vitamins-Minerals (ICAPS AREDS 2) CAPS Take by mouth daily       potassium & sodium phosphates 278-164-250 MG/75ML SOLR Take 1 tablet by mouth daily       simvastatin (ZOCOR) 20 MG tablet Take 1 tablet (20 mg) by mouth At Bedtime Profile Rx: patient will contact pharmacy when needed  Qty: 90 tablet, Refills: 3    Associated Diagnoses: Hyperlipidemia LDL goal <130       !! - Potential duplicate medications found. Please discuss with provider.        Allergies   Allergies   Allergen Reactions     Strawberry Flavor      Data   Most Recent 3 CBC's:  Recent Labs   Lab Test  10/11/18   1324  03/17/18   0550  03/16/18   0555   WBC  8.9  11.4*  15.8*   HGB  12.3  12.3   13.0   MCV  86  88  86   PLT  401  721*  797*      Most Recent 3 BMP's:  Recent Labs   Lab Test  10/14/18   0530  10/13/18   0600  10/12/18   1403  10/12/18   0621   NA  139  138   --   140   POTASSIUM  3.8  4.1  3.9  3.2*   CHLORIDE  103  103   --   104   CO2  30  30   --   30   BUN  15  13   --   9   CR  0.70  0.67   --   0.61   ANIONGAP  6  5   --   6   GURWINDER  8.2*  8.3*   --   8.1*   GLC  101*  97   --   99     Most Recent 2 LFT's:  Recent Labs   Lab Test  05/02/18   0826  03/14/18   1049   AST  16  17   ALT  25  57*   ALKPHOS  46  94   BILITOTAL  0.5  0.5     Most Recent INR's and Anticoagulation Dosing History:  Anticoagulation Dose History     Recent Dosing and Labs Latest Ref Rng & Units 11/12/2014 3/19/2018    INR 0.86 - 1.14 1.04 1.31(H)        Most Recent 3 Troponin's:  Recent Labs   Lab Test  10/11/18   1324  03/14/18   1049  03/06/18   1744  11/12/14   1212   11/12/14   0813   TROPI  <0.015  <0.015  <0.015   --    < >   --    TROPONIN   --    --    --   0.00   --   0.01    < > = values in this interval not displayed.     Most Recent Cholesterol Panel:  Recent Labs   Lab Test  07/11/18   0907   CHOL  171   LDL  84   HDL  69   TRIG  88     Most Recent 6 Bacteria Isolates From Any Culture (See EPIC Reports for Culture Details):  Recent Labs   Lab Test  03/19/13   1041  02/09/11   1545   CULT  >100,000 colonies/mL Escherichia coli  >100,000 colonies/mL Escherichia coli     Most Recent TSH, T4 and A1c Labs:  Recent Labs   Lab Test  03/15/18   2025   TSH  0.64     Results for orders placed or performed during the hospital encounter of 10/11/18   XR Chest 2 Views    Narrative    CHEST TWO VIEWS  10/11/2018 2:05 PM     HISTORY: Chest pain.     COMPARISON: 3/21/2018      Impression    IMPRESSION: Lung volumes are unchanged. Left basilar atelectasis has  improved. There is persistent bibasilar opacity at the posterior  costophrenic angles likely representing residual atelectasis. Probable  small bilateral pleural  effusions, new compared to 3/21/2018. Mild to  moderate pulmonary vascular congestion is present. Cardiac silhouette  is enlarged, unchanged. Dual lead cardiac pacing device is unchanged.    HARLEY CONNOR MD

## 2018-10-14 NOTE — PLAN OF CARE
Problem: Patient Care Overview  Goal: Plan of Care/Patient Progress Review  Outcome: Improving  Heart Failure Care Pathway  GOALS TO BE MET BEFORE DISCHARGE:    1. Decrease congestion and/or edema with diuretic therapy to achieve near      optimal volume status.            Dyspnea improved:  Yes            Edema improved:     Yes        Net I/O and Weights since admission:          09/14 0700 - 10/14 0659  In: 1336 [P.O.:1320; I.V.:16]  Out: 7175 [Urine:7175]  Net: -5839            Vitals:    10/12/18 0625 10/13/18 0253   Weight: 81.2 kg (179 lb) 81.1 kg (178 lb 14.4 oz)       2.  O2 sats > 92% on RA or at prior home O2 therapy level.          Current oxygenation status:       SpO2: 92 %         O2 Device: Nasal cannula,  Oxygen Delivery: 1 LPM         Able to wean O2 this shift to keep sats > 92%:  No, please explain: still requiring 1L of O2       Does patient use Home O2? No    3.  Tolerates ambulation and mobility near baseline: Yes        How many times did the patient ambulate with nursing staff this shift? 3    Please review the Heart Failure Care Pathway for additional HF goal outcomes.    Pt. A&Ox4. VSS. Unable to wean patient down to RA, would dip into high 80's on RA at rest, staying in the low 90's on 1L per nasal cannula. Denies pain/SOB/edema. Up w/ SBA. Tele is 100% A paced, HR 60's. Good urine output overnight, continue to diuresis. Possible discharge tomorrow.        Alison Negrete RN  10/14/2018

## 2018-10-15 ENCOUNTER — TELEPHONE (OUTPATIENT)
Dept: FAMILY MEDICINE | Facility: CLINIC | Age: 78
End: 2018-10-15

## 2018-10-15 DIAGNOSIS — I50.32 CHRONIC DIASTOLIC CONGESTIVE HEART FAILURE (H): Primary | ICD-10-CM

## 2018-10-15 NOTE — TELEPHONE ENCOUNTER
"ED/Discharge Protocol    \"Hi, my name is Aleta Pacheco, a registered nurse, and I am calling on behalf of Dr. Philip's office at [place].  I am calling to follow up and see how things are going for you after your recent visit.\"    \"I see that you were in the (ER/UC/IP) on 10/11-10/14.    How are you doing now that you are home?\" \"it is back up there \" BP back in better    Is patient experiencing symptoms that may require a hospital visit?  no    Discharge Instructions    \"Let's review your discharge instructions.  What is/are the follow-up recommendations?  Pt. Response: [facility]    \"Were you instructed to make a follow-up appointment?\"  Pt. Response: No. Appt made with Dr Philip for her for post hosp      \"When you see the provider, I would recommend that you bring your discharge instructions with you.    Medications    \"How many new medications are you on since your hospitalization/ED visit?\"    0- \"How many of your current medicines changed (dose, timing, name, etc.) while you were in the hospital/ED visit?\"   0- \"Do you have questions about your medications?\"   No  \"Were you newly diagnosed with heart failure, COPD, diabetes or did you have a heart attack?\"   NO, pt has heart failure listed as historical upon admission       Medication reconciliation completed? Yes    Was [facility] referral placed (*Make sure to put transitions as reason for referral)?   No - pt already has appt with [facility] next week    Call Summary    \"Do you have any questions or concerns about your condition or care plan at the moment?\"    No  Triage nurse advice given: call back if questions    Patient was in ER 0 in the past year (assess appropriateness of ER visits.)      \"If you have questions or things don't continue to improve, we encourage you contact us through the main clinic number,   .  Even if the clinic is not open, triage nurses are available 24/7 to help you.            \"Thank you for your time and take " "care!\"      Aleta Pacheco, RN, BSN           "

## 2018-10-15 NOTE — PLAN OF CARE
Problem: Patient Care Overview  Goal: Plan of Care/Patient Progress Review  Physical Therapy Discharge Summary    Reason for therapy discharge:    Discharged to home.    Progress towards therapy goal(s). See goals on Care Plan in [facility] electronic health record for goal details.  Goals not met.  Barriers to achieving goals:   discharge from facility.    Therapy recommendation(s):    Continued therapy is recommended.  Rationale/Recommendations:  May benefit from outpatient cardiac rehab program if she qualifies or WEL program if interested in supervised exercise program to maximize functional outcomes..

## 2018-10-17 ENCOUNTER — OFFICE VISIT (OUTPATIENT)
Dept: FAMILY MEDICINE | Facility: CLINIC | Age: 78
End: 2018-10-17
Payer: COMMERCIAL

## 2018-10-17 VITALS
WEIGHT: 182.25 LBS | BODY MASS INDEX: 31.28 KG/M2 | TEMPERATURE: 97 F | HEART RATE: 60 BPM | OXYGEN SATURATION: 96 % | SYSTOLIC BLOOD PRESSURE: 136 MMHG | DIASTOLIC BLOOD PRESSURE: 77 MMHG

## 2018-10-17 DIAGNOSIS — E78.5 HYPERLIPIDEMIA LDL GOAL <130: ICD-10-CM

## 2018-10-17 DIAGNOSIS — Z95.0 S/P CARDIAC PACEMAKER PROCEDURE: ICD-10-CM

## 2018-10-17 DIAGNOSIS — I50.9 ACUTE CONGESTIVE HEART FAILURE, UNSPECIFIED HEART FAILURE TYPE (H): Primary | ICD-10-CM

## 2018-10-17 DIAGNOSIS — I48.91 ATRIAL FIBRILLATION WITH RAPID VENTRICULAR RESPONSE (H): ICD-10-CM

## 2018-10-17 DIAGNOSIS — I10 HYPERTENSION GOAL BP (BLOOD PRESSURE) < 140/90: ICD-10-CM

## 2018-10-17 PROCEDURE — 99495 TRANSJ CARE MGMT MOD F2F 14D: CPT | Performed by: FAMILY MEDICINE

## 2018-10-17 NOTE — PROGRESS NOTES
"  SUBJECTIVE:   Hamida Verma is a 78 year old female who presents to clinic today for the following health issues:      Hospital Follow-up Visit:    Hospital/Nursing Home/IP Rehab Facility: [facility]  Date of Admission: 10/11/2018  Date of Discharge: 10/14/2018  Reason(s) for Admission: acute CHF     Discharge Diagnoses   :  Active Problems:  Acute on chronic heart failure exacerbation with preserved ejection fraction  Remote history of smoking.  Probable underlying COPD.  Paroxysmal atrial fibrillation with sick sinus syndrome, status post permanent pacemaker placement in March 2018.  Essential hypertension.  Dyslipidemia.  Moderate pulmonary hypertension.       Problems taking medications regularly:  None       Medication changes since discharge: None       Problems adhering to non-medication therapy:  None       Summary of hospitalization:  [facility] discharge summary reviewed  Diagnostic Tests/Treatments reviewed.  Follow up needed: EP and core clinic follow up needed   Other Healthcare Providers Involved in Patient s Care:         Specialist appointment - as above   Update since discharge: improved.      Post Discharge Medication Reconciliation: discharge medications reconciled, continue medications without change.  Plan of care communicated with patient     Coding guidelines for this visit:  Type of Medical   Decision Making Face-to-Face Visit       within 7 Days of discharge Face-to-Face Visit        within 14 days of discharge   Moderate Complexity [id number]   High Complexity [id number]          She has been feeling well since she was discharged.  She denies any weight gain on her scale.  She denies any chest pain, shortness of breath, swelling.  She has continued her medications as instructed and is tolerating them well.  She has an appointment scheduled with the core clinic.       From hospital discharge summary on 10/14/2018:  \"History of Present Illness     As Per " Admitting MD: Ms. Hamida Verma is a delightful 78-year-old  lady with a past medical history notable for atrial fibrillation, sick sinus syndrome, status post permanent pacemaker, hypertension, dyslipidemia, moderate pulmonary hypertension and heart failure with preserved ejection fraction, who has presented to the Emergency Department for evaluation of shortness of breath.  Earlier this morning she was at the [facility] for cataract surgery when the staff noted that her oxygen saturation was low at 88-89% and she was slightly hypertensive.  The surgery was canceled and the surgeon referred her to the Emergency Department for evaluation.  On direct questioning, she complains of increasing shortness of breath for the past 2 days along with a dry cough.  She reported no fever, chills, chest pain, sputum, nausea, vomiting or other complaints.  She states her weight has been somewhat stable with only 1-pound variation recently.  She does report some left leg swelling since the placement of a permanent pacemaker.  She is compliant with her medications as well as a low-salt diet.       In the Emergency Department, the patient has been evaluated by Chey Mustafa CNP, and the workup in the ER shows a mildly elevated BNP of 1117.  Troponin I is less than 0.015.  She has normal hemoglobin of 12.3 and white count of 8.9.  The electrocardiogram reveals a normal sinus rhythm with a rate of 64 beats per minute.  On the monitor, however, she is in a V-paced rhythm.  The chest x-ray by my review shows mild to moderate pulmonary vascular congestion and enlarged cardiac silhouette.  There is a probable small bilateral pleural effusion as read by the radiologist.  It also shows persistent bibasilar opacity in the posterior costophrenic angles, likely representing residual atelectasis.  In the ER, the patient has been treated with intravenous furosemide 40 mg x 1.  She is being admitted to the hospital under  "inpatient status.  Notably, in the ER, the patient has remained hypoxic with oxygen saturation around 87-88% without oxygen.  She is currently on 3 liters and saturating 95-96%.             Hospital Course     Hamida Verma was admitted on 10/11/2018.  The following problems were addressed during her hospitalization:     Cumulative Summary: Patient is a very pleasant 78-year-old  female with past medical history significant for essential hypertension, dyslipidemia, atrial fibrillation on anticoagulation, history of heart failure with preserved ejection fraction, moderate pulmonary hypertension, sick sinus syndrome status post permanent pacemaker placement who was admitted on October 11 with shortness of breath which was thought to be secondary to acute decompensation of diastolic heart failure. \"       Problem list and histories reviewed & adjusted, as indicated.  Additional history: as documented    Patient Active Problem List   Diagnosis     Symptomatic menopausal or female climacteric states     HYPERLIPIDEMIA LDL GOAL <130     Hypertension goal BP (blood pressure) < 140/90     Knee pain     Obesity     Atrial fibrillation with rapid ventricular response (H)     CHF (congestive heart failure) (H)     Acute CHF (congestive heart failure) (H)     S/P cardiac pacemaker procedure     Past Surgical History:   Procedure Laterality Date     HYSTERECTOMY, VAGINAL      hysterectomy       Social History   Substance Use Topics     Smoking status: Former Smoker     Start date: 10/28/1955     Quit date: 9/1/2000     Smokeless tobacco: Never Used      Comment: quit 6yrs ago     Alcohol use No     Family History   Problem Relation Age of Onset     Cerebrovascular Disease Mother      Eye Disorder Mother      Myocardial Infarction Mother      C.A.D. Father      heart attack     Alcohol/Drug Father      alcohol     Blood Disease Sister      lupus     Depression Sister      Diabetes No family hx of      Breast Cancer No " family hx of      Cancer - colorectal No family hx of          Current Outpatient Prescriptions   Medication Sig Dispense Refill     alendronate (FOSAMAX) 70 MG tablet Take 1 tablet (70 mg) by mouth every 7 days Take 60 minutes before am meal with 8 oz. water. Remain upright for 30 minutes. 12 tablet 3     apixaban ANTICOAGULANT (ELIQUIS) 5 MG tablet Take 1 tablet (5 mg) by mouth 2 times daily 180 tablet 3     cholecalciferol (VITAMIN  -D) 1000 UNIT capsule Take 1 capsule by mouth daily.       FLAX SEED OIL OR 1 capsule daily       flecainide (TAMBOCOR) 100 MG tablet Take 1 tablet (100 mg) by mouth 2 times daily 90 tablet 3     fluticasone (FLONASE) 50 MCG/ACT spray Spray 1-2 sprays into both nostrils daily as needed for rhinitis or allergies       furosemide (LASIX) 20 MG tablet Take 1 tablet (20 mg) by mouth daily 30 tablet 5     Krill Oil 500 MG CAPS Take 1 capsule by mouth daily       lisinopril (PRINIVIL/ZESTRIL) 40 MG tablet Take 1 tablet (40 mg) by mouth daily 90 tablet 3     Methylsulfonylmethane (MSM) 500 MG CAPS Take 1 capsule by mouth daily       metoprolol succinate (TOPROL-XL) 50 MG 24 hr tablet Take 1 tablet (50 mg) by mouth 2 times daily 180 tablet 3     Milk Thistle 200 MG CAPS Take 1 capsule by mouth daily        Multiple Vitamins-Minerals (HAIR SKIN NAILS PO) Take 1 tablet by mouth At Bedtime       potassium & sodium phosphates 278-164-250 MG/75ML SOLR Take 1 tablet by mouth daily        simvastatin (ZOCOR) 20 MG tablet Take 1 tablet (20 mg) by mouth At Bedtime Profile Rx: patient will contact pharmacy when needed 90 tablet 3     albuterol (PROAIR HFA/PROVENTIL HFA/VENTOLIN HFA) 108 (90 BASE) MCG/ACT Inhaler Inhale 2 puffs into the lungs 4 times daily as needed for other (dyspnea) 1 Inhaler 3     Allergies   Allergen Reactions     Strawberry Flavor      Recent Labs   Lab Test  10/14/18   0530  10/13/18   0600   07/11/18   0907   05/02/18   0826   03/15/18   2025   03/14/18   1049   03/06/18   1316   03/23/17   0804  05/23/16   1056   LDL   --    --    --   84   --    --    --    --    --    --    --    --   91  103*   HDL   --    --    --   69   --    --    --    --    --    --    --    --   61  66   TRIG   --    --    --   88   --    --    --    --    --    --    --    --   109  104   ALT   --    --    --    --    --   25   --    --    --   57*   --    --   24  26   CR  0.70  0.67   < >  0.81   < >  0.81   < >  0.83   < >  0.83   < >  0.91  0.71  0.77   GFRESTIMATED  81  85   < >  69   < >  69   < >  66   < >  66   < >  59*  79  73   GFRESTBLACK  >90  >90   < >  83   < >  83   < >  80   < >  80   < >  72  >90   GFR Calc    88   POTASSIUM  3.8  4.1   < >  4.2   < >  4.1   < >  3.5   < >  3.6   < >  3.4  3.7  4.1   TSH   --    --    --    --    --    --    --   0.64   --    --    --   0.54   --    --     < > = values in this interval not displayed.      BP Readings from Last 3 Encounters:   10/17/18 150/73   10/14/18 142/74   10/02/18 147/78    Wt Readings from Last 3 Encounters:   10/17/18 182 lb 4 oz (82.7 kg)   10/14/18 180 lb 1.6 oz (81.7 kg)   10/02/18 189 lb (85.7 kg)              Reviewed and updated as needed this visit by clinical staff  Tobacco  Allergies  Meds       Reviewed and updated as needed this visit by Provider         ROS:  Constitutional, HEENT, cardiovascular, pulmonary, gi and gu systems are negative, except as otherwise noted.    OBJECTIVE:     /73  Pulse 60  Temp 97  F (36.1  C) (Oral)  Wt 182 lb 4 oz (82.7 kg)  LMP  (LMP Unknown)  SpO2 96%  BMI 31.28 kg/m2  Body mass index is 31.28 kg/(m^2).   Wt Readings from Last 5 Encounters:   10/17/18 182 lb 4 oz (82.7 kg)   10/14/18 180 lb 1.6 oz (81.7 kg)   10/02/18 189 lb (85.7 kg)   07/11/18 186 lb (84.4 kg)   06/11/18 185 lb (83.9 kg)       GENERAL: healthy, alert and no distress    Diagnostic Test Results:  Results for orders placed or performed during the hospital encounter of 10/11/18   XR Chest 2 Views     Narrative    CHEST TWO VIEWS  10/11/2018 2:05 PM     HISTORY: Chest pain.     COMPARISON: 3/21/2018      Impression    IMPRESSION: Lung volumes are unchanged. Left basilar atelectasis has  improved. There is persistent bibasilar opacity at the posterior  costophrenic angles likely representing residual atelectasis. Probable  small bilateral pleural effusions, new compared to 3/21/2018. Mild to  moderate pulmonary vascular congestion is present. Cardiac silhouette  is enlarged, unchanged. Dual lead cardiac pacing device is unchanged.    HARLEY CONNOR MD   CBC with platelets differential   Result Value Ref Range    WBC 8.9 4.0 - 11.0 10e9/L    RBC Count 4.46 3.8 - 5.2 10e12/L    Hemoglobin 12.3 11.7 - 15.7 g/dL    Hematocrit 38.3 35.0 - 47.0 %    MCV 86 78 - 100 fl    MCH 27.6 26.5 - 33.0 pg    MCHC 32.1 31.5 - 36.5 g/dL    RDW 14.9 10.0 - 15.0 %    Platelet Count 401 150 - 450 10e9/L    Diff Method Automated Method     % Neutrophils 74.3 %    % Lymphocytes 13.0 %    % Monocytes 9.4 %    % Eosinophils 1.7 %    % Basophils 0.8 %    % Immature Granulocytes 0.8 %    Nucleated RBCs 0 0 /100    Absolute Neutrophil 6.6 1.6 - 8.3 10e9/L    Absolute Lymphocytes 1.2 0.8 - 5.3 10e9/L    Absolute Monocytes 0.8 0.0 - 1.3 10e9/L    Absolute Eosinophils 0.2 0.0 - 0.7 10e9/L    Absolute Basophils 0.1 0.0 - 0.2 10e9/L    Abs Immature Granulocytes 0.1 0 - 0.4 10e9/L    Absolute Nucleated RBC 0.0    Basic metabolic panel   Result Value Ref Range    Sodium 142 133 - 144 mmol/L    Potassium 3.4 3.4 - 5.3 mmol/L    Chloride 106 94 - 109 mmol/L    Carbon Dioxide 29 20 - 32 mmol/L    Anion Gap 7 3 - 14 mmol/L    Glucose 99 70 - 99 mg/dL    Urea Nitrogen 8 7 - 30 mg/dL    Creatinine 0.65 0.52 - 1.04 mg/dL    GFR Estimate 87 >60 mL/min/1.7m2    GFR Estimate If Black >90 >60 mL/min/1.7m2    Calcium 8.4 (L) 8.5 - 10.1 mg/dL   Troponin I   Result Value Ref Range    Troponin I ES <0.015 0.000 - 0.045 ug/L   Nt probnp inpatient (BNP)   Result  Value Ref Range    N-Terminal Pro BNP Inpatient 1117 0 - 1800 pg/mL   Basic metabolic panel   Result Value Ref Range    Sodium 140 133 - 144 mmol/L    Potassium 3.2 (L) 3.4 - 5.3 mmol/L    Chloride 104 94 - 109 mmol/L    Carbon Dioxide 30 20 - 32 mmol/L    Anion Gap 6 3 - 14 mmol/L    Glucose 99 70 - 99 mg/dL    Urea Nitrogen 9 7 - 30 mg/dL    Creatinine 0.61 0.52 - 1.04 mg/dL    GFR Estimate >90 >60 mL/min/1.7m2    GFR Estimate If Black >90 >60 mL/min/1.7m2    Calcium 8.1 (L) 8.5 - 10.1 mg/dL   Magnesium   Result Value Ref Range    Magnesium 2.3 1.6 - 2.3 mg/dL   Potassium   Result Value Ref Range    Potassium 3.9 3.4 - 5.3 mmol/L   Basic metabolic panel   Result Value Ref Range    Sodium 138 133 - 144 mmol/L    Potassium 4.1 3.4 - 5.3 mmol/L    Chloride 103 94 - 109 mmol/L    Carbon Dioxide 30 20 - 32 mmol/L    Anion Gap 5 3 - 14 mmol/L    Glucose 97 70 - 99 mg/dL    Urea Nitrogen 13 7 - 30 mg/dL    Creatinine 0.67 0.52 - 1.04 mg/dL    GFR Estimate 85 >60 mL/min/1.7m2    GFR Estimate If Black >90 >60 mL/min/1.7m2    Calcium 8.3 (L) 8.5 - 10.1 mg/dL   Basic metabolic panel   Result Value Ref Range    Sodium 139 133 - 144 mmol/L    Potassium 3.8 3.4 - 5.3 mmol/L    Chloride 103 94 - 109 mmol/L    Carbon Dioxide 30 20 - 32 mmol/L    Anion Gap 6 3 - 14 mmol/L    Glucose 101 (H) 70 - 99 mg/dL    Urea Nitrogen 15 7 - 30 mg/dL    Creatinine 0.70 0.52 - 1.04 mg/dL    GFR Estimate 81 >60 mL/min/1.7m2    GFR Estimate If Black >90 >60 mL/min/1.7m2    Calcium 8.2 (L) 8.5 - 10.1 mg/dL   EKG 12-lead, tracing only   Result Value Ref Range    Interpretation ECG Click View Image link to view waveform and result    [facility] Evaluation IP Consult: Patient to be seen: Routine - within 24 hours; Consultant may enter orders: Yes    Narrative    Charlotte Garrett RN     10/12/2018 11:10 AM  [facility] Clinic evaluation referral received from Dr. Lan. Patient   is not currently established in the [facility]. Cardiology not   consulted  this admission. Nutrition consult noted.    Hospital nurse, please give pt [facility] Clinic/HF education.     [facility] appointment made for:       We look forward to seeing Hamida in the clinic.   Please call with questions.     Charlotte Garrett, RN  [facility] RN Care Coordinator  [facility]  [phone]    [facility]: Cardiomyopathy, Optimization, Rehabilitation,   Education   The [facility] is a heart failure specialty clinic within the   [facility] where you will work   with your cardiologist, nurse practitioners, physician assistants   and registered nurses who specialize in heart failure care. They   are dedicated to helping patients with heart failure to carefully   adjust medications, receive education, and learn who and when to   call if symptoms develop. They specialize in helping you better   understand your condition, slow the progression of your disease,   improve the length and quality of your life, help you detect   future heart problems before they become life threatening, and   avoid hospitalizations.     Nutrition Services Adult IP Consult    Soila Ortiz     10/12/2018 11:31 AM  REASON FOR ASSESSMENT:  CHF Consult for 2 gm NA Diet Education    NUTRITION HISTORY:    Information obtained from: Patient  - Pt reports she has been following a low sodium diet since   March, and it is difficult sometimes as she loves salty foods  -pt reports she is lactose intolerant so she doesn't drink milk   or eat yogurt, but does sometimes have a small amount of cheese.        Living situation:   Lives with adult daughter and grandchildren    Grocery shopping:  Pt shops along with daughter and granddaughter    Meal preparation:  Pt prepares her own breakfast and lunch, takes turns cooking   supper between herself, daughter, and granddaughter    Breakfast:  Typically toast with jelly, a little cheese, 2 cups of coffee    Lunch:  Often a  sandwich on whole wheat bread with meat, lettuce, and   sometimes pickles    Dinner:   Usually a pasta dish with chicken and veggies, or pork chops and   potatoes.      Previous diet instructions:  Low sodium      CURRENT DIET:  2 gram Sodium     NUTRITION DIAGNOSIS:  No nutrition diagnosis identified at this time.       INTERVENTIONS:    Nutrition Prescription:  Continue 2 gram sodium diet restriction    Implementation:  ? Assessed learning needs, learning preferences, and willingness   to learn  ? Nutrition Education (Content):  a) Provided handouts:  1) Seasoning Your Food Without Salt  2) Low Na Recipe Booklet  b) Discussed rational for limiting Na for CHF and stressed   importance of following 2 gm Na guidelines   c) Encouraged patient to keep a daily food record  ? Nutrition Education (Application):  a) Discussed current eating habits and recommended alternative   food choices  ? Anticipated good compliance  ? Diet Education - refer to Education Flowsheet    Goals:  ? Patient verbalizes understanding of diet by asking appropriate   questions and identifying high sodium foods to avoid.    ? All of the above goals met during the education session    Follow Up:  ? Provided RD contact information for future questions.  ? Recommend Out-Patient Nutrition Referral, if further diet   instructions are needed.        Soila Huffman  Dietetic Intern   Echocardiogram - Routine    Narrative    [id number]  Pending sale to [facility]19  [id number]  [id number]^ALEXA^ASHKAN^H           [facility]  Echocardiography Laboratory  [address] [place] [address]        Name: ARTURO BOSWELL  MRN: [id number]  : 1940  Study Date: 10/12/2018 08:49 AM  Age: 78 yrs  Gender: Female  Patient Location: [facility]  Reason For Study: CHF  Ordering Physician: ASHKAN LIU  Referring Physician: Mikala Philip  Performed By: Brianna Hargrove     BSA: 1.9 m2  Height: 64 in  Weight: 179 lb  HR: 63  BP: 161/76  mmHg  _____________________________________________________________________________  __        Procedure  Complete Portable Echo Adult.  _____________________________________________________________________________  __        Interpretation Summary     Left ventricular systolic function is normal.  The left atrium is mild to moderately dilated.  There is mild to moderate (1-2+) tricuspid regurgitation.  Doppler findings do not suggest pulmonary hypertension.  _____________________________________________________________________________  __        Left Ventricle  The left ventricle is normal in size. There is normal left ventricular wall  thickness. Left ventricular systolic function is normal. The visual ejection  fraction is estimated at 55-60%. Left ventricular diastolic function is  indeterminate. Normal left ventricular wall motion. There is no thrombus seen  in the left ventricle.     Right Ventricle  The right ventricle is normal in structure, function and size. There is a  catheter/pacemaker lead seen in the right ventricle.     Atria  The left atrium is mild to moderately dilated. The right atrium is mildly  dilated.     Mitral Valve  The mitral valve leaflets appear normal. There is no evidence of stenosis,  fluttering, or prolapse. There is trace mitral regurgitation.        Tricuspid Valve  There is mild to moderate (1-2+) tricuspid regurgitation. Doppler findings do  not suggest pulmonary hypertension. The right ventricular systolic pressure is  approximated at 24.3 mmHg plus the right atrial pressure. Normal IVC (1.5-  2.5cm) with >50% respiratory collapse; right atrial pressure is estimated at  5-10mmHg.     Aortic Valve  There is moderate aortic sclerosis of the non-coronary cusp. No  hemodynamically significant valvular aortic stenosis.     Pulmonic Valve  The pulmonic valve is not well seen, but is grossly normal.     Vessels  Normal size aorta.     Pericardium  The pericardium appears normal.       "  Rhythm  Sinus rhythm was noted.  _____________________________________________________________________________  __  MMode/2D Measurements & Calculations  RVDd: 3.9 cm  IVSd: 0.99 cm     LVIDd: 4.5 cm  LVIDs: 2.7 cm  LVPWd: 1.1 cm  FS: 40.2 %  LV mass(C)d: 165.0 grams  LV mass(C)dI: 88.4 grams/m2  Ao root diam: 2.8 cm  LA dimension: 4.2 cm  asc Aorta Diam: 3.4 cm  LA/Ao: 1.5  LVOT diam: 2.0 cm  LVOT area: 3.0 cm2  LA Volume (BP): 69.7 ml  LA Volume Index (BP): 37.3 ml/m2  RWT: 0.50           Doppler Measurements & Calculations  MV E max roslyn: 95.1 cm/sec  MV A max roslyn: 68.9 cm/sec  MV E/A: 1.4  MV dec time: 0.23 sec  PA V2 max: 97.7 cm/sec  PA max PG: 3.8 mmHg  PA acc time: 0.05 sec  TR max roslyn: 245.7 cm/sec  TR max P.3 mmHg  E/E' av.1  Lateral E/e': 11.3  Medial E/e': 20.9           _____________________________________________________________________________  __           Report approved by: Daniel Coombs 10/12/2018 10:38 AM          ASSESSMENT/PLAN:          1. Acute congestive heart failure, unspecified heart failure type (H)  Much improved.  She continues furosemide 20 mg daily, lisinopril 40 mg daily, metoprolol 50 mg twice daily.  She has core clinic follow-up next Thursday.    2. Hypertension goal BP (blood pressure) < 140/90  Initial blood pressure mildly elevated.  MA will recheck.  Continues on medications as above.    3. Atrial fibrillation with rapid ventricular response (H), sick sinus syndrome, S/P cardiac pacemaker procedure  Continues apixaban, metoprolol 50 mg twice daily and flecainide 100 mg twice daily.  She will follow-up with cardiology.    5. Hyperlipidemia LDL goal <130  Continue simvastatin 20 mg daily.    From hospital discharge summary:  \"  Acute on chronic decompensation of heart failure with preserved ejection fraction:  Remote Smoking hx, rule out COPD   Hypertension:    Patient has known history of chronic diastolic heart failure with the last LV ejection fraction of " 60-65% as well as moderate pulmonary hypertension by echocardiogram in 03/2018.   At home, she is on furosemide 20 mg p.o. daily, lisinopril 40 mg p.o. daily and metoprolol XL 50 mg p.o. b.i.d.  She has mildly elevated BNP of 1117 and pulmonary vascular congestion on the chest x-ray.  In the ER, the patient received 40 mg IV furosemide. She initially required 3 liters of Oxygen due to Acute hypoxia and resp failure   -ECHO was done during the hospitalization which showed LV mild to mod dilation, TR 1-2+, normal systolic vxn, EF 55-60%, LV diastolic fxn indeterminate. No RWMA. No thrombus.      --She did receive IV Lasix 20 mg p.o. twice daily and was slowly able to get weaned off from oxygen, her creatinine and electrolytes were stable.  --She was also monitored for her weight and strict intake and output.  She did not have any evidence of ischemic event exacerbating her symptoms.  She was continued on her anticoagulation with apixaban.  --She is also established with core clinic and is planned to follow-up with cardiology as an outpatient, she will be discharged on 20 mg of Lasix and she will continue to take her apixaban, metoprolol and lisinopril along with her flecainide after the discharge.  -- She is advised to get outpatient pulmonary function testing for proper diagnosis of COPD due to high probability.      Paroxysmal atrial fibrillation/sick sinus syndrome, status post permanent pacemaker in 03/2018:    The electrocardiogram in the ER revealed normal sinus rhythm; however, on the monitor, she has a paced rhythm.  The rate is controlled.  She is chronically anticoagulated with apixaban which we will continue.     --Continue patient on PTA Eliquis, Toprol-XL 50 mg p.o. twice daily and flecainide 100 mg p.o. twice daily at discharge  -- Outpatient follow-up with the EP.      Dyslipidemia:    She will continue on her prior to admission simvastatin 20 mg p.o. at bedtime.       Moderate pulmonary hypertension:  "   Based on echo finding in 03/2018. Repeat ECHO 10/12/18 does not suggest pulm HTN. \"     Patient Instructions   Return to see me in 3 months (January).       Mikala Philip M.D.        [facility]    "

## 2018-10-17 NOTE — MR AVS SNAPSHOT
After Visit Summary   10/17/2018    Hamida Verma    MRN: [id number]           Patient Information     Date Of Birth          1940        Visit Information        Provider Department      10/17/2018 11:00 AM Mikala Philip MD [facility]        Today's Diagnoses     Acute congestive heart failure, unspecified heart failure type (H)    -  1    Hypertension goal BP (blood pressure) < 140/90        Atrial fibrillation with rapid ventricular response (H)        S/P cardiac pacemaker procedure        Hyperlipidemia LDL goal <130          Care Instructions    Return to see me in 3 months (January).           Follow-ups after your visit        Follow-up notes from your care team     Return in about 3 months (around 1/17/2019) for Follow up visit.      Your next 10 appointments already scheduled     Oct 24, 2018  8:00 AM CDT   SHORT with Rosamaria Majano [facility] ([facility])    [address] [place] [id number]   [phone]            Oct 25, 2018  8:00 AM CDT   LAB with [facility]   [facility] ([facility])    [address] [place] [id number] [phone]           Please do not eat 10-12 hours before your appointment if you are coming in fasting for labs on lipids, cholesterol, or glucose (sugar). This does not apply to pregnant women. Water, hot tea and black coffee (with nothing added) are okay. Do not drink other fluids, diet soda or chew gum.            Oct 25, 2018  8:50 AM CDT   CORE Enrollment with Adriana Ferrell PA-C   [facility] ([facility])    [address] [place] [id number] [phone] OPT 2            Dec 31, 2018  2:15 PM CST   Remote PPM Check with [facility] TECH1   [facility] ([facility])    [address] [place] [address]  [id number]  [place] [id number] [phone] OPT 2           This appointment is for a remote check of your pacemaker.  This is not an appointment at the office.              Who to contact     If you have questions or need follow up information about today's clinic visit or your schedule please contact [facility] directly at [phone].  Normal or non-critical lab and imaging results will be communicated to you by MyChart, letter or phone within 4 business days after the clinic has received the results. If you do not hear from us within 7 days, please contact the clinic through MyChart or phone. If you have a critical or abnormal lab result, we will notify you by phone as soon as possible.  Submit refill requests through [employer] or call your pharmacy and they will forward the refill request to us. Please allow 3 business days for your refill to be completed.          Additional Information About Your Visit        [employer] Information     [employer] lets you send messages to your doctor, view your test results, renew your prescriptions, schedule appointments and more. To sign up, go to [email or web address] . Click on \"Log in\" on the left side of the screen, which will take you to the Welcome page. Then click on \"Sign up Now\" on the right side of the page.     You will be asked to enter the access code listed below, as well as some personal information. Please follow the directions to create your username and password.     Your access code is: [email or web address]  Expires: 2018  8:37 AM     Your access code will  in 90 days. If you need help or a new code, please call your [facility] clinic or [phone].        Care EveryWhere ID     This is your Care EveryWhere ID. This could be used by other organizations to access your [facility] medical records  [id number]        Your Vitals Were     Pulse Temperature Last Period Pulse Oximetry BMI (Body Mass Index)       60 97  F (36.1  C) (Oral) (LMP Unknown) " 96% 31.28 kg/m2        Blood Pressure from Last 3 Encounters:   10/17/18 150/73   10/14/18 142/74   10/02/18 147/78    Weight from Last 3 Encounters:   10/17/18 182 lb 4 oz (82.7 kg)   10/14/18 180 lb 1.6 oz (81.7 kg)   10/02/18 189 lb (85.7 kg)              Today, you had the following     No orders found for display       Primary Care Provider Office Phone # Fax #    Mikala Philip [phone] [address]  [facility] [address]        Equal Access to Services     [facility]: Hadii aad jean hadasho Sodonny, waaxda luqadaha, qaybta kaalmada aderhettyada, carlos carvajal . So [facility] [phone].    ATENCIÓN: Si habla español, tiene a ivy disposición servicios gratuitos de asistencia lingüística. Llame al [phone].    We comply with applicable federal civil rights laws and [place] laws. We do not discriminate on the basis of race, color, national origin, age, disability, sex, sexual orientation, or gender identity.            Thank you!     Thank you for choosing [facility]  for your care. Our goal is always to provide you with excellent care. Hearing back from our patients is one way we can continue to improve our services. Please take a few minutes to complete the written survey that you may receive in the mail after your visit with us. Thank you!             Your Updated Medication List - Protect others around you: Learn how to safely use, store and throw away your medicines at www.disposemymeds.org.          This list is accurate as of 10/17/18 12:02 PM.  Always use your most recent med list.                   Brand Name Dispense Instructions for use Diagnosis    albuterol 108 (90 Base) MCG/ACT inhaler    PROAIR HFA/PROVENTIL HFA/VENTOLIN HFA    1 Inhaler    Inhale 2 puffs into the lungs 4 times daily as needed for other (dyspnea)    SOB (shortness of breath)       alendronate 70 MG tablet    FOSAMAX    12 tablet    Take 1 tablet (70 mg) by  mouth every 7 days Take 60 minutes before am meal with 8 oz. water. Remain upright for 30 minutes.    Osteoporosis       apixaban ANTICOAGULANT 5 MG tablet    ELIQUIS    180 tablet    Take 1 tablet (5 mg) by mouth 2 times daily    Atrial fibrillation with rapid ventricular response (H)       cholecalciferol 1000 units capsule    vitamin  -D     Take 1 capsule by mouth daily.        FLAX SEED OIL PO      1 capsule daily        flecainide 100 MG tablet    TAMBOCOR    90 tablet    Take 1 tablet (100 mg) by mouth 2 times daily    Sick sinus syndrome (H), Cardiac pacemaker in situ       fluticasone 50 MCG/ACT spray    FLONASE     Spray 1-2 sprays into both nostrils daily as needed for rhinitis or allergies        furosemide 20 MG tablet    LASIX    30 tablet    Take 1 tablet (20 mg) by mouth daily    Systolic congestive heart failure, unspecified congestive heart failure chronicity       HAIR SKIN NAILS PO      Take 1 tablet by mouth At Bedtime        Krill Oil 500 MG Caps      Take 1 capsule by mouth daily        lisinopril 40 MG tablet    PRINIVIL/ZESTRIL    90 tablet    Take 1 tablet (40 mg) by mouth daily    Hypertension goal BP (blood pressure) < 140/90       metoprolol succinate 50 MG 24 hr tablet    TOPROL-XL    180 tablet    Take 1 tablet (50 mg) by mouth 2 times daily    Atrial fibrillation with rapid ventricular response (H), Systolic congestive heart failure, unspecified congestive heart failure chronicity       Milk Thistle 200 MG Caps      Take 1 capsule by mouth daily         MG Caps      Take 1 capsule by mouth daily        potassium & sodium phosphates 278-164-250 MG/75ML Solr      Take 1 tablet by mouth daily        simvastatin 20 MG tablet    ZOCOR    90 tablet    Take 1 tablet (20 mg) by mouth At Bedtime Profile Rx: patient will contact pharmacy when needed    Hyperlipidemia LDL goal <130

## 2018-10-25 ENCOUNTER — TELEPHONE (OUTPATIENT)
Dept: CARDIOLOGY | Facility: CLINIC | Age: 78
End: 2018-10-25

## 2018-10-25 ENCOUNTER — DOCUMENTATION ONLY (OUTPATIENT)
Dept: CARDIOLOGY | Facility: CLINIC | Age: 78
End: 2018-10-25

## 2018-10-25 ENCOUNTER — OFFICE VISIT (OUTPATIENT)
Dept: CARDIOLOGY | Facility: CLINIC | Age: 78
End: 2018-10-25
Payer: COMMERCIAL

## 2018-10-25 VITALS
OXYGEN SATURATION: 96 % | WEIGHT: 183.6 LBS | BODY MASS INDEX: 31.34 KG/M2 | HEART RATE: 64 BPM | SYSTOLIC BLOOD PRESSURE: 130 MMHG | HEIGHT: 64 IN | DIASTOLIC BLOOD PRESSURE: 80 MMHG

## 2018-10-25 DIAGNOSIS — I25.10 CORONARY ARTERY CALCIFICATION SEEN ON CT SCAN: ICD-10-CM

## 2018-10-25 DIAGNOSIS — I50.32 CHRONIC DIASTOLIC CONGESTIVE HEART FAILURE (H): Primary | ICD-10-CM

## 2018-10-25 DIAGNOSIS — I10 HYPERTENSION GOAL BP (BLOOD PRESSURE) < 140/90: ICD-10-CM

## 2018-10-25 DIAGNOSIS — I10 HTN (HYPERTENSION): Primary | ICD-10-CM

## 2018-10-25 DIAGNOSIS — E78.5 HYPERLIPIDEMIA LDL GOAL <70: ICD-10-CM

## 2018-10-25 DIAGNOSIS — I50.32 CHRONIC DIASTOLIC CONGESTIVE HEART FAILURE (H): ICD-10-CM

## 2018-10-25 DIAGNOSIS — I49.5 SSS (SICK SINUS SYNDROME) (H): ICD-10-CM

## 2018-10-25 LAB
ANION GAP SERPL CALCULATED.3IONS-SCNC: 9.9 MMOL/L (ref 6–17)
BUN SERPL-MCNC: 18 MG/DL (ref 7–30)
CALCIUM SERPL-MCNC: 9 MG/DL (ref 8.5–10.5)
CHLORIDE SERPL-SCNC: 103 MMOL/L (ref 98–107)
CO2 SERPL-SCNC: 28 MMOL/L (ref 23–29)
CREAT SERPL-MCNC: 0.91 MG/DL (ref 0.7–1.3)
GFR SERPL CREATININE-BSD FRML MDRD: 60 ML/MIN/1.7M2
GLUCOSE SERPL-MCNC: 90 MG/DL (ref 70–105)
NT-PROBNP SERPL-MCNC: 343 PG/ML (ref 0–450)
POTASSIUM SERPL-SCNC: 3.9 MMOL/L (ref 3.5–5.1)
SODIUM SERPL-SCNC: 137 MMOL/L (ref 136–145)
TSH SERPL DL<=0.005 MIU/L-ACNC: 0.8 MU/L (ref 0.4–4)

## 2018-10-25 PROCEDURE — 80048 BASIC METABOLIC PNL TOTAL CA: CPT | Performed by: PHYSICIAN ASSISTANT

## 2018-10-25 PROCEDURE — 99214 OFFICE O/P EST MOD 30 MIN: CPT | Performed by: PHYSICIAN ASSISTANT

## 2018-10-25 PROCEDURE — 84443 ASSAY THYROID STIM HORMONE: CPT | Performed by: PHYSICIAN ASSISTANT

## 2018-10-25 PROCEDURE — 83880 ASSAY OF NATRIURETIC PEPTIDE: CPT | Performed by: PHYSICIAN ASSISTANT

## 2018-10-25 PROCEDURE — 36415 COLL VENOUS BLD VENIPUNCTURE: CPT | Performed by: PHYSICIAN ASSISTANT

## 2018-10-25 RX ORDER — ATORVASTATIN CALCIUM 10 MG/1
40 TABLET, FILM COATED ORAL DAILY
Qty: 30 TABLET | Refills: 5 | Status: SHIPPED | OUTPATIENT
Start: 2018-10-25 | End: 2019-01-05

## 2018-10-25 RX ORDER — ALBUTEROL SULFATE 90 UG/1
2 AEROSOL, METERED RESPIRATORY (INHALATION) EVERY 6 HOURS
COMMUNITY
End: 2019-01-05

## 2018-10-25 RX ORDER — AMLODIPINE BESYLATE 5 MG/1
5 TABLET ORAL DAILY
Qty: 90 TABLET | Refills: 3 | Status: SHIPPED | OUTPATIENT
Start: 2018-10-25 | End: 2018-10-31

## 2018-10-25 NOTE — PROGRESS NOTES
Cardiology Clinic Progress Note    Hamida Verma MRN# [id number]   YOB: 1940 Age: 78 year old     Reason for visit: CORE Enrollment           Assessment and Plan:     In summary, the patient presents today for assessment of HFpEF after recent admission for the same. She had a prior admission in March for volume overload in the setting of rapid atrial fibrillation, however that does not appear to have been a contributing factor during this admission. Potentially her presentation was secondary to diastolic dysfunction, mildly uncontrolled blood pressures and underlying lung disease. She admittedly doesn't monitor her salt intake but is very regimented in her daily weights and twice daily blood pressure recordings.     1. HFpEF   - ECHO: EF 55-60%, E/E' avg 16, mild-mod LAE   - ACC/AHA Stage: C; FC I    - Etiology: age, htn, paf   - RV: normal   - Valves: Mild-mod TR   - Volume: Appears euvolemic    Admit Wt: 186 lbs    Current Wt: 179 - 180 lbs (per home scale)    O2 Requirements: RA    Diuretics: Lasix 20 mg daily   - Rhythm: SR / ApVs / PAF   - QRS: Narrow   - Device: DDDR PPM   - Other: TSH OK                 Stop-Bang Score: Low    2. PAF, s/p PPM - on flecainide 100 BID, Toprol 50 BID and Eliquis 5 BID. Appears well-controlled per recent device interrogation in September. Recent EKG benign.     3. HTN - could be better-controlled, with majority of her home readings in the upper 130's - 140's. On Lisinopril 40, Toprol 50 BID.     4. CAC - per CT. No sxs suggestive of angina. Melonie MPI this year was normal (small fixed defect suggestive of breast attenuation). LDL in July was 84 on Zocor 20.     5. Probable COPD - with some emphymatous changes noted on chest CT. +Smoking history. To have PFT's per PCP.       Plan:  - Will interrogate her device to ensure there are no contributing arrhythmias prompting her recent admission.  - If this is benign, will maintain Toprol at its current dose and add  Norvasc 5 mg daily. She has an accurate BP cuff at home and will continue to monitor.   - Will switch her from Zocor 20 to Lipitor 40 and repeat her lipids in 3 months.   - Educated on low salt diet. Instructed to continue daily weights and to call with weight gain of 3+ lbs from one day to next or 5+ lbs within one week.   - Follow up with me in 1 month, Dr. Voss in 3 months, Dr. Mallory in 6 months (she met him as an inpatient and would like to establish care).  - Continue routine PPM checks.  - OK to proceed with cataract surgery next month from a cardiac standpoint.          History of Presenting Illness:      Hamida Verma is a pleasant 78 year old patient of  Dr. Mallory and Dr. Voss who presents today for a CORE Enrollment visit.    The patient has a history of CAC on CT with a normal Melonie MPI in 3/2018, HFpEF, AF s/p PPM anticoagulated on Eliquis, HTN, HLP and VHD. She was admitted in March 2018 with fluid overload in the setting of rapid atrial fibrillation. She spontaneously converted but ultimately received a pacemaker for post-conversion pauses up to 4 seconds duration and following that was placed on flecainide for PAF with RVR. However she continued to have a significant burden of atrial fibrillation and flecainide was therefore this was stopped and a rate control strategy with Toprol was pursued. However in July of this year flecainide was re-started after AF with RVR in the 160's-170's was noted on her device interrogation. Her most recent interrogation was performed in September and revealed 82% Ap with <1% , no mode switches and adequate rate histogram.     She was then admitted for the second time with HFpEF on 10/11/18, after she was noted to be mildly hypoxic with SpO2 in the upper 80's and hypertensive when she presented for cataract surgery. She admitted to a dry cough with mild dyspnea for two days prior, however she hadn't taken anything for this as she wasn't sure what might  interact with her flecainide. Her home weight had crept up to 186 (she checks this consistently and typically varies between 179 - 182 lbs) however her chronic LE edema (L>R) had been stable on Lasix 20 mg daily. She was directed to the ED where a proBNP was slightly elevated at 1,100. Labs including troponin x 1 were WNL. CXR revealed vascular congestion and a small pleural effusion. TTE revealed a normal LVEF with mild diastolic dysfunction with an E/E' avg of 16 and mild-mod LAE, mild-mod TR without evidence of PHTN, as well as moderate aortic sclerosis without significant aortic stenosis. She was given several doses of IV Lasix 20 BID, supplemental O2 was weaned, and she was discharged three days later on her PTA Lasix. Discharge wt was 180 lbs. It was recommended she have outpatient PFT's for assessment of probable COPD.     Since home, she's weighed 179 - 180 lbs. In addition to checking daily weights, she records BID blood pressures at home. Her records for the past three weeks reveal 120's-140's/70's-80's. Labs including TSH, BMP and proBNP today are WNL.     Today, she feels very well, although does note a persistently running nose. She denies fevers, chills, nausea. She denies chest pain, shortness of breath, PND, orthopnea, palpitations, near syncope or syncope. Her LE edema is stable and chronically worse on the L. She typically elevates her legs in the mornings for this. She's never had palpitations. She's been told she snores in the past. She denies PND, daytime somnolence.     FHx is significant for a son and father who both passed from cardiac arrest (in late 50's and early 60's, respectively). Father had a history of alcohol and drug use. Mother had a history of CVA and CAD. The patient is  and has been dating her high school sweetheart for the past 9 years. She lives with her daughter and granddaughter. She has a longstanding history of smoking 1-1/2 packs a day for approximately 45 years  "but quit in 2000.  She feels she's probably compliant with a low-sodium diet but doesn't really monitor this. She states her diet had been stable before her admission. She denies drinking alcohol.  She ambulates without any assistive devices. She does her own grocery shopping. She's very involved with her [employer].     She's been re-scheduled for cataract surgery on November 6th.           Review of Systems:     12-pt ROS is negative except for as noted in the HPI.           Physical Exam:     Vitals: /80  Pulse 64  Ht 1.626 m (5' 4\")  Wt 83.3 kg (183 lb 9.6 oz)  LMP  (LMP Unknown)  SpO2 96%  BMI 31.51 kg/m2  Wt Readings from Last 3 Encounters:   10/25/18 83.3 kg (183 lb 9.6 oz)   10/17/18 82.7 kg (182 lb 4 oz)   10/14/18 81.7 kg (180 lb 1.6 oz)       Constitutional:  Patient is pleasant, alert, cooperative, and in NAD.  HEENT:  NCAT. PERRLA. EOM's intact. No masses or thyromegaly. Teeth in normal repair.   Neck:  CVP appears normal. No carotid bruits.   Chest:  Non-tender, normal A/P diameter.   Pulmonary: Normal respiratory effort. CTAB.   Cardiac: RRR, normal S1/S2, no S3/S4, grade 2-3/6 sm at the LSB  Abdomen:  Non-tender abdomen with normoactive bowel sounds, no hepatosplenomegaly appreciated.   Vascular: Pulses in the upper and lower extremities are 2+ and equal bilaterally.  Extremities: 1+ pitting pretibial edema on the left, trace on the right. +venous varicosities  Skin:  No rashes or lesions appreciated.   Neurological:  No gross motor or sensory deficits.   Psych: Appropriate affect.        Data:     Cardiac Diagnostics reviewed:  Type Date Result   TTE 10/12/18 Left ventricular systolic function is normal.  The left atrium is mild to moderately dilated.  There is moderate aortic sclerosis of the non-coronary cusp. No  hemodynamically significant valvular aortic stenosis.  There is mild to moderate (1-2+) tricuspid regurgitation.  Doppler findings do not suggest pulmonary hypertension.  E/E' " avg 16.  IVSd 1 / PWd 1.1    3/7/18 1. The left ventricle is normal in size. The visual ejection fraction is  estimated at 65%.  2. The right ventricle is normal in structure, function and size.  3. There is mild to moderate (1-2+) mitral regurgitation.  4. There is moderate (2+) tricuspid regurgitation. The right ventricular  systolic pressure is approximated at 28mmHg plus the right atrial pressure.  No previous echo for comparison.   Stress 3/8/18 (Melonie) 1.  Myocardial perfusion imaging using single isotope technique  demonstrated a small perfusion defect of mild severity involving the  left ventricular apex which is essentially fixed and most likely  related to breast attenuation. There is no evidence of significant  pharmacological stress-induced ischemia or prior myocardial infarction  on this study.   2. Gated images demonstrated normal left ventricular wall motion.  The left ventricular systolic function is 73% at rest and 74% on the post stress images.  3. There is no previous study for comparison.   EKG 10/11/18 NSR, 64 bpm. QRS 98, QTc 462.   Device PPM check, 9.24.18 [facility] (D) Remote PPM Device Check  AP: 82            %                    : <1 %  Mode: DDDR        Presenting Rhythm: AP/VS, AS/VS  Heart Rate: Adequate rates per histogram  Sensing: Stable    Pacing Threshold: Stable    Impedance: Stable  Battery Status: 10.4 years  Atrial Arrhythmia: No mode switch episodes. 1 PMT occurred, no EGM.   Ventricular Arrhythmia: None    CXR 10/11/18    Chest CT 3/14/18 Mild to moderate emphysematous changes in the lungs. Small  bilateral pleural effusion with adjacent atelectasis. Apparent mild  interstitial thickening throughout both lungs. No pericardial  effusion. A few nonspecific borderline-enlarged mediastinal lymph  nodes. Mild cardiomegaly. Atherosclerotic calcification in the  thoracic aorta and coronary arteries.   LE ultrasound 3/14/18 The left common femoral, superficial femoral,  popliteal and  posterior tibial veins are patent and fully compressible and  demonstrate normal venous Doppler flow. The visualized greater  saphenous vein is negative for thrombus. For comparison the right  common femoral vein was evaluated and was unremarkable.     IMPRESSION: No deep venous thrombosis demonstrated.     Recent Labs   Lab Test  10/25/18   0731  07/11/18   0907  03/15/18   2025  03/06/18   1744  03/23/17   0804  05/23/16   1056   LDL   --   84   --    --   91  103*   HDL   --   69   --    --   61  66   NHDL   --   102   --    --   113  124   CHOL   --   171   --    --   174  190   TRIG   --   88   --    --   109  104   TSH  0.80   --   0.64  0.54   --    --    NTBNP  343   --    --    --    --    --      Lab Results   Component Value Date    CHOL 171 07/11/2018    HDL 69 07/11/2018    LDL 84 07/11/2018    TRIG 88 07/11/2018    CHOLHDLRATIO 2.8 05/07/2015       Lab Results   Component Value Date    WBC 8.9 10/11/2018    RBC 4.46 10/11/2018    HGB 12.3 10/11/2018    HCT 38.3 10/11/2018    MCV 86 10/11/2018    MCH 27.6 10/11/2018    MCHC 32.1 10/11/2018    RDW 14.9 10/11/2018     10/11/2018       Lab Results   Component Value Date     10/25/2018    POTASSIUM 3.9 10/25/2018    CHLORIDE 103 10/25/2018    CO2 28 10/25/2018    ANIONGAP 9.9 10/25/2018    GLC 90 10/25/2018    BUN 18 10/25/2018    CR 0.91 10/25/2018    GFRESTIMATED 60 (L) 10/25/2018    GFRESTBLACK 72 10/25/2018    GURWINDER 9.0 10/25/2018      Lab Results   Component Value Date    AST 16 05/02/2018    ALT 25 05/02/2018       No results found for: A1C    Lab Results   Component Value Date    INR 1.31 (H) 03/19/2018    INR 1.04 11/12/2014          Problem List:     Patient Active Problem List   Diagnosis     Symptomatic menopausal or female climacteric states     HYPERLIPIDEMIA LDL GOAL <130     Hypertension goal BP (blood pressure) < 140/90     Knee pain     Obesity     Atrial fibrillation with rapid ventricular response (H)     S/P  cardiac pacemaker procedure     SSS (sick sinus syndrome) (H)     Chronic diastolic CHF (congestive heart failure) (H)     Pulmonary hypertension (H)            Medications:     Current Outpatient Prescriptions   Medication Sig Dispense Refill     albuterol (PROAIR HFA/PROVENTIL HFA/VENTOLIN HFA) 108 (90 Base) MCG/ACT inhaler Inhale 2 puffs into the lungs every 6 hours       alendronate (FOSAMAX) 70 MG tablet Take 1 tablet (70 mg) by mouth every 7 days Take 60 minutes before am meal with 8 oz. water. Remain upright for 30 minutes. 12 tablet 3     apixaban ANTICOAGULANT (ELIQUIS) 5 MG tablet Take 1 tablet (5 mg) by mouth 2 times daily 180 tablet 3     cholecalciferol (VITAMIN  -D) 1000 UNIT capsule Take 1 capsule by mouth daily.       FLAX SEED OIL OR 1 capsule daily       flecainide (TAMBOCOR) 100 MG tablet Take 1 tablet (100 mg) by mouth 2 times daily 90 tablet 3     fluticasone (FLONASE) 50 MCG/ACT spray Spray 1-2 sprays into both nostrils daily as needed for rhinitis or allergies       furosemide (LASIX) 20 MG tablet Take 1 tablet (20 mg) by mouth daily 30 tablet 5     Krill Oil 500 MG CAPS Take 1 capsule by mouth daily       lisinopril (PRINIVIL/ZESTRIL) 40 MG tablet Take 1 tablet (40 mg) by mouth daily 90 tablet 3     Methylsulfonylmethane (MSM) 500 MG CAPS Take 1 capsule by mouth daily       metoprolol succinate (TOPROL-XL) 50 MG 24 hr tablet Take 1 tablet (50 mg) by mouth 2 times daily 180 tablet 3     Milk Thistle 200 MG CAPS Take 1 capsule by mouth daily        Multiple Vitamins-Minerals (HAIR SKIN NAILS PO) Take 1 tablet by mouth At Bedtime       potassium & sodium phosphates 278-164-250 MG/75ML SOLR Take 1 tablet by mouth daily        simvastatin (ZOCOR) 20 MG tablet Take 1 tablet (20 mg) by mouth At Bedtime Profile Rx: patient will contact pharmacy when needed 90 tablet 3          Past Medical History:     Past Medical History:   Diagnosis Date     Atrial fibrillation (H)      Chronic diastolic CHF  (congestive heart failure) (H)      Essential hypertension, benign      HYPERLIPIDEMIA NEC/NOS 3/30/2006     Pulmonary hypertension (H)      SSS (sick sinus syndrome) (H)     s/p PPM 3/2018     Past Surgical History:   Procedure Laterality Date     HYSTERECTOMY, VAGINAL      hysterectomy     Family History   Problem Relation Age of Onset     Cerebrovascular Disease Mother      Glaucoma Mother      Macular Degeneration Mother      Alcohol/Drug Father      alcohol     Myocardial Infarction Father 63     passed away from MI     Family History Negative Sister      Family History Negative Sister      Family History Negative Sister      Cerebrovascular Disease Sister      Family History Negative Brother      Family History Negative Maternal Grandmother      Family History Negative Maternal Grandfather      Family History Negative Paternal Grandmother      Family History Negative Paternal Grandfather      Myocardial Infarction Son 57     passed away from MI     Dementia Daughter 57     early onset on [place]     Diabetes No family hx of      Breast Cancer No family hx of      Cancer - colorectal No family hx of      Social History     Social History     Marital status:      Spouse name: N/A     Number of children: N/A     Years of education: N/A     Occupational History     Not on file.     Social History Main Topics     Smoking status: Former Smoker     Start date: 10/28/1955     Quit date: 9/1/2000     Smokeless tobacco: Never Used      Comment: quit 6 yrs ago     Alcohol use No     Drug use: No     Sexual activity: Yes     Partners: Male     Other Topics Concern     Parent/Sibling W/ Cabg, Mi Or Angioplasty Before 65f 55m? No     Social History Narrative    Balanced Diet - Yes    Osteoporosis Prevention Measures - Dairy servings per day: 2    Regular Exercise -  Yes Describe walk, but not recently due to weather    Dental Exam - Yes    Eye Exam -No    Self Breast Exam - Yes    Abuse: Current or Past (Physical,  Sexual or Emotional)- No    Do you feel safe in your environment - Yes    Guns stored in the home - No    Sunscreen used - Yes    Seatbelts used - Yes    Lipids -  1/10    Glucose -  1/10    Colon Cancer Screening - Yes, 7/21/08    Hemoccults - NO    Pap Test -  Many yrs ago, has hysterectomy    Do you have any concerns about STD's -  No    Mammography - 6/18/08    DEXA - 4/13/06    Immunizations reviewed and up to date - No    Jonathan Marshall MA                        Allergies:   Strawberry flavor      Adriana Ferrell PA-C  [facility]  Pager: [phone]

## 2018-10-25 NOTE — LETTER
10/25/2018    Mikala Philip MD, MD  [address]  [facility] [address]    RE: Hamida Verma       Dear Colleague,    I had the pleasure of seeing Hamida Verma in the [facility].    Cardiology Clinic Progress Note    Hamida Verma MRN# [id number]   YOB: 1940 Age: 78 year old     Reason for visit: CORE Enrollment           Assessment and Plan:     In summary, the patient presents today for assessment of HFpEF after recent admission for the same. She had a prior admission in March for volume overload in the setting of rapid atrial fibrillation, however that does not appear to have been a contributing factor during this admission. Potentially her presentation was secondary to diastolic dysfunction, mildly uncontrolled blood pressures and underlying lung disease. She admittedly doesn't monitor her salt intake but is very regimented in her daily weights and twice daily blood pressure recordings.     1. HFpEF   - ECHO: EF 55-60%, E/E' avg 16, mild-mod LAE   - ACC/AHA Stage: C; FC I    - Etiology: age, htn, paf   - RV: normal   - Valves: Mild-mod TR   - Volume: Appears euvolemic    Admit Wt: 186 lbs    Current Wt: 179 - 180 lbs (per home scale)    O2 Requirements: RA    Diuretics: Lasix 20 mg daily   - Rhythm: SR / ApVs / PAF   - QRS: Narrow   - Device: DDDR PPM   - Other: TSH OK                 Stop-Bang Score: Low    2. PAF, s/p PPM - on flecainide 100 BID, Toprol 50 BID and Eliquis 5 BID. Appears well-controlled per recent device interrogation in September. Recent EKG benign.     3. HTN - could be better-controlled, with majority of her home readings in the upper 130's - 140's. On Lisinopril 40, Toprol 50 BID.     4. CAC - per CT. No sxs suggestive of angina. Melonie MPI this year was normal (small fixed defect suggestive of breast attenuation). LDL in July was 84 on Zocor 20.     5. Probable COPD - with some emphymatous changes noted on chest CT. +Smoking history.  To have PFT's per PCP.       Plan:  - Will interrogate her device to ensure there are no contributing arrhythmias prompting her recent admission.  - If this is benign, will maintain Toprol at its current dose and add Norvasc 5 mg daily. She has an accurate BP cuff at home and will continue to monitor.   - Will switch her from Zocor 20 to Lipitor 40 and repeat her lipids in 3 months.   - Educated on low salt diet. Instructed to continue daily weights and to call with weight gain of 3+ lbs from one day to next or 5+ lbs within one week.   - Follow up with me in 1 month, Dr. Voss in 3 months, Dr. Mallory in 6 months (she met him as an inpatient and would like to establish care).  - Continue routine PPM checks.  - OK to proceed with cataract surgery next month from a cardiac standpoint.          History of Presenting Illness:      Hamida Verma is a pleasant 78 year old patient of  Dr. Mallory and Dr. Voss who presents today for a CORE Enrollment visit.    The patient has a history of CAC on CT with a normal Melonie MPI in 3/2018, HFpEF, AF s/p PPM anticoagulated on Eliquis, HTN, HLP and VHD. She was admitted in March 2018 with fluid overload in the setting of rapid atrial fibrillation. She spontaneously converted but ultimately received a pacemaker for post-conversion pauses up to 4 seconds duration and following that was placed on flecainide for PAF with RVR. However she continued to have a significant burden of atrial fibrillation and flecainide was therefore this was stopped and a rate control strategy with Toprol was pursued. However in July of this year flecainide was re-started after AF with RVR in the 160's-170's was noted on her device interrogation. Her most recent interrogation was performed in September and revealed 82% Ap with <1% , no mode switches and adequate rate histogram.     She was then admitted for the second time with HFpEF on 10/11/18, after she was noted to be mildly hypoxic with SpO2  in the upper 80's and hypertensive when she presented for cataract surgery. She admitted to a dry cough with mild dyspnea for two days prior, however she hadn't taken anything for this as she wasn't sure what might interact with her flecainide. Her home weight had crept up to 186 (she checks this consistently and typically varies between 179 - 182 lbs) however her chronic LE edema (L>R) had been stable on Lasix 20 mg daily. She was directed to the ED where a proBNP was slightly elevated at 1,100. Labs including troponin x 1 were WNL. CXR revealed vascular congestion and a small pleural effusion. TTE revealed a normal LVEF with mild diastolic dysfunction with an E/E' avg of 16 and mild-mod LAE, mild-mod TR without evidence of PHTN, as well as moderate aortic sclerosis without significant aortic stenosis. She was given several doses of IV Lasix 20 BID, supplemental O2 was weaned, and she was discharged three days later on her PTA Lasix. Discharge wt was 180 lbs. It was recommended she have outpatient PFT's for assessment of probable COPD.     Since home, she's weighed 179 - 180 lbs. In addition to checking daily weights, she records BID blood pressures at home. Her records for the past three weeks reveal 120's-140's/70's-80's. Labs including TSH, BMP and proBNP today are WNL.     Today, she feels very well, although does note a persistently running nose. She denies fevers, chills, nausea. She denies chest pain, shortness of breath, PND, orthopnea, palpitations, near syncope or syncope. Her LE edema is stable and chronically worse on the L. She typically elevates her legs in the mornings for this. She's never had palpitations. She's been told she snores in the past. She denies PND, daytime somnolence.     FHx is significant for a son and father who both passed from cardiac arrest (in late 50's and early 60's, respectively). Father had a history of alcohol and drug use. Mother had a history of CVA and CAD. The patient  "is  and has been dating her high school sweetheart for the past 9 years. She lives with her daughter and granddaughter.  She has a longstanding history of smoking 1-1/2 packs a day for approximately 45 years but quit in 2000.   She feels she's probably compliant with a low-sodium diet but doesn't really monitor this. She states her diet had been stable before her admission. She denies drinking alcohol.  She ambulates without any assistive devices. She does her own grocery shopping. She's very involved with her Jew.     She's been re-scheduled for cataract surgery on November 6th.           Review of Systems:     12-pt ROS is negative except for as noted in the HPI.           Physical Exam:     Vitals: /80  Pulse 64  Ht 1.626 m (5' 4\")  Wt 83.3 kg (183 lb 9.6 oz)  LMP  (LMP Unknown)  SpO2 96%  BMI 31.51 kg/m2  Wt Readings from Last 3 Encounters:   10/25/18 83.3 kg (183 lb 9.6 oz)   10/17/18 82.7 kg (182 lb 4 oz)   10/14/18 81.7 kg (180 lb 1.6 oz)       Constitutional:  Patient is pleasant, alert, cooperative, and in NAD.  HEENT:  NCAT. PERRLA. EOM's intact. No masses or thyromegaly. Teeth in normal repair.   Neck:  CVP appears normal. No carotid bruits.   Chest:  Non-tender, normal A/P diameter.   Pulmonary: Normal respiratory effort. CTAB.   Cardiac: RRR, normal S1/S2, no S3/S4, grade 2-3/6 sm at the LSB  Abdomen:  Non-tender abdomen with normoactive bowel sounds, no hepatosplenomegaly appreciated.   Vascular: Pulses in the upper and lower extremities are 2+ and equal bilaterally.  Extremities: 1+ pitting pretibial edema on the left, trace on the right. +venous varicosities  Skin:  No rashes or lesions appreciated.   Neurological:  No gross motor or sensory deficits.   Psych: Appropriate affect.        Data:     Cardiac Diagnostics reviewed:  Type Date Result   TTE 10/12/18 Left ventricular systolic function is normal.  The left atrium is mild to moderately dilated.  There is moderate aortic " sclerosis of the non-coronary cusp. No  hemodynamically significant valvular aortic stenosis.  There is mild to moderate (1-2+) tricuspid regurgitation.  Doppler findings do not suggest pulmonary hypertension.  E/E' avg 16.  IVSd 1 / PWd 1.1    3/7/18 1. The left ventricle is normal in size. The visual ejection fraction is  estimated at 65%.  2. The right ventricle is normal in structure, function and size.  3. There is mild to moderate (1-2+) mitral regurgitation.  4. There is moderate (2+) tricuspid regurgitation. The right ventricular  systolic pressure is approximated at 28mmHg plus the right atrial pressure.  No previous echo for comparison.   Stress 3/8/18 (Melonie) 1.  Myocardial perfusion imaging using single isotope technique  demonstrated a small perfusion defect of mild severity involving the  left ventricular apex which is essentially fixed and most likely  related to breast attenuation. There is no evidence of significant  pharmacological stress-induced ischemia or prior myocardial infarction  on this study.   2. Gated images demonstrated normal left ventricular wall motion.  The left ventricular systolic function is 73% at rest and 74% on the post stress images.  3. There is no previous study for comparison.   EKG 10/11/18 NSR, 64 bpm. QRS 98, QTc 462.   Device PPM check, 9.24.18 [employer] (D) Remote PPM Device Check  AP: 82            %                    : <1 %  Mode: DDDR        Presenting Rhythm: AP/VS, AS/VS  Heart Rate: Adequate rates per histogram  Sensing: Stable    Pacing Threshold: Stable    Impedance: Stable  Battery Status: 10.4 years  Atrial Arrhythmia: No mode switch episodes. 1 PMT occurred, no EGM.   Ventricular Arrhythmia: None    CXR 10/11/18    Chest CT 3/14/18 Mild to moderate emphysematous changes in the lungs. Small  bilateral pleural effusion with adjacent atelectasis. Apparent mild  interstitial thickening throughout both lungs. No pericardial  effusion. A few nonspecific  borderline-enlarged mediastinal lymph  nodes. Mild cardiomegaly. Atherosclerotic calcification in the  thoracic aorta and coronary arteries.   LE ultrasound 3/14/18 The left common femoral, superficial femoral, popliteal and  posterior tibial veins are patent and fully compressible and  demonstrate normal venous Doppler flow. The visualized greater  saphenous vein is negative for thrombus. For comparison the right  common femoral vein was evaluated and was unremarkable.     IMPRESSION: No deep venous thrombosis demonstrated.     Recent Labs   Lab Test  10/25/18   0731  07/11/18   0907  03/15/18   2025  03/06/18   1744  03/23/17   0804  05/23/16   1056   LDL   --   84   --    --   91  103*   HDL   --   69   --    --   61  66   NHDL   --   102   --    --   113  124   CHOL   --   171   --    --   174  190   TRIG   --   88   --    --   109  104   TSH  0.80   --   0.64  0.54   --    --    NTBNP  343   --    --    --    --    --      Lab Results   Component Value Date    CHOL 171 07/11/2018    HDL 69 07/11/2018    LDL 84 07/11/2018    TRIG 88 07/11/2018    CHOLHDLRATIO 2.8 05/07/2015       Lab Results   Component Value Date    WBC 8.9 10/11/2018    RBC 4.46 10/11/2018    HGB 12.3 10/11/2018    HCT 38.3 10/11/2018    MCV 86 10/11/2018    MCH 27.6 10/11/2018    MCHC 32.1 10/11/2018    RDW 14.9 10/11/2018     10/11/2018       Lab Results   Component Value Date     10/25/2018    POTASSIUM 3.9 10/25/2018    CHLORIDE 103 10/25/2018    CO2 28 10/25/2018    ANIONGAP 9.9 10/25/2018    GLC 90 10/25/2018    BUN 18 10/25/2018    CR 0.91 10/25/2018    GFRESTIMATED 60 (L) 10/25/2018    GFRESTBLACK 72 10/25/2018    GURWINDER 9.0 10/25/2018      Lab Results   Component Value Date    AST 16 05/02/2018    ALT 25 05/02/2018       No results found for: A1C    Lab Results   Component Value Date    INR 1.31 (H) 03/19/2018    INR 1.04 11/12/2014          Problem List:     Patient Active Problem List   Diagnosis     Symptomatic menopausal  or female climacteric states     HYPERLIPIDEMIA LDL GOAL <130     Hypertension goal BP (blood pressure) < 140/90     Knee pain     Obesity     Atrial fibrillation with rapid ventricular response (H)     S/P cardiac pacemaker procedure     SSS (sick sinus syndrome) (H)     Chronic diastolic CHF (congestive heart failure) (H)     Pulmonary hypertension (H)            Medications:     Current Outpatient Prescriptions   Medication Sig Dispense Refill     albuterol (PROAIR HFA/PROVENTIL HFA/VENTOLIN HFA) 108 (90 Base) MCG/ACT inhaler Inhale 2 puffs into the lungs every 6 hours       alendronate (FOSAMAX) 70 MG tablet Take 1 tablet (70 mg) by mouth every 7 days Take 60 minutes before am meal with 8 oz. water. Remain upright for 30 minutes. 12 tablet 3     apixaban ANTICOAGULANT (ELIQUIS) 5 MG tablet Take 1 tablet (5 mg) by mouth 2 times daily 180 tablet 3     cholecalciferol (VITAMIN  -D) 1000 UNIT capsule Take 1 capsule by mouth daily.       FLAX SEED OIL OR 1 capsule daily       flecainide (TAMBOCOR) 100 MG tablet Take 1 tablet (100 mg) by mouth 2 times daily 90 tablet 3     fluticasone (FLONASE) 50 MCG/ACT spray Spray 1-2 sprays into both nostrils daily as needed for rhinitis or allergies       furosemide (LASIX) 20 MG tablet Take 1 tablet (20 mg) by mouth daily 30 tablet 5     Krill Oil 500 MG CAPS Take 1 capsule by mouth daily       lisinopril (PRINIVIL/ZESTRIL) 40 MG tablet Take 1 tablet (40 mg) by mouth daily 90 tablet 3     Methylsulfonylmethane (MSM) 500 MG CAPS Take 1 capsule by mouth daily       metoprolol succinate (TOPROL-XL) 50 MG 24 hr tablet Take 1 tablet (50 mg) by mouth 2 times daily 180 tablet 3     Milk Thistle 200 MG CAPS Take 1 capsule by mouth daily        Multiple Vitamins-Minerals (HAIR SKIN NAILS PO) Take 1 tablet by mouth At Bedtime       potassium & sodium phosphates 278-164-250 MG/75ML SOLR Take 1 tablet by mouth daily        simvastatin (ZOCOR) 20 MG tablet Take 1 tablet (20 mg) by mouth At  Bedtime Profile Rx: patient will contact pharmacy when needed 90 tablet 3          Past Medical History:     Past Medical History:   Diagnosis Date     Atrial fibrillation (H)      Chronic diastolic CHF (congestive heart failure) (H)      Essential hypertension, benign      HYPERLIPIDEMIA NEC/NOS 3/30/2006     Pulmonary hypertension (H)      SSS (sick sinus syndrome) (H)     s/p PPM 3/2018     Past Surgical History:   Procedure Laterality Date     HYSTERECTOMY, VAGINAL      hysterectomy     Family History   Problem Relation Age of Onset     Cerebrovascular Disease Mother      Glaucoma Mother      Macular Degeneration Mother      Alcohol/Drug Father      alcohol     Myocardial Infarction Father 63     passed away from MI     Family History Negative Sister      Family History Negative Sister      Family History Negative Sister      Cerebrovascular Disease Sister      Family History Negative Brother      Family History Negative Maternal Grandmother      Family History Negative Maternal Grandfather      Family History Negative Paternal Grandmother      Family History Negative Paternal Grandfather      Myocardial Infarction Son 57     passed away from MI     Dementia Daughter 57     early onset on [place]     Diabetes No family hx of      Breast Cancer No family hx of      Cancer - colorectal No family hx of      Social History     Social History     Marital status:      Spouse name: N/A     Number of children: N/A     Years of education: N/A     Occupational History     Not on file.     Social History Main Topics     Smoking status: Former Smoker     Start date: 10/28/1955     Quit date: 9/1/2000     Smokeless tobacco: Never Used      Comment: quit 6 yrs ago     Alcohol use No     Drug use: No     Sexual activity: Yes     Partners: Male     Other Topics Concern     Parent/Sibling W/ Cabg, Mi Or Angioplasty Before 65f 55m? No     Social History Narrative    Balanced Diet - Yes    Osteoporosis Prevention Measures -  Dairy servings per day: 2    Regular Exercise -  Yes Describe walk, but not recently due to weather    Dental Exam - Yes    Eye Exam -No    Self Breast Exam - Yes    Abuse: Current or Past (Physical, Sexual or Emotional)- No    Do you feel safe in your environment - Yes    Guns stored in the home - No    Sunscreen used - Yes    Seatbelts used - Yes    Lipids -  1/10    Glucose -  1/10    Colon Cancer Screening - Yes, 7/21/08    Hemoccults - NO    Pap Test -  Many yrs ago, has hysterectomy    Do you have any concerns about STD's -  No    Mammography - 6/18/08    DEXA - 4/13/06    Immunizations reviewed and up to date - No    Jonathan Marshall MA                        Allergies:   Strawberry flavor      Adriana Ferrell PA-C  [facility]  Pager: [phone]      Thank you for allowing me to participate in the care of your patient.    Sincerely,     Adriana Ferrell PA-C     [facility]

## 2018-10-25 NOTE — LETTER
10/25/2018    Mikala Philip MD, MD  [address]  [facility] [address]    RE: Hamida Verma       Dear Colleague,    I had the pleasure of seeing Hamida Verma in the [facility].    Cardiology Clinic Progress Note    Hamida Verma MRN# [id number]   YOB: 1940 Age: 78 year old     Reason for visit: CORE Enrollment           Assessment and Plan:     In summary, the patient presents today for assessment of HFpEF after recent admission for the same. She had a prior admission in March for volume overload in the setting of rapid atrial fibrillation, however that does not appear to have been a contributing factor during this admission. Potentially her presentation was secondary to diastolic dysfunction, mildly uncontrolled blood pressures and underlying lung disease. She admittedly doesn't monitor her salt intake but is very regimented in her daily weights and twice daily blood pressure recordings.     1. HFpEF   - ECHO: EF 55-60%, E/E' avg 16, mild-mod LAE   - ACC/AHA Stage: C; FC I    - Etiology: age, htn, paf   - RV: normal   - Valves: Mild-mod TR   - Volume: Appears euvolemic    Admit Wt: 186 lbs    Current Wt: 179 - 180 lbs (per home scale)    O2 Requirements: RA    Diuretics: Lasix 20 mg daily   - Rhythm: SR / ApVs / PAF   - QRS: Narrow   - Device: DDDR PPM   - Other: TSH OK                 Stop-Bang Score: Low    2. PAF, s/p PPM - on flecainide 100 BID, Toprol 50 BID and Eliquis 5 BID. Appears well-controlled per recent device interrogation in September. Recent EKG benign.     3. HTN - could be better-controlled, with majority of her home readings in the upper 130's - 140's. On Lisinopril 40, Toprol 50 BID.     4. CAC - per CT. No sxs suggestive of angina. Melonie MPI this year was normal (small fixed defect suggestive of breast attenuation). LDL in July was 84 on Zocor 20.     5. Probable COPD - with some emphymatous changes noted on chest CT. +Smoking history.  To have PFT's per PCP.       Plan:  - Will interrogate her device to ensure there are no contributing arrhythmias prompting her recent admission.  - If this is benign, will maintain Toprol at its current dose and add Norvasc 5 mg daily. She has an accurate BP cuff at home and will continue to monitor.   - Will switch her from Zocor 20 to Lipitor 40 and repeat her lipids in 3 months.   - Educated on low salt diet. Instructed to continue daily weights and to call with weight gain of 3+ lbs from one day to next or 5+ lbs within one week.   - Follow up with me in 1 month, Dr. Voss in 3 months, Dr. Mallory in 6 months (she met him as an inpatient and would like to establish care).  - Continue routine PPM checks.  - OK to proceed with cataract surgery next month from a cardiac standpoint.          History of Presenting Illness:      Hamida Verma is a pleasant 78 year old patient of  Dr. Mallory and Dr. Voss who presents today for a CORE Enrollment visit.    The patient has a history of CAC on CT with a normal Melonie MPI in 3/2018, HFpEF, AF s/p PPM anticoagulated on Eliquis, HTN, HLP and VHD. She was admitted in March 2018 with fluid overload in the setting of rapid atrial fibrillation. She spontaneously converted but ultimately received a pacemaker for post-conversion pauses up to 4 seconds duration and following that was placed on flecainide for PAF with RVR. However she continued to have a significant burden of atrial fibrillation and flecainide was therefore this was stopped and a rate control strategy with Toprol was pursued. However in July of this year flecainide was re-started after AF with RVR in the 160's-170's was noted on her device interrogation. Her most recent interrogation was performed in September and revealed 82% Ap with <1% , no mode switches and adequate rate histogram.     She was then admitted for the second time with HFpEF on 10/11/18, after she was noted to be mildly hypoxic with SpO2  in the upper 80's and hypertensive when she presented for cataract surgery. She admitted to a dry cough with mild dyspnea for two days prior, however she hadn't taken anything for this as she wasn't sure what might interact with her flecainide. Her home weight had crept up to 186 (she checks this consistently and typically varies between 179 - 182 lbs) however her chronic LE edema (L>R) had been stable on Lasix 20 mg daily. She was directed to the ED where a proBNP was slightly elevated at 1,100. Labs including troponin x 1 were WNL. CXR revealed vascular congestion and a small pleural effusion. TTE revealed a normal LVEF with mild diastolic dysfunction with an E/E' avg of 16 and mild-mod LAE, mild-mod TR without evidence of PHTN, as well as moderate aortic sclerosis without significant aortic stenosis. She was given several doses of IV Lasix 20 BID, supplemental O2 was weaned, and she was discharged three days later on her PTA Lasix. Discharge wt was 180 lbs. It was recommended she have outpatient PFT's for assessment of probable COPD.     Since home, she's weighed 179 - 180 lbs. In addition to checking daily weights, she records BID blood pressures at home. Her records for the past three weeks reveal 120's-140's/70's-80's. Labs including TSH, BMP and proBNP today are WNL.     Today, she feels very well, although does note a persistently running nose. She denies fevers, chills, nausea. She denies chest pain, shortness of breath, PND, orthopnea, palpitations, near syncope or syncope. Her LE edema is stable and chronically worse on the L. She typically elevates her legs in the mornings for this. She's never had palpitations. She's been told she snores in the past. She denies PND, daytime somnolence.     FHx is significant for a son and father who both passed from cardiac arrest (in late 50's and early 60's, respectively). Father had a history of alcohol and drug use. Mother had a history of CVA and CAD. The patient  "is  and has been dating her high school sweetheart for the past 9 years. She lives with her daughter and granddaughter.  She has a longstanding history of smoking 1-1/2 packs a day for approximately 45 years but quit in 2000.   She feels she's probably compliant with a low-sodium diet but doesn't really monitor this. She states her diet had been stable before her admission. She denies drinking alcohol.  She ambulates without any assistive devices. She does her own grocery shopping. She's very involved with her Scientologist.     She's been re-scheduled for cataract surgery on November 6th.           Review of Systems:     12-pt ROS is negative except for as noted in the HPI.           Physical Exam:     Vitals: /80  Pulse 64  Ht 1.626 m (5' 4\")  Wt 83.3 kg (183 lb 9.6 oz)  LMP  (LMP Unknown)  SpO2 96%  BMI 31.51 kg/m2  Wt Readings from Last 3 Encounters:   10/25/18 83.3 kg (183 lb 9.6 oz)   10/17/18 82.7 kg (182 lb 4 oz)   10/14/18 81.7 kg (180 lb 1.6 oz)       Constitutional:  Patient is pleasant, alert, cooperative, and in NAD.  HEENT:  NCAT. PERRLA. EOM's intact. No masses or thyromegaly. Teeth in normal repair.   Neck:  CVP appears normal. No carotid bruits.   Chest:  Non-tender, normal A/P diameter.   Pulmonary: Normal respiratory effort. CTAB.   Cardiac: RRR, normal S1/S2, no S3/S4, grade 2-3/6 sm at the LSB  Abdomen:  Non-tender abdomen with normoactive bowel sounds, no hepatosplenomegaly appreciated.   Vascular: Pulses in the upper and lower extremities are 2+ and equal bilaterally.  Extremities: 1+ pitting pretibial edema on the left, trace on the right. +venous varicosities  Skin:  No rashes or lesions appreciated.   Neurological:  No gross motor or sensory deficits.   Psych: Appropriate affect.        Data:     Cardiac Diagnostics reviewed:  Type Date Result   TTE 10/12/18 Left ventricular systolic function is normal.  The left atrium is mild to moderately dilated.  There is moderate aortic " sclerosis of the non-coronary cusp. No  hemodynamically significant valvular aortic stenosis.  There is mild to moderate (1-2+) tricuspid regurgitation.  Doppler findings do not suggest pulmonary hypertension.  E/E' avg 16.  IVSd 1 / PWd 1.1    3/7/18 1. The left ventricle is normal in size. The visual ejection fraction is  estimated at 65%.  2. The right ventricle is normal in structure, function and size.  3. There is mild to moderate (1-2+) mitral regurgitation.  4. There is moderate (2+) tricuspid regurgitation. The right ventricular  systolic pressure is approximated at 28mmHg plus the right atrial pressure.  No previous echo for comparison.   Stress 3/8/18 (Melonie) 1.  Myocardial perfusion imaging using single isotope technique  demonstrated a small perfusion defect of mild severity involving the  left ventricular apex which is essentially fixed and most likely  related to breast attenuation. There is no evidence of significant  pharmacological stress-induced ischemia or prior myocardial infarction  on this study.   2. Gated images demonstrated normal left ventricular wall motion.  The left ventricular systolic function is 73% at rest and 74% on the post stress images.  3. There is no previous study for comparison.   EKG 10/11/18 NSR, 64 bpm. QRS 98, QTc 462.   Device PPM check, 9.24.18 [employer] (D) Remote PPM Device Check  AP: 82            %                    : <1 %  Mode: DDDR        Presenting Rhythm: AP/VS, AS/VS  Heart Rate: Adequate rates per histogram  Sensing: Stable    Pacing Threshold: Stable    Impedance: Stable  Battery Status: 10.4 years  Atrial Arrhythmia: No mode switch episodes. 1 PMT occurred, no EGM.   Ventricular Arrhythmia: None    CXR 10/11/18    Chest CT 3/14/18 Mild to moderate emphysematous changes in the lungs. Small  bilateral pleural effusion with adjacent atelectasis. Apparent mild  interstitial thickening throughout both lungs. No pericardial  effusion. A few nonspecific  borderline-enlarged mediastinal lymph  nodes. Mild cardiomegaly. Atherosclerotic calcification in the  thoracic aorta and coronary arteries.   LE ultrasound 3/14/18 The left common femoral, superficial femoral, popliteal and  posterior tibial veins are patent and fully compressible and  demonstrate normal venous Doppler flow. The visualized greater  saphenous vein is negative for thrombus. For comparison the right  common femoral vein was evaluated and was unremarkable.     IMPRESSION: No deep venous thrombosis demonstrated.     Recent Labs   Lab Test  10/25/18   0731  07/11/18   0907  03/15/18   2025  03/06/18   1744  03/23/17   0804  05/23/16   1056   LDL   --   84   --    --   91  103*   HDL   --   69   --    --   61  66   NHDL   --   102   --    --   113  124   CHOL   --   171   --    --   174  190   TRIG   --   88   --    --   109  104   TSH  0.80   --   0.64  0.54   --    --    NTBNP  343   --    --    --    --    --      Lab Results   Component Value Date    CHOL 171 07/11/2018    HDL 69 07/11/2018    LDL 84 07/11/2018    TRIG 88 07/11/2018    CHOLHDLRATIO 2.8 05/07/2015       Lab Results   Component Value Date    WBC 8.9 10/11/2018    RBC 4.46 10/11/2018    HGB 12.3 10/11/2018    HCT 38.3 10/11/2018    MCV 86 10/11/2018    MCH 27.6 10/11/2018    MCHC 32.1 10/11/2018    RDW 14.9 10/11/2018     10/11/2018       Lab Results   Component Value Date     10/25/2018    POTASSIUM 3.9 10/25/2018    CHLORIDE 103 10/25/2018    CO2 28 10/25/2018    ANIONGAP 9.9 10/25/2018    GLC 90 10/25/2018    BUN 18 10/25/2018    CR 0.91 10/25/2018    GFRESTIMATED 60 (L) 10/25/2018    GFRESTBLACK 72 10/25/2018    GURWINDER 9.0 10/25/2018      Lab Results   Component Value Date    AST 16 05/02/2018    ALT 25 05/02/2018       No results found for: A1C    Lab Results   Component Value Date    INR 1.31 (H) 03/19/2018    INR 1.04 11/12/2014          Problem List:     Patient Active Problem List   Diagnosis     Symptomatic menopausal  or female climacteric states     HYPERLIPIDEMIA LDL GOAL <130     Hypertension goal BP (blood pressure) < 140/90     Knee pain     Obesity     Atrial fibrillation with rapid ventricular response (H)     S/P cardiac pacemaker procedure     SSS (sick sinus syndrome) (H)     Chronic diastolic CHF (congestive heart failure) (H)     Pulmonary hypertension (H)            Medications:     Current Outpatient Prescriptions   Medication Sig Dispense Refill     albuterol (PROAIR HFA/PROVENTIL HFA/VENTOLIN HFA) 108 (90 Base) MCG/ACT inhaler Inhale 2 puffs into the lungs every 6 hours       alendronate (FOSAMAX) 70 MG tablet Take 1 tablet (70 mg) by mouth every 7 days Take 60 minutes before am meal with 8 oz. water. Remain upright for 30 minutes. 12 tablet 3     apixaban ANTICOAGULANT (ELIQUIS) 5 MG tablet Take 1 tablet (5 mg) by mouth 2 times daily 180 tablet 3     cholecalciferol (VITAMIN  -D) 1000 UNIT capsule Take 1 capsule by mouth daily.       FLAX SEED OIL OR 1 capsule daily       flecainide (TAMBOCOR) 100 MG tablet Take 1 tablet (100 mg) by mouth 2 times daily 90 tablet 3     fluticasone (FLONASE) 50 MCG/ACT spray Spray 1-2 sprays into both nostrils daily as needed for rhinitis or allergies       furosemide (LASIX) 20 MG tablet Take 1 tablet (20 mg) by mouth daily 30 tablet 5     Krill Oil 500 MG CAPS Take 1 capsule by mouth daily       lisinopril (PRINIVIL/ZESTRIL) 40 MG tablet Take 1 tablet (40 mg) by mouth daily 90 tablet 3     Methylsulfonylmethane (MSM) 500 MG CAPS Take 1 capsule by mouth daily       metoprolol succinate (TOPROL-XL) 50 MG 24 hr tablet Take 1 tablet (50 mg) by mouth 2 times daily 180 tablet 3     Milk Thistle 200 MG CAPS Take 1 capsule by mouth daily        Multiple Vitamins-Minerals (HAIR SKIN NAILS PO) Take 1 tablet by mouth At Bedtime       potassium & sodium phosphates 278-164-250 MG/75ML SOLR Take 1 tablet by mouth daily        simvastatin (ZOCOR) 20 MG tablet Take 1 tablet (20 mg) by mouth At  Bedtime Profile Rx: patient will contact pharmacy when needed 90 tablet 3          Past Medical History:     Past Medical History:   Diagnosis Date     Atrial fibrillation (H)      Chronic diastolic CHF (congestive heart failure) (H)      Essential hypertension, benign      HYPERLIPIDEMIA NEC/NOS 3/30/2006     Pulmonary hypertension (H)      SSS (sick sinus syndrome) (H)     s/p PPM 3/2018     Past Surgical History:   Procedure Laterality Date     HYSTERECTOMY, VAGINAL      hysterectomy     Family History   Problem Relation Age of Onset     Cerebrovascular Disease Mother      Glaucoma Mother      Macular Degeneration Mother      Alcohol/Drug Father      alcohol     Myocardial Infarction Father 63     passed away from MI     Family History Negative Sister      Family History Negative Sister      Family History Negative Sister      Cerebrovascular Disease Sister      Family History Negative Brother      Family History Negative Maternal Grandmother      Family History Negative Maternal Grandfather      Family History Negative Paternal Grandmother      Family History Negative Paternal Grandfather      Myocardial Infarction Son 57     passed away from MI     Dementia Daughter 57     early onset on [place]enda     Diabetes No family hx of      Breast Cancer No family hx of      Cancer - colorectal No family hx of      Social History     Social History     Marital status:      Spouse name: N/A     Number of children: N/A     Years of education: N/A     Occupational History     Not on file.     Social History Main Topics     Smoking status: Former Smoker     Start date: 10/28/1955     Quit date: 9/1/2000     Smokeless tobacco: Never Used      Comment: quit 6 yrs ago     Alcohol use No     Drug use: No     Sexual activity: Yes     Partners: Male     Other Topics Concern     Parent/Sibling W/ Cabg, Mi Or Angioplasty Before 65f 55m? No     Social History Narrative    Balanced Diet - Yes    Osteoporosis Prevention Measures -  Dairy servings per day: 2    Regular Exercise -  Yes Describe walk, but not recently due to weather    Dental Exam - Yes    Eye Exam -No    Self Breast Exam - Yes    Abuse: Current or Past (Physical, Sexual or Emotional)- No    Do you feel safe in your environment - Yes    Guns stored in the home - No    Sunscreen used - Yes    Seatbelts used - Yes    Lipids -  1/10    Glucose -  1/10    Colon Cancer Screening - Yes, 7/21/08    Hemoccults - NO    Pap Test -  Many yrs ago, has hysterectomy    Do you have any concerns about STD's -  No    Mammography - 6/18/08    DEXA - 4/13/06    Immunizations reviewed and up to date - No    Jonathan Marshall MA                        Allergies:   Strawberry flavor      Adriana Ferrell PA-C  [facility]  Pager: [phone]      Thank you for allowing me to participate in the care of your patient.      Sincerely,     Adriana Ferrell PA-C     [facility]    cc:   No referring provider defined for this encounter.

## 2018-10-25 NOTE — PATIENT INSTRUCTIONS
Call CORE nurse for any questions or concerns:  [phone]   *If you have concerns after hours, please call [phone], option 2 to speak with on call Cardiologist.    1. When you arrive home, please send a remote transmission from your pacemaker. I'll call you once I see that, and let you know if you should increase your Toprol vs start an additional medication for your blood pressure. I'd like to see you back in one month for reassessment. Please see Dr. Voss in three months and Dr. Mauri Chase back in six months.     Stop taking Zocor (simvastatin) and start taking Lipitor (atorvastatin) 40 mg daily. We'll re-check your cholesterol in 3 months.     2. Continue to weigh yourself daily and write it down. Let me know if your blood pressure continues to sit persistently > 140 on the top after we make the medication changes.      3. Call CORE nurse if your weight is up more than 2 pounds in one day or 5 pounds in one week.     4. Call CORE nurse if you feel more short of breath, have more abdominal bloating, or leg swelling.     5. Start keeping track of your sodium intake, and follow a low sodium diet (less than 2000 mg daily). If you eat less salt, you will retain less fluid.     6. Please ask your PCP if you should have the pulmonary function testing done.      7. Do NOT take Aleve or ibuprofen without talking to your doctor first.      8. Lab Results are very good.      Component      Latest Ref Rng & Units 10/14/2018 10/25/2018   Sodium      136 - 145 mmol/L 139 137   Potassium      3.5 - 5.1 mmol/L 3.8 3.9   Chloride      98 - 107 mmol/L 103 103   Carbon Dioxide      23 - 29 mmol/L 30 28   Anion Gap      6 - 17 mmol/L 6 9.9   Glucose      70 - 105 mg/dL 101 (H) 90   Urea Nitrogen      7 - 30 mg/dL 15 18   Creatinine      0.70 - 1.30 mg/dL 0.70 0.91   GFR Estimate      >60 mL/min/1.7m2 81 60 (L)   GFR Estimate If Black      >60 mL/min/1.7m2 >90 72   Calcium      8.5 - 10.5 mg/dL 8.2 (L) 9.0   N-Terminal Pro  Bnp      0 - 450 pg/mL  343   TSH      0.40 - 4.00 mU/L  0.80     [facility]: Cardiomyopathy, Optimization, Rehabilitation, Education  The [facility] is a heart failure specialty clinic within the [facility] where you will work with specialized nurse practitioners, physician assistants, doctors, and registered nurses. They are dedicated to helping patients with heart failure to carefully adjust medications, receive education, and learn who and when to call if symptoms develop. They specialize in helping you better understand your condition, slow the progression of your disease, improve the length and quality of your life, help you detect future heart problems before they become life threatening, and avoid hospitalizations.       No

## 2018-10-25 NOTE — TELEPHONE ENCOUNTER
I called pt and reviewed recommendation from Adriana Ferrell to start norvasc 5 mg daily since device interrogation did not show any arrhythmia. Pt instructed to monitor for any side effects such as lower extremity swelling or facial flushing. She monitors her bp routinely and will notify us if dropping too low or if she has lightheadedness/ dizziness.Script sent to pharmacy. Darling GRANADOS October 25, 2018, 2:46 PM

## 2018-10-25 NOTE — MR AVS SNAPSHOT
After Visit Summary   10/25/2018    Hamida Verma    MRN: [id number]           Patient Information     Date Of Birth          1940        Visit Information        Provider Department      10/25/2018 8:50 AM Adriana Ferrell PA-C [facility]   [place]        Today's Diagnoses     Hyperlipidemia LDL goal <70    -  1    Chronic diastolic congestive heart failure (H)          Care Instructions    Call [facility] nurse for any questions or concerns:  [phone]   *If you have concerns after hours, please call [phone], option 2 to speak with on call Cardiologist.    1. When you arrive home, please send a remote transmission from your pacemaker. I'll call you once I see that, and let you know if you should increase your Toprol vs start an additional medication for your blood pressure. I'd like to see you back in one month for reassessment. Please see Dr. Voss in three months and Dr. Mauri Chase back in six months.     Stop taking Zocor (simvastatin) and start taking Lipitor (atorvastatin) 40 mg daily. We'll re-check your cholesterol in 3 months.     2. Continue to weigh yourself daily and write it down. Let me know if your blood pressure continues to sit persistently > 140 on the top after we make the medication changes.      3. Call [facility] nurse if your weight is up more than 2 pounds in one day or 5 pounds in one week.     4. Call [facility] nurse if you feel more short of breath, have more abdominal bloating, or leg swelling.     5. Start keeping track of your sodium intake, and follow a low sodium diet (less than 2000 mg daily). If you eat less salt, you will retain less fluid.     6. Please ask your PCP if you should have the pulmonary function testing done.      7. Do NOT take Aleve or ibuprofen without talking to your doctor first.      8. Lab Results are very good.      Component      Latest Ref Rng & Units 10/14/2018 10/25/2018   Sodium      136 - 145 mmol/L 139 137    Potassium      3.5 - 5.1 mmol/L 3.8 3.9   Chloride      98 - 107 mmol/L 103 103   Carbon Dioxide      23 - 29 mmol/L 30 28   Anion Gap      6 - 17 mmol/L 6 9.9   Glucose      70 - 105 mg/dL 101 (H) 90   Urea Nitrogen      7 - 30 mg/dL 15 18   Creatinine      0.70 - 1.30 mg/dL 0.70 0.91   GFR Estimate      >60 mL/min/1.7m2 81 60 (L)   GFR Estimate If Black      >60 mL/min/1.7m2 >90 72   Calcium      8.5 - 10.5 mg/dL 8.2 (L) 9.0   N-Terminal Pro Bnp      0 - 450 pg/mL  343   TSH      0.40 - 4.00 mU/L  0.80     [facility]: Cardiomyopathy, Optimization, Rehabilitation, Education  The [facility] is a heart failure specialty clinic within the [facility] where you will work with specialized nurse practitioners, physician assistants, doctors, and registered nurses. They are dedicated to helping patients with heart failure to carefully adjust medications, receive education, and learn who and when to call if symptoms develop. They specialize in helping you better understand your condition, slow the progression of your disease, improve the length and quality of your life, help you detect future heart problems before they become life threatening, and avoid hospitalizations.              Follow-ups after your visit        Additional Services     Follow-Up with [facility] - GORGE visit           Follow-Up with Electrophysiologist           Follow-Up with Cardiologist                 Your next 10 appointments already scheduled     Nov 12, 2018  8:00 AM CST   SHORT with Dhara Sutton, [facility] ([facility])    [address] [place] [id number] [phone]            Dec 31, 2018  2:15 PM CST   Remote PPM Check with MCCRACKEN TECH1   [facility] ([facility])    [address] [facility] [address]  [facility] [id number] [phone] OPT 2           This appointment is for a remote check of your pacemaker.  This is not  "an appointment at the office.            Flaquito 15, 2019  7:40 AM CST   Office Visit with Mikala Philip MD   [facility] ([facility])    [address] [place] [id number] [phone]           Bring a current list of meds and any records pertaining to this visit. For Physicals, please bring immunization records and any forms needing to be filled out. Please arrive 10 minutes early to complete paperwork.              Future tests that were ordered for you today     Open Future Orders        Priority Expected Expires Ordered    Follow-Up with Cardiologist Routine 4/23/2019 10/25/2019 10/25/2018    Follow-Up with Electrophysiologist Routine 1/23/2019 10/25/2019 10/25/2018    Lipid Profile Routine 1/23/2019 10/25/2019 10/25/2018    Follow-Up with [facility] - GORGE visit Routine 11/22/2018 10/25/2019 10/25/2018            Who to contact     If you have questions or need follow up information about today's clinic visit or your schedule please contact [facility] directly at [phone].  Normal or non-critical lab and imaging results will be communicated to you by MyChart, letter or phone within 4 business days after the clinic has received the results. If you do not hear from us within 7 days, please contact the clinic through [facility] or phone. If you have a critical or abnormal lab result, we will notify you by phone as soon as possible.  Submit refill requests through [employer] or call your pharmacy and they will forward the refill request to us. Please allow 3 business days for your refill to be completed.          Additional Information About Your Visit        MyChart Information     [employer] lets you send messages to your doctor, view your test results, renew your prescriptions, schedule appointments and more. To sign up, go to [email or web address] . Click on \"Log in\" on the left side of the screen, which will take you to the " "Welcome page. Then click on \"Sign up Now\" on the right side of the page.     You will be asked to enter the access code listed below, as well as some personal information. Please follow the directions to create your username and password.     Your access code is: [email or web address]  Expires: 2018  8:37 AM     Your access code will  in 90 days. If you need help or a new code, please call your [facility] clinic or [phone].        Care EveryWhere ID     This is your Care EveryWhere ID. This could be used by other organizations to access your [facility] medical records  [id number]        Your Vitals Were     Pulse Height Last Period Pulse Oximetry BMI (Body Mass Index)       64 1.626 m (5' 4\") (LMP Unknown) 96% 31.51 kg/m2        Blood Pressure from Last 3 Encounters:   10/25/18 130/80   10/17/18 136/77   10/14/18 142/74    Weight from Last 3 Encounters:   10/25/18 83.3 kg (183 lb 9.6 oz)   10/17/18 82.7 kg (182 lb 4 oz)   10/14/18 81.7 kg (180 lb 1.6 oz)              We Performed the Following     Follow-Up with [facility]          Today's Medication Changes          These changes are accurate as of 10/25/18  9:56 AM.  If you have any questions, ask your nurse or doctor.               Start taking these medicines.        Dose/Directions    atorvastatin 10 MG tablet   Commonly known as:  LIPITOR   Used for:  Hyperlipidemia LDL goal <70   Started by:  Adriana Ferrell PA-C        Dose:  40 mg   Take 4 tablets (40 mg) by mouth daily   Quantity:  30 tablet   Refills:  5         Stop taking these medicines if you haven't already. Please contact your care team if you have questions.     simvastatin 20 MG tablet   Commonly known as:  ZOCOR   Stopped by:  Adriana Ferrell PA-C                Where to get your medicines      These medications were sent to [facility] [place], [place] - [address]  [address], [facility] [address]     Phone:  [phone]     atorvastatin 10 MG tablet "                Primary Care Provider Office Phone # Fax #    Mikala Philip [phone] [address]  [facility] [address]        Equal Access to Services     NESSA MARIE : Hadwillem adams pena kenneyo Jose, waaxda luqadaha, qaybta kaalmada kennedi, carlos sexton laRachelcm horne. So [facility] [phone].    ATENCIÓN: Si habla español, tiene a ivy disposición servicios gratuitos de asistencia lingüística. Llame al [phone].    We comply with applicable federal civil rights laws and [place] laws. We do not discriminate on the basis of race, color, national origin, age, disability, sex, sexual orientation, or gender identity.            Thank you!     Thank you for choosing [facility]  for your care. Our goal is always to provide you with excellent care. Hearing back from our patients is one way we can continue to improve our services. Please take a few minutes to complete the written survey that you may receive in the mail after your visit with us. Thank you!             Your Updated Medication List - Protect others around you: Learn how to safely use, store and throw away your medicines at www.disposemymeds.org.          This list is accurate as of 10/25/18  9:56 AM.  Always use your most recent med list.                   Brand Name Dispense Instructions for use Diagnosis    * albuterol 108 (90 Base) MCG/ACT inhaler    PROAIR HFA/PROVENTIL HFA/VENTOLIN HFA     Inhale 2 puffs into the lungs every 6 hours        * albuterol 108 (90 Base) MCG/ACT inhaler    PROAIR HFA/PROVENTIL HFA/VENTOLIN HFA    1 Inhaler    Inhale 2 puffs into the lungs 4 times daily as needed for other (dyspnea)    SOB (shortness of breath)       alendronate 70 MG tablet    FOSAMAX    12 tablet    Take 1 tablet (70 mg) by mouth every 7 days Take 60 minutes before am meal with 8 oz. water. Remain upright for 30 minutes.    Osteoporosis       apixaban ANTICOAGULANT 5 MG  tablet    ELIQUIS    180 tablet    Take 1 tablet (5 mg) by mouth 2 times daily    Atrial fibrillation with rapid ventricular response (H)       atorvastatin 10 MG tablet    LIPITOR    30 tablet    Take 4 tablets (40 mg) by mouth daily    Hyperlipidemia LDL goal <70       cholecalciferol 1000 units capsule    vitamin  -D     Take 1 capsule by mouth daily.        FLAX SEED OIL PO      1 capsule daily        flecainide 100 MG tablet    TAMBOCOR    90 tablet    Take 1 tablet (100 mg) by mouth 2 times daily    Sick sinus syndrome (H), Cardiac pacemaker in situ       fluticasone 50 MCG/ACT spray    FLONASE     Spray 1-2 sprays into both nostrils daily as needed for rhinitis or allergies        furosemide 20 MG tablet    LASIX    30 tablet    Take 1 tablet (20 mg) by mouth daily    Systolic congestive heart failure, unspecified congestive heart failure chronicity       HAIR SKIN NAILS PO      Take 1 tablet by mouth At Bedtime        Krill Oil 500 MG Caps      Take 1 capsule by mouth daily        lisinopril 40 MG tablet    PRINIVIL/ZESTRIL    90 tablet    Take 1 tablet (40 mg) by mouth daily    Hypertension goal BP (blood pressure) < 140/90       metoprolol succinate 50 MG 24 hr tablet    TOPROL-XL    180 tablet    Take 1 tablet (50 mg) by mouth 2 times daily    Atrial fibrillation with rapid ventricular response (H), Systolic congestive heart failure, unspecified congestive heart failure chronicity       Milk Thistle 200 MG Caps      Take 1 capsule by mouth daily         MG Caps      Take 1 capsule by mouth daily        potassium & sodium phosphates 278-164-250 MG/75ML Solr      Take 1 tablet by mouth daily        * Notice:  This list has 2 medication(s) that are the same as other medications prescribed for you. Read the directions carefully, and ask your doctor or other care provider to review them with you.

## 2018-10-25 NOTE — PROGRESS NOTES
Abbott [address] DR/MRI:     Transmission received per Adriana Ferrell NP; pt was in to see Adriana today; transmission to assess if any AF noted since last check 9-24-18. No AF noted on log since April 2018. Verbal report to Adriana. SueLanngenbrunnerRN

## 2018-10-25 NOTE — TELEPHONE ENCOUNTER
I reviewed the patient's remote device interrogation which shows no arrhythmia since her last check on September 24th. Therefore will move forward with plan to add Norvasc 5 mg daily to her regimen. Thank you.

## 2018-10-30 NOTE — PATIENT INSTRUCTIONS
1.  Hold apixaban (eliquis) 1 day before surgery (last dose Sunday 11/4).  Okay to restart after surgery.      2.  The morning of surgery take flecanide, furosemide, and metoprolol.  Hold lisinopril and vitamins.    3.  Follow up immediately if increased weight, swelling in legs, difficulty breathing, or new cough      Before Your Surgery      Call your surgeon if there is any change in your health. This includes signs of a cold or flu (such as a sore throat, runny nose, cough, rash or fever).    Do not smoke, drink alcohol or take over the counter medicine (unless your surgeon or primary care doctor tells you to) for the 24 hours before and after surgery.    If you take prescribed drugs: Follow your doctor s orders about which medicines to take and which to stop until after surgery.    Eating and drinking prior to surgery: follow the instructions from your surgeon    Take a shower or bath the night before surgery. Use the soap your surgeon gave you to gently clean your skin. If you do not have soap from your surgeon, use your regular soap. Do not shave or scrub the surgery site.  Wear clean pajamas and have clean sheets on your bed.

## 2018-10-30 NOTE — PROGRESS NOTES
[facility]  [address] [place] [id number] [phone]  Dept: [phone]    PRE-OP EVALUATION:  Today's date: 10/31/2018    Hamida Verma (: 1940) presents for pre-operative evaluation assessment as requested by Dr. Adair.  She requires evaluation and anesthesia risk assessment prior to undergoing surgery/procedure for treatment of left eye cataract .    Proposed Surgery/ Procedure: left eye cataract removal  Date of Surgery/ Procedure: 18  Time of Surgery/ Procedure: [facility]  Hospital/Surgical Facility[facility]  Fax number for surgical facility: [phone]  Primary Physician: Mikala Philip  Type of Anesthesia Anticipated: to be determined    Patient has a Health Care Directive or Living Will:  YES at home    1. NO - Do you have a history of heart attack, stroke, stent, bypass or surgery on an artery in the head, neck, heart or legs?  2. NO - Do you ever have any pain or discomfort in your chest?  3. YES - DO YOU HAVE A HISTORY OF HEART FAILURE see below  4. NO - Are you troubled by shortness of breath when: walking on the level, up a slight hill or at night?  5. NO - Do you currently have a cold, bronchitis or other respiratory infection?  6. NO - Do you have a cough, shortness of breath or wheezing?  7. NO - Do you sometimes get pains in the calves of your legs when you walk?  8. NO - Do you or anyone in your family have previous history of blood clots?  9. NO - Do you or does anyone in your family have a serious bleeding problem such as prolonged bleeding following surgeries or cuts?  10. NO - Have you ever had problems with anemia or been told to take iron pills?  11. NO - Have you had any abnormal blood loss such as black, tarry or bloody stools, or abnormal vaginal bleeding?  12. NO - Have you ever had a blood transfusion?  13. NO - Have you or any of your relatives ever had problems with anesthesia?  14. NO - Do you have sleep apnea,  excessive snoring or daytime drowsiness?  15. NO - Do you have any prosthetic heart valves?  16. NO - Do you have prosthetic joints?  17. NO - Is there any chance that you may be pregnant?      HPI:     HPI related to upcoming procedure: left cataract surgery    HTN- controlled on amlodipine, lisinopril,  and metoprolol.  Lisinopril dose inceased 10/2/18.  Amlodipine added by cardiology 10/25/18.  Today reports she has not started the amlodipine, blood pressure looks good today.  No chest pain, shortness of breath, or palpitations.      HLD- on statin lipitor    afib with RVR- anticoagulated on eliquis.  Rhythm/rate controlled with flecanide and metoprolol.      Cardiac pacer 3/2018 for sick sinus syndrome.    CHF- recent hospitalization 10/2018 for exacerbation, improved with IV diuresis.  ECHO was done during the hospitalization which showed LV mild to mod dilation, TR 1-2+, normal systolic vxn, EF 55-60%.  on furosemide and bb and ACE.  follows with cards and [facility] clinic.  Wt after hospitalization 179lb, 184 today.  Slight swelling in legs.  No cough, no shortness of breath.  Doing daily weights at home, slight fluctuation but she attributes this to diet.      felt to likely have underlying COPD during above hospitalization, recommended outpt PFTs.      moderate pulmonary hypertension by echocardiogram in 03/2018.  Repeat ECHO 10/12/18 does not suggest pulm HTN    MEDICAL HISTORY:     Patient Active Problem List    Diagnosis Date Noted     Coronary artery calcification seen on CT scan 10/25/2018     Priority: Medium     SSS (sick sinus syndrome) (H)      Priority: Medium     s/p PPM 3/2018       Chronic diastolic CHF (congestive heart failure) (H)      Priority: Medium     Pulmonary hypertension (H)      Priority: Medium     S/P cardiac pacemaker procedure 10/17/2018     Priority: Medium     Atrial fibrillation with rapid ventricular response (H) 03/06/2018     Priority: Medium     Obesity 11/25/2014      Priority: Medium     Knee pain 04/30/2013     Priority: Medium     Hypertension goal BP (blood pressure) < 140/90 02/21/2011     Priority: Medium     Hyperlipidemia LDL goal <70 05/09/2010     Priority: Medium     Symptomatic menopausal or female climacteric states 03/30/2006     Priority: Medium      Past Medical History:   Diagnosis Date     Atrial fibrillation (H)      Chronic diastolic CHF (congestive heart failure) (H)      Essential hypertension, benign      HYPERLIPIDEMIA NEC/NOS 3/30/2006     Pulmonary hypertension (H)      SSS (sick sinus syndrome) (H)     s/p PPM 3/2018     Past Surgical History:   Procedure Laterality Date     HYSTERECTOMY, VAGINAL      hysterectomy     Current Outpatient Prescriptions   Medication Sig Dispense Refill     albuterol (PROAIR HFA/PROVENTIL HFA/VENTOLIN HFA) 108 (90 Base) MCG/ACT inhaler Inhale 2 puffs into the lungs every 6 hours       alendronate (FOSAMAX) 70 MG tablet Take 1 tablet (70 mg) by mouth every 7 days Take 60 minutes before am meal with 8 oz. water. Remain upright for 30 minutes. 12 tablet 3     amLODIPine (NORVASC) 5 MG tablet Take 1 tablet (5 mg) by mouth daily 90 tablet 3     apixaban ANTICOAGULANT (ELIQUIS) 5 MG tablet Take 1 tablet (5 mg) by mouth 2 times daily 180 tablet 3     atorvastatin (LIPITOR) 10 MG tablet Take 4 tablets (40 mg) by mouth daily 30 tablet 5     cholecalciferol (VITAMIN  -D) 1000 UNIT capsule Take 1 capsule by mouth daily.       FLAX SEED OIL OR 1 capsule daily       flecainide (TAMBOCOR) 100 MG tablet Take 1 tablet (100 mg) by mouth 2 times daily 90 tablet 3     fluticasone (FLONASE) 50 MCG/ACT spray Spray 1-2 sprays into both nostrils daily as needed for rhinitis or allergies       furosemide (LASIX) 20 MG tablet Take 1 tablet (20 mg) by mouth daily 30 tablet 5     Krill Oil 500 MG CAPS Take 1 capsule by mouth daily       lisinopril (PRINIVIL/ZESTRIL) 40 MG tablet Take 1 tablet (40 mg) by mouth daily 90 tablet 3      Methylsulfonylmethane (MSM) 500 MG CAPS Take 1 capsule by mouth daily       metoprolol succinate (TOPROL-XL) 50 MG 24 hr tablet Take 1 tablet (50 mg) by mouth 2 times daily 180 tablet 3     Milk Thistle 200 MG CAPS Take 1 capsule by mouth daily        Multiple Vitamins-Minerals (HAIR SKIN NAILS PO) Take 1 tablet by mouth At Bedtime       potassium & sodium phosphates 278-164-250 MG/75ML SOLR Take 1 tablet by mouth daily        OTC products: no recent use of OTC ASA, NSAIDS or Steroids    Allergies   Allergen Reactions     Strawberry Flavor       Latex Allergy: NO    Social History   Substance Use Topics     Smoking status: Former Smoker     Start date: 10/28/1955     Quit date: 9/1/2000     Smokeless tobacco: Never Used      Comment: quit 6 yrs ago     Alcohol use No     History   Drug Use No       REVIEW OF SYSTEMS:   CONSTITUTIONAL: NEGATIVE for fever, chills, change in weight  ENT/MOUTH: NEGATIVE for ear, mouth and throat problems  RESP: NEGATIVE for significant cough or SOB  CV: NEGATIVE for chest pain, palpitations or peripheral edema    EXAM:   LMP  (LMP Unknown)  GENERAL APPEARANCE: healthy, alert and no distress  HENT: ear canals and TM's normal and nose and mouth without ulcers or lesions  RESP: lungs clear to auscultation - no rales, rhonchi or wheezes  CV: regular rate and rhythm, normal S1 S2, no S3 or S4 and no murmur, click or rub.  Trace edema to left lower leg, +1 edema to right lower leg  ABDOMEN: soft, nontender, no HSM or masses and bowel sounds normal  NEURO: Normal strength and tone, sensory exam grossly normal, mentation intact and speech normal    DIAGNOSTICS:   No labs or EKG required for low risk surgery (cataract, skin procedure, breast biopsy, etc)    Recent Labs   Lab Test  10/25/18   0731  10/14/18   0530   10/11/18   1324   03/19/18   1055   03/17/18   0550   11/12/14   0814   HGB   --    --    --   12.3   --    --    --   12.3   < >  14.1   PLT   --    --    --   401   --    --    --    721*   < >  405   INR   --    --    --    --    --   1.31*   --    --    --   1.04   NA  137  139   < >  142   < >   --    < >  138   < >  140   POTASSIUM  3.9  3.8   < >  3.4   < >  3.7   < >  3.9   < >  3.1*   CR  0.91  0.70   < >  0.65   < >   --    < >  1.03   < >  0.80    < > = values in this interval not displayed.        IMPRESSION:   Reason for surgery/procedure: left cataract    The proposed surgical procedure is considered LOW risk.    REVISED CARDIAC RISK INDEX  The patient has the following serious cardiovascular risks for perioperative complications such as (MI, PE, VFib and 3  AV Block):  Congestive Heart Failure (pulmonary edema, PND, s3 sharyn, CXR with pulmonary congestion, basilar rales)  INTERPRETATION: 1 risks: Class II (low risk - 0.9% complication rate)    The patient has the following additional risks for perioperative complications:  Anticoagulated on apixaban      ICD-10-CM    1. Preop general physical exam Z01.818        RECOMMENDATIONS:       Cardiovascular Risk  Patient is already on a Beta Blocker. Continue Betablocker therapy after surgery, using Beta blocker order set as necessary for NPO status.  CHF appears well controlled today, no signs of exacerbation.      Pulmonary Risk  Possible underlying COPD, no evidence of exacerbation today, normal respiratory exam.      --Patient is to take all scheduled medications on the day of surgery EXCEPT for modifications listed below.    Anticoagulant or Antiplatelet Medication Use  Spoke with ophthalmologist Dr. Ennis regarding anticoagulation with apixaban prior to procedure, it was his opinion that from an operative risk perspective the safest course would be to discontinue it.  I did discuss with Hamida that given her history of afib she would be at an increased risk of stroke during this period CHADSVASC score 5 = 7.2% annual risk of stroke.  Ultimately we decided to hold apixaban 1 day prior to surgery, we elected not to bridge with lovenox  given she will be off therapy for only 2 days.  She did understand the increased stroke risk for this 2 day period and was comfortable with the plan.  She will restart apixaban postop if no bleeding complications.        ACE Inhibitor or Angiotensin Receptor Blocker (ARB) Use  Ace inhibitor or Angiotensin Receptor Blocker (ARB) and should HOLD this medication for the 24 hours prior to surgery.      APPROVAL GIVEN to proceed with proposed procedure, without further diagnostic evaluation       Signed Electronically by: NELY Chapa CNP    Copy of this evaluation report is provided to requesting physician.    [facility] Preop Guidelines    Revised Cardiac Risk Index

## 2018-10-31 ENCOUNTER — OFFICE VISIT (OUTPATIENT)
Dept: FAMILY MEDICINE | Facility: CLINIC | Age: 78
End: 2018-10-31
Payer: COMMERCIAL

## 2018-10-31 VITALS
SYSTOLIC BLOOD PRESSURE: 122 MMHG | TEMPERATURE: 97.9 F | HEART RATE: 62 BPM | DIASTOLIC BLOOD PRESSURE: 62 MMHG | RESPIRATION RATE: 12 BRPM | OXYGEN SATURATION: 98 % | WEIGHT: 184 LBS | BODY MASS INDEX: 31.58 KG/M2

## 2018-10-31 DIAGNOSIS — I10 HYPERTENSION GOAL BP (BLOOD PRESSURE) < 140/90: ICD-10-CM

## 2018-10-31 DIAGNOSIS — Z95.0 S/P CARDIAC PACEMAKER PROCEDURE: ICD-10-CM

## 2018-10-31 DIAGNOSIS — I48.91 ATRIAL FIBRILLATION WITH RAPID VENTRICULAR RESPONSE (H): ICD-10-CM

## 2018-10-31 DIAGNOSIS — Z01.818 PREOP GENERAL PHYSICAL EXAM: Primary | ICD-10-CM

## 2018-10-31 DIAGNOSIS — I27.20 PULMONARY HYPERTENSION (H): ICD-10-CM

## 2018-10-31 DIAGNOSIS — I50.32 CHRONIC DIASTOLIC CHF (CONGESTIVE HEART FAILURE) (H): ICD-10-CM

## 2018-10-31 PROCEDURE — 99214 OFFICE O/P EST MOD 30 MIN: CPT | Performed by: NURSE PRACTITIONER

## 2018-10-31 NOTE — MR AVS SNAPSHOT
After Visit Summary   10/31/2018    Hamida Verma    MRN: [id number]           Patient Information     Date Of Birth          1940        Visit Information        Provider Department      10/31/2018 2:20 PM Landy Pierre APRN [facility]        Today's Diagnoses     Preop general physical exam    -  1    Hypertension goal BP (blood pressure) < 140/90        Atrial fibrillation with rapid ventricular response (H)        S/P cardiac pacemaker procedure        Chronic diastolic CHF (congestive heart failure) (H)        Pulmonary hypertension (H)          Care Instructions    1.  Hold apixaban (eliquis) 1 day before surgery (last dose Sunday 11/4).  Okay to restart after surgery.      2.  The morning of surgery take flecanide, furosemide, and metoprolol.  Hold lisinopril and vitamins.    3.  Follow up immediately if increased weight, swelling in legs, difficulty breathing, or new cough      Before Your Surgery      Call your surgeon if there is any change in your health. This includes signs of a cold or flu (such as a sore throat, runny nose, cough, rash or fever).    Do not smoke, drink alcohol or take over the counter medicine (unless your surgeon or primary care doctor tells you to) for the 24 hours before and after surgery.    If you take prescribed drugs: Follow your doctor s orders about which medicines to take and which to stop until after surgery.    Eating and drinking prior to surgery: follow the instructions from your surgeon    Take a shower or bath the night before surgery. Use the soap your surgeon gave you to gently clean your skin. If you do not have soap from your surgeon, use your regular soap. Do not shave or scrub the surgery site.  Wear clean pajamas and have clean sheets on your bed.           Follow-ups after your visit        Your next 10 appointments already scheduled     Nov 12, 2018  8:00 AM SIMA HERNANDEZ with Dhara Sutton [facility]  [facility] ([facility])    [address] [place] [id number] [phone]            Nov 28, 2018  8:50 AM CST   Core Return with Adriana Ferrell PA-C   [facility] ([facility])    [address] [place] [id number] [phone] OPT 2            Dec 31, 2018  2:15 PM CST   Remote PPM Check with MCCRACKEN TECH1   [facility] ([facility])    [address] [place] [id number] [phone] OPT 2           This appointment is for a remote check of your pacemaker.  This is not an appointment at the office.            Flaquito 15, 2019  7:40 AM CST   Office Visit with Mikala Philip MD   [facility] ([facility])    [address] [place] [id number] [phone]           Bring a current list of meds and any records pertaining to this visit. For Physicals, please bring immunization records and any forms needing to be filled out. Please arrive 10 minutes early to complete paperwork.            Jan 21, 2019  8:10 AM CST   LAB with [facility]   [facility] ([facility])    [address] [place] [phone] [phone]           Please do not eat 10-12 hours before your appointment if you are coming in fasting for labs on lipids, cholesterol, or glucose (sugar). This does not apply to pregnant women. Water, hot tea and black coffee (with nothing added) are okay. Do not drink other fluids, diet soda or chew gum.            Jan 23, 2019  2:15 PM CST   P EP RETURN with Roe Rene MD   [facility] ([facility])    [address] [place] [phone] OPT 2              Who to contact     If you have questions or need follow up information about today's clinic visit or  your schedule please contact [facility] directly at [phone].  Normal or non-critical lab and imaging results will be communicated to you by MyChart, letter or phone within 4 business days after the clinic has received the results. If you do not hear from us within 7 days, please contact the clinic through MyChart or phone. If you have a critical or abnormal lab result, we will notify you by phone as soon as possible.  Submit refill requests through [employer] or call your pharmacy and they will forward the refill request to us. Please allow 3 business days for your refill to be completed.          Additional Information About Your Visit        Care EveryWhere ID     This is your Care EveryWhere ID. This could be used by other organizations to access your [place] medical records  [id number]        Your Vitals Were     Pulse Temperature Respirations Last Period Pulse Oximetry BMI (Body Mass Index)    62 97.9  F (36.6  C) (Oral) 12 (LMP Unknown) 98% 31.58 kg/m2       Blood Pressure from Last 3 Encounters:   10/31/18 122/62   10/25/18 130/80   10/17/18 136/77    Weight from Last 3 Encounters:   10/31/18 184 lb (83.5 kg)   10/25/18 183 lb 9.6 oz (83.3 kg)   10/17/18 182 lb 4 oz (82.7 kg)              Today, you had the following     No orders found for display         Today's Medication Changes          These changes are accurate as of 10/31/18  2:40 PM.  If you have any questions, ask your nurse or doctor.               Stop taking these medicines if you haven't already. Please contact your care team if you have questions.     amLODIPine 5 MG tablet   Commonly known as:  NORVASC   Stopped by:  Landy Pierre APRN CNP                    Primary Care Provider Office Phone # Fax #    Mikala Philip [phone] [address] [facility] [address]        Equal Access to Services     NESSA MARIE AH: Sushant Garcia, madelaine potts, chance kolb,  carlos ramirezgeorge rosenberg'aan ah. So [facility] [phone].    ATENCIÓN: Si tonela nathen, tiene a ivy disposición servicios gratuitos de asistencia lingüística. Siomara west [phone].    We comply with applicable federal civil rights laws and [place] laws. We do not discriminate on the basis of race, color, national origin, age, disability, sex, sexual orientation, or gender identity.            Thank you!     Thank you for choosing [facility]  for your care. Our goal is always to provide you with excellent care. Hearing back from our patients is one way we can continue to improve our services. Please take a few minutes to complete the written survey that you may receive in the mail after your visit with us. Thank you!             Your Updated Medication List - Protect others around you: Learn how to safely use, store and throw away your medicines at www.disposemymeds.org.          This list is accurate as of 10/31/18  2:40 PM.  Always use your most recent med list.                   Brand Name Dispense Instructions for use Diagnosis    albuterol 108 (90 Base) MCG/ACT inhaler    PROAIR HFA/PROVENTIL HFA/VENTOLIN HFA     Inhale 2 puffs into the lungs every 6 hours        alendronate 70 MG tablet    FOSAMAX    12 tablet    Take 1 tablet (70 mg) by mouth every 7 days Take 60 minutes before am meal with 8 oz. water. Remain upright for 30 minutes.    Osteoporosis       apixaban ANTICOAGULANT 5 MG tablet    ELIQUIS    180 tablet    Take 1 tablet (5 mg) by mouth 2 times daily    Atrial fibrillation with rapid ventricular response (H)       atorvastatin 10 MG tablet    LIPITOR    30 tablet    Take 4 tablets (40 mg) by mouth daily    Hyperlipidemia LDL goal <70       cholecalciferol 1000 units capsule    vitamin  -D     Take 1 capsule by mouth daily.        FLAX SEED OIL PO      1 capsule daily        flecainide 100 MG tablet    TAMBOCOR    90 tablet    Take 1 tablet (100 mg) by mouth 2 times daily     Sick sinus syndrome (H), Cardiac pacemaker in situ       fluticasone 50 MCG/ACT spray    FLONASE     Spray 1-2 sprays into both nostrils daily as needed for rhinitis or allergies        furosemide 20 MG tablet    LASIX    30 tablet    Take 1 tablet (20 mg) by mouth daily    Systolic congestive heart failure, unspecified congestive heart failure chronicity       HAIR SKIN NAILS PO      Take 1 tablet by mouth At Bedtime        Krill Oil 500 MG Caps      Take 1 capsule by mouth daily        lisinopril 40 MG tablet    PRINIVIL/ZESTRIL    90 tablet    Take 1 tablet (40 mg) by mouth daily    Hypertension goal BP (blood pressure) < 140/90       metoprolol succinate 50 MG 24 hr tablet    TOPROL-XL    180 tablet    Take 1 tablet (50 mg) by mouth 2 times daily    Atrial fibrillation with rapid ventricular response (H), Systolic congestive heart failure, unspecified congestive heart failure chronicity       Milk Thistle 200 MG Caps      Take 1 capsule by mouth daily         MG Caps      Take 1 capsule by mouth daily        potassium & sodium phosphates 278-164-250 MG/75ML Solr      Take 1 tablet by mouth daily

## 2018-11-10 ENCOUNTER — APPOINTMENT (OUTPATIENT)
Dept: GENERAL RADIOLOGY | Facility: CLINIC | Age: 78
End: 2018-11-10
Attending: EMERGENCY MEDICINE
Payer: COMMERCIAL

## 2018-11-10 ENCOUNTER — HOSPITAL ENCOUNTER (EMERGENCY)
Facility: CLINIC | Age: 78
Discharge: HOME | End: 2018-11-10
Attending: EMERGENCY MEDICINE | Admitting: EMERGENCY MEDICINE
Payer: COMMERCIAL

## 2018-11-10 VITALS
SYSTOLIC BLOOD PRESSURE: 182 MMHG | BODY MASS INDEX: 33.13 KG/M2 | TEMPERATURE: 98.2 F | OXYGEN SATURATION: 94 % | RESPIRATION RATE: 18 BRPM | DIASTOLIC BLOOD PRESSURE: 82 MMHG | HEIGHT: 62 IN | WEIGHT: 180 LBS | HEART RATE: 61 BPM

## 2018-11-10 DIAGNOSIS — Z86.79 HISTORY OF HEART FAILURE: ICD-10-CM

## 2018-11-10 DIAGNOSIS — R05.9 COUGH: ICD-10-CM

## 2018-11-10 LAB
ANION GAP SERPL CALCULATED.3IONS-SCNC: 7 MMOL/L (ref 3–14)
BUN SERPL-MCNC: 10 MG/DL (ref 7–30)
CALCIUM SERPL-MCNC: 8.6 MG/DL (ref 8.5–10.1)
CHLORIDE SERPL-SCNC: 107 MMOL/L (ref 94–109)
CO2 SERPL-SCNC: 27 MMOL/L (ref 20–32)
CREAT SERPL-MCNC: 0.69 MG/DL (ref 0.52–1.04)
ERYTHROCYTE [DISTWIDTH] IN BLOOD BY AUTOMATED COUNT: 14.5 % (ref 10–15)
GFR SERPL CREATININE-BSD FRML MDRD: 83 ML/MIN/1.7M2
GLUCOSE SERPL-MCNC: 97 MG/DL (ref 70–99)
HCT VFR BLD AUTO: 41.3 % (ref 35–47)
HGB BLD-MCNC: 13.3 G/DL (ref 11.7–15.7)
INTERPRETATION ECG - MUSE: NORMAL
MCH RBC QN AUTO: 27.5 PG (ref 26.5–33)
MCHC RBC AUTO-ENTMCNC: 32.2 G/DL (ref 31.5–36.5)
MCV RBC AUTO: 86 FL (ref 78–100)
NT-PROBNP SERPL-MCNC: 982 PG/ML (ref 0–1800)
PLATELET # BLD AUTO: 393 10E9/L (ref 150–450)
POTASSIUM SERPL-SCNC: 3.8 MMOL/L (ref 3.4–5.3)
RBC # BLD AUTO: 4.83 10E12/L (ref 3.8–5.2)
SODIUM SERPL-SCNC: 141 MMOL/L (ref 133–144)
WBC # BLD AUTO: 10.5 10E9/L (ref 4–11)

## 2018-11-10 PROCEDURE — 99285 EMERGENCY DEPT VISIT HI MDM: CPT | Mod: 25

## 2018-11-10 PROCEDURE — 71046 X-RAY EXAM CHEST 2 VIEWS: CPT

## 2018-11-10 PROCEDURE — 93005 ELECTROCARDIOGRAM TRACING: CPT

## 2018-11-10 PROCEDURE — 80048 BASIC METABOLIC PNL TOTAL CA: CPT | Performed by: EMERGENCY MEDICINE

## 2018-11-10 PROCEDURE — 83880 ASSAY OF NATRIURETIC PEPTIDE: CPT | Performed by: EMERGENCY MEDICINE

## 2018-11-10 PROCEDURE — 85027 COMPLETE CBC AUTOMATED: CPT | Performed by: EMERGENCY MEDICINE

## 2018-11-10 ASSESSMENT — ENCOUNTER SYMPTOMS
FEVER: 0
SHORTNESS OF BREATH: 0
RHINORRHEA: 1
COUGH: 1
SORE THROAT: 0
WHEEZING: 1

## 2018-11-10 NOTE — DISCHARGE INSTRUCTIONS

## 2018-11-10 NOTE — ED PROVIDER NOTES
History     Chief Complaint:  Cough    HPI   Hamida Verma is a 78 year old female, anticoagulated on Eliquis, with a history of atrial fibrillation, CHF, and hypertension who presents to the emergency department for evaluation of a cough. She was admitted to the hospital from the ED on 10/11 for further evaluation of a CHF exacerbation and, upon discharge, was told to return if she has any cough or increased breathing difficulties. Four days ago, she started having dry cough with associated rhinorrhea. Since then, the cough has progressively worsened and she is now bringing up sputum with it. Her daughter has also noticed that she has been complaining of being run down after doing regular chores around the house more often, so she decided to present the patient to the ED today. Here in the ED, she reports the wet cough, rhinorrhea, and congestion. She denies any increased shortness of breath, noted wheezes, chest pain, sore throat, ear pain, fever, or recent sick contacts. Additionally, she reports that she was 2lbs heavier yesterday, but was back down to her baseline this morning; she denies any increased lower extremity edema from baseline, however she reports her left leg is always larger than her right. Her daughter reports that this is the typical presentation of her symptoms during a CHF exacerbation (i.e. starting with a slight tickle and growing into a full wet cough). Additionally, the patient has seen her primary physician twice and the core clinic once since her hospitalization with no new findings, and she reports that she has not had to use her albuterol inhaler this week.     Allergies:  NKDA    Medications:    Alendronate  Eliquis  Atorvastatin  Flecainide  Furosemide  Lisinopril  MSM  Metoprolol     Past Medical History:    A.fib.   Diastolic CHF  HTN  Hyperlipidemia  Pulmonary HTN  SSS    Past Surgical History:    Vaginal hysterectomy  Pacemaker placed    Family History:    Cerebrovascular  "disease  Glaucoma  Macular degeneratoin  Alcohol abuse  MI  Dementia    Social History:  Marital Status:   [5]  Former smoker for 45 years. Quit 2000.   Negative for alcohol use.     Review of Systems   Constitutional: Negative for fever.   HENT: Positive for congestion and rhinorrhea. Negative for ear discharge, ear pain and sore throat.    Respiratory: Positive for cough and wheezing. Negative for shortness of breath.    Cardiovascular: Negative for chest pain.       Physical Exam     Patient Vitals for the past 24 hrs:   BP Temp Temp src Pulse Resp SpO2 Height Weight   11/10/18 1135 176/73 98.2  F (36.8  C) Temporal 60 18 94 % 1.575 m (5' 2\") 81.6 kg (180 lb)     Physical Exam  Eye:  Pupils are equal, round, and reactive.  Extraocular movements intact.    ENT:  No rhinorrhea.  Moist mucus membranes.  Normal tongue and tonsil.    Cardiac:  Regular rate and rhythm.  No murmurs, gallops, or rubs.    Pulmonary:  Clear to auscultation bilaterally.  No wheezes, rales, or rhonchi.    Abdomen:  Positive bowel sounds.  Abdomen is soft and non-distended, without focal tenderness.    Musculoskeletal:  Normal movement of all extremities without evidence for deficit. No lower extremity edema noted.     Skin:  Warm and dry without rashes.    Neurologic:  Non-focal exam without asymmetric weakness or numbness.     Psychiatric:  Normal affect with appropriate interaction with examiner.      Emergency Department Course   ECG:  Indication: Cough  Time: 1223  Vent. Rate 60 bpm. MS interval 218. QRS duration 88. QT/QTc 430/430. P-R-T axis * 16 28.  Atrial-paced rhythm with prolonged AV conduction. Abnormal ECG. Read time: 1233.    Imaging:  Radiographic findings were communicated with the patient who voiced understanding of the findings.    XR Chest PA & LAT:   Again seen is mild cardiomegaly. Left subclavian  intracardiac stimulator leads are again demonstrated. The lungs are  clear with resolution of previously seen " "bibasilar atelectasis.  Previously seen pleural effusions have resolved. No other change or  abnormality is noted, as per radiology.     Laboratory:  CBC: WBC: 10.5, HGB: 13.3, PLT: 393    BMP: All WNL (Creatinine: 0.69)    BNP: 982    Emergency Department Course:  Nursing notes and vitals reviewed. (3143) I performed an exam of the patient as documented above.      IV inserted. Blood drawn. This was sent to the lab for further testing, results above.     The patient was sent for a chest x-ray while in the emergency department, findings above.      EKG obtained in the ED, see results above.     (0747) I rechecked the patient and discussed the results of her workup thus far.      Findings and plan explained to the Patient and daughter. Patient discharged home with instructions regarding supportive care, medications, and reasons to return. The importance of close follow-up was reviewed. The patient was prescribed no medications.     I personally reviewed the laboratory and imaging results with the Patient and daughter and answered all related questions prior to discharge.      Impression & Plan      Medical Decision Making:  This delightful 78-year-old woman with a history of diastolic heart failure and pulmonary hypertension presents to us because of upper respiratory symptoms for the last 3-4 days.  Her daughter notes that she is being \"proactive\" because she is concerned anytime that her mother develops a cough like this as it has shown signs of being congestive heart failure previously.  The patient denies having any concerns for heart failure.  She has been checking her weights regularly and they have been stable.  She denies any new swelling in her legs.  She has no orthopnea.  She describes nasal congestion along with a mildly productive cough but no increased work of breathing.  Her exam is consistent with the patient's view of the issue, as I see signs of congestion but otherwise no other stigmata of heart " failure.  Chest x-ray is reassuring.  Labs showed no worsening of her heart failure and show no other worrisome signs.  At this juncture, I feel this is all pointing toward upper respiratory issues and she will be discharged home.  Otherwise, she knows to continue to take her meds and weigh herself regularly.  I did confirm with the daughter that the patient is higher risk because of her comorbidities and there should never be a hesitation to return to us for increased work of breathing, high fever, or any other emergent concerns.    Diagnosis:    ICD-10-CM    1. Cough R05    2. History of heart failure Z86.79        Disposition:  discharged to home    Scribe Disclosure:  I, Adriana Quach, am serving as a scribe on 11/10/2018 at 11:53 AM to personally document services performed by Trierweiler, Chad A, MD based on my observations and the provider's statements to me.       Adriana Quach  11/10/2018    EMERGENCY DEPARTMENT       Trierweiler, Chad A, MD  11/11/18 [id number]

## 2018-11-10 NOTE — ED AVS SNAPSHOT
Emergency Department    [address] [place] [id number]    Phone:  [phone]    Fax:  [phone]                                       Hamida Verma   MRN: [id number]    Department:   Emergency Department   Date of Visit:  11/10/2018           After Visit Summary Signature Page     I have received my discharge instructions, and my questions have been answered. I have discussed any challenges I see with this plan with the nurse or doctor.    ..........................................................................................................................................  Patient/Patient Representative Signature      ..........................................................................................................................................  Patient Representative Print Name and Relationship to Patient    ..................................................               ................................................  Date                                   Time    ..........................................................................................................................................  Reviewed by Signature/Title    ...................................................              ..............................................  Date                                               Time          22EPIC Rev 08/18

## 2018-11-10 NOTE — ED AVS SNAPSHOT
Emergency Department    [address] [facility] [address]    Phone:  [phone]    Fax:  [phone]                                       Hamida Verma   MRN: [id number]    Department:   Emergency Department   Date of Visit:  11/10/2018           Patient Information     Date Of Birth          1940        Your diagnoses for this visit were:     Cough     History of heart failure        You were seen by Trierweiler, Chad A, MD.      Follow-up Information     Follow up with Mikala Philip MD In 1 week.    Specialty:  Family Practice    Contact information:    [address]  [facility] [id number] [phone]          Follow up with  Emergency Department.    Specialty:  EMERGENCY MEDICINE    Why:  If symptoms worsen    Contact information:    [address] [facility] [address] [phone]        Discharge Instructions       Discharge Instructions  Upper Respiratory Infection    The upper respiratory tract includes the sinuses, nasal passages, pharynx, and larynx. A URI, or upper respiratory infection, is an infection of any of the parts of the upper airway. Symptoms include runny nose, congestion, sneezing, sore throat, cough, and fever. URIs are almost always caused by a virus. Antibiotics do not help with viral infections, so are generally not prescribed. A URI is very contagious through coughing and nasal secretions; make sure you wash your hands often and clean surfaces after sneezing, coughing or touching them. While you should start to improve in 3 - 5 days, remember that sometimes a cough can linger for several weeks.    Generally, every Emergency Department visit should have a follow-up clinic visit with either a primary or a specialty clinic/provider. Please follow-up as instructed by your emergency provider today.    Return to the Emergency Department if:    Any of your symptoms get much worse.    You seem very sick, like being too weak to get up.    You  have chest pain or shortness of breath.     You have a severe headache.    You are vomiting (throwing up) so much you cannot keep fluids or medicines down.    You have confusion or seem unusually drowsy.    You have a seizure.    What can I do to help myself?    Fill any prescriptions the provider gave you and take them right away    If you have a fever, get plenty of rest and drink lots of fluids, especially water.    Using a humidifier or saline nose spray will also help loosen mucous.     Clothes or blankets will not change your fever. Do what is comfortable for you.    Bathing or sponging in lukewarm water may help you feel better.    Acetaminophen (Tylenol ) or ibuprofen (Advil , Motrin ) will help bring fever down and may help you feel more comfortable. Be sure to read and follow the package directions, and ask your provider if you have questions.    Do not drink alcohol.    Decongestants may help you feel better. You may use decongestant nose sprays Afrin  (oxymetazoline) or Cb-Synephrine  (phenylephrine hydrochloride) for up to 3 days, or may use a decongestant tablet like Sudafed  (pseudoephedrine).  If you were given a prescription for medicine here today, be sure to read all of the information (including the package insert) that comes with your prescription.  This will include important information about the medicine, its side effects, and any warnings that you need to know about.  The pharmacist who fills the prescription can provide more information and answer questions you may have about the medicine.  If you have questions or concerns that the pharmacist cannot address, please call or return to the Emergency Department.   Remember that you can always come back to the Emergency Department if you are not able to see your regular provider in the amount of time listed above, if you get any new symptoms, or if there is anything that worries you.      Your next 10 appointments already scheduled     Nov 12,  2018  8:00 AM CST   SHORT with Dhara Sutton [facility] ([facility])    [address] [facility] [address] [phone]            Nov 28, 2018  8:50 AM CST   Core Return with Adriana Ferrell PA-C   [facility] ([facility])    [address] [place] [id number] [phone] OPT 2            Dec 31, 2018  2:15 PM CST   Remote PPM Check with MCCRACKEN TECH1   [facility] ([facility])    [address] [place] [address] [id number] [phone] OPT 2           This appointment is for a remote check of your pacemaker.  This is not an appointment at the office.            Flaquito 15, 2019  7:40 AM CST   Office Visit with Mikala Philip MD   [facility] ([facility])    [address] [place] [address] [phone]           Bring a current list of meds and any records pertaining to this visit. For Physicals, please bring immunization records and any forms needing to be filled out. Please arrive 10 minutes early to complete paperwork.            Jan 21, 2019  8:10 AM CST   LAB with [facility]   [facility] [place] ([facility])    [address] [place] [id number] [phone]           Please do not eat 10-12 hours before your appointment if you are coming in fasting for labs on lipids, cholesterol, or glucose (sugar). This does not apply to pregnant women. Water, hot tea and black coffee (with nothing added) are okay. Do not drink other fluids, diet soda or chew gum.            Jan 23, 2019  2:15 PM CST   P EP RETURN with Roe Rene MD   [facility] ([facility])    [address] [place] [address] [id number] [phone] OPT 2              24 Hour Appointment Hotline        To make an appointment at any [facility] ([facility]), call [phone] ([phone]). If you don't have a family doctor or clinic, we will help you find one. [facility] clinics are conveniently located to serve the needs of you and your family.             Review of your medicines      Our records show that you are taking the medicines listed below. If these are incorrect, please call your family doctor or clinic.        Dose / Directions Last dose taken    albuterol 108 (90 Base) MCG/ACT inhaler   Commonly known as:  PROAIR HFA/PROVENTIL HFA/VENTOLIN HFA   Dose:  2 puff        Inhale 2 puffs into the lungs every 6 hours   Refills:  0        alendronate 70 MG tablet   Commonly known as:  FOSAMAX   Dose:  70 mg   Quantity:  12 tablet        Take 1 tablet (70 mg) by mouth every 7 days Take 60 minutes before am meal with 8 oz. water. Remain upright for 30 minutes.   Refills:  3        apixaban ANTICOAGULANT 5 MG tablet   Commonly known as:  ELIQUIS   Dose:  5 mg   Quantity:  180 tablet        Take 1 tablet (5 mg) by mouth 2 times daily   Refills:  3        atorvastatin 10 MG tablet   Commonly known as:  LIPITOR   Dose:  40 mg   Quantity:  30 tablet        Take 4 tablets (40 mg) by mouth daily   Refills:  5        cholecalciferol 1000 units capsule   Commonly known as:  vitamin  -D   Dose:  1 capsule        Take 1 capsule by mouth daily.   Refills:  0        FLAX SEED OIL PO        1 capsule daily   Refills:  0        flecainide 100 MG tablet   Commonly known as:  TAMBOCOR   Dose:  100 mg   Quantity:  90 tablet        Take 1 tablet (100 mg) by mouth 2 times daily   Refills:  3        fluticasone 50 MCG/ACT spray   Commonly known as:  FLONASE   Dose:  1-2 spray        Spray 1-2 sprays into both nostrils daily as needed for rhinitis or allergies   Refills:  0        furosemide 20 MG tablet   Commonly known as:  LASIX   Dose:  20 mg   Quantity:  30 tablet        Take 1 tablet (20 mg) by mouth daily   Refills:  5        HAIR  SKIN NAILS PO   Dose:  1 tablet        Take 1 tablet by mouth At Bedtime   Refills:  0        Krill Oil 500 MG Caps   Dose:  1 capsule        Take 1 capsule by mouth daily   Refills:  0        lisinopril 40 MG tablet   Commonly known as:  PRINIVIL/ZESTRIL   Dose:  40 mg   Quantity:  90 tablet        Take 1 tablet (40 mg) by mouth daily   Refills:  3        metoprolol succinate 50 MG 24 hr tablet   Commonly known as:  TOPROL-XL   Dose:  50 mg   Quantity:  180 tablet        Take 1 tablet (50 mg) by mouth 2 times daily   Refills:  3        Milk Thistle 200 MG Caps   Dose:  1 capsule        Take 1 capsule by mouth daily   Refills:  0         MG Caps   Dose:  1 capsule        Take 1 capsule by mouth daily   Refills:  0        potassium & sodium phosphates 278-164-250 MG/75ML Solr   Dose:  1 tablet        Take 1 tablet by mouth daily   Refills:  0                Procedures and tests performed during your visit     BNP    Basic metabolic panel    CBC (+ platelets, no diff)    Chest XR,  PA & LAT    EKG 12 lead      Orders Needing Specimen Collection     None      Pending Results     Date and Time Order Name Status Description    11/10/2018 1213 Chest XR,  PA & LAT Preliminary             Pending Culture Results     No orders found from 11/8/2018 to 11/11/2018.            Pending Results Instructions     If you had any lab results that were not finalized at the time of your Discharge, you can call the ED Lab Result RN at [phone]. You will be contacted by this team for any positive Lab results or changes in treatment. The nurses are available 7 days a week from 10A to 6:30P.  You can leave a message 24 hours per day and they will return your call.        Test Results From Your Hospital Stay        11/10/2018  1:11 PM      Component Results     Component Value Ref Range & Units Status    N-Terminal Pro BNP Inpatient 982 0 - 1800 pg/mL Final       Reference range shown and results flagged as abnormal are suggested  inpatient   cut points for confirming diagnosis if CHF in an acute setting. Establishing a   baseline value for each individual patient is useful for follow-up. An   inpatient or emergency department NT-proPBNP <300 pg/mL effectively rules out   acute CHF, with 99% negative predictive value.  The outpatient non-acute reference range for ruling out CHF is:   0-125 pg/mL (age 18 to less than 75)   0-450 pg/mL (age 75 yrs and older)           11/10/2018  1:07 PM      Component Results     Component Value Ref Range & Units Status    Sodium 141 133 - 144 mmol/L Final    Potassium 3.8 3.4 - 5.3 mmol/L Final    Chloride 107 94 - 109 mmol/L Final    Carbon Dioxide 27 20 - 32 mmol/L Final    Anion Gap 7 3 - 14 mmol/L Final    Glucose 97 70 - 99 mg/dL Final    Urea Nitrogen 10 7 - 30 mg/dL Final    Creatinine 0.69 0.52 - 1.04 mg/dL Final    GFR Estimate 83 >60 mL/min/1.7m2 Final    Non  GFR Calc    GFR Estimate If Black >90 >60 mL/min/1.7m2 Final    African American GFR Calc    Calcium 8.6 8.5 - 10.1 mg/dL Final         11/10/2018 12:54 PM      Component Results     Component Value Ref Range & Units Status    WBC 10.5 4.0 - 11.0 10e9/L Final    RBC Count 4.83 3.8 - 5.2 10e12/L Final    Hemoglobin 13.3 11.7 - 15.7 g/dL Final    Hematocrit 41.3 35.0 - 47.0 % Final    MCV 86 78 - 100 fl Final    MCH 27.5 26.5 - 33.0 pg Final    MCHC 32.2 31.5 - 36.5 g/dL Final    RDW 14.5 10.0 - 15.0 % Final    Platelet Count 393 150 - 450 10e9/L Final         11/10/2018  1:25 PM      Narrative     CHEST TWO VIEWS   11/10/2018 12:58 PM    HISTORY: Cough.     COMPARISON: 10/11/2018        Impression     IMPRESSION: Again seen is mild cardiomegaly. Left subclavian  intracardiac stimulator leads are again demonstrated. The lungs are  clear with resolution of previously seen bibasilar atelectasis.  Previously seen pleural effusions have resolved. No other change or  abnormality is noted.                 Clinical Quality Measure:  Blood Pressure Screening     Your blood pressure was checked while you were in the emergency department today. The last reading we obtained was  BP: 176/73 . Please read the guidelines below about what these numbers mean and what you should do about them.  If your systolic blood pressure (the top number) is less than 120 and your diastolic blood pressure (the bottom number) is less than 80, then your blood pressure is normal. There is nothing more that you need to do about it.  If your systolic blood pressure (the top number) is 120-139 or your diastolic blood pressure (the bottom number) is 80-89, your blood pressure may be higher than it should be. You should have your blood pressure rechecked within a year by a primary care provider.  If your systolic blood pressure (the top number) is 140 or greater or your diastolic blood pressure (the bottom number) is 90 or greater, you may have high blood pressure. High blood pressure is treatable, but if left untreated over time it can put you at risk for heart attack, stroke, or kidney failure. You should have your blood pressure rechecked by a primary care provider within the next 4 weeks.  If your provider in the emergency department today gave you specific instructions to follow-up with your doctor or provider even sooner than that, you should follow that instruction and not wait for up to 4 weeks for your follow-up visit.        Thank you for choosing [facility]       Thank you for choosing [facility] for your care. Our goal is always to provide you with excellent care. Hearing back from our patients is one way we can continue to improve our services. Please take a few minutes to complete the written survey that you may receive in the mail after you visit with us. Thank you!        Care EveryWhere ID     This is your Care EveryWhere ID. This could be used by other organizations to access your [facility] medical records  [id number]        Equal Access to Services     NESSA  FRANK : Leslieii adams Garcia, warosalieda luqadaha, qaybta kanohemi kolb, carlos horne. [facility] [phone].    ATENCIÓN: Si habla español, tiene a ivy disposición servicios gratuitos de asistencia lingüística. Llame al [phone].    We comply with applicable federal civil rights laws and [place] laws. We do not discriminate on the basis of race, color, national origin, age, disability, sex, sexual orientation, or gender identity.            After Visit Summary       This is your record. Keep this with you and show to your community pharmacist(s) and doctor(s) at your next visit.

## 2018-11-12 ENCOUNTER — OFFICE VISIT (OUTPATIENT)
Dept: PHARMACY | Facility: CLINIC | Age: 78
End: 2018-11-12
Payer: COMMERCIAL

## 2018-11-12 ENCOUNTER — PATIENT OUTREACH (OUTPATIENT)
Dept: CARE COORDINATION | Facility: CLINIC | Age: 78
End: 2018-11-12

## 2018-11-12 VITALS — HEART RATE: 59 BPM | SYSTOLIC BLOOD PRESSURE: 145 MMHG | DIASTOLIC BLOOD PRESSURE: 73 MMHG

## 2018-11-12 DIAGNOSIS — I50.32 CHRONIC DIASTOLIC CHF (CONGESTIVE HEART FAILURE) (H): Primary | ICD-10-CM

## 2018-11-12 DIAGNOSIS — I48.91 ATRIAL FIBRILLATION WITH RAPID VENTRICULAR RESPONSE (H): ICD-10-CM

## 2018-11-12 DIAGNOSIS — I50.32 CHRONIC DIASTOLIC CHF (CONGESTIVE HEART FAILURE) (H): ICD-10-CM

## 2018-11-12 DIAGNOSIS — I10 HYPERTENSION GOAL BP (BLOOD PRESSURE) < 140/90: Primary | ICD-10-CM

## 2018-11-12 DIAGNOSIS — M81.0 OSTEOPOROSIS, UNSPECIFIED OSTEOPOROSIS TYPE, UNSPECIFIED PATHOLOGICAL FRACTURE PRESENCE: ICD-10-CM

## 2018-11-12 PROCEDURE — 99607 MTMS BY PHARM ADDL 15 MIN: CPT | Performed by: PHARMACIST

## 2018-11-12 PROCEDURE — 99606 MTMS BY PHARM EST 15 MIN: CPT | Performed by: PHARMACIST

## 2018-11-12 RX ORDER — MELATONIN 3 MG
2 TABLET ORAL 2 TIMES DAILY
COMMUNITY
End: 2023-12-06

## 2018-11-12 ASSESSMENT — ACTIVITIES OF DAILY LIVING (ADL): DEPENDENT_IADLS:: INDEPENDENT

## 2018-11-12 NOTE — PROGRESS NOTES
SUBJECTIVE/OBJECTIVE:                Hamida Verma is a 78 year old female coming in for a follow-up visit for Medication Therapy Management.  She was referred to me from Dr. Mikala Philip.     Chief Complaint: Follow up from our visit on 7/11/18.  No concerns today.    Tobacco: No tobacco use  Alcohol: not currently using  Caffeine: 2-3 cups of coffee every morning.    Medication Adherence/Access:  no issues reported    HFpEF/HTN/A fib: Current medications include metoprolol succinate 50 mg twice daily, lisinopril 40 mg once daily and furosemide 20 mg daily. She is also taking Eliquis 5 mg twice daily and flecainide 100 mg twice daily. Patient reports no current medication side effects.  She checks her blood pressure at home, this morning's reading was 147/84. She reports no chest pain or shortness of breath.  ECHO:  Date 10/11/18, EF 55-60%  Pt is not complaining of sx of HF.    Pt is measuring daily weights and reports stable. 178 lbs this morning.   Patient does self-monitor BP. See above.   Pt is following a low sodium diet, is avoiding EtOH.  Follow[facility]ology at [facility].  Gets yearly flu vaccine and has had both pneumonia vaccines.     BP Readings from Last 3 Encounters:   11/10/18 182/82   10/31/18 122/62   10/25/18 130/80     Osteoporosis: taking alendronate 70 mg once weekly and coral calcium one tablet daily and vitamin D 1000 units daily. Patient is getting about 300 mg of calcium from diet daily also taking a multivitamin.     Today's Vitals: /73  Pulse 59  LMP  (LMP Unknown)       ASSESSMENT:              Current medications were reviewed today as discussed above.      Medication Adherence: good, no issues identified    HFpEF/HTN/A fib: stable, blood pressure slightly above goal of <140/90 today, continue to monitor. Followed by cardiology.    Osteoporosis: Recommended daily calcium is 1200 mg. Patient is getting about 1/2 of that from supplements. Recommend increase in calcium  supplement (see Plan).     PLAN:                  1. Your goal daily calcium is 1200 mg. You are currently getting about 300 mg from supplements and 300 mg from diet. Recommend you increase your current calcium to 2 tablets twice daily.     I spent 30 minutes with this patient today. All changes were made via collaborative practice agreement with Mikala Philip. A copy of the visit note was provided to the patient's primary care provider.     Will follow up in 3 months.    The patient was given a summary of these recommendations as an after visit summary.    Dhara Sutton, PharmD  Medication Therapy Management

## 2018-11-12 NOTE — PATIENT INSTRUCTIONS
1. Your goal daily calcium is 1200 mg. You are currently getting about 300 mg from supplements and 300 mg from diet. Recommend you increase your current calcium to 2 tablets twice daily.     You should continue to follow up with [facility] about every 3 months.    Keep up the good work!!      Dhara Sutton, PharmD  [phone] in clinic on Tuesday and Wednesday    You may receive a survey about the [facility] services you received.  I would appreciate your feedback to help me serve you better in the future. Please fill it out and return it when you can. Your comments will be anonymous.

## 2018-11-12 NOTE — LETTER
[facility] CARE COORDINATION    November 12, 2018    Hamida Verma  [address]  [facility] [address]      Dear Hamida,    I am a clinic care coordinator who works with Mikala Philip MD, MD at [facility] . I wanted to thank you for spending the time to talk with me.  I wanted to introduce myself and provide you with my contact information so that you can call me with questions or concerns about your health care. Below is a description of clinic care coordination and how I can further assist you.     The clinic care coordinator is a registered nurse  who understand the health care system. The goal of clinic care coordination is to help you manage your health and improve access to the [facility] system in the most efficient manner. The registered nurse can assist you in meeting your health care goals by providing education, coordinating services, and strengthening the communication among your providers.   Please feel free to contact me at [phone], with any questions or concerns. We at [facility] are focused on providing you with the highest-quality healthcare experience possible and that all starts with you.     Sincerely,     Jeannie Lopez    Enclosed: I have enclosed a copy of the Complex Care Plan. This has helpful information and goals that we have talked about. Please keep this in an easy to access place to use as needed.  CHF Action Plan

## 2018-11-12 NOTE — LETTER
[facility] Home  Complex Care Plan  About Me  Patient Name:  Hamida Verma    YOB: 1940  Age:     78 year old   [facility] MRN:   [id number] Telephone Information:    Home Phone [phone]   Mobile Not on file.       Address:    [address] [facility] [address] Email address:  No e-mail address on record      Emergency Contact(s)  Name Relationship Lgl Grd Work Phone Home Phone Mobile Phone   1PEGGY MARTIN Son No  [phone]            Primary language:  English     needed? No   [facility] [employer] [facility]:  [phone] op. 1  Other communication barriers: None  Preferred Method of Communication:  Mail  Current living arrangement: I live in a private home with family  Mobility Status/ Medical Equipment: Independent    Health Maintenance  Health Maintenance Reviewed: Not assessed    My Access Plan  Medical Emergency 911   Primary Clinic Line [facility] [phone]   24 Hour Appointment Line [phone] or  [phone]) (toll-free)   24 Hour Nurse Line [phone] (toll-free)   Preferred Urgent Care [facility], [phone]   Preferred Hospital [facility]  [phone]   Preferred Pharmacy [facility] [address] [place] [address]     Behavioral Health Crisis Line The National Suicide Prevention Lifeline at 1-956.165.8755 or 804     My Care Team Members    Patient Care Team       Relationship Specialty Notifications Start End    Mikala Philip MD PCP - General Family Practice  11/28/14     Phone: [phone] Fax: [phone] [address] [facility] [address]    Jeannie Lopez, RN Lead Care Coordinator Primary Care - [facility] Admissions 11/12/18     Phone: [phone] Fax: [phone]                My Care Plans  Self Management and Treatment Plan  Goals and (Comments)  Goals        General    Improve chronic symptoms (pt-stated)      Notes - Note created  11/12/2018 10:59 AM by Jeannie Lopez RN    Goal Statement: I will follow the CHF Action Plan   Measure of Success: I will have decreased hospital visits   Supportive Steps to Achieve:   I will share my action plan with my family members or friend so they can help me recognize early problems also    I will call my clinic if I have trouble lying down to sleep due to breathing problems   I will read nutrition labels when I am buying prepared foods or spices   I will buy salt substitute and low sodium seasonings such as Mrs Duran next time I go to the grocery store  I will keep a log on my daily sodium (salt)intake and will not go over 2000...mg  I will pace out my activities like bathing-cooking- cleaning - and rest between tasks for 10-15 minutes  I will elevate my legs on a chair or ottoman when I sit     Barriers: No barriers identified   Strengths: Lives with daughter and granddaughter   Date to Achieve By: ongoing   Patient expressed understanding of goal: Yes             Action Plans on File: CHF                      Advance Care Plans/Directives Type: None       My Medical and Care Information  Problem List   Patient Active Problem List   Diagnosis     Symptomatic menopausal or female climacteric states     Hyperlipidemia LDL goal <70     Hypertension goal BP (blood pressure) < 140/90     Knee pain     Obesity     Atrial fibrillation with rapid ventricular response (H)     S/P cardiac pacemaker procedure     SSS (sick sinus syndrome) (H)     Chronic diastolic CHF (congestive heart failure) (H)     Pulmonary hypertension (H)     Coronary artery calcification seen on CT scan      Current Medications and Allergies:  See printed Medication Report.    Care Coordination Start Date: 11/12/2018   Frequency of Care Coordination: weekly   Form Last Updated: 11/12/2018

## 2018-11-12 NOTE — PROGRESS NOTES
"Clinic Care Coordination Contact    Clinic Care Coordination Contact  OUTREACH    Referral Information:  Referral Source: ED Follow-Up    Primary Diagnosis: CHF    Chief Complaint   Patient presents with     Clinic Care Coordination - Post Hospital     Clinic Care Coordination RN         Universal Utilization: ED visit Cough History of CHF   Clinic Utilization  Difficulty keeping appointments:: No  Compliance Concerns: No  No-Show Concerns: No  No PCP office visit in Past Year: No  Utilization    Last refreshed: 2018 11:04 AM:  No Show Count (past year) 1       Last refreshed: 2018 11:04 AM:  ED visits 1       Last refreshed: 2018 11:04 AM:  Hospital admissions 3          Current as of: 2018 11:04 AM             Clinical Concerns:  Education Provided to patient: CHF Action Plan mailed to the patient   Pain  Pain (GOAL):: No  Health Maintenance Reviewed: Not assessed  Clinical Pathway: Clinic Care Coordination CHF Assessment    Discharge:      Day of hospital discharge: 11/10/18  What recommendations were made for follow up after your recent hospitalization? [facility] Cardiology   Have the follow up appointments been scheduled? Yes         CHF:    Home scale available:  Yes  Home scale weight this mornin    Heart Failure Zones sheet on refrigerator or available: No mailed  action plan   Any increased SOB since hospital discharge:  No  Any increased edema since hospital discharge:  No  What number to call for YELLOW zones: [phone]    Symptom Review:   Heart Failure Symptoms  Shortness of breath:: No  Wheezing or noisy breathing?: No  Cough: Yes  Is your cough:: Productive  Increased sputum: No  Fever: No  Chest pain: : No  Dizzy or Lightheaded: No  Checking weight daily? : Yes  Diet:: 2000 mg of sodium  Appetite:: Normal  Fatigue: No  Weakness (Heaviness in limbs):: No  What Heart Failure zone are you currently in?: Green  Overall your CHF symptoms are (GOAL):: Stable    Medications:  \"How " "many new medications are you on since your hospitalization?\"  0 - 1 MTM visit this morning   Is patient on Warfarin?  No  Is Ejection Fraction <40%: No    When is the first appointment with the CHF clinic: 11/28/2018               Medication Management:  Reviewed at MTM visit today      Functional Status:  Dependent ADLs:: Independent  Dependent IADLs:: Independent  Bed or wheelchair confined:: No  Mobility Status: Independent    Living Situation:  Current living arrangement:: I live in a private home with family    Diet/Exercise/Sleep:  Diet:: No added salt  Food Insecurity: No  Tube Feeding: No  Inadequate activity/exercise (GOAL):: No  Significant changes in sleep pattern (GOAL): No    Transportation:           Psychosocial:  Informal Support system:: Family     Financial/Insurance:   Financial/Insurance concerns (GOAL):: No       Community Resources: None  Supplies used at home:: None  Equipment Currently Used at Home: none    Advance Care Plan/Directive  Advanced Care Plans/Directives on file:: No    Referrals Placed: None     Goals:   Goals        General    Improve chronic symptoms (pt-stated)     Notes - Note created  11/12/2018 10:59 AM by Jeannie Lopez RN    Goal Statement: I will follow the CHF Action Plan   Measure of Success: I will have decreased hospital visits   Supportive Steps to Achieve:   I will share my action plan with my family members or friend so they can help me recognize early problems also    I will call my clinic if I have trouble lying down to sleep due to breathing problems   I will read nutrition labels when I am buying prepared foods or spices   I will buy salt substitute and low sodium seasonings such as Mrs Dash next time I go to the grocery store  I will keep a log on my daily sodium (salt)intake and will not go over 2000...mg  I will pace out my activities like bathing-cooking- cleaning - and rest between tasks for 10-15 minutes  I will elevate my legs on a chair or ottoman " when I sit     Barriers: No barriers identified   Strengths: Lives with daughter and granddaughter   Date to Achieve By: ongoing   Patient expressed understanding of goal: Yes              Patient/Caregiver understanding: Patient expresses good understanding of discharge instructions     Outreach Frequency: weekly  Future Appointments              In 2 weeks Adriana Ferrell PA-C [facility]    In 1 month [facility] TECH1 [facility]    In 2 months Mikala Philip MD [facility],     In 2 months [facility] LAB [facility] PHYSICIANS HEART AT [facility] CL[facility]    In 2 months Roe Voss MD [facility]          Plan: CC will follow up in 3-5 business days     Jeannie Lopez RN / Clinical Care Coordinator     [facility]   [email or web address]  Office :  [phone] / Fax :  [phone]

## 2018-11-12 NOTE — MR AVS SNAPSHOT
After Visit Summary   11/12/2018    Hamida Verma    MRN: [id number]           Patient Information     Date Of Birth          1940        Visit Information        Provider Department      11/12/2018 8:00 AM Dhara Sutton, [facility] MTM        Care Instructions    1. Your goal daily calcium is 1200 mg. You are currently getting about 300 mg from supplements and 300 mg from diet. Recommend you increase your current calcium to 2 tablets twice daily.     You should continue to follow up with [facility] about every 3 months.    Keep up the good work!!      Dhara Sutton, PharmD  [phone] in clinic on Tuesday and Wednesday    You may receive a survey about the MT services you received.  I would appreciate your feedback to help me serve you better in the future. Please fill it out and return it when you can. Your comments will be anonymous.                    Follow-ups after your visit        Your next 10 appointments already scheduled     Nov 28, 2018  8:50 AM CST   Core Return with Adriana Ferrell PA-C   [facility] ([facility])    [address] [place] [id number] [phone] OPT 2            Dec 31, 2018  2:15 PM CST   Remote PPM Check with MCCRACKEN TECH1   [facility] ([facility])    [address] [place] [id number] [phone] OPT 2           This appointment is for a remote check of your pacemaker.  This is not an appointment at the office.            [place] 15, 2019  7:40 AM CST   Office Visit with Mikala Philip MD   [facility] ([facility])    [address] [place] [address] [phone]           Bring a current list of meds and any records pertaining to this visit. For Physicals, please bring immunization records and any forms needing to be filled out. Please arrive 10 minutes  early to complete paperwork.            Jan 21, 2019  8:10 AM CST   LAB with [facility] LAB   [facility] PHYSICIANS HEART AT [place] ([facility])    [address]  [place]  [place] [id number] [phone]           Please do not eat 10-12 hours before your appointment if you are coming in fasting for labs on lipids, cholesterol, or glucose (sugar). This does not apply to pregnant women. Water, hot tea and black coffee (with nothing added) are okay. Do not drink other fluids, diet soda or chew gum.            Jan 23, 2019  2:15 PM CST   [facility] EP RETURN with Roe Rene MD   [facility] ([facility])    [address]  [place] [id number] [phone] OPT 2              Who to contact     If you have questions or need follow up information about today's clinic visit or your schedule please contact [facility] MT directly at [phone].  Normal or non-critical lab and imaging results will be communicated to you by MyChart, letter or phone within 4 business days after the clinic has received the results. If you do not hear from us within 7 days, please contact the clinic through MyChart or phone. If you have a critical or abnormal lab result, we will notify you by phone as soon as possible.  Submit refill requests through [employer] or call your pharmacy and they will forward the refill request to us. Please allow 3 business days for your refill to be completed.          Additional Information About Your Visit        Care EveryWhere ID     This is your Care EveryWhere ID. This could be used by other organizations to access your [facility] medical records  [id number]        Your Vitals Were     Last Period                   (LMP Unknown)            Blood Pressure from Last 3 Encounters:   11/10/18 182/82   10/31/18 122/62   10/25/18 130/80    Weight from Last 3 Encounters:   11/10/18 180 lb (81.6 kg)   10/31/18 184  lb (83.5 kg)   10/25/18 183 lb 9.6 oz (83.3 kg)              Today, you had the following     No orders found for display       Primary Care Provider Office Phone # Fax #    Mikala Philip [phone] [address]  [facility] [address]        Equal Access to Services     [facility]: Hadii aad ku hadasho Soomaali, waaxda luqadaha, qaybta kaalmada adeegyada, waxay idiin hayaan adeeg kharash la'aan . So [facility] [phone].    ATENCIÓN: Si habla español, tiene a ivy disposición servicios gratuitos de asistencia lingüística. Llame al [phone].    We comply with applicable federal civil rights laws and [place] laws. We do not discriminate on the basis of race, color, national origin, age, disability, sex, sexual orientation, or gender identity.            Thank you!     Thank you for choosing [facility]  for your care. Our goal is always to provide you with excellent care. Hearing back from our patients is one way we can continue to improve our services. Please take a few minutes to complete the written survey that you may receive in the mail after your visit with us. Thank you!             Your Updated Medication List - Protect others around you: Learn how to safely use, store and throw away your medicines at www.disposemymeds.org.          This list is accurate as of 11/12/18  8:22 AM.  Always use your most recent med list.                   Brand Name Dispense Instructions for use Diagnosis    albuterol 108 (90 Base) MCG/ACT inhaler    PROAIR HFA/PROVENTIL HFA/VENTOLIN HFA     Inhale 2 puffs into the lungs every 6 hours        alendronate 70 MG tablet    FOSAMAX    12 tablet    Take 1 tablet (70 mg) by mouth every 7 days Take 60 minutes before am meal with 8 oz. water. Remain upright for 30 minutes.    Osteoporosis       apixaban ANTICOAGULANT 5 MG tablet    ELIQUIS    180 tablet    Take 1 tablet (5 mg) by mouth 2 times daily    Atrial fibrillation with rapid  ventricular response (H)       atorvastatin 10 MG tablet    LIPITOR    30 tablet    Take 4 tablets (40 mg) by mouth daily    Hyperlipidemia LDL goal <70       cholecalciferol 1000 units capsule    vitamin  -D     Take 1 capsule by mouth daily.        Coral Calcium 133-66.7-133 MG-MG-UNIT Caps      Take 2 tablets by mouth 2 times daily        FLAX SEED OIL PO      1 capsule daily        flecainide 100 MG tablet    TAMBOCOR    90 tablet    Take 1 tablet (100 mg) by mouth 2 times daily    Sick sinus syndrome (H), Cardiac pacemaker in situ       fluticasone 50 MCG/ACT spray    FLONASE     Spray 1-2 sprays into both nostrils daily as needed for rhinitis or allergies        furosemide 20 MG tablet    LASIX    30 tablet    Take 1 tablet (20 mg) by mouth daily    Systolic congestive heart failure, unspecified congestive heart failure chronicity       HAIR SKIN NAILS PO      Take 1 tablet by mouth At Bedtime        Krill Oil 500 MG Caps      Take 1 capsule by mouth daily        lisinopril 40 MG tablet    PRINIVIL/ZESTRIL    90 tablet    Take 1 tablet (40 mg) by mouth daily    Hypertension goal BP (blood pressure) < 140/90       metoprolol succinate 50 MG 24 hr tablet    TOPROL-XL    180 tablet    Take 1 tablet (50 mg) by mouth 2 times daily    Atrial fibrillation with rapid ventricular response (H), Systolic congestive heart failure, unspecified congestive heart failure chronicity       Milk Thistle 200 MG Caps      Take 1 capsule by mouth daily         MG Caps      Take 1 capsule by mouth daily        potassium & sodium phosphates 278-164-250 MG/75ML Solr      Take 1 tablet by mouth daily

## 2018-11-14 ENCOUNTER — OFFICE VISIT (OUTPATIENT)
Dept: NURSING | Facility: CLINIC | Age: 78
End: 2018-11-14
Payer: COMMERCIAL

## 2018-11-14 ENCOUNTER — HOSPITAL ENCOUNTER (EMERGENCY)
Facility: CLINIC | Age: 78
Discharge: HOME | End: 2018-11-14
Attending: NURSE PRACTITIONER | Admitting: NURSE PRACTITIONER
Payer: COMMERCIAL

## 2018-11-14 VITALS
TEMPERATURE: 97.7 F | OXYGEN SATURATION: 98 % | DIASTOLIC BLOOD PRESSURE: 81 MMHG | SYSTOLIC BLOOD PRESSURE: 162 MMHG | RESPIRATION RATE: 16 BRPM | HEART RATE: 60 BPM

## 2018-11-14 VITALS
DIASTOLIC BLOOD PRESSURE: 93 MMHG | WEIGHT: 178 LBS | SYSTOLIC BLOOD PRESSURE: 190 MMHG | BODY MASS INDEX: 31.54 KG/M2 | TEMPERATURE: 97.9 F | HEART RATE: 63 BPM | HEIGHT: 63 IN | RESPIRATION RATE: 20 BRPM | OXYGEN SATURATION: 99 %

## 2018-11-14 DIAGNOSIS — I15.9 SECONDARY HYPERTENSION: ICD-10-CM

## 2018-11-14 DIAGNOSIS — H57.12 PAIN OF LEFT EYE: ICD-10-CM

## 2018-11-14 DIAGNOSIS — Z09 FOLLOW-UP EXAMINATION FOLLOWING SURGERY: Primary | ICD-10-CM

## 2018-11-14 PROCEDURE — 99207 ZZC NO CHARGE NURSE ONLY: CPT

## 2018-11-14 PROCEDURE — 25000132 ZZH RX MED GY IP 250 OP 250 PS 637: Performed by: NURSE PRACTITIONER

## 2018-11-14 PROCEDURE — 99283 EMERGENCY DEPT VISIT LOW MDM: CPT

## 2018-11-14 RX ORDER — METOPROLOL TARTRATE 25 MG/1
50 TABLET, FILM COATED ORAL ONCE
Status: COMPLETED | OUTPATIENT
Start: 2018-11-14 | End: 2018-11-14

## 2018-11-14 RX ORDER — TRAMADOL HYDROCHLORIDE 50 MG/1
50 TABLET ORAL ONCE
Status: COMPLETED | OUTPATIENT
Start: 2018-11-14 | End: 2018-11-14

## 2018-11-14 RX ORDER — ONDANSETRON 4 MG/1
4 TABLET, ORALLY DISINTEGRATING ORAL EVERY 8 HOURS PRN
Qty: 10 TABLET | Refills: 0 | Status: SHIPPED | OUTPATIENT
Start: 2018-11-14 | End: 2019-02-21

## 2018-11-14 RX ORDER — LISINOPRIL 40 MG/1
40 TABLET ORAL ONCE
Status: COMPLETED | OUTPATIENT
Start: 2018-11-14 | End: 2018-11-14

## 2018-11-14 RX ADMIN — TRAMADOL HYDROCHLORIDE 50 MG: 50 TABLET, COATED ORAL at 16:06

## 2018-11-14 RX ADMIN — LISINOPRIL 40 MG: 40 TABLET ORAL at 16:06

## 2018-11-14 RX ADMIN — METOPROLOL TARTRATE 50 MG: 25 TABLET ORAL at 16:06

## 2018-11-14 ASSESSMENT — ENCOUNTER SYMPTOMS
WEAKNESS: 1
VOMITING: 1
NAUSEA: 1
ABDOMINAL PAIN: 0
EYE PAIN: 1
FEVER: 0

## 2018-11-14 NOTE — ED AVS SNAPSHOT
Emergency Department    [address] [facility] [address]    Phone:  [phone]    Fax:  [phone]                                       Hamida Verma   MRN: [id number]    Department:   Emergency Department   Date of Visit:  11/14/2018           Patient Information     Date Of Birth          1940        Your diagnoses for this visit were:     Secondary hypertension     Pain of left eye        You were seen by Ramirez Menjivar, NELY CNP.      Follow-up Information     Follow up with Mikala Philip MD In 3 days.    Specialty:  Family Practice    Why:  if continuned symptoms or sooner if worsening    Contact information:    [address]  [facility] [address] [phone]          Follow up with  Emergency Department.    Specialty:  EMERGENCY MEDICINE    Why:  If symptoms worsen    Contact information:    [address] [facility] [id number] [phone]        Discharge Instructions       Make sure and take 2.5 mg of Xarelto 2 times a day instead of the 5 mg that you are currently taking.  This follows the recommendation of your retinal surgeon.  Also make sure you take your blood pressure medication as directed.        Discharge References/Attachments     HYPERTENSION, ESTABLISHED (ENGLISH)      Your next 10 appointments already scheduled     Nov 28, 2018  8:50 AM CST   Core Return with Adriana Ferrell PA-C   [facility] ([facility])    [address] [place] [phone] OPT 2            Dec 31, 2018  2:15 PM CST   Remote PPM Check with MCCRACKEN TECH1   [facility] ([facility])    [address] [place] [phone] OPT 2           This appointment is for a remote check of your pacemaker.  This is not an appointment at the office.            Fla[place] 15, 2019  7:40 AM CST   Office Visit with Mikala Philip MD    [facility] ([facility])    [address] [facility] [address] [phone]           Bring a current list of meds and any records pertaining to this visit. For Physicals, please bring immunization records and any forms needing to be filled out. Please arrive 10 minutes early to complete paperwork.            Jan 21, 2019  8:10 AM CST   LAB with [facility] LAB   [facility] HEART AT [place] ([facility])    [address] [place] [address] [phone]           Please do not eat 10-12 hours before your appointment if you are coming in fasting for labs on lipids, cholesterol, or glucose (sugar). This does not apply to pregnant women. Water, hot tea and black coffee (with nothing added) are okay. Do not drink other fluids, diet soda or chew gum.            Jan 23, 2019  2:15 PM CST   [facility] EP RETURN with Roe Rene MD   [facility] ([facility])    [address] [place], [place] [id number]  [facility] [address] [phone] OPT 2              24 Hour Appointment Hotline       To make an appointment at any [facility]), call [phone]). If you don't have a family doctor or clinic, we will help you find one. [facility] are conveniently located to serve the needs of you and your family.             Review of your medicines      START taking        Dose / Directions Last dose taken    ondansetron 4 MG ODT tab   Commonly known as:  ZOFRAN ODT   Dose:  4 mg   Quantity:  10 tablet        Take 1 tablet (4 mg) by mouth every 8 hours as needed for nausea   Refills:  0          Our records show that you are taking the medicines listed below. If these are incorrect, please call your family doctor or clinic.        Dose / Directions Last dose taken    albuterol 108 (90 Base) MCG/ACT inhaler   Commonly known as:  PROAIR HFA/PROVENTIL HFA/VENTOLIN HFA   Dose:  2 puff         Inhale 2 puffs into the lungs every 6 hours   Refills:  0        alendronate 70 MG tablet   Commonly known as:  FOSAMAX   Dose:  70 mg   Quantity:  12 tablet        Take 1 tablet (70 mg) by mouth every 7 days Take 60 minutes before am meal with 8 oz. water. Remain upright for 30 minutes.   Refills:  3        apixaban ANTICOAGULANT 5 MG tablet   Commonly known as:  ELIQUIS   Dose:  5 mg   Quantity:  180 tablet        Take 1 tablet (5 mg) by mouth 2 times daily   Refills:  3        atorvastatin 10 MG tablet   Commonly known as:  LIPITOR   Dose:  40 mg   Quantity:  30 tablet        Take 4 tablets (40 mg) by mouth daily   Refills:  5        cholecalciferol 1000 units capsule   Commonly known as:  vitamin  -D   Dose:  1 capsule        Take 1 capsule by mouth daily.   Refills:  0        Coral Calcium 133-66.7-133 MG-MG-UNIT Caps   Dose:  2 tablet        Take 2 tablets by mouth 2 times daily   Refills:  0        FLAX SEED OIL PO        1 capsule daily   Refills:  0        flecainide 100 MG tablet   Commonly known as:  TAMBOCOR   Dose:  100 mg   Quantity:  90 tablet        Take 1 tablet (100 mg) by mouth 2 times daily   Refills:  3        fluticasone 50 MCG/ACT spray   Commonly known as:  FLONASE   Dose:  1-2 spray        Spray 1-2 sprays into both nostrils daily as needed for rhinitis or allergies   Refills:  0        furosemide 20 MG tablet   Commonly known as:  LASIX   Dose:  20 mg   Quantity:  30 tablet        Take 1 tablet (20 mg) by mouth daily   Refills:  5        HAIR SKIN NAILS PO   Dose:  1 tablet        Take 1 tablet by mouth At Bedtime   Refills:  0        Krill Oil 500 MG Caps   Dose:  1 capsule        Take 1 capsule by mouth daily   Refills:  0        lisinopril 40 MG tablet   Commonly known as:  PRINIVIL/ZESTRIL   Dose:  40 mg   Quantity:  90 tablet        Take 1 tablet (40 mg) by mouth daily   Refills:  3        metoprolol succinate 50 MG 24 hr tablet   Commonly known as:  TOPROL-XL   Dose:  50 mg    Quantity:  180 tablet        Take 1 tablet (50 mg) by mouth 2 times daily   Refills:  3        Milk Thistle 200 MG Caps   Dose:  1 capsule        Take 1 capsule by mouth daily   Refills:  0         MG Caps   Dose:  1 capsule        Take 1 capsule by mouth daily   Refills:  0        potassium & sodium phosphates 278-164-250 MG/75ML Solr   Dose:  1 tablet        Take 1 tablet by mouth daily   Refills:  0                Prescriptions were sent or printed at these locations (1 Prescription)                   Other Prescriptions                Printed at Department/Unit printer (1 of 1)         ondansetron (ZOFRAN ODT) 4 MG ODT tab                Orders Needing Specimen Collection     None      Pending Results     No orders found from 11/12/2018 to 11/15/2018.            Pending Culture Results     No orders found from 11/12/2018 to 11/15/2018.            Pending Results Instructions     If you had any lab results that were not finalized at the time of your Discharge, you can call the ED Lab Result RN at [phone]. You will be contacted by this team for any positive Lab results or changes in treatment. The nurses are available 7 days a week from 10A to 6:30P.  You can leave a message 24 hours per day and they will return your call.        Test Results From Your Hospital Stay               Clinical Quality Measure: Blood Pressure Screening     Your blood pressure was checked while you were in the emergency department today. The last reading we obtained was  BP: (!) 190/93 . Please read the guidelines below about what these numbers mean and what you should do about them.  If your systolic blood pressure (the top number) is less than 120 and your diastolic blood pressure (the bottom number) is less than 80, then your blood pressure is normal. There is nothing more that you need to do about it.  If your systolic blood pressure (the top number) is 120-139 or your diastolic blood pressure (the bottom number) is  80-89, your blood pressure may be higher than it should be. You should have your blood pressure rechecked within a year by a primary care provider.  If your systolic blood pressure (the top number) is 140 or greater or your diastolic blood pressure (the bottom number) is 90 or greater, you may have high blood pressure. High blood pressure is treatable, but if left untreated over time it can put you at risk for heart attack, stroke, or kidney failure. You should have your blood pressure rechecked by a primary care provider within the next 4 weeks.  If your provider in the emergency department today gave you specific instructions to follow-up with your doctor or provider even sooner than that, you should follow that instruction and not wait for up to 4 weeks for your follow-up visit.        Thank you for choosing [facility]       Thank you for choosing [facility] for your care. Our goal is always to provide you with excellent care. Hearing back from our patients is one way we can continue to improve our services. Please take a few minutes to complete the written survey that you may receive in the mail after you visit with us. Thank you!        Care EveryWhere ID     This is your Care EveryWhere ID. This could be used by other organizations to access your [facility] medical records  [id number]        Equal Access to Services     NESSA MARIE : Sushant Garcia, madelaine potts, carlos ramírez. So [facility] [phone].    ATENCIÓN: Si habla español, tiene a ivy disposición servicios gratuitos de asistencia lingüística. Terryame al [phone].    We comply with applicable federal civil rights laws and [place] laws. We do not discriminate on the basis of race, color, national origin, age, disability, sex, sexual orientation, or gender identity.            After Visit Summary       This is your record. Keep this with you and show to your community pharmacist(s)  and doctor(s) at your next visit.

## 2018-11-14 NOTE — NURSING NOTE
Patient, granddaughter, Zoe, and daughter walked into exam room.    History obtained from Zoe and daughter:  1. Patient had cataract surgery last week and there were complications  2. Had weekly follow up and suture removed  3. Saw a retinal specialist (Dr. Weir) because eye pressure was too low  4. US showed hematoma behind left eye   A. Patient is on Eliquis, prescribed by her cardiologist, whom they are unable to get in touch with   B. Dr. Weir recommends Eliquis dose be lowered.  Patient also prescribed Prednisone and directed to PCP clinic for recommendations on Eliquis and Prednisone  5. Patient has heart failure and a pace maker  6. This morning woke up at 0800 in severe pain and vomitted   A. Got home at midnight   B. Also weak and lethargic this morning   C. Still very fatigued  7. BP today was 179/105  8. Patient denied chest pain and SOB  9. Patient has not taken medications today and has not had much to drink, likely dehydrated as well    Vital signs stable.     No irregularity nor murmur heard upon auscultation of heart.    Writer recommended:  1. Emergency room now for evaluation and further workup      Patient, granddaughter and daughter in agreement with plan and will go to [facility].    DANIEL BlankN, RN

## 2018-11-14 NOTE — ED AVS SNAPSHOT
Emergency Department    [address] [place] [address]    Phone:  [phone]    Fax:  [phone]                                       Hamida Verma   MRN: [id number]    Department:   Emergency Department   Date of Visit:  11/14/2018           After Visit Summary Signature Page     I have received my discharge instructions, and my questions have been answered. I have discussed any challenges I see with this plan with the nurse or doctor.    ..........................................................................................................................................  Patient/Patient Representative Signature      ..........................................................................................................................................  Patient Representative Print Name and Relationship to Patient    ..................................................               ................................................  Date                                   Time    ..........................................................................................................................................  Reviewed by Signature/Title    ...................................................              ..............................................  Date                                               Time          22EPIC Rev 08/18

## 2018-11-14 NOTE — DISCHARGE INSTRUCTIONS
Make sure and take 2.5 mg of Xarelto 2 times a day instead of the 5 mg that you are currently taking.  This follows the recommendation of your retinal surgeon.  Also make sure you take your blood pressure medication as directed.

## 2018-11-14 NOTE — MR AVS SNAPSHOT
After Visit Summary   11/14/2018    Hamida Verma    MRN: [id number]           Patient Information     Date Of Birth          1940        Visit Information        Provider Department      11/14/2018 2:00 PM HW RN/TRIAGE NURSE ONLY [facility]        Today's Diagnoses     Follow-up examination following surgery    -  1       Follow-ups after your visit        Your next 10 appointments already scheduled     Nov 28, 2018  8:50 AM CST   Core Return with Adriana Ferrell PA-C   [facility] ([facility])    [address] [place] [id number] [phone] OPT 2            Dec 31, 2018  2:15 PM CST   Remote PPM Check with MCCRACKEN TECH1   [facility] ([facility])    [address] [place] [id number] [phone] OPT 2           This appointment is for a remote check of your pacemaker.  This is not an appointment at the office.            Flaquito 15, 2019  7:40 AM CST   Office Visit with Mikala Philip MD   [facility] ([facility])    [address] [place] [address] [phone]           Bring a current list of meds and any records pertaining to this visit. For Physicals, please bring immunization records and any forms needing to be filled out. Please arrive 10 minutes early to complete paperwork.            Jan 21, 2019  8:10 AM CST   LAB with [facility]   [facility] [place] ([facility])    [address] [place] [id number] [phone]           Please do not eat 10-12 hours before your appointment if you are coming in fasting for labs on lipids, cholesterol, or glucose (sugar). This does not apply to pregnant women. Water, hot tea and black coffee (with nothing added) are okay. Do not drink other fluids, diet soda or chew gum.            Jan 23, 2019   2:15 PM CST   [facility] EP RETURN with Roe Rene MD   [facility] ([facility])    [address]  [facility] [address] [phone] OPT 2              Who to contact     If you have questions or need follow up information about today's clinic visit or your schedule please contact [facility] directly at [phone].  Normal or non-critical lab and imaging results will be communicated to you by MyChart, letter or phone within 4 business days after the clinic has received the results. If you do not hear from us within 7 days, please contact the clinic through MyChart or phone. If you have a critical or abnormal lab result, we will notify you by phone as soon as possible.  Submit refill requests through [employer] or call your pharmacy and they will forward the refill request to us. Please allow 3 business days for your refill to be completed.          Additional Information About Your Visit        Care EveryWhere ID     This is your Care EveryWhere ID. This could be used by other organizations to access your [facility] medical records  [id number]        Your Vitals Were     Pulse Temperature Respirations Last Period Pulse Oximetry       60 97.7  F (36.5  C) (Oral) 16 (LMP Unknown) 98%        Blood Pressure from Last 3 Encounters:   11/14/18 162/81   11/12/18 145/73   11/10/18 182/82    Weight from Last 3 Encounters:   11/10/18 180 lb (81.6 kg)   10/31/18 184 lb (83.5 kg)   10/25/18 183 lb 9.6 oz (83.3 kg)              Today, you had the following     No orders found for display       Primary Care Provider Office Phone # Fax #    Mikala Philip [phone]       [address]  [facility] [address]        Goals        General    Improve chronic symptoms (pt-stated)     Notes - Note created  11/12/2018 10:59 AM by Jeannie Lopez, RN    Goal Statement: I will follow the CHF Action Plan   Measure of Success: I will have  decreased hospital visits   Supportive Steps to Achieve:   I will share my action plan with my family members or friend so they can help me recognize early problems also    I will call my clinic if I have trouble lying down to sleep due to breathing problems   I will read nutrition labels when I am buying prepared foods or spices   I will buy salt substitute and low sodium seasonings such as Mrs Duran next time I go to the grocery store  I will keep a log on my daily sodium (salt)intake and will not go over 2000...mg  I will pace out my activities like bathing-cooking- cleaning - and rest between tasks for 10-15 minutes  I will elevate my legs on a chair or ottoman when I sit     Barriers: No barriers identified   Strengths: Lives with daughter and granddaughter   Date to Achieve By: ongoing   Patient expressed understanding of goal: Yes        Equal Access to Services     NESSA MARIE : Sushant Garcia, wamónica luqadaha, qaybta kaalmabrittany kolb, carlos horne. So [facility] [phone].    ATENCIÓN: Si habla español, tiene a ivy disposición servicios gratuitos de asistencia lingüística. Llame al [phone].    We comply with applicable federal civil rights laws and [place] laws. We do not discriminate on the basis of race, color, national origin, age, disability, sex, sexual orientation, or gender identity.            Thank you!     Thank you for choosing [facility]  for your care. Our goal is always to provide you with excellent care. Hearing back from our patients is one way we can continue to improve our services. Please take a few minutes to complete the written survey that you may receive in the mail after your visit with us. Thank you!             Your Updated Medication List - Protect others around you: Learn how to safely use, store and throw away your medicines at www.disposemymeds.org.          This list is accurate as of 11/14/18  2:15 PM.  Always  use your most recent med list.                   Brand Name Dispense Instructions for use Diagnosis    albuterol 108 (90 Base) MCG/ACT inhaler    PROAIR HFA/PROVENTIL HFA/VENTOLIN HFA     Inhale 2 puffs into the lungs every 6 hours        alendronate 70 MG tablet    FOSAMAX    12 tablet    Take 1 tablet (70 mg) by mouth every 7 days Take 60 minutes before am meal with 8 oz. water. Remain upright for 30 minutes.    Osteoporosis       apixaban ANTICOAGULANT 5 MG tablet    ELIQUIS    180 tablet    Take 1 tablet (5 mg) by mouth 2 times daily    Atrial fibrillation with rapid ventricular response (H)       atorvastatin 10 MG tablet    LIPITOR    30 tablet    Take 4 tablets (40 mg) by mouth daily    Hyperlipidemia LDL goal <70       cholecalciferol 1000 units capsule    vitamin  -D     Take 1 capsule by mouth daily.        Coral Calcium 133-66.7-133 MG-MG-UNIT Caps      Take 2 tablets by mouth 2 times daily        FLAX SEED OIL PO      1 capsule daily        flecainide 100 MG tablet    TAMBOCOR    90 tablet    Take 1 tablet (100 mg) by mouth 2 times daily    Sick sinus syndrome (H), Cardiac pacemaker in situ       fluticasone 50 MCG/ACT spray    FLONASE     Spray 1-2 sprays into both nostrils daily as needed for rhinitis or allergies        furosemide 20 MG tablet    LASIX    30 tablet    Take 1 tablet (20 mg) by mouth daily    Systolic congestive heart failure, unspecified congestive heart failure chronicity       HAIR SKIN NAILS PO      Take 1 tablet by mouth At Bedtime        Krill Oil 500 MG Caps      Take 1 capsule by mouth daily        lisinopril 40 MG tablet    PRINIVIL/ZESTRIL    90 tablet    Take 1 tablet (40 mg) by mouth daily    Hypertension goal BP (blood pressure) < 140/90       metoprolol succinate 50 MG 24 hr tablet    TOPROL-XL    180 tablet    Take 1 tablet (50 mg) by mouth 2 times daily    Atrial fibrillation with rapid ventricular response (H), Systolic congestive heart failure, unspecified congestive  heart failure chronicity       Milk Thistle 200 MG Caps      Take 1 capsule by mouth daily         MG Caps      Take 1 capsule by mouth daily        potassium & sodium phosphates 278-164-250 MG/75ML Solr      Take 1 tablet by mouth daily

## 2018-11-14 NOTE — ED PROVIDER NOTES
History     Chief Complaint:  Eye Problem    HPI   Hamida Verma is a 78 year old female with a history of CHF and atrial fibrillation anticoagulated on Eliquis who presents with an eye problem. The patient's family reports that the patient had cataract surgery in the left eye a few weeks ago and had a stitch clipped yesterday. However, her eye pressure was low so she had to have another stitch placed. This morning, the patient was weak, nauseated, unable to get up on her own, and was complaining of left eye pain. She had 1 episode of vomiting and also complained of being fatigued and having a reduced appetite so the patient's granddaughter called Dr. Ennis of ophthalmology who did the surgery. The patient was brought to Dr. Ennis and had an elevated blood pressure of 179/105 at 11:29 am. Her family notes she has not taken her blood pressure medications since 2 days ago. Dr. Ennis recommended they follow up with Dr. Weir, the retina specialist. Dr. Weir told her she has a hematoma behind her eye and recommended she follow up with her primary to see if she can stop her Eliquis and start a prednisone taper as well as Timolol ophthalmic solution. The patient followed up with her primary Dr. Philip who referred her to the ED for further evaluation. The patient also complains of having some back aches. The patient has had no fevers, leg swelling above baseline, abdominal pain, or changes in medications.    Allergies:  No known drug allergies    Medications:    Albuterol  Fosamax  Eliquis  Lipitor   Tambocor  Flonase  Lasix  Lisinopril  Methylsulfonylmethane  Metoprolol    Past Medical History:    Atrial fibrillation with RVR  Chronic diastolic CHF (congestive heart failure)   Coronary artery calcification  Essential hypertension   Hyperlipidemia  Pulmonary hypertension  SSS (sick sinus syndrome)     Past Surgical History:    Hysterectomy  Pacemaker  Cataract surgery    Family History:    Cerebrovascular  "disease  Glaucoma  Macular degeneration  Alcohol use  Myocardial infarction  Dementia     Social History:  The patient presents to the ED with her daughter and granddaughter.  Marital status:   Smoking status: Former Smoker, quit 2000  Alcohol use: No    Review of Systems   Constitutional: Negative for fever.   Eyes: Positive for pain (left eye).   Cardiovascular: Negative for leg swelling.   Gastrointestinal: Positive for nausea and vomiting (x1). Negative for abdominal pain.   Neurological: Positive for weakness.   All other systems reviewed and are negative.    Physical Exam     Patient Vitals for the past 24 hrs:   BP Temp Temp src Pulse Heart Rate Resp SpO2 Height Weight   11/14/18 1700 (!) 190/93 - - - - - 99 % - -   11/14/18 1644 - - - - - - 98 % - -   11/14/18 1620 181/86 - - - - - - - -   11/14/18 1611 - - - 63 - - - - -   11/14/18 1604 152/88 - - - - - - - -   11/14/18 1456 191/82 97.9  F (36.6  C) Oral 60 60 20 98 % 1.6 m (5' 3\") 80.7 kg (178 lb)       Physical Exam  General: Alert, Mild  discomfort, well kept  Eyes: PERRL, conjunctivae pink no scleral icterus or conjunctival injection  ENT:   Moist mucus membranes, posterior oropharynx clear without erythema or exudates, No lymphadenopathy, Normal voice  Resp:  Lungs clear to auscultation bilaterally, no crackles/rubs/wheezes. Good air movement  CV:  Normal rate and rhythm, no murmurs/rubs/gallops  GI:  Abdomen soft and non-distended.  Normoactive BS.  No tenderness, guarding or rebound, No masses  Skin:  Warm, dry.  No rashes or petechiae  Musculoskeletal: No peripheral edema or calf tenderness, Normal gross ROM   Neuro: Alert and oriented to person/place/time, normal sensation  Psychiatric: Normal affect, cooperative, good eye contact    Emergency Department Course     Interventions:  1606: Metoprolol Tartrate 50 mg PO  1606: Lisinopril 40 mg PO  1601: Tramadol 50 mg PO    Emergency Department Course:  Past medical records, nursing notes, and " vitals reviewed.  1535: I performed an exam of the patient and obtained history, as documented above.     1706: I rechecked the patient.    Findings and plan explained to the patient and her family. Patient discharged home with instructions regarding supportive care, medications, and reasons to return. The importance of close follow-up was reviewed.    Impression & Plan      Medical Decision Making:  Hamida Verma is a 78 year old female who presents today for evaluation of high blood pressure.  She has recently had cataract surgery the did have complications and she had a revision yesterday.  Today she has seen both a ophthalmologist and a retinal specialist.  She was recommended to decrease her Eliquis I discussed this with pharmacy and she is reduced to half of her current dose.  She will be taking 2.5 mg twice daily instead of 5.  She was initially seen by her primary care provider however they immediately sent her here for further evaluation due to her elevated blood pressure.  She described no change in her eye pain or other symptoms.  She was given the above medication and had some fluctuation in her blood pressure although it still remained elevated.  Her eye pain resolved.  As she had been thoroughly evaluated by multiple specialists a day there was no indication for further testing or imagery as she has had no change in her condition.  She had not taken her blood pressure medication for 2 days this in association with her eye pain is most likely the cause of her blood pressure elevation today.  I discussed strict return protocols with her.  Again there is no sign of CVA.  She has had resolution of her eye discomfort.  She has been uncomfortable resting in her bed.  She does wants to be discharged home.  This does not appear to be hypertensive emergency or urgency at this time.  There is no indication for further laboratory studies.  She does appear to be safe and appropriate for outpatient management and  follow-up.      Diagnosis:    ICD-10-CM   1. Secondary hypertension I15.9   2. Pain of left eye H57.12       Disposition:  discharged to home    Discharge Medications:  New Prescriptions    ONDANSETRON (ZOFRAN ODT) 4 MG ODT TAB    Take 1 tablet (4 mg) by mouth every 8 hours as needed for nausea         Nicki Bardales  11/14/2018    EMERGENCY DEPARTMENT  I, Nicki Bardales, am serving as a scribe at 3:35 PM on 11/14/2018 to document services personally performed by Ramirez Menjivar APRN NP based on my observations and the provider's statements to me.        Ramirez Menjivar APRN CNP  11/14/18 1746

## 2018-11-15 ENCOUNTER — PATIENT OUTREACH (OUTPATIENT)
Dept: CARE COORDINATION | Facility: CLINIC | Age: 78
End: 2018-11-15

## 2018-11-15 DIAGNOSIS — I10 HYPERTENSION GOAL BP (BLOOD PRESSURE) < 140/90: Primary | ICD-10-CM

## 2018-11-15 ASSESSMENT — ACTIVITIES OF DAILY LIVING (ADL): DEPENDENT_IADLS:: INDEPENDENT

## 2018-11-15 NOTE — PROGRESS NOTES
Clinic Care Coordination Contact  [facility]/Voicemail       Clinical Data: Care Coordinator Outreach    ED visit 11/14/2018-Eye pain/ high blood pressure Forgot blood pressure medication for 2 days     Outreach attempted x 1.  Left message on voicemail with call back information and requested return call.    Plan:. Care Coordinator will try to reach patient again in 1-2 business days.    Jeannie Lopez RN / Clinical Care Coordinator     [facility]   [email or web address]  Office :  [phone] / Fax :  [phone]

## 2018-11-16 ASSESSMENT — ACTIVITIES OF DAILY LIVING (ADL): DEPENDENT_IADLS:: INDEPENDENT

## 2018-11-16 NOTE — PROGRESS NOTES
Clinic Care Coordination Contact  [facility]/Voicemail       Clinical Data: Care Coordinator Outreach  Outreach attempted x 2.  Left message on voicemail with call back information and requested return call.  Plan: . Care Coordinator will do no further outreaches at this time.  Jeannie Lopez RN / Clinical Care Coordinator     [facility]   [email or web address]  Office :  [phone] / Fax :  [phone]

## 2018-11-16 NOTE — PROGRESS NOTES
Clinic Care Coordination Contact    Clinic Care Coordination Contact  OUTREACH    Referral Information: ED follow up        Primary Diagnosis: CHF (Hypertension)    Chief Complaint   Patient presents with     Clinic Care Coordination - [facility]     Clinic Care Coordination RN         Universal Utilization: 11/14/2018-ED visit eye problem and elevated blood pressure   Clinic Utilization  Difficulty keeping appointments:: No  Compliance Concerns: No  No-Show Concerns: No  No PCP office visit in Past Year: No  Utilization    Last refreshed: 11/16/2018  2:20 PM:  No Show Count (past year) 1       Last refreshed: 11/16/2018  2:20 PM:  ED visits 2       Last refreshed: 11/16/2018  2:20 PM:  Hospital admissions 3          Current as of: 11/16/2018  2:20 PM             Clinical Concerns:  Current Medical Concerns:  Patient reports she saw the eye provider yesterday and she is making great progress and she can see more light   Blood pressure today 136/77 pulse 60  Eliquis was cut down to 1/2 tablet in the morning and 1/2 tablet at night  Patient transferred to the appointment line to make a follow up appointment with Dr Philip to discuss starting a Prednisone taper      Patient has another eye exam on Monday 11/19/2018    Pain  Pain (GOAL):: No  Health Maintenance Reviewed: Not assessed  Clinical Pathway: None    Medication Management:  Reviewed      Functional Status:  Dependent ADLs:: Independent  Dependent IADLs:: Independent  Bed or wheelchair confined:: No  Mobility Status: Independent    Living Situation:  Current living arrangement:: I live in a private home with family    Diet/Exercise/Sleep:  Diet:: No added salt  Food Insecurity: No  Tube Feeding: No  Inadequate activity/exercise (GOAL):: No  Significant changes in sleep pattern (GOAL): No    Transportation:           Psychosocial:  Informal Support system:: Family     Financial/Insurance:   Financial/Insurance concerns (GOAL):: No  Community Resources:  None  Supplies used at home:: None  Equipment Currently Used at Home: none    Advance Care Plan/Directive  Advanced Care Plans/Directives on file:: No    Referrals Placed: None     Goals:   Goals        General    Improve chronic symptoms (pt-stated)     Notes - Note created  11/12/2018 10:59 AM by Jeannie Lopez RN    Goal Statement: I will follow the CHF Action Plan   Measure of Success: I will have decreased hospital visits   Supportive Steps to Achieve:   I will share my action plan with my family members or friend so they can help me recognize early problems also    I will call my clinic if I have trouble lying down to sleep due to breathing problems   I will read nutrition labels when I am buying prepared foods or spices   I will buy salt substitute and low sodium seasonings such as Mrs Duran next time I go to the grocery store  I will keep a log on my daily sodium (salt)intake and will not go over 2000...mg  I will pace out my activities like bathing-cooking- cleaning - and rest between tasks for 10-15 minutes  I will elevate my legs on a chair or ottoman when I sit     Barriers: No barriers identified   Strengths: Lives with daughter and granddaughter   Date to Achieve By: ongoing   Patient expressed understanding of goal: Yes              Patient/Caregiver understanding: Patient expresses good understanding of discharge instructions     Outreach Frequency: weekly  Future Appointments              In 5 days Mikala Arrington MD [facility]     In 1 week Adriana Ferrell PA-C [facility]    In 1 month [facility] TECH1 [facility]    In 2 months [facility]    In 2 months [facility]    In 2 months Roe Voss MD [facility]          Plan:  CC will follow up in 3-5 business days     Jeannie Lopez RN / Clinical Care Coordinator     [facility]   [email or web address]  Office :  [phone] / Fax :  [phone]

## 2018-11-18 NOTE — PROGRESS NOTES
SUBJECTIVE:  Hamida Verma, a 78 year old female with history of hypertension, Afib, HFpEF, SSS s/p pacemaker placement, is here to discuss the following issues:     ED/UC Followup:    Facility:  [facility]   Date of visit: 11/14/2018  Reason for visit: hypertension following complicated cataract surgery  Current Status: improved       HYPERTENSION FOLLOW-UP    Current medication regimen includes metoprolol succinate 50 mg QD, lisinopril 40 mg QD, and lasix 20 mg QD.     Patient reports no problems or side effects with the medication.  Patient does check blood pressure outside of clinic and they've been controlled since ER discharge.      She had left cataract surgery on 11/6 that was complicated by floppy iris and required a left vitrectomy for removal of lens fragments.  This was complicated by a hemorrhagic choroidal effusion which required a revision of the corneal incision with additional sutures on 11/13.  Her apixiban dose was cut in half and she continues to take 2.5 mg QD.  The next day she developed severe eye pain and was seen by both of her ophthalmologists (Dr. Ennis and Dr. Weir) and also the ER due to severe hypertension.  Today she explains that she had not been able to take her usual blood pressure medications due to the severe pain and associated nausea.  She has since resumed her usual medication regimen and her blood pressure readings have been fine.  Her eye pain has resolved but Dr. Weir would like to have her start oral prednisone.  She has some hesitation about this.        Problem list and histories reviewed & updated, as indicated.  Patient Active Problem List   Diagnosis     Symptomatic menopausal or female climacteric states     Hyperlipidemia LDL goal <70     Hypertension goal BP (blood pressure) < 140/90     Knee pain     Obesity     Atrial fibrillation with rapid ventricular response (H)     S/P cardiac pacemaker procedure     SSS (sick sinus syndrome) (H)      Chronic diastolic CHF (congestive heart failure) (H)     Pulmonary hypertension (H)     Coronary artery calcification seen on CT scan       BP Readings from Last 3 Encounters:   11/21/18 109/73   11/14/18 (!) 190/93   11/14/18 162/81    Wt Readings from Last 3 Encounters:   11/21/18 172 lb (78 kg)   11/14/18 178 lb (80.7 kg)   11/10/18 180 lb (81.6 kg)           ROS:  12 point ROS of systems including Constitutional, Eyes, ENT, Respiratory, Cardiovascular, Gastroenterology, Genitourinary, Integumentary, Muscularskeletal, Neurologic, Psychiatric, and Hematologic/Lymphatic were all negative except for pertinent positives noted in my HPI.     OBJECTIVE:    /73  Pulse 63  Temp 97.7  F (36.5  C) (Oral)  Resp 16  Wt 172 lb (78 kg)  LMP  (LMP Unknown)  SpO2 96%  Breastfeeding? No  BMI 30.47 kg/m2  GEN:  no apparent distress  EYES: left eye with some erythema and crusting  LUNGS:  normal respiratory effort, and lungs clear to auscultation bilaterally - no rales, rhonchi or wheezes  CV: regular rate and rhythm, normal S1 S2, no S3 or S4 and no murmur, click or rub     ASSESSMENT/PLAN:  1. Hypertension goal BP (blood pressure) < 140/90  stable/well controlled - Continue current medication regimen.     2. Atrial fibrillation with rapid ventricular response (H)  3. S/P cardiac pacemaker procedure  4. Chronic anticoagulation  Currently still on lower dose of apixaban.  She'll clarify with Dr. Weir (retina specialist) when she can resume full strength anticoagulation.      I recommended she take the systemic prednisone as advised by ophtho.  We discussed potential side effects.         Patient Instructions   Go ahead and start the prednisone.    Next Wednesday, clarify with Dr. Weir if you can resume your full dose of Eliquis.          Mikala Arrington MD   [facility]

## 2018-11-20 ENCOUNTER — TRANSFERRED RECORDS (OUTPATIENT)
Dept: HEALTH INFORMATION MANAGEMENT | Facility: CLINIC | Age: 78
End: 2018-11-20

## 2018-11-21 ENCOUNTER — OFFICE VISIT (OUTPATIENT)
Dept: FAMILY MEDICINE | Facility: CLINIC | Age: 78
End: 2018-11-21
Payer: COMMERCIAL

## 2018-11-21 VITALS
SYSTOLIC BLOOD PRESSURE: 109 MMHG | BODY MASS INDEX: 30.47 KG/M2 | OXYGEN SATURATION: 96 % | HEART RATE: 63 BPM | TEMPERATURE: 97.7 F | DIASTOLIC BLOOD PRESSURE: 73 MMHG | WEIGHT: 172 LBS | RESPIRATION RATE: 16 BRPM

## 2018-11-21 DIAGNOSIS — Z79.01 CHRONIC ANTICOAGULATION: ICD-10-CM

## 2018-11-21 DIAGNOSIS — I48.91 ATRIAL FIBRILLATION WITH RAPID VENTRICULAR RESPONSE (H): ICD-10-CM

## 2018-11-21 DIAGNOSIS — I10 HYPERTENSION GOAL BP (BLOOD PRESSURE) < 140/90: Primary | ICD-10-CM

## 2018-11-21 DIAGNOSIS — Z95.0 S/P CARDIAC PACEMAKER PROCEDURE: ICD-10-CM

## 2018-11-21 PROCEDURE — 99213 OFFICE O/P EST LOW 20 MIN: CPT | Performed by: FAMILY MEDICINE

## 2018-11-21 NOTE — PATIENT INSTRUCTIONS
Go ahead and start the prednisone.    Next Wednesday, clarify with Dr. Weir if you can resume your full dose of Eliquis.

## 2018-11-21 NOTE — MR AVS SNAPSHOT
After Visit Summary   11/21/2018    Hamida Verma    MRN: [id number]           Patient Information     Date Of Birth          1940        Visit Information        Provider Department      11/21/2018 3:40 PM Mikala Arrington MD [facility]        Today's Diagnoses     Hypertension goal BP (blood pressure) < 140/90    -  1      Care Instructions    Go ahead and start the prednisone.    Next Wednesday, clarify with Dr. Weir if you can resume your full dose of Eliquis.              Follow-ups after your visit        Your next 10 appointments already scheduled     Nov 28, 2018  8:50 AM CST   Core Return with Adriana Ferrell PA-C   [facility] ([facility]    [address] [place] [id number] [phone] OPT 2            Dec 31, 2018  2:15 PM CST   Remote PPM Check with MCCRACKEN TECH1   [facility] ([facility])    [address] [place] [address] [id number] [phone] OPT 2           This appointment is for a remote check of your pacemaker.  This is not an appointment at the office.            Flaquito 15, 2019  7:40 AM CST   Office Visit with Mikala Philip MD   [facility]    [address] [place] [id number] [phone]           Bring a current list of meds and any records pertaining to this visit. For Physicals, please bring immunization records and any forms needing to be filled out. Please arrive 10 minutes early to complete paperwork.            Jan 21, 2019  8:10 AM CST   LAB with [facility]   [facility] [place] ([facility]    [address] [place] [phone]           Please do not eat 10-12 hours before your appointment if you are coming in fasting for labs on lipids, cholesterol, or glucose (sugar). This does not  apply to pregnant women. Water, hot tea and black coffee (with nothing added) are okay. Do not drink other fluids, diet soda or chew gum.            Jan 23, 2019  2:15 PM CST   [facility] EP RETURN with Roe Rene MD   [facility] ([facility])    [address]  [place] [address] [phone] OPT 2              Who to contact     If you have questions or need follow up information about today's clinic visit or your schedule please contact [facility] directly at [phone].  Normal or non-critical lab and imaging results will be communicated to you by MyChart, letter or phone within 4 business days after the clinic has received the results. If you do not hear from us within 7 days, please contact the clinic through MyChart or phone. If you have a critical or abnormal lab result, we will notify you by phone as soon as possible.  Submit refill requests through [employer] or call your pharmacy and they will forward the refill request to us. Please allow 3 business days for your refill to be completed.          Additional Information About Your Visit        Care EveryWhere ID     This is your Care EveryWhere ID. This could be used by other organizations to access your [facility] medical records  [id number]        Your Vitals Were     Pulse Temperature Respirations Last Period Pulse Oximetry Breastfeeding?    63 97.7  F (36.5  C) (Oral) 16 (LMP Unknown) 96% No    BMI (Body Mass Index)                   30.47 kg/m2            Blood Pressure from Last 3 Encounters:   11/21/18 109/73   11/14/18 (!) 190/93   11/14/18 162/81    Weight from Last 3 Encounters:   11/21/18 172 lb (78 kg)   11/14/18 178 lb (80.7 kg)   11/10/18 180 lb (81.6 kg)              Today, you had the following     No orders found for display       Primary Care Provider Office Phone # Fax #    Mikala Philip [phone] [address]  [facility]  [id number]        Goals        General    Improve chronic symptoms (pt-stated)     Notes - Note created  11/12/2018 10:59 AM by Jeannie Lopez RN    Goal Statement: I will follow the CHF Action Plan   Measure of Success: I will have decreased hospital visits   Supportive Steps to Achieve:   I will share my action plan with my family members or friend so they can help me recognize early problems also    I will call my clinic if I have trouble lying down to sleep due to breathing problems   I will read nutrition labels when I am buying prepared foods or spices   I will buy salt substitute and low sodium seasonings such as Mrs Duran next time I go to the grocery store  I will keep a log on my daily sodium (salt)intake and will not go over 2000...mg  I will pace out my activities like bathing-cooking- cleaning - and rest between tasks for 10-15 minutes  I will elevate my legs on a chair or ottoman when I sit     Barriers: No barriers identified   Strengths: Lives with daughter and granddaughter   Date to Achieve By: ongoing   Patient expressed understanding of goal: Yes        Equal Access to Services     NESSA MARIE : Hadii adams pena hadasho Soomaali, waaxda luqadaha, qaybta kaalmada adeegyada, waxay chela carvajal . So [facility] [phone].    ATENCIÓN: Si habla español, tiene a ivy disposición servicios gratuitos de asistencia lingüística. Llame al [phone].    We comply with applicable federal civil rights laws and [place] laws. We do not discriminate on the basis of race, color, national origin, age, disability, sex, sexual orientation, or gender identity.            Thank you!     Thank you for choosing [facility]  for your care. Our goal is always to provide you with excellent care. Hearing back from our patients is one way we can continue to improve our services. Please take a few minutes to complete the written survey that you may receive in the mail after your visit with us. Thank  you!             Your Updated Medication List - Protect others around you: Learn how to safely use, store and throw away your medicines at www.disposemymeds.org.          This list is accurate as of 11/21/18  4:38 PM.  Always use your most recent med list.                   Brand Name Dispense Instructions for use Diagnosis    albuterol 108 (90 Base) MCG/ACT inhaler    PROAIR HFA/PROVENTIL HFA/VENTOLIN HFA     Inhale 2 puffs into the lungs every 6 hours        alendronate 70 MG tablet    FOSAMAX    12 tablet    Take 1 tablet (70 mg) by mouth every 7 days Take 60 minutes before am meal with 8 oz. water. Remain upright for 30 minutes.    Osteoporosis       apixaban ANTICOAGULANT 5 MG tablet    ELIQUIS    180 tablet    Take 1 tablet (5 mg) by mouth 2 times daily    Atrial fibrillation with rapid ventricular response (H)       atorvastatin 10 MG tablet    LIPITOR    30 tablet    Take 4 tablets (40 mg) by mouth daily    Hyperlipidemia LDL goal <70       cholecalciferol 1000 units capsule    vitamin  -D     Take 1 capsule by mouth daily.        Coral Calcium 133-66.7-133 MG-MG-UNIT Caps      Take 2 tablets by mouth 2 times daily        FLAX SEED OIL PO      1 capsule daily        flecainide 100 MG tablet    TAMBOCOR    90 tablet    Take 1 tablet (100 mg) by mouth 2 times daily    Sick sinus syndrome (H), Cardiac pacemaker in situ       fluticasone 50 MCG/ACT spray    FLONASE     Spray 1-2 sprays into both nostrils daily as needed for rhinitis or allergies        furosemide 20 MG tablet    LASIX    30 tablet    Take 1 tablet (20 mg) by mouth daily    Systolic congestive heart failure, unspecified congestive heart failure chronicity       HAIR SKIN NAILS PO      Take 1 tablet by mouth At Bedtime        Krill Oil 500 MG Caps      Take 1 capsule by mouth daily        lisinopril 40 MG tablet    PRINIVIL/ZESTRIL    90 tablet    Take 1 tablet (40 mg) by mouth daily    Hypertension goal BP (blood pressure) < 140/90        metoprolol succinate 50 MG 24 hr tablet    TOPROL-XL    180 tablet    Take 1 tablet (50 mg) by mouth 2 times daily    Atrial fibrillation with rapid ventricular response (H), Systolic congestive heart failure, unspecified congestive heart failure chronicity       Milk Thistle 200 MG Caps      Take 1 capsule by mouth daily         MG Caps      Take 1 capsule by mouth daily        potassium & sodium phosphates 278-164-250 MG/75ML Solr      Take 1 tablet by mouth daily

## 2018-11-28 ENCOUNTER — OFFICE VISIT (OUTPATIENT)
Dept: CARDIOLOGY | Facility: CLINIC | Age: 78
End: 2018-11-28
Attending: PHYSICIAN ASSISTANT
Payer: COMMERCIAL

## 2018-11-28 ENCOUNTER — TRANSFERRED RECORDS (OUTPATIENT)
Dept: HEALTH INFORMATION MANAGEMENT | Facility: CLINIC | Age: 78
End: 2018-11-28

## 2018-11-28 VITALS
HEART RATE: 72 BPM | DIASTOLIC BLOOD PRESSURE: 76 MMHG | OXYGEN SATURATION: 95 % | SYSTOLIC BLOOD PRESSURE: 128 MMHG | WEIGHT: 177 LBS | BODY MASS INDEX: 30.22 KG/M2 | HEIGHT: 64 IN

## 2018-11-28 DIAGNOSIS — Z95.0 S/P CARDIAC PACEMAKER PROCEDURE: ICD-10-CM

## 2018-11-28 DIAGNOSIS — I48.0 PAF (PAROXYSMAL ATRIAL FIBRILLATION) (H): ICD-10-CM

## 2018-11-28 DIAGNOSIS — E78.5 HYPERLIPIDEMIA LDL GOAL <70: ICD-10-CM

## 2018-11-28 DIAGNOSIS — I10 HYPERTENSION GOAL BP (BLOOD PRESSURE) < 140/90: Primary | ICD-10-CM

## 2018-11-28 DIAGNOSIS — I50.32 CHRONIC DIASTOLIC CONGESTIVE HEART FAILURE (H): ICD-10-CM

## 2018-11-28 PROCEDURE — 99214 OFFICE O/P EST MOD 30 MIN: CPT | Performed by: PHYSICIAN ASSISTANT

## 2018-11-28 RX ORDER — PREDNISONE 20 MG/1
TABLET ORAL
COMMUNITY
Start: 2018-11-14 | End: 2019-01-05

## 2018-11-28 RX ORDER — TRAMADOL HYDROCHLORIDE 50 MG/1
50 TABLET ORAL EVERY 6 HOURS PRN
COMMUNITY
Start: 2018-11-14 | End: 2019-01-05

## 2018-11-28 NOTE — LETTER
11/28/2018    Mikala Philip MD, MD  [address]  [facility] [address]    RE: Hamida Verma       Dear Colleague,    I had the pleasure of seeing Hamida Verma in the [facility].    Cardiology Clinic Progress Note    Hamida Verma MRN# [id number]   YOB: 1940 Age: 78 year old     Reason for visit: CORE Enrollment           Assessment and Plan:     In summary, the patient presents today for assessment of HFpEF. She was admitted in March for volume overload in the setting of rapid atrial fibrillation, then again in October with CHF felt secondary to diastolic dysfunction, mildly uncontrolled blood pressures and underlying lung disease. Norvasc 5 was added to her regimen at her last office visit one month ago however she never did start this. Unfortunately she's suffered from complications of cataract surgery and uncontrolled hypertension due to anxiety, eye pain and med non-compliance resulting in the development of a choroidal hematoma and multiple ED visits since then. Today, her BP appears well-controlled, consistent with her recent home recordings. She remains on low-dose Eliquis due to her recent choroidal hematoma, and is scheduled to see her ophthalmologist for reassessment later today.     1. HFpEF   - ECHO: EF 55-60%, E/E' avg 16, mild-mod LAE   - ACC/AHA Stage: C; FC I    - Etiology: age, htn, paf   - RV: normal   - Valves: Mild-mod TR   - Volume: Appears euvolemic    Admit Wt: 186 lbs    Current Wt: 174 lbs (per home scale)    O2 Requirements: RA    Diuretics: Lasix 20 mg daily   - Rhythm: SR / ApVs / PAF   - QRS: Narrow   - Device: DDDR PPM   - Other: TSH OK                 Stop-Bang Score: Low    2. PAF, s/p PPM - on flecainide 100 BID, Toprol 50 BID and Eliquis 5 BID. Appears well-controlled per recent device interrogation in September. Recent EKG benign.     3. HTN - home readings show 's - 130's in the mornings, as low as 100's in the  evenings. On Lisinopril 40, Toprol 50 BID, Lasix 20.     4. CAC - per CT. No sxs suggestive of angina. Melonie MPI this year was normal (small fixed defect suggestive of breast attenuation). LDL in July was 84 on Zocor 20. Switched to Lipitor 40 in 10/2018.     5. Probable COPD - with some emphymatous changes noted on chest CT. +Smoking history. To have PFT's per PCP.       Plan:  - No medication changes today. She'll call me if she finds that she does in fact have the previously-ordered Norvasc at home. If she does have it at home, but has not been taking it, she should not start taking it now, but should keep it on hand as we can use this PRN in the future for uncontrolled blood pressures. If she doesn't have it at home, she should not necessarily pick it up at this time as her pressures appear well-controlled and she's currently in the donut hole. She has an accurate BP cuff at home and will continue to monitor.   - She was encouraged to call us in the future with re-development of cough, weight gain of 2+ lbs from one day to next or 5+ lbs within one week, and/or uncontrolled BP's.   - To return to Eliquis 5 mg BID once felt safe by ophthalmology. Samples provided today.   - Educated on low salt diet.  - Follow up with me in 3 weeks with labs prior; Dr. Voss in 2 months; Dr. Mallory in 5 months.  - Repeat lipids following switch to Lipitor in ~ 2 months.   - Continue routine PPM checks.         History of Presenting Illness:      Hamida Verma is a pleasant 78 year old patient of  Dr. Mallory and Dr. Voss who presents today for a CORE Enrollment visit.    The patient has a history of CAC on CT with a normal Melonie MPI in 3/2018, HFpEF, AF s/p PPM anticoagulated on Eliquis, HTN, HLP and VHD. She was admitted in March 2018 with fluid overload in the setting of rapid atrial fibrillation. She spontaneously converted but ultimately received a pacemaker for post-conversion pauses up to 4 seconds duration and  following that was placed on flecainide for PAF with RVR. However she continued to have a significant burden of atrial fibrillation and flecainide was therefore this was stopped and a rate control strategy with Toprol was pursued. However in July of this year flecainide was re-started after AF with RVR in the 160's-170's was noted on her device interrogation. Her most recent interrogation was performed in September and revealed 82% Ap with <1% , no mode switches and adequate rate histogram.     She was then admitted for the second time with HFpEF on 10/11/18, after she was noted to be mildly hypoxic with SpO2 in the upper 80's and hypertensive when she presented for cataract surgery. She admitted to a dry cough with mild dyspnea for two days prior, however she hadn't taken anything for this as she wasn't sure what might interact with her flecainide. Her home weight had crept up to 186 (she checks this consistently and typically varies between 179 - 182 lbs) however her chronic LE edema (L>R) had been stable on Lasix 20 mg daily. She was directed to the ED where a proBNP was slightly elevated at 1,100. Labs including troponin x 1 were WNL. CXR revealed vascular congestion and a small pleural effusion. TTE revealed a normal LVEF with mild diastolic dysfunction with an E/E' avg of 16 and mild-mod LAE, mild-mod TR without evidence of PHTN, as well as moderate aortic sclerosis without significant aortic stenosis. She was given several doses of IV Lasix 20 BID, supplemental O2 was weaned, and she was discharged three days later on her PTA Lasix. Discharge wt was 180 lbs. It was recommended she have outpatient PFT's for assessment of probable COPD.     When last seen by me one month ago, her home weights were stable and she was feeling well. Her device was interrogated and showed no arrhythmias that might be contributing to her recent episode of CHF. Blood pressure was mildly uncontrolled and Norvasc 5 was added to her  regimen, a low-salt diet was reinforced. However she never started the Norvasc and doesn't believe she has this at home.     She's unfortunately had multiple visits with various specialists since that time. On 11/6, she underwent cataract surgery. On 11/10, she was brought to the ED by her daughter as she was concerned regarding development of a cough, and was concerned she was beginning to re-develop congestive heart failure. Weight was stable ~ 180 lbs. CXR was benign. proBNP was 982 (WNL). She was noted to be hypertensive in the 170's-180's systolic however she was discharged home without any med changes. Soon after that she lost her appetite and subsequently lost about 10 lbs, which she believes was due to anxiety as her ophthalmologist told her she may need more surgery on her eye. On 11/13, she was seen by her ophthalmologist and was markedly hypertensive and had developed a hemorrhagic choroidal effusion which required a revision of the corneal incision with additional sutures. She admitted that she hadn't taken her antihypertensive meds in several days due to not feeling well. She was asked to follow up with her PCP regarding her blood pressure but was referred back to the ED by her. She was again noted to be markedly hypertensive in the ED. At that time, her Eliquis was cut in half and she was asked to re-start her antihypertensive meds. She was seen by her PCP in follow up ten days ago and her BP was very well-controlled at 109/73 mmHg.     Today, she presents to clinic with her granddaughter Zoe. She's feeling much better at this time. She has been doing better with watching her salt intake and is now reading labels and attempting to keep this < 2g/day. She continues to be very regimented in her daily weights and twice daily blood pressure recordings. Her weight has been gradually climbing as her appetite has improved, but has been stable at 174 lbs for the past 4 days. BP's 120's-130's/70's-80's  "during the daytime and lower (typically ~ 100's/70's) in the evening over the past week. Her LE edema is better than usual and she states that overall she feels better at this weight. She denies chest pain, shortness of breath, PND, orthopnea, palpitations, near syncope or syncope. She's been told she snores in the past but denies PND, daytime somnolence.     FHx is significant for a son and father who both passed from cardiac arrest (in late 50's and early 60's, respectively). Father had a history of alcohol and drug use. Mother had a history of CVA and CAD. The patient is  and has been dating her high school sweetheart for the past 9 years. She lives with her daughter and granddaughter. She has a longstanding history of smoking 1-1/2 packs a day for approximately 45 years but quit in 2000.  She feels she's probably compliant with a low-sodium diet but doesn't really monitor this. She states her diet had been stable before her admission. She denies drinking alcohol.  She ambulates without any assistive devices. She does her own grocery shopping. She's very involved with her [employer].           Review of Systems:     12-pt ROS is negative except for as noted in the HPI.           Physical Exam:     Vitals: /76  Pulse 72  Ht 1.626 m (5' 4\")  Wt 80.3 kg (177 lb)  LMP  (LMP Unknown)  SpO2 95%  BMI 30.38 kg/m2  Wt Readings from Last 3 Encounters:   11/28/18 80.3 kg (177 lb)   11/21/18 78 kg (172 lb)   11/14/18 80.7 kg (178 lb)       Constitutional:  Patient is pleasant, alert, cooperative, and in NAD.  HEENT:  NCAT. PERRLA. EOM's intact. No masses or thyromegaly. Teeth in normal repair.   Neck:  CVP appears normal.   Pulmonary: Normal respiratory effort. CTAB.   Cardiac: RRR, normal S1/S2, +S4, grade 2-3/6 sm at the LSB  Abdomen:  Non-tender abdomen with normoactive bowel sounds, no hepatosplenomegaly appreciated.   Vascular: Pulses in the upper and lower extremities are 2+ and equal " bilaterally.  Extremities: trace pretibial edema on the L  Neurological:  No gross motor or sensory deficits.   Psych: Appropriate affect.        Data:     Cardiac Diagnostics reviewed:  Type Date Result   TTE 10/12/18 Left ventricular systolic function is normal.  The left atrium is mild to moderately dilated.  There is moderate aortic sclerosis of the non-coronary cusp. No  hemodynamically significant valvular aortic stenosis.  There is mild to moderate (1-2+) tricuspid regurgitation.  Doppler findings do not suggest pulmonary hypertension.  E/E' avg 16.  IVSd 1 / PWd 1.1    3/7/18 1. The left ventricle is normal in size. The visual ejection fraction is  estimated at 65%.  2. The right ventricle is normal in structure, function and size.  3. There is mild to moderate (1-2+) mitral regurgitation.  4. There is moderate (2+) tricuspid regurgitation. The right ventricular  systolic pressure is approximated at 28mmHg plus the right atrial pressure.  No previous echo for comparison.   Stress 3/8/18 (Melonie) 1.  Myocardial perfusion imaging using single isotope technique  demonstrated a small perfusion defect of mild severity involving the  left ventricular apex which is essentially fixed and most likely  related to breast attenuation. There is no evidence of significant  pharmacological stress-induced ischemia or prior myocardial infarction  on this study.   2. Gated images demonstrated normal left ventricular wall motion.  The left ventricular systolic function is 73% at rest and 74% on the post stress images.  3. There is no previous study for comparison.   EKG 10/11/18 NSR, 64 bpm. QRS 98, QTc 462.   Device PPM check, 9.24.18 [employer] (D) Remote PPM Device Check  AP: 82            %                    : <1 %  Mode: DDDR        Presenting Rhythm: AP/VS, AS/VS  Heart Rate: Adequate rates per histogram  Sensing: Stable    Pacing Threshold: Stable    Impedance: Stable  Battery Status: 10.4 years  Atrial  Arrhythmia: No mode switch episodes. 1 PMT occurred, no EGM.   Ventricular Arrhythmia: None    CXR 10/11/18    Chest CT 3/14/18 Mild to moderate emphysematous changes in the lungs. Small  bilateral pleural effusion with adjacent atelectasis. Apparent mild  interstitial thickening throughout both lungs. No pericardial  effusion. A few nonspecific borderline-enlarged mediastinal lymph  nodes. Mild cardiomegaly. Atherosclerotic calcification in the  thoracic aorta and coronary arteries.   LE ultrasound 3/14/18 The left common femoral, superficial femoral, popliteal and  posterior tibial veins are patent and fully compressible and  demonstrate normal venous Doppler flow. The visualized greater  saphenous vein is negative for thrombus. For comparison the right  common femoral vein was evaluated and was unremarkable.     IMPRESSION: No deep venous thrombosis demonstrated.     Recent Labs   Lab Test  10/25/18   0731  07/11/18   0907  03/15/18   2025  03/06/18   1744  03/23/17   0804  05/23/16   1056   LDL   --   84   --    --   91  103*   HDL   --   69   --    --   61  66   NHDL   --   102   --    --   113  124   CHOL   --   171   --    --   174  190   TRIG   --   88   --    --   109  104   TSH  0.80   --   0.64  0.54   --    --    NTBNP  343   --    --    --    --    --      Lab Results   Component Value Date    CHOL 171 07/11/2018    HDL 69 07/11/2018    LDL 84 07/11/2018    TRIG 88 07/11/2018    CHOLHDLRATIO 2.8 05/07/2015       Lab Results   Component Value Date    WBC 10.5 11/10/2018    RBC 4.83 11/10/2018    HGB 13.3 11/10/2018    HCT 41.3 11/10/2018    MCV 86 11/10/2018    MCH 27.5 11/10/2018    MCHC 32.2 11/10/2018    RDW 14.5 11/10/2018     11/10/2018       Lab Results   Component Value Date     11/10/2018    POTASSIUM 3.8 11/10/2018    CHLORIDE 107 11/10/2018    CO2 27 11/10/2018    ANIONGAP 7 11/10/2018    GLC 97 11/10/2018    BUN 10 11/10/2018    CR 0.69 11/10/2018    GFRESTIMATED 83 11/10/2018     GFRESTBLACK >90 11/10/2018    GURWINDER 8.6 11/10/2018      Lab Results   Component Value Date    AST 16 05/02/2018    ALT 25 05/02/2018       No results found for: A1C    Lab Results   Component Value Date    INR 1.31 (H) 03/19/2018    INR 1.04 11/12/2014          Problem List:     Patient Active Problem List   Diagnosis     Symptomatic menopausal or female climacteric states     Hyperlipidemia LDL goal <70     Hypertension goal BP (blood pressure) < 140/90     Knee pain     Obesity     Atrial fibrillation with rapid ventricular response (H)     S/P cardiac pacemaker procedure     SSS (sick sinus syndrome) (H)     Chronic diastolic CHF (congestive heart failure) (H)     Pulmonary hypertension (H)     Coronary artery calcification seen on CT scan            Medications:     Current Outpatient Prescriptions   Medication Sig Dispense Refill     albuterol (PROAIR HFA/PROVENTIL HFA/VENTOLIN HFA) 108 (90 Base) MCG/ACT inhaler Inhale 2 puffs into the lungs every 6 hours       alendronate (FOSAMAX) 70 MG tablet Take 1 tablet (70 mg) by mouth every 7 days Take 60 minutes before am meal with 8 oz. water. Remain upright for 30 minutes. 12 tablet 3     apixaban ANTICOAGULANT (ELIQUIS) 5 MG tablet Take 1 tablet (5 mg) by mouth 2 times daily (Patient taking differently: Take 2.5 mg by mouth 2 times daily ) 180 tablet 3     atorvastatin (LIPITOR) 10 MG tablet Take 4 tablets (40 mg) by mouth daily 30 tablet 5     cholecalciferol (VITAMIN  -D) 1000 UNIT capsule Take 1 capsule by mouth daily.       Coral Calcium 133-66.7-133 MG-MG-UNIT CAPS Take 2 tablets by mouth 2 times daily        FLAX SEED OIL OR 1 capsule daily       flecainide (TAMBOCOR) 100 MG tablet Take 1 tablet (100 mg) by mouth 2 times daily 90 tablet 3     fluticasone (FLONASE) 50 MCG/ACT spray Spray 1-2 sprays into both nostrils daily as needed for rhinitis or allergies       furosemide (LASIX) 20 MG tablet Take 1 tablet (20 mg) by mouth daily 30 tablet 5     Krill Oil 500  MG CAPS Take 1 capsule by mouth daily       lisinopril (PRINIVIL/ZESTRIL) 40 MG tablet Take 1 tablet (40 mg) by mouth daily 90 tablet 3     Methylsulfonylmethane (MSM) 500 MG CAPS Take 1 capsule by mouth daily       metoprolol succinate (TOPROL-XL) 50 MG 24 hr tablet Take 1 tablet (50 mg) by mouth 2 times daily 180 tablet 3     Milk Thistle 200 MG CAPS Take 1 capsule by mouth daily        Multiple Vitamins-Minerals (HAIR SKIN NAILS PO) Take 1 tablet by mouth At Bedtime       potassium & sodium phosphates 278-164-250 MG/75ML SOLR Take 1 tablet by mouth daily        predniSONE (DELTASONE) 20 MG tablet        traMADol (ULTRAM) 50 MG tablet             Past Medical History:     Past Medical History:   Diagnosis Date     Atrial fibrillation (H)      Chronic diastolic CHF (congestive heart failure) (H)      Essential hypertension, benign      HYPERLIPIDEMIA NEC/NOS 3/30/2006     Pulmonary hypertension (H)      SSS (sick sinus syndrome) (H)     s/p PPM 3/2018     Past Surgical History:   Procedure Laterality Date     HYSTERECTOMY, VAGINAL      hysterectomy     Family History   Problem Relation Age of Onset     Cerebrovascular Disease Mother      Glaucoma Mother      Macular Degeneration Mother      Alcohol/Drug Father      alcohol     Myocardial Infarction Father 63     passed away from MI     Family History Negative Sister      Family History Negative Sister      Family History Negative Sister      Cerebrovascular Disease Sister      Family History Negative Brother      Family History Negative Maternal Grandmother      Family History Negative Maternal Grandfather      Family History Negative Paternal Grandmother      Family History Negative Paternal Grandfather      Myocardial Infarction Son 57     passed away from MI     Dementia Daughter 57     early onset on [place]     Diabetes No family hx of      Breast Cancer No family hx of      Cancer - colorectal No family hx of      Social History     Social History     Marital  status:      Spouse name: N/A     Number of children: N/A     Years of education: N/A     Occupational History     Not on file.     Social History Main Topics     Smoking status: Former Smoker     Start date: 10/28/1955     Quit date: 9/1/2000     Smokeless tobacco: Never Used      Comment: quit 6 yrs ago     Alcohol use No     Drug use: No     Sexual activity: Yes     Partners: Male     Other Topics Concern     Parent/Sibling W/ Cabg, Mi Or Angioplasty Before 65f 55m? No     Social History Narrative    Balanced Diet - Yes    Osteoporosis Prevention Measures - Dairy servings per day: 2    Regular Exercise -  Yes Describe walk, but not recently due to weather    Dental Exam - Yes    Eye Exam -No    Self Breast Exam - Yes    Abuse: Current or Past (Physical, Sexual or Emotional)- No    Do you feel safe in your environment - Yes    Guns stored in the home - No    Sunscreen used - Yes    Seatbelts used - Yes    Lipids -  1/10    Glucose -  1/10    Colon Cancer Screening - Yes, 7/21/08    Hemoccults - NO    Pap Test -  Many yrs ago, has hysterectomy    Do you have any concerns about STD's -  No    Mammography - 6/18/08    DEXA - 4/13/06    Immunizations reviewed and up to date - No    Jonathan Marshall MA                        Allergies:   Strawberry flavor      Adriana Ferrell PA-C  [facility]  Pager: [phone]      Thank you for allowing me to participate in the care of your patient.    Sincerely,     Adriana Ferrell PA-C     [facility]

## 2018-11-28 NOTE — PROGRESS NOTES
Cardiology Clinic Progress Note    Hamida Verma MRN# [id number]   YOB: 1940 Age: 78 year old     Reason for visit: CORE Enrollment           Assessment and Plan:     In summary, the patient presents today for assessment of HFpEF. She was admitted in March for volume overload in the setting of rapid atrial fibrillation, then again in October with CHF felt secondary to diastolic dysfunction, mildly uncontrolled blood pressures and underlying lung disease. Norvasc 5 was added to her regimen at her last office visit one month ago however she never did start this. Unfortunately she's suffered from complications of cataract surgery and uncontrolled hypertension due to anxiety, eye pain and med non-compliance resulting in the development of a choroidal hematoma and multiple ED visits since then. Today, her BP appears well-controlled, consistent with her recent home recordings. She remains on low-dose Eliquis due to her recent choroidal hematoma, and is scheduled to see her ophthalmologist for reassessment later today.     1. HFpEF   - ECHO: EF 55-60%, E/E' avg 16, mild-mod LAE   - ACC/AHA Stage: C; FC I    - Etiology: age, htn, paf   - RV: normal   - Valves: Mild-mod TR   - Volume: Appears euvolemic    Admit Wt: 186 lbs    Current Wt: 174 lbs (per home scale)    O2 Requirements: RA    Diuretics: Lasix 20 mg daily   - Rhythm: SR / ApVs / PAF   - QRS: Narrow   - Device: DDDR PPM   - Other: TSH OK                 Stop-Bang Score: Low    2. PAF, s/p PPM - on flecainide 100 BID, Toprol 50 BID and Eliquis 5 BID. Appears well-controlled per recent device interrogation in September. Recent EKG benign.     3. HTN - home readings show 's - 130's in the mornings, as low as 100's in the evenings. On Lisinopril 40, Toprol 50 BID, Lasix 20.     4. CAC - per CT. No sxs suggestive of angina. Melonie MPI this year was normal (small fixed defect suggestive of breast attenuation). LDL in July was 84 on Zocor 20.  Switched to Lipitor 40 in 10/2018.     5. Probable COPD - with some emphymatous changes noted on chest CT. +Smoking history. To have PFT's per PCP.       Plan:  - No medication changes today. She'll call me if she finds that she does in fact have the previously-ordered Norvasc at home. If she does have it at home, but has not been taking it, she should not start taking it now, but should keep it on hand as we can use this PRN in the future for uncontrolled blood pressures. If she doesn't have it at home, she should not necessarily pick it up at this time as her pressures appear well-controlled and she's currently in the donut hole. She has an accurate BP cuff at home and will continue to monitor.   - She was encouraged to call us in the future with re-development of cough, weight gain of 2+ lbs from one day to next or 5+ lbs within one week, and/or uncontrolled BP's.   - To return to Eliquis 5 mg BID once felt safe by ophthalmology. Samples provided today.   - Educated on low salt diet.  - Follow up with me in 3 weeks with labs prior; Dr. Voss in 2 months; Dr. Mallory in 5 months.  - Repeat lipids following switch to Lipitor in ~ 2 months.   - Continue routine PPM checks.         History of Presenting Illness:      Hamida Verma is a pleasant 78 year old patient of  Dr. Mallory and Dr. Voss who presents today for a CORE Enrollment visit.    The patient has a history of CAC on CT with a normal Melonie MPI in 3/2018, HFpEF, AF s/p PPM anticoagulated on Eliquis, HTN, HLP and VHD. She was admitted in March 2018 with fluid overload in the setting of rapid atrial fibrillation. She spontaneously converted but ultimately received a pacemaker for post-conversion pauses up to 4 seconds duration and following that was placed on flecainide for PAF with RVR. However she continued to have a significant burden of atrial fibrillation and flecainide was therefore this was stopped and a rate control strategy with Toprol was  pursued. However in July of this year flecainide was re-started after AF with RVR in the 160's-170's was noted on her device interrogation. Her most recent interrogation was performed in September and revealed 82% Ap with <1% , no mode switches and adequate rate histogram.     She was then admitted for the second time with HFpEF on 10/11/18, after she was noted to be mildly hypoxic with SpO2 in the upper 80's and hypertensive when she presented for cataract surgery. She admitted to a dry cough with mild dyspnea for two days prior, however she hadn't taken anything for this as she wasn't sure what might interact with her flecainide. Her home weight had crept up to 186 (she checks this consistently and typically varies between 179 - 182 lbs) however her chronic LE edema (L>R) had been stable on Lasix 20 mg daily. She was directed to the ED where a proBNP was slightly elevated at 1,100. Labs including troponin x 1 were WNL. CXR revealed vascular congestion and a small pleural effusion. TTE revealed a normal LVEF with mild diastolic dysfunction with an E/E' avg of 16 and mild-mod LAE, mild-mod TR without evidence of PHTN, as well as moderate aortic sclerosis without significant aortic stenosis. She was given several doses of IV Lasix 20 BID, supplemental O2 was weaned, and she was discharged three days later on her PTA Lasix. Discharge wt was 180 lbs. It was recommended she have outpatient PFT's for assessment of probable COPD.     When last seen by me one month ago, her home weights were stable and she was feeling well. Her device was interrogated and showed no arrhythmias that might be contributing to her recent episode of CHF. Blood pressure was mildly uncontrolled and Norvasc 5 was added to her regimen, a low-salt diet was reinforced. However she never started the Norvasc and doesn't believe she has this at home.     She's unfortunately had multiple visits with various specialists since that time. On 11/6, she  underwent cataract surgery. On 11/10, she was brought to the ED by her daughter as she was concerned regarding development of a cough, and was concerned she was beginning to re-develop congestive heart failure. Weight was stable ~ 180 lbs. CXR was benign. proBNP was 982 (WNL). She was noted to be hypertensive in the 170's-180's systolic however she was discharged home without any med changes. Soon after that she lost her appetite and subsequently lost about 10 lbs, which she believes was due to anxiety as her ophthalmologist told her she may need more surgery on her eye. On 11/13, she was seen by her ophthalmologist and was markedly hypertensive and had developed a hemorrhagic choroidal effusion which required a revision of the corneal incision with additional sutures. She admitted that she hadn't taken her antihypertensive meds in several days due to not feeling well. She was asked to follow up with her PCP regarding her blood pressure but was referred back to the ED by her. She was again noted to be markedly hypertensive in the ED. At that time, her Eliquis was cut in half and she was asked to re-start her antihypertensive meds. She was seen by her PCP in follow up ten days ago and her BP was very well-controlled at 109/73 mmHg.     Today, she presents to clinic with her granddaughter Zoe. She's feeling much better at this time. She has been doing better with watching her salt intake and is now reading labels and attempting to keep this < 2g/day. She continues to be very regimented in her daily weights and twice daily blood pressure recordings. Her weight has been gradually climbing as her appetite has improved, but has been stable at 174 lbs for the past 4 days. BP's 120's-130's/70's-80's during the daytime and lower (typically ~ 100's/70's) in the evening over the past week. Her LE edema is better than usual and she states that overall she feels better at this weight. She denies chest pain, shortness of  "breath, PND, orthopnea, palpitations, near syncope or syncope. She's been told she snores in the past but denies PND, daytime somnolence.     FHx is significant for a son and father who both passed from cardiac arrest (in late 50's and early 60's, respectively). Father had a history of alcohol and drug use. Mother had a history of CVA and CAD. The patient is  and has been dating her high school sweetheart for the past 9 years. She lives with her daughter and granddaughter. She has a longstanding history of smoking 1-1/2 packs a day for approximately 45 years but quit in 2000.  She feels she's probably compliant with a low-sodium diet but doesn't really monitor this. She states her diet had been stable before her admission. She denies drinking alcohol.  She ambulates without any assistive devices. She does her own grocery shopping. She's very involved with her [employer].           Review of Systems:     12-pt ROS is negative except for as noted in the HPI.           Physical Exam:     Vitals: /76  Pulse 72  Ht 1.626 m (5' 4\")  Wt 80.3 kg (177 lb)  LMP  (LMP Unknown)  SpO2 95%  BMI 30.38 kg/m2  Wt Readings from Last 3 Encounters:   11/28/18 80.3 kg (177 lb)   11/21/18 78 kg (172 lb)   11/14/18 80.7 kg (178 lb)       Constitutional:  Patient is pleasant, alert, cooperative, and in NAD.  HEENT:  NCAT. PERRLA. EOM's intact. No masses or thyromegaly. Teeth in normal repair.   Neck:  CVP appears normal.   Pulmonary: Normal respiratory effort. CTAB.   Cardiac: RRR, normal S1/S2, +S4, grade 2-3/6 sm at the LSB  Abdomen:  Non-tender abdomen with normoactive bowel sounds, no hepatosplenomegaly appreciated.   Vascular: Pulses in the upper and lower extremities are 2+ and equal bilaterally.  Extremities: trace pretibial edema on the L  Neurological:  No gross motor or sensory deficits.   Psych: Appropriate affect.        Data:     Cardiac Diagnostics reviewed:  Type Date Result   TTE 10/12/18 Left ventricular " systolic function is normal.  The left atrium is mild to moderately dilated.  There is moderate aortic sclerosis of the non-coronary cusp. No  hemodynamically significant valvular aortic stenosis.  There is mild to moderate (1-2+) tricuspid regurgitation.  Doppler findings do not suggest pulmonary hypertension.  E/E' avg 16.  IVSd 1 / PWd 1.1    3/7/18 1. The left ventricle is normal in size. The visual ejection fraction is  estimated at 65%.  2. The right ventricle is normal in structure, function and size.  3. There is mild to moderate (1-2+) mitral regurgitation.  4. There is moderate (2+) tricuspid regurgitation. The right ventricular  systolic pressure is approximated at 28mmHg plus the right atrial pressure.  No previous echo for comparison.   Stress 3/8/18 (Melonie) 1.  Myocardial perfusion imaging using single isotope technique  demonstrated a small perfusion defect of mild severity involving the  left ventricular apex which is essentially fixed and most likely  related to breast attenuation. There is no evidence of significant  pharmacological stress-induced ischemia or prior myocardial infarction  on this study.   2. Gated images demonstrated normal left ventricular wall motion.  The left ventricular systolic function is 73% at rest and 74% on the post stress images.  3. There is no previous study for comparison.   EKG 10/11/18 NSR, 64 bpm. QRS 98, QTc 462.   Device PPM check, 9.24.18 [employer] (D) Remote PPM Device Check  AP: 82            %                    : <1 %  Mode: DDDR        Presenting Rhythm: AP/VS, AS/VS  Heart Rate: Adequate rates per histogram  Sensing: Stable    Pacing Threshold: Stable    Impedance: Stable  Battery Status: 10.4 years  Atrial Arrhythmia: No mode switch episodes. 1 PMT occurred, no EGM.   Ventricular Arrhythmia: None    CXR 10/11/18    Chest CT 3/14/18 Mild to moderate emphysematous changes in the lungs. Small  bilateral pleural effusion with adjacent atelectasis.  Apparent mild  interstitial thickening throughout both lungs. No pericardial  effusion. A few nonspecific borderline-enlarged mediastinal lymph  nodes. Mild cardiomegaly. Atherosclerotic calcification in the  thoracic aorta and coronary arteries.   LE ultrasound 3/14/18 The left common femoral, superficial femoral, popliteal and  posterior tibial veins are patent and fully compressible and  demonstrate normal venous Doppler flow. The visualized greater  saphenous vein is negative for thrombus. For comparison the right  common femoral vein was evaluated and was unremarkable.     IMPRESSION: No deep venous thrombosis demonstrated.     Recent Labs   Lab Test  10/25/18   0731  07/11/18   0907  03/15/18   2025  03/06/18   1744  03/23/17   0804  05/23/16   1056   LDL   --   84   --    --   91  103*   HDL   --   69   --    --   61  66   NHDL   --   102   --    --   113  124   CHOL   --   171   --    --   174  190   TRIG   --   88   --    --   109  104   TSH  0.80   --   0.64  0.54   --    --    NTBNP  343   --    --    --    --    --      Lab Results   Component Value Date    CHOL 171 07/11/2018    HDL 69 07/11/2018    LDL 84 07/11/2018    TRIG 88 07/11/2018    CHOLHDLRATIO 2.8 05/07/2015       Lab Results   Component Value Date    WBC 10.5 11/10/2018    RBC 4.83 11/10/2018    HGB 13.3 11/10/2018    HCT 41.3 11/10/2018    MCV 86 11/10/2018    MCH 27.5 11/10/2018    MCHC 32.2 11/10/2018    RDW 14.5 11/10/2018     11/10/2018       Lab Results   Component Value Date     11/10/2018    POTASSIUM 3.8 11/10/2018    CHLORIDE 107 11/10/2018    CO2 27 11/10/2018    ANIONGAP 7 11/10/2018    GLC 97 11/10/2018    BUN 10 11/10/2018    CR 0.69 11/10/2018    GFRESTIMATED 83 11/10/2018    GFRESTBLACK >90 11/10/2018    GURWINDER 8.6 11/10/2018      Lab Results   Component Value Date    AST 16 05/02/2018    ALT 25 05/02/2018       No results found for: A1C    Lab Results   Component Value Date    INR 1.31 (H) 03/19/2018    INR 1.04  11/12/2014          Problem List:     Patient Active Problem List   Diagnosis     Symptomatic menopausal or female climacteric states     Hyperlipidemia LDL goal <70     Hypertension goal BP (blood pressure) < 140/90     Knee pain     Obesity     Atrial fibrillation with rapid ventricular response (H)     S/P cardiac pacemaker procedure     SSS (sick sinus syndrome) (H)     Chronic diastolic CHF (congestive heart failure) (H)     Pulmonary hypertension (H)     Coronary artery calcification seen on CT scan            Medications:     Current Outpatient Prescriptions   Medication Sig Dispense Refill     albuterol (PROAIR HFA/PROVENTIL HFA/VENTOLIN HFA) 108 (90 Base) MCG/ACT inhaler Inhale 2 puffs into the lungs every 6 hours       alendronate (FOSAMAX) 70 MG tablet Take 1 tablet (70 mg) by mouth every 7 days Take 60 minutes before am meal with 8 oz. water. Remain upright for 30 minutes. 12 tablet 3     apixaban ANTICOAGULANT (ELIQUIS) 5 MG tablet Take 1 tablet (5 mg) by mouth 2 times daily (Patient taking differently: Take 2.5 mg by mouth 2 times daily ) 180 tablet 3     atorvastatin (LIPITOR) 10 MG tablet Take 4 tablets (40 mg) by mouth daily 30 tablet 5     cholecalciferol (VITAMIN  -D) 1000 UNIT capsule Take 1 capsule by mouth daily.       Coral Calcium 133-66.7-133 MG-MG-UNIT CAPS Take 2 tablets by mouth 2 times daily        FLAX SEED OIL OR 1 capsule daily       flecainide (TAMBOCOR) 100 MG tablet Take 1 tablet (100 mg) by mouth 2 times daily 90 tablet 3     fluticasone (FLONASE) 50 MCG/ACT spray Spray 1-2 sprays into both nostrils daily as needed for rhinitis or allergies       furosemide (LASIX) 20 MG tablet Take 1 tablet (20 mg) by mouth daily 30 tablet 5     Krill Oil 500 MG CAPS Take 1 capsule by mouth daily       lisinopril (PRINIVIL/ZESTRIL) 40 MG tablet Take 1 tablet (40 mg) by mouth daily 90 tablet 3     Methylsulfonylmethane (MSM) 500 MG CAPS Take 1 capsule by mouth daily       metoprolol succinate  (TOPROL-XL) 50 MG 24 hr tablet Take 1 tablet (50 mg) by mouth 2 times daily 180 tablet 3     Milk Thistle 200 MG CAPS Take 1 capsule by mouth daily        Multiple Vitamins-Minerals (HAIR SKIN NAILS PO) Take 1 tablet by mouth At Bedtime       potassium & sodium phosphates 278-164-250 MG/75ML SOLR Take 1 tablet by mouth daily        predniSONE (DELTASONE) 20 MG tablet        traMADol (ULTRAM) 50 MG tablet             Past Medical History:     Past Medical History:   Diagnosis Date     Atrial fibrillation (H)      Chronic diastolic CHF (congestive heart failure) (H)      Essential hypertension, benign      HYPERLIPIDEMIA NEC/NOS 3/30/2006     Pulmonary hypertension (H)      SSS (sick sinus syndrome) (H)     s/p PPM 3/2018     Past Surgical History:   Procedure Laterality Date     HYSTERECTOMY, VAGINAL      hysterectomy     Family History   Problem Relation Age of Onset     Cerebrovascular Disease Mother      Glaucoma Mother      Macular Degeneration Mother      Alcohol/Drug Father      alcohol     Myocardial Infarction Father 63     passed away from MI     Family History Negative Sister      Family History Negative Sister      Family History Negative Sister      Cerebrovascular Disease Sister      Family History Negative Brother      Family History Negative Maternal Grandmother      Family History Negative Maternal Grandfather      Family History Negative Paternal Grandmother      Family History Negative Paternal Grandfather      Myocardial Infarction Son 57     passed away from MI     Dementia Daughter 57     early onset on [place]     Diabetes No family hx of      Breast Cancer No family hx of      Cancer - colorectal No family hx of      Social History     Social History     Marital status:      Spouse name: N/A     Number of children: N/A     Years of education: N/A     Occupational History     Not on file.     Social History Main Topics     Smoking status: Former Smoker     Start date: 10/28/1955     Quit  date: 9/1/2000     Smokeless tobacco: Never Used      Comment: quit 6 yrs ago     Alcohol use No     Drug use: No     Sexual activity: Yes     Partners: Male     Other Topics Concern     Parent/Sibling W/ Cabg, Mi Or Angioplasty Before 65f 55m? No     Social History Narrative    Balanced Diet - Yes    Osteoporosis Prevention Measures - Dairy servings per day: 2    Regular Exercise -  Yes Describe walk, but not recently due to weather    Dental Exam - Yes    Eye Exam -No    Self Breast Exam - Yes    Abuse: Current or Past (Physical, Sexual or Emotional)- No    Do you feel safe in your environment - Yes    Guns stored in the home - No    Sunscreen used - Yes    Seatbelts used - Yes    Lipids -  1/10    Glucose -  1/10    Colon Cancer Screening - Yes, 7/21/08    Hemoccults - NO    Pap Test -  Many yrs ago, has hysterectomy    Do you have any concerns about STD's -  No    Mammography - 6/18/08    DEXA - 4/13/06    Immunizations reviewed and up to date - No    Jonathan Marshall MA                        Allergies:   Strawberry flavor      Adriana Ferrell PA-C  [facility]  Pager: [phone]

## 2018-11-28 NOTE — MR AVS SNAPSHOT
After Visit Summary   2018    Hamida Verma    MRN: [id number]           Patient Information     Date Of Birth          1940        Visit Information        Provider Department      2018 8:50 AM Adriana Ferrell PA-C [facility]        Today's Diagnoses     Chronic diastolic congestive heart failure (H)          Care Instructions    Thank you for visiting with us today. Please call the CORE nurse for any questions or concerns: [phone].    Today, we discussed the following:   - Check when you get home to see if you do have Norvasc. If not, it may be sitting at your pharmacy still. I don't need you to start taking this regularly at this time if you don't have it at home, but don't throw it out if you do have it at home. Just let me know so we can ensure we have an accurate medication list.   - Samples of Eliquis given today. Let me know if you'd like to switch to Warfarin in the future.   - Ask your opthalmologist if you can return to the full dose of Eliquis when you see him this afternoon.   - Return to see me in three weeks with labs prior. Other follow up as scheduled.     Please, remember to continue the followin.  Weigh yourself daily and write it down.  Call CORE nurse if your weight is up more than 2 pounds in one day, or 5 pounds in one week.  2.  Call CORE nurse if you feel more short of breath (with activity or when trying to sleep at night), have more abdominal bloating or leg swelling.  3.  Eat a low sodium diet (less than 2,000mg or 2g daily). If you eat less salt, you will retain less fluid.   4.  Please avoid NSAIDs as able (For example, Ibuprofen / aleve / advil / naprosen / diclofenac).      Scheduling phone number: [phone]  Reminder: Please bring in all current medications, over the counter supplements and vitamin bottles to your next appointment.    Thank you!  Adriana Ferrell  MICHEAL  ______________________________________________________________  [facility]: Cardiomyopathy, Optimization, Rehabilitation, Education  The [facility] is a heart failure specialty clinic within the [facility] where you will work with specialized nurse practitioners, physician assistants, doctors, and registered nurses. They are dedicated to helping patients with heart failure to carefully adjust medications, receive education, and learn who and when to call if symptoms develop. They specialize in helping you better understand your condition, slow the progression of your disease, improve the length and quality of your life, help you detect future heart problems before they become life threatening, and avoid hospitalizations.            Follow-ups after your visit        Additional Services     Follow-Up with [facility] - GORGE visit                 Your next 10 appointments already scheduled     Dec 31, 2018  2:15 PM CST   Remote PPM Check with [facility] TECH1   [facility] ([facility])    [address] [place] [id number] [phone] OPT 2           This appointment is for a remote check of your pacemaker.  This is not an appointment at the office.            Flaquito 15, 2019  7:40 AM CST   Office Visit with Mikala Philip MD   [facility] ([facility])    [address] [place] [id number] [phone]           Bring a current list of meds and any records pertaining to this visit. For Physicals, please bring immunization records and any forms needing to be filled out. Please arrive 10 minutes early to complete paperwork.            Jan 21, 2019  8:10 AM CST   LAB with [facility]   [facility] [place] ([facility])    [address] [place] [id number] [phone]           Please do not eat 10-12 hours before your appointment if you are  "coming in fasting for labs on lipids, cholesterol, or glucose (sugar). This does not apply to pregnant women. Water, hot tea and black coffee (with nothing added) are okay. Do not drink other fluids, diet soda or chew gum.            Jan 23, 2019  2:15 PM CST   P EP RETURN with Roe Rene MD   [facility] ([facility])    [address] [place], [place] [address]  [facility] [id number] [phone] OPT 2              Future tests that were ordered for you today     Open Future Orders        Priority Expected Expires Ordered    Follow-Up with [facility] - GORGE visit Routine 12/19/2018 11/28/2019 11/28/2018    Basic metabolic panel Routine 12/19/2018 11/28/2019 11/28/2018    N terminal pro BNP outpatient Routine 12/19/2018 11/28/2019 11/28/2018            Who to contact     If you have questions or need follow up information about today's clinic visit or your schedule please contact [facility] directly at [phone].  Normal or non-critical lab and imaging results will be communicated to you by MyChart, letter or phone within 4 business days after the clinic has received the results. If you do not hear from us within 7 days, please contact the clinic through MyChart or phone. If you have a critical or abnormal lab result, we will notify you by phone as soon as possible.  Submit refill requests through My[employer] or call your pharmacy and they will forward the refill request to us. Please allow 3 business days for your refill to be completed.          Additional Information About Your Visit        Care EveryWhere ID     This is your Care EveryWhere ID. This could be used by other organizations to access your [place] medical records  [id number]        Your Vitals Were     Pulse Height Last Period Pulse Oximetry BMI (Body Mass Index)       72 1.626 m (5' 4\") (LMP Unknown) 95% 30.38 kg/m2        Blood Pressure from Last 3 Encounters: "   11/28/18 128/76   11/21/18 109/73   11/14/18 (!) 190/93    Weight from Last 3 Encounters:   11/28/18 80.3 kg (177 lb)   11/21/18 78 kg (172 lb)   11/14/18 80.7 kg (178 lb)              We Performed the Following     Follow-Up with [facility] - GORGE visit          Today's Medication Changes          These changes are accurate as of 11/28/18  9:24 AM.  If you have any questions, ask your nurse or doctor.               These medicines have changed or have updated prescriptions.        Dose/Directions    apixaban ANTICOAGULANT 5 MG tablet   Commonly known as:  ELIQUIS   This may have changed:  how much to take   Used for:  Atrial fibrillation with rapid ventricular response (H)        Dose:  5 mg   Take 1 tablet (5 mg) by mouth 2 times daily   Quantity:  180 tablet   Refills:  3               Information about OPIOIDS     PRESCRIPTION OPIOIDS: WHAT YOU NEED TO KNOW   We gave you an opioid (narcotic) pain medicine. It is important to manage your pain, but opioids are not always the best choice. You should first try all the other options your care team gave you. Take this medicine for as short a time (and as few doses) as possible.    Some activities can increase your pain, such as bandage changes or therapy sessions. It may help to take your pain medicine 30 to 60 minutes before these activities. Reduce your stress by getting enough sleep, working on hobbies you enjoy and practicing relaxation or meditation. Talk to your care team about ways to manage your pain beyond prescription opioids.    These medicines have risks:    DO NOT drive when on new or higher doses of pain medicine. These medicines can affect your alertness and reaction times, and you could be arrested for driving under the influence (DUI). If you need to use opioids long-term, talk to your care team about driving.    DO NOT operate heavy machinery    DO NOT do any other dangerous activities while taking these medicines.    DO NOT drink any alcohol while  taking these medicines.     If the opioid prescribed includes acetaminophen, DO NOT take with any other medicines that contain acetaminophen. Read all labels carefully. Look for the word  acetaminophen  or  Tylenol.  Ask your pharmacist if you have questions or are unsure.    You can get addicted to pain medicines, especially if you have a history of addiction (chemical, alcohol or substance dependence). Talk to your care team about ways to reduce this risk.    All opioids tend to cause constipation. Drink plenty of water and eat foods that have a lot of fiber, such as fruits, vegetables, prune juice, apple juice and high-fiber cereal. Take a laxative (Miralax, milk of magnesia, Colace, Senna) if you don t move your bowels at least every other day. Other side effects include upset stomach, sleepiness, dizziness, throwing up, tolerance (needing more of the medicine to have the same effect), physical dependence and slowed breathing.    Store your pills in a secure place, locked if possible. We will not replace any lost or stolen medicine. If you don t finish your medicine, please throw away (dispose) as directed by your pharmacist. The [place] [employer] has more information about safe disposal: https://www.pca.[facility].us/living-green/managing-unwanted-medications         Primary Care Provider Office Phone # Fax #    Mikala Philip [phone] [address]  [facility] [address]        Goals        General    Improve chronic symptoms (pt-stated)     Notes - Note created  11/12/2018 10:59 AM by Jeannie Lopez RN    Goal Statement: I will follow the CHF Action Plan   Measure of Success: I will have decreased hospital visits   Supportive Steps to Achieve:   I will share my action plan with my family members or friend so they can help me recognize early problems also    I will call my clinic if I have trouble lying down to sleep due to breathing problems   I will read nutrition  labels when I am buying prepared foods or spices   I will buy salt substitute and low sodium seasonings such as Mrs Duran next time I go to the grocery store  I will keep a log on my daily sodium (salt)intake and will not go over 2000...mg  I will pace out my activities like bathing-cooking- cleaning - and rest between tasks for 10-15 minutes  I will elevate my legs on a chair or ottoman when I sit     Barriers: No barriers identified   Strengths: Lives with daughter and granddaughter   Date to Achieve By: ongoing   Patient expressed understanding of goal: Yes        Equal Access to Services     NESSA MARIE : Hadii adams Garcia, wamónica luqadaha, qaybasia kaalmabrittany kolb, carlos carvajal . So [facility] [phone].    ATENCIÓN: Si habla español, tiene a viy disposición servicios gratuitos de asistencia lingüística. Llame al [phone].    We comply with applicable federal civil rights laws and [place] laws. We do not discriminate on the basis of race, color, national origin, age, disability, sex, sexual orientation, or gender identity.            Thank you!     Thank you for choosing [facility]  for your care. Our goal is always to provide you with excellent care. Hearing back from our patients is one way we can continue to improve our services. Please take a few minutes to complete the written survey that you may receive in the mail after your visit with us. Thank you!             Your Updated Medication List - Protect others around you: Learn how to safely use, store and throw away your medicines at www.disposemymeds.org.          This list is accurate as of 11/28/18  9:24 AM.  Always use your most recent med list.                   Brand Name Dispense Instructions for use Diagnosis    albuterol 108 (90 Base) MCG/ACT inhaler    PROAIR HFA/PROVENTIL HFA/VENTOLIN HFA     Inhale 2 puffs into the lungs every 6 hours        alendronate 70 MG  tablet    FOSAMAX    12 tablet    Take 1 tablet (70 mg) by mouth every 7 days Take 60 minutes before am meal with 8 oz. water. Remain upright for 30 minutes.    Osteoporosis       apixaban ANTICOAGULANT 5 MG tablet    ELIQUIS    180 tablet    Take 1 tablet (5 mg) by mouth 2 times daily    Atrial fibrillation with rapid ventricular response (H)       atorvastatin 10 MG tablet    LIPITOR    30 tablet    Take 4 tablets (40 mg) by mouth daily    Hyperlipidemia LDL goal <70       cholecalciferol 1000 units (25 mcg) capsule    VITAMIN D3     Take 1 capsule by mouth daily.        Coral Calcium 133-66.7-133 MG-MG-UNIT Caps      Take 2 tablets by mouth 2 times daily        FLAX SEED OIL PO      1 capsule daily        flecainide 100 MG tablet    TAMBOCOR    90 tablet    Take 1 tablet (100 mg) by mouth 2 times daily    Sick sinus syndrome (H), Cardiac pacemaker in situ       fluticasone 50 MCG/ACT nasal spray    FLONASE     Spray 1-2 sprays into both nostrils daily as needed for rhinitis or allergies        furosemide 20 MG tablet    LASIX    30 tablet    Take 1 tablet (20 mg) by mouth daily    Systolic congestive heart failure, unspecified congestive heart failure chronicity       HAIR SKIN NAILS PO      Take 1 tablet by mouth At Bedtime        Krill Oil 500 MG Caps      Take 1 capsule by mouth daily        lisinopril 40 MG tablet    PRINIVIL/ZESTRIL    90 tablet    Take 1 tablet (40 mg) by mouth daily    Hypertension goal BP (blood pressure) < 140/90       metoprolol succinate 50 MG 24 hr tablet    TOPROL-XL    180 tablet    Take 1 tablet (50 mg) by mouth 2 times daily    Atrial fibrillation with rapid ventricular response (H), Systolic congestive heart failure, unspecified congestive heart failure chronicity       Milk Thistle 200 MG Caps      Take 1 capsule by mouth daily         MG Caps      Take 1 capsule by mouth daily        potassium & sodium phosphates 278-164-250 MG/75ML Solr      Take 1 tablet by mouth daily         predniSONE 20 MG tablet    DELTASONE          traMADol 50 MG tablet    ULTRAM

## 2018-11-28 NOTE — LETTER
11/28/2018    Mikala Philip MD, MD  [address]  [facility] [address]    RE: Hamida Verma       Dear Colleague,    I had the pleasure of seeing Hamida Verma in the [facility].    Cardiology Clinic Progress Note    Hamida Verma MRN# [id number]   YOB: 1940 Age: 78 year old     Reason for visit: CORE Enrollment           Assessment and Plan:     In summary, the patient presents today for assessment of HFpEF. She was admitted in March for volume overload in the setting of rapid atrial fibrillation, then again in October with CHF felt secondary to diastolic dysfunction, mildly uncontrolled blood pressures and underlying lung disease. Norvasc 5 was added to her regimen at her last office visit one month ago however she never did start this. Unfortunately she's suffered from complications of cataract surgery and uncontrolled hypertension due to anxiety, eye pain and med non-compliance resulting in the development of a choroidal hematoma and multiple ED visits since then. Today, her BP appears well-controlled, consistent with her recent home recordings. She remains on low-dose Eliquis due to her recent choroidal hematoma, and is scheduled to see her ophthalmologist for reassessment later today.     1. HFpEF   - ECHO: EF 55-60%, E/E' avg 16, mild-mod LAE   - ACC/AHA Stage: C; FC I    - Etiology: age, htn, paf   - RV: normal   - Valves: Mild-mod TR   - Volume: Appears euvolemic    Admit Wt: 186 lbs    Current Wt: 174 lbs (per home scale)    O2 Requirements: RA    Diuretics: Lasix 20 mg daily   - Rhythm: SR / ApVs / PAF   - QRS: Narrow   - Device: DDDR PPM   - Other: TSH OK                 Stop-Bang Score: Low    2. PAF, s/p PPM - on flecainide 100 BID, Toprol 50 BID and Eliquis 5 BID. Appears well-controlled per recent device interrogation in September. Recent EKG benign.     3. HTN - home readings show 's - 130's in the mornings, as low as 100's in the  evenings. On Lisinopril 40, Toprol 50 BID, Lasix 20.     4. CAC - per CT. No sxs suggestive of angina. Melonie MPI this year was normal (small fixed defect suggestive of breast attenuation). LDL in July was 84 on Zocor 20. Switched to Lipitor 40 in 10/2018.     5. Probable COPD - with some emphymatous changes noted on chest CT. +Smoking history. To have PFT's per PCP.       Plan:  - No medication changes today. She'll call me if she finds that she does in fact have the previously-ordered Norvasc at home. If she does have it at home, but has not been taking it, she should not start taking it now, but should keep it on hand as we can use this PRN in the future for uncontrolled blood pressures. If she doesn't have it at home, she should not necessarily pick it up at this time as her pressures appear well-controlled and she's currently in the donut hole. She has an accurate BP cuff at home and will continue to monitor.   - She was encouraged to call us in the future with re-development of cough, weight gain of 2+ lbs from one day to next or 5+ lbs within one week, and/or uncontrolled BP's.   - To return to Eliquis 5 mg BID once felt safe by ophthalmology. Samples provided today.   - Educated on low salt diet.  - Follow up with me in 3 weeks with labs prior; Dr. Voss in 2 months; Dr. Mallory in 5 months.  - Repeat lipids following switch to Lipitor in ~ 2 months.   - Continue routine PPM checks.         History of Presenting Illness:      Hamida Verma is a pleasant 78 year old patient of  Dr. Mallory and Dr. Voss who presents today for a CORE Enrollment visit.    The patient has a history of CAC on CT with a normal Melonie MPI in 3/2018, HFpEF, AF s/p PPM anticoagulated on Eliquis, HTN, HLP and VHD. She was admitted in March 2018 with fluid overload in the setting of rapid atrial fibrillation. She spontaneously converted but ultimately received a pacemaker for post-conversion pauses up to 4 seconds duration and  following that was placed on flecainide for PAF with RVR. However she continued to have a significant burden of atrial fibrillation and flecainide was therefore this was stopped and a rate control strategy with Toprol was pursued. However in July of this year flecainide was re-started after AF with RVR in the 160's-170's was noted on her device interrogation. Her most recent interrogation was performed in September and revealed 82% Ap with <1% , no mode switches and adequate rate histogram.     She was then admitted for the second time with HFpEF on 10/11/18, after she was noted to be mildly hypoxic with SpO2 in the upper 80's and hypertensive when she presented for cataract surgery. She admitted to a dry cough with mild dyspnea for two days prior, however she hadn't taken anything for this as she wasn't sure what might interact with her flecainide. Her home weight had crept up to 186 (she checks this consistently and typically varies between 179 - 182 lbs) however her chronic LE edema (L>R) had been stable on Lasix 20 mg daily. She was directed to the ED where a proBNP was slightly elevated at 1,100. Labs including troponin x 1 were WNL. CXR revealed vascular congestion and a small pleural effusion. TTE revealed a normal LVEF with mild diastolic dysfunction with an E/E' avg of 16 and mild-mod LAE, mild-mod TR without evidence of PHTN, as well as moderate aortic sclerosis without significant aortic stenosis. She was given several doses of IV Lasix 20 BID, supplemental O2 was weaned, and she was discharged three days later on her PTA Lasix. Discharge wt was 180 lbs. It was recommended she have outpatient PFT's for assessment of probable COPD.     When last seen by me one month ago, her home weights were stable and she was feeling well. Her device was interrogated and showed no arrhythmias that might be contributing to her recent episode of CHF. Blood pressure was mildly uncontrolled and Norvasc 5 was added to her  regimen, a low-salt diet was reinforced. However she never started the Norvasc and doesn't believe she has this at home.     She's unfortunately had multiple visits with various specialists since that time. On 11/6, she underwent cataract surgery. On 11/10, she was brought to the ED by her daughter as she was concerned regarding development of a cough, and was concerned she was beginning to re-develop congestive heart failure. Weight was stable ~ 180 lbs. CXR was benign. proBNP was 982 (WNL). She was noted to be hypertensive in the 170's-180's systolic however she was discharged home without any med changes. Soon after that she lost her appetite and subsequently lost about 10 lbs, which she believes was due to anxiety as her ophthalmologist told her she may need more surgery on her eye. On 11/13, she was seen by her ophthalmologist and was markedly hypertensive and had developed a hemorrhagic choroidal effusion which required a revision of the corneal incision with additional sutures. She admitted that she hadn't taken her antihypertensive meds in several days due to not feeling well. She was asked to follow up with her PCP regarding her blood pressure but was referred back to the ED by her. She was again noted to be markedly hypertensive in the ED. At that time, her Eliquis was cut in half and she was asked to re-start her antihypertensive meds. She was seen by her PCP in follow up ten days ago and her BP was very well-controlled at 109/73 mmHg.     Today, she presents to clinic with her granddaughter Zoe. She's feeling much better at this time. She has been doing better with watching her salt intake and is now reading labels and attempting to keep this < 2g/day. She continues to be very regimented in her daily weights and twice daily blood pressure recordings. Her weight has been gradually climbing as her appetite has improved, but has been stable at 174 lbs for the past 4 days. BP's 120's-130's/70's-80's  "during the daytime and lower (typically ~ 100's/70's) in the evening over the past week. Her LE edema is better than usual and she states that overall she feels better at this weight. She denies chest pain, shortness of breath, PND, orthopnea, palpitations, near syncope or syncope. She's been told she snores in the past but denies PND, daytime somnolence.     FHx is significant for a son and father who both passed from cardiac arrest (in late 50's and early 60's, respectively). Father had a history of alcohol and drug use. Mother had a history of CVA and CAD. The patient is  and has been dating her high school sweetheart for the past 9 years. She lives with her daughter and granddaughter. She has a longstanding history of smoking 1-1/2 packs a day for approximately 45 years but quit in 2000.  She feels she's probably compliant with a low-sodium diet but doesn't really monitor this. She states her diet had been stable before her admission. She denies drinking alcohol.  She ambulates without any assistive devices. She does her own grocery shopping. She's very involved with her Christianity.           Review of Systems:     12-pt ROS is negative except for as noted in the HPI.           Physical Exam:     Vitals: /76  Pulse 72  Ht 1.626 m (5' 4\")  Wt 80.3 kg (177 lb)  LMP  (LMP Unknown)  SpO2 95%  BMI 30.38 kg/m2  Wt Readings from Last 3 Encounters:   11/28/18 80.3 kg (177 lb)   11/21/18 78 kg (172 lb)   11/14/18 80.7 kg (178 lb)       Constitutional:  Patient is pleasant, alert, cooperative, and in NAD.  HEENT:  NCAT. PERRLA. EOM's intact. No masses or thyromegaly. Teeth in normal repair.   Neck:  CVP appears normal.   Pulmonary: Normal respiratory effort. CTAB.   Cardiac: RRR, normal S1/S2, +S4, grade 2-3/6 sm at the LSB  Abdomen:  Non-tender abdomen with normoactive bowel sounds, no hepatosplenomegaly appreciated.   Vascular: Pulses in the upper and lower extremities are 2+ and equal " bilaterally.  Extremities: trace pretibial edema on the L  Neurological:  No gross motor or sensory deficits.   Psych: Appropriate affect.        Data:     Cardiac Diagnostics reviewed:  Type Date Result   TTE 10/12/18 Left ventricular systolic function is normal.  The left atrium is mild to moderately dilated.  There is moderate aortic sclerosis of the non-coronary cusp. No  hemodynamically significant valvular aortic stenosis.  There is mild to moderate (1-2+) tricuspid regurgitation.  Doppler findings do not suggest pulmonary hypertension.  E/E' avg 16.  IVSd 1 / PWd 1.1    3/7/18 1. The left ventricle is normal in size. The visual ejection fraction is  estimated at 65%.  2. The right ventricle is normal in structure, function and size.  3. There is mild to moderate (1-2+) mitral regurgitation.  4. There is moderate (2+) tricuspid regurgitation. The right ventricular  systolic pressure is approximated at 28mmHg plus the right atrial pressure.  No previous echo for comparison.   Stress 3/8/18 (Melonie) 1.  Myocardial perfusion imaging using single isotope technique  demonstrated a small perfusion defect of mild severity involving the  left ventricular apex which is essentially fixed and most likely  related to breast attenuation. There is no evidence of significant  pharmacological stress-induced ischemia or prior myocardial infarction  on this study.   2. Gated images demonstrated normal left ventricular wall motion.  The left ventricular systolic function is 73% at rest and 74% on the post stress images.  3. There is no previous study for comparison.   EKG 10/11/18 NSR, 64 bpm. QRS 98, QTc 462.   Device PPM check, 9.24.18 [employer] (D) Remote PPM Device Check  AP: 82            %                    : <1 %  Mode: DDDR        Presenting Rhythm: AP/VS, AS/VS  Heart Rate: Adequate rates per histogram  Sensing: Stable    Pacing Threshold: Stable    Impedance: Stable  Battery Status: 10.4 years  Atrial  Arrhythmia: No mode switch episodes. 1 PMT occurred, no EGM.   Ventricular Arrhythmia: None    CXR 10/11/18    Chest CT 3/14/18 Mild to moderate emphysematous changes in the lungs. Small  bilateral pleural effusion with adjacent atelectasis. Apparent mild  interstitial thickening throughout both lungs. No pericardial  effusion. A few nonspecific borderline-enlarged mediastinal lymph  nodes. Mild cardiomegaly. Atherosclerotic calcification in the  thoracic aorta and coronary arteries.   LE ultrasound 3/14/18 The left common femoral, superficial femoral, popliteal and  posterior tibial veins are patent and fully compressible and  demonstrate normal venous Doppler flow. The visualized greater  saphenous vein is negative for thrombus. For comparison the right  common femoral vein was evaluated and was unremarkable.     IMPRESSION: No deep venous thrombosis demonstrated.     Recent Labs   Lab Test  10/25/18   0731  07/11/18   0907  03/15/18   2025  03/06/18   1744  03/23/17   0804  05/23/16   1056   LDL   --   84   --    --   91  103*   HDL   --   69   --    --   61  66   NHDL   --   102   --    --   113  124   CHOL   --   171   --    --   174  190   TRIG   --   88   --    --   109  104   TSH  0.80   --   0.64  0.54   --    --    NTBNP  343   --    --    --    --    --      Lab Results   Component Value Date    CHOL 171 07/11/2018    HDL 69 07/11/2018    LDL 84 07/11/2018    TRIG 88 07/11/2018    CHOLHDLRATIO 2.8 05/07/2015       Lab Results   Component Value Date    WBC 10.5 11/10/2018    RBC 4.83 11/10/2018    HGB 13.3 11/10/2018    HCT 41.3 11/10/2018    MCV 86 11/10/2018    MCH 27.5 11/10/2018    MCHC 32.2 11/10/2018    RDW 14.5 11/10/2018     11/10/2018       Lab Results   Component Value Date     11/10/2018    POTASSIUM 3.8 11/10/2018    CHLORIDE 107 11/10/2018    CO2 27 11/10/2018    ANIONGAP 7 11/10/2018    GLC 97 11/10/2018    BUN 10 11/10/2018    CR 0.69 11/10/2018    GFRESTIMATED 83 11/10/2018     GFRESTBLACK >90 11/10/2018    GURWINDER 8.6 11/10/2018      Lab Results   Component Value Date    AST 16 05/02/2018    ALT 25 05/02/2018       No results found for: A1C    Lab Results   Component Value Date    INR 1.31 (H) 03/19/2018    INR 1.04 11/12/2014          Problem List:     Patient Active Problem List   Diagnosis     Symptomatic menopausal or female climacteric states     Hyperlipidemia LDL goal <70     Hypertension goal BP (blood pressure) < 140/90     Knee pain     Obesity     Atrial fibrillation with rapid ventricular response (H)     S/P cardiac pacemaker procedure     SSS (sick sinus syndrome) (H)     Chronic diastolic CHF (congestive heart failure) (H)     Pulmonary hypertension (H)     Coronary artery calcification seen on CT scan            Medications:     Current Outpatient Prescriptions   Medication Sig Dispense Refill     albuterol (PROAIR HFA/PROVENTIL HFA/VENTOLIN HFA) 108 (90 Base) MCG/ACT inhaler Inhale 2 puffs into the lungs every 6 hours       alendronate (FOSAMAX) 70 MG tablet Take 1 tablet (70 mg) by mouth every 7 days Take 60 minutes before am meal with 8 oz. water. Remain upright for 30 minutes. 12 tablet 3     apixaban ANTICOAGULANT (ELIQUIS) 5 MG tablet Take 1 tablet (5 mg) by mouth 2 times daily (Patient taking differently: Take 2.5 mg by mouth 2 times daily ) 180 tablet 3     atorvastatin (LIPITOR) 10 MG tablet Take 4 tablets (40 mg) by mouth daily 30 tablet 5     cholecalciferol (VITAMIN  -D) 1000 UNIT capsule Take 1 capsule by mouth daily.       Coral Calcium 133-66.7-133 MG-MG-UNIT CAPS Take 2 tablets by mouth 2 times daily        FLAX SEED OIL OR 1 capsule daily       flecainide (TAMBOCOR) 100 MG tablet Take 1 tablet (100 mg) by mouth 2 times daily 90 tablet 3     fluticasone (FLONASE) 50 MCG/ACT spray Spray 1-2 sprays into both nostrils daily as needed for rhinitis or allergies       furosemide (LASIX) 20 MG tablet Take 1 tablet (20 mg) by mouth daily 30 tablet 5     Krill Oil 500  MG CAPS Take 1 capsule by mouth daily       lisinopril (PRINIVIL/ZESTRIL) 40 MG tablet Take 1 tablet (40 mg) by mouth daily 90 tablet 3     Methylsulfonylmethane (MSM) 500 MG CAPS Take 1 capsule by mouth daily       metoprolol succinate (TOPROL-XL) 50 MG 24 hr tablet Take 1 tablet (50 mg) by mouth 2 times daily 180 tablet 3     Milk Thistle 200 MG CAPS Take 1 capsule by mouth daily        Multiple Vitamins-Minerals (HAIR SKIN NAILS PO) Take 1 tablet by mouth At Bedtime       potassium & sodium phosphates 278-164-250 MG/75ML SOLR Take 1 tablet by mouth daily        predniSONE (DELTASONE) 20 MG tablet        traMADol (ULTRAM) 50 MG tablet             Past Medical History:     Past Medical History:   Diagnosis Date     Atrial fibrillation (H)      Chronic diastolic CHF (congestive heart failure) (H)      Essential hypertension, benign      HYPERLIPIDEMIA NEC/NOS 3/30/2006     Pulmonary hypertension (H)      SSS (sick sinus syndrome) (H)     s/p PPM 3/2018     Past Surgical History:   Procedure Laterality Date     HYSTERECTOMY, VAGINAL      hysterectomy     Family History   Problem Relation Age of Onset     Cerebrovascular Disease Mother      Glaucoma Mother      Macular Degeneration Mother      Alcohol/Drug Father      alcohol     Myocardial Infarction Father 63     passed away from MI     Family History Negative Sister      Family History Negative Sister      Family History Negative Sister      Cerebrovascular Disease Sister      Family History Negative Brother      Family History Negative Maternal Grandmother      Family History Negative Maternal Grandfather      Family History Negative Paternal Grandmother      Family History Negative Paternal Grandfather      Myocardial Infarction Son 57     passed away from MI     Dementia Daughter 57     early onset on [place]     Diabetes No family hx of      Breast Cancer No family hx of      Cancer - colorectal No family hx of      Social History     Social History     Marital  status:      Spouse name: N/A     Number of children: N/A     Years of education: N/A     Occupational History     Not on file.     Social History Main Topics     Smoking status: Former Smoker     Start date: 10/28/1955     Quit date: 9/1/2000     Smokeless tobacco: Never Used      Comment: quit 6 yrs ago     Alcohol use No     Drug use: No     Sexual activity: Yes     Partners: Male     Other Topics Concern     Parent/Sibling W/ Cabg, Mi Or Angioplasty Before 65f 55m? No     Social History Narrative    Balanced Diet - Yes    Osteoporosis Prevention Measures - Dairy servings per day: 2    Regular Exercise -  Yes Describe walk, but not recently due to weather    Dental Exam - Yes    Eye Exam -No    Self Breast Exam - Yes    Abuse: Current or Past (Physical, Sexual or Emotional)- No    Do you feel safe in your environment - Yes    Guns stored in the home - No    Sunscreen used - Yes    Seatbelts used - Yes    Lipids -  1/10    Glucose -  1/10    Colon Cancer Screening - Yes, 7/21/08    Hemoccults - NO    Pap Test -  Many yrs ago, has hysterectomy    Do you have any concerns about STD's -  No    Mammography - 6/18/08    DEXA - 4/13/06    Immunizations reviewed and up to date - No    Jonathan Marshall MA                        Allergies:   Strawberry flavor      Adriana Ferrell PA-C  [facility]  Pager: [phone]      Thank you for allowing me to participate in the care of your patient.      Sincerely,     Adriana Ferrell PA-C     [facility]    cc:   Adriana Ferrell PA-C  [address] [address]

## 2018-11-28 NOTE — PATIENT INSTRUCTIONS
Thank you for visiting with us today. Please call the CORE nurse for any questions or concerns: [phone].    Today, we discussed the following:   - Check when you get home to see if you do have Norvasc. If not, it may be sitting at your pharmacy still. I don't need you to start taking this regularly at this time if you don't have it at home, but don't throw it out if you do have it at home. Just let me know so we can ensure we have an accurate medication list.   - Samples of Eliquis given today. Let me know if you'd like to switch to Warfarin in the future.   - Ask your opthalmologist if you can return to the full dose of Eliquis when you see him this afternoon.   - Return to see me in three weeks with labs prior. Other follow up as scheduled.     Please, remember to continue the followin.  Weigh yourself daily and write it down.  Call CORE nurse if your weight is up more than 2 pounds in one day, or 5 pounds in one week.  2.  Call CORE nurse if you feel more short of breath (with activity or when trying to sleep at night), have more abdominal bloating or leg swelling.  3.  Eat a low sodium diet (less than 2,000mg or 2g daily). If you eat less salt, you will retain less fluid.   4.  Please avoid NSAIDs as able (For example, Ibuprofen / aleve / advil / naprosen / diclofenac).      Scheduling phone number: [phone]  Reminder: Please bring in all current medications, over the counter supplements and vitamin bottles to your next appointment.    Thank you!  Adriana Ferrell PA-C  ______________________________________________________________  [facility] Clinic: Cardiomyopathy, Optimization, Rehabilitation, Education  The CORE Clinic is a heart failure specialty clinic within the [facility] where you will work with specialized nurse practitioners, physician assistants, doctors, and registered nurses. They are dedicated to helping patients with heart failure to carefully adjust medications, receive  education, and learn who and when to call if symptoms develop. They specialize in helping you better understand your condition, slow the progression of your disease, improve the length and quality of your life, help you detect future heart problems before they become life threatening, and avoid hospitalizations.

## 2018-11-29 ENCOUNTER — PATIENT OUTREACH (OUTPATIENT)
Dept: CARE COORDINATION | Facility: CLINIC | Age: 78
End: 2018-11-29

## 2018-11-29 DIAGNOSIS — I10 HYPERTENSION GOAL BP (BLOOD PRESSURE) < 140/90: Primary | ICD-10-CM

## 2018-11-29 ASSESSMENT — ACTIVITIES OF DAILY LIVING (ADL): DEPENDENT_IADLS:: INDEPENDENT

## 2018-11-29 NOTE — LETTER
[facility]  11/29/2018       Hamida Verma  [address] [facility] [address]        Dear Hamida,    It was a pleasure to speak with you.  I would like to provide you with the enclosed information for your records.  As part of care coordination, we are developing care plans to assist in accomplishing your health care goals.  When we speak next, please feel free to let me know if you want to add or change any information on your care plans.    As always, feel free to contact me if you have any questions or concerns.  I look forward to working with you in the effort to achieve your health care and wellness goals .    The [facility] contact number is [phone]        Sincerely,        Jeannie Lopez RN / Clinical Care Coordinator     [facility]   [email or web address]  Office :  [phone] / Fax :  [phone]

## 2018-11-29 NOTE — PROGRESS NOTES
Clinic Care Coordination Contact    Clinic Care Coordination Contact  OUTREACH    Referral Information:       Primary Diagnosis: CHF (Hypertension)    Chief Complaint   Patient presents with     Clinic Care Coordination - Follow-up     Clinic Care Coordiantion         Universal Utilization:    11/14/2018-ED visit eye problem and elevated blood pressure   11/10/2018--ED visit Cough History of CHF   Clinic Utilization  Difficulty keeping appointments:: No  Compliance Concerns: No  No-Show Concerns: No  No PCP office visit in Past Year: No  Utilization    Last refreshed: 11/28/2018 10:00 AM:  No Show Count (past year) 1       Last refreshed: 11/28/2018 10:00 AM:  ED visits 2       Last refreshed: 11/28/2018 10:00 AM:  Hospital admissions 3          Current as of: 11/28/2018 10:00 AM             Clinical Concerns:  Current Medical Concerns:  Patient reports she saw the Cardiologist and eye provider yesterday  Cardiologist recommended she start the Prednisone which she did and the eye provider would like her to not restart the Eliquis for 1 more week due to possible hematoma in eye .   Patient states the Eliquis is expensive and was given samples  Might need to switch to warfarin because she is in the donut hole and can't afford Eliquis.  CC informed patient of the option of the [facility] Prescription Assistance Program and she will     Pain  Pain (GOAL):: No  Health Maintenance Reviewed: Up to date  Clinical Pathway: Clinic Care Coordination CHF Assessment         CHF:    Heart Failure Zones sheet on refrigerator or available: Yes  Any increased SOB since hospital discharge:  No  Any increased edema since hospital discharge:  No  What number to call for YELLOW zones: [phone]    Symptom Review:   Heart Failure Symptoms  Cough: Yes  Is your cough:: Productive  Increased sputum: No  Fever: No  Chest pain: : No  Dizzy or Lightheaded: No  Checking weight daily? : Yes  Weight increase more than 2 lbs in 24 hours?: No  Weight  Increase more than 5 lbs in 1 week? : No  Diet:: 2000 mg of sodium  Appetite:: Normal  Fatigue: No  Weakness (Heaviness in limbs):: No  What Heart Failure zone are you currently in?: Green  Overall your CHF symptoms are (GOAL):: Stable    Medications:  When is the first appointment with the CHF clinic:[facility] visit Yesterday              Medication Management:  Discussed cost of Eliquis     Functional Status:  Dependent ADLs:: Independent  Dependent IADLs:: Independent  Bed or wheelchair confined:: No  Mobility Status: Independent    Living Situation:  Current living arrangement:: I live in a private home with family    Diet/Exercise/Sleep:  Diet:: No added salt  Food Insecurity: No  Tube Feeding: No  Inadequate activity/exercise (GOAL):: No  Significant changes in sleep pattern (GOAL): No         Psychosocial:  Mental health DX:: No  Mental health management concern (GOAL):: No  Informal Support system:: Family     Community Resources: None  Supplies used at home:: None  Equipment Currently Used at Home: none    Advance Care Plan/Directive  Advanced Care Plans/Directives on file:: No    Referrals Placed: None     Goals:   Goals        Blood Pressure    Blood Pressure below 140/90     Notes - Note created  11/29/2018  1:08 PM by Jeannie Lopez, RN    Goal Statement: I will keep my blood pressures below 140/90  Measure of Success:Blood pressure below 140/90  Supportive Steps to Achieve:   I will keep routine primary provider /Cardiology appointments   I will check my blood pressure daily   I will take medications as directed   Barriers: None  Strengths: well informed of normal blood pressure readings   Date to Achieve By: Ongoing   Patient expressed understanding of goal: Yes           General    Improve chronic symptoms (pt-stated)     Notes - Note edited  11/29/2018  1:04 PM by Jeannie Lopez RN    Goal Statement: I will follow the CHF Action Plan   Measure of Success: I will have decreased hospital visits    Supportive Steps to Achieve:   I will share my action plan with my family members or friend so they can help me recognize early problems also    I will call my clinic if I have trouble lying down to sleep due to breathing problems   I will read nutrition labels when I am buying prepared foods or spices   I will buy salt substitute and low sodium seasonings such as Mrs Duran next time I go to the grocery store  I will keep a log on my daily sodium (salt)intake and will not go over 2000...mg  I will pace out my activities like bathing-cooking- cleaning - and rest between tasks for 10-15 minutes  I will elevate my legs on a chair or ottoman when I sit       Barriers: No barriers identified   Strengths: Lives with daughter and granddaughter   Date to Achieve By: ongoing   Patient expressed understanding of goal: Yes              Outreach Frequency: weekly  Future Appointments              In 2 weeks [facility]    In 2 weeks Ardiana Ferrell PA-C [facility] CL[facility]    In 1 month [facility] TECH1 [facility] CL[facility]    In 1 month Mikala Philip MD [facility]    In 1 month [facility]    In 1 month Roe Voss MD [facility] CL[facility]          Plan: Patient agrees to a follow up call in 1-2 weeks    Jeannie Lopez RN / Clinical Care Coordinator     [facility][note 3]   [email or web address]  Office :  [phone] / Fax :  [phone]

## 2018-11-29 NOTE — LETTER
[facility] Home  Complex Care Plan  About Me  Patient Name:  Hamida Verma    YOB: 1940  Age:     78 year old   [place] MRN:   [id number] Telephone Information:    Home Phone [phone]   Mobile Not on file.       Address:    [address] [facility] [address] [address] Email address:  No e-mail address on record      Emergency Contact(s)  Name Relationship Lgl Grd Work Phone Home Phone Mobile Phone   1PEGGY MARTIN Son No  [phone]            Primary language:  English     needed? No   [facility] [employer]:  [phone] op. 1  Other communication barriers: None  Preferred Method of Communication:  Mail  Current living arrangement: I live in a private home with family  Mobility Status/ Medical Equipment: Independent    Health Maintenance  Health Maintenance Reviewed: up to date     My Access Plan  Medical Emergency 911   Primary Clinic Line [facility] - [phone]   24 Hour Appointment Line [phone] or  [phone]) (toll-free)   24 Hour Nurse Line [phone] (toll-free)   Preferred Urgent Care [facility], [phone]   Preferred Hospital [facility]  [phone]   Preferred Pharmacy [facility] [address] [place] [address]     Behavioral Health Crisis Line The National Suicide Prevention Lifeline at 1-493.562.8408 or 700     My Care Team Members    Patient Care Team       Relationship Specialty Notifications Start End    Mikala Philip MD PCP - General Family Practice  11/28/14     Phone: [phone] Fax: [phone] [address] [facility] [address]    Jeannie Lopez, RN Lead Care Coordinator Primary Care - CC Admissions 11/12/18     Phone: [phone] Fax: [phone]                My Care Plans  Self Management and Treatment Plan  Goals and (Comments)  Goals        Blood Pressure    Blood Pressure below 140/90     Notes -  Note created  11/29/2018  1:08 PM by Jeannie Lopez, RN    Goal Statement: I will keep my blood pressures below 140/90  Measure of Success:Blood pressure below 140/90  Supportive Steps to Achieve:   I will keep routine primary provider /Cardiology appointments   I will check my blood pressure daily   I will take medications as directed   Barriers: None  Strengths: well informed of normal blood pressure readings   Date to Achieve By: Ongoing   Patient expressed understanding of goal: Yes           General    Improve chronic symptoms (pt-stated)     Notes - Note edited  11/29/2018  1:04 PM by Jeannie Lopez, RN    Goal Statement: I will follow the CHF Action Plan   Measure of Success: I will have decreased hospital visits   Supportive Steps to Achieve:   I will share my action plan with my family members or friend so they can help me recognize early problems also    I will call my clinic if I have trouble lying down to sleep due to breathing problems   I will read nutrition labels when I am buying prepared foods or spices   I will buy salt substitute and low sodium seasonings such as Mrs Duran next time I go to the grocery store  I will keep a log on my daily sodium (salt)intake and will not go over 2000...mg  I will pace out my activities like bathing-cooking- cleaning - and rest between tasks for 10-15 minutes  I will elevate my legs on a chair or ottoman when I sit       Barriers: No barriers identified   Strengths: Lives with daughter and granddaughter   Date to Achieve By: ongoing   Patient expressed understanding of goal: Yes             Action Plans on File: CHF Action Plan                       Advance Care Plans/Directives Type: None       My Medical and Care Information  Problem List   Patient Active Problem List   Diagnosis     Symptomatic menopausal or female climacteric states     Hyperlipidemia LDL goal <70     Hypertension goal BP (blood pressure) < 140/90     Knee pain     Obesity     PAF (paroxysmal  atrial fibrillation) (H)     S/P cardiac pacemaker procedure     SSS (sick sinus syndrome) (H)     Chronic diastolic CHF (congestive heart failure) (H)     Pulmonary hypertension (H)     Coronary artery calcification seen on CT scan      Current Medications and Allergies:  See printed Medication Report.    Care Coordination Start Date: 11/15/2018   Frequency of Care Coordination: 1-2 weeks    Form Last Updated: 11/29/2018

## 2018-12-04 ENCOUNTER — CARE COORDINATION (OUTPATIENT)
Dept: CARDIOLOGY | Facility: CLINIC | Age: 78
End: 2018-12-04

## 2018-12-04 NOTE — PROGRESS NOTES
"Spoke w/ pt. She asked if she should be taking norvasc, as she had discussed this med w/ Adriana Ferrell PAC during 11/28/18 OV.    Reviewed Adriana's note from that day:  \"No medication changes today. She'll call me if she finds that she does in fact have the previously-ordered Norvasc at home. If she does have it at home, but has not been taking it, she should not start taking it now, but should keep it on hand as we can use this PRN in the future for uncontrolled blood pressures. If she doesn't have it at home, she should not necessarily pick it up at this time as her pressures appear well-controlled and she's currently in the donut hole. She has an accurate BP cuff at home and will continue to monitor.\"    Reviewed above w/ pt. I asked her to monitor her BP daily and call us if her BP is consistently >130/80. She agreed and noted that her BPs have been below that threshold.     I reminded her of 12/19 follow-up.    Roselyn Smith RN BSN   11:25 AM 12/04/18        "

## 2018-12-04 NOTE — PROGRESS NOTES
Pt LVM 12/3/18, which I received today, stating that she had question about medication.    Called pt, no answer. LVM requesting call back.

## 2018-12-18 NOTE — PROGRESS NOTES
Cardiology Clinic Progress Note    Hamida Verma MRN# [id number]   YOB: 1940 Age: 78 year old     Reason for visit: CORE Enrollment           Assessment and Plan:     In summary, the patient presents today for assessment of HFpEF. She was admitted in March for volume overload in the setting of rapid atrial fibrillation, then again in October with CHF felt secondary to diastolic dysfunction, mildly uncontrolled blood pressures and underlying lung disease. Norvasc 5 was added to her regimen at her last office visit one month ago however she never did start this. Unfortunately she's suffered from complications of cataract surgery and uncontrolled hypertension due to anxiety, eye pain and med non-compliance resulting in the development of a choroidal hematoma and multiple ED visits since then. Today, her BP appears well-controlled, consistent with her recent home recordings. She remains on low-dose Eliquis due to her recent choroidal hematoma, and is scheduled to see her ophthalmologist for reassessment later today.     1. HFpEF   - ECHO: EF 55-60%, E/E' avg 16, mild-mod LAE   - ACC/AHA Stage: C; FC I    - Etiology: age, htn, paf   - RV: normal   - Valves: Mild-mod TR   - Volume: Appears euvolemic    Admit Wt: 186 lbs    Current Wt: 174 lbs (per home scale)    O2 Requirements: RA    Diuretics: Lasix 20 mg daily   - Rhythm: SR / ApVs / PAF   - QRS: Narrow   - Device: DDDR PPM   - Other: TSH OK                 Stop-Bang Score: Low    2. PAF, s/p PPM - on flecainide 100 BID, Toprol 50 BID and Eliquis 5 BID. Appears well-controlled per recent device interrogation in September. Recent EKG benign.     3. HTN - home readings show 's - 130's in the mornings, as low as 100's in the evenings. On Lisinopril 40, Toprol 50 BID, Lasix 20.     4. CAC - per CT. No sxs suggestive of angina. Melonie MPI this year was normal (small fixed defect suggestive of breast attenuation). LDL in July was 84 on Zocor 20.  Switched to Lipitor 40 in 10/2018.     5. Probable COPD - with some emphymatous changes noted on chest CT. +Smoking history. To have PFT's per PCP.       Plan:  - No medication changes today. She'll call me if she finds that she does in fact have the previously-ordered Norvasc at home. If she does have it at home, but has not been taking it, she should not start taking it now, but should keep it on hand as we can use this PRN in the future for uncontrolled blood pressures. If she doesn't have it at home, she should not necessarily pick it up at this time as her pressures appear well-controlled and she's currently in the donut hole. She has an accurate BP cuff at home and will continue to monitor.   - She was encouraged to call us in the future with re-development of cough, weight gain of 2+ lbs from one day to next or 5+ lbs within one week, and/or uncontrolled BP's.   - To return to Eliquis 5 mg BID once felt safe by ophthalmology. Samples provided today.   - Educated on low salt diet.  - Follow up with me in 3 weeks with labs prior; Dr. Voss in 2 months; Dr. Mallory in 5 months.  - Repeat lipids following switch to Lipitor in ~ 2 months.   - Continue routine PPM checks.         History of Presenting Illness:      Hamida Verma is a pleasant 78 year old patient of  Dr. Mallory and Dr. Voss who presents today for a CORE Enrollment visit.    The patient has a history of CAC on CT with a normal Melonie MPI in 3/2018, HFpEF, AF s/p PPM anticoagulated on Eliquis, HTN, HLP and VHD. She was admitted in March 2018 with fluid overload in the setting of rapid atrial fibrillation. She spontaneously converted but ultimately received a pacemaker for post-conversion pauses up to 4 seconds duration and following that was placed on flecainide for PAF with RVR. However she continued to have a significant burden of atrial fibrillation and flecainide was therefore this was stopped and a rate control strategy with Toprol was  pursued. However in July of this year flecainide was re-started after AF with RVR in the 160's-170's was noted on her device interrogation. Her most recent interrogation was performed in September and revealed 82% Ap with <1% , no mode switches and adequate rate histogram.     She was then admitted for the second time with HFpEF on 10/11/18, after she was noted to be mildly hypoxic with SpO2 in the upper 80's and hypertensive when she presented for cataract surgery. She admitted to a dry cough with mild dyspnea for two days prior, however she hadn't taken anything for this as she wasn't sure what might interact with her flecainide. Her home weight had crept up to 186 (she checks this consistently and typically varies between 179 - 182 lbs) however her chronic LE edema (L>R) had been stable on Lasix 20 mg daily. She was directed to the ED where a proBNP was slightly elevated at 1,100. Labs including troponin x 1 were WNL. CXR revealed vascular congestion and a small pleural effusion. TTE revealed a normal LVEF with mild diastolic dysfunction with an E/E' avg of 16 and mild-mod LAE, mild-mod TR without evidence of PHTN, as well as moderate aortic sclerosis without significant aortic stenosis. She was given several doses of IV Lasix 20 BID, supplemental O2 was weaned, and she was discharged three days later on her PTA Lasix. Discharge wt was 180 lbs. It was recommended she have outpatient PFT's for assessment of probable COPD.     When last seen by me one month ago, her home weights were stable and she was feeling well. Her device was interrogated and showed no arrhythmias that might be contributing to her recent episode of CHF. Blood pressure was mildly uncontrolled and Norvasc 5 was added to her regimen, a low-salt diet was reinforced. However she never started the Norvasc and doesn't believe she has this at home.     She's unfortunately had multiple visits with various specialists since that time. On 11/6, she  underwent cataract surgery. On 11/10, she was brought to the ED by her daughter as she was concerned regarding development of a cough, and was concerned she was beginning to re-develop congestive heart failure. Weight was stable ~ 180 lbs. CXR was benign. proBNP was 982 (WNL). She was noted to be hypertensive in the 170's-180's systolic however she was discharged home without any med changes. Soon after that she lost her appetite and subsequently lost about 10 lbs, which she believes was due to anxiety as her ophthalmologist told her she may need more surgery on her eye. On 11/13, she was seen by her ophthalmologist and was markedly hypertensive and had developed a hemorrhagic choroidal effusion which required a revision of the corneal incision with additional sutures. She admitted that she hadn't taken her antihypertensive meds in several days due to not feeling well. She was asked to follow up with her PCP regarding her blood pressure but was referred back to the ED by her. She was again noted to be markedly hypertensive in the ED. At that time, her Eliquis was cut in half and she was asked to re-start her antihypertensive meds. She was seen by her PCP in follow up ten days ago and her BP was very well-controlled at 109/73 mmHg.     Today, she presents to clinic with her granddaughter Zoe. She's feeling much better at this time. She has been doing better with watching her salt intake and is now reading labels and attempting to keep this < 2g/day. She continues to be very regimented in her daily weights and twice daily blood pressure recordings. Her weight has been gradually climbing as her appetite has improved, but has been stable at 174 lbs for the past 4 days. BP's 120's-130's/70's-80's during the daytime and lower (typically ~ 100's/70's) in the evening over the past week. Her LE edema is better than usual and she states that overall she feels better at this weight. She denies chest pain, shortness of  breath, PND, orthopnea, palpitations, near syncope or syncope. She's been told she snores in the past but denies PND, daytime somnolence.     FHx is significant for a son and father who both passed from cardiac arrest (in late 50's and early 60's, respectively). Father had a history of alcohol and drug use. Mother had a history of CVA and CAD. The patient is  and has been dating her high school sweetheart for the past 9 years. She lives with her daughter and granddaughter. She has a longstanding history of smoking 1-1/2 packs a day for approximately 45 years but quit in 2000.  She feels she's probably compliant with a low-sodium diet but doesn't really monitor this. She states her diet had been stable before her admission. She denies drinking alcohol.  She ambulates without any assistive devices. She does her own grocery shopping. She's very involved with her [employer].           Review of Systems:     12-pt ROS is negative except for as noted in the HPI.           Physical Exam:     Vitals: LMP  (LMP Unknown)   Wt Readings from Last 3 Encounters:   11/28/18 80.3 kg (177 lb)   11/21/18 78 kg (172 lb)   11/14/18 80.7 kg (178 lb)       Constitutional:  Patient is pleasant, alert, cooperative, and in NAD.  HEENT:  NCAT. PERRLA. EOM's intact. No masses or thyromegaly. Teeth in normal repair.   Neck:  CVP appears normal.   Pulmonary: Normal respiratory effort. CTAB.   Cardiac: RRR, normal S1/S2, +S4, grade 2-3/6 sm at the LSB  Abdomen:  Non-tender abdomen with normoactive bowel sounds, no hepatosplenomegaly appreciated.   Vascular: Pulses in the upper and lower extremities are 2+ and equal bilaterally.  Extremities: trace pretibial edema on the L  Neurological:  No gross motor or sensory deficits.   Psych: Appropriate affect.        Data:     Cardiac Diagnostics reviewed:  Type Date Result   TTE 10/12/18 Left ventricular systolic function is normal.  The left atrium is mild to moderately dilated.  There is moderate  aortic sclerosis of the non-coronary cusp. No  hemodynamically significant valvular aortic stenosis.  There is mild to moderate (1-2+) tricuspid regurgitation.  Doppler findings do not suggest pulmonary hypertension.  E/E' avg 16.  IVSd 1 / PWd 1.1    3/7/18 1. The left ventricle is normal in size. The visual ejection fraction is  estimated at 65%.  2. The right ventricle is normal in structure, function and size.  3. There is mild to moderate (1-2+) mitral regurgitation.  4. There is moderate (2+) tricuspid regurgitation. The right ventricular  systolic pressure is approximated at 28mmHg plus the right atrial pressure.  No previous echo for comparison.   Stress 3/8/18 (Melonie) 1.  Myocardial perfusion imaging using single isotope technique  demonstrated a small perfusion defect of mild severity involving the  left ventricular apex which is essentially fixed and most likely  related to breast attenuation. There is no evidence of significant  pharmacological stress-induced ischemia or prior myocardial infarction  on this study.   2. Gated images demonstrated normal left ventricular wall motion.  The left ventricular systolic function is 73% at rest and 74% on the post stress images.  3. There is no previous study for comparison.   EKG 10/11/18 NSR, 64 bpm. QRS 98, QTc 462.   Device PPM check, 9.24.18 [employer] (D) Remote PPM Device Check  AP: 82            %                    : <1 %  Mode: DDDR        Presenting Rhythm: AP/VS, AS/VS  Heart Rate: Adequate rates per histogram  Sensing: Stable    Pacing Threshold: Stable    Impedance: Stable  Battery Status: 10.4 years  Atrial Arrhythmia: No mode switch episodes. 1 PMT occurred, no EGM.   Ventricular Arrhythmia: None    CXR 10/11/18    Chest CT 3/14/18 Mild to moderate emphysematous changes in the lungs. Small  bilateral pleural effusion with adjacent atelectasis. Apparent mild  interstitial thickening throughout both lungs. No pericardial  effusion. A few  nonspecific borderline-enlarged mediastinal lymph  nodes. Mild cardiomegaly. Atherosclerotic calcification in the  thoracic aorta and coronary arteries.   LE ultrasound 3/14/18 The left common femoral, superficial femoral, popliteal and  posterior tibial veins are patent and fully compressible and  demonstrate normal venous Doppler flow. The visualized greater  saphenous vein is negative for thrombus. For comparison the right  common femoral vein was evaluated and was unremarkable.     IMPRESSION: No deep venous thrombosis demonstrated.     Recent Labs   Lab Test 10/25/18  0731 07/11/18  0907 03/15/18  2025 03/06/18  1744 03/23/17  0804 05/23/16  1056   LDL  --  84  --   --  91 103*   HDL  --  69  --   --  61 66   NHDL  --  102  --   --  113 124   CHOL  --  171  --   --  174 190   TRIG  --  88  --   --  109 104   TSH 0.80  --  0.64 0.54  --   --    NTBNP 343  --   --   --   --   --      Lab Results   Component Value Date    CHOL 171 07/11/2018    HDL 69 07/11/2018    LDL 84 07/11/2018    TRIG 88 07/11/2018    CHOLHDLRATIO 2.8 05/07/2015       Lab Results   Component Value Date    WBC 10.5 11/10/2018    RBC 4.83 11/10/2018    HGB 13.3 11/10/2018    HCT 41.3 11/10/2018    MCV 86 11/10/2018    MCH 27.5 11/10/2018    MCHC 32.2 11/10/2018    RDW 14.5 11/10/2018     11/10/2018       Lab Results   Component Value Date     11/10/2018    POTASSIUM 3.8 11/10/2018    CHLORIDE 107 11/10/2018    CO2 27 11/10/2018    ANIONGAP 7 11/10/2018    GLC 97 11/10/2018    BUN 10 11/10/2018    CR 0.69 11/10/2018    GFRESTIMATED 83 11/10/2018    GFRESTBLACK >90 11/10/2018    GURWINDER 8.6 11/10/2018      Lab Results   Component Value Date    AST 16 05/02/2018    ALT 25 05/02/2018       No results found for: A1C    Lab Results   Component Value Date    INR 1.31 (H) 03/19/2018    INR 1.04 11/12/2014          Problem List:     Patient Active Problem List   Diagnosis     Symptomatic menopausal or female climacteric states      Hyperlipidemia LDL goal <70     Hypertension goal BP (blood pressure) < 140/90     Knee pain     Obesity     PAF (paroxysmal atrial fibrillation) (H)     S/P cardiac pacemaker procedure     SSS (sick sinus syndrome) (H)     Chronic diastolic CHF (congestive heart failure) (H)     Pulmonary hypertension (H)     Coronary artery calcification seen on CT scan            Medications:     Current Outpatient Medications   Medication Sig Dispense Refill     albuterol (PROAIR HFA/PROVENTIL HFA/VENTOLIN HFA) 108 (90 Base) MCG/ACT inhaler Inhale 2 puffs into the lungs every 6 hours       albuterol (PROAIR HFA/PROVENTIL HFA/VENTOLIN HFA) 108 (90 BASE) MCG/ACT Inhaler Inhale 2 puffs into the lungs 4 times daily as needed for other (dyspnea) 1 Inhaler 3     alendronate (FOSAMAX) 70 MG tablet Take 1 tablet (70 mg) by mouth every 7 days Take 60 minutes before am meal with 8 oz. water. Remain upright for 30 minutes. 12 tablet 3     apixaban ANTICOAGULANT (ELIQUIS) 5 MG tablet Take 1 tablet (5 mg) by mouth 2 times daily (Patient taking differently: Take 2.5 mg by mouth 2 times daily ) 180 tablet 3     atorvastatin (LIPITOR) 10 MG tablet Take 4 tablets (40 mg) by mouth daily 30 tablet 5     cholecalciferol (VITAMIN  -D) 1000 UNIT capsule Take 1 capsule by mouth daily.       Coral Calcium 133-66.7-133 MG-MG-UNIT CAPS Take 2 tablets by mouth 2 times daily        FLAX SEED OIL OR 1 capsule daily       flecainide (TAMBOCOR) 100 MG tablet Take 1 tablet (100 mg) by mouth 2 times daily 90 tablet 3     fluticasone (FLONASE) 50 MCG/ACT spray Spray 1-2 sprays into both nostrils daily as needed for rhinitis or allergies       furosemide (LASIX) 20 MG tablet Take 1 tablet (20 mg) by mouth daily 30 tablet 5     Krill Oil 500 MG CAPS Take 1 capsule by mouth daily       lisinopril (PRINIVIL/ZESTRIL) 40 MG tablet Take 1 tablet (40 mg) by mouth daily 90 tablet 3     Methylsulfonylmethane (MSM) 500 MG CAPS Take 1 capsule by mouth daily       metoprolol  succinate (TOPROL-XL) 50 MG 24 hr tablet Take 1 tablet (50 mg) by mouth 2 times daily 180 tablet 3     Milk Thistle 200 MG CAPS Take 1 capsule by mouth daily        Multiple Vitamins-Minerals (HAIR SKIN NAILS PO) Take 1 tablet by mouth At Bedtime       potassium & sodium phosphates 278-164-250 MG/75ML SOLR Take 1 tablet by mouth daily        predniSONE (DELTASONE) 20 MG tablet        traMADol (ULTRAM) 50 MG tablet             Past Medical History:     Past Medical History:   Diagnosis Date     Atrial fibrillation (H)      Chronic diastolic CHF (congestive heart failure) (H)      Essential hypertension, benign      HYPERLIPIDEMIA NEC/NOS 3/30/2006     Pulmonary hypertension (H)      SSS (sick sinus syndrome) (H)     s/p PPM 3/2018     Past Surgical History:   Procedure Laterality Date     HYSTERECTOMY, VAGINAL      hysterectomy     Family History   Problem Relation Age of Onset     Cerebrovascular Disease Mother      Glaucoma Mother      Macular Degeneration Mother      Alcohol/Drug Father         alcohol     Myocardial Infarction Father 63        passed away from MI     Family History Negative Sister      Family History Negative Sister      Family History Negative Sister      Cerebrovascular Disease Sister      Family History Negative Brother      Family History Negative Maternal Grandmother      Family History Negative Maternal Grandfather      Family History Negative Paternal Grandmother      Family History Negative Paternal Grandfather      Myocardial Infarction Son 57        passed away from MI     Dementia Daughter 57        early onset on [place]     Diabetes No family hx of      Breast Cancer No family hx of      Cancer - colorectal No family hx of      Social History     Socioeconomic History     Marital status:      Spouse name: Not on file     Number of children: Not on file     Years of education: Not on file     Highest education level: Not on file   Social Needs     Financial resource strain: Not  on file     Food insecurity - worry: Not on file     Food insecurity - inability: Not on file     Transportation needs - medical: Not on file     Transportation needs - non-medical: Not on file   Occupational History     Not on file   Tobacco Use     Smoking status: Former Smoker     Start date: 10/28/1955     Last attempt to quit: 2000     Years since quittin.3     Smokeless tobacco: Never Used     Tobacco comment: quit 6 yrs ago   Substance and Sexual Activity     Alcohol use: No     Drug use: No     Sexual activity: Yes     Partners: Male   Other Topics Concern     Parent/sibling w/ CABG, MI or angioplasty before 65F 55M? No   Social History Narrative    Balanced Diet - Yes    Osteoporosis Prevention Measures - Dairy servings per day: 2    Regular Exercise -  Yes Describe walk, but not recently due to weather    Dental Exam - Yes    Eye Exam -No    Self Breast Exam - Yes    Abuse: Current or Past (Physical, Sexual or Emotional)- No    Do you feel safe in your environment - Yes    Guns stored in the home - No    Sunscreen used - Yes    Seatbelts used - Yes    Lipids -  1/10    Glucose -  1/10    Colon Cancer Screening - Yes, 08    Hemoccults - NO    Pap Test -  Many yrs ago, has hysterectomy    Do you have any concerns about STD's -  No    Mammography - 08    DEXA - 06    Immunizations reviewed and up to date - No    Jonathan Marshall MA                        Allergies:   Strawberry flavor      Adriana Ferrell PA-C  [facility]  Pager: [phone]

## 2018-12-19 ENCOUNTER — OFFICE VISIT (OUTPATIENT)
Dept: CARDIOLOGY | Facility: CLINIC | Age: 78
End: 2018-12-19
Attending: PHYSICIAN ASSISTANT
Payer: COMMERCIAL

## 2018-12-19 ENCOUNTER — TELEPHONE (OUTPATIENT)
Dept: CARDIOLOGY | Facility: CLINIC | Age: 78
End: 2018-12-19

## 2018-12-19 VITALS
SYSTOLIC BLOOD PRESSURE: 122 MMHG | BODY MASS INDEX: 31.6 KG/M2 | HEIGHT: 64 IN | DIASTOLIC BLOOD PRESSURE: 84 MMHG | WEIGHT: 185.1 LBS | HEART RATE: 64 BPM | OXYGEN SATURATION: 96 %

## 2018-12-19 DIAGNOSIS — I50.32 CHRONIC DIASTOLIC CONGESTIVE HEART FAILURE (H): ICD-10-CM

## 2018-12-19 LAB
ANION GAP SERPL CALCULATED.3IONS-SCNC: 5.4 MMOL/L (ref 6–17)
BUN SERPL-MCNC: 20 MG/DL (ref 7–30)
CALCIUM SERPL-MCNC: 8.4 MG/DL (ref 8.5–10.5)
CHLORIDE SERPL-SCNC: 102 MMOL/L (ref 98–107)
CO2 SERPL-SCNC: 30 MMOL/L (ref 23–29)
CREAT SERPL-MCNC: 0.96 MG/DL (ref 0.7–1.3)
GFR SERPL CREATININE-BSD FRML MDRD: 56 ML/MIN/{1.73_M2}
GLUCOSE SERPL-MCNC: 158 MG/DL (ref 70–105)
NT-PROBNP SERPL-MCNC: 400 PG/ML (ref 0–450)
POTASSIUM SERPL-SCNC: 3.4 MMOL/L (ref 3.5–5.1)
SODIUM SERPL-SCNC: 134 MMOL/L (ref 136–145)

## 2018-12-19 PROCEDURE — 83880 ASSAY OF NATRIURETIC PEPTIDE: CPT | Performed by: PHYSICIAN ASSISTANT

## 2018-12-19 PROCEDURE — 36415 COLL VENOUS BLD VENIPUNCTURE: CPT | Performed by: PHYSICIAN ASSISTANT

## 2018-12-19 PROCEDURE — 80048 BASIC METABOLIC PNL TOTAL CA: CPT | Performed by: PHYSICIAN ASSISTANT

## 2018-12-19 PROCEDURE — 99214 OFFICE O/P EST MOD 30 MIN: CPT | Performed by: PHYSICIAN ASSISTANT

## 2018-12-19 RX ORDER — SPIRONOLACTONE 25 MG/1
25 TABLET ORAL DAILY
Qty: 30 TABLET | Refills: 3 | Status: SHIPPED | OUTPATIENT
Start: 2018-12-19 | End: 2019-01-05

## 2018-12-19 ASSESSMENT — MIFFLIN-ST. JEOR: SCORE: 1299.61

## 2018-12-19 NOTE — TELEPHONE ENCOUNTER
Reminder sent to CORE board to make sure pt had BMP done at [facility] ~ 12/26/18, as ordered by GE Jimenes RN 11:08 AM 12/19/2018

## 2018-12-19 NOTE — PROGRESS NOTES
Cardiology Clinic Progress Note    Hamida Verma MRN# [id number]   YOB: 1940 Age: 78 year old     Reason for visit: C[facility] clinic visit           Assessment and Plan:     In summary, the patient presents today for assessment of HFpEF. She was admitted in March for volume overload in the setting of rapid atrial fibrillation, then again in October with CHF felt secondary to diastolic dysfunction, mildly uncontrolled blood pressures and underlying lung disease. Since that time, she'd done well from a heart failure standpoint, but had suffered from complications of cataract surgery and uncontrolled hypertension due to anxiety, eye pain and med non-compliance resulting in the development of a choroidal hematoma and multiple ED visits since then. She's been on prednisone since for her eye complications and is starting a taper finally tomorrow. Today, she's unfortunately up 8 lbs over the past three weeks and has re-developed LE edema. She's had no changes in her breathing. She admits to an increased appetite and food intake, but has continued to watch her sodium. proBNP is WNL. Potassium is low at 3.4.     1. HFpEF   - ECHO: EF 55-60%, E/E' avg 16, mild-mod LAE   - ACC/AHA Stage: C; FC I    - Etiology: age, htn, paf   - RV: normal   - Valves: Mild-mod TR   - Volume: Mildly up    Admit Wt: 186 lbs    Current Wt: 182 lbs (per home scale)    Dry Wt: 174 lbs    Diuretics: Lasix 20 mg daily   - Rhythm: SR / ApVs / PAF   - QRS: Narrow   - Device: DDDR PPM   - Other: TSH OK                 Stop-Bang Score: Low    2. PAF, s/p PPM - on flecainide 100 BID, Toprol 50 BID and Eliquis 5 BID. Appears well-controlled per recent device interrogation in September. Recent EKG benign.     3. HTN - home readings show 's - 130's in the mornings, as low as 100's in the evenings. On Lisinopril 40, Toprol 50 BID, Lasix 20.     4. CAC - per CT. No sxs suggestive of angina. Melonie MPI this year was normal (small fixed defect  suggestive of breast attenuation). LDL in July was 84 on Zocor 20. Switched to Lipitor 40 in 10/2018.     5. Probable COPD - with some emphymatous changes noted on chest CT. +Smoking history. To have PFT's per PCP.       Plan:  - Start Aldactone 25 mg daily. I'd like her to see me back in one week for reassessment however with the upcoming holidays she's asking to see me back in two weeks instead. Given her complete lack of dyspnea, I think this is reasonable, and she'll instead have a BMP and magnesium level done at a [place] clinic near her in one week, and RTC to see me in two weeks.   - She has an accurate BP cuff at home and will continue to monitor.   - Repeat lipids following switch to Lipitor in ~ 2 months.   - F/U arranged with her primary cardiologist and EP.   - Continue routine PPM checks.         History of Presenting Illness:      Hamida Verma is a pleasant 78 year old patient of  Dr. Mallory and Dr. Voss who presents today for a C[facility] clinic visit.    The patient has a history of CAC on CT with a normal Melonie MPI in 3/2018, HFpEF, AF s/p PPM anticoagulated on Eliquis, HTN, HLP and VHD. She was admitted in March 2018 with fluid overload in the setting of rapid atrial fibrillation. She spontaneously converted but ultimately received a pacemaker for post-conversion pauses up to 4 seconds duration and following that was placed on flecainide for PAF with RVR. However she continued to have a significant burden of atrial fibrillation and flecainide was therefore this was stopped and a rate control strategy with Toprol was pursued. However in July of this year flecainide was re-started after AF with RVR in the 160's-170's was noted on her device interrogation. Her most recent interrogation was performed in September and revealed 82% Ap with <1% , no mode switches and adequate rate histogram.     She was then admitted for the second time with HFpEF on 10/11/18, after she was noted to be mildly hypoxic  with SpO2 in the upper 80's and hypertensive when she presented for cataract surgery. She admitted to a dry cough with mild dyspnea for two days prior, however she hadn't taken anything for this as she wasn't sure what might interact with her flecainide. Her home weight had crept up to 186 (she checks this consistently and typically varies between 179 - 182 lbs) however her chronic LE edema (L>R) had been stable on Lasix 20 mg daily. She was directed to the ED where a proBNP was slightly elevated at 1,100. Labs including troponin x 1 were WNL. CXR revealed vascular congestion and a small pleural effusion. TTE revealed a normal LVEF with mild diastolic dysfunction with an E/E' avg of 16 and mild-mod LAE, mild-mod TR without evidence of PHTN, as well as moderate aortic sclerosis without significant aortic stenosis. She was given several doses of IV Lasix 20 BID, supplemental O2 was weaned, and she was discharged three days later on her PTA Lasix. Discharge wt was 180 lbs. It was recommended she have outpatient PFT's for assessment of probable COPD.     When last seen by me one month ago, her home weights were stable and she was feeling well. Her device was interrogated and showed no arrhythmias that might be contributing to her recent episode of CHF. Blood pressure was mildly uncontrolled and Norvasc 5 was added to her regimen, a low-salt diet was reinforced. However she never started the Norvasc and doesn't believe she has this at home.     She's unfortunately had multiple visits with various specialists since that time. On 11/6, she underwent cataract surgery. On 11/10, she was brought to the ED by her daughter as she was concerned regarding development of a cough, and was concerned she was beginning to re-develop congestive heart failure. Weight was stable ~ 180 lbs. CXR was benign. proBNP was 982 (WNL). She was noted to be hypertensive in the 170's-180's systolic however she was discharged home without any med  changes. Soon after that she lost her appetite and subsequently lost about 10 lbs, which she believes was due to anxiety as her ophthalmologist told her she may need more surgery on her eye. On 11/13, she was seen by her ophthalmologist and was markedly hypertensive and had developed a hemorrhagic choroidal effusion which required a revision of the corneal incision with additional sutures. She admitted that she hadn't taken her antihypertensive meds in several days due to not feeling well. She was asked to follow up with her PCP regarding her blood pressure but was referred back to the ED by her. She was again noted to be markedly hypertensive in the ED. At that time, her Eliquis was cut in half and she was asked to re-start her antihypertensive meds. She was seen by her PCP in follow up ten days ago and her BP was very well-controlled at 109/73 mmHg.     When I saw her last in clinic on 11/28, she was feeling much better and had been doing better with watching her salt intake and was reading labels and attempting to keep this < 2g/day. Her weight had been gradually climbing as her appetite had improved, but had been recently stable at 174 lbs. Her home BP's were very well-controlled. No med changes were made. She remained on low-dose Eliquis following her choroidal hematoma at that time, and saw her ophthalmologist later that day who up-titrated her to full dose.     Today, her weight is unfortunately up 8 lbs over the past three weeks. She's continued on prednisone since I last saw her, and is starting a taper down tomorrow. However since I last saw her, she's developed a markedly increased appetite. She's also noted increased leg swelling (L>R, L is chronically larger than the R) and gradual weight gain. Her breathing has been OK and she's been sleeping well. She continues to be very regimented in her daily weights and twice daily blood pressure recordings. She denies chest pain, shortness of breath, PND,  orthopnea, palpitations, near syncope or syncope. She's been told she snores in the past but denies PND, daytime somnolence. Labs today are stable overall, although potassium is mildly low at 3.4. proBNP is WNL.     FHx is significant for a son and father who both passed from cardiac arrest (in late 50's and early 60's, respectively). Father had a history of alcohol and drug use. Mother had a history of CVA and CAD. The patient is  and has been dating her high school sweetheart for the past 9 years. She lives with her daughter and granddaughter Zoe. She has a longstanding history of smoking 1-1/2 packs a day for approximately 45 years but quit in 2000. She denies drinking alcohol.  She ambulates without any assistive devices. She does her own grocery shopping. She's very involved with her [employer]. She has two cats and is also hosting two dogs whom belong to her daughter and granddaughter.           Review of Systems:     12-pt ROS is negative except for as noted in the HPI.           Physical Exam:     Vitals: LMP  (LMP Unknown)   Wt Readings from Last 3 Encounters:   11/28/18 80.3 kg (177 lb)   11/21/18 78 kg (172 lb)   11/14/18 80.7 kg (178 lb)       Constitutional:  Patient is pleasant, alert, cooperative, and in NAD.  HEENT:  NCAT. PERRLA. EOM's intact. No masses or thyromegaly. Teeth in normal repair.   Neck:  CVP appears normal.   Pulmonary: Normal respiratory effort. CTAB.   Cardiac: RRR, normal S1/S2, +S4, grade 2-3/6 sm at the LSB  Abdomen:  Non-tender abdomen with normoactive bowel sounds, no hepatosplenomegaly appreciated.   Vascular: Pulses in the upper and lower extremities are 2+ and equal bilaterally.  Extremities: 2+ pitting edema L pedal - knee, 1+ on the R  Neurological:  No gross motor or sensory deficits.   Psych: Appropriate affect.        Data:     Cardiac Diagnostics reviewed:  Type Date Result   TTE 10/12/18 Left ventricular systolic function is normal.  The left atrium is mild to  moderately dilated.  There is moderate aortic sclerosis of the non-coronary cusp. No  hemodynamically significant valvular aortic stenosis.  There is mild to moderate (1-2+) tricuspid regurgitation.  Doppler findings do not suggest pulmonary hypertension.  E/E' avg 16.  IVSd 1 / PWd 1.1    3/7/18 1. The left ventricle is normal in size. The visual ejection fraction is  estimated at 65%.  2. The right ventricle is normal in structure, function and size.  3. There is mild to moderate (1-2+) mitral regurgitation.  4. There is moderate (2+) tricuspid regurgitation. The right ventricular  systolic pressure is approximated at 28mmHg plus the right atrial pressure.  No previous echo for comparison.   Stress 3/8/18 (Melonie) 1.  Myocardial perfusion imaging using single isotope technique  demonstrated a small perfusion defect of mild severity involving the  left ventricular apex which is essentially fixed and most likely  related to breast attenuation. There is no evidence of significant  pharmacological stress-induced ischemia or prior myocardial infarction  on this study.   2. Gated images demonstrated normal left ventricular wall motion.  The left ventricular systolic function is 73% at rest and 74% on the post stress images.  3. There is no previous study for comparison.   EKG 10/11/18 NSR, 64 bpm. QRS 98, QTc 462.   Device PPM check, 9.24.18 [employer] (D) Remote PPM Device Check  AP: 82            %                    : <1 %  Mode: DDDR        Presenting Rhythm: AP/VS, AS/VS  Heart Rate: Adequate rates per histogram  Sensing: Stable    Pacing Threshold: Stable    Impedance: Stable  Battery Status: 10.4 years  Atrial Arrhythmia: No mode switch episodes. 1 PMT occurred, no EGM.   Ventricular Arrhythmia: None    CXR 10/11/18    Chest CT 3/14/18 Mild to moderate emphysematous changes in the lungs. Small  bilateral pleural effusion with adjacent atelectasis. Apparent mild  interstitial thickening throughout both  lungs. No pericardial  effusion. A few nonspecific borderline-enlarged mediastinal lymph  nodes. Mild cardiomegaly. Atherosclerotic calcification in the  thoracic aorta and coronary arteries.   LE ultrasound 3/14/18 The left common femoral, superficial femoral, popliteal and  posterior tibial veins are patent and fully compressible and  demonstrate normal venous Doppler flow. The visualized greater  saphenous vein is negative for thrombus. For comparison the right  common femoral vein was evaluated and was unremarkable.     IMPRESSION: No deep venous thrombosis demonstrated.     Recent Labs   Lab Test 10/25/18  0731 07/11/18  0907 03/15/18  2025 03/06/18  1744 03/23/17  0804 05/23/16  1056   LDL  --  84  --   --  91 103*   HDL  --  69  --   --  61 66   NHDL  --  102  --   --  113 124   CHOL  --  171  --   --  174 190   TRIG  --  88  --   --  109 104   TSH 0.80  --  0.64 0.54  --   --    NTBNP 343  --   --   --   --   --      Lab Results   Component Value Date    CHOL 171 07/11/2018    HDL 69 07/11/2018    LDL 84 07/11/2018    TRIG 88 07/11/2018    CHOLHDLRATIO 2.8 05/07/2015       Lab Results   Component Value Date    WBC 10.5 11/10/2018    RBC 4.83 11/10/2018    HGB 13.3 11/10/2018    HCT 41.3 11/10/2018    MCV 86 11/10/2018    MCH 27.5 11/10/2018    MCHC 32.2 11/10/2018    RDW 14.5 11/10/2018     11/10/2018       Lab Results   Component Value Date     11/10/2018    POTASSIUM 3.8 11/10/2018    CHLORIDE 107 11/10/2018    CO2 27 11/10/2018    ANIONGAP 7 11/10/2018    GLC 97 11/10/2018    BUN 10 11/10/2018    CR 0.69 11/10/2018    GFRESTIMATED 83 11/10/2018    GFRESTBLACK >90 11/10/2018    GURWINDER 8.6 11/10/2018      Lab Results   Component Value Date    AST 16 05/02/2018    ALT 25 05/02/2018       No results found for: A1C    Lab Results   Component Value Date    INR 1.31 (H) 03/19/2018    INR 1.04 11/12/2014          Problem List:     Patient Active Problem List   Diagnosis     Symptomatic menopausal or  female climacteric states     Hyperlipidemia LDL goal <70     Hypertension goal BP (blood pressure) < 140/90     Knee pain     Obesity     PAF (paroxysmal atrial fibrillation) (H)     S/P cardiac pacemaker procedure     SSS (sick sinus syndrome) (H)     Chronic diastolic CHF (congestive heart failure) (H)     Pulmonary hypertension (H)     Coronary artery calcification seen on CT scan            Medications:     Current Outpatient Medications   Medication Sig Dispense Refill     albuterol (PROAIR HFA/PROVENTIL HFA/VENTOLIN HFA) 108 (90 Base) MCG/ACT inhaler Inhale 2 puffs into the lungs every 6 hours       albuterol (PROAIR HFA/PROVENTIL HFA/VENTOLIN HFA) 108 (90 BASE) MCG/ACT Inhaler Inhale 2 puffs into the lungs 4 times daily as needed for other (dyspnea) 1 Inhaler 3     alendronate (FOSAMAX) 70 MG tablet Take 1 tablet (70 mg) by mouth every 7 days Take 60 minutes before am meal with 8 oz. water. Remain upright for 30 minutes. 12 tablet 3     apixaban ANTICOAGULANT (ELIQUIS) 5 MG tablet Take 1 tablet (5 mg) by mouth 2 times daily (Patient taking differently: Take 2.5 mg by mouth 2 times daily ) 180 tablet 3     atorvastatin (LIPITOR) 10 MG tablet Take 4 tablets (40 mg) by mouth daily 30 tablet 5     cholecalciferol (VITAMIN  -D) 1000 UNIT capsule Take 1 capsule by mouth daily.       Coral Calcium 133-66.7-133 MG-MG-UNIT CAPS Take 2 tablets by mouth 2 times daily        FLAX SEED OIL OR 1 capsule daily       flecainide (TAMBOCOR) 100 MG tablet Take 1 tablet (100 mg) by mouth 2 times daily 90 tablet 3     fluticasone (FLONASE) 50 MCG/ACT spray Spray 1-2 sprays into both nostrils daily as needed for rhinitis or allergies       furosemide (LASIX) 20 MG tablet Take 1 tablet (20 mg) by mouth daily 30 tablet 5     Krill Oil 500 MG CAPS Take 1 capsule by mouth daily       lisinopril (PRINIVIL/ZESTRIL) 40 MG tablet Take 1 tablet (40 mg) by mouth daily 90 tablet 3     Methylsulfonylmethane (MSM) 500 MG CAPS Take 1 capsule  by mouth daily       metoprolol succinate (TOPROL-XL) 50 MG 24 hr tablet Take 1 tablet (50 mg) by mouth 2 times daily 180 tablet 3     Milk Thistle 200 MG CAPS Take 1 capsule by mouth daily        Multiple Vitamins-Minerals (HAIR SKIN NAILS PO) Take 1 tablet by mouth At Bedtime       potassium & sodium phosphates 278-164-250 MG/75ML SOLR Take 1 tablet by mouth daily        predniSONE (DELTASONE) 20 MG tablet        traMADol (ULTRAM) 50 MG tablet             Past Medical History:     Past Medical History:   Diagnosis Date     Atrial fibrillation (H)      Chronic diastolic CHF (congestive heart failure) (H)      Essential hypertension, benign      HYPERLIPIDEMIA NEC/NOS 3/30/2006     Pulmonary hypertension (H)      SSS (sick sinus syndrome) (H)     s/p PPM 3/2018     Past Surgical History:   Procedure Laterality Date     HYSTERECTOMY, VAGINAL      hysterectomy     Family History   Problem Relation Age of Onset     Cerebrovascular Disease Mother      Glaucoma Mother      Macular Degeneration Mother      Alcohol/Drug Father         alcohol     Myocardial Infarction Father 63        passed away from MI     Family History Negative Sister      Family History Negative Sister      Family History Negative Sister      Cerebrovascular Disease Sister      Family History Negative Brother      Family History Negative Maternal Grandmother      Family History Negative Maternal Grandfather      Family History Negative Paternal Grandmother      Family History Negative Paternal Grandfather      Myocardial Infarction Son 57        passed away from MI     Dementia Daughter 57        early onset on [place]     Diabetes No family hx of      Breast Cancer No family hx of      Cancer - colorectal No family hx of      Social History     Socioeconomic History     Marital status:      Spouse name: Not on file     Number of children: Not on file     Years of education: Not on file     Highest education level: Not on file   Social Needs      Financial resource strain: Not on file     Food insecurity - worry: Not on file     Food insecurity - inability: Not on file     Transportation needs - medical: Not on file     Transportation needs - non-medical: Not on file   Occupational History     Not on file   Tobacco Use     Smoking status: Former Smoker     Start date: 10/28/1955     Last attempt to quit: 2000     Years since quittin.3     Smokeless tobacco: Never Used     Tobacco comment: quit 6 yrs ago   Substance and Sexual Activity     Alcohol use: No     Drug use: No     Sexual activity: Yes     Partners: Male   Other Topics Concern     Parent/sibling w/ CABG, MI or angioplasty before 65F 55M? No   Social History Narrative    Balanced Diet - Yes    Osteoporosis Prevention Measures - Dairy servings per day: 2    Regular Exercise -  Yes Describe walk, but not recently due to weather    Dental Exam - Yes    Eye Exam -No    Self Breast Exam - Yes    Abuse: Current or Past (Physical, Sexual or Emotional)- No    Do you feel safe in your environment - Yes    Guns stored in the home - No    Sunscreen used - Yes    Seatbelts used - Yes    Lipids -  1/10    Glucose -  1/10    Colon Cancer Screening - Yes, 08    Hemoccults - NO    Pap Test -  Many yrs ago, has hysterectomy    Do you have any concerns about STD's -  No    Mammography - 08    DEXA - 06    Immunizations reviewed and up to date - No    Jonathan Marshall MA                        Allergies:   Strawberry flavor      Adriana Ferrell PA-C  [facility]  Pager: [phone]

## 2018-12-19 NOTE — PATIENT INSTRUCTIONS
Thank you for visiting with us today. Please call the CORE nurse for any questions or concerns: [phone].    Today, we discussed the following:   - Results:   Component      Latest Ref Rng & Units 11/10/2018 2018   Sodium      136 - 145 mmol/L 141 134 (L)   Potassium      3.5 - 5.1 mmol/L 3.8 3.4 (L)   Chloride      98 - 107 mmol/L 107 102   Carbon Dioxide      23 - 29 mmol/L 27 30 (H)   Anion Gap      6 - 17 mmol/L 7 5.4 (L)   Glucose      70 - 105 mg/dL 97 158 (H)   Urea Nitrogen      7 - 30 mg/dL 10 20   Creatinine      0.70 - 1.30 mg/dL 0.69 0.96   GFR Estimate      >60 mL/min/1.73:m2 83 56 (L)   GFR Estimate If Black      >60 mL/min/1.73:m2 >90 68   Calcium      8.5 - 10.5 mg/dL 8.6 8.4 (L)     - Medication changes:  We'll start the new medication which is a water pill called spironolactone (Aldactone) 25 mg once daily.   - Follow up: Please have your blood drawn at a [facility] clinic in one week. We'll call you when we see those results. I'll see you back in clinic in two weeks.     Please, remember to continue the followin.  Weigh yourself daily and write it down.  Call CORE nurse if your weight is up more than 2 pounds in one day, or 5 pounds in one week.  2.  Call C[facility] nurse if you feel more short of breath (with activity or when trying to sleep at night), have more abdominal bloating or leg swelling.  3.  Eat a low sodium diet (less than 2,000mg or 2g daily). If you eat less salt, you will retain less fluid.   4.  Please avoid NSAIDs as able (For example, Ibuprofen / aleve / advil / naprosen / diclofenac).      Scheduling phone number: [phone]  Reminder: Please bring in all current medications, over the counter supplements and vitamin bottles to your next appointment.    Thank you!  Adriana Ferrell PA-C

## 2018-12-19 NOTE — TELEPHONE ENCOUNTER
----- Message from Adriana Ferrell PA-C sent at 12/19/2018 10:24 AM CST -----  Hi ladies, this patient was started on aldactone today and will have a BMP drawn in one week at [facility] - I think she said chetan? Can someone look out for those results for me? Thank you!

## 2018-12-19 NOTE — LETTER
12/19/2018    Mikala Philip MD, MD  [address]  [facility] [address]    RE: Hamida Verma       Dear Colleague,    I had the pleasure of seeing Hamida Verma in the [facility].        Cardiology Clinic Progress Note    Hamida Verma MRN# [id number]   YOB: 1940 Age: 78 year old     Reason for visit: CORE Enrollment           Assessment and Plan:     In summary, the patient presents today for assessment of HFpEF. She was admitted in March for volume overload in the setting of rapid atrial fibrillation, then again in October with CHF felt secondary to diastolic dysfunction, mildly uncontrolled blood pressures and underlying lung disease. Norvasc 5 was added to her regimen at her last office visit one month ago however she never did start this. Unfortunately she's suffered from complications of cataract surgery and uncontrolled hypertension due to anxiety, eye pain and med non-compliance resulting in the development of a choroidal hematoma and multiple ED visits since then. Today, her BP appears well-controlled, consistent with her recent home recordings. She remains on low-dose Eliquis due to her recent choroidal hematoma, and is scheduled to see her ophthalmologist for reassessment later today.     1. HFpEF   - ECHO: EF 55-60%, E/E' avg 16, mild-mod LAE   - ACC/AHA Stage: C; FC I    - Etiology: age, htn, paf   - RV: normal   - Valves: Mild-mod TR   - Volume: Appears euvolemic    Admit Wt: 186 lbs    Current Wt: 174 lbs (per home scale)    O2 Requirements: RA    Diuretics: Lasix 20 mg daily   - Rhythm: SR / ApVs / PAF   - QRS: Narrow   - Device: DDDR PPM   - Other: TSH OK                 Stop-Bang Score: Low    2. PAF, s/p PPM - on flecainide 100 BID, Toprol 50 BID and Eliquis 5 BID. Appears well-controlled per recent device interrogation in September. Recent EKG benign.     3. HTN - home readings show 's - 130's in the mornings, as low as 100's in the  evenings. On Lisinopril 40, Toprol 50 BID, Lasix 20.     4. CAC - per CT. No sxs suggestive of angina. Melonie MPI this year was normal (small fixed defect suggestive of breast attenuation). LDL in July was 84 on Zocor 20. Switched to Lipitor 40 in 10/2018.     5. Probable COPD - with some emphymatous changes noted on chest CT. +Smoking history. To have PFT's per PCP.       Plan:  - No medication changes today. She'll call me if she finds that she does in fact have the previously-ordered Norvasc at home. If she does have it at home, but has not been taking it, she should not start taking it now, but should keep it on hand as we can use this PRN in the future for uncontrolled blood pressures. If she doesn't have it at home, she should not necessarily pick it up at this time as her pressures appear well-controlled and she's currently in the donut hole. She has an accurate BP cuff at home and will continue to monitor.   - She was encouraged to call us in the future with re-development of cough, weight gain of 2+ lbs from one day to next or 5+ lbs within one week, and/or uncontrolled BP's.   - To return to Eliquis 5 mg BID once felt safe by ophthalmology. Samples provided today.   - Educated on low salt diet.  - Follow up with me in 3 weeks with labs prior; Dr. Voss in 2 months; Dr. Mallory in 5 months.  - Repeat lipids following switch to Lipitor in ~ 2 months.   - Continue routine PPM checks.         History of Presenting Illness:      Hamida Verma is a pleasant 78 year old patient of  Dr. Mallory and Dr. Voss who presents today for a CORE Enrollment visit.    The patient has a history of CAC on CT with a normal Melonie MPI in 3/2018, HFpEF, AF s/p PPM anticoagulated on Eliquis, HTN, HLP and VHD. She was admitted in March 2018 with fluid overload in the setting of rapid atrial fibrillation. She spontaneously converted but ultimately received a pacemaker for post-conversion pauses up to 4 seconds duration and  following that was placed on flecainide for PAF with RVR. However she continued to have a significant burden of atrial fibrillation and flecainide was therefore this was stopped and a rate control strategy with Toprol was pursued. However in July of this year flecainide was re-started after AF with RVR in the 160's-170's was noted on her device interrogation. Her most recent interrogation was performed in September and revealed 82% Ap with <1% , no mode switches and adequate rate histogram.     She was then admitted for the second time with HFpEF on 10/11/18, after she was noted to be mildly hypoxic with SpO2 in the upper 80's and hypertensive when she presented for cataract surgery. She admitted to a dry cough with mild dyspnea for two days prior, however she hadn't taken anything for this as she wasn't sure what might interact with her flecainide. Her home weight had crept up to 186 (she checks this consistently and typically varies between 179 - 182 lbs) however her chronic LE edema (L>R) had been stable on Lasix 20 mg daily. She was directed to the ED where a proBNP was slightly elevated at 1,100. Labs including troponin x 1 were WNL. CXR revealed vascular congestion and a small pleural effusion. TTE revealed a normal LVEF with mild diastolic dysfunction with an E/E' avg of 16 and mild-mod LAE, mild-mod TR without evidence of PHTN, as well as moderate aortic sclerosis without significant aortic stenosis. She was given several doses of IV Lasix 20 BID, supplemental O2 was weaned, and she was discharged three days later on her PTA Lasix. Discharge wt was 180 lbs. It was recommended she have outpatient PFT's for assessment of probable COPD.     When last seen by me one month ago, her home weights were stable and she was feeling well. Her device was interrogated and showed no arrhythmias that might be contributing to her recent episode of CHF. Blood pressure was mildly uncontrolled and Norvasc 5 was added to her  regimen, a low-salt diet was reinforced. However she never started the Norvasc and doesn't believe she has this at home.     She's unfortunately had multiple visits with various specialists since that time. On 11/6, she underwent cataract surgery. On 11/10, she was brought to the ED by her daughter as she was concerned regarding development of a cough, and was concerned she was beginning to re-develop congestive heart failure. Weight was stable ~ 180 lbs. CXR was benign. proBNP was 982 (WNL). She was noted to be hypertensive in the 170's-180's systolic however she was discharged home without any med changes. Soon after that she lost her appetite and subsequently lost about 10 lbs, which she believes was due to anxiety as her ophthalmologist told her she may need more surgery on her eye. On 11/13, she was seen by her ophthalmologist and was markedly hypertensive and had developed a hemorrhagic choroidal effusion which required a revision of the corneal incision with additional sutures. She admitted that she hadn't taken her antihypertensive meds in several days due to not feeling well. She was asked to follow up with her PCP regarding her blood pressure but was referred back to the ED by her. She was again noted to be markedly hypertensive in the ED. At that time, her Eliquis was cut in half and she was asked to re-start her antihypertensive meds. She was seen by her PCP in follow up ten days ago and her BP was very well-controlled at 109/73 mmHg.     Today, she presents to clinic with her granddaughter Zoe. She's feeling much better at this time. She has been doing better with watching her salt intake and is now reading labels and attempting to keep this < 2g/day. She continues to be very regimented in her daily weights and twice daily blood pressure recordings. Her weight has been gradually climbing as her appetite has improved, but has been stable at 174 lbs for the past 4 days. BP's 120's-130's/70's-80's  during the daytime and lower (typically ~ 100's/70's) in the evening over the past week. Her LE edema is better than usual and she states that overall she feels better at this weight. She denies chest pain, shortness of breath, PND, orthopnea, palpitations, near syncope or syncope. She's been told she snores in the past but denies PND, daytime somnolence.     FHx is significant for a son and father who both passed from cardiac arrest (in late 50's and early 60's, respectively). Father had a history of alcohol and drug use. Mother had a history of CVA and CAD. The patient is  and has been dating her high school sweetheart for the past 9 years. She lives with her daughter and granddaughter. She has a longstanding history of smoking 1-1/2 packs a day for approximately 45 years but quit in 2000.  She feels she's probably compliant with a low-sodium diet but doesn't really monitor this. She states her diet had been stable before her admission. She denies drinking alcohol.  She ambulates without any assistive devices. She does her own grocery shopping. She's very involved with her [employer].           Review of Systems:     12-pt ROS is negative except for as noted in the HPI.           Physical Exam:     Vitals: LMP  (LMP Unknown)   Wt Readings from Last 3 Encounters:   11/28/18 80.3 kg (177 lb)   11/21/18 78 kg (172 lb)   11/14/18 80.7 kg (178 lb)       Constitutional:  Patient is pleasant, alert, cooperative, and in NAD.  HEENT:  NCAT. PERRLA. EOM's intact. No masses or thyromegaly. Teeth in normal repair.   Neck:  CVP appears normal.   Pulmonary: Normal respiratory effort. CTAB.   Cardiac: RRR, normal S1/S2, +S4, grade 2-3/6 sm at the LSB  Abdomen:  Non-tender abdomen with normoactive bowel sounds, no hepatosplenomegaly appreciated.   Vascular: Pulses in the upper and lower extremities are 2+ and equal bilaterally.  Extremities: trace pretibial edema on the L  Neurological:  No gross motor or sensory deficits.    Psych: Appropriate affect.        Data:     Cardiac Diagnostics reviewed:  Type Date Result   TTE 10/12/18 Left ventricular systolic function is normal.  The left atrium is mild to moderately dilated.  There is moderate aortic sclerosis of the non-coronary cusp. No  hemodynamically significant valvular aortic stenosis.  There is mild to moderate (1-2+) tricuspid regurgitation.  Doppler findings do not suggest pulmonary hypertension.  E/E' avg 16.  IVSd 1 / PWd 1.1    3/7/18 1. The left ventricle is normal in size. The visual ejection fraction is  estimated at 65%.  2. The right ventricle is normal in structure, function and size.  3. There is mild to moderate (1-2+) mitral regurgitation.  4. There is moderate (2+) tricuspid regurgitation. The right ventricular  systolic pressure is approximated at 28mmHg plus the right atrial pressure.  No previous echo for comparison.   Stress 3/8/18 (Melonie) 1.  Myocardial perfusion imaging using single isotope technique  demonstrated a small perfusion defect of mild severity involving the  left ventricular apex which is essentially fixed and most likely  related to breast attenuation. There is no evidence of significant  pharmacological stress-induced ischemia or prior myocardial infarction  on this study.   2. Gated images demonstrated normal left ventricular wall motion.  The left ventricular systolic function is 73% at rest and 74% on the post stress images.  3. There is no previous study for comparison.   EKG 10/11/18 NSR, 64 bpm. QRS 98, QTc 462.   Device PPM check, 9.24.18 [employer] (D) Remote PPM Device Check  AP: 82            %                    : <1 %  Mode: DDDR        Presenting Rhythm: AP/VS, AS/VS  Heart Rate: Adequate rates per histogram  Sensing: Stable    Pacing Threshold: Stable    Impedance: Stable  Battery Status: 10.4 years  Atrial Arrhythmia: No mode switch episodes. 1 PMT occurred, no EGM.   Ventricular Arrhythmia: None    CXR 10/11/18    Chest CT  3/14/18 Mild to moderate emphysematous changes in the lungs. Small  bilateral pleural effusion with adjacent atelectasis. Apparent mild  interstitial thickening throughout both lungs. No pericardial  effusion. A few nonspecific borderline-enlarged mediastinal lymph  nodes. Mild cardiomegaly. Atherosclerotic calcification in the  thoracic aorta and coronary arteries.   LE ultrasound 3/14/18 The left common femoral, superficial femoral, popliteal and  posterior tibial veins are patent and fully compressible and  demonstrate normal venous Doppler flow. The visualized greater  saphenous vein is negative for thrombus. For comparison the right  common femoral vein was evaluated and was unremarkable.     IMPRESSION: No deep venous thrombosis demonstrated.     Recent Labs   Lab Test 10/25/18  0731 07/11/18  0907 03/15/18  2025 03/06/18  1744 03/23/17  0804 05/23/16  1056   LDL  --  84  --   --  91 103*   HDL  --  69  --   --  61 66   NHDL  --  102  --   --  113 124   CHOL  --  171  --   --  174 190   TRIG  --  88  --   --  109 104   TSH 0.80  --  0.64 0.54  --   --    NTBNP 343  --   --   --   --   --      Lab Results   Component Value Date    CHOL 171 07/11/2018    HDL 69 07/11/2018    LDL 84 07/11/2018    TRIG 88 07/11/2018    CHOLHDLRATIO 2.8 05/07/2015       Lab Results   Component Value Date    WBC 10.5 11/10/2018    RBC 4.83 11/10/2018    HGB 13.3 11/10/2018    HCT 41.3 11/10/2018    MCV 86 11/10/2018    MCH 27.5 11/10/2018    MCHC 32.2 11/10/2018    RDW 14.5 11/10/2018     11/10/2018       Lab Results   Component Value Date     11/10/2018    POTASSIUM 3.8 11/10/2018    CHLORIDE 107 11/10/2018    CO2 27 11/10/2018    ANIONGAP 7 11/10/2018    GLC 97 11/10/2018    BUN 10 11/10/2018    CR 0.69 11/10/2018    GFRESTIMATED 83 11/10/2018    GFRESTBLACK >90 11/10/2018    GURWINDER 8.6 11/10/2018      Lab Results   Component Value Date    AST 16 05/02/2018    ALT 25 05/02/2018       No results found for: A1C    Lab  Results   Component Value Date    INR 1.31 (H) 03/19/2018    INR 1.04 11/12/2014          Problem List:     Patient Active Problem List   Diagnosis     Symptomatic menopausal or female climacteric states     Hyperlipidemia LDL goal <70     Hypertension goal BP (blood pressure) < 140/90     Knee pain     Obesity     PAF (paroxysmal atrial fibrillation) (H)     S/P cardiac pacemaker procedure     SSS (sick sinus syndrome) (H)     Chronic diastolic CHF (congestive heart failure) (H)     Pulmonary hypertension (H)     Coronary artery calcification seen on CT scan            Medications:     Current Outpatient Medications   Medication Sig Dispense Refill     albuterol (PROAIR HFA/PROVENTIL HFA/VENTOLIN HFA) 108 (90 Base) MCG/ACT inhaler Inhale 2 puffs into the lungs every 6 hours       albuterol (PROAIR HFA/PROVENTIL HFA/VENTOLIN HFA) 108 (90 BASE) MCG/ACT Inhaler Inhale 2 puffs into the lungs 4 times daily as needed for other (dyspnea) 1 Inhaler 3     alendronate (FOSAMAX) 70 MG tablet Take 1 tablet (70 mg) by mouth every 7 days Take 60 minutes before am meal with 8 oz. water. Remain upright for 30 minutes. 12 tablet 3     apixaban ANTICOAGULANT (ELIQUIS) 5 MG tablet Take 1 tablet (5 mg) by mouth 2 times daily (Patient taking differently: Take 2.5 mg by mouth 2 times daily ) 180 tablet 3     atorvastatin (LIPITOR) 10 MG tablet Take 4 tablets (40 mg) by mouth daily 30 tablet 5     cholecalciferol (VITAMIN  -D) 1000 UNIT capsule Take 1 capsule by mouth daily.       Coral Calcium 133-66.7-133 MG-MG-UNIT CAPS Take 2 tablets by mouth 2 times daily        FLAX SEED OIL OR 1 capsule daily       flecainide (TAMBOCOR) 100 MG tablet Take 1 tablet (100 mg) by mouth 2 times daily 90 tablet 3     fluticasone (FLONASE) 50 MCG/ACT spray Spray 1-2 sprays into both nostrils daily as needed for rhinitis or allergies       furosemide (LASIX) 20 MG tablet Take 1 tablet (20 mg) by mouth daily 30 tablet 5     Krill Oil 500 MG CAPS Take 1  capsule by mouth daily       lisinopril (PRINIVIL/ZESTRIL) 40 MG tablet Take 1 tablet (40 mg) by mouth daily 90 tablet 3     Methylsulfonylmethane (MSM) 500 MG CAPS Take 1 capsule by mouth daily       metoprolol succinate (TOPROL-XL) 50 MG 24 hr tablet Take 1 tablet (50 mg) by mouth 2 times daily 180 tablet 3     Milk Thistle 200 MG CAPS Take 1 capsule by mouth daily        Multiple Vitamins-Minerals (HAIR SKIN NAILS PO) Take 1 tablet by mouth At Bedtime       potassium & sodium phosphates 278-164-250 MG/75ML SOLR Take 1 tablet by mouth daily        predniSONE (DELTASONE) 20 MG tablet        traMADol (ULTRAM) 50 MG tablet             Past Medical History:     Past Medical History:   Diagnosis Date     Atrial fibrillation (H)      Chronic diastolic CHF (congestive heart failure) (H)      Essential hypertension, benign      HYPERLIPIDEMIA NEC/NOS 3/30/2006     Pulmonary hypertension (H)      SSS (sick sinus syndrome) (H)     s/p PPM 3/2018     Past Surgical History:   Procedure Laterality Date     HYSTERECTOMY, VAGINAL      hysterectomy     Family History   Problem Relation Age of Onset     Cerebrovascular Disease Mother      Glaucoma Mother      Macular Degeneration Mother      Alcohol/Drug Father         alcohol     Myocardial Infarction Father 63        passed away from MI     Family History Negative Sister      Family History Negative Sister      Family History Negative Sister      Cerebrovascular Disease Sister      Family History Negative Brother      Family History Negative Maternal Grandmother      Family History Negative Maternal Grandfather      Family History Negative Paternal Grandmother      Family History Negative Paternal Grandfather      Myocardial Infarction Son 57        passed away from MI     Dementia Daughter 57        early onset on [place]     Diabetes No family hx of      Breast Cancer No family hx of      Cancer - colorectal No family hx of      Social History     Socioeconomic History      Marital status:      Spouse name: Not on file     Number of children: Not on file     Years of education: Not on file     Highest education level: Not on file   Social Needs     Financial resource strain: Not on file     Food insecurity - worry: Not on file     Food insecurity - inability: Not on file     Transportation needs - medical: Not on file     Transportation needs - non-medical: Not on file   Occupational History     Not on file   Tobacco Use     Smoking status: Former Smoker     Start date: 10/28/1955     Last attempt to quit: 2000     Years since quittin.3     Smokeless tobacco: Never Used     Tobacco comment: quit 6 yrs ago   Substance and Sexual Activity     Alcohol use: No     Drug use: No     Sexual activity: Yes     Partners: Male   Other Topics Concern     Parent/sibling w/ CABG, MI or angioplasty before 65F 55M? No   Social History Narrative    Balanced Diet - Yes    Osteoporosis Prevention Measures - Dairy servings per day: 2    Regular Exercise -  Yes Describe walk, but not recently due to weather    Dental Exam - Yes    Eye Exam -No    Self Breast Exam - Yes    Abuse: Current or Past (Physical, Sexual or Emotional)- No    Do you feel safe in your environment - Yes    Guns stored in the home - No    Sunscreen used - Yes    Seatbelts used - Yes    Lipids -  1/10    Glucose -  1/10    Colon Cancer Screening - Yes, 08    Hemoccults - NO    Pap Test -  Many yrs ago, has hysterectomy    Do you have any concerns about STD's -  No    Mammography - 08    DEXA - 06    Immunizations reviewed and up to date - No    Jonathan Marshall MA                        Allergies:   Strawberry flavor      Adriana Ferrell PA-C  [facility]  Pager: [phone]      Cardiology Clinic Progress Note    Hamida Verma MRN# [id number]   YOB: 1940 Age: 78 year old     Reason for visit: CORE clinic visit           Assessment and Plan:     In summary, the patient presents today for  assessment of HFpEF. She was admitted in March for volume overload in the setting of rapid atrial fibrillation, then again in October with CHF felt secondary to diastolic dysfunction, mildly uncontrolled blood pressures and underlying lung disease. Since that time, she'd done well from a heart failure standpoint, but had suffered from complications of cataract surgery and uncontrolled hypertension due to anxiety, eye pain and med non-compliance resulting in the development of a choroidal hematoma and multiple ED visits since then. She's been on prednisone since for her eye complications and is starting a taper finally tomorrow. Today, she's unfortunately up 8 lbs over the past three weeks and has re-developed LE edema. She's had no changes in her breathing. She admits to an increased appetite and food intake, but has continued to watch her sodium. proBNP is WNL. Potassium is low at 3.4.     1. HFpEF   - ECHO: EF 55-60%, E/E' avg 16, mild-mod LAE   - ACC/AHA Stage: C; FC I    - Etiology: age, htn, paf   - RV: normal   - Valves: Mild-mod TR   - Volume: Mildly up    Admit Wt: 186 lbs    Current Wt: 182 lbs (per home scale)    Dry Wt: 174 lbs    Diuretics: Lasix 20 mg daily   - Rhythm: SR / ApVs / PAF   - QRS: Narrow   - Device: DDDR PPM   - Other: TSH OK                 Stop-Bang Score: Low    2. PAF, s/p PPM - on flecainide 100 BID, Toprol 50 BID and Eliquis 5 BID. Appears well-controlled per recent device interrogation in September. Recent EKG benign.     3. HTN - home readings show 's - 130's in the mornings, as low as 100's in the evenings. On Lisinopril 40, Toprol 50 BID, Lasix 20.     4. CAC - per CT. No sxs suggestive of angina. Melonie MPI this year was normal (small fixed defect suggestive of breast attenuation). LDL in July was 84 on Zocor 20. Switched to Lipitor 40 in 10/2018.     5. Probable COPD - with some emphymatous changes noted on chest CT. +Smoking history. To have PFT's per PCP.       Plan:  -  Start Aldactone 25 mg daily. I'd like her to see me back in one week for reassessment however with the upcoming holidays she's asking to see me back in two weeks instead. Given her complete lack of dyspnea, I think this is reasonable, and she'll instead have a BMP and magnesium level done at a [facility] near her in one week, and RTC to see me in two weeks.   - She has an accurate BP cuff at home and will continue to monitor.   - Repeat lipids following switch to Lipitor in ~ 2 months.   - F/U arranged with her primary cardiologist and EP.   - Continue routine PPM checks.         History of Presenting Illness:      Hamida Verma is a pleasant 78 year old patient of  Dr. Mallory and Dr. Voss who presents today for a CORE clinic visit.    The patient has a history of CAC on CT with a normal Melonie MPI in 3/2018, HFpEF, AF s/p PPM anticoagulated on Eliquis, HTN, HLP and VHD. She was admitted in March 2018 with fluid overload in the setting of rapid atrial fibrillation. She spontaneously converted but ultimately received a pacemaker for post-conversion pauses up to 4 seconds duration and following that was placed on flecainide for PAF with RVR. However she continued to have a significant burden of atrial fibrillation and flecainide was therefore this was stopped and a rate control strategy with Toprol was pursued. However in July of this year flecainide was re-started after AF with RVR in the 160's-170's was noted on her device interrogation. Her most recent interrogation was performed in September and revealed 82% Ap with <1% , no mode switches and adequate rate histogram.     She was then admitted for the second time with HFpEF on 10/11/18, after she was noted to be mildly hypoxic with SpO2 in the upper 80's and hypertensive when she presented for cataract surgery. She admitted to a dry cough with mild dyspnea for two days prior, however she hadn't taken anything for this as she wasn't sure what might  interact with her flecainide. Her home weight had crept up to 186 (she checks this consistently and typically varies between 179 - 182 lbs) however her chronic LE edema (L>R) had been stable on Lasix 20 mg daily. She was directed to the ED where a proBNP was slightly elevated at 1,100. Labs including troponin x 1 were WNL. CXR revealed vascular congestion and a small pleural effusion. TTE revealed a normal LVEF with mild diastolic dysfunction with an E/E' avg of 16 and mild-mod LAE, mild-mod TR without evidence of PHTN, as well as moderate aortic sclerosis without significant aortic stenosis. She was given several doses of IV Lasix 20 BID, supplemental O2 was weaned, and she was discharged three days later on her PTA Lasix. Discharge wt was 180 lbs. It was recommended she have outpatient PFT's for assessment of probable COPD.     When last seen by me one month ago, her home weights were stable and she was feeling well. Her device was interrogated and showed no arrhythmias that might be contributing to her recent episode of CHF. Blood pressure was mildly uncontrolled and Norvasc 5 was added to her regimen, a low-salt diet was reinforced. However she never started the Norvasc and doesn't believe she has this at home.     She's unfortunately had multiple visits with various specialists since that time. On 11/6, she underwent cataract surgery. On 11/10, she was brought to the ED by her daughter as she was concerned regarding development of a cough, and was concerned she was beginning to re-develop congestive heart failure. Weight was stable ~ 180 lbs. CXR was benign. proBNP was 982 (WNL). She was noted to be hypertensive in the 170's-180's systolic however she was discharged home without any med changes. Soon after that she lost her appetite and subsequently lost about 10 lbs, which she believes was due to anxiety as her ophthalmologist told her she may need more surgery on her eye. 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 She admitted that she hadn't taken her antihypertensive meds in several days due to not feeling well. She was asked to follow up with her PCP regarding her blood pressure but was referred back to the ED by her. She was again noted to be markedly hypertensive in the ED. At that time, her Eliquis was cut in half and she was asked to re-start her antihypertensive meds. She was seen by her PCP in follow up ten days ago and her BP was very well-controlled at 109/73 mmHg.     When I saw her last in clinic on 11/28, she was feeling much better and had been doing better with watching her salt intake and was reading labels and attempting to keep this < 2g/day. Her weight had been gradually climbing as her appetite had improved, but had been recently stable at 174 lbs. Her home BP's were very well-controlled. No med changes were made. She remained on low-dose Eliquis following her choroidal hematoma at that time, and saw her ophthalmologist later that day who up-titrated her to full dose.     Today, her weight is unfortunately up 8 lbs over the past three weeks. She's continued on prednisone since I last saw her, and is starting a taper down tomorrow. However since I last saw her, she's developed a markedly increased appetite. She's also noted increased leg swelling (L>R, L is chronically larger than the R) and gradual weight gain. Her breathing has been OK and she's been sleeping well. She continues to be very regimented in her daily weights and twice daily blood pressure recordings. She denies chest pain, shortness of breath, PND, orthopnea, palpitations, near syncope or syncope. She's been told she snores in the past but denies PND, daytime somnolence. Labs today are stable overall, although potassium is mildly low at 3.4. proBNP is WNL.     FHx is  significant for a son and father who both passed from cardiac arrest (in late 50's and early 60's, respectively). Father had a history of alcohol and drug use. Mother had a history of CVA and CAD. The patient is  and has been dating her high school sweetheart for the past 9 years. She lives with her daughter and granddaughter Zoe. She has a longstanding history of smoking 1-1/2 packs a day for approximately 45 years but quit in 2000. She denies drinking alcohol.  She ambulates without any assistive devices. She does her own grocery shopping. She's very involved with her [employer]. She has two cats and is also hosting two dogs whom belong to her daughter and granddaughter.           Review of Systems:     12-pt ROS is negative except for as noted in the HPI.           Physical Exam:     Vitals: LMP  (LMP Unknown)   Wt Readings from Last 3 Encounters:   11/28/18 80.3 kg (177 lb)   11/21/18 78 kg (172 lb)   11/14/18 80.7 kg (178 lb)       Constitutional:  Patient is pleasant, alert, cooperative, and in NAD.  HEENT:  NCAT. PERRLA. EOM's intact. No masses or thyromegaly. Teeth in normal repair.   Neck:  CVP appears normal.   Pulmonary: Normal respiratory effort. CTAB.   Cardiac: RRR, normal S1/S2, +S4, grade 2-3/6 sm at the LSB  Abdomen:  Non-tender abdomen with normoactive bowel sounds, no hepatosplenomegaly appreciated.   Vascular: Pulses in the upper and lower extremities are 2+ and equal bilaterally.  Extremities: 2+ pitting edema L pedal - knee, 1+ on the R  Neurological:  No gross motor or sensory deficits.   Psych: Appropriate affect.        Data:     Cardiac Diagnostics reviewed:  Type Date Result   TTE 10/12/18 Left ventricular systolic function is normal.  The left atrium is mild to moderately dilated.  There is moderate aortic sclerosis of the non-coronary cusp. No  hemodynamically significant valvular aortic stenosis.  There is mild to moderate (1-2+) tricuspid regurgitation.  Doppler findings do not  suggest pulmonary hypertension.  E/E' avg 16.  IVSd 1 / PWd 1.1    3/7/18 1. The left ventricle is normal in size. The visual ejection fraction is  estimated at 65%.  2. The right ventricle is normal in structure, function and size.  3. There is mild to moderate (1-2+) mitral regurgitation.  4. There is moderate (2+) tricuspid regurgitation. The right ventricular  systolic pressure is approximated at 28mmHg plus the right atrial pressure.  No previous echo for comparison.   Stress 3/8/18 (Melonie) 1.  Myocardial perfusion imaging using single isotope technique  demonstrated a small perfusion defect of mild severity involving the  left ventricular apex which is essentially fixed and most likely  related to breast attenuation. There is no evidence of significant  pharmacological stress-induced ischemia or prior myocardial infarction  on this study.   2. Gated images demonstrated normal left ventricular wall motion.  The left ventricular systolic function is 73% at rest and 74% on the post stress images.  3. There is no previous study for comparison.   EKG 10/11/18 NSR, 64 bpm. QRS 98, QTc 462.   Device PPM check, 9.24.18 [employer] (D) Remote PPM Device Check  AP: 82            %                    : <1 %  Mode: DDDR        Presenting Rhythm: AP/VS, AS/VS  Heart Rate: Adequate rates per histogram  Sensing: Stable    Pacing Threshold: Stable    Impedance: Stable  Battery Status: 10.4 years  Atrial Arrhythmia: No mode switch episodes. 1 PMT occurred, no EGM.   Ventricular Arrhythmia: None    CXR 10/11/18    Chest CT 3/14/18 Mild to moderate emphysematous changes in the lungs. Small  bilateral pleural effusion with adjacent atelectasis. Apparent mild  interstitial thickening throughout both lungs. No pericardial  effusion. A few nonspecific borderline-enlarged mediastinal lymph  nodes. Mild cardiomegaly. Atherosclerotic calcification in the  thoracic aorta and coronary arteries.   LE ultrasound 3/14/18 The left  common femoral, superficial femoral, popliteal and  posterior tibial veins are patent and fully compressible and  demonstrate normal venous Doppler flow. The visualized greater  saphenous vein is negative for thrombus. For comparison the right  common femoral vein was evaluated and was unremarkable.     IMPRESSION: No deep venous thrombosis demonstrated.     Recent Labs   Lab Test 10/25/18  0731 07/11/18  0907 03/15/18  2025 03/06/18  1744 03/23/17  0804 05/23/16  1056   LDL  --  84  --   --  91 103*   HDL  --  69  --   --  61 66   NHDL  --  102  --   --  113 124   CHOL  --  171  --   --  174 190   TRIG  --  88  --   --  109 104   TSH 0.80  --  0.64 0.54  --   --    NTBNP 343  --   --   --   --   --      Lab Results   Component Value Date    CHOL 171 07/11/2018    HDL 69 07/11/2018    LDL 84 07/11/2018    TRIG 88 07/11/2018    CHOLHDLRATIO 2.8 05/07/2015       Lab Results   Component Value Date    WBC 10.5 11/10/2018    RBC 4.83 11/10/2018    HGB 13.3 11/10/2018    HCT 41.3 11/10/2018    MCV 86 11/10/2018    MCH 27.5 11/10/2018    MCHC 32.2 11/10/2018    RDW 14.5 11/10/2018     11/10/2018       Lab Results   Component Value Date     11/10/2018    POTASSIUM 3.8 11/10/2018    CHLORIDE 107 11/10/2018    CO2 27 11/10/2018    ANIONGAP 7 11/10/2018    GLC 97 11/10/2018    BUN 10 11/10/2018    CR 0.69 11/10/2018    GFRESTIMATED 83 11/10/2018    GFRESTBLACK >90 11/10/2018    GURWINDER 8.6 11/10/2018      Lab Results   Component Value Date    AST 16 05/02/2018    ALT 25 05/02/2018       No results found for: A1C    Lab Results   Component Value Date    INR 1.31 (H) 03/19/2018    INR 1.04 11/12/2014          Problem List:     Patient Active Problem List   Diagnosis     Symptomatic menopausal or female climacteric states     Hyperlipidemia LDL goal <70     Hypertension goal BP (blood pressure) < 140/90     Knee pain     Obesity     PAF (paroxysmal atrial fibrillation) (H)     S/P cardiac pacemaker procedure     SSS  (sick sinus syndrome) (H)     Chronic diastolic CHF (congestive heart failure) (H)     Pulmonary hypertension (H)     Coronary artery calcification seen on CT scan            Medications:     Current Outpatient Medications   Medication Sig Dispense Refill     albuterol (PROAIR HFA/PROVENTIL HFA/VENTOLIN HFA) 108 (90 Base) MCG/ACT inhaler Inhale 2 puffs into the lungs every 6 hours       albuterol (PROAIR HFA/PROVENTIL HFA/VENTOLIN HFA) 108 (90 BASE) MCG/ACT Inhaler Inhale 2 puffs into the lungs 4 times daily as needed for other (dyspnea) 1 Inhaler 3     alendronate (FOSAMAX) 70 MG tablet Take 1 tablet (70 mg) by mouth every 7 days Take 60 minutes before am meal with 8 oz. water. Remain upright for 30 minutes. 12 tablet 3     apixaban ANTICOAGULANT (ELIQUIS) 5 MG tablet Take 1 tablet (5 mg) by mouth 2 times daily (Patient taking differently: Take 2.5 mg by mouth 2 times daily ) 180 tablet 3     atorvastatin (LIPITOR) 10 MG tablet Take 4 tablets (40 mg) by mouth daily 30 tablet 5     cholecalciferol (VITAMIN  -D) 1000 UNIT capsule Take 1 capsule by mouth daily.       Coral Calcium 133-66.7-133 MG-MG-UNIT CAPS Take 2 tablets by mouth 2 times daily        FLAX SEED OIL OR 1 capsule daily       flecainide (TAMBOCOR) 100 MG tablet Take 1 tablet (100 mg) by mouth 2 times daily 90 tablet 3     fluticasone (FLONASE) 50 MCG/ACT spray Spray 1-2 sprays into both nostrils daily as needed for rhinitis or allergies       furosemide (LASIX) 20 MG tablet Take 1 tablet (20 mg) by mouth daily 30 tablet 5     Krill Oil 500 MG CAPS Take 1 capsule by mouth daily       lisinopril (PRINIVIL/ZESTRIL) 40 MG tablet Take 1 tablet (40 mg) by mouth daily 90 tablet 3     Methylsulfonylmethane (MSM) 500 MG CAPS Take 1 capsule by mouth daily       metoprolol succinate (TOPROL-XL) 50 MG 24 hr tablet Take 1 tablet (50 mg) by mouth 2 times daily 180 tablet 3     Milk Thistle 200 MG CAPS Take 1 capsule by mouth daily        Multiple Vitamins-Minerals  (HAIR SKIN NAILS PO) Take 1 tablet by mouth At Bedtime       potassium & sodium phosphates 278-164-250 MG/75ML SOLR Take 1 tablet by mouth daily        predniSONE (DELTASONE) 20 MG tablet        traMADol (ULTRAM) 50 MG tablet             Past Medical History:     Past Medical History:   Diagnosis Date     Atrial fibrillation (H)      Chronic diastolic CHF (congestive heart failure) (H)      Essential hypertension, benign      HYPERLIPIDEMIA NEC/NOS 3/30/2006     Pulmonary hypertension (H)      SSS (sick sinus syndrome) (H)     s/p PPM 3/2018     Past Surgical History:   Procedure Laterality Date     HYSTERECTOMY, VAGINAL      hysterectomy     Family History   Problem Relation Age of Onset     Cerebrovascular Disease Mother      Glaucoma Mother      Macular Degeneration Mother      Alcohol/Drug Father         alcohol     Myocardial Infarction Father 63        passed away from MI     Family History Negative Sister      Family History Negative Sister      Family History Negative Sister      Cerebrovascular Disease Sister      Family History Negative Brother      Family History Negative Maternal Grandmother      Family History Negative Maternal Grandfather      Family History Negative Paternal Grandmother      Family History Negative Paternal Grandfather      Myocardial Infarction Son 57        passed away from MI     Dementia Daughter 57        early onset on [place]     Diabetes No family hx of      Breast Cancer No family hx of      Cancer - colorectal No family hx of      Social History     Socioeconomic History     Marital status:      Spouse name: Not on file     Number of children: Not on file     Years of education: Not on file     Highest education level: Not on file   Social Needs     Financial resource strain: Not on file     Food insecurity - worry: Not on file     Food insecurity - inability: Not on file     Transportation needs - medical: Not on file     Transportation needs - non-medical: Not on  file   Occupational History     Not on file   Tobacco Use     Smoking status: Former Smoker     Start date: 10/28/1955     Last attempt to quit: 2000     Years since quittin.3     Smokeless tobacco: Never Used     Tobacco comment: quit 6 yrs ago   Substance and Sexual Activity     Alcohol use: No     Drug use: No     Sexual activity: Yes     Partners: Male   Other Topics Concern     Parent/sibling w/ CABG, MI or angioplasty before 65F 55M? No   Social History Narrative    Balanced Diet - Yes    Osteoporosis Prevention Measures - Dairy servings per day: 2    Regular Exercise -  Yes Describe walk, but not recently due to weather    Dental Exam - Yes    Eye Exam -No    Self Breast Exam - Yes    Abuse: Current or Past (Physical, Sexual or Emotional)- No    Do you feel safe in your environment - Yes    Guns stored in the home - No    Sunscreen used - Yes    Seatbelts used - Yes    Lipids -  1/10    Glucose -  1/10    Colon Cancer Screening - Yes, 08    Hemoccults - NO    Pap Test -  Many yrs ago, has hysterectomy    Do you have any concerns about STD's -  No    Mammography - 08    DEXA - 06    Immunizations reviewed and up to date - No    Jonathan Marshall MA                        Allergies:   Strawberry flavor      Adriana Ferrell PA-C  [facility]  Pager: [phone]    Thank you for allowing me to participate in the care of your patient.    Sincerely,     Adriana Ferrell PA-C     [facility]

## 2018-12-19 NOTE — LETTER
12/19/2018    Mikala Philip MD, MD  [address]  [facility] [address]    RE: Hamida Verma       Dear Colleague,    I had the pleasure of seeing Hamida Verma in the [facility].        [facility] Progress Note    Hamida Verma MRN# [id number]   YOB: 1940 Age: 78 year old     Reason for visit: CORE Enrollment           Assessment and Plan:     In summary, the patient presents today for assessment of HFpEF. She was admitted in March for volume overload in the setting of rapid atrial fibrillation, then again in October with CHF felt secondary to diastolic dysfunction, mildly uncontrolled blood pressures and underlying lung disease. Norvasc 5 was added to her regimen at her last office visit one month ago however she never did start this. Unfortunately she's suffered from complications of cataract surgery and uncontrolled hypertension due to anxiety, eye pain and med non-compliance resulting in the development of a choroidal hematoma and multiple ED visits since then. Today, her BP appears well-controlled, consistent with her recent home recordings. She remains on low-dose Eliquis due to her recent choroidal hematoma, and is scheduled to see her ophthalmologist for reassessment later today.     1. HFpEF   - ECHO: EF 55-60%, E/E' avg 16, mild-mod LAE   - ACC/AHA Stage: C; FC I    - Etiology: age, htn, paf   - RV: normal   - Valves: Mild-mod TR   - Volume: Appears euvolemic    Admit Wt: 186 lbs    Current Wt: 174 lbs (per home scale)    O2 Requirements: RA    Diuretics: Lasix 20 mg daily   - Rhythm: SR / ApVs / PAF   - QRS: Narrow   - Device: DDDR PPM   - Other: TSH OK                 Stop-Bang Score: Low    2. PAF, s/p PPM - on flecainide 100 BID, Toprol 50 BID and Eliquis 5 BID. Appears well-controlled per recent device interrogation in September. Recent EKG benign.     3. HTN - home readings show 's - 130's in the mornings, as low as 100's in the  evenings. On Lisinopril 40, Toprol 50 BID, Lasix 20.     4. CAC - per CT. No sxs suggestive of angina. Melonie MPI this year was normal (small fixed defect suggestive of breast attenuation). LDL in July was 84 on Zocor 20. Switched to Lipitor 40 in 10/2018.     5. Probable COPD - with some emphymatous changes noted on chest CT. +Smoking history. To have PFT's per PCP.       Plan:  - No medication changes today. She'll call me if she finds that she does in fact have the previously-ordered Norvasc at home. If she does have it at home, but has not been taking it, she should not start taking it now, but should keep it on hand as we can use this PRN in the future for uncontrolled blood pressures. If she doesn't have it at home, she should not necessarily pick it up at this time as her pressures appear well-controlled and she's currently in the donut hole. She has an accurate BP cuff at home and will continue to monitor.   - She was encouraged to call us in the future with re-development of cough, weight gain of 2+ lbs from one day to next or 5+ lbs within one week, and/or uncontrolled BP's.   - To return to Eliquis 5 mg BID once felt safe by ophthalmology. Samples provided today.   - Educated on low salt diet.  - Follow up with me in 3 weeks with labs prior; Dr. Voss in 2 months; Dr. Mallory in 5 months.  - Repeat lipids following switch to Lipitor in ~ 2 months.   - Continue routine PPM checks.         History of Presenting Illness:      Hamida Verma is a pleasant 78 year old patient of  Dr. Mallory and Dr. Voss who presents today for a CORE Enrollment visit.    The patient has a history of CAC on CT with a normal Melonie MPI in 3/2018, HFpEF, AF s/p PPM anticoagulated on Eliquis, HTN, HLP and VHD. She was admitted in March 2018 with fluid overload in the setting of rapid atrial fibrillation. She spontaneously converted but ultimately received a pacemaker for post-conversion pauses up to 4 seconds duration and  following that was placed on flecainide for PAF with RVR. However she continued to have a significant burden of atrial fibrillation and flecainide was therefore this was stopped and a rate control strategy with Toprol was pursued. However in July of this year flecainide was re-started after AF with RVR in the 160's-170's was noted on her device interrogation. Her most recent interrogation was performed in September and revealed 82% Ap with <1% , no mode switches and adequate rate histogram.     She was then admitted for the second time with HFpEF on 10/11/18, after she was noted to be mildly hypoxic with SpO2 in the upper 80's and hypertensive when she presented for cataract surgery. She admitted to a dry cough with mild dyspnea for two days prior, however she hadn't taken anything for this as she wasn't sure what might interact with her flecainide. Her home weight had crept up to 186 (she checks this consistently and typically varies between 179 - 182 lbs) however her chronic LE edema (L>R) had been stable on Lasix 20 mg daily. She was directed to the ED where a proBNP was slightly elevated at 1,100. Labs including troponin x 1 were WNL. CXR revealed vascular congestion and a small pleural effusion. TTE revealed a normal LVEF with mild diastolic dysfunction with an E/E' avg of 16 and mild-mod LAE, mild-mod TR without evidence of PHTN, as well as moderate aortic sclerosis without significant aortic stenosis. She was given several doses of IV Lasix 20 BID, supplemental O2 was weaned, and she was discharged three days later on her PTA Lasix. Discharge wt was 180 lbs. It was recommended she have outpatient PFT's for assessment of probable COPD.     When last seen by me one month ago, her home weights were stable and she was feeling well. Her device was interrogated and showed no arrhythmias that might be contributing to her recent episode of CHF. Blood pressure was mildly uncontrolled and Norvasc 5 was added to her  regimen, a low-salt diet was reinforced. However she never started the Norvasc and doesn't believe she has this at home.     She's unfortunately had multiple visits with various specialists since that time. On 11/6, she underwent cataract surgery. On 11/10, she was brought to the ED by her daughter as she was concerned regarding development of a cough, and was concerned she was beginning to re-develop congestive heart failure. Weight was stable ~ 180 lbs. CXR was benign. proBNP was 982 (WNL). She was noted to be hypertensive in the 170's-180's systolic however she was discharged home without any med changes. Soon after that she lost her appetite and subsequently lost about 10 lbs, which she believes was due to anxiety as her ophthalmologist told her she may need more surgery on her eye. On 11/13, she was seen by her ophthalmologist and was markedly hypertensive and had developed a hemorrhagic choroidal effusion which required a revision of the corneal incision with additional sutures. She admitted that she hadn't taken her antihypertensive meds in several days due to not feeling well. She was asked to follow up with her PCP regarding her blood pressure but was referred back to the ED by her. She was again noted to be markedly hypertensive in the ED. At that time, her Eliquis was cut in half and she was asked to re-start her antihypertensive meds. She was seen by her PCP in follow up ten days ago and her BP was very well-controlled at 109/73 mmHg.     Today, she presents to clinic with her granddaughter Zoe. She's feeling much better at this time. She has been doing better with watching her salt intake and is now reading labels and attempting to keep this < 2g/day. She continues to be very regimented in her daily weights and twice daily blood pressure recordings. Her weight has been gradually climbing as her appetite has improved, but has been stable at 174 lbs for the past 4 days. BP's 120's-130's/70's-80's  during the daytime and lower (typically ~ 100's/70's) in the evening over the past week. Her LE edema is better than usual and she states that overall she feels better at this weight. She denies chest pain, shortness of breath, PND, orthopnea, palpitations, near syncope or syncope. She's been told she snores in the past but denies PND, daytime somnolence.     FHx is significant for a son and father who both passed from cardiac arrest (in late 50's and early 60's, respectively). Father had a history of alcohol and drug use. Mother had a history of CVA and CAD. The patient is  and has been dating her high school sweetheart for the past 9 years. She lives with her daughter and granddaughter. She has a longstanding history of smoking 1-1/2 packs a day for approximately 45 years but quit in 2000.  She feels she's probably compliant with a low-sodium diet but doesn't really monitor this. She states her diet had been stable before her admission. She denies drinking alcohol.  She ambulates without any assistive devices. She does her own grocery shopping. She's very involved with her [employer].           Review of Systems:     12-pt ROS is negative except for as noted in the HPI.           Physical Exam:     Vitals: LMP  (LMP Unknown)   Wt Readings from Last 3 Encounters:   11/28/18 80.3 kg (177 lb)   11/21/18 78 kg (172 lb)   11/14/18 80.7 kg (178 lb)       Constitutional:  Patient is pleasant, alert, cooperative, and in NAD.  HEENT:  NCAT. PERRLA. EOM's intact. No masses or thyromegaly. Teeth in normal repair.   Neck:  CVP appears normal.   Pulmonary: Normal respiratory effort. CTAB.   Cardiac: RRR, normal S1/S2, +S4, grade 2-3/6 sm at the LSB  Abdomen:  Non-tender abdomen with normoactive bowel sounds, no hepatosplenomegaly appreciated.   Vascular: Pulses in the upper and lower extremities are 2+ and equal bilaterally.  Extremities: trace pretibial edema on the L  Neurological:  No gross motor or sensory deficits.    Psych: Appropriate affect.        Data:     Cardiac Diagnostics reviewed:  Type Date Result   TTE 10/12/18 Left ventricular systolic function is normal.  The left atrium is mild to moderately dilated.  There is moderate aortic sclerosis of the non-coronary cusp. No  hemodynamically significant valvular aortic stenosis.  There is mild to moderate (1-2+) tricuspid regurgitation.  Doppler findings do not suggest pulmonary hypertension.  E/E' avg 16.  IVSd 1 / PWd 1.1    3/7/18 1. The left ventricle is normal in size. The visual ejection fraction is  estimated at 65%.  2. The right ventricle is normal in structure, function and size.  3. There is mild to moderate (1-2+) mitral regurgitation.  4. There is moderate (2+) tricuspid regurgitation. The right ventricular  systolic pressure is approximated at 28mmHg plus the right atrial pressure.  No previous echo for comparison.   Stress 3/8/18 (Melonie) 1.  Myocardial perfusion imaging using single isotope technique  demonstrated a small perfusion defect of mild severity involving the  left ventricular apex which is essentially fixed and most likely  related to breast attenuation. There is no evidence of significant  pharmacological stress-induced ischemia or prior myocardial infarction  on this study.   2. Gated images demonstrated normal left ventricular wall motion.  The left ventricular systolic function is 73% at rest and 74% on the post stress images.  3. There is no previous study for comparison.   EKG 10/11/18 NSR, 64 bpm. QRS 98, QTc 462.   Device PPM check, 9.24.18 [employer] (D) Remote PPM Device Check  AP: 82            %                    : <1 %  Mode: DDDR        Presenting Rhythm: AP/VS, AS/VS  Heart Rate: Adequate rates per histogram  Sensing: Stable    Pacing Threshold: Stable    Impedance: Stable  Battery Status: 10.4 years  Atrial Arrhythmia: No mode switch episodes. 1 PMT occurred, no EGM.   Ventricular Arrhythmia: None    CXR 10/11/18    Chest CT  3/14/18 Mild to moderate emphysematous changes in the lungs. Small  bilateral pleural effusion with adjacent atelectasis. Apparent mild  interstitial thickening throughout both lungs. No pericardial  effusion. A few nonspecific borderline-enlarged mediastinal lymph  nodes. Mild cardiomegaly. Atherosclerotic calcification in the  thoracic aorta and coronary arteries.   LE ultrasound 3/14/18 The left common femoral, superficial femoral, popliteal and  posterior tibial veins are patent and fully compressible and  demonstrate normal venous Doppler flow. The visualized greater  saphenous vein is negative for thrombus. For comparison the right  common femoral vein was evaluated and was unremarkable.     IMPRESSION: No deep venous thrombosis demonstrated.     Recent Labs   Lab Test 10/25/18  0731 07/11/18  0907 03/15/18  2025 03/06/18  1744 03/23/17  0804 05/23/16  1056   LDL  --  84  --   --  91 103*   HDL  --  69  --   --  61 66   NHDL  --  102  --   --  113 124   CHOL  --  171  --   --  174 190   TRIG  --  88  --   --  109 104   TSH 0.80  --  0.64 0.54  --   --    NTBNP 343  --   --   --   --   --      Lab Results   Component Value Date    CHOL 171 07/11/2018    HDL 69 07/11/2018    LDL 84 07/11/2018    TRIG 88 07/11/2018    CHOLHDLRATIO 2.8 05/07/2015       Lab Results   Component Value Date    WBC 10.5 11/10/2018    RBC 4.83 11/10/2018    HGB 13.3 11/10/2018    HCT 41.3 11/10/2018    MCV 86 11/10/2018    MCH 27.5 11/10/2018    MCHC 32.2 11/10/2018    RDW 14.5 11/10/2018     11/10/2018       Lab Results   Component Value Date     11/10/2018    POTASSIUM 3.8 11/10/2018    CHLORIDE 107 11/10/2018    CO2 27 11/10/2018    ANIONGAP 7 11/10/2018    GLC 97 11/10/2018    BUN 10 11/10/2018    CR 0.69 11/10/2018    GFRESTIMATED 83 11/10/2018    GFRESTBLACK >90 11/10/2018    GURWINDER 8.6 11/10/2018      Lab Results   Component Value Date    AST 16 05/02/2018    ALT 25 05/02/2018       No results found for: A1C    Lab  Results   Component Value Date    INR 1.31 (H) 03/19/2018    INR 1.04 11/12/2014          Problem List:     Patient Active Problem List   Diagnosis     Symptomatic menopausal or female climacteric states     Hyperlipidemia LDL goal <70     Hypertension goal BP (blood pressure) < 140/90     Knee pain     Obesity     PAF (paroxysmal atrial fibrillation) (H)     S/P cardiac pacemaker procedure     SSS (sick sinus syndrome) (H)     Chronic diastolic CHF (congestive heart failure) (H)     Pulmonary hypertension (H)     Coronary artery calcification seen on CT scan            Medications:     Current Outpatient Medications   Medication Sig Dispense Refill     albuterol (PROAIR HFA/PROVENTIL HFA/VENTOLIN HFA) 108 (90 Base) MCG/ACT inhaler Inhale 2 puffs into the lungs every 6 hours       albuterol (PROAIR HFA/PROVENTIL HFA/VENTOLIN HFA) 108 (90 BASE) MCG/ACT Inhaler Inhale 2 puffs into the lungs 4 times daily as needed for other (dyspnea) 1 Inhaler 3     alendronate (FOSAMAX) 70 MG tablet Take 1 tablet (70 mg) by mouth every 7 days Take 60 minutes before am meal with 8 oz. water. Remain upright for 30 minutes. 12 tablet 3     apixaban ANTICOAGULANT (ELIQUIS) 5 MG tablet Take 1 tablet (5 mg) by mouth 2 times daily (Patient taking differently: Take 2.5 mg by mouth 2 times daily ) 180 tablet 3     atorvastatin (LIPITOR) 10 MG tablet Take 4 tablets (40 mg) by mouth daily 30 tablet 5     cholecalciferol (VITAMIN  -D) 1000 UNIT capsule Take 1 capsule by mouth daily.       Coral Calcium 133-66.7-133 MG-MG-UNIT CAPS Take 2 tablets by mouth 2 times daily        FLAX SEED OIL OR 1 capsule daily       flecainide (TAMBOCOR) 100 MG tablet Take 1 tablet (100 mg) by mouth 2 times daily 90 tablet 3     fluticasone (FLONASE) 50 MCG/ACT spray Spray 1-2 sprays into both nostrils daily as needed for rhinitis or allergies       furosemide (LASIX) 20 MG tablet Take 1 tablet (20 mg) by mouth daily 30 tablet 5     Krill Oil 500 MG CAPS Take 1  capsule by mouth daily       lisinopril (PRINIVIL/ZESTRIL) 40 MG tablet Take 1 tablet (40 mg) by mouth daily 90 tablet 3     Methylsulfonylmethane (MSM) 500 MG CAPS Take 1 capsule by mouth daily       metoprolol succinate (TOPROL-XL) 50 MG 24 hr tablet Take 1 tablet (50 mg) by mouth 2 times daily 180 tablet 3     Milk Thistle 200 MG CAPS Take 1 capsule by mouth daily        Multiple Vitamins-Minerals (HAIR SKIN NAILS PO) Take 1 tablet by mouth At Bedtime       potassium & sodium phosphates 278-164-250 MG/75ML SOLR Take 1 tablet by mouth daily        predniSONE (DELTASONE) 20 MG tablet        traMADol (ULTRAM) 50 MG tablet             Past Medical History:     Past Medical History:   Diagnosis Date     Atrial fibrillation (H)      Chronic diastolic CHF (congestive heart failure) (H)      Essential hypertension, benign      HYPERLIPIDEMIA NEC/NOS 3/30/2006     Pulmonary hypertension (H)      SSS (sick sinus syndrome) (H)     s/p PPM 3/2018     Past Surgical History:   Procedure Laterality Date     HYSTERECTOMY, VAGINAL      hysterectomy     Family History   Problem Relation Age of Onset     Cerebrovascular Disease Mother      Glaucoma Mother      Macular Degeneration Mother      Alcohol/Drug Father         alcohol     Myocardial Infarction Father 63        passed away from MI     Family History Negative Sister      Family History Negative Sister      Family History Negative Sister      Cerebrovascular Disease Sister      Family History Negative Brother      Family History Negative Maternal Grandmother      Family History Negative Maternal Grandfather      Family History Negative Paternal Grandmother      Family History Negative Paternal Grandfather      Myocardial Infarction Son 57        passed away from MI     Dementia Daughter 57        early onset on [place]     Diabetes No family hx of      Breast Cancer No family hx of      Cancer - colorectal No family hx of      Social History     Socioeconomic History      Marital status:      Spouse name: Not on file     Number of children: Not on file     Years of education: Not on file     Highest education level: Not on file   Social Needs     Financial resource strain: Not on file     Food insecurity - worry: Not on file     Food insecurity - inability: Not on file     Transportation needs - medical: Not on file     Transportation needs - non-medical: Not on file   Occupational History     Not on file   Tobacco Use     Smoking status: Former Smoker     Start date: 10/28/1955     Last attempt to quit: 2000     Years since quittin.3     Smokeless tobacco: Never Used     Tobacco comment: quit 6 yrs ago   Substance and Sexual Activity     Alcohol use: No     Drug use: No     Sexual activity: Yes     Partners: Male   Other Topics Concern     Parent/sibling w/ CABG, MI or angioplasty before 65F 55M? No   Social History Narrative    Balanced Diet - Yes    Osteoporosis Prevention Measures - Dairy servings per day: 2    Regular Exercise -  Yes Describe walk, but not recently due to weather    Dental Exam - Yes    Eye Exam -No    Self Breast Exam - Yes    Abuse: Current or Past (Physical, Sexual or Emotional)- No    Do you feel safe in your environment - Yes    Guns stored in the home - No    Sunscreen used - Yes    Seatbelts used - Yes    Lipids -  1/10    Glucose -  1/10    Colon Cancer Screening - Yes, 08    Hemoccults - NO    Pap Test -  Many yrs ago, has hysterectomy    Do you have any concerns about STD's -  No    Mammography - 08    DEXA - 06    Immunizations reviewed and up to date - No    Jonathan Marshall MA                        Allergies:   Strawberry flavor      Adriana Ferrell PA-C  [facility]  Pager: [phone]      Cardiology Clinic Progress Note    Hamida Verma MRN# [id number]   YOB: 1940 Age: 78 year old     Reason for visit: CORE clinic visit           Assessment and Plan:     In summary, the patient presents today for  assessment of HFpEF. She was admitted in March for volume overload in the setting of rapid atrial fibrillation, then again in October with CHF felt secondary to diastolic dysfunction, mildly uncontrolled blood pressures and underlying lung disease. Since that time, she'd done well from a heart failure standpoint, but had suffered from complications of cataract surgery and uncontrolled hypertension due to anxiety, eye pain and med non-compliance resulting in the development of a choroidal hematoma and multiple ED visits since then. She's been on prednisone since for her eye complications and is starting a taper finally tomorrow. Today, she's unfortunately up 8 lbs over the past three weeks and has re-developed LE edema. She's had no changes in her breathing. She admits to an increased appetite and food intake, but has continued to watch her sodium. proBNP is WNL. Potassium is low at 3.4.     1. HFpEF   - ECHO: EF 55-60%, E/E' avg 16, mild-mod LAE   - ACC/AHA Stage: C; FC I    - Etiology: age, htn, paf   - RV: normal   - Valves: Mild-mod TR   - Volume: Mildly up    Admit Wt: 186 lbs    Current Wt: 182 lbs (per home scale)    Dry Wt: 174 lbs    Diuretics: Lasix 20 mg daily   - Rhythm: SR / ApVs / PAF   - QRS: Narrow   - Device: DDDR PPM   - Other: TSH OK                 Stop-Bang Score: Low    2. PAF, s/p PPM - on flecainide 100 BID, Toprol 50 BID and Eliquis 5 BID. Appears well-controlled per recent device interrogation in September. Recent EKG benign.     3. HTN - home readings show 's - 130's in the mornings, as low as 100's in the evenings. On Lisinopril 40, Toprol 50 BID, Lasix 20.     4. CAC - per CT. No sxs suggestive of angina. Melonie MPI this year was normal (small fixed defect suggestive of breast attenuation). LDL in July was 84 on Zocor 20. Switched to Lipitor 40 in 10/2018.     5. Probable COPD - with some emphymatous changes noted on chest CT. +Smoking history. To have PFT's per PCP.       Plan:  -  Start Aldactone 25 mg daily. I'd like her to see me back in one week for reassessment however with the upcoming holidays she's asking to see me back in two weeks instead. Given her complete lack of dyspnea, I think this is reasonable, and she'll instead have a BMP and magnesium level done at a [place] clinic near her in one week, and RTC to see me in two weeks.   - She has an accurate BP cuff at home and will continue to monitor.   - Repeat lipids following switch to Lipitor in ~ 2 months.   - F/U arranged with her primary cardiologist and EP.   - Continue routine PPM checks.         History of Presenting Illness:      Hamida Verma is a pleasant 78 year old patient of  Dr. Mallory and Dr. Voss who presents today for a CORE clinic visit.    The patient has a history of CAC on CT with a normal Melonie MPI in 3/2018, HFpEF, AF s/p PPM anticoagulated on Eliquis, HTN, HLP and VHD. She was admitted in March 2018 with fluid overload in the setting of rapid atrial fibrillation. She spontaneously converted but ultimately received a pacemaker for post-conversion pauses up to 4 seconds duration and following that was placed on flecainide for PAF with RVR. However she continued to have a significant burden of atrial fibrillation and flecainide was therefore this was stopped and a rate control strategy with Toprol was pursued. However in July of this year flecainide was re-started after AF with RVR in the 160's-170's was noted on her device interrogation. Her most recent interrogation was performed in September and revealed 82% Ap with <1% , no mode switches and adequate rate histogram.     She was then admitted for the second time with HFpEF on 10/11/18, after she was noted to be mildly hypoxic with SpO2 in the upper 80's and hypertensive when she presented for cataract surgery. She admitted to a dry cough with mild dyspnea for two days prior, however she hadn't taken anything for this as she wasn't sure what might  interact with her flecainide. Her home weight had crept up to 186 (she checks this consistently and typically varies between 179 - 182 lbs) however her chronic LE edema (L>R) had been stable on Lasix 20 mg daily. She was directed to the ED where a proBNP was slightly elevated at 1,100. Labs including troponin x 1 were WNL. CXR revealed vascular congestion and a small pleural effusion. TTE revealed a normal LVEF with mild diastolic dysfunction with an E/E' avg of 16 and mild-mod LAE, mild-mod TR without evidence of PHTN, as well as moderate aortic sclerosis without significant aortic stenosis. She was given several doses of IV Lasix 20 BID, supplemental O2 was weaned, and she was discharged three days later on her PTA Lasix. Discharge wt was 180 lbs. It was recommended she have outpatient PFT's for assessment of probable COPD.     When last seen by me one month ago, her home weights were stable and she was feeling well. Her device was interrogated and showed no arrhythmias that might be contributing to her recent episode of CHF. Blood pressure was mildly uncontrolled and Norvasc 5 was added to her regimen, a low-salt diet was reinforced. However she never started the Norvasc and doesn't believe she has this at home.     She's unfortunately had multiple visits with various specialists since that time. On 11/6, she underwent cataract surgery. On 11/10, she was brought to the ED by her daughter as she was concerned regarding development of a cough, and was concerned she was beginning to re-develop congestive heart failure. Weight was stable ~ 180 lbs. CXR was benign. proBNP was 982 (WNL). She was noted to be hypertensive in the 170's-180's systolic however she was discharged home without any med changes. Soon after that she lost her appetite and subsequently lost about 10 lbs, which she believes was due to anxiety as her ophthalmologist told her she may need more surgery on her eye. 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 She admitted that she hadn't taken her antihypertensive meds in several days due to not feeling well. She was asked to follow up with her PCP regarding her blood pressure but was referred back to the ED by her. She was again noted to be markedly hypertensive in the ED. At that time, her Eliquis was cut in half and she was asked to re-start her antihypertensive meds. She was seen by her PCP in follow up ten days ago and her BP was very well-controlled at 109/73 mmHg.     When I saw her last in clinic on 11/28, she was feeling much better and had been doing better with watching her salt intake and was reading labels and attempting to keep this < 2g/day. Her weight had been gradually climbing as her appetite had improved, but had been recently stable at 174 lbs. Her home BP's were very well-controlled. No med changes were made. She remained on low-dose Eliquis following her choroidal hematoma at that time, and saw her ophthalmologist later that day who up-titrated her to full dose.     Today, her weight is unfortunately up 8 lbs over the past three weeks. She's continued on prednisone since I last saw her, and is starting a taper down tomorrow. However since I last saw her, she's developed a markedly increased appetite. She's also noted increased leg swelling (L>R, L is chronically larger than the R) and gradual weight gain. Her breathing has been OK and she's been sleeping well. She continues to be very regimented in her daily weights and twice daily blood pressure recordings. She denies chest pain, shortness of breath, PND, orthopnea, palpitations, near syncope or syncope. She's been told she snores in the past but denies PND, daytime somnolence. Labs today are stable overall, although potassium is mildly low at 3.4. proBNP is WNL.     FHx is  significant for a son and father who both passed from cardiac arrest (in late 50's and early 60's, respectively). Father had a history of alcohol and drug use. Mother had a history of CVA and CAD. The patient is  and has been dating her high school sweetheart for the past 9 years. She lives with her daughter and granddaughter Zoe. She has a longstanding history of smoking 1-1/2 packs a day for approximately 45 years but quit in 2000. She denies drinking alcohol.  She ambulates without any assistive devices. She does her own grocery shopping. She's very involved with her Zoroastrianism. She has two cats and is also hosting two dogs whom belong to her daughter and granddaughter.           Review of Systems:     12-pt ROS is negative except for as noted in the HPI.           Physical Exam:     Vitals: LMP  (LMP Unknown)   Wt Readings from Last 3 Encounters:   11/28/18 80.3 kg (177 lb)   11/21/18 78 kg (172 lb)   11/14/18 80.7 kg (178 lb)       Constitutional:  Patient is pleasant, alert, cooperative, and in NAD.  HEENT:  NCAT. PERRLA. EOM's intact. No masses or thyromegaly. Teeth in normal repair.   Neck:  CVP appears normal.   Pulmonary: Normal respiratory effort. CTAB.   Cardiac: RRR, normal S1/S2, +S4, grade 2-3/6 sm at the LSB  Abdomen:  Non-tender abdomen with normoactive bowel sounds, no hepatosplenomegaly appreciated.   Vascular: Pulses in the upper and lower extremities are 2+ and equal bilaterally.  Extremities: 2+ pitting edema L pedal - knee, 1+ on the R  Neurological:  No gross motor or sensory deficits.   Psych: Appropriate affect.        Data:     Cardiac Diagnostics reviewed:  Type Date Result   TTE 10/12/18 Left ventricular systolic function is normal.  The left atrium is mild to moderately dilated.  There is moderate aortic sclerosis of the non-coronary cusp. No  hemodynamically significant valvular aortic stenosis.  There is mild to moderate (1-2+) tricuspid regurgitation.  Doppler findings do not  suggest pulmonary hypertension.  E/E' avg 16.  IVSd 1 / PWd 1.1    3/7/18 1. The left ventricle is normal in size. The visual ejection fraction is  estimated at 65%.  2. The right ventricle is normal in structure, function and size.  3. There is mild to moderate (1-2+) mitral regurgitation.  4. There is moderate (2+) tricuspid regurgitation. The right ventricular  systolic pressure is approximated at 28mmHg plus the right atrial pressure.  No previous echo for comparison.   Stress 3/8/18 (Melonie) 1.  Myocardial perfusion imaging using single isotope technique  demonstrated a small perfusion defect of mild severity involving the  left ventricular apex which is essentially fixed and most likely  related to breast attenuation. There is no evidence of significant  pharmacological stress-induced ischemia or prior myocardial infarction  on this study.   2. Gated images demonstrated normal left ventricular wall motion.  The left ventricular systolic function is 73% at rest and 74% on the post stress images.  3. There is no previous study for comparison.   EKG 10/11/18 NSR, 64 bpm. QRS 98, QTc 462.   Device PPM check, 9.24.18 [employer] (D) Remote PPM Device Check  AP: 82            %                    : <1 %  Mode: DDDR        Presenting Rhythm: AP/VS, AS/VS  Heart Rate: Adequate rates per histogram  Sensing: Stable    Pacing Threshold: Stable    Impedance: Stable  Battery Status: 10.4 years  Atrial Arrhythmia: No mode switch episodes. 1 PMT occurred, no EGM.   Ventricular Arrhythmia: None    CXR 10/11/18    Chest CT 3/14/18 Mild to moderate emphysematous changes in the lungs. Small  bilateral pleural effusion with adjacent atelectasis. Apparent mild  interstitial thickening throughout both lungs. No pericardial  effusion. A few nonspecific borderline-enlarged mediastinal lymph  nodes. Mild cardiomegaly. Atherosclerotic calcification in the  thoracic aorta and coronary arteries.   LE ultrasound 3/14/18 The left  common femoral, superficial femoral, popliteal and  posterior tibial veins are patent and fully compressible and  demonstrate normal venous Doppler flow. The visualized greater  saphenous vein is negative for thrombus. For comparison the right  common femoral vein was evaluated and was unremarkable.     IMPRESSION: No deep venous thrombosis demonstrated.     Recent Labs   Lab Test 10/25/18  0731 07/11/18  0907 03/15/18  2025 03/06/18  1744 03/23/17  0804 05/23/16  1056   LDL  --  84  --   --  91 103*   HDL  --  69  --   --  61 66   NHDL  --  102  --   --  113 124   CHOL  --  171  --   --  174 190   TRIG  --  88  --   --  109 104   TSH 0.80  --  0.64 0.54  --   --    NTBNP 343  --   --   --   --   --      Lab Results   Component Value Date    CHOL 171 07/11/2018    HDL 69 07/11/2018    LDL 84 07/11/2018    TRIG 88 07/11/2018    CHOLHDLRATIO 2.8 05/07/2015       Lab Results   Component Value Date    WBC 10.5 11/10/2018    RBC 4.83 11/10/2018    HGB 13.3 11/10/2018    HCT 41.3 11/10/2018    MCV 86 11/10/2018    MCH 27.5 11/10/2018    MCHC 32.2 11/10/2018    RDW 14.5 11/10/2018     11/10/2018       Lab Results   Component Value Date     11/10/2018    POTASSIUM 3.8 11/10/2018    CHLORIDE 107 11/10/2018    CO2 27 11/10/2018    ANIONGAP 7 11/10/2018    GLC 97 11/10/2018    BUN 10 11/10/2018    CR 0.69 11/10/2018    GFRESTIMATED 83 11/10/2018    GFRESTBLACK >90 11/10/2018    GURWINDER 8.6 11/10/2018      Lab Results   Component Value Date    AST 16 05/02/2018    ALT 25 05/02/2018       No results found for: A1C    Lab Results   Component Value Date    INR 1.31 (H) 03/19/2018    INR 1.04 11/12/2014          Problem List:     Patient Active Problem List   Diagnosis     Symptomatic menopausal or female climacteric states     Hyperlipidemia LDL goal <70     Hypertension goal BP (blood pressure) < 140/90     Knee pain     Obesity     PAF (paroxysmal atrial fibrillation) (H)     S/P cardiac pacemaker procedure     SSS  (sick sinus syndrome) (H)     Chronic diastolic CHF (congestive heart failure) (H)     Pulmonary hypertension (H)     Coronary artery calcification seen on CT scan            Medications:     Current Outpatient Medications   Medication Sig Dispense Refill     albuterol (PROAIR HFA/PROVENTIL HFA/VENTOLIN HFA) 108 (90 Base) MCG/ACT inhaler Inhale 2 puffs into the lungs every 6 hours       albuterol (PROAIR HFA/PROVENTIL HFA/VENTOLIN HFA) 108 (90 BASE) MCG/ACT Inhaler Inhale 2 puffs into the lungs 4 times daily as needed for other (dyspnea) 1 Inhaler 3     alendronate (FOSAMAX) 70 MG tablet Take 1 tablet (70 mg) by mouth every 7 days Take 60 minutes before am meal with 8 oz. water. Remain upright for 30 minutes. 12 tablet 3     apixaban ANTICOAGULANT (ELIQUIS) 5 MG tablet Take 1 tablet (5 mg) by mouth 2 times daily (Patient taking differently: Take 2.5 mg by mouth 2 times daily ) 180 tablet 3     atorvastatin (LIPITOR) 10 MG tablet Take 4 tablets (40 mg) by mouth daily 30 tablet 5     cholecalciferol (VITAMIN  -D) 1000 UNIT capsule Take 1 capsule by mouth daily.       Coral Calcium 133-66.7-133 MG-MG-UNIT CAPS Take 2 tablets by mouth 2 times daily        FLAX SEED OIL OR 1 capsule daily       flecainide (TAMBOCOR) 100 MG tablet Take 1 tablet (100 mg) by mouth 2 times daily 90 tablet 3     fluticasone (FLONASE) 50 MCG/ACT spray Spray 1-2 sprays into both nostrils daily as needed for rhinitis or allergies       furosemide (LASIX) 20 MG tablet Take 1 tablet (20 mg) by mouth daily 30 tablet 5     Krill Oil 500 MG CAPS Take 1 capsule by mouth daily       lisinopril (PRINIVIL/ZESTRIL) 40 MG tablet Take 1 tablet (40 mg) by mouth daily 90 tablet 3     Methylsulfonylmethane (MSM) 500 MG CAPS Take 1 capsule by mouth daily       metoprolol succinate (TOPROL-XL) 50 MG 24 hr tablet Take 1 tablet (50 mg) by mouth 2 times daily 180 tablet 3     Milk Thistle 200 MG CAPS Take 1 capsule by mouth daily        Multiple Vitamins-Minerals  (HAIR SKIN NAILS PO) Take 1 tablet by mouth At Bedtime       potassium & sodium phosphates 278-164-250 MG/75ML SOLR Take 1 tablet by mouth daily        predniSONE (DELTASONE) 20 MG tablet        traMADol (ULTRAM) 50 MG tablet             Past Medical History:     Past Medical History:   Diagnosis Date     Atrial fibrillation (H)      Chronic diastolic CHF (congestive heart failure) (H)      Essential hypertension, benign      HYPERLIPIDEMIA NEC/NOS 3/30/2006     Pulmonary hypertension (H)      SSS (sick sinus syndrome) (H)     s/p PPM 3/2018     Past Surgical History:   Procedure Laterality Date     HYSTERECTOMY, VAGINAL      hysterectomy     Family History   Problem Relation Age of Onset     Cerebrovascular Disease Mother      Glaucoma Mother      Macular Degeneration Mother      Alcohol/Drug Father         alcohol     Myocardial Infarction Father 63        passed away from MI     Family History Negative Sister      Family History Negative Sister      Family History Negative Sister      Cerebrovascular Disease Sister      Family History Negative Brother      Family History Negative Maternal Grandmother      Family History Negative Maternal Grandfather      Family History Negative Paternal Grandmother      Family History Negative Paternal Grandfather      Myocardial Infarction Son 57        passed away from MI     Dementia Daughter 57        early onset on [place]     Diabetes No family hx of      Breast Cancer No family hx of      Cancer - colorectal No family hx of      Social History     Socioeconomic History     Marital status:      Spouse name: Not on file     Number of children: Not on file     Years of education: Not on file     Highest education level: Not on file   Social Needs     Financial resource strain: Not on file     Food insecurity - worry: Not on file     Food insecurity - inability: Not on file     Transportation needs - medical: Not on file     Transportation needs - non-medical: Not on  file   Occupational History     Not on file   Tobacco Use     Smoking status: Former Smoker     Start date: 10/28/1955     Last attempt to quit: 2000     Years since quittin.3     Smokeless tobacco: Never Used     Tobacco comment: quit 6 yrs ago   Substance and Sexual Activity     Alcohol use: No     Drug use: No     Sexual activity: Yes     Partners: Male   Other Topics Concern     Parent/sibling w/ CABG, MI or angioplasty before 65F 55M? No   Social History Narrative    Balanced Diet - Yes    Osteoporosis Prevention Measures - Dairy servings per day: 2    Regular Exercise -  Yes Describe walk, but not recently due to weather    Dental Exam - Yes    Eye Exam -No    Self Breast Exam - Yes    Abuse: Current or Past (Physical, Sexual or Emotional)- No    Do you feel safe in your environment - Yes    Guns stored in the home - No    Sunscreen used - Yes    Seatbelts used - Yes    Lipids -  1/10    Glucose -  1/10    Colon Cancer Screening - Yes, 08    Hemoccults - NO    Pap Test -  Many yrs ago, has hysterectomy    Do you have any concerns about STD's -  No    Mammography - 08    DEXA - 06    Immunizations reviewed and up to date - No    Jonathan Marshall MA                        Allergies:   Strawberry flavor      Adriana Ferrell PA-C  [facility]  Pager: [phone]  ank you for allowing me to participate in the care of your patient.      Sincerely,     Adriana Ferrell PA-C     [facility]    cc:   Adriana Ferrell PA-C  [address]  [place] [address]

## 2018-12-21 ENCOUNTER — PATIENT OUTREACH (OUTPATIENT)
Dept: CARE COORDINATION | Facility: CLINIC | Age: 78
End: 2018-12-21

## 2018-12-21 DIAGNOSIS — I50.32 CHRONIC DIASTOLIC CHF (CONGESTIVE HEART FAILURE) (H): Primary | ICD-10-CM

## 2018-12-21 ASSESSMENT — ACTIVITIES OF DAILY LIVING (ADL): DEPENDENT_IADLS:: INDEPENDENT

## 2018-12-21 NOTE — PROGRESS NOTES
Clinic Care Coordination Contact  [facility]/Voicemail       Clinical Data: Care Coordinator Outreach   [place] Utilization:    11/14/2018-ED visit eye problem and elevated blood pressure   11/10/2018--ED visit Cough History of CHF   Outreach attempted x 1.  Left message on voicemail with call back information and requested return call.  Plan:  Care Coordinator will try to reach patient again in 3-5 business days.  Jeannie Lopez RN / Clinical Care Coordinator     [facility]   [email or web address]  Office :  [phone] / Fax :  [phone]

## 2018-12-27 DIAGNOSIS — I50.32 CHRONIC DIASTOLIC CONGESTIVE HEART FAILURE (H): ICD-10-CM

## 2018-12-27 LAB
ANION GAP SERPL CALCULATED.3IONS-SCNC: 7 MMOL/L (ref 3–14)
BUN SERPL-MCNC: 23 MG/DL (ref 7–30)
CALCIUM SERPL-MCNC: 9.4 MG/DL (ref 8.5–10.1)
CHLORIDE SERPL-SCNC: 98 MMOL/L (ref 94–109)
CO2 SERPL-SCNC: 30 MMOL/L (ref 20–32)
CREAT SERPL-MCNC: 1.04 MG/DL (ref 0.52–1.04)
GFR SERPL CREATININE-BSD FRML MDRD: 51 ML/MIN/{1.73_M2}
GLUCOSE SERPL-MCNC: 78 MG/DL (ref 70–99)
MAGNESIUM SERPL-MCNC: 2.4 MG/DL (ref 1.6–2.3)
POTASSIUM SERPL-SCNC: 4.3 MMOL/L (ref 3.4–5.3)
SODIUM SERPL-SCNC: 135 MMOL/L (ref 133–144)

## 2018-12-27 PROCEDURE — 83735 ASSAY OF MAGNESIUM: CPT | Performed by: PHYSICIAN ASSISTANT

## 2018-12-27 PROCEDURE — 36415 COLL VENOUS BLD VENIPUNCTURE: CPT | Performed by: PHYSICIAN ASSISTANT

## 2018-12-27 PROCEDURE — 80048 BASIC METABOLIC PNL TOTAL CA: CPT | Performed by: PHYSICIAN ASSISTANT

## 2019-01-02 ENCOUNTER — ANCILLARY PROCEDURE (OUTPATIENT)
Dept: CARDIOLOGY | Facility: CLINIC | Age: 79
End: 2019-01-02
Attending: INTERNAL MEDICINE
Payer: COMMERCIAL

## 2019-01-02 DIAGNOSIS — I50.32 CHRONIC DIASTOLIC CONGESTIVE HEART FAILURE (H): ICD-10-CM

## 2019-01-02 DIAGNOSIS — I49.5 SICK SINUS SYNDROME (H): ICD-10-CM

## 2019-01-02 LAB
ANION GAP SERPL CALCULATED.3IONS-SCNC: 9 MMOL/L (ref 3–14)
BUN SERPL-MCNC: 40 MG/DL (ref 7–30)
CALCIUM SERPL-MCNC: 10 MG/DL (ref 8.5–10.1)
CHLORIDE SERPL-SCNC: 99 MMOL/L (ref 94–109)
CO2 SERPL-SCNC: 26 MMOL/L (ref 20–32)
CREAT SERPL-MCNC: 1.35 MG/DL (ref 0.52–1.04)
GFR SERPL CREATININE-BSD FRML MDRD: 37 ML/MIN/{1.73_M2}
GLUCOSE SERPL-MCNC: 97 MG/DL (ref 70–99)
POTASSIUM SERPL-SCNC: 4.5 MMOL/L (ref 3.4–5.3)
SODIUM SERPL-SCNC: 134 MMOL/L (ref 133–144)

## 2019-01-02 PROCEDURE — 93294 REM INTERROG EVL PM/LDLS PM: CPT | Performed by: INTERNAL MEDICINE

## 2019-01-02 PROCEDURE — 36415 COLL VENOUS BLD VENIPUNCTURE: CPT | Performed by: PHYSICIAN ASSISTANT

## 2019-01-02 PROCEDURE — 80048 BASIC METABOLIC PNL TOTAL CA: CPT | Performed by: PHYSICIAN ASSISTANT

## 2019-01-02 PROCEDURE — 93296 REM INTERROG EVL PM/IDS: CPT | Performed by: INTERNAL MEDICINE

## 2019-01-03 ENCOUNTER — TRANSFERRED RECORDS (OUTPATIENT)
Dept: HEALTH INFORMATION MANAGEMENT | Facility: CLINIC | Age: 79
End: 2019-01-03

## 2019-01-04 ENCOUNTER — CARE COORDINATION (OUTPATIENT)
Dept: CARDIOLOGY | Facility: CLINIC | Age: 79
End: 2019-01-04

## 2019-01-04 DIAGNOSIS — I50.32 CHRONIC DIASTOLIC CONGESTIVE HEART FAILURE (H): Primary | ICD-10-CM

## 2019-01-04 NOTE — PROGRESS NOTES
Call to patient per request of SD to assess progress on aldactone as patient cancelled appt today.  LM for call back.  Pooja Vaughn RN on 1/4/2019 at 8:59 AM

## 2019-01-04 NOTE — PROGRESS NOTES
Return call from patient. She continues to deal w/the complications of her recent cataract surgery. She was seen in f/u by the surgeon yesterday who discovered a small retinal tear which will require surgical repair. This procedure is planned for next week. She added that upon leaving the surgeons office she slipped and fell, landing on her knee and shoulder. Today she is also feeling feverish w/flu-like sx in addition to general body aches after falling yesterday.     Her breathing is stable, she denied any SOB and added that she sleeps well w/o PND/orthopnea. She started the spironolactone and the LE swelling has completely resolved. Her weight is currently 175-176#. However she has not taken any spironolactone X4 days because she started to experience significant dizziness. This has now resolved off the spironolactone. In addition she is tapering the prednisone, currently taking it only every other day.     SDavis requested to reschedule a f/u which has been made for Wed 1/16 - 1120 in the lab(orders entered) and 1230 w/SD. Patient verbalized her appreciation of our f/u and had no additional questions/concerns.  Will update provider.  Pooja Vaughn RN on 1/4/2019 at 9:28 AM

## 2019-01-05 ENCOUNTER — HOSPITAL ENCOUNTER (INPATIENT)
Facility: CLINIC | Age: 79
LOS: 5 days | Discharge: HOME HEALTH | DRG: 871 | End: 2019-01-10
Attending: EMERGENCY MEDICINE | Admitting: INTERNAL MEDICINE
Payer: COMMERCIAL

## 2019-01-05 ENCOUNTER — APPOINTMENT (OUTPATIENT)
Dept: GENERAL RADIOLOGY | Facility: CLINIC | Age: 79
DRG: 871 | End: 2019-01-05
Payer: COMMERCIAL

## 2019-01-05 DIAGNOSIS — J44.1 COPD EXACERBATION (H): Primary | ICD-10-CM

## 2019-01-05 DIAGNOSIS — N12 PYELONEPHRITIS: ICD-10-CM

## 2019-01-05 PROBLEM — I95.9 HYPOTENSION: Status: ACTIVE | Noted: 2019-01-05

## 2019-01-05 LAB
ALBUMIN SERPL-MCNC: 2.5 G/DL (ref 3.4–5)
ALBUMIN UR-MCNC: NEGATIVE MG/DL
ALP SERPL-CCNC: 43 U/L (ref 40–150)
ALT SERPL W P-5'-P-CCNC: 61 U/L (ref 0–50)
ANION GAP SERPL CALCULATED.3IONS-SCNC: 9 MMOL/L (ref 3–14)
APPEARANCE UR: ABNORMAL
AST SERPL W P-5'-P-CCNC: 24 U/L (ref 0–45)
BACTERIA #/AREA URNS HPF: ABNORMAL /HPF
BASE EXCESS BLDA CALC-SCNC: 2 MMOL/L
BASOPHILS # BLD AUTO: 0 10E9/L (ref 0–0.2)
BASOPHILS NFR BLD AUTO: 0.3 %
BILIRUB SERPL-MCNC: 0.7 MG/DL (ref 0.2–1.3)
BILIRUB UR QL STRIP: NEGATIVE
BUN SERPL-MCNC: 35 MG/DL (ref 7–30)
CALCIUM SERPL-MCNC: 8.7 MG/DL (ref 8.5–10.1)
CHLORIDE SERPL-SCNC: 102 MMOL/L (ref 94–109)
CK SERPL-CCNC: 34 U/L (ref 30–225)
CO2 SERPL-SCNC: 23 MMOL/L (ref 20–32)
COLOR UR AUTO: YELLOW
CREAT SERPL-MCNC: 1.35 MG/DL (ref 0.52–1.04)
DIFFERENTIAL METHOD BLD: ABNORMAL
EOSINOPHIL # BLD AUTO: 0.1 10E9/L (ref 0–0.7)
EOSINOPHIL NFR BLD AUTO: 0.4 %
ERYTHROCYTE [DISTWIDTH] IN BLOOD BY AUTOMATED COUNT: 17.2 % (ref 10–15)
GFR SERPL CREATININE-BSD FRML MDRD: 37 ML/MIN/{1.73_M2}
GLUCOSE SERPL-MCNC: 119 MG/DL (ref 70–99)
GLUCOSE UR STRIP-MCNC: NEGATIVE MG/DL
HCO3 BLD-SCNC: 26 MMOL/L (ref 21–28)
HCT VFR BLD AUTO: 36.5 % (ref 35–47)
HGB BLD-MCNC: 12.1 G/DL (ref 11.7–15.7)
HGB UR QL STRIP: NEGATIVE
HYALINE CASTS #/AREA URNS LPF: 1 /LPF (ref 0–2)
IMM GRANULOCYTES # BLD: 0.6 10E9/L (ref 0–0.4)
IMM GRANULOCYTES NFR BLD: 3.7 %
INTERPRETATION ECG - MUSE: NORMAL
KETONES UR STRIP-MCNC: NEGATIVE MG/DL
LACTATE BLD-SCNC: 0.9 MMOL/L (ref 0.7–2)
LACTATE BLD-SCNC: 2.1 MMOL/L (ref 0.7–2)
LEUKOCYTE ESTERASE UR QL STRIP: ABNORMAL
LYMPHOCYTES # BLD AUTO: 1 10E9/L (ref 0.8–5.3)
LYMPHOCYTES NFR BLD AUTO: 6.9 %
MAGNESIUM SERPL-MCNC: 2.6 MG/DL (ref 1.6–2.3)
MCH RBC QN AUTO: 28.1 PG (ref 26.5–33)
MCHC RBC AUTO-ENTMCNC: 33.2 G/DL (ref 31.5–36.5)
MCV RBC AUTO: 85 FL (ref 78–100)
MONOCYTES # BLD AUTO: 1.4 10E9/L (ref 0–1.3)
MONOCYTES NFR BLD AUTO: 9.5 %
MUCOUS THREADS #/AREA URNS LPF: PRESENT /LPF
NEUTROPHILS # BLD AUTO: 11.8 10E9/L (ref 1.6–8.3)
NEUTROPHILS NFR BLD AUTO: 79.2 %
NITRATE UR QL: NEGATIVE
NRBC # BLD AUTO: 0 10*3/UL
NRBC BLD AUTO-RTO: 0 /100
NT-PROBNP SERPL-MCNC: 210 PG/ML (ref 0–1800)
OXYHGB MFR BLD: 92 % (ref 92–100)
PCO2 BLD: 37 MM HG (ref 35–45)
PH BLD: 7.46 PH (ref 7.35–7.45)
PH UR STRIP: 6 PH (ref 5–7)
PHOSPHATE SERPL-MCNC: 3.6 MG/DL (ref 2.5–4.5)
PLATELET # BLD AUTO: 279 10E9/L (ref 150–450)
PO2 BLD: 63 MM HG (ref 80–105)
POTASSIUM SERPL-SCNC: 4.7 MMOL/L (ref 3.4–5.3)
PROCALCITONIN SERPL-MCNC: 0.06 NG/ML
PROT SERPL-MCNC: 6.5 G/DL (ref 6.8–8.8)
RBC # BLD AUTO: 4.31 10E12/L (ref 3.8–5.2)
RBC #/AREA URNS AUTO: 1 /HPF (ref 0–2)
SODIUM SERPL-SCNC: 134 MMOL/L (ref 133–144)
SOURCE: ABNORMAL
SP GR UR STRIP: 1.01 (ref 1–1.03)
TROPONIN I SERPL-MCNC: <0.015 UG/L (ref 0–0.04)
UROBILINOGEN UR STRIP-MCNC: NORMAL MG/DL (ref 0–2)
WBC # BLD AUTO: 14.9 10E9/L (ref 4–11)
WBC #/AREA URNS AUTO: 38 /HPF (ref 0–5)

## 2019-01-05 PROCEDURE — 85025 COMPLETE CBC W/AUTO DIFF WBC: CPT | Performed by: EMERGENCY MEDICINE

## 2019-01-05 PROCEDURE — 84145 PROCALCITONIN (PCT): CPT | Performed by: PHYSICIAN ASSISTANT

## 2019-01-05 PROCEDURE — 99223 1ST HOSP IP/OBS HIGH 75: CPT | Mod: AI | Performed by: PHYSICIAN ASSISTANT

## 2019-01-05 PROCEDURE — 21000001 ZZH R&B HEART CARE

## 2019-01-05 PROCEDURE — 83880 ASSAY OF NATRIURETIC PEPTIDE: CPT | Performed by: EMERGENCY MEDICINE

## 2019-01-05 PROCEDURE — 36600 WITHDRAWAL OF ARTERIAL BLOOD: CPT

## 2019-01-05 PROCEDURE — 83605 ASSAY OF LACTIC ACID: CPT | Performed by: EMERGENCY MEDICINE

## 2019-01-05 PROCEDURE — 96365 THER/PROPH/DIAG IV INF INIT: CPT

## 2019-01-05 PROCEDURE — 25000128 H RX IP 250 OP 636: Performed by: EMERGENCY MEDICINE

## 2019-01-05 PROCEDURE — 84484 ASSAY OF TROPONIN QUANT: CPT | Performed by: PHYSICIAN ASSISTANT

## 2019-01-05 PROCEDURE — 82550 ASSAY OF CK (CPK): CPT | Performed by: PHYSICIAN ASSISTANT

## 2019-01-05 PROCEDURE — 82805 BLOOD GASES W/O2 SATURATION: CPT | Performed by: EMERGENCY MEDICINE

## 2019-01-05 PROCEDURE — 87040 BLOOD CULTURE FOR BACTERIA: CPT | Performed by: EMERGENCY MEDICINE

## 2019-01-05 PROCEDURE — 99285 EMERGENCY DEPT VISIT HI MDM: CPT | Mod: 25

## 2019-01-05 PROCEDURE — 84484 ASSAY OF TROPONIN QUANT: CPT | Performed by: EMERGENCY MEDICINE

## 2019-01-05 PROCEDURE — 40000275 ZZH STATISTIC RCP TIME EA 10 MIN

## 2019-01-05 PROCEDURE — 96361 HYDRATE IV INFUSION ADD-ON: CPT

## 2019-01-05 PROCEDURE — 87086 URINE CULTURE/COLONY COUNT: CPT | Performed by: EMERGENCY MEDICINE

## 2019-01-05 PROCEDURE — 93005 ELECTROCARDIOGRAM TRACING: CPT

## 2019-01-05 PROCEDURE — 71046 X-RAY EXAM CHEST 2 VIEWS: CPT

## 2019-01-05 PROCEDURE — 94640 AIRWAY INHALATION TREATMENT: CPT

## 2019-01-05 PROCEDURE — 83735 ASSAY OF MAGNESIUM: CPT | Performed by: EMERGENCY MEDICINE

## 2019-01-05 PROCEDURE — 87186 SC STD MICRODIL/AGAR DIL: CPT | Performed by: EMERGENCY MEDICINE

## 2019-01-05 PROCEDURE — 87088 URINE BACTERIA CULTURE: CPT | Performed by: EMERGENCY MEDICINE

## 2019-01-05 PROCEDURE — 84100 ASSAY OF PHOSPHORUS: CPT | Performed by: EMERGENCY MEDICINE

## 2019-01-05 PROCEDURE — 25000132 ZZH RX MED GY IP 250 OP 250 PS 637: Performed by: PHYSICIAN ASSISTANT

## 2019-01-05 PROCEDURE — 36415 COLL VENOUS BLD VENIPUNCTURE: CPT | Performed by: PHYSICIAN ASSISTANT

## 2019-01-05 PROCEDURE — 81001 URINALYSIS AUTO W/SCOPE: CPT | Performed by: EMERGENCY MEDICINE

## 2019-01-05 PROCEDURE — 80053 COMPREHEN METABOLIC PANEL: CPT | Performed by: EMERGENCY MEDICINE

## 2019-01-05 PROCEDURE — 25000125 ZZHC RX 250: Performed by: EMERGENCY MEDICINE

## 2019-01-05 RX ORDER — POTASSIUM CHLORIDE 29.8 MG/ML
20 INJECTION INTRAVENOUS
Status: DISCONTINUED | OUTPATIENT
Start: 2019-01-05 | End: 2019-01-10 | Stop reason: HOSPADM

## 2019-01-05 RX ORDER — HYDRALAZINE HYDROCHLORIDE 20 MG/ML
10 INJECTION INTRAMUSCULAR; INTRAVENOUS EVERY 4 HOURS PRN
Status: DISCONTINUED | OUTPATIENT
Start: 2019-01-05 | End: 2019-01-09

## 2019-01-05 RX ORDER — POTASSIUM CHLORIDE 1500 MG/1
20-40 TABLET, EXTENDED RELEASE ORAL
Status: DISCONTINUED | OUTPATIENT
Start: 2019-01-05 | End: 2019-01-10 | Stop reason: HOSPADM

## 2019-01-05 RX ORDER — AMOXICILLIN 250 MG
1 CAPSULE ORAL 2 TIMES DAILY
Status: DISCONTINUED | OUTPATIENT
Start: 2019-01-05 | End: 2019-01-10 | Stop reason: HOSPADM

## 2019-01-05 RX ORDER — BISACODYL 5 MG
15 TABLET, DELAYED RELEASE (ENTERIC COATED) ORAL DAILY PRN
Status: DISCONTINUED | OUTPATIENT
Start: 2019-01-05 | End: 2019-01-10 | Stop reason: HOSPADM

## 2019-01-05 RX ORDER — NALOXONE HYDROCHLORIDE 0.4 MG/ML
.1-.4 INJECTION, SOLUTION INTRAMUSCULAR; INTRAVENOUS; SUBCUTANEOUS
Status: DISCONTINUED | OUTPATIENT
Start: 2019-01-05 | End: 2019-01-10 | Stop reason: HOSPADM

## 2019-01-05 RX ORDER — FUROSEMIDE 10 MG/ML
20 INJECTION INTRAMUSCULAR; INTRAVENOUS
Status: DISCONTINUED | OUTPATIENT
Start: 2019-01-06 | End: 2019-01-06

## 2019-01-05 RX ORDER — BISACODYL 5 MG
5 TABLET, DELAYED RELEASE (ENTERIC COATED) ORAL DAILY PRN
Status: DISCONTINUED | OUTPATIENT
Start: 2019-01-05 | End: 2019-01-10 | Stop reason: HOSPADM

## 2019-01-05 RX ORDER — IPRATROPIUM BROMIDE AND ALBUTEROL SULFATE 2.5; .5 MG/3ML; MG/3ML
3 SOLUTION RESPIRATORY (INHALATION)
Status: DISCONTINUED | OUTPATIENT
Start: 2019-01-05 | End: 2019-01-09

## 2019-01-05 RX ORDER — BISACODYL 5 MG
10 TABLET, DELAYED RELEASE (ENTERIC COATED) ORAL DAILY PRN
Status: DISCONTINUED | OUTPATIENT
Start: 2019-01-05 | End: 2019-01-10 | Stop reason: HOSPADM

## 2019-01-05 RX ORDER — HYDROCODONE BITARTRATE AND ACETAMINOPHEN 5; 325 MG/1; MG/1
1-2 TABLET ORAL EVERY 4 HOURS PRN
Status: DISCONTINUED | OUTPATIENT
Start: 2019-01-05 | End: 2019-01-10 | Stop reason: HOSPADM

## 2019-01-05 RX ORDER — CEFTRIAXONE 2 G/1
2 INJECTION, POWDER, FOR SOLUTION INTRAMUSCULAR; INTRAVENOUS ONCE
Status: COMPLETED | OUTPATIENT
Start: 2019-01-05 | End: 2019-01-05

## 2019-01-05 RX ORDER — ATORVASTATIN CALCIUM 40 MG/1
40 TABLET, FILM COATED ORAL EVERY EVENING
Status: DISCONTINUED | OUTPATIENT
Start: 2019-01-05 | End: 2019-01-10 | Stop reason: HOSPADM

## 2019-01-05 RX ORDER — LIDOCAINE 40 MG/G
CREAM TOPICAL
Status: DISCONTINUED | OUTPATIENT
Start: 2019-01-05 | End: 2019-01-05

## 2019-01-05 RX ORDER — ACETAMINOPHEN 325 MG/1
650 TABLET ORAL EVERY 4 HOURS PRN
Status: DISCONTINUED | OUTPATIENT
Start: 2019-01-05 | End: 2019-01-10 | Stop reason: HOSPADM

## 2019-01-05 RX ORDER — BISACODYL 10 MG
10 SUPPOSITORY, RECTAL RECTAL DAILY PRN
Status: DISCONTINUED | OUTPATIENT
Start: 2019-01-05 | End: 2019-01-10 | Stop reason: HOSPADM

## 2019-01-05 RX ORDER — LIDOCAINE 40 MG/G
CREAM TOPICAL
Status: DISCONTINUED | OUTPATIENT
Start: 2019-01-05 | End: 2019-01-09

## 2019-01-05 RX ORDER — METOPROLOL SUCCINATE 50 MG/1
50 TABLET, EXTENDED RELEASE ORAL 2 TIMES DAILY
Status: DISCONTINUED | OUTPATIENT
Start: 2019-01-06 | End: 2019-01-10 | Stop reason: HOSPADM

## 2019-01-05 RX ORDER — POLYETHYLENE GLYCOL 3350 17 G/17G
17 POWDER, FOR SOLUTION ORAL DAILY PRN
Status: DISCONTINUED | OUTPATIENT
Start: 2019-01-05 | End: 2019-01-10 | Stop reason: HOSPADM

## 2019-01-05 RX ORDER — IPRATROPIUM BROMIDE AND ALBUTEROL SULFATE 2.5; .5 MG/3ML; MG/3ML
3 SOLUTION RESPIRATORY (INHALATION) ONCE
Status: COMPLETED | OUTPATIENT
Start: 2019-01-05 | End: 2019-01-05

## 2019-01-05 RX ORDER — HYDROMORPHONE HYDROCHLORIDE 1 MG/ML
0.2 INJECTION, SOLUTION INTRAMUSCULAR; INTRAVENOUS; SUBCUTANEOUS
Status: DISCONTINUED | OUTPATIENT
Start: 2019-01-05 | End: 2019-01-10 | Stop reason: HOSPADM

## 2019-01-05 RX ORDER — FLECAINIDE ACETATE 100 MG/1
100 TABLET ORAL 2 TIMES DAILY
Status: DISCONTINUED | OUTPATIENT
Start: 2019-01-05 | End: 2019-01-10 | Stop reason: HOSPADM

## 2019-01-05 RX ORDER — POTASSIUM CL/LIDO/0.9 % NACL 10MEQ/0.1L
10 INTRAVENOUS SOLUTION, PIGGYBACK (ML) INTRAVENOUS
Status: DISCONTINUED | OUTPATIENT
Start: 2019-01-05 | End: 2019-01-10 | Stop reason: HOSPADM

## 2019-01-05 RX ORDER — CEFTRIAXONE 2 G/1
2 INJECTION, POWDER, FOR SOLUTION INTRAMUSCULAR; INTRAVENOUS EVERY 24 HOURS
Status: DISCONTINUED | OUTPATIENT
Start: 2019-01-06 | End: 2019-01-08

## 2019-01-05 RX ORDER — IPRATROPIUM BROMIDE AND ALBUTEROL SULFATE 2.5; .5 MG/3ML; MG/3ML
3 SOLUTION RESPIRATORY (INHALATION)
Status: DISCONTINUED | OUTPATIENT
Start: 2019-01-05 | End: 2019-01-10

## 2019-01-05 RX ORDER — ONDANSETRON 4 MG/1
4 TABLET, ORALLY DISINTEGRATING ORAL EVERY 6 HOURS PRN
Status: DISCONTINUED | OUTPATIENT
Start: 2019-01-05 | End: 2019-01-10 | Stop reason: HOSPADM

## 2019-01-05 RX ORDER — ONDANSETRON 2 MG/ML
4 INJECTION INTRAMUSCULAR; INTRAVENOUS EVERY 6 HOURS PRN
Status: DISCONTINUED | OUTPATIENT
Start: 2019-01-05 | End: 2019-01-10 | Stop reason: HOSPADM

## 2019-01-05 RX ORDER — POTASSIUM CHLORIDE 7.45 MG/ML
10 INJECTION INTRAVENOUS
Status: DISCONTINUED | OUTPATIENT
Start: 2019-01-05 | End: 2019-01-10 | Stop reason: HOSPADM

## 2019-01-05 RX ORDER — ACETAMINOPHEN 650 MG/1
650 SUPPOSITORY RECTAL EVERY 4 HOURS PRN
Status: DISCONTINUED | OUTPATIENT
Start: 2019-01-05 | End: 2019-01-10 | Stop reason: HOSPADM

## 2019-01-05 RX ORDER — NITROGLYCERIN 0.4 MG/1
0.4 TABLET SUBLINGUAL EVERY 5 MIN PRN
Status: DISCONTINUED | OUTPATIENT
Start: 2019-01-05 | End: 2019-01-05

## 2019-01-05 RX ORDER — ATORVASTATIN CALCIUM 40 MG/1
40 TABLET, FILM COATED ORAL DAILY
COMMUNITY
End: 2019-06-04

## 2019-01-05 RX ORDER — AMOXICILLIN 250 MG
2 CAPSULE ORAL 2 TIMES DAILY
Status: DISCONTINUED | OUTPATIENT
Start: 2019-01-05 | End: 2019-01-10 | Stop reason: HOSPADM

## 2019-01-05 RX ORDER — NITROGLYCERIN 0.4 MG/1
0.4 TABLET SUBLINGUAL EVERY 5 MIN PRN
Status: DISCONTINUED | OUTPATIENT
Start: 2019-01-05 | End: 2019-01-09

## 2019-01-05 RX ORDER — POTASSIUM CHLORIDE 1.5 G/1.58G
20-40 POWDER, FOR SOLUTION ORAL
Status: DISCONTINUED | OUTPATIENT
Start: 2019-01-05 | End: 2019-01-10 | Stop reason: HOSPADM

## 2019-01-05 RX ORDER — PROCHLORPERAZINE 25 MG
12.5 SUPPOSITORY, RECTAL RECTAL EVERY 12 HOURS PRN
Status: DISCONTINUED | OUTPATIENT
Start: 2019-01-05 | End: 2019-01-10 | Stop reason: HOSPADM

## 2019-01-05 RX ORDER — MAGNESIUM SULFATE HEPTAHYDRATE 40 MG/ML
4 INJECTION, SOLUTION INTRAVENOUS EVERY 4 HOURS PRN
Status: DISCONTINUED | OUTPATIENT
Start: 2019-01-05 | End: 2019-01-10 | Stop reason: HOSPADM

## 2019-01-05 RX ORDER — PROCHLORPERAZINE MALEATE 5 MG
5 TABLET ORAL EVERY 6 HOURS PRN
Status: DISCONTINUED | OUTPATIENT
Start: 2019-01-05 | End: 2019-01-10 | Stop reason: HOSPADM

## 2019-01-05 RX ADMIN — FLECAINIDE ACETATE 100 MG: 100 TABLET ORAL at 21:48

## 2019-01-05 RX ADMIN — IPRATROPIUM BROMIDE AND ALBUTEROL SULFATE 3 ML: .5; 2.5 SOLUTION RESPIRATORY (INHALATION) at 18:13

## 2019-01-05 RX ADMIN — SODIUM CHLORIDE 1000 ML: 9 INJECTION, SOLUTION INTRAVENOUS at 15:38

## 2019-01-05 RX ADMIN — CEFTRIAXONE SODIUM 2 G: 2 INJECTION, POWDER, FOR SOLUTION INTRAMUSCULAR; INTRAVENOUS at 18:13

## 2019-01-05 RX ADMIN — APIXABAN 5 MG: 5 TABLET, FILM COATED ORAL at 21:47

## 2019-01-05 RX ADMIN — SODIUM CHLORIDE 1000 ML: 9 INJECTION, SOLUTION INTRAVENOUS at 17:00

## 2019-01-05 RX ADMIN — ATORVASTATIN CALCIUM 40 MG: 40 TABLET, FILM COATED ORAL at 21:48

## 2019-01-05 ASSESSMENT — MIFFLIN-ST. JEOR
SCORE: 1269.67
SCORE: 1233.39
SCORE: 1263.32

## 2019-01-05 ASSESSMENT — ENCOUNTER SYMPTOMS
WEAKNESS: 1
ABDOMINAL PAIN: 0
DIZZINESS: 1

## 2019-01-05 NOTE — ED PROVIDER NOTES
History     Chief Complaint:  Hypotension     HPI   Hamida Verma is an anticoagulated (Eliquis) 78 year old female with a history of HTN, HLD, CHF, atrial fibrillation who presents to the emergency department for evaluation of hypotension. The patient reports she last felt normal on 1/2 but has experienced dizziness and weakness following a mechanical fall on 1/3. The patient's daughter notes the patient has had intermittently low blood pressure, 76/64 (cuff at home), prompting the daughter to contact EMS. Of note, the patient was started on Aldactone on 12/19, EMS noted concern to family for blood pressure medication changes. Here at the ED, the patient remarks she feels at baseline lying down, denying chest pain or abdominal pain. However, she expresses concern that she has difficulty ambulating secondary to her dizziness. The patient notes she has not yet eaten today, and she has not drank water consistently today; she is on Prednisone but has not taken it for around 4 days.    Allergies:  Strawberry Flavor     Medications:    Albuterol  Fosamax  Eliquis  Lipitor  Tambocor  Flonase  Lasix  Lisinopril  Methylsulfonylmethane   Metoprolol  Deltasone  Aldactone   Ultram     Past Medical History:    Atrial fibrillation  CHF  HTN  HLD  Sick sinus syndrome  Obesity    Past Surgical History:    Hysterectomy     Family History:    Cerebrovascular disease  Glaucoma  Macular degeneration   Alcohol/drug use  MI  Dementia    Social History:  Presents with granddaughter and daughter.  Former smoker, quit 2000.  Negative for alcohol use.  Marital Status:   [5]     Review of Systems   Cardiovascular: Negative for chest pain.   Gastrointestinal: Negative for abdominal pain.   Musculoskeletal: Positive for gait problem.   Neurological: Positive for dizziness and weakness. Negative for syncope.   All other systems reviewed and are negative.        Physical Exam     Patient Vitals for the past 24 hrs:   BP Temp Temp src  "Pulse Heart Rate Resp SpO2 Height Weight   01/05/19 1815 130/63 -- -- 65 65 17 95 % -- --   01/05/19 1800 129/71 -- -- 65 65 18 94 % -- --   01/05/19 1730 114/57 -- -- 64 63 23 94 % -- --   01/05/19 1700 96/68 -- -- 62 64 18 96 % -- --   01/05/19 1630 97/53 -- -- 61 61 10 96 % -- --   01/05/19 1600 99/69 -- -- 62 -- -- 93 % -- --   01/05/19 1530 99/51 -- -- 62 -- -- (!) 87 % -- --   01/05/19 1518 129/90 98.4  F (36.9  C) Oral 64 -- 20 97 % 1.702 m (5' 7\") 72.6 kg (160 lb)   01/05/19 1515 100/58 98.6  F (37  C) Oral 64 -- 18 95 % 1.651 m (5' 5\") 79.4 kg (175 lb)     Physical Exam  Vitals: reviewed by me  General: Pt seen on [facility], cooperative, and alert to conversation  Eyes: Tracking well, clear conjunctiva BL  ENT: MMM, midline trachea.   Lungs: No tachypnea, no accessory muscle use. No respiratory distress.   CV: Rate as above, regular rhythm.    Abd: Soft, non tender, no guarding, no rebound. Non distended  MSK: no peripheral edema or joint effusion.  No evidence of trauma  Skin: No rash, normal turgor and temperature  Neuro: Clear speech and no facial droop.  Psych: Not RIS, no e/o AH/VH      Emergency Department Course   ECG:  Time: 1520  Vent. Rate 62 bpm. TN interval 176. QRS duration 98. QT/QTc 408/414. P-R-T axis 60 0 22. Normal sinus rhythm. Inferior infarct, age undetermined. Abnormal ECG. Read time: 1520    Imaging:  Radiographic findings were communicated with the patient who voiced understanding of the findings.    XR Chest 2 views:   Clear lungs. As per radiology.     Laboratory:  CBC: WBC: 14.9 (H), HGB: 12.1, PLT: 279  CMP: Glucose 119 (H), Urea Nitrogen 35 (H), Creatinine 1.35 (H), GFR Estimate 37 (L), Albumin 2.5 (L), Protein Total 6.5 (L), ALT 61 (H), o/w WNL     BNP: 210    Lactic acid: 2.1 (H)  Lactic acid repeat: 0.9    UA with micro: Leukocyte Esterase Large, WBC 38 (H), Bacteria Few, Mucous Present, o/w negative    Blood gas arterial and oxyhgb: pH 7.46 (H), pO2 63 (L), " o/w negative    1520 Troponin I: <0.015    Urine culture pending.    Interventions:  1538 NS 1L IV BOLUS  1700 NS 1L IV BOLUS  1813 DuoNeb 3 mL Nebulization    Rocephin 1 g IV    Emergency Department Course:  Nursing notes and vitals reviewed. 1525 I performed an exam of the patient as documented above.     EKG obtained in the ED, see results above.     IV inserted. Medicine administered as documented above. Blood drawn. This was sent to the lab for further testing, results above.    The patient was sent for a XR Chest while in the emergency department, findings above.     1752 I spoke with Dr. Singh, hospitalist, who agreed to admit the patient.    1755 I rechecked the patient and discussed the results of her workup thus far.     Findings and plan explained to the Patient and daughter who consents to admission. Discussed the patient with Dr. Singh, who will admit the patient to an [facility] bed for further monitoring, evaluation, and treatment.    I personally reviewed the laboratory results with the Patient and daughter and answered all related questions prior to admission.    Impression & Plan      CMS Diagnoses:   The patient has signs of Severe Sepsis as evidenced by:    1. 2 SIRS criteria, AND  2. Suspected infection, AND   3. Organ dysfunction: Lactic Acid > 1.9    Time severe sepsis diagnosis confirmed = see results  as this was the time when Lactate resulted, and the level was > 1.9      3 Hour Severe Sepsis Bundle Completion:  1. Initial Lactic Acid Result:   Recent Labs   Lab Test 01/05/19  1812 01/05/19  1520 03/16/18  2013   LACT 0.9 2.1* 0.7     2. Blood Cultures before Antibiotics: Yes  3. Broad Spectrum Antibiotics Administered: Yes     Anti-infectives (From now, onward)    Start     Dose/Rate Route Frequency Ordered Stop    01/06/19 1800  cefTRIAXone (ROCEPHIN) 2 g vial to attach to  ml bag for ADULTS or NS 50 ml bag for PEDS      2 g  over 30 Minutes Intravenous EVERY 24 HOURS 01/05/19 1900           4. See MAR  ml of IV fluids.  Ideal body weight: 61.6 kg (135 lb 12.9 oz)  Adjusted ideal body weight: 67.2 kg (148 lb 1.9 oz)    Severe Sepsis reassessment:  1. Repeat Lactic Acid Level: pending   2. MAP>65 after initial IVF bolus, will continue to monitor fluid status and vital signs               Medical Decision Making:  Hamida Verma is a 78 year old female who appears to the ED with what appears to be hypotension, likely from UTI and pyelo. She has a slightly elevated lactate, and a white count as well. For this reason, given her urine, I did elect to treat with Ceftriaxone. Blood cultures were drawn as well. Patient does have secondary issue of pulmonary hypertension, and takes albuterol at home for this, but does not use any oxygen. Interestingly, here in the ED, her oxygen needs have increased. No evidence of CHF. X-ray is within normal limits with no evidence of pneumonia, and I am unable to fully explain her hypoxia at this time. We will continue treatment with albuterol, supportive therapy, admit to the care of Dr. Singh, with what appears to be her primary issue of urosepsis, not requiring pressors. Patient is okay with this plan, does appear to be stable for a non-ICU floor, but Dr. Singh and I did think it was in our best interest to admit the patient to the [facility] given her new oxygen needs. She is doing well here on 6 L, nasal cannula, protecting airway. Will continue monitoring,     Diagnosis:    ICD-10-CM   1. Pyelonephritis N12       Disposition:  Admitted to Dr. Singh.    I, Homer Ren, am serving as a scribe on 1/5/2019 at 3:16 PM to personally document services performed by Mynor Solomon* based on my observations and the provider's statements to me.     Homer Ren  1/5/2019    EMERGENCY DEPARTMENT       Mynor Solomon MD  01/05/19 [id number]

## 2019-01-05 NOTE — ED NOTES
Bed: ED24  Expected date:   Expected time:   Means of arrival:   Comments:  Lorie 448 dizzy 78 female

## 2019-01-05 NOTE — ED NOTES
Pt became hypoxic on RA. SpO2 dropped to 79%. Pt placed on O2 and titrated to SpO2 >90%. MD eisenberg.

## 2019-01-06 ENCOUNTER — APPOINTMENT (OUTPATIENT)
Dept: OCCUPATIONAL THERAPY | Facility: CLINIC | Age: 79
DRG: 871 | End: 2019-01-06
Payer: COMMERCIAL

## 2019-01-06 ENCOUNTER — APPOINTMENT (OUTPATIENT)
Dept: GENERAL RADIOLOGY | Facility: CLINIC | Age: 79
DRG: 871 | End: 2019-01-06
Payer: COMMERCIAL

## 2019-01-06 LAB
ALBUMIN SERPL-MCNC: 2.2 G/DL (ref 3.4–5)
ALP SERPL-CCNC: 41 U/L (ref 40–150)
ALT SERPL W P-5'-P-CCNC: 55 U/L (ref 0–50)
ANION GAP SERPL CALCULATED.3IONS-SCNC: 7 MMOL/L (ref 3–14)
AST SERPL W P-5'-P-CCNC: 27 U/L (ref 0–45)
BILIRUB SERPL-MCNC: 0.7 MG/DL (ref 0.2–1.3)
BUN SERPL-MCNC: 24 MG/DL (ref 7–30)
CALCIUM SERPL-MCNC: 8 MG/DL (ref 8.5–10.1)
CHLORIDE SERPL-SCNC: 105 MMOL/L (ref 94–109)
CO2 SERPL-SCNC: 23 MMOL/L (ref 20–32)
CREAT SERPL-MCNC: 0.99 MG/DL (ref 0.52–1.04)
ERYTHROCYTE [DISTWIDTH] IN BLOOD BY AUTOMATED COUNT: 17.6 % (ref 10–15)
GFR SERPL CREATININE-BSD FRML MDRD: 54 ML/MIN/{1.73_M2}
GLUCOSE SERPL-MCNC: 95 MG/DL (ref 70–99)
HCT VFR BLD AUTO: 32.6 % (ref 35–47)
HGB BLD-MCNC: 10.6 G/DL (ref 11.7–15.7)
MCH RBC QN AUTO: 27.7 PG (ref 26.5–33)
MCHC RBC AUTO-ENTMCNC: 32.5 G/DL (ref 31.5–36.5)
MCV RBC AUTO: 85 FL (ref 78–100)
NT-PROBNP SERPL-MCNC: 234 PG/ML (ref 0–1800)
PLATELET # BLD AUTO: 257 10E9/L (ref 150–450)
POTASSIUM SERPL-SCNC: 4.5 MMOL/L (ref 3.4–5.3)
PROT SERPL-MCNC: 5.8 G/DL (ref 6.8–8.8)
RBC # BLD AUTO: 3.83 10E12/L (ref 3.8–5.2)
SODIUM SERPL-SCNC: 135 MMOL/L (ref 133–144)
WBC # BLD AUTO: 13.3 10E9/L (ref 4–11)

## 2019-01-06 PROCEDURE — 83880 ASSAY OF NATRIURETIC PEPTIDE: CPT | Performed by: PHYSICIAN ASSISTANT

## 2019-01-06 PROCEDURE — 25000128 H RX IP 250 OP 636: Performed by: INTERNAL MEDICINE

## 2019-01-06 PROCEDURE — 25000132 ZZH RX MED GY IP 250 OP 250 PS 637: Performed by: PHYSICIAN ASSISTANT

## 2019-01-06 PROCEDURE — 25000128 H RX IP 250 OP 636: Performed by: PHYSICIAN ASSISTANT

## 2019-01-06 PROCEDURE — 12000000 ZZH R&B MED SURG/OB

## 2019-01-06 PROCEDURE — 36415 COLL VENOUS BLD VENIPUNCTURE: CPT | Performed by: PHYSICIAN ASSISTANT

## 2019-01-06 PROCEDURE — 93010 ELECTROCARDIOGRAM REPORT: CPT | Performed by: INTERNAL MEDICINE

## 2019-01-06 PROCEDURE — 71046 X-RAY EXAM CHEST 2 VIEWS: CPT

## 2019-01-06 PROCEDURE — 40000886 ZZH STATISTIC STEP DOWN HRS DAY

## 2019-01-06 PROCEDURE — 25000125 ZZHC RX 250: Performed by: PHYSICIAN ASSISTANT

## 2019-01-06 PROCEDURE — 40000884 ZZH STATISTIC STEP DOWN HRS NIGHT

## 2019-01-06 PROCEDURE — 99221 1ST HOSP IP/OBS SF/LOW 40: CPT | Performed by: INTERNAL MEDICINE

## 2019-01-06 PROCEDURE — 93005 ELECTROCARDIOGRAM TRACING: CPT

## 2019-01-06 PROCEDURE — 40000275 ZZH STATISTIC RCP TIME EA 10 MIN

## 2019-01-06 PROCEDURE — 97165 OT EVAL LOW COMPLEX 30 MIN: CPT | Mod: GO | Performed by: OCCUPATIONAL THERAPIST

## 2019-01-06 PROCEDURE — 80053 COMPREHEN METABOLIC PANEL: CPT | Performed by: PHYSICIAN ASSISTANT

## 2019-01-06 PROCEDURE — 85027 COMPLETE CBC AUTOMATED: CPT | Performed by: PHYSICIAN ASSISTANT

## 2019-01-06 PROCEDURE — 94640 AIRWAY INHALATION TREATMENT: CPT

## 2019-01-06 PROCEDURE — 97535 SELF CARE MNGMENT TRAINING: CPT | Mod: GO | Performed by: OCCUPATIONAL THERAPIST

## 2019-01-06 PROCEDURE — 97110 THERAPEUTIC EXERCISES: CPT | Mod: GO | Performed by: OCCUPATIONAL THERAPIST

## 2019-01-06 PROCEDURE — 40000133 ZZH STATISTIC OT WARD VISIT: Performed by: OCCUPATIONAL THERAPIST

## 2019-01-06 PROCEDURE — 99233 SBSQ HOSP IP/OBS HIGH 50: CPT | Performed by: INTERNAL MEDICINE

## 2019-01-06 PROCEDURE — 94640 AIRWAY INHALATION TREATMENT: CPT | Mod: 76

## 2019-01-06 RX ORDER — FUROSEMIDE 20 MG
20 TABLET ORAL DAILY
Status: DISCONTINUED | OUTPATIENT
Start: 2019-01-07 | End: 2019-01-10 | Stop reason: HOSPADM

## 2019-01-06 RX ORDER — FUROSEMIDE 10 MG/ML
20 INJECTION INTRAMUSCULAR; INTRAVENOUS
Status: DISCONTINUED | OUTPATIENT
Start: 2019-01-06 | End: 2019-01-06

## 2019-01-06 RX ORDER — FUROSEMIDE 10 MG/ML
20 INJECTION INTRAMUSCULAR; INTRAVENOUS
Status: COMPLETED | OUTPATIENT
Start: 2019-01-06 | End: 2019-01-06

## 2019-01-06 RX ADMIN — FLECAINIDE ACETATE 100 MG: 100 TABLET ORAL at 08:12

## 2019-01-06 RX ADMIN — METOPROLOL SUCCINATE 50 MG: 50 TABLET, EXTENDED RELEASE ORAL at 22:04

## 2019-01-06 RX ADMIN — IPRATROPIUM BROMIDE AND ALBUTEROL SULFATE 3 ML: .5; 2.5 SOLUTION RESPIRATORY (INHALATION) at 11:19

## 2019-01-06 RX ADMIN — ATORVASTATIN CALCIUM 40 MG: 40 TABLET, FILM COATED ORAL at 22:04

## 2019-01-06 RX ADMIN — SENNOSIDES AND DOCUSATE SODIUM 1 TABLET: 8.6; 5 TABLET ORAL at 22:04

## 2019-01-06 RX ADMIN — APIXABAN 5 MG: 5 TABLET, FILM COATED ORAL at 22:03

## 2019-01-06 RX ADMIN — APIXABAN 5 MG: 5 TABLET, FILM COATED ORAL at 08:12

## 2019-01-06 RX ADMIN — METOPROLOL SUCCINATE 50 MG: 50 TABLET, EXTENDED RELEASE ORAL at 08:12

## 2019-01-06 RX ADMIN — FUROSEMIDE 20 MG: 10 INJECTION, SOLUTION INTRAVENOUS at 07:54

## 2019-01-06 RX ADMIN — FUROSEMIDE 20 MG: 10 INJECTION, SOLUTION INTRAVENOUS at 17:06

## 2019-01-06 RX ADMIN — ACETAMINOPHEN 650 MG: 325 TABLET, FILM COATED ORAL at 18:49

## 2019-01-06 RX ADMIN — FLECAINIDE ACETATE 100 MG: 100 TABLET ORAL at 22:03

## 2019-01-06 RX ADMIN — CEFTRIAXONE SODIUM 2 G: 2 INJECTION, POWDER, FOR SOLUTION INTRAMUSCULAR; INTRAVENOUS at 17:06

## 2019-01-06 ASSESSMENT — MIFFLIN-ST. JEOR: SCORE: 1313.22

## 2019-01-06 ASSESSMENT — ACTIVITIES OF DAILY LIVING (ADL)
ADLS_ACUITY_SCORE: 13
ADLS_ACUITY_SCORE: 15

## 2019-01-06 NOTE — PLAN OF CARE
Pt VSS on 5-6L NC, attempted to wean pt off oxygen multiple times, but pt desats to mid to high 80s. Tele NSR. A&Ox4. Up with SBA, using bathroom. Denies pain. Slept. Plan to begin diuresing and repeat CXR. Continue to monitor.

## 2019-01-06 NOTE — PROGRESS NOTES
"SPIRITUAL HEALTH SERVICES  Progress Note  [facility] - CORONARY CARE UNIT     On-Call  response to pt Health Care Directive info request.  met briefly with pt alone re: healthcare directive needs. Pt clearly denied any needs at this time, and that \"that is all clear.\" Pt also named that she receives pastoral visits from her own [employer] , and denied any spiritual care or emotional needs for support at this time.         JIMMY Liao.Div  Staff   Pager [phone]  "

## 2019-01-06 NOTE — CONSULTS
[facility]  Cardiology Consultation   Date of Admission:  1/5/2019  Assessment & Plan   #1 Acute hypoxemic respiratory failure after 2 L of normal saline  #2 History of chronic HFpEF, stable not decompensated  #3  Pyelonephritis, acute kidney injury  #4 hypertension    Hamida Verma is a 79 yo female with history of hypertension, hyperlipidemia, atrial fibrillation, HFpEF, sinus node dysfunction status post pacemaker who was admitted with pyelonephritis. Cardiology consulted due to history of HFpEF. She appears very well compensated and euvolemic on exam. Still with oxygen requirement, suspect slight pulmonary congestion after 2L IVF given for sepsis. Agree with IV diuresis today, then back on home regimen. No other cardiac medication changes recommended.      Eva Rousseau MD  Text Page      Code Status    DNR/DNI    Reason for Consult   Reason for consult: Hypoxia    Primary Care Physician   Mikala Philip MD    Chief Complaint   weaknes    HPI:  Hamida Verma is a 79 yo female with past medical history of hypertension, hyperlipidemia, HFpEF, paroxysmal A. fib on Eliquis and flecainide who presented with hypotension and generalized weakness.  Cardiology has been consulted for heart failure exacerbation.    Patient suffered a mechanical fall 2 days prior to admission.  She routinely checks blood pressure and weight at home and noticed on the day of admission her blood pressure was 76/64.  She called EMS.  En route blood pressure was systolic 80-120s mmHg. she was recently taking prednisone after eye surgery and feels her weight and lower extremity has been fluctuating. Dry weight 174lbs. In ED BP 90/50, UA markedly abnormal.  WBC slightly elevated at 14.9 NT proBNP, 210 no signs of heart failure exacerbation.  Troponin negative creatinine 1.35 above baseline 0.9.  Patient was given 2 L normal sinus saline bolus for presumed pyelonephritis.  She then became hypoxemic after IV fluids requiring  6 L oxygen. Currently denies LE edema, SOB. Was doing very well as an outpatient.    Past Medical History   I have reviewed this patient's medical history and updated it with pertinent information if needed.   Past Medical History:   Diagnosis Date     Atrial fibrillation (H)      Chronic diastolic CHF (congestive heart failure) (H)      Essential hypertension, benign      HYPERLIPIDEMIA NEC/NOS 3/30/2006     Pulmonary hypertension (H)      SSS (sick sinus syndrome) (H)     s/p PPM 3/2018     Past Surgical History   Past Surgical History:   Procedure Laterality Date     EYE SURGERY       HYSTERECTOMY, VAGINAL      hysterectomy       Prior to Admission Medications   Prior to Admission Medications   Prescriptions Last Dose Informant Patient Reported? Taking?   Coral Calcium 133-66.7-133 MG-MG-UNIT CAPS 2019 at AM Self Yes Yes   Sig: Take 2 tablets by mouth 2 times daily    FLAX SEED OIL OR 2019 at AM Self Yes Yes   Si capsule daily   Krill Oil 500 MG CAPS 2019 at AM Self Yes Yes   Sig: Take 1 capsule by mouth daily   Methylsulfonylmethane (MSM) 500 MG CAPS 2019 at AM Self Yes Yes   Sig: Take 1 capsule by mouth daily   Milk Thistle 200 MG CAPS 2019 at AM Self Yes Yes   Sig: Take 1 capsule by mouth daily    Multiple Vitamins-Minerals (HAIR SKIN NAILS PO) 2019 at PM Self Yes Yes   Sig: Take 1 tablet by mouth At Bedtime   alendronate (FOSAMAX) 70 MG tablet 2019 Self No Yes   Sig: Take 1 tablet (70 mg) by mouth every 7 days Take 60 minutes before am meal with 8 oz. water. Remain upright for 30 minutes.   apixaban ANTICOAGULANT (ELIQUIS) 5 MG tablet 2019 at AM Self No Yes   Sig: Take 1 tablet (5 mg) by mouth 2 times daily   atorvastatin (LIPITOR) 40 MG tablet 2019 at PM Self Yes Yes   Sig: Take 40 mg by mouth daily   cholecalciferol (VITAMIN  -D) 1000 UNIT capsule 2019 at AM Self Yes Yes   Sig: Take 1 capsule by mouth daily.   flecainide (TAMBOCOR) 100 MG tablet 2019 at AM  Self No Yes   Sig: Take 1 tablet (100 mg) by mouth 2 times daily   furosemide (LASIX) 20 MG tablet 1/5/2019 at AM Self No Yes   Sig: Take 1 tablet (20 mg) by mouth daily   lisinopril (PRINIVIL/ZESTRIL) 40 MG tablet 1/5/2019 at AM Self No Yes   Sig: Take 1 tablet (40 mg) by mouth daily   metoprolol succinate (TOPROL-XL) 50 MG 24 hr tablet 1/5/2019 at AM Self No Yes   Sig: Take 1 tablet (50 mg) by mouth 2 times daily   potassium & sodium phosphates 278-164-250 MG/75ML SOLR 1/5/2019 at AM Self Yes Yes   Sig: Take 1 tablet by mouth daily       Facility-Administered Medications: None     Allergies   Allergies   Allergen Reactions     Strawberry Flavor        Social History   I have reviewed this patient's social history and updated it with pertinent information if needed. Hamida Verma  reports that she quit smoking about 18 years ago. She started smoking about 63 years ago. she has never used smokeless tobacco. She reports that she does not drink alcohol or use drugs.    Family History   I have reviewed this patient's family history and updated it with pertinent information if needed.   Family History   Problem Relation Age of Onset     Cerebrovascular Disease Mother      Glaucoma Mother      Macular Degeneration Mother      Alcohol/Drug Father         alcohol     Myocardial Infarction Father 63        passed away from MI     Family History Negative Sister      Family History Negative Sister      Family History Negative Sister      Cerebrovascular Disease Sister      Family History Negative Brother      Family History Negative Maternal Grandmother      Family History Negative Maternal Grandfather      Family History Negative Paternal Grandmother      Family History Negative Paternal Grandfather      Myocardial Infarction Son 57        passed away from MI     Dementia Daughter 57        early onset on namenda     Diabetes No family hx of      Breast Cancer No family hx of      Cancer - colorectal No family hx of         Review of Systems   The 10 point Review of Systems is negative other than noted in the HPI or here.   Physical Exam   Temp: 98.8  F (37.1  C) Temp src: Oral BP: 108/54 Pulse: 75 Heart Rate: 76 Resp: 28 SpO2: 93 % O2 Device: Nasal cannula Oxygen Delivery: 6 LPM  Vital Signs with Ranges  Temp:  [98.4  F (36.9  C)-98.8  F (37.1  C)] 98.8  F (37.1  C)  Pulse:  [61-79] 75  Heart Rate:  [61-79] 76  Resp:  [10-28] 28  BP: ()/(50-90) 108/54  SpO2:  [87 %-97 %] 93 %  177 lbs 9.6 oz    Constitutional: alert, oriented, no distress  Eyes: anicteric sclerae, no conjunctivitis   Respiratory: normal respiratory effort on room air, clear to auscultation bilaterally, no wheezing, no crackles noted.  Cardiovascular: regular rate and rhythm, normal S1 and S2, no third heart sounds, normal JVP  GI: soft, non-tender, non-distended, normal bowel sounds throughout  Extremities: No peripheral edema noted  Genitourinary: no mckeon catheter in place  Skin: no rashes or breakdown of examined skin areas  Neurologic: no focal deficits, grossly intact  Neuropsychiatric: normal affect, and mood. Answers questions appropriately, interactive on exam    Data   Results for orders placed or performed during the hospital encounter of 01/05/19 (from the past 24 hour(s))   CBC with platelets differential   Result Value Ref Range    WBC 14.9 (H) 4.0 - 11.0 10e9/L    RBC Count 4.31 3.8 - 5.2 10e12/L    Hemoglobin 12.1 11.7 - 15.7 g/dL    Hematocrit 36.5 35.0 - 47.0 %    MCV 85 78 - 100 fl    MCH 28.1 26.5 - 33.0 pg    MCHC 33.2 31.5 - 36.5 g/dL    RDW 17.2 (H) 10.0 - 15.0 %    Platelet Count 279 150 - 450 10e9/L    Diff Method Automated Method     % Neutrophils 79.2 %    % Lymphocytes 6.9 %    % Monocytes 9.5 %    % Eosinophils 0.4 %    % Basophils 0.3 %    % Immature Granulocytes 3.7 %    Nucleated RBCs 0 0 /100    Absolute Neutrophil 11.8 (H) 1.6 - 8.3 10e9/L    Absolute Lymphocytes 1.0 0.8 - 5.3 10e9/L    Absolute Monocytes 1.4 (H) 0.0 - 1.3  10e9/L    Absolute Eosinophils 0.1 0.0 - 0.7 10e9/L    Absolute Basophils 0.0 0.0 - 0.2 10e9/L    Abs Immature Granulocytes 0.6 (H) 0 - 0.4 10e9/L    Absolute Nucleated RBC 0.0    Comprehensive metabolic panel   Result Value Ref Range    Sodium 134 133 - 144 mmol/L    Potassium 4.7 3.4 - 5.3 mmol/L    Chloride 102 94 - 109 mmol/L    Carbon Dioxide 23 20 - 32 mmol/L    Anion Gap 9 3 - 14 mmol/L    Glucose 119 (H) 70 - 99 mg/dL    Urea Nitrogen 35 (H) 7 - 30 mg/dL    Creatinine 1.35 (H) 0.52 - 1.04 mg/dL    GFR Estimate 37 (L) >60 mL/min/[1.73_m2]    GFR Estimate If Black 43 (L) >60 mL/min/[1.73_m2]    Calcium 8.7 8.5 - 10.1 mg/dL    Bilirubin Total 0.7 0.2 - 1.3 mg/dL    Albumin 2.5 (L) 3.4 - 5.0 g/dL    Protein Total 6.5 (L) 6.8 - 8.8 g/dL    Alkaline Phosphatase 43 40 - 150 U/L    ALT 61 (H) 0 - 50 U/L    AST 24 0 - 45 U/L   Lactic acid whole blood   Result Value Ref Range    Lactic Acid 2.1 (H) 0.7 - 2.0 mmol/L   Troponin I   Result Value Ref Range    Troponin I ES <0.015 0.000 - 0.045 ug/L   Nt probnp inpatient (BNP)   Result Value Ref Range    N-Terminal Pro BNP Inpatient 210 0 - 1,800 pg/mL   Magnesium   Result Value Ref Range    Magnesium 2.6 (H) 1.6 - 2.3 mg/dL   Phosphorus   Result Value Ref Range    Phosphorus 3.6 2.5 - 4.5 mg/dL   EKG 12 lead   Result Value Ref Range    Interpretation ECG Click View Image link to view waveform and result    XR Chest 2 Views    Narrative    CHEST TWO VIEWS 1/5/2019 3:56 PM     COMPARISON: Two-view chest x-ray 11/10/2018    HISTORY: Shortness of breath.    FINDINGS: A dual-lead pacemaker module implanted over the left chest  with the leads in the right atrium and right ventricle is again noted  without identifiable change.    The cardiac silhouette, pulmonary vasculature, lungs and pleural  spaces are within normal limits.      Impression    IMPRESSION: Clear lungs.    CHIRAG MCCABE MD   Blood gas arterial and oxyhgb   Result Value Ref Range    pH Arterial 7.46 (H) 7.35 -  7.45 pH    pCO2 Arterial 37 35 - 45 mm Hg    pO2 Arterial 63 (L) 80 - 105 mm Hg    Bicarbonate Arterial 26 21 - 28 mmol/L    Oxyhemoglobin Arterial 92 92 - 100 %    Base Excess Art 2.0 mmol/L   UA with Microscopic   Result Value Ref Range    Color Urine Yellow     Appearance Urine Slightly Cloudy     Glucose Urine Negative NEG^Negative mg/dL    Bilirubin Urine Negative NEG^Negative    Ketones Urine Negative NEG^Negative mg/dL    Specific Gravity Urine 1.012 1.003 - 1.035    Blood Urine Negative NEG^Negative    pH Urine 6.0 5.0 - 7.0 pH    Protein Albumin Urine Negative NEG^Negative mg/dL    Urobilinogen mg/dL Normal 0.0 - 2.0 mg/dL    Nitrite Urine Negative NEG^Negative    Leukocyte Esterase Urine Large (A) NEG^Negative    Source Catheterized Urine     WBC Urine 38 (H) 0 - 5 /HPF    RBC Urine 1 0 - 2 /HPF    Bacteria Urine Few (A) NEG^Negative /HPF    Mucous Urine Present (A) NEG^Negative /LPF    Hyaline Casts 1 0 - 2 /LPF   Urine Culture   Result Value Ref Range    Specimen Description Catheterized Urine     Special Requests Specimen received in preservative     Culture Micro PENDING    Blood culture   Result Value Ref Range    Specimen Description Blood Left Hand     Special Requests Aerobic and anaerobic bottles received     Culture Micro No growth after 5 hours    Lactic acid   Result Value Ref Range    Lactic Acid 0.9 0.7 - 2.0 mmol/L   Blood culture   Result Value Ref Range    Specimen Description Blood Right Arm     Special Requests Aerobic and anaerobic bottles received     Culture Micro No growth after 6 hours    Troponin I   Result Value Ref Range    Troponin I ES <0.015 0.000 - 0.045 ug/L   CK total   Result Value Ref Range    CK Total 34 30 - 225 U/L   Procalcitonin   Result Value Ref Range    Procalcitonin 0.06 ng/ml   Troponin I   Result Value Ref Range    Troponin I ES <0.015 0.000 - 0.045 ug/L   CBC with platelets   Result Value Ref Range    WBC 13.3 (H) 4.0 - 11.0 10e9/L    RBC Count 3.83 3.8 - 5.2  10e12/L    Hemoglobin 10.6 (L) 11.7 - 15.7 g/dL    Hematocrit 32.6 (L) 35.0 - 47.0 %    MCV 85 78 - 100 fl    MCH 27.7 26.5 - 33.0 pg    MCHC 32.5 31.5 - 36.5 g/dL    RDW 17.6 (H) 10.0 - 15.0 %    Platelet Count 257 150 - 450 10e9/L   Comprehensive metabolic panel   Result Value Ref Range    Sodium 135 133 - 144 mmol/L    Potassium 4.5 3.4 - 5.3 mmol/L    Chloride 105 94 - 109 mmol/L    Carbon Dioxide 23 20 - 32 mmol/L    Anion Gap 7 3 - 14 mmol/L    Glucose 95 70 - 99 mg/dL    Urea Nitrogen 24 7 - 30 mg/dL    Creatinine 0.99 0.52 - 1.04 mg/dL    GFR Estimate 54 (L) >60 mL/min/[1.73_m2]    GFR Estimate If Black 63 >60 mL/min/[1.73_m2]    Calcium 8.0 (L) 8.5 - 10.1 mg/dL    Bilirubin Total 0.7 0.2 - 1.3 mg/dL    Albumin 2.2 (L) 3.4 - 5.0 g/dL    Protein Total 5.8 (L) 6.8 - 8.8 g/dL    Alkaline Phosphatase 41 40 - 150 U/L    ALT 55 (H) 0 - 50 U/L    AST 27 0 - 45 U/L   Nt probnp inpatient   Result Value Ref Range    N-Terminal Pro BNP Inpatient 234 0 - 1,800 pg/mL

## 2019-01-06 NOTE — ED TRIAGE NOTES
Pt reported that she wanted to be DNR/DNI, but didn't want her daughter to know. Receiving RN on CCU updated on this. Hospitalist also aware of this so they can re-address code status with pt while daughter is not with her.

## 2019-01-06 NOTE — PROGRESS NOTES
Report to Eileen on station 66. Pt denies pain. Room not clean at this time, so RN will call when appropriate to transfer. Ambulatory to  for transfer. Pt aware and will make family aware of room change.

## 2019-01-06 NOTE — ED NOTES
Report to [facility] RN. She had no questions upon completion of report. Pending repeat lactic acid results.

## 2019-01-06 NOTE — H&P
[facility]    History and Physical  Hospitalist       Date of Admission:  1/5/2019    Assessment & Plan   Hamida Verma is a very pleasant 78-year-old  female with past medical history significant for essential hypertension, dyslipidemia, atrial fibrillation on anticoagulation, history of heart failure with preserved ejection fraction, moderate pulmonary hypertension, sick sinus syndrome status post permanent pacemaker placement who was admitted on October 11 with shortness of breath which was thought to be secondary to acute decompensation of diastolic heart failure.     Pyelonephritis with weakness:   UA abnormal with Large LE, WBC 38, although neg nitrites. Leukocytosis with WBC 14.9. Lactate 2.1 --> 0.9. Felt generalized malaise after falling on Thursday. Denies changes to urination. Intermittent dry cough. Denies fevers/chills. CXR unremarkable.  - Admit to [facility] overnight  - Continue 2gm Rocephin Q 24 hrs, await urine culture results  - Repeat CBC in AM  - Add on CPK, Mg++, procal    Acute kidney injury:   Cr typically 0.69-0.96. Has been creeping up with prednisone use. Recently started on spironolactone but only took for 2 days due to dizziness, has not taken in 4 days. Cr at last lab draw 1.35 and again today 1.35.  - Repeat CMP in AM  - Hold ACE-I in setting of JERE and hypotension on admission, re-eval resumption in AM  - Received 2L NS in ED, no further fluids given O2 needs    Acute hypoxia after receiving 2L NS bolus in ED  Chronic diastolic CHF with preserved ejection fraction:  Remote Smoking hx, possible COPD   Hypertension:    +Chronic diastolic CHF with the last LV ejection fraction of 60-65% as well as moderate pulmonary HTN by ECHO in 3/2018.  PTA on Lasix 20 mg PO daily, lisinopril 40 mg p.o. daily and metoprolol XL 50 mg p.o. b.i.d.    ECHO Oct 2018: LV mild to mod dilation, TR 1-2+, normal systolic vxn, EF 55-60%, LV diastolic fxn indeterminate. No RWMA. No  thrombus.  Was advised to get outpatient pulmonary function testing for proper diagnosis of COPD due to high probability during Oct 2018 admission but she tells me she did not do this yet. Has a remote smoking hx, quit in 2010. Some emphysematous changes on prior chest CT.  - daily weights, I/Os, DRY WT ~174#, telemetry  - [facility] patient, consulted as courtesy   - Given 2L in ED due to hypotension and UTI. ABG showing pH 7.46 and pO2 63. Now with bibasilar rales on exam and requiring 5L NC. No SOB or distress. Takes Lasix 20mg PO BID at home PTA, hold for tonight and resume with IV dosing 20mg BID in AM  - Repeat the CXR in AM and pro-BNP to ensure not starting CHF exacerbation  - Wean O2 as able, Duonebs 4x/day and PRN, Encourage IS  - Troponin neg, follow x2 more given O2 requirments, no chest pain, palpitations, or SOB  - Repeat CXR in AM  - 2mg Na+ diet     Paroxysmal atrial fibrillation/sick sinus syndrome, status post permanent pacemaker in 03/2018:    EKG in the ER revealed normal sinus rhythm, rate controlled 60s. Chronically anticoagulated with apixaban. Has a pacemaker  - Pacemaker recently interogated   - Continue PTA Eliquis 5mg BID, Toprol-XL 50 mg BID with hold parameters starting tomorrow (hold tonights dose due to hypotension) and flecainide 100 mg BID        Dyslipidemia:  Continue PTA simvastatin 20 mg p.o. at bedtime.       Moderate pulmonary hypertension:    Based on echo finding in 03/2018. Repeat ECHO 10/12/18 does not suggest pulm HTN.      DVT Prophylaxis: Eliquis  Code Status: Full Code - discussed with patient specifically and she would like to be full code at this time. Per RN note, pt expressed she may prefer DNR/DNI if daughter was not around, this should be re-addressed tomorrow.     Disposition: Expected discharge in >2 days once clinically stable.    This patient was discussed with Dr. Singh of the Hospitalist Service who agrees with current plans as outlined above.    Diane  VALERIA York PA-C  Hospitalist Physician Assistant  Pager: [phone]      Primary Care Physician   Mikala Philip MD    Chief Complaint   Low blood pressure    History is obtained from the patient, electronic health record and emergency department physician    History of Present Illness   Hamida Verma prasanna 78 year old  female with a PMH HTN, HLD, diastolic CHF with preserved EF, paroxysmal atrial fibrillation on Eliquis and flecainide who presents to the emergency department for evaluation of hypotension with generalized malaise. She last felt normal on 1/2 but has experienced dizziness and weakness following a mechanical fall on 1/3. Pt checks BPs daily at home and weight as she is a [facility] pt and follows closely, she had normal BP this AM usually running 120s/80s. Around 2pm she checked BP again which came back 76/64 therefore she called EMS. EMS checked and she was running 80s-130s/50s-70s and she was brought to the ED. She was started on Aldactone on 12/19, however only took a few doses as it was causing dizziness. Has been tapering Prednisone for her recent eye surgery last month, she feels the prednisone has been altering her weights and BLE edema but with tapering it has improved. Denies chest pain, palpitations, or SOB. No headaches, present dizziness, or N/V/abd pain. No bowel or bladder changes. Admits to tripping on a curb x2 days ago and hitting her knee (bruised on exam) and shoulder, no LOC and did not hit her head. Was able to walk afterward. +Generalized malaise.     In the ED she was found to have soft BPs in the 90s/50s. UA abnormal with Large LE, neg nitrites, and WBC 38, culture pending. Blood cultures drawn x2. WBC 14.9, pro- with no s/s CHF exacerbation. Trop undetectable. Lactate 2.1 which trended down to 0.9. Cr noted 1.35 above usual baseline 0.69-0.9 range. Given low pressures, pt was given 2L NS boluses and started on Rocephin for presumed pyelonephritis. CXR  unremarkable. She became hypoxic after IV fluids requiring up to 6L which is now being weaned down. She was given a duoneb and NC, no SOB. No increased work of breathing. Noted to have bibasilar rales on my exam.    Past Medical History    I have reviewed this patient's medical history and updated it with pertinent information if needed.   Past Medical History:   Diagnosis Date     Atrial fibrillation (H)      Chronic diastolic CHF (congestive heart failure) (H)      Essential hypertension, benign      HYPERLIPIDEMIA NEC/NOS 3/30/2006     Pulmonary hypertension (H)      SSS (sick sinus syndrome) (H)     s/p PPM 3/2018       Past Surgical History   I have reviewed this patient's surgical history and updated it with pertinent information if needed.  Past Surgical History:   Procedure Laterality Date     EYE SURGERY       HYSTERECTOMY, VAGINAL      hysterectomy       Prior to Admission Medications   Prior to Admission Medications   Prescriptions Last Dose Informant Patient Reported? Taking?   Coral Calcium 133-66.7-133 MG-MG-UNIT CAPS 2019 at AM Self Yes Yes   Sig: Take 2 tablets by mouth 2 times daily    FLAX SEED OIL OR 2019 at AM Self Yes Yes   Si capsule daily   Krill Oil 500 MG CAPS 2019 at AM Self Yes Yes   Sig: Take 1 capsule by mouth daily   Methylsulfonylmethane (MSM) 500 MG CAPS 2019 at AM Self Yes Yes   Sig: Take 1 capsule by mouth daily   Milk Thistle 200 MG CAPS 2019 at AM Self Yes Yes   Sig: Take 1 capsule by mouth daily    Multiple Vitamins-Minerals (HAIR SKIN NAILS PO) 2019 at PM Self Yes Yes   Sig: Take 1 tablet by mouth At Bedtime   alendronate (FOSAMAX) 70 MG tablet 2019 Self No Yes   Sig: Take 1 tablet (70 mg) by mouth every 7 days Take 60 minutes before am meal with 8 oz. water. Remain upright for 30 minutes.   apixaban ANTICOAGULANT (ELIQUIS) 5 MG tablet 2019 at AM Self No Yes   Sig: Take 1 tablet (5 mg) by mouth 2 times daily   atorvastatin (LIPITOR) 40 MG  tablet 1/4/2019 at PM Self Yes Yes   Sig: Take 40 mg by mouth daily   cholecalciferol (VITAMIN  -D) 1000 UNIT capsule 1/5/2019 at AM Self Yes Yes   Sig: Take 1 capsule by mouth daily.   flecainide (TAMBOCOR) 100 MG tablet 1/5/2019 at AM Self No Yes   Sig: Take 1 tablet (100 mg) by mouth 2 times daily   furosemide (LASIX) 20 MG tablet 1/5/2019 at AM Self No Yes   Sig: Take 1 tablet (20 mg) by mouth daily   lisinopril (PRINIVIL/ZESTRIL) 40 MG tablet 1/5/2019 at AM Self No Yes   Sig: Take 1 tablet (40 mg) by mouth daily   metoprolol succinate (TOPROL-XL) 50 MG 24 hr tablet 1/5/2019 at AM Self No Yes   Sig: Take 1 tablet (50 mg) by mouth 2 times daily   potassium & sodium phosphates 278-164-250 MG/75ML SOLR 1/5/2019 at AM Self Yes Yes   Sig: Take 1 tablet by mouth daily       Facility-Administered Medications: None     Allergies   Allergies   Allergen Reactions     Strawberry Flavor        Social History   I have reviewed this patient's social history and updated it with pertinent information if needed. Hamida Verma  reports that she quit smoking about 18 years ago. She started smoking about 63 years ago. she has never used smokeless tobacco. She reports that she does not drink alcohol or use drugs.    Family History   I have reviewed this patient's family history and updated it with pertinent information if needed.   Family History   Problem Relation Age of Onset     Cerebrovascular Disease Mother      Glaucoma Mother      Macular Degeneration Mother      Alcohol/Drug Father         alcohol     Myocardial Infarction Father 63        passed away from MI     Family History Negative Sister      Family History Negative Sister      Family History Negative Sister      Cerebrovascular Disease Sister      Family History Negative Brother      Family History Negative Maternal Grandmother      Family History Negative Maternal Grandfather      Family History Negative Paternal Grandmother      Family History Negative Paternal  "Grandfather      Myocardial Infarction Son 57        passed away from MI     Dementia Daughter 57        early onset on namenda     Diabetes No family hx of      Breast Cancer No family hx of      Cancer - colorectal No family hx of        Review of Systems   The 10 point Review of Systems is negative other than noted in the HPI or here.     Physical Exam   Temp: 98.4  F (36.9  C) Temp src: Oral BP: 117/50 Pulse: 65 Heart Rate: 71 Resp: 11 SpO2: 91 % O2 Device: Nasal cannula Oxygen Delivery: 5 LPM  Vital Signs with Ranges  /50 (BP Location: Left arm)   Pulse 65   Temp 98.4  F (36.9  C) (Oral)   Resp 11   Ht 1.702 m (5' 7\")   Wt 75.6 kg (166 lb 9.6 oz)   LMP  (LMP Unknown)   SpO2 91%   BMI 26.09 kg/m    Temp:  [98.4  F (36.9  C)-98.6  F (37  C)] 98.4  F (36.9  C)  Pulse:  [61-65] 65  Heart Rate:  [61-72] 71  Resp:  [10-23] 11  BP: ()/(50-90) 117/50  SpO2:  [87 %-97 %] 91 %  166 lbs 9.6 oz    General: Awake, alert, female who appears stated age. Looks comfortable sitting up in bed, family at bedside.  HEENT: Normocephalic, atraumatic. Extraocular movements intact. Lt pupil asymmetric due to recent eye surgery. Rt pupil reactive to light. Oropharynx clear without exudates.   Respiratory: Clear to auscultation bilaterally in upper fields. +bibasilar course rales. 5L NC in place. No increased work of breathing, no accessory muscle use.  Cardiovascular: Regular rate and rhythm, +S1 and S2 without murmur, gallops, or rubs. LLE trace to 1+ pitting edema. RLE 1-2+ pitting edema. Dorsalis pedis pulses palpable bilaterally.   Gastrointestinal: Soft, non-tender, non-distended. Normoactive bowel sounds x4 quadrants.  Skin: Warm, dry. No rashes, no obvious rashes or lesions on exposed skin.  Musculoskeletal: No joint swelling, erythema or tenderness. Moves all extremities equally. No CVA tenderness.   Neurologic: AAO x3. Cranial nerves 2-12 grossly intact, normal strength and sensation.  Psychiatric: " Appropriate mood and affect. No obvious anxiety or depression.    Data   Data reviewed today:  I personally reviewed the EKG tracing showing NSR, 62 bpm.  Recent Labs   Lab 01/05/19  1520 01/02/19  0816   WBC 14.9*  --    HGB 12.1  --    MCV 85  --      --     134   POTASSIUM 4.7 4.5   CHLORIDE 102 99   CO2 23 26   BUN 35* 40*   CR 1.35* 1.35*   ANIONGAP 9 9   GURWINDER 8.7 10.0   * 97   ALBUMIN 2.5*  --    PROTTOTAL 6.5*  --    BILITOTAL 0.7  --    ALKPHOS 43  --    ALT 61*  --    AST 24  --    TROPI <0.015  --        Imaging:  Recent Results (from the past 24 hour(s))   XR Chest 2 Views    Narrative    CHEST TWO VIEWS 1/5/2019 3:56 PM     COMPARISON: Two-view chest x-ray 11/10/2018    HISTORY: Shortness of breath.    FINDINGS: A dual-lead pacemaker module implanted over the left chest  with the leads in the right atrium and right ventricle is again noted  without identifiable change.    The cardiac silhouette, pulmonary vasculature, lungs and pleural  spaces are within normal limits.      Impression    IMPRESSION: Clear lungs.    CHIRAG MCCABE MD

## 2019-01-06 NOTE — PROGRESS NOTES
[facility]    Medicine Progress Note - Hospitalist Service       Date of Admission:  1/5/2019  Assessment & Plan      Hamida Verma is a very pleasant 78-year-old  female with past medical history significant for essential hypertension, dyslipidemia, atrial fibrillation on anticoagulation, history of heart failure with preserved ejection fraction, moderate pulmonary hypertension, sick sinus syndrome status post permanent pacemaker placement who was admitted on October 11 with shortness of breath which was thought to be secondary to acute decompensation of diastolic heart failure.     Pyelonephritis with weakness:   UA abnormal with Large LE, WBC 38, although neg nitrites. Leukocytosis with WBC 14.9. Lactate 2.1 --> 0.9. Felt generalized malaise after falling on Thursday. Denies changes to urination. Intermittent dry cough. Denies fevers/chills. CXR unremarkable.  - Admitted to [facility] overnight but stable, will change to Gen [facility]  - Continue 2gm Rocephin Q 24 hrs, still awaiting urine culture results, WBC is down from 14k to 13k  - Repeat CBC on Monday 1/7/19  - Procal shows low risk of systemic infection    Acute kidney injury:   Cr typically 0.69-0.96. Has been creeping up with prednisone use. Recently started on spironolactone but only took for 2 days due to dizziness, has not taken in 4 days. Cr at last lab draw 1.35 and again today 1.35.  - Renal function normal this AM, monitor as we will be starting some IV lasix this AM  - CK normal, no indication of rhabdomyolysis, no IVFs as this time  - hold ACEI for now in light of diuresis, BP is stable     Acute hypoxia after receiving 2L NS bolus in ED  Chronic diastolic CHF with preserved ejection fraction:  Remote Smoking hx, possible COPD   Hypertension:    +Chronic diastolic CHF with the last LV ejection fraction of 60-65% as well as moderate pulmonary HTN by ECHO in 3/2018.  PTA on Lasix 20 mg PO daily, lisinopril 40 mg p.o. daily and  metoprolol XL 50 mg p.o. b.i.d.    ECHO  Oct 2018: LV mild to mod dilation, TR 1-2+, normal systolic vxn, EF 55-60%, LV diastolic fxn indeterminate. No RWMA. No thrombus.  Was advised to get outpatient pulmonary function testing for proper diagnosis of COPD due to high probability during Oct 2018 admission but she tells me she did not do this yet. Has a remote smoking hx, quit in 2010. Some emphysematous changes on prior chest CT.  - CXR clear, patient feeling better, less weak and fatigued but O2 sats worse with increase in O2 needs to 6L NC this AM  - she doesn't appear to be in respiratory distress, has only a mild, non-productive cough   - Not sure of etiology, CHF considered and will try IV lasix today but no rales or crackles and BMP is only 234  - no sign of COPD of physical exam, keep on q4 duonebs for now, consider adding prednisone if lasix not effective today  - repeat CXR this AM  - consider ACS, troponin x 3 negative yesterday, ECG shows new inferior infarct, no CP/N/V/Diaphoresis, SOB could be anginal equivalent, will repeat ECG this AM, consider repeat TTE     Paroxysmal atrial fibrillation/sick sinus syndrome, status post permanent pacemaker in 03/2018:    EKG in the ER revealed normal sinus rhythm, rate controlled 60s. Chronically anticoagulated with apixaban. Has a pacemaker  - Pacemaker recently interogated   - Continue PTA Eliquis 5mg BID, Toprol-XL 50 mg BID with hold parameters starting tomorrow (hold tonights dose due to hypotension) and flecainide 100 mg BID        Dyslipidemia:  Continue PTA simvastatin 20 mg p.o. at bedtime.       Moderate pulmonary hypertension:    Based on echo finding in 03/2018. Repeat ECHO 10/12/18 does not suggest pulm HTN.        Diet: Combination Diet 2 gm NA Diet; Low Saturated Fat Diet    DVT Prophylaxis: Eliquis  Lopez Catheter: not present  Code Status: DNR/DNI, please note, the patient has requested this topic not be discussed in front of her daughter out of  fear that it will start arguments.  I counseled the patient that she will need to get something in writing in case she becomes mentally incapacitated, ok with advance directive consult but they cant talk to her about this when he daughter is here.    Disposition Plan   Expected discharge: 2 - 3 days, recommended to prior living arrangement once O2 use less than 2 liters/minute.  Entered: Mynor Santos MD 01/06/2019, 8:13 AM       The patient's care was discussed with the Bedside Nurse and Patient.    Mynor Santos MD  Hospitalist Service  [facility]  Pager [phone]    ______________________________________________________________________    Interval History   The patient reports she is feeling better today.  She denies any SOB or cough at rest.  No dizziness.  Denies any nausea or vomiting.  NO chest pain.  Ambulates to toilet and back without assistance.  This small bit of activity doesn't seem to bother her breathing much.  She denies any recent peripheral edema.  No wheezing at home.  Says her family has told her she has been coughing at home but it was dry. Feeling less weak and fatigued.  RN informed me that anytime they try to wean O2 she drops into 80s, in fact she had to be turned up to 6L NC this AM.  Patient not laboring, very comfortable in bed.  She requested change in code status but daughter is not to be made aware of this.  I counseled that its her choice but she wants to avoid conflict and feels she is taking her daughters feelings into account.  Counseled that this will be a problem if she were to get to a point were she cant make decisions on her own behalf.  Says she doesn't have DNR/DNI written down but is willing to have someone help her with this.    Data reviewed today: I reviewed all medications, new labs and imaging results over the last 24 hours. I personally reviewed the chest x-ray image(s) showing clear lungs..    Physical Exam   Vital Signs: Temp: 98.8   F (37.1  C) Temp src: Oral BP: 108/54 Pulse: 75 Heart Rate: 76 Resp: 28 SpO2: 93 % O2 Device: Nasal cannula Oxygen Delivery: 6 LPM  Weight: 177 lbs 9.6 oz  Constitutional: awake, alert, cooperative, no apparent distress, and appears stated age  Respiratory: No increased work of breathing, good air exchange, clear to auscultation bilaterally, no crackles or wheezing  Cardiovascular: Normal apical impulse, regular rate and rhythm, normal S1 and S2, no S3 or S4, and no murmur noted  GI: No scars, normal bowel sounds, soft, non-distended, non-tender, no masses palpated, no hepatosplenomegally  Genitounirinary: Bladder:  Tenderness absent  Skin: no bruising or bleeding, normal skin color, texture, turgor and no redness, warmth, or swelling  Musculoskeletal: There is no redness, warmth, or swelling of the joints.  Full range of motion noted.  Motor strength is 5 out of 5 all extremities bilaterally.  Tone is normal.  Neurologic: Awake, alert, oriented to name, place and time.  Cranial nerves II-XII are grossly intact.  Motor is 5 out of 5 bilaterally.  Cerebellar finger to nose, heel to shin intact.  Sensory is intact.  Babinski down going, Romberg negative, and gait is normal.  Neuropsychiatric: General: normal, calm and normal eye contact  Level of consciousness: alert / normal  Affect: normal and pleasant  Orientation: oriented to self, place, time and situation  Memory and insight: normal, memory for past and recent events intact and thought process normal    Data   Recent Labs   Lab 01/06/19  0527 01/05/19  2325 01/05/19  1926 01/05/19  1520 01/02/19  0816   WBC 13.3*  --   --  14.9*  --    HGB 10.6*  --   --  12.1  --    MCV 85  --   --  85  --      --   --  279  --      --   --  134 134   POTASSIUM 4.5  --   --  4.7 4.5   CHLORIDE 105  --   --  102 99   CO2 23  --   --  23 26   BUN 24  --   --  35* 40*   CR 0.99  --   --  1.35* 1.35*   ANIONGAP 7  --   --  9 9   GURWINDER 8.0*  --   --  8.7 10.0   GLC 95   --   --  119* 97   ALBUMIN 2.2*  --   --  2.5*  --    PROTTOTAL 5.8*  --   --  6.5*  --    BILITOTAL 0.7  --   --  0.7  --    ALKPHOS 41  --   --  43  --    ALT 55*  --   --  61*  --    AST 27  --   --  24  --    TROPI  --  <0.015 <0.015 <0.015  --

## 2019-01-06 NOTE — PHARMACY-ADMISSION MEDICATION HISTORY
Admission medication history interview status for the 1/5/2019  admission is complete. See EPIC admission navigator for prior to admission medications     Medication history source reliability:Moderate    Actions taken by pharmacist (provider contacted, etc):None     Additional medication history information not noted on PTA med list : None    Medication reconciliation/reorder completed by provider prior to medication history? No    Time spent in this activity: 15 minutes    Prior to Admission medications    Medication Sig Last Dose Taking? Auth Provider   alendronate (FOSAMAX) 70 MG tablet Take 1 tablet (70 mg) by mouth every 7 days Take 60 minutes before am meal with 8 oz. water. Remain upright for 30 minutes. 1/2/2019 Yes Mikala Philip MD   apixaban ANTICOAGULANT (ELIQUIS) 5 MG tablet Take 1 tablet (5 mg) by mouth 2 times daily 1/5/2019 at AM Yes Kasie Alcocer APRN CNP   atorvastatin (LIPITOR) 40 MG tablet Take 40 mg by mouth daily 1/4/2019 at PM Yes Unknown, Entered By History   cholecalciferol (VITAMIN  -D) 1000 UNIT capsule Take 1 capsule by mouth daily. 1/5/2019 at AM Yes Reported, Patient   Coral Calcium 133-66.7-133 MG-MG-UNIT CAPS Take 2 tablets by mouth 2 times daily  1/5/2019 at AM Yes Reported, Patient   FLAX SEED OIL OR 1 capsule daily 1/5/2019 at AM Yes Reported, Patient   flecainide (TAMBOCOR) 100 MG tablet Take 1 tablet (100 mg) by mouth 2 times daily 1/5/2019 at AM Yes Roe Voss MD   furosemide (LASIX) 20 MG tablet Take 1 tablet (20 mg) by mouth daily 1/5/2019 at AM Yes Roe Voss MD   Krill Oil 500 MG CAPS Take 1 capsule by mouth daily 1/5/2019 at AM Yes Reported, Patient   lisinopril (PRINIVIL/ZESTRIL) 40 MG tablet Take 1 tablet (40 mg) by mouth daily 1/5/2019 at AM Yes Mikala Philip MD   Methylsulfonylmethane (MSM) 500 MG CAPS Take 1 capsule by mouth daily 1/5/2019 at AM Yes Unknown, Entered By History   metoprolol succinate (TOPROL-XL) 50 MG 24 hr tablet Take 1  tablet (50 mg) by mouth 2 times daily 1/5/2019 at AM Yes Kasie Alcocer, NELY CNP   Milk Thistle 200 MG CAPS Take 1 capsule by mouth daily  1/5/2019 at AM Yes Reported, Patient   Multiple Vitamins-Minerals (HAIR SKIN NAILS PO) Take 1 tablet by mouth At Bedtime 1/4/2019 at PM Yes Unknown, Entered By History   potassium & sodium phosphates 278-164-250 MG/75ML SOLR Take 1 tablet by mouth daily  1/5/2019 at AM Yes Reported, Patient

## 2019-01-06 NOTE — PROGRESS NOTES
01/06/19 1220   Quick Adds   Type of Visit Initial Occupational Therapy Evaluation   Living Environment   Lives With child(isidoro), adult;grandchild(isidoro)   Living Arrangements house  (2 story with basement)   Home Accessibility stairs to enter home;stairs within home;other (see comments)   Living Environment Comment 3 outside steps, 12 steps between floors, pt has a tub shower   Self-Care   Usual Activity Tolerance good   Current Activity Tolerance fair   Equipment Currently Used at Home none   Functional Level   Ambulation 0-->independent   Transferring 0-->independent   Toileting 0-->independent   Bathing 0-->independent   Dressing 0-->independent   Eating 0-->independent   Communication 0-->understands/communicates without difficulty   Swallowing 0-->swallows foods/liquids without difficulty   Cognition 0 - no cognition issues reported   Fall history within last six months yes   Number of times patient has fallen within last six months 1   Which of the above functional risks had a recent onset or change? fall history   General Information   Onset of Illness/Injury or Date of Surgery - Date 01/05/19   Referring Physician Diane York PA-C   Patient/Family Goals Statement return home   Additional Occupational Profile Info/Pertinent History of Current Problem Pt admitted with SOB, acute decompensative diastolic HF.  PMH includes essential HTN, dyslipidemia, aFib on coagulation, history of HF with preserved EF, sick sinus syndrome post pacemaker   Precautions/Limitations oxygen therapy device and L/min  (6L O2)   General Observations Pt in bed, willing to participate   Cognitive Status Examination   Orientation orientation to person, place and time   Level of Consciousness alert   Follows Commands (Cognition) WNL   Memory intact   Attention No deficits were identified   Visual Perception   Visual Perception Wears glasses   Pain Assessment   Patient Currently in Pain No   Range of Motion (ROM)   ROM Quick Adds No  "deficits were identified   ROM Comment B UEs   Strength   Manual Muscle Testing Quick Adds No deficits were identified   Strength Comments B UEs   Coordination   Upper Extremity Coordination No deficits were identified   Mobility   Bed Mobility Bed mobility skill: Supine to sit   Bed Mobility Skill: Supine to Sit   Level of [place]: Supine/Sit stand-by assist   Physical Assist/Nonphysical Assist: Supine/Sit verbal cues;1 person assist   Transfer Skill: Sit to Stand   Level of [place]: Sit/Stand contact guard   Physical Assist/Nonphysical Assist: Sit/Stand verbal cues;1 person assist   Transfer Skill: Sit to Stand full weight-bearing   Activities of Daily Living Analysis   Impairments Contributing to Impaired Activities of Daily Living strength decreased  (patient experiencing transient dizziness with position fernandez)   General Therapy Interventions   Planned Therapy Interventions ADL retraining;home program guidelines;progressive activity/exercise   Clinical Impression   Criteria for Skilled Therapeutic Interventions Met yes, treatment indicated   OT Diagnosis decreased indpendence in ADLS secondary to a cardiac condition   Influenced by the following impairments dizziness, SOB, decreased endurance   Assessment of Occupational Performance 3-5 Performance Deficits   Identified Performance Deficits decreased endurance for dressing, bathing,functional mobility, household chores   Clinical Decision Making (Complexity) Low complexity   Therapy Frequency daily   Predicted Duration of Therapy Intervention (days/wks) 4 days   Anticipated Discharge Disposition Home with Assist   Risks and Benefits of Treatment have been explained. Yes   Patient, Family & other staff in agreement with plan of care Yes   [facility] [employer] TM \"6 Clicks\"   2016, Trustees of [facility], under license to [employer].  All rights reserved.   6 Clicks Short Forms Daily Activity Inpatient Short Form   [facility]  " "\"6 Clicks\" Daily Activity Inpatient Short Form   1. Putting on and taking off regular lower body clothing? 4 - None   2. Bathing (including washing, rinsing, drying)? 3 - A Little   3. Toileting, which includes using toilet, bedpan or urinal? 4 - None   4. Putting on and taking off regular upper body clothing? 4 - None   5. Taking care of personal grooming such as brushing teeth? 4 - None   6. Eating meals? 4 - None   Daily Activity Raw Score (Score out of 24.Lower scores equate to lower levels of function) 23   Total Evaluation Time   Total Evaluation Time (Minutes) 15     "

## 2019-01-06 NOTE — PROGRESS NOTES
Transferred to station 66. Ambulatory to  with SBA. Denies pain. Jacket, cell phone and slippers with pt. 6 L oxygen for transport.

## 2019-01-06 NOTE — PLAN OF CARE
Neuro:intact  CV/Rhythm:sr  Resp/02:desats with activity, 6 L NC  GI/Diet:2 g na  :voiding  Skin/Incisions/Sites:intact  Pulses/CMS:intact  Edema:none  Activity/Falls Risk:fall risk  Lines/Drains/IVs:piv  Labs/BGM:-  Test/Procedures:cxr done improving  VS/Pain:stable, 0/10 pain  DC Plan:transfer to station 66, [facility] tele  Other:change to PO lasix tomorrow    Heart Failure Care Pathway  GOALS TO BE MET BEFORE DISCHARGE:    1. Decrease congestion and/or edema with diuretic therapy to achieve near      optimal volume status.            Dyspnea improved:  No, please explain: requiring 6 L of NC oxygen and desats with activity             Edema improved:     Yes        Net I/O and Weights since admission:          12/07 1500 - 01/06 1459  In: 600 [P.O.:600]  Out: 400 [Urine:400]  Net: 200            Vitals:    01/05/19 1515 01/05/19 1518 01/05/19 1900 01/06/19 0600   Weight: 79.4 kg (175 lb) 72.6 kg (160 lb) 75.6 kg (166 lb 9.6 oz) 80.6 kg (177 lb 9.6 oz)       2.  O2 sats > 92% on RA or at prior home O2 therapy level.          Current oxygenation status:       SpO2: 92 %         O2 Device: Nasal cannula,  Oxygen Delivery: 6 LPM         Able to wean O2 this shift to keep sats > 92%:  No, please explain: attempt to do 4 L but sats 89%, 84% on 6 L with activity        Does patient use Home O2? No    3.  Tolerates ambulation and mobility near baseline: Yes        How many times did the patient ambulate with nursing staff this shift? 4    Please review the Heart Failure Care Pathway for additional HF goal outcomes.    Pura Roa RN  1/6/2019

## 2019-01-06 NOTE — PLAN OF CARE
Discharge Planner OT     Pt is a 78 year old female admitted with SOB, acute decompensative diastolic HF.  PMH includes essential HTN, dyslipidemia, aFib on coagulation, history of HF with preserved EF, sick sinus syndrome post pacemaker.  Lives with adult daughter and granddaughter in a two story home.    Patient plan for discharge: Home, keep participating in the [facility] clinic  Current status: Initial evaluation complete, treatment started for ADLS and ambulation.  Pt educated on the progression of CR.  States she has had some education on CHF issues, been to the [facility] clinic 3 times.  Was set to go again this last Friday but unable.  Pt experienced dizziness whern first sitting up, cleared after a minute or so.  Dizziness again when standing, cleared in about a minute.  Came back to chair to sit to eat lunch.  Independent with self-feeding.  Pt ambulated 100 feet pushing WC on 6L O2.  O2 Sats dropped to 84% on return, came back up to the mid 90s within 3 minutes.  Pt did notice SOB and had some fatigue with exercise.   Barriers to return to prior living situation: Current medical condition, stairs at home  Recommendations for discharge: Home with assist of family and follow up with [facility] clinic  Rationale for recommendations: Anticipate pt will improve to go home with family assist and CORE clinic.  She has been mostly independent at home until lately and is motivated to get better.  Has family around all day per her report.       Entered by: Austin Knox 01/06/2019 12:45 PM

## 2019-01-07 ENCOUNTER — APPOINTMENT (OUTPATIENT)
Dept: GENERAL RADIOLOGY | Facility: CLINIC | Age: 79
DRG: 871 | End: 2019-01-07
Payer: COMMERCIAL

## 2019-01-07 ENCOUNTER — APPOINTMENT (OUTPATIENT)
Dept: OCCUPATIONAL THERAPY | Facility: CLINIC | Age: 79
DRG: 871 | End: 2019-01-07
Payer: COMMERCIAL

## 2019-01-07 LAB
BACTERIA SPEC CULT: ABNORMAL
BACTERIA SPEC CULT: ABNORMAL
GLUCOSE BLDC GLUCOMTR-MCNC: 106 MG/DL (ref 70–99)
GLUCOSE BLDC GLUCOMTR-MCNC: 112 MG/DL (ref 70–99)
GLUCOSE BLDC GLUCOMTR-MCNC: 149 MG/DL (ref 70–99)
Lab: ABNORMAL
SPECIMEN SOURCE: ABNORMAL

## 2019-01-07 PROCEDURE — 99233 SBSQ HOSP IP/OBS HIGH 50: CPT | Performed by: INTERNAL MEDICINE

## 2019-01-07 PROCEDURE — 00000146 ZZHCL STATISTIC GLUCOSE BY METER IP

## 2019-01-07 PROCEDURE — 71046 X-RAY EXAM CHEST 2 VIEWS: CPT

## 2019-01-07 PROCEDURE — 97110 THERAPEUTIC EXERCISES: CPT | Mod: GO

## 2019-01-07 PROCEDURE — 12000000 ZZH R&B MED SURG/OB

## 2019-01-07 PROCEDURE — 40000275 ZZH STATISTIC RCP TIME EA 10 MIN

## 2019-01-07 PROCEDURE — 25000125 ZZHC RX 250: Performed by: PHYSICIAN ASSISTANT

## 2019-01-07 PROCEDURE — 94640 AIRWAY INHALATION TREATMENT: CPT | Mod: 76

## 2019-01-07 PROCEDURE — 94640 AIRWAY INHALATION TREATMENT: CPT

## 2019-01-07 PROCEDURE — 25000125 ZZHC RX 250: Performed by: INTERNAL MEDICINE

## 2019-01-07 PROCEDURE — 25000128 H RX IP 250 OP 636: Performed by: PHYSICIAN ASSISTANT

## 2019-01-07 PROCEDURE — 25000132 ZZH RX MED GY IP 250 OP 250 PS 637: Performed by: PHYSICIAN ASSISTANT

## 2019-01-07 PROCEDURE — 25000132 ZZH RX MED GY IP 250 OP 250 PS 637: Performed by: INTERNAL MEDICINE

## 2019-01-07 PROCEDURE — 97535 SELF CARE MNGMENT TRAINING: CPT | Mod: GO

## 2019-01-07 PROCEDURE — 40000133 ZZH STATISTIC OT WARD VISIT

## 2019-01-07 RX ORDER — PREDNISONE 5 MG/1
5 TABLET ORAL EVERY OTHER DAY
Status: DISCONTINUED | OUTPATIENT
Start: 2019-01-07 | End: 2019-01-07

## 2019-01-07 RX ORDER — PREDNISONE 20 MG/1
40 TABLET ORAL DAILY
Status: DISCONTINUED | OUTPATIENT
Start: 2019-01-07 | End: 2019-01-10 | Stop reason: HOSPADM

## 2019-01-07 RX ADMIN — IPRATROPIUM BROMIDE AND ALBUTEROL SULFATE 3 ML: .5; 2.5 SOLUTION RESPIRATORY (INHALATION) at 15:16

## 2019-01-07 RX ADMIN — METOPROLOL SUCCINATE 50 MG: 50 TABLET, EXTENDED RELEASE ORAL at 21:28

## 2019-01-07 RX ADMIN — CEFTRIAXONE SODIUM 2 G: 2 INJECTION, POWDER, FOR SOLUTION INTRAMUSCULAR; INTRAVENOUS at 17:36

## 2019-01-07 RX ADMIN — APIXABAN 5 MG: 5 TABLET, FILM COATED ORAL at 21:28

## 2019-01-07 RX ADMIN — IPRATROPIUM BROMIDE AND ALBUTEROL SULFATE 3 ML: .5; 2.5 SOLUTION RESPIRATORY (INHALATION) at 07:00

## 2019-01-07 RX ADMIN — FLECAINIDE ACETATE 100 MG: 100 TABLET ORAL at 08:04

## 2019-01-07 RX ADMIN — METOPROLOL SUCCINATE 50 MG: 50 TABLET, EXTENDED RELEASE ORAL at 08:04

## 2019-01-07 RX ADMIN — ATORVASTATIN CALCIUM 40 MG: 40 TABLET, FILM COATED ORAL at 21:26

## 2019-01-07 RX ADMIN — FUROSEMIDE 20 MG: 20 TABLET ORAL at 08:04

## 2019-01-07 RX ADMIN — APIXABAN 5 MG: 5 TABLET, FILM COATED ORAL at 08:04

## 2019-01-07 RX ADMIN — IPRATROPIUM BROMIDE AND ALBUTEROL SULFATE 3 ML: .5; 2.5 SOLUTION RESPIRATORY (INHALATION) at 10:33

## 2019-01-07 RX ADMIN — PREDNISONE 5 MG: 5 TABLET ORAL at 12:44

## 2019-01-07 RX ADMIN — IPRATROPIUM BROMIDE AND ALBUTEROL SULFATE 3 ML: .5; 2.5 SOLUTION RESPIRATORY (INHALATION) at 19:44

## 2019-01-07 RX ADMIN — ACETAMINOPHEN 650 MG: 325 TABLET, FILM COATED ORAL at 08:03

## 2019-01-07 RX ADMIN — PREDNISONE 40 MG: 20 TABLET ORAL at 17:23

## 2019-01-07 RX ADMIN — FLECAINIDE ACETATE 100 MG: 100 TABLET ORAL at 21:28

## 2019-01-07 ASSESSMENT — ACTIVITIES OF DAILY LIVING (ADL)
ADLS_ACUITY_SCORE: 13
ADLS_ACUITY_SCORE: 14
ADLS_ACUITY_SCORE: 14
ADLS_ACUITY_SCORE: 13

## 2019-01-07 ASSESSMENT — MIFFLIN-ST. JEOR: SCORE: 1307.63

## 2019-01-07 NOTE — PROGRESS NOTES
Pt is on 5L NC with SpO2 90-95%. Breath sounds were diminished.  All nebs were given as ordered by MD and will continue to monitor and follow.     Evelia Estrada, RRT  1/7/2019 5:57 PM

## 2019-01-07 NOTE — CONSULTS
Pulmonary Medicine Consultation      Date of Admission: 1/5/2019  Primary Attending:  Annie Singh MD  Consulting Physician: Chuck Jefferson MD    History:     78 year old  female with history of hypertension atrial fibrillation heart failure with preserved EF presents with dyspnea on to be hypoxic.  Initially thought to be secondary to decompensated diastolic heart failure but it is more likely patient is having her first COPD exacerbation.  Patient was a heavy smoker but quit over 15 years ago but no history of PFTs were noted.  Not on any inhalers.  Her chest imaging does suggest signs of emphysematous changes per my review.  She presents with an initial UTI which is being treated with Rocephin.  She is on prednisone 5 mg every other day for the past few weeks due to a retinal tear and hematoma.  She denies coughing up any sputum, denies any sick contacts, travel or environmental exposures.  Currently on 5 L nasal cannula.      Review of Systems - A 10-system ROS is negative except for items mentioned above and in HPI.  Denies any chest pain or hemoptysis      Prior medical history:  Past Medical History:   Diagnosis Date     Atrial fibrillation (H)      Chronic diastolic CHF (congestive heart failure) (H)      Essential hypertension, benign      HYPERLIPIDEMIA NEC/NOS 3/30/2006     Pulmonary hypertension (H)      SSS (sick sinus syndrome) (H)     s/p PPM 3/2018       Past Surgical History:   Procedure Laterality Date     EYE SURGERY       HYSTERECTOMY, VAGINAL      hysterectomy       Patient Active Problem List   Diagnosis     Symptomatic menopausal or female climacteric states     Hyperlipidemia LDL goal <70     Hypertension goal BP (blood pressure) < 140/90     Knee pain     Obesity     PAF (paroxysmal atrial fibrillation) (H)     S/P cardiac pacemaker procedure     SSS (sick sinus syndrome) (H)     Chronic diastolic CHF (congestive heart failure) (H)     Pulmonary hypertension (H)     Coronary artery  calcification seen on CT scan     Hypotension       Social History     Social History     Socioeconomic History     Marital status:      Spouse name: Not on file     Number of children: Not on file     Years of education: Not on file     Highest education level: Not on file   Social Needs     Financial resource strain: Not on file     Food insecurity - worry: Not on file     Food insecurity - inability: Not on file     Transportation needs - medical: Not on file     Transportation needs - non-medical: Not on file   Occupational History     Not on file   Tobacco Use     Smoking status: Former Smoker     Start date: 10/28/1955     Last attempt to quit: 2000     Years since quittin.3     Smokeless tobacco: Never Used     Tobacco comment: quit 6 yrs ago   Substance and Sexual Activity     Alcohol use: No     Drug use: No     Sexual activity: Yes     Partners: Male   Other Topics Concern     Parent/sibling w/ CABG, MI or angioplasty before 65F 55M? No   Social History Narrative    Balanced Diet - Yes    Osteoporosis Prevention Measures - Dairy servings per day: 2    Regular Exercise -  Yes Describe walk, but not recently due to weather    Dental Exam - Yes    Eye Exam -No    Self Breast Exam - Yes    Abuse: Current or Past (Physical, Sexual or Emotional)- No    Do you feel safe in your environment - Yes    Guns stored in the home - No    Sunscreen used - Yes    Seatbelts used - Yes    Lipids -  1/10    Glucose -  1/10    Colon Cancer Screening - Yes, 08    Hemoccults - NO    Pap Test -  Many yrs ago, has hysterectomy    Do you have any concerns about STD's -  No    Mammography - 08    DEXA - 06    Immunizations reviewed and up to date - No    Jonathan Marshall MA                     Family History  Family History   Problem Relation Age of Onset     Cerebrovascular Disease Mother      Glaucoma Mother      Macular Degeneration Mother      Alcohol/Drug Father         alcohol     Myocardial  Infarction Father 63        passed away from MI     Family History Negative Sister      Family History Negative Sister      Family History Negative Sister      Cerebrovascular Disease Sister      Family History Negative Brother      Family History Negative Maternal Grandmother      Family History Negative Maternal Grandfather      Family History Negative Paternal Grandmother      Family History Negative Paternal Grandfather      Myocardial Infarction Son 57        passed away from MI     Dementia Daughter 57        early onset on [place]     Diabetes No family hx of      Breast Cancer No family hx of      Cancer - colorectal No family hx of          Medications  No current outpatient medications on file.     Current Facility-Administered Medications Ordered in Epic   Medication Dose Route Frequency Last Rate Last Dose     acetaminophen (TYLENOL) Suppository 650 mg  650 mg Rectal Q4H PRN         acetaminophen (TYLENOL) tablet 650 mg  650 mg Oral Q4H PRN   650 mg at 01/07/19 0803     apixaban ANTICOAGULANT (ELIQUIS) tablet 5 mg  5 mg Oral BID   5 mg at 01/07/19 0804     atorvastatin (LIPITOR) tablet 40 mg  40 mg Oral QPM   40 mg at 01/06/19 2204     bisacodyl (DULCOLAX) EC tablet 5 mg  5 mg Oral Daily PRN        Or     bisacodyl (DULCOLAX) EC tablet 10 mg  10 mg Oral Daily PRN        Or     bisacodyl (DULCOLAX) EC tablet 15 mg  15 mg Oral Daily PRN         bisacodyl (DULCOLAX) Suppository 10 mg  10 mg Rectal Daily PRN         cefTRIAXone (ROCEPHIN) 2 g vial to attach to  ml bag for ADULTS or NS 50 ml bag for PEDS  2 g Intravenous Q24H   2 g at 01/06/19 1706     Continuing ACE inhibitor/ARB/ARNI from home medication list OR ACE inhibitor/ARB/ARNI order already placed during this visit   Does not apply DOES NOT GO TO MAR         Continuing beta blocker from home medication list OR beta blocker order already placed during this visit   Does not apply DOES NOT GO TO MAR         flecainide (TAMBOCOR) tablet 100 mg   100 mg Oral BID   100 mg at 01/07/19 0804     furosemide (LASIX) tablet 20 mg  20 mg Oral Daily   20 mg at 01/07/19 0804     hydrALAZINE (APRESOLINE) injection 10 mg  10 mg Intravenous Q4H PRN         HYDROcodone-acetaminophen (NORCO) 5-325 MG per tablet 1-2 tablet  1-2 tablet Oral Q4H PRN         HYDROmorphone (PF) (DILAUDID) injection 0.2 mg  0.2 mg Intravenous Q2H PRN         ipratropium - albuterol 0.5 mg/2.5 mg/3 mL (DUONEB) neb solution 3 mL  3 mL Nebulization 4x daily   3 mL at 01/07/19 1516     ipratropium - albuterol 0.5 mg/2.5 mg/3 mL (DUONEB) neb solution 3 mL  3 mL Nebulization Q2H PRN         lidocaine (LMX4) cream   Topical Q1H PRN         lidocaine 1 % 1 mL  1 mL Other Q1H PRN         magnesium sulfate 4 g in 100 mL sterile water (premade)  4 g Intravenous Q4H PRN         melatonin tablet 1 mg  1 mg Oral At Bedtime PRN         metoprolol succinate ER (TOPROL-XL) 24 hr tablet 50 mg  50 mg Oral BID   50 mg at 01/07/19 0804     naloxone (NARCAN) injection 0.1-0.4 mg  0.1-0.4 mg Intravenous Q2 Min PRN         nitroGLYcerin (NITROSTAT) sublingual tablet 0.4 mg  0.4 mg Sublingual Q5 Min PRN         ondansetron (ZOFRAN-ODT) ODT tab 4 mg  4 mg Oral Q6H PRN        Or     ondansetron (ZOFRAN) injection 4 mg  4 mg Intravenous Q6H PRN         Patient is already receiving anticoagulation with heparin, enoxaparin (LOVENOX), warfarin (COUMADIN)  or other anticoagulant medication   Does not apply Continuous PRN         polyethylene glycol (MIRALAX/GLYCOLAX) Packet 17 g  17 g Oral Daily PRN         potassium chloride (KLOR-CON) Packet 20-40 mEq  20-40 mEq Oral or Feeding Tube Q2H PRN         potassium chloride 10 mEq in 100 mL intermittent infusion with 10 mg lidocaine  10 mEq Intravenous Q1H PRN         potassium chloride 10 mEq in 100 mL sterile water intermittent infusion (premix)  10 mEq Intravenous Q1H PRN         potassium chloride 20 mEq in 50 mL intermittent infusion  20 mEq Intravenous Q1H PRN          potassium chloride ER (K-DUR/KLOR-CON M) CR tablet 20-40 mEq  20-40 mEq Oral Q2H PRN         predniSONE (DELTASONE) tablet 40 mg  40 mg Oral Daily         prochlorperazine (COMPAZINE) injection 5 mg  5 mg Intravenous Q6H PRN        Or     prochlorperazine (COMPAZINE) tablet 5 mg  5 mg Oral Q6H PRN        Or     prochlorperazine (COMPAZINE) Suppository 12.5 mg  12.5 mg Rectal Q12H PRN         senna-docusate (SENOKOT-S/PERICOLACE) 8.6-50 MG per tablet 1 tablet  1 tablet Oral BID   1 tablet at 19 2204    Or     senna-docusate (SENOKOT-S/PERICOLACE) 8.6-50 MG per tablet 2 tablet  2 tablet Oral BID         sodium chloride (PF) 0.9% PF flush 3 mL  3 mL Intracatheter Q1H PRN         sodium chloride (PF) 0.9% PF flush 3 mL  3 mL Intracatheter Q8H   3 mL at 19 1245     sodium chloride (PF) 0.9% PF flush 3 mL  3 mL Intracatheter Q1H PRN         No current Epic-ordered outpatient medications on file.       Allergies   Allergen Reactions     Strawberry Flavor          Physical Examination:   Vitals:    19 0802 19 0819 19 0830 19 1540   BP:  109/69 103/58 119/57   BP Location:  Right arm Left arm Right arm   Pulse:       Resp:    18   Temp: 98.9  F (37.2  C)   99.2  F (37.3  C)   TempSrc: Oral   Oral   SpO2: 91% 93% 90% 90%   Weight:       Height:         Body mass index is 27.62 kg/m .  Temp (24hrs), Av.9  F (37.2  C), Min:98.4  F (36.9  C), Max:99.6  F (37.6  C)        Constitutional:  Appears comfortable.  HENT:  mucous membranes moist.  Eyes: PERRLA, no icterus, no pallor.   Neck: No lymphadenopathy or thyromegaly, trachea midline, no carotid bruits.  Cardiovascular: Regular rate and rythym, no murmurs, rubs or gallops, no peripheral edema.  Respiratory/Chest: Diminished breath sounds throughout no wheezing rhonchi rales noted chest expansion normal gastrointestinal: Abdomen was soft, non-tender, non-distended, no masses felt, no hepatosplenomegaly.  Musculoskeletal: No clubbing or  cyanosis, full range of motion in all extremities.  Neurological: No focal motor or sensory deficits. DTR's are 2+ and symmetric.  Skin: No skin rash, hives, petechiae, or breakdown.      CMP  Recent Labs   Lab 01/06/19  0527 01/05/19  1520 01/02/19  0816    134 134   POTASSIUM 4.5 4.7 4.5   CHLORIDE 105 102 99   CO2 23 23 26   ANIONGAP 7 9 9   GLC 95 119* 97   BUN 24 35* 40*   CR 0.99 1.35* 1.35*   GFRESTIMATED 54* 37* 37*   GFRESTBLACK 63 43* 43*   GURWINDER 8.0* 8.7 10.0   MAG  --  2.6*  --    PHOS  --  3.6  --    PROTTOTAL 5.8* 6.5*  --    ALBUMIN 2.2* 2.5*  --    BILITOTAL 0.7 0.7  --    ALKPHOS 41 43  --    AST 27 24  --    ALT 55* 61*  --      CBC  Recent Labs   Lab 01/06/19  0527 01/05/19  1520   WBC 13.3* 14.9*   RBC 3.83 4.31   HGB 10.6* 12.1   HCT 32.6* 36.5   MCV 85 85   MCH 27.7 28.1   MCHC 32.5 33.2   RDW 17.6* 17.2*    279     INRNo lab results found in last 7 days.  Arterial Blood Gas  Recent Labs   Lab 01/05/19  1625   PH 7.46*   PCO2 37   PO2 63*   HCO3 26     Recent Labs   Lab 01/05/19  1812 01/05/19  1800 01/05/19  1640   CULT No growth after 2 days No growth after 2 days >100,000 colonies/mL  Klebsiella pneumoniae  *  50,000 to 100,000 colonies/mL  Strain 2  Klebsiella pneumoniae  *  Susceptibility testing in progress       Diagnostic Studies:  Chest Radiology:    Chest x-ray 1-7  COMPARISON: January 6, 2019.      FINDINGS: Stable cardiac device, no pneumothorax. There are no acute  infiltrates. The cardiac silhouette is not enlarged. Pulmonary  vasculature may be congested.                                                                      IMPRESSION: Possible vascular congestion, no acute infiltrates.          Assessment/Plan:     Impression  -Acute hypoxic respiratory failure  -Likely COPD exacerbation  -Strict tobacco abuse  -Heart failure with preserved EF  -Atrial fibrillation on chronic anticoagulation  -Sick sinus syndrome    Plan  -Change prednisone from 5 mg every other day  to 40 mg daily for 5 days.  Can transition her back down to 5 mg after the 5-day burst.  -Continue Rocephin  -Wean oxygen to maintain sats above 90%  -Component of deconditioning, physical therapy and likely rehab may be needed post hospitalization  -No indication for PFTs at this time.  Patient will need to recover and stabilize.  Suggest getting PFTs as an outpatient 6-8 weeks post discharge  -Continue scheduled duo nebs  -Once improvement is noted we will put her on maintenance inhalers.  Likely start with a LAMA/LABA combination (Anoro)  -Continue diuresis    Thank you for the opportunity to participate in the care of this patient.  We will continue to follow along.  Please call with any questions.        Pily Jefferson MD  Pulmonary, Critical Care and Sleep Medicine  [facility]   Pager: [phone]  Office:[phone]

## 2019-01-07 NOTE — CONSULTS
Care Transition Initial Assessment - RN        Met with: Patient.  DATA   Active Problems:    Hypotension       Cognitive Status: awake, alert and oriented.        Contact information and PCP information verified: Yes  Lives With: Daughter and granddaughter  Living Arrangements: house(2 story with basement)     Insurance concerns: No Insurance issues identified  ASSESSMENT  Patient currently receives the following services: NA       Identified issues/concerns regarding health management:Pt shares she has been having difficulty following a restricted sodium diet.  She feels she has had enough diet education and knows what to do.  She declined offer of having a Dietician meet with her.  Pt does practice daily weights.  She is followed closely by the [facility] and has an appointment scheduled for 1/16/19.  She is currently on 5L and does not use supplemental O2 at baseline.  She will have a Pulmonary consult today.  Pt had a recent cataract surgery with needing further surgery on her left eye due to a small retinal tear.  She is scheduled for surgery next week for this.  Will need to reschedule her pre op physical that is scheduled with her [facility] physician on 1/9/19.  Pt resides with her daughter and 31yr old granddaughter.  Pt's daughter is home most of the time.  Planning transition home when medically stable to do so.    PLAN  Financial costs for the patient include:NA    Patient anticipates discharging to: Home        Patient anticipates needs for home equipment: Possibly, O2  Plan/Disposition: Home   [facility] 1/16/19  Cardiologist, Dr Voss 1/23/19  PCP, Dr Philip 1/15/19  Will follow for additional scheduling    Care  (CTS) will continue to follow as needed.

## 2019-01-07 NOTE — PLAN OF CARE
Discharge Planner OT   Patient plan for discharge: Home.  Current status: Able to ambulate in hallway for 2 min with SOB, on 6L with sats 90% on return to room. CG A for balance.   Barriers to return to prior living situation: Current level of A with ADL and mobility.  Recommendations for discharge: Consider TCU.   Rationale for recommendations: Didn't talk with patient about TCU however she is making slow progress in exercise/CR due to increased O2 needs.       Entered by: Rosamaria Hong 01/07/2019 8:41 AM

## 2019-01-07 NOTE — PLAN OF CARE
Continues to require 5 lpm nasal cannula, monitored on continuous pulse ox; up with assist of 1 to ambulate in kong, sats drop to 77% on 6 lpm nasal cannula during ambulation; patient SALAZAR but states is tolerating well; encouraged to use pursed lip breathing.  Afebrile.  Infrequent dry cough.  Lungs coarse at bases this morning. Using incentive spirometer.  On scheduled nebs.  Oral diuretic.  CXR pending this afternoon.  Restarted prednisone; daughter to bring in bottle to verify dosing.  Pulmonary consult pending.  Good intake, .  Tylenol for back pain with relief.   Tele NSR

## 2019-01-07 NOTE — PROGRESS NOTES
[facility]    Medicine Progress Note - Hospitalist Service       Date of Admission:  1/5/2019  Date of Service: 1/7/2019    Assessment & Plan      Hamida Verma is a very pleasant 78-year-old  female with past medical history significant for essential hypertension, dyslipidemia, atrial fibrillation on anticoagulation, history of heart failure with preserved ejection fraction, moderate pulmonary hypertension, sick sinus syndrome status post permanent pacemaker placement who was admitted on October 11 with shortness of breath which was thought to be secondary to acute decompensation of diastolic heart failure.     Pyelonephritis with weakness:   UA abnormal with Large LE, WBC 38, although neg nitrites. Leukocytosis with WBC 14.9. Lactate 2.1 --> 0.9. Felt generalized malaise after falling on Thursday. Denies changes to urination. Intermittent dry cough. Denies fevers/chills. CXR unremarkable.  - [facility], transferred to [facility] on 1/7  - Continue 2gm Rocephin Q 24 hrs   - Repeat CBC in AM, WBC was 13K yesterday  - urine culture growing >100K colonies of Klebsiella pneumoniae, abx sensitivity panel in progress but keep on rocephin as she seems to be improving  - Procal shows low risk of systemic infection    Acute kidney injury:   Cr typically 0.69-0.96. Has been creeping up with prednisone use. Recently started on spironolactone but only took for 2 days due to dizziness, has not taken in 4 days. Cr at last lab draw 1.35 and again today 1.35.  - Renal function normal on 1/6, got one day of IV lasix BID so will check BMP in AM  - CK normal, no indication of rhabdomyolysis, no IVFs as this time  - continue to hold ACEI for now in light of diuresis, BP is stable     Acute hypoxia after receiving 2L NS bolus in ED  Chronic diastolic CHF with preserved ejection fraction:  Remote Smoking hx, possible COPD   +Chronic diastolic CHF with the last LV ejection fraction of 60-65% as well  as moderate pulmonary HTN by ECHO in 3/2018.  PTA on Lasix 20 mg PO daily, lisinopril 40 mg p.o. daily and metoprolol XL 50 mg p.o. b.i.d.    ECHO  Oct 2018: LV mild to mod dilation, TR 1-2+, normal systolic vxn, EF 55-60%, LV diastolic fxn indeterminate. No RWMA. No thrombus.  Was advised to get outpatient pulmonary function testing for proper diagnosis of COPD due to high probability during Oct 2018 admission but she tells me she did not do this yet. Has a remote smoking hx, quit in 2010. Some emphysematous changes on prior chest CT.  - CXR was clear on admission, I am not seeing any evidence of pneumonia but rocephin should cover this  - No evidence of bronchoconstriction on exam, patient hesitant to have a steroid increase given she was recently weaned down after her eye surgery, she didn't like the side effects  - we thought perhaps her hypoxia was volume overload from the 2L of IVF in the ED, but on exam she is euvolemic, CXR shows no fluid, IV lasix x 1 day hasnt really improved her O2 status, still on 5L NC, went up to 10L oxymizer last night  - Cardiology following but not looking like CHF, BNP was only in 200-300 range, no echo at this time, go back on PTA po lasix  - questionable history of pulmonary hypertension but echo finding 10/12/18 does not suggest pulm HTN.   - continue on q6 nebulizers  - will consult Pulmonology as I am not certain why the patient is persistently SOB and hypoxic requiring such high amounts of O2 despite nebs, abx and IV lasix  - will repeat CXR today, of note she had long tobacco history but no wheezing and good air movement throughout     Retinal Tear:  - patient tells me today that she had a surgery over a month ago and has been on a slow prednisone taper, she was tapered to half a tablet every other day but cant tell me what the dose of the tablet is  - she will contact daughter to get prednisone dose, I will start 5mg every other day for now  - she is scheduled for repeat  ophthalmology appointment and surgery on 1/10 and pre-op on 1/9, may have to be post-poned    Hypertension:    - stable on ACEI, hold for another day    Paroxysmal atrial fibrillation/sick sinus syndrome, status post permanent pacemaker in 03/2018:    EKG in the ER revealed normal sinus rhythm, rate controlled 60s. Chronically anticoagulated with apixaban. Has a pacemaker  - Pacemaker recently interogated   - Continue PTA Eliquis 5mg BID, Toprol-XL 50 mg BID with hold parameters starting tomorrow (hold tonights dose due to hypotension) and flecainide 100 mg BID        Dyslipidemia:  Continue PTA simvastatin 20 mg p.o. at bedtime.              Diet: Combination Diet 2 gm NA Diet; Low Saturated Fat Diet    DVT Prophylaxis: Eliquis  Lopez Catheter: not present  Code Status: DNR/DNI, please note, the patient has requested this topic not be discussed in front of her daughter out of fear that it will start arguments.  I counseled the patient that she will need to get something in writing in case she becomes mentally incapacitated, ok with advance directive consult but they cant talk to her about this when he daughter is here.    Disposition Plan   Expected discharge: 2 - 3 days, recommended to prior living arrangement once O2 use less than 2 liters/minute.  Entered: Mynor Santos MD 01/07/2019, 11:44 AM       The patient's care was discussed with the Bedside Nurse and Patient.    Mynor Santos MD  Hospitalist Service  [facility]  Pager [phone]    ______________________________________________________________________    Interval History   Today the patient says she feels good and wants to go home, however, her O2 needs are not improving.  In fact last night they got worse for a period of time, she had to bump up to 10L oxymizer, is back down to 5-6L NC now.  Got SALZAAR while ambulating after 2 minutes in the hallways and had to lay down.  She makes no other complaints today, she has no  peripheral edema, lungs sound distant but clear.  Informed me about her eye surgery coming up on 1/10 and after I mentioned maybe we should trial a steroid burst she was heistant, didn't like the swelling she got on steroids in the past.  She denies any CP, no N/V, no F/C.    Data reviewed today: I reviewed all medications, new labs and imaging results over the last 24 hours. I personally reviewed the chest x-ray image(s) showing clear lungs..    Physical Exam   Vital Signs: Temp: 98.9  F (37.2  C) Temp src: Oral BP: 103/58 Pulse: 73 Heart Rate: 91 Resp: 16 SpO2: 90 % O2 Device: Nasal cannula Oxygen Delivery: 5 LPM  Weight: 176 lbs 5.89 oz  Constitutional: awake, alert, cooperative, no apparent distress, and appears stated age  Respiratory: No increased work of breathing, good air exchange, clear to auscultation bilaterally, no crackles or wheezing  Cardiovascular: Normal apical impulse, regular rate and rhythm, normal S1 and S2, no S3 or S4, and no murmur noted  GI: No scars, normal bowel sounds, soft, non-distended, non-tender, no masses palpated, no hepatosplenomegally  Genitounirinary: Bladder:  Tenderness absent  Skin: no bruising or bleeding, normal skin color, texture, turgor and no redness, warmth, or swelling  Musculoskeletal: There is no redness, warmth, or swelling of the joints.  Full range of motion noted.  Motor strength is 5 out of 5 all extremities bilaterally.  Tone is normal.  Neurologic: Awake, alert, oriented to name, place and time.  Cranial nerves II-XII are grossly intact.  Motor is 5 out of 5 bilaterally.  Cerebellar finger to nose, heel to shin intact.  Sensory is intact.  Babinski down going, Romberg negative, and gait is normal.  Neuropsychiatric: General: normal, calm and normal eye contact  Level of consciousness: alert / normal  Affect: normal and pleasant  Orientation: oriented to self, place, time and situation  Memory and insight: normal, memory for past and recent events intact and  thought process normal    Data   Recent Labs   Lab 01/06/19  0527 01/05/19  2325 01/05/19  1926 01/05/19  1520 01/02/19  0816   WBC 13.3*  --   --  14.9*  --    HGB 10.6*  --   --  12.1  --    MCV 85  --   --  85  --      --   --  279  --      --   --  134 134   POTASSIUM 4.5  --   --  4.7 4.5   CHLORIDE 105  --   --  102 99   CO2 23  --   --  23 26   BUN 24  --   --  35* 40*   CR 0.99  --   --  1.35* 1.35*   ANIONGAP 7  --   --  9 9   GURWINDER 8.0*  --   --  8.7 10.0   GLC 95  --   --  119* 97   ALBUMIN 2.2*  --   --  2.5*  --    PROTTOTAL 5.8*  --   --  6.5*  --    BILITOTAL 0.7  --   --  0.7  --    ALKPHOS 41  --   --  43  --    ALT 55*  --   --  61*  --    AST 27  --   --  24  --    TROPI  --  <0.015 <0.015 <0.015  --

## 2019-01-07 NOTE — CONSULTS
"[facility] referral received from Diane York PAC.     Hamida is currently established in the [facility] and follows with Adriana Ferrell PAC.    Reviewed Dr. Rousseau's note from yesterday stating volume overload likely due to IVF and Hamida is improving, \"well compensated and euvolemic on exam\" and no changes are planned for diuretics at discharge.     Will continue with plan for Hamida to follow-up at previously scheduled [facility] visits: Wednesday 1/16/19 for BMP, pro BNP and office visit with Adriana.     If changes to this plan are needed, please contact [facility] RN.    We look forward to seeing Hamida in the clinic.   Please call with questions.     Roselyn Smith RN, BSN  [facility] Care Coordinator  [phone]      [facility]: Cardiomyopathy, Optimization, Rehabilitation, Education   The [facility] is a heart failure specialty clinic within the [facility] where you will work with your cardiologist, nurse practitioners, physician assistants and registered nurses who specialize in heart failure care. They are dedicated to helping patients with heart failure to carefully adjust medications, receive education, and learn who and when to call if symptoms develop. They specialize in helping you better understand your condition, slow the progression of your disease, improve the length and quality of your life, help you detect future heart problems before they become life threatening, and avoid hospitalizations.              "

## 2019-01-07 NOTE — PLAN OF CARE
Patient is A&OX4, VSS ON 6l via N/C, denied any pain nor SOB. Up with 1 assist, ambulated to bathroom and voiding O.K, also had a BM.Tele is 1st degree AVB.I.v IS S/L,still on I.v rocephine..

## 2019-01-08 LAB
ANION GAP SERPL CALCULATED.3IONS-SCNC: 11 MMOL/L (ref 3–14)
BUN SERPL-MCNC: 18 MG/DL (ref 7–30)
CALCIUM SERPL-MCNC: 8.5 MG/DL (ref 8.5–10.1)
CHLORIDE SERPL-SCNC: 102 MMOL/L (ref 94–109)
CO2 SERPL-SCNC: 20 MMOL/L (ref 20–32)
CREAT SERPL-MCNC: 0.8 MG/DL (ref 0.52–1.04)
ERYTHROCYTE [DISTWIDTH] IN BLOOD BY AUTOMATED COUNT: 17.1 % (ref 10–15)
GFR SERPL CREATININE-BSD FRML MDRD: 70 ML/MIN/{1.73_M2}
GLUCOSE BLDC GLUCOMTR-MCNC: 122 MG/DL (ref 70–99)
GLUCOSE BLDC GLUCOMTR-MCNC: 139 MG/DL (ref 70–99)
GLUCOSE BLDC GLUCOMTR-MCNC: 181 MG/DL (ref 70–99)
GLUCOSE SERPL-MCNC: 235 MG/DL (ref 70–99)
HBA1C MFR BLD: 6.9 % (ref 0–5.6)
HCT VFR BLD AUTO: 33.7 % (ref 35–47)
HGB BLD-MCNC: 11.1 G/DL (ref 11.7–15.7)
INTERPRETATION ECG - MUSE: NORMAL
MAGNESIUM SERPL-MCNC: 2.6 MG/DL (ref 1.6–2.3)
MCH RBC QN AUTO: 27.8 PG (ref 26.5–33)
MCHC RBC AUTO-ENTMCNC: 32.9 G/DL (ref 31.5–36.5)
MCV RBC AUTO: 85 FL (ref 78–100)
PLATELET # BLD AUTO: 341 10E9/L (ref 150–450)
POTASSIUM SERPL-SCNC: 4.2 MMOL/L (ref 3.4–5.3)
RBC # BLD AUTO: 3.99 10E12/L (ref 3.8–5.2)
SODIUM SERPL-SCNC: 133 MMOL/L (ref 133–144)
WBC # BLD AUTO: 14.3 10E9/L (ref 4–11)

## 2019-01-08 PROCEDURE — 40000275 ZZH STATISTIC RCP TIME EA 10 MIN

## 2019-01-08 PROCEDURE — 85027 COMPLETE CBC AUTOMATED: CPT | Performed by: INTERNAL MEDICINE

## 2019-01-08 PROCEDURE — 25000132 ZZH RX MED GY IP 250 OP 250 PS 637: Performed by: PHYSICIAN ASSISTANT

## 2019-01-08 PROCEDURE — 25000125 ZZHC RX 250: Performed by: PHYSICIAN ASSISTANT

## 2019-01-08 PROCEDURE — 94640 AIRWAY INHALATION TREATMENT: CPT

## 2019-01-08 PROCEDURE — 94640 AIRWAY INHALATION TREATMENT: CPT | Mod: 76

## 2019-01-08 PROCEDURE — 36415 COLL VENOUS BLD VENIPUNCTURE: CPT | Performed by: HOSPITALIST

## 2019-01-08 PROCEDURE — 25000125 ZZHC RX 250: Performed by: INTERNAL MEDICINE

## 2019-01-08 PROCEDURE — 25000131 ZZH RX MED GY IP 250 OP 636 PS 637: Performed by: HOSPITALIST

## 2019-01-08 PROCEDURE — 25000132 ZZH RX MED GY IP 250 OP 250 PS 637: Performed by: INTERNAL MEDICINE

## 2019-01-08 PROCEDURE — 99232 SBSQ HOSP IP/OBS MODERATE 35: CPT | Performed by: HOSPITALIST

## 2019-01-08 PROCEDURE — 00000146 ZZHCL STATISTIC GLUCOSE BY METER IP

## 2019-01-08 PROCEDURE — 83036 HEMOGLOBIN GLYCOSYLATED A1C: CPT | Performed by: INTERNAL MEDICINE

## 2019-01-08 PROCEDURE — 12000000 ZZH R&B MED SURG/OB

## 2019-01-08 PROCEDURE — 25000132 ZZH RX MED GY IP 250 OP 250 PS 637: Performed by: HOSPITALIST

## 2019-01-08 PROCEDURE — 36415 COLL VENOUS BLD VENIPUNCTURE: CPT | Performed by: INTERNAL MEDICINE

## 2019-01-08 PROCEDURE — 80048 BASIC METABOLIC PNL TOTAL CA: CPT | Performed by: INTERNAL MEDICINE

## 2019-01-08 PROCEDURE — 83735 ASSAY OF MAGNESIUM: CPT | Performed by: HOSPITALIST

## 2019-01-08 RX ORDER — DEXTROSE MONOHYDRATE 25 G/50ML
25-50 INJECTION, SOLUTION INTRAVENOUS
Status: DISCONTINUED | OUTPATIENT
Start: 2019-01-08 | End: 2019-01-10 | Stop reason: HOSPADM

## 2019-01-08 RX ORDER — NICOTINE POLACRILEX 4 MG
15-30 LOZENGE BUCCAL
Status: DISCONTINUED | OUTPATIENT
Start: 2019-01-08 | End: 2019-01-10 | Stop reason: HOSPADM

## 2019-01-08 RX ORDER — SULFAMETHOXAZOLE/TRIMETHOPRIM 800-160 MG
1 TABLET ORAL 2 TIMES DAILY
Status: DISCONTINUED | OUTPATIENT
Start: 2019-01-08 | End: 2019-01-08

## 2019-01-08 RX ADMIN — FUROSEMIDE 20 MG: 20 TABLET ORAL at 07:58

## 2019-01-08 RX ADMIN — METOPROLOL SUCCINATE 50 MG: 50 TABLET, EXTENDED RELEASE ORAL at 08:01

## 2019-01-08 RX ADMIN — PREDNISONE 40 MG: 20 TABLET ORAL at 07:59

## 2019-01-08 RX ADMIN — IPRATROPIUM BROMIDE AND ALBUTEROL SULFATE 3 ML: .5; 2.5 SOLUTION RESPIRATORY (INHALATION) at 15:23

## 2019-01-08 RX ADMIN — SENNOSIDES AND DOCUSATE SODIUM 1 TABLET: 8.6; 5 TABLET ORAL at 20:32

## 2019-01-08 RX ADMIN — AMOXICILLIN AND CLAVULANATE POTASSIUM 1 TABLET: 875; 125 TABLET, FILM COATED ORAL at 20:32

## 2019-01-08 RX ADMIN — FLECAINIDE ACETATE 100 MG: 100 TABLET ORAL at 07:57

## 2019-01-08 RX ADMIN — IPRATROPIUM BROMIDE AND ALBUTEROL SULFATE 3 ML: .5; 2.5 SOLUTION RESPIRATORY (INHALATION) at 11:56

## 2019-01-08 RX ADMIN — METOPROLOL SUCCINATE 50 MG: 50 TABLET, EXTENDED RELEASE ORAL at 20:33

## 2019-01-08 RX ADMIN — APIXABAN 5 MG: 5 TABLET, FILM COATED ORAL at 20:33

## 2019-01-08 RX ADMIN — ATORVASTATIN CALCIUM 40 MG: 40 TABLET, FILM COATED ORAL at 20:32

## 2019-01-08 RX ADMIN — FLECAINIDE ACETATE 100 MG: 100 TABLET ORAL at 21:43

## 2019-01-08 RX ADMIN — IPRATROPIUM BROMIDE AND ALBUTEROL SULFATE 3 ML: .5; 2.5 SOLUTION RESPIRATORY (INHALATION) at 07:45

## 2019-01-08 RX ADMIN — AMOXICILLIN AND CLAVULANATE POTASSIUM 1 TABLET: 875; 125 TABLET, FILM COATED ORAL at 12:45

## 2019-01-08 RX ADMIN — APIXABAN 5 MG: 5 TABLET, FILM COATED ORAL at 07:58

## 2019-01-08 RX ADMIN — INSULIN ASPART 1 UNITS: 100 INJECTION, SOLUTION INTRAVENOUS; SUBCUTANEOUS at 17:17

## 2019-01-08 RX ADMIN — IPRATROPIUM BROMIDE AND ALBUTEROL SULFATE 3 ML: .5; 2.5 SOLUTION RESPIRATORY (INHALATION) at 19:15

## 2019-01-08 ASSESSMENT — ACTIVITIES OF DAILY LIVING (ADL)
ADLS_ACUITY_SCORE: 13
ADLS_ACUITY_SCORE: 13
ADLS_ACUITY_SCORE: 14
ADLS_ACUITY_SCORE: 13
ADLS_ACUITY_SCORE: 13
ADLS_ACUITY_SCORE: 14

## 2019-01-08 ASSESSMENT — MIFFLIN-ST. JEOR: SCORE: 1296.63

## 2019-01-08 NOTE — PROGRESS NOTES
[facility]    Medicine Progress Note - Hospitalist Service       Date of Admission:  1/5/2019  Date of Service: 1/7/2019    Assessment & Plan      Hamida Verma is a very pleasant 78-year-old  female with past medical history significant for essential hypertension, dyslipidemia, atrial fibrillation on anticoagulation, history of heart failure with preserved ejection fraction, moderate pulmonary hypertension, sick sinus syndrome status post permanent pacemaker placement who was admitted on October 11 with shortness of breath which was thought to be secondary to acute decompensation of diastolic heart failure.     Acute pyelonephritis secondary to klebsiella :   UA abnormal with Large LE, WBC 38, although neg nitrites. Leukocytosis with WBC 14.9. Lactate 2.1 --> 0.9. Felt generalized malaise after falling on Thursday. Denies changes to urination. Intermittent dry cough. Denies fevers/chills. CXR unremarkable.  plan  - plan on transition to augmentin to complete course. Bactrim's concern is the resolving juanjo. FQ are not advised with the class 1c antiarrhythmics    Acute kidney injury:   Cr typically 0.69-0.96. Has been creeping up with prednisone use. Recently started on spironolactone but only took for 2 days due to dizziness, has not taken in 4 days. Cr at last lab draw 1.35 and again today 1.35.  - continue home diuretics  - recheck BMP in the AM    Acute hypoxia after receiving 2L NS bolus in ED  Chronic diastolic CHF with preserved ejection fraction:  Remote Smoking hx, possible COPD   +Chronic diastolic CHF with the last LV ejection fraction of 60-65% as well as moderate pulmonary HTN by ECHO in 3/2018.  PTA on Lasix 20 mg PO daily, lisinopril 40 mg p.o. daily and metoprolol XL 50 mg p.o. b.i.d.    ECHO  Oct 2018: LV mild to mod dilation, TR 1-2+, normal systolic vxn, EF 55-60%, LV diastolic fxn indeterminate. No RWMA. No thrombus.  Was advised to get outpatient pulmonary function  testing for proper diagnosis of COPD due to high probability during Oct 2018 admission but she tells me she did not do this yet. Has a remote smoking hx, quit in 2010. Some emphysematous changes on prior chest CT.  Seen by pulmonary and treatment initiated for COPD exacerbation  Plan  - continue steroids as per pulm  - continue duonebs  - wean o2 as able  -pulm following, likely anoro on discharge  - would advise setting up for neb on discharge     Retinal Tear:  - patient tells me today that she had a surgery over a month ago and has been on a slow prednisone taper, she was tapered to half a tablet every other day but cant tell me what the dose of the tablet is  - she is in the process of rescheduling surgery  - continue steroids, after the burst she should return to her PTA allotment    Hypertension:    - stable on ACEI, hold for another day    Paroxysmal atrial fibrillation/sick sinus syndrome, status post permanent pacemaker in 03/2018:    EKG in the ER revealed normal sinus rhythm, rate controlled 60s. Chronically anticoagulated with apixaban. Has a pacemaker  - Pacemaker recently interogated   - Continue PTA Eliquis 5mg BID, Toprol-XL 50 mg BID with hold parameters starting tomorrow (hold tonights dose due to hypotension) and flecainide 100 mg BID        Dyslipidemia:  Continue PTA simvastatin 20 mg p.o. at bedtime.     Hyperglycemia  With steroids, concern for early diabetes or perhaps just steroid induced.  - check A1c  - sliding scale insulin             Diet: Combination Diet 2 gm NA Diet; Low Saturated Fat Diet    DVT Prophylaxis: Eliquis  Lopez Catheter: not present  Code Status: DNR/DNI  Per the previous physician. Noted disagreement between the patient and her daughter    Disposition Plan   Expected discharge: 2 - 3 days, recommended to prior living arrangement once O2 use less than 2 liters/minute.  Entered: Mathew Torres,  01/08/2019, 11:54 AM       The patient's care was discussed with the  Bedside Nurse and Patient.    Mathew Torres, DO  Hospitalist Service  [facility]  Pager [phone]    ______________________________________________________________________    Interval History   O2 improved down to 5 liters. Feeling much improved.     Data reviewed today: I reviewed all medications, new labs and imaging results over the last 24 hours. I personally reviewed the chest x-ray image(s) showing clear lungs..    Physical Exam   Vital Signs: Temp: 97.7  F (36.5  C) Temp src: Oral BP: 105/54   Heart Rate: 74 Resp: 20 SpO2: 94 % O2 Device: Nasal cannula Oxygen Delivery: 5 LPM  Weight: 173 lbs 15.09 oz  Constitutional: awake, alert, cooperative, no apparent distress, and appears stated age  Respiratory: sounds clear with possible some wheezing. wob normal  Cardiovascular: Normal apical impulse, regular rate and rhythm, normal S1 and S2, no S3 or S4, and no murmur noted  GI: No scars, normal bowel sounds, soft, non-distended, non-tender, no masses palpated, no hepatosplenomegally  Genitounirinary: Bladder:  Tenderness absent  Skin: no bruising or bleeding, normal skin color, texture, turgor and no redness, warmth, or swelling  Musculoskeletal: There is no redness, warmth, or swelling of the joints.  Full range of motion noted.  Motor strength is 5 out of 5 all extremities bilaterally.  Tone is normal.  Neurologic: Awake, alert, oriented to name, place and time.   Neuropsychiatric: General: normal, calm and normal eye contact  Level of consciousness: alert / normal  Affect: normal and pleasant  Orientation: oriented to self, place, time and situation  Memory and insight: normal, memory for past and recent events intact and thought process normal    Data   Recent Labs   Lab 01/08/19  0814 01/06/19  0527 01/05/19  2325 01/05/19  1926 01/05/19  1520   WBC 14.3* 13.3*  --   --  14.9*   HGB 11.1* 10.6*  --   --  12.1   MCV 85 85  --   --  85    257  --   --  279    135  --   --   134   POTASSIUM 4.2 4.5  --   --  4.7   CHLORIDE 102 105  --   --  102   CO2 20 23  --   --  23   BUN 18 24  --   --  35*   CR 0.80 0.99  --   --  1.35*   ANIONGAP 11 7  --   --  9   GURWINDER 8.5 8.0*  --   --  8.7   * 95  --   --  119*   ALBUMIN  --  2.2*  --   --  2.5*   PROTTOTAL  --  5.8*  --   --  6.5*   BILITOTAL  --  0.7  --   --  0.7   ALKPHOS  --  41  --   --  43   ALT  --  55*  --   --  61*   AST  --  27  --   --  24   TROPI  --   --  <0.015 <0.015 <0.015

## 2019-01-08 NOTE — PLAN OF CARE
Still SALAZAR; continues on 5 lpm nasal cannula; at 4 lpm sat was 91-92%; states feels better with breathing today and tolerates activity better, sats drop to 83% with activity and rebounds to low 90's sooner than yesterday.  Up with SBA/Gait belt; gait is steadier and stronger than yesterday as well.  Good intake, BG's 235, 122; now with sliding scale insulin; hgb a1c 6.9.  Denies pain.  Antibiotic changed to oral and continues on prednisone.

## 2019-01-08 NOTE — PLAN OF CARE
Pt. . A and O x4, up with SBA, GB. Denies discomfort.  Becomes short of breath with activity and desats to mid 80's, Using oxygen via NC at 4-6L NC with sat of 90-95%. Glucose 106, 149. Prednisone dose increased today. Receiving nebs as scheduled.

## 2019-01-08 NOTE — PLAN OF CARE
"OT/CR: tx session attempted however pt declined at this time due to fatigue. Pt reports \"I'm exhausted,\" as she was started on new antibiotics and has been up to the bathroom a few times. Pt agrees to walk with nursing staff this PM.   "

## 2019-01-08 NOTE — PLAN OF CARE
A&O x 4. VSS on O2 6L via nasal canula. SALAZAR. Up wit SBA and gait belt to the bathroom. De sats with ambulation to bathroom to low 80s. O2 saturation return to low-mid 90s once at rest in bed. Tele SR with first degree AVB. Denied pain. IV SL Slept well. Will continue to monitor.

## 2019-01-09 ENCOUNTER — APPOINTMENT (OUTPATIENT)
Dept: OCCUPATIONAL THERAPY | Facility: CLINIC | Age: 79
DRG: 871 | End: 2019-01-09
Payer: COMMERCIAL

## 2019-01-09 ENCOUNTER — APPOINTMENT (OUTPATIENT)
Dept: PHYSICAL THERAPY | Facility: CLINIC | Age: 79
DRG: 871 | End: 2019-01-09
Payer: COMMERCIAL

## 2019-01-09 LAB
ANION GAP SERPL CALCULATED.3IONS-SCNC: 11 MMOL/L (ref 3–14)
BUN SERPL-MCNC: 20 MG/DL (ref 7–30)
CALCIUM SERPL-MCNC: 8.6 MG/DL (ref 8.5–10.1)
CHLORIDE SERPL-SCNC: 102 MMOL/L (ref 94–109)
CO2 SERPL-SCNC: 23 MMOL/L (ref 20–32)
CREAT SERPL-MCNC: 0.84 MG/DL (ref 0.52–1.04)
ERYTHROCYTE [DISTWIDTH] IN BLOOD BY AUTOMATED COUNT: 17.3 % (ref 10–15)
GFR SERPL CREATININE-BSD FRML MDRD: 66 ML/MIN/{1.73_M2}
GLUCOSE BLDC GLUCOMTR-MCNC: 103 MG/DL (ref 70–99)
GLUCOSE BLDC GLUCOMTR-MCNC: 109 MG/DL (ref 70–99)
GLUCOSE BLDC GLUCOMTR-MCNC: 127 MG/DL (ref 70–99)
GLUCOSE BLDC GLUCOMTR-MCNC: 146 MG/DL (ref 70–99)
GLUCOSE BLDC GLUCOMTR-MCNC: 168 MG/DL (ref 70–99)
GLUCOSE SERPL-MCNC: 149 MG/DL (ref 70–99)
HCT VFR BLD AUTO: 32.1 % (ref 35–47)
HGB BLD-MCNC: 10.7 G/DL (ref 11.7–15.7)
MCH RBC QN AUTO: 27.9 PG (ref 26.5–33)
MCHC RBC AUTO-ENTMCNC: 33.3 G/DL (ref 31.5–36.5)
MCV RBC AUTO: 84 FL (ref 78–100)
PLATELET # BLD AUTO: 407 10E9/L (ref 150–450)
POTASSIUM SERPL-SCNC: 3.9 MMOL/L (ref 3.4–5.3)
RBC # BLD AUTO: 3.83 10E12/L (ref 3.8–5.2)
SODIUM SERPL-SCNC: 136 MMOL/L (ref 133–144)
WBC # BLD AUTO: 16.3 10E9/L (ref 4–11)

## 2019-01-09 PROCEDURE — 40000193 ZZH STATISTIC PT WARD VISIT

## 2019-01-09 PROCEDURE — 80048 BASIC METABOLIC PNL TOTAL CA: CPT | Performed by: HOSPITALIST

## 2019-01-09 PROCEDURE — 97161 PT EVAL LOW COMPLEX 20 MIN: CPT | Mod: GP

## 2019-01-09 PROCEDURE — 25000132 ZZH RX MED GY IP 250 OP 250 PS 637: Performed by: HOSPITALIST

## 2019-01-09 PROCEDURE — 25000132 ZZH RX MED GY IP 250 OP 250 PS 637: Performed by: INTERNAL MEDICINE

## 2019-01-09 PROCEDURE — 36415 COLL VENOUS BLD VENIPUNCTURE: CPT | Performed by: HOSPITALIST

## 2019-01-09 PROCEDURE — 97116 GAIT TRAINING THERAPY: CPT | Mod: GP

## 2019-01-09 PROCEDURE — 94640 AIRWAY INHALATION TREATMENT: CPT

## 2019-01-09 PROCEDURE — 40000275 ZZH STATISTIC RCP TIME EA 10 MIN

## 2019-01-09 PROCEDURE — 85027 COMPLETE CBC AUTOMATED: CPT | Performed by: HOSPITALIST

## 2019-01-09 PROCEDURE — 00000146 ZZHCL STATISTIC GLUCOSE BY METER IP

## 2019-01-09 PROCEDURE — 94640 AIRWAY INHALATION TREATMENT: CPT | Mod: 76

## 2019-01-09 PROCEDURE — 25000125 ZZHC RX 250: Performed by: INTERNAL MEDICINE

## 2019-01-09 PROCEDURE — 25000125 ZZHC RX 250: Performed by: PHYSICIAN ASSISTANT

## 2019-01-09 PROCEDURE — 25000132 ZZH RX MED GY IP 250 OP 250 PS 637: Performed by: PHYSICIAN ASSISTANT

## 2019-01-09 PROCEDURE — 99233 SBSQ HOSP IP/OBS HIGH 50: CPT | Performed by: INTERNAL MEDICINE

## 2019-01-09 PROCEDURE — 12000000 ZZH R&B MED SURG/OB

## 2019-01-09 RX ORDER — LEVOFLOXACIN 500 MG/1
500 TABLET, FILM COATED ORAL DAILY
Status: DISCONTINUED | OUTPATIENT
Start: 2019-01-09 | End: 2019-01-10 | Stop reason: HOSPADM

## 2019-01-09 RX ORDER — HYDRALAZINE HYDROCHLORIDE 20 MG/ML
10 INJECTION INTRAMUSCULAR; INTRAVENOUS EVERY 4 HOURS PRN
Status: DISCONTINUED | OUTPATIENT
Start: 2019-01-09 | End: 2019-01-10 | Stop reason: HOSPADM

## 2019-01-09 RX ADMIN — AMOXICILLIN AND CLAVULANATE POTASSIUM 1 TABLET: 875; 125 TABLET, FILM COATED ORAL at 08:00

## 2019-01-09 RX ADMIN — APIXABAN 5 MG: 5 TABLET, FILM COATED ORAL at 21:06

## 2019-01-09 RX ADMIN — FLECAINIDE ACETATE 100 MG: 100 TABLET ORAL at 08:00

## 2019-01-09 RX ADMIN — APIXABAN 5 MG: 5 TABLET, FILM COATED ORAL at 08:00

## 2019-01-09 RX ADMIN — Medication 2 SPRAY: at 14:09

## 2019-01-09 RX ADMIN — ACETAMINOPHEN 650 MG: 325 TABLET, FILM COATED ORAL at 18:16

## 2019-01-09 RX ADMIN — LEVOFLOXACIN 500 MG: 500 TABLET, FILM COATED ORAL at 14:57

## 2019-01-09 RX ADMIN — FLECAINIDE ACETATE 100 MG: 100 TABLET ORAL at 21:06

## 2019-01-09 RX ADMIN — PREDNISONE 40 MG: 20 TABLET ORAL at 08:00

## 2019-01-09 RX ADMIN — METOPROLOL SUCCINATE 50 MG: 50 TABLET, EXTENDED RELEASE ORAL at 08:00

## 2019-01-09 RX ADMIN — FUROSEMIDE 20 MG: 20 TABLET ORAL at 08:00

## 2019-01-09 RX ADMIN — ATORVASTATIN CALCIUM 40 MG: 40 TABLET, FILM COATED ORAL at 21:06

## 2019-01-09 RX ADMIN — IPRATROPIUM BROMIDE AND ALBUTEROL SULFATE 3 ML: .5; 2.5 SOLUTION RESPIRATORY (INHALATION) at 08:45

## 2019-01-09 RX ADMIN — INSULIN ASPART 1 UNITS: 100 INJECTION, SOLUTION INTRAVENOUS; SUBCUTANEOUS at 17:02

## 2019-01-09 RX ADMIN — METOPROLOL SUCCINATE 50 MG: 50 TABLET, EXTENDED RELEASE ORAL at 21:06

## 2019-01-09 ASSESSMENT — ACTIVITIES OF DAILY LIVING (ADL)
ADLS_ACUITY_SCORE: 13

## 2019-01-09 ASSESSMENT — MIFFLIN-ST. JEOR: SCORE: 1304.63

## 2019-01-09 NOTE — PLAN OF CARE
A&Ox4.  VSS, on 5L/NC, O2 sats 92-93%, desats with activity down to 84%, recovered quickly.  SALAZAR.  Infreq dry, nonproductive cough.  LS diminished.  Up to BR w/ SBA, strict I&O's.  Tele- NSR.  WBC 14.3, creat 1.35.  .  Yolette nebs, oral Augmentin, oral prednisone. Pulmonary following.  Discharge pending O2 needs, nursing will continue to monitor.

## 2019-01-09 NOTE — PLAN OF CARE
Pt is A&O x 4, calm and pleasant. VSS on 5LNC, saturating low 90's, unable to wean down. Dipped to 84% on activities but recovered quickly. SALAZAR, Lung sound diminished throughout. Using IS. Feeling better than yesterday per pt. Cardiac diet with good appetite,  and 139. strict I&O, voiding adequate. On PO abx. Tele SR with first degree AVB. Pulmonologist following. Discharge pending in progress. Continue to monitor.

## 2019-01-09 NOTE — PLAN OF CARE
Discharge Planner PT   Patient plan for discharge: Home with daughter  Current status: PT eval complete, treatment initiated. Pt is admitted with intermittent low BP, and mechanical fall. Pt lives with her daughter and grand daughter in a 2Sh with bed and bath upstairs with rail support. Pt was ind with all ADL's and gait prior to admission. Pt is at independent with all aspects of bed mobility, independent with transfers and supervision to independent with ambulation x 300 ft. Pt went up/down 1 flight of stairs with 1 rail support and supervision. Pt was dependent on 3L of O2 throughout PT session and noted to be desaturating to 80% with mobility. Pt is educated on energy conservation, taking rest breaks and pursed lip breathing. Pt is near usual baseline at this time and therefore will discharge from PT. Pt is encouraged to ambulate in the hallway at least 3 times a day with [facility] staff. RN and pt agreed.   Barriers to return to prior living situation: None anticipated.   Recommendations for discharge: Home with family assistance for household chores.   Rationale for recommendations: Pt will need assistance with household chores to help with energy conservation.        Entered by: April Singh 01/09/2019 4:31 PM

## 2019-01-09 NOTE — PROGRESS NOTES
01/09/19 1600   Quick Adds   Type of Visit Initial PT Evaluation   Living Environment   Lives With child(isidoro), adult;grandchild(isidoro)   Living Arrangements house   Home Accessibility stairs to enter home;stairs within home   Living Environment Comment Pt lives in a 2Sh with 3 RAMONITA and bed and bath upstairs. Pt stated that the steps to enter from the front and near the door way and is able to hold onto the doorway.    Self-Care   Usual Activity Tolerance good   Current Activity Tolerance good   Equipment Currently Used at Home none   Activity/Exercise/Self-Care Comment Pt was ind with ADL's   Functional Level Prior   Ambulation 0-->independent   Transferring 0-->independent   Toileting 0-->independent   Bathing 0-->independent   Communication 0-->understands/communicates without difficulty   Swallowing 0-->swallows foods/liquids without difficulty   Cognition 0 - no cognition issues reported   Fall history within last six months yes   Number of times patient has fallen within last six months 1   Which of the above functional risks had a recent onset or change? none   Prior Functional Level Comment Pt was ind with ambualtion without the use of any DME's.    General Information   Onset of Illness/Injury or Date of Surgery - Date 01/05/19   Referring Physician Micki Lan MD   Patient/Family Goals Statement be able to walk without getting SOB   Pertinent History of Current Problem (include personal factors and/or comorbidities that impact the POC) 78-year-old  female with past medical history significant for essential hypertension, dyslipidemia, atrial fibrillation on anticoagulation, heart failure with preserved ejection fraction, moderate pulmonary hypertension, sick sinus syndrome status post permanent pacemaker placement who presented to the emergency department with generalized weakness, fall and, hypotension   Precautions/Limitations fall precautions   Weight-Bearing Status - LLE full  "weight-bearing   Weight-Bearing Status - RLE full weight-bearing   General Observations Pleasant and cooperative   Cognitive Status Examination   Orientation orientation to person, place and time   Level of Consciousness alert   Follows Commands and Answers Questions 100% of the time   Personal Safety and Judgment intact   Memory intact   Pain Assessment   Patient Currently in Pain No   Range of Motion (ROM)   ROM Comment BLE: WFL   Strength   Strength Comments BLE: 4+/5 bilaterally    Bed Mobility   Bed Mobility Comments Supine>sit: independent, SIt>supine: independent   Transfer Skills   Transfer Comments STS at independent   Gait   Gait Comments 20 ft without AD's and SBA.    Balance   Balance Comments Sitting: good, Standing: good   Sensory Examination   Sensory Perception no deficits were identified   Coordination   Coordination no deficits were identified   Muscle Tone   Muscle Tone no deficits were identified   General Therapy Interventions   Planned Therapy Interventions gait training;risk factor education   Clinical Impression   Criteria for Skilled Therapeutic Intervention yes, treatment indicated   PT Diagnosis impaired gait   Influenced by the following impairments Decreased activity tolerance   Functional limitations due to impairments Assistance needed with gait   Clinical Presentation Stable/Uncomplicated   Clinical Presentation Rationale Current clinical and fucntional presentation   Clinical Decision Making (Complexity) Low complexity   Therapy Frequency` 1 time/week   Predicted Duration of Therapy Intervention (days/wks) 5   Anticipated Discharge Disposition Home with Assist   Risk & Benefits of therapy have been explained Yes   Patient, Family & other staff in agreement with plan of care Yes   Clinical Impression Comments Pt presents with decreased activity tolerance limiting fucntional mobility. Pt will beneift from an additional visit to progress gait to baseline.   [facility] AM-PAC  \"6 " "Clicks\" V.2 Basic Mobility Inpatient Short Form   1. Turning from your back to your side while in a flat bed without using bedrails? 4 - None   2. Moving from lying on your back to sitting on the side of a flat bed without using bedrails? 4 - None   3. Moving to and from a bed to a chair (including a wheelchair)? 4 - None   4. Standing up from a chair using your arms (e.g., wheelchair, or bedside chair)? 4 - None   5. To walk in hospital room? 4 - None   6. Climbing 3-5 steps with a railing? 3 - A Little   Basic Mobility Raw Score (Score out of 24.Lower scores equate to lower levels of function) 23   Total Evaluation Time   Total Evaluation Time (Minutes) 15     "

## 2019-01-09 NOTE — PROGRESS NOTES
Patient is on a 5L NC with SpO2 in the low 90's. BS diminished. All nebs were given as ordered.  Will cont to follow.  1/8/2019  Diane Kelly RRT

## 2019-01-09 NOTE — PLAN OF CARE
A&O. VSS. Pt on 5L O2 at beginning of shift, decreased to 3L. Pt desats with activity down to mid 80's, recovers quickly with rest. Tele: SR with BBB. Up with SBA and GB. SALAZAR. LS diminished. Infrequent dry, nonprodoctive cough. Tolerating mod carb, low NA, low fat diet. , 109 and 146. IV SL. Discharge pending O2 needs.

## 2019-01-09 NOTE — PROGRESS NOTES
[facility]  Hospitalist Progress Note  Micki Lan MD    Assessment & Plan   Hamida MARQUEZ Luci is a very pleasant 78-year-old  female with past medical history significant for essential hypertension, dyslipidemia, atrial fibrillation on anticoagulation, heart failure with preserved ejection fraction, moderate pulmonary hypertension, sick sinus syndrome status post permanent pacemaker placement who presented to the emergency department with generalized weakness, fall and, hypotension. She was admitted for further management.       Acute pyelonephritis with sepsis, secondary to klebsiella :   Presented with generalized weakness, fall, and hypotension. Denied changes to urination. CXR unremarkable. UA abnormal with Large LE, WBC 38, although neg nitrites. Leukocytosis with WBC 14.9. Lactate 2.1 --> 0.9. Started on IV ceftriaxone upon admission. Urine culture grew 2 strains of Klebsiella pneumoniae. Blood cultures have remained negative.    - Transitioned antibiotic therapy to oral Levaquin 01/09 for total antibiotic course of 7 days (to also cover for possible COPD exacerbation, see below)       Acute kidney injury:   Baseline Cr typically 0.69-0.96.Cr 1.35 on admission, improved with IV fluid, suggestive of pre-renal azotemia.    Recent Labs   Lab 01/09/19  0910 01/08/19  0814 01/06/19  0527 01/05/19  1520   CR 0.84 0.80 0.99 1.35*     -Continue to monitor renal function periodically  -Avoid NSAIDs and nephrotoxins     Acute hypoxia after receiving 2L NS bolus in ED,  Chronic diastolic CHF with preserved ejection fraction,  Remote Smoking hx, possible COPD exacerbation  History of chronic diastolic CHF. ECHO  Oct 2018: LV mild to mod dilation, TR 1-2+, normal systolic vxn, EF 55-60%, LV diastolic fxn indeterminate. No RWMA. No thrombus.  PTA on Lasix 20 mg PO daily, lisinopril 40 mg p.o. daily and metoprolol XL 50 mg p.o. b.i.d.    Has a remote smoking history, quit in 2010. Some  emphysematous changes on prior chest CT.  Was advised to get outpatient pulmonary function testing for proper diagnosis of COPD due to high probability during Oct 2018 admission but not done yet.   CXR on admission 01/05 with clear lungs. Repeat CXR 01/07 with possible vascular congestion, no acute infiltrates.  Patient was seen by pulmonology and treatment initiated for possible COPD exacerbation.    - Prednisone 40 mg daily for 5 days. Can transition her back down to 5 mg after the 5-day burst.  - Continue PTA Lasix and metoprolol  - Continue to hold PTA lisinopril due to JERE  - Oral Levaquin as above  - Wean oxygen to maintain sats above 90% (currently on 4 L/min)  - Component of deconditioning, physical therapy and likely rehab may be needed post hospitalization  - No indication for PFTs at this time.  Patient will need to recover and stabilize.  Suggest getting PFTs as an outpatient 6-8 weeks post discharge  - Continue scheduled duo nebs  - Once improvement is noted we will put her on maintenance inhalers.  Likely start with a LAMA/LABA combination (Anoro)  - Pulm following, appreciate input    Physical deconditioning:  - PT     Recent history of revision of corneal incision in 11/2018:  -Plan for transition her back down to prednisone 5 mg daily after the 5-day burst as above.     Hypertension:    PTA: Lasix 20 mg PO daily, lisinopril 40 mg p.o. daily and metoprolol XL 50 mg p.o. b.i.d.   BP is currently controlled.  - Continue PTA Lasix and metoprolol  - Continue to hold PTA lisinopril  - PRN IV hydralazine available     Paroxysmal atrial fibrillation/sick sinus syndrome, status post permanent pacemaker in 03/2018:    EKG in the ER revealed normal sinus rhythm, rate controlled 60s. Chronically anticoagulated with apixaban. NSR is maintained.  - Pacemaker recently interogated   - Continue PTA Eliquis 5mg BID, Toprol-XL 50 mg BID with hold parameters, and flecainide 100 mg BID  - Continue  telemetry        Dyslipidemia:    - Continue PTA simvastatin 20 mg p.o. at bedtime.      Hyperglycemia, mild:  Hgb A1c 6.9% on 01/08. Concerned for early diabetes, exacerbated by steroid therapy.    - Continue sliding scale insulin  - Low carb diet  - Follow up with PCP for optimal management (discussed with the patient)         Orders Placed This Encounter      Combination Diet 1195-0752 Calories: Moderate Consistent CHO (4-6 CHO units/meal); 2 gm NA Diet; Low Saturated Fat Diet       DVT Prophylaxis: Eliquis  Code Status: DNR/DNI.  Disposition: Pending ongoing management and hospital course. Expected discharge: 2-3 days, pending improvement in respiratory status.    D/W RN.    Time Spent on this Encounter   I spent 40 minutes on the unit/floor managing the care of Hamida MARQUEZ Luci. Over 50% of my time was spent counseling the patient and/or coordinating care regarding services listed in this note.      Interval History   Patient continues to require O2 supplement, now down to 4 L/min. She reports no cp, palpitation, or other complaints. NSR is maintained.    Data reviewed today: I reviewed all new labs and imaging over the last 24 hours. I personally reviewed no images or EKG's today.    Physical Exam   Temp: 98.4  F (36.9  C) Temp src: Oral BP: 124/63 Pulse: 66 Heart Rate: 65 Resp: 18 SpO2: 96 %(Back in bed) O2 Device: Nasal cannula Oxygen Delivery: 5 LPM  Vitals:    01/07/19 0655 01/08/19 0645 01/09/19 0618   Weight: 80 kg (176 lb 5.9 oz) 78.9 kg (173 lb 15.1 oz) 79.7 kg (175 lb 11.3 oz)     Vital Signs with Ranges  Temp:  [97.8  F (36.6  C)-98.4  F (36.9  C)] 98.4  F (36.9  C)  Pulse:  [66-81] 66  Heart Rate:  [65-86] 65  Resp:  [18-20] 18  BP: (117-130)/(61-69) 124/63  SpO2:  [83 %-97 %] 96 %  I/O's Last 24 hours  I/O last 3 completed shifts:  In: 880 [P.O.:880]  Out: 1100 [Urine:1100]    Constitutional: Comfortable, no acute distress  HEENT: No conjunctival pallor.  Neurologic: Awake, alert, fully  oriented  Neck: Supple, no elevated JVP  Respiratory: Diminished lung sounds diffusely, no wheezing, or rhonchi  Cardiovascular: Normal S1 and S2, regular rhythm and rate  GI: Abdomen soft, non tender, non distended  Extremities: No calf tenderness, no significant LE edema  Skin/integument: No acute rash, no cyanosis    Medications   All medications were reviewed.    Continuing ACE inhibitor/ARB/ARNI from home medication list OR ACE inhibitor/ARB order already placed during this visit       - MEDICATION INSTRUCTIONS -       - MEDICATION INSTRUCTIONS -         amoxicillin-clavulanate  1 tablet Oral Q12H ZEESHAN     apixaban ANTICOAGULANT  5 mg Oral BID     atorvastatin  40 mg Oral QPM     flecainide  100 mg Oral BID     furosemide  20 mg Oral Daily     insulin aspart  1-7 Units Subcutaneous TID AC     insulin aspart  1-5 Units Subcutaneous At Bedtime     ipratropium - albuterol 0.5 mg/2.5 mg/3 mL  3 mL Nebulization 4x daily     metoprolol succinate ER  50 mg Oral BID     predniSONE  40 mg Oral Daily     senna-docusate  1 tablet Oral BID    Or     senna-docusate  2 tablet Oral BID     sodium chloride (PF)  3 mL Intracatheter Q8H        Data   Recent Labs   Lab 01/09/19  0910 01/08/19  0814 01/06/19  0527 01/05/19  2325 01/05/19  1926 01/05/19  1520   WBC 16.3* 14.3* 13.3*  --   --  14.9*   HGB 10.7* 11.1* 10.6*  --   --  12.1   MCV 84 85 85  --   --  85    341 257  --   --  279    133 135  --   --  134   POTASSIUM 3.9 4.2 4.5  --   --  4.7   CHLORIDE 102 102 105  --   --  102   CO2 23 20 23  --   --  23   BUN 20 18 24  --   --  35*   CR 0.84 0.80 0.99  --   --  1.35*   ANIONGAP 11 11 7  --   --  9   GURWINDER 8.6 8.5 8.0*  --   --  8.7   * 235* 95  --   --  119*   ALBUMIN  --   --  2.2*  --   --  2.5*   PROTTOTAL  --   --  5.8*  --   --  6.5*   BILITOTAL  --   --  0.7  --   --  0.7   ALKPHOS  --   --  41  --   --  43   ALT  --   --  55*  --   --  61*   AST  --   --  27  --   --  24   TROPI  --   --   --   <0.015 <0.015 <0.015       No results found for this or any previous visit (from the past 24 hour(s)).

## 2019-01-09 NOTE — PROGRESS NOTES
"PULMONARY PROGRESS NOTE          Interval History:      Feels fine, on 3L NC at rest with desats on ambulation. No cough, no acute overnight events noted.          Physical Exam:      Blood pressure 121/68, pulse 70, temperature 97.5  F (36.4  C), temperature source Oral, resp. rate 16, height 1.702 m (5' 7\"), weight 79.7 kg (175 lb 11.3 oz), SpO2 93 %, not currently breastfeeding.  Vitals:    01/07/19 0655 01/08/19 0645 01/09/19 0618   Weight: 80 kg (176 lb 5.9 oz) 78.9 kg (173 lb 15.1 oz) 79.7 kg (175 lb 11.3 oz)     Vital Signs with Ranges  Temp:  [97.5  F (36.4  C)-98.4  F (36.9  C)] 97.5  F (36.4  C)  Pulse:  [66-81] 70  Heart Rate:  [65-86] 72  Resp:  [16-20] 16  BP: (117-130)/(61-69) 121/68  SpO2:  [83 %-97 %] 93 %  I/O's Last 24 hours  I/O last 3 completed shifts:  In: 1080 [P.O.:1080]  Out: 1600 [Urine:1600]    Constitutional:  Appears comfortable.  HENT:  mucous membranes moist.  Eyes: PERRLA, no icterus, no pallor.   Neck: No lymphadenopathy or thyromegaly, trachea midline, no carotid bruits.  Cardiovascular: Regular rate and rythym, no murmurs, rubs or gallops, no peripheral edema.  Respiratory/Chest: Diminished breath sounds throughout no wheezing rhonchi rales noted chest expansion normal gastrointestinal: Abdomen was soft, non-tender, non-distended, no masses felt, no hepatosplenomegaly.  Musculoskeletal: No clubbing or cyanosis, full range of motion in all extremities.  Neurological: No focal motor or sensory deficits. DTR's are 2+ and symmetric.  Skin: No skin rash, hives, petechiae, or breakdown.             Medications:          apixaban ANTICOAGULANT  5 mg Oral BID     atorvastatin  40 mg Oral QPM     flecainide  100 mg Oral BID     furosemide  20 mg Oral Daily     insulin aspart  1-7 Units Subcutaneous TID AC     insulin aspart  1-5 Units Subcutaneous At Bedtime     ipratropium - albuterol 0.5 mg/2.5 mg/3 mL  3 mL Nebulization 4x daily     levofloxacin  500 mg Oral Daily     metoprolol succinate " ER  50 mg Oral BID     predniSONE  40 mg Oral Daily     senna-docusate  1 tablet Oral BID    Or     senna-docusate  2 tablet Oral BID     sodium chloride (PF)  3 mL Intracatheter Q8H            Data:      All new lab and imaging data was reviewed.   Recent Labs   Lab Test 01/09/19 0910 01/08/19 0814 01/06/19 0527 03/19/18  1055  11/12/14  0814   WBC 16.3* 14.3* 13.3*   < >  --    < > 7.1   HGB 10.7* 11.1* 10.6*   < >  --    < > 14.1   MCV 84 85 85   < >  --    < > 84    341 257   < >  --    < > 405   INR  --   --   --   --  1.31*  --  1.04    < > = values in this interval not displayed.      Recent Labs   Lab Test 01/09/19 0910 01/08/19 0814 01/06/19 0527    133 135   POTASSIUM 3.9 4.2 4.5   CHLORIDE 102 102 105   CO2 23 20 23   BUN 20 18 24   CR 0.84 0.80 0.99   ANIONGAP 11 11 7   GURWINDER 8.6 8.5 8.0*   * 235* 95            Active Problems:    Hypotension           Assessment and Plan:      Impression  -Acute hypoxic respiratory failure  -Likely COPD exacerbation  -Strict tobacco abuse  -Heart failure with preserved EF  -Atrial fibrillation on chronic anticoagulation  -Sick sinus syndrome     Plan  -Change prednisone from 5 mg every other day to 40 mg daily for 5 days.  Can transition her back down to 5 mg after the 5-day burst.  -Continue Rocephin  -Wean oxygen to maintain sats above 90%  -Component of deconditioning, physical therapy and likely rehab may be needed post hospitalization  -No indication for PFTs at this time.  Patient will need to recover and stabilize.  Suggest getting PFTs as an outpatient 6-8 weeks post discharge (will set up for patient)  -Continue scheduled duo nebs  -Once improvement is noted we will put her on maintenance inhalers.  Likely start with a LAMA/LABA combination (Anoro), which can be tomorrow  -Continue diuresis     Thank you for the opportunity to participate in the care of this patient.  Stable respiratory status, wean O2 to keep sats above 90%, will need  home O2. Okay to discharge from a pulmonary standpoint when okay with primary team, complete prednisone burst then decrease to baseline, inhalers and complete Levaquin course. We will set up outpatient appt and obtain PFTs. Will benefit from pulmonary rehab upon discharge.     Thank you for the consult, we will sign off at this time. Please call with any questions.       Chuck Jefferson MD  [facility] / [facility]  [phone] (pager)  [phone] (office)

## 2019-01-10 ENCOUNTER — DOCUMENTATION ONLY (OUTPATIENT)
Dept: MEDSURG UNIT | Facility: CLINIC | Age: 79
End: 2019-01-10

## 2019-01-10 ENCOUNTER — APPOINTMENT (OUTPATIENT)
Dept: OCCUPATIONAL THERAPY | Facility: CLINIC | Age: 79
DRG: 871 | End: 2019-01-10
Payer: COMMERCIAL

## 2019-01-10 VITALS
TEMPERATURE: 97.7 F | SYSTOLIC BLOOD PRESSURE: 129 MMHG | WEIGHT: 176 LBS | RESPIRATION RATE: 16 BRPM | HEART RATE: 66 BPM | DIASTOLIC BLOOD PRESSURE: 69 MMHG | OXYGEN SATURATION: 91 % | BODY MASS INDEX: 27.62 KG/M2 | HEIGHT: 67 IN

## 2019-01-10 LAB
ANION GAP SERPL CALCULATED.3IONS-SCNC: 10 MMOL/L (ref 3–14)
BUN SERPL-MCNC: 21 MG/DL (ref 7–30)
CALCIUM SERPL-MCNC: 8.6 MG/DL (ref 8.5–10.1)
CHLORIDE SERPL-SCNC: 104 MMOL/L (ref 94–109)
CO2 SERPL-SCNC: 24 MMOL/L (ref 20–32)
CREAT SERPL-MCNC: 0.86 MG/DL (ref 0.52–1.04)
ERYTHROCYTE [DISTWIDTH] IN BLOOD BY AUTOMATED COUNT: 17.4 % (ref 10–15)
GFR SERPL CREATININE-BSD FRML MDRD: 64 ML/MIN/{1.73_M2}
GLUCOSE BLDC GLUCOMTR-MCNC: 105 MG/DL (ref 70–99)
GLUCOSE BLDC GLUCOMTR-MCNC: 81 MG/DL (ref 70–99)
GLUCOSE BLDC GLUCOMTR-MCNC: 94 MG/DL (ref 70–99)
GLUCOSE SERPL-MCNC: 97 MG/DL (ref 70–99)
HCT VFR BLD AUTO: 31.5 % (ref 35–47)
HGB BLD-MCNC: 10.3 G/DL (ref 11.7–15.7)
MCH RBC QN AUTO: 27.7 PG (ref 26.5–33)
MCHC RBC AUTO-ENTMCNC: 32.7 G/DL (ref 31.5–36.5)
MCV RBC AUTO: 85 FL (ref 78–100)
PLATELET # BLD AUTO: 349 10E9/L (ref 150–450)
POTASSIUM SERPL-SCNC: 3.5 MMOL/L (ref 3.4–5.3)
RBC # BLD AUTO: 3.72 10E12/L (ref 3.8–5.2)
SODIUM SERPL-SCNC: 138 MMOL/L (ref 133–144)
WBC # BLD AUTO: 12.9 10E9/L (ref 4–11)

## 2019-01-10 PROCEDURE — 97535 SELF CARE MNGMENT TRAINING: CPT | Mod: GO

## 2019-01-10 PROCEDURE — 99239 HOSP IP/OBS DSCHRG MGMT >30: CPT | Performed by: INTERNAL MEDICINE

## 2019-01-10 PROCEDURE — 80048 BASIC METABOLIC PNL TOTAL CA: CPT | Performed by: HOSPITALIST

## 2019-01-10 PROCEDURE — 25000125 ZZHC RX 250: Performed by: INTERNAL MEDICINE

## 2019-01-10 PROCEDURE — 25000132 ZZH RX MED GY IP 250 OP 250 PS 637: Performed by: PHYSICIAN ASSISTANT

## 2019-01-10 PROCEDURE — 36415 COLL VENOUS BLD VENIPUNCTURE: CPT | Performed by: HOSPITALIST

## 2019-01-10 PROCEDURE — 40000133 ZZH STATISTIC OT WARD VISIT

## 2019-01-10 PROCEDURE — 00000146 ZZHCL STATISTIC GLUCOSE BY METER IP

## 2019-01-10 PROCEDURE — 85027 COMPLETE CBC AUTOMATED: CPT | Performed by: HOSPITALIST

## 2019-01-10 PROCEDURE — 25000132 ZZH RX MED GY IP 250 OP 250 PS 637: Performed by: INTERNAL MEDICINE

## 2019-01-10 PROCEDURE — 97530 THERAPEUTIC ACTIVITIES: CPT | Mod: GO

## 2019-01-10 RX ORDER — ALBUTEROL SULFATE 90 UG/1
2 AEROSOL, METERED RESPIRATORY (INHALATION) EVERY 6 HOURS
Qty: 1 INHALER | Refills: 3 | Status: SHIPPED | OUTPATIENT
Start: 2019-01-10 | End: 2019-01-10

## 2019-01-10 RX ORDER — ALBUTEROL SULFATE 0.83 MG/ML
2.5 SOLUTION RESPIRATORY (INHALATION)
Status: DISCONTINUED | OUTPATIENT
Start: 2019-01-10 | End: 2019-01-10 | Stop reason: HOSPADM

## 2019-01-10 RX ORDER — LEVOFLOXACIN 500 MG/1
500 TABLET, FILM COATED ORAL DAILY
Qty: 2 TABLET | Refills: 0 | Status: SHIPPED | OUTPATIENT
Start: 2019-01-10 | End: 2019-01-10

## 2019-01-10 RX ORDER — PREDNISONE 20 MG/1
40 TABLET ORAL DAILY
Qty: 4 TABLET | Refills: 0 | Status: SHIPPED | OUTPATIENT
Start: 2019-01-10 | End: 2019-02-21

## 2019-01-10 RX ORDER — ALBUTEROL SULFATE 90 UG/1
2 AEROSOL, METERED RESPIRATORY (INHALATION) EVERY 4 HOURS PRN
Qty: 1 INHALER | Refills: 3 | Status: SHIPPED | OUTPATIENT
Start: 2019-01-10 | End: 2019-02-21

## 2019-01-10 RX ORDER — CEFUROXIME AXETIL 500 MG/1
500 TABLET ORAL 2 TIMES DAILY
Qty: 6 TABLET | Refills: 0 | Status: SHIPPED | OUTPATIENT
Start: 2019-01-10 | End: 2019-02-21

## 2019-01-10 RX ADMIN — FLECAINIDE ACETATE 100 MG: 100 TABLET ORAL at 07:48

## 2019-01-10 RX ADMIN — PREDNISONE 40 MG: 20 TABLET ORAL at 07:48

## 2019-01-10 RX ADMIN — APIXABAN 5 MG: 5 TABLET, FILM COATED ORAL at 07:49

## 2019-01-10 RX ADMIN — UMECLIDINIUM BROMIDE AND VILANTEROL TRIFENATATE 1 PUFF: 62.5; 25 POWDER RESPIRATORY (INHALATION) at 07:49

## 2019-01-10 RX ADMIN — LEVOFLOXACIN 500 MG: 500 TABLET, FILM COATED ORAL at 07:49

## 2019-01-10 RX ADMIN — METOPROLOL SUCCINATE 50 MG: 50 TABLET, EXTENDED RELEASE ORAL at 07:47

## 2019-01-10 RX ADMIN — Medication 1 SPRAY: at 07:49

## 2019-01-10 RX ADMIN — FUROSEMIDE 20 MG: 20 TABLET ORAL at 07:48

## 2019-01-10 ASSESSMENT — ACTIVITIES OF DAILY LIVING (ADL)
ADLS_ACUITY_SCORE: 13

## 2019-01-10 ASSESSMENT — MIFFLIN-ST. JEOR: SCORE: 1305.96

## 2019-01-10 NOTE — DISCHARGE SUMMARY
[facility]  Discharge Summary        Hamida Verma MRN# [id number]   YOB: 1940 Age: 78 year old     Date of Admission:  1/5/2019  Date of Discharge:  1/10/2019  Admitting Physician:  Annie Singh MD  Discharge Physician:             Micki Lan MD  Discharging Service:             Hospitalist     Primary Provider: Mikaal Evangelista  Primary Care Physician Phone Number: [phone]     Discharge Diagnoses:     Acute pyelonephritis with sepsis, secondary to Klebsiella pneumoniae   JERE  Acute hypoxia, suspect undiagnoed COPD with acute exacerbation  Physical deconditioning    Other/Chronic Medical Problems:      Chronic diastolic CHF  HTN  DLP  PAF  SSS, s/p PPM  Hyperglycemia, possible early DM2  Moderate pulmonary hypertension  Recent history of revision of corneal incision    Problem Oriented Hospital Course   Hamida Verma is a very pleasant 78-year-old  female with past medical history significant for essential hypertension, dyslipidemia, paroxysmal atrial fibrillation on anticoagulation, heart failure with preserved ejection fraction, moderate pulmonary hypertension, and sick sinus syndrome, status post permanent pacemaker placement who presented with generalized weakness, fall, and hypotension. She was admitted for further management.      For a detailed history and physical exam, please refer to the dictated admission note by Diane York PA-C on 01/05/2019.     Acute pyelonephritis with sepsis, secondary to Klebsiella pneumoniae :   Patient presented with generalized weakness, fall, and hypotension. Leukocytosis with WBC 14.9. CXR unremarkable. UA abnormal with Large LE, WBC 38, neg nitrites. Lactate 2.1 --> 0.9. Started on IV ceftriaxone upon admission. Urine culture grew 2 strains of Klebsiella pneumoniae. Blood cultures remained negative. Antibiotic therapy was transitioned to oral Levaquin 01/09, but due to interaction with flecainide, she was  discharged on oral cefuroxime 500 mg po bid for 3 more days.     Acute kidney injury:   Baseline Cr typically 0.6-0.9. Cr 1.35 on admission, improved with IV fluid, suggestive of pre-renal azotemia. Notably, PTA lisinopril was held till the time of discharge.     Recent Labs   Lab 01/10/19  0808 01/09/19  0910 01/08/19  0814 01/06/19  0527 01/05/19  1520   CR 0.86 0.84 0.80 0.99 1.35*        Acute hypoxic respiratory failure,  Suspected COPD with acute exacerbation,  History of chronic diastolic CHF (HF with preserved ejection fraction).  Patient noted to be hypoxic in ED. History of chronic diastolic CHF. ECHO Oct 2018: LV mild to mod dilation, TR 1-2+, normal systolic function with EF 55-60%, LV diastolic fxn indeterminate. No RWMA. No thrombus. PTA on Lasix 20 mg PO daily, lisinopril 40 mg p.o. daily ,and metoprolol XL 50 mg p.o. b.i.d. Patient had a remote smoking history, quit in 2010. Some emphysematous changes on prior chest CT. She had been advised to have outpatient pulmonary function testing done for proper diagnosis of COPD due to high probability during Oct 2018 admission but not done yet. CXR on admission 01/05 with clear lungs. Repeat CXR 01/07 with possible vascular congestion, no acute infiltrates. Patient was seen by pulmonology and treatment initiated for possible COPD exacerbation. She was started on prednisone 40 mg daily for 5 days,respiratory therapy with nebs, and antibiotics (initially ceftriaxone, then Levaquin). She continued with PTA Lasix and metoprolol. At the time of discharge her O2 need was down due 2-3 L/min. She was discharged on Anoro Ellipta, prn albuterol MDI, home O2, oral prednisone 40 mg po daily for 2 more days, and cefuroxime 500 mg po bid for 3 more days. She was scheduled to follow up with pulmonology clinic on 02/19 for reassessment and further work-up including PFT.     Physical deconditioning:  PT recommended home with family assistance.     Recent history of revision  of corneal incision in 11/2018:  Patient is to continue with PTA low dose prednisone after the 5-day burst of high dose prednisone as above.     Hypertension:    Chronic and stable on PTA Lasix 20 mg PO daily, lisinopril 40 mg p.o. daily and metoprolol XL 50 mg p.o. b.i.d.      Paroxysmal atrial fibrillation,  Sick sinus syndrome, status post permanent pacemaker in 03/2018:    EKG in the ER revealed normal sinus rhythm, rate controlled 60s. Chronically anticoagulated with apixaban. NSR was maintained. She continued with PTA Eliquis 5mg BID, Toprol-XL 50 mg BID, and flecainide 100 mg BID        Dyslipidemia:    She continued with PTA simvastatin 20 mg p.o. at bedtime.      Hyperglycemia, mild:  Hgb A1c 6.9% on 01/08. Concerned for early diabetes, exacerbated by steroid therapy. She was placed on low carb diet and advised to follow up with PCP for optimal management (discussed with the patient).               Code Status:      DNR / DNI        Brief Hospital Stay Summary Sent Home With Patient in AVS:        Reason for your hospital stay      Kidney infection and possible COPD exacerbation                 Important Results:      Na 138, K 3.5, Cr 0.86, Hgb 10.3, WBC 12.9, PLT 349K        Pending Results:        Unresulted Labs Ordered in the Past 30 Days of this Admission     Date and Time Order Name Status Description    1/5/2019 1742 Blood culture Preliminary     1/5/2019 1742 Blood culture Preliminary               Discharge Instructions and Follow-Up:      Follow-up Appointments     Follow-up and recommended labs and tests       Follow up with PCP in one week. Recommended lab: BMP.  Follow up with Dr. Ronny Nascimento ([facility]) on 02/19/2019 for PFT   and reassessment.  Follow up with [facility] on 01/16/2019 with Sandrine.                 Discharge Disposition:      Discharged to home        Discharge Medications:        Current Discharge Medication List      START taking these medications    Details    albuterol (PROAIR HFA/PROVENTIL HFA/VENTOLIN HFA) 108 (90 Base) MCG/ACT inhaler Inhale 2 puffs into the lungs every 4 hours as needed for shortness of breath / dyspnea or wheezing  Qty: 1 Inhaler, Refills: 3    Comments: Future refills by PCP Dr. Mikala Philip MD with phone number [phone].  Associated Diagnoses: COPD exacerbation (H)      cefuroxime (CEFTIN) 500 MG tablet Take 1 tablet (500 mg) by mouth 2 times daily for 3 days  Qty: 6 tablet, Refills: 0    Associated Diagnoses: Pyelonephritis      predniSONE (DELTASONE) 20 MG tablet Take 40 mg by mouth daily for 2 days.  Qty: 4 tablet, Refills: 0    Associated Diagnoses: COPD exacerbation (H)      umeclidinium-vilanterol (ANORO ELLIPTA) 62.5-25 MCG/INH oral inhaler Inhale 1 puff into the lungs daily  Qty: 1 Inhaler, Refills: 3    Comments: Future refills by PCP Dr. Mikala Philip MD with phone number [phone].  Associated Diagnoses: COPD exacerbation (H)         CONTINUE these medications which have NOT CHANGED    Details   alendronate (FOSAMAX) 70 MG tablet Take 1 tablet (70 mg) by mouth every 7 days Take 60 minutes before am meal with 8 oz. water. Remain upright for 30 minutes.  Qty: 12 tablet, Refills: 3    Associated Diagnoses: Osteoporosis      apixaban ANTICOAGULANT (ELIQUIS) 5 MG tablet Take 1 tablet (5 mg) by mouth 2 times daily  Qty: 180 tablet, Refills: 3    Associated Diagnoses: Atrial fibrillation with rapid ventricular response (H)      atorvastatin (LIPITOR) 40 MG tablet Take 40 mg by mouth daily      cholecalciferol (VITAMIN  -D) 1000 UNIT capsule Take 1 capsule by mouth daily.      Coral Calcium 133-66.7-133 MG-MG-UNIT CAPS Take 2 tablets by mouth 2 times daily       FLAX SEED OIL OR 1 capsule daily      flecainide (TAMBOCOR) 100 MG tablet Take 1 tablet (100 mg) by mouth 2 times daily  Qty: 90 tablet, Refills: 3    Associated Diagnoses: Sick sinus syndrome (H); Cardiac pacemaker in situ      furosemide (LASIX) 20 MG tablet Take 1  "tablet (20 mg) by mouth daily  Qty: 30 tablet, Refills: 5    Associated Diagnoses: Systolic congestive heart failure, unspecified congestive heart failure chronicity      Krill Oil 500 MG CAPS Take 1 capsule by mouth daily      lisinopril (PRINIVIL/ZESTRIL) 40 MG tablet Take 1 tablet (40 mg) by mouth daily  Qty: 90 tablet, Refills: 3    Associated Diagnoses: Hypertension goal BP (blood pressure) < 140/90      Methylsulfonylmethane (MSM) 500 MG CAPS Take 1 capsule by mouth daily      metoprolol succinate (TOPROL-XL) 50 MG 24 hr tablet Take 1 tablet (50 mg) by mouth 2 times daily  Qty: 180 tablet, Refills: 3    Comments: Pt will call when she needs a refill  Associated Diagnoses: Atrial fibrillation with rapid ventricular response (H); Systolic congestive heart failure, unspecified congestive heart failure chronicity      Milk Thistle 200 MG CAPS Take 1 capsule by mouth daily       Multiple Vitamins-Minerals (HAIR SKIN NAILS PO) Take 1 tablet by mouth At Bedtime      potassium & sodium phosphates 278-164-250 MG/75ML SOLR Take 1 tablet by mouth daily                  Allergies:         Allergies   Allergen Reactions     Strawberry Flavor            Consultations This Hospital Stay:      Pulmonology  Cardiology  [facility]        Condition and Physical on Discharge:      Discharge condition: Stable   Vitals: Blood pressure 129/69, pulse 66, temperature 97.7  F (36.5  C), temperature source Oral, resp. rate 16, height 1.702 m (5' 7\"), weight 79.8 kg (176 lb), SpO2 91 %, not currently breastfeeding.           Diet and Activity:     After Care Instructions     Activity      Your activity upon discharge: activity as tolerated         Diet      Follow this diet upon discharge: Orders Placed This Encounter      Combination Diet 2769-7288 Calories: Moderate Consistent CHO (4-6 CHO units/meal); 2 gm NA Diet; Low Saturated Fat Diet         Oxygen Adult      [place] [facility] Order 2-3 liter(s) by nasal cannula continuously with use of " portable tank. Expected treatment length is indefinite (99 months).. Test on conserving device as applicable.    Patients who qualify for home O2 coverage under the CMS guidelines require ABG tests or O2 sat readings obtained closest to, but no earlier than 2 days prior to the discharge, as evidence of the need for home oxygen therapy. Testing must be performed while patient is in the chronic stable state. See notes for O2 sats.    I certify that this patient, Hamida Verma has been under my care and that I, or a nurse practitioner or physician's assistant working with me, had a face-to-face encounter that meets the face-to-face encounter requirements with this patient on 1/10/2019. The patient, Hamida Verma was evaluated or treated in whole, or in part, for the following medical condition, which necessitates the use of the ordered oxygen. Treatment Diagnosis: COPD    Attending Provider: Micki Lan  Physician signature: See electronic signature associated with these discharge orders  Date of Order: January 10, 2019                     Discharge Time:      Greater than 30 minutes.        Image Results From This Hospital Stay (For Non-EPIC Providers):        Results for orders placed or performed during the hospital encounter of 01/05/19   XR Chest 2 Views    Narrative    CHEST TWO VIEWS 1/5/2019 3:56 PM     COMPARISON: Two-view chest x-ray 11/10/2018    HISTORY: Shortness of breath.    FINDINGS: A dual-lead pacemaker module implanted over the left chest  with the leads in the right atrium and right ventricle is again noted  without identifiable change.    The cardiac silhouette, pulmonary vasculature, lungs and pleural  spaces are within normal limits.      Impression    IMPRESSION: Clear lungs.    CHIRAG MCCABE MD   XR Chest 2 Views    Narrative    CHEST TWO VIEWS  1/6/2019 10:05 AM     HISTORY: diastolic CHF, received 2L fluids in ED for hypotension    COMPARISON: 1/5/2019 chest x-ray      Impression     IMPRESSION: Heart and pulmonary vessels within normal limits.  Redemonstrated cardiac device over the left chest with right atrial  and ventricular leads. No acute infiltrate or pleural effusion.    HOOD YOUNG MD   XR Chest 2 Views    Narrative    CHEST TWO VIEWS 2019 2:29 PM     HISTORY: Persistent hypoxia and high O2 needs.    COMPARISON: 2019.     FINDINGS: Stable cardiac device, no pneumothorax. There are no acute  infiltrates. The cardiac silhouette is not enlarged. Pulmonary  vasculature may be congested.      Impression    IMPRESSION: Possible vascular congestion, no acute infiltrates.    ANH HOWARD MD     Recent Results (from the past 4320 hour(s))   Echocardiogram - Routine    Narrative    [id number]  [id number]  [id number]  [id number]^ALEXA^ASHKAN^H           [facility]  Echocardiography Laboratory  [address] [place] [address]        Name: ARTURO BOSWELL  MRN: [id number]  : 1940  Study Date: 10/12/2018 08:49 AM  Age: 78 yrs  Gender: Female  Patient Location: [facility]  Reason For Study: CHF  Ordering Physician: ASHKAN LIU  Referring Physician: Mikala Philip  Performed By: Brianna Hargrove     BSA: 1.9 m2  Height: 64 in  Weight: 179 lb  HR: 63  BP: 161/76 mmHg  _____________________________________________________________________________  __        Procedure  Complete Portable Echo Adult.  _____________________________________________________________________________  __        Interpretation Summary     Left ventricular systolic function is normal.  The left atrium is mild to moderately dilated.  There is mild to moderate (1-2+) tricuspid regurgitation.  Doppler findings do not suggest pulmonary hypertension.  _____________________________________________________________________________  __        Left Ventricle  The left ventricle is normal in size. There is normal left ventricular wall  thickness. Left ventricular systolic function is normal. The  visual ejection  fraction is estimated at 55-60%. Left ventricular diastolic function is  indeterminate. Normal left ventricular wall motion. There is no thrombus seen  in the left ventricle.     Right Ventricle  The right ventricle is normal in structure, function and size. There is a  catheter/pacemaker lead seen in the right ventricle.     Atria  The left atrium is mild to moderately dilated. The right atrium is mildly  dilated.     Mitral Valve  The mitral valve leaflets appear normal. There is no evidence of stenosis,  fluttering, or prolapse. There is trace mitral regurgitation.        Tricuspid Valve  There is mild to moderate (1-2+) tricuspid regurgitation. Doppler findings do  not suggest pulmonary hypertension. The right ventricular systolic pressure is  approximated at 24.3 mmHg plus the right atrial pressure. Normal IVC (1.5-  2.5cm) with >50% respiratory collapse; right atrial pressure is estimated at  5-10mmHg.     Aortic Valve  There is moderate aortic sclerosis of the non-coronary cusp. No  hemodynamically significant valvular aortic stenosis.     Pulmonic Valve  The pulmonic valve is not well seen, but is grossly normal.     Vessels  Normal size aorta.     Pericardium  The pericardium appears normal.        Rhythm  Sinus rhythm was noted.  _____________________________________________________________________________  __  MMode/2D Measurements & Calculations  RVDd: 3.9 cm  IVSd: 0.99 cm     LVIDd: 4.5 cm  LVIDs: 2.7 cm  LVPWd: 1.1 cm  FS: 40.2 %  LV mass(C)d: 165.0 grams  LV mass(C)dI: 88.4 grams/m2  Ao root diam: 2.8 cm  LA dimension: 4.2 cm  asc Aorta Diam: 3.4 cm  LA/Ao: 1.5  LVOT diam: 2.0 cm  LVOT area: 3.0 cm2  LA Volume (BP): 69.7 ml  LA Volume Index (BP): 37.3 ml/m2  RWT: 0.50           Doppler Measurements & Calculations  MV E max roslyn: 95.1 cm/sec  MV A max roslyn: 68.9 cm/sec  MV E/A: 1.4  MV dec time: 0.23 sec  PA V2 max: 97.7 cm/sec  PA max PG: 3.8 mmHg  PA acc time: 0.05 sec  TR max roslyn:  245.7 cm/sec  TR max P.3 mmHg  E/E' av.1  Lateral E/e': 11.3  Medial E/e': 20.9           _____________________________________________________________________________  __           Report approved by: Daniel Coombs 10/12/2018 10:38 AM              Most Recent Lab Results In EPIC (For Non-EPIC Providers):    Most Recent 3 CBC's:  Recent Labs   Lab Test 01/10/19  0808 19  0910 19  0814   WBC 12.9* 16.3* 14.3*   HGB 10.3* 10.7* 11.1*   MCV 85 84 85    407 341      Most Recent 3 BMP's:  Recent Labs   Lab Test 01/10/19  0808 19  0910 19  0814    136 133   POTASSIUM 3.5 3.9 4.2   CHLORIDE 104 102 102   CO2 24 23 20   BUN 21 20 18   CR 0.86 0.84 0.80   ANIONGAP 10 11 11   GURWINDER 8.6 8.6 8.5   GLC 97 149* 235*     Most Recent 3 Troponin's:  Recent Labs   Lab Test 19  2325 19  1926 19  1520  14  1212  14  0813   TROPI <0.015 <0.015 <0.015   < >  --    < >  --    TROPONIN  --   --   --   --  0.00  --  0.01    < > = values in this interval not displayed.     Most Recent 3 INR's:  Recent Labs   Lab Test 18  1055 14  0814   INR 1.31* 1.04     Most Recent 2 LFT's:  Recent Labs   Lab Test 19  0527 19  1520   AST 27 24   ALT 55* 61*   ALKPHOS 41 43   BILITOTAL 0.7 0.7     Most Recent Cholesterol Panel:  Recent Labs   Lab Test 07/11/18  0907   CHOL 171   LDL 84   HDL 69   TRIG 88     Most Recent 6 Bacteria Isolates From Any Culture (See EPIC Reports for Culture Details):  Recent Labs   Lab Test 19  1812 19  1800 19  1640 13  1041 11  1545   CULT No growth after 5 days No growth after 5 days >100,000 colonies/mL  Klebsiella pneumoniae  *  50,000 to 100,000 colonies/mL  Strain 2  Klebsiella pneumoniae  * >100,000 colonies/mL Escherichia coli >100,000 colonies/mL Escherichia coli     Most Recent TSH, T4 and HgbA1c:   Recent Labs   Lab Test 19  0814 10/25/18  0731   TSH  --  0.80   A1C 6.9*  --

## 2019-01-10 NOTE — PROGRESS NOTES
Discharge    Patient discharged to home via daughter with private transportation  Care plan note    A&O. VSS. Pt on 3L O2 at beginning of shift, decreased to 2L today. Pt desats with activity down to mid 80's, recovers quickly with rest. Tele: SR with BBB. Up with SBA and GB. SALAZAR. LS diminished. Tolerating mod carb, low NA, low fat diet. BG 81 and 94. Planning to discharge home with home O2 today.            Listed belongings gathered and returned to patient. Yes  Care Plan and Patient education resolved: Yes  Prescriptions if needed, hard copies sent with patient Yes  Home and hospital acquired medications returned to patient:Yes  Medication Bin checked and emptied on discharge Yes  Follow up appointment made for patient: Yes

## 2019-01-10 NOTE — PROGRESS NOTES
Patient has been assessed for Home Oxygen needs. Oxygen readings:    *Pulse oximetry (SpO2) = 88% on room air at rest while awake.    *SpO2 improved to 96% on 2liters/minute at rest.    *SpO2 = 79% on room air during activity/with exercise.    *SpO2 improved to 84% on 2liters/minute during activity/with exercise.

## 2019-01-10 NOTE — PLAN OF CARE
Discharge Planner OT   Patient plan for discharge: home  Current status: pt seen in AM, tolerated approx 9min in standing, of which 6:24 min was ambulating in kong with SBA and at completion in standing 02 87% on 2L, /76, HR 77. Pt cited fatigue/effort at 4/10 on Omni Effort Scale. Pt performing toileting, transfers in room SBA, no LOB.   Barriers to return to prior living situation: stairs - defer to PT   Recommendations for discharge: home  Rationale for recommendations: pt alble to perform basic self cares without assist, currently limited by endurance and 02 needs       Entered by: Asher Lobato 01/10/2019 11:57 AM

## 2019-01-10 NOTE — PROGRESS NOTES
Pt is ready for hospital discharge.  Her oxygen has been ordered through [facility] and it will be delivered by 2:30pm.  Cardiology, Pulmonary, and PCP appointments have all been made.  Pt has cancelled her eye appointment and she will reschedule.  In discussion with her, there is no need for home care following post discharge.  Pt was given the COPD Action Plan and CHF education provided.  Pt's daughter will be providing transportation home.

## 2019-01-10 NOTE — PROGRESS NOTES
[facility]  Hospitalist Progress Note  Micki Lan MD    Assessment & Plan   Hamida JIMMY Verma is a very pleasant 78-year-old  female with past medical history significant for essential hypertension, dyslipidemia, atrial fibrillation on anticoagulation, heart failure with preserved ejection fraction, moderate pulmonary hypertension, sick sinus syndrome status post permanent pacemaker placement who presented to the emergency department with generalized weakness, fall and, hypotension. She was admitted for further management.       Acute pyelonephritis with sepsis, secondary to Klebsiella pneumoniae :   Presented with generalized weakness, fall, and hypotension. CXR unremarkable. UA abnormal with Large LE, WBC 38, although neg nitrites. Leukocytosis with WBC 14.9. Lactate 2.1 --> 0.9. Started on IV ceftriaxone upon admission. Urine culture grew 2 strains of Klebsiella pneumoniae. Blood cultures have remained negative.    - Transitioned antibiotic therapy to oral Levaquin 01/09 for total antibiotic course of 7 days through 01/12 (to also cover for possible COPD exacerbation, see below)     Acute kidney injury:   Baseline Cr typically 0.6-0.9. Cr 1.35 on admission, improved with IV fluid, suggestive of pre-renal azotemia.    Recent Labs   Lab 01/10/19  0808 01/09/19  0910 01/08/19  0814 01/06/19  0527 01/05/19  1520   CR 0.86 0.84 0.80 0.99 1.35*     -Continue to monitor renal function periodically  -Avoid NSAIDs and nephrotoxins     Acute hypoxia after receiving 2L NS bolus in ED,  Chronic diastolic CHF (HFwith preserved ejection fraction),  Remote Smoking hx, possible COPD exacerbation  History of chronic diastolic CHF. ECHO Oct 2018: LV mild to mod dilation, TR 1-2+, normal systolic vxn, EF 55-60%, LV diastolic fxn indeterminate. No RWMA. No thrombus.  PTA on Lasix 20 mg PO daily, lisinopril 40 mg p.o. daily and metoprolol XL 50 mg p.o. b.i.d.    Has a remote smoking history, quit in  2010. Some emphysematous changes on prior chest CT.  Was advised to get outpatient pulmonary function testing for proper diagnosis of COPD due to high probability during Oct 2018 admission but not done yet.   CXR on admission 01/05 with clear lungs. Repeat CXR 01/07 with possible vascular congestion, no acute infiltrates.  Patient was seen by pulmonology and treatment initiated for possible COPD exacerbation.    - Prednisone 40 mg daily, started on 01/07, for 5 days. Can transition her back down to 5 mg after the 5-day burst.  - Continue PTA Lasix and metoprolol  - Resume PTA lisinopril   - Oral Levaquin as above  - Wean oxygen to maintain sats above 90% (currently on 3 L/min)  - Component of deconditioning, physical therapy and likely rehab may be needed post hospitalization  - No indication for PFTs at this time.  Patient will need to recover and stabilize.  Suggest getting PFTs as an outpatient 6-8 weeks post discharge  -Started umeclidinium-vilanterol (Anoro Ellipta) 1 puff daily from 01/09  - Continue Duo-Neb prn  - Will arrange for home O2  - Pulm signed off.    Physical deconditioning:  - Continue PT. Recommending home with family assistance     Recent history of revision of corneal incision in 11/2018:  -Plan for transition her back down to prednisone 5 mg daily after the 5-day burst as above.     Hypertension:    PTA: Lasix 20 mg PO daily, lisinopril 40 mg p.o. daily and metoprolol XL 50 mg p.o. b.i.d.   BP is currently controlled.  - Continue PTA Lasix and metoprolol  - Continue to hold PTA lisinopril  - PRN IV hydralazine available     Paroxysmal atrial fibrillation/sick sinus syndrome, status post permanent pacemaker in 03/2018:    EKG in the ER revealed normal sinus rhythm, rate controlled 60s. Chronically anticoagulated with apixaban. NSR is maintained.  - Continue PTA Eliquis 5mg BID, Toprol-XL 50 mg BID with hold parameters, and flecainide 100 mg BID  - Continue telemetry        Dyslipidemia:    -  Continue PTA simvastatin 20 mg p.o. at bedtime.      Hyperglycemia, mild:  Hgb A1c 6.9% on 01/08. Concerned for early diabetes, exacerbated by steroid therapy.    - Continue sliding scale insulin  - Low carb diet  - Follow up with PCP for optimal management (discussed with the patient)         Orders Placed This Encounter      Combination Diet 2017-0648 Calories: Moderate Consistent CHO (4-6 CHO units/meal); 2 gm NA Diet; Low Saturated Fat Diet       DVT Prophylaxis: Eliquis  Code Status: DNR/DNI.  Disposition: Pending ongoing management and hospital course. Expected discharge: 2-3 days, pending improvement in respiratory status.    D/W RN.      Interval History   Patient continues to require O2 supplement, now down to 3 L/min, bur she feels better. She reports no cp, palpitation, or other complaints. NSR is maintained. She wishes to be discharged today.    Data reviewed today: I reviewed all new labs and imaging over the last 24 hours. I personally reviewed no images or EKG's today.    Physical Exam   Temp: 97.7  F (36.5  C) Temp src: Oral BP: 129/69 Pulse: 66 Heart Rate: 63 Resp: 16 SpO2: 91 % O2 Device: Nasal cannula Oxygen Delivery: 3 LPM  Vitals:    01/08/19 0645 01/09/19 0618 01/10/19 0647   Weight: 78.9 kg (173 lb 15.1 oz) 79.7 kg (175 lb 11.3 oz) 79.8 kg (176 lb)     Vital Signs with Ranges  Temp:  [97.5  F (36.4  C)-98.3  F (36.8  C)] 97.7  F (36.5  C)  Pulse:  [66-70] 66  Heart Rate:  [63-73] 63  Resp:  [16] 16  BP: (111-129)/(57-69) 129/69  SpO2:  [91 %-96 %] 91 %  I/O's Last 24 hours  I/O last 3 completed shifts:  In: 840 [P.O.:840]  Out: 1675 [Urine:1675]    Constitutional: Comfortable, no acute distress  HEENT: No conjunctival pallor.  Neurologic: Awake, alert, fully oriented  Neck: Supple, no elevated JVP  Respiratory: Left basilar crackles, no wheezing, or rhonchi  Cardiovascular: Normal S1 and S2, regular rhythm and rate  GI: Abdomen soft, non tender, non distended  Extremities: No calf tenderness,  no significant LE edema  Skin/integument: No acute rash, no cyanosis    Medications   All medications were reviewed.    Continuing ACE inhibitor/ARB/ARNI from home medication list OR ACE inhibitor/ARB order already placed during this visit       - MEDICATION INSTRUCTIONS -       - MEDICATION INSTRUCTIONS -         apixaban ANTICOAGULANT  5 mg Oral BID     atorvastatin  40 mg Oral QPM     flecainide  100 mg Oral BID     furosemide  20 mg Oral Daily     insulin aspart  1-7 Units Subcutaneous TID AC     insulin aspart  1-5 Units Subcutaneous At Bedtime     levofloxacin  500 mg Oral Daily     metoprolol succinate ER  50 mg Oral BID     predniSONE  40 mg Oral Daily     senna-docusate  1 tablet Oral BID    Or     senna-docusate  2 tablet Oral BID     sodium chloride (PF)  3 mL Intracatheter Q8H     umeclidinium-vilanterol  1 puff Inhalation Daily        Data   Recent Labs   Lab 01/10/19  0808 01/09/19  0910 01/08/19  0814 01/06/19  0527 01/05/19  2325 01/05/19  1926 01/05/19  1520   WBC 12.9* 16.3* 14.3* 13.3*  --   --  14.9*   HGB 10.3* 10.7* 11.1* 10.6*  --   --  12.1   MCV 85 84 85 85  --   --  85    407 341 257  --   --  279    136 133 135  --   --  134   POTASSIUM 3.5 3.9 4.2 4.5  --   --  4.7   CHLORIDE 104 102 102 105  --   --  102   CO2 24 23 20 23  --   --  23   BUN 21 20 18 24  --   --  35*   CR 0.86 0.84 0.80 0.99  --   --  1.35*   ANIONGAP 10 11 11 7  --   --  9   GURWINDER 8.6 8.6 8.5 8.0*  --   --  8.7   GLC 97 149* 235* 95  --   --  119*   ALBUMIN  --   --   --  2.2*  --   --  2.5*   PROTTOTAL  --   --   --  5.8*  --   --  6.5*   BILITOTAL  --   --   --  0.7  --   --  0.7   ALKPHOS  --   --   --  41  --   --  43   ALT  --   --   --  55*  --   --  61*   AST  --   --   --  27  --   --  24   TROPI  --   --   --   --  <0.015 <0.015 <0.015       No results found for this or any previous visit (from the past 24 hour(s)).

## 2019-01-10 NOTE — DISCHARGE INSTRUCTIONS
Oxygen Provider:  Arranged through [employer], contact number [phone].  If you have any questions or concerns please call the oxygen company directly.

## 2019-01-10 NOTE — PLAN OF CARE
A&Ox4.  VSS, on 3L/NC, O2 sats 93-96%, desats with activity down to low 80%, recovered quickly.  Denies pain, nausea.  SALAZAR.  Infreq dry, nonproductive cough.  LS diminished.  Redness noted around ears, foam applied.  Up to BR w/ SBA, strict I&O's.  Tele- NSR.  WBC 16.3,  and 105.  Yolette nebs, oral Augmentin, oral prednisone. Pulmonary signed off.  Discharge pending O2 needs, nursing will continue to monitor.

## 2019-01-10 NOTE — PLAN OF CARE
A&O. VSS. Pt on 3L O2 at beginning of shift, decreased to 2L today. Pt desats with activity down to mid 80's, recovers quickly with rest. Tele: SR with BBB. Up with SBA and GB. SALAZAR. LS diminished. Tolerating mod carb, low NA, low fat diet. BG 81 and 94. Planning to discharge home with home O2 today.

## 2019-01-10 NOTE — PROGRESS NOTES
Received intake call for home oxygen at 12:34PM. Reviewed patient's chart; Patient qualifies under Medicare guidelines and all documentation is in the chart including a good order.   1:09PM- Spoke with care coordinator, Sonya, confirmed we received the order, and provided them with ETA of oxygen.   1:11PM- Called to offer choice and patient is okay with [facility] setting them up. Discussed equipment with patient and informed them that we would be to bedside with oxygen in the next 2 hours.   1:35PM- Delivered POC to bedside.

## 2019-01-11 ENCOUNTER — PATIENT OUTREACH (OUTPATIENT)
Dept: CARE COORDINATION | Facility: CLINIC | Age: 79
End: 2019-01-11

## 2019-01-11 DIAGNOSIS — J44.1 COPD EXACERBATION (H): ICD-10-CM

## 2019-01-11 DIAGNOSIS — N12 PYELONEPHRITIS: Primary | ICD-10-CM

## 2019-01-11 LAB
BACTERIA SPEC CULT: NO GROWTH
BACTERIA SPEC CULT: NO GROWTH
Lab: NORMAL
Lab: NORMAL
SPECIMEN SOURCE: NORMAL
SPECIMEN SOURCE: NORMAL

## 2019-01-11 NOTE — PROGRESS NOTES
Clinic Care Coordination Contact    Clinic Care Coordination Contact  OUTREACH    Referral Information:  Referral Source: IP Handoff    Primary Diagnosis: CHF(Hypertension)    No chief complaint on file.       Universal Utilization: Hospital admission 1/5-1/10/2019-Pyelonephritis and New COPD diagnosis   Clinic Utilization  Difficulty keeping appointments:: No  Compliance Concerns: No  No-Show Concerns: No  No PCP office visit in Past Year: No  Utilization    Last refreshed: 1/11/2019  7:20 AM:  Hospital Admissions 4           Last refreshed: 1/11/2019  7:20 AM:  ED Visits 2           Last refreshed: 1/11/2019  7:20 AM:  No Show Count (past year) 1              Current as of: 1/11/2019  7:20 AM              Clinical Concerns:  Education Provided to patient:Patient has COPD educational materials from hospital staff   Pain  Pain (GOAL):: No  Health Maintenance Reviewed: Due/Overdue   Health Maintenance Due   Topic Date Due     SPIROMETRY ONETIME  1940     COPD ACTION PLAN Q1 YR  1940     ZOSTER IMMUNIZATION (2 of 3) 05/14/2013     Clinical Pathway: Clinic Care Coordination COPD Assessment    Discharge:      Day of hospital discharge: 1/5/19  What recommendations were made for follow up after your recent hospitalization? Prednisone and antibiotic   Have the follow up appointments been scheduled? Yes    Transportation concerns (GOAL):: No  Is there a referral for Pulmonary Rehab? Discussed in the hospital     Symptom Review:    How have you been feeling now that you are home? tired  Are you having any increased shortness of breath? No  Symptoms  Anxiety: No  Cough: No  Is your cough:: Productive  Fatigue: Yes  Weight change: : No  Wheezing: No  COPD symptoms limiting activities?: Yes  What COPD zone are you currently in?: Green  Taking COPD medications as prescribed: : Yes  Took steroids (by mouth) for COPD: Yes  Overall your COPD symptoms are (GOAL):: Stable    Do you have a COPD Action Plan? Yes     Is the  COPD Action Plan on refrigerator or available: Yes    What number would you call if you were in the YELLOW zones:  [phone]    Medications:    Were you started on any new medications?  Yes, Prednisone /antibiotic   Were any of your previous medications changed? No  Do you have all of your medications? Need to  a few prescriptions from the pharmacy  Have you had trouble filling your prescriptions? No  Are you medications effective in controlling your symptoms? Yes,   Are you currently on Prednisone )?  Yes  Was MTM or Diabetic Education referral placed)? Offered MTM but refuses at this time     Oxygen/DME:    Are you currently on oxygen?  Yes   Is this new for you? Yes   Is it set up at home? Yes   What liter flow were you instructed to use and how often do you use it? 2 liters continuously     Review with patient how to use/maintain nebulizers and inhalers: Yes     Activity:    How much activity can you do before you are SOB? Very little pacing activity        Diet:    Do you weigh yourself daily? Yes  Weight stable weight today 176 #  Are there any current diet restrictions or changes per discharge instructions? No    Emotions/Lifestyle:    Do you smoke?  reports that she quit smoking about 18 years ago. She started smoking about 63 years ago. she has never used smokeless tobacco.  Would you like to try to quit smoking?quit 2000    Medication Management:  Reviewed           Living Situation:  Lives with daughter and granddaughter        Diet/Exercise/Sleep:  Diet:: No added salt  Food Insecurity: No  Tube Feeding: No  Inadequate activity/exercise (GOAL):: No  Significant changes in sleep pattern (GOAL): No    Transportation:  Transportation concerns (GOAL):: No  Transportation means:: Accessible car     Psychosocial:        Financial/Insurance:   Financial/Insurance concerns (GOAL):: No       Equipment Currently Used at Home: none    Advance Care Plan/Directive  Advanced Care Plans/Directives on file::  Yes  Type Advanced Care Plans/Directives: DNR/DNI          Goals:   Goals        Blood Pressure    Blood Pressure below 140/90     Notes - Note created  11/29/2018  1:08 PM by Jeannie Lopez, RN    Goal Statement: I will keep my blood pressures below 140/90  Measure of Success:Blood pressure below 140/90  Supportive Steps to Achieve:   I will keep routine primary provider /Cardiology appointments   I will check my blood pressure daily   I will take medications as directed   Barriers: None  Strengths: well informed of normal blood pressure readings   Date to Achieve By: Ongoing   Patient expressed understanding of goal: Yes           General    Improve chronic symptoms (pt-stated)     Notes - Note edited  1/11/2019  9:21 AM by Jeannie Lopez, RN    Goal Statement: I will follow the CHF/COPD  Action Plan   Measure of Success: I will have decreased hospital visits   Supportive Steps to Achieve:   I will share my action plan with my family members or friend so they can help me recognize early problems also    I will call my clinic if I have trouble lying down to sleep due to breathing problems   I will read nutrition labels when I am buying prepared foods or spices   I will buy salt substitute and low sodium seasonings such as Mrs Roger next time I go to the grocery store  I will keep a log on my daily sodium (salt)intake and will not go over 2000...mg  I will pace out my activities like bathing-cooking- cleaning - and rest between tasks for 10-15 minutes  I will elevate my legs on a chair or ottoman when I sit       Barriers: No barriers identified   Strengths: Lives with daughter and granddaughter   Date to Achieve By: ongoing   Patient expressed understanding of goal: Yes              Patient/Caregiver understanding: Patient expresses good understanding of discharge instructions     Outreach Frequency: weekly  Future Appointments              In 4 days Mikala Philip MD [facility] [place]     In 5 days [facility]  LAB [facility] PHYSICIANS HEART AT [facility] PSA CLIN    In 5 days Adriana Ferrell PA-C [facility] PSA CLIN    In 1 week [facility] LAB [facility] HEART [facility] PSA CLIN    In 1 week Roe Voss MD [facility] PSA CLIN    In 2 weeks Mikala Philip MD [facility]    In 2 months [facility] TECH1 [facility] PSA CLIN          Plan:   Patient will keep PCP hospital follow up 1/15/2019  Patient will continue oxygen at 2 liters continuously  Patient will continue to check weight and blood pressure daily and call if any concerns   Patient will seek medical help if experiencing increased symptoms of concern such as fever increased SOB ,fever or colored sputum   Patient will continue to follow the [facility] clinic as scheduled     Jeannie Lopez RN / Clinical Care Coordinator     [facility]   [email or web address]  Office :  [phone] / Fax :  [phone]

## 2019-01-11 NOTE — LETTER
[facility] Home  Complex Care Plan  About Me  Patient Name:  Hamida Verma    YOB: 1940  Age:     78 year old   [facility] MRN:   [id number] Telephone Information:  Home Phone [phone]   Mobile Not on file.       Address:    [address] [place] [id number] Email address:  No e-mail address on record      Emergency Contact(s)  Name Relationship Lgl Grd Work Phone Home Phone Mobile Phone   1PEGGY MARTIN Son No  [phone]            Primary language:  English     needed? No   [facility]:  [phone] op. 1  Other communication barriers:    Preferred Method of Communication:  Mail  Current living arrangement:    Mobility Status/ Medical Equipment:      Health Maintenance  Health Maintenance Reviewed:    Health Maintenance Due   Topic Date Due     SPIROMETRY ONETIME  1940     COPD ACTION PLAN Q1 YR  1940     ZOSTER IMMUNIZATION (2 of 3) 05/14/2013       My Access Plan  Medical Emergency 911   Primary Clinic Line [facility] [place] [facility] [phone]   24 Hour Appointment Line [phone] or  [phone]) (toll-free)   24 Hour Nurse Line [phone] (toll-free)   Preferred Urgent Care     Preferred Hospital     Preferred Pharmacy [facility] [address] [facility] [address]     Behavioral Health Crisis Line The National Suicide Prevention Lifeline at 1-744.421.2998 or 911     My Care Team Members    Patient Care Team       Relationship Specialty Notifications Start End    Mikala Philip MD PCP - General Family Practice  11/28/14     Phone: [phone] Fax: [phone] [address] [facility] [address]    Mikala Philip MD PCP - Assigned PCP   11/23/14     Phone: [phone] Fax: [phone] [address] [facility] [address]    Jeannie Lopez, RN Lead Care Coordinator Primary Care - [facility] Admissions 11/12/18     Phone: [phone] Fax: [phone]                [place]  Care Plans  Self Management and Treatment Plan  Goals and (Comments)  Goals        Blood Pressure    Blood Pressure below 140/90     Notes - Note created  11/29/2018  1:08 PM by Jeannie Lopez, RN    Goal Statement: I will keep my blood pressures below 140/90  Measure of Success:Blood pressure below 140/90  Supportive Steps to Achieve:   I will keep routine primary provider /Cardiology appointments   I will check my blood pressure daily   I will take medications as directed   Barriers: None  Strengths: well informed of normal blood pressure readings   Date to Achieve By: Ongoing   Patient expressed understanding of goal: Yes           General    Improve chronic symptoms (pt-stated)     Notes - Note edited  1/11/2019  9:21 AM by Jeannie Lopez, RN    Goal Statement: I will follow the CHF/COPD  Action Plan   Measure of Success: I will have decreased hospital visits   Supportive Steps to Achieve:   I will share my action plan with my family members or friend so they can help me recognize early problems also    I will call my clinic if I have trouble lying down to sleep due to breathing problems   I will read nutrition labels when I am buying prepared foods or spices   I will buy salt substitute and low sodium seasonings such as Mrs Dash next time I go to the grocery store  I will keep a log on my daily sodium (salt)intake and will not go over 2000...mg  I will pace out my activities like bathing-cooking- cleaning - and rest between tasks for 10-15 minutes  I will elevate my legs on a chair or ottoman when I sit       Barriers: No barriers identified   Strengths: Lives with daughter and granddaughter   Date to Achieve By: ongoing   Patient expressed understanding of goal: Yes             Action Plans on File: COPD CHF                       Advance Care Plans/Directives Type: DNR/ DNI       My Medical and Care Information  Problem List   Patient Active Problem List   Diagnosis     Symptomatic menopausal or female  climacteric states     Hyperlipidemia LDL goal <70     Hypertension goal BP (blood pressure) < 140/90     Knee pain     Obesity     PAF (paroxysmal atrial fibrillation) (H)     S/P cardiac pacemaker procedure     SSS (sick sinus syndrome) (H)     Chronic diastolic CHF (congestive heart failure) (H)     Pulmonary hypertension (H)     Coronary artery calcification seen on CT scan     Hypotension      Current Medications and Allergies:  See printed Medication Report.    Care Coordination Start Date: 1/11/2019   Frequency of Care Coordination:  1-2 weeks   Form Last Updated: 01/11/2019

## 2019-01-11 NOTE — PLAN OF CARE
Occupational Therapy Discharge Summary    Reason for therapy discharge:    Discharged to home.    Progress towards therapy goal(s). See goals on Care Plan in [facility] electronic health record for goal details.  Goals partially met.  Barriers to achieving goals:   discharge from facility.    Therapy recommendation(s):    No further therapy is recommended.

## 2019-01-14 ENCOUNTER — OFFICE VISIT (OUTPATIENT)
Dept: FAMILY MEDICINE | Facility: CLINIC | Age: 79
End: 2019-01-14
Payer: COMMERCIAL

## 2019-01-14 VITALS
SYSTOLIC BLOOD PRESSURE: 108 MMHG | DIASTOLIC BLOOD PRESSURE: 68 MMHG | BODY MASS INDEX: 27.8 KG/M2 | TEMPERATURE: 97.4 F | OXYGEN SATURATION: 98 % | WEIGHT: 177.5 LBS | RESPIRATION RATE: 16 BRPM | HEART RATE: 63 BPM

## 2019-01-14 DIAGNOSIS — E78.5 DYSLIPIDEMIA: ICD-10-CM

## 2019-01-14 DIAGNOSIS — R53.81 PHYSICAL DECONDITIONING: ICD-10-CM

## 2019-01-14 DIAGNOSIS — N17.9 ACUTE KIDNEY INJURY (H): ICD-10-CM

## 2019-01-14 DIAGNOSIS — I49.5 SSS (SICK SINUS SYNDROME) (H): ICD-10-CM

## 2019-01-14 DIAGNOSIS — J96.01 ACUTE RESPIRATORY FAILURE WITH HYPOXIA (H): ICD-10-CM

## 2019-01-14 DIAGNOSIS — R73.9 HYPERGLYCEMIA: ICD-10-CM

## 2019-01-14 DIAGNOSIS — I50.32 CHRONIC DIASTOLIC CHF (CONGESTIVE HEART FAILURE) (H): ICD-10-CM

## 2019-01-14 DIAGNOSIS — J44.1 COPD WITH ACUTE EXACERBATION (H): ICD-10-CM

## 2019-01-14 DIAGNOSIS — I10 BENIGN ESSENTIAL HYPERTENSION: ICD-10-CM

## 2019-01-14 DIAGNOSIS — N10: Primary | ICD-10-CM

## 2019-01-14 DIAGNOSIS — N17.2: Primary | ICD-10-CM

## 2019-01-14 DIAGNOSIS — I48.0 PAROXYSMAL ATRIAL FIBRILLATION (H): ICD-10-CM

## 2019-01-14 PROCEDURE — 36415 COLL VENOUS BLD VENIPUNCTURE: CPT | Performed by: FAMILY MEDICINE

## 2019-01-14 PROCEDURE — 99495 TRANSJ CARE MGMT MOD F2F 14D: CPT | Performed by: FAMILY MEDICINE

## 2019-01-14 PROCEDURE — 80048 BASIC METABOLIC PNL TOTAL CA: CPT | Performed by: FAMILY MEDICINE

## 2019-01-14 RX ORDER — TIOTROPIUM BROMIDE 18 UG/1
18 CAPSULE ORAL; RESPIRATORY (INHALATION) DAILY
Qty: 90 CAPSULE | Refills: 3 | Status: SHIPPED | OUTPATIENT
Start: 2019-01-14 | End: 2019-02-21

## 2019-01-14 NOTE — PROGRESS NOTES
SUBJECTIVE:   Hamida Verma is a 78 year old female who presents to clinic today for the following health issues:          Hospital Follow-up Visit:    Hospital/Nursing Home/IP Rehab Facility: [facility]  Date of Admission:             1/5/2019  Date of Discharge:              1/10/2019  Admitting Physician:                   Annie Singh MD  Discharge Physician:                        Micki Lan MD  Discharging Service:                        Hospitalist     Primary Provider: Mikala Evangelista  Primary Care Physician Phone Number: [phone]     Discharge Diagnoses:     Acute pyelonephritis with sepsis, secondary to Klebsiella pneumoniae   JERE  Acute hypoxia, suspect undiagnoed COPD with acute exacerbation  Physical deconditioning         Problems taking medications regularly:  None       Medication changes since discharge: new inhaler        Problems adhering to non-medication therapy:  None    Summary of hospitalization:  [facility] discharge summary reviewed  Diagnostic Tests/Treatments reviewed.  Follow up needed: BMP  Other Healthcare Providers Involved in Patient s Care:         Specialist appointment - cardiology, pulmonology  Update since discharge: improved.      Post Discharge Medication Reconciliation: discharge medications reconciled and changed, per note/orders (see AVS).  Plan of care communicated with patient and her daughter     Coding guidelines for this visit:  Type of Medical   Decision Making Face-to-Face Visit       within 7 Days of discharge Face-to-Face Visit        within 14 days of discharge   Moderate Complexity [id number]   High Complexity [id number]          Overall, she feels like she is doing pretty well.  She continues on 2 L of home oxygen.  She does notice some dyspnea on exertion and this has remained stable.  Her weight has remained stable.  There has been no increase in edema.  She is continue her medications as instructed.  She notes  "that the Anoro Ellipta is very expensive and would like an alternative.  She has an appointment scheduled with cardiology this week and with pulmonology next month.  She will have pulmonary function test next month.     From hospital discharge summary:   \"  Problem Oriented Hospital Course   Hamida Verma is a very pleasant 78-year-old  female with past medical history significant for essential hypertension, dyslipidemia, paroxysmal atrial fibrillation on anticoagulation, heart failure with preserved ejection fraction, moderate pulmonary hypertension, and sick sinus syndrome, status post permanent pacemaker placement who presented with generalized weakness, fall, and hypotension. She was admitted for further management.       For a detailed history and physical exam, please refer to the dictated admission note by Diane York PA-C on 01/05/2019.     Acute pyelonephritis with sepsis, secondary to Klebsiella pneumoniae :   Patient presented with generalized weakness, fall, and hypotension. Leukocytosis with WBC 14.9. CXR unremarkable. UA abnormal with Large LE, WBC 38, neg nitrites. Lactate 2.1 --> 0.9. Started on IV ceftriaxone upon admission. Urine culture grew 2 strains of Klebsiella pneumoniae. Blood cultures remained negative. Antibiotic therapy was transitioned to oral Levaquin 01/09, but due to interaction with flecainide, she was discharged on oral cefuroxime 500 mg po bid for 3 more days.     Acute kidney injury:   Baseline Cr typically 0.6-0.9. Cr 1.35 on admission, improved with IV fluid, suggestive of pre-renal azotemia. Notably, PTA lisinopril was held till the time of discharge.             Recent Labs   Lab 01/10/19  0808 01/09/19  0910 01/08/19  0814 01/06/19  0527 01/05/19  1520   CR 0.86 0.84 0.80 0.99 1.35*         Acute hypoxia,  Suspected COPD with acute exacerbation,  History of chronic diastolic CHF (HF with preserved ejection fraction).  Patient noted to be hypoxic in ED. History " of chronic diastolic CHF. ECHO Oct 2018: LV mild to mod dilation, TR 1-2+, normal systolic function with EF 55-60%, LV diastolic fxn indeterminate. No RWMA. No thrombus. PTA on Lasix 20 mg PO daily, lisinopril 40 mg p.o. daily ,and metoprolol XL 50 mg p.o. b.i.d. Patient had a remote smoking history, quit in 2010. Some emphysematous changes on prior chest CT. She had been advised to have outpatient pulmonary function testing done for proper diagnosis of COPD due to high probability during Oct 2018 admission but not done yet. CXR on admission 01/05 with clear lungs. Repeat CXR 01/07 with possible vascular congestion, no acute infiltrates. Patient was seen by pulmonology and treatment initiated for possible COPD exacerbation. She was started on prednisone 40 mg daily for 5 days,respiratory therapy with nebs, and antibiotics (initially ceftriaxone, then Levaquin). She continued with PTA Lasix and metoprolol. At the time of discharge her O2 need was down due 2-3 L/min. She was discharged on Anoro Ellipta, prn albuterol MDI, home O2, oral prednisone 40 mg po daily for 2 more days, and cefuroxime 500 mg po bid for 3 more days. She was scheduled to follow up with pulmonology clinic on 02/19 for reassessment and further work-up including PFT.     Physical deconditioning:  PT recommended home with family assistance.     Recent history of revision of corneal incision in 11/2018:  Patient is to continue with PTA low dose prednisone after the 5-day burst of high dose prednisone as above.     Hypertension:    Chronic and stable on PTA Lasix 20 mg PO daily, lisinopril 40 mg p.o. daily and metoprolol XL 50 mg p.o. b.i.d.      Paroxysmal atrial fibrillation,  Sick sinus syndrome, status post permanent pacemaker in 03/2018:    EKG in the ER revealed normal sinus rhythm, rate controlled 60s. Chronically anticoagulated with apixaban. NSR was maintained. She continued with PTA Eliquis 5mg BID, Toprol-XL 50 mg BID, and flecainide 100 mg  "BID        Dyslipidemia:    She continued with PTA simvastatin 20 mg p.o. at bedtime.      Hyperglycemia, mild:  Hgb A1c 6.9% on . Concerned for early diabetes, exacerbated by steroid therapy. She was placed on low carb diet and advised to follow up with PCP for optimal management (discussed with the patient).    \"    Problem list and histories reviewed & adjusted, as indicated.  Additional history: as documented    Patient Active Problem List   Diagnosis     Symptomatic menopausal or female climacteric states     Hyperlipidemia LDL goal <70     Hypertension goal BP (blood pressure) < 140/90     Knee pain     Obesity     PAF (paroxysmal atrial fibrillation) (H)     S/P cardiac pacemaker procedure     SSS (sick sinus syndrome) (H)     Chronic diastolic CHF (congestive heart failure) (H)     Pulmonary hypertension (H)     Coronary artery calcification seen on CT scan     Hypotension     Past Surgical History:   Procedure Laterality Date     EYE SURGERY       HYSTERECTOMY, VAGINAL      hysterectomy       Social History     Tobacco Use     Smoking status: Former Smoker     Start date: 10/28/1955     Last attempt to quit: 2000     Years since quittin.3     Smokeless tobacco: Never Used     Tobacco comment: quit 6 yrs ago   Substance Use Topics     Alcohol use: No     Family History   Problem Relation Age of Onset     Cerebrovascular Disease Mother      Glaucoma Mother      Macular Degeneration Mother      Alcohol/Drug Father         alcohol     Myocardial Infarction Father 63        passed away from MI     Family History Negative Sister      Family History Negative Sister      Family History Negative Sister      Cerebrovascular Disease Sister      Family History Negative Brother      Family History Negative Maternal Grandmother      Family History Negative Maternal Grandfather      Family History Negative Paternal Grandmother      Family History Negative Paternal Grandfather      Myocardial Infarction Son " 57        passed away from MI     Dementia Daughter 57        early onset on [place]     Diabetes No family hx of      Breast Cancer No family hx of      Cancer - colorectal No family hx of          Current Outpatient Medications   Medication Sig Dispense Refill     albuterol (PROAIR HFA/PROVENTIL HFA/VENTOLIN HFA) 108 (90 Base) MCG/ACT inhaler Inhale 2 puffs into the lungs every 4 hours as needed for shortness of breath / dyspnea or wheezing 1 Inhaler 3     alendronate (FOSAMAX) 70 MG tablet Take 1 tablet (70 mg) by mouth every 7 days Take 60 minutes before am meal with 8 oz. water. Remain upright for 30 minutes. 12 tablet 3     apixaban ANTICOAGULANT (ELIQUIS) 5 MG tablet Take 1 tablet (5 mg) by mouth 2 times daily 180 tablet 3     atorvastatin (LIPITOR) 40 MG tablet Take 40 mg by mouth daily       cholecalciferol (VITAMIN  -D) 1000 UNIT capsule Take 1 capsule by mouth daily.       Coral Calcium 133-66.7-133 MG-MG-UNIT CAPS Take 2 tablets by mouth 2 times daily        FLAX SEED OIL OR 1 capsule daily       flecainide (TAMBOCOR) 100 MG tablet Take 1 tablet (100 mg) by mouth 2 times daily 90 tablet 3     furosemide (LASIX) 20 MG tablet Take 1 tablet (20 mg) by mouth daily 30 tablet 5     Krill Oil 500 MG CAPS Take 1 capsule by mouth daily       lisinopril (PRINIVIL/ZESTRIL) 40 MG tablet Take 1 tablet (40 mg) by mouth daily 90 tablet 3     Methylsulfonylmethane (MSM) 500 MG CAPS Take 1 capsule by mouth daily       metoprolol succinate (TOPROL-XL) 50 MG 24 hr tablet Take 1 tablet (50 mg) by mouth 2 times daily 180 tablet 3     Milk Thistle 200 MG CAPS Take 1 capsule by mouth daily        Multiple Vitamins-Minerals (HAIR SKIN NAILS PO) Take 1 tablet by mouth At Bedtime       potassium & sodium phosphates 278-164-250 MG/75ML SOLR Take 1 tablet by mouth daily        salmeterol (SEREVENT) 50 MCG/DOSE inhaler Inhale 1 puff into the lungs 2 times daily 1 Inhaler 1     tiotropium (SPIRIVA) 18 MCG inhaled capsule Inhale 1  capsule (18 mcg) into the lungs daily 90 capsule 3     umeclidinium-vilanterol (ANORO ELLIPTA) 62.5-25 MCG/INH oral inhaler Inhale 1 puff into the lungs daily 1 Inhaler 3     Allergies   Allergen Reactions     Strawberry Flavor      Recent Labs   Lab Test 01/10/19  0808 01/09/19  0910 01/08/19  0814 01/06/19  0527 01/05/19  1520  10/25/18  0731  07/11/18  0907  05/02/18  0826  03/15/18  2025  03/23/17  0804 05/23/16  1056   A1C  --   --  6.9*  --   --   --   --   --   --   --   --   --   --   --   --   --    LDL  --   --   --   --   --   --   --   --  84  --   --   --   --   --  91 103*   HDL  --   --   --   --   --   --   --   --  69  --   --   --   --   --  61 66   TRIG  --   --   --   --   --   --   --   --  88  --   --   --   --   --  109 104   ALT  --   --   --  55* 61*  --   --   --   --   --  25  --   --    < > 24 26   CR 0.86 0.84 0.80 0.99 1.35*   < > 0.91   < > 0.81   < > 0.81   < > 0.83   < > 0.71 0.77   GFRESTIMATED 64 66 70 54* 37*   < > 60*   < > 69   < > 69   < > 66   < > 79 73   GFRESTBLACK 75 77 81 63 43*   < > 72   < > 83   < > 83   < > 80   < > >90   GFR Calc   88   POTASSIUM 3.5 3.9 4.2 4.5 4.7   < > 3.9   < > 4.2   < > 4.1   < > 3.5   < > 3.7 4.1   TSH  --   --   --   --   --   --  0.80  --   --   --   --   --  0.64   < >  --   --     < > = values in this interval not displayed.      BP Readings from Last 3 Encounters:   01/14/19 108/68   01/10/19 129/69   12/19/18 122/84    Wt Readings from Last 3 Encounters:   01/14/19 80.5 kg (177 lb 8 oz)   01/10/19 79.8 kg (176 lb)   12/19/18 84 kg (185 lb 1.6 oz)              Reviewed and updated as needed this visit by clinical staff  Tobacco  Allergies  Meds  Med Hx  Surg Hx  Fam Hx  Soc Hx      Reviewed and updated as needed this visit by Provider         ROS:  As above     OBJECTIVE:     /68 (BP Location: Left arm, Patient Position: Chair, Cuff Size: Adult Regular)   Pulse 63   Temp 97.4  F (36.3  C) (Oral)   Resp 16    Wt 80.5 kg (177 lb 8 oz)   LMP  (LMP Unknown)   SpO2 98%   BMI 27.80 kg/m    Body mass index is 27.8 kg/m .  GENERAL:   alert and no distress, sitting in a wheelchair, on 2 liters O2 by NC  RESP: lungs clear to auscultation - no rales, rhonchi or wheezes  CV: regular rate and rhythm, normal S1 S2, no S3 or S4, no murmur     Diagnostic Test Results:  Results for orders placed or performed during the hospital encounter of 01/05/19   XR Chest 2 Views    Narrative    CHEST TWO VIEWS 1/5/2019 3:56 PM     COMPARISON: Two-view chest x-ray 11/10/2018    HISTORY: Shortness of breath.    FINDINGS: A dual-lead pacemaker module implanted over the left chest  with the leads in the right atrium and right ventricle is again noted  without identifiable change.    The cardiac silhouette, pulmonary vasculature, lungs and pleural  spaces are within normal limits.      Impression    IMPRESSION: Clear lungs.    CHIRAG MCCABE MD   XR Chest 2 Views    Narrative    CHEST TWO VIEWS  1/6/2019 10:05 AM     HISTORY: diastolic CHF, received 2L fluids in ED for hypotension    COMPARISON: 1/5/2019 chest x-ray      Impression    IMPRESSION: Heart and pulmonary vessels within normal limits.  Redemonstrated cardiac device over the left chest with right atrial  and ventricular leads. No acute infiltrate or pleural effusion.    HOOD YOUNG MD   XR Chest 2 Views    Narrative    CHEST TWO VIEWS 1/7/2019 2:29 PM     HISTORY: Persistent hypoxia and high O2 needs.    COMPARISON: January 6, 2019.     FINDINGS: Stable cardiac device, no pneumothorax. There are no acute  infiltrates. The cardiac silhouette is not enlarged. Pulmonary  vasculature may be congested.      Impression    IMPRESSION: Possible vascular congestion, no acute infiltrates.    ANH HOWARD MD   CBC with platelets differential   Result Value Ref Range    WBC 14.9 (H) 4.0 - 11.0 10e9/L    RBC Count 4.31 3.8 - 5.2 10e12/L    Hemoglobin 12.1 11.7 - 15.7 g/dL    Hematocrit 36.5 35.0 -  47.0 %    MCV 85 78 - 100 fl    MCH 28.1 26.5 - 33.0 pg    MCHC 33.2 31.5 - 36.5 g/dL    RDW 17.2 (H) 10.0 - 15.0 %    Platelet Count 279 150 - 450 10e9/L    Diff Method Automated Method     % Neutrophils 79.2 %    % Lymphocytes 6.9 %    % Monocytes 9.5 %    % Eosinophils 0.4 %    % Basophils 0.3 %    % Immature Granulocytes 3.7 %    Nucleated RBCs 0 0 /100    Absolute Neutrophil 11.8 (H) 1.6 - 8.3 10e9/L    Absolute Lymphocytes 1.0 0.8 - 5.3 10e9/L    Absolute Monocytes 1.4 (H) 0.0 - 1.3 10e9/L    Absolute Eosinophils 0.1 0.0 - 0.7 10e9/L    Absolute Basophils 0.0 0.0 - 0.2 10e9/L    Abs Immature Granulocytes 0.6 (H) 0 - 0.4 10e9/L    Absolute Nucleated RBC 0.0    Comprehensive metabolic panel   Result Value Ref Range    Sodium 134 133 - 144 mmol/L    Potassium 4.7 3.4 - 5.3 mmol/L    Chloride 102 94 - 109 mmol/L    Carbon Dioxide 23 20 - 32 mmol/L    Anion Gap 9 3 - 14 mmol/L    Glucose 119 (H) 70 - 99 mg/dL    Urea Nitrogen 35 (H) 7 - 30 mg/dL    Creatinine 1.35 (H) 0.52 - 1.04 mg/dL    GFR Estimate 37 (L) >60 mL/min/[1.73_m2]    GFR Estimate If Black 43 (L) >60 mL/min/[1.73_m2]    Calcium 8.7 8.5 - 10.1 mg/dL    Bilirubin Total 0.7 0.2 - 1.3 mg/dL    Albumin 2.5 (L) 3.4 - 5.0 g/dL    Protein Total 6.5 (L) 6.8 - 8.8 g/dL    Alkaline Phosphatase 43 40 - 150 U/L    ALT 61 (H) 0 - 50 U/L    AST 24 0 - 45 U/L   Lactic acid whole blood   Result Value Ref Range    Lactic Acid 2.1 (H) 0.7 - 2.0 mmol/L   UA with Microscopic   Result Value Ref Range    Color Urine Yellow     Appearance Urine Slightly Cloudy     Glucose Urine Negative NEG^Negative mg/dL    Bilirubin Urine Negative NEG^Negative    Ketones Urine Negative NEG^Negative mg/dL    Specific Gravity Urine 1.012 1.003 - 1.035    Blood Urine Negative NEG^Negative    pH Urine 6.0 5.0 - 7.0 pH    Protein Albumin Urine Negative NEG^Negative mg/dL    Urobilinogen mg/dL Normal 0.0 - 2.0 mg/dL    Nitrite Urine Negative NEG^Negative    Leukocyte Esterase Urine Large (A)  NEG^Negative    Source Catheterized Urine     WBC Urine 38 (H) 0 - 5 /HPF    RBC Urine 1 0 - 2 /HPF    Bacteria Urine Few (A) NEG^Negative /HPF    Mucous Urine Present (A) NEG^Negative /LPF    Hyaline Casts 1 0 - 2 /LPF   Troponin I   Result Value Ref Range    Troponin I ES <0.015 0.000 - 0.045 ug/L   Nt probnp inpatient (BNP)   Result Value Ref Range    N-Terminal Pro BNP Inpatient 210 0 - 1,800 pg/mL   Blood gas arterial and oxyhgb   Result Value Ref Range    pH Arterial 7.46 (H) 7.35 - 7.45 pH    pCO2 Arterial 37 35 - 45 mm Hg    pO2 Arterial 63 (L) 80 - 105 mm Hg    Bicarbonate Arterial 26 21 - 28 mmol/L    Oxyhemoglobin Arterial 92 92 - 100 %    Base Excess Art 2.0 mmol/L   Lactic acid   Result Value Ref Range    Lactic Acid 0.9 0.7 - 2.0 mmol/L   Magnesium   Result Value Ref Range    Magnesium 2.6 (H) 1.6 - 2.3 mg/dL   Phosphorus   Result Value Ref Range    Phosphorus 3.6 2.5 - 4.5 mg/dL   Troponin I   Result Value Ref Range    Troponin I ES <0.015 0.000 - 0.045 ug/L   Troponin I   Result Value Ref Range    Troponin I ES <0.015 0.000 - 0.045 ug/L   CK total   Result Value Ref Range    CK Total 34 30 - 225 U/L   Procalcitonin   Result Value Ref Range    Procalcitonin 0.06 ng/ml   CBC with platelets   Result Value Ref Range    WBC 13.3 (H) 4.0 - 11.0 10e9/L    RBC Count 3.83 3.8 - 5.2 10e12/L    Hemoglobin 10.6 (L) 11.7 - 15.7 g/dL    Hematocrit 32.6 (L) 35.0 - 47.0 %    MCV 85 78 - 100 fl    MCH 27.7 26.5 - 33.0 pg    MCHC 32.5 31.5 - 36.5 g/dL    RDW 17.6 (H) 10.0 - 15.0 %    Platelet Count 257 150 - 450 10e9/L   Comprehensive metabolic panel   Result Value Ref Range    Sodium 135 133 - 144 mmol/L    Potassium 4.5 3.4 - 5.3 mmol/L    Chloride 105 94 - 109 mmol/L    Carbon Dioxide 23 20 - 32 mmol/L    Anion Gap 7 3 - 14 mmol/L    Glucose 95 70 - 99 mg/dL    Urea Nitrogen 24 7 - 30 mg/dL    Creatinine 0.99 0.52 - 1.04 mg/dL    GFR Estimate 54 (L) >60 mL/min/[1.73_m2]    GFR Estimate If Black 63 >60  mL/min/[1.73_m2]    Calcium 8.0 (L) 8.5 - 10.1 mg/dL    Bilirubin Total 0.7 0.2 - 1.3 mg/dL    Albumin 2.2 (L) 3.4 - 5.0 g/dL    Protein Total 5.8 (L) 6.8 - 8.8 g/dL    Alkaline Phosphatase 41 40 - 150 U/L    ALT 55 (H) 0 - 50 U/L    AST 27 0 - 45 U/L   Nt probnp inpatient   Result Value Ref Range    N-Terminal Pro BNP Inpatient 234 0 - 1,800 pg/mL   Glucose by meter   Result Value Ref Range    Glucose 112 (H) 70 - 99 mg/dL   Glucose by meter   Result Value Ref Range    Glucose 106 (H) 70 - 99 mg/dL   Glucose by meter   Result Value Ref Range    Glucose 149 (H) 70 - 99 mg/dL   Basic metabolic panel   Result Value Ref Range    Sodium 133 133 - 144 mmol/L    Potassium 4.2 3.4 - 5.3 mmol/L    Chloride 102 94 - 109 mmol/L    Carbon Dioxide 20 20 - 32 mmol/L    Anion Gap 11 3 - 14 mmol/L    Glucose 235 (H) 70 - 99 mg/dL    Urea Nitrogen 18 7 - 30 mg/dL    Creatinine 0.80 0.52 - 1.04 mg/dL    GFR Estimate 70 >60 mL/min/[1.73_m2]    GFR Estimate If Black 81 >60 mL/min/[1.73_m2]    Calcium 8.5 8.5 - 10.1 mg/dL   CBC with platelets   Result Value Ref Range    WBC 14.3 (H) 4.0 - 11.0 10e9/L    RBC Count 3.99 3.8 - 5.2 10e12/L    Hemoglobin 11.1 (L) 11.7 - 15.7 g/dL    Hematocrit 33.7 (L) 35.0 - 47.0 %    MCV 85 78 - 100 fl    MCH 27.8 26.5 - 33.0 pg    MCHC 32.9 31.5 - 36.5 g/dL    RDW 17.1 (H) 10.0 - 15.0 %    Platelet Count 341 150 - 450 10e9/L   Magnesium   Result Value Ref Range    Magnesium 2.6 (H) 1.6 - 2.3 mg/dL   Glucose by meter   Result Value Ref Range    Glucose 122 (H) 70 - 99 mg/dL   Hemoglobin A1c   Result Value Ref Range    Hemoglobin A1C 6.9 (H) 0 - 5.6 %   Glucose by meter   Result Value Ref Range    Glucose 181 (H) 70 - 99 mg/dL   Glucose by meter   Result Value Ref Range    Glucose 139 (H) 70 - 99 mg/dL   Basic metabolic panel   Result Value Ref Range    Sodium 136 133 - 144 mmol/L    Potassium 3.9 3.4 - 5.3 mmol/L    Chloride 102 94 - 109 mmol/L    Carbon Dioxide 23 20 - 32 mmol/L    Anion Gap 11 3 - 14  mmol/L    Glucose 149 (H) 70 - 99 mg/dL    Urea Nitrogen 20 7 - 30 mg/dL    Creatinine 0.84 0.52 - 1.04 mg/dL    GFR Estimate 66 >60 mL/min/[1.73_m2]    GFR Estimate If Black 77 >60 mL/min/[1.73_m2]    Calcium 8.6 8.5 - 10.1 mg/dL   CBC with platelets   Result Value Ref Range    WBC 16.3 (H) 4.0 - 11.0 10e9/L    RBC Count 3.83 3.8 - 5.2 10e12/L    Hemoglobin 10.7 (L) 11.7 - 15.7 g/dL    Hematocrit 32.1 (L) 35.0 - 47.0 %    MCV 84 78 - 100 fl    MCH 27.9 26.5 - 33.0 pg    MCHC 33.3 31.5 - 36.5 g/dL    RDW 17.3 (H) 10.0 - 15.0 %    Platelet Count 407 150 - 450 10e9/L   Glucose by meter   Result Value Ref Range    Glucose 127 (H) 70 - 99 mg/dL   Glucose by meter   Result Value Ref Range    Glucose 103 (H) 70 - 99 mg/dL   Glucose by meter   Result Value Ref Range    Glucose 109 (H) 70 - 99 mg/dL   Glucose by meter   Result Value Ref Range    Glucose 146 (H) 70 - 99 mg/dL   Glucose by meter   Result Value Ref Range    Glucose 168 (H) 70 - 99 mg/dL   Basic metabolic panel   Result Value Ref Range    Sodium 138 133 - 144 mmol/L    Potassium 3.5 3.4 - 5.3 mmol/L    Chloride 104 94 - 109 mmol/L    Carbon Dioxide 24 20 - 32 mmol/L    Anion Gap 10 3 - 14 mmol/L    Glucose 97 70 - 99 mg/dL    Urea Nitrogen 21 7 - 30 mg/dL    Creatinine 0.86 0.52 - 1.04 mg/dL    GFR Estimate 64 >60 mL/min/[1.73_m2]    GFR Estimate If Black 75 >60 mL/min/[1.73_m2]    Calcium 8.6 8.5 - 10.1 mg/dL     *Note: Due to a large number of results and/or encounters for the requested time period, some results have not been displayed. A complete set of results can be found in Results Review.       ASSESSMENT/PLAN:        Hamida Verma is a 78-year-old  female with past medical history significant for essential hypertension, dyslipidemia, paroxysmal atrial fibrillation on anticoagulation, heart failure with preserved ejection fraction, moderate pulmonary hypertension, and sick sinus syndrome, status post permanent pacemaker placement.      Acute  pyelonephritis with sepsis, secondary to Klebsiella pneumoniae :   She completed her antibiotics and is improved.     Patient presented with generalized weakness, fall, and hypotension. Leukocytosis with WBC 14.9. CXR unremarkable. UA abnormal with Large LE, WBC 38, neg nitrites. Lactate 2.1 --> 0.9. Started on IV ceftriaxone upon admission. Urine culture grew 2 strains of Klebsiella pneumoniae. Blood cultures remained negative. Antibiotic therapy was transitioned to oral Levaquin 01/09, but due to interaction with flecainide, she was discharged on oral cefuroxime 500 mg po bid for 3 more days.     Acute kidney injury:   Continues lisinopril. BMP today     Baseline Cr typically 0.6-0.9. Cr 1.35 on admission, improved with IV fluid, suggestive of pre-renal azotemia. Notably, PTA lisinopril was held till the time of discharge.             Recent Labs   Lab 01/10/19  0808 01/09/19  0910 01/08/19  0814 01/06/19  0527 01/05/19  1520   CR 0.86 0.84 0.80 0.99 1.35*         Acute hypoxia,  Suspected COPD with acute exacerbation,  History of chronic diastolic CHF (HF with preserved ejection fraction).  Improved.   She continues on 2 L of oxygen.  She has an appointment with pulmonology scheduled next month.  She completed her antibiotic and higher dose prednisone burst.  She continues on alternate day low-dose prednisone for her left eye.  Will complete the Anoro Ellipta, she has 3 doses left.  Then will start Spiriva daily and Serevent twice daily in hopes that these will be less expensive for her.     Patient noted to be hypoxic in ED. History of chronic diastolic CHF. ECHO Oct 2018: LV mild to mod dilation, TR 1-2+, normal systolic function with EF 55-60%, LV diastolic fxn indeterminate. No RWMA. No thrombus. PTA on Lasix 20 mg PO daily, lisinopril 40 mg p.o. daily ,and metoprolol XL 50 mg p.o. b.i.d. Patient had a remote smoking history, quit in 2010. Some emphysematous changes on prior chest CT. She had been advised to  have outpatient pulmonary function testing done for proper diagnosis of COPD due to high probability during Oct 2018 admission but not done yet. CXR on admission 01/05 with clear lungs. Repeat CXR 01/07 with possible vascular congestion, no acute infiltrates. Patient was seen by pulmonology and treatment initiated for possible COPD exacerbation. She was started on prednisone 40 mg daily for 5 days,respiratory therapy with nebs, and antibiotics (initially ceftriaxone, then Levaquin). She continued with PTA Lasix and metoprolol. At the time of discharge her O2 need was down due 2-3 L/min. She was discharged on Anoro Ellipta, prn albuterol MDI, home O2, oral prednisone 40 mg po daily for 2 more days, and cefuroxime 500 mg po bid for 3 more days. She was scheduled to follow up with pulmonology clinic on 02/19 for reassessment and further work-up including PFT.     Physical deconditioning:  PT recommended home with family assistance.     Recent history of revision of corneal incision in 11/2018:  Patient is to continue with PTA low dose prednisone after the 5-day burst of high dose prednisone as above.     Hypertension:    Chronic and stable on PTA Lasix 20 mg PO daily, lisinopril 40 mg p.o. daily and metoprolol XL 50 mg p.o. b.i.d.      Paroxysmal atrial fibrillation,  Sick sinus syndrome, status post permanent pacemaker in 03/2018:    No changes today. Has follow up with cardiology in two days.   EKG in the ER revealed normal sinus rhythm, rate controlled 60s. Chronically anticoagulated with apixaban. NSR was maintained. She continued with PTA Eliquis 5mg BID, Toprol-XL 50 mg BID, and flecainide 100 mg BID        Dyslipidemia:    She continued with PTA simvastatin 20 mg p.o. at bedtime.      Hyperglycemia, mild:   Will recheck a1C in 3 months. Continues on every other day low dose prednisone, which is likely affecting her BG    Hgb A1c 6.9% on 01/08. Concerned for early diabetes, exacerbated by steroid therapy. She was  placed on low carb diet and advised to follow up with PCP for optimal management (discussed with the patient).     Patient Instructions   1) See me in 3 months and we will reevaluate your blood sugars. See me earlier as needed.   2) Finish the anoro inhaler. When you are done, start the spiriva once daily and the serevent twice daily. Hopefully, this will be less expensive for you. If not, your pulmonologist may choose different inhalers for you.   3) Continue on the home oxygen for now until you are evaluated by the pulmonologist.   4) Request refills of your spironolactone and furosemide through your cardiologist.       Mikala Philip M.D.        [facility]

## 2019-01-14 NOTE — PATIENT INSTRUCTIONS
1) See me in 3 months and we will reevaluate your blood sugars. See me earlier as needed.   2) Finish the anoro inhaler. When you are done, start the spiriva once daily and the serevent twice daily. Hopefully, this will be less expensive for you. If not, your pulmonologist may choose different inhalers for you.   3) Continue on the home oxygen for now until you are evaluated by the pulmonologist.   4) Request refills of your spironolactone and furosemide through your cardiologist.

## 2019-01-15 LAB
ANION GAP SERPL CALCULATED.3IONS-SCNC: 8 MMOL/L (ref 3–14)
BUN SERPL-MCNC: 24 MG/DL (ref 7–30)
CALCIUM SERPL-MCNC: 9 MG/DL (ref 8.5–10.1)
CHLORIDE SERPL-SCNC: 101 MMOL/L (ref 94–109)
CO2 SERPL-SCNC: 27 MMOL/L (ref 20–32)
CREAT SERPL-MCNC: 0.94 MG/DL (ref 0.52–1.04)
GFR SERPL CREATININE-BSD FRML MDRD: 57 ML/MIN/{1.73_M2}
GLUCOSE SERPL-MCNC: 80 MG/DL (ref 70–99)
POTASSIUM SERPL-SCNC: 4.5 MMOL/L (ref 3.4–5.3)
SODIUM SERPL-SCNC: 136 MMOL/L (ref 133–144)

## 2019-01-16 ENCOUNTER — OFFICE VISIT (OUTPATIENT)
Dept: CARDIOLOGY | Facility: CLINIC | Age: 79
End: 2019-01-16
Payer: COMMERCIAL

## 2019-01-16 VITALS
HEIGHT: 64 IN | WEIGHT: 180.5 LBS | SYSTOLIC BLOOD PRESSURE: 106 MMHG | DIASTOLIC BLOOD PRESSURE: 60 MMHG | HEART RATE: 68 BPM | BODY MASS INDEX: 30.81 KG/M2

## 2019-01-16 DIAGNOSIS — I50.32 CHRONIC DIASTOLIC CONGESTIVE HEART FAILURE (H): Primary | ICD-10-CM

## 2019-01-16 DIAGNOSIS — I50.32 CHRONIC DIASTOLIC CONGESTIVE HEART FAILURE (H): ICD-10-CM

## 2019-01-16 DIAGNOSIS — E87.6 HYPOKALEMIA: ICD-10-CM

## 2019-01-16 DIAGNOSIS — I49.5 SICK SINUS SYNDROME (H): ICD-10-CM

## 2019-01-16 DIAGNOSIS — I10 HYPERTENSION GOAL BP (BLOOD PRESSURE) < 140/90: ICD-10-CM

## 2019-01-16 DIAGNOSIS — I48.0 PAF (PAROXYSMAL ATRIAL FIBRILLATION) (H): ICD-10-CM

## 2019-01-16 DIAGNOSIS — E78.5 HYPERLIPIDEMIA LDL GOAL <70: ICD-10-CM

## 2019-01-16 LAB
ANION GAP SERPL CALCULATED.3IONS-SCNC: 10.3 MMOL/L (ref 6–17)
BUN SERPL-MCNC: 17 MG/DL (ref 7–30)
CALCIUM SERPL-MCNC: 9.4 MG/DL (ref 8.5–10.5)
CHLORIDE SERPL-SCNC: 100 MMOL/L (ref 98–107)
CHOLEST SERPL-MCNC: 142 MG/DL
CO2 SERPL-SCNC: 27 MMOL/L (ref 23–29)
CREAT SERPL-MCNC: 0.96 MG/DL (ref 0.7–1.3)
GFR SERPL CREATININE-BSD FRML MDRD: 56 ML/MIN/{1.73_M2}
GLUCOSE SERPL-MCNC: 110 MG/DL (ref 70–105)
HDLC SERPL-MCNC: 45 MG/DL
LDLC SERPL CALC-MCNC: 70 MG/DL
NONHDLC SERPL-MCNC: 97 MG/DL
NT-PROBNP SERPL-MCNC: 333 PG/ML (ref 0–450)
POTASSIUM SERPL-SCNC: 3.3 MMOL/L (ref 3.5–5.1)
SODIUM SERPL-SCNC: 134 MMOL/L (ref 136–145)
TRIGL SERPL-MCNC: 133 MG/DL

## 2019-01-16 PROCEDURE — 83880 ASSAY OF NATRIURETIC PEPTIDE: CPT | Performed by: PHYSICIAN ASSISTANT

## 2019-01-16 PROCEDURE — 80048 BASIC METABOLIC PNL TOTAL CA: CPT | Performed by: PHYSICIAN ASSISTANT

## 2019-01-16 PROCEDURE — 36415 COLL VENOUS BLD VENIPUNCTURE: CPT | Performed by: PHYSICIAN ASSISTANT

## 2019-01-16 PROCEDURE — 99214 OFFICE O/P EST MOD 30 MIN: CPT | Performed by: PHYSICIAN ASSISTANT

## 2019-01-16 PROCEDURE — 80061 LIPID PANEL: CPT | Performed by: PHYSICIAN ASSISTANT

## 2019-01-16 RX ORDER — FUROSEMIDE 20 MG
20 TABLET ORAL DAILY
Qty: 90 TABLET | Refills: 3 | Status: SHIPPED | OUTPATIENT
Start: 2019-01-16 | End: 2020-01-02

## 2019-01-16 RX ORDER — CEFUROXIME AXETIL 500 MG/1
500 TABLET ORAL 2 TIMES DAILY
COMMUNITY
End: 2019-02-21

## 2019-01-16 RX ORDER — POTASSIUM CHLORIDE 750 MG/1
10 TABLET, EXTENDED RELEASE ORAL DAILY
Qty: 90 TABLET | Refills: 3 | Status: SHIPPED | OUTPATIENT
Start: 2019-01-16 | End: 2020-01-09

## 2019-01-16 RX ORDER — PREDNISONE 20 MG/1
20 TABLET ORAL DAILY
COMMUNITY
End: 2019-01-16

## 2019-01-16 RX ORDER — PREDNISONE 20 MG/1
10 TABLET ORAL EVERY OTHER DAY
COMMUNITY
Start: 2019-01-16 | End: 2019-02-21

## 2019-01-16 ASSESSMENT — MIFFLIN-ST. JEOR: SCORE: 1278.74

## 2019-01-16 NOTE — LETTER
1/16/2019    Mikala Philip MD, MD  [address]  [facility] [address]    RE: Hamida Verma       Dear Colleague,    I had the pleasure of seeing Hamida Verma in the [facility].        [facility] Progress Note    Hamida Verma MRN# [id number]   YOB: 1940 Age: 78 year old          Assessment and Plan:     In summary, the patient presents today for assessment of HFpEF and following recent admission for acute pyelonephritis, discharged six days ago. There's question of whether she was or wasn't compliant with her newly-prescribed Aldactone prior to admission. She was ultimately discharged on her PTA Lasix alone. Her weight has been stable since that time, and she appears stable and euvolemic in clinic.     1. HFpEF   - ECHO: EF 55-60%, E/E' avg 16, mild-mod LAE   - ACC/AHA Stage: C; FC I    - Etiology: htn, paf   - RV: normal   - Valves: Mild-mod TR   - Volume: Euvolemic    Current Wt: 176 lbs (per home scale)    Dry Wt: 174 - 180 lbs    Diuretics: Lasix 20 mg daily    O2: 2L/min (new from discharge)   - Rhythm: SR / ApVs / PAF   - QRS: Narrow   - Device: DDDR PPM   - Other: TSH OK                 Stop-Bang Score: Low    2. PAF, s/p PPM - on flecainide 100 BID, Toprol 50 BID and Eliquis 5 BID. Followed by EP.     3. HTN - Well-controlled per home readings, and in clinic. On Lisinopril 40, Toprol 50 BID, Lasix 20.     4. CAC - per CT. No sxs suggestive of angina. Melonie MPI 3/2018 was normal (small fixed defect suggestive of breast attenuation). LDL controlled following recent switch from Zocor to Lipitor.     5. Probable COPD - with some emphymatous changes noted on chest CT. +Smoking history. To have PFT's per PCP.     6. Retinal tear - requiring ultimate surgery, currently on the back-burner given her recent admission. Will need Eliquis held for this.     7. Hypokalemia      Plan:  - Continue current dose of Lasix.  - Stop GNC potassium supplement, and  start KCL 10 mEQ daily.   - F/U arranged with EP in one week, will re-check her potassium level then. She's scheduled to see pulmonology in one month. I'll get her in to see Dr. Mallory after that, and have her return for f/u in [facility] clinic in 3 months. She'll continue to monitor her weights and blood pressures at home in the interim, and call with weight gain or symptom progression.         History of Presenting Illness:      Hamida Verma is a pleasant 78 year old patient of  Dr. Mallory and Dr. Voss who presents today for a [facility] clinic visit and hospital f/u visit.    She was admitted in March 2018 for heart failure in the setting of rapid AF, then again in October with CHF felt secondary to diastolic dysfunction, mildly uncontrolled blood pressures and underlying lung disease. She had a complicated course with a readmission for non-cardiac issues since that time. Please see my note from 12/19/18 for a detailed account of her past history.     When I saw her in clinic end of November, she was feeling much better and had been doing better with watching her salt intake and was reading labels and attempting to keep this < 2g/day. Her weight had been gradually climbing as her appetite had improved, but had been recently stable at 174 lbs. Her home BP's were very well-controlled. No med changes were made. She remained on low-dose Eliquis following her choroidal hematoma at that time, and saw her ophthalmologist later that day who up-titrated her to full dose.     When I last saw her in clinic on December 19th, her weight had risen further, up a total of 8 lbs over the prior three weeks. She'd noted some worsening her chronic L>R leg swelling along with this, but felt her breathing was stable. She was starting to taper her prednisone at that point.  Aldactone was initiated. She was scheduled to have labs drawn in one week and to see me in two. Her f/u lab showed a stable creat of 1. Unfortunately, she  cancelled her f/u with me the following week and ended up in the ED the day after that (on 1/5) with Klebsiella pneumoniae secondary to acute pyelonephritis. This was complicated by acute hypoxia, suspected COPD exacerbation. She received IVF and lisinopril was held during her admission but apparently her lasix was continued throughout. There's no mention of Aldactone. CXR on 1/7 was noted to show some congestion and IVF was stopped. She was ultimately was discharged on 1/10 on Lasix 20 daily, at a weight of 176 lbs. She was sent home on O2 2-3L/min, which is new for her. She has f/u with pulm on 2/19.     Today, she presents with her granddaughter Zoe, who she usually presents with, as well as her daughter Karen who I am just meeting today. She's living back at home with them. She presents in a wheelchair but this is because she has a large portable O2 tank with her; at home she's amabulating without assistance. She's feeling pretty well, considering everything. Her biggest complaint is of transporting her oxygen tank which actually looks to be a portable concentrator and is fairly noisy. Her weight has been stable at 176 lbs since home, and her edema is at baseline.   She tells me that, prior to admission, she'd felt dizzy and attributed this to Aldactone so she stopped it. However she brings in her medication bottles today and one of them is a bottle of Aldactone with only one tablet left in it, so I suspect she actually had continued taking it. Regardless, she hadn't been getting it in the hospital. She uses a [employer] brand potassium supplement at home, I'm not familiar with this. She takes in a fair amount of potassium in her diet, she states, and is continuing to limit her sodium intake.   Labs today show a slightly low K+ of 3.3, Na 134. Creatinine is 1. Lipids show a controlled LDL of 70.   Recent pacemaker check is satisfactory, detailed below.          Review of Systems:     12-pt ROS is negative except  "for as noted in the HPI.          Physical Exam:     Vitals: /60   Pulse 68   Ht 1.626 m (5' 4\")   Wt 81.9 kg (180 lb 8 oz)   LMP  (LMP Unknown)   BMI 30.98 kg/m     Wt Readings from Last 10 Encounters:   01/16/19 81.9 kg (180 lb 8 oz)   01/14/19 80.5 kg (177 lb 8 oz)   01/10/19 79.8 kg (176 lb)   12/19/18 84 kg (185 lb 1.6 oz)   11/28/18 80.3 kg (177 lb)   11/21/18 78 kg (172 lb)   11/14/18 80.7 kg (178 lb)   11/10/18 81.6 kg (180 lb)   10/31/18 83.5 kg (184 lb)   10/25/18 83.3 kg (183 lb 9.6 oz)     Constitutional:  Patient is pleasant, alert, cooperative, and in NAD.  HEENT:  NCAT. PERRLA. EOM's intact. No masses or thyromegaly. Teeth in normal repair.   Neck:  CVP appears normal.   Pulmonary: Normal respiratory effort. Some soft LLL rales, otherwise CTAB.  Cardiac: RRR, normal S1/S2, +S4, grade 2-3/6 sm at the LSB  Abdomen:  Non-tender abdomen with normoactive bowel sounds, no hepatosplenomegaly appreciated.   Vascular: Pulses in the upper and lower extremities are 2+ and equal bilaterally.  Extremities: 1+ pitting edema L pedal - pretibial region, none on the R  Neurological:  No gross motor or sensory deficits.   Psych: Appropriate affect.        Data:     Cardiac Diagnostics reviewed:  Type Date Result   TTE 10/12/18 Left ventricular systolic function is normal.  The left atrium is mild to moderately dilated.  There is moderate aortic sclerosis of the non-coronary cusp. No  hemodynamically significant valvular aortic stenosis.  There is mild to moderate (1-2+) tricuspid regurgitation.  Doppler findings do not suggest pulmonary hypertension.  E/E' avg 16.  IVSd 1 / PWd 1.1    3/7/18 1. The left ventricle is normal in size. The visual ejection fraction is  estimated at 65%.  2. The right ventricle is normal in structure, function and size.  3. There is mild to moderate (1-2+) mitral regurgitation.  4. There is moderate (2+) tricuspid regurgitation. The right ventricular  systolic pressure is " approximated at 28mmHg plus the right atrial pressure.  No previous echo for comparison.   Stress 3/8/18 (Melonie) 1.  Myocardial perfusion imaging using single isotope technique  demonstrated a small perfusion defect of mild severity involving the  left ventricular apex which is essentially fixed and most likely  related to breast attenuation. There is no evidence of significant  pharmacological stress-induced ischemia or prior myocardial infarction  on this study.   2. Gated images demonstrated normal left ventricular wall motion.  The left ventricular systolic function is 73% at rest and 74% on the post stress images.  3. There is no previous study for comparison.   EKG 10/11/18 NSR, 64 bpm. QRS 98, QTc 462.   Device PPM check, 1/2/19 [employer] (D) Remote PPM Device Check  AP: 72%\X090A\: <1%  Mode: DDDR        Presenting Rhythm: AS/VS, AP/VS  Heart Rate: Adequate rates per histogram  Sensing: stable      Pacing Threshold: stable      Impedance: stable  Battery Status: 10.6 years  Atrial Arrhythmia: None  Ventricular Arrhythmia: None  Battery Status: 10.4 years  Atrial Arrhythmia: No mode switch episodes. 1 PMT occurred, no EGM.   Ventricular Arrhythmia: None    CXR 1/7/19    Chest CT 3/14/18 Mild to moderate emphysematous changes in the lungs. Small  bilateral pleural effusion with adjacent atelectasis. Apparent mild  interstitial thickening throughout both lungs. No pericardial  effusion. A few nonspecific borderline-enlarged mediastinal lymph  nodes. Mild cardiomegaly. Atherosclerotic calcification in the  thoracic aorta and coronary arteries.   LE ultrasound 3/14/18 The left common femoral, superficial femoral, popliteal and  posterior tibial veins are patent and fully compressible and  demonstrate normal venous Doppler flow. The visualized greater  saphenous vein is negative for thrombus. For comparison the right  common femoral vein was evaluated and was unremarkable.     IMPRESSION: No deep venous  thrombosis demonstrated.     Labs reviewed:  Recent Labs   Lab Test 01/16/19  1135 12/19/18  0829 10/25/18  0731 07/11/18  0907 03/15/18  2025 03/06/18  1744 03/23/17  0804   LDL 70  --   --  84  --   --  91   HDL 45*  --   --  69  --   --  61   NHDL 97  --   --  102  --   --  113   CHOL 142  --   --  171  --   --  174   TRIG 133  --   --  88  --   --  109   TSH  --   --  0.80  --  0.64 0.54  --    NTBNP 333 400 343  --   --   --   --        Lab Results   Component Value Date    WBC 12.9 (H) 01/10/2019    RBC 3.72 (L) 01/10/2019    HGB 10.3 (L) 01/10/2019    HCT 31.5 (L) 01/10/2019    MCV 85 01/10/2019    MCH 27.7 01/10/2019    MCHC 32.7 01/10/2019    RDW 17.4 (H) 01/10/2019     01/10/2019       Lab Results   Component Value Date     (L) 01/16/2019    POTASSIUM 3.3 (L) 01/16/2019    CHLORIDE 100 01/16/2019    CO2 27 01/16/2019    ANIONGAP 10.3 01/16/2019     (H) 01/16/2019    BUN 17 01/16/2019    CR 0.96 01/16/2019    GFRESTIMATED 56 (L) 01/16/2019    GFRESTBLACK 68 01/16/2019    GURWINDER 9.4 01/16/2019      Lab Results   Component Value Date    AST 27 01/06/2019    ALT 55 (H) 01/06/2019       Lab Results   Component Value Date    A1C 6.9 (H) 01/08/2019       Lab Results   Component Value Date    INR 1.31 (H) 03/19/2018    INR 1.04 11/12/2014           Problem List:     Patient Active Problem List   Diagnosis     Symptomatic menopausal or female climacteric states     Hyperlipidemia LDL goal <70     Hypertension goal BP (blood pressure) < 140/90     Knee pain     Obesity     PAF (paroxysmal atrial fibrillation) (H)     S/P cardiac pacemaker procedure     SSS (sick sinus syndrome) (H)     Chronic diastolic CHF (congestive heart failure) (H)     Pulmonary hypertension (H)     Coronary artery calcification seen on CT scan     Hypotension           Medications:     Current Outpatient Medications   Medication Sig Dispense Refill     albuterol (PROAIR HFA/PROVENTIL HFA/VENTOLIN HFA) 108 (90 Base) MCG/ACT  inhaler Inhale 2 puffs into the lungs every 4 hours as needed for shortness of breath / dyspnea or wheezing 1 Inhaler 3     alendronate (FOSAMAX) 70 MG tablet Take 1 tablet (70 mg) by mouth every 7 days Take 60 minutes before am meal with 8 oz. water. Remain upright for 30 minutes. 12 tablet 3     apixaban ANTICOAGULANT (ELIQUIS) 5 MG tablet Take 1 tablet (5 mg) by mouth 2 times daily 180 tablet 3     atorvastatin (LIPITOR) 40 MG tablet Take 40 mg by mouth daily       cefuroxime (CEFTIN) 500 MG tablet Take 500 mg by mouth 2 times daily       cholecalciferol (VITAMIN  -D) 1000 UNIT capsule Take 1 capsule by mouth daily.       Coral Calcium 133-66.7-133 MG-MG-UNIT CAPS Take 2 tablets by mouth 2 times daily        FLAX SEED OIL OR 1 capsule daily       flecainide (TAMBOCOR) 100 MG tablet Take 1 tablet (100 mg) by mouth 2 times daily 90 tablet 3     furosemide (LASIX) 20 MG tablet Take 1 tablet (20 mg) by mouth daily 90 tablet 3     Krill Oil 500 MG CAPS Take 1 capsule by mouth daily       lisinopril (PRINIVIL/ZESTRIL) 40 MG tablet Take 1 tablet (40 mg) by mouth daily 90 tablet 3     Methylsulfonylmethane (MSM) 500 MG CAPS Take 1 capsule by mouth daily       metoprolol succinate (TOPROL-XL) 50 MG 24 hr tablet Take 1 tablet (50 mg) by mouth 2 times daily 180 tablet 3     Milk Thistle 200 MG CAPS Take 1 capsule by mouth daily        Multiple Vitamins-Minerals (HAIR SKIN NAILS PO) Take 1 tablet by mouth At Bedtime       potassium chloride ER (K-DUR/KLOR-CON M) 10 MEQ CR tablet Take 1 tablet (10 mEq) by mouth daily 90 tablet 3     predniSONE (DELTASONE) 20 MG tablet Take 10 mg by mouth every other day.       salmeterol (SEREVENT) 50 MCG/DOSE inhaler Inhale 1 puff into the lungs 2 times daily 1 Inhaler 1     tiotropium (SPIRIVA) 18 MCG inhaled capsule Inhale 1 capsule (18 mcg) into the lungs daily 90 capsule 3     umeclidinium-vilanterol (ANORO ELLIPTA) 62.5-25 MCG/INH oral inhaler Inhale 1 puff into the lungs daily 1  Inhaler 3           Past Medical History:     Past Medical History:   Diagnosis Date     Atrial fibrillation (H)      Chronic diastolic CHF (congestive heart failure) (H)      Essential hypertension, benign      HYPERLIPIDEMIA NEC/NOS 3/30/2006     Pulmonary hypertension (H)      SSS (sick sinus syndrome) (H)     s/p PPM 3/2018     Past Surgical History:   Procedure Laterality Date     EYE SURGERY       HYSTERECTOMY, VAGINAL      hysterectomy     Family History   Problem Relation Age of Onset     Cerebrovascular Disease Mother      Glaucoma Mother      Macular Degeneration Mother      Alcohol/Drug Father         alcohol     Myocardial Infarction Father 63        passed away from MI     Family History Negative Sister      Family History Negative Sister      Family History Negative Sister      Cerebrovascular Disease Sister      Family History Negative Brother      Family History Negative Maternal Grandmother      Family History Negative Maternal Grandfather      Family History Negative Paternal Grandmother      Family History Negative Paternal Grandfather      Myocardial Infarction Son 57        passed away from MI     Dementia Daughter 57        early onset on [place]     Diabetes No family hx of      Breast Cancer No family hx of      Cancer - colorectal No family hx of      Social History     Socioeconomic History     Marital status:      Spouse name: Not on file     Number of children: Not on file     Years of education: Not on file     Highest education level: Not on file   Social Needs     Financial resource strain: Not on file     Food insecurity - worry: Not on file     Food insecurity - inability: Not on file     Transportation needs - medical: Not on file     Transportation needs - non-medical: Not on file   Occupational History     Not on file   Tobacco Use     Smoking status: Former Smoker     Packs/day: 1.50     Years: 45.00     Pack years: 67.50     Types: Cigarettes     Start date: 10/28/1955      Last attempt to quit: 2000     Years since quittin.3     Smokeless tobacco: Never Used     Tobacco comment: quit 6 yrs ago   Substance and Sexual Activity     Alcohol use: No     Drug use: No     Sexual activity: Yes     Partners: Male   Other Topics Concern     Parent/sibling w/ CABG, MI or angioplasty before 65F 55M? No   Social History Narrative    Balanced Diet - Yes    Osteoporosis Prevention Measures - Dairy servings per day: 2    Regular Exercise -  Yes Describe walk, but not recently due to weather    Dental Exam - Yes    Eye Exam -No    Self Breast Exam - Yes    Abuse: Current or Past (Physical, Sexual or Emotional)- No    Do you feel safe in your environment - Yes    Guns stored in the home - No    Sunscreen used - Yes    Seatbelts used - Yes    Lipids -  1/10    Glucose -  1/10    Colon Cancer Screening - Yes, 08    Hemoccults - NO    Pap Test -  Many yrs ago, has hysterectomy    Do you have any concerns about STD's -  No    Mammography - 08    DEXA - 06    Immunizations reviewed and up to date - No    Jonathan Marshall MA                       Allergies:   Strawberry flavor      Adriana Ferrell PA-C  [facility]  Pager: [phone]    ank you for allowing me to participate in the care of your patient.      Sincerely,     Adriana Ferrell PA-C     [facility]    cc:   No referring provider defined for this encounter.

## 2019-01-16 NOTE — PATIENT INSTRUCTIONS
Thank you for visiting with us today. Please call the C[facility] nurse for any questions or concerns: [phone].    Today, we discussed the following:   - Results: Your kidney function looks good, potassium level is a little low. Stop your GNC potassium and start a potassium tablet - I sent this in to your pharmacy, along with your refill for Lasix.  - Follow up: You see Dr. Voss in 1 week. I want to check your potassium level again at at that time. I'd like you to see Dr. Mallory or I back in one and three months. We will get you in more frequently in the meantime if you notice your weights increasing, swelling worsening, or breathing worsening. Please call us in that case.    Please, remember to continue the followin.  Weigh yourself daily and write it down.  Call C[facility] nurse if your weight is up more than 2 pounds in one day, or 5 pounds in one week.  2.  Call [facility] nurse if you feel more short of breath (with activity or when trying to sleep at night), have more abdominal bloating or leg swelling.  3.  Eat a low sodium diet (less than 2,000mg or 2g daily). If you eat less salt, you will retain less fluid.   4.  Please avoid NSAIDs as able (For example, Ibuprofen / aleve / advil / naprosen / diclofenac).      Scheduling phone number: [phone]  Reminder: Please bring in all current medications, over the counter supplements and vitamin bottles to your next appointment.    Thank you!  Adriana Ferrell PA-C  ______________________________________________________________

## 2019-01-16 NOTE — LETTER
1/16/2019    Mikala Philip MD, MD  [address]  [facility] [address]    RE: Hamida Verma       Dear Colleague,    I had the pleasure of seeing Hamida Verma in the [facility].        Cardiology Clinic Progress Note    Hamida Verma MRN# [id number]   YOB: 1940 Age: 78 year old          Assessment and Plan:     In summary, the patient presents today for assessment of HFpEF and following recent admission for acute pyelonephritis, discharged six days ago. There's question of whether she was or wasn't compliant with her newly-prescribed Aldactone prior to admission. She was ultimately discharged on her PTA Lasix alone. Her weight has been stable since that time, and she appears stable and euvolemic in clinic.     1. HFpEF   - ECHO: EF 55-60%, E/E' avg 16, mild-mod LAE   - ACC/AHA Stage: C; FC I    - Etiology: htn, paf   - RV: normal   - Valves: Mild-mod TR   - Volume: Euvolemic    Current Wt: 176 lbs (per home scale)    Dry Wt: 174 - 180 lbs    Diuretics: Lasix 20 mg daily    O2: 2L/min (new from discharge)   - Rhythm: SR / ApVs / PAF   - QRS: Narrow   - Device: DDDR PPM   - Other: TSH OK                 Stop-Bang Score: Low    2. PAF, s/p PPM - on flecainide 100 BID, Toprol 50 BID and Eliquis 5 BID. Followed by EP.     3. HTN - Well-controlled per home readings, and in clinic. On Lisinopril 40, Toprol 50 BID, Lasix 20.     4. CAC - per CT. No sxs suggestive of angina. Melonie MPI 3/2018 was normal (small fixed defect suggestive of breast attenuation). LDL controlled following recent switch from Zocor to Lipitor.     5. Probable COPD - with some emphymatous changes noted on chest CT. +Smoking history. To have PFT's per PCP.     6. Retinal tear - requiring ultimate surgery, currently on the back-burner given her recent admission. Will need Eliquis held for this.     7. Hypokalemia      Plan:  - Continue current dose of Lasix.  - Stop [employer] potassium supplement, and  start KCL 10 mEQ daily.   - F/U arranged with EP in one week, will re-check her potassium level then. She's scheduled to see pulmonology in one month. I'll get her in to see Dr. Mallory after that, and have her return for f/u in [facility] clinic in 3 months. She'll continue to monitor her weights and blood pressures at home in the interim, and call with weight gain or symptom progression.         History of Presenting Illness:      Hamida Verma is a pleasant 78 year old patient of  Dr. Mallory and Dr. Voss who presents today for a [facility] clinic visit and hospital f/u visit.    She was admitted in March 2018 for heart failure in the setting of rapid AF, then again in October with CHF felt secondary to diastolic dysfunction, mildly uncontrolled blood pressures and underlying lung disease. She had a complicated course with a readmission for non-cardiac issues since that time. Please see my note from 12/19/18 for a detailed account of her past history.     When I saw her in clinic end of November, she was feeling much better and had been doing better with watching her salt intake and was reading labels and attempting to keep this < 2g/day. Her weight had been gradually climbing as her appetite had improved, but had been recently stable at 174 lbs. Her home BP's were very well-controlled. No med changes were made. She remained on low-dose Eliquis following her choroidal hematoma at that time, and saw her ophthalmologist later that day who up-titrated her to full dose.     When I last saw her in clinic on December 19th, her weight had risen further, up a total of 8 lbs over the prior three weeks. She'd noted some worsening her chronic L>R leg swelling along with this, but felt her breathing was stable. She was starting to taper her prednisone at that point.  Aldactone was initiated. She was scheduled to have labs drawn in one week and to see me in two. Her f/u lab showed a stable creat of 1. Unfortunately, she  cancelled her f/u with me the following week and ended up in the ED the day after that (on 1/5) with Klebsiella pneumoniae secondary to acute pyelonephritis. This was complicated by acute hypoxia, suspected COPD exacerbation. She received IVF and lisinopril was held during her admission but apparently her lasix was continued throughout. There's no mention of Aldactone. CXR on 1/7 was noted to show some congestion and IVF was stopped. She was ultimately was discharged on 1/10 on Lasix 20 daily, at a weight of 176 lbs. She was sent home on O2 2-3L/min, which is new for her. She has f/u with pulm on 2/19.     Today, she presents with her granddaughter Zoe, who she usually presents with, as well as her daughter Karen who I am just meeting today. She's living back at home with them. She presents in a wheelchair but this is because she has a large portable O2 tank with her; at home she's amabulating without assistance. She's feeling pretty well, considering everything. Her biggest complaint is of transporting her oxygen tank which actually looks to be a portable concentrator and is fairly noisy. Her weight has been stable at 176 lbs since home, and her edema is at baseline.   She tells me that, prior to admission, she'd felt dizzy and attributed this to Aldactone so she stopped it. However she brings in her medication bottles today and one of them is a bottle of Aldactone with only one tablet left in it, so I suspect she actually had continued taking it. Regardless, she hadn't been getting it in the hospital. She uses a [employer] brand potassium supplement at home, I'm not familiar with this. She takes in a fair amount of potassium in her diet, she states, and is continuing to limit her sodium intake.   Labs today show a slightly low K+ of 3.3, Na 134. Creatinine is 1. Lipids show a controlled LDL of 70.   Recent pacemaker check is satisfactory, detailed below.          Review of Systems:     12-pt ROS is negative except  "for as noted in the HPI.          Physical Exam:     Vitals: /60   Pulse 68   Ht 1.626 m (5' 4\")   Wt 81.9 kg (180 lb 8 oz)   LMP  (LMP Unknown)   BMI 30.98 kg/m     Wt Readings from Last 10 Encounters:   01/16/19 81.9 kg (180 lb 8 oz)   01/14/19 80.5 kg (177 lb 8 oz)   01/10/19 79.8 kg (176 lb)   12/19/18 84 kg (185 lb 1.6 oz)   11/28/18 80.3 kg (177 lb)   11/21/18 78 kg (172 lb)   11/14/18 80.7 kg (178 lb)   11/10/18 81.6 kg (180 lb)   10/31/18 83.5 kg (184 lb)   10/25/18 83.3 kg (183 lb 9.6 oz)     Constitutional:  Patient is pleasant, alert, cooperative, and in NAD.  HEENT:  NCAT. PERRLA. EOM's intact. No masses or thyromegaly. Teeth in normal repair.   Neck:  CVP appears normal.   Pulmonary: Normal respiratory effort. Some soft LLL rales, otherwise CTAB.  Cardiac: RRR, normal S1/S2, +S4, grade 2-3/6 sm at the LSB  Abdomen:  Non-tender abdomen with normoactive bowel sounds, no hepatosplenomegaly appreciated.   Vascular: Pulses in the upper and lower extremities are 2+ and equal bilaterally.  Extremities: 1+ pitting edema L pedal - pretibial region, none on the R  Neurological:  No gross motor or sensory deficits.   Psych: Appropriate affect.        Data:     Cardiac Diagnostics reviewed:  Type Date Result   TTE 10/12/18 Left ventricular systolic function is normal.  The left atrium is mild to moderately dilated.  There is moderate aortic sclerosis of the non-coronary cusp. No  hemodynamically significant valvular aortic stenosis.  There is mild to moderate (1-2+) tricuspid regurgitation.  Doppler findings do not suggest pulmonary hypertension.  E/E' avg 16.  IVSd 1 / PWd 1.1    3/7/18 1. The left ventricle is normal in size. The visual ejection fraction is  estimated at 65%.  2. The right ventricle is normal in structure, function and size.  3. There is mild to moderate (1-2+) mitral regurgitation.  4. There is moderate (2+) tricuspid regurgitation. The right ventricular  systolic pressure is " approximated at 28mmHg plus the right atrial pressure.  No previous echo for comparison.   Stress 3/8/18 (Melonie) 1.  Myocardial perfusion imaging using single isotope technique  demonstrated a small perfusion defect of mild severity involving the  left ventricular apex which is essentially fixed and most likely  related to breast attenuation. There is no evidence of significant  pharmacological stress-induced ischemia or prior myocardial infarction  on this study.   2. Gated images demonstrated normal left ventricular wall motion.  The left ventricular systolic function is 73% at rest and 74% on the post stress images.  3. There is no previous study for comparison.   EKG 10/11/18 NSR, 64 bpm. QRS 98, QTc 462.   Device PPM check, 1/2/19 [employer] (D) Remote PPM Device Check  AP: 72%\X090A\: <1%  Mode: DDDR        Presenting Rhythm: AS/VS, AP/VS  Heart Rate: Adequate rates per histogram  Sensing: stable      Pacing Threshold: stable      Impedance: stable  Battery Status: 10.6 years  Atrial Arrhythmia: None  Ventricular Arrhythmia: None  Battery Status: 10.4 years  Atrial Arrhythmia: No mode switch episodes. 1 PMT occurred, no EGM.   Ventricular Arrhythmia: None    CXR 1/7/19    Chest CT 3/14/18 Mild to moderate emphysematous changes in the lungs. Small  bilateral pleural effusion with adjacent atelectasis. Apparent mild  interstitial thickening throughout both lungs. No pericardial  effusion. A few nonspecific borderline-enlarged mediastinal lymph  nodes. Mild cardiomegaly. Atherosclerotic calcification in the  thoracic aorta and coronary arteries.   LE ultrasound 3/14/18 The left common femoral, superficial femoral, popliteal and  posterior tibial veins are patent and fully compressible and  demonstrate normal venous Doppler flow. The visualized greater  saphenous vein is negative for thrombus. For comparison the right  common femoral vein was evaluated and was unremarkable.     IMPRESSION: No deep venous  thrombosis demonstrated.     Labs reviewed:  Recent Labs   Lab Test 01/16/19  1135 12/19/18  0829 10/25/18  0731 07/11/18  0907 03/15/18  2025 03/06/18  1744 03/23/17  0804   LDL 70  --   --  84  --   --  91   HDL 45*  --   --  69  --   --  61   NHDL 97  --   --  102  --   --  113   CHOL 142  --   --  171  --   --  174   TRIG 133  --   --  88  --   --  109   TSH  --   --  0.80  --  0.64 0.54  --    NTBNP 333 400 343  --   --   --   --        Lab Results   Component Value Date    WBC 12.9 (H) 01/10/2019    RBC 3.72 (L) 01/10/2019    HGB 10.3 (L) 01/10/2019    HCT 31.5 (L) 01/10/2019    MCV 85 01/10/2019    MCH 27.7 01/10/2019    MCHC 32.7 01/10/2019    RDW 17.4 (H) 01/10/2019     01/10/2019       Lab Results   Component Value Date     (L) 01/16/2019    POTASSIUM 3.3 (L) 01/16/2019    CHLORIDE 100 01/16/2019    CO2 27 01/16/2019    ANIONGAP 10.3 01/16/2019     (H) 01/16/2019    BUN 17 01/16/2019    CR 0.96 01/16/2019    GFRESTIMATED 56 (L) 01/16/2019    GFRESTBLACK 68 01/16/2019    GURWINDER 9.4 01/16/2019      Lab Results   Component Value Date    AST 27 01/06/2019    ALT 55 (H) 01/06/2019       Lab Results   Component Value Date    A1C 6.9 (H) 01/08/2019       Lab Results   Component Value Date    INR 1.31 (H) 03/19/2018    INR 1.04 11/12/2014           Problem List:     Patient Active Problem List   Diagnosis     Symptomatic menopausal or female climacteric states     Hyperlipidemia LDL goal <70     Hypertension goal BP (blood pressure) < 140/90     Knee pain     Obesity     PAF (paroxysmal atrial fibrillation) (H)     S/P cardiac pacemaker procedure     SSS (sick sinus syndrome) (H)     Chronic diastolic CHF (congestive heart failure) (H)     Pulmonary hypertension (H)     Coronary artery calcification seen on CT scan     Hypotension           Medications:     Current Outpatient Medications   Medication Sig Dispense Refill     albuterol (PROAIR HFA/PROVENTIL HFA/VENTOLIN HFA) 108 (90 Base) MCG/ACT  inhaler Inhale 2 puffs into the lungs every 4 hours as needed for shortness of breath / dyspnea or wheezing 1 Inhaler 3     alendronate (FOSAMAX) 70 MG tablet Take 1 tablet (70 mg) by mouth every 7 days Take 60 minutes before am meal with 8 oz. water. Remain upright for 30 minutes. 12 tablet 3     apixaban ANTICOAGULANT (ELIQUIS) 5 MG tablet Take 1 tablet (5 mg) by mouth 2 times daily 180 tablet 3     atorvastatin (LIPITOR) 40 MG tablet Take 40 mg by mouth daily       cefuroxime (CEFTIN) 500 MG tablet Take 500 mg by mouth 2 times daily       cholecalciferol (VITAMIN  -D) 1000 UNIT capsule Take 1 capsule by mouth daily.       Coral Calcium 133-66.7-133 MG-MG-UNIT CAPS Take 2 tablets by mouth 2 times daily        FLAX SEED OIL OR 1 capsule daily       flecainide (TAMBOCOR) 100 MG tablet Take 1 tablet (100 mg) by mouth 2 times daily 90 tablet 3     furosemide (LASIX) 20 MG tablet Take 1 tablet (20 mg) by mouth daily 90 tablet 3     Krill Oil 500 MG CAPS Take 1 capsule by mouth daily       lisinopril (PRINIVIL/ZESTRIL) 40 MG tablet Take 1 tablet (40 mg) by mouth daily 90 tablet 3     Methylsulfonylmethane (MSM) 500 MG CAPS Take 1 capsule by mouth daily       metoprolol succinate (TOPROL-XL) 50 MG 24 hr tablet Take 1 tablet (50 mg) by mouth 2 times daily 180 tablet 3     Milk Thistle 200 MG CAPS Take 1 capsule by mouth daily        Multiple Vitamins-Minerals (HAIR SKIN NAILS PO) Take 1 tablet by mouth At Bedtime       potassium chloride ER (K-DUR/KLOR-CON M) 10 MEQ CR tablet Take 1 tablet (10 mEq) by mouth daily 90 tablet 3     predniSONE (DELTASONE) 20 MG tablet Take 10 mg by mouth every other day.       salmeterol (SEREVENT) 50 MCG/DOSE inhaler Inhale 1 puff into the lungs 2 times daily 1 Inhaler 1     tiotropium (SPIRIVA) 18 MCG inhaled capsule Inhale 1 capsule (18 mcg) into the lungs daily 90 capsule 3     umeclidinium-vilanterol (ANORO ELLIPTA) 62.5-25 MCG/INH oral inhaler Inhale 1 puff into the lungs daily 1  Inhaler 3           Past Medical History:     Past Medical History:   Diagnosis Date     Atrial fibrillation (H)      Chronic diastolic CHF (congestive heart failure) (H)      Essential hypertension, benign      HYPERLIPIDEMIA NEC/NOS 3/30/2006     Pulmonary hypertension (H)      SSS (sick sinus syndrome) (H)     s/p PPM 3/2018     Past Surgical History:   Procedure Laterality Date     EYE SURGERY       HYSTERECTOMY, VAGINAL      hysterectomy     Family History   Problem Relation Age of Onset     Cerebrovascular Disease Mother      Glaucoma Mother      Macular Degeneration Mother      Alcohol/Drug Father         alcohol     Myocardial Infarction Father 63        passed away from MI     Family History Negative Sister      Family History Negative Sister      Family History Negative Sister      Cerebrovascular Disease Sister      Family History Negative Brother      Family History Negative Maternal Grandmother      Family History Negative Maternal Grandfather      Family History Negative Paternal Grandmother      Family History Negative Paternal Grandfather      Myocardial Infarction Son 57        passed away from MI     Dementia Daughter 57        early onset on [place]     Diabetes No family hx of      Breast Cancer No family hx of      Cancer - colorectal No family hx of      Social History     Socioeconomic History     Marital status:      Spouse name: Not on file     Number of children: Not on file     Years of education: Not on file     Highest education level: Not on file   Social Needs     Financial resource strain: Not on file     Food insecurity - worry: Not on file     Food insecurity - inability: Not on file     Transportation needs - medical: Not on file     Transportation needs - non-medical: Not on file   Occupational History     Not on file   Tobacco Use     Smoking status: Former Smoker     Packs/day: 1.50     Years: 45.00     Pack years: 67.50     Types: Cigarettes     Start date: 10/28/1955      Last attempt to quit: 2000     Years since quittin.3     Smokeless tobacco: Never Used     Tobacco comment: quit 6 yrs ago   Substance and Sexual Activity     Alcohol use: No     Drug use: No     Sexual activity: Yes     Partners: Male   Other Topics Concern     Parent/sibling w/ CABG, MI or angioplasty before 65F 55M? No   Social History Narrative    Balanced Diet - Yes    Osteoporosis Prevention Measures - Dairy servings per day: 2    Regular Exercise -  Yes Describe walk, but not recently due to weather    Dental Exam - Yes    Eye Exam -No    Self Breast Exam - Yes    Abuse: Current or Past (Physical, Sexual or Emotional)- No    Do you feel safe in your environment - Yes    Guns stored in the home - No    Sunscreen used - Yes    Seatbelts used - Yes    Lipids -  1/10    Glucose -  1/10    Colon Cancer Screening - Yes, 08    Hemoccults - NO    Pap Test -  Many yrs ago, has hysterectomy    Do you have any concerns about STD's -  No    Mammography - 08    DEXA - 06    Immunizations reviewed and up to date - No    Jonathan Marshall MA                       Allergies:   Strawberry flavor      Adriana Ferrell PA-C  [facility]  Pager: [phone]    Thank you for allowing me to participate in the care of your patient.    Sincerely,     Adriana Ferrell PA-C     [facility]

## 2019-01-16 NOTE — PROGRESS NOTES
Cardiology Clinic Progress Note    Hamida Verma MRN# [id number]   YOB: 1940 Age: 78 year old          Assessment and Plan:     In summary, the patient presents today for assessment of HFpEF and following recent admission for acute pyelonephritis, discharged six days ago. There's question of whether she was or wasn't compliant with her newly-prescribed Aldactone prior to admission. She was ultimately discharged on her PTA Lasix alone. Her weight has been stable since that time, and she appears stable and euvolemic in clinic.     1. HFpEF   - ECHO: EF 55-60%, E/E' avg 16, mild-mod LAE   - ACC/AHA Stage: C; FC I    - Etiology: htn, paf   - RV: normal   - Valves: Mild-mod TR   - Volume: Euvolemic    Current Wt: 176 lbs (per home scale)    Dry Wt: 174 - 180 lbs    Diuretics: Lasix 20 mg daily    O2: 2L/min (new from discharge)   - Rhythm: SR / ApVs / PAF   - QRS: Narrow   - Device: DDDR PPM   - Other: TSH OK                 Stop-Bang Score: Low    2. PAF, s/p PPM - on flecainide 100 BID, Toprol 50 BID and Eliquis 5 BID. Followed by EP.     3. HTN - Well-controlled per home readings, and in clinic. On Lisinopril 40, Toprol 50 BID, Lasix 20.     4. CAC - per CT. No sxs suggestive of angina. Melonie MPI 3/2018 was normal (small fixed defect suggestive of breast attenuation). LDL controlled following recent switch from Zocor to Lipitor.     5. Probable COPD - with some emphymatous changes noted on chest CT. +Smoking history. To have PFT's per PCP.     6. Retinal tear - requiring ultimate surgery, currently on the back-burner given her recent admission. Will need Eliquis held for this.     7. Hypokalemia      Plan:  - Continue current dose of Lasix.  - Stop [employer] potassium supplement, and start KCL 10 mEQ daily.   - F/U arranged with EP in one week, will re-check her potassium level then. She's scheduled to see pulmonology in one month. I'll get her in to see Dr. Mallory after that, and have her return for  f/u in [facility] clinic in 3 months. She'll continue to monitor her weights and blood pressures at home in the interim, and call with weight gain or symptom progression.         History of Presenting Illness:      Hamida Verma is a pleasant 78 year old patient of  Dr. Mallory and Dr. Voss who presents today for a [facility] clinic visit and hospital f/u visit.    She was admitted in March 2018 for heart failure in the setting of rapid AF, then again in October with CHF felt secondary to diastolic dysfunction, mildly uncontrolled blood pressures and underlying lung disease. She had a complicated course with a readmission for non-cardiac issues since that time. Please see my note from 12/19/18 for a detailed account of her past history.     When I saw her in clinic end of November, she was feeling much better and had been doing better with watching her salt intake and was reading labels and attempting to keep this < 2g/day. Her weight had been gradually climbing as her appetite had improved, but had been recently stable at 174 lbs. Her home BP's were very well-controlled. No med changes were made. She remained on low-dose Eliquis following her choroidal hematoma at that time, and saw her ophthalmologist later that day who up-titrated her to full dose.     When I last saw her in clinic on December 19th, her weight had risen further, up a total of 8 lbs over the prior three weeks. She'd noted some worsening her chronic L>R leg swelling along with this, but felt her breathing was stable. She was starting to taper her prednisone at that point.  Aldactone was initiated. She was scheduled to have labs drawn in one week and to see me in two. Her f/u lab showed a stable creat of 1. Unfortunately, she cancelled her f/u with me the following week and ended up in the ED the day after that (on 1/5) with Klebsiella pneumoniae secondary to acute pyelonephritis. This was complicated by acute hypoxia, suspected COPD exacerbation. She  "received IVF and lisinopril was held during her admission but apparently her lasix was continued throughout. There's no mention of Aldactone. CXR on 1/7 was noted to show some congestion and IVF was stopped. She was ultimately was discharged on 1/10 on Lasix 20 daily, at a weight of 176 lbs. She was sent home on O2 2-3L/min, which is new for her. She has f/u with pulm on 2/19.     Today, she presents with her granddaughter Zoe, who she usually presents with, as well as her daughter Karen who I am just meeting today. She's living back at home with them. She presents in a wheelchair but this is because she has a large portable O2 tank with her; at home she's amabulating without assistance. She's feeling pretty well, considering everything. Her biggest complaint is of transporting her oxygen tank which actually looks to be a portable concentrator and is fairly noisy. Her weight has been stable at 176 lbs since home, and her edema is at baseline.   She tells me that, prior to admission, she'd felt dizzy and attributed this to Aldactone so she stopped it. However she brings in her medication bottles today and one of them is a bottle of Aldactone with only one tablet left in it, so I suspect she actually had continued taking it. Regardless, she hadn't been getting it in the hospital. She uses a [employer] brand potassium supplement at home, I'm not familiar with this. She takes in a fair amount of potassium in her diet, she states, and is continuing to limit her sodium intake.   Labs today show a slightly low K+ of 3.3, Na 134. Creatinine is 1. Lipids show a controlled LDL of 70.   Recent pacemaker check is satisfactory, detailed below.          Review of Systems:     12-pt ROS is negative except for as noted in the HPI.          Physical Exam:     Vitals: /60   Pulse 68   Ht 1.626 m (5' 4\")   Wt 81.9 kg (180 lb 8 oz)   LMP  (LMP Unknown)   BMI 30.98 kg/m    Wt Readings from Last 10 Encounters:   01/16/19 81.9 kg " (180 lb 8 oz)   01/14/19 80.5 kg (177 lb 8 oz)   01/10/19 79.8 kg (176 lb)   12/19/18 84 kg (185 lb 1.6 oz)   11/28/18 80.3 kg (177 lb)   11/21/18 78 kg (172 lb)   11/14/18 80.7 kg (178 lb)   11/10/18 81.6 kg (180 lb)   10/31/18 83.5 kg (184 lb)   10/25/18 83.3 kg (183 lb 9.6 oz)     Constitutional:  Patient is pleasant, alert, cooperative, and in NAD.  HEENT:  NCAT. PERRLA. EOM's intact. No masses or thyromegaly. Teeth in normal repair.   Neck:  CVP appears normal.   Pulmonary: Normal respiratory effort. Some soft LLL rales, otherwise CTAB.  Cardiac: RRR, normal S1/S2, +S4, grade 2-3/6 sm at the LSB  Abdomen:  Non-tender abdomen with normoactive bowel sounds, no hepatosplenomegaly appreciated.   Vascular: Pulses in the upper and lower extremities are 2+ and equal bilaterally.  Extremities: 1+ pitting edema L pedal - pretibial region, none on the R  Neurological:  No gross motor or sensory deficits.   Psych: Appropriate affect.        Data:     Cardiac Diagnostics reviewed:  Type Date Result   TTE 10/12/18 Left ventricular systolic function is normal.  The left atrium is mild to moderately dilated.  There is moderate aortic sclerosis of the non-coronary cusp. No  hemodynamically significant valvular aortic stenosis.  There is mild to moderate (1-2+) tricuspid regurgitation.  Doppler findings do not suggest pulmonary hypertension.  E/E' avg 16.  IVSd 1 / PWd 1.1    3/7/18 1. The left ventricle is normal in size. The visual ejection fraction is  estimated at 65%.  2. The right ventricle is normal in structure, function and size.  3. There is mild to moderate (1-2+) mitral regurgitation.  4. There is moderate (2+) tricuspid regurgitation. The right ventricular  systolic pressure is approximated at 28mmHg plus the right atrial pressure.  No previous echo for comparison.   Stress 3/8/18 (Melonie) 1.  Myocardial perfusion imaging using single isotope technique  demonstrated a small perfusion defect of mild severity involving  the  left ventricular apex which is essentially fixed and most likely  related to breast attenuation. There is no evidence of significant  pharmacological stress-induced ischemia or prior myocardial infarction  on this study.   2. Gated images demonstrated normal left ventricular wall motion.  The left ventricular systolic function is 73% at rest and 74% on the post stress images.  3. There is no previous study for comparison.   EKG 10/11/18 NSR, 64 bpm. QRS 98, QTc 462.   Device PPM check, 1/2/19 [employer] (D) Remote PPM Device Check  AP: 72%\X090A\: <1%  Mode: DDDR        Presenting Rhythm: AS/VS, AP/VS  Heart Rate: Adequate rates per histogram  Sensing: stable      Pacing Threshold: stable      Impedance: stable  Battery Status: 10.6 years  Atrial Arrhythmia: None  Ventricular Arrhythmia: None  Battery Status: 10.4 years  Atrial Arrhythmia: No mode switch episodes. 1 PMT occurred, no EGM.   Ventricular Arrhythmia: None    CXR 1/7/19    Chest CT 3/14/18 Mild to moderate emphysematous changes in the lungs. Small  bilateral pleural effusion with adjacent atelectasis. Apparent mild  interstitial thickening throughout both lungs. No pericardial  effusion. A few nonspecific borderline-enlarged mediastinal lymph  nodes. Mild cardiomegaly. Atherosclerotic calcification in the  thoracic aorta and coronary arteries.   LE ultrasound 3/14/18 The left common femoral, superficial femoral, popliteal and  posterior tibial veins are patent and fully compressible and  demonstrate normal venous Doppler flow. The visualized greater  saphenous vein is negative for thrombus. For comparison the right  common femoral vein was evaluated and was unremarkable.     IMPRESSION: No deep venous thrombosis demonstrated.     Labs reviewed:  Recent Labs   Lab Test 01/16/19  1135 12/19/18  0829 10/25/18  0731 07/11/18  0907 03/15/18  2025 03/06/18  1744 03/23/17  0804   LDL 70  --   --  84  --   --  91   HDL 45*  --   --  69  --   --  61    NHDL 97  --   --  102  --   --  113   CHOL 142  --   --  171  --   --  174   TRIG 133  --   --  88  --   --  109   TSH  --   --  0.80  --  0.64 0.54  --    NTBNP 333 400 343  --   --   --   --        Lab Results   Component Value Date    WBC 12.9 (H) 01/10/2019    RBC 3.72 (L) 01/10/2019    HGB 10.3 (L) 01/10/2019    HCT 31.5 (L) 01/10/2019    MCV 85 01/10/2019    MCH 27.7 01/10/2019    MCHC 32.7 01/10/2019    RDW 17.4 (H) 01/10/2019     01/10/2019       Lab Results   Component Value Date     (L) 01/16/2019    POTASSIUM 3.3 (L) 01/16/2019    CHLORIDE 100 01/16/2019    CO2 27 01/16/2019    ANIONGAP 10.3 01/16/2019     (H) 01/16/2019    BUN 17 01/16/2019    CR 0.96 01/16/2019    GFRESTIMATED 56 (L) 01/16/2019    GFRESTBLACK 68 01/16/2019    GURWINDER 9.4 01/16/2019      Lab Results   Component Value Date    AST 27 01/06/2019    ALT 55 (H) 01/06/2019       Lab Results   Component Value Date    A1C 6.9 (H) 01/08/2019       Lab Results   Component Value Date    INR 1.31 (H) 03/19/2018    INR 1.04 11/12/2014           Problem List:     Patient Active Problem List   Diagnosis     Symptomatic menopausal or female climacteric states     Hyperlipidemia LDL goal <70     Hypertension goal BP (blood pressure) < 140/90     Knee pain     Obesity     PAF (paroxysmal atrial fibrillation) (H)     S/P cardiac pacemaker procedure     SSS (sick sinus syndrome) (H)     Chronic diastolic CHF (congestive heart failure) (H)     Pulmonary hypertension (H)     Coronary artery calcification seen on CT scan     Hypotension           Medications:     Current Outpatient Medications   Medication Sig Dispense Refill     albuterol (PROAIR HFA/PROVENTIL HFA/VENTOLIN HFA) 108 (90 Base) MCG/ACT inhaler Inhale 2 puffs into the lungs every 4 hours as needed for shortness of breath / dyspnea or wheezing 1 Inhaler 3     alendronate (FOSAMAX) 70 MG tablet Take 1 tablet (70 mg) by mouth every 7 days Take 60 minutes before am meal with 8 oz.  water. Remain upright for 30 minutes. 12 tablet 3     apixaban ANTICOAGULANT (ELIQUIS) 5 MG tablet Take 1 tablet (5 mg) by mouth 2 times daily 180 tablet 3     atorvastatin (LIPITOR) 40 MG tablet Take 40 mg by mouth daily       cefuroxime (CEFTIN) 500 MG tablet Take 500 mg by mouth 2 times daily       cholecalciferol (VITAMIN  -D) 1000 UNIT capsule Take 1 capsule by mouth daily.       Coral Calcium 133-66.7-133 MG-MG-UNIT CAPS Take 2 tablets by mouth 2 times daily        FLAX SEED OIL OR 1 capsule daily       flecainide (TAMBOCOR) 100 MG tablet Take 1 tablet (100 mg) by mouth 2 times daily 90 tablet 3     furosemide (LASIX) 20 MG tablet Take 1 tablet (20 mg) by mouth daily 90 tablet 3     Krill Oil 500 MG CAPS Take 1 capsule by mouth daily       lisinopril (PRINIVIL/ZESTRIL) 40 MG tablet Take 1 tablet (40 mg) by mouth daily 90 tablet 3     Methylsulfonylmethane (MSM) 500 MG CAPS Take 1 capsule by mouth daily       metoprolol succinate (TOPROL-XL) 50 MG 24 hr tablet Take 1 tablet (50 mg) by mouth 2 times daily 180 tablet 3     Milk Thistle 200 MG CAPS Take 1 capsule by mouth daily        Multiple Vitamins-Minerals (HAIR SKIN NAILS PO) Take 1 tablet by mouth At Bedtime       potassium chloride ER (K-DUR/KLOR-CON M) 10 MEQ CR tablet Take 1 tablet (10 mEq) by mouth daily 90 tablet 3     predniSONE (DELTASONE) 20 MG tablet Take 10 mg by mouth every other day.       salmeterol (SEREVENT) 50 MCG/DOSE inhaler Inhale 1 puff into the lungs 2 times daily 1 Inhaler 1     tiotropium (SPIRIVA) 18 MCG inhaled capsule Inhale 1 capsule (18 mcg) into the lungs daily 90 capsule 3     umeclidinium-vilanterol (ANORO ELLIPTA) 62.5-25 MCG/INH oral inhaler Inhale 1 puff into the lungs daily 1 Inhaler 3           Past Medical History:     Past Medical History:   Diagnosis Date     Atrial fibrillation (H)      Chronic diastolic CHF (congestive heart failure) (H)      Essential hypertension, benign      HYPERLIPIDEMIA NEC/NOS 3/30/2006      Pulmonary hypertension (H)      SSS (sick sinus syndrome) (H)     s/p PPM 3/2018     Past Surgical History:   Procedure Laterality Date     EYE SURGERY       HYSTERECTOMY, VAGINAL      hysterectomy     Family History   Problem Relation Age of Onset     Cerebrovascular Disease Mother      Glaucoma Mother      Macular Degeneration Mother      Alcohol/Drug Father         alcohol     Myocardial Infarction Father 63        passed away from MI     Family History Negative Sister      Family History Negative Sister      Family History Negative Sister      Cerebrovascular Disease Sister      Family History Negative Brother      Family History Negative Maternal Grandmother      Family History Negative Maternal Grandfather      Family History Negative Paternal Grandmother      Family History Negative Paternal Grandfather      Myocardial Infarction Son 57        passed away from MI     Dementia Daughter 57        early onset on [place]     Diabetes No family hx of      Breast Cancer No family hx of      Cancer - colorectal No family hx of      Social History     Socioeconomic History     Marital status:      Spouse name: Not on file     Number of children: Not on file     Years of education: Not on file     Highest education level: Not on file   Social Needs     Financial resource strain: Not on file     Food insecurity - worry: Not on file     Food insecurity - inability: Not on file     Transportation needs - medical: Not on file     Transportation needs - non-medical: Not on file   Occupational History     Not on file   Tobacco Use     Smoking status: Former Smoker     Packs/day: 1.50     Years: 45.00     Pack years: 67.50     Types: Cigarettes     Start date: 10/28/1955     Last attempt to quit: 2000     Years since quittin.3     Smokeless tobacco: Never Used     Tobacco comment: quit 6 yrs ago   Substance and Sexual Activity     Alcohol use: No     Drug use: No     Sexual activity: Yes     Partners: Male    Other Topics Concern     Parent/sibling w/ CABG, MI or angioplasty before 65F 55M? No   Social History Narrative    Balanced Diet - Yes    Osteoporosis Prevention Measures - Dairy servings per day: 2    Regular Exercise -  Yes Describe walk, but not recently due to weather    Dental Exam - Yes    Eye Exam -No    Self Breast Exam - Yes    Abuse: Current or Past (Physical, Sexual or Emotional)- No    Do you feel safe in your environment - Yes    Guns stored in the home - No    Sunscreen used - Yes    Seatbelts used - Yes    Lipids -  1/10    Glucose -  1/10    Colon Cancer Screening - Yes, 7/21/08    Hemoccults - NO    Pap Test -  Many yrs ago, has hysterectomy    Do you have any concerns about STD's -  No    Mammography - 6/18/08    DEXA - 4/13/06    Immunizations reviewed and up to date - No    Jonathan Marshall MA                       Allergies:   Strawberry flavor      Adriana Ferrell PA-C  [facility]  Pager: [phone]

## 2019-01-18 DIAGNOSIS — Z95.0 CARDIAC PACEMAKER IN SITU: ICD-10-CM

## 2019-01-18 DIAGNOSIS — I49.5 SICK SINUS SYNDROME (H): ICD-10-CM

## 2019-01-18 RX ORDER — FLECAINIDE ACETATE 100 MG/1
100 TABLET ORAL 2 TIMES DAILY
Qty: 180 TABLET | Refills: 0 | Status: SHIPPED | OUTPATIENT
Start: 2019-01-18 | End: 2019-06-05

## 2019-01-23 ENCOUNTER — OFFICE VISIT (OUTPATIENT)
Dept: CARDIOLOGY | Facility: CLINIC | Age: 79
End: 2019-01-23
Attending: PHYSICIAN ASSISTANT
Payer: COMMERCIAL

## 2019-01-23 VITALS
DIASTOLIC BLOOD PRESSURE: 70 MMHG | OXYGEN SATURATION: 96 % | HEIGHT: 64 IN | SYSTOLIC BLOOD PRESSURE: 116 MMHG | WEIGHT: 182 LBS | HEART RATE: 65 BPM | BODY MASS INDEX: 31.07 KG/M2

## 2019-01-23 DIAGNOSIS — I50.32 CHRONIC DIASTOLIC CONGESTIVE HEART FAILURE (H): ICD-10-CM

## 2019-01-23 LAB
ANION GAP SERPL CALCULATED.3IONS-SCNC: 5.6 MMOL/L (ref 6–17)
BUN SERPL-MCNC: 18 MG/DL (ref 7–30)
CALCIUM SERPL-MCNC: 9.5 MG/DL (ref 8.5–10.5)
CHLORIDE SERPL-SCNC: 101 MMOL/L (ref 98–107)
CO2 SERPL-SCNC: 31 MMOL/L (ref 23–29)
CREAT SERPL-MCNC: 0.98 MG/DL (ref 0.7–1.3)
GFR SERPL CREATININE-BSD FRML MDRD: 55 ML/MIN/{1.73_M2}
GLUCOSE SERPL-MCNC: 103 MG/DL (ref 70–105)
POTASSIUM SERPL-SCNC: 3.6 MMOL/L (ref 3.5–5.1)
SODIUM SERPL-SCNC: 134 MMOL/L (ref 136–145)

## 2019-01-23 PROCEDURE — 99213 OFFICE O/P EST LOW 20 MIN: CPT | Performed by: INTERNAL MEDICINE

## 2019-01-23 PROCEDURE — 80048 BASIC METABOLIC PNL TOTAL CA: CPT | Performed by: PHYSICIAN ASSISTANT

## 2019-01-23 PROCEDURE — 36415 COLL VENOUS BLD VENIPUNCTURE: CPT | Performed by: PHYSICIAN ASSISTANT

## 2019-01-23 ASSESSMENT — MIFFLIN-ST. JEOR: SCORE: 1285.55

## 2019-01-23 NOTE — PROGRESS NOTES
HPI and Plan:   Thank you for allowing me to participate in the care of this very delightful patient.  As you know, Mrs. Verma is a 78-year-old female with a history of tachybradycardia syndrome, status post pacemaker implantation and has been on flecainide with excellent maintenance of sinus rhythm.  She recently was hospitalized for acute pyelonephritis associated with CHF decompensation with preserved LV systolic function.  She is slowly recovering from it.  Additionally, she was told to have COPD and currently is on oxygen supplement.  She is scheduled to see pulmonology next month.    Her pacemaker was checked recently shows appropriate function.  AF burden is less than 10% of the time.  Patient has been on anticoagulation without any bleeding problems.  Her main concern for today's visit is the left lower extremity swelling worsen especially as the day progresses ever since he started taking prednisone for recent eye surgery.  She is tapering it off now.  Her breathing otherwise has been improving.  I reassured her that the leg edema is likely from the prednisone and not from CHF decompensation nor DVT(patient has been on Eliquis).  Patient will continue to follow-up with device clinic and see Adriana Neal, 1 of our advanced practice providers as previously scheduled and see me every other year.  No orders of the defined types were placed in this encounter.      No orders of the defined types were placed in this encounter.      There are no discontinued medications.      Encounter Diagnosis   Name Primary?     Chronic diastolic congestive heart failure (H)        CURRENT MEDICATIONS:  Current Outpatient Medications   Medication Sig Dispense Refill     albuterol (PROAIR HFA/PROVENTIL HFA/VENTOLIN HFA) 108 (90 Base) MCG/ACT inhaler Inhale 2 puffs into the lungs every 4 hours as needed for shortness of breath / dyspnea or wheezing 1 Inhaler 3     alendronate (FOSAMAX) 70 MG tablet Take 1 tablet (70 mg) by mouth  every 7 days Take 60 minutes before am meal with 8 oz. water. Remain upright for 30 minutes. 12 tablet 3     apixaban ANTICOAGULANT (ELIQUIS) 5 MG tablet Take 1 tablet (5 mg) by mouth 2 times daily 180 tablet 3     atorvastatin (LIPITOR) 40 MG tablet Take 40 mg by mouth daily       cefuroxime (CEFTIN) 500 MG tablet Take 500 mg by mouth 2 times daily       Coral Calcium 133-66.7-133 MG-MG-UNIT CAPS Take 2 tablets by mouth 2 times daily        FLAX SEED OIL OR 1 capsule daily       flecainide (TAMBOCOR) 100 MG tablet Take 1 tablet (100 mg) by mouth 2 times daily 180 tablet 0     furosemide (LASIX) 20 MG tablet Take 1 tablet (20 mg) by mouth daily 90 tablet 3     Krill Oil 500 MG CAPS Take 1 capsule by mouth daily       lisinopril (PRINIVIL/ZESTRIL) 40 MG tablet Take 1 tablet (40 mg) by mouth daily 90 tablet 3     Methylsulfonylmethane (MSM) 500 MG CAPS Take 1 capsule by mouth daily       metoprolol succinate (TOPROL-XL) 50 MG 24 hr tablet Take 1 tablet (50 mg) by mouth 2 times daily 180 tablet 3     Milk Thistle 200 MG CAPS Take 1 capsule by mouth daily        Multiple Vitamins-Minerals (HAIR SKIN NAILS PO) Take 1 tablet by mouth At Bedtime       potassium chloride ER (K-DUR/KLOR-CON M) 10 MEQ CR tablet Take 1 tablet (10 mEq) by mouth daily 90 tablet 3     predniSONE (DELTASONE) 20 MG tablet Take 10 mg by mouth every other day.       salmeterol (SEREVENT) 50 MCG/DOSE inhaler Inhale 1 puff into the lungs 2 times daily 1 Inhaler 1     tiotropium (SPIRIVA) 18 MCG inhaled capsule Inhale 1 capsule (18 mcg) into the lungs daily 90 capsule 3     umeclidinium-vilanterol (ANORO ELLIPTA) 62.5-25 MCG/INH oral inhaler Inhale 1 puff into the lungs daily 1 Inhaler 3     cholecalciferol (VITAMIN  -D) 1000 UNIT capsule Take 1 capsule by mouth daily.         ALLERGIES     Allergies   Allergen Reactions     Strawberry Flavor        PAST MEDICAL HISTORY:  Past Medical History:   Diagnosis Date     Atrial fibrillation (H)      Chronic  diastolic CHF (congestive heart failure) (H)      Essential hypertension, benign      HYPERLIPIDEMIA NEC/NOS 3/30/2006     Pulmonary hypertension (H)      SSS (sick sinus syndrome) (H)     s/p PPM 3/2018       PAST SURGICAL HISTORY:  Past Surgical History:   Procedure Laterality Date     EYE SURGERY       HYSTERECTOMY, VAGINAL      hysterectomy       FAMILY HISTORY:  Family History   Problem Relation Age of Onset     Cerebrovascular Disease Mother      Glaucoma Mother      Macular Degeneration Mother      Alcohol/Drug Father         alcohol     Myocardial Infarction Father 63        passed away from MI     Family History Negative Sister      Family History Negative Sister      Family History Negative Sister      Cerebrovascular Disease Sister      Family History Negative Brother      Family History Negative Maternal Grandmother      Family History Negative Maternal Grandfather      Family History Negative Paternal Grandmother      Family History Negative Paternal Grandfather      Myocardial Infarction Son 57        passed away from MI     Dementia Daughter 57        early onset on namenda     Diabetes No family hx of      Breast Cancer No family hx of      Cancer - colorectal No family hx of        SOCIAL HISTORY:  Social History     Socioeconomic History     Marital status:      Spouse name: None     Number of children: None     Years of education: None     Highest education level: None   Social Needs     Financial resource strain: None     Food insecurity - worry: None     Food insecurity - inability: None     Transportation needs - medical: None     Transportation needs - non-medical: None   Occupational History     None   Tobacco Use     Smoking status: Former Smoker     Packs/day: 1.50     Years: 45.00     Pack years: 67.50     Types: Cigarettes     Start date: 10/28/1955     Last attempt to quit: 2000     Years since quittin.4     Smokeless tobacco: Never Used     Tobacco comment: quit 6 yrs  "ago   Substance and Sexual Activity     Alcohol use: No     Drug use: No     Sexual activity: Yes     Partners: Male   Other Topics Concern     Parent/sibling w/ CABG, MI or angioplasty before 65F 55M? No   Social History Narrative    Balanced Diet - Yes    Osteoporosis Prevention Measures - Dairy servings per day: 2    Regular Exercise -  Yes Describe walk, but not recently due to weather    Dental Exam - Yes    Eye Exam -No    Self Breast Exam - Yes    Abuse: Current or Past (Physical, Sexual or Emotional)- No    Do you feel safe in your environment - Yes    Guns stored in the home - No    Sunscreen used - Yes    Seatbelts used - Yes    Lipids -  1/10    Glucose -  1/10    Colon Cancer Screening - Yes, 7/21/08    Hemoccults - NO    Pap Test -  Many yrs ago, has hysterectomy    Do you have any concerns about STD's -  No    Mammography - 6/18/08    DEXA - 4/13/06    Immunizations reviewed and up to date - No    Jonathan Marshall MA                   Review of Systems:  Skin:  Negative       Eyes:  Positive for glasses recent eye surgery   ENT:  Negative      Respiratory:  Negative    2L O2   Cardiovascular:  Negative;palpitations;chest pain;syncope or near-syncope;cyanosis;exercise intolerance;lightheadedness;dizziness;fatigue Positive for;edema Left lower leg foot and ankle   Gastroenterology: Negative      Genitourinary:  Negative      Musculoskeletal:  Negative      Neurologic:  Negative      Psychiatric:  Negative      Heme/Lymph/Imm:  Positive for allergies strawberry  Endocrine:  Negative        Physical Exam:  Vitals: /70 (BP Location: Left arm, Patient Position: Chair, Cuff Size: Adult Regular)   Pulse 65   Ht 1.626 m (5' 4\")   Wt 82.6 kg (182 lb)   LMP  (LMP Unknown)   SpO2 96%   BMI 31.24 kg/m      Constitutional:  cooperative, alert and oriented, well developed, well nourished, in no acute distress        Skin:  warm and dry to the touch, no apparent skin lesions or masses noted          Head:  " normocephalic, no masses or lesions        Eyes:  pupils equal and round, conjunctivae and lids unremarkable, sclera white, no xanthalasma, EOMS intact, no nystagmus        Lymph:No Cervical lymphadenopathy present     ENT:  no pallor or cyanosis, dentition good        Neck:  carotid pulses are full and equal bilaterally, JVP normal, no carotid bruit        Respiratory:    diminished breath sounds bilaterally       Cardiac: regular rhythm, normal S1/S2, no S3 or S4, apical impulse not displaced, no murmurs, gallops or rubs                                                         GI:  abdomen soft, non-tender, BS normoactive, no mass, no HSM, no bruits        Extremities and Muscular Skeletal:        pitting;2+ trace      Neurological:  no gross motor deficits        Psych:           CC  Adriana Ferrell, PA-C  [id number] [id number]  [place] [address]

## 2019-01-23 NOTE — LETTER
1/23/2019    Mikala Philip MD, MD  [address]  [facility] [address]    RE: Hamida Verma       Dear Colleague,    I had the pleasure of seeing Hamida Verma in the [facility].    HPI and Plan:   Thank you for allowing me to participate in the care of this very delightful patient.  As you know, Mrs. Verma is a 78-year-old female with a history of tachybradycardia syndrome, status post pacemaker implantation and has been on flecainide with excellent maintenance of sinus rhythm.  She recently was hospitalized for acute pyelonephritis associated with CHF decompensation with preserved LV systolic function.  She is slowly recovering from it.  Additionally, she was told to have COPD and currently is on oxygen supplement.  She is scheduled to see pulmonology next month.    Her pacemaker was checked recently shows appropriate function.  AF burden is less than 10% of the time.  Patient has been on anticoagulation without any bleeding problems.  Her main concern for today's visit is the left lower extremity swelling worsen especially as the day progresses ever since he started taking prednisone for recent eye surgery.  She is tapering it off now.  Her breathing otherwise has been improving.  I reassured her that the leg edema is likely from the prednisone and not from CHF decompensation nor DVT(patient has been on Eliquis).  Patient will continue to follow-up with device clinic and see Adriana Neal, 1 of our advanced practice providers as previously scheduled and see me every other year.  No orders of the defined types were placed in this encounter.      No orders of the defined types were placed in this encounter.      There are no discontinued medications.      Encounter Diagnosis   Name Primary?     Chronic diastolic congestive heart failure (H)        CURRENT MEDICATIONS:  Current Outpatient Medications   Medication Sig Dispense Refill     albuterol (PROAIR HFA/PROVENTIL  HFA/VENTOLIN HFA) 108 (90 Base) MCG/ACT inhaler Inhale 2 puffs into the lungs every 4 hours as needed for shortness of breath / dyspnea or wheezing 1 Inhaler 3     alendronate (FOSAMAX) 70 MG tablet Take 1 tablet (70 mg) by mouth every 7 days Take 60 minutes before am meal with 8 oz. water. Remain upright for 30 minutes. 12 tablet 3     apixaban ANTICOAGULANT (ELIQUIS) 5 MG tablet Take 1 tablet (5 mg) by mouth 2 times daily 180 tablet 3     atorvastatin (LIPITOR) 40 MG tablet Take 40 mg by mouth daily       cefuroxime (CEFTIN) 500 MG tablet Take 500 mg by mouth 2 times daily       Coral Calcium 133-66.7-133 MG-MG-UNIT CAPS Take 2 tablets by mouth 2 times daily        FLAX SEED OIL OR 1 capsule daily       flecainide (TAMBOCOR) 100 MG tablet Take 1 tablet (100 mg) by mouth 2 times daily 180 tablet 0     furosemide (LASIX) 20 MG tablet Take 1 tablet (20 mg) by mouth daily 90 tablet 3     Krill Oil 500 MG CAPS Take 1 capsule by mouth daily       lisinopril (PRINIVIL/ZESTRIL) 40 MG tablet Take 1 tablet (40 mg) by mouth daily 90 tablet 3     Methylsulfonylmethane (MSM) 500 MG CAPS Take 1 capsule by mouth daily       metoprolol succinate (TOPROL-XL) 50 MG 24 hr tablet Take 1 tablet (50 mg) by mouth 2 times daily 180 tablet 3     Milk Thistle 200 MG CAPS Take 1 capsule by mouth daily        Multiple Vitamins-Minerals (HAIR SKIN NAILS PO) Take 1 tablet by mouth At Bedtime       potassium chloride ER (K-DUR/KLOR-CON M) 10 MEQ CR tablet Take 1 tablet (10 mEq) by mouth daily 90 tablet 3     predniSONE (DELTASONE) 20 MG tablet Take 10 mg by mouth every other day.       salmeterol (SEREVENT) 50 MCG/DOSE inhaler Inhale 1 puff into the lungs 2 times daily 1 Inhaler 1     tiotropium (SPIRIVA) 18 MCG inhaled capsule Inhale 1 capsule (18 mcg) into the lungs daily 90 capsule 3     umeclidinium-vilanterol (ANORO ELLIPTA) 62.5-25 MCG/INH oral inhaler Inhale 1 puff into the lungs daily 1 Inhaler 3     cholecalciferol (VITAMIN  -D) 1000  UNIT capsule Take 1 capsule by mouth daily.         ALLERGIES     Allergies   Allergen Reactions     Strawberry Flavor        PAST MEDICAL HISTORY:  Past Medical History:   Diagnosis Date     Atrial fibrillation (H)      Chronic diastolic CHF (congestive heart failure) (H)      Essential hypertension, benign      HYPERLIPIDEMIA NEC/NOS 3/30/2006     Pulmonary hypertension (H)      SSS (sick sinus syndrome) (H)     s/p PPM 3/2018       PAST SURGICAL HISTORY:  Past Surgical History:   Procedure Laterality Date     EYE SURGERY       HYSTERECTOMY, VAGINAL      hysterectomy       FAMILY HISTORY:  Family History   Problem Relation Age of Onset     Cerebrovascular Disease Mother      Glaucoma Mother      Macular Degeneration Mother      Alcohol/Drug Father         alcohol     Myocardial Infarction Father 63        passed away from MI     Family History Negative Sister      Family History Negative Sister      Family History Negative Sister      Cerebrovascular Disease Sister      Family History Negative Brother      Family History Negative Maternal Grandmother      Family History Negative Maternal Grandfather      Family History Negative Paternal Grandmother      Family History Negative Paternal Grandfather      Myocardial Infarction Son 57        passed away from MI     Dementia Daughter 57        early onset on namenda     Diabetes No family hx of      Breast Cancer No family hx of      Cancer - colorectal No family hx of        SOCIAL HISTORY:  Social History     Socioeconomic History     Marital status:      Spouse name: None     Number of children: None     Years of education: None     Highest education level: None   Social Needs     Financial resource strain: None     Food insecurity - worry: None     Food insecurity - inability: None     Transportation needs - medical: None     Transportation needs - non-medical: None   Occupational History     None   Tobacco Use     Smoking status: Former Smoker      "Packs/day: 1.50     Years: 45.00     Pack years: 67.50     Types: Cigarettes     Start date: 10/28/1955     Last attempt to quit: 2000     Years since quittin.4     Smokeless tobacco: Never Used     Tobacco comment: quit 6 yrs ago   Substance and Sexual Activity     Alcohol use: No     Drug use: No     Sexual activity: Yes     Partners: Male   Other Topics Concern     Parent/sibling w/ CABG, MI or angioplasty before 65F 55M? No   Social History Narrative    Balanced Diet - Yes    Osteoporosis Prevention Measures - Dairy servings per day: 2    Regular Exercise -  Yes Describe walk, but not recently due to weather    Dental Exam - Yes    Eye Exam -No    Self Breast Exam - Yes    Abuse: Current or Past (Physical, Sexual or Emotional)- No    Do you feel safe in your environment - Yes    Guns stored in the home - No    Sunscreen used - Yes    Seatbelts used - Yes    Lipids -  1/10    Glucose -  1/10    Colon Cancer Screening - Yes, 08    Hemoccults - NO    Pap Test -  Many yrs ago, has hysterectomy    Do you have any concerns about STD's -  No    Mammography - 08    DEXA - 06    Immunizations reviewed and up to date - No    Jonathan Marshall MA                   Review of Systems:  Skin:  Negative       Eyes:  Positive for glasses recent eye surgery   ENT:  Negative      Respiratory:  Negative    2L O2   Cardiovascular:  Negative;palpitations;chest pain;syncope or near-syncope;cyanosis;exercise intolerance;lightheadedness;dizziness;fatigue Positive for;edema Left lower leg foot and ankle   Gastroenterology: Negative      Genitourinary:  Negative      Musculoskeletal:  Negative      Neurologic:  Negative      Psychiatric:  Negative      Heme/Lymph/Imm:  Positive for allergies strawberry  Endocrine:  Negative        Physical Exam:  Vitals: /70 (BP Location: Left arm, Patient Position: Chair, Cuff Size: Adult Regular)   Pulse 65   Ht 1.626 m (5' 4\")   Wt 82.6 kg (182 lb)   LMP  (LMP Unknown)   " SpO2 96%   BMI 31.24 kg/m       Constitutional:  cooperative, alert and oriented, well developed, well nourished, in no acute distress        Skin:  warm and dry to the touch, no apparent skin lesions or masses noted          Head:  normocephalic, no masses or lesions        Eyes:  pupils equal and round, conjunctivae and lids unremarkable, sclera white, no xanthalasma, EOMS intact, no nystagmus        Lymph:No Cervical lymphadenopathy present     ENT:  no pallor or cyanosis, dentition good        Neck:  carotid pulses are full and equal bilaterally, JVP normal, no carotid bruit        Respiratory:    diminished breath sounds bilaterally       Cardiac: regular rhythm, normal S1/S2, no S3 or S4, apical impulse not displaced, no murmurs, gallops or rubs                                                         GI:  abdomen soft, non-tender, BS normoactive, no mass, no HSM, no bruits        Extremities and Muscular Skeletal:        pitting;2+ trace      Neurological:  no gross motor deficits        Psych:               Thank you for allowing me to participate in the care of your patient.    Sincerely,     Roe Rene MD     [facility]

## 2019-01-23 NOTE — LETTER
1/23/2019    Mikala Philip MD, MD  [address]  [facility] [address]    RE: Hamida Verma       Dear Colleague,    I had the pleasure of seeing Hamida Verma in the [facility].    HPI and Plan:   Thank you for allowing me to participate in the care of this very delightful patient.  As you know, Mrs. Verma is a 78-year-old female with a history of tachybradycardia syndrome, status post pacemaker implantation and has been on flecainide with excellent maintenance of sinus rhythm.  She recently was hospitalized for acute pyelonephritis associated with CHF decompensation with preserved LV systolic function.  She is slowly recovering from it.  Additionally, she was told to have COPD and currently is on oxygen supplement.  She is scheduled to see pulmonology next month.    Her pacemaker was checked recently shows appropriate function.  AF burden is less than 10% of the time.  Patient has been on anticoagulation without any bleeding problems.  Her main concern for today's visit is the left lower extremity swelling worsen especially as the day progresses ever since he started taking prednisone for recent eye surgery.  She is tapering it off now.  Her breathing otherwise has been improving.  I reassured her that the leg edema is likely from the prednisone and not from CHF decompensation nor DVT(patient has been on Eliquis).  Patient will continue to follow-up with device clinic and see Adriana Neal, 1 of our advanced practice providers as previously scheduled and see me every other year.  No orders of the defined types were placed in this encounter.      No orders of the defined types were placed in this encounter.      There are no discontinued medications.      Encounter Diagnosis   Name Primary?     Chronic diastolic congestive heart failure (H)        CURRENT MEDICATIONS:  Current Outpatient Medications   Medication Sig Dispense Refill     albuterol (PROAIR HFA/PROVENTIL  HFA/VENTOLIN HFA) 108 (90 Base) MCG/ACT inhaler Inhale 2 puffs into the lungs every 4 hours as needed for shortness of breath / dyspnea or wheezing 1 Inhaler 3     alendronate (FOSAMAX) 70 MG tablet Take 1 tablet (70 mg) by mouth every 7 days Take 60 minutes before am meal with 8 oz. water. Remain upright for 30 minutes. 12 tablet 3     apixaban ANTICOAGULANT (ELIQUIS) 5 MG tablet Take 1 tablet (5 mg) by mouth 2 times daily 180 tablet 3     atorvastatin (LIPITOR) 40 MG tablet Take 40 mg by mouth daily       cefuroxime (CEFTIN) 500 MG tablet Take 500 mg by mouth 2 times daily       Coral Calcium 133-66.7-133 MG-MG-UNIT CAPS Take 2 tablets by mouth 2 times daily        FLAX SEED OIL OR 1 capsule daily       flecainide (TAMBOCOR) 100 MG tablet Take 1 tablet (100 mg) by mouth 2 times daily 180 tablet 0     furosemide (LASIX) 20 MG tablet Take 1 tablet (20 mg) by mouth daily 90 tablet 3     Krill Oil 500 MG CAPS Take 1 capsule by mouth daily       lisinopril (PRINIVIL/ZESTRIL) 40 MG tablet Take 1 tablet (40 mg) by mouth daily 90 tablet 3     Methylsulfonylmethane (MSM) 500 MG CAPS Take 1 capsule by mouth daily       metoprolol succinate (TOPROL-XL) 50 MG 24 hr tablet Take 1 tablet (50 mg) by mouth 2 times daily 180 tablet 3     Milk Thistle 200 MG CAPS Take 1 capsule by mouth daily        Multiple Vitamins-Minerals (HAIR SKIN NAILS PO) Take 1 tablet by mouth At Bedtime       potassium chloride ER (K-DUR/KLOR-CON M) 10 MEQ CR tablet Take 1 tablet (10 mEq) by mouth daily 90 tablet 3     predniSONE (DELTASONE) 20 MG tablet Take 10 mg by mouth every other day.       salmeterol (SEREVENT) 50 MCG/DOSE inhaler Inhale 1 puff into the lungs 2 times daily 1 Inhaler 1     tiotropium (SPIRIVA) 18 MCG inhaled capsule Inhale 1 capsule (18 mcg) into the lungs daily 90 capsule 3     umeclidinium-vilanterol (ANORO ELLIPTA) 62.5-25 MCG/INH oral inhaler Inhale 1 puff into the lungs daily 1 Inhaler 3     cholecalciferol (VITAMIN  -D) 1000  UNIT capsule Take 1 capsule by mouth daily.         ALLERGIES     Allergies   Allergen Reactions     Strawberry Flavor        PAST MEDICAL HISTORY:  Past Medical History:   Diagnosis Date     Atrial fibrillation (H)      Chronic diastolic CHF (congestive heart failure) (H)      Essential hypertension, benign      HYPERLIPIDEMIA NEC/NOS 3/30/2006     Pulmonary hypertension (H)      SSS (sick sinus syndrome) (H)     s/p PPM 3/2018       PAST SURGICAL HISTORY:  Past Surgical History:   Procedure Laterality Date     EYE SURGERY       HYSTERECTOMY, VAGINAL      hysterectomy       FAMILY HISTORY:  Family History   Problem Relation Age of Onset     Cerebrovascular Disease Mother      Glaucoma Mother      Macular Degeneration Mother      Alcohol/Drug Father         alcohol     Myocardial Infarction Father 63        passed away from MI     Family History Negative Sister      Family History Negative Sister      Family History Negative Sister      Cerebrovascular Disease Sister      Family History Negative Brother      Family History Negative Maternal Grandmother      Family History Negative Maternal Grandfather      Family History Negative Paternal Grandmother      Family History Negative Paternal Grandfather      Myocardial Infarction Son 57        passed away from MI     Dementia Daughter 57        early onset on namenda     Diabetes No family hx of      Breast Cancer No family hx of      Cancer - colorectal No family hx of        SOCIAL HISTORY:  Social History     Socioeconomic History     Marital status:      Spouse name: None     Number of children: None     Years of education: None     Highest education level: None   Social Needs     Financial resource strain: None     Food insecurity - worry: None     Food insecurity - inability: None     Transportation needs - medical: None     Transportation needs - non-medical: None   Occupational History     None   Tobacco Use     Smoking status: Former Smoker      "Packs/day: 1.50     Years: 45.00     Pack years: 67.50     Types: Cigarettes     Start date: 10/28/1955     Last attempt to quit: 2000     Years since quittin.4     Smokeless tobacco: Never Used     Tobacco comment: quit 6 yrs ago   Substance and Sexual Activity     Alcohol use: No     Drug use: No     Sexual activity: Yes     Partners: Male   Other Topics Concern     Parent/sibling w/ CABG, MI or angioplasty before 65F 55M? No   Social History Narrative    Balanced Diet - Yes    Osteoporosis Prevention Measures - Dairy servings per day: 2    Regular Exercise -  Yes Describe walk, but not recently due to weather    Dental Exam - Yes    Eye Exam -No    Self Breast Exam - Yes    Abuse: Current or Past (Physical, Sexual or Emotional)- No    Do you feel safe in your environment - Yes    Guns stored in the home - No    Sunscreen used - Yes    Seatbelts used - Yes    Lipids -  1/10    Glucose -  1/10    Colon Cancer Screening - Yes, 08    Hemoccults - NO    Pap Test -  Many yrs ago, has hysterectomy    Do you have any concerns about STD's -  No    Mammography - 08    DEXA - 06    Immunizations reviewed and up to date - No    Jonathan [place] MA                   Review of Systems:  Skin:  Negative       Eyes:  Positive for glasses recent eye surgery   ENT:  Negative      Respiratory:  Negative    2L O2   Cardiovascular:  Negative;palpitations;chest pain;syncope or near-syncope;cyanosis;exercise intolerance;lightheadedness;dizziness;fatigue Positive for;edema Left lower leg foot and ankle   Gastroenterology: Negative      Genitourinary:  Negative      Musculoskeletal:  Negative      Neurologic:  Negative      Psychiatric:  Negative      Heme/Lymph/Imm:  Positive for allergies strawberry  Endocrine:  Negative        Physical Exam:  Vitals: /70 (BP Location: Left arm, Patient Position: Chair, Cuff Size: Adult Regular)   Pulse 65   Ht 1.626 m (5' 4\")   Wt 82.6 kg (182 lb)   LMP  (LMP Unknown)   " SpO2 96%   BMI 31.24 kg/m       Constitutional:  cooperative, alert and oriented, well developed, well nourished, in no acute distress        Skin:  warm and dry to the touch, no apparent skin lesions or masses noted          Head:  normocephalic, no masses or lesions        Eyes:  pupils equal and round, conjunctivae and lids unremarkable, sclera white, no xanthalasma, EOMS intact, no nystagmus        Lymph:No Cervical lymphadenopathy present     ENT:  no pallor or cyanosis, dentition good        Neck:  carotid pulses are full and equal bilaterally, JVP normal, no carotid bruit        Respiratory:    diminished breath sounds bilaterally       Cardiac: regular rhythm, normal S1/S2, no S3 or S4, apical impulse not displaced, no murmurs, gallops or rubs                                                         GI:  abdomen soft, non-tender, BS normoactive, no mass, no HSM, no bruits        Extremities and Muscular Skeletal:        pitting;2+ trace      Neurological:  no gross motor deficits        Psych:           CC  Adriana Ferrell PA-C  [address]  [place] [address]                Thank you for allowing me to participate in the care of your patient.      Sincerely,     Roe Rene MD     [facility]    cc:   Adriana Ferrell PA-C  [address]  [place] [address]

## 2019-01-30 ENCOUNTER — PATIENT OUTREACH (OUTPATIENT)
Dept: CARE COORDINATION | Facility: CLINIC | Age: 79
End: 2019-01-30

## 2019-01-30 DIAGNOSIS — J44.1 COPD EXACERBATION (H): ICD-10-CM

## 2019-01-30 DIAGNOSIS — N12 PYELONEPHRITIS: Primary | ICD-10-CM

## 2019-01-30 NOTE — PROGRESS NOTES
Clinic Care Coordination Contact  [facility]/Voicemail       Clinical Data: Care Coordinator Outreach    Hospital admission 1/5-1/10/2019-Pyelonephritis and New COPD diagnosis     Outreach attempted x 1.  Left message on voicemail with call back information and requested return call.    Plan:. Care Coordinator will try to reach patient again in 3-5 business days.    Jeannie Lopez RN / Clinical Care Coordinator     [facility]   [email or web address]  Office :  [phone] / Fax :  [phone]

## 2019-02-12 ENCOUNTER — PATIENT OUTREACH (OUTPATIENT)
Dept: CARE COORDINATION | Facility: CLINIC | Age: 79
End: 2019-02-12

## 2019-02-12 DIAGNOSIS — J44.1 COPD EXACERBATION (H): ICD-10-CM

## 2019-02-12 DIAGNOSIS — N12 PYELONEPHRITIS: Primary | ICD-10-CM

## 2019-02-12 NOTE — PROGRESS NOTES
Clinic Care Coordination Contact  [facility]/Voicemail       Clinical Data: Care Coordinator Outreach  Hospital admission 1/5-1/10/2019-Pyelonephritis and New COPD diagnosis   Outreach attempted x 2.   Left message on voicemail with call back information and requested return call.  Plan: Care Coordinator will await a return call from the patient      Jeannie Lopez RN / Clinical Care Coordinator     [facility]   [email or web address]  Office :  [phone] / Fax :  [phone]

## 2019-02-19 ENCOUNTER — TRANSFERRED RECORDS (OUTPATIENT)
Dept: HEALTH INFORMATION MANAGEMENT | Facility: CLINIC | Age: 79
End: 2019-02-19

## 2019-02-20 LAB
MDC_IDC_LEAD_IMPLANT_DT: NORMAL
MDC_IDC_LEAD_IMPLANT_DT: NORMAL
MDC_IDC_LEAD_LOCATION: NORMAL
MDC_IDC_LEAD_LOCATION: NORMAL
MDC_IDC_LEAD_LOCATION_DETAIL_1: NORMAL
MDC_IDC_LEAD_LOCATION_DETAIL_1: NORMAL
MDC_IDC_LEAD_MFG: NORMAL
MDC_IDC_LEAD_MFG: NORMAL
MDC_IDC_LEAD_MODEL: NORMAL
MDC_IDC_LEAD_MODEL: NORMAL
MDC_IDC_LEAD_SERIAL: NORMAL
MDC_IDC_LEAD_SERIAL: NORMAL
MDC_IDC_MSMT_BATTERY_DTM: NORMAL
MDC_IDC_MSMT_BATTERY_REMAINING_LONGEVITY: 127 MO
MDC_IDC_MSMT_BATTERY_REMAINING_PERCENTAGE: 95.5 %
MDC_IDC_MSMT_BATTERY_RRT_TRIGGER: NORMAL
MDC_IDC_MSMT_BATTERY_STATUS: NORMAL
MDC_IDC_MSMT_BATTERY_VOLTAGE: 3.01 V
MDC_IDC_MSMT_LEADCHNL_RA_IMPEDANCE_VALUE: 590 OHM
MDC_IDC_MSMT_LEADCHNL_RA_LEAD_CHANNEL_STATUS: NORMAL
MDC_IDC_MSMT_LEADCHNL_RA_PACING_THRESHOLD_AMPLITUDE: 0.5 V
MDC_IDC_MSMT_LEADCHNL_RA_PACING_THRESHOLD_PULSEWIDTH: 0.4 MS
MDC_IDC_MSMT_LEADCHNL_RA_SENSING_INTR_AMPL: 5 MV
MDC_IDC_MSMT_LEADCHNL_RV_IMPEDANCE_VALUE: 690 OHM
MDC_IDC_MSMT_LEADCHNL_RV_LEAD_CHANNEL_STATUS: NORMAL
MDC_IDC_MSMT_LEADCHNL_RV_PACING_THRESHOLD_AMPLITUDE: 0.62 V
MDC_IDC_MSMT_LEADCHNL_RV_PACING_THRESHOLD_PULSEWIDTH: 0.4 MS
MDC_IDC_MSMT_LEADCHNL_RV_SENSING_INTR_AMPL: 12 MV
MDC_IDC_PG_IMPLANT_DTM: NORMAL
MDC_IDC_PG_MFG: NORMAL
MDC_IDC_PG_MODEL: NORMAL
MDC_IDC_PG_SERIAL: NORMAL
MDC_IDC_PG_TYPE: NORMAL
MDC_IDC_SESS_CLINIC_NAME: NORMAL
MDC_IDC_SESS_DTM: NORMAL
MDC_IDC_SESS_REPROGRAMMED: NO
MDC_IDC_SESS_TYPE: NORMAL
MDC_IDC_SET_BRADY_AT_MODE_SWITCH_MODE: NORMAL
MDC_IDC_SET_BRADY_AT_MODE_SWITCH_RATE: 170 {BEATS}/MIN
MDC_IDC_SET_BRADY_LOWRATE: 60 {BEATS}/MIN
MDC_IDC_SET_BRADY_MAX_SENSOR_RATE: 120 {BEATS}/MIN
MDC_IDC_SET_BRADY_MAX_TRACKING_RATE: 120 {BEATS}/MIN
MDC_IDC_SET_BRADY_MODE: NORMAL
MDC_IDC_SET_BRADY_PAV_DELAY_LOW: 250 MS
MDC_IDC_SET_BRADY_SAV_DELAY_LOW: 225 MS
MDC_IDC_SET_LEADCHNL_RA_PACING_AMPLITUDE: 2 V
MDC_IDC_SET_LEADCHNL_RA_PACING_ANODE_ELECTRODE_1: NORMAL
MDC_IDC_SET_LEADCHNL_RA_PACING_ANODE_LOCATION_1: NORMAL
MDC_IDC_SET_LEADCHNL_RA_PACING_CAPTURE_MODE: NORMAL
MDC_IDC_SET_LEADCHNL_RA_PACING_CATHODE_ELECTRODE_1: NORMAL
MDC_IDC_SET_LEADCHNL_RA_PACING_CATHODE_LOCATION_1: NORMAL
MDC_IDC_SET_LEADCHNL_RA_PACING_POLARITY: NORMAL
MDC_IDC_SET_LEADCHNL_RA_PACING_PULSEWIDTH: 0.4 MS
MDC_IDC_SET_LEADCHNL_RA_SENSING_ADAPTATION_MODE: NORMAL
MDC_IDC_SET_LEADCHNL_RA_SENSING_ANODE_ELECTRODE_1: NORMAL
MDC_IDC_SET_LEADCHNL_RA_SENSING_ANODE_LOCATION_1: NORMAL
MDC_IDC_SET_LEADCHNL_RA_SENSING_CATHODE_ELECTRODE_1: NORMAL
MDC_IDC_SET_LEADCHNL_RA_SENSING_CATHODE_LOCATION_1: NORMAL
MDC_IDC_SET_LEADCHNL_RA_SENSING_POLARITY: NORMAL
MDC_IDC_SET_LEADCHNL_RA_SENSING_SENSITIVITY: 0.3 MV
MDC_IDC_SET_LEADCHNL_RV_PACING_AMPLITUDE: 0.88
MDC_IDC_SET_LEADCHNL_RV_PACING_ANODE_ELECTRODE_1: NORMAL
MDC_IDC_SET_LEADCHNL_RV_PACING_ANODE_LOCATION_1: NORMAL
MDC_IDC_SET_LEADCHNL_RV_PACING_CAPTURE_MODE: NORMAL
MDC_IDC_SET_LEADCHNL_RV_PACING_CATHODE_ELECTRODE_1: NORMAL
MDC_IDC_SET_LEADCHNL_RV_PACING_CATHODE_LOCATION_1: NORMAL
MDC_IDC_SET_LEADCHNL_RV_PACING_POLARITY: NORMAL
MDC_IDC_SET_LEADCHNL_RV_PACING_PULSEWIDTH: 0.4 MS
MDC_IDC_SET_LEADCHNL_RV_SENSING_ADAPTATION_MODE: NORMAL
MDC_IDC_SET_LEADCHNL_RV_SENSING_ANODE_ELECTRODE_1: NORMAL
MDC_IDC_SET_LEADCHNL_RV_SENSING_ANODE_LOCATION_1: NORMAL
MDC_IDC_SET_LEADCHNL_RV_SENSING_CATHODE_ELECTRODE_1: NORMAL
MDC_IDC_SET_LEADCHNL_RV_SENSING_CATHODE_LOCATION_1: NORMAL
MDC_IDC_SET_LEADCHNL_RV_SENSING_POLARITY: NORMAL
MDC_IDC_SET_LEADCHNL_RV_SENSING_SENSITIVITY: 0.5 MV
MDC_IDC_STAT_AT_BURDEN_PERCENT: 0 %
MDC_IDC_STAT_AT_DTM_END: NORMAL
MDC_IDC_STAT_AT_DTM_START: NORMAL
MDC_IDC_STAT_AT_MODE_SW_COUNT: 0
MDC_IDC_STAT_AT_MODE_SW_COUNT_PER_DAY: 0
MDC_IDC_STAT_AT_MODE_SW_PERCENT_TIME: 0 %
MDC_IDC_STAT_BRADY_AP_VP_PERCENT: 1 %
MDC_IDC_STAT_BRADY_AP_VS_PERCENT: 73 %
MDC_IDC_STAT_BRADY_AS_VP_PERCENT: 1 %
MDC_IDC_STAT_BRADY_AS_VS_PERCENT: 27 %
MDC_IDC_STAT_BRADY_DTM_END: NORMAL
MDC_IDC_STAT_BRADY_DTM_START: NORMAL
MDC_IDC_STAT_BRADY_RA_PERCENT_PACED: 72 %
MDC_IDC_STAT_BRADY_RV_PERCENT_PACED: 1 %
MDC_IDC_STAT_CRT_DTM_END: NORMAL
MDC_IDC_STAT_CRT_DTM_START: NORMAL
MDC_IDC_STAT_HEART_RATE_ATRIAL_MAX: 230 {BEATS}/MIN
MDC_IDC_STAT_HEART_RATE_ATRIAL_MEAN: 67 {BEATS}/MIN
MDC_IDC_STAT_HEART_RATE_ATRIAL_MIN: 50 {BEATS}/MIN
MDC_IDC_STAT_HEART_RATE_DTM_END: NORMAL
MDC_IDC_STAT_HEART_RATE_DTM_START: NORMAL
MDC_IDC_STAT_HEART_RATE_VENTRICULAR_MAX: 180 {BEATS}/MIN
MDC_IDC_STAT_HEART_RATE_VENTRICULAR_MEAN: 67 {BEATS}/MIN
MDC_IDC_STAT_HEART_RATE_VENTRICULAR_MIN: 40 {BEATS}/MIN

## 2019-02-21 ENCOUNTER — OFFICE VISIT (OUTPATIENT)
Dept: FAMILY MEDICINE | Facility: CLINIC | Age: 79
End: 2019-02-21
Payer: COMMERCIAL

## 2019-02-21 VITALS
BODY MASS INDEX: 31.5 KG/M2 | SYSTOLIC BLOOD PRESSURE: 112 MMHG | RESPIRATION RATE: 14 BRPM | HEART RATE: 79 BPM | TEMPERATURE: 97 F | HEIGHT: 64 IN | WEIGHT: 184.5 LBS | DIASTOLIC BLOOD PRESSURE: 72 MMHG | OXYGEN SATURATION: 97 %

## 2019-02-21 DIAGNOSIS — E78.5 DYSLIPIDEMIA: ICD-10-CM

## 2019-02-21 DIAGNOSIS — M81.0 AGE RELATED OSTEOPOROSIS, UNSPECIFIED PATHOLOGICAL FRACTURE PRESENCE: ICD-10-CM

## 2019-02-21 DIAGNOSIS — I50.32 CHRONIC DIASTOLIC CHF (CONGESTIVE HEART FAILURE) (H): ICD-10-CM

## 2019-02-21 DIAGNOSIS — I48.91 ATRIAL FIBRILLATION WITH RAPID VENTRICULAR RESPONSE (H): ICD-10-CM

## 2019-02-21 DIAGNOSIS — I10 BENIGN ESSENTIAL HYPERTENSION: ICD-10-CM

## 2019-02-21 DIAGNOSIS — I48.0 PAROXYSMAL ATRIAL FIBRILLATION (H): ICD-10-CM

## 2019-02-21 DIAGNOSIS — Z95.0 S/P CARDIAC PACEMAKER PROCEDURE: ICD-10-CM

## 2019-02-21 DIAGNOSIS — R73.9 HYPERGLYCEMIA: ICD-10-CM

## 2019-02-21 DIAGNOSIS — J44.1 COPD WITH ACUTE EXACERBATION (H): ICD-10-CM

## 2019-02-21 DIAGNOSIS — H33.22 LEFT RETINAL DETACHMENT: ICD-10-CM

## 2019-02-21 DIAGNOSIS — Z01.818 PREOP GENERAL PHYSICAL EXAM: Primary | ICD-10-CM

## 2019-02-21 PROCEDURE — 99215 OFFICE O/P EST HI 40 MIN: CPT | Performed by: FAMILY MEDICINE

## 2019-02-21 RX ORDER — TIOTROPIUM BROMIDE 18 UG/1
18 CAPSULE ORAL; RESPIRATORY (INHALATION) DAILY
Qty: 90 CAPSULE | Refills: 3 | COMMUNITY
Start: 2019-02-21 | End: 2019-10-08

## 2019-02-21 RX ORDER — ALENDRONATE SODIUM 70 MG/1
70 TABLET ORAL
Qty: 12 TABLET | Refills: 3 | Status: SHIPPED | OUTPATIENT
Start: 2019-02-21 | End: 2020-02-20

## 2019-02-21 ASSESSMENT — MIFFLIN-ST. JEOR: SCORE: 1296.89

## 2019-02-21 NOTE — PATIENT INSTRUCTIONS
Before Your Surgery      Call your surgeon if there is any change in your health. This includes signs of a cold or flu (such as a sore throat, runny nose, cough, rash or fever).    Do not smoke, drink alcohol or take over the counter medicine (unless your surgeon or primary care doctor tells you to) for the 24 hours before and after surgery.    If you take prescribed drugs: Follow your doctor s orders about which medicines to take and which to stop until after surgery.    Eating and drinking prior to surgery: follow the instructions from your surgeon    Take a shower or bath the night before surgery. Use the soap your surgeon gave you to gently clean your skin. If you do not have soap from your surgeon, use your regular soap. Do not shave or scrub the surgery site.  Wear clean pajamas and have clean sheets on your bed.     As before your last procedure:   1) Do not take eliquis the day before and the day of your procedure.   2) Do not take lisinopril the day before and the day of your procedure.

## 2019-02-21 NOTE — PROGRESS NOTES
Formerly named [facility]  [address] [place] [id number] [phone]  Dept: [phone]    PRE-OP EVALUATION:  Today's date: 2019    Hamida Verma (: 1940) presents for pre-operative evaluation assessment as requested by her ophthalmologist.  She requires evaluation and anesthesia risk assessment prior to undergoing surgery/procedure for left retinal repair .    Proposed Surgery/ Procedure: repair the left eye retina   Date of Surgery/ Procedure: 2019  Time of Surgery/ Procedure:  7 AM   Hospital/Surgical Facility: \[facility]"" [facility] location  Fax number for surgical facility:    Primary Physician: Mikala Philip  Type of Anesthesia Anticipated: to be determined    Patient has a Health Care Directive or Living Will:  NO    1. NO - Do you have a history of heart attack, stroke, stent, bypass or surgery on an artery in the head, neck, heart or legs?  2. NO - Do you ever have any pain or discomfort in your chest?  3. Yes - Do you have a history of  Heart Failure?  4. NO - Are you troubled by shortness of breath when: walking on the level, up a slight hill or at night?  5. NO - Do you currently have a cold, bronchitis or other respiratory infection?  6. NO - Do you have a cough, shortness of breath or wheezing?  7. NO - Do you sometimes get pains in the calves of your legs when you walk?  8. NO - Do you or anyone in your family have previous history of blood clots?  9. NO - Do you or does anyone in your family have a serious bleeding problem such as prolonged bleeding following surgeries or cuts?  10. NO - Have you ever had problems with anemia or been told to take iron pills?  11. NO - Have you had any abnormal blood loss such as black, tarry or bloody stools, or abnormal vaginal bleeding?  12. NO - Have you ever had a blood transfusion?  13. NO - Have you or any of your relatives ever had problems with anesthesia?  14. NO - Do you have sleep apnea, excessive  snoring or daytime drowsiness?  15. NO - Do you have any prosthetic heart valves?  16. NO - Do you have prosthetic joints?  17. NO - Is there any chance that you may be pregnant?      HPI:     HPI related to upcoming procedure: Hamida Verma is a 78 year old female who presents today for preop for left retinal repair.       A-FIB - Patient has a longstanding history of A-fib currently on flecainide. Current treatment regimen includes Apixaban for stroke prevention and denies significant symptoms of lightheadedness, palpitations or dyspnea.                                                                                                                                                                             .  CHF - Patient has a history of  CHF.The patient denies chest pain, edema, orthopnea, SOB or recent weight gain. Last seen by cardiology on 1/23/19.                                                       .  COPD - Patient has a history of mild COPD . Patient has been doing well overall and is no longer needing oxygen. She continues spiriva and uses albuterol prn.                                                                                                        .  HYPERLIPIDEMIA - Patient has a  history of Hyperlipidemia requiring medication for treatment.                                                                                                                                                    .  HYPERTENSION - Patient has history of HTN , currently denies any symptoms referable to elevated blood pressure. Specifically denies chest pain, palpitations, dyspnea, orthopnea, PND or peripheral edema. Blood pressure readings have been in normal range. Current medication regimen is as listed below. Patient denies any side effects of medication.                                                                                                                                                                                        .    MEDICAL HISTORY:     Patient Active Problem List    Diagnosis Date Noted     Hypotension 01/05/2019     Priority: Medium     Coronary artery calcification seen on CT scan 10/25/2018     Priority: Medium     SSS (sick sinus syndrome) (H)      Priority: Medium     s/p PPM 3/2018       Chronic diastolic CHF (congestive heart failure) (H)      Priority: Medium     Pulmonary hypertension (H)      Priority: Medium     S/P cardiac pacemaker procedure 10/17/2018     Priority: Medium     PAF (paroxysmal atrial fibrillation) (H) 03/06/2018     Priority: Medium     Obesity 11/25/2014     Priority: Medium     Knee pain 04/30/2013     Priority: Medium     Hypertension goal BP (blood pressure) < 140/90 02/21/2011     Priority: Medium     Hyperlipidemia LDL goal <70 05/09/2010     Priority: Medium     Symptomatic menopausal or female climacteric states 03/30/2006     Priority: Medium      Past Medical History:   Diagnosis Date     Atrial fibrillation (H)      Chronic diastolic CHF (congestive heart failure) (H)      Essential hypertension, benign      HYPERLIPIDEMIA NEC/NOS 3/30/2006     Pulmonary hypertension (H)      SSS (sick sinus syndrome) (H)     s/p PPM 3/2018     Past Surgical History:   Procedure Laterality Date     EYE SURGERY       HYSTERECTOMY, VAGINAL      hysterectomy     Current Outpatient Medications   Medication Sig Dispense Refill     alendronate (FOSAMAX) 70 MG tablet Take 1 tablet (70 mg) by mouth every 7 days Take 60 minutes before am meal with 8 oz. water. Remain upright for 30 minutes. 12 tablet 3     apixaban ANTICOAGULANT (ELIQUIS) 5 MG tablet Take 1 tablet (5 mg) by mouth 2 times daily 180 tablet 3     atorvastatin (LIPITOR) 40 MG tablet Take 40 mg by mouth daily       cholecalciferol (VITAMIN  -D) 1000 UNIT capsule Take 1 capsule by mouth daily.       Coral Calcium 133-66.7-133 MG-MG-UNIT CAPS Take 2 tablets by mouth 2 times daily        FLAX SEED OIL OR 1 capsule daily        "flecainide (TAMBOCOR) 100 MG tablet Take 1 tablet (100 mg) by mouth 2 times daily 180 tablet 0     furosemide (LASIX) 20 MG tablet Take 1 tablet (20 mg) by mouth daily 90 tablet 3     Krill Oil 500 MG CAPS Take 1 capsule by mouth daily       lisinopril (PRINIVIL/ZESTRIL) 40 MG tablet Take 1 tablet (40 mg) by mouth daily 90 tablet 3     Methylsulfonylmethane (MSM) 500 MG CAPS Take 1 capsule by mouth daily       metoprolol succinate (TOPROL-XL) 50 MG 24 hr tablet Take 1 tablet (50 mg) by mouth 2 times daily 180 tablet 3     Milk Thistle 200 MG CAPS Take 1 capsule by mouth daily        Multiple Vitamins-Minerals (HAIR SKIN NAILS PO) Take 1 tablet by mouth At Bedtime       potassium chloride ER (K-DUR/KLOR-CON M) 10 MEQ CR tablet Take 1 tablet (10 mEq) by mouth daily 90 tablet 3     tiotropium (SPIRIVA) 18 MCG inhaled capsule Inhale 1 capsule (18 mcg) into the lungs daily 90 capsule 3     OTC products: None, except as noted above    Allergies   Allergen Reactions     Strawberry Flavor       Latex Allergy: NO    Social History     Tobacco Use     Smoking status: Former Smoker     Packs/day: 1.50     Years: 45.00     Pack years: 67.50     Types: Cigarettes     Start date: 10/28/1955     Last attempt to quit: 2000     Years since quittin.4     Smokeless tobacco: Never Used     Tobacco comment: quit 6 yrs ago   Substance Use Topics     Alcohol use: No     History   Drug Use No       REVIEW OF SYSTEMS:    ROS: 10 point ROS neg other than the symptoms noted above in the HPI.     EXAM:   /72 (BP Location: Left arm, Patient Position: Chair, Cuff Size: Adult Large)   Pulse 79   Temp 97  F (36.1  C) (Tympanic)   Resp 14   Ht 1.626 m (5' 4\")   Wt 83.7 kg (184 lb 8 oz)   LMP  (LMP Unknown)   SpO2 97%   BMI 31.67 kg/m      GENERAL APPEARANCE:  alert and no distress     EYES: EOMI, PERRL     HENT: ear canals and TM's normal and nose and mouth without ulcers or lesions     NECK: no adenopathy, no asymmetry, " masses, or scars and thyroid normal to palpation     RESP: lungs clear to auscultation - no rales, rhonchi or wheezes     CV: regular rates and rhythm, normal S1 S2, no S3 or S4 and no murmur, click or rub     ABDOMEN:  soft, nontender, no HSM or masses and bowel sounds normal     MS: extremities normal- no gross deformities noted      SKIN: no suspicious lesions or rashes     NEURO: Normal strength and tone, sensory exam grossly normal, mentation intact and speech normal     PSYCH: mentation appears normal. and affect normal/bright     LYMPHATICS: No cervical adenopathy    DIAGNOSTICS:   No labs or EKG required for low risk surgery (cataract, skin procedure, breast biopsy, etc)    Recent Labs   Lab Test 01/23/19  1510 01/16/19  1135  01/10/19  0808 01/09/19  0910 01/08/19  0814  03/19/18  1055  11/12/14  0814   HGB  --   --   --  10.3* 10.7* 11.1*   < >  --    < > 14.1   PLT  --   --   --  349 407 341   < >  --    < > 405   INR  --   --   --   --   --   --   --  1.31*  --  1.04   * 134*   < > 138 136 133   < >  --    < > 140   POTASSIUM 3.6 3.3*   < > 3.5 3.9 4.2   < > 3.7   < > 3.1*   CR 0.98 0.96   < > 0.86 0.84 0.80   < >  --    < > 0.80   A1C  --   --   --   --   --  6.9*  --   --   --   --     < > = values in this interval not displayed.        IMPRESSION:   Reason for surgery/procedure:   Diagnosis/reason for consult: preop for left retinal repair    The proposed surgical procedure is considered LOW risk.    REVISED CARDIAC RISK INDEX  The patient has the following serious cardiovascular risks for perioperative complications such as (MI, PE, VFib and 3  AV Block):  Congestive Heart Failure (pulmonary edema, PND, s3 sharyn, CXR with pulmonary congestion, basilar rales)  INTERPRETATION: 1 risks: Class II (low risk - 0.9% complication rate)    The patient has the following additional risks for perioperative complications:  No identified additional risks      ICD-10-CM    1. Preop general physical exam  Z01.818    2. Left retinal detachment H33.22    3. Chronic diastolic CHF (congestive heart failure) (H) I50.32    4. Benign essential hypertension I10    5. Paroxysmal atrial fibrillation (H) I48.0    6. S/P cardiac pacemaker procedure Z95.0    7. Dyslipidemia E78.5    8. Hyperglycemia R73.9    9. Atrial fibrillation with rapid ventricular response (H) I48.91    10. Age related osteoporosis, unspecified pathological fracture presence M81.0 alendronate (FOSAMAX) 70 MG tablet   11. COPD with acute exacerbation (H) J44.1 tiotropium (SPIRIVA) 18 MCG inhaled capsule       RECOMMENDATIONS:       Cardiovascular Risk  Chronic CHF -- stable    Pulmonary Risk  Incentive spirometry post op  Respiratory Therapy (Respiratory Care IP Consult)  post op  NG tube decompression if abdominal distension or significant vomiting       --Patient is to take all scheduled medications on the day of surgery EXCEPT for modifications listed below.    Anticoagulant or Antiplatelet Medication Use  Bleeding risk is low   She will not take eliquis the day before and the day of her procedure.     ACE Inhibitor or Angiotensin Receptor Blocker (ARB) Use  Ace inhibitor or Angiotensin Receptor Blocker (ARB) and should HOLD this medication for the 24 hours prior to surgery.      APPROVAL GIVEN to proceed with proposed procedure, without further diagnostic evaluation       Signed Electronically by: Mikala Philip MD    Copy of this evaluation report is provided to requesting physician.    [place] Preop Guidelines    Revised Cardiac Risk Index

## 2019-03-05 ENCOUNTER — PATIENT OUTREACH (OUTPATIENT)
Dept: CARE COORDINATION | Facility: CLINIC | Age: 79
End: 2019-03-05

## 2019-03-05 DIAGNOSIS — N12 PYELONEPHRITIS: Primary | ICD-10-CM

## 2019-03-05 DIAGNOSIS — J44.1 COPD EXACERBATION (H): ICD-10-CM

## 2019-03-05 NOTE — PROGRESS NOTES
Clinic Care Coordination Contact  [facility]/Voicemail    Referral Source: IP Handoff  Hospital admission 1/5-1/10/2019-Pyelonephritis and New COPD diagnosis   Clinical Data: Care Coordinator Outreach  Outreach attempted x 1.  Left message on voicemail with call back information and requested return call.  Plan: Care Coordinator will await a return call from the patient   Jeannie Lopez RN / Clinical Care Coordinator     [facility]   [email or web address]  Office :  [phone] / Fax :  [phone]

## 2019-03-28 ENCOUNTER — OFFICE VISIT (OUTPATIENT)
Dept: FAMILY MEDICINE | Facility: CLINIC | Age: 79
End: 2019-03-28
Payer: COMMERCIAL

## 2019-03-28 VITALS
RESPIRATION RATE: 16 BRPM | HEIGHT: 64 IN | WEIGHT: 174.5 LBS | TEMPERATURE: 97.6 F | SYSTOLIC BLOOD PRESSURE: 102 MMHG | HEART RATE: 68 BPM | BODY MASS INDEX: 29.79 KG/M2 | DIASTOLIC BLOOD PRESSURE: 64 MMHG | OXYGEN SATURATION: 95 %

## 2019-03-28 DIAGNOSIS — E78.5 HYPERLIPIDEMIA LDL GOAL <70: ICD-10-CM

## 2019-03-28 DIAGNOSIS — R73.09 ELEVATED GLUCOSE: ICD-10-CM

## 2019-03-28 DIAGNOSIS — Z95.0 S/P CARDIAC PACEMAKER PROCEDURE: ICD-10-CM

## 2019-03-28 DIAGNOSIS — I50.32 CHRONIC DIASTOLIC CHF (CONGESTIVE HEART FAILURE) (H): ICD-10-CM

## 2019-03-28 DIAGNOSIS — Z00.00 MEDICARE ANNUAL WELLNESS VISIT, INITIAL: Primary | ICD-10-CM

## 2019-03-28 DIAGNOSIS — L65.9 HAIR LOSS: ICD-10-CM

## 2019-03-28 DIAGNOSIS — I10 HYPERTENSION GOAL BP (BLOOD PRESSURE) < 140/90: ICD-10-CM

## 2019-03-28 DIAGNOSIS — I48.0 PAF (PAROXYSMAL ATRIAL FIBRILLATION) (H): ICD-10-CM

## 2019-03-28 DIAGNOSIS — I49.5 SSS (SICK SINUS SYNDROME) (H): ICD-10-CM

## 2019-03-28 DIAGNOSIS — D64.9 ANEMIA, UNSPECIFIED TYPE: ICD-10-CM

## 2019-03-28 PROBLEM — E11.9 DIABETES MELLITUS, TYPE 2 (H): Status: ACTIVE | Noted: 2019-03-28

## 2019-03-28 LAB
ANION GAP SERPL CALCULATED.3IONS-SCNC: 10 MMOL/L (ref 3–14)
BASOPHILS # BLD AUTO: 0.1 10E9/L (ref 0–0.2)
BASOPHILS NFR BLD AUTO: 0.9 %
BUN SERPL-MCNC: 17 MG/DL (ref 7–30)
CALCIUM SERPL-MCNC: 9.9 MG/DL (ref 8.5–10.1)
CHLORIDE SERPL-SCNC: 106 MMOL/L (ref 94–109)
CO2 SERPL-SCNC: 24 MMOL/L (ref 20–32)
CREAT SERPL-MCNC: 0.96 MG/DL (ref 0.52–1.04)
DIFFERENTIAL METHOD BLD: ABNORMAL
EOSINOPHIL # BLD AUTO: 0.2 10E9/L (ref 0–0.7)
EOSINOPHIL NFR BLD AUTO: 2.5 %
ERYTHROCYTE [DISTWIDTH] IN BLOOD BY AUTOMATED COUNT: 13.2 % (ref 10–15)
GFR SERPL CREATININE-BSD FRML MDRD: 56 ML/MIN/{1.73_M2}
GLUCOSE SERPL-MCNC: 113 MG/DL (ref 70–99)
HBA1C MFR BLD: 5.1 % (ref 0–5.6)
HCT VFR BLD AUTO: 41.1 % (ref 35–47)
HGB BLD-MCNC: 12.9 G/DL (ref 11.7–15.7)
LYMPHOCYTES # BLD AUTO: 1.6 10E9/L (ref 0.8–5.3)
LYMPHOCYTES NFR BLD AUTO: 16.9 %
MCH RBC QN AUTO: 29.4 PG (ref 26.5–33)
MCHC RBC AUTO-ENTMCNC: 31.4 G/DL (ref 31.5–36.5)
MCV RBC AUTO: 94 FL (ref 78–100)
MONOCYTES # BLD AUTO: 1 10E9/L (ref 0–1.3)
MONOCYTES NFR BLD AUTO: 10.6 %
NEUTROPHILS # BLD AUTO: 6.7 10E9/L (ref 1.6–8.3)
NEUTROPHILS NFR BLD AUTO: 69.1 %
PLATELET # BLD AUTO: 419 10E9/L (ref 150–450)
POTASSIUM SERPL-SCNC: 4.3 MMOL/L (ref 3.4–5.3)
RBC # BLD AUTO: 4.39 10E12/L (ref 3.8–5.2)
SODIUM SERPL-SCNC: 140 MMOL/L (ref 133–144)
TSH SERPL DL<=0.005 MIU/L-ACNC: 0.81 MU/L (ref 0.4–4)
WBC # BLD AUTO: 9.6 10E9/L (ref 4–11)

## 2019-03-28 PROCEDURE — 36415 COLL VENOUS BLD VENIPUNCTURE: CPT | Performed by: FAMILY MEDICINE

## 2019-03-28 PROCEDURE — G0439 PPPS, SUBSEQ VISIT: HCPCS | Performed by: FAMILY MEDICINE

## 2019-03-28 PROCEDURE — 84443 ASSAY THYROID STIM HORMONE: CPT | Performed by: FAMILY MEDICINE

## 2019-03-28 PROCEDURE — 83036 HEMOGLOBIN GLYCOSYLATED A1C: CPT | Performed by: FAMILY MEDICINE

## 2019-03-28 PROCEDURE — 80048 BASIC METABOLIC PNL TOTAL CA: CPT | Performed by: FAMILY MEDICINE

## 2019-03-28 PROCEDURE — 99214 OFFICE O/P EST MOD 30 MIN: CPT | Mod: 25 | Performed by: FAMILY MEDICINE

## 2019-03-28 PROCEDURE — 85025 COMPLETE CBC W/AUTO DIFF WBC: CPT | Performed by: FAMILY MEDICINE

## 2019-03-28 ASSESSMENT — MIFFLIN-ST. JEOR: SCORE: 1247.56

## 2019-03-28 NOTE — PATIENT INSTRUCTIONS
I recommend getting the new shingles vaccine, Shingrix.  You can call your insurance company to find out if this vaccine is covered.  You may also ask our pharmacy to check if your insurance covers Shingrix if given at the pharmacy.      Preventive Health Recommendations    See your health care provider every year to    Review health changes.     Discuss preventive care.      Review your medicines if your doctor has prescribed any.      You no longer need a yearly Pap test unless you've had an abnormal Pap test in the past 10 years. If you have vaginal symptoms, such as bleeding or discharge, be sure to talk with your provider about a Pap test.      Every 1 to 2 years, have a mammogram.  If you are over 69, talk with your health care provider about whether or not you want to continue having screening mammograms.      Every 10 years, have a colonoscopy. Or, have a yearly FIT test (stool test). These exams will check for colon cancer.       Have a cholesterol test every 5 years, or more often if your doctor advises it.       Have a diabetes test (fasting glucose) every three years. If you are at risk for diabetes, you should have this test more often.       At age 65, have a bone density scan (DEXA) to check for osteoporosis (brittle bone disease).    Shots:    Get a flu shot each year.    Get a tetanus shot every 10 years.    Talk to your doctor about your pneumonia vaccines. There are now two you should receive - Pneumovax (PPSV 23) and Prevnar (PCV 13).    Talk to your pharmacist about the shingles vaccine.    Talk to your doctor about the hepatitis B vaccine.    Nutrition:     Eat at least 5 servings of fruits and vegetables each day.      Eat whole-grain bread, whole-wheat pasta and brown rice instead of white grains and rice.      Get adequate Calcium and Vitamin D.     Lifestyle    Exercise at least 150 minutes a week (30 minutes a day, 5 days a week). This will help you control your weight and prevent  disease.      Limit alcohol to one drink per day.      No smoking.       Wear sunscreen to prevent skin cancer.       See your dentist twice a year for an exam and cleaning.      See your eye doctor every 1 to 2 years to screen for conditions such as glaucoma, macular degeneration and cataracts.    Personalized Prevention Plan  You are due for the preventive services outlined below.  Your care team is available to assist you in scheduling these services.  If you have already completed any of these items, please share that information with your care team to update in your medical record.  Health Maintenance Due   Topic Date Due     COPD ACTION PLAN Q1 YR  1940     Zoster (Shingles) Vaccine (2 of 3) 05/14/2013     Annual Wellness Visit  10/12/2018     Preventive Health Recommendations    See your health care provider every year to    Review health changes.     Discuss preventive care.      Review your medicines if your doctor has prescribed any.      You no longer need a yearly Pap test unless you've had an abnormal Pap test in the past 10 years. If you have vaginal symptoms, such as bleeding or discharge, be sure to talk with your provider about a Pap test.      Every 1 to 2 years, have a mammogram.  If you are over 69, talk with your health care provider about whether or not you want to continue having screening mammograms.      Every 10 years, have a colonoscopy. Or, have a yearly FIT test (stool test). These exams will check for colon cancer.       Have a cholesterol test every 5 years, or more often if your doctor advises it.       Have a diabetes test (fasting glucose) every three years. If you are at risk for diabetes, you should have this test more often.       At age 65, have a bone density scan (DEXA) to check for osteoporosis (brittle bone disease).    Shots:    Get a flu shot each year.    Get a tetanus shot every 10 years.    Talk to your doctor about your pneumonia vaccines. There are now two you  should receive - Pneumovax (PPSV 23) and Prevnar (PCV 13).    Talk to your pharmacist about the shingles vaccine.    Talk to your doctor about the hepatitis B vaccine.    Nutrition:     Eat at least 5 servings of fruits and vegetables each day.      Eat whole-grain bread, whole-wheat pasta and brown rice instead of white grains and rice.      Get adequate Calcium and Vitamin D.     Lifestyle    Exercise at least 150 minutes a week (30 minutes a day, 5 days a week). This will help you control your weight and prevent disease.      Limit alcohol to one drink per day.      No smoking.       Wear sunscreen to prevent skin cancer.       See your dentist twice a year for an exam and cleaning.      See your eye doctor every 1 to 2 years to screen for conditions such as glaucoma, macular degeneration and cataracts.    Personalized Prevention Plan  You are due for the preventive services outlined below.  Your care team is available to assist you in scheduling these services.  If you have already completed any of these items, please share that information with your care team to update in your medical record.  Health Maintenance Due   Topic Date Due     COPD ACTION PLAN Q1 YR  1940     Zoster (Shingles) Vaccine (2 of 3) 05/14/2013     Annual Wellness Visit  10/12/2018

## 2019-03-28 NOTE — PROGRESS NOTES
"  SUBJECTIVE:   Hamida Verma is a 78 year old female who presents for Preventive Visit.      Are you in the first 12 months of your Medicare Part B coverage?  No    Physical Health:    In general, how would you rate your overall physical health? good    Outside of work, how many days during the week do you exercise? 6-7 days/week    Outside of work, approximately how many minutes a day do you exercise?less than 15 minutes    If you drink alcohol do you typically have >3 drinks per day or >7 drinks per week? No    Do you usually eat at least 4 servings of fruit and vegetables a day, include whole grains & fiber and avoid regularly eating high fat or \"junk\" foods? Yes    Do you have any problems taking medications regularly?  No    Do you have any side effects from medications? none    Needs assistance for the following daily activities: no assistance needed    Which of the following safety concerns are present in your home?  none identified     Hearing impairment: No    In the past 6 months, have you been bothered by leaking of urine? no    Mental Health:    In general, how would you rate your overall mental or emotional health? fair  PHQ-2 Score:  0    Do you feel safe in your environment? Yes    Do you have a Health Care Directive? No: Advance care planning reviewed with patient; information given to patient to review.    Additional concerns to address?  no  Medical assistant advised patient about addressing additional health concerns that could be billed, as it is not considered a part of a preventive physical. Patient verbalized understanding.    Mikala Philip MD, MD on 3/28/2019 at 7:52 AM      Fall risk:  Fallen 2 or more times in the past year?: No  Any fall with injury in the past year?: No    Cognitive Screenin) Repeat 3 items (Leader, Season, Table)    2) Clock draw: NORMAL  3) 3 item recall: Recalls 3 objects  Results: 3 items recalled: COGNITIVE IMPAIRMENT LESS LIKELY    Mini-CogTM Copyright S " Nataliya. Licensed by the author for use in [facility]; reprinted with permission (bony@[email or web address]). All rights reserved.      Do you have sleep apnea, excessive snoring or daytime drowsiness?: no            Reviewed and updated as needed this visit by clinical staff  Tobacco  Allergies  Meds  Med Hx  Surg Hx  Fam Hx  Soc Hx        Reviewed and updated as needed this visit by Provider        Social History     Tobacco Use     Smoking status: Former Smoker     Packs/day: 1.50     Years: 45.00     Pack years: 67.50     Types: Cigarettes     Start date: 10/28/1955     Last attempt to quit: 2000     Years since quittin.5     Smokeless tobacco: Never Used     Tobacco comment: quit 6 yrs ago   Substance Use Topics     Alcohol use: No                           Current providers sharing in care for this patient include:   Patient Care Team:  Mikala Philip MD as PCP - General (Family Practice)  Mikala Philip MD as Assigned PCP    The following health maintenance items are reviewed in Epic and correct as of today:  Health Maintenance   Topic Date Due     COPD ACTION PLAN Q1 YR  1940     ZOSTER IMMUNIZATION (2 of 3) 2013     MEDICARE ANNUAL WELLNESS VISIT  10/12/2018     BMP Q6 MOS  2019     CMP Q1 YR  2020     CBC Q1 YR  01/10/2020     LIPID MONITORING Q1 YEAR  2020     FALL RISK ASSESSMENT  2020     PHQ-2 Q1 YR  2020     HF ACTION PLAN Q3 YR  2021     ADVANCE DIRECTIVE PLANNING Q5 YRS  10/12/2023     DTAP/TDAP/TD IMMUNIZATION (3 - Td) 2025     SPIROMETRY ONETIME  Completed     DEXA SCAN SCREENING (SYSTEM ASSIGNED)  Completed     INFLUENZA VACCINE  Completed     IPV IMMUNIZATION  Aged Out     MENINGITIS IMMUNIZATION  Aged Out     BP Readings from Last 3 Encounters:   19 102/64   19 112/72   19 116/70    Wt Readings from Last 3 Encounters:   19 79.2 kg (174 lb 8 oz)   19 83.7 kg (184 lb 8 oz)   19 82.6 kg (182  lb)                  Patient Active Problem List   Diagnosis     Symptomatic menopausal or female climacteric states     Hyperlipidemia LDL goal <70     Hypertension goal BP (blood pressure) < 140/90     Knee pain     Obesity     PAF (paroxysmal atrial fibrillation) (H)     S/P cardiac pacemaker procedure     SSS (sick sinus syndrome) (H)     Chronic diastolic CHF (congestive heart failure) (H)     Pulmonary hypertension (H)     Coronary artery calcification seen on CT scan     Hypotension     Past Surgical History:   Procedure Laterality Date     EYE SURGERY       HYSTERECTOMY, VAGINAL      hysterectomy       Social History     Tobacco Use     Smoking status: Former Smoker     Packs/day: 1.50     Years: 45.00     Pack years: 67.50     Types: Cigarettes     Start date: 10/28/1955     Last attempt to quit: 2000     Years since quittin.5     Smokeless tobacco: Never Used     Tobacco comment: quit 6 yrs ago   Substance Use Topics     Alcohol use: No     Family History   Problem Relation Age of Onset     Cerebrovascular Disease Mother      Glaucoma Mother      Macular Degeneration Mother      Alcohol/Drug Father         alcohol     Myocardial Infarction Father 63        passed away from MI     Family History Negative Sister      Family History Negative Sister      Family History Negative Sister      Cerebrovascular Disease Sister      Family History Negative Brother      Family History Negative Maternal Grandmother      Family History Negative Maternal Grandfather      Family History Negative Paternal Grandmother      Family History Negative Paternal Grandfather      Myocardial Infarction Son 57        passed away from MI     Dementia Daughter 57        early onset on [place]     Diabetes No family hx of      Breast Cancer No family hx of      Cancer - colorectal No family hx of          Current Outpatient Medications   Medication Sig Dispense Refill     alendronate (FOSAMAX) 70 MG tablet Take 1 tablet (70 mg)  by mouth every 7 days Take 60 minutes before am meal with 8 oz. water. Remain upright for 30 minutes. 12 tablet 3     apixaban ANTICOAGULANT (ELIQUIS) 5 MG tablet Take 1 tablet (5 mg) by mouth 2 times daily 180 tablet 3     atorvastatin (LIPITOR) 40 MG tablet Take 40 mg by mouth daily       cholecalciferol (VITAMIN  -D) 1000 UNIT capsule Take 1 capsule by mouth daily.       Coral Calcium 133-66.7-133 MG-MG-UNIT CAPS Take 2 tablets by mouth 2 times daily        FLAX SEED OIL OR 1 capsule daily       flecainide (TAMBOCOR) 100 MG tablet Take 1 tablet (100 mg) by mouth 2 times daily 180 tablet 0     furosemide (LASIX) 20 MG tablet Take 1 tablet (20 mg) by mouth daily 90 tablet 3     Krill Oil 500 MG CAPS Take 1 capsule by mouth daily       lisinopril (PRINIVIL/ZESTRIL) 40 MG tablet Take 1 tablet (40 mg) by mouth daily 90 tablet 3     Methylsulfonylmethane (MSM) 500 MG CAPS Take 1 capsule by mouth daily       metoprolol succinate (TOPROL-XL) 50 MG 24 hr tablet Take 1 tablet (50 mg) by mouth 2 times daily 180 tablet 3     Milk Thistle 200 MG CAPS Take 1 capsule by mouth daily        Multiple Vitamins-Minerals (HAIR SKIN NAILS PO) Take 1 tablet by mouth At Bedtime       potassium chloride ER (K-DUR/KLOR-CON M) 10 MEQ CR tablet Take 1 tablet (10 mEq) by mouth daily 90 tablet 3     tiotropium (SPIRIVA) 18 MCG inhaled capsule Inhale 1 capsule (18 mcg) into the lungs daily 90 capsule 3     Allergies   Allergen Reactions     Strawberry Flavor      Recent Labs   Lab Test 01/23/19  1510 01/16/19  1135  01/08/19  0814 01/06/19  0527 01/05/19  1520  10/25/18  0731  07/11/18  0907  05/02/18  0826  03/15/18  2025  03/23/17  0804   A1C  --   --   --  6.9*  --   --   --   --   --   --   --   --   --   --   --   --    LDL  --  70  --   --   --   --   --   --   --  84  --   --   --   --   --  91   HDL  --  45*  --   --   --   --   --   --   --  69  --   --   --   --   --  61   TRIG  --  133  --   --   --   --   --   --   --  88  --   --   " --   --   --  109   ALT  --   --   --   --  55* 61*  --   --   --   --   --  25  --   --    < > 24   CR 0.98 0.96   < > 0.80 0.99 1.35*   < > 0.91   < > 0.81   < > 0.81   < > 0.83   < > 0.71   GFRESTIMATED 55* 56*   < > 70 54* 37*   < > 60*   < > 69   < > 69   < > 66   < > 79   GFRESTBLACK 66 68   < > 81 63 43*   < > 72   < > 83   < > 83   < > 80   < > >90   GFR Calc     POTASSIUM 3.6 3.3*   < > 4.2 4.5 4.7   < > 3.9   < > 4.2   < > 4.1   < > 3.5   < > 3.7   TSH  --   --   --   --   --   --   --  0.80  --   --   --   --   --  0.64   < >  --     < > = values in this interval not displayed.          ROS:   ROS: 10 point ROS neg other than the symptoms noted above in the HPI.     She is still healing after her eye surgery and has follow-up scheduled with her ophthalmologist.    Her boyfriend of 11 years went into the ICU and heart failure this last weekend.  He was also recently diagnosed with Parkinson's disease.  She said he just ended in hospice care.    OBJECTIVE:   /64 (BP Location: Left arm, Patient Position: Chair, Cuff Size: Adult Large)   Pulse 68   Temp 97.6  F (36.4  C) (Oral)   Resp 16   Ht 1.619 m (5' 3.75\")   Wt 79.2 kg (174 lb 8 oz)   LMP  (LMP Unknown)   SpO2 95%   BMI 30.19 kg/m   Estimated body mass index is 30.19 kg/m  as calculated from the following:    Height as of this encounter: 1.619 m (5' 3.75\").    Weight as of this encounter: 79.2 kg (174 lb 8 oz).  EXAM:   GENERAL: healthy, alert and no distress  EYES: right eye grossly normal to inspection. Left eye with some scleral injection and mild eyelid swelling (this is the side she recently had her procedure on)   HENT: ear canals and TM's normal, nose and mouth without ulcers or lesions  NECK: no adenopathy, no asymmetry, masses, or scars and thyroid normal to palpation  RESP: lungs clear to auscultation - no rales, rhonchi or wheezes  CV: regular rate and rhythm, normal S1 S2,  trace peripheral edema on the left, " "none on the right.   ABDOMEN: soft, nontender, no hepatosplenomegaly, no masses and bowel sounds normal  MS: no gross musculoskeletal defects noted, no edema  SKIN: no suspicious lesions or rashes  NEURO: Normal strength and tone, mentation intact and speech normal  PSYCH: mentation appears normal, affect normal/bright    Diagnostic Test Results:  none     ASSESSMENT / PLAN:   1. Medicare annual wellness visit, initial       2. Chronic diastolic CHF (congestive heart failure) (H)  Followed by cardiology and stable    3. Hyperlipidemia LDL goal <70  Continue atorvastatin 40 mg daily    4. Hypertension goal BP (blood pressure) < 140/90  Continues lisinopril 40 mg daily, metoprolol 50 mg daily, furosemide 20 mg daily.  - Basic metabolic panel    5. PAF (paroxysmal atrial fibrillation) (H)  6. SSS (sick sinus syndrome) (H)  7. S/P cardiac pacemaker procedure  Followed by cardiology.  Continues apixaban 5 mg twice daily.    8. Anemia, unspecified type  Will recheck hemoglobin today  - CBC with platelets differential    9. Elevated glucose  While on prednisone, recheck A1c today  - Hemoglobin A1c    10. Hair loss  Diffuse hair loss, last couple of weeks especially. Checking for anemia and thyroid function today. Also was in the hospital for ARF two months ago. Suspect telogen effluvium.   - TSH with free T4 reflex    End of Life Planning:  Patient currently has an advanced directive: No.  I have verified the patient's ablity to prepare an advanced directive/make health care decisions.  Literature was provided to assist patient in preparing an advanced directive.    COUNSELING:  Reviewed preventive health counseling, as reflected in patient instructions    BP Readings from Last 1 Encounters:   03/28/19 102/64     Estimated body mass index is 30.19 kg/m  as calculated from the following:    Height as of this encounter: 1.619 m (5' 3.75\").    Weight as of this encounter: 79.2 kg (174 lb 8 oz).           reports that she quit " smoking about 18 years ago. Her smoking use included cigarettes. She started smoking about 63 years ago. She has a 67.50 pack-year smoking history. she has never used smokeless tobacco.      Appropriate preventive services were discussed with this patient, including applicable screening as appropriate for cardiovascular disease, diabetes, osteopenia/osteoporosis, and glaucoma.  As appropriate for age/gender, discussed screening for colorectal cancer, prostate cancer, breast cancer, and cervical cancer. Checklist reviewing preventive services available has been given to the patient.    Reviewed patients plan of care and provided an AVS. The Intermediate Care Plan ( asthma action plan, low back pain action plan, and migraine action plan) for Hamida meets the Care Plan requirement. This Care Plan has been established and reviewed with the Patient.    Counseling Resources:  ATP IV Guidelines  Pooled Cohorts Equation Calculator  Breast Cancer Risk Calculator  FRAX Risk Assessment  ICSI Preventive Guidelines  Dietary Guidelines for Americans, 2010  USDA's MyPlate  ASA Prophylaxis  Lung CA Screening    Mikala Philip MD, MD  [facility]

## 2019-04-09 ENCOUNTER — ANCILLARY PROCEDURE (OUTPATIENT)
Dept: CARDIOLOGY | Facility: CLINIC | Age: 79
End: 2019-04-09
Attending: INTERNAL MEDICINE
Payer: COMMERCIAL

## 2019-04-09 DIAGNOSIS — I49.5 SICK SINUS SYNDROME (H): ICD-10-CM

## 2019-04-09 PROCEDURE — 93294 REM INTERROG EVL PM/LDLS PM: CPT | Performed by: INTERNAL MEDICINE

## 2019-04-09 PROCEDURE — 93296 REM INTERROG EVL PM/IDS: CPT | Performed by: INTERNAL MEDICINE

## 2019-04-25 LAB
MDC_IDC_EPISODE_DTM: NORMAL
MDC_IDC_EPISODE_DURATION: 6 S
MDC_IDC_EPISODE_ID: NORMAL
MDC_IDC_EPISODE_TYPE: NORMAL
MDC_IDC_LEAD_IMPLANT_DT: NORMAL
MDC_IDC_LEAD_IMPLANT_DT: NORMAL
MDC_IDC_LEAD_LOCATION: NORMAL
MDC_IDC_LEAD_LOCATION: NORMAL
MDC_IDC_LEAD_LOCATION_DETAIL_1: NORMAL
MDC_IDC_LEAD_LOCATION_DETAIL_1: NORMAL
MDC_IDC_LEAD_MFG: NORMAL
MDC_IDC_LEAD_MFG: NORMAL
MDC_IDC_LEAD_MODEL: NORMAL
MDC_IDC_LEAD_MODEL: NORMAL
MDC_IDC_LEAD_SERIAL: NORMAL
MDC_IDC_LEAD_SERIAL: NORMAL
MDC_IDC_MSMT_BATTERY_DTM: NORMAL
MDC_IDC_MSMT_BATTERY_REMAINING_LONGEVITY: 128 MO
MDC_IDC_MSMT_BATTERY_REMAINING_PERCENTAGE: 95.5 %
MDC_IDC_MSMT_BATTERY_RRT_TRIGGER: NORMAL
MDC_IDC_MSMT_BATTERY_STATUS: NORMAL
MDC_IDC_MSMT_BATTERY_VOLTAGE: 3.01 V
MDC_IDC_MSMT_LEADCHNL_RA_IMPEDANCE_VALUE: 510 OHM
MDC_IDC_MSMT_LEADCHNL_RA_LEAD_CHANNEL_STATUS: NORMAL
MDC_IDC_MSMT_LEADCHNL_RA_PACING_THRESHOLD_AMPLITUDE: 0.5 V
MDC_IDC_MSMT_LEADCHNL_RA_PACING_THRESHOLD_PULSEWIDTH: 0.4 MS
MDC_IDC_MSMT_LEADCHNL_RA_SENSING_INTR_AMPL: 3.6 MV
MDC_IDC_MSMT_LEADCHNL_RV_IMPEDANCE_VALUE: 530 OHM
MDC_IDC_MSMT_LEADCHNL_RV_LEAD_CHANNEL_STATUS: NORMAL
MDC_IDC_MSMT_LEADCHNL_RV_PACING_THRESHOLD_AMPLITUDE: 0.62 V
MDC_IDC_MSMT_LEADCHNL_RV_PACING_THRESHOLD_PULSEWIDTH: 0.4 MS
MDC_IDC_MSMT_LEADCHNL_RV_SENSING_INTR_AMPL: 11.8 MV
MDC_IDC_PG_IMPLANT_DTM: NORMAL
MDC_IDC_PG_MFG: NORMAL
MDC_IDC_PG_MODEL: NORMAL
MDC_IDC_PG_SERIAL: NORMAL
MDC_IDC_PG_TYPE: NORMAL
MDC_IDC_SESS_CLINIC_NAME: NORMAL
MDC_IDC_SESS_DTM: NORMAL
MDC_IDC_SESS_REPROGRAMMED: NO
MDC_IDC_SESS_TYPE: NORMAL
MDC_IDC_SET_BRADY_AT_MODE_SWITCH_MODE: NORMAL
MDC_IDC_SET_BRADY_AT_MODE_SWITCH_RATE: 170 {BEATS}/MIN
MDC_IDC_SET_BRADY_LOWRATE: 60 {BEATS}/MIN
MDC_IDC_SET_BRADY_MAX_SENSOR_RATE: 120 {BEATS}/MIN
MDC_IDC_SET_BRADY_MAX_TRACKING_RATE: 120 {BEATS}/MIN
MDC_IDC_SET_BRADY_MODE: NORMAL
MDC_IDC_SET_BRADY_PAV_DELAY_LOW: 250 MS
MDC_IDC_SET_BRADY_SAV_DELAY_LOW: 225 MS
MDC_IDC_SET_LEADCHNL_RA_PACING_AMPLITUDE: 2 V
MDC_IDC_SET_LEADCHNL_RA_PACING_ANODE_ELECTRODE_1: NORMAL
MDC_IDC_SET_LEADCHNL_RA_PACING_ANODE_LOCATION_1: NORMAL
MDC_IDC_SET_LEADCHNL_RA_PACING_CAPTURE_MODE: NORMAL
MDC_IDC_SET_LEADCHNL_RA_PACING_CATHODE_ELECTRODE_1: NORMAL
MDC_IDC_SET_LEADCHNL_RA_PACING_CATHODE_LOCATION_1: NORMAL
MDC_IDC_SET_LEADCHNL_RA_PACING_POLARITY: NORMAL
MDC_IDC_SET_LEADCHNL_RA_PACING_PULSEWIDTH: 0.4 MS
MDC_IDC_SET_LEADCHNL_RA_SENSING_ADAPTATION_MODE: NORMAL
MDC_IDC_SET_LEADCHNL_RA_SENSING_ANODE_ELECTRODE_1: NORMAL
MDC_IDC_SET_LEADCHNL_RA_SENSING_ANODE_LOCATION_1: NORMAL
MDC_IDC_SET_LEADCHNL_RA_SENSING_CATHODE_ELECTRODE_1: NORMAL
MDC_IDC_SET_LEADCHNL_RA_SENSING_CATHODE_LOCATION_1: NORMAL
MDC_IDC_SET_LEADCHNL_RA_SENSING_POLARITY: NORMAL
MDC_IDC_SET_LEADCHNL_RA_SENSING_SENSITIVITY: 0.3 MV
MDC_IDC_SET_LEADCHNL_RV_PACING_AMPLITUDE: 0.88
MDC_IDC_SET_LEADCHNL_RV_PACING_ANODE_ELECTRODE_1: NORMAL
MDC_IDC_SET_LEADCHNL_RV_PACING_ANODE_LOCATION_1: NORMAL
MDC_IDC_SET_LEADCHNL_RV_PACING_CAPTURE_MODE: NORMAL
MDC_IDC_SET_LEADCHNL_RV_PACING_CATHODE_ELECTRODE_1: NORMAL
MDC_IDC_SET_LEADCHNL_RV_PACING_CATHODE_LOCATION_1: NORMAL
MDC_IDC_SET_LEADCHNL_RV_PACING_POLARITY: NORMAL
MDC_IDC_SET_LEADCHNL_RV_PACING_PULSEWIDTH: 0.4 MS
MDC_IDC_SET_LEADCHNL_RV_SENSING_ADAPTATION_MODE: NORMAL
MDC_IDC_SET_LEADCHNL_RV_SENSING_ANODE_ELECTRODE_1: NORMAL
MDC_IDC_SET_LEADCHNL_RV_SENSING_ANODE_LOCATION_1: NORMAL
MDC_IDC_SET_LEADCHNL_RV_SENSING_CATHODE_ELECTRODE_1: NORMAL
MDC_IDC_SET_LEADCHNL_RV_SENSING_CATHODE_LOCATION_1: NORMAL
MDC_IDC_SET_LEADCHNL_RV_SENSING_POLARITY: NORMAL
MDC_IDC_SET_LEADCHNL_RV_SENSING_SENSITIVITY: 0.5 MV
MDC_IDC_STAT_AT_BURDEN_PERCENT: 0 %
MDC_IDC_STAT_AT_DTM_END: NORMAL
MDC_IDC_STAT_AT_DTM_START: NORMAL
MDC_IDC_STAT_AT_MODE_SW_COUNT: 1
MDC_IDC_STAT_AT_MODE_SW_COUNT_PER_DAY: 0
MDC_IDC_STAT_AT_MODE_SW_MAX_DURATION: 6 S
MDC_IDC_STAT_AT_MODE_SW_PERCENT_TIME: 1 %
MDC_IDC_STAT_BRADY_AP_VP_PERCENT: 1 %
MDC_IDC_STAT_BRADY_AP_VS_PERCENT: 13 %
MDC_IDC_STAT_BRADY_AS_VP_PERCENT: 1 %
MDC_IDC_STAT_BRADY_AS_VS_PERCENT: 86 %
MDC_IDC_STAT_BRADY_DTM_END: NORMAL
MDC_IDC_STAT_BRADY_DTM_START: NORMAL
MDC_IDC_STAT_BRADY_RA_PERCENT_PACED: 13 %
MDC_IDC_STAT_BRADY_RV_PERCENT_PACED: 1 %
MDC_IDC_STAT_CRT_DTM_END: NORMAL
MDC_IDC_STAT_CRT_DTM_START: NORMAL
MDC_IDC_STAT_HEART_RATE_ATRIAL_MAX: 330 {BEATS}/MIN
MDC_IDC_STAT_HEART_RATE_ATRIAL_MEAN: 75 {BEATS}/MIN
MDC_IDC_STAT_HEART_RATE_ATRIAL_MIN: 50 {BEATS}/MIN
MDC_IDC_STAT_HEART_RATE_DTM_END: NORMAL
MDC_IDC_STAT_HEART_RATE_DTM_START: NORMAL
MDC_IDC_STAT_HEART_RATE_VENTRICULAR_MAX: 220 {BEATS}/MIN
MDC_IDC_STAT_HEART_RATE_VENTRICULAR_MEAN: 75 {BEATS}/MIN
MDC_IDC_STAT_HEART_RATE_VENTRICULAR_MIN: 40 {BEATS}/MIN

## 2019-04-29 ENCOUNTER — DOCUMENTATION ONLY (OUTPATIENT)
Dept: CARDIOLOGY | Facility: CLINIC | Age: 79
End: 2019-04-29

## 2019-04-30 DIAGNOSIS — I50.20 SYSTOLIC CONGESTIVE HEART FAILURE (H): ICD-10-CM

## 2019-04-30 DIAGNOSIS — I48.91 ATRIAL FIBRILLATION WITH RAPID VENTRICULAR RESPONSE (H): ICD-10-CM

## 2019-05-01 ENCOUNTER — TELEPHONE (OUTPATIENT)
Dept: CARDIOLOGY | Facility: CLINIC | Age: 79
End: 2019-05-01

## 2019-05-01 ENCOUNTER — OFFICE VISIT (OUTPATIENT)
Dept: CARDIOLOGY | Facility: CLINIC | Age: 79
End: 2019-05-01
Attending: PHYSICIAN ASSISTANT
Payer: COMMERCIAL

## 2019-05-01 VITALS
DIASTOLIC BLOOD PRESSURE: 81 MMHG | WEIGHT: 177.7 LBS | HEIGHT: 64 IN | SYSTOLIC BLOOD PRESSURE: 118 MMHG | HEART RATE: 116 BPM | BODY MASS INDEX: 30.34 KG/M2

## 2019-05-01 DIAGNOSIS — E78.5 HYPERLIPIDEMIA LDL GOAL <70: ICD-10-CM

## 2019-05-01 DIAGNOSIS — I48.0 PAF (PAROXYSMAL ATRIAL FIBRILLATION) (H): Primary | ICD-10-CM

## 2019-05-01 DIAGNOSIS — I50.32 CHRONIC DIASTOLIC CONGESTIVE HEART FAILURE (H): ICD-10-CM

## 2019-05-01 DIAGNOSIS — I10 HYPERTENSION GOAL BP (BLOOD PRESSURE) < 140/90: ICD-10-CM

## 2019-05-01 DIAGNOSIS — I48.0 PAF (PAROXYSMAL ATRIAL FIBRILLATION) (H): ICD-10-CM

## 2019-05-01 DIAGNOSIS — Z95.0 CARDIAC PACEMAKER IN SITU: ICD-10-CM

## 2019-05-01 LAB
ANION GAP SERPL CALCULATED.3IONS-SCNC: 14.8 MMOL/L (ref 6–17)
BUN SERPL-MCNC: 17 MG/DL (ref 7–30)
CALCIUM SERPL-MCNC: 10.2 MG/DL (ref 8.5–10.5)
CHLORIDE SERPL-SCNC: 100 MMOL/L (ref 98–107)
CO2 SERPL-SCNC: 27 MMOL/L (ref 23–29)
CREAT SERPL-MCNC: 1.12 MG/DL (ref 0.7–1.3)
GFR SERPL CREATININE-BSD FRML MDRD: 47 ML/MIN/{1.73_M2}
GLUCOSE SERPL-MCNC: 119 MG/DL (ref 70–105)
MAGNESIUM SERPL-MCNC: 2.6 MG/DL (ref 1.6–2.3)
POTASSIUM SERPL-SCNC: 3.8 MMOL/L (ref 3.5–5.1)
SODIUM SERPL-SCNC: 138 MMOL/L (ref 136–145)
TSH SERPL DL<=0.005 MIU/L-ACNC: 1.11 MU/L (ref 0.4–4)

## 2019-05-01 PROCEDURE — 36415 COLL VENOUS BLD VENIPUNCTURE: CPT | Performed by: INTERNAL MEDICINE

## 2019-05-01 PROCEDURE — 80048 BASIC METABOLIC PNL TOTAL CA: CPT | Performed by: INTERNAL MEDICINE

## 2019-05-01 PROCEDURE — 99215 OFFICE O/P EST HI 40 MIN: CPT | Performed by: INTERNAL MEDICINE

## 2019-05-01 PROCEDURE — 84443 ASSAY THYROID STIM HORMONE: CPT | Performed by: INTERNAL MEDICINE

## 2019-05-01 PROCEDURE — 83735 ASSAY OF MAGNESIUM: CPT | Performed by: INTERNAL MEDICINE

## 2019-05-01 PROCEDURE — 93000 ELECTROCARDIOGRAM COMPLETE: CPT | Performed by: INTERNAL MEDICINE

## 2019-05-01 RX ORDER — PREDNISOLONE ACETATE 10 MG/ML
1-2 SUSPENSION/ DROPS OPHTHALMIC 3 TIMES DAILY
COMMUNITY
End: 2019-12-18

## 2019-05-01 ASSESSMENT — MIFFLIN-ST. JEOR: SCORE: 1262.07

## 2019-05-01 NOTE — PROGRESS NOTES
HPI and Plan:   See dictation    Orders Placed This Encounter   Procedures     Basic metabolic panel     TSH with free T4 reflex     Magnesium     Follow-Up with Electrophysiologist     Follow-Up with Cardiac Advanced Practice Provider     Follow-Up with Cardiologist     EKG 12-lead complete w/read - Clinics     EKG 12-lead complete w/read - Clinics (to be scheduled)     EKG 12-lead complete w/read - Clinics     Cardioversion     Transesophageal Echocardiogram       Orders Placed This Encounter   Medications     prednisoLONE acetate (PRED FORTE) 1 % ophthalmic suspension     Si-2 drops 3 times daily     rivaroxaban ANTICOAGULANT (XARELTO) 20 MG TABS tablet     Sig: Take 1 tablet (20 mg) by mouth daily (with dinner)     Dispense:  90 tablet     Refill:  3       Medications Discontinued During This Encounter   Medication Reason     apixaban ANTICOAGULANT (ELIQUIS) 5 MG tablet Alternate therapy         Encounter Diagnoses   Name Primary?     Chronic diastolic congestive heart failure (H)      PAF (paroxysmal atrial fibrillation) (H) Yes     Hypertension goal BP (blood pressure) < 140/90      Hyperlipidemia LDL goal <70      Cardiac pacemaker in situ        CURRENT MEDICATIONS:  Current Outpatient Medications   Medication Sig Dispense Refill     alendronate (FOSAMAX) 70 MG tablet Take 1 tablet (70 mg) by mouth every 7 days Take 60 minutes before am meal with 8 oz. water. Remain upright for 30 minutes. 12 tablet 3     atorvastatin (LIPITOR) 40 MG tablet Take 40 mg by mouth daily       cholecalciferol (VITAMIN  -D) 1000 UNIT capsule Take 1 capsule by mouth daily.       Coral Calcium 133-66.7-133 MG-MG-UNIT CAPS Take 2 tablets by mouth 2 times daily        FLAX SEED OIL OR 1 capsule daily       flecainide (TAMBOCOR) 100 MG tablet Take 1 tablet (100 mg) by mouth 2 times daily 180 tablet 0     furosemide (LASIX) 20 MG tablet Take 1 tablet (20 mg) by mouth daily 90 tablet 3     Krill Oil 500 MG CAPS Take 1 capsule by  mouth daily       lisinopril (PRINIVIL/ZESTRIL) 40 MG tablet Take 1 tablet (40 mg) by mouth daily 90 tablet 3     Methylsulfonylmethane (MSM) 500 MG CAPS Take 1 capsule by mouth daily       metoprolol succinate (TOPROL-XL) 50 MG 24 hr tablet Take 1 tablet (50 mg) by mouth 2 times daily 180 tablet 3     Milk Thistle 200 MG CAPS Take 1 capsule by mouth daily        Multiple Vitamins-Minerals (HAIR SKIN NAILS PO) Take 1 tablet by mouth At Bedtime       potassium chloride ER (K-DUR/KLOR-CON M) 10 MEQ CR tablet Take 1 tablet (10 mEq) by mouth daily 90 tablet 3     prednisoLONE acetate (PRED FORTE) 1 % ophthalmic suspension 1-2 drops 3 times daily       rivaroxaban ANTICOAGULANT (XARELTO) 20 MG TABS tablet Take 1 tablet (20 mg) by mouth daily (with dinner) 90 tablet 3     tiotropium (SPIRIVA) 18 MCG inhaled capsule Inhale 1 capsule (18 mcg) into the lungs daily 90 capsule 3       ALLERGIES     Allergies   Allergen Reactions     Strawberries [Strawberry] Anaphylaxis     Strawberry Flavor        PAST MEDICAL HISTORY:  Past Medical History:   Diagnosis Date     Atrial fibrillation (H)      Chronic diastolic CHF (congestive heart failure) (H)      Essential hypertension, benign      HYPERLIPIDEMIA NEC/NOS 3/30/2006     Pulmonary hypertension (H)      SSS (sick sinus syndrome) (H)     s/p PPM 3/2018       PAST SURGICAL HISTORY:  Past Surgical History:   Procedure Laterality Date     EYE SURGERY       HYSTERECTOMY, VAGINAL      hysterectomy       FAMILY HISTORY:  Family History   Problem Relation Age of Onset     Cerebrovascular Disease Mother      Glaucoma Mother      Macular Degeneration Mother      Alcohol/Drug Father         alcohol     Myocardial Infarction Father 63        passed away from MI     Family History Negative Sister      Family History Negative Sister      Family History Negative Sister      Cerebrovascular Disease Sister      Family History Negative Brother      Family History Negative Maternal Grandmother       Family History Negative Maternal Grandfather      Family History Negative Paternal Grandmother      Family History Negative Paternal Grandfather      Myocardial Infarction Son 57        passed away from MI     Dementia Daughter 57        early onset on [place]     Diabetes No family hx of      Breast Cancer No family hx of      Cancer - colorectal No family hx of        SOCIAL HISTORY:  Social History     Socioeconomic History     Marital status:      Spouse name: None     Number of children: None     Years of education: None     Highest education level: None   Occupational History     None   Social Needs     Financial resource strain: None     Food insecurity:     Worry: None     Inability: None     Transportation needs:     Medical: None     Non-medical: None   Tobacco Use     Smoking status: Former Smoker     Packs/day: 1.50     Years: 45.00     Pack years: 67.50     Types: Cigarettes     Start date: 10/28/1955     Last attempt to quit: 2000     Years since quittin.6     Smokeless tobacco: Never Used     Tobacco comment: quit 6 yrs ago   Substance and Sexual Activity     Alcohol use: No     Drug use: No     Sexual activity: Yes     Partners: Male   Lifestyle     Physical activity:     Days per week: None     Minutes per session: None     Stress: None   Relationships     Social connections:     Talks on phone: None     Gets together: None     Attends Mandaen service: None     Active member of club or organization: None     Attends meetings of clubs or organizations: None     Relationship status: None     Intimate partner violence:     Fear of current or ex partner: None     Emotionally abused: None     Physically abused: None     Forced sexual activity: None   Other Topics Concern     Parent/sibling w/ CABG, MI or angioplasty before 65F 55M? No   Social History Narrative    Balanced Diet - Yes    Osteoporosis Prevention Measures - Dairy servings per day: 2    Regular Exercise -  Yes Describe  "walk, but not recently due to weather    Dental Exam - Yes    Eye Exam -No    Self Breast Exam - Yes    Abuse: Current or Past (Physical, Sexual or Emotional)- No    Do you feel safe in your environment - Yes    Guns stored in the home - No    Sunscreen used - Yes    Seatbelts used - Yes    Lipids -  1/10    Glucose -  1/10    Colon Cancer Screening - Yes, 7/21/08    Hemoccults - NO    Pap Test -  Many yrs ago, has hysterectomy    Do you have any concerns about STD's -  No    Mammography - 6/18/08    DEXA - 4/13/06    Immunizations reviewed and up to date - No    Jonathan Marshall MA                   Review of Systems:  Skin:  Negative       Eyes:  Positive for glasses recent eye surgery   ENT:  Negative      Respiratory:  Negative cough  2L O2   Cardiovascular:  Negative;palpitations;chest pain;syncope or near-syncope;cyanosis;exercise intolerance;lightheadedness;dizziness;fatigue Positive for;edema Left lower leg foot and ankle   Gastroenterology: Negative      Genitourinary:  Negative      Musculoskeletal:  Negative      Neurologic:  Negative      Psychiatric:  Negative      Heme/Lymph/Imm:  Positive for allergies strawberry  Endocrine:  Negative        Physical Exam:  Vitals: /81   Pulse 116   Ht 1.619 m (5' 3.75\")   Wt 80.6 kg (177 lb 11.2 oz)   LMP  (LMP Unknown)   BMI 30.74 kg/m      Constitutional:  cooperative, alert and oriented, well developed, well nourished, in no acute distress        Skin:  warm and dry to the touch, no apparent skin lesions or masses noted   pacemaker incision in the left infraclavicular area was well-healed      Head:  normocephalic, no masses or lesions        Eyes:  pupils equal and round, conjunctivae and lids unremarkable, sclera white, no xanthalasma, EOMS intact, no nystagmus        Lymph:No Cervical lymphadenopathy present     ENT:  no pallor or cyanosis, dentition good        Neck:  carotid pulses are full and equal bilaterally, JVP normal, no carotid bruit    "     Respiratory:  normal breath sounds, clear to auscultation, normal A-P diameter, normal symmetry, normal respiratory excursion, no use of accessory muscles         Cardiac: no murmurs, gallops or rubs detected irregularly irregular rhythm (Variable S1 and normal S2)            pulses full and equal                                        GI:  not assessed this visit        Extremities and Muscular Skeletal:  no deformities, clubbing, cyanosis, erythema observed;no edema              Neurological:  no gross motor deficits;affect appropriate        Psych:  Alert and Oriented x 3        CC  Adriana Ferrell PA-C  [address]  [place] [address]

## 2019-05-01 NOTE — LETTER
5/1/2019      Mikala Philip MD, MD  [address]  [facility] [address]      RE: Hamida Verma       Dear Colleague,    I had the pleasure of seeing Hamida Verma in the [facility].    Service Date: 05/01/2019      REFERRING PHYSICIAN:  Dr. Mikala Philip.        HISTORY OF PRESENT ILLNESS:  It is my pleasure to see your patient, Hamida Verma, who has been followed by JOSUE Morgan, and by Dr. Roe Voss.  This is a patient with a history of paroxysmal atrial fibrillation and also sick sinus syndrome for which she had a permanent pacemaker implanted.  She also carries the diagnosis of diastolic congestive heart failure and has a diagnosis of chronic obstructive pulmonary disease.  This patient has been on flecainide 100 mg twice a day to maintain sinus rhythm.  She did have a nuclear stress test performed in 03/2018 which demonstrated a small perfusion defect of mild severity involving the left ventricular apex which is fixed and most likely breast attenuation.  There was no significant evidence of infarction or ischemia.  Last echocardiogram was performed on 10/12/2018.  At that stage, her ejection fraction was 55%-60%.  There was no mention of diastolic function on that study.  However, I do note that the E to E prime velocity was raised at 20.9, indicating raised left ventricular end-diastolic pressures.  No significant valvular heart disease was noted apart from mild to moderate tricuspid regurgitation.  With that background in mind, the patient presents today feeling well with no shortness of breath, no chest discomfort, no ankle edema.  She also is complaining of no palpitations.  However, on examination her pulse is clearly irregular and rapid, and we obtained a 12-lead electrocardiogram which showed that her ventricular rate is 150 beats per minute.  Looking at V1, this appears to be atrial flutter with rapid ventricular response.  Her blood pressure is normal at 118/81.   Initially when I spoke to the patient, she had said that she had been taking her Eliquis faithfully for a long period of time, but then just after I had ordered the electrical cardioversion, she mentioned that she had missed 2 days of Eliquis.  She ran out of it on Saturday, had none and Sunday, and then it was reintroduced on Monday.  Therefore, we cannot proceed with cardioversion without transesophageal echocardiography as per the American College of Cardiology guidelines.  The patient appears euvolemic today with no orthopnea, PND or ankle edema.  Her jugular venous pulse is not raised.  She has no peripheral edema.      The patient also tells me that Eliquis is extremely expensive for her, but Xarelto is not, so I will be switching her to 20 mg per day of Xarelto this evening.  I have told her not to take the Eliquis but to take the Xarelto 20 mg with her evening meal this evening.      IMPRESSION:   1.  Recurrent atrial fibrillation/atrial flutter with rapid ventricular response.  The patient is asymptomatic with this.   2.  The patient did miss 2 days of Eliquis last weekend.  This precludes proceeding ahead with electrical cardioversion in the absence of a CAROLANN.   3.  Diagnosis of diastolic heart failure.  The patient appears euvolemic and does not have symptoms of congestive heart failure.   4.  Chronic obstructive pulmonary disease.   5.  Normotensive.      PLAN:   1.  Firstly, we will switch the patient to Xarelto, which is more affordable for her.  She will take 20 mg this evening and stop the Eliquis medication.   2.  We will arrange for a transesophageal echo and cardioversion, tomorrow if possible.  I explained the risks of transesophageal echo to the patient including the risk of throat discomfort, laceration or abrasion of the oropharynx, rupture of the esophagus.  I also explained to the patient that she must be fasting for a minimum of 6 hours before the procedure, but she can take her medication  with water sips.  I explained the risks of electrical cardioversion including redness on the chest and stroke.  I did explain to the patient that we are performing transesophageal echocardiography to reduce the risk of stroke.  I also explained to the patient that she needs to have somebody with her to bring her home.   3.  I will arrange for the patient to see Dr. Voss, who has been following her for some time with respect to her pacemaker and atrial fibrillation.   4.  It is clear that she has failed the 100 mg twice a day dosing of flecainide.  You may want to use another medication such as sotalol or AV node ablation.  She does not feel the atrial fibrillation, but clearly metoprolol and flecainide does not keep the heart rate below 100 when she goes into this rhythm.   5.  I will have the patient follow up with JOSUE Morgan, in a month's time and she will follow up me in 6 months' time.      It has been my pleasure to be involved in the care of this very nice patient.      Roby Mallory MD, FACC      cc:   [place] [address]         ROBY COLUNGA MD, FACC             D: 2019   T: 2019   MT: LIZ      Name:     ARTURO BOSWELL   MRN:      [id number]        Account:      [id number]   :      1940           Service Date: 2019      Document: [id number]         Outpatient Encounter Medications as of 2019   Medication Sig Dispense Refill     alendronate (FOSAMAX) 70 MG tablet Take 1 tablet (70 mg) by mouth every 7 days Take 60 minutes before am meal with 8 oz. water. Remain upright for 30 minutes. 12 tablet 3     atorvastatin (LIPITOR) 40 MG tablet Take 40 mg by mouth daily       cholecalciferol (VITAMIN  -D) 1000 UNIT capsule Take 1 capsule by mouth daily.       Coral Calcium 133-66.7-133 MG-MG-UNIT CAPS Take 2 tablets by mouth 2 times daily        FLAX SEED OIL OR 1 capsule daily        flecainide (TAMBOCOR) 100 MG tablet Take 1 tablet (100 mg) by mouth 2 times daily 180 tablet 0     furosemide (LASIX) 20 MG tablet Take 1 tablet (20 mg) by mouth daily 90 tablet 3     Krill Oil 500 MG CAPS Take 1 capsule by mouth daily       lisinopril (PRINIVIL/ZESTRIL) 40 MG tablet Take 1 tablet (40 mg) by mouth daily 90 tablet 3     Methylsulfonylmethane (MSM) 500 MG CAPS Take 1 capsule by mouth daily       Milk Thistle 200 MG CAPS Take 1 capsule by mouth daily        Multiple Vitamins-Minerals (HAIR SKIN NAILS PO) Take 1 tablet by mouth At Bedtime       potassium chloride ER (K-DUR/KLOR-CON M) 10 MEQ CR tablet Take 1 tablet (10 mEq) by mouth daily 90 tablet 3     prednisoLONE acetate (PRED FORTE) 1 % ophthalmic suspension 1-2 drops 3 times daily       rivaroxaban ANTICOAGULANT (XARELTO) 20 MG TABS tablet Take 1 tablet (20 mg) by mouth daily (with dinner) 90 tablet 3     tiotropium (SPIRIVA) 18 MCG inhaled capsule Inhale 1 capsule (18 mcg) into the lungs daily 90 capsule 3     [DISCONTINUED] metoprolol succinate (TOPROL-XL) 50 MG 24 hr tablet Take 1 tablet (50 mg) by mouth 2 times daily 180 tablet 3     [DISCONTINUED] apixaban ANTICOAGULANT (ELIQUIS) 5 MG tablet Take 1 tablet (5 mg) by mouth 2 times daily 180 tablet 3     No facility-administered encounter medications on file as of 5/1/2019.        Again, thank you for allowing me to participate in the care of your patient.      Sincerely,    Roby Mallory MD, MD     [facility]

## 2019-05-01 NOTE — PROGRESS NOTES
Service Date: 05/01/2019      REFERRING PHYSICIAN:  Dr. Mikala Philip.        HISTORY OF PRESENT ILLNESS:  It is my pleasure to see your patient, Hamida Verma, who has been followed by JOSUE Morgan, and by Dr. Roe Voss.  This is a patient with a history of paroxysmal atrial fibrillation and also sick sinus syndrome for which she had a permanent pacemaker implanted.  She also carries the diagnosis of diastolic congestive heart failure and has a diagnosis of chronic obstructive pulmonary disease.  This patient has been on flecainide 100 mg twice a day to maintain sinus rhythm.  She did have a nuclear stress test performed in 03/2018 which demonstrated a small perfusion defect of mild severity involving the left ventricular apex which is fixed and most likely breast attenuation.  There was no significant evidence of infarction or ischemia.  Last echocardiogram was performed on 10/12/2018.  At that stage, her ejection fraction was 55%-60%.  There was no mention of diastolic function on that study.  However, I do note that the E to E prime velocity was raised at 20.9, indicating raised left ventricular end-diastolic pressures.  No significant valvular heart disease was noted apart from mild to moderate tricuspid regurgitation.  With that background in mind, the patient presents today feeling well with no shortness of breath, no chest discomfort, no ankle edema.  She also is complaining of no palpitations.  However, on examination her pulse is clearly irregular and rapid, and we obtained a 12-lead electrocardiogram which showed that her ventricular rate is 150 beats per minute.  Looking at V1, this appears to be atrial flutter with rapid ventricular response.  Her blood pressure is normal at 118/81.  Initially when I spoke to the patient, she had said that she had been taking her Eliquis faithfully for a long period of time, but then just after I had ordered the electrical cardioversion, she mentioned that she had  missed 2 days of Eliquis.  She ran out of it on Saturday, had none and Sunday, and then it was reintroduced on Monday.  Therefore, we cannot proceed with cardioversion without transesophageal echocardiography as per the American College of Cardiology guidelines.  The patient appears euvolemic today with no orthopnea, PND or ankle edema.  Her jugular venous pulse is not raised.  She has no peripheral edema.      The patient also tells me that Eliquis is extremely expensive for her, but Xarelto is not, so I will be switching her to 20 mg per day of Xarelto this evening.  I have told her not to take the Eliquis but to take the Xarelto 20 mg with her evening meal this evening.      IMPRESSION:   1.  Recurrent atrial fibrillation/atrial flutter with rapid ventricular response.  The patient is asymptomatic with this.   2.  The patient did miss 2 days of Eliquis last weekend.  This precludes proceeding ahead with electrical cardioversion in the absence of a CAROLANN.   3.  Diagnosis of diastolic heart failure.  The patient appears euvolemic and does not have symptoms of congestive heart failure.   4.  Chronic obstructive pulmonary disease.   5.  Normotensive.      PLAN:   1.  Firstly, we will switch the patient to Xarelto, which is more affordable for her.  She will take 20 mg this evening and stop the Eliquis medication.   2.  We will arrange for a transesophageal echo and cardioversion, tomorrow if possible.  I explained the risks of transesophageal echo to the patient including the risk of throat discomfort, laceration or abrasion of the oropharynx, rupture of the esophagus.  I also explained to the patient that she must be fasting for a minimum of 6 hours before the procedure, but she can take her medication with water sips.  I explained the risks of electrical cardioversion including redness on the chest and stroke.  I did explain to the patient that we are performing transesophageal echocardiography to reduce the risk of  stroke.  I also explained to the patient that she needs to have somebody with her to bring her home.   3.  I will arrange for the patient to see Dr. Voss, who has been following her for some time with respect to her pacemaker and atrial fibrillation.   4.  It is clear that she has failed the 100 mg twice a day dosing of flecainide.  You may want to use another medication such as sotalol or AV node ablation.  She does not feel the atrial fibrillation, but clearly metoprolol and flecainide does not keep the heart rate below 100 when she goes into this rhythm.   5.  I will have the patient follow up with JOSUE Morgan, in a month's time and she will follow up me in 6 months' time.      It has been my pleasure to be involved in the care of this very nice patient.      Roby Mallory MD, FACC      cc:   [place] [address]         ROBY COLUNGA MD, FACC             D: 2019   T: 2019   MT: LIZ      Name:     ARTURO BOSWELL   MRN:      [id number]        Account:      [id number]   :      1940           Service Date: 2019      Document: [id number]

## 2019-05-01 NOTE — TELEPHONE ENCOUNTER
Called pt regarding pre procedure instructions for CAROLANN and Cardioversion scheduled for tomorrow Here at [facility] @ 1330- pt to arrive @ 1130    Left voice message to call back with any questions.

## 2019-05-01 NOTE — LETTER
2019    Mikala Philip MD, MD  [address]  [facility] [address]    RE: Hamida Verma       Dear Colleague,    I had the pleasure of seeing Hamida Verma in the [facility].    HPI and Plan:   See dictation    Orders Placed This Encounter   Procedures     Basic metabolic panel     TSH with free T4 reflex     Magnesium     Follow-Up with Electrophysiologist     Follow-Up with Cardiac Advanced Practice Provider     Follow-Up with Cardiologist     EKG 12-lead complete w/read - Clinics     EKG 12-lead complete w/read - Clinics (to be scheduled)     EKG 12-lead complete w/read - Clinics     Cardioversion     Transesophageal Echocardiogram       Orders Placed This Encounter   Medications     prednisoLONE acetate (PRED FORTE) 1 % ophthalmic suspension     Si-2 drops 3 times daily     rivaroxaban ANTICOAGULANT (XARELTO) 20 MG TABS tablet     Sig: Take 1 tablet (20 mg) by mouth daily (with dinner)     Dispense:  90 tablet     Refill:  3       Medications Discontinued During This Encounter   Medication Reason     apixaban ANTICOAGULANT (ELIQUIS) 5 MG tablet Alternate therapy         Encounter Diagnoses   Name Primary?     Chronic diastolic congestive heart failure (H)      PAF (paroxysmal atrial fibrillation) (H) Yes     Hypertension goal BP (blood pressure) < 140/90      Hyperlipidemia LDL goal <70      Cardiac pacemaker in situ        CURRENT MEDICATIONS:  Current Outpatient Medications   Medication Sig Dispense Refill     alendronate (FOSAMAX) 70 MG tablet Take 1 tablet (70 mg) by mouth every 7 days Take 60 minutes before am meal with 8 oz. water. Remain upright for 30 minutes. 12 tablet 3     atorvastatin (LIPITOR) 40 MG tablet Take 40 mg by mouth daily       cholecalciferol (VITAMIN  -D) 1000 UNIT capsule Take 1 capsule by mouth daily.       Coral Calcium 133-66.7-133 MG-MG-UNIT CAPS Take 2 tablets by mouth 2 times daily        FLAX SEED OIL OR 1 capsule daily        flecainide (TAMBOCOR) 100 MG tablet Take 1 tablet (100 mg) by mouth 2 times daily 180 tablet 0     furosemide (LASIX) 20 MG tablet Take 1 tablet (20 mg) by mouth daily 90 tablet 3     Krill Oil 500 MG CAPS Take 1 capsule by mouth daily       lisinopril (PRINIVIL/ZESTRIL) 40 MG tablet Take 1 tablet (40 mg) by mouth daily 90 tablet 3     Methylsulfonylmethane (MSM) 500 MG CAPS Take 1 capsule by mouth daily       metoprolol succinate (TOPROL-XL) 50 MG 24 hr tablet Take 1 tablet (50 mg) by mouth 2 times daily 180 tablet 3     Milk Thistle 200 MG CAPS Take 1 capsule by mouth daily        Multiple Vitamins-Minerals (HAIR SKIN NAILS PO) Take 1 tablet by mouth At Bedtime       potassium chloride ER (K-DUR/KLOR-CON M) 10 MEQ CR tablet Take 1 tablet (10 mEq) by mouth daily 90 tablet 3     prednisoLONE acetate (PRED FORTE) 1 % ophthalmic suspension 1-2 drops 3 times daily       rivaroxaban ANTICOAGULANT (XARELTO) 20 MG TABS tablet Take 1 tablet (20 mg) by mouth daily (with dinner) 90 tablet 3     tiotropium (SPIRIVA) 18 MCG inhaled capsule Inhale 1 capsule (18 mcg) into the lungs daily 90 capsule 3       ALLERGIES     Allergies   Allergen Reactions     Strawberries [Strawberry] Anaphylaxis     Strawberry Flavor        PAST MEDICAL HISTORY:  Past Medical History:   Diagnosis Date     Atrial fibrillation (H)      Chronic diastolic CHF (congestive heart failure) (H)      Essential hypertension, benign      HYPERLIPIDEMIA NEC/NOS 3/30/2006     Pulmonary hypertension (H)      SSS (sick sinus syndrome) (H)     s/p PPM 3/2018       PAST SURGICAL HISTORY:  Past Surgical History:   Procedure Laterality Date     EYE SURGERY       HYSTERECTOMY, VAGINAL      hysterectomy       FAMILY HISTORY:  Family History   Problem Relation Age of Onset     Cerebrovascular Disease Mother      Glaucoma Mother      Macular Degeneration Mother      Alcohol/Drug Father         alcohol     Myocardial Infarction Father 63        passed away from MI      Family History Negative Sister      Family History Negative Sister      Family History Negative Sister      Cerebrovascular Disease Sister      Family History Negative Brother      Family History Negative Maternal Grandmother      Family History Negative Maternal Grandfather      Family History Negative Paternal Grandmother      Family History Negative Paternal Grandfather      Myocardial Infarction Son 57        passed away from MI     Dementia Daughter 57        early onset on [place]     Diabetes No family hx of      Breast Cancer No family hx of      Cancer - colorectal No family hx of        SOCIAL HISTORY:  Social History     Socioeconomic History     Marital status:      Spouse name: None     Number of children: None     Years of education: None     Highest education level: None   Occupational History     None   Social Needs     Financial resource strain: None     Food insecurity:     Worry: None     Inability: None     Transportation needs:     Medical: None     Non-medical: None   Tobacco Use     Smoking status: Former Smoker     Packs/day: 1.50     Years: 45.00     Pack years: 67.50     Types: Cigarettes     Start date: 10/28/1955     Last attempt to quit: 2000     Years since quittin.6     Smokeless tobacco: Never Used     Tobacco comment: quit 6 yrs ago   Substance and Sexual Activity     Alcohol use: No     Drug use: No     Sexual activity: Yes     Partners: Male   Lifestyle     Physical activity:     Days per week: None     Minutes per session: None     Stress: None   Relationships     Social connections:     Talks on phone: None     Gets together: None     Attends Restorationist service: None     Active member of club or organization: None     Attends meetings of clubs or organizations: None     Relationship status: None     Intimate partner violence:     Fear of current or ex partner: None     Emotionally abused: None     Physically abused: None     Forced sexual activity: None   Other  "Topics Concern     Parent/sibling w/ CABG, MI or angioplasty before 65F 55M? No   Social History Narrative    Balanced Diet - Yes    Osteoporosis Prevention Measures - Dairy servings per day: 2    Regular Exercise -  Yes Describe walk, but not recently due to weather    Dental Exam - Yes    Eye Exam -No    Self Breast Exam - Yes    Abuse: Current or Past (Physical, Sexual or Emotional)- No    Do you feel safe in your environment - Yes    Guns stored in the home - No    Sunscreen used - Yes    Seatbelts used - Yes    Lipids -  1/10    Glucose -  1/10    Colon Cancer Screening - Yes, 7/21/08    Hemoccults - NO    Pap Test -  Many yrs ago, has hysterectomy    Do you have any concerns about STD's -  No    Mammography - 6/18/08    DEXA - 4/13/06    Immunizations reviewed and up to date - No    Jonathan Marshall MA                   Review of Systems:  Skin:  Negative       Eyes:  Positive for glasses recent eye surgery   ENT:  Negative      Respiratory:  Negative cough  2L O2   Cardiovascular:  Negative;palpitations;chest pain;syncope or near-syncope;cyanosis;exercise intolerance;lightheadedness;dizziness;fatigue Positive for;edema Left lower leg foot and ankle   Gastroenterology: Negative      Genitourinary:  Negative      Musculoskeletal:  Negative      Neurologic:  Negative      Psychiatric:  Negative      Heme/Lymph/Imm:  Positive for allergies strawberry  Endocrine:  Negative        Physical Exam:  Vitals: /81   Pulse 116   Ht 1.619 m (5' 3.75\")   Wt 80.6 kg (177 lb 11.2 oz)   LMP  (LMP Unknown)   BMI 30.74 kg/m       Constitutional:  cooperative, alert and oriented, well developed, well nourished, in no acute distress        Skin:  warm and dry to the touch, no apparent skin lesions or masses noted   pacemaker incision in the left infraclavicular area was well-healed      Head:  normocephalic, no masses or lesions        Eyes:  pupils equal and round, conjunctivae and lids unremarkable, sclera white, no " xanthalasma, EOMS intact, no nystagmus        Lymph:No Cervical lymphadenopathy present     ENT:  no pallor or cyanosis, dentition good        Neck:  carotid pulses are full and equal bilaterally, JVP normal, no carotid bruit        Respiratory:  normal breath sounds, clear to auscultation, normal A-P diameter, normal symmetry, normal respiratory excursion, no use of accessory muscles         Cardiac: no murmurs, gallops or rubs detected irregularly irregular rhythm (Variable S1 and normal S2)            pulses full and equal                                        GI:  not assessed this visit        Extremities and Muscular Skeletal:  no deformities, clubbing, cyanosis, erythema observed;no edema              Neurological:  no gross motor deficits;affect appropriate        Psych:  Alert and Oriented x 3        CC  Adriana Ferrell PA-C  [address]  [place] [address]                Service Date: 05/01/2019      REFERRING PHYSICIAN:  Dr. Mikala Philip.        HISTORY OF PRESENT ILLNESS:  It is my pleasure to see your patient, Hamida Verma, who has been followed by JOSUE Morgan, and by Dr. Roe Voss.  This is a patient with a history of paroxysmal atrial fibrillation and also sick sinus syndrome for which she had a permanent pacemaker implanted.  She also carries the diagnosis of diastolic congestive heart failure and has a diagnosis of chronic obstructive pulmonary disease.  This patient has been on flecainide 100 mg twice a day to maintain sinus rhythm.  She did have a nuclear stress test performed in 03/2018 which demonstrated a small perfusion defect of mild severity involving the left ventricular apex which is fixed and most likely breast attenuation.  There was no significant evidence of infarction or ischemia.  Last echocardiogram was performed on 10/12/2018.  At that stage, her ejection fraction was 55%-60%.  There was no mention of diastolic function on that study.  However, I do note that  the E to E prime velocity was raised at 20.9, indicating raised left ventricular end-diastolic pressures.  No significant valvular heart disease was noted apart from mild to moderate tricuspid regurgitation.  With that background in mind, the patient presents today feeling well with no shortness of breath, no chest discomfort, no ankle edema.  She also is complaining of no palpitations.  However, on examination her pulse is clearly irregular and rapid, and we obtained a 12-lead electrocardiogram which showed that her ventricular rate is 150 beats per minute.  Looking at V1, this appears to be atrial flutter with rapid ventricular response.  Her blood pressure is normal at 118/81.  Initially when I spoke to the patient, she had said that she had been taking her Eliquis faithfully for a long period of time, but then just after I had ordered the electrical cardioversion, she mentioned that she had missed 2 days of Eliquis.  She ran out of it on Saturday, had none and Sunday, and then it was reintroduced on Monday.  Therefore, we cannot proceed with cardioversion without transesophageal echocardiography as per the American College of Cardiology guidelines.  The patient appears euvolemic today with no orthopnea, PND or ankle edema.  Her jugular venous pulse is not raised.  She has no peripheral edema.      The patient also tells me that Eliquis is extremely expensive for her, but Xarelto is not, so I will be switching her to 20 mg per day of Xarelto this evening.  I have told her not to take the Eliquis but to take the Xarelto 20 mg with her evening meal this evening.      IMPRESSION:   1.  Recurrent atrial fibrillation/atrial flutter with rapid ventricular response.  The patient is asymptomatic with this.   2.  The patient did miss 2 days of Eliquis last weekend.  This precludes proceeding ahead with electrical cardioversion in the absence of a CAROLANN.   3.  Diagnosis of diastolic heart failure.  The patient appears  euvolemic and does not have symptoms of congestive heart failure.   4.  Chronic obstructive pulmonary disease.   5.  Normotensive.      PLAN:   1.  Firstly, we will switch the patient to Xarelto, which is more affordable for her.  She will take 20 mg this evening and stop the Eliquis medication.   2.  We will arrange for a transesophageal echo and cardioversion, tomorrow if possible.  I explained the risks of transesophageal echo to the patient including the risk of throat discomfort, laceration or abrasion of the oropharynx, rupture of the esophagus.  I also explained to the patient that she must be fasting for a minimum of 6 hours before the procedure, but she can take her medication with water sips.  I explained the risks of electrical cardioversion including redness on the chest and stroke.  I did explain to the patient that we are performing transesophageal echocardiography to reduce the risk of stroke.  I also explained to the patient that she needs to have somebody with her to bring her home.   3.  I will arrange for the patient to see Dr. Voss, who has been following her for some time with respect to her pacemaker and atrial fibrillation.   4.  It is clear that she has failed the 100 mg twice a day dosing of flecainide.  You may want to use another medication such as sotalol or AV node ablation.  She does not feel the atrial fibrillation, but clearly metoprolol and flecainide does not keep the heart rate below 100 when she goes into this rhythm.   5.  I will have the patient follow up with JOSUE Morgan, in a month's time and she will follow up me in 6 months' time.      It has been my pleasure to be involved in the care of this very nice patient.      Roby Mallory MD, FACC      cc:   [place] [address]         ROBY COLUNGA MD, FACC             D: 05/01/2019   T: 05/01/2019   MT: LIZ      Name:     ARTURO BOSWELL    MRN:      [id number]        Account:      [id number]   :      1940           Service Date: 2019      Document: [id number]         Thank you for allowing me to participate in the care of your patient.      Sincerely,     Roby Mallory MD, MD     [facility]    cc:   JACKIE AdameC  [address] [facility] [address]  [place] [address]

## 2019-05-02 ENCOUNTER — ANESTHESIA EVENT (OUTPATIENT)
Dept: MEDSURG UNIT | Facility: CLINIC | Age: 79
End: 2019-05-02

## 2019-05-02 ENCOUNTER — HOSPITAL ENCOUNTER (OUTPATIENT)
Facility: CLINIC | Age: 79
Discharge: HOME | End: 2019-05-02
Attending: INTERNAL MEDICINE | Admitting: INTERNAL MEDICINE
Payer: COMMERCIAL

## 2019-05-02 ENCOUNTER — ANESTHESIA (OUTPATIENT)
Dept: MEDSURG UNIT | Facility: CLINIC | Age: 79
End: 2019-05-02

## 2019-05-02 ENCOUNTER — HOSPITAL ENCOUNTER (OUTPATIENT)
Dept: CARDIOLOGY | Facility: CLINIC | Age: 79
End: 2019-05-02
Attending: INTERNAL MEDICINE | Admitting: INTERNAL MEDICINE
Payer: COMMERCIAL

## 2019-05-02 VITALS
DIASTOLIC BLOOD PRESSURE: 64 MMHG | OXYGEN SATURATION: 98 % | HEIGHT: 64 IN | HEART RATE: 144 BPM | TEMPERATURE: 97.9 F | BODY MASS INDEX: 30.34 KG/M2 | WEIGHT: 177.7 LBS | SYSTOLIC BLOOD PRESSURE: 102 MMHG | RESPIRATION RATE: 18 BRPM

## 2019-05-02 DIAGNOSIS — I48.0 PAF (PAROXYSMAL ATRIAL FIBRILLATION) (H): ICD-10-CM

## 2019-05-02 DIAGNOSIS — I48.0 PAROXYSMAL ATRIAL FIBRILLATION (H): Primary | ICD-10-CM

## 2019-05-02 PROCEDURE — 92960 CARDIOVERSION ELECTRIC EXT: CPT

## 2019-05-02 PROCEDURE — 40000857 ZZH STATISTIC TEE INCLUDES SEDATION

## 2019-05-02 PROCEDURE — 40000235 ZZH STATISTIC TELEMETRY

## 2019-05-02 PROCEDURE — 36415 COLL VENOUS BLD VENIPUNCTURE: CPT

## 2019-05-02 RX ORDER — FENTANYL CITRATE 50 UG/ML
25 INJECTION, SOLUTION INTRAMUSCULAR; INTRAVENOUS
Status: DISCONTINUED | OUTPATIENT
Start: 2019-05-02 | End: 2019-05-02 | Stop reason: HOSPADM

## 2019-05-02 RX ORDER — GLYCOPYRROLATE 0.2 MG/ML
0.1 INJECTION, SOLUTION INTRAMUSCULAR; INTRAVENOUS ONCE
Status: DISCONTINUED | OUTPATIENT
Start: 2019-05-02 | End: 2019-05-02 | Stop reason: HOSPADM

## 2019-05-02 RX ORDER — POTASSIUM CHLORIDE 1500 MG/1
40 TABLET, EXTENDED RELEASE ORAL
Status: DISCONTINUED | OUTPATIENT
Start: 2019-05-02 | End: 2019-05-02 | Stop reason: HOSPADM

## 2019-05-02 RX ORDER — ATROPINE SULFATE 0.1 MG/ML
.5-1 INJECTION INTRAVENOUS
Status: DISCONTINUED | OUTPATIENT
Start: 2019-05-02 | End: 2019-05-02 | Stop reason: HOSPADM

## 2019-05-02 RX ORDER — SODIUM CHLORIDE 9 MG/ML
1000 INJECTION, SOLUTION INTRAVENOUS CONTINUOUS
Status: DISCONTINUED | OUTPATIENT
Start: 2019-05-02 | End: 2019-05-02 | Stop reason: HOSPADM

## 2019-05-02 RX ORDER — MAGNESIUM SULFATE HEPTAHYDRATE 40 MG/ML
2 INJECTION, SOLUTION INTRAVENOUS
Status: DISCONTINUED | OUTPATIENT
Start: 2019-05-02 | End: 2019-05-02 | Stop reason: HOSPADM

## 2019-05-02 RX ORDER — METOPROLOL SUCCINATE 50 MG/1
50 TABLET, EXTENDED RELEASE ORAL 2 TIMES DAILY
Qty: 180 TABLET | Refills: 3 | Status: SHIPPED | OUTPATIENT
Start: 2019-05-02 | End: 2020-04-21

## 2019-05-02 RX ORDER — FLUMAZENIL 0.1 MG/ML
0.2 INJECTION, SOLUTION INTRAVENOUS
Status: DISCONTINUED | OUTPATIENT
Start: 2019-05-02 | End: 2019-05-02 | Stop reason: HOSPADM

## 2019-05-02 RX ORDER — POTASSIUM CHLORIDE 1500 MG/1
20 TABLET, EXTENDED RELEASE ORAL
Status: DISCONTINUED | OUTPATIENT
Start: 2019-05-02 | End: 2019-05-02 | Stop reason: HOSPADM

## 2019-05-02 RX ORDER — LIDOCAINE HYDROCHLORIDE 40 MG/ML
1.5 SOLUTION TOPICAL ONCE
Status: DISCONTINUED | OUTPATIENT
Start: 2019-05-02 | End: 2019-05-02 | Stop reason: HOSPADM

## 2019-05-02 RX ORDER — FENTANYL CITRATE 50 UG/ML
25-50 INJECTION, SOLUTION INTRAMUSCULAR; INTRAVENOUS
Status: DISCONTINUED | OUTPATIENT
Start: 2019-05-02 | End: 2019-05-02 | Stop reason: HOSPADM

## 2019-05-02 RX ORDER — NALOXONE HYDROCHLORIDE 0.4 MG/ML
.1-.4 INJECTION, SOLUTION INTRAMUSCULAR; INTRAVENOUS; SUBCUTANEOUS
Status: DISCONTINUED | OUTPATIENT
Start: 2019-05-02 | End: 2019-05-02 | Stop reason: HOSPADM

## 2019-05-02 ASSESSMENT — MIFFLIN-ST. JEOR: SCORE: 1261.91

## 2019-05-02 NOTE — PROGRESS NOTES
MD Notification    Notified Person: MD    Notified Person Name:  Dr. Mallory    Notification Date/Time:  5/2/19 1215    Notification Interaction:  Telephone    Purpose of Notification:  Patient states she took her last dose of Eliquis on 5/1/19 A.M. And has not started her Xarelto.    Orders Received:  Per Dr. Mallory procedure cancelled.      Comments:  Dr. Mallory at bedside.  Explained reason procedure cancelled.  [facility] will call to reschedule.  He insturcted the patient to take her Xarelto tonight and stressed the importance of not missing any doses.  Patient and her granddaughter, Zoe verbalized understanding.

## 2019-05-02 NOTE — PROGRESS NOTES
Patient was due to have transesophageal echocardiogram and electrical cardioversion today.  She was given very clear instructions yesterday to start Xarelto 20 mg with her evening meal.  She was told specifically to take the Xarelto the evening before her procedure and specifically she was told to take it with her meal on the evening of 1 May which was yesterday.  The patient did not take her Xarelto or any other anticoagulants.  She arrives here today not anticoagulated and because of this we cannot proceed ahead with electrical cardioversion and CAROLANN.  She was told to start taking the Xarelto this evening and to continue to take it.  I repeated this about 3 times so that she and her granddaughter would understand that.  I must say that I mention it about 3 times yesterday as well and it was the last thing I mentioned to her before she left our clinic yesterday evening.  Her granddaughter told me that she would ensure that her grandmother did take the Xarelto.  I will try to get the CAROLANN and cardioversion performed tomorrow. The last thing I said to her before I left the care suite room was to take the Xarelto this evening, and she has samples which I gave to her yesterday..

## 2019-05-03 ENCOUNTER — ANESTHESIA EVENT (OUTPATIENT)
Dept: MEDSURG UNIT | Facility: CLINIC | Age: 79
End: 2019-05-03

## 2019-05-03 ENCOUNTER — ANESTHESIA (OUTPATIENT)
Dept: MEDSURG UNIT | Facility: CLINIC | Age: 79
End: 2019-05-03

## 2019-05-03 ENCOUNTER — HOSPITAL ENCOUNTER (OUTPATIENT)
Dept: MEDSURG UNIT | Facility: CLINIC | Age: 79
End: 2019-05-03
Attending: INTERNAL MEDICINE
Payer: COMMERCIAL

## 2019-05-03 ENCOUNTER — HOSPITAL ENCOUNTER (OUTPATIENT)
Dept: CARDIOLOGY | Facility: CLINIC | Age: 79
Discharge: HOME | End: 2019-05-03
Attending: INTERNAL MEDICINE | Admitting: INTERNAL MEDICINE
Payer: COMMERCIAL

## 2019-05-03 VITALS
BODY MASS INDEX: 30.22 KG/M2 | WEIGHT: 177 LBS | HEART RATE: 92 BPM | RESPIRATION RATE: 16 BRPM | OXYGEN SATURATION: 100 % | DIASTOLIC BLOOD PRESSURE: 62 MMHG | TEMPERATURE: 97.9 F | SYSTOLIC BLOOD PRESSURE: 98 MMHG | HEIGHT: 64 IN

## 2019-05-03 DIAGNOSIS — I48.0 PAROXYSMAL ATRIAL FIBRILLATION (H): ICD-10-CM

## 2019-05-03 LAB
MAGNESIUM SERPL-MCNC: 2.5 MG/DL (ref 1.6–2.3)
POTASSIUM SERPL-SCNC: 4.1 MMOL/L (ref 3.4–5.3)

## 2019-05-03 PROCEDURE — 93010 ELECTROCARDIOGRAM REPORT: CPT | Performed by: INTERNAL MEDICINE

## 2019-05-03 PROCEDURE — 76376 3D RENDER W/INTRP POSTPROCES: CPT

## 2019-05-03 PROCEDURE — 40000065 ZZH STATISTIC EKG NON-CHARGEABLE

## 2019-05-03 PROCEDURE — 40000857 ZZH STATISTIC TEE INCLUDES SEDATION

## 2019-05-03 PROCEDURE — 83735 ASSAY OF MAGNESIUM: CPT | Performed by: INTERNAL MEDICINE

## 2019-05-03 PROCEDURE — 25000128 H RX IP 250 OP 636: Performed by: NURSE ANESTHETIST, CERTIFIED REGISTERED

## 2019-05-03 PROCEDURE — 93005 ELECTROCARDIOGRAM TRACING: CPT

## 2019-05-03 PROCEDURE — 25000128 H RX IP 250 OP 636: Performed by: INTERNAL MEDICINE

## 2019-05-03 PROCEDURE — 25000125 ZZHC RX 250: Performed by: INTERNAL MEDICINE

## 2019-05-03 PROCEDURE — 40000235 ZZH STATISTIC TELEMETRY

## 2019-05-03 PROCEDURE — 84132 ASSAY OF SERUM POTASSIUM: CPT | Performed by: INTERNAL MEDICINE

## 2019-05-03 PROCEDURE — 25800030 ZZH RX IP 258 OP 636: Performed by: INTERNAL MEDICINE

## 2019-05-03 PROCEDURE — 93312 ECHO TRANSESOPHAGEAL: CPT | Mod: 26 | Performed by: INTERNAL MEDICINE

## 2019-05-03 PROCEDURE — 92960 CARDIOVERSION ELECTRIC EXT: CPT

## 2019-05-03 PROCEDURE — 93325 DOPPLER ECHO COLOR FLOW MAPG: CPT | Mod: 26 | Performed by: INTERNAL MEDICINE

## 2019-05-03 PROCEDURE — 93320 DOPPLER ECHO COMPLETE: CPT | Mod: 26 | Performed by: INTERNAL MEDICINE

## 2019-05-03 RX ORDER — PROPOFOL 10 MG/ML
INJECTION, EMULSION INTRAVENOUS PRN
Status: DISCONTINUED | OUTPATIENT
Start: 2019-05-03 | End: 2019-05-03

## 2019-05-03 RX ORDER — FLUMAZENIL 0.1 MG/ML
0.2 INJECTION, SOLUTION INTRAVENOUS
Status: DISCONTINUED | OUTPATIENT
Start: 2019-05-03 | End: 2019-05-04 | Stop reason: HOSPADM

## 2019-05-03 RX ORDER — FENTANYL CITRATE 50 UG/ML
25-50 INJECTION, SOLUTION INTRAMUSCULAR; INTRAVENOUS
Status: COMPLETED | OUTPATIENT
Start: 2019-05-03 | End: 2019-05-03

## 2019-05-03 RX ORDER — ACYCLOVIR 200 MG/1
9.5 CAPSULE ORAL
Status: DISCONTINUED | OUTPATIENT
Start: 2019-05-03 | End: 2019-05-04 | Stop reason: HOSPADM

## 2019-05-03 RX ORDER — SODIUM CHLORIDE 9 MG/ML
1000 INJECTION, SOLUTION INTRAVENOUS CONTINUOUS
Status: DISCONTINUED | OUTPATIENT
Start: 2019-05-03 | End: 2019-05-04 | Stop reason: HOSPADM

## 2019-05-03 RX ORDER — POTASSIUM CHLORIDE 1500 MG/1
40 TABLET, EXTENDED RELEASE ORAL
Status: DISCONTINUED | OUTPATIENT
Start: 2019-05-03 | End: 2019-05-04 | Stop reason: HOSPADM

## 2019-05-03 RX ORDER — NALOXONE HYDROCHLORIDE 0.4 MG/ML
.1-.4 INJECTION, SOLUTION INTRAMUSCULAR; INTRAVENOUS; SUBCUTANEOUS
Status: DISCONTINUED | OUTPATIENT
Start: 2019-05-03 | End: 2019-05-04 | Stop reason: HOSPADM

## 2019-05-03 RX ORDER — MAGNESIUM SULFATE HEPTAHYDRATE 40 MG/ML
2 INJECTION, SOLUTION INTRAVENOUS
Status: DISCONTINUED | OUTPATIENT
Start: 2019-05-03 | End: 2019-05-04 | Stop reason: HOSPADM

## 2019-05-03 RX ORDER — LIDOCAINE HYDROCHLORIDE 40 MG/ML
1.5 SOLUTION TOPICAL ONCE
Status: COMPLETED | OUTPATIENT
Start: 2019-05-03 | End: 2019-05-03

## 2019-05-03 RX ORDER — GLYCOPYRROLATE 0.2 MG/ML
0.1 INJECTION, SOLUTION INTRAMUSCULAR; INTRAVENOUS ONCE
Status: COMPLETED | OUTPATIENT
Start: 2019-05-03 | End: 2019-05-03

## 2019-05-03 RX ORDER — FENTANYL CITRATE 50 UG/ML
25 INJECTION, SOLUTION INTRAMUSCULAR; INTRAVENOUS
Status: DISCONTINUED | OUTPATIENT
Start: 2019-05-03 | End: 2019-05-04 | Stop reason: HOSPADM

## 2019-05-03 RX ORDER — POTASSIUM CHLORIDE 1500 MG/1
20 TABLET, EXTENDED RELEASE ORAL
Status: DISCONTINUED | OUTPATIENT
Start: 2019-05-03 | End: 2019-05-04 | Stop reason: HOSPADM

## 2019-05-03 RX ORDER — ATROPINE SULFATE 0.1 MG/ML
.5-1 INJECTION INTRAVENOUS
Status: DISCONTINUED | OUTPATIENT
Start: 2019-05-03 | End: 2019-05-04 | Stop reason: HOSPADM

## 2019-05-03 RX ADMIN — MIDAZOLAM HYDROCHLORIDE 0.5 MG: 1 INJECTION, SOLUTION INTRAMUSCULAR; INTRAVENOUS at 08:57

## 2019-05-03 RX ADMIN — MIDAZOLAM HYDROCHLORIDE 0.5 MG: 1 INJECTION, SOLUTION INTRAMUSCULAR; INTRAVENOUS at 08:59

## 2019-05-03 RX ADMIN — MIDAZOLAM HYDROCHLORIDE 0.5 MG: 1 INJECTION, SOLUTION INTRAMUSCULAR; INTRAVENOUS at 08:53

## 2019-05-03 RX ADMIN — LIDOCAINE HYDROCHLORIDE 1.5 ML: 40 SOLUTION TOPICAL at 08:20

## 2019-05-03 RX ADMIN — SODIUM CHLORIDE 1000 ML: 9 INJECTION, SOLUTION INTRAVENOUS at 09:11

## 2019-05-03 RX ADMIN — GLYCOPYRROLATE 0.1 MG: 0.2 INJECTION, SOLUTION INTRAMUSCULAR; INTRAVENOUS at 08:12

## 2019-05-03 RX ADMIN — MIDAZOLAM HYDROCHLORIDE 0.5 MG: 1 INJECTION, SOLUTION INTRAMUSCULAR; INTRAVENOUS at 08:56

## 2019-05-03 RX ADMIN — PROPOFOL 60 MG: 10 INJECTION, EMULSION INTRAVENOUS at 09:27

## 2019-05-03 RX ADMIN — TOPICAL ANESTHETIC 0.5 ML: 200 SPRAY DENTAL; PERIODONTAL at 08:31

## 2019-05-03 RX ADMIN — MIDAZOLAM HYDROCHLORIDE 1 MG: 1 INJECTION, SOLUTION INTRAMUSCULAR; INTRAVENOUS at 08:51

## 2019-05-03 RX ADMIN — FENTANYL CITRATE 50 MCG: 50 INJECTION, SOLUTION INTRAMUSCULAR; INTRAVENOUS at 08:52

## 2019-05-03 ASSESSMENT — ENCOUNTER SYMPTOMS
DYSRHYTHMIAS: 1
SEIZURES: 0

## 2019-05-03 ASSESSMENT — LIFESTYLE VARIABLES: TOBACCO_USE: 0

## 2019-05-03 ASSESSMENT — MIFFLIN-ST. JEOR: SCORE: 1258.74

## 2019-05-03 NOTE — ANESTHESIA CARE TRANSFER NOTE
Patient: Hamida Verma    * No procedures listed *    Diagnosis: * No pre-op diagnosis entered *  Diagnosis Additional Information: No value filed.    Anesthesia Type:   No value filed.     Note:  Airway :Nasal Cannula  Patient transferred to:Phase II  Comments: Diagnosis: afib  Procedure: Cardioversion.  Cardiologist: Mark Lucio MD  Location: Care Suites 20    VSS. Spont Respirations. Report to RNHandoff Report: Identifed the Patient, Identified the Reponsible Provider, Reviewed the pertinent medical history, Discussed the surgical course, Reviewed Intra-OP anesthesia mangement and issues during anesthesia, Set expectations for post-procedure period and Allowed opportunity for questions and acknowledgement of understanding      Vitals: (Last set prior to Anesthesia Care Transfer)    CRNA VITALS  5/3/2019 0902 - 5/3/2019 1002      5/3/2019             Resp Rate (set):  10                Electronically Signed By: NELY Gonsales CRNA  May 3, 2019  10:07 AM

## 2019-05-03 NOTE — PROCEDURES
CAROLANN & Cardioversion Note    Indication: Atrial Fibrillation with RVR     Informed consent was signed by the patient.  A timeout was taken.    Sedation for CAROLANN:  Versed 3mg  Fentanyl 50mcg    Findings:  No clot in the SABINE or LA. Normal SABINE velocities.   LV: normal size and function    RV: normal in size and function   LA: moderately dilated  Mitral valve: normal, mild MR   Aortic valve: sclerosis of the RCC    Tricuspid valve: normal, mild TR   Atrial septum: intact  Aorta: normal sized     Anesthesia was present for cardioversion, see separate documentation for their sedation.    Cardioversion:  See anesthesia documentation for sedation during cardioversion.    Baseline rhythm: atrial fibrillation  Synchronized cardioversion performed at 150J   Post-DCCV rhythm: Sinus, no conversion pause noted. NSR confirmed using PPM interrogation.     No complications.    Mark Oneal MD  Text Page   May 3, 2019

## 2019-05-03 NOTE — ANESTHESIA PREPROCEDURE EVALUATION
Anesthesia Pre-Procedure Evaluation    Patient: Hamida Verma   MRN: [id number] : 1940          Preoperative Diagnosis: * No pre-op diagnosis entered *    * No procedures listed *    Past Medical History:   Diagnosis Date     Atrial fibrillation (H)      Chronic diastolic CHF (congestive heart failure) (H)      Essential hypertension, benign      HYPERLIPIDEMIA NEC/NOS 3/30/2006     Pulmonary hypertension (H)      SSS (sick sinus syndrome) (H)     s/p PPM 3/2018     Past Surgical History:   Procedure Laterality Date     EYE SURGERY       HYSTERECTOMY, VAGINAL      hysterectomy       Anesthesia Evaluation     . Pt has had prior anesthetic.     No history of anesthetic complications          ROS/MED HX    ENT/Pulmonary:      (-) tobacco use and sleep apnea   Neurologic:      (-) seizures and CVA   Cardiovascular:     (+) Dyslipidemia, hypertension--CAD, --. : . CHF . fainting (syncope). pacemaker :. dysrhythmias a-fib, . pulmonary hypertension,       METS/Exercise Tolerance:     Hematologic:         Musculoskeletal:         GI/Hepatic:        (-) GERD and liver disease   Renal/Genitourinary:      (-) renal disease   Endo:     (+) type II DM Obesity, .      Psychiatric:         Infectious Disease:         Malignancy:         Other:                          Physical Exam  Normal systems: cardiovascular, pulmonary and dental    Airway   Mallampati: III  TM distance: >3 FB  Neck ROM: full    Dental     Cardiovascular       Pulmonary             Lab Results   Component Value Date    WBC 9.6 2019    HGB 12.9 2019    HCT 41.1 2019     2019     2019    POTASSIUM 4.1 2019    CHLORIDE 100 2019    CO2 27 2019    BUN 17 2019    CR 1.12 2019     (H) 2019    GURWINDER 10.2 2019    PHOS 3.6 2019    MAG 2.5 (H) 2019    ALBUMIN 2.2 (L) 2019    PROTTOTAL 5.8 (L) 2019    ALT 55 (H) 2019    AST 27 2019     "ALKPHOS 41 01/06/2019    BILITOTAL 0.7 01/06/2019    LIPASE 89 11/12/2014    PTT 29 11/12/2014    INR 1.31 (H) 03/19/2018    TSH 1.11 05/01/2019       Preop Vitals  BP Readings from Last 3 Encounters:   05/03/19 98/62   05/02/19 102/64   05/01/19 118/81    Pulse Readings from Last 3 Encounters:   05/03/19 92   05/02/19 144   05/01/19 116      Resp Readings from Last 3 Encounters:   05/03/19 16   05/02/19 18   03/28/19 16    SpO2 Readings from Last 3 Encounters:   05/03/19 100%   05/02/19 98%   03/28/19 95%      Temp Readings from Last 1 Encounters:   05/03/19 36.6  C (97.9  F) (Oral)    Ht Readings from Last 1 Encounters:   05/03/19 1.619 m (5' 3.74\")      Wt Readings from Last 1 Encounters:   05/03/19 80.3 kg (177 lb)    Estimated body mass index is 30.63 kg/m  as calculated from the following:    Height as of this encounter: 1.619 m (5' 3.74\").    Weight as of this encounter: 80.3 kg (177 lb).       Anesthesia Plan      History & Physical Review  History and physical reviewed and following examination; no interval change.    ASA Status:  3 .    NPO Status:  > 8 hours    Plan for General with Propofol induction.          Postoperative Care      Consents  Anesthetic plan, risks, benefits and alternatives discussed with:  Patient..                 Mick Mason MD  "

## 2019-05-03 NOTE — PROGRESS NOTES
Sedation Post Procedure Summary:    Immediately prior to starting the procedure a Time Out was conducted with procedural staff and re-confirmed the patient s name, procedure, and site/side. Md completed sedation assessment.   Consent obtained from patient after discussing the risks, benefits and alternatives.       Indication:  Sedation was required to allow for CAROLANN    Sedatives: Fentanyl 50 Mcg  And Versed  3 Mg    Vital signs, airway, and pulse oximetry were monitored throughout the procedure and sedation was maintained until the procedure was complete.      Patient tolerance: Patient tolerated the procedure well with no immediate complications.    Cardioversion successful: conversion from Afib to NSR with 150 Joule shock from MD.    (See Doc Flow-sheets and MAR for additional information)    Ramirez Villanueva

## 2019-05-03 NOTE — ANESTHESIA POSTPROCEDURE EVALUATION
Patient: Hamida Verma    * No procedures listed *    Diagnosis:* No pre-op diagnosis entered *  Diagnosis Additional Information: afib    Anesthesia Type:  General    Note:  Anesthesia Post Evaluation    Patient location during evaluation: Bedside  Patient participation: Able to fully participate in evaluation  Level of consciousness: awake and alert  Pain management: satisfactory to patient  Airway patency: patent  Cardiovascular status: hemodynamically stable  Respiratory status: acceptable and unassisted  Hydration status: balanced  PONV: none     Anesthetic complications: None          Last vitals:  Vitals:    05/03/19 0950 05/03/19 1005 05/03/19 1020   BP: 93/58 97/60 98/62   Pulse: 87 87 92   Resp: 16 16 16   Temp:      SpO2: 100% 100% 100%         Electronically Signed By: Mick Mason MD  May 3, 2019  11:00 AM

## 2019-05-03 NOTE — DISCHARGE INSTRUCTIONS
CAROLANN  (Transesophageal Echocardiogram)  with Cardioversion Discharge Instructions    After you go home:      Have an adult stay with you for 6 hours.       For 24 hours - due to the sedation you received:    Relax and take it easy.    Do NOT make any important or legal decisions.    Do NOT drive or operate machines at home or at work.    Do NOT drink alcohol.    Diet:      You may resume your normal diet, but no scratchy foods for two days.    If your throat is sore, eat cold, bland or soft foods.    You may have heartburn if the tube used in the exam entered your stomach.  If so:   - Do not eat acidic and spicy foods.   - Do not eat three hours before bedtime.  Clear liquids are okay.   - When lying down, use two pillows to raise your head.    Medicines:      Take your medications, including blood thinners, unless your provider tells you not to.    If you have stopped any medicines, check with your provider about when to restart them.    You may take Tylenol (Acetaminophen) if your throat is sore.    You may take antacids if you have heartburn.      Follow Up Appointments:      Follow up with your cardiologist at [facility] of patient preference as instructed.    Follow up with your primary care provider as needed.    If you ve had a cardioversion:    The skin on your chest or back may feel tender for 48 hours.  If your skin is tender, you may:    Use a cold pack on the site. Never use ice directly on your skin. Use the cold pack for 20 minutes. Remove it for at least 30 minutes before re-using.    Apply 1% hydrocortisone cream to the skin (sold at drug stores)    Take Advil (Ibuprofen) or Tylenol (Acetaminophen).      Call the clinic if:      You have heartburn that is severe or lasts more than 72 hours.    You have a sore throat that feels worse after 72 hours.    You have shortness of breath, neck pain, chest pain, fever, chills, coughing up blood, or other unusual signs.    Call your cardiologist right  away if you have an irregular heartbeat, shortness of breath or feel dizzy.    Questions or concerns      [facility] [place]:    [phone] UMP (7 days a week)

## 2019-05-03 NOTE — PROGRESS NOTES
Care Suites Discharge Summary    Discharge Criteria:   Discharge Criteria met per MD orders: Yes.   Vital signs stable.     Pt demonstrates ability to ambulate safely: Yes.  (See discharge questionnaire for additional information)    Discharge instructions & education:   Discharge instructions reviewed with patient and family. Patient verbalizes  understanding.   Additional patient education provided: CAROLANN and cardioversion    Medications:   Patient will be discharging on new medications- No. Patient verbalizes reason for use, start date, and side effects NA.    Items returned to patient:   Home and hospital acquired medications returned to patient NA   Listed belongings gathered and returned to patient: yes    Patient discharged to home via DTR.    Ramirez Villanueva

## 2019-05-06 ENCOUNTER — OFFICE VISIT (OUTPATIENT)
Dept: CARDIOLOGY | Facility: CLINIC | Age: 79
End: 2019-05-06
Attending: INTERNAL MEDICINE
Payer: COMMERCIAL

## 2019-05-06 VITALS
SYSTOLIC BLOOD PRESSURE: 97 MMHG | HEART RATE: 79 BPM | BODY MASS INDEX: 29.88 KG/M2 | WEIGHT: 175 LBS | HEIGHT: 64 IN | DIASTOLIC BLOOD PRESSURE: 63 MMHG

## 2019-05-06 DIAGNOSIS — I48.0 PAF (PAROXYSMAL ATRIAL FIBRILLATION) (H): Primary | ICD-10-CM

## 2019-05-06 PROCEDURE — 93000 ELECTROCARDIOGRAM COMPLETE: CPT | Performed by: INTERNAL MEDICINE

## 2019-05-06 PROCEDURE — 99214 OFFICE O/P EST MOD 30 MIN: CPT | Performed by: INTERNAL MEDICINE

## 2019-05-06 RX ORDER — LIDOCAINE 40 MG/G
CREAM TOPICAL
Status: CANCELLED | OUTPATIENT
Start: 2019-05-06

## 2019-05-06 RX ORDER — SODIUM CHLORIDE 450 MG/100ML
INJECTION, SOLUTION INTRAVENOUS CONTINUOUS
Status: CANCELLED | OUTPATIENT
Start: 2019-05-06

## 2019-05-06 RX ORDER — CEFAZOLIN SODIUM 2 G/100ML
2 INJECTION, SOLUTION INTRAVENOUS
Status: CANCELLED | OUTPATIENT
Start: 2019-05-06

## 2019-05-06 ASSESSMENT — MIFFLIN-ST. JEOR: SCORE: 1249.82

## 2019-05-06 NOTE — PROGRESS NOTES
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 Recently, pt has been noted to have more atrial tachyarrhythmias including atrial flutter with RVR. Pt this time around did not experience much sxs. However, due to risk of tachycardia induced CHF and cardiomyopathy and limited medical therapy due to low bp pt underwent CAROLANN guided CVN last week. She was in sinus rhythm by checking her pulse for a few days only. Currently, she is in AF with RVR but no sxs.    Pt has been under a lot of stress due to her significant other being in hospice. Discussed risks of tachycardia induced CHF and cardiomyopathy given limited medical therapy needed to control rate due to low bp. One option is amio and repeat cardioversion but it may not work and moreover, pt has no classic sxs asides from rate being uncontrolled. The other option is AVN ablation which I would recommend given that she already has a ppm in place. I went over the procedure in detail with risks including but not limited to vascular injury. We can stop Flecainide after the procedure.    Orders Placed This Encounter   Procedures     EKG 12-lead complete w/read - Clinics (performed today)       No orders of the defined types were placed in this encounter.      There are no discontinued medications.      Encounter Diagnosis   Name Primary?     PAF (paroxysmal atrial fibrillation) (H) Yes       CURRENT MEDICATIONS:  Current Outpatient Medications   Medication Sig Dispense Refill     alendronate (FOSAMAX) 70 MG tablet Take 1 tablet (70 mg) by mouth every 7 days Take 60 minutes before am meal with 8 oz. water. Remain upright for 30 minutes. 12 tablet 3     atorvastatin (LIPITOR) 40 MG tablet Take 40 mg by mouth daily       cholecalciferol (VITAMIN  -D) 1000 UNIT capsule Take 1 capsule by mouth daily.       Coral Calcium 133-66.7-133 MG-MG-UNIT CAPS Take 2 tablets by mouth 2 times daily        FLAX SEED OIL OR  1 capsule daily       flecainide (TAMBOCOR) 100 MG tablet Take 1 tablet (100 mg) by mouth 2 times daily 180 tablet 0     furosemide (LASIX) 20 MG tablet Take 1 tablet (20 mg) by mouth daily 90 tablet 3     Krill Oil 500 MG CAPS Take 1 capsule by mouth daily       lisinopril (PRINIVIL/ZESTRIL) 40 MG tablet Take 1 tablet (40 mg) by mouth daily 90 tablet 3     Methylsulfonylmethane (MSM) 500 MG CAPS Take 1 capsule by mouth daily       metoprolol succinate ER (TOPROL-XL) 50 MG 24 hr tablet Take 1 tablet (50 mg) by mouth 2 times daily 180 tablet 3     Milk Thistle 200 MG CAPS Take 1 capsule by mouth daily        Multiple Vitamins-Minerals (HAIR SKIN NAILS PO) Take 1 tablet by mouth At Bedtime       potassium chloride ER (K-DUR/KLOR-CON M) 10 MEQ CR tablet Take 1 tablet (10 mEq) by mouth daily 90 tablet 3     prednisoLONE acetate (PRED FORTE) 1 % ophthalmic suspension 1-2 drops 3 times daily       rivaroxaban ANTICOAGULANT (XARELTO) 20 MG TABS tablet Take 1 tablet (20 mg) by mouth daily (with dinner) 90 tablet 3     tiotropium (SPIRIVA) 18 MCG inhaled capsule Inhale 1 capsule (18 mcg) into the lungs daily 90 capsule 3       ALLERGIES     Allergies   Allergen Reactions     Strawberries [Strawberry] Anaphylaxis     Strawberry Flavor        PAST MEDICAL HISTORY:  Past Medical History:   Diagnosis Date     Atrial fibrillation (H)      Chronic diastolic CHF (congestive heart failure) (H)      Essential hypertension, benign      HYPERLIPIDEMIA NEC/NOS 3/30/2006     Pulmonary hypertension (H)      SSS (sick sinus syndrome) (H)     s/p PPM 3/2018       PAST SURGICAL HISTORY:  Past Surgical History:   Procedure Laterality Date     EYE SURGERY       HYSTERECTOMY, VAGINAL      hysterectomy       FAMILY HISTORY:  Family History   Problem Relation Age of Onset     Cerebrovascular Disease Mother      Glaucoma Mother      Macular Degeneration Mother      Alcohol/Drug Father         alcohol     Myocardial Infarction Father 63         passed away from MI     Family History Negative Sister      Family History Negative Sister      Family History Negative Sister      Cerebrovascular Disease Sister      Family History Negative Brother      Family History Negative Maternal Grandmother      Family History Negative Maternal Grandfather      Family History Negative Paternal Grandmother      Family History Negative Paternal Grandfather      Myocardial Infarction Son 57        passed away from MI     Dementia Daughter 57        early onset on namenda     Diabetes No family hx of      Breast Cancer No family hx of      Cancer - colorectal No family hx of        SOCIAL HISTORY:  Social History     Socioeconomic History     Marital status:      Spouse name: None     Number of children: None     Years of education: None     Highest education level: None   Occupational History     None   Social Needs     Financial resource strain: None     Food insecurity:     Worry: None     Inability: None     Transportation needs:     Medical: None     Non-medical: None   Tobacco Use     Smoking status: Former Smoker     Packs/day: 1.50     Years: 45.00     Pack years: 67.50     Types: Cigarettes     Start date: 10/28/1955     Last attempt to quit: 2000     Years since quittin.6     Smokeless tobacco: Never Used     Tobacco comment: quit 6 yrs ago   Substance and Sexual Activity     Alcohol use: No     Drug use: No     Sexual activity: Yes     Partners: Male   Lifestyle     Physical activity:     Days per week: None     Minutes per session: None     Stress: None   Relationships     Social connections:     Talks on phone: None     Gets together: None     Attends Taoism service: None     Active member of club or organization: None     Attends meetings of clubs or organizations: None     Relationship status: None     Intimate partner violence:     Fear of current or ex partner: None     Emotionally abused: None     Physically abused: None     Forced sexual  "activity: None   Other Topics Concern     Parent/sibling w/ CABG, MI or angioplasty before 65F 55M? No   Social History Narrative    Balanced Diet - Yes    Osteoporosis Prevention Measures - Dairy servings per day: 2    Regular Exercise -  Yes Describe walk, but not recently due to weather    Dental Exam - Yes    Eye Exam -No    Self Breast Exam - Yes    Abuse: Current or Past (Physical, Sexual or Emotional)- No    Do you feel safe in your environment - Yes    Guns stored in the home - No    Sunscreen used - Yes    Seatbelts used - Yes    Lipids -  1/10    Glucose -  1/10    Colon Cancer Screening - Yes, 7/21/08    Hemoccults - NO    Pap Test -  Many yrs ago, has hysterectomy    Do you have any concerns about STD's -  No    Mammography - 6/18/08    DEXA - 4/13/06    Immunizations reviewed and up to date - No    Jonathan Marshall MA                   Review of Systems:  Skin:  Negative       Eyes:  Positive for glasses recent eye surgery   ENT:  Negative      Respiratory:  Negative cough  2L O2   Cardiovascular:  Negative;palpitations;chest pain;syncope or near-syncope;cyanosis;exercise intolerance;lightheadedness;dizziness;fatigue Positive for;edema Left lower leg foot and ankle   Gastroenterology: Negative      Genitourinary:  Negative      Musculoskeletal:  Negative      Neurologic:  Negative      Psychiatric:  Negative      Heme/Lymph/Imm:  Positive for allergies strawberry  Endocrine:  Negative        Physical Exam:  Vitals: BP 97/63   Pulse 79   Ht 1.619 m (5' 3.75\")   Wt 79.4 kg (175 lb)   LMP  (LMP Unknown)   BMI 30.27 kg/m      Constitutional:  cooperative, alert and oriented, well developed, well nourished, in no acute distress        Skin:  warm and dry to the touch, no apparent skin lesions or masses noted          Head:  normocephalic, no masses or lesions        Eyes:  pupils equal and round, conjunctivae and lids unremarkable, sclera white, no xanthalasma, EOMS intact, no nystagmus        Lymph:  "     ENT:           Neck:  carotid pulses are full and equal bilaterally, JVP normal, no carotid bruit        Respiratory:  normal breath sounds, clear to auscultation, normal A-P diameter, normal symmetry, normal respiratory excursion, no use of accessory muscles         Cardiac:                                                           GI:           Extremities and Muscular Skeletal:                 Neurological:           Psych:           CC  Roby Mallory MD  [address]  [place] [address]

## 2019-05-06 NOTE — LETTER
5/6/2019    Mikala Philip MD, MD  [address]  [facility] [address]    RE: Hamida Verma       Dear Colleague,    I had the pleasure of seeing Hamida JIMMY Verma in the [facility].    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 Recently, pt has been noted to have more atrial tachyarrhythmias including atrial flutter with RVR. Pt this time around did not experience much sxs. However, due to risk of tachycardia induced CHF and cardiomyopathy and limited medical therapy due to low bp pt underwent CAROLANN guided CVN last week. She was in sinus rhythm by checking her pulse for a few days only. Currently, she is in AF with RVR but no sxs.    Pt has been under a lot of stress due to her significant other being in hospice. Discussed risks of tachycardia induced CHF and cardiomyopathy given limited medical therapy needed to control rate due to low bp. One option is amio and repeat cardioversion but it may not work and moreover, pt has no classic sxs asides from rate being uncontrolled. The other option is AVN ablation which I would recommend given that she already has a ppm in place. I went over the procedure in detail with risks including but not limited to vascular injury. We can stop Flecainide after the procedure.    Orders Placed This Encounter   Procedures     EKG 12-lead complete w/read - Clinics (performed today)       No orders of the defined types were placed in this encounter.      There are no discontinued medications.      Encounter Diagnosis   Name Primary?     PAF (paroxysmal atrial fibrillation) (H) Yes       CURRENT MEDICATIONS:  Current Outpatient Medications   Medication Sig Dispense Refill     alendronate (FOSAMAX) 70 MG tablet Take 1 tablet (70 mg) by mouth every 7 days Take 60 minutes before am meal with 8 oz. water. Remain upright for 30 minutes. 12 tablet 3     atorvastatin (LIPITOR) 40 MG tablet  Take 40 mg by mouth daily       cholecalciferol (VITAMIN  -D) 1000 UNIT capsule Take 1 capsule by mouth daily.       Coral Calcium 133-66.7-133 MG-MG-UNIT CAPS Take 2 tablets by mouth 2 times daily        FLAX SEED OIL OR 1 capsule daily       flecainide (TAMBOCOR) 100 MG tablet Take 1 tablet (100 mg) by mouth 2 times daily 180 tablet 0     furosemide (LASIX) 20 MG tablet Take 1 tablet (20 mg) by mouth daily 90 tablet 3     Krill Oil 500 MG CAPS Take 1 capsule by mouth daily       lisinopril (PRINIVIL/ZESTRIL) 40 MG tablet Take 1 tablet (40 mg) by mouth daily 90 tablet 3     Methylsulfonylmethane (MSM) 500 MG CAPS Take 1 capsule by mouth daily       metoprolol succinate ER (TOPROL-XL) 50 MG 24 hr tablet Take 1 tablet (50 mg) by mouth 2 times daily 180 tablet 3     Milk Thistle 200 MG CAPS Take 1 capsule by mouth daily        Multiple Vitamins-Minerals (HAIR SKIN NAILS PO) Take 1 tablet by mouth At Bedtime       potassium chloride ER (K-DUR/KLOR-CON M) 10 MEQ CR tablet Take 1 tablet (10 mEq) by mouth daily 90 tablet 3     prednisoLONE acetate (PRED FORTE) 1 % ophthalmic suspension 1-2 drops 3 times daily       rivaroxaban ANTICOAGULANT (XARELTO) 20 MG TABS tablet Take 1 tablet (20 mg) by mouth daily (with dinner) 90 tablet 3     tiotropium (SPIRIVA) 18 MCG inhaled capsule Inhale 1 capsule (18 mcg) into the lungs daily 90 capsule 3       ALLERGIES     Allergies   Allergen Reactions     Strawberries [Strawberry] Anaphylaxis     Strawberry Flavor        PAST MEDICAL HISTORY:  Past Medical History:   Diagnosis Date     Atrial fibrillation (H)      Chronic diastolic CHF (congestive heart failure) (H)      Essential hypertension, benign      HYPERLIPIDEMIA NEC/NOS 3/30/2006     Pulmonary hypertension (H)      SSS (sick sinus syndrome) (H)     s/p PPM 3/2018       PAST SURGICAL HISTORY:  Past Surgical History:   Procedure Laterality Date     EYE SURGERY       HYSTERECTOMY, VAGINAL      hysterectomy       FAMILY  HISTORY:  Family History   Problem Relation Age of Onset     Cerebrovascular Disease Mother      Glaucoma Mother      Macular Degeneration Mother      Alcohol/Drug Father         alcohol     Myocardial Infarction Father 63        passed away from MI     Family History Negative Sister      Family History Negative Sister      Family History Negative Sister      Cerebrovascular Disease Sister      Family History Negative Brother      Family History Negative Maternal Grandmother      Family History Negative Maternal Grandfather      Family History Negative Paternal Grandmother      Family History Negative Paternal Grandfather      Myocardial Infarction Son 57        passed away from MI     Dementia Daughter 57        early onset on namenda     Diabetes No family hx of      Breast Cancer No family hx of      Cancer - colorectal No family hx of        SOCIAL HISTORY:  Social History     Socioeconomic History     Marital status:      Spouse name: None     Number of children: None     Years of education: None     Highest education level: None   Occupational History     None   Social Needs     Financial resource strain: None     Food insecurity:     Worry: None     Inability: None     Transportation needs:     Medical: None     Non-medical: None   Tobacco Use     Smoking status: Former Smoker     Packs/day: 1.50     Years: 45.00     Pack years: 67.50     Types: Cigarettes     Start date: 10/28/1955     Last attempt to quit: 2000     Years since quittin.6     Smokeless tobacco: Never Used     Tobacco comment: quit 6 yrs ago   Substance and Sexual Activity     Alcohol use: No     Drug use: No     Sexual activity: Yes     Partners: Male   Lifestyle     Physical activity:     Days per week: None     Minutes per session: None     Stress: None   Relationships     Social connections:     Talks on phone: None     Gets together: None     Attends [employer] service: None     Active member of club or organization: None  "    Attends meetings of clubs or organizations: None     Relationship status: None     Intimate partner violence:     Fear of current or ex partner: None     Emotionally abused: None     Physically abused: None     Forced sexual activity: None   Other Topics Concern     Parent/sibling w/ CABG, MI or angioplasty before 65F 55M? No   Social History Narrative    Balanced Diet - Yes    Osteoporosis Prevention Measures - Dairy servings per day: 2    Regular Exercise -  Yes Describe walk, but not recently due to weather    Dental Exam - Yes    Eye Exam -No    Self Breast Exam - Yes    Abuse: Current or Past (Physical, Sexual or Emotional)- No    Do you feel safe in your environment - Yes    Guns stored in the home - No    Sunscreen used - Yes    Seatbelts used - Yes    Lipids -  1/10    Glucose -  1/10    Colon Cancer Screening - Yes, 7/21/08    Hemoccults - NO    Pap Test -  Many yrs ago, has hysterectomy    Do you have any concerns about STD's -  No    Mammography - 6/18/08    DEXA - 4/13/06    Immunizations reviewed and up to date - No    Jonathan Marshall MA                   Review of Systems:  Skin:  Negative       Eyes:  Positive for glasses recent eye surgery   ENT:  Negative      Respiratory:  Negative cough  2L O2   Cardiovascular:  Negative;palpitations;chest pain;syncope or near-syncope;cyanosis;exercise intolerance;lightheadedness;dizziness;fatigue Positive for;edema Left lower leg foot and ankle   Gastroenterology: Negative      Genitourinary:  Negative      Musculoskeletal:  Negative      Neurologic:  Negative      Psychiatric:  Negative      Heme/Lymph/Imm:  Positive for allergies strawberry  Endocrine:  Negative        Physical Exam:  Vitals: BP 97/63   Pulse 79   Ht 1.619 m (5' 3.75\")   Wt 79.4 kg (175 lb)   LMP  (LMP Unknown)   BMI 30.27 kg/m       Constitutional:  cooperative, alert and oriented, well developed, well nourished, in no acute distress        Skin:  warm and dry to the touch, no apparent " skin lesions or masses noted          Head:  normocephalic, no masses or lesions        Eyes:  pupils equal and round, conjunctivae and lids unremarkable, sclera white, no xanthalasma, EOMS intact, no nystagmus        Lymph:      ENT:           Neck:  carotid pulses are full and equal bilaterally, JVP normal, no carotid bruit        Respiratory:  normal breath sounds, clear to auscultation, normal A-P diameter, normal symmetry, normal respiratory excursion, no use of accessory muscles         Cardiac:                                                           GI:           Extremities and Muscular Skeletal:                 Neurological:           Psych:           CC  Roby Mallory MD  [address] [id number]                Thank you for allowing me to participate in the care of your patient.    Sincerely,     Roe Rene MD     [facility]    cc:   Roby Mallory MD  [id number]

## 2019-05-07 ENCOUNTER — DOCUMENTATION ONLY (OUTPATIENT)
Dept: CARDIOLOGY | Facility: CLINIC | Age: 79
End: 2019-05-07

## 2019-05-07 ENCOUNTER — HOSPITAL ENCOUNTER (OUTPATIENT)
Facility: CLINIC | Age: 79
Discharge: HOME | End: 2019-05-07
Admitting: INTERNAL MEDICINE
Payer: COMMERCIAL

## 2019-05-07 ENCOUNTER — SURGERY (OUTPATIENT)
Age: 79
End: 2019-05-07
Payer: COMMERCIAL

## 2019-05-07 VITALS
DIASTOLIC BLOOD PRESSURE: 54 MMHG | SYSTOLIC BLOOD PRESSURE: 95 MMHG | TEMPERATURE: 97.7 F | HEART RATE: 83 BPM | RESPIRATION RATE: 16 BRPM | BODY MASS INDEX: 31.54 KG/M2 | OXYGEN SATURATION: 95 % | HEIGHT: 63 IN | WEIGHT: 178 LBS

## 2019-05-07 DIAGNOSIS — I48.0 PAF (PAROXYSMAL ATRIAL FIBRILLATION) (H): ICD-10-CM

## 2019-05-07 LAB
ANION GAP SERPL CALCULATED.3IONS-SCNC: 6 MMOL/L (ref 3–14)
BUN SERPL-MCNC: 21 MG/DL (ref 7–30)
CALCIUM SERPL-MCNC: 9.3 MG/DL (ref 8.5–10.1)
CHLORIDE SERPL-SCNC: 103 MMOL/L (ref 94–109)
CO2 SERPL-SCNC: 28 MMOL/L (ref 20–32)
CREAT SERPL-MCNC: 1.13 MG/DL (ref 0.52–1.04)
ERYTHROCYTE [DISTWIDTH] IN BLOOD BY AUTOMATED COUNT: 12.9 % (ref 10–15)
GFR SERPL CREATININE-BSD FRML MDRD: 46 ML/MIN/{1.73_M2}
GLUCOSE SERPL-MCNC: 114 MG/DL (ref 70–99)
HCT VFR BLD AUTO: 40.9 % (ref 35–47)
HGB BLD-MCNC: 13.3 G/DL (ref 11.7–15.7)
MCH RBC QN AUTO: 28.9 PG (ref 26.5–33)
MCHC RBC AUTO-ENTMCNC: 32.5 G/DL (ref 31.5–36.5)
MCV RBC AUTO: 89 FL (ref 78–100)
PLATELET # BLD AUTO: 453 10E9/L (ref 150–450)
POTASSIUM SERPL-SCNC: 4.1 MMOL/L (ref 3.4–5.3)
RBC # BLD AUTO: 4.6 10E12/L (ref 3.8–5.2)
SODIUM SERPL-SCNC: 137 MMOL/L (ref 133–144)
WBC # BLD AUTO: 8.9 10E9/L (ref 4–11)

## 2019-05-07 PROCEDURE — 40000235 ZZH STATISTIC TELEMETRY

## 2019-05-07 PROCEDURE — 93650 ICAR CATH ABLTJ AV NODE FUNC: CPT | Performed by: INTERNAL MEDICINE

## 2019-05-07 PROCEDURE — 40000852 ZZH STATISTIC HEART CATH LAB OR EP LAB

## 2019-05-07 PROCEDURE — 99153 MOD SED SAME PHYS/QHP EA: CPT | Performed by: INTERNAL MEDICINE

## 2019-05-07 PROCEDURE — 36415 COLL VENOUS BLD VENIPUNCTURE: CPT

## 2019-05-07 PROCEDURE — 25800029 ZZH RX IP 258 OP 250: Performed by: INTERNAL MEDICINE

## 2019-05-07 PROCEDURE — 99152 MOD SED SAME PHYS/QHP 5/>YRS: CPT | Performed by: INTERNAL MEDICINE

## 2019-05-07 PROCEDURE — 85027 COMPLETE CBC AUTOMATED: CPT | Performed by: INTERNAL MEDICINE

## 2019-05-07 PROCEDURE — C1888 ENDOVAS NON-CARDIAC ABL CATH: HCPCS | Performed by: INTERNAL MEDICINE

## 2019-05-07 PROCEDURE — 25000128 H RX IP 250 OP 636: Performed by: INTERNAL MEDICINE

## 2019-05-07 PROCEDURE — 80048 BASIC METABOLIC PNL TOTAL CA: CPT | Performed by: INTERNAL MEDICINE

## 2019-05-07 PROCEDURE — 25000125 ZZHC RX 250: Performed by: INTERNAL MEDICINE

## 2019-05-07 PROCEDURE — 27210794 ZZH OR GENERAL SUPPLY STERILE: Performed by: INTERNAL MEDICINE

## 2019-05-07 RX ORDER — SODIUM CHLORIDE 450 MG/100ML
INJECTION, SOLUTION INTRAVENOUS CONTINUOUS
Status: DISCONTINUED | OUTPATIENT
Start: 2019-05-07 | End: 2019-05-07 | Stop reason: HOSPADM

## 2019-05-07 RX ORDER — CEFAZOLIN SODIUM 2 G/100ML
2 INJECTION, SOLUTION INTRAVENOUS
Status: DISCONTINUED | OUTPATIENT
Start: 2019-05-07 | End: 2019-05-07

## 2019-05-07 RX ORDER — NALOXONE HYDROCHLORIDE 0.4 MG/ML
.1-.4 INJECTION, SOLUTION INTRAMUSCULAR; INTRAVENOUS; SUBCUTANEOUS
Status: DISCONTINUED | OUTPATIENT
Start: 2019-05-07 | End: 2019-05-07 | Stop reason: HOSPADM

## 2019-05-07 RX ORDER — HEPARIN SODIUM 200 [USP'U]/100ML
100-600 INJECTION, SOLUTION INTRAVENOUS CONTINUOUS PRN
Status: DISCONTINUED | OUTPATIENT
Start: 2019-05-07 | End: 2019-05-07 | Stop reason: HOSPADM

## 2019-05-07 RX ORDER — FENTANYL CITRATE 50 UG/ML
INJECTION, SOLUTION INTRAMUSCULAR; INTRAVENOUS
Status: DISCONTINUED | OUTPATIENT
Start: 2019-05-07 | End: 2019-05-07 | Stop reason: HOSPADM

## 2019-05-07 RX ORDER — HEPARIN SODIUM 200 [USP'U]/100ML
100-500 INJECTION, SOLUTION INTRAVENOUS CONTINUOUS PRN
Status: DISCONTINUED | OUTPATIENT
Start: 2019-05-07 | End: 2019-05-07 | Stop reason: HOSPADM

## 2019-05-07 RX ORDER — LIDOCAINE 40 MG/G
CREAM TOPICAL
Status: DISCONTINUED | OUTPATIENT
Start: 2019-05-07 | End: 2019-05-07 | Stop reason: HOSPADM

## 2019-05-07 RX ORDER — DOBUTAMINE HYDROCHLORIDE 200 MG/100ML
5-40 INJECTION INTRAVENOUS CONTINUOUS PRN
Status: DISCONTINUED | OUTPATIENT
Start: 2019-05-07 | End: 2019-05-07 | Stop reason: HOSPADM

## 2019-05-07 RX ADMIN — FENTANYL CITRATE 25 MCG: 50 INJECTION, SOLUTION INTRAMUSCULAR; INTRAVENOUS at 13:25

## 2019-05-07 RX ADMIN — LIDOCAINE HYDROCHLORIDE 10 ML: 10 INJECTION, SOLUTION EPIDURAL; INFILTRATION; INTRACAUDAL; PERINEURAL at 13:31

## 2019-05-07 RX ADMIN — MIDAZOLAM 1 MG: 1 INJECTION INTRAMUSCULAR; INTRAVENOUS at 13:31

## 2019-05-07 RX ADMIN — FENTANYL CITRATE 50 MCG: 50 INJECTION, SOLUTION INTRAMUSCULAR; INTRAVENOUS at 13:31

## 2019-05-07 RX ADMIN — SODIUM CHLORIDE: 4.5 INJECTION, SOLUTION INTRAVENOUS at 11:27

## 2019-05-07 RX ADMIN — MIDAZOLAM 0.5 MG: 1 INJECTION INTRAMUSCULAR; INTRAVENOUS at 13:25

## 2019-05-07 ASSESSMENT — MIFFLIN-ST. JEOR: SCORE: 1251.53

## 2019-05-07 NOTE — PROGRESS NOTES
Return to CS 1 at 1405.  Rt groin site CDI, soft and flat.  Denies pain.  Call light in reach, will cont to monitor.  Per report Pt's son was called with update.  Instructed on restrictions during bedrest.

## 2019-05-07 NOTE — PROGRESS NOTES
Patient s/p AV node ablation/4 hour bedrest- up to ambulate with SBA.  Right groin site remains CDI without bleeding or hematoma.  VS WNL, denies pain, tolerates PO, voiding, paced rhythm, and states has no further questions regarding AVS and discharge home instructions.  Discharged home with a .

## 2019-05-07 NOTE — PROGRESS NOTES
Pt here for ablation.  Assessment complete labs pending.  States son will come later for  if needed.  Pt lives at home with family, who will be  Home overnight with her.    Sons phone number is [phone], will have MD call with report s/p procedure.

## 2019-05-07 NOTE — PROGRESS NOTES
"MD Notification    Notified Person: MD    Notified Person Name:  Dr. Voss    Notification Date/Time:  5/7/19 4869    Notification Interaction:  Text page and called back.    Purpose of Notification:  Clarification of home medication Flecainide.  Per Dr. Voss's note patient to stop flecainide but on AVS it states to \"talk with your doctor about this medication\"     Orders Received:  Per Dr. Voss, \"stop flecainide.\"    Comments:  Patient's copy of AVS updated.  This writer unable to update AVS in Epic.    "

## 2019-05-07 NOTE — DISCHARGE INSTRUCTIONS
AV Node Ablation Discharge Instructions - Femoral     After you go home:      Have an adult stay with you until tomorrow.    You may resume your normal diet.       For 24 hours - due to the sedation you received:    Relax and take it easy.    Do NOT make any important or legal decisions.    Do NOT drive or operate machines at home or at work.    Do NOT drink alcohol.    Care of Groin Puncture Site:      For the first 24 hrs - check the puncture site every 1-2 hours while awake.    For 2 days, when you cough, sneeze, laugh or move your bowels, hold your hand over the puncture site and press firmly.    Remove the bandaid after 24 hours. If there is minor oozing, apply another bandaid and remove it after 12 hours.    It is normal to have a small bruise or pea size lump at the site.    You may shower tomorrow.  Do NOT take a bath, or use a hot tub or pool for at least 3 days. Do NOT scrub the site. Do not use lotion or powder near the puncture site.    Activity:            For 2 days:    No stooping or squatting    Do NOT do any heavy activity such as exercise, lifting, or straining.     No housework, yard work or any activity that make you sweat    Do NOT lift more than 10 pounds    Bleeding:      If you start bleeding from the site in your groin, lie down flat and press firmly on the site for 10 minutes.     Once bleeding stops, lay flat for 2 hours.    Call [facility] as soon as you can.       Call 911 right away if you have heavy bleeding or bleeding that does not stop.      Medicines:      Take your medications, including blood thinners, unless your provider tells you not to.    If you have stopped any medicines, check with your provider about when to restart them.    If you have pain or shortness of breath, you may take Advil (ibuprofen) or Tylenol (acetaminophen).    Follow Up Appointments:      Follow up with [facility] Nurse Practitioner at [facility] of patient preference in 7-10 days.    Call the  clinic if:      You have increased pain or a large or growing hard lump around the site.    The site is red, swollen, hot or tender.    Blood or fluid is draining from the site.    You have chills or a fever greater than 101 F (38 C).    Your leg feels numb, cool or changes color.    Increased pain in the chest and/or groin.    Increased shortness of breath    Chest pain not relieved by Tylenol or Advil    New pain in the back or belly that you cannot control with Tylenol.    Recurrent irregular or fast heart rate lasting over 2 hours.    Any questions or concerns.    Heart rhythms:    You may have some irregular heartbeats. These feel very strong. They may make you feel that the fast heart rhythm is going to start again.  Give it time. The irregular beats should occur less often.       [facility] [place]:    [phone] [facility] (7 days a week)

## 2019-05-07 NOTE — PROGRESS NOTES
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 Home after 4 hrs bedrest. Stop Flecainide.

## 2019-05-07 NOTE — TELEPHONE ENCOUNTER
[employer] PPM    Remote alert for AF with RVR lasting 2 hours and 35 minutes at an avg V rate of 192 BPM. Pt was cardioverted a few days ago, saw Dr Voss yesterday due to return of AF and is now scheduled for AVNA today. DARWIN Chatterjee

## 2019-05-08 ENCOUNTER — DOCUMENTATION ONLY (OUTPATIENT)
Dept: CARDIOLOGY | Facility: CLINIC | Age: 79
End: 2019-05-08

## 2019-05-08 DIAGNOSIS — Z95.0 CARDIAC PACEMAKER IN SITU: Primary | ICD-10-CM

## 2019-05-08 NOTE — TELEPHONE ENCOUNTER
S/P AVNA - previous PPM. LRL was set to 80 - 1 week check to lower LRL scheduled for 5/15. She will keep an eye on the incision site and call us with any problems. SK

## 2019-05-10 ENCOUNTER — DOCUMENTATION ONLY (OUTPATIENT)
Dept: CARDIOLOGY | Facility: CLINIC | Age: 79
End: 2019-05-10

## 2019-05-10 NOTE — TELEPHONE ENCOUNTER
[employer] (D) Pacemaker Device Check    ----s/p AVNA rep check----    AP: <1 %    : <1 %    Mode: DDDR     Underlying Rhythm: not obtained     Heart Rate: not included     Sensing: WNL    Pacing Threshold: A-not obtained, V-WNL    Impedance: WNL    Battery Status: estimated 9-10 years       Atrial Arrhythmia: Since 5/7 - 9 AMS episodes for a 53% AT/AF burden    Ventricular Arrhythmia: 3 VHRs- show RVR    Setting Change: changed AMS base rate and base rate from 60 to 80 and PVARP from 300 ms to 275 ms  Care Plan: She is coming next week to change LRL, and annual threshold on 7/9. SK

## 2019-05-12 ENCOUNTER — OFFICE VISIT (OUTPATIENT)
Dept: URGENT CARE | Facility: URGENT CARE | Age: 79
End: 2019-05-12
Payer: COMMERCIAL

## 2019-05-12 ENCOUNTER — ANCILLARY PROCEDURE (OUTPATIENT)
Dept: GENERAL RADIOLOGY | Facility: CLINIC | Age: 79
End: 2019-05-12
Payer: COMMERCIAL

## 2019-05-12 VITALS
TEMPERATURE: 97.6 F | DIASTOLIC BLOOD PRESSURE: 76 MMHG | WEIGHT: 178 LBS | HEART RATE: 79 BPM | SYSTOLIC BLOOD PRESSURE: 116 MMHG | OXYGEN SATURATION: 99 % | BODY MASS INDEX: 31.53 KG/M2
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
## 2019-05-12 DIAGNOSIS — S61.411A LACERATION OF RIGHT HAND, FOREIGN BODY PRESENCE UNSPECIFIED, INITIAL ENCOUNTER: ICD-10-CM
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
## 2019-05-12 PROCEDURE — 99213 OFFICE O/P EST LOW 20 MIN: CPT | Mod: 25 | Performed by: FAMILY MEDICINE

## 2019-05-12 PROCEDURE — 73130 X-RAY EXAM OF HAND: CPT | Mod: RT | Performed by: FAMILY MEDICINE

## 2019-05-12 PROCEDURE — 12002 RPR S/N/AX/GEN/TRNK2.6-7.5CM: CPT | Performed by: FAMILY MEDICINE
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
## 2019-05-12 NOTE — PATIENT INSTRUCTIONS
Return in 12 -14 days to remove the 4 stitches    Keep the area dry when not showering      If there is increased pain,redness, discharge from the laceration area please come in to be seen immediately      If you become dizzy, imbalanced, have a bad headache, visual difficulties, slurred speech please go to the emergency room immediately

## 2019-05-13 LAB — INTERPRETATION ECG - MUSE: NORMAL

## 2019-05-15 ENCOUNTER — ANCILLARY PROCEDURE (OUTPATIENT)
Dept: CARDIOLOGY | Facility: CLINIC | Age: 79
End: 2019-05-15
Attending: INTERNAL MEDICINE
Payer: COMMERCIAL

## 2019-05-15 DIAGNOSIS — Z95.0 CARDIAC PACEMAKER IN SITU: ICD-10-CM

## 2019-05-17 LAB
MDC_IDC_LEAD_IMPLANT_DT: NORMAL
MDC_IDC_LEAD_IMPLANT_DT: NORMAL
MDC_IDC_LEAD_LOCATION: NORMAL
MDC_IDC_LEAD_LOCATION: NORMAL
MDC_IDC_LEAD_LOCATION_DETAIL_1: NORMAL
MDC_IDC_LEAD_LOCATION_DETAIL_1: NORMAL
MDC_IDC_LEAD_MFG: NORMAL
MDC_IDC_LEAD_MFG: NORMAL
MDC_IDC_LEAD_MODEL: NORMAL
MDC_IDC_LEAD_MODEL: NORMAL
MDC_IDC_LEAD_SERIAL: NORMAL
MDC_IDC_LEAD_SERIAL: NORMAL
MDC_IDC_MSMT_BATTERY_REMAINING_LONGEVITY: 98 MO
MDC_IDC_MSMT_BATTERY_STATUS: NORMAL
MDC_IDC_MSMT_BATTERY_VOLTAGE: 2.99 V
MDC_IDC_MSMT_LEADCHNL_RA_IMPEDANCE_VALUE: 462.5 OHM
MDC_IDC_MSMT_LEADCHNL_RA_PACING_THRESHOLD_AMPLITUDE: 1 V
MDC_IDC_MSMT_LEADCHNL_RA_PACING_THRESHOLD_PULSEWIDTH: 0.4 MS
MDC_IDC_MSMT_LEADCHNL_RA_SENSING_INTR_AMPL: 3.7 MV
MDC_IDC_MSMT_LEADCHNL_RV_IMPEDANCE_VALUE: 587.5 OHM
MDC_IDC_MSMT_LEADCHNL_RV_PACING_THRESHOLD_AMPLITUDE: 0.62 V
MDC_IDC_MSMT_LEADCHNL_RV_PACING_THRESHOLD_PULSEWIDTH: 0.4 MS
MDC_IDC_MSMT_LEADCHNL_RV_SENSING_INTR_AMPL: 11.9 MV
MDC_IDC_PG_IMPLANT_DTM: NORMAL
MDC_IDC_PG_MFG: NORMAL
MDC_IDC_PG_MODEL: NORMAL
MDC_IDC_PG_SERIAL: NORMAL
MDC_IDC_PG_TYPE: NORMAL
MDC_IDC_SESS_CLINIC_NAME: NORMAL
MDC_IDC_SESS_DTM: NORMAL
MDC_IDC_SESS_TYPE: NORMAL
MDC_IDC_SET_BRADY_AT_MODE_SWITCH_MODE: NORMAL
MDC_IDC_SET_BRADY_AT_MODE_SWITCH_RATE: 170 {BEATS}/MIN
MDC_IDC_SET_BRADY_HYSTRATE: NORMAL
MDC_IDC_SET_BRADY_LOWRATE: 70 {BEATS}/MIN
MDC_IDC_SET_BRADY_MAX_SENSOR_RATE: 120 {BEATS}/MIN
MDC_IDC_SET_BRADY_MAX_TRACKING_RATE: 120 {BEATS}/MIN
MDC_IDC_SET_BRADY_MODE: NORMAL
MDC_IDC_SET_BRADY_NIGHT_RATE: NORMAL
MDC_IDC_SET_BRADY_PAV_DELAY_LOW: 250 MS
MDC_IDC_SET_BRADY_SAV_DELAY_LOW: 225 MS
MDC_IDC_SET_LEADCHNL_RA_PACING_AMPLITUDE: 2 V
MDC_IDC_SET_LEADCHNL_RA_PACING_ANODE_ELECTRODE_1: NORMAL
MDC_IDC_SET_LEADCHNL_RA_PACING_ANODE_LOCATION_1: NORMAL
MDC_IDC_SET_LEADCHNL_RA_PACING_CAPTURE_MODE: NORMAL
MDC_IDC_SET_LEADCHNL_RA_PACING_CATHODE_ELECTRODE_1: NORMAL
MDC_IDC_SET_LEADCHNL_RA_PACING_CATHODE_LOCATION_1: NORMAL
MDC_IDC_SET_LEADCHNL_RA_PACING_POLARITY: NORMAL
MDC_IDC_SET_LEADCHNL_RA_PACING_PULSEWIDTH: 0.4 MS
MDC_IDC_SET_LEADCHNL_RA_SENSING_ADAPTATION_MODE: NORMAL
MDC_IDC_SET_LEADCHNL_RA_SENSING_ANODE_ELECTRODE_1: NORMAL
MDC_IDC_SET_LEADCHNL_RA_SENSING_ANODE_LOCATION_1: NORMAL
MDC_IDC_SET_LEADCHNL_RA_SENSING_CATHODE_ELECTRODE_1: NORMAL
MDC_IDC_SET_LEADCHNL_RA_SENSING_CATHODE_LOCATION_1: NORMAL
MDC_IDC_SET_LEADCHNL_RA_SENSING_POLARITY: NORMAL
MDC_IDC_SET_LEADCHNL_RA_SENSING_SENSITIVITY: 0.3 MV
MDC_IDC_SET_LEADCHNL_RV_PACING_AMPLITUDE: 0.88
MDC_IDC_SET_LEADCHNL_RV_PACING_ANODE_ELECTRODE_1: NORMAL
MDC_IDC_SET_LEADCHNL_RV_PACING_ANODE_LOCATION_1: NORMAL
MDC_IDC_SET_LEADCHNL_RV_PACING_CAPTURE_MODE: NORMAL
MDC_IDC_SET_LEADCHNL_RV_PACING_CATHODE_ELECTRODE_1: NORMAL
MDC_IDC_SET_LEADCHNL_RV_PACING_CATHODE_LOCATION_1: NORMAL
MDC_IDC_SET_LEADCHNL_RV_PACING_POLARITY: NORMAL
MDC_IDC_SET_LEADCHNL_RV_PACING_PULSEWIDTH: 0.4 MS
MDC_IDC_SET_LEADCHNL_RV_SENSING_ANODE_ELECTRODE_1: NORMAL
MDC_IDC_SET_LEADCHNL_RV_SENSING_ANODE_LOCATION_1: NORMAL
MDC_IDC_SET_LEADCHNL_RV_SENSING_CATHODE_ELECTRODE_1: NORMAL
MDC_IDC_SET_LEADCHNL_RV_SENSING_CATHODE_LOCATION_1: NORMAL
MDC_IDC_SET_LEADCHNL_RV_SENSING_POLARITY: NORMAL
MDC_IDC_SET_LEADCHNL_RV_SENSING_SENSITIVITY: 0.5 MV
MDC_IDC_STAT_AT_MODE_SW_COUNT: 0
MDC_IDC_STAT_BRADY_RA_PERCENT_PACED: 92 %
MDC_IDC_STAT_BRADY_RV_PERCENT_PACED: 99.96 %
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
## 2019-06-04 ENCOUNTER — OFFICE VISIT (OUTPATIENT)
Dept: FAMILY MEDICINE | Facility: CLINIC | Age: 79
End: 2019-06-04
Payer: COMMERCIAL

## 2019-06-04 VITALS
SYSTOLIC BLOOD PRESSURE: 116 MMHG | DIASTOLIC BLOOD PRESSURE: 65 MMHG | HEART RATE: 70 BPM | BODY MASS INDEX: 31.89 KG/M2 | TEMPERATURE: 96.3 F | WEIGHT: 180 LBS | OXYGEN SATURATION: 96 %

## 2019-06-04 DIAGNOSIS — H91.90 HEARING DIFFICULTY, UNSPECIFIED LATERALITY: Primary | ICD-10-CM

## 2019-06-04 DIAGNOSIS — S61.219D: ICD-10-CM

## 2019-06-04 DIAGNOSIS — H61.21 IMPACTED CERUMEN OF RIGHT EAR: ICD-10-CM

## 2019-06-04 DIAGNOSIS — N18.30 CKD (CHRONIC KIDNEY DISEASE) STAGE 3, GFR 30-59 ML/MIN (H): ICD-10-CM

## 2019-06-04 DIAGNOSIS — I25.10 CORONARY ARTERY CALCIFICATION SEEN ON CT SCAN: Primary | ICD-10-CM
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
## 2019-06-04 LAB
CREAT UR-MCNC: 40 MG/DL
MICROALBUMIN UR-MCNC: 23 MG/L
MICROALBUMIN/CREAT UR: 57.43 MG/G CR (ref 0–25)

## 2019-06-04 PROCEDURE — 69209 REMOVE IMPACTED EAR WAX UNI: CPT | Mod: RT | Performed by: FAMILY MEDICINE

## 2019-06-04 PROCEDURE — 82043 UR ALBUMIN QUANTITATIVE: CPT | Performed by: FAMILY MEDICINE

## 2019-06-04 PROCEDURE — 99213 OFFICE O/P EST LOW 20 MIN: CPT | Mod: 25 | Performed by: FAMILY MEDICINE

## 2019-06-04 PROCEDURE — 99207 C PAF COMPLETED  NO CHARGE: CPT | Performed by: FAMILY MEDICINE

## 2019-06-04 RX ORDER — ATORVASTATIN CALCIUM 40 MG/1
40 TABLET, FILM COATED ORAL DAILY
Qty: 90 TABLET | Refills: 3 | Status: SHIPPED | OUTPATIENT
Start: 2019-06-04 | End: 2020-07-09

## 2019-06-04 NOTE — PATIENT INSTRUCTIONS
1) Schedule with the audiologist for a hearing test.   2) Leave urine either today or tomorrow for your urine protein test, a test of kidney function.

## 2019-06-05 ENCOUNTER — OFFICE VISIT (OUTPATIENT)
Dept: CARDIOLOGY | Facility: CLINIC | Age: 79
End: 2019-06-05
Attending: INTERNAL MEDICINE
Payer: COMMERCIAL

## 2019-06-05 VITALS
WEIGHT: 181.8 LBS | SYSTOLIC BLOOD PRESSURE: 132 MMHG | BODY MASS INDEX: 31.04 KG/M2 | HEIGHT: 64 IN | HEART RATE: 70 BPM | DIASTOLIC BLOOD PRESSURE: 78 MMHG | OXYGEN SATURATION: 96 %

## 2019-06-05 DIAGNOSIS — E78.5 HYPERLIPIDEMIA LDL GOAL <70: ICD-10-CM

## 2019-06-05 DIAGNOSIS — I10 HYPERTENSION GOAL BP (BLOOD PRESSURE) < 140/90: ICD-10-CM

## 2019-06-05 DIAGNOSIS — I50.32 CHRONIC DIASTOLIC CONGESTIVE HEART FAILURE (H): Primary | ICD-10-CM

## 2019-06-05 DIAGNOSIS — Z95.0 CARDIAC PACEMAKER IN SITU: ICD-10-CM

## 2019-06-05 DIAGNOSIS — I48.0 PAF (PAROXYSMAL ATRIAL FIBRILLATION) (H): ICD-10-CM

## 2019-06-05 DIAGNOSIS — I50.32 CHRONIC DIASTOLIC CONGESTIVE HEART FAILURE (H): ICD-10-CM

## 2019-06-05 LAB
ANION GAP SERPL CALCULATED.3IONS-SCNC: 12.1 MMOL/L (ref 6–17)
BUN SERPL-MCNC: 19 MG/DL (ref 7–30)
CALCIUM SERPL-MCNC: 9.8 MG/DL (ref 8.5–10.5)
CHLORIDE SERPL-SCNC: 102 MMOL/L (ref 98–107)
CO2 SERPL-SCNC: 28 MMOL/L (ref 23–29)
CREAT SERPL-MCNC: 1.09 MG/DL (ref 0.7–1.3)
GFR SERPL CREATININE-BSD FRML MDRD: 48 ML/MIN/{1.73_M2}
GLUCOSE SERPL-MCNC: 112 MG/DL (ref 70–105)
HGB BLD-MCNC: 12.5 G/DL (ref 11.7–15.7)
POTASSIUM SERPL-SCNC: 4.1 MMOL/L (ref 3.5–5.1)
SODIUM SERPL-SCNC: 138 MMOL/L (ref 136–145)

## 2019-06-05 PROCEDURE — 85018 HEMOGLOBIN: CPT | Performed by: PHYSICIAN ASSISTANT

## 2019-06-05 PROCEDURE — 36415 COLL VENOUS BLD VENIPUNCTURE: CPT | Performed by: PHYSICIAN ASSISTANT

## 2019-06-05 PROCEDURE — 80048 BASIC METABOLIC PNL TOTAL CA: CPT | Performed by: PHYSICIAN ASSISTANT

## 2019-06-05 PROCEDURE — 99214 OFFICE O/P EST MOD 30 MIN: CPT | Performed by: PHYSICIAN ASSISTANT

## 2019-06-05 PROCEDURE — 93000 ELECTROCARDIOGRAM COMPLETE: CPT | Performed by: PHYSICIAN ASSISTANT

## 2019-06-05 ASSESSMENT — MIFFLIN-ST. JEOR: SCORE: 1280.67
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
## 2019-06-05 NOTE — PATIENT INSTRUCTIONS
Today, we discussed the following:   - Results: Your blood test looks great.   - Medication changes:  No changes today. Remember to not miss any doses of the Xarelto. This is important for preventing a stroke.  - Follow up: Please arrange a follow up visit with EP within the next two weeks. Come back to see me in 3 months. Call me in the meantime with any weight gain which is associated with difficulty breathing or swelling.     If you can make it, the eating with heart failure session is  from 11-12pm at the [facility] [address].     Please, remember to continue the followin.  Weigh yourself daily. Call if your weight is up > than 2 pounds in one day, or 5 pounds in one week; if you feel more short of breath or have worsening swelling in your legs or abdomen.  2.  Eat a low sodium diet (less than 2,000mg or 2g daily). If you eat less salt, you will retain less fluid.   3.  Avoid alcohol, as this can worsen heart failure.   4.  Avoid NSAIDs as able (For example, Ibuprofen / aleve / advil / naprosen / diclofenac).    Please call [facility] nurse for any questions or concerns Mon-Fri 8am-4pm:   [phone]  For concerns after hours:   [phone]   Scheduling phone number:   [phone]    Thank you for visiting with us today.   Adriana Ferrell PA-C  ______________________________________________________________
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
## 2019-06-10 NOTE — NURSING NOTE
Hamida Verma is a 78 year old female who presents in clinic with complaint of impacted ear wax (cerumen).  Per the order of  Mikala Philip, ear wax was removed from right side by flushing with warm water and manual debridement has not been performed. Patient denies pain/dizziness/discharge/drainage  (if yes, stop procedure and huddle with provider).  Ear wax has been successfully removed. (If not, huddle with provider).   Molly Marshall

## 2019-06-14 ENCOUNTER — TRANSFERRED RECORDS (OUTPATIENT)
Dept: HEALTH INFORMATION MANAGEMENT | Facility: CLINIC | Age: 79
End: 2019-06-14

## 2019-06-17 DIAGNOSIS — I50.32 CHRONIC DIASTOLIC CONGESTIVE HEART FAILURE (H): ICD-10-CM

## 2019-06-17 LAB
ANION GAP SERPL CALCULATED.3IONS-SCNC: 7 MMOL/L (ref 3–14)
BUN SERPL-MCNC: 14 MG/DL (ref 7–30)
CALCIUM SERPL-MCNC: 9.4 MG/DL (ref 8.5–10.1)
CHLORIDE SERPL-SCNC: 108 MMOL/L (ref 94–109)
CO2 SERPL-SCNC: 26 MMOL/L (ref 20–32)
CREAT SERPL-MCNC: 0.92 MG/DL (ref 0.52–1.04)
GFR SERPL CREATININE-BSD FRML MDRD: 59 ML/MIN/{1.73_M2}
GLUCOSE SERPL-MCNC: 97 MG/DL (ref 70–99)
HGB BLD-MCNC: 12.1 G/DL (ref 11.7–15.7)
POTASSIUM SERPL-SCNC: 4.3 MMOL/L (ref 3.4–5.3)
SODIUM SERPL-SCNC: 141 MMOL/L (ref 133–144)

## 2019-06-17 PROCEDURE — 80048 BASIC METABOLIC PNL TOTAL CA: CPT | Performed by: PHYSICIAN ASSISTANT

## 2019-06-17 PROCEDURE — 36415 COLL VENOUS BLD VENIPUNCTURE: CPT | Performed by: PHYSICIAN ASSISTANT

## 2019-06-17 PROCEDURE — 85018 HEMOGLOBIN: CPT | Performed by: PHYSICIAN ASSISTANT

## 2019-07-09 ENCOUNTER — ANCILLARY PROCEDURE (OUTPATIENT)
Dept: CARDIOLOGY | Facility: CLINIC | Age: 79
End: 2019-07-09
Attending: INTERNAL MEDICINE
Payer: COMMERCIAL

## 2019-07-09 DIAGNOSIS — I49.5 SICK SINUS SYNDROME (H): ICD-10-CM

## 2019-07-09 DIAGNOSIS — Z95.0 CARDIAC PACEMAKER IN SITU: Primary | ICD-10-CM

## 2019-07-09 PROCEDURE — 93280 PM DEVICE PROGR EVAL DUAL: CPT | Performed by: INTERNAL MEDICINE

## 2019-07-10 LAB
MDC_IDC_LEAD_IMPLANT_DT: NORMAL
MDC_IDC_LEAD_IMPLANT_DT: NORMAL
MDC_IDC_LEAD_LOCATION: NORMAL
MDC_IDC_LEAD_LOCATION: NORMAL
MDC_IDC_LEAD_LOCATION_DETAIL_1: NORMAL
MDC_IDC_LEAD_LOCATION_DETAIL_1: NORMAL
MDC_IDC_LEAD_MFG: NORMAL
MDC_IDC_LEAD_MFG: NORMAL
MDC_IDC_LEAD_MODEL: NORMAL
MDC_IDC_LEAD_MODEL: NORMAL
MDC_IDC_LEAD_SERIAL: NORMAL
MDC_IDC_LEAD_SERIAL: NORMAL
MDC_IDC_MSMT_BATTERY_REMAINING_LONGEVITY: 112 MO
MDC_IDC_MSMT_BATTERY_STATUS: NORMAL
MDC_IDC_MSMT_BATTERY_VOLTAGE: 2.99 V
MDC_IDC_MSMT_LEADCHNL_RA_IMPEDANCE_VALUE: 487.5 OHM
MDC_IDC_MSMT_LEADCHNL_RA_PACING_THRESHOLD_AMPLITUDE: 0.5 V
MDC_IDC_MSMT_LEADCHNL_RA_PACING_THRESHOLD_AMPLITUDE: 0.5 V
MDC_IDC_MSMT_LEADCHNL_RA_PACING_THRESHOLD_PULSEWIDTH: 0.4 MS
MDC_IDC_MSMT_LEADCHNL_RA_PACING_THRESHOLD_PULSEWIDTH: 0.4 MS
MDC_IDC_MSMT_LEADCHNL_RA_SENSING_INTR_AMPL: 3.7 MV
MDC_IDC_MSMT_LEADCHNL_RV_IMPEDANCE_VALUE: 650 OHM
MDC_IDC_MSMT_LEADCHNL_RV_PACING_THRESHOLD_AMPLITUDE: 0.75 V
MDC_IDC_MSMT_LEADCHNL_RV_PACING_THRESHOLD_AMPLITUDE: 0.75 V
MDC_IDC_MSMT_LEADCHNL_RV_PACING_THRESHOLD_PULSEWIDTH: 0.4 MS
MDC_IDC_MSMT_LEADCHNL_RV_PACING_THRESHOLD_PULSEWIDTH: 0.4 MS
MDC_IDC_PG_IMPLANT_DTM: NORMAL
MDC_IDC_PG_MFG: NORMAL
MDC_IDC_PG_MODEL: NORMAL
MDC_IDC_PG_SERIAL: NORMAL
MDC_IDC_PG_TYPE: NORMAL
MDC_IDC_SESS_CLINIC_NAME: NORMAL
MDC_IDC_SESS_DTM: NORMAL
MDC_IDC_SESS_TYPE: NORMAL
MDC_IDC_SET_BRADY_AT_MODE_SWITCH_MODE: NORMAL
MDC_IDC_SET_BRADY_AT_MODE_SWITCH_RATE: 170 {BEATS}/MIN
MDC_IDC_SET_BRADY_HYSTRATE: NORMAL
MDC_IDC_SET_BRADY_LOWRATE: 60 {BEATS}/MIN
MDC_IDC_SET_BRADY_MAX_SENSOR_RATE: 120 {BEATS}/MIN
MDC_IDC_SET_BRADY_MAX_TRACKING_RATE: 120 {BEATS}/MIN
MDC_IDC_SET_BRADY_MODE: NORMAL
MDC_IDC_SET_BRADY_NIGHT_RATE: NORMAL
MDC_IDC_SET_BRADY_PAV_DELAY_LOW: 250 MS
MDC_IDC_SET_BRADY_SAV_DELAY_LOW: 225 MS
MDC_IDC_SET_LEADCHNL_RA_PACING_AMPLITUDE: 2 V
MDC_IDC_SET_LEADCHNL_RA_PACING_ANODE_ELECTRODE_1: NORMAL
MDC_IDC_SET_LEADCHNL_RA_PACING_ANODE_LOCATION_1: NORMAL
MDC_IDC_SET_LEADCHNL_RA_PACING_CAPTURE_MODE: NORMAL
MDC_IDC_SET_LEADCHNL_RA_PACING_CATHODE_ELECTRODE_1: NORMAL
MDC_IDC_SET_LEADCHNL_RA_PACING_CATHODE_LOCATION_1: NORMAL
MDC_IDC_SET_LEADCHNL_RA_PACING_POLARITY: NORMAL
MDC_IDC_SET_LEADCHNL_RA_PACING_PULSEWIDTH: 0.4 MS
MDC_IDC_SET_LEADCHNL_RA_SENSING_ADAPTATION_MODE: NORMAL
MDC_IDC_SET_LEADCHNL_RA_SENSING_ANODE_ELECTRODE_1: NORMAL
MDC_IDC_SET_LEADCHNL_RA_SENSING_ANODE_LOCATION_1: NORMAL
MDC_IDC_SET_LEADCHNL_RA_SENSING_CATHODE_ELECTRODE_1: NORMAL
MDC_IDC_SET_LEADCHNL_RA_SENSING_CATHODE_LOCATION_1: NORMAL
MDC_IDC_SET_LEADCHNL_RA_SENSING_POLARITY: NORMAL
MDC_IDC_SET_LEADCHNL_RA_SENSING_SENSITIVITY: 0.3 MV
MDC_IDC_SET_LEADCHNL_RV_PACING_AMPLITUDE: 0.88
MDC_IDC_SET_LEADCHNL_RV_PACING_ANODE_ELECTRODE_1: NORMAL
MDC_IDC_SET_LEADCHNL_RV_PACING_ANODE_LOCATION_1: NORMAL
MDC_IDC_SET_LEADCHNL_RV_PACING_CAPTURE_MODE: NORMAL
MDC_IDC_SET_LEADCHNL_RV_PACING_CATHODE_ELECTRODE_1: NORMAL
MDC_IDC_SET_LEADCHNL_RV_PACING_CATHODE_LOCATION_1: NORMAL
MDC_IDC_SET_LEADCHNL_RV_PACING_POLARITY: NORMAL
MDC_IDC_SET_LEADCHNL_RV_PACING_PULSEWIDTH: 0.4 MS
MDC_IDC_SET_LEADCHNL_RV_SENSING_ANODE_ELECTRODE_1: NORMAL
MDC_IDC_SET_LEADCHNL_RV_SENSING_ANODE_LOCATION_1: NORMAL
MDC_IDC_SET_LEADCHNL_RV_SENSING_CATHODE_ELECTRODE_1: NORMAL
MDC_IDC_SET_LEADCHNL_RV_SENSING_CATHODE_LOCATION_1: NORMAL
MDC_IDC_SET_LEADCHNL_RV_SENSING_POLARITY: NORMAL
MDC_IDC_SET_LEADCHNL_RV_SENSING_SENSITIVITY: 0.5 MV
MDC_IDC_STAT_AT_MODE_SW_COUNT: 8
MDC_IDC_STAT_BRADY_RA_PERCENT_PACED: 71 %
MDC_IDC_STAT_BRADY_RV_PERCENT_PACED: 99.96 %

## 2019-10-08 ENCOUNTER — TELEPHONE (OUTPATIENT)
Dept: CARDIOLOGY | Facility: CLINIC | Age: 79
End: 2019-10-08

## 2019-10-08 ENCOUNTER — OFFICE VISIT (OUTPATIENT)
Dept: FAMILY MEDICINE | Facility: CLINIC | Age: 79
End: 2019-10-08
Payer: COMMERCIAL

## 2019-10-08 VITALS
BODY MASS INDEX: 32.73 KG/M2 | TEMPERATURE: 97.7 F | OXYGEN SATURATION: 98 % | HEIGHT: 63 IN | WEIGHT: 184.75 LBS | HEART RATE: 60 BPM | DIASTOLIC BLOOD PRESSURE: 78 MMHG | SYSTOLIC BLOOD PRESSURE: 132 MMHG

## 2019-10-08 DIAGNOSIS — N18.30 CKD (CHRONIC KIDNEY DISEASE) STAGE 3, GFR 30-59 ML/MIN (H): ICD-10-CM

## 2019-10-08 DIAGNOSIS — M81.0 AGE RELATED OSTEOPOROSIS, UNSPECIFIED PATHOLOGICAL FRACTURE PRESENCE: ICD-10-CM

## 2019-10-08 DIAGNOSIS — E78.5 HYPERLIPIDEMIA LDL GOAL <70: ICD-10-CM

## 2019-10-08 DIAGNOSIS — I10 BENIGN ESSENTIAL HYPERTENSION: ICD-10-CM

## 2019-10-08 DIAGNOSIS — I49.5 SSS (SICK SINUS SYNDROME) (H): ICD-10-CM

## 2019-10-08 DIAGNOSIS — Z01.818 PREOP GENERAL PHYSICAL EXAM: Primary | ICD-10-CM

## 2019-10-08 DIAGNOSIS — I10 HYPERTENSION GOAL BP (BLOOD PRESSURE) < 140/90: ICD-10-CM

## 2019-10-08 DIAGNOSIS — I50.32 CHRONIC DIASTOLIC CHF (CONGESTIVE HEART FAILURE) (H): ICD-10-CM

## 2019-10-08 DIAGNOSIS — H54.7 VISION PROBLEM: ICD-10-CM

## 2019-10-08 DIAGNOSIS — I48.0 PAF (PAROXYSMAL ATRIAL FIBRILLATION) (H): ICD-10-CM

## 2019-10-08 DIAGNOSIS — Z95.0 S/P CARDIAC PACEMAKER PROCEDURE: ICD-10-CM

## 2019-10-08 DIAGNOSIS — J44.9 CHRONIC OBSTRUCTIVE PULMONARY DISEASE, UNSPECIFIED COPD TYPE (H): ICD-10-CM

## 2019-10-08 PROCEDURE — 99215 OFFICE O/P EST HI 40 MIN: CPT | Performed by: FAMILY MEDICINE

## 2019-10-08 ASSESSMENT — MIFFLIN-ST. JEOR: SCORE: 1277.15

## 2019-10-08 NOTE — PATIENT INSTRUCTIONS
Before Your Surgery      Call your surgeon if there is any change in your health. This includes signs of a cold or flu (such as a sore throat, runny nose, cough, rash or fever).    Do not smoke, drink alcohol or take over the counter medicine (unless your surgeon or primary care doctor tells you to) for the 24 hours before and after surgery.    If you take prescribed drugs: Follow your doctor s orders about which medicines to take and which to stop until after surgery.    Eating and drinking prior to surgery: follow the instructions from your surgeon    Take a shower or bath the night before surgery. Use the soap your surgeon gave you to gently clean your skin. If you do not have soap from your surgeon, use your regular soap. Do not shave or scrub the surgery site.  Wear clean pajamas and have clean sheets on your bed.     You are due for a visit with the cardiologist. Please call your cardiologist regarding instructions for your xarelto before surgery.    Do not take lisinopril on the day of your surgery.

## 2019-10-08 NOTE — PROGRESS NOTES
[facility]  [address] [place] [id number] [phone]  Dept: [phone]    PRE-OP EVALUATION:  Today's date: 10/8/2019    Hamida Verma (: 1940) presents for pre-operative evaluation assessment as requested by Dr. Frey, Mynor Coello MD  .  She requires evaluation and anesthesia risk assessment prior to undergoing surgery/procedure for treatment of LEFT VITRECTOMY POSTERIOR 25 GAUGE SYSTEM, MEMBRANE PEEL, BRILLIANT BLUE DYE, PERIOCULAR SUBTENON KENALOG INJECTION, ANTERIOR CHAMBER INTRAOCULAR LENS PLACEMENT, AIR FLUID EXCHANGE .    Proposed Surgery/ Procedure: LEFT VITRECTOMY POSTERIOR 25 GAUGE SYSTEM, MEMBRANE PEEL, BRILLIANT BLUE DYE, PERIOCULAR SUBTENON KENALOG INJECTION, ANTERIOR CHAMBER INTRAOCULAR LENS PLACEMENT, AIR FLUID EXCHANGE  Date of Surgery/ Procedure: 10/17/2019  Time of Surgery/ Procedure: to be determined   Hospital/Surgical Facility: [facility]   Fax number for surgical facility: [phone]  Primary Physician: Mikala Philip  Type of Anesthesia Anticipated: to be determined    Patient has a Health Care Directive or Living Will:  NO    1. NO - Do you have a history of heart attack, stroke, stent, bypass or surgery on an artery in the head, neck, heart or legs?  2. NO - Do you ever have any pain or discomfort in your chest?  3. YES - Do you have a history of  Heart Failure? She has a pacemaker and she was in the hospital for congestive heart failure   4. NO - Are you troubled by shortness of breath when: walking on the level, up a slight hill or at night?  5. NO - Do you currently have a cold, bronchitis or other respiratory infection?  6. NO - Do you have a cough, shortness of breath or wheezing?  7. NO - Do you sometimes get pains in the calves of your legs when you walk?  8. NO - Do you or anyone in your family have previous history of blood clots?  9. NO - Do you or does anyone in your family have a serious  bleeding problem such as prolonged bleeding following surgeries or cuts?  10. NO - Have you ever had problems with anemia or been told to take iron pills?  11. NO - Have you had any abnormal blood loss such as black, tarry or bloody stools, or abnormal vaginal bleeding?  12. NO - Have you ever had a blood transfusion?  13. NO - Have you or any of your relatives ever had problems with anesthesia?  14. NO - Do you have sleep apnea, excessive snoring or daytime drowsiness?  15. NO - Do you have any prosthetic heart valves?  16. NO - Do you have prosthetic joints?  17. NO - Is there any chance that you may be pregnant?      HPI:     HPI related to upcoming procedure: Hamida Verma is a 79 year old female who presents today for preop for LEFT VITRECTOMY POSTERIOR 25 GAUGE SYSTEM, MEMBRANE PEEL, BRILLIANT BLUE DYE, PERIOCULAR SUBTENON KENALOG INJECTION, ANTERIOR CHAMBER INTRAOCULAR LENS PLACEMENT, AIR FLUID EXCHANGE .    Paroxysmal A-FIB/aflutter/sick sinus s/p av node ablation and PPM - Patient has a longstanding history of A-fib currently on xarelto, previously on flecainide.  Underwent AVN ablation on 5/7/9.                                                                                                                                                                                .  CHF - Patient has a history of  CHF.The patient denies chest pain, edema, orthopnea, SOB or recent weight gain. Last seen by cardiology on 6/5/19 and was doing well from a cardiovascular standpoint at that time. She had EP follow up in July. She was to return to see cardiologist last month.      Melonie MPI 3/2018 was normal (small fixed defect suggestive of breast attenuation).                                                       .  COPD - Patient has a history of mild COPD . Patient has been doing well overall and is no longer needing oxygen. She continues spiriva and uses albuterol prn. follows with pulmonology.                                                                                                         .  HYPERLIPIDEMIA - Patient has a  history of Hyperlipidemia requiring medication for treatment.                                                                                                                                                      .  HYPERTENSION - Patient has history of HTN , currently denies any symptoms referable to elevated blood pressure. Specifically denies chest pain, palpitations, dyspnea, orthopnea, PND or peripheral edema. Blood pressure readings have been in normal range. Current medication regimen is as listed below. Patient denies any side effects of medication.                        MEDICAL HISTORY:     Patient Active Problem List    Diagnosis Date Noted     Chronic obstructive pulmonary disease, unspecified COPD type (H) 10/08/2019     Priority: Medium     CKD (chronic kidney disease) stage 3, GFR 30-59 ml/min (H) 06/04/2019     Priority: Medium     Diabetes mellitus, type 2 (H) 03/28/2019     Priority: Medium     Hypotension 01/05/2019     Priority: Medium     Coronary artery calcification seen on CT scan 10/25/2018     Priority: Medium     SSS (sick sinus syndrome) (H)      Priority: Medium     s/p PPM 3/2018       Chronic diastolic CHF (congestive heart failure) (H)      Priority: Medium     Pulmonary hypertension (H)      Priority: Medium     S/P cardiac pacemaker procedure 10/17/2018     Priority: Medium     PAF (paroxysmal atrial fibrillation) (H) 03/06/2018     Priority: Medium     Obesity 11/25/2014     Priority: Medium     Knee pain 04/30/2013     Priority: Medium     Hypertension goal BP (blood pressure) < 140/90 02/21/2011     Priority: Medium     Hyperlipidemia LDL goal <70 05/09/2010     Priority: Medium     Symptomatic menopausal or female climacteric states 03/30/2006     Priority: Medium      Past Medical History:   Diagnosis Date     Atrial fibrillation (H)      Chronic diastolic CHF  (congestive heart failure) (H)      Essential hypertension, benign      HYPERLIPIDEMIA NEC/NOS 3/30/2006     Pulmonary hypertension (H)      SSS (sick sinus syndrome) (H)     s/p PPM 3/2018     Past Surgical History:   Procedure Laterality Date     EP ABLATION AV NODE N/A 5/7/2019    Procedure: EP Ablation AV Node;  Surgeon: Roe Voss MD;  Location:  [facility]     EYE SURGERY       HYSTERECTOMY, VAGINAL      hysterectomy     Current Outpatient Medications   Medication Sig Dispense Refill     alendronate (FOSAMAX) 70 MG tablet Take 1 tablet (70 mg) by mouth every 7 days Take 60 minutes before am meal with 8 oz. water. Remain upright for 30 minutes. 12 tablet 3     atorvastatin (LIPITOR) 40 MG tablet Take 1 tablet (40 mg) by mouth daily 90 tablet 3     cholecalciferol (VITAMIN  -D) 1000 UNIT capsule Take 1 capsule by mouth daily.       Coral Calcium 133-66.7-133 MG-MG-UNIT CAPS Take 2 tablets by mouth 2 times daily        FLAX SEED OIL OR 1 capsule daily       furosemide (LASIX) 20 MG tablet Take 1 tablet (20 mg) by mouth daily 90 tablet 3     Krill Oil 500 MG CAPS Take 1 capsule by mouth daily       lisinopril (PRINIVIL/ZESTRIL) 40 MG tablet Take 1 tablet (40 mg) by mouth daily 90 tablet 3     Methylsulfonylmethane (MSM) 500 MG CAPS Take 1 capsule by mouth daily       metoprolol succinate ER (TOPROL-XL) 50 MG 24 hr tablet Take 1 tablet (50 mg) by mouth 2 times daily 180 tablet 3     Milk Thistle 200 MG CAPS Take 1 capsule by mouth daily        Multiple Vitamins-Minerals (HAIR SKIN NAILS PO) Take 1 tablet by mouth At Bedtime       potassium chloride ER (K-DUR/KLOR-CON M) 10 MEQ CR tablet Take 1 tablet (10 mEq) by mouth daily 90 tablet 3     prednisoLONE acetate (PRED FORTE) 1 % ophthalmic suspension 1-2 drops 3 times daily       rivaroxaban ANTICOAGULANT (XARELTO) 20 MG TABS tablet Take 1 tablet (20 mg) by mouth daily (with dinner) 90 tablet 3     OTC products: None, except as noted  "above    Allergies   Allergen Reactions     Strawberries [Strawberry] Anaphylaxis     Strawberry Flavor       Latex Allergy: NO    Social History     Tobacco Use     Smoking status: Former Smoker     Packs/day: 1.50     Years: 45.00     Pack years: 67.50     Types: Cigarettes     Start date: 10/28/1955     Last attempt to quit: 2000     Years since quittin.1     Smokeless tobacco: Never Used     Tobacco comment: quit 6 yrs ago   Substance Use Topics     Alcohol use: No     History   Drug Use No       REVIEW OF SYSTEMS:   Constitutional, neuro, ENT, endocrine, pulmonary, cardiac, gastrointestinal, genitourinary, musculoskeletal, integument and psychiatric systems are negative, except as otherwise noted.    EXAM:   /78 (BP Location: Left arm, Patient Position: Sitting, Cuff Size: Adult Regular)   Pulse 60   Temp 97.7  F (36.5  C) (Oral)   Ht 1.6 m (5' 3\")   Wt 83.8 kg (184 lb 12 oz)   LMP  (LMP Unknown)   SpO2 98%   BMI 32.73 kg/m      GENERAL APPEARANCE: healthy, alert and no distress     EYES: EOMI, PERRL     HENT: ear canals and TM's normal and nose and mouth without ulcers or lesions     NECK: no adenopathy, no asymmetry, masses, or scars and thyroid normal to palpation     RESP: lungs clear to auscultation - no rales, rhonchi or wheezes     CV: regular rates and rhythm, normal S1 S2, no S3 or S4 and no murmur, click or rub     ABDOMEN:  soft, nontender, no HSM or masses and bowel sounds normal     MS: extremities normal- no gross deformities noted      SKIN: no suspicious lesions or rashes     NEURO: Normal strength and tone, sensory exam grossly normal, mentation intact and speech normal     PSYCH: mentation appears normal. and affect normal/bright     LYMPHATICS: No cervical adenopathy    DIAGNOSTICS:   No labs or EKG required for low risk surgery (cataract, skin procedure, breast biopsy, etc)    Recent Labs   Lab Test 19  0758 19  0736 19  1122  19  0905  " 01/08/19  0814  03/19/18  1055  11/12/14  0814   HGB 12.1 12.5 13.3  --  12.9   < > 11.1*   < >  --    < > 14.1   PLT  --   --  453*  --  419   < > 341   < >  --    < > 405   INR  --   --   --   --   --   --   --   --  1.31*  --  1.04    138 137   < > 140   < > 133   < >  --    < > 140   POTASSIUM 4.3 4.1 4.1   < > 4.3   < > 4.2   < > 3.7   < > 3.1*   CR 0.92 1.09 1.13*   < > 0.96   < > 0.80   < >  --    < > 0.80   A1C  --   --   --   --  5.1  --  6.9*  --   --   --   --     < > = values in this interval not displayed.        IMPRESSION:   Reason for surgery/procedure: impaired vision, left eye  Diagnosis/reason for consult: preop for   LEFT VITRECTOMY POSTERIOR 25 GAUGE SYSTEM, MEMBRANE PEEL, BRILLIANT BLUE DYE, PERIOCULAR SUBTENON KENALOG INJECTION, ANTERIOR CHAMBER INTRAOCULAR LENS PLACEMENT, AIR FLUID EXCHANGE .  The proposed surgical procedure is considered LOW risk.    REVISED CARDIAC RISK INDEX  The patient has the following serious cardiovascular risks for perioperative complications such as (MI, PE, VFib and 3  AV Block):  Coronary Artery Disease (MI, positive stress test, angina, Qs on EKG)  Congestive Heart Failure (pulmonary edema, PND, s3 sharyn, CXR with pulmonary congestion, basilar rales)  INTERPRETATION: 2 risks: Class III (moderate risk - 6.6% complication rate)    The patient has the following additional risks for perioperative complications:  No identified additional risks      ICD-10-CM    1. Preop general physical exam Z01.818    2. Vision problem H54.7    3. Chronic diastolic CHF (congestive heart failure) (H) I50.32    4. Hyperlipidemia LDL goal <70 E78.5    5. Hypertension goal BP (blood pressure) < 140/90 I10    6. PAF (paroxysmal atrial fibrillation) (H) I48.0    7. SSS (sick sinus syndrome) (H) I49.5    8. S/P cardiac pacemaker procedure Z95.0    9. Benign essential hypertension I10    10. CKD (chronic kidney disease) stage 3, GFR 30-59 ml/min (H) N18.3    11. Age related  osteoporosis, unspecified pathological fracture presence M81.0    12. Chronic obstructive pulmonary disease, unspecified COPD type (H) J44.9        RECOMMENDATIONS:         --Patient is to take all scheduled medications on the day of surgery EXCEPT for modifications listed below.    Anticoagulant or Antiplatelet Medication Use  She will call cardiology regarding instructions for xarelto        ACE Inhibitor or Angiotensin Receptor Blocker (ARB) Use  Ace inhibitor or Angiotensin Receptor Blocker (ARB) and should HOLD this medication for the 24 hours prior to surgery.      APPROVAL GIVEN to proceed with proposed procedure, without further diagnostic evaluation       Signed Electronically by: Mikala Philip MD, MD    Copy of this evaluation report is provided to requesting physician.    [facility] Preop Guidelines    Revised Cardiac Risk Index

## 2019-10-08 NOTE — TELEPHONE ENCOUNTER
Pt called and will be having: LEFT VITRECTOMY POSTERIOR 25 GAUGE SYSTEM, MEMBRANE PEEL, BRILLIANT BLUE DYE, PERIOCULAR SUBTENON KENALOG INJECTION, ANTERIOR CHAMBER INTRAOCULAR LENS PLACEMENT, AIR FLUID EXCHANGE.   Pt has no history of Stroke, Clot or valve replacement. Thus pt is able to hold her Xarelto for 2-3 days.  Pt stated that she plans to hold the day before and day of and start either the night of or day after.  I asked that pt call and make sure that they are ok for you to only hold for 1 day prior.  Pt states that she will do that.  No other questions at this time. Nusrat

## 2019-10-15 ENCOUNTER — ANCILLARY PROCEDURE (OUTPATIENT)
Dept: CARDIOLOGY | Facility: CLINIC | Age: 79
End: 2019-10-15
Attending: INTERNAL MEDICINE
Payer: COMMERCIAL

## 2019-10-15 DIAGNOSIS — Z95.0 CARDIAC PACEMAKER IN SITU: ICD-10-CM

## 2019-10-15 PROCEDURE — 93296 REM INTERROG EVL PM/IDS: CPT | Performed by: INTERNAL MEDICINE

## 2019-10-15 PROCEDURE — 93294 REM INTERROG EVL PM/LDLS PM: CPT | Performed by: INTERNAL MEDICINE

## 2019-10-26 LAB
MDC_IDC_EPISODE_DTM: NORMAL
MDC_IDC_EPISODE_DURATION: 10 S
MDC_IDC_EPISODE_DURATION: 12 S
MDC_IDC_EPISODE_DURATION: 14 S
MDC_IDC_EPISODE_DURATION: 16 S
MDC_IDC_EPISODE_DURATION: 3144 S
MDC_IDC_EPISODE_DURATION: 4706 S
MDC_IDC_EPISODE_DURATION: 82 S
MDC_IDC_EPISODE_DURATION: NORMAL S
MDC_IDC_EPISODE_ID: NORMAL
MDC_IDC_EPISODE_TYPE: NORMAL
MDC_IDC_LEAD_IMPLANT_DT: NORMAL
MDC_IDC_LEAD_IMPLANT_DT: NORMAL
MDC_IDC_LEAD_LOCATION: NORMAL
MDC_IDC_LEAD_LOCATION: NORMAL
MDC_IDC_LEAD_LOCATION_DETAIL_1: NORMAL
MDC_IDC_LEAD_LOCATION_DETAIL_1: NORMAL
MDC_IDC_LEAD_MFG: NORMAL
MDC_IDC_LEAD_MFG: NORMAL
MDC_IDC_LEAD_MODEL: NORMAL
MDC_IDC_LEAD_MODEL: NORMAL
MDC_IDC_LEAD_SERIAL: NORMAL
MDC_IDC_LEAD_SERIAL: NORMAL
MDC_IDC_MSMT_BATTERY_DTM: NORMAL
MDC_IDC_MSMT_BATTERY_REMAINING_LONGEVITY: 126 MO
MDC_IDC_MSMT_BATTERY_REMAINING_PERCENTAGE: 95.5 %
MDC_IDC_MSMT_BATTERY_RRT_TRIGGER: NORMAL
MDC_IDC_MSMT_BATTERY_STATUS: NORMAL
MDC_IDC_MSMT_BATTERY_VOLTAGE: 2.99 V
MDC_IDC_MSMT_LEADCHNL_RA_IMPEDANCE_VALUE: 450 OHM
MDC_IDC_MSMT_LEADCHNL_RA_LEAD_CHANNEL_STATUS: NORMAL
MDC_IDC_MSMT_LEADCHNL_RA_PACING_THRESHOLD_AMPLITUDE: 0.5 V
MDC_IDC_MSMT_LEADCHNL_RA_PACING_THRESHOLD_PULSEWIDTH: 0.4 MS
MDC_IDC_MSMT_LEADCHNL_RA_SENSING_INTR_AMPL: 2.4 MV
MDC_IDC_MSMT_LEADCHNL_RV_IMPEDANCE_VALUE: 550 OHM
MDC_IDC_MSMT_LEADCHNL_RV_LEAD_CHANNEL_STATUS: NORMAL
MDC_IDC_MSMT_LEADCHNL_RV_PACING_THRESHOLD_AMPLITUDE: 0.62 V
MDC_IDC_MSMT_LEADCHNL_RV_PACING_THRESHOLD_PULSEWIDTH: 0.4 MS
MDC_IDC_MSMT_LEADCHNL_RV_SENSING_INTR_AMPL: 8.5 MV
MDC_IDC_PG_IMPLANT_DTM: NORMAL
MDC_IDC_PG_MFG: NORMAL
MDC_IDC_PG_MODEL: NORMAL
MDC_IDC_PG_SERIAL: NORMAL
MDC_IDC_PG_TYPE: NORMAL
MDC_IDC_SESS_CLINIC_NAME: NORMAL
MDC_IDC_SESS_DTM: NORMAL
MDC_IDC_SESS_REPROGRAMMED: NO
MDC_IDC_SESS_TYPE: NORMAL
MDC_IDC_SET_BRADY_AT_MODE_SWITCH_MODE: NORMAL
MDC_IDC_SET_BRADY_AT_MODE_SWITCH_RATE: 170 {BEATS}/MIN
MDC_IDC_SET_BRADY_LOWRATE: 60 {BEATS}/MIN
MDC_IDC_SET_BRADY_MAX_SENSOR_RATE: 120 {BEATS}/MIN
MDC_IDC_SET_BRADY_MAX_TRACKING_RATE: 120 {BEATS}/MIN
MDC_IDC_SET_BRADY_MODE: NORMAL
MDC_IDC_SET_BRADY_PAV_DELAY_LOW: 250 MS
MDC_IDC_SET_BRADY_SAV_DELAY_LOW: 225 MS
MDC_IDC_SET_LEADCHNL_RA_PACING_AMPLITUDE: 2 V
MDC_IDC_SET_LEADCHNL_RA_PACING_ANODE_ELECTRODE_1: NORMAL
MDC_IDC_SET_LEADCHNL_RA_PACING_ANODE_LOCATION_1: NORMAL
MDC_IDC_SET_LEADCHNL_RA_PACING_CAPTURE_MODE: NORMAL
MDC_IDC_SET_LEADCHNL_RA_PACING_CATHODE_ELECTRODE_1: NORMAL
MDC_IDC_SET_LEADCHNL_RA_PACING_CATHODE_LOCATION_1: NORMAL
MDC_IDC_SET_LEADCHNL_RA_PACING_POLARITY: NORMAL
MDC_IDC_SET_LEADCHNL_RA_PACING_PULSEWIDTH: 0.4 MS
MDC_IDC_SET_LEADCHNL_RA_SENSING_ADAPTATION_MODE: NORMAL
MDC_IDC_SET_LEADCHNL_RA_SENSING_ANODE_ELECTRODE_1: NORMAL
MDC_IDC_SET_LEADCHNL_RA_SENSING_ANODE_LOCATION_1: NORMAL
MDC_IDC_SET_LEADCHNL_RA_SENSING_CATHODE_ELECTRODE_1: NORMAL
MDC_IDC_SET_LEADCHNL_RA_SENSING_CATHODE_LOCATION_1: NORMAL
MDC_IDC_SET_LEADCHNL_RA_SENSING_POLARITY: NORMAL
MDC_IDC_SET_LEADCHNL_RA_SENSING_SENSITIVITY: 0.3 MV
MDC_IDC_SET_LEADCHNL_RV_PACING_AMPLITUDE: 0.88
MDC_IDC_SET_LEADCHNL_RV_PACING_ANODE_ELECTRODE_1: NORMAL
MDC_IDC_SET_LEADCHNL_RV_PACING_ANODE_LOCATION_1: NORMAL
MDC_IDC_SET_LEADCHNL_RV_PACING_CAPTURE_MODE: NORMAL
MDC_IDC_SET_LEADCHNL_RV_PACING_CATHODE_ELECTRODE_1: NORMAL
MDC_IDC_SET_LEADCHNL_RV_PACING_CATHODE_LOCATION_1: NORMAL
MDC_IDC_SET_LEADCHNL_RV_PACING_POLARITY: NORMAL
MDC_IDC_SET_LEADCHNL_RV_PACING_PULSEWIDTH: 0.4 MS
MDC_IDC_SET_LEADCHNL_RV_SENSING_ADAPTATION_MODE: NORMAL
MDC_IDC_SET_LEADCHNL_RV_SENSING_ANODE_ELECTRODE_1: NORMAL
MDC_IDC_SET_LEADCHNL_RV_SENSING_ANODE_LOCATION_1: NORMAL
MDC_IDC_SET_LEADCHNL_RV_SENSING_CATHODE_ELECTRODE_1: NORMAL
MDC_IDC_SET_LEADCHNL_RV_SENSING_CATHODE_LOCATION_1: NORMAL
MDC_IDC_SET_LEADCHNL_RV_SENSING_POLARITY: NORMAL
MDC_IDC_SET_LEADCHNL_RV_SENSING_SENSITIVITY: 0.5 MV
MDC_IDC_STAT_AT_BURDEN_PERCENT: 29 %
MDC_IDC_STAT_AT_DTM_END: NORMAL
MDC_IDC_STAT_AT_DTM_START: NORMAL
MDC_IDC_STAT_AT_MODE_SW_COUNT: 22
MDC_IDC_STAT_AT_MODE_SW_COUNT_PER_DAY: 0
MDC_IDC_STAT_AT_MODE_SW_MAX_DURATION: NORMAL S
MDC_IDC_STAT_AT_MODE_SW_PERCENT_TIME: 29 %
MDC_IDC_STAT_BRADY_AP_VP_PERCENT: 30 %
MDC_IDC_STAT_BRADY_AP_VS_PERCENT: 1 %
MDC_IDC_STAT_BRADY_AS_VP_PERCENT: 70 %
MDC_IDC_STAT_BRADY_AS_VS_PERCENT: 1 %
MDC_IDC_STAT_BRADY_DTM_END: NORMAL
MDC_IDC_STAT_BRADY_DTM_START: NORMAL
MDC_IDC_STAT_BRADY_RA_PERCENT_PACED: 21 %
MDC_IDC_STAT_BRADY_RV_PERCENT_PACED: 99 %
MDC_IDC_STAT_CRT_DTM_END: NORMAL
MDC_IDC_STAT_CRT_DTM_START: NORMAL
MDC_IDC_STAT_HEART_RATE_ATRIAL_MAX: 330 {BEATS}/MIN
MDC_IDC_STAT_HEART_RATE_ATRIAL_MEAN: 211 {BEATS}/MIN
MDC_IDC_STAT_HEART_RATE_ATRIAL_MIN: 40 {BEATS}/MIN
MDC_IDC_STAT_HEART_RATE_DTM_END: NORMAL
MDC_IDC_STAT_HEART_RATE_DTM_START: NORMAL
MDC_IDC_STAT_HEART_RATE_VENTRICULAR_MAX: 200 {BEATS}/MIN
MDC_IDC_STAT_HEART_RATE_VENTRICULAR_MEAN: 70 {BEATS}/MIN
MDC_IDC_STAT_HEART_RATE_VENTRICULAR_MIN: 40 {BEATS}/MIN

## 2019-10-29 DIAGNOSIS — I10 HYPERTENSION GOAL BP (BLOOD PRESSURE) < 140/90: ICD-10-CM

## 2019-10-29 RX ORDER — LISINOPRIL 40 MG/1
TABLET ORAL
Qty: 90 TABLET | Refills: 3 | Status: SHIPPED | OUTPATIENT
Start: 2019-10-29 | End: 2020-07-09

## 2019-10-29 NOTE — TELEPHONE ENCOUNTER
"Requested Prescriptions   Pending Prescriptions Disp Refills     lisinopril (PRINIVIL/ZESTRIL) 40 MG tablet [Pharmacy Med Name: LISINOPRIL 40MG TABS] 90 tablet 3     Sig: TAKE ONE TABLET BY MOUTH EVERY DAY  Last Written Prescription Date:  10/2/2018  Last Fill Quantity: 90 tablet,  # refills: 3   Last Office Visit: 10/8/2019   Future Office Visit:            ACE Inhibitors (Including Combos) Protocol Passed - 10/29/2019 11:44 AM        Passed - Blood pressure under 140/90 in past 12 months     BP Readings from Last 3 Encounters:   10/08/19 132/78   06/05/19 132/78   06/04/19 116/65           Passed - Recent (12 mo) or future (30 days) visit within the authorizing provider's specialty     Patient has had an office visit with the authorizing provider or a provider within the authorizing providers department within the previous 12 mos or has a future within next 30 days. See \"Patient Info\" tab in inbasket, or \"Choose Columns\" in Meds & Orders section of the refill encounter.            Passed - Medication is active on med list        Passed - Patient is age 18 or older        Passed - No active pregnancy on record        Passed - Normal serum creatinine on file in past 12 months     Recent Labs   Lab Test 06/17/19  0758   CR 0.92           Passed - Normal serum potassium on file in past 12 months     Recent Labs   Lab Test 06/17/19  0758   POTASSIUM 4.3           Passed - No positive pregnancy test within past 12 months          "

## 2019-10-30 NOTE — TELEPHONE ENCOUNTER
Signed Prescriptions:                        Disp   Refills    lisinopril (PRINIVIL/ZESTRIL) 40 MG tablet 90 tab*3        Sig: TAKE ONE TABLET BY MOUTH EVERY DAY  Authorizing Provider: ALPHONSE BEAN  Ordering User: MEMO AVILES

## 2019-11-13 ENCOUNTER — TELEPHONE (OUTPATIENT)
Dept: PHARMACY | Facility: CLINIC | Age: 79
End: 2019-11-13

## 2019-11-13 NOTE — TELEPHONE ENCOUNTER
Patient has not responded to contact attempts to follow up with MTM. I will stop reaching out to her at this time, but please let me know if I can assist in her care in the future. Routing to PCP as FYI. Please place another referral in the future if you would like patient to follow up.      Bing Majano, PharmD  Medication Therapy Management Pharmacist

## 2019-11-25 ENCOUNTER — CARE COORDINATION (OUTPATIENT)
Dept: CARDIOLOGY | Facility: CLINIC | Age: 79
End: 2019-11-25

## 2019-11-25 DIAGNOSIS — I50.32 CHRONIC DIASTOLIC CHF (CONGESTIVE HEART FAILURE) (H): Primary | ICD-10-CM

## 2019-11-25 RX ORDER — AMLODIPINE BESYLATE 5 MG/1
5 TABLET ORAL DAILY
Qty: 90 TABLET | Refills: 3 | Status: SHIPPED | OUTPATIENT
Start: 2019-11-25 | End: 2019-12-18

## 2019-11-25 NOTE — PROGRESS NOTES
VM from pt asking for call back. Returned call to pt, she needs refill on Amlodipine, she only has 2 pills left. Reviewed chart, Amlodipine not on med list. When I search chart, it looks like it was prescribed 10/2018, but pt reportedly never started it and at visit 11/28/18 with JOSUE Jimenes, pt was told not to start it as BP controlled, but keep in case needed in future. Pt states she has been taking it all along, and now out of refills. Pt overdue for CORE follow up from 9/2019. Pt willing to come in, she is feeling fine. Pt had tried to get in with JOSUE Jimenes in September but when she called she was booked and than pt had eye surgery, so she forgot about it. Told pt I will verify with JOSUE Jimenes that she should continue Amlodipine and call her back, and then will arrange follow up as well. Pt in agreement. Sent to JOSUE Jimenes to review. Of note, pt seen at PCP in [facility] 10/8/19 and BP was 132/78. DARWIN Joshi 11:48 AM 11/25/19

## 2019-11-25 NOTE — PROGRESS NOTES
Called pt, reviewed recommendation to continue since she has been taking it. Rx sent. Pt taking 5mg daily. Scheduled pt for next available CORE follow up with labs 12/18/19 with JOSUE Jimenes. DARWIN Joshi 2:33 PM 11/25/19

## 2019-12-12 DIAGNOSIS — I50.32 CHRONIC DIASTOLIC CONGESTIVE HEART FAILURE (H): ICD-10-CM

## 2019-12-12 DIAGNOSIS — I50.32 CHRONIC DIASTOLIC CHF (CONGESTIVE HEART FAILURE) (H): Primary | ICD-10-CM

## 2019-12-18 ENCOUNTER — OFFICE VISIT (OUTPATIENT)
Dept: CARDIOLOGY | Facility: CLINIC | Age: 79
End: 2019-12-18
Payer: COMMERCIAL

## 2019-12-18 VITALS
HEART RATE: 68 BPM | SYSTOLIC BLOOD PRESSURE: 152 MMHG | BODY MASS INDEX: 33.47 KG/M2 | DIASTOLIC BLOOD PRESSURE: 78 MMHG | WEIGHT: 188.9 LBS | HEIGHT: 63 IN

## 2019-12-18 DIAGNOSIS — I50.32 CHRONIC DIASTOLIC CONGESTIVE HEART FAILURE (H): ICD-10-CM

## 2019-12-18 DIAGNOSIS — I50.32 CHRONIC DIASTOLIC CHF (CONGESTIVE HEART FAILURE) (H): ICD-10-CM

## 2019-12-18 DIAGNOSIS — I50.32 CHRONIC DIASTOLIC CHF (CONGESTIVE HEART FAILURE) (H): Primary | ICD-10-CM

## 2019-12-18 LAB
ANION GAP SERPL CALCULATED.3IONS-SCNC: 8.9 MMOL/L (ref 6–17)
BUN SERPL-MCNC: 18 MG/DL (ref 7–30)
CALCIUM SERPL-MCNC: 9.7 MG/DL (ref 8.5–10.5)
CHLORIDE SERPL-SCNC: 103 MMOL/L (ref 98–107)
CO2 SERPL-SCNC: 30 MMOL/L (ref 23–29)
CREAT SERPL-MCNC: 1.11 MG/DL (ref 0.7–1.3)
GFR SERPL CREATININE-BSD FRML MDRD: 47 ML/MIN/{1.73_M2}
GLUCOSE SERPL-MCNC: 115 MG/DL (ref 70–105)
HGB BLD-MCNC: 12.4 G/DL (ref 11.7–15.7)
NT-PROBNP SERPL-MCNC: 383 PG/ML (ref 0–450)
POTASSIUM SERPL-SCNC: 3.9 MMOL/L (ref 3.5–5.1)
SODIUM SERPL-SCNC: 138 MMOL/L (ref 136–145)
TSH SERPL DL<=0.005 MIU/L-ACNC: 0.88 MU/L (ref 0.4–4)

## 2019-12-18 PROCEDURE — 80048 BASIC METABOLIC PNL TOTAL CA: CPT | Performed by: PHYSICIAN ASSISTANT

## 2019-12-18 PROCEDURE — 84443 ASSAY THYROID STIM HORMONE: CPT | Performed by: PHYSICIAN ASSISTANT

## 2019-12-18 PROCEDURE — 36415 COLL VENOUS BLD VENIPUNCTURE: CPT | Performed by: PHYSICIAN ASSISTANT

## 2019-12-18 PROCEDURE — 85018 HEMOGLOBIN: CPT | Performed by: PHYSICIAN ASSISTANT

## 2019-12-18 PROCEDURE — 83880 ASSAY OF NATRIURETIC PEPTIDE: CPT | Performed by: PHYSICIAN ASSISTANT

## 2019-12-18 PROCEDURE — 99214 OFFICE O/P EST MOD 30 MIN: CPT | Performed by: PHYSICIAN ASSISTANT

## 2019-12-18 RX ORDER — AMLODIPINE BESYLATE 10 MG/1
10 TABLET ORAL DAILY
Qty: 90 TABLET | Refills: 3 | Status: SHIPPED | OUTPATIENT
Start: 2019-12-18 | End: 2020-07-09

## 2019-12-18 ASSESSMENT — MIFFLIN-ST. JEOR: SCORE: 1295.97

## 2019-12-18 NOTE — LETTER
"12/18/2019    Mikala Philip MD, MD  [address]  [facility] [address]    RE: Hamida Verma       Dear Colleague,    I had the pleasure of seeing Hamida Verma in the [facility].    Cardiology Clinic Progress Note    Hamida Verma MRN# [id number]   YOB: 1940 Age: 79 year old          Assessment and Plan:     In summary, the patient presents today for a CORE clinic f/u. She's doing very well from a cardiovascular standpoint.     1. HFpEF   - ECHO: EF 55-60%, E/E' avg 16, mild-mod LAE   - ACC/AHA Stage: C; FC I    - Etiology: htn, paf   - RV: normal   - Valves: Mild MR, TR   - Volume: Euvolemic    Current Wt: 188 lbs clinic (183 home scale)    Dry Wt: probably ~180-185 lbs    Diuretics: Lasix 20 mg daily   - Rhythm: ApVp (PPM-dependent)   - QRS: Narrow   - Device: DDDR PPM   - Other: TSH OK                 Stop-Bang Score: Low    2. Hypertension - uncontrolled in clinic.     Plan:  - Increase amlodipine from 5 to 10 mg daily.   - Will \"graduate\" from CORE clinic. She'll continue to keep an eye on her weights and follow a relatively sodium-restricted diet.     Follow-up:  - Dr. Voss on 1/23.  - Dr. Mallory 5/2020.    It was a pleasure seeing Hamida today.         History of Presenting Illness:      Hamida Verma is a pleasant 79 year old patient of  Dr. Mallory and Dr. Vsos who presents today for a CORE clinic f/u visit.    She has a past medical history including the following -   # HFpEF  # PAF/PAFL, s/p AVN ablation 5/2019 - on xarelto  # Sick sinus syndrome, s/p PPM 3/2018  # CAC per CT, [facility]ie MPI 3/2018 negative for ischemia  # HLP, controlled on Lipitor.   # HTN  # Mild COPD, follows with pulm  # Social - lives with her granddaughters Zoe and daughter Karen. Her significant other passed away early 2019. Has a great support system with family and friends. She gets lunch with her high school friends once a month at their highschool " "hang-out.    Hamida was admitted multiple times in 2018. She was admitted in March for heart failure in the setting of rapid AF, then again in October with CHF felt secondary to diastolic dysfunction, uncontrolled blood pressures and underlying lung disease. She had a complicated course and was readmitted Jan 2019 for a COPD exacerbation from klebsiella PNA and acute pyelonephritis, ultimately discharged with chronic oxygen at 2-3L/min. That was eventually weaned to off as an outpatient.    She saw Dr. Mallory beginning of May and was noted to be in rapid AF with a HR of 150. She underwent DCCV which failed, and then underwent AVN ablation on 5/7 with Dr. Voss. Flecainide was stopped.    I last saw Hamida in June. She was doing well, completely off supplemental oxygen. No changes were made.    Since then, she underwent a series of eye surgeries for L cataract removal which ended up being more complicated than anticipated. She held her xarelto for a total of two days during the process.   Today, she's feeling very well from a cardiovascular standpoint. Her weight is up a total of 8 lbs over the past six months per our clinic scale however she's up only 3 lbs per her home scale. She denies LE edema or dyspnea with this. She's had no chest pain, orthopnea, PND, or palpitations. She denies any TIA/CVA symptoms. Her device check from October shows 21% Ap and 100%  with underlying AF, overall stable findings. BP in clinic is elevated, also on my re-check. BMP is stable, Hgb is WNL. TFT's and proBNP are pending at the time of visit.          Review of Systems:     12-pt ROS is negative except for as noted in the HPI.          Physical Exam:     Vitals: BP (!) 152/78   Pulse 68   Ht 1.6 m (5' 3\")   Wt 85.7 kg (188 lb 14.4 oz)   LMP  (LMP Unknown)   BMI 33.46 kg/m     Wt Readings from Last 10 Encounters:   12/18/19 85.7 kg (188 lb 14.4 oz)   10/08/19 83.8 kg (184 lb 12 oz)   06/05/19 82.5 kg (181 lb 12.8 oz) "   06/04/19 81.6 kg (180 lb)   05/12/19 80.7 kg (178 lb)   05/07/19 80.7 kg (178 lb)   05/06/19 79.4 kg (175 lb)   05/03/19 80.3 kg (177 lb)   05/02/19 80.6 kg (177 lb 11.2 oz)   05/01/19 80.6 kg (177 lb 11.2 oz)     Constitutional:  Patient is pleasant, alert, cooperative, and in NAD.  HEENT:  NCAT. PERRLA. EOM's intact. No masses or thyromegaly. Teeth in normal repair.   Neck:  CVP appears normal.   Pulmonary: Normal respiratory effort. CTAB.  Cardiac: RRR, normal S1/S2, +S4, grade 2-3/6 sm at the LSB  Abdomen:  Non-tender abdomen with normoactive bowel sounds, no hepatosplenomegaly appreciated.   Vascular: Pulses in the upper and lower extremities are 2+ and equal bilaterally.  Extremities: Trace pretibial edema bilaterally  Neurological:  No gross motor or sensory deficits.   Psych: Appropriate affect.        Data:     Cardiac Diagnostics reviewed:  Type Date Result   TTE 10/12/18 Left ventricular systolic function is normal.  The left atrium is mild to moderately dilated.  There is moderate aortic sclerosis of the non-coronary cusp. No  hemodynamically significant valvular aortic stenosis.  There is mild to moderate (1-2+) tricuspid regurgitation.  Doppler findings do not suggest pulmonary hypertension.  E/E' avg 16.  IVSd 1 / PWd 1.1    3/7/18 1. The left ventricle is normal in size. The visual ejection fraction is  estimated at 65%.  2. The right ventricle is normal in structure, function and size.  3. There is mild to moderate (1-2+) mitral regurgitation.  4. There is moderate (2+) tricuspid regurgitation. The right ventricular  systolic pressure is approximated at 28mmHg plus the right atrial pressure.  No previous echo for comparison.   Stress 3/8/18 (Melonie) 1.  Myocardial perfusion imaging using single isotope technique  demonstrated a small perfusion defect of mild severity involving the  left ventricular apex which is essentially fixed and most likely  related to breast attenuation. There is no evidence  of significant  pharmacological stress-induced ischemia or prior myocardial infarction  on this study.   2. Gated images demonstrated normal left ventricular wall motion.  The left ventricular systolic function is 73% at rest and 74% on the post stress images.  3. There is no previous study for comparison.   EKG 10/11/18 NSR, 64 bpm. QRS 98, QTc 462.   Device PPM check, 1/2/19 [employer] (D) Remote PPM Device Check  AP: 72%\X090A\: <1%  Mode: DDDR        Presenting Rhythm: AS/VS, AP/VS  Heart Rate: Adequate rates per histogram  Sensing: stable      Pacing Threshold: stable      Impedance: stable  Battery Status: 10.6 years  Atrial Arrhythmia: None  Ventricular Arrhythmia: None  Battery Status: 10.4 years  Atrial Arrhythmia: No mode switch episodes. 1 PMT occurred, no EGM.   Ventricular Arrhythmia: None    CXR 1/7/19    Chest CT 3/14/18 Mild to moderate emphysematous changes in the lungs. Small  bilateral pleural effusion with adjacent atelectasis. Apparent mild  interstitial thickening throughout both lungs. No pericardial  effusion. A few nonspecific borderline-enlarged mediastinal lymph  nodes. Mild cardiomegaly. Atherosclerotic calcification in the  thoracic aorta and coronary arteries.   LE ultrasound 3/14/18 The left common femoral, superficial femoral, popliteal and  posterior tibial veins are patent and fully compressible and  demonstrate normal venous Doppler flow. The visualized greater  saphenous vein is negative for thrombus. For comparison the right  common femoral vein was evaluated and was unremarkable.     IMPRESSION: No deep venous thrombosis demonstrated.     Labs reviewed:  Recent Labs   Lab Test 05/01/19  0839 03/28/19  0905 01/16/19  1135 12/19/18  0829 10/25/18  0731 07/11/18  0907  03/23/17  0804   LDL  --   --  70  --   --  84  --  91   HDL  --   --  45*  --   --  69  --  61   NHDL  --   --  97  --   --  102  --  113   CHOL  --   --  142  --   --  171  --  174   TRIG  --   --  133  --    --  88  --  109   TSH 1.11 0.81  --   --  0.80  --    < >  --    NTBNP  --   --  333 400 343  --   --   --     < > = values in this interval not displayed.       Lab Results   Component Value Date    WBC 8.9 05/07/2019    RBC 4.60 05/07/2019    HGB 12.1 06/17/2019    HCT 40.9 05/07/2019    MCV 89 05/07/2019    MCH 28.9 05/07/2019    MCHC 32.5 05/07/2019    RDW 12.9 05/07/2019     (H) 05/07/2019       Lab Results   Component Value Date     06/17/2019    POTASSIUM 4.3 06/17/2019    CHLORIDE 108 06/17/2019    CO2 26 06/17/2019    ANIONGAP 7 06/17/2019    GLC 97 06/17/2019    BUN 14 06/17/2019    CR 0.92 06/17/2019    GFRESTIMATED 59 (L) 06/17/2019    GFRESTBLACK 69 06/17/2019    GURWINDER 9.4 06/17/2019      Lab Results   Component Value Date    AST 27 01/06/2019    ALT 55 (H) 01/06/2019       Lab Results   Component Value Date    A1C 5.1 03/28/2019       Lab Results   Component Value Date    INR 1.31 (H) 03/19/2018    INR 1.04 11/12/2014           Problem List:     Patient Active Problem List   Diagnosis     Symptomatic menopausal or female climacteric states     Hyperlipidemia LDL goal <70     Hypertension goal BP (blood pressure) < 140/90     Knee pain     Obesity     PAF (paroxysmal atrial fibrillation) (H)     S/P cardiac pacemaker procedure     SSS (sick sinus syndrome) (H)     Chronic diastolic CHF (congestive heart failure) (H)     Pulmonary hypertension (H)     Coronary artery calcification seen on CT scan     Hypotension     Diabetes mellitus, type 2 (H)     CKD (chronic kidney disease) stage 3, GFR 30-59 ml/min (H)     Chronic obstructive pulmonary disease, unspecified COPD type (H)           Medications:     Current Outpatient Medications   Medication Sig Dispense Refill     alendronate (FOSAMAX) 70 MG tablet Take 1 tablet (70 mg) by mouth every 7 days Take 60 minutes before am meal with 8 oz. water. Remain upright for 30 minutes. 12 tablet 3     amLODIPine (NORVASC) 5 MG tablet Take 1 tablet (5 mg)  by mouth daily 90 tablet 3     atorvastatin (LIPITOR) 40 MG tablet Take 1 tablet (40 mg) by mouth daily 90 tablet 3     cholecalciferol (VITAMIN  -D) 1000 UNIT capsule Take 1 capsule by mouth daily.       Coral Calcium 133-66.7-133 MG-MG-UNIT CAPS Take 2 tablets by mouth 2 times daily        FLAX SEED OIL OR 1 capsule daily       furosemide (LASIX) 20 MG tablet Take 1 tablet (20 mg) by mouth daily 90 tablet 3     Krill Oil 500 MG CAPS Take 1 capsule by mouth daily       lisinopril (PRINIVIL/ZESTRIL) 40 MG tablet TAKE ONE TABLET BY MOUTH EVERY DAY 90 tablet 3     metoprolol succinate ER (TOPROL-XL) 50 MG 24 hr tablet Take 1 tablet (50 mg) by mouth 2 times daily 180 tablet 3     Milk Thistle 200 MG CAPS Take 1 capsule by mouth daily        Multiple Vitamins-Minerals (HAIR SKIN NAILS PO) Take 1 tablet by mouth At Bedtime       potassium chloride ER (K-DUR/KLOR-CON M) 10 MEQ CR tablet Take 1 tablet (10 mEq) by mouth daily 90 tablet 3     rivaroxaban ANTICOAGULANT (XARELTO) 20 MG TABS tablet Take 1 tablet (20 mg) by mouth daily (with dinner) 90 tablet 3           Past Medical History:     Past Medical History:   Diagnosis Date     Atrial fibrillation (H)      Chronic diastolic CHF (congestive heart failure) (H)      Essential hypertension, benign      HYPERLIPIDEMIA NEC/NOS 3/30/2006     Pulmonary hypertension (H)      SSS (sick sinus syndrome) (H)     s/p PPM 3/2018     Past Surgical History:   Procedure Laterality Date     EP ABLATION AV NODE N/A 5/7/2019    Procedure: EP Ablation AV Node;  Surgeon: Roe Voss MD;  Location: [facility] CARDIAC CATH LAB     EYE SURGERY       HYSTERECTOMY, VAGINAL      hysterectomy     Family History   Problem Relation Age of Onset     Cerebrovascular Disease Mother      Glaucoma Mother      Macular Degeneration Mother      Alcohol/Drug Father         alcohol     Myocardial Infarction Father 63        passed away from MI     Family History Negative Sister      Family History Negative  Sister      Family History Negative Sister      Cerebrovascular Disease Sister      Family History Negative Brother      Family History Negative Maternal Grandmother      Family History Negative Maternal Grandfather      Family History Negative Paternal Grandmother      Family History Negative Paternal Grandfather      Myocardial Infarction Son 57        passed away from MI     Dementia Daughter 57        early onset on [place]     Diabetes No family hx of      Breast Cancer No family hx of      Cancer - colorectal No family hx of      Social History     Socioeconomic History     Marital status:      Spouse name: Not on file     Number of children: Not on file     Years of education: Not on file     Highest education level: Not on file   Occupational History     Not on file   Social Needs     Financial resource strain: Not on file     Food insecurity:     Worry: Not on file     Inability: Not on file     Transportation needs:     Medical: Not on file     Non-medical: Not on file   Tobacco Use     Smoking status: Former Smoker     Packs/day: 1.50     Years: 45.00     Pack years: 67.50     Types: Cigarettes     Start date: 10/28/1955     Last attempt to quit: 2000     Years since quittin.3     Smokeless tobacco: Never Used     Tobacco comment: quit 6 yrs ago   Substance and Sexual Activity     Alcohol use: No     Drug use: No     Sexual activity: Yes     Partners: Male   Lifestyle     Physical activity:     Days per week: Not on file     Minutes per session: Not on file     Stress: Not on file   Relationships     Social connections:     Talks on phone: Not on file     Gets together: Not on file     Attends Restorationist service: Not on file     Active member of club or organization: Not on file     Attends meetings of clubs or organizations: Not on file     Relationship status: Not on file     Intimate partner violence:     Fear of current or ex partner: Not on file     Emotionally abused: Not on file      Physically abused: Not on file     Forced sexual activity: Not on file   Other Topics Concern     Parent/sibling w/ CABG, MI or angioplasty before 65F 55M? No   Social History Narrative    Balanced Diet - Yes    Osteoporosis Prevention Measures - Dairy servings per day: 2    Regular Exercise -  Yes Describe walk, but not recently due to weather    Dental Exam - Yes    Eye Exam -No    Self Breast Exam - Yes    Abuse: Current or Past (Physical, Sexual or Emotional)- No    Do you feel safe in your environment - Yes    Guns stored in the home - No    Sunscreen used - Yes    Seatbelts used - Yes    Lipids -  1/10    Glucose -  1/10    Colon Cancer Screening - Yes, 7/21/08    Hemoccults - NO    Pap Test -  Many yrs ago, has hysterectomy    Do you have any concerns about STD's -  No    Mammography - 6/18/08    DEXA - 4/13/06    Immunizations reviewed and up to date - No    Jonathan Marshall MA                       Allergies:   Strawberries [strawberry] and Strawberry flavor      Adriana Ferrell PA-C  [facility]  Pager: [phone]        Thank you for allowing me to participate in the care of your patient.    Sincerely,     Adriana Ferrell PA-C     [facility]

## 2019-12-18 NOTE — PROGRESS NOTES
"Cardiology Clinic Progress Note    Hamida Verma MRN# [id number]   YOB: 1940 Age: 79 year old          Assessment and Plan:     In summary, the patient presents today for a [facility] clinic f/u. She's doing very well from a cardiovascular standpoint.     1. HFpEF   - ECHO: EF 55-60%, E/E' avg 16, mild-mod LAE   - ACC/AHA Stage: C; FC I    - Etiology: htn, paf   - RV: normal   - Valves: Mild MR, TR   - Volume: Euvolemic    Current Wt: 188 lbs clinic (183 home scale)    Dry Wt: probably ~180-185 lbs    Diuretics: Lasix 20 mg daily   - Rhythm: ApVp (PPM-dependent)   - QRS: Narrow   - Device: DDDR PPM   - Other: TSH OK                 Stop-Bang Score: Low    2. Hypertension - uncontrolled in clinic.     Plan:  - Increase amlodipine from 5 to 10 mg daily.   - Will \"graduate\" from [facility] clinic. She'll continue to keep an eye on her weights and follow a relatively sodium-restricted diet.     Follow-up:  - Dr. Voss on 1/23.  - Dr. Mallory 5/2020.    It was a pleasure seeing Hamida today.         History of Presenting Illness:      Hamida Verma is a pleasant 79 year old patient of  Dr. Mallory and Dr. Voss who presents today for a C[facility] clinic f/u visit.    She has a past medical history including the following -   # HFpEF  # PAF/PAFL, s/p AVN ablation 5/2019 - on xarelto  # Sick sinus syndrome, s/p PPM 3/2018  # CAC per CT, [facility] MPI 3/2018 negative for ischemia  # HLP, controlled on Lipitor.   # HTN  # Mild COPD, follows with pulm  # Social - lives with her granddaughters Zoe and daughter Karen. Her significant other passed away early 2019. Has a great support system with family and friends. She gets lunch with her high school friends once a month at their highschool hang-out.    Hamida was admitted multiple times in 2018. She was admitted in March for heart failure in the setting of rapid AF, then again in October with CHF felt secondary to diastolic dysfunction, uncontrolled blood pressures and " "underlying lung disease. She had a complicated course and was readmitted Jan 2019 for a COPD exacerbation from klebsiella PNA and acute pyelonephritis, ultimately discharged with chronic oxygen at 2-3L/min. That was eventually weaned to off as an outpatient.    She saw Dr. Mallory beginning of May and was noted to be in rapid AF with a HR of 150. She underwent DCCV which failed, and then underwent AVN ablation on 5/7 with Dr. Voss. Flecainide was stopped.    I last saw Hamida in June. She was doing well, completely off supplemental oxygen. No changes were made.    Since then, she underwent a series of eye surgeries for L cataract removal which ended up being more complicated than anticipated. She held her xarelto for a total of two days during the process.   Today, she's feeling very well from a cardiovascular standpoint. Her weight is up a total of 8 lbs over the past six months per our clinic scale however she's up only 3 lbs per her home scale. She denies LE edema or dyspnea with this. She's had no chest pain, orthopnea, PND, or palpitations. She denies any TIA/CVA symptoms. Her device check from October shows 21% Ap and 100%  with underlying AF, overall stable findings. BP in clinic is elevated, also on my re-check. BMP is stable, Hgb is WNL. TFT's and proBNP are pending at the time of visit.          Review of Systems:     12-pt ROS is negative except for as noted in the HPI.          Physical Exam:     Vitals: BP (!) 152/78   Pulse 68   Ht 1.6 m (5' 3\")   Wt 85.7 kg (188 lb 14.4 oz)   LMP  (LMP Unknown)   BMI 33.46 kg/m    Wt Readings from Last 10 Encounters:   12/18/19 85.7 kg (188 lb 14.4 oz)   10/08/19 83.8 kg (184 lb 12 oz)   06/05/19 82.5 kg (181 lb 12.8 oz)   06/04/19 81.6 kg (180 lb)   05/12/19 80.7 kg (178 lb)   05/07/19 80.7 kg (178 lb)   05/06/19 79.4 kg (175 lb)   05/03/19 80.3 kg (177 lb)   05/02/19 80.6 kg (177 lb 11.2 oz)   05/01/19 80.6 kg (177 lb 11.2 oz)     Constitutional:  Patient " is pleasant, alert, cooperative, and in NAD.  HEENT:  NCAT. PERRLA. EOM's intact. No masses or thyromegaly. Teeth in normal repair.   Neck:  CVP appears normal.   Pulmonary: Normal respiratory effort. CTAB.  Cardiac: RRR, normal S1/S2, +S4, grade 2-3/6 sm at the LSB  Abdomen:  Non-tender abdomen with normoactive bowel sounds, no hepatosplenomegaly appreciated.   Vascular: Pulses in the upper and lower extremities are 2+ and equal bilaterally.  Extremities: Trace pretibial edema bilaterally  Neurological:  No gross motor or sensory deficits.   Psych: Appropriate affect.        Data:     Cardiac Diagnostics reviewed:  Type Date Result   TTE 10/12/18 Left ventricular systolic function is normal.  The left atrium is mild to moderately dilated.  There is moderate aortic sclerosis of the non-coronary cusp. No  hemodynamically significant valvular aortic stenosis.  There is mild to moderate (1-2+) tricuspid regurgitation.  Doppler findings do not suggest pulmonary hypertension.  E/E' avg 16.  IVSd 1 / PWd 1.1    3/7/18 1. The left ventricle is normal in size. The visual ejection fraction is  estimated at 65%.  2. The right ventricle is normal in structure, function and size.  3. There is mild to moderate (1-2+) mitral regurgitation.  4. There is moderate (2+) tricuspid regurgitation. The right ventricular  systolic pressure is approximated at 28mmHg plus the right atrial pressure.  No previous echo for comparison.   Stress 3/8/18 ([facility]) 1.  Myocardial perfusion imaging using single isotope technique  demonstrated a small perfusion defect of mild severity involving the  left ventricular apex which is essentially fixed and most likely  related to breast attenuation. There is no evidence of significant  pharmacological stress-induced ischemia or prior myocardial infarction  on this study.   2. Gated images demonstrated normal left ventricular wall motion.  The left ventricular systolic function is 73% at rest and 74% on the  post stress images.  3. There is no previous study for comparison.   EKG 10/11/18 NSR, 64 bpm. QRS 98, QTc 462.   Device PPM check, 1/2/19 [employer] (D) Remote PPM Device Check  AP: 72%\X090A\: <1%  Mode: DDDR        Presenting Rhythm: AS/VS, AP/VS  Heart Rate: Adequate rates per histogram  Sensing: stable      Pacing Threshold: stable      Impedance: stable  Battery Status: 10.6 years  Atrial Arrhythmia: None  Ventricular Arrhythmia: None  Battery Status: 10.4 years  Atrial Arrhythmia: No mode switch episodes. 1 PMT occurred, no EGM.   Ventricular Arrhythmia: None    CXR 1/7/19    Chest CT 3/14/18 Mild to moderate emphysematous changes in the lungs. Small  bilateral pleural effusion with adjacent atelectasis. Apparent mild  interstitial thickening throughout both lungs. No pericardial  effusion. A few nonspecific borderline-enlarged mediastinal lymph  nodes. Mild cardiomegaly. Atherosclerotic calcification in the  thoracic aorta and coronary arteries.   LE ultrasound 3/14/18 The left common femoral, superficial femoral, popliteal and  posterior tibial veins are patent and fully compressible and  demonstrate normal venous Doppler flow. The visualized greater  saphenous vein is negative for thrombus. For comparison the right  common femoral vein was evaluated and was unremarkable.     IMPRESSION: No deep venous thrombosis demonstrated.     Labs reviewed:  Recent Labs   Lab Test 05/01/19  0839 03/28/19  0905 01/16/19  1135 12/19/18  0829 10/25/18  0731 07/11/18  0907  03/23/17  0804   LDL  --   --  70  --   --  84  --  91   HDL  --   --  45*  --   --  69  --  61   NHDL  --   --  97  --   --  102  --  113   CHOL  --   --  142  --   --  171  --  174   TRIG  --   --  133  --   --  88  --  109   TSH 1.11 0.81  --   --  0.80  --    < >  --    NTBNP  --   --  333 400 343  --   --   --     < > = values in this interval not displayed.       Lab Results   Component Value Date    WBC 8.9 05/07/2019    RBC 4.60  05/07/2019    HGB 12.1 06/17/2019    HCT 40.9 05/07/2019    MCV 89 05/07/2019    MCH 28.9 05/07/2019    MCHC 32.5 05/07/2019    RDW 12.9 05/07/2019     (H) 05/07/2019       Lab Results   Component Value Date     06/17/2019    POTASSIUM 4.3 06/17/2019    CHLORIDE 108 06/17/2019    CO2 26 06/17/2019    ANIONGAP 7 06/17/2019    GLC 97 06/17/2019    BUN 14 06/17/2019    CR 0.92 06/17/2019    GFRESTIMATED 59 (L) 06/17/2019    GFRESTBLACK 69 06/17/2019    GURWINDER 9.4 06/17/2019      Lab Results   Component Value Date    AST 27 01/06/2019    ALT 55 (H) 01/06/2019       Lab Results   Component Value Date    A1C 5.1 03/28/2019       Lab Results   Component Value Date    INR 1.31 (H) 03/19/2018    INR 1.04 11/12/2014           Problem List:     Patient Active Problem List   Diagnosis     Symptomatic menopausal or female climacteric states     Hyperlipidemia LDL goal <70     Hypertension goal BP (blood pressure) < 140/90     Knee pain     Obesity     PAF (paroxysmal atrial fibrillation) (H)     S/P cardiac pacemaker procedure     SSS (sick sinus syndrome) (H)     Chronic diastolic CHF (congestive heart failure) (H)     Pulmonary hypertension (H)     Coronary artery calcification seen on CT scan     Hypotension     Diabetes mellitus, type 2 (H)     CKD (chronic kidney disease) stage 3, GFR 30-59 ml/min (H)     Chronic obstructive pulmonary disease, unspecified COPD type (H)           Medications:     Current Outpatient Medications   Medication Sig Dispense Refill     alendronate (FOSAMAX) 70 MG tablet Take 1 tablet (70 mg) by mouth every 7 days Take 60 minutes before am meal with 8 oz. water. Remain upright for 30 minutes. 12 tablet 3     amLODIPine (NORVASC) 5 MG tablet Take 1 tablet (5 mg) by mouth daily 90 tablet 3     atorvastatin (LIPITOR) 40 MG tablet Take 1 tablet (40 mg) by mouth daily 90 tablet 3     cholecalciferol (VITAMIN  -D) 1000 UNIT capsule Take 1 capsule by mouth daily.       Coral Calcium 133-66.7-133  MG-MG-UNIT CAPS Take 2 tablets by mouth 2 times daily        FLAX SEED OIL OR 1 capsule daily       furosemide (LASIX) 20 MG tablet Take 1 tablet (20 mg) by mouth daily 90 tablet 3     Krill Oil 500 MG CAPS Take 1 capsule by mouth daily       lisinopril (PRINIVIL/ZESTRIL) 40 MG tablet TAKE ONE TABLET BY MOUTH EVERY DAY 90 tablet 3     metoprolol succinate ER (TOPROL-XL) 50 MG 24 hr tablet Take 1 tablet (50 mg) by mouth 2 times daily 180 tablet 3     Milk Thistle 200 MG CAPS Take 1 capsule by mouth daily        Multiple Vitamins-Minerals (HAIR SKIN NAILS PO) Take 1 tablet by mouth At Bedtime       potassium chloride ER (K-DUR/KLOR-CON M) 10 MEQ CR tablet Take 1 tablet (10 mEq) by mouth daily 90 tablet 3     rivaroxaban ANTICOAGULANT (XARELTO) 20 MG TABS tablet Take 1 tablet (20 mg) by mouth daily (with dinner) 90 tablet 3           Past Medical History:     Past Medical History:   Diagnosis Date     Atrial fibrillation (H)      Chronic diastolic CHF (congestive heart failure) (H)      Essential hypertension, benign      HYPERLIPIDEMIA NEC/NOS 3/30/2006     Pulmonary hypertension (H)      SSS (sick sinus syndrome) (H)     s/p PPM 3/2018     Past Surgical History:   Procedure Laterality Date     EP ABLATION AV NODE N/A 5/7/2019    Procedure: EP Ablation AV Node;  Surgeon: Roe Voss MD;  Location:  [facility]     EYE SURGERY       HYSTERECTOMY, VAGINAL      hysterectomy     Family History   Problem Relation Age of Onset     Cerebrovascular Disease Mother      Glaucoma Mother      Macular Degeneration Mother      Alcohol/Drug Father         alcohol     Myocardial Infarction Father 63        passed away from MI     Family History Negative Sister      Family History Negative Sister      Family History Negative Sister      Cerebrovascular Disease Sister      Family History Negative Brother      Family History Negative Maternal Grandmother      Family History Negative Maternal Grandfather      Family  History Negative Paternal Grandmother      Family History Negative Paternal Grandfather      Myocardial Infarction Son 57        passed away from MI     Dementia Daughter 57        early onset on [place]     Diabetes No family hx of      Breast Cancer No family hx of      Cancer - colorectal No family hx of      Social History     Socioeconomic History     Marital status:      Spouse name: Not on file     Number of children: Not on file     Years of education: Not on file     Highest education level: Not on file   Occupational History     Not on file   Social Needs     Financial resource strain: Not on file     Food insecurity:     Worry: Not on file     Inability: Not on file     Transportation needs:     Medical: Not on file     Non-medical: Not on file   Tobacco Use     Smoking status: Former Smoker     Packs/day: 1.50     Years: 45.00     Pack years: 67.50     Types: Cigarettes     Start date: 10/28/1955     Last attempt to quit: 2000     Years since quittin.3     Smokeless tobacco: Never Used     Tobacco comment: quit 6 yrs ago   Substance and Sexual Activity     Alcohol use: No     Drug use: No     Sexual activity: Yes     Partners: Male   Lifestyle     Physical activity:     Days per week: Not on file     Minutes per session: Not on file     Stress: Not on file   Relationships     Social connections:     Talks on phone: Not on file     Gets together: Not on file     Attends [employer] service: Not on file     Active member of club or organization: Not on file     Attends meetings of clubs or organizations: Not on file     Relationship status: Not on file     Intimate partner violence:     Fear of current or ex partner: Not on file     Emotionally abused: Not on file     Physically abused: Not on file     Forced sexual activity: Not on file   Other Topics Concern     Parent/sibling w/ CABG, MI or angioplasty before 65F 55M? No   Social History Narrative    Balanced Diet - Yes    Osteoporosis  Prevention Measures - Dairy servings per day: 2    Regular Exercise -  Yes Describe walk, but not recently due to weather    Dental Exam - Yes    Eye Exam -No    Self Breast Exam - Yes    Abuse: Current or Past (Physical, Sexual or Emotional)- No    Do you feel safe in your environment - Yes    Guns stored in the home - No    Sunscreen used - Yes    Seatbelts used - Yes    Lipids -  1/10    Glucose -  1/10    Colon Cancer Screening - Yes, 7/21/08    Hemoccults - NO    Pap Test -  Many yrs ago, has hysterectomy    Do you have any concerns about STD's -  No    Mammography - 6/18/08    DEXA - 4/13/06    Immunizations reviewed and up to date - No    Jonathan Marshall MA                       Allergies:   Strawberries [strawberry] and Strawberry flavor      Adriana Ferrell PA-C  [facility]  Pager: [phone]

## 2019-12-18 NOTE — PATIENT INSTRUCTIONS
"Today, we discussed the following:   - Results: Your kidney function looks great.   - Medication changes:  Increase the amlodipine from 5 to 10 mg daily. You can use up your current bottle by taking two tabs daily. I have also sent in a prescription for the higher dose (which would be 1 tab daily).   - Follow up:    Dr. Voss 20.    I'd like you to see Dr. Mallory around May - someone should call you to schedule this, but if you don't hear from us, please call.    I can always see you back in the meantime as needed, otherwise, I will \"graduate\" you from the C[facility] clinic - keep up the good work!    Please, remember to continue the followin.  Weigh yourself daily. Call if your weight is up > than 2 pounds in one day, or 5 pounds in one week; if you feel more short of breath or have worsening swelling in your legs or abdomen.  2.  Eat a low sodium diet (less than 2,000mg or 2g daily). If you eat less salt, you will retain less fluid.   3.  Avoid alcohol, as this can worsen heart failure.   4.  Avoid NSAIDs as able (For example, Ibuprofen / aleve / advil / naprosen / diclofenac).    Please call CORE nurse for any questions or concerns Mon-Fri 8am-4pm:   [phone]  For concerns after hours:   [phone]   Scheduling phone number:   [phone]    Thank you for visiting with us today.   Adriana Ferrell PA-C  ______________________________________________________________  "

## 2020-01-02 ENCOUNTER — CARE COORDINATION (OUTPATIENT)
Dept: CARDIOLOGY | Facility: CLINIC | Age: 80
End: 2020-01-02

## 2020-01-02 DIAGNOSIS — I50.32 CHRONIC DIASTOLIC CHF (CONGESTIVE HEART FAILURE) (H): Primary | ICD-10-CM

## 2020-01-02 RX ORDER — FUROSEMIDE 20 MG
20 TABLET ORAL DAILY
Qty: 90 TABLET | Refills: 3 | Status: SHIPPED | OUTPATIENT
Start: 2020-01-02 | End: 2020-07-09

## 2020-01-02 NOTE — PROGRESS NOTES
Patient LM requesting lasix refill.     Call back to patient. Confirmed she is taking lasix 20mg/d. Refill sent to her preferred pharmacy.  Pooja Vaughn RN on 1/2/2020 at 12:16 PM

## 2020-01-09 ENCOUNTER — CARE COORDINATION (OUTPATIENT)
Dept: CARDIOLOGY | Facility: CLINIC | Age: 80
End: 2020-01-09

## 2020-01-09 DIAGNOSIS — E87.6 HYPOKALEMIA: ICD-10-CM

## 2020-01-09 RX ORDER — POTASSIUM CHLORIDE 750 MG/1
10 TABLET, EXTENDED RELEASE ORAL DAILY
Qty: 90 TABLET | Refills: 3 | Status: SHIPPED | OUTPATIENT
Start: 2020-01-09 | End: 2020-07-09

## 2020-01-09 NOTE — PROGRESS NOTES
Incoming call requesting refill of potassium supplement. Confirmed w/patient that she is taking 10meq/day. Refill sent to her preferred pharmacy.  Pooja Vaughn RN on 1/9/2020 at 2:08 PM

## 2020-01-15 ENCOUNTER — ANCILLARY PROCEDURE (OUTPATIENT)
Dept: CARDIOLOGY | Facility: CLINIC | Age: 80
End: 2020-01-15
Attending: INTERNAL MEDICINE
Payer: COMMERCIAL

## 2020-01-15 DIAGNOSIS — Z95.0 CARDIAC PACEMAKER IN SITU: ICD-10-CM

## 2020-01-15 LAB
MDC_IDC_EPISODE_DTM: NORMAL
MDC_IDC_EPISODE_DURATION: 12 S
MDC_IDC_EPISODE_DURATION: 36 S
MDC_IDC_EPISODE_DURATION: 6 S
MDC_IDC_EPISODE_DURATION: NORMAL S
MDC_IDC_EPISODE_DURATION: NORMAL S
MDC_IDC_EPISODE_ID: NORMAL
MDC_IDC_EPISODE_TYPE: NORMAL
MDC_IDC_LEAD_IMPLANT_DT: NORMAL
MDC_IDC_LEAD_IMPLANT_DT: NORMAL
MDC_IDC_LEAD_LOCATION: NORMAL
MDC_IDC_LEAD_LOCATION: NORMAL
MDC_IDC_LEAD_LOCATION_DETAIL_1: NORMAL
MDC_IDC_LEAD_LOCATION_DETAIL_1: NORMAL
MDC_IDC_LEAD_MFG: NORMAL
MDC_IDC_LEAD_MFG: NORMAL
MDC_IDC_LEAD_MODEL: NORMAL
MDC_IDC_LEAD_MODEL: NORMAL
MDC_IDC_LEAD_SERIAL: NORMAL
MDC_IDC_LEAD_SERIAL: NORMAL
MDC_IDC_MSMT_BATTERY_DTM: NORMAL
MDC_IDC_MSMT_BATTERY_REMAINING_LONGEVITY: 128 MO
MDC_IDC_MSMT_BATTERY_REMAINING_PERCENTAGE: 95.5 %
MDC_IDC_MSMT_BATTERY_RRT_TRIGGER: NORMAL
MDC_IDC_MSMT_BATTERY_STATUS: NORMAL
MDC_IDC_MSMT_BATTERY_VOLTAGE: 3.01 V
MDC_IDC_MSMT_LEADCHNL_RA_IMPEDANCE_VALUE: 490 OHM
MDC_IDC_MSMT_LEADCHNL_RA_LEAD_CHANNEL_STATUS: NORMAL
MDC_IDC_MSMT_LEADCHNL_RA_PACING_THRESHOLD_AMPLITUDE: 0.5 V
MDC_IDC_MSMT_LEADCHNL_RA_PACING_THRESHOLD_PULSEWIDTH: 0.4 MS
MDC_IDC_MSMT_LEADCHNL_RA_SENSING_INTR_AMPL: 3.2 MV
MDC_IDC_MSMT_LEADCHNL_RV_IMPEDANCE_VALUE: 550 OHM
MDC_IDC_MSMT_LEADCHNL_RV_LEAD_CHANNEL_STATUS: NORMAL
MDC_IDC_MSMT_LEADCHNL_RV_PACING_THRESHOLD_AMPLITUDE: 0.62 V
MDC_IDC_MSMT_LEADCHNL_RV_PACING_THRESHOLD_PULSEWIDTH: 0.4 MS
MDC_IDC_MSMT_LEADCHNL_RV_SENSING_INTR_AMPL: 8.5 MV
MDC_IDC_PG_IMPLANT_DTM: NORMAL
MDC_IDC_PG_MFG: NORMAL
MDC_IDC_PG_MODEL: NORMAL
MDC_IDC_PG_SERIAL: NORMAL
MDC_IDC_PG_TYPE: NORMAL
MDC_IDC_SESS_CLINIC_NAME: NORMAL
MDC_IDC_SESS_DTM: NORMAL
MDC_IDC_SESS_REPROGRAMMED: NO
MDC_IDC_SESS_TYPE: NORMAL
MDC_IDC_SET_BRADY_AT_MODE_SWITCH_MODE: NORMAL
MDC_IDC_SET_BRADY_AT_MODE_SWITCH_RATE: 170 {BEATS}/MIN
MDC_IDC_SET_BRADY_LOWRATE: 60 {BEATS}/MIN
MDC_IDC_SET_BRADY_MAX_SENSOR_RATE: 120 {BEATS}/MIN
MDC_IDC_SET_BRADY_MAX_TRACKING_RATE: 120 {BEATS}/MIN
MDC_IDC_SET_BRADY_MODE: NORMAL
MDC_IDC_SET_BRADY_PAV_DELAY_LOW: 250 MS
MDC_IDC_SET_BRADY_SAV_DELAY_LOW: 225 MS
MDC_IDC_SET_LEADCHNL_RA_PACING_AMPLITUDE: 2 V
MDC_IDC_SET_LEADCHNL_RA_PACING_ANODE_ELECTRODE_1: NORMAL
MDC_IDC_SET_LEADCHNL_RA_PACING_ANODE_LOCATION_1: NORMAL
MDC_IDC_SET_LEADCHNL_RA_PACING_CAPTURE_MODE: NORMAL
MDC_IDC_SET_LEADCHNL_RA_PACING_CATHODE_ELECTRODE_1: NORMAL
MDC_IDC_SET_LEADCHNL_RA_PACING_CATHODE_LOCATION_1: NORMAL
MDC_IDC_SET_LEADCHNL_RA_PACING_POLARITY: NORMAL
MDC_IDC_SET_LEADCHNL_RA_PACING_PULSEWIDTH: 0.4 MS
MDC_IDC_SET_LEADCHNL_RA_SENSING_ADAPTATION_MODE: NORMAL
MDC_IDC_SET_LEADCHNL_RA_SENSING_ANODE_ELECTRODE_1: NORMAL
MDC_IDC_SET_LEADCHNL_RA_SENSING_ANODE_LOCATION_1: NORMAL
MDC_IDC_SET_LEADCHNL_RA_SENSING_CATHODE_ELECTRODE_1: NORMAL
MDC_IDC_SET_LEADCHNL_RA_SENSING_CATHODE_LOCATION_1: NORMAL
MDC_IDC_SET_LEADCHNL_RA_SENSING_POLARITY: NORMAL
MDC_IDC_SET_LEADCHNL_RA_SENSING_SENSITIVITY: 0.3 MV
MDC_IDC_SET_LEADCHNL_RV_PACING_AMPLITUDE: 0.88
MDC_IDC_SET_LEADCHNL_RV_PACING_ANODE_ELECTRODE_1: NORMAL
MDC_IDC_SET_LEADCHNL_RV_PACING_ANODE_LOCATION_1: NORMAL
MDC_IDC_SET_LEADCHNL_RV_PACING_CAPTURE_MODE: NORMAL
MDC_IDC_SET_LEADCHNL_RV_PACING_CATHODE_ELECTRODE_1: NORMAL
MDC_IDC_SET_LEADCHNL_RV_PACING_CATHODE_LOCATION_1: NORMAL
MDC_IDC_SET_LEADCHNL_RV_PACING_POLARITY: NORMAL
MDC_IDC_SET_LEADCHNL_RV_PACING_PULSEWIDTH: 0.4 MS
MDC_IDC_SET_LEADCHNL_RV_SENSING_ADAPTATION_MODE: NORMAL
MDC_IDC_SET_LEADCHNL_RV_SENSING_ANODE_ELECTRODE_1: NORMAL
MDC_IDC_SET_LEADCHNL_RV_SENSING_ANODE_LOCATION_1: NORMAL
MDC_IDC_SET_LEADCHNL_RV_SENSING_CATHODE_ELECTRODE_1: NORMAL
MDC_IDC_SET_LEADCHNL_RV_SENSING_CATHODE_LOCATION_1: NORMAL
MDC_IDC_SET_LEADCHNL_RV_SENSING_POLARITY: NORMAL
MDC_IDC_SET_LEADCHNL_RV_SENSING_SENSITIVITY: 0.5 MV
MDC_IDC_STAT_AT_BURDEN_PERCENT: 1 %
MDC_IDC_STAT_AT_DTM_END: NORMAL
MDC_IDC_STAT_AT_DTM_START: NORMAL
MDC_IDC_STAT_AT_MODE_SW_COUNT: 6
MDC_IDC_STAT_AT_MODE_SW_COUNT_PER_DAY: 0
MDC_IDC_STAT_AT_MODE_SW_MAX_DURATION: NORMAL S
MDC_IDC_STAT_AT_MODE_SW_PERCENT_TIME: 7 %
MDC_IDC_STAT_BRADY_AP_VP_PERCENT: 33 %
MDC_IDC_STAT_BRADY_AP_VS_PERCENT: 1 %
MDC_IDC_STAT_BRADY_AS_VP_PERCENT: 67 %
MDC_IDC_STAT_BRADY_AS_VS_PERCENT: 1 %
MDC_IDC_STAT_BRADY_DTM_END: NORMAL
MDC_IDC_STAT_BRADY_DTM_START: NORMAL
MDC_IDC_STAT_BRADY_RA_PERCENT_PACED: 33 %
MDC_IDC_STAT_BRADY_RV_PERCENT_PACED: 99 %
MDC_IDC_STAT_CRT_DTM_END: NORMAL
MDC_IDC_STAT_CRT_DTM_START: NORMAL
MDC_IDC_STAT_HEART_RATE_ATRIAL_MAX: 330 {BEATS}/MIN
MDC_IDC_STAT_HEART_RATE_ATRIAL_MEAN: 80 {BEATS}/MIN
MDC_IDC_STAT_HEART_RATE_ATRIAL_MIN: 40 {BEATS}/MIN
MDC_IDC_STAT_HEART_RATE_DTM_END: NORMAL
MDC_IDC_STAT_HEART_RATE_DTM_START: NORMAL
MDC_IDC_STAT_HEART_RATE_VENTRICULAR_MAX: 200 {BEATS}/MIN
MDC_IDC_STAT_HEART_RATE_VENTRICULAR_MEAN: 70 {BEATS}/MIN
MDC_IDC_STAT_HEART_RATE_VENTRICULAR_MIN: 40 {BEATS}/MIN

## 2020-01-15 PROCEDURE — 93296 REM INTERROG EVL PM/IDS: CPT | Performed by: INTERNAL MEDICINE

## 2020-01-15 PROCEDURE — 93294 REM INTERROG EVL PM/LDLS PM: CPT | Performed by: INTERNAL MEDICINE

## 2020-02-10 ENCOUNTER — TELEPHONE (OUTPATIENT)
Dept: FAMILY MEDICINE | Facility: CLINIC | Age: 80
End: 2020-02-10

## 2020-02-10 NOTE — TELEPHONE ENCOUNTER
Panel Management Review      Patient has the following on her problem list: None      Composite cancer screening  Chart review shows that this patient is due/due soon for the following None  Summary:    Patient is due/failing the following:   BP CHECK and PHYSICAL    Action needed:   Patient needs office visit for  Physical and BP check.    Type of outreach:    will us back tomorrow/    Questions for provider review:    None                                                                                                                                    Molly Marshall MA       Chart routed to Care Team       Molly Marshall MA  .

## 2020-02-20 ENCOUNTER — OFFICE VISIT (OUTPATIENT)
Dept: FAMILY MEDICINE | Facility: CLINIC | Age: 80
End: 2020-02-20
Payer: COMMERCIAL

## 2020-02-20 VITALS
OXYGEN SATURATION: 98 % | DIASTOLIC BLOOD PRESSURE: 68 MMHG | BODY MASS INDEX: 33.36 KG/M2 | SYSTOLIC BLOOD PRESSURE: 118 MMHG | TEMPERATURE: 97.6 F | HEART RATE: 65 BPM | WEIGHT: 188.25 LBS | HEIGHT: 63 IN

## 2020-02-20 DIAGNOSIS — N18.30 CKD (CHRONIC KIDNEY DISEASE) STAGE 3, GFR 30-59 ML/MIN (H): ICD-10-CM

## 2020-02-20 DIAGNOSIS — I49.5 SSS (SICK SINUS SYNDROME) (H): ICD-10-CM

## 2020-02-20 DIAGNOSIS — I50.32 CHRONIC DIASTOLIC CHF (CONGESTIVE HEART FAILURE) (H): ICD-10-CM

## 2020-02-20 DIAGNOSIS — I10 HYPERTENSION GOAL BP (BLOOD PRESSURE) < 140/90: Primary | ICD-10-CM

## 2020-02-20 DIAGNOSIS — E78.5 HYPERLIPIDEMIA LDL GOAL <70: ICD-10-CM

## 2020-02-20 DIAGNOSIS — M81.0 AGE RELATED OSTEOPOROSIS, UNSPECIFIED PATHOLOGICAL FRACTURE PRESENCE: ICD-10-CM

## 2020-02-20 DIAGNOSIS — L03.115 CELLULITIS OF RIGHT LOWER EXTREMITY: ICD-10-CM
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
## 2020-02-20 DIAGNOSIS — J44.9 CHRONIC OBSTRUCTIVE PULMONARY DISEASE, UNSPECIFIED COPD TYPE (H): ICD-10-CM

## 2020-02-20 DIAGNOSIS — Z95.0 S/P CARDIAC PACEMAKER PROCEDURE: ICD-10-CM

## 2020-02-20 DIAGNOSIS — I48.0 PAF (PAROXYSMAL ATRIAL FIBRILLATION) (H): ICD-10-CM

## 2020-02-20 PROCEDURE — 99214 OFFICE O/P EST MOD 30 MIN: CPT | Performed by: FAMILY MEDICINE

## 2020-02-20 RX ORDER — CEPHALEXIN 500 MG/1
500 CAPSULE ORAL 2 TIMES DAILY
Qty: 20 CAPSULE | Refills: 0 | Status: SHIPPED | OUTPATIENT
Start: 2020-02-20 | End: 2020-07-09

## 2020-02-20 RX ORDER — ALENDRONATE SODIUM 70 MG/1
70 TABLET ORAL
Qty: 12 TABLET | Refills: 3 | Status: SHIPPED | OUTPATIENT
Start: 2020-02-20 | End: 2021-09-14

## 2020-02-20 ASSESSMENT — MIFFLIN-ST. JEOR: SCORE: 1293.03

## 2020-02-20 NOTE — PATIENT INSTRUCTIONS
1) Start the Keflex 500mg twice daily for your cat scratch. You can stop the medication after 5 days if the redness and irritation are gone.   2) Let me know if it is not improving or if it is feeling worse or you develop a fever.   3) Consider taking a probiotic daily. One brand is Culturelle.

## 2020-02-20 NOTE — PROGRESS NOTES
SUBJECTIVE:   Hamida Verma is a 79 year old female who presents to clinic today for the following health issues:      Hypertension Follow-up      Do you check your blood pressure regularly outside of the clinic? Yes     Are you following a low salt diet? Yes    Are your blood pressures ever more than 140 on the top number (systolic) OR more   than 90 on the bottom number (diastolic), for example 140/90? No      How many servings of fruits and vegetables do you eat daily?  Depends on the day and what she is eating     On average, how many sweetened beverages do you drink each day (Examples: soda, juice, sweet tea, etc.  Do NOT count diet or artificially sweetened beverages)?   Maybe one every other day-      How many days per week do you exercise enough to make your heart beat faster? 3 or less due to the weather condition     How many minutes a day do you exercise enough to make your heart beat faster? 30 - 60    How many days per week do you miss taking your medication? 0    She was seen by cardiology on 12/18/2019 and at that time her blood pressure was uncontrolled.  Her amlodipine was increased from 5 mg to 10 mg daily.  At that time, [facility] and was told to keep an eye on her weight and follow a sodium restricted diet.     She remains on alendronate for osteoporosis and is tolerating the medication well.    Her cat scratched her about 4 days ago.  Since that time, she is developed some redness and itchiness in the area of the scratch in her right upper thigh.  She denies any pain in the area.  She denies any fevers.  She is otherwise feeling well.    Problem list and histories reviewed & adjusted, as indicated.  Additional history: as documented    Patient Active Problem List   Diagnosis     Symptomatic menopausal or female climacteric states     Hyperlipidemia LDL goal <70     Hypertension goal BP (blood pressure) < 140/90     Knee pain     Obesity     PAF  (paroxysmal atrial fibrillation) (H)     S/P cardiac pacemaker procedure     SSS (sick sinus syndrome) (H)     Chronic diastolic CHF (congestive heart failure) (H)     Pulmonary hypertension (H)     Coronary artery calcification seen on CT scan     Hypotension     Diabetes mellitus, type 2 (H)     CKD (chronic kidney disease) stage 3, GFR 30-59 ml/min (H)     Chronic obstructive pulmonary disease, unspecified COPD type (H)     Past Surgical History:   Procedure Laterality Date     EP ABLATION AV NODE N/A 2019    Procedure: EP Ablation AV Node;  Surgeon: Roe Voss MD;  Location:  [facility]     EYE SURGERY       HYSTERECTOMY, VAGINAL      hysterectomy       Social History     Tobacco Use     Smoking status: Former Smoker     Packs/day: 1.50     Years: 45.00     Pack years: 67.50     Types: Cigarettes     Start date: 10/28/1955     Last attempt to quit: 2000     Years since quittin.4     Smokeless tobacco: Never Used     Tobacco comment: quit 6 yrs ago   Substance Use Topics     Alcohol use: No     Family History   Problem Relation Age of Onset     Cerebrovascular Disease Mother      Glaucoma Mother      Macular Degeneration Mother      Alcohol/Drug Father         alcohol     Myocardial Infarction Father 63        passed away from MI     Family History Negative Sister      Family History Negative Sister      Family History Negative Sister      Cerebrovascular Disease Sister      Family History Negative Brother      Family History Negative Maternal Grandmother      Family History Negative Maternal Grandfather      Family History Negative Paternal Grandmother      Family History Negative Paternal Grandfather      Myocardial Infarction Son 57        passed away from MI     Dementia Daughter 57        early onset on namenda     Diabetes No family hx of      Breast Cancer No family hx of      Cancer - colorectal No family hx of          Current Outpatient Medications   Medication Sig  Dispense Refill     alendronate (FOSAMAX) 70 MG tablet Take 1 tablet (70 mg) by mouth every 7 days Take 60 minutes before am meal with 8 oz. water. Remain upright for 30 minutes. 12 tablet 3     amLODIPine (NORVASC) 10 MG tablet Take 1 tablet (10 mg) by mouth daily 90 tablet 3     atorvastatin (LIPITOR) 40 MG tablet Take 1 tablet (40 mg) by mouth daily 90 tablet 3     cholecalciferol (VITAMIN  -D) 1000 UNIT capsule Take 1 capsule by mouth daily.       Coral Calcium 133-66.7-133 MG-MG-UNIT CAPS Take 2 tablets by mouth 2 times daily        FLAX SEED OIL OR 1 capsule daily       furosemide (LASIX) 20 MG tablet Take 1 tablet (20 mg) by mouth daily 90 tablet 3     lisinopril (PRINIVIL/ZESTRIL) 40 MG tablet TAKE ONE TABLET BY MOUTH EVERY DAY 90 tablet 3     metoprolol succinate ER (TOPROL-XL) 50 MG 24 hr tablet Take 1 tablet (50 mg) by mouth 2 times daily 180 tablet 3     Multiple Vitamins-Minerals (HAIR SKIN NAILS PO) Take 1 tablet by mouth At Bedtime       potassium chloride ER (K-DUR/KLOR-CON M) 10 MEQ CR tablet Take 1 tablet (10 mEq) by mouth daily 90 tablet 3     rivaroxaban ANTICOAGULANT (XARELTO) 20 MG TABS tablet Take 1 tablet (20 mg) by mouth daily (with dinner) 90 tablet 3     Krill Oil 500 MG CAPS Take 1 capsule by mouth daily       Milk Thistle 200 MG CAPS Take 1 capsule by mouth daily        Allergies   Allergen Reactions     Strawberries [Strawberry] Anaphylaxis     Strawberry Flavor      Recent Labs   Lab Test 12/18/19  0730 06/17/19  0758  05/01/19  0839 03/28/19  0905  01/16/19  1135  01/08/19  0814 01/06/19  0527 01/05/19  1520  07/11/18  0907  05/02/18  0826  03/23/17  0804   A1C  --   --   --   --  5.1  --   --   --  6.9*  --   --   --   --   --   --   --   --    LDL  --   --   --   --   --   --  70  --   --   --   --   --  84  --   --   --  91   HDL  --   --   --   --   --   --  45*  --   --   --   --   --  69  --   --   --  61   TRIG  --   --   --   --   --   --  133  --   --   --   --   --  88  --    "--   --  109   ALT  --   --   --   --   --   --   --   --   --  55* 61*  --   --   --  25   < > 24   CR 1.11 0.92   < > 1.12 0.96   < > 0.96   < > 0.80 0.99 1.35*   < > 0.81   < > 0.81   < > 0.71   GFRESTIMATED 47* 59*   < > 47* 56*   < > 56*   < > 70 54* 37*   < > 69   < > 69   < > 79   GFRESTBLACK 57* 69   < > 57* 65   < > 68   < > 81 63 43*   < > 83   < > 83   < > >90   GFR Calc     POTASSIUM 3.9 4.3   < > 3.8 4.3   < > 3.3*   < > 4.2 4.5 4.7   < > 4.2   < > 4.1   < > 3.7   TSH 0.88  --   --  1.11 0.81  --   --   --   --   --   --    < >  --   --   --    < >  --     < > = values in this interval not displayed.      BP Readings from Last 3 Encounters:   02/20/20 118/68   12/18/19 (!) 152/78   10/08/19 132/78    Wt Readings from Last 3 Encounters:   02/20/20 85.4 kg (188 lb 4 oz)   12/18/19 85.7 kg (188 lb 14.4 oz)   10/08/19 83.8 kg (184 lb 12 oz)              Reviewed and updated as needed this visit by clinical staff  Tobacco  Allergies  Meds       Reviewed and updated as needed this visit by Provider         ROS:   Denies headaches, vision changes, weakness, numbness, tingling, chest pain, shortness of breath, PND, orthopnea, weight gain.       OBJECTIVE:     /68 (BP Location: Right arm, Patient Position: Sitting, Cuff Size: Adult Regular)   Pulse 65   Temp 97.6  F (36.4  C) (Tympanic)   Ht 1.6 m (5' 3\")   Wt 85.4 kg (188 lb 4 oz)   LMP  (LMP Unknown)   SpO2 98%   BMI 33.35 kg/m    Body mass index is 33.35 kg/m .   Wt Readings from Last 5 Encounters:   02/20/20 85.4 kg (188 lb 4 oz)   12/18/19 85.7 kg (188 lb 14.4 oz)   10/08/19 83.8 kg (184 lb 12 oz)   06/05/19 82.5 kg (181 lb 12.8 oz)   06/04/19 81.6 kg (180 lb)      GENERAL: healthy, alert and no distress  Skin : right lower anterior thigh, there is a cluster of excoriated erythematous spots with mild surrounding erythema and increased warmth. Nontender. No drainage. No area of induration.     Diagnostic Test Results:  Labs " reviewed in Epic    ASSESSMENT/PLAN:       1.  Hypertension   Improved control.  Continues lisinopril 40 mg daily, metoprolol 50 mg twice daily, amlodipine 10 mg daily, furosemide 20 mg daily.  Amlodipine was added at the time of her last cardiology visit in December. She continues on potassium 10meq daily as she is on lasix.      2. Chronic diastolic CHF (congestive heart failure) (H)  Followed by cardiology and stable.  Recently graduated from [facility].  Continues to monitor her weight and salt intake.  Doing well.     3. Hyperlipidemia LDL goal <70  Continue atorvastatin 40 mg daily     4. PAF (paroxysmal atrial fibrillation) (H)  5. SSS (sick sinus syndrome) (H)  6. S/P cardiac pacemaker procedure  Followed by cardiology.  Continues apixaban 5 mg twice daily.    7. Osteoporosis  Continues alendronate. Last DEXA was in 2017. I recommended scheduling DEXA  Continues calcium and vitamin d supplementation.     8. Cellulitis right thigh, nonpurulent.   Cat scratch.   Mild symptoms currently. Will start keflex with renal dosing 500mg twice daily for 5-10 days, see below.   12/18/19 GFR 47, Cr 1.11.     9. CKD III  Stable. Not on NSAID medication.   Continues on ACEI     Patient Instructions   1) Start the Keflex 500mg twice daily for your cat scratch. You can stop the medication after 5 days if the redness and irritation are gone.   2) Let me know if it is not improving or if it is feeling worse or you develop a fever.   3) Consider taking a probiotic daily. One brand is Culturelle.       Mikala Philip M.D.        [facility]

## 2020-02-26 ENCOUNTER — ANCILLARY PROCEDURE (OUTPATIENT)
Dept: BONE DENSITY | Facility: CLINIC | Age: 80
End: 2020-02-26
Attending: FAMILY MEDICINE
Payer: COMMERCIAL

## 2020-02-26 DIAGNOSIS — M81.0 AGE RELATED OSTEOPOROSIS, UNSPECIFIED PATHOLOGICAL FRACTURE PRESENCE: ICD-10-CM

## 2020-02-26 PROCEDURE — 77085 DXA BONE DENSITY AXL VRT FX: CPT | Performed by: INTERNAL MEDICINE

## 2020-03-12 ENCOUNTER — TRANSFERRED RECORDS (OUTPATIENT)
Dept: HEALTH INFORMATION MANAGEMENT | Facility: CLINIC | Age: 80
End: 2020-03-12

## 2020-04-09 ENCOUNTER — TRANSFERRED RECORDS (OUTPATIENT)
Dept: HEALTH INFORMATION MANAGEMENT | Facility: CLINIC | Age: 80
End: 2020-04-09

## 2020-04-21 DIAGNOSIS — I48.91 ATRIAL FIBRILLATION WITH RAPID VENTRICULAR RESPONSE (H): ICD-10-CM

## 2020-04-21 RX ORDER — METOPROLOL SUCCINATE 50 MG/1
50 TABLET, EXTENDED RELEASE ORAL 2 TIMES DAILY
Qty: 180 TABLET | Refills: 0 | Status: SHIPPED | OUTPATIENT
Start: 2020-04-21 | End: 2020-08-04

## 2020-04-29 ENCOUNTER — ANCILLARY PROCEDURE (OUTPATIENT)
Dept: CARDIOLOGY | Facility: CLINIC | Age: 80
End: 2020-04-29
Attending: INTERNAL MEDICINE
Payer: COMMERCIAL

## 2020-04-29 DIAGNOSIS — Z95.0 CARDIAC PACEMAKER IN SITU: ICD-10-CM

## 2020-04-29 LAB
MDC_IDC_EPISODE_DTM: NORMAL
MDC_IDC_EPISODE_DURATION: 10 S
MDC_IDC_EPISODE_DURATION: 10 S
MDC_IDC_EPISODE_DURATION: 12 S
MDC_IDC_EPISODE_DURATION: 12 S
MDC_IDC_EPISODE_DURATION: 14 S
MDC_IDC_EPISODE_DURATION: 18 S
MDC_IDC_EPISODE_DURATION: 206 S
MDC_IDC_EPISODE_DURATION: 6 S
MDC_IDC_EPISODE_DURATION: 8 S
MDC_IDC_EPISODE_DURATION: NORMAL S
MDC_IDC_EPISODE_ID: NORMAL
MDC_IDC_EPISODE_TYPE: NORMAL
MDC_IDC_LEAD_IMPLANT_DT: NORMAL
MDC_IDC_LEAD_IMPLANT_DT: NORMAL
MDC_IDC_LEAD_LOCATION: NORMAL
MDC_IDC_LEAD_LOCATION: NORMAL
MDC_IDC_LEAD_LOCATION_DETAIL_1: NORMAL
MDC_IDC_LEAD_LOCATION_DETAIL_1: NORMAL
MDC_IDC_LEAD_MFG: NORMAL
MDC_IDC_LEAD_MFG: NORMAL
MDC_IDC_LEAD_MODEL: NORMAL
MDC_IDC_LEAD_MODEL: NORMAL
MDC_IDC_LEAD_SERIAL: NORMAL
MDC_IDC_LEAD_SERIAL: NORMAL
MDC_IDC_MSMT_BATTERY_DTM: NORMAL
MDC_IDC_MSMT_BATTERY_REMAINING_LONGEVITY: 130 MO
MDC_IDC_MSMT_BATTERY_REMAINING_PERCENTAGE: 95.5 %
MDC_IDC_MSMT_BATTERY_RRT_TRIGGER: NORMAL
MDC_IDC_MSMT_BATTERY_STATUS: NORMAL
MDC_IDC_MSMT_BATTERY_VOLTAGE: 2.99 V
MDC_IDC_MSMT_LEADCHNL_RA_IMPEDANCE_VALUE: 550 OHM
MDC_IDC_MSMT_LEADCHNL_RA_LEAD_CHANNEL_STATUS: NORMAL
MDC_IDC_MSMT_LEADCHNL_RA_PACING_THRESHOLD_AMPLITUDE: 0.5 V
MDC_IDC_MSMT_LEADCHNL_RA_PACING_THRESHOLD_PULSEWIDTH: 0.4 MS
MDC_IDC_MSMT_LEADCHNL_RA_SENSING_INTR_AMPL: 4.2 MV
MDC_IDC_MSMT_LEADCHNL_RV_IMPEDANCE_VALUE: 580 OHM
MDC_IDC_MSMT_LEADCHNL_RV_LEAD_CHANNEL_STATUS: NORMAL
MDC_IDC_MSMT_LEADCHNL_RV_PACING_THRESHOLD_AMPLITUDE: 0.62 V
MDC_IDC_MSMT_LEADCHNL_RV_PACING_THRESHOLD_PULSEWIDTH: 0.4 MS
MDC_IDC_MSMT_LEADCHNL_RV_SENSING_INTR_AMPL: 8.5 MV
MDC_IDC_PG_IMPLANT_DTM: NORMAL
MDC_IDC_PG_MFG: NORMAL
MDC_IDC_PG_MODEL: NORMAL
MDC_IDC_PG_SERIAL: NORMAL
MDC_IDC_PG_TYPE: NORMAL
MDC_IDC_SESS_CLINIC_NAME: NORMAL
MDC_IDC_SESS_DTM: NORMAL
MDC_IDC_SESS_REPROGRAMMED: NO
MDC_IDC_SESS_TYPE: NORMAL
MDC_IDC_SET_BRADY_AT_MODE_SWITCH_MODE: NORMAL
MDC_IDC_SET_BRADY_AT_MODE_SWITCH_RATE: 170 {BEATS}/MIN
MDC_IDC_SET_BRADY_LOWRATE: 60 {BEATS}/MIN
MDC_IDC_SET_BRADY_MAX_SENSOR_RATE: 120 {BEATS}/MIN
MDC_IDC_SET_BRADY_MAX_TRACKING_RATE: 120 {BEATS}/MIN
MDC_IDC_SET_BRADY_MODE: NORMAL
MDC_IDC_SET_BRADY_PAV_DELAY_LOW: 250 MS
MDC_IDC_SET_BRADY_SAV_DELAY_LOW: 225 MS
MDC_IDC_SET_LEADCHNL_RA_PACING_AMPLITUDE: 2 V
MDC_IDC_SET_LEADCHNL_RA_PACING_ANODE_ELECTRODE_1: NORMAL
MDC_IDC_SET_LEADCHNL_RA_PACING_ANODE_LOCATION_1: NORMAL
MDC_IDC_SET_LEADCHNL_RA_PACING_CAPTURE_MODE: NORMAL
MDC_IDC_SET_LEADCHNL_RA_PACING_CATHODE_ELECTRODE_1: NORMAL
MDC_IDC_SET_LEADCHNL_RA_PACING_CATHODE_LOCATION_1: NORMAL
MDC_IDC_SET_LEADCHNL_RA_PACING_POLARITY: NORMAL
MDC_IDC_SET_LEADCHNL_RA_PACING_PULSEWIDTH: 0.4 MS
MDC_IDC_SET_LEADCHNL_RA_SENSING_ADAPTATION_MODE: NORMAL
MDC_IDC_SET_LEADCHNL_RA_SENSING_ANODE_ELECTRODE_1: NORMAL
MDC_IDC_SET_LEADCHNL_RA_SENSING_ANODE_LOCATION_1: NORMAL
MDC_IDC_SET_LEADCHNL_RA_SENSING_CATHODE_ELECTRODE_1: NORMAL
MDC_IDC_SET_LEADCHNL_RA_SENSING_CATHODE_LOCATION_1: NORMAL
MDC_IDC_SET_LEADCHNL_RA_SENSING_POLARITY: NORMAL
MDC_IDC_SET_LEADCHNL_RA_SENSING_SENSITIVITY: 0.3 MV
MDC_IDC_SET_LEADCHNL_RV_PACING_AMPLITUDE: 0.88
MDC_IDC_SET_LEADCHNL_RV_PACING_ANODE_ELECTRODE_1: NORMAL
MDC_IDC_SET_LEADCHNL_RV_PACING_ANODE_LOCATION_1: NORMAL
MDC_IDC_SET_LEADCHNL_RV_PACING_CAPTURE_MODE: NORMAL
MDC_IDC_SET_LEADCHNL_RV_PACING_CATHODE_ELECTRODE_1: NORMAL
MDC_IDC_SET_LEADCHNL_RV_PACING_CATHODE_LOCATION_1: NORMAL
MDC_IDC_SET_LEADCHNL_RV_PACING_POLARITY: NORMAL
MDC_IDC_SET_LEADCHNL_RV_PACING_PULSEWIDTH: 0.4 MS
MDC_IDC_SET_LEADCHNL_RV_SENSING_ADAPTATION_MODE: NORMAL
MDC_IDC_SET_LEADCHNL_RV_SENSING_ANODE_ELECTRODE_1: NORMAL
MDC_IDC_SET_LEADCHNL_RV_SENSING_ANODE_LOCATION_1: NORMAL
MDC_IDC_SET_LEADCHNL_RV_SENSING_CATHODE_ELECTRODE_1: NORMAL
MDC_IDC_SET_LEADCHNL_RV_SENSING_CATHODE_LOCATION_1: NORMAL
MDC_IDC_SET_LEADCHNL_RV_SENSING_POLARITY: NORMAL
MDC_IDC_SET_LEADCHNL_RV_SENSING_SENSITIVITY: 0.5 MV
MDC_IDC_STAT_AT_BURDEN_PERCENT: 19 %
MDC_IDC_STAT_AT_DTM_END: NORMAL
MDC_IDC_STAT_AT_DTM_START: NORMAL
MDC_IDC_STAT_AT_MODE_SW_COUNT: 15
MDC_IDC_STAT_AT_MODE_SW_COUNT_PER_DAY: 0
MDC_IDC_STAT_AT_MODE_SW_MAX_DURATION: NORMAL S
MDC_IDC_STAT_AT_MODE_SW_PERCENT_TIME: 19 %
MDC_IDC_STAT_BRADY_AP_VP_PERCENT: 40 %
MDC_IDC_STAT_BRADY_AP_VS_PERCENT: 1 %
MDC_IDC_STAT_BRADY_AS_VP_PERCENT: 60 %
MDC_IDC_STAT_BRADY_AS_VS_PERCENT: 1 %
MDC_IDC_STAT_BRADY_DTM_END: NORMAL
MDC_IDC_STAT_BRADY_DTM_START: NORMAL
MDC_IDC_STAT_BRADY_RA_PERCENT_PACED: 32 %
MDC_IDC_STAT_BRADY_RV_PERCENT_PACED: 99 %
MDC_IDC_STAT_CRT_DTM_END: NORMAL
MDC_IDC_STAT_CRT_DTM_START: NORMAL
MDC_IDC_STAT_HEART_RATE_ATRIAL_MAX: 330 {BEATS}/MIN
MDC_IDC_STAT_HEART_RATE_ATRIAL_MEAN: 184 {BEATS}/MIN
MDC_IDC_STAT_HEART_RATE_ATRIAL_MIN: 40 {BEATS}/MIN
MDC_IDC_STAT_HEART_RATE_DTM_END: NORMAL
MDC_IDC_STAT_HEART_RATE_DTM_START: NORMAL
MDC_IDC_STAT_HEART_RATE_VENTRICULAR_MAX: 210 {BEATS}/MIN
MDC_IDC_STAT_HEART_RATE_VENTRICULAR_MEAN: 69 {BEATS}/MIN
MDC_IDC_STAT_HEART_RATE_VENTRICULAR_MIN: 40 {BEATS}/MIN

## 2020-04-29 PROCEDURE — 93296 REM INTERROG EVL PM/IDS: CPT | Performed by: INTERNAL MEDICINE

## 2020-04-29 PROCEDURE — 93294 REM INTERROG EVL PM/LDLS PM: CPT | Performed by: INTERNAL MEDICINE

## 2020-05-18 DIAGNOSIS — I48.0 PAF (PAROXYSMAL ATRIAL FIBRILLATION) (H): ICD-10-CM

## 2020-07-09 ENCOUNTER — VIRTUAL VISIT (OUTPATIENT)
Dept: FAMILY MEDICINE | Facility: CLINIC | Age: 80
End: 2020-07-09
Payer: COMMERCIAL

## 2020-07-09 VITALS — BODY MASS INDEX: 33.35 KG/M2 | HEIGHT: 63 IN

## 2020-07-09 DIAGNOSIS — H54.7 VISION PROBLEM: ICD-10-CM

## 2020-07-09 DIAGNOSIS — Z00.00 ENCOUNTER FOR MEDICARE ANNUAL WELLNESS EXAM: Primary | ICD-10-CM

## 2020-07-09 DIAGNOSIS — I48.0 PAF (PAROXYSMAL ATRIAL FIBRILLATION) (H): ICD-10-CM

## 2020-07-09 DIAGNOSIS — E78.5 HYPERLIPIDEMIA LDL GOAL <70: ICD-10-CM

## 2020-07-09 DIAGNOSIS — M81.0 AGE RELATED OSTEOPOROSIS, UNSPECIFIED PATHOLOGICAL FRACTURE PRESENCE: ICD-10-CM

## 2020-07-09 DIAGNOSIS — I10 HYPERTENSION GOAL BP (BLOOD PRESSURE) < 140/90: ICD-10-CM

## 2020-07-09 DIAGNOSIS — E87.6 HYPOKALEMIA: ICD-10-CM

## 2020-07-09 DIAGNOSIS — I50.32 CHRONIC DIASTOLIC CHF (CONGESTIVE HEART FAILURE) (H): ICD-10-CM

## 2020-07-09 DIAGNOSIS — I49.5 SSS (SICK SINUS SYNDROME) (H): ICD-10-CM

## 2020-07-09 DIAGNOSIS — N18.30 CKD (CHRONIC KIDNEY DISEASE) STAGE 3, GFR 30-59 ML/MIN (H): ICD-10-CM

## 2020-07-09 DIAGNOSIS — Z95.0 S/P CARDIAC PACEMAKER PROCEDURE: ICD-10-CM

## 2020-07-09 DIAGNOSIS — I25.10 CORONARY ARTERY CALCIFICATION SEEN ON CT SCAN: ICD-10-CM

## 2020-07-09 PROCEDURE — 99214 OFFICE O/P EST MOD 30 MIN: CPT | Mod: 95 | Performed by: FAMILY MEDICINE

## 2020-07-09 RX ORDER — ATORVASTATIN CALCIUM 40 MG/1
40 TABLET, FILM COATED ORAL DAILY
Qty: 90 TABLET | Refills: 3 | Status: SHIPPED | OUTPATIENT
Start: 2020-07-09 | End: 2021-07-15

## 2020-07-09 RX ORDER — LISINOPRIL 40 MG/1
40 TABLET ORAL DAILY
Qty: 90 TABLET | Refills: 3 | Status: SHIPPED | OUTPATIENT
Start: 2020-07-09 | End: 2021-08-09

## 2020-07-09 RX ORDER — POTASSIUM CHLORIDE 750 MG/1
10 TABLET, EXTENDED RELEASE ORAL DAILY
Qty: 90 TABLET | Refills: 3 | Status: SHIPPED | OUTPATIENT
Start: 2020-07-09 | End: 2021-07-15

## 2020-07-09 RX ORDER — AMLODIPINE BESYLATE 10 MG/1
10 TABLET ORAL DAILY
Qty: 90 TABLET | Refills: 3 | Status: SHIPPED | OUTPATIENT
Start: 2020-07-09 | End: 2021-07-15

## 2020-07-09 RX ORDER — FUROSEMIDE 20 MG
20 TABLET ORAL DAILY
Qty: 90 TABLET | Refills: 3 | Status: SHIPPED | OUTPATIENT
Start: 2020-07-09 | End: 2021-09-05

## 2020-07-09 NOTE — PROGRESS NOTES
"Hamida Verma is a 79 year old female who is being evaluated via a billable telephone visit.      The patient has been notified of following:     \"This telephone visit will be conducted via a call between you and your physician/provider. We have found that certain health care needs can be provided without the need for a physical exam.  This service lets us provide the care you need with a short phone conversation.  If a prescription is necessary we can send it directly to your pharmacy.  If lab work is needed we can place an order for that and you can then stop by our lab to have the test done at a later time.    Telephone visits are billed at different rates depending on your insurance coverage. During this emergency period, for some insurers they may be billed the same as an in-person visit.  Please reach out to your insurance provider with any questions.    If during the course of the call the physician/provider feels a telephone visit is not appropriate, you will not be charged for this service.\"    Patient has given verbal consent for Telephone visit?  Yes    What phone number would you like to be contacted at? [phone]    How would you like to obtain your AVS? Mail a copy    Subjective     Hamida Verma is a 79 year old female who presents via phone visit today for the following health issues:    HPI  Annual Wellness Visit    Are you in the first 12 months of your Medicare Part B coverage?  No    Physical Health:    In general, how would you rate your overall physical health? good    Outside of work, how many days during the week do you exercise?6-7 days/week    Outside of work, approximately how many minutes a day do you exercise?30-45 minutes    If you drink alcohol do you typically have >3 drinks per day or >7 drinks per week? Not Applicable    Do you usually eat at least 4 servings of fruit and vegetables a day, include whole grains & fiber and avoid regularly eating high fat or \"junk\" foods? " NO    Do you have any problems taking medications regularly? No    Do you have any side effects from medications? none    Needs assistance for the following daily activities: no assistance needed    Which of the following safety concerns are present in your home?  none identified     Hearing impairment: Yes, uses hearing aids    In the past 6 months, have you been bothered by leaking of urine? no    Mental Health:    In general, how would you rate your overall mental or emotional health? good  PHQ-2 Score:      Do you feel safe in your environment? Yes    Have you ever done Advance Care Planning? (For example, a Health Directive, POLST, or a discussion with a medical provider or your loved ones about your wishes)? No, advance care planning information given to patient to review.  Advanced care planning was discussed at today's visit.    Fall risk:  Fallen 2 or more times in the past year?: No  Any fall with injury in the past year?: No    Cognitive Screening: Unable to complete due to virtual visit; need for additional assessment in future face-to-face visit    Do you have sleep apnea, excessive snoring or daytime drowsiness?: no    Current providers sharing in care for this patient include:    Patient Care Team:  Mikala Philip MD as PCP - General (Family Practice)  Mikala Philip MD as Assigned PCP    Sometimes life gets kind of dull during the coronavirus pandemic. Has a stack of books that she wanted to read and has been doing a lot of reading. She is getting some physical activity too. Her granddaughter and daughter live with her. Her granddaughter was an EMT in the [employer].     She did see ophtho in May and will be due in August. She will schedule       Patient Active Problem List   Diagnosis     Symptomatic menopausal or female climacteric states     Hyperlipidemia LDL goal <70     Hypertension goal BP (blood pressure) < 140/90     Knee pain     Obesity     PAF (paroxysmal atrial fibrillation) (H)      S/P cardiac pacemaker procedure     SSS (sick sinus syndrome) (H)     Chronic diastolic CHF (congestive heart failure) (H)     Pulmonary hypertension (H)     Coronary artery calcification seen on CT scan     Hypotension     Diabetes mellitus, type 2 (H)     CKD (chronic kidney disease) stage 3, GFR 30-59 ml/min (H)     Chronic obstructive pulmonary disease, unspecified COPD type (H)     Past Surgical History:   Procedure Laterality Date     EP ABLATION AV NODE N/A 2019    Procedure: EP Ablation AV Node;  Surgeon: Roe Voss MD;  Location:  [facility]     EYE SURGERY       HYSTERECTOMY, VAGINAL      hysterectomy       Social History     Tobacco Use     Smoking status: Former Smoker     Packs/day: 1.50     Years: 45.00     Pack years: 67.50     Types: Cigarettes     Start date: 10/28/1955     Last attempt to quit: 2000     Years since quittin.8     Smokeless tobacco: Never Used     Tobacco comment: quit 6 yrs ago   Substance Use Topics     Alcohol use: No     Family History   Problem Relation Age of Onset     Cerebrovascular Disease Mother      Glaucoma Mother      Macular Degeneration Mother      Alcohol/Drug Father         alcohol     Myocardial Infarction Father 63        passed away from MI     Family History Negative Sister      Family History Negative Sister      Family History Negative Sister      Cerebrovascular Disease Sister      Family History Negative Brother      Family History Negative Maternal Grandmother      Family History Negative Maternal Grandfather      Family History Negative Paternal Grandmother      Family History Negative Paternal Grandfather      Myocardial Infarction Son 57        passed away from MI     Dementia Daughter 57        early onset on namenda     Diabetes No family hx of      Breast Cancer No family hx of      Cancer - colorectal No family hx of          Current Outpatient Medications   Medication Sig Dispense Refill     alendronate 70 MG PO  tablet Take 1 tablet (70 mg) by mouth every 7 days Take 60 minutes before am meal with 8 oz. water. Remain upright for 30 minutes. 12 tablet 3     amLODIPine (NORVASC) 10 MG tablet Take 1 tablet (10 mg) by mouth daily 90 tablet 3     atorvastatin (LIPITOR) 40 MG tablet Take 1 tablet (40 mg) by mouth daily 90 tablet 3     cholecalciferol (VITAMIN  -D) 1000 UNIT capsule Take 1 capsule by mouth daily.       Coral Calcium 133-66.7-133 MG-MG-UNIT CAPS Take 2 tablets by mouth 2 times daily        FLAX SEED OIL OR 1 capsule daily       furosemide (LASIX) 20 MG tablet Take 1 tablet (20 mg) by mouth daily 90 tablet 3     Krill Oil 500 MG CAPS Take 1 capsule by mouth daily       lisinopril (ZESTRIL) 40 MG tablet Take 1 tablet (40 mg) by mouth daily 90 tablet 3     metoprolol succinate ER (TOPROL-XL) 50 MG 24 hr tablet Take 1 tablet (50 mg) by mouth 2 times daily 180 tablet 0     Milk Thistle 200 MG CAPS Take 1 capsule by mouth daily        Multiple Vitamins-Minerals (HAIR SKIN NAILS PO) Take 1 tablet by mouth At Bedtime       potassium chloride ER (KLOR-CON M) 10 MEQ CR tablet Take 1 tablet (10 mEq) by mouth daily 90 tablet 3     rivaroxaban ANTICOAGULANT (XARELTO ANTICOAGULANT) 20 MG TABS tablet Take 1 tablet (20 mg) by mouth daily (with dinner) 90 tablet 0     Allergies   Allergen Reactions     Strawberries [Strawberry] Anaphylaxis     Strawberry Flavor      Recent Labs   Lab Test 12/18/19  0730 06/17/19  0758  05/01/19  0839 03/28/19  0905  01/16/19  1135  01/08/19  0814 01/06/19  0527 01/05/19  1520  07/11/18  0907  05/02/18  0826  03/23/17  0804   A1C  --   --   --   --  5.1  --   --   --  6.9*  --   --   --   --   --   --   --   --    LDL  --   --   --   --   --   --  70  --   --   --   --   --  84  --   --   --  91   HDL  --   --   --   --   --   --  45*  --   --   --   --   --  69  --   --   --  61   TRIG  --   --   --   --   --   --  133  --   --   --   --   --  88  --   --   --  109   ALT  --   --   --   --   --    "--   --   --   --  55* 61*  --   --   --  25   < > 24   CR 1.11 0.92   < > 1.12 0.96   < > 0.96   < > 0.80 0.99 1.35*   < > 0.81   < > 0.81   < > 0.71   GFRESTIMATED 47* 59*   < > 47* 56*   < > 56*   < > 70 54* 37*   < > 69   < > 69   < > 79   GFRESTBLACK 57* 69   < > 57* 65   < > 68   < > 81 63 43*   < > 83   < > 83   < > >90   GFR Calc     POTASSIUM 3.9 4.3   < > 3.8 4.3   < > 3.3*   < > 4.2 4.5 4.7   < > 4.2   < > 4.1   < > 3.7   TSH 0.88  --   --  1.11 0.81  --   --   --   --   --   --    < >  --   --   --    < >  --     < > = values in this interval not displayed.      BP Readings from Last 3 Encounters:   02/20/20 118/68   12/18/19 (!) 152/78   10/08/19 132/78    Wt Readings from Last 3 Encounters:   02/20/20 85.4 kg (188 lb 4 oz)   12/18/19 85.7 kg (188 lb 14.4 oz)   10/08/19 83.8 kg (184 lb 12 oz)                    Reviewed and updated as needed this visit by Provider         Review of Systems   Constitutional, HEENT, cardiovascular, pulmonary, gi and gu systems are negative, except as otherwise noted.       Objective   Reported vitals:  Ht 1.6 m (5' 3\")   LMP  (LMP Unknown)   BMI 33.35 kg/m     healthy, alert and no distress  PSYCH: Alert and oriented times 3; coherent speech, normal   rate and volume, able to articulate logical thoughts, able   to abstract reason, no tangential thoughts, no hallucinations   or delusions  Her affect is normal  RESP: No cough, no audible wheezing, able to talk in full sentences  Remainder of exam unable to be completed due to telephone visits    Diagnostic Test Results:  Labs reviewed in Epic        Assessment/Plan:     1.  Wellness visit  Doing well   Recommended shingrix vaccine.     2. Hypertension   Improved control. Will check BP when she comes for labs.  Continues lisinopril 40 mg daily, metoprolol 50 mg twice daily, amlodipine 10 mg daily, furosemide 20 mg daily.  Amlodipine was added at the time of her last cardiology visit in December. She " continues on potassium 10meq daily as she is on lasix. CMP and urine albumin ordered     3. Chronic diastolic CHF (congestive heart failure) (H)  Followed by cardiology and stable.  [facility].  Continues to monitor her weight and salt intake.  Doing well.     4. Hyperlipidemia LDL goal <70  Continue atorvastatin 40 mg daily     5. PAF (paroxysmal atrial fibrillation) (H)  6. SSS (sick sinus syndrome) (H)   S/P cardiac pacemaker procedure  Followed by cardiology.  Continues apixaban 5 mg twice daily.  She is due for an EP visit      7. Osteoporosis  Continues alendronate. Last DEXA was in 2017. I recommended scheduling DEXA when clinic reopens  Continues calcium and vitamin d supplementation.      8.  CKD III  Stable. Not on NSAID medication.   Continues on ACEI   CMP and urine albumin to be scheduled    9. Macular degeneration  She is due for a visit with ophthalmology     Return in about 6 months (around 1/9/2021) for Follow up visit.      Phone call duration:  17 minutes    Mikala Philip MD

## 2020-07-09 NOTE — PATIENT INSTRUCTIONS
Preventive Health Recommendations    See your health care provider every year to    Review health changes.     Discuss preventive care.      Review your medicines if your doctor has prescribed any.    You no longer need a yearly Pap test unless you've had an abnormal Pap test in the past 10 years. If you have vaginal symptoms, such as bleeding or discharge, be sure to talk with your provider about a Pap test.    Every 1 to 2 years, have a mammogram.  If you are over 69, talk with your health care provider about whether or not you want to continue having screening mammograms.    Every 10 years, have a colonoscopy. Or, have a yearly FIT test (stool test). These exams will check for colon cancer.     Have a cholesterol test every 5 years, or more often if your doctor advises it.     Have a diabetes test (fasting glucose) every three years. If you are at risk for diabetes, you should have this test more often.     At age 65, have a bone density scan (DEXA) to check for osteoporosis (brittle bone disease).    Shots:    Get a flu shot each year.    Get a tetanus shot every 10 years.    Talk to your doctor about your pneumonia vaccines. There are now two you should receive - Pneumovax (PPSV 23) and Prevnar (PCV 13).    Talk to your pharmacist about the shingles vaccine.    Talk to your doctor about the hepatitis B vaccine.    Nutrition:     Eat at least 5 servings of fruits and vegetables each day.    Eat whole-grain bread, whole-wheat pasta and brown rice instead of white grains and rice.    Get adequate Calcium and Vitamin D.     Lifestyle    Exercise at least 150 minutes a week (30 minutes a day, 5 days a week). This will help you control your weight and prevent disease.    Limit alcohol to one drink per day.    No smoking.     Wear sunscreen to prevent skin cancer.     See your dentist twice a year for an exam and cleaning.    See your eye doctor every 1 to 2 years to screen for conditions such as glaucoma, macular  degeneration and cataracts.    Personalized Prevention Plan  You are due for the preventive services outlined below.  Your care team is available to assist you in scheduling these services.  If you have already completed any of these items, please share that information with your care team to update in your medical record.  Health Maintenance Due   Topic Date Due     Diabetic Foot Exam  1940     COPD Action Plan  1940     Eye Exam  1940     Zoster (Shingles) Vaccine (2 of 3) 05/14/2013     PHQ-2  01/01/2020     Liver Monitoring Lab  01/06/2020     Comprehensive Metabolic Panel  01/06/2020     Cholesterol Lab  01/16/2020     Annual Wellness Visit  03/28/2020     Complete Blood Count  05/07/2020     Kidney Microalbumin Urine Test  06/04/2020     FALL RISK ASSESSMENT  06/04/2020     Basic Metabolic Panel  06/18/2020

## 2020-07-15 ENCOUNTER — ALLIED HEALTH/NURSE VISIT (OUTPATIENT)
Dept: NURSING | Facility: CLINIC | Age: 80
End: 2020-07-15
Payer: COMMERCIAL

## 2020-07-15 VITALS — SYSTOLIC BLOOD PRESSURE: 128 MMHG | DIASTOLIC BLOOD PRESSURE: 78 MMHG | HEART RATE: 72 BPM

## 2020-07-15 DIAGNOSIS — Z01.30 BP CHECK: Primary | ICD-10-CM

## 2020-07-15 DIAGNOSIS — I10 HYPERTENSION GOAL BP (BLOOD PRESSURE) < 140/90: ICD-10-CM

## 2020-07-15 DIAGNOSIS — N18.30 CKD (CHRONIC KIDNEY DISEASE) STAGE 3, GFR 30-59 ML/MIN (H): ICD-10-CM

## 2020-07-15 DIAGNOSIS — E78.5 HYPERLIPIDEMIA LDL GOAL <70: ICD-10-CM

## 2020-07-15 DIAGNOSIS — I48.0 PAF (PAROXYSMAL ATRIAL FIBRILLATION) (H): ICD-10-CM

## 2020-07-15 LAB
ALBUMIN SERPL-MCNC: 3.4 G/DL (ref 3.4–5)
ALP SERPL-CCNC: 57 U/L (ref 40–150)
ALT SERPL W P-5'-P-CCNC: 29 U/L (ref 0–50)
ANION GAP SERPL CALCULATED.3IONS-SCNC: 6 MMOL/L (ref 3–14)
AST SERPL W P-5'-P-CCNC: 19 U/L (ref 0–45)
BILIRUB SERPL-MCNC: 0.4 MG/DL (ref 0.2–1.3)
BUN SERPL-MCNC: 24 MG/DL (ref 7–30)
CALCIUM SERPL-MCNC: 9.5 MG/DL (ref 8.5–10.1)
CHLORIDE SERPL-SCNC: 105 MMOL/L (ref 94–109)
CHOLEST SERPL-MCNC: 248 MG/DL
CO2 SERPL-SCNC: 26 MMOL/L (ref 20–32)
CREAT SERPL-MCNC: 1.11 MG/DL (ref 0.52–1.04)
CREAT UR-MCNC: 61 MG/DL
ERYTHROCYTE [DISTWIDTH] IN BLOOD BY AUTOMATED COUNT: 13.4 % (ref 10–15)
GFR SERPL CREATININE-BSD FRML MDRD: 47 ML/MIN/{1.73_M2}
GLUCOSE SERPL-MCNC: 92 MG/DL (ref 70–99)
HCT VFR BLD AUTO: 39.6 % (ref 35–47)
HDLC SERPL-MCNC: 60 MG/DL
HGB BLD-MCNC: 12.9 G/DL (ref 11.7–15.7)
LDLC SERPL CALC-MCNC: 165 MG/DL
MCH RBC QN AUTO: 28.7 PG (ref 26.5–33)
MCHC RBC AUTO-ENTMCNC: 32.6 G/DL (ref 31.5–36.5)
MCV RBC AUTO: 88 FL (ref 78–100)
MICROALBUMIN UR-MCNC: 38 MG/L
MICROALBUMIN/CREAT UR: 62.05 MG/G CR (ref 0–25)
NONHDLC SERPL-MCNC: 188 MG/DL
PLATELET # BLD AUTO: 401 10E9/L (ref 150–450)
POTASSIUM SERPL-SCNC: 4.1 MMOL/L (ref 3.4–5.3)
PROT SERPL-MCNC: 7.3 G/DL (ref 6.8–8.8)
RBC # BLD AUTO: 4.49 10E12/L (ref 3.8–5.2)
SODIUM SERPL-SCNC: 137 MMOL/L (ref 133–144)
TRIGL SERPL-MCNC: 115 MG/DL
WBC # BLD AUTO: 8.6 10E9/L (ref 4–11)

## 2020-07-15 PROCEDURE — 85027 COMPLETE CBC AUTOMATED: CPT | Performed by: FAMILY MEDICINE

## 2020-07-15 PROCEDURE — 82043 UR ALBUMIN QUANTITATIVE: CPT | Performed by: FAMILY MEDICINE

## 2020-07-15 PROCEDURE — 80061 LIPID PANEL: CPT | Performed by: FAMILY MEDICINE

## 2020-07-15 PROCEDURE — 36415 COLL VENOUS BLD VENIPUNCTURE: CPT | Performed by: FAMILY MEDICINE

## 2020-07-15 PROCEDURE — 99207 ZZC NO CHARGE NURSE ONLY: CPT

## 2020-07-15 PROCEDURE — 80053 COMPREHEN METABOLIC PANEL: CPT | Performed by: FAMILY MEDICINE

## 2020-07-15 NOTE — PROGRESS NOTES
Hamida Verma is a 80 year old patient who comes in today for a Blood Pressure check.  Initial BP:  LMP  (LMP Unknown)      Data Unavailable  Disposition: follow-up as previously indicated by provider    Jefferson Marshall MA

## 2020-08-04 DIAGNOSIS — I48.91 ATRIAL FIBRILLATION WITH RAPID VENTRICULAR RESPONSE (H): ICD-10-CM

## 2020-08-04 RX ORDER — METOPROLOL SUCCINATE 50 MG/1
50 TABLET, EXTENDED RELEASE ORAL 2 TIMES DAILY
Qty: 180 TABLET | Refills: 0 | Status: SHIPPED | OUTPATIENT
Start: 2020-08-04 | End: 2020-11-02

## 2020-08-18 DIAGNOSIS — I48.0 PAF (PAROXYSMAL ATRIAL FIBRILLATION) (H): ICD-10-CM

## 2020-09-22 ENCOUNTER — VIRTUAL VISIT (OUTPATIENT)
Dept: CARDIOLOGY | Facility: CLINIC | Age: 80
End: 2020-09-22
Payer: COMMERCIAL

## 2020-09-22 DIAGNOSIS — I50.32 CHRONIC DIASTOLIC CHF (CONGESTIVE HEART FAILURE) (H): ICD-10-CM

## 2020-09-22 DIAGNOSIS — I25.10 CORONARY ARTERY DISEASE INVOLVING NATIVE CORONARY ARTERY OF NATIVE HEART WITHOUT ANGINA PECTORIS: ICD-10-CM

## 2020-09-22 DIAGNOSIS — I10 HYPERTENSION GOAL BP (BLOOD PRESSURE) < 140/90: ICD-10-CM

## 2020-09-22 DIAGNOSIS — E78.5 HYPERLIPIDEMIA LDL GOAL <70: ICD-10-CM

## 2020-09-22 DIAGNOSIS — I50.32 CHRONIC DIASTOLIC CONGESTIVE HEART FAILURE (H): ICD-10-CM

## 2020-09-22 DIAGNOSIS — I48.0 PAF (PAROXYSMAL ATRIAL FIBRILLATION) (H): Primary | ICD-10-CM

## 2020-09-22 PROCEDURE — 99213 OFFICE O/P EST LOW 20 MIN: CPT | Mod: 95 | Performed by: INTERNAL MEDICINE

## 2020-09-22 NOTE — PROGRESS NOTES
Service Date: 09/22/2020      TELEPHONE VISIT      HISTORY OF PRESENT ILLNESS:  is my pleasure to see your patient, Hamida Verma, in followup.  As you know, this is a patient with a history of paroxysmal atrial fibrillation who failed medical therapy and then had an AV node ablation performed.  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  The patient also has a history of diastolic congestive heart failure, which has been well controlled with furosemide 20 mg per day.  She does have a history of essential hypertension.  Her most recent blood pressure on 07/15 was excellent at 128/78.      The patient does have coronary artery disease based upon CT scanning.      Interrogation of her pacemaker shows that she has mode switching, indicating that she is having episodes of atrial fibrillation which is to be expected.  The patient is anticoagulated with Xarelto and essentially her pacemaker is her rate-controlling device since the AV node ablation prevents the increased atrial rates from conducting to the ventricles.     Her lipid profile on 07/15 was not good with an LDL of 165, HDL of 60 and triglycerides of 150 with a total cholesterol of 248.  On further questioning, it appears that her pharmacy was trying to get through to one of our PAs to get this renewed and for some reason that information did not get through, but I believe that you have signed the prescription now.  I told the patient in future if she has difficulty with our with the medications that we are prescribing to just call us and we will rectify it immediately for her.      IMPRESSION:   1.  Paroxysmal atrial fibrillation.  The patient is doing very well since the AV node ablation.  The patient has a permanent pacemaker in situ for preexisting conduction system disease.  The patient does have some mode switching, which is to be expected.   2.  Essential hypertension.  Her blood pressure is well  controlled.   3.  Normally functioning permanent pacemaker with 10.8 years left on the battery.   4.  LDL cholesterol markedly raised due to the fact that she could not get her prescription renewed.   5.  Mild renal insufficiency.   6.  Diastolic congestive heart failure.  This is well controlled with metoprolol, lisinopril and furosemide 20 mg per day.   7.  Chronically anticoagulated with rivaroxaban without bleeding issues.      PLAN:  We will see the patient back again in 1 year's time.  I will repeat her lipid profile with liver function tests and her basic metabolic profile at that stage.        As always, she has been told to contact us if she has any questions or concerns.  It has been my pleasure to be involved in the care of this very nice patient.      This was a 15-minute telephone call due to the COVID-19 pandemic.      cc:      Mikala Philip MD    [facility]   [address] [place] [address]         NOY COLUNGA MD, [facility]             D: 2020   T: 2020   MT: INDIRA      Name:     ARTURO BOSWELL   MRN:      [id number]        Account:      [id number]   :      1940           Service Date: 2020      Document: [id number]

## 2020-09-22 NOTE — PROGRESS NOTES
"Hamida Verma is a 80 year old female who is being evaluated via a billable telephone visit.      The patient has been notified of following:     \"This telephone visit will be conducted via a call between you and your physician/provider. We have found that certain health care needs can be provided without the need for a physical exam.  This service lets us provide the care you need with a short phone conversation.  If a prescription is necessary we can send it directly to your pharmacy.  If lab work is needed we can place an order for that and you can then stop by our lab to have the test done at a later time.    Telephone visits are billed at different rates depending on your insurance coverage. During this emergency period, for some insurers they may be billed the same as an in-person visit.  Please reach out to your insurance provider with any questions.    If during the course of the call the physician/provider feels a telephone visit is not appropriate, you will not be charged for this service.\"    Patient has given verbal consent for Telephone visit?  Yes    What phone number would you like to be contacted at? [phone]    How would you like to obtain your AVS? Mail a copy    Vitals - Patient Reported  Systolic (Patient Reported): 124  Diastolic (Patient Reported): 72  Weight (Patient Reported): 85.3 kg (188 lb)  Height (Patient Reported): 160 cm (5' 3\")  BMI (Based on Pt Reported Ht/Wt): 33.3      Review Of Systems:  Skin: NEGATIVE  Eyes:Ears/Nose/Throat: NEGATIVE  Respiratory: NEGATIVE  Cardiovascular:NEGATIVE  Gastrointestinal: NEGATIVE  Genitourinary:NEGATIVE  Musculoskeletal: NEGATIVE  Neurologic: NEGATIVE  Psychiatric: NEGATIVE  Hematologic/Lymphatic/Immunologic: NEGATIVE  Endocrine:  Diabetes    ELSA Alvarenga    Phone call duration: 20 minutes    Roby Mallory MD, [facility] FRCPI    "

## 2020-09-22 NOTE — LETTER
"9/22/2020    Mikala Philip MD, MD  [address]  [facility] [address]    RE: Hamida Verma       Dear Colleague,    I had the pleasure of seeing Hamida Verma in the [facility].    Hamida Verma is a 80 year old female who is being evaluated via a billable telephone visit.      The patient has been notified of following:     \"This telephone visit will be conducted via a call between you and your physician/provider. We have found that certain health care needs can be provided without the need for a physical exam.  This service lets us provide the care you need with a short phone conversation.  If a prescription is necessary we can send it directly to your pharmacy.  If lab work is needed we can place an order for that and you can then stop by our lab to have the test done at a later time.    Telephone visits are billed at different rates depending on your insurance coverage. During this emergency period, for some insurers they may be billed the same as an in-person visit.  Please reach out to your insurance provider with any questions.    If during the course of the call the physician/provider feels a telephone visit is not appropriate, you will not be charged for this service.\"    Patient has given verbal consent for Telephone visit?  Yes    What phone number would you like to be contacted at? [phone]    How would you like to obtain your AVS? Mail a copy    Vitals - Patient Reported  Systolic (Patient Reported): 124  Diastolic (Patient Reported): 72  Weight (Patient Reported): 85.3 kg (188 lb)  Height (Patient Reported): 160 cm (5' 3\")  BMI (Based on Pt Reported Ht/Wt): 33.3      Review Of Systems:  Skin: NEGATIVE  Eyes:Ears/Nose/Throat: NEGATIVE  Respiratory: NEGATIVE  Cardiovascular:NEGATIVE  Gastrointestinal: NEGATIVE  Genitourinary:NEGATIVE  Musculoskeletal: NEGATIVE  Neurologic: NEGATIVE  Psychiatric: NEGATIVE  Hematologic/Lymphatic/Immunologic: " NEGATIVE  Endocrine:  Diabetes    Colette, RMGURVINDER    Phone call duration: 20 minutes    Roby Mallory MD, [facility] FRCPI      Service Date: 09/22/2020      TELEPHONE VISIT      HISTORY OF PRESENT ILLNESS:  is my pleasure to see your patient, Hamida Verma, in followup.  As you know, this is a patient with a history of paroxysmal atrial fibrillation who failed medical therapy and then had an AV node ablation performed.  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  The patient also has a history of diastolic congestive heart failure, which has been well controlled with furosemide 20 mg per day.  She does have a history of essential hypertension.  Her most recent blood pressure on 07/15 was excellent at 128/78.      The patient does have coronary artery disease based upon CT scanning.      Interrogation of her pacemaker shows that she has mode switching, indicating that she is having episodes of atrial fibrillation which is to be expected.  The patient is anticoagulated with Xarelto and essentially her pacemaker is her rate-controlling device since the AV node ablation prevents the increased atrial rates from conducting to the ventricles.     Her lipid profile on 07/15 was not good with an LDL of 165, HDL of 60 and triglycerides of 150 with a total cholesterol of 248.  On further questioning, it appears that her pharmacy was trying to get through to one of our PAs to get this renewed and for some reason that information did not get through, but I believe that you have signed the prescription now.  I told the patient in future if she has difficulty with our with the medications that we are prescribing to just call us and we will rectify it immediately for her.      IMPRESSION:   1.  Paroxysmal atrial fibrillation.  The patient is doing very well since the AV node ablation.  The patient has a permanent pacemaker in situ for preexisting conduction system disease.   The patient does have some mode switching, which is to be expected.   2.  Essential hypertension.  Her blood pressure is well controlled.   3.  Normally functioning permanent pacemaker with 10.8 years left on the battery.   4.  LDL cholesterol markedly raised due to the fact that she could not get her prescription renewed.   5.  Mild renal insufficiency.   6.  Diastolic congestive heart failure.  This is well controlled with metoprolol, lisinopril and furosemide 20 mg per day.   7.  Chronically anticoagulated with rivaroxaban without bleeding issues.      PLAN:  We will see the patient back again in 1 year's time.  I will repeat her lipid profile with liver function tests and her basic metabolic profile at that stage.        As always, she has been told to contact us if she has any questions or concerns.  It has been my pleasure to be involved in the care of this very nice patient.      This was a 15-minute telephone call due to the COVID-19 pandemic.      cc:      Mikala Philip MD    [facility]   [address] [place] [id number]         ROBY COLUNGA MD, [facility]          D: 2020   T: 2020   MT: INDIRA      Name:     ARTURO BOSWELL   MRN:      [id number]        Account:      [id number]   :      1940           Service Date: 2020      Document: [id number]        Thank you for allowing me to participate in the care of your patient.    Sincerely,     Roby Mallory MD, MD     [facility]

## 2020-10-12 ENCOUNTER — ALLIED HEALTH/NURSE VISIT (OUTPATIENT)
Dept: NURSING | Facility: CLINIC | Age: 80
End: 2020-10-12
Payer: COMMERCIAL

## 2020-10-12 DIAGNOSIS — Z23 NEED FOR PROPHYLACTIC VACCINATION AND INOCULATION AGAINST INFLUENZA: Primary | ICD-10-CM

## 2020-10-12 PROCEDURE — 90662 IIV NO PRSV INCREASED AG IM: CPT

## 2020-10-12 PROCEDURE — G0008 ADMIN INFLUENZA VIRUS VAC: HCPCS

## 2020-10-12 NOTE — NURSING NOTE

## 2020-11-02 DIAGNOSIS — I48.91 ATRIAL FIBRILLATION WITH RAPID VENTRICULAR RESPONSE (H): ICD-10-CM

## 2020-11-02 RX ORDER — METOPROLOL SUCCINATE 50 MG/1
50 TABLET, EXTENDED RELEASE ORAL 2 TIMES DAILY
Qty: 180 TABLET | Refills: 3 | Status: SHIPPED | OUTPATIENT
Start: 2020-11-02 | End: 2021-11-05

## 2020-11-17 DIAGNOSIS — I48.0 PAF (PAROXYSMAL ATRIAL FIBRILLATION) (H): ICD-10-CM

## 2020-11-18 DIAGNOSIS — I48.0 PAF (PAROXYSMAL ATRIAL FIBRILLATION) (H): ICD-10-CM

## 2021-04-13 ENCOUNTER — ANCILLARY PROCEDURE (OUTPATIENT)
Dept: CARDIOLOGY | Facility: CLINIC | Age: 81
End: 2021-04-13
Attending: INTERNAL MEDICINE
Payer: COMMERCIAL

## 2021-04-13 DIAGNOSIS — I49.5 SSS (SICK SINUS SYNDROME) (H): Primary | ICD-10-CM

## 2021-04-13 DIAGNOSIS — Z95.0 CARDIAC PACEMAKER IN SITU: ICD-10-CM

## 2021-04-13 PROCEDURE — 93280 PM DEVICE PROGR EVAL DUAL: CPT | Performed by: INTERNAL MEDICINE

## 2021-04-14 LAB
MDC_IDC_LEAD_IMPLANT_DT: NORMAL
MDC_IDC_LEAD_IMPLANT_DT: NORMAL
MDC_IDC_LEAD_LOCATION: NORMAL
MDC_IDC_LEAD_LOCATION: NORMAL
MDC_IDC_LEAD_LOCATION_DETAIL_1: NORMAL
MDC_IDC_LEAD_LOCATION_DETAIL_1: NORMAL
MDC_IDC_LEAD_MFG: NORMAL
MDC_IDC_LEAD_MFG: NORMAL
MDC_IDC_LEAD_MODEL: NORMAL
MDC_IDC_LEAD_MODEL: NORMAL
MDC_IDC_LEAD_SERIAL: NORMAL
MDC_IDC_LEAD_SERIAL: NORMAL
MDC_IDC_MSMT_BATTERY_REMAINING_LONGEVITY: 128 MO
MDC_IDC_MSMT_BATTERY_STATUS: NORMAL
MDC_IDC_MSMT_BATTERY_VOLTAGE: 2.99 V
MDC_IDC_MSMT_LEADCHNL_RA_IMPEDANCE_VALUE: 600 OHM
MDC_IDC_MSMT_LEADCHNL_RA_PACING_THRESHOLD_AMPLITUDE: 0.5 V
MDC_IDC_MSMT_LEADCHNL_RA_PACING_THRESHOLD_AMPLITUDE: 0.5 V
MDC_IDC_MSMT_LEADCHNL_RA_PACING_THRESHOLD_PULSEWIDTH: 0.4 MS
MDC_IDC_MSMT_LEADCHNL_RA_PACING_THRESHOLD_PULSEWIDTH: 0.4 MS
MDC_IDC_MSMT_LEADCHNL_RA_SENSING_INTR_AMPL: 4.8 MV
MDC_IDC_MSMT_LEADCHNL_RV_IMPEDANCE_VALUE: 587.5 OHM
MDC_IDC_MSMT_LEADCHNL_RV_PACING_THRESHOLD_AMPLITUDE: 0.62 V
MDC_IDC_MSMT_LEADCHNL_RV_PACING_THRESHOLD_PULSEWIDTH: 0.4 MS
MDC_IDC_MSMT_LEADCHNL_RV_SENSING_INTR_AMPL: 8.3 MV
MDC_IDC_PG_IMPLANT_DTM: NORMAL
MDC_IDC_PG_MFG: NORMAL
MDC_IDC_PG_MODEL: NORMAL
MDC_IDC_PG_SERIAL: NORMAL
MDC_IDC_PG_TYPE: NORMAL
MDC_IDC_SESS_CLINIC_NAME: NORMAL
MDC_IDC_SESS_DTM: NORMAL
MDC_IDC_SESS_TYPE: NORMAL
MDC_IDC_SET_BRADY_AT_MODE_SWITCH_MODE: NORMAL
MDC_IDC_SET_BRADY_AT_MODE_SWITCH_RATE: 170 {BEATS}/MIN
MDC_IDC_SET_BRADY_HYSTRATE: NORMAL
MDC_IDC_SET_BRADY_LOWRATE: 60 {BEATS}/MIN
MDC_IDC_SET_BRADY_MAX_SENSOR_RATE: 120 {BEATS}/MIN
MDC_IDC_SET_BRADY_MAX_TRACKING_RATE: 120 {BEATS}/MIN
MDC_IDC_SET_BRADY_MODE: NORMAL
MDC_IDC_SET_BRADY_NIGHT_RATE: NORMAL
MDC_IDC_SET_BRADY_PAV_DELAY_LOW: 250 MS
MDC_IDC_SET_BRADY_SAV_DELAY_LOW: 225 MS
MDC_IDC_SET_LEADCHNL_RA_PACING_AMPLITUDE: 2 V
MDC_IDC_SET_LEADCHNL_RA_PACING_CAPTURE_MODE: NORMAL
MDC_IDC_SET_LEADCHNL_RA_PACING_POLARITY: NORMAL
MDC_IDC_SET_LEADCHNL_RA_PACING_PULSEWIDTH: 0.4 MS
MDC_IDC_SET_LEADCHNL_RA_SENSING_ADAPTATION_MODE: NORMAL
MDC_IDC_SET_LEADCHNL_RA_SENSING_POLARITY: NORMAL
MDC_IDC_SET_LEADCHNL_RA_SENSING_SENSITIVITY: 0.3 MV
MDC_IDC_SET_LEADCHNL_RV_PACING_AMPLITUDE: 0.88
MDC_IDC_SET_LEADCHNL_RV_PACING_CAPTURE_MODE: NORMAL
MDC_IDC_SET_LEADCHNL_RV_PACING_POLARITY: NORMAL
MDC_IDC_SET_LEADCHNL_RV_PACING_PULSEWIDTH: 0.4 MS
MDC_IDC_SET_LEADCHNL_RV_SENSING_POLARITY: NORMAL
MDC_IDC_SET_LEADCHNL_RV_SENSING_SENSITIVITY: 0.5 MV
MDC_IDC_STAT_AT_MODE_SW_COUNT: 19
MDC_IDC_STAT_BRADY_RA_PERCENT_PACED: 39 %
MDC_IDC_STAT_BRADY_RV_PERCENT_PACED: 99.89 %

## 2021-07-12 DIAGNOSIS — I25.10 CORONARY ARTERY CALCIFICATION SEEN ON CT SCAN: ICD-10-CM

## 2021-07-12 DIAGNOSIS — I50.32 CHRONIC DIASTOLIC CHF (CONGESTIVE HEART FAILURE) (H): ICD-10-CM

## 2021-07-12 DIAGNOSIS — E87.6 HYPOKALEMIA: ICD-10-CM

## 2021-07-15 RX ORDER — AMLODIPINE BESYLATE 10 MG/1
TABLET ORAL
Qty: 90 TABLET | Refills: 0 | Status: SHIPPED | OUTPATIENT
Start: 2021-07-15 | End: 2021-09-13

## 2021-07-15 RX ORDER — ATORVASTATIN CALCIUM 40 MG/1
TABLET, FILM COATED ORAL
Qty: 90 TABLET | Refills: 0 | Status: SHIPPED | OUTPATIENT
Start: 2021-07-15 | End: 2021-09-13

## 2021-07-15 RX ORDER — POTASSIUM CHLORIDE 750 MG/1
TABLET, EXTENDED RELEASE ORAL
Qty: 90 TABLET | Refills: 0 | Status: SHIPPED | OUTPATIENT
Start: 2021-07-15 | End: 2021-10-18

## 2021-07-15 NOTE — TELEPHONE ENCOUNTER
Routing refill request to provider for review/approval because:  --Labs out of range: Scr.  --Due for annual visit.         ,  --Please contact patient and ask to schedule an annual visit/physical.    --A refill request for medication was sent to the provider.            --Last visit:  7/9/2020 annual wellness.    --Future Visit: NONE      Creatinine   Date Value Ref Range Status   07/15/2020 1.11 (H) 0.52 - 1.04 mg/dL Final   12/18/2019 1.11 0.70 - 1.30 mg/dL Final   06/17/2019 0.92 0.52 - 1.04 mg/dL Final   06/05/2019 1.09 0.70 - 1.30 mg/dL Final   05/07/2019 1.13 (H) 0.52 - 1.04 mg/dL Final       ---Two Prescription approved per [facility] Refill Protocol.       Lucina Ricardo RN BSN     [facility]

## 2021-07-25 ENCOUNTER — ANCILLARY PROCEDURE (OUTPATIENT)
Dept: CARDIOLOGY | Facility: CLINIC | Age: 81
End: 2021-07-25
Attending: INTERNAL MEDICINE
Payer: COMMERCIAL

## 2021-07-25 DIAGNOSIS — Z95.0 CARDIAC PACEMAKER IN SITU: ICD-10-CM

## 2021-07-25 DIAGNOSIS — I49.5 SSS (SICK SINUS SYNDROME) (H): ICD-10-CM

## 2021-07-25 PROCEDURE — 93296 REM INTERROG EVL PM/IDS: CPT | Performed by: INTERNAL MEDICINE

## 2021-07-25 PROCEDURE — 93294 REM INTERROG EVL PM/LDLS PM: CPT | Performed by: INTERNAL MEDICINE

## 2021-08-03 LAB
MDC_IDC_LEAD_IMPLANT_DT: NORMAL
MDC_IDC_LEAD_IMPLANT_DT: NORMAL
MDC_IDC_LEAD_LOCATION: NORMAL
MDC_IDC_LEAD_LOCATION: NORMAL
MDC_IDC_LEAD_LOCATION_DETAIL_1: NORMAL
MDC_IDC_LEAD_LOCATION_DETAIL_1: NORMAL
MDC_IDC_LEAD_MFG: NORMAL
MDC_IDC_LEAD_MFG: NORMAL
MDC_IDC_LEAD_MODEL: NORMAL
MDC_IDC_LEAD_MODEL: NORMAL
MDC_IDC_LEAD_POLARITY_TYPE: NORMAL
MDC_IDC_LEAD_POLARITY_TYPE: NORMAL
MDC_IDC_LEAD_SERIAL: NORMAL
MDC_IDC_LEAD_SERIAL: NORMAL
MDC_IDC_MSMT_BATTERY_DTM: NORMAL
MDC_IDC_MSMT_BATTERY_REMAINING_LONGEVITY: 132 MO
MDC_IDC_MSMT_BATTERY_REMAINING_PERCENTAGE: 95.5 %
MDC_IDC_MSMT_BATTERY_RRT_TRIGGER: NORMAL
MDC_IDC_MSMT_BATTERY_STATUS: NORMAL
MDC_IDC_MSMT_BATTERY_VOLTAGE: 2.99 V
MDC_IDC_MSMT_LEADCHNL_RA_IMPEDANCE_VALUE: 590 OHM
MDC_IDC_MSMT_LEADCHNL_RA_LEAD_CHANNEL_STATUS: NORMAL
MDC_IDC_MSMT_LEADCHNL_RA_PACING_THRESHOLD_AMPLITUDE: 0.5 V
MDC_IDC_MSMT_LEADCHNL_RA_PACING_THRESHOLD_PULSEWIDTH: 0.4 MS
MDC_IDC_MSMT_LEADCHNL_RA_SENSING_INTR_AMPL: 3.3 MV
MDC_IDC_MSMT_LEADCHNL_RV_IMPEDANCE_VALUE: 590 OHM
MDC_IDC_MSMT_LEADCHNL_RV_LEAD_CHANNEL_STATUS: NORMAL
MDC_IDC_MSMT_LEADCHNL_RV_PACING_THRESHOLD_AMPLITUDE: 0.5 V
MDC_IDC_MSMT_LEADCHNL_RV_PACING_THRESHOLD_PULSEWIDTH: 0.4 MS
MDC_IDC_MSMT_LEADCHNL_RV_SENSING_INTR_AMPL: 8.3 MV
MDC_IDC_PG_IMPLANT_DTM: NORMAL
MDC_IDC_PG_MFG: NORMAL
MDC_IDC_PG_MODEL: NORMAL
MDC_IDC_PG_SERIAL: NORMAL
MDC_IDC_PG_TYPE: NORMAL
MDC_IDC_SESS_CLINIC_NAME: NORMAL
MDC_IDC_SESS_DTM: NORMAL
MDC_IDC_SESS_REPROGRAMMED: NO
MDC_IDC_SESS_TYPE: NORMAL
MDC_IDC_SET_BRADY_AT_MODE_SWITCH_MODE: NORMAL
MDC_IDC_SET_BRADY_AT_MODE_SWITCH_RATE: 170 {BEATS}/MIN
MDC_IDC_SET_BRADY_LOWRATE: 60 {BEATS}/MIN
MDC_IDC_SET_BRADY_MAX_SENSOR_RATE: 120 {BEATS}/MIN
MDC_IDC_SET_BRADY_MAX_TRACKING_RATE: 120 {BEATS}/MIN
MDC_IDC_SET_BRADY_MODE: NORMAL
MDC_IDC_SET_BRADY_PAV_DELAY_LOW: 250 MS
MDC_IDC_SET_BRADY_SAV_DELAY_LOW: 225 MS
MDC_IDC_SET_LEADCHNL_RA_PACING_AMPLITUDE: 2 V
MDC_IDC_SET_LEADCHNL_RA_PACING_ANODE_ELECTRODE_1: NORMAL
MDC_IDC_SET_LEADCHNL_RA_PACING_ANODE_LOCATION_1: NORMAL
MDC_IDC_SET_LEADCHNL_RA_PACING_CAPTURE_MODE: NORMAL
MDC_IDC_SET_LEADCHNL_RA_PACING_CATHODE_ELECTRODE_1: NORMAL
MDC_IDC_SET_LEADCHNL_RA_PACING_CATHODE_LOCATION_1: NORMAL
MDC_IDC_SET_LEADCHNL_RA_PACING_POLARITY: NORMAL
MDC_IDC_SET_LEADCHNL_RA_PACING_PULSEWIDTH: 0.4 MS
MDC_IDC_SET_LEADCHNL_RA_SENSING_ADAPTATION_MODE: NORMAL
MDC_IDC_SET_LEADCHNL_RA_SENSING_ANODE_ELECTRODE_1: NORMAL
MDC_IDC_SET_LEADCHNL_RA_SENSING_ANODE_LOCATION_1: NORMAL
MDC_IDC_SET_LEADCHNL_RA_SENSING_CATHODE_ELECTRODE_1: NORMAL
MDC_IDC_SET_LEADCHNL_RA_SENSING_CATHODE_LOCATION_1: NORMAL
MDC_IDC_SET_LEADCHNL_RA_SENSING_POLARITY: NORMAL
MDC_IDC_SET_LEADCHNL_RA_SENSING_SENSITIVITY: 0.3 MV
MDC_IDC_SET_LEADCHNL_RV_PACING_AMPLITUDE: 0.75 V
MDC_IDC_SET_LEADCHNL_RV_PACING_ANODE_ELECTRODE_1: NORMAL
MDC_IDC_SET_LEADCHNL_RV_PACING_ANODE_LOCATION_1: NORMAL
MDC_IDC_SET_LEADCHNL_RV_PACING_CAPTURE_MODE: NORMAL
MDC_IDC_SET_LEADCHNL_RV_PACING_CATHODE_ELECTRODE_1: NORMAL
MDC_IDC_SET_LEADCHNL_RV_PACING_CATHODE_LOCATION_1: NORMAL
MDC_IDC_SET_LEADCHNL_RV_PACING_POLARITY: NORMAL
MDC_IDC_SET_LEADCHNL_RV_PACING_PULSEWIDTH: 0.4 MS
MDC_IDC_SET_LEADCHNL_RV_SENSING_ADAPTATION_MODE: NORMAL
MDC_IDC_SET_LEADCHNL_RV_SENSING_ANODE_ELECTRODE_1: NORMAL
MDC_IDC_SET_LEADCHNL_RV_SENSING_ANODE_LOCATION_1: NORMAL
MDC_IDC_SET_LEADCHNL_RV_SENSING_CATHODE_ELECTRODE_1: NORMAL
MDC_IDC_SET_LEADCHNL_RV_SENSING_CATHODE_LOCATION_1: NORMAL
MDC_IDC_SET_LEADCHNL_RV_SENSING_POLARITY: NORMAL
MDC_IDC_SET_LEADCHNL_RV_SENSING_SENSITIVITY: 0.5 MV
MDC_IDC_STAT_AT_BURDEN_PERCENT: 0 %
MDC_IDC_STAT_AT_DTM_END: NORMAL
MDC_IDC_STAT_AT_DTM_START: NORMAL
MDC_IDC_STAT_AT_MODE_SW_COUNT: 0
MDC_IDC_STAT_AT_MODE_SW_COUNT_PER_DAY: 0
MDC_IDC_STAT_AT_MODE_SW_PERCENT_TIME: 0 %
MDC_IDC_STAT_BRADY_AP_VP_PERCENT: 49 %
MDC_IDC_STAT_BRADY_AP_VS_PERCENT: 1 %
MDC_IDC_STAT_BRADY_AS_VP_PERCENT: 51 %
MDC_IDC_STAT_BRADY_AS_VS_PERCENT: 1 %
MDC_IDC_STAT_BRADY_DTM_END: NORMAL
MDC_IDC_STAT_BRADY_DTM_START: NORMAL
MDC_IDC_STAT_BRADY_RA_PERCENT_PACED: 49 %
MDC_IDC_STAT_BRADY_RV_PERCENT_PACED: 99 %
MDC_IDC_STAT_CRT_DTM_END: NORMAL
MDC_IDC_STAT_CRT_DTM_START: NORMAL
MDC_IDC_STAT_HEART_RATE_ATRIAL_MAX: 310 {BEATS}/MIN
MDC_IDC_STAT_HEART_RATE_ATRIAL_MEAN: 68 {BEATS}/MIN
MDC_IDC_STAT_HEART_RATE_ATRIAL_MIN: 50 {BEATS}/MIN
MDC_IDC_STAT_HEART_RATE_DTM_END: NORMAL
MDC_IDC_STAT_HEART_RATE_DTM_START: NORMAL
MDC_IDC_STAT_HEART_RATE_VENTRICULAR_MAX: 210 {BEATS}/MIN
MDC_IDC_STAT_HEART_RATE_VENTRICULAR_MEAN: 68 {BEATS}/MIN
MDC_IDC_STAT_HEART_RATE_VENTRICULAR_MIN: 40 {BEATS}/MIN

## 2021-08-04 DIAGNOSIS — I10 HYPERTENSION GOAL BP (BLOOD PRESSURE) < 140/90: ICD-10-CM

## 2021-08-09 RX ORDER — LISINOPRIL 40 MG/1
TABLET ORAL
Qty: 90 TABLET | Refills: 0 | Status: SHIPPED | OUTPATIENT
Start: 2021-08-09 | End: 2021-09-13

## 2021-08-09 NOTE — TELEPHONE ENCOUNTER
Dr. Philip-Please sign if agree. Creatinine elevated.    Team Coordinators-Please contact patient to schedule annual physical.    Thank you!  DANE Blank, RN  [facility]      Creatinine   Date Value Ref Range Status   07/15/2020 1.11 (H) 0.52 - 1.04 mg/dL Final

## 2021-08-09 NOTE — TELEPHONE ENCOUNTER
Scheduled pt   Prescription approved per [facility] Refill Protocol.  Marina COELLO RN

## 2021-08-17 ENCOUNTER — TRANSFERRED RECORDS (OUTPATIENT)
Dept: HEALTH INFORMATION MANAGEMENT | Facility: CLINIC | Age: 81
End: 2021-08-17

## 2021-09-01 DIAGNOSIS — I50.32 CHRONIC DIASTOLIC CHF (CONGESTIVE HEART FAILURE) (H): ICD-10-CM

## 2021-09-05 RX ORDER — FUROSEMIDE 20 MG
TABLET ORAL
Qty: 90 TABLET | Refills: 0 | Status: SHIPPED | OUTPATIENT
Start: 2021-09-05 | End: 2021-12-01

## 2021-09-13 NOTE — PATIENT INSTRUCTIONS
I recommend getting the new shingles vaccine, Shingrix.  You can call your insurance company to find out if this vaccine is covered.  You may also ask our pharmacy to check if your insurance covers Shingrix if given at the pharmacy.    Schedule a bone density test, preferably at the [facility].             Patient Education   Personalized Prevention Plan  You are due for the preventive services outlined below.  Your care team is available to assist you in scheduling these services.  If you have already completed any of these items, please share that information with your care team to update in your medical record.  Health Maintenance Due   Topic Date Due     Diabetic Foot Exam  Never done     ANNUAL REVIEW OF HM ORDERS  Never done     COPD Action Plan  Never done     Eye Exam  Never done     Zoster (Shingles) Vaccine (2 of 3) 05/14/2013     PHQ-2  01/01/2021     Basic Metabolic Panel  01/15/2021     Annual Wellness Visit  07/09/2021     FALL RISK ASSESSMENT  07/09/2021     Liver Monitoring Lab  07/15/2021     Comprehensive Metabolic Panel  07/15/2021     Cholesterol Lab  07/15/2021     Kidney Microalbumin Urine Test  07/15/2021     Complete Blood Count  07/15/2021     Flu Vaccine (1) 09/01/2021

## 2021-09-14 ENCOUNTER — OFFICE VISIT (OUTPATIENT)
Dept: FAMILY MEDICINE | Facility: CLINIC | Age: 81
End: 2021-09-14
Payer: COMMERCIAL

## 2021-09-14 VITALS
BODY MASS INDEX: 32.78 KG/M2 | OXYGEN SATURATION: 98 % | SYSTOLIC BLOOD PRESSURE: 120 MMHG | TEMPERATURE: 96.4 F | RESPIRATION RATE: 16 BRPM | HEIGHT: 63 IN | WEIGHT: 185 LBS | HEART RATE: 62 BPM | DIASTOLIC BLOOD PRESSURE: 74 MMHG

## 2021-09-14 DIAGNOSIS — N18.30 STAGE 3 CHRONIC KIDNEY DISEASE, UNSPECIFIED WHETHER STAGE 3A OR 3B CKD (H): ICD-10-CM

## 2021-09-14 DIAGNOSIS — I25.10 CORONARY ARTERY CALCIFICATION SEEN ON CT SCAN: ICD-10-CM

## 2021-09-14 DIAGNOSIS — I49.5 SSS (SICK SINUS SYNDROME) (H): ICD-10-CM

## 2021-09-14 DIAGNOSIS — M81.0 AGE RELATED OSTEOPOROSIS, UNSPECIFIED PATHOLOGICAL FRACTURE PRESENCE: ICD-10-CM

## 2021-09-14 DIAGNOSIS — I50.32 CHRONIC DIASTOLIC CHF (CONGESTIVE HEART FAILURE) (H): ICD-10-CM

## 2021-09-14 DIAGNOSIS — Z00.00 ENCOUNTER FOR MEDICARE ANNUAL WELLNESS EXAM: Primary | ICD-10-CM

## 2021-09-14 DIAGNOSIS — Z95.0 S/P CARDIAC PACEMAKER PROCEDURE: ICD-10-CM

## 2021-09-14 DIAGNOSIS — I48.0 PAF (PAROXYSMAL ATRIAL FIBRILLATION) (H): ICD-10-CM

## 2021-09-14 DIAGNOSIS — I10 HYPERTENSION GOAL BP (BLOOD PRESSURE) < 140/90: ICD-10-CM

## 2021-09-14 LAB
ERYTHROCYTE [DISTWIDTH] IN BLOOD BY AUTOMATED COUNT: 13 % (ref 10–15)
HCT VFR BLD AUTO: 42.1 % (ref 35–47)
HGB BLD-MCNC: 13.4 G/DL (ref 11.7–15.7)
MCH RBC QN AUTO: 28.2 PG (ref 26.5–33)
MCHC RBC AUTO-ENTMCNC: 31.8 G/DL (ref 31.5–36.5)
MCV RBC AUTO: 89 FL (ref 78–100)
PLATELET # BLD AUTO: 356 10E3/UL (ref 150–450)
RBC # BLD AUTO: 4.75 10E6/UL (ref 3.8–5.2)
WBC # BLD AUTO: 8.2 10E3/UL (ref 4–11)

## 2021-09-14 PROCEDURE — G0008 ADMIN INFLUENZA VIRUS VAC: HCPCS | Performed by: FAMILY MEDICINE

## 2021-09-14 PROCEDURE — 82043 UR ALBUMIN QUANTITATIVE: CPT | Performed by: FAMILY MEDICINE

## 2021-09-14 PROCEDURE — 36415 COLL VENOUS BLD VENIPUNCTURE: CPT | Performed by: FAMILY MEDICINE

## 2021-09-14 PROCEDURE — 90662 IIV NO PRSV INCREASED AG IM: CPT | Performed by: FAMILY MEDICINE

## 2021-09-14 PROCEDURE — 99397 PER PM REEVAL EST PAT 65+ YR: CPT | Mod: 25 | Performed by: FAMILY MEDICINE

## 2021-09-14 PROCEDURE — 99214 OFFICE O/P EST MOD 30 MIN: CPT | Mod: 25 | Performed by: FAMILY MEDICINE

## 2021-09-14 PROCEDURE — 80053 COMPREHEN METABOLIC PANEL: CPT | Performed by: FAMILY MEDICINE

## 2021-09-14 PROCEDURE — 85027 COMPLETE CBC AUTOMATED: CPT | Performed by: FAMILY MEDICINE

## 2021-09-14 RX ORDER — AMLODIPINE BESYLATE 10 MG/1
10 TABLET ORAL DAILY
Qty: 90 TABLET | Refills: 3 | Status: SHIPPED | OUTPATIENT
Start: 2021-09-14 | End: 2022-09-12

## 2021-09-14 RX ORDER — LISINOPRIL 40 MG/1
40 TABLET ORAL DAILY
Qty: 90 TABLET | Refills: 3 | Status: SHIPPED | OUTPATIENT
Start: 2021-09-14 | End: 2022-08-10

## 2021-09-14 RX ORDER — ATORVASTATIN CALCIUM 40 MG/1
40 TABLET, FILM COATED ORAL DAILY
Qty: 90 TABLET | Refills: 3 | Status: SHIPPED | OUTPATIENT
Start: 2021-09-14 | End: 2022-10-19

## 2021-09-14 RX ORDER — ALENDRONATE SODIUM 70 MG/1
70 TABLET ORAL
Qty: 12 TABLET | Refills: 3 | Status: SHIPPED | OUTPATIENT
Start: 2021-09-14 | End: 2023-05-30

## 2021-09-14 ASSESSMENT — MIFFLIN-ST. JEOR: SCORE: 1268.28

## 2021-09-14 NOTE — LETTER
September 16, 2021      Hamida Verma  [address]  [facility] [address]        Dear ,    We are writing to inform you of your test results.    Your lab results have returned and show a mild increase in the protein leakage in your urine, which comes from chronic kidney disease. Your blood test for kidney function was stable. No changes are needed at this time. The rest of your results were also stable.       Resulted Orders   Comprehensive metabolic panel (BMP + Alb, Alk Phos, ALT, AST, Total. Bili, TP)   Result Value Ref Range    Sodium 139 133 - 144 mmol/L    Potassium 4.3 3.4 - 5.3 mmol/L    Chloride 109 94 - 109 mmol/L    Carbon Dioxide (CO2) 27 20 - 32 mmol/L    Anion Gap 3 3 - 14 mmol/L    Urea Nitrogen 18 7 - 30 mg/dL    Creatinine 1.01 0.52 - 1.04 mg/dL    Calcium 9.1 8.5 - 10.1 mg/dL    Glucose 106 (H) 70 - 99 mg/dL    Alkaline Phosphatase 57 40 - 150 U/L    AST 18 0 - 45 U/L    ALT 28 0 - 50 U/L    Protein Total 7.2 6.8 - 8.8 g/dL    Albumin 3.5 3.4 - 5.0 g/dL    Bilirubin Total 0.4 0.2 - 1.3 mg/dL    GFR Estimate 52 (L) >60 mL/min/1.73m2      Comment:      As of July 11, 2021, eGFR is calculated by the CKD-EPI creatinine equation, without race adjustment. eGFR can be influenced by muscle mass, exercise, and diet. The reported eGFR is an estimation only and is only applicable if the renal function is stable.   Albumin Random Urine Quantitative with Creat Ratio   Result Value Ref Range    Creatinine Urine mg/dL 148 mg/dL    Albumin Urine mg/L 212 mg/L    Albumin Urine mg/g Cr 143.24 (H) 0.00 - 25.00 mg/g Cr   CBC with platelets   Result Value Ref Range    WBC Count 8.2 4.0 - 11.0 10e3/uL    RBC Count 4.75 3.80 - 5.20 10e6/uL    Hemoglobin 13.4 11.7 - 15.7 g/dL    Hematocrit 42.1 35.0 - 47.0 %    MCV 89 78 - 100 fL    MCH 28.2 26.5 - 33.0 pg    MCHC 31.8 31.5 - 36.5 g/dL    RDW 13.0 10.0 - 15.0 %    Platelet Count 356 150 - 450 10e3/uL       If you have any questions or concerns, please  call the clinic at the number listed above.       Sincerely,      Mikala Philip MD

## 2021-09-14 NOTE — PROGRESS NOTES
"  SUBJECTIVE:   Hamida Verma is a 81 year old female who presents for Preventive Visit.      Are you in the first 12 months of your Medicare Part B coverage?  No    Physical Health:    In general, how would you rate your overall physical health? good    Outside of work, how many days during the week do you exercise? 4-5 days/week    Outside of work, approximately how many minutes a day do you exercise?30-45 minutes    If you drink alcohol do you typically have >3 drinks per day or >7 drinks per week? No    Do you usually eat at least 4 servings of fruit and vegetables a day, include whole grains & fiber and avoid regularly eating high fat or \"junk\" foods? Yes    Do you have any problems taking medications regularly?  No    Do you have any side effects from medications? none    Needs assistance for the following daily activities: no assistance needed    Which of the following safety concerns are present in your home?  none identified     Hearing impairment: Yes, Difficulty following a conversation in a noisy restaurant or crowded room.    In the past 6 months, have you been bothered by leaking of urine? no    Mental Health:    In general, how would you rate your overall mental or emotional health? good  PHQ-2 Score:      Do you feel safe in your environment? Yes    Have you ever done Advance Care Planning? (For example, a Health Directive, POLST, or a discussion with a medical provider or your loved ones about your wishes): No, advance care planning information given to patient to review.  Patient plans to discuss their wishes with loved ones or provider.      Additional concerns to address?  No    Fall risk:  Fallen 2 or more times in the past year?: No  Any fall with injury in the past year?: No  click delete button to remove this line now  Cognitive Screenin) Repeat 3 items (Leader, Season, Table)    2) Clock draw: NORMAL  3) 3 item recall: Recalls 3 objects  Results: NORMAL clock, 1-2 items recalled: " COGNITIVE IMPAIRMENT LESS LIKELY    Mini-CogTM Copyright NOEMI An. Licensed by the author for use in [facility]; reprinted with permission (bony@.[facility]). All rights reserved.         Daughter has dementia x 10 years, just found out it is Lewy body dementia.         Reviewed and updated as needed this visit by clinical staff  Tobacco  Allergies  Meds   Med Hx  Surg Hx  Fam Hx  Soc Hx        Reviewed and updated as needed this visit by Provider                Social History     Tobacco Use     Smoking status: Former Smoker     Packs/day: 1.50     Years: 45.00     Pack years: 67.50     Types: Cigarettes     Start date: 10/28/1955     Quit date: 2000     Years since quittin.0     Smokeless tobacco: Never Used     Tobacco comment: quit 6 yrs ago   Substance Use Topics     Alcohol use: No                           Current providers sharing in care for this patient include:   Patient Care Team:  Mikala Philip MD as PCP - General (Family Practice)  Roby Mallory MD as Assigned Heart and Vascular Provider  Mikala Philip MD as Assigned PCP    The following health maintenance items are reviewed in Epic and correct as of today:  Health Maintenance   Topic Date Due     DIABETIC FOOT EXAM  Never done     COPD ACTION PLAN  Never done     EYE EXAM  Never done     ZOSTER IMMUNIZATION (2 of 3) 2013     BMP  01/15/2021     FALL RISK ASSESSMENT  2021     ALT  07/15/2021     CMP  07/15/2021     LIPID  07/15/2021     MICROALBUMIN  07/15/2021     CBC  07/15/2021     INFLUENZA VACCINE (1) 2021     HF ACTION PLAN  2021     MEDICARE ANNUAL WELLNESS VISIT  2022     ANNUAL REVIEW OF HM ORDERS  2022     DTAP/TDAP/TD IMMUNIZATION (3 - Td or Tdap) 2025     ADVANCE CARE PLANNING  2026     DEXA  2035     TSH W/FREE T4 REFLEX  Completed     SPIROMETRY  Completed     PHQ-2  Completed     Pneumococcal Vaccine: 65+ Years  Completed     COVID-19  "Vaccine  Completed     IPV IMMUNIZATION  Aged Out     MENINGITIS IMMUNIZATION  Aged Out     A1C  Discontinued         ROS:  Constitutional, HEENT, cardiovascular, pulmonary, gi and gu systems are negative, except as otherwise noted.    OBJECTIVE:   /74 (BP Location: Left arm, Patient Position: Sitting, Cuff Size: Adult Large)   Pulse 62   Temp (!) 96.4  F (35.8  C) (Temporal)   Resp 16   Ht 1.6 m (5' 3\")   Wt 83.9 kg (185 lb)   LMP  (LMP Unknown)   SpO2 98%   BMI 32.77 kg/m   Estimated body mass index is 32.77 kg/m  as calculated from the following:    Height as of this encounter: 1.6 m (5' 3\").    Weight as of this encounter: 83.9 kg (185 lb).  EXAM:   GENERAL APPEARANCE: healthy, alert and no distress  EYES: Eyes grossly normal to inspection, PERRL and conjunctivae and sclerae normal  HENT: ear canals and TM's normal, nose and mouth without ulcers or lesions, oropharynx clear and oral mucous membranes moist  NECK: no adenopathy, no asymmetry, masses, or scars and thyroid normal to palpation  RESP: lungs clear to auscultation - no rales, rhonchi or wheezes  CV: regular rate and rhythm, normal S1 S2, no S3 or S4, no murmur, click or rub, no peripheral edema and peripheral pulses strong  ABDOMEN: soft, nontender, no hepatosplenomegaly, no masses and bowel sounds normal  MS: no musculoskeletal defects are noted and gait is age appropriate without ataxia  SKIN: no suspicious lesions or rashes  NEURO: Normal strength and tone, sensory exam grossly normal, mentation intact and speech normal  PSYCH: mentation appears normal and affect normal/bright    Diagnostic Test Results:  Labs reviewed in Epic    ASSESSMENT / PLAN:       ICD-10-CM    1. Encounter for Medicare annual wellness exam  Z00.00    2. Hypertension goal BP (blood pressure) < 140/90  I10 Comprehensive metabolic panel (BMP + Alb, Alk Phos, ALT, AST, Total. Bili, TP)     Albumin Random Urine Quantitative with Creat Ratio     lisinopril (ZESTRIL) 40 " MG tablet   3. Coronary artery calcification seen on CT scan  I25.10 atorvastatin (LIPITOR) 40 MG tablet   4. Chronic diastolic CHF (congestive heart failure) (H)  I50.32 amLODIPine (NORVASC) 10 MG tablet   5. SSS (sick sinus syndrome) (H)  I49.5    6. PAF (paroxysmal atrial fibrillation) (H)  I48.0 CBC with platelets   7. S/P cardiac pacemaker procedure  Z95.0    8. Stage 3 chronic kidney disease, unspecified whether stage 3a or 3b CKD  N18.30    9. Age related osteoporosis, unspecified pathological fracture presence  M81.0 DX Hip/Pelvis/Spine     alendronate (FOSAMAX) 70 MG tablet      Wellness visit  Doing well   Recommended shingrix vaccine and flu shot.   She has received both doses of the Moderna COVID-19 vaccine.        Hypertension  controlled  Continues lisinopril 40 mg daily, metoprolol 50 mg twice daily, amlodipine 10 mg daily, furosemide 20 mg daily.  She continues on potassium 10meq daily as she is on lasix. CMP and urine albumin ordered      Chronic diastolic CHF (congestive heart failure) (H)  Followed by cardiology and artem.  Graduated from core clinic.  Continues to monitor her weight and salt intake.  Doing well.      Hyperlipidemia LDL goal <70  Continue atorvastatin 40 mg daily      PAF (paroxysmal atrial fibrillation) (H)   SSS (sick sinus syndrome) (H)   S/P cardiac pacemaker procedure  Followed by cardiology.  Continues apixaban 5 mg twice daily. Last visit with cardiology 9/22/2020 (note reviewed today) and is due for follow up.          Osteoporosis  Continues alendronate. Last DEXA was in 2017. I recommend scheduling repeat DEXA  Continues calcium and vitamin d supplementation.       CKD III  Stable. Not taking NSAID medication.   Continues on ACEI   CMP and urine albumin today      Macular degeneration  Follows with ophthalmology      Return in about 6 months  for Follow up visit.       COUNSELING:  Reviewed preventive health counseling, as reflected in patient instructions    Estimated  "body mass index is 32.77 kg/m  as calculated from the following:    Height as of this encounter: 1.6 m (5' 3\").    Weight as of this encounter: 83.9 kg (185 lb).        She reports that she quit smoking about 21 years ago. Her smoking use included cigarettes. She started smoking about 65 years ago. She has a 67.50 pack-year smoking history. She has never used smokeless tobacco.    Appropriate preventive services were discussed with this patient, including applicable screening as appropriate for cardiovascular disease, diabetes, osteopenia/osteoporosis, and glaucoma.  As appropriate for age/gender, discussed screening for colorectal cancer, prostate cancer, breast cancer, and cervical cancer. Checklist reviewing preventive services available has been given to the patient.    Reviewed patients plan of care and provided an AVS. The Intermediate Care Plan ( asthma action plan, low back pain action plan, and migraine action plan) for Hamida meets the Care Plan requirement. This Care Plan has been established and reviewed with the Patient.    Counseling Resources:  ATP IV Guidelines  Pooled Cohorts Equation Calculator  Breast Cancer Risk Calculator  BRCA-Related Cancer Risk Assessment: FHS-7 Tool  FRAX Risk Assessment  ICSI Preventive Guidelines  Dietary Guidelines for Americans, 2010  [employer]'s MyPlate  ASA Prophylaxis  Lung CA Screening    Mikala Philip MD  [facility]  "

## 2021-09-15 LAB
ALBUMIN SERPL-MCNC: 3.5 G/DL (ref 3.4–5)
ALP SERPL-CCNC: 57 U/L (ref 40–150)
ALT SERPL W P-5'-P-CCNC: 28 U/L (ref 0–50)
ANION GAP SERPL CALCULATED.3IONS-SCNC: 3 MMOL/L (ref 3–14)
AST SERPL W P-5'-P-CCNC: 18 U/L (ref 0–45)
BILIRUB SERPL-MCNC: 0.4 MG/DL (ref 0.2–1.3)
BUN SERPL-MCNC: 18 MG/DL (ref 7–30)
CALCIUM SERPL-MCNC: 9.1 MG/DL (ref 8.5–10.1)
CHLORIDE BLD-SCNC: 109 MMOL/L (ref 94–109)
CO2 SERPL-SCNC: 27 MMOL/L (ref 20–32)
CREAT SERPL-MCNC: 1.01 MG/DL (ref 0.52–1.04)
CREAT UR-MCNC: 148 MG/DL
GFR SERPL CREATININE-BSD FRML MDRD: 52 ML/MIN/1.73M2
GLUCOSE BLD-MCNC: 106 MG/DL (ref 70–99)
MICROALBUMIN UR-MCNC: 212 MG/L
MICROALBUMIN/CREAT UR: 143.24 MG/G CR (ref 0–25)
POTASSIUM BLD-SCNC: 4.3 MMOL/L (ref 3.4–5.3)
PROT SERPL-MCNC: 7.2 G/DL (ref 6.8–8.8)
SODIUM SERPL-SCNC: 139 MMOL/L (ref 133–144)

## 2021-10-07 ENCOUNTER — TRANSFERRED RECORDS (OUTPATIENT)
Dept: HEALTH INFORMATION MANAGEMENT | Facility: CLINIC | Age: 81
End: 2021-10-07

## 2021-10-11 ENCOUNTER — HOSPITAL ENCOUNTER (OUTPATIENT)
Dept: BONE DENSITY | Facility: CLINIC | Age: 81
Discharge: HOME | End: 2021-10-11
Attending: FAMILY MEDICINE | Admitting: FAMILY MEDICINE
Payer: COMMERCIAL

## 2021-10-11 DIAGNOSIS — M81.0 AGE RELATED OSTEOPOROSIS, UNSPECIFIED PATHOLOGICAL FRACTURE PRESENCE: ICD-10-CM

## 2021-10-11 PROCEDURE — 77085 DXA BONE DENSITY AXL VRT FX: CPT

## 2021-10-12 DIAGNOSIS — E78.5 HYPERLIPIDEMIA LDL GOAL <70: Primary | ICD-10-CM

## 2021-10-12 DIAGNOSIS — I25.10 CORONARY ARTERY DISEASE INVOLVING NATIVE CORONARY ARTERY OF NATIVE HEART WITHOUT ANGINA PECTORIS: ICD-10-CM

## 2021-10-18 DIAGNOSIS — E87.6 HYPOKALEMIA: ICD-10-CM

## 2021-10-19 RX ORDER — POTASSIUM CHLORIDE 750 MG/1
10 TABLET, EXTENDED RELEASE ORAL DAILY
Qty: 90 TABLET | Refills: 0 | Status: SHIPPED | OUTPATIENT
Start: 2021-10-19 | End: 2022-01-11

## 2021-10-19 NOTE — TELEPHONE ENCOUNTER
"Requested Prescriptions   Pending Prescriptions Disp Refills     potassium chloride ER (KLOR-CON M) 10 MEQ CR tablet 90 tablet 0     Sig: Take 1 tablet (10 mEq) by mouth daily       Potassium Supplements Protocol Passed - 10/18/2021 11:53 AM        Passed - Recent (12 mo) or future (30 days) visit within the authorizing provider's department     Patient has had an office visit with the authorizing provider or a provider within the authorizing providers department within the previous 12 mos or has a future within next 30 days. See \"Patient Info\" tab in inbasket, or \"Choose Columns\" in Meds & Orders section of the refill encounter.              Passed - Medication is active on med list        Passed - Patient is age 18 or older        Passed - Normal serum potassium in past 12 months     Recent Labs   Lab Test 09/14/21  1000   POTASSIUM 4.3                       Signed Prescriptions:                        Disp   Refills    potassium chloride ER (KLOR-CON M) 10 MEQ *90 tab*0        Sig: Take 1 tablet (10 mEq) by mouth daily  Authorizing Provider: ALPHONSE BEAN  Ordering User: MEMO AVILES      "

## 2021-10-26 ENCOUNTER — LAB (OUTPATIENT)
Dept: LAB | Facility: CLINIC | Age: 81
End: 2021-10-26
Payer: COMMERCIAL

## 2021-10-26 DIAGNOSIS — E78.5 HYPERLIPIDEMIA LDL GOAL <70: ICD-10-CM

## 2021-10-26 DIAGNOSIS — I25.10 CORONARY ARTERY DISEASE INVOLVING NATIVE CORONARY ARTERY OF NATIVE HEART WITHOUT ANGINA PECTORIS: ICD-10-CM

## 2021-10-26 LAB
ALT SERPL W P-5'-P-CCNC: 34 U/L (ref 0–50)
ANION GAP SERPL CALCULATED.3IONS-SCNC: 7 MMOL/L (ref 3–14)
BUN SERPL-MCNC: 18 MG/DL (ref 7–30)
CALCIUM SERPL-MCNC: 9.5 MG/DL (ref 8.5–10.1)
CHLORIDE BLD-SCNC: 106 MMOL/L (ref 94–109)
CHOLEST SERPL-MCNC: 154 MG/DL
CO2 SERPL-SCNC: 26 MMOL/L (ref 20–32)
CREAT SERPL-MCNC: 1.05 MG/DL (ref 0.52–1.04)
FASTING STATUS PATIENT QL REPORTED: YES
GFR SERPL CREATININE-BSD FRML MDRD: 50 ML/MIN/1.73M2
GLUCOSE BLD-MCNC: 101 MG/DL (ref 70–99)
HDLC SERPL-MCNC: 69 MG/DL
LDLC SERPL CALC-MCNC: 70 MG/DL
NONHDLC SERPL-MCNC: 85 MG/DL
POTASSIUM BLD-SCNC: 3.9 MMOL/L (ref 3.4–5.3)
SODIUM SERPL-SCNC: 139 MMOL/L (ref 133–144)
TRIGL SERPL-MCNC: 73 MG/DL

## 2021-10-26 PROCEDURE — 84460 ALANINE AMINO (ALT) (SGPT): CPT

## 2021-10-26 PROCEDURE — 80061 LIPID PANEL: CPT

## 2021-10-26 PROCEDURE — 80048 BASIC METABOLIC PNL TOTAL CA: CPT

## 2021-10-26 PROCEDURE — 36415 COLL VENOUS BLD VENIPUNCTURE: CPT

## 2021-10-28 ENCOUNTER — OFFICE VISIT (OUTPATIENT)
Dept: CARDIOLOGY | Facility: CLINIC | Age: 81
End: 2021-10-28
Payer: COMMERCIAL

## 2021-10-28 VITALS
WEIGHT: 187 LBS | HEART RATE: 72 BPM | BODY MASS INDEX: 33.13 KG/M2 | OXYGEN SATURATION: 97 % | SYSTOLIC BLOOD PRESSURE: 125 MMHG | DIASTOLIC BLOOD PRESSURE: 79 MMHG

## 2021-10-28 DIAGNOSIS — I48.20 CHRONIC ATRIAL FIBRILLATION (H): ICD-10-CM

## 2021-10-28 DIAGNOSIS — I25.10 CORONARY ARTERY DISEASE INVOLVING NATIVE CORONARY ARTERY OF NATIVE HEART WITHOUT ANGINA PECTORIS: ICD-10-CM

## 2021-10-28 DIAGNOSIS — Z95.0 CARDIAC PACEMAKER IN SITU: ICD-10-CM

## 2021-10-28 DIAGNOSIS — I50.32 CHRONIC DIASTOLIC CONGESTIVE HEART FAILURE (H): ICD-10-CM

## 2021-10-28 DIAGNOSIS — E78.5 HYPERLIPIDEMIA LDL GOAL <70: Primary | ICD-10-CM

## 2021-10-28 PROCEDURE — 99214 OFFICE O/P EST MOD 30 MIN: CPT | Performed by: INTERNAL MEDICINE

## 2021-10-28 NOTE — PROGRESS NOTES
Service Date: 10/28/2021    HISTORY OF PRESENT ILLNESS:  It was my pleasure to see your patient, Hamida Verma.  She is a very pleasant 81-year-old patient with a past history of atrial fibrillation who failed medical therapy and then underwent an AV node ablation.  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  This patient also has a history of essential hypertension and diastolic congestive heart failure.  She has coronary artery disease based upon coronary calcification and aortic calcification noted on CT scanning of the chest.    Since I last saw her, she has been doing well.  Interrogation of her permanent pacemaker was performed in July of this year showing that the pacemaker is functioning normally with 11 years left on the battery.  The patient's lipid profile was good with an LDL of 70, HDL of 69, triglycerides of 73 with a total cholesterol of 154.  The patient is anticoagulated with Xarelto 20 mg per day for stroke prophylaxis.  The patient has no bleeding issues.  With respect to her hypertension, her blood pressure is well controlled today at 125/79.  The patient is not complaining of any chest pains, chest pressure or unusual shortness of breath.    IMPRESSION:    1.  Atrial fibrillation, status post AV node ablation and permanent pacemaker placement.  The patient is doing well with this and is anticoagulated with Xarelto with no bleeding complications.  2.  Asymptomatic coronary artery disease with no symptoms suggestive of angina pectoris.  3.  Excellent lipid profile, well within secondary prevention guidelines and no evidence of hepatic toxicity on liver function testing.  4.  Diastolic congestive heart failure.  The patient appears to be euvolemic with no symptoms of congestive heart failure and no signs of volume overload.    PLAN:  We will continue the patient on her present medications.  I would like to get an echocardiogram, however, on  her next visit since we have not had one since 2019.  In particular, this patient does have diastolic heart failure, so we would like to look at that, but also this patient has permanent pacemaker in situ and we are increasingly seeing patients developing tricuspid regurgitation due to the lead placement, so we do need to get a baseline echocardiogram.    It has been my pleasure to be involved in the care of this very nice patient.  We look forward to seeing her again.    Roby Mallory MD    cc:  Mikala Philip MD  [address]  [address]      Roby Chase MD, [facility]        D: 10/28/2021   T: 10/28/2021   MT: shin    Name:     ARTURO BOSWELLOscar  MRN:      [id number]        Account:      [id number]   :      1940           Service Date: 10/28/2021       Document: [id number]

## 2021-10-28 NOTE — PROGRESS NOTES
HPI and Plan:   See dictation    Orders Placed This Encounter   Procedures     Lipid Profile     ALT     Basic metabolic panel     Follow-Up with Cardiologist     Echocardiogram Complete       No orders of the defined types were placed in this encounter.      There are no discontinued medications.      Encounter Diagnoses   Name Primary?     Hyperlipidemia LDL goal <70 Yes     Coronary artery disease involving native coronary artery of native heart without angina pectoris      Cardiac pacemaker in situ      Chronic diastolic congestive heart failure (H)      Chronic atrial fibrillation (H)        CURRENT MEDICATIONS:  Current Outpatient Medications   Medication Sig Dispense Refill     alendronate (FOSAMAX) 70 MG tablet Take 1 tablet (70 mg) by mouth every 7 days Take 60 minutes before am meal with 8 oz. water. Remain upright for 30 minutes. 12 tablet 3     amLODIPine (NORVASC) 10 MG tablet Take 1 tablet (10 mg) by mouth daily 90 tablet 3     atorvastatin (LIPITOR) 40 MG tablet Take 1 tablet (40 mg) by mouth daily 90 tablet 3     cholecalciferol (VITAMIN  -D) 1000 UNIT capsule Take 1 capsule by mouth daily.       Coral Calcium 133-66.7-133 MG-MG-UNIT CAPS Take 2 tablets by mouth 2 times daily        FLAX SEED OIL OR 1 capsule daily       furosemide (LASIX) 20 MG tablet TAKE ONE TABLET BY MOUTH ONCE DAILY 90 tablet 0     lisinopril (ZESTRIL) 40 MG tablet Take 1 tablet (40 mg) by mouth daily 90 tablet 3     metoprolol succinate ER (TOPROL-XL) 50 MG 24 hr tablet Take 1 tablet (50 mg) by mouth 2 times daily 180 tablet 3     Multiple Vitamins-Minerals (HAIR SKIN NAILS PO) Take 1 tablet by mouth At Bedtime       potassium chloride ER (KLOR-CON M) 10 MEQ CR tablet Take 1 tablet (10 mEq) by mouth daily 90 tablet 0     rivaroxaban ANTICOAGULANT (XARELTO ANTICOAGULANT) 20 MG TABS tablet Take 1 tablet (20 mg) by mouth daily (with dinner) 90 tablet 3     Milk Thistle 200 MG CAPS Take 1 capsule by mouth daily  (Patient not  taking: Reported on 10/28/2021)         ALLERGIES     Allergies   Allergen Reactions     Strawberries [Strawberry] Anaphylaxis     Strawberry Flavor        PAST MEDICAL HISTORY:  Past Medical History:   Diagnosis Date     Atrial fibrillation (H)      Chronic diastolic CHF (congestive heart failure) (H)      Essential hypertension, benign      HYPERLIPIDEMIA NEC/NOS 3/30/2006     Pulmonary hypertension (H)      SSS (sick sinus syndrome) (H)     s/p PPM 3/2018       PAST SURGICAL HISTORY:  Past Surgical History:   Procedure Laterality Date     EP ABLATION AV NODE N/A 5/7/2019    Procedure: EP Ablation AV Node;  Surgeon: Roe Voss MD;  Location:  [facility]     EYE SURGERY       HYSTERECTOMY, VAGINAL      hysterectomy       FAMILY HISTORY:  Family History   Problem Relation Age of Onset     Cerebrovascular Disease Mother      Glaucoma Mother      Macular Degeneration Mother      Alcohol/Drug Father         alcohol     Myocardial Infarction Father 63        passed away from MI     Family History Negative Sister      Family History Negative Sister      Family History Negative Sister      Cerebrovascular Disease Sister      Family History Negative Brother      Family History Negative Maternal Grandmother      Family History Negative Maternal Grandfather      Family History Negative Paternal Grandmother      Family History Negative Paternal Grandfather      Myocardial Infarction Son 57        passed away from MI     Dementia Daughter 57        early onset on [place]     Diabetes No family hx of      Breast Cancer No family hx of      Cancer - colorectal No family hx of        SOCIAL HISTORY:  Social History     Socioeconomic History     Marital status:      Spouse name: None     Number of children: None     Years of education: None     Highest education level: None   Occupational History     None   Tobacco Use     Smoking status: Former Smoker     Packs/day: 1.50     Years: 45.00     Pack years:  67.50     Types: Cigarettes     Start date: 10/28/1955     Quit date: 2000     Years since quittin.1     Smokeless tobacco: Never Used     Tobacco comment: quit 6 yrs ago   Substance and Sexual Activity     Alcohol use: No     Drug use: No     Sexual activity: Yes     Partners: Male   Other Topics Concern     Parent/sibling w/ CABG, MI or angioplasty before 65F 55M? No   Social History Narrative    Balanced Diet - Yes    Osteoporosis Prevention Measures - Dairy servings per day: 2    Regular Exercise -  Yes Describe walk, but not recently due to weather    Dental Exam - Yes    Eye Exam -No    Self Breast Exam - Yes    Abuse: Current or Past (Physical, Sexual or Emotional)- No    Do you feel safe in your environment - Yes    Guns stored in the home - No    Sunscreen used - Yes    Seatbelts used - Yes    Lipids -  1/10    Glucose -  1/10    Colon Cancer Screening - Yes, 08    Hemoccults - NO    Pap Test -  Many yrs ago, has hysterectomy    Do you have any concerns about STD's -  No    Mammography - 08    DEXA - 06    Immunizations reviewed and up to date - No    Jonathan Marshall MA                 Social Determinants of Health     Financial Resource Strain:      Difficulty of Paying Living Expenses:    Food Insecurity:      Worried About Running Out of Food in the Last Year:      Ran Out of Food in the Last Year:    Transportation Needs:      Lack of Transportation (Medical):      Lack of Transportation (Non-Medical):    Physical Activity:      Days of Exercise per Week:      Minutes of Exercise per Session:    Stress:      Feeling of Stress :    Social Connections:      Frequency of Communication with Friends and Family:      Frequency of Social Gatherings with Friends and Family:      Attends [employer] Services:      Active Member of Clubs or Organizations:      Attends Club or Organization Meetings:      Marital Status:    Intimate Partner Violence:      Fear of Current or Ex-Partner:       Emotionally Abused:      Physically Abused:      Sexually Abused:        Review of Systems:  Skin:  Negative       Eyes:  Positive for glasses recent eye surgery   ENT:  Positive for (constant runny nose.) hearing loss    Respiratory:  Negative       Cardiovascular:  Negative      Gastroenterology: Negative      Genitourinary:  Negative      Musculoskeletal:  Negative      Neurologic:  Negative      Psychiatric:  Negative      Heme/Lymph/Imm:  Positive for allergies strawberry  Endocrine:  Negative        Physical Exam:  Vitals: /79   Pulse 72   Wt 84.8 kg (187 lb)   LMP  (LMP Unknown)   SpO2 97%   BMI 33.13 kg/m      Constitutional:  cooperative, alert and oriented, well developed, well nourished, in no acute distress        Skin:  warm and dry to the touch, no apparent skin lesions or masses noted   pacemaker incision in the left infraclavicular area was well-healed      Head:  normocephalic, no masses or lesions        Eyes:  pupils equal and round        Lymph:No Cervical lymphadenopathy present     ENT:  no pallor or cyanosis, dentition good        Neck:  carotid pulses are full and equal bilaterally, JVP normal, no carotid bruit        Respiratory:  normal breath sounds, clear to auscultation, normal A-P diameter, normal symmetry, normal respiratory excursion, no use of accessory muscles diminished breath sounds bilaterally       Cardiac: no murmurs, gallops or rubs detected;regular rhythm    (Variable S1 and normal S2)            pulses full and equal     right radial artery;2+       right dorsalis pedis artery;2+     left radial artery;2+       left dorsalis pedis artery;2+            GI:  not assessed this visit        Extremities and Muscular Skeletal:  no deformities, clubbing, cyanosis, erythema observed;no edema     pitting;2+ trace      Neurological:  no gross motor deficits;affect appropriate        Psych:  Alert and Oriented x 3        CC  No referring provider defined for this  encounter.

## 2021-10-28 NOTE — LETTER
10/28/2021    Mikala Philip MD, MD  [address]  [facility] [address]    RE: Hamida Verma       Dear Colleague,    I had the pleasure of seeing Hamida Verma in the [facility].    HPI and Plan:   See dictation    Orders Placed This Encounter   Procedures     Lipid Profile     ALT     Basic metabolic panel     Follow-Up with Cardiologist     Echocardiogram Complete       No orders of the defined types were placed in this encounter.      There are no discontinued medications.      Encounter Diagnoses   Name Primary?     Hyperlipidemia LDL goal <70 Yes     Coronary artery disease involving native coronary artery of native heart without angina pectoris      Cardiac pacemaker in situ      Chronic diastolic congestive heart failure (H)      Chronic atrial fibrillation (H)        CURRENT MEDICATIONS:  Current Outpatient Medications   Medication Sig Dispense Refill     alendronate (FOSAMAX) 70 MG tablet Take 1 tablet (70 mg) by mouth every 7 days Take 60 minutes before am meal with 8 oz. water. Remain upright for 30 minutes. 12 tablet 3     amLODIPine (NORVASC) 10 MG tablet Take 1 tablet (10 mg) by mouth daily 90 tablet 3     atorvastatin (LIPITOR) 40 MG tablet Take 1 tablet (40 mg) by mouth daily 90 tablet 3     cholecalciferol (VITAMIN  -D) 1000 UNIT capsule Take 1 capsule by mouth daily.       Coral Calcium 133-66.7-133 MG-MG-UNIT CAPS Take 2 tablets by mouth 2 times daily        FLAX SEED OIL OR 1 capsule daily       furosemide (LASIX) 20 MG tablet TAKE ONE TABLET BY MOUTH ONCE DAILY 90 tablet 0     lisinopril (ZESTRIL) 40 MG tablet Take 1 tablet (40 mg) by mouth daily 90 tablet 3     metoprolol succinate ER (TOPROL-XL) 50 MG 24 hr tablet Take 1 tablet (50 mg) by mouth 2 times daily 180 tablet 3     Multiple Vitamins-Minerals (HAIR SKIN NAILS PO) Take 1 tablet by mouth At Bedtime       potassium chloride ER (KLOR-CON M) 10 MEQ CR tablet Take 1 tablet  (10 mEq) by mouth daily 90 tablet 0     rivaroxaban ANTICOAGULANT (XARELTO ANTICOAGULANT) 20 MG TABS tablet Take 1 tablet (20 mg) by mouth daily (with dinner) 90 tablet 3     Milk Thistle 200 MG CAPS Take 1 capsule by mouth daily  (Patient not taking: Reported on 10/28/2021)         ALLERGIES     Allergies   Allergen Reactions     Strawberries [Strawberry] Anaphylaxis     Strawberry Flavor        PAST MEDICAL HISTORY:  Past Medical History:   Diagnosis Date     Atrial fibrillation (H)      Chronic diastolic CHF (congestive heart failure) (H)      Essential hypertension, benign      HYPERLIPIDEMIA NEC/NOS 3/30/2006     Pulmonary hypertension (H)      SSS (sick sinus syndrome) (H)     s/p PPM 3/2018       PAST SURGICAL HISTORY:  Past Surgical History:   Procedure Laterality Date     EP ABLATION AV NODE N/A 5/7/2019    Procedure: EP Ablation AV Node;  Surgeon: Roe Voss MD;  Location:  HE[facility] CATH LAB     EYE SURGERY       HYSTERECTOMY, VAGINAL      hysterectomy       FAMILY HISTORY:  Family History   Problem Relation Age of Onset     Cerebrovascular Disease Mother      Glaucoma Mother      Macular Degeneration Mother      Alcohol/Drug Father         alcohol     Myocardial Infarction Father 63        passed away from MI     Family History Negative Sister      Family History Negative Sister      Family History Negative Sister      Cerebrovascular Disease Sister      Family History Negative Brother      Family History Negative Maternal Grandmother      Family History Negative Maternal Grandfather      Family History Negative Paternal Grandmother      Family History Negative Paternal Grandfather      Myocardial Infarction Son 57        passed away from MI     Dementia Daughter 57        early onset on namenda     Diabetes No family hx of      Breast Cancer No family hx of      Cancer - colorectal No family hx of        SOCIAL HISTORY:  Social History     Socioeconomic History     Marital status:       Spouse name: None     Number of children: None     Years of education: None     Highest education level: None   Occupational History     None   Tobacco Use     Smoking status: Former Smoker     Packs/day: 1.50     Years: 45.00     Pack years: 67.50     Types: Cigarettes     Start date: 10/28/1955     Quit date: 2000     Years since quittin.1     Smokeless tobacco: Never Used     Tobacco comment: quit 6 yrs ago   Substance and Sexual Activity     Alcohol use: No     Drug use: No     Sexual activity: Yes     Partners: Male   Other Topics Concern     Parent/sibling w/ CABG, MI or angioplasty before 65F 55M? No   Social History Narrative    Balanced Diet - Yes    Osteoporosis Prevention Measures - Dairy servings per day: 2    Regular Exercise -  Yes Describe walk, but not recently due to weather    Dental Exam - Yes    Eye Exam -No    Self Breast Exam - Yes    Abuse: Current or Past (Physical, Sexual or Emotional)- No    Do you feel safe in your environment - Yes    Guns stored in the home - No    Sunscreen used - Yes    Seatbelts used - Yes    Lipids -  1/10    Glucose -  1/10    Colon Cancer Screening - Yes, 08    Hemoccults - NO    Pap Test -  Many yrs ago, has hysterectomy    Do you have any concerns about STD's -  No    Mammography - 08    DEXA - 06    Immunizations reviewed and up to date - No    Jonathan Marshall MA                 Social Determinants of Health     Financial Resource Strain:      Difficulty of Paying Living Expenses:    Food Insecurity:      Worried About Running Out of Food in the Last Year:      Ran Out of Food in the Last Year:    Transportation Needs:      Lack of Transportation (Medical):      Lack of Transportation (Non-Medical):    Physical Activity:      Days of Exercise per Week:      Minutes of Exercise per Session:    Stress:      Feeling of Stress :    Social Connections:      Frequency of Communication with Friends and Family:      Frequency of Social Gatherings  with Friends and Family:      Attends [employer] Services:      Active Member of Clubs or Organizations:      Attends Club or Organization Meetings:      Marital Status:    Intimate Partner Violence:      Fear of Current or Ex-Partner:      Emotionally Abused:      Physically Abused:      Sexually Abused:        Review of Systems:  Skin:  Negative       Eyes:  Positive for glasses recent eye surgery   ENT:  Positive for (constant runny nose.) hearing loss    Respiratory:  Negative       Cardiovascular:  Negative      Gastroenterology: Negative      Genitourinary:  Negative      Musculoskeletal:  Negative      Neurologic:  Negative      Psychiatric:  Negative      Heme/Lymph/Imm:  Positive for allergies strawberry  Endocrine:  Negative        Physical Exam:  Vitals: /79   Pulse 72   Wt 84.8 kg (187 lb)   LMP  (LMP Unknown)   SpO2 97%   BMI 33.13 kg/m      Constitutional:  cooperative, alert and oriented, well developed, well nourished, in no acute distress        Skin:  warm and dry to the touch, no apparent skin lesions or masses noted   pacemaker incision in the left infraclavicular area was well-healed      Head:  normocephalic, no masses or lesions        Eyes:  pupils equal and round        Lymph:No Cervical lymphadenopathy present     ENT:  no pallor or cyanosis, dentition good        Neck:  carotid pulses are full and equal bilaterally, JVP normal, no carotid bruit        Respiratory:  normal breath sounds, clear to auscultation, normal A-P diameter, normal symmetry, normal respiratory excursion, no use of accessory muscles diminished breath sounds bilaterally       Cardiac: no murmurs, gallops or rubs detected;regular rhythm    (Variable S1 and normal S2)            pulses full and equal     right radial artery;2+       right dorsalis pedis artery;2+     left radial artery;2+       left dorsalis pedis artery;2+            GI:  not assessed this visit        Extremities and Muscular Skeletal:  no  deformities, clubbing, cyanosis, erythema observed;no edema     pitting;2+ trace      Neurological:  no gross motor deficits;affect appropriate        Psych:  Alert and Oriented x 3        CC  No referring provider defined for this encounter.                  Thank you for allowing me to participate in the care of your patient.      Sincerely,     Roby Mallory MD, MD     [facility]  cc:   No referring provider defined for this encounter.

## 2021-11-01 ENCOUNTER — ANCILLARY PROCEDURE (OUTPATIENT)
Dept: CARDIOLOGY | Facility: CLINIC | Age: 81
End: 2021-11-01
Attending: INTERNAL MEDICINE
Payer: COMMERCIAL

## 2021-11-01 DIAGNOSIS — I48.91 ATRIAL FIBRILLATION WITH RAPID VENTRICULAR RESPONSE (H): ICD-10-CM

## 2021-11-01 DIAGNOSIS — Z95.0 CARDIAC PACEMAKER IN SITU: ICD-10-CM

## 2021-11-01 PROCEDURE — 93294 REM INTERROG EVL PM/LDLS PM: CPT | Performed by: INTERNAL MEDICINE

## 2021-11-01 PROCEDURE — 93296 REM INTERROG EVL PM/IDS: CPT | Performed by: INTERNAL MEDICINE

## 2021-11-01 RX ORDER — METOPROLOL SUCCINATE 50 MG/1
50 TABLET, EXTENDED RELEASE ORAL 2 TIMES DAILY
Qty: 180 TABLET | Refills: 3 | Status: CANCELLED | OUTPATIENT
Start: 2021-11-01

## 2021-11-05 DIAGNOSIS — I48.91 ATRIAL FIBRILLATION WITH RAPID VENTRICULAR RESPONSE (H): ICD-10-CM

## 2021-11-05 RX ORDER — METOPROLOL SUCCINATE 50 MG/1
50 TABLET, EXTENDED RELEASE ORAL 2 TIMES DAILY
Qty: 180 TABLET | Refills: 3 | Status: SHIPPED | OUTPATIENT
Start: 2021-11-05 | End: 2022-11-01

## 2021-11-05 NOTE — TELEPHONE ENCOUNTER
Patient is out of meds.    Thank You  Sandrine [facility]

## 2021-11-05 NOTE — TELEPHONE ENCOUNTER
Routing refill request to provider for review/approval because:  Managed by cardiologist     DANIEL MoonN RN  [facility]

## 2021-11-10 LAB
MDC_IDC_EPISODE_DTM: NORMAL
MDC_IDC_EPISODE_DURATION: 10 S
MDC_IDC_EPISODE_ID: NORMAL
MDC_IDC_EPISODE_TYPE: NORMAL
MDC_IDC_LEAD_IMPLANT_DT: NORMAL
MDC_IDC_LEAD_IMPLANT_DT: NORMAL
MDC_IDC_LEAD_LOCATION: NORMAL
MDC_IDC_LEAD_LOCATION: NORMAL
MDC_IDC_LEAD_LOCATION_DETAIL_1: NORMAL
MDC_IDC_LEAD_LOCATION_DETAIL_1: NORMAL
MDC_IDC_LEAD_MFG: NORMAL
MDC_IDC_LEAD_MFG: NORMAL
MDC_IDC_LEAD_MODEL: NORMAL
MDC_IDC_LEAD_MODEL: NORMAL
MDC_IDC_LEAD_POLARITY_TYPE: NORMAL
MDC_IDC_LEAD_POLARITY_TYPE: NORMAL
MDC_IDC_LEAD_SERIAL: NORMAL
MDC_IDC_LEAD_SERIAL: NORMAL
MDC_IDC_MSMT_BATTERY_DTM: NORMAL
MDC_IDC_MSMT_BATTERY_REMAINING_LONGEVITY: 130 MO
MDC_IDC_MSMT_BATTERY_REMAINING_PERCENTAGE: 95.5 %
MDC_IDC_MSMT_BATTERY_RRT_TRIGGER: NORMAL
MDC_IDC_MSMT_BATTERY_STATUS: NORMAL
MDC_IDC_MSMT_BATTERY_VOLTAGE: 2.99 V
MDC_IDC_MSMT_LEADCHNL_RA_IMPEDANCE_VALUE: 580 OHM
MDC_IDC_MSMT_LEADCHNL_RA_LEAD_CHANNEL_STATUS: NORMAL
MDC_IDC_MSMT_LEADCHNL_RA_PACING_THRESHOLD_AMPLITUDE: 0.5 V
MDC_IDC_MSMT_LEADCHNL_RA_PACING_THRESHOLD_PULSEWIDTH: 0.4 MS
MDC_IDC_MSMT_LEADCHNL_RA_SENSING_INTR_AMPL: 2.6 MV
MDC_IDC_MSMT_LEADCHNL_RV_IMPEDANCE_VALUE: 610 OHM
MDC_IDC_MSMT_LEADCHNL_RV_LEAD_CHANNEL_STATUS: NORMAL
MDC_IDC_MSMT_LEADCHNL_RV_PACING_THRESHOLD_AMPLITUDE: 0.75 V
MDC_IDC_MSMT_LEADCHNL_RV_PACING_THRESHOLD_PULSEWIDTH: 0.4 MS
MDC_IDC_MSMT_LEADCHNL_RV_SENSING_INTR_AMPL: 11.8 MV
MDC_IDC_PG_IMPLANT_DTM: NORMAL
MDC_IDC_PG_MFG: NORMAL
MDC_IDC_PG_MODEL: NORMAL
MDC_IDC_PG_SERIAL: NORMAL
MDC_IDC_PG_TYPE: NORMAL
MDC_IDC_SESS_CLINIC_NAME: NORMAL
MDC_IDC_SESS_DTM: NORMAL
MDC_IDC_SESS_REPROGRAMMED: NO
MDC_IDC_SESS_TYPE: NORMAL
MDC_IDC_SET_BRADY_AT_MODE_SWITCH_MODE: NORMAL
MDC_IDC_SET_BRADY_AT_MODE_SWITCH_RATE: 170 {BEATS}/MIN
MDC_IDC_SET_BRADY_LOWRATE: 60 {BEATS}/MIN
MDC_IDC_SET_BRADY_MAX_SENSOR_RATE: 120 {BEATS}/MIN
MDC_IDC_SET_BRADY_MAX_TRACKING_RATE: 120 {BEATS}/MIN
MDC_IDC_SET_BRADY_MODE: NORMAL
MDC_IDC_SET_BRADY_PAV_DELAY_LOW: 250 MS
MDC_IDC_SET_BRADY_SAV_DELAY_LOW: 225 MS
MDC_IDC_SET_LEADCHNL_RA_PACING_AMPLITUDE: 2 V
MDC_IDC_SET_LEADCHNL_RA_PACING_ANODE_ELECTRODE_1: NORMAL
MDC_IDC_SET_LEADCHNL_RA_PACING_ANODE_LOCATION_1: NORMAL
MDC_IDC_SET_LEADCHNL_RA_PACING_CAPTURE_MODE: NORMAL
MDC_IDC_SET_LEADCHNL_RA_PACING_CATHODE_ELECTRODE_1: NORMAL
MDC_IDC_SET_LEADCHNL_RA_PACING_CATHODE_LOCATION_1: NORMAL
MDC_IDC_SET_LEADCHNL_RA_PACING_POLARITY: NORMAL
MDC_IDC_SET_LEADCHNL_RA_PACING_PULSEWIDTH: 0.4 MS
MDC_IDC_SET_LEADCHNL_RA_SENSING_ADAPTATION_MODE: NORMAL
MDC_IDC_SET_LEADCHNL_RA_SENSING_ANODE_ELECTRODE_1: NORMAL
MDC_IDC_SET_LEADCHNL_RA_SENSING_ANODE_LOCATION_1: NORMAL
MDC_IDC_SET_LEADCHNL_RA_SENSING_CATHODE_ELECTRODE_1: NORMAL
MDC_IDC_SET_LEADCHNL_RA_SENSING_CATHODE_LOCATION_1: NORMAL
MDC_IDC_SET_LEADCHNL_RA_SENSING_POLARITY: NORMAL
MDC_IDC_SET_LEADCHNL_RA_SENSING_SENSITIVITY: 0.3 MV
MDC_IDC_SET_LEADCHNL_RV_PACING_AMPLITUDE: 1 V
MDC_IDC_SET_LEADCHNL_RV_PACING_ANODE_ELECTRODE_1: NORMAL
MDC_IDC_SET_LEADCHNL_RV_PACING_ANODE_LOCATION_1: NORMAL
MDC_IDC_SET_LEADCHNL_RV_PACING_CAPTURE_MODE: NORMAL
MDC_IDC_SET_LEADCHNL_RV_PACING_CATHODE_ELECTRODE_1: NORMAL
MDC_IDC_SET_LEADCHNL_RV_PACING_CATHODE_LOCATION_1: NORMAL
MDC_IDC_SET_LEADCHNL_RV_PACING_POLARITY: NORMAL
MDC_IDC_SET_LEADCHNL_RV_PACING_PULSEWIDTH: 0.4 MS
MDC_IDC_SET_LEADCHNL_RV_SENSING_ADAPTATION_MODE: NORMAL
MDC_IDC_SET_LEADCHNL_RV_SENSING_ANODE_ELECTRODE_1: NORMAL
MDC_IDC_SET_LEADCHNL_RV_SENSING_ANODE_LOCATION_1: NORMAL
MDC_IDC_SET_LEADCHNL_RV_SENSING_CATHODE_ELECTRODE_1: NORMAL
MDC_IDC_SET_LEADCHNL_RV_SENSING_CATHODE_LOCATION_1: NORMAL
MDC_IDC_SET_LEADCHNL_RV_SENSING_POLARITY: NORMAL
MDC_IDC_SET_LEADCHNL_RV_SENSING_SENSITIVITY: 0.5 MV
MDC_IDC_STAT_AT_BURDEN_PERCENT: 0 %
MDC_IDC_STAT_AT_DTM_END: NORMAL
MDC_IDC_STAT_AT_DTM_START: NORMAL
MDC_IDC_STAT_AT_MODE_SW_COUNT: 1
MDC_IDC_STAT_AT_MODE_SW_COUNT_PER_DAY: 0
MDC_IDC_STAT_AT_MODE_SW_MAX_DURATION: 10 S
MDC_IDC_STAT_AT_MODE_SW_PERCENT_TIME: 1 %
MDC_IDC_STAT_BRADY_AP_VP_PERCENT: 54 %
MDC_IDC_STAT_BRADY_AP_VS_PERCENT: 1 %
MDC_IDC_STAT_BRADY_AS_VP_PERCENT: 46 %
MDC_IDC_STAT_BRADY_AS_VS_PERCENT: 1 %
MDC_IDC_STAT_BRADY_DTM_END: NORMAL
MDC_IDC_STAT_BRADY_DTM_START: NORMAL
MDC_IDC_STAT_BRADY_RA_PERCENT_PACED: 54 %
MDC_IDC_STAT_BRADY_RV_PERCENT_PACED: 99 %
MDC_IDC_STAT_CRT_DTM_END: NORMAL
MDC_IDC_STAT_CRT_DTM_START: NORMAL
MDC_IDC_STAT_HEART_RATE_ATRIAL_MAX: 330 {BEATS}/MIN
MDC_IDC_STAT_HEART_RATE_ATRIAL_MEAN: 68 {BEATS}/MIN
MDC_IDC_STAT_HEART_RATE_ATRIAL_MIN: 40 {BEATS}/MIN
MDC_IDC_STAT_HEART_RATE_DTM_END: NORMAL
MDC_IDC_STAT_HEART_RATE_DTM_START: NORMAL
MDC_IDC_STAT_HEART_RATE_VENTRICULAR_MAX: 180 {BEATS}/MIN
MDC_IDC_STAT_HEART_RATE_VENTRICULAR_MEAN: 68 {BEATS}/MIN
MDC_IDC_STAT_HEART_RATE_VENTRICULAR_MIN: 40 {BEATS}/MIN

## 2021-11-12 DIAGNOSIS — I48.0 PAF (PAROXYSMAL ATRIAL FIBRILLATION) (H): ICD-10-CM

## 2021-11-29 DIAGNOSIS — I50.32 CHRONIC DIASTOLIC CHF (CONGESTIVE HEART FAILURE) (H): ICD-10-CM

## 2021-11-30 ENCOUNTER — OFFICE VISIT (OUTPATIENT)
Dept: URGENT CARE | Facility: URGENT CARE | Age: 81
End: 2021-11-30
Payer: COMMERCIAL

## 2021-11-30 VITALS
SYSTOLIC BLOOD PRESSURE: 137 MMHG | BODY MASS INDEX: 33.13 KG/M2 | OXYGEN SATURATION: 97 % | WEIGHT: 187 LBS | HEART RATE: 69 BPM | TEMPERATURE: 98.8 F | DIASTOLIC BLOOD PRESSURE: 67 MMHG | RESPIRATION RATE: 20 BRPM

## 2021-11-30 DIAGNOSIS — R22.2 LUMP IN CHEST: Primary | ICD-10-CM

## 2021-11-30 DIAGNOSIS — L02.213 CUTANEOUS ABSCESS OF CHEST WALL: ICD-10-CM

## 2021-11-30 PROCEDURE — 87077 CULTURE AEROBIC IDENTIFY: CPT | Performed by: FAMILY MEDICINE

## 2021-11-30 PROCEDURE — 10060 I&D ABSCESS SIMPLE/SINGLE: CPT | Performed by: FAMILY MEDICINE

## 2021-11-30 PROCEDURE — 87186 SC STD MICRODIL/AGAR DIL: CPT | Performed by: FAMILY MEDICINE

## 2021-11-30 PROCEDURE — 87070 CULTURE OTHR SPECIMN AEROBIC: CPT | Performed by: FAMILY MEDICINE

## 2021-11-30 RX ORDER — CEPHALEXIN 500 MG/1
500 CAPSULE ORAL 2 TIMES DAILY
Qty: 14 CAPSULE | Refills: 0 | Status: SHIPPED | OUTPATIENT
Start: 2021-11-30 | End: 2021-12-07

## 2021-11-30 NOTE — PATIENT INSTRUCTIONS
Patient Education     Abscess (Incision & Drainage)  An abscess is sometimes called a boil. It happens when bacteria get trapped under the skin and start to grow. Pus forms inside the abscess as the body responds to the bacteria. An abscess can happen with an insect bite, ingrown hair, blocked oil gland, pimple, cyst, or puncture wound.   Your healthcare provider has drained the pus from your abscess. If the abscess pocket was large, your healthcare provider may have put in gauze packing. Your provider will need to remove or replace it on your next visit. . You may not need antibiotics to treat a simple abscess, unless the infection is spreading into the skin around the wound (cellulitis).   The wound will take about 1 to 2 weeks to heal, depending on the size of the abscess. Healthy tissue will grow from the bottom and sides of the opening until it seals over.   Home care  These tips can help your wound heal:    The wound may drain for the first 2 days. Cover the wound with a clean dry dressing. Change the dressing if it becomes soaked with blood or pus.    If a gauze packing was placed inside the abscess pocket, you may be told to remove it yourself. You may do this in the shower. Once the packing is removed, you should wash the area in the shower, or clean the area as directed by your provider. Continue to do this until the skin opening has closed. Make sure you wash your hands after changing the packing or cleaning the wound.    If you were prescribed antibiotics, take them as directed until they are all gone.    You may use acetaminophen or ibuprofen to control pain, unless another pain medicine was prescribed. If you have liver disease or ever had a stomach ulcer, talk with your healthcare provider before using these medicines.  Follow-up care  Follow up with your healthcare provider, or as advised. If a gauze packing was put in your wound, it should be removed in 1 to 2 days. Check your wound every day for  any signs that the infection is getting worse. The signs are listed below.   When to seek medical advice  Call your healthcare provider right away if any of these occur:    Increasing redness or swelling    Red streaks in the skin leading away from the wound    Increasing local pain or swelling    Continued pus draining from the wound 2 days after treatment    Fever of 100.4 F (38 C) or higher, or as directed by your healthcare provider    Boil returns when you are at home  Shaina last reviewed this educational content on 8/1/2019 2000-2021 [employer]. All rights reserved. This information is not intended as a substitute for professional medical care. Always follow your healthcare professional's instructions.

## 2021-11-30 NOTE — PROGRESS NOTES
Chief Complaint   Patient presents with     Urgent Care     Lump on chest x 3 weeks getting bigger     Initial differential diagnosis included but was not restricted to   Differential Diagnosis:  Skin abscess, boil. Infected hair follicle ,infected sebaceous cyst   Medical Decision Making:    ASSESMENT AND PLAN   Hamida was seen today for urgent care.    Diagnoses and all orders for this visit:    Lump in chest  -     Abscess Aerobic Bacterial Culture Routine    Cutaneous abscess of chest wall        After informed consent the area was cleaned with alcohol swab then 1 cc of 1% lighter with epi was infiltrated in the area then 11 blade scalpel was used to make an opening thick pus was drained , wound culture was obtained   Minimal  bleeding was noted  Area dressed with alcohol   Bacitracin applied   Dressed with telfa and paper tapeTylenol and Rest  Continue applying warm packs   Routine discharge counseling was given to the patient and the patient understands that worsening, changing or persistent symptoms should prompt an immediate call or follow up with their primary physician or the emergency department. The importance of appropriate follow up was also discussed with the patient.     I have reviewed the nursing notes.    Time  spent on the date of the encounter doing chart review, patient visit and documentation   see orders in Epic  Pt verbalized and agreed with the plan and is aware of the worsening symptoms for which would need to follow up .  Pt was stable during time of discharge from the clinic     SUBJECTIVE     Hamida Verma is a 81 year old female presenting with a chief complaint of    Chief Complaint   Patient presents with     Urgent Care     Lump on chest x 3 weeks getting bigger           Hamida Verma is a 81 year old female presenting with a chief complaint of painful lump on the chest wall she has been noticing the lump for almost last 3 weeks.  Has been noticing the lump is getting bigger and  hurting her more.  She has been applying warm packs to the area patient is on Xarelto for A. fib.  Denies any other new symptoms such as fever chills.She is an established patient of [facility].  Onset of symptoms was 3 week(s) ago.  Course of illness is worsening.    Severity moderate      Past Medical History:   Diagnosis Date     Atrial fibrillation (H)      Chronic diastolic CHF (congestive heart failure) (H)      Essential hypertension, benign      HYPERLIPIDEMIA NEC/NOS 3/30/2006     Pulmonary hypertension (H)      SSS (sick sinus syndrome) (H)     s/p PPM 3/2018     Current Outpatient Medications   Medication Sig Dispense Refill     alendronate (FOSAMAX) 70 MG tablet Take 1 tablet (70 mg) by mouth every 7 days Take 60 minutes before am meal with 8 oz. water. Remain upright for 30 minutes. 12 tablet 3     amLODIPine (NORVASC) 10 MG tablet Take 1 tablet (10 mg) by mouth daily 90 tablet 3     atorvastatin (LIPITOR) 40 MG tablet Take 1 tablet (40 mg) by mouth daily 90 tablet 3     cholecalciferol (VITAMIN  -D) 1000 UNIT capsule Take 1 capsule by mouth daily.       Coral Calcium 133-66.7-133 MG-MG-UNIT CAPS Take 2 tablets by mouth 2 times daily        FLAX SEED OIL OR 1 capsule daily       furosemide (LASIX) 20 MG tablet TAKE ONE TABLET BY MOUTH ONCE DAILY 90 tablet 0     lisinopril (ZESTRIL) 40 MG tablet Take 1 tablet (40 mg) by mouth daily 90 tablet 3     metoprolol succinate ER (TOPROL-XL) 50 MG 24 hr tablet Take 1 tablet (50 mg) by mouth 2 times daily 180 tablet 3     Milk Thistle 200 MG CAPS Take 1 capsule by mouth daily  (Patient not taking: Reported on 10/28/2021)       Multiple Vitamins-Minerals (HAIR SKIN NAILS PO) Take 1 tablet by mouth At Bedtime       potassium chloride ER (KLOR-CON M) 10 MEQ CR tablet Take 1 tablet (10 mEq) by mouth daily 90 tablet 0     rivaroxaban ANTICOAGULANT (XARELTO ANTICOAGULANT) 20 MG TABS tablet Take 1 tablet (20 mg) by mouth daily (with dinner) 90 tablet 2     Social History      Tobacco Use     Smoking status: Former Smoker     Packs/day: 1.50     Years: 45.00     Pack years: 67.50     Types: Cigarettes     Start date: 10/28/1955     Quit date: 2000     Years since quittin.2     Smokeless tobacco: Never Used     Tobacco comment: quit 6 yrs ago   Substance Use Topics     Alcohol use: No     Family History   Problem Relation Age of Onset     Cerebrovascular Disease Mother      Glaucoma Mother      Macular Degeneration Mother      Alcohol/Drug Father         alcohol     Myocardial Infarction Father 63        passed away from MI     Family History Negative Sister      Family History Negative Sister      Family History Negative Sister      Cerebrovascular Disease Sister      Family History Negative Brother      Family History Negative Maternal Grandmother      Family History Negative Maternal Grandfather      Family History Negative Paternal Grandmother      Family History Negative Paternal Grandfather      Myocardial Infarction Son 57        passed away from MI     Dementia Daughter 57        early onset on namenda     Diabetes No family hx of      Breast Cancer No family hx of      Cancer - colorectal No family hx of          ROS:    10 point ROS of systems including Constitutional, Eyes, Respiratory, Cardiovascular, Gastroenterology, Genitourinary,, Muscularskeletal, Psychiatric ,neurological were all negative except for pertinent positives noted in my HPI         OBJECTIVE:    /67   Pulse 69   Temp 98.8  F (37.1  C) (Oral)   Resp 20   Wt 84.8 kg (187 lb)   LMP  (LMP Unknown)   SpO2 97%   BMI 33.13 kg/m    GENERAL APPEARANCE: healthy, alert and no distress  EYES: EOMI,  PERRL, conjunctiva clear  CV: regular rates and rhythm, normal S1 S2, no murmur noted  SKIN: chest wall -shows painful lump measuring about 4cm with fluctuation in the middle with a oreilly along with some white discoloration.  PSYCH: mentation appears normal  Physical Exam      (Note was completed,  in part, with Dragon voice-recognition software. Documentation reviewed, but some grammatical, spelling, and word errors may remain.)  Brittani Oliveira MD on 11/30/2021 at 10:47 AM

## 2021-12-01 RX ORDER — FUROSEMIDE 20 MG
TABLET ORAL
Qty: 90 TABLET | Refills: 0 | Status: SHIPPED | OUTPATIENT
Start: 2021-12-01 | End: 2022-02-09

## 2021-12-03 LAB
BACTERIA ABSC ANAEROBE+AEROBE CULT: ABNORMAL
BACTERIA ABSC ANAEROBE+AEROBE CULT: ABNORMAL

## 2022-01-10 DIAGNOSIS — E87.6 HYPOKALEMIA: ICD-10-CM

## 2022-01-11 RX ORDER — POTASSIUM CHLORIDE 750 MG/1
TABLET, EXTENDED RELEASE ORAL
Qty: 90 TABLET | Refills: 0 | Status: SHIPPED | OUTPATIENT
Start: 2022-01-11 | End: 2022-04-12

## 2022-01-11 NOTE — TELEPHONE ENCOUNTER
Prescription approved per [facility] Refill Protocol.  Zoe Gomez RN  [facility]

## 2022-02-02 ENCOUNTER — ANCILLARY PROCEDURE (OUTPATIENT)
Dept: CARDIOLOGY | Facility: CLINIC | Age: 82
End: 2022-02-02
Attending: INTERNAL MEDICINE
Payer: COMMERCIAL

## 2022-02-02 DIAGNOSIS — Z95.0 CARDIAC PACEMAKER IN SITU: ICD-10-CM

## 2022-02-02 PROCEDURE — 93296 REM INTERROG EVL PM/IDS: CPT | Performed by: INTERNAL MEDICINE

## 2022-02-02 PROCEDURE — 93294 REM INTERROG EVL PM/LDLS PM: CPT | Performed by: INTERNAL MEDICINE

## 2022-02-03 LAB
MDC_IDC_EPISODE_DTM: NORMAL
MDC_IDC_EPISODE_DURATION: 12 S
MDC_IDC_EPISODE_ID: NORMAL
MDC_IDC_EPISODE_TYPE: NORMAL
MDC_IDC_LEAD_IMPLANT_DT: NORMAL
MDC_IDC_LEAD_IMPLANT_DT: NORMAL
MDC_IDC_LEAD_LOCATION: NORMAL
MDC_IDC_LEAD_LOCATION: NORMAL
MDC_IDC_LEAD_LOCATION_DETAIL_1: NORMAL
MDC_IDC_LEAD_LOCATION_DETAIL_1: NORMAL
MDC_IDC_LEAD_MFG: NORMAL
MDC_IDC_LEAD_MFG: NORMAL
MDC_IDC_LEAD_MODEL: NORMAL
MDC_IDC_LEAD_MODEL: NORMAL
MDC_IDC_LEAD_POLARITY_TYPE: NORMAL
MDC_IDC_LEAD_POLARITY_TYPE: NORMAL
MDC_IDC_LEAD_SERIAL: NORMAL
MDC_IDC_LEAD_SERIAL: NORMAL
MDC_IDC_MSMT_BATTERY_DTM: NORMAL
MDC_IDC_MSMT_BATTERY_REMAINING_LONGEVITY: 130 MO
MDC_IDC_MSMT_BATTERY_REMAINING_PERCENTAGE: 95.5 %
MDC_IDC_MSMT_BATTERY_RRT_TRIGGER: NORMAL
MDC_IDC_MSMT_BATTERY_STATUS: NORMAL
MDC_IDC_MSMT_BATTERY_VOLTAGE: 2.99 V
MDC_IDC_MSMT_LEADCHNL_RA_IMPEDANCE_VALUE: 550 OHM
MDC_IDC_MSMT_LEADCHNL_RA_LEAD_CHANNEL_STATUS: NORMAL
MDC_IDC_MSMT_LEADCHNL_RA_PACING_THRESHOLD_AMPLITUDE: 0.5 V
MDC_IDC_MSMT_LEADCHNL_RA_PACING_THRESHOLD_PULSEWIDTH: 0.4 MS
MDC_IDC_MSMT_LEADCHNL_RA_SENSING_INTR_AMPL: 4.2 MV
MDC_IDC_MSMT_LEADCHNL_RV_IMPEDANCE_VALUE: 550 OHM
MDC_IDC_MSMT_LEADCHNL_RV_LEAD_CHANNEL_STATUS: NORMAL
MDC_IDC_MSMT_LEADCHNL_RV_PACING_THRESHOLD_AMPLITUDE: 0.5 V
MDC_IDC_MSMT_LEADCHNL_RV_PACING_THRESHOLD_PULSEWIDTH: 0.4 MS
MDC_IDC_MSMT_LEADCHNL_RV_SENSING_INTR_AMPL: 11.8 MV
MDC_IDC_PG_IMPLANT_DTM: NORMAL
MDC_IDC_PG_MFG: NORMAL
MDC_IDC_PG_MODEL: NORMAL
MDC_IDC_PG_SERIAL: NORMAL
MDC_IDC_PG_TYPE: NORMAL
MDC_IDC_SESS_CLINIC_NAME: NORMAL
MDC_IDC_SESS_DTM: NORMAL
MDC_IDC_SESS_REPROGRAMMED: NO
MDC_IDC_SESS_TYPE: NORMAL
MDC_IDC_SET_BRADY_AT_MODE_SWITCH_MODE: NORMAL
MDC_IDC_SET_BRADY_AT_MODE_SWITCH_RATE: 170 {BEATS}/MIN
MDC_IDC_SET_BRADY_LOWRATE: 60 {BEATS}/MIN
MDC_IDC_SET_BRADY_MAX_SENSOR_RATE: 120 {BEATS}/MIN
MDC_IDC_SET_BRADY_MAX_TRACKING_RATE: 120 {BEATS}/MIN
MDC_IDC_SET_BRADY_MODE: NORMAL
MDC_IDC_SET_BRADY_PAV_DELAY_LOW: 250 MS
MDC_IDC_SET_BRADY_SAV_DELAY_LOW: 225 MS
MDC_IDC_SET_LEADCHNL_RA_PACING_AMPLITUDE: 2 V
MDC_IDC_SET_LEADCHNL_RA_PACING_ANODE_ELECTRODE_1: NORMAL
MDC_IDC_SET_LEADCHNL_RA_PACING_ANODE_LOCATION_1: NORMAL
MDC_IDC_SET_LEADCHNL_RA_PACING_CAPTURE_MODE: NORMAL
MDC_IDC_SET_LEADCHNL_RA_PACING_CATHODE_ELECTRODE_1: NORMAL
MDC_IDC_SET_LEADCHNL_RA_PACING_CATHODE_LOCATION_1: NORMAL
MDC_IDC_SET_LEADCHNL_RA_PACING_POLARITY: NORMAL
MDC_IDC_SET_LEADCHNL_RA_PACING_PULSEWIDTH: 0.4 MS
MDC_IDC_SET_LEADCHNL_RA_SENSING_ADAPTATION_MODE: NORMAL
MDC_IDC_SET_LEADCHNL_RA_SENSING_ANODE_ELECTRODE_1: NORMAL
MDC_IDC_SET_LEADCHNL_RA_SENSING_ANODE_LOCATION_1: NORMAL
MDC_IDC_SET_LEADCHNL_RA_SENSING_CATHODE_ELECTRODE_1: NORMAL
MDC_IDC_SET_LEADCHNL_RA_SENSING_CATHODE_LOCATION_1: NORMAL
MDC_IDC_SET_LEADCHNL_RA_SENSING_POLARITY: NORMAL
MDC_IDC_SET_LEADCHNL_RA_SENSING_SENSITIVITY: 0.3 MV
MDC_IDC_SET_LEADCHNL_RV_PACING_AMPLITUDE: 0.75 V
MDC_IDC_SET_LEADCHNL_RV_PACING_ANODE_ELECTRODE_1: NORMAL
MDC_IDC_SET_LEADCHNL_RV_PACING_ANODE_LOCATION_1: NORMAL
MDC_IDC_SET_LEADCHNL_RV_PACING_CAPTURE_MODE: NORMAL
MDC_IDC_SET_LEADCHNL_RV_PACING_CATHODE_ELECTRODE_1: NORMAL
MDC_IDC_SET_LEADCHNL_RV_PACING_CATHODE_LOCATION_1: NORMAL
MDC_IDC_SET_LEADCHNL_RV_PACING_POLARITY: NORMAL
MDC_IDC_SET_LEADCHNL_RV_PACING_PULSEWIDTH: 0.4 MS
MDC_IDC_SET_LEADCHNL_RV_SENSING_ADAPTATION_MODE: NORMAL
MDC_IDC_SET_LEADCHNL_RV_SENSING_ANODE_ELECTRODE_1: NORMAL
MDC_IDC_SET_LEADCHNL_RV_SENSING_ANODE_LOCATION_1: NORMAL
MDC_IDC_SET_LEADCHNL_RV_SENSING_CATHODE_ELECTRODE_1: NORMAL
MDC_IDC_SET_LEADCHNL_RV_SENSING_CATHODE_LOCATION_1: NORMAL
MDC_IDC_SET_LEADCHNL_RV_SENSING_POLARITY: NORMAL
MDC_IDC_SET_LEADCHNL_RV_SENSING_SENSITIVITY: 0.5 MV
MDC_IDC_STAT_AT_BURDEN_PERCENT: 1 %
MDC_IDC_STAT_AT_DTM_END: NORMAL
MDC_IDC_STAT_AT_DTM_START: NORMAL
MDC_IDC_STAT_AT_MODE_SW_COUNT: 1
MDC_IDC_STAT_AT_MODE_SW_COUNT_PER_DAY: 0
MDC_IDC_STAT_AT_MODE_SW_MAX_DURATION: 12 S
MDC_IDC_STAT_AT_MODE_SW_PERCENT_TIME: 1 %
MDC_IDC_STAT_BRADY_AP_VP_PERCENT: 51 %
MDC_IDC_STAT_BRADY_AP_VS_PERCENT: 1 %
MDC_IDC_STAT_BRADY_AS_VP_PERCENT: 49 %
MDC_IDC_STAT_BRADY_AS_VS_PERCENT: 1 %
MDC_IDC_STAT_BRADY_DTM_END: NORMAL
MDC_IDC_STAT_BRADY_DTM_START: NORMAL
MDC_IDC_STAT_BRADY_RA_PERCENT_PACED: 51 %
MDC_IDC_STAT_BRADY_RV_PERCENT_PACED: 99 %
MDC_IDC_STAT_CRT_DTM_END: NORMAL
MDC_IDC_STAT_CRT_DTM_START: NORMAL
MDC_IDC_STAT_HEART_RATE_ATRIAL_MAX: 280 {BEATS}/MIN
MDC_IDC_STAT_HEART_RATE_ATRIAL_MEAN: 68 {BEATS}/MIN
MDC_IDC_STAT_HEART_RATE_ATRIAL_MIN: 50 {BEATS}/MIN
MDC_IDC_STAT_HEART_RATE_DTM_END: NORMAL
MDC_IDC_STAT_HEART_RATE_DTM_START: NORMAL
MDC_IDC_STAT_HEART_RATE_VENTRICULAR_MAX: 180 {BEATS}/MIN
MDC_IDC_STAT_HEART_RATE_VENTRICULAR_MEAN: 68 {BEATS}/MIN
MDC_IDC_STAT_HEART_RATE_VENTRICULAR_MIN: 40 {BEATS}/MIN

## 2022-02-09 ENCOUNTER — OFFICE VISIT (OUTPATIENT)
Dept: FAMILY MEDICINE | Facility: CLINIC | Age: 82
End: 2022-02-09
Payer: COMMERCIAL

## 2022-02-09 VITALS
WEIGHT: 187 LBS | TEMPERATURE: 97 F | HEART RATE: 66 BPM | OXYGEN SATURATION: 99 % | BODY MASS INDEX: 31.92 KG/M2 | HEIGHT: 64 IN | DIASTOLIC BLOOD PRESSURE: 73 MMHG | SYSTOLIC BLOOD PRESSURE: 126 MMHG | RESPIRATION RATE: 15 BRPM

## 2022-02-09 DIAGNOSIS — I49.5 SSS (SICK SINUS SYNDROME) (H): ICD-10-CM

## 2022-02-09 DIAGNOSIS — I25.10 CORONARY ARTERY CALCIFICATION SEEN ON CT SCAN: ICD-10-CM

## 2022-02-09 DIAGNOSIS — Z95.0 S/P CARDIAC PACEMAKER PROCEDURE: ICD-10-CM

## 2022-02-09 DIAGNOSIS — I50.32 CHRONIC DIASTOLIC CHF (CONGESTIVE HEART FAILURE) (H): ICD-10-CM

## 2022-02-09 DIAGNOSIS — I10 HYPERTENSION GOAL BP (BLOOD PRESSURE) < 140/90: ICD-10-CM

## 2022-02-09 DIAGNOSIS — I48.0 PAF (PAROXYSMAL ATRIAL FIBRILLATION) (H): ICD-10-CM

## 2022-02-09 DIAGNOSIS — E78.5 HYPERLIPIDEMIA LDL GOAL <70: ICD-10-CM

## 2022-02-09 DIAGNOSIS — N18.30 STAGE 3 CHRONIC KIDNEY DISEASE, UNSPECIFIED WHETHER STAGE 3A OR 3B CKD (H): ICD-10-CM

## 2022-02-09 DIAGNOSIS — J44.9 CHRONIC OBSTRUCTIVE PULMONARY DISEASE, UNSPECIFIED COPD TYPE (H): Primary | ICD-10-CM

## 2022-02-09 PROBLEM — E11.9 DIABETES MELLITUS, TYPE 2 (H): Status: RESOLVED | Noted: 2019-03-28 | Resolved: 2022-02-09

## 2022-02-09 PROCEDURE — 99214 OFFICE O/P EST MOD 30 MIN: CPT | Performed by: FAMILY MEDICINE

## 2022-02-09 RX ORDER — FUROSEMIDE 20 MG
TABLET ORAL
Qty: 90 TABLET | Refills: 1 | Status: SHIPPED | OUTPATIENT
Start: 2022-02-09 | End: 2022-07-20

## 2022-02-09 ASSESSMENT — MIFFLIN-ST. JEOR: SCORE: 1293.23

## 2022-02-09 NOTE — PATIENT INSTRUCTIONS
Schedule wellness visit in September or October.   I sent refills of your furosemide. In the future, let's have your cardiologist refill that one medication.

## 2022-02-09 NOTE — PROGRESS NOTES
Assessment & Plan        COPD, mild  Occasional SALAZAR, unchanged.   Denies wheezing or coughing. Tried Spiriva in the past, but did not feel it made much difference.  When she saw the lung specialist in 2019 they did not feel like she necessarily needed to be on any inhaler at that time and last she was noticing benefit from the Spiriva.  She had been given Anoro, which had been cost prohibitive.    Hypertension  controlled  Continues lisinopril 40 mg daily, metoprolol 50 mg twice daily, amlodipine 10 mg daily, furosemide 20 mg daily.  She continues on potassium 10meq daily as she is on lasix. CMP and urine albumin ordered      Chronic diastolic CHF (congestive heart failure) (H)  Followed by cardiology and stable.  Graduated from core clinic.  Continues to monitor her weight and salt intake.  Doing well.  I refilled her furosemide 20 mg daily and asked that she have further refills through cardiology who has been following her for heart failure.  She has an echocardiogram planned in May per her report.  Last seen by cardiology on 10/28/2021 and stable.      Hyperlipidemia LDL goal <70  Continue atorvastatin 40 mg daily      PAF (paroxysmal atrial fibrillation) (H)   SSS (sick sinus syndrome) (H)   S/P cardiac pacemaker procedure  Followed by cardiology.  Continues apixaban 5 mg twice daily. Last visit with cardiology on 10/28/2021 and stable.  Note reviewed today.         Osteoporosis  Continues alendronate. Last DEXA was in October.  Unfortunately, she had a DEXA scan in 2020, which was not seen by me at the time of her last visit and she was charged for the DEXA due to the fact that it was done too early.   Continues calcium and vitamin d supplementation.       CKD III  Stable. Not taking NSAID medication.   Continues on ACEI          Macular degeneration  Follows with ophthalmology                 Return in about 8 months (around 10/9/2022) for Routine preventive.    Mikala Philip MD   [facility]  "JONO Mei is a 81 year old who presents for the following health issues     History of Present Illness       She eats 0-1 servings of fruits and vegetables daily.She consumes 1 sweetened beverage(s) daily.She exercises with enough effort to increase her heart rate 30 to 60 minutes per day.  She exercises with enough effort to increase her heart rate 3 or less days per week.   She is taking medications regularly.         REFILLS FOR furosemide (LASIX) 20 MG tablet      Medication Followup of furosemide (LASIX) 20 MG tablet      Taking Medication as prescribed: yes    Side Effects:  None    Medication Helping Symptoms:  yes       doing well. Has mild SALAZAR with climbing stairs longstanding and unchanged. Denies wheezing or coughing. Feeling well in general. Daughter, who was diagnosed with Lewy body dementia will be seeing Lewy body dementia specialist at [place].          Review of Systems          Objective    /73 (BP Location: Right arm, Patient Position: Chair, Cuff Size: Adult Regular)   Pulse 66   Temp 97  F (36.1  C) (Temporal)   Resp 15   Ht 1.626 m (5' 4\")   Wt 84.8 kg (187 lb)   LMP  (LMP Unknown)   SpO2 99%   BMI 32.10 kg/m    Body mass index is 32.1 kg/m .  Physical Exam   GENERAL: healthy, alert and no distress                  "

## 2022-04-12 DIAGNOSIS — E87.6 HYPOKALEMIA: ICD-10-CM

## 2022-04-12 RX ORDER — POTASSIUM CHLORIDE 750 MG/1
TABLET, EXTENDED RELEASE ORAL
Qty: 90 TABLET | Refills: 1 | Status: SHIPPED | OUTPATIENT
Start: 2022-04-12 | End: 2022-10-19

## 2022-05-04 ENCOUNTER — ANCILLARY PROCEDURE (OUTPATIENT)
Dept: CARDIOLOGY | Facility: CLINIC | Age: 82
End: 2022-05-04
Attending: INTERNAL MEDICINE
Payer: COMMERCIAL

## 2022-05-04 DIAGNOSIS — I49.5 SSS (SICK SINUS SYNDROME) (H): Primary | ICD-10-CM

## 2022-05-04 DIAGNOSIS — Z95.0 CARDIAC PACEMAKER IN SITU: ICD-10-CM

## 2022-05-04 PROCEDURE — 93280 PM DEVICE PROGR EVAL DUAL: CPT | Performed by: INTERNAL MEDICINE

## 2022-05-18 LAB
MDC_IDC_LEAD_IMPLANT_DT: NORMAL
MDC_IDC_LEAD_IMPLANT_DT: NORMAL
MDC_IDC_LEAD_LOCATION: NORMAL
MDC_IDC_LEAD_LOCATION: NORMAL
MDC_IDC_LEAD_LOCATION_DETAIL_1: NORMAL
MDC_IDC_LEAD_LOCATION_DETAIL_1: NORMAL
MDC_IDC_LEAD_MFG: NORMAL
MDC_IDC_LEAD_MFG: NORMAL
MDC_IDC_LEAD_MODEL: NORMAL
MDC_IDC_LEAD_MODEL: NORMAL
MDC_IDC_LEAD_POLARITY_TYPE: NORMAL
MDC_IDC_LEAD_POLARITY_TYPE: NORMAL
MDC_IDC_LEAD_SERIAL: NORMAL
MDC_IDC_LEAD_SERIAL: NORMAL
MDC_IDC_MSMT_BATTERY_REMAINING_LONGEVITY: 133 MO
MDC_IDC_MSMT_BATTERY_STATUS: NORMAL
MDC_IDC_MSMT_BATTERY_VOLTAGE: 2.98 V
MDC_IDC_MSMT_LEADCHNL_RA_IMPEDANCE_VALUE: 562.5 OHM
MDC_IDC_MSMT_LEADCHNL_RA_PACING_THRESHOLD_AMPLITUDE: 0.38 V
MDC_IDC_MSMT_LEADCHNL_RA_PACING_THRESHOLD_AMPLITUDE: 0.5 V
MDC_IDC_MSMT_LEADCHNL_RA_PACING_THRESHOLD_PULSEWIDTH: 0.4 MS
MDC_IDC_MSMT_LEADCHNL_RA_PACING_THRESHOLD_PULSEWIDTH: 0.4 MS
MDC_IDC_MSMT_LEADCHNL_RA_SENSING_INTR_AMPL: 4.1 MV
MDC_IDC_MSMT_LEADCHNL_RV_IMPEDANCE_VALUE: 587.5 OHM
MDC_IDC_MSMT_LEADCHNL_RV_PACING_THRESHOLD_AMPLITUDE: 0.75 V
MDC_IDC_MSMT_LEADCHNL_RV_PACING_THRESHOLD_AMPLITUDE: 0.75 V
MDC_IDC_MSMT_LEADCHNL_RV_PACING_THRESHOLD_PULSEWIDTH: 0.4 MS
MDC_IDC_MSMT_LEADCHNL_RV_PACING_THRESHOLD_PULSEWIDTH: 0.4 MS
MDC_IDC_MSMT_LEADCHNL_RV_SENSING_INTR_AMPL: 11 MV
MDC_IDC_PG_IMPLANT_DTM: NORMAL
MDC_IDC_PG_MFG: NORMAL
MDC_IDC_PG_MODEL: NORMAL
MDC_IDC_PG_SERIAL: NORMAL
MDC_IDC_PG_TYPE: NORMAL
MDC_IDC_SESS_CLINIC_NAME: NORMAL
MDC_IDC_SESS_DTM: NORMAL
MDC_IDC_SESS_TYPE: NORMAL
MDC_IDC_SET_BRADY_AT_MODE_SWITCH_MODE: NORMAL
MDC_IDC_SET_BRADY_AT_MODE_SWITCH_RATE: 170 {BEATS}/MIN
MDC_IDC_SET_BRADY_HYSTRATE: NORMAL
MDC_IDC_SET_BRADY_LOWRATE: 60 {BEATS}/MIN
MDC_IDC_SET_BRADY_MAX_SENSOR_RATE: 120 {BEATS}/MIN
MDC_IDC_SET_BRADY_MAX_TRACKING_RATE: 120 {BEATS}/MIN
MDC_IDC_SET_BRADY_MODE: NORMAL
MDC_IDC_SET_BRADY_NIGHT_RATE: NORMAL
MDC_IDC_SET_BRADY_PAV_DELAY_LOW: 250 MS
MDC_IDC_SET_BRADY_SAV_DELAY_LOW: 225 MS
MDC_IDC_SET_LEADCHNL_RA_PACING_AMPLITUDE: 1.38
MDC_IDC_SET_LEADCHNL_RA_PACING_CAPTURE_MODE: NORMAL
MDC_IDC_SET_LEADCHNL_RA_PACING_POLARITY: NORMAL
MDC_IDC_SET_LEADCHNL_RA_PACING_PULSEWIDTH: 0.4 MS
MDC_IDC_SET_LEADCHNL_RA_SENSING_ADAPTATION_MODE: NORMAL
MDC_IDC_SET_LEADCHNL_RA_SENSING_POLARITY: NORMAL
MDC_IDC_SET_LEADCHNL_RA_SENSING_SENSITIVITY: 0.3 MV
MDC_IDC_SET_LEADCHNL_RV_PACING_AMPLITUDE: 0.88
MDC_IDC_SET_LEADCHNL_RV_PACING_CAPTURE_MODE: NORMAL
MDC_IDC_SET_LEADCHNL_RV_PACING_POLARITY: NORMAL
MDC_IDC_SET_LEADCHNL_RV_PACING_PULSEWIDTH: 0.4 MS
MDC_IDC_SET_LEADCHNL_RV_SENSING_POLARITY: NORMAL
MDC_IDC_SET_LEADCHNL_RV_SENSING_SENSITIVITY: 0.5 MV
MDC_IDC_STAT_AT_MODE_SW_COUNT: 0
MDC_IDC_STAT_BRADY_RA_PERCENT_PACED: 45 %
MDC_IDC_STAT_BRADY_RV_PERCENT_PACED: 99.97 %

## 2022-07-20 ENCOUNTER — TELEPHONE (OUTPATIENT)
Dept: CARDIOLOGY | Facility: CLINIC | Age: 82
End: 2022-07-20

## 2022-07-20 ENCOUNTER — OFFICE VISIT (OUTPATIENT)
Dept: FAMILY MEDICINE | Facility: CLINIC | Age: 82
End: 2022-07-20
Payer: COMMERCIAL

## 2022-07-20 VITALS
WEIGHT: 180 LBS | OXYGEN SATURATION: 93 % | BODY MASS INDEX: 31.89 KG/M2 | DIASTOLIC BLOOD PRESSURE: 73 MMHG | HEIGHT: 63 IN | HEART RATE: 70 BPM | TEMPERATURE: 97.3 F | SYSTOLIC BLOOD PRESSURE: 128 MMHG

## 2022-07-20 DIAGNOSIS — E78.5 HYPERLIPIDEMIA LDL GOAL <70: ICD-10-CM

## 2022-07-20 DIAGNOSIS — I10 HYPERTENSION GOAL BP (BLOOD PRESSURE) < 140/90: ICD-10-CM

## 2022-07-20 DIAGNOSIS — N18.30 STAGE 3 CHRONIC KIDNEY DISEASE, UNSPECIFIED WHETHER STAGE 3A OR 3B CKD (H): ICD-10-CM

## 2022-07-20 DIAGNOSIS — M54.6 BILATERAL THORACIC BACK PAIN, UNSPECIFIED CHRONICITY: ICD-10-CM

## 2022-07-20 DIAGNOSIS — J44.9 CHRONIC OBSTRUCTIVE PULMONARY DISEASE, UNSPECIFIED COPD TYPE (H): ICD-10-CM

## 2022-07-20 DIAGNOSIS — I50.32 CHRONIC DIASTOLIC CHF (CONGESTIVE HEART FAILURE) (H): ICD-10-CM

## 2022-07-20 DIAGNOSIS — I27.20 PULMONARY HYPERTENSION (H): ICD-10-CM

## 2022-07-20 DIAGNOSIS — I48.0 PAF (PAROXYSMAL ATRIAL FIBRILLATION) (H): ICD-10-CM

## 2022-07-20 DIAGNOSIS — R53.83 FATIGUE, UNSPECIFIED TYPE: Primary | ICD-10-CM

## 2022-07-20 DIAGNOSIS — R06.02 SOB (SHORTNESS OF BREATH): ICD-10-CM

## 2022-07-20 DIAGNOSIS — Z95.0 S/P CARDIAC PACEMAKER PROCEDURE: ICD-10-CM

## 2022-07-20 LAB
ERYTHROCYTE [DISTWIDTH] IN BLOOD BY AUTOMATED COUNT: 12.9 % (ref 10–15)
HCT VFR BLD AUTO: 38.4 % (ref 35–47)
HGB BLD-MCNC: 12.5 G/DL (ref 11.7–15.7)
MCH RBC QN AUTO: 28 PG (ref 26.5–33)
MCHC RBC AUTO-ENTMCNC: 32.6 G/DL (ref 31.5–36.5)
MCV RBC AUTO: 86 FL (ref 78–100)
NT-PROBNP SERPL-MCNC: 1628 PG/ML (ref 0–450)
PLATELET # BLD AUTO: 531 10E3/UL (ref 150–450)
RBC # BLD AUTO: 4.47 10E6/UL (ref 3.8–5.2)
WBC # BLD AUTO: 9.9 10E3/UL (ref 4–11)

## 2022-07-20 PROCEDURE — 83880 ASSAY OF NATRIURETIC PEPTIDE: CPT | Performed by: FAMILY MEDICINE

## 2022-07-20 PROCEDURE — 85027 COMPLETE CBC AUTOMATED: CPT | Performed by: FAMILY MEDICINE

## 2022-07-20 PROCEDURE — 36415 COLL VENOUS BLD VENIPUNCTURE: CPT | Performed by: FAMILY MEDICINE

## 2022-07-20 PROCEDURE — 84443 ASSAY THYROID STIM HORMONE: CPT | Performed by: FAMILY MEDICINE

## 2022-07-20 PROCEDURE — 99215 OFFICE O/P EST HI 40 MIN: CPT | Performed by: FAMILY MEDICINE

## 2022-07-20 PROCEDURE — 80053 COMPREHEN METABOLIC PANEL: CPT | Performed by: FAMILY MEDICINE

## 2022-07-20 RX ORDER — ALBUTEROL SULFATE 90 UG/1
2 AEROSOL, METERED RESPIRATORY (INHALATION) EVERY 6 HOURS
Qty: 18 G | Refills: 3 | Status: ON HOLD | OUTPATIENT
Start: 2022-07-20 | End: 2023-12-22

## 2022-07-20 RX ORDER — TIOTROPIUM BROMIDE 18 UG/1
18 CAPSULE ORAL; RESPIRATORY (INHALATION) DAILY
Qty: 30 CAPSULE | Refills: 1 | Status: ON HOLD | OUTPATIENT
Start: 2022-07-20 | End: 2023-12-22

## 2022-07-20 RX ORDER — FUROSEMIDE 20 MG
TABLET ORAL
Qty: 90 TABLET | Refills: 1 | Status: SHIPPED | OUTPATIENT
Start: 2022-07-20 | End: 2022-09-12

## 2022-07-20 NOTE — PATIENT INSTRUCTIONS
Schedule an echocardiogram  Schedule an appointment with cardiology  Start weighing yourself daily again.   Let me know in the meantime if your shortness of breath is getting worse  Start the spiriva for your breathing  You can also use the albuterol as needed for shortness of breath.   Schedule a visit with pulmonology   For your back pain, schedule with sports medicine.   Avoid tylenol PM. You can take melatonin 3mg at bedtime to   For pain, take tylenol 1000mg three times daily and use topical pain creams such as biofreeze and aspercream (if it helps)

## 2022-07-20 NOTE — PROGRESS NOTES
Assessment & Plan        Fatigue  SOB  Gradually increasing over time, more noticeable past few months. Suspect multifactorial. Will evaluate for HF with repeat echo, BNP and recommend follow up with cardiology. Denies PND, orthopnea, edema, weight gain (though not monitoring her weight). For COPD will restart spiriva, albuterol prn (refills given today). Grief may also be contributing as she lost her best friend of 77 years and was unable to see her or be at the .   BMP, CBC, TSH, BNP, Echocardiogram    Chronic diastolic CHF (congestive heart failure) (H)  With possible acute on chronic HF as noted above. eval as above, follow up with cardiology, avoid salty foods, start daily weights again.     Graduated from [facility] clinic .  Continues to monitor her weight and salt intake.  Doing well.  I refilled her furosemide 20 mg daily and asked that she have further refills through cardiology who has been following her for heart failure.  She has an echocardiogram planned in May per her report.  Last seen by cardiology on 10/28/2021 and stable.      COPD   SOB as noted above. Will restart spiriva and refill albuterol. Recommended pulmonology follow up.     Denies wheezing or coughing. Tried Spiriva in the past, but was not sure if it made much difference.  When she saw the lung specialist in 2019 they did not feel like she necessarily needed to be on any inhaler at that time and last she was noticing benefit from the Spiriva.  She had been given Anoro, which had been cost prohibitive.     Back pain  Present for years, but somewhat worse over the past few months. Entire back is affected, thoracic region more significantly so without radicular symptoms. No recent history of trauma. Improves with biofreeze. Referred to ortho. Evidence of mild T7 and T11 compression 10/11/21 DEXA with multilevel degenerative changes present.       Hypertension  controlled  Continues lisinopril 40 mg daily, metoprolol 50 mg twice  "daily, amlodipine 10 mg daily, furosemide 20 mg daily.  She continues on potassium 10meq daily as she is on lasix.          Hyperlipidemia LDL goal <70  Continue atorvastatin 40 mg daily      PAF (paroxysmal atrial fibrillation) (H)   SSS (sick sinus syndrome) (H)   S/P cardiac pacemaker procedure  Followed by cardiology.  Continues apixaban 5 mg twice daily. Last visit with cardiology on 10/28/2021 and stable.            Osteoporosis  Continues alendronate. Last DEXA was in October.  Unfortunately, she had a DEXA scan in 2020, which was not seen by me at the time of her last visit and she was charged for the DEXA due to the fact that it was done too early.   Continues calcium and vitamin d supplementation.       CKD III  Stable. Not taking NSAID medication.   Continues on ACEI          Macular degeneration  Follows with ophthalmology                  BMI:   Estimated body mass index is 32.14 kg/m  as calculated from the following:    Height as of this encounter: 1.594 m (5' 2.75\").    Weight as of this encounter: 81.6 kg (180 lb).        Patient Instructions   1. Schedule an echocardiogram  2. Schedule an appointment with cardiology  3. Start weighing yourself daily again.   4. Let me know in the meantime if your shortness of breath is getting worse  5. Start the spiriva for your breathing  6. You can also use the albuterol as needed for shortness of breath.   7. Schedule a visit with pulmonology   8. For your back pain, schedule with sports medicine.   9. Avoid tylenol PM. You can take melatonin 3mg at bedtime to   10. For pain, take tylenol 1000mg three times daily and use topical pain creams such as biofreeze and aspercream (if it helps)   45 minutes spent on the date of the encounter doing chart review, history and exam, documentation and further activities per the note     Return in about 3 weeks (around 8/10/2022) for Follow up, using a video visit.    Mikala Philip MD   [facility] " JOSY Mei is a 82 year old, presenting for the following health issues:  Recheck Medication and Back Pain    Additional: Same feeling she had as when she went into heart failure     History of Present Illness       Back Pain:  She presents for follow up of back pain. Patient's back pain is a chronic problem.  Location of back pain:  Right lower back, left lower back, right middle of back, left middle of back, right upper back, left upper back, right side of neck, left side of neck, right shoulder and left shoulder  Description of back pain: dull ache  Back pain spreads: right shoulder, left shoulder, right side of neck and left side of neck    Since patient first noticed back pain, pain is: gradually worsening  Does back pain interfere with her job:  Not applicable      Reason for visit:  General fatigue and shortness of breath    She eats 0-1 servings of fruits and vegetables daily.She consumes 3 sweetened beverage(s) daily.She exercises with enough effort to increase her heart rate 9 or less minutes per day.  She exercises with enough effort to increase her heart rate 3 or less days per week. She is missing 1 dose(s) of medications per week.  She is not taking prescribed medications regularly due to remembering to take.       Her granddaughter Zoe is with her today.  Hamida reports that over the past few months she has been more fatigued and short of breath--actually more over the past year.  Her granddaughter has noticed that she has been less active and reports that she has also been complaining more often of back pain, both when she is sitting and when she goes to the grocery store.  Seems more tired than usual.  She denies PND, orthopnea, peripheral edema, chest pain.  She recently lost her best friend of 77 years who  of dementia.  She was unable to be with her at the end of her life and was unable to go to the .  Her friend's   recently too as did Hamida's cousin. Her  "granddaughter wonders if some of her symptoms are related to grief.  Her granddaughter has checked her blood pressure and says it has been in normal range.  However, her oxygen level has been a little low.  Regarding her back pain, the back pain occurs throughout her back lower back to upper back, but mostly thoracic region.  She denies any radiation of the pain. Unchanged in quality over time, just more frequent.     Review of Systems          Objective    /73 (BP Location: Right arm, Patient Position: Sitting, Cuff Size: Adult Regular)   Pulse 70   Temp 97.3  F (36.3  C) (Temporal)   Ht 1.594 m (5' 2.75\")   Wt 81.6 kg (180 lb)   LMP  (LMP Unknown)   SpO2 93%   BMI 32.14 kg/m    Body mass index is 32.14 kg/m .  Physical Exam   GENERAL: appears healthy, alert and no distress  RESP: lungs clear to auscultation - no rales, rhonchi or wheezes  CV: regular rate and rhythm, no peripheral edema    BACK: no vertebral tenderness. There is ttp over the paraspinal muscles primarily in the thoracic region bilaterally, left > right.         .  ..   Recent Results (from the past 24 hour(s))   CBC with platelets    Collection Time: 07/20/22 12:46 PM   Result Value Ref Range    WBC Count 9.9 4.0 - 11.0 10e3/uL    RBC Count 4.47 3.80 - 5.20 10e6/uL    Hemoglobin 12.5 11.7 - 15.7 g/dL    Hematocrit 38.4 35.0 - 47.0 %    MCV 86 78 - 100 fL    MCH 28.0 26.5 - 33.0 pg    MCHC 32.6 31.5 - 36.5 g/dL    RDW 12.9 10.0 - 15.0 %    Platelet Count 531 (H) 150 - 450 10e3/uL     "

## 2022-07-20 NOTE — TELEPHONE ENCOUNTER
[facility] Call Center    Phone Message    May a detailed message be left on voicemail: yes     Reason for Call: Appointment Intake    Referring Provider Name: Dr. Mikala Philip    This is a pt of Dr. Mallory who has her appointments set up in October for her annual f/u.     Dr. Philip wants pt seen sooner, echo, etc.     I am forwarding this to Dr. Mallory's nurse to decide which is best for pt to have tests done, etc.    Thank you!

## 2022-07-21 ENCOUNTER — TELEPHONE (OUTPATIENT)
Dept: FAMILY MEDICINE | Facility: CLINIC | Age: 82
End: 2022-07-21

## 2022-07-21 DIAGNOSIS — R79.89 ELEVATED PLATELET COUNT: Primary | ICD-10-CM

## 2022-07-21 LAB
ALBUMIN SERPL-MCNC: 3.1 G/DL (ref 3.4–5)
ALP SERPL-CCNC: 59 U/L (ref 40–150)
ALT SERPL W P-5'-P-CCNC: 31 U/L (ref 0–50)
ANION GAP SERPL CALCULATED.3IONS-SCNC: 6 MMOL/L (ref 3–14)
AST SERPL W P-5'-P-CCNC: 18 U/L (ref 0–45)
BILIRUB SERPL-MCNC: 0.4 MG/DL (ref 0.2–1.3)
BUN SERPL-MCNC: 15 MG/DL (ref 7–30)
CALCIUM SERPL-MCNC: 9.7 MG/DL (ref 8.5–10.1)
CHLORIDE BLD-SCNC: 105 MMOL/L (ref 94–109)
CO2 SERPL-SCNC: 28 MMOL/L (ref 20–32)
CREAT SERPL-MCNC: 0.95 MG/DL (ref 0.52–1.04)
GFR SERPL CREATININE-BSD FRML MDRD: 59 ML/MIN/1.73M2
GLUCOSE BLD-MCNC: 91 MG/DL (ref 70–99)
POTASSIUM BLD-SCNC: 4.1 MMOL/L (ref 3.4–5.3)
PROT SERPL-MCNC: 7.1 G/DL (ref 6.8–8.8)
SODIUM SERPL-SCNC: 139 MMOL/L (ref 133–144)
TSH SERPL DL<=0.005 MIU/L-ACNC: 0.72 MU/L (ref 0.4–4)

## 2022-07-21 NOTE — TELEPHONE ENCOUNTER
Writer called patient and reviewed message per Dr. Philip.    Patient verbalized understanding and in agreement with plan.    Lab appt scheduled on 7/27/22.  Lab visit date, time and location confirmed with patient. Patient informed she does not need to fast for lab visit.    DANIEL BlankN, RN  [facility]

## 2022-07-21 NOTE — TELEPHONE ENCOUNTER
RN --please call Hamida to let her know that her lab results have returned.  Her BNP is high, indicating likely worsening heart failure.  I see that she already has an appointment for an echocardiogram and a visit with cardiology scheduled.   Please recommend that she keep a low-salt diet (<2 g per day), weigh herself daily, and if she is feeling worse in the meantime or gaining weight, call cardiology clinic. If unable to contact or feeling significantly worse, she should go to the emergency room. All of her other lab results are stable. Her platelet count was a little high (and has been in the past), which may be related to the HF. I would like to repeat her CBC in the next week Lab ordered. Mikala Philip M.D.        Recent Results (from the past 240 hour(s))   CBC with platelets    Collection Time: 07/20/22 12:46 PM   Result Value Ref Range    WBC Count 9.9 4.0 - 11.0 10e3/uL    RBC Count 4.47 3.80 - 5.20 10e6/uL    Hemoglobin 12.5 11.7 - 15.7 g/dL    Hematocrit 38.4 35.0 - 47.0 %    MCV 86 78 - 100 fL    MCH 28.0 26.5 - 33.0 pg    MCHC 32.6 31.5 - 36.5 g/dL    RDW 12.9 10.0 - 15.0 %    Platelet Count 531 (H) 150 - 450 10e3/uL   Comprehensive metabolic panel (BMP + Alb, Alk Phos, ALT, AST, Total. Bili, TP)    Collection Time: 07/20/22 12:46 PM   Result Value Ref Range    Sodium 139 133 - 144 mmol/L    Potassium 4.1 3.4 - 5.3 mmol/L    Chloride 105 94 - 109 mmol/L    Carbon Dioxide (CO2) 28 20 - 32 mmol/L    Anion Gap 6 3 - 14 mmol/L    Urea Nitrogen 15 7 - 30 mg/dL    Creatinine 0.95 0.52 - 1.04 mg/dL    Calcium 9.7 8.5 - 10.1 mg/dL    Glucose 91 70 - 99 mg/dL    Alkaline Phosphatase 59 40 - 150 U/L    AST 18 0 - 45 U/L    ALT 31 0 - 50 U/L    Protein Total 7.1 6.8 - 8.8 g/dL    Albumin 3.1 (L) 3.4 - 5.0 g/dL    Bilirubin Total 0.4 0.2 - 1.3 mg/dL    GFR Estimate 59 (L) >60 mL/min/1.73m2   TSH with free T4 reflex    Collection Time: 07/20/22 12:46 PM   Result Value Ref Range    TSH 0.72 0.40 - 4.00 mU/L   BNP-N  terminal pro    Collection Time: 07/20/22 12:46 PM   Result Value Ref Range    N Terminal Pro BNP Outpatient 1,628 (H) 0 - 450 pg/mL

## 2022-07-27 ENCOUNTER — LAB (OUTPATIENT)
Dept: LAB | Facility: CLINIC | Age: 82
End: 2022-07-27
Payer: COMMERCIAL

## 2022-07-27 DIAGNOSIS — R79.89 ELEVATED PLATELET COUNT: ICD-10-CM

## 2022-07-27 LAB
BASOPHILS # BLD AUTO: 0.1 10E3/UL (ref 0–0.2)
BASOPHILS NFR BLD AUTO: 1 %
EOSINOPHIL # BLD AUTO: 0.6 10E3/UL (ref 0–0.7)
EOSINOPHIL NFR BLD AUTO: 6 %
ERYTHROCYTE [DISTWIDTH] IN BLOOD BY AUTOMATED COUNT: 13.3 % (ref 10–15)
HCT VFR BLD AUTO: 40.5 % (ref 35–47)
HGB BLD-MCNC: 13.1 G/DL (ref 11.7–15.7)
IMM GRANULOCYTES # BLD: 0 10E3/UL
IMM GRANULOCYTES NFR BLD: 0 %
LYMPHOCYTES # BLD AUTO: 1.8 10E3/UL (ref 0.8–5.3)
LYMPHOCYTES NFR BLD AUTO: 21 %
MCH RBC QN AUTO: 27.9 PG (ref 26.5–33)
MCHC RBC AUTO-ENTMCNC: 32.3 G/DL (ref 31.5–36.5)
MCV RBC AUTO: 86 FL (ref 78–100)
MONOCYTES # BLD AUTO: 0.9 10E3/UL (ref 0–1.3)
MONOCYTES NFR BLD AUTO: 10 %
NEUTROPHILS # BLD AUTO: 5.4 10E3/UL (ref 1.6–8.3)
NEUTROPHILS NFR BLD AUTO: 62 %
PLATELET # BLD AUTO: 545 10E3/UL (ref 150–450)
RBC # BLD AUTO: 4.7 10E6/UL (ref 3.8–5.2)
WBC # BLD AUTO: 8.7 10E3/UL (ref 4–11)

## 2022-07-27 PROCEDURE — 36415 COLL VENOUS BLD VENIPUNCTURE: CPT

## 2022-07-27 PROCEDURE — 85025 COMPLETE CBC W/AUTO DIFF WBC: CPT

## 2022-08-04 ENCOUNTER — ANCILLARY PROCEDURE (OUTPATIENT)
Dept: CARDIOLOGY | Facility: CLINIC | Age: 82
End: 2022-08-04
Attending: INTERNAL MEDICINE
Payer: COMMERCIAL

## 2022-08-04 DIAGNOSIS — I49.5 SSS (SICK SINUS SYNDROME) (H): ICD-10-CM

## 2022-08-04 DIAGNOSIS — Z95.0 CARDIAC PACEMAKER IN SITU: ICD-10-CM

## 2022-08-04 PROCEDURE — 93294 REM INTERROG EVL PM/LDLS PM: CPT | Performed by: INTERNAL MEDICINE

## 2022-08-04 PROCEDURE — 93296 REM INTERROG EVL PM/IDS: CPT | Performed by: INTERNAL MEDICINE

## 2022-08-05 ENCOUNTER — HOSPITAL ENCOUNTER (OUTPATIENT)
Dept: CARDIOLOGY | Facility: CLINIC | Age: 82
Discharge: HOME | End: 2022-08-05
Attending: FAMILY MEDICINE | Admitting: FAMILY MEDICINE
Payer: COMMERCIAL

## 2022-08-05 ENCOUNTER — TELEPHONE (OUTPATIENT)
Dept: FAMILY MEDICINE | Facility: CLINIC | Age: 82
End: 2022-08-05

## 2022-08-05 DIAGNOSIS — R53.83 FATIGUE, UNSPECIFIED TYPE: ICD-10-CM

## 2022-08-05 DIAGNOSIS — R06.02 SOB (SHORTNESS OF BREATH): ICD-10-CM

## 2022-08-05 DIAGNOSIS — I50.32 CHRONIC DIASTOLIC CHF (CONGESTIVE HEART FAILURE) (H): ICD-10-CM

## 2022-08-05 LAB — LVEF ECHO: NORMAL

## 2022-08-05 PROCEDURE — 93306 TTE W/DOPPLER COMPLETE: CPT | Mod: 26 | Performed by: INTERNAL MEDICINE

## 2022-08-05 PROCEDURE — 93306 TTE W/DOPPLER COMPLETE: CPT

## 2022-08-05 NOTE — TELEPHONE ENCOUNTER
Left message on patient's voicemail to call back and speak with a triage nurse.     Pt already has video appt with Dr. Philip scheduled 8/16    DANIEL MoonN RN  [facility]

## 2022-08-05 NOTE — TELEPHONE ENCOUNTER
RN -- please call Hamida to let her know that there blood counts were stable with ongoing increase in platelets.  I suspect this may be reactive thrombocytosis due to HF. Echo is scheduled today and she has appt with cardiology next week. I would like to follow up with her in two weeks via video or telephone visit. We will talk about the need for further labs at that time. Please schedule. Mikala Philip M.D.

## 2022-08-08 DIAGNOSIS — I48.0 PAF (PAROXYSMAL ATRIAL FIBRILLATION) (H): ICD-10-CM

## 2022-08-08 NOTE — TELEPHONE ENCOUNTER
Received refill request for:  Xarelto  Last OV was: 10/28/21 Dr. SHULTZ  Labs/EKG: CBC 7/27/22  F/U scheduled:   Date Time Provider Department Center   8/10/2022  1:50 PM Sandrine Glover PA-C [facility]   8/16/2022  8:00 AM Mikala Philip MD [facility]   10/17/2022  8:15 AM [facility]   10/17/2022  8:45 AM [id number] [facility]   10/21/2022  8:15 AM Roby Mallory MD [facility]   11/10/2022 12:00 AM [facility] TECH1 [facility]     Pharmacy: [facility] Cardiology Refill Guideline reviewed.  Medication meets criteria for refill.    Charlotte Garrett RN, BSN  08/08/22 at 10:03 AM

## 2022-08-08 NOTE — TELEPHONE ENCOUNTER
Patient called back and was informed of message per Dr. Philip.    Patient verbalized understanding and in agreement with plan.    Patient confirmed she completed echocardiogram on 8/5/22, has Cardiology appt and follow up video visit with Dr. Philip scheduled.    DANE Blank, RN  [facility]

## 2022-08-10 ENCOUNTER — OFFICE VISIT (OUTPATIENT)
Dept: CARDIOLOGY | Facility: CLINIC | Age: 82
End: 2022-08-10
Attending: FAMILY MEDICINE
Payer: COMMERCIAL

## 2022-08-10 VITALS
WEIGHT: 178.8 LBS | SYSTOLIC BLOOD PRESSURE: 100 MMHG | DIASTOLIC BLOOD PRESSURE: 68 MMHG | HEIGHT: 63 IN | OXYGEN SATURATION: 96 % | BODY MASS INDEX: 31.68 KG/M2 | HEART RATE: 72 BPM

## 2022-08-10 DIAGNOSIS — I50.32 CHRONIC DIASTOLIC CHF (CONGESTIVE HEART FAILURE) (H): ICD-10-CM

## 2022-08-10 DIAGNOSIS — I50.20 HEART FAILURE WITH REDUCED EJECTION FRACTION, NYHA CLASS II (H): Primary | ICD-10-CM

## 2022-08-10 DIAGNOSIS — R06.02 SOB (SHORTNESS OF BREATH): ICD-10-CM

## 2022-08-10 DIAGNOSIS — R53.83 FATIGUE, UNSPECIFIED TYPE: ICD-10-CM

## 2022-08-10 PROCEDURE — 99214 OFFICE O/P EST MOD 30 MIN: CPT | Performed by: PHYSICIAN ASSISTANT

## 2022-08-10 RX ORDER — SACUBITRIL AND VALSARTAN 49; 51 MG/1; MG/1
1 TABLET, FILM COATED ORAL 2 TIMES DAILY
Qty: 60 TABLET | Refills: 11 | Status: SHIPPED | OUTPATIENT
Start: 2022-08-10 | End: 2022-09-12

## 2022-08-10 NOTE — PROGRESS NOTES
Service Date: 08/10/2022    PRIMARY CARDIOLOGIST:  Roby Mallory MD    REASON FOR VISIT:  Echocardiogram followup.    HISTORY OF PRESENT ILLNESS:  Ms. Verma is an absolutely delightful 82-year-old woman with past medical history significant for the followin.  AFib, failed medical therapy and underwent an AV node ablation.  2.  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  3.  Hypertension.  4.  HFpEF, now found to have a newly reduced EF to 45% on her echocardiogram.  5.  Likely coronary artery disease based on coronary artery calcifications on 2018 CT of chest.  6.  Mild tricuspid regurg.  7.  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    She was last seen by Dr. Mallory last  and had been doing fairly well.  She is anticoagulated her lipids were good and her breathing was okay.  Unfortunately, this summer, she became more progressively short of breath, so much so that she cancelled her birthday celebration at a [employer], which really worried her children as this is her favorite place.  She noticed that things got so severe that would have to stop and rest after going grocery shopping and that was not normal for her.  She is able to change clothes and can take a shower without getting short of breath.  She is also able to go up and down stairs.  It is primarily any distance of walking.  She had some back pain with this dyspnea, but denies any chest pain or chest heaviness.  She also has not had increased edema, denies orthopnea or PND.      She was seen by her primary care provider, Dr. Philip, who ordered an echocardiogram.  Her labs showed her N-terminal proBNP was slightly elevated at 1600.  Creatinine and BUN were normal.  Hemoglobin was normal as was TSH.      She got an echocardiogram, which was reviewed today.  This shows her EF decreased to about 45%-50%.  The study was technically difficult, but it did appear reduced from that  time.  Her valvular disease was stable with mild TR, mildly elevated pulmonary pressures at 31, but a normal IVC.  She had been maintained on Lasix throughout and several inhalers were added to her regimen at that time.    She tells me that last week, she walked across the street to get her echocardiogram done and she had to stop and rest and felt pretty horribly.  Today, though, she was able to walk across the street and able to go up to the elevator without difficulty.  Overall, she feels like she is improving, but not back to herself.  As above, she denies chest pain, syncope, presyncope, orthopnea or PND.    SOCIAL HISTORY:  She has never been a big drinker.  No street drugs.  She has a family history of alcoholism, so she wanted to be sure to avoid that.  She is a former smoker with about a 67-pack-year history.    PHYSICAL EXAMINATION:    GENERAL:  Well-developed, well-nourished woman in no acute distress.  HEENT:  Normocephalic, atraumatic.  HEART:  Regular.  I do not appreciate murmur, rub or gallop.  LUNGS:  Clear, without wheezes, rales or rhonchi.  EXTREMITIES:  Without peripheral edema.  NECK:  The neck veins are flat at 30 degrees.  SKIN:  Warm and dry.    ASSESSMENT AND PLAN:    1.  Newly reduced ejection fraction 45%-50% with progressive dyspnea on exertion of unclear etiology.  It is certainly possible that her dyspnea is secondary to pulmonary disease as it seems fairly improved with just the addition of inhalers.  Of course, we need to work up her new cardiomyopathy as well.    Today, we discussed possible etiologies of her reduced ejection fraction, including coronary artery disease, RV pacing, infection (which she has had none), thyroid, which has been normal.  She has no history of chemotherapy or significant ETOH or street drugs.  At this point, my biggest suspicion would be potentially RV pacing, but we will make sure we do a complete workup.  We discussed doing a nuclear stress test.  She has  no known coronary calcifications on her CT in 2018 and depending on the area of ischemia in the size of ischemia or infarct, would consider a coronary angiogram.  In addition, we are going to try to optimize her medications further.  She is already on lisinopril and metoprolol.  I am going to stop her lisinopril, which is at 40 mg and replace it with Entresto 49/51 mg b.i.d. after a washout.  If her pressures remain low, I would cut back her amlodipine and use more of the Entresto for its other benefits.  This may be expensive for her, and if that is the case, we will work on getting it through [employer].  I am also hopeful the Entresto will give her benefit of improved breathing, etc.  At this point, she appears euvolemic both on her echocardiogram and on physical exam.  We will continue her on Lasix 20 mg.  2.  Possible coronary artery disease as above.  3.  Atrial fibrillation with history of AV node ablation and permanent pacemaker in place.  Heart rates are controlled, so not contributing.  She is maintained on Xarelto appropriately for anticoagulation.  4.  Dyslipidemia.  Last lipid panel  shows LDL 70, HDL 69, total cholesterol 154.  We will continue Lipitor 40 mg.    We will see this patient back in about a month to further evaluate things and discuss her stress test.  She already has followup with Dr. Mauri Chase in October and will keep that visit as well.    Thank you for allowing me to participate in this delightful patient's care.    Sandrine Glover PA-C        D: 08/10/2022   T: 08/10/2022   MT: INDIRA    Name:     ARTURO BOSWELL  MRN:      [id number]        Account:      [id number]   :      1940           Service Date: 08/10/2022       Document: [id number]

## 2022-08-10 NOTE — PATIENT INSTRUCTIONS
Thank you for visiting with me today.    We discussed: your echocardiogram shows your heart is a little weak, the squeeze is about 45% where normal is 55%.      This can be caused by many things including blocked heart arteries, your pacemaker pacing your heart, infections etc.  We'll do some tests to help figure that out.      Medication changes:  stop taking lisinopril.    On Friday morning, start taking Entresto 49/51 mg twice a day. This will help make your heart stronger and should help you breath a little easier.      Follow up: we'll do a stress test to see if you have blocked arteries that may be causing the problem.     I'll see you back in about a month with labs before that visit.      Please call my nurse, Vanesa, at [phone] with any questions or concerns.    Scheduling phone number: [phone]  Reminder: Please bring in all current medications, over the counter supplements and vitamin bottles to your next appointment.

## 2022-08-10 NOTE — LETTER
8/10/2022    Mikala Philip MD, MD  [address] [place] [address]    RE: Hamida Verma       Dear Colleague,     I had the pleasure of seeing Hamida JIMMY Verma in the [facility].  [id number]      HPI and Plan:   See dictation    Orders this Visit:  Orders Placed This Encounter   Procedures     Basic metabolic panel     Follow-Up with Cardiology GORGE     Orders Placed This Encounter   Medications     sacubitril-valsartan (ENTRESTO) 49-51 MG per tablet     Sig: Take 1 tablet by mouth 2 times daily     Dispense:  60 tablet     Refill:  11     Medications Discontinued During This Encounter   Medication Reason     Milk Thistle 200 MG CAPS      lisinopril (ZESTRIL) 40 MG tablet          Encounter Diagnoses   Name Primary?     Fatigue, unspecified type      SOB (shortness of breath)      Chronic diastolic CHF (congestive heart failure) (H)      Heart failure with reduced ejection fraction, NYHA class II (H) Yes       CURRENT MEDICATIONS:  Current Outpatient Medications   Medication Sig Dispense Refill     albuterol (PROAIR HFA/PROVENTIL HFA/VENTOLIN HFA) 108 (90 Base) MCG/ACT inhaler Inhale 2 puffs into the lungs every 6 hours 18 g 3     alendronate (FOSAMAX) 70 MG tablet Take 1 tablet (70 mg) by mouth every 7 days Take 60 minutes before am meal with 8 oz. water. Remain upright for 30 minutes. 12 tablet 3     amLODIPine (NORVASC) 10 MG tablet Take 1 tablet (10 mg) by mouth daily 90 tablet 3     atorvastatin (LIPITOR) 40 MG tablet Take 1 tablet (40 mg) by mouth daily 90 tablet 3     cholecalciferol (VITAMIN  -D) 1000 UNIT capsule Take 1 capsule by mouth daily.       Coral Calcium 133-66.7-133 MG-MG-UNIT CAPS Take 2 tablets by mouth 2 times daily        FLAX SEED OIL OR 1 capsule daily       furosemide (LASIX) 20 MG tablet TAKE ONE TABLET BY MOUTH ONCE DAILY. Please request further refills through cardiology 90 tablet 1     metoprolol succinate ER (TOPROL-XL) 50 MG 24 hr tablet Take 1 tablet (50 mg)  by mouth 2 times daily 180 tablet 3     Multiple Vitamins-Minerals (HAIR SKIN NAILS PO) Take 1 tablet by mouth At Bedtime       potassium chloride ER (KLOR-CON M) 10 MEQ CR tablet TAKE ONE TABLET BY MOUTH ONCE DAILY 90 tablet 1     rivaroxaban ANTICOAGULANT (XARELTO ANTICOAGULANT) 20 MG TABS tablet Take 1 tablet (20 mg) by mouth daily (with dinner) 90 tablet 0     sacubitril-valsartan (ENTRESTO) 49-51 MG per tablet Take 1 tablet by mouth 2 times daily 60 tablet 11     tiotropium (SPIRIVA) 18 MCG inhaled capsule Inhale 1 capsule (18 mcg) into the lungs daily 30 capsule 1       ALLERGIES     Allergies   Allergen Reactions     Strawberries [Strawberry] Anaphylaxis     Strawberry Flavor        PAST MEDICAL HISTORY:  Past Medical History:   Diagnosis Date     Atrial fibrillation (H)      Chronic diastolic CHF (congestive heart failure) (H)      Essential hypertension, benign      HYPERLIPIDEMIA NEC/NOS 3/30/2006     Pulmonary hypertension (H)      SSS (sick sinus syndrome) (H)     s/p PPM 3/2018       PAST SURGICAL HISTORY:  Past Surgical History:   Procedure Laterality Date     EP ABLATION AV NODE N/A 5/7/2019    Procedure: EP Ablation AV Node;  Surgeon: Roe Voss MD;  Location:  [facility]     EYE SURGERY       HYSTERECTOMY, VAGINAL      hysterectomy       FAMILY HISTORY:  Family History   Problem Relation Age of Onset     Cerebrovascular Disease Mother      Glaucoma Mother      Macular Degeneration Mother      Alcohol/Drug Father         alcohol     Myocardial Infarction Father 63        passed away from MI     Family History Negative Sister      Family History Negative Sister      Family History Negative Sister      Cerebrovascular Disease Sister      Family History Negative Brother      Family History Negative Maternal Grandmother      Family History Negative Maternal Grandfather      Family History Negative Paternal Grandmother      Family History Negative Paternal Grandfather      Myocardial  Infarction Son 57        passed away from MI     Dementia Daughter 57        early onset on namenda     Diabetes No family hx of      Breast Cancer No family hx of      Cancer - colorectal No family hx of        SOCIAL HISTORY:  Social History     Socioeconomic History     Marital status:      Spouse name: None     Number of children: None     Years of education: None     Highest education level: None   Tobacco Use     Smoking status: Former Smoker     Packs/day: 1.50     Years: 45.00     Pack years: 67.50     Types: Cigarettes     Start date: 10/28/1955     Quit date: 2000     Years since quittin.9     Smokeless tobacco: Never Used     Tobacco comment: quit 6 yrs ago   Substance and Sexual Activity     Alcohol use: No     Drug use: No     Sexual activity: Yes     Partners: Male   Other Topics Concern     Parent/sibling w/ CABG, MI or angioplasty before 65F 55M? No   Social History Narrative    Balanced Diet - Yes    Osteoporosis Prevention Measures - Dairy servings per day: 2    Regular Exercise -  Yes Describe walk, but not recently due to weather    Dental Exam - Yes    Eye Exam -No    Self Breast Exam - Yes    Abuse: Current or Past (Physical, Sexual or Emotional)- No    Do you feel safe in your environment - Yes    Guns stored in the home - No    Sunscreen used - Yes    Seatbelts used - Yes    Lipids -  1/10    Glucose -  1/10    Colon Cancer Screening - Yes, 08    Hemoccults - NO    Pap Test -  Many yrs ago, has hysterectomy    Do you have any concerns about STD's -  No    Mammography - 08    DEXA - 06    Immunizations reviewed and up to date - No    Jonathan Marshall MA                   Review of Systems:  Skin:  not assessed     Eyes:  not assessed    ENT:  not assessed    Respiratory:  Positive for shortness of breath;cough  Cardiovascular:  Negative    Gastroenterology: not assessed    Genitourinary:  not assessed    Musculoskeletal:  not assessed    Neurologic:  not assessed   "  Psychiatric:  Positive for excessive stress  Heme/Lymph/Imm:  not assessed    Endocrine:  not assessed      Physical Exam:  Vitals: /68 (BP Location: Left arm)   Pulse 72   Ht 1.6 m (5' 3\")   Wt 81.1 kg (178 lb 12.8 oz)   LMP  (LMP Unknown)   SpO2 96%   BMI 31.67 kg/m     Please refer to dictation for physical exam    Recent Lab Results: all reviewed today  CBC RESULTS:  Lab Results   Component Value Date    WBC 8.7 2022    WBC 8.6 07/15/2020    RBC 4.70 2022    RBC 4.49 07/15/2020    HGB 13.1 2022    HGB 12.9 07/15/2020    HCT 40.5 2022    HCT 39.6 07/15/2020    MCV 86 2022    MCV 88 07/15/2020    MCH 27.9 2022    MCH 28.7 07/15/2020    MCHC 32.3 2022    MCHC 32.6 07/15/2020    RDW 13.3 2022    RDW 13.4 07/15/2020     (H) 2022     07/15/2020       BMP RESULTS:  Lab Results   Component Value Date     2022     07/15/2020    POTASSIUM 4.1 2022    POTASSIUM 4.1 07/15/2020    CHLORIDE 105 2022    CHLORIDE 105 07/15/2020    CO2 28 2022    CO2 26 07/15/2020    ANIONGAP 6 2022    ANIONGAP 6 07/15/2020    GLC 91 2022    GLC 92 07/15/2020    BUN 15 2022    BUN 24 07/15/2020    CR 0.95 2022    CR 1.11 (H) 07/15/2020    GFRESTIMATED 59 (L) 2022    GFRESTIMATED 47 (L) 07/15/2020    GFRESTBLACK 54 (L) 07/15/2020    GURWINDER 9.7 2022    GURWINDER 9.5 07/15/2020        INR RESULTS:  Lab Results   Component Value Date    INR 1.31 (H) 2018    INR 1.04 2014           CC  Mikala Philip MD  [address] [place] [address]        Service Date: 08/10/2022    PRIMARY CARDIOLOGIST:  Roby Mallory MD    REASON FOR VISIT:  Echocardiogram followup.    HISTORY OF PRESENT ILLNESS:  Ms. Verma is an absolutely delightful 82-year-old woman with past medical history significant for the followin.  AFib, failed medical therapy and underwent an AV node ablation.  2.  Permanent " pacemaker placed for high-grade conduction disease with AV node ablation after failed medical management.  3.  Hypertension.  4.  HFpEF, now found to have a newly reduced EF to 45% on her echocardiogram.  5.  Likely coronary artery disease based on coronary artery calcifications on 2018 CT of chest.  6.  Mild tricuspid regurg.  7.  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    She was last seen by Dr. Mallory last fall and had been doing fairly well.  She is anticoagulated her lipids were good and her breathing was okay.  Unfortunately, this summer, she became more progressively short of breath, so much so that she cancelled her birthday celebration at a [employer], which really worried her children as this is her favorite place.  She noticed that things got so severe that would have to stop and rest after going grocery shopping and that was not normal for her.  She is able to change clothes and can take a shower without getting short of breath.  She is also able to go up and down stairs.  It is primarily any distance of walking.  She had some back pain with this dyspnea, but denies any chest pain or chest heaviness.  She also has not had increased edema, denies orthopnea or PND.      She was seen by her primary care provider, Dr. Philip, who ordered an echocardiogram.  Her labs showed her N-terminal proBNP was slightly elevated at 1600.  Creatinine and BUN were normal.  Hemoglobin was normal as was TSH.      She got an echocardiogram, which was reviewed today.  This shows her EF decreased to about 45%-50%.  The study was technically difficult, but it did appear reduced from that time.  Her valvular disease was stable with mild TR, mildly elevated pulmonary pressures at 31, but a normal IVC.  She had been maintained on Lasix throughout and several inhalers were added to her regimen at that time.    She tells me that last week, she walked across the street to get her echocardiogram done and she had to stop and rest and felt  pretty horribly.  Today, though, she was able to walk across the street and able to go up to the elevator without difficulty.  Overall, she feels like she is improving, but not back to herself.  As above, she denies chest pain, syncope, presyncope, orthopnea or PND.    SOCIAL HISTORY:  She has never been a big drinker.  No street drugs.  She has a family history of alcoholism, so she wanted to be sure to avoid that.  She is a former smoker with about a 67-pack-year history.    PHYSICAL EXAMINATION:    GENERAL:  Well-developed, well-nourished woman in no acute distress.  HEENT:  Normocephalic, atraumatic.  HEART:  Regular.  I do not appreciate murmur, rub or gallop.  LUNGS:  Clear, without wheezes, rales or rhonchi.  EXTREMITIES:  Without peripheral edema.  NECK:  The neck veins are flat at 30 degrees.  SKIN:  Warm and dry.    ASSESSMENT AND PLAN:    1.  Newly reduced ejection fraction 45%-50% with progressive dyspnea on exertion of unclear etiology.  It is certainly possible that her dyspnea is secondary to pulmonary disease as it seems fairly improved with just the addition of inhalers.  Of course, we need to work up her new cardiomyopathy as well.    Today, we discussed possible etiologies of her reduced ejection fraction, including coronary artery disease, RV pacing, infection (which she has had none), thyroid, which has been normal.  She has no history of chemotherapy or significant ETOH or street drugs.  At this point, my biggest suspicion would be potentially RV pacing, but we will make sure we do a complete workup.  We discussed doing a nuclear stress test.  She has no known coronary calcifications on her CT in 2018 and depending on the area of ischemia in the size of ischemia or infarct, would consider a coronary angiogram.  In addition, we are going to try to optimize her medications further.  She is already on lisinopril and metoprolol.  I am going to stop her lisinopril, which is at 40 mg and replace it  with Entresto 49/51 mg b.i.d. after a washout.  If her pressures remain low, I would cut back her amlodipine and use more of the Entresto for its other benefits.  This may be expensive for her, and if that is the case, we will work on getting it through [employer].  I am also hopeful the Entresto will give her benefit of improved breathing, etc.  At this point, she appears euvolemic both on her echocardiogram and on physical exam.  We will continue her on Lasix 20 mg.  2.  Possible coronary artery disease as above.  3.  Atrial fibrillation with history of AV node ablation and permanent pacemaker in place.  Heart rates are controlled, so not contributing.  She is maintained on Xarelto appropriately for anticoagulation.  4.  Dyslipidemia.  Last lipid panel  shows LDL 70, HDL 69, total cholesterol 154.  We will continue Lipitor 40 mg.    We will see this patient back in about a month to further evaluate things and discuss her stress test.  She already has followup with Dr. Mauri Chase in October and will keep that visit as well.    Thank you for allowing me to participate in this delightful patient's care.    Sandrine Glover PA-C        D: 08/10/2022   T: 08/10/2022   MT: INDIRA    Name:     ATRURO BOSWELL  MRN:      [id number]        Account:      [id number]   :      1940           Service Date: 08/10/2022       Document: [id number]    Thank you for allowing me to participate in the care of your patient.      Sincerely,     Sandrine Glover PA-C     [facility]  cc:   Mikala Philip MD  [address] [place] [address]

## 2022-08-16 ENCOUNTER — VIRTUAL VISIT (OUTPATIENT)
Dept: FAMILY MEDICINE | Facility: CLINIC | Age: 82
End: 2022-08-16
Payer: COMMERCIAL

## 2022-08-16 DIAGNOSIS — Z95.0 S/P CARDIAC PACEMAKER PROCEDURE: ICD-10-CM

## 2022-08-16 DIAGNOSIS — I50.32 CHRONIC DIASTOLIC CHF (CONGESTIVE HEART FAILURE) (H): ICD-10-CM

## 2022-08-16 DIAGNOSIS — M54.6 BILATERAL THORACIC BACK PAIN, UNSPECIFIED CHRONICITY: ICD-10-CM

## 2022-08-16 DIAGNOSIS — E78.5 HYPERLIPIDEMIA LDL GOAL <70: ICD-10-CM

## 2022-08-16 DIAGNOSIS — I10 HYPERTENSION GOAL BP (BLOOD PRESSURE) < 140/90: ICD-10-CM

## 2022-08-16 DIAGNOSIS — J44.9 CHRONIC OBSTRUCTIVE PULMONARY DISEASE, UNSPECIFIED COPD TYPE (H): Primary | ICD-10-CM

## 2022-08-16 DIAGNOSIS — N18.30 STAGE 3 CHRONIC KIDNEY DISEASE, UNSPECIFIED WHETHER STAGE 3A OR 3B CKD (H): ICD-10-CM

## 2022-08-16 DIAGNOSIS — I49.5 SSS (SICK SINUS SYNDROME) (H): ICD-10-CM

## 2022-08-16 DIAGNOSIS — I48.0 PAF (PAROXYSMAL ATRIAL FIBRILLATION) (H): ICD-10-CM

## 2022-08-16 PROCEDURE — 99214 OFFICE O/P EST MOD 30 MIN: CPT | Mod: 95 | Performed by: FAMILY MEDICINE

## 2022-08-16 NOTE — PROGRESS NOTES
Hamida is a 82 year old who is being evaluated via a billable video visit.      How would you like to obtain your AVS? MyChart  If the video visit is dropped, the invitation should be resent by: Send to e-mail at: [email or web address]  Will anyone else be joining your video visit? Yes, granddaughter Zoe         Assessment & Plan        COPD   Dyspnea improving after starting spiriva and albuterol.  Continue inhalers.      Denies wheezing or coughing. Tried Spiriva in the past, but was not sure if it made much difference.  When she saw the lung specialist in 2019 they did not feel like she necessarily needed to be on any inhaler at that time and last she was noticing benefit from the Spiriva.  She had been given Anoro, which had been cost prohibitive.    Heart failure  SALAZAR  BNP was elevated and echo showed decreased EF 45%.  She saw cardiology and her lisinopril was discontinued and she was started on Entresto.  Since that change, she has noticed even more improvement in her dyspnea and energy level.  Continues furosemide 20 mg daily.  She has further evaluation of cardiomyopathy with stress test scheduled next week.  Has follow-up appointment scheduled with cardiology in September and October.  Denies PND, orthopnea, edema, weight gain.       Graduated from [facility] clinic 2019.  Continues to monitor her weight and salt intake.  Doing well.  I refilled her furosemide 20 mg daily and asked that she have further refills through cardiology who has been following her for heart failure.  She has an echocardiogram planned in May per her report.  Last seen by cardiology on 10/28/2021 and stable.             Back pain  Improved since her last visit.  She does not feel the need to schedule sports medicine at this time.    Present for years, but somewhat worse over the past few months. Entire back is affected, thoracic region more significantly so without radicular symptoms. No recent history of trauma. Improves with  "biofreeze. Referred to ortho. Evidence of mild T7 and T11 compression 10/11/21 DEXA with multilevel degenerative changes present.         Hypertension  controlled  Continues lisinopril 40 mg daily, metoprolol 50 mg twice daily, amlodipine 10 mg daily, furosemide 20 mg daily.  She continues on potassium 10meq daily as she is on lasix.           Hyperlipidemia LDL goal <70  Continue atorvastatin 40 mg daily      PAF (paroxysmal atrial fibrillation) (H)   SSS (sick sinus syndrome) (H)   S/P cardiac pacemaker procedure  Followed by cardiology.  Continues apixaban 5 mg twice daily.            Osteoporosis  Continues alendronate. Last DEXA was in October.  Unfortunately, she had a DEXA scan in 2020, which was not seen by me at the time of her last visit and she was charged for the DEXA due to the fact that it was done too early.   Continues calcium and vitamin d supplementation.       CKD III  Stable. Not taking NSAID medication.             Macular degeneration  Follows with ophthalmology      She will schedule a wellness visit with me this fall or winter.     BMI:   Estimated body mass index is 31.67 kg/m  as calculated from the following:    Height as of 8/10/22: 1.6 m (5' 3\").    Weight as of 8/10/22: 81.1 kg (178 lb 12.8 oz).             No follow-ups on file.    Mikala Philip MD   [facility]    Monisha Mei is a 82 year old , presenting for the following health issues:  No chief complaint on file.      History of Present Illness       Vascular Disease:  She presents for follow up of vascular disease.  She never takes nitroglycerin. She is not taking daily aspirin.    She eats 2-3 servings of fruits and vegetables daily.She consumes 4 sweetened beverage(s) daily.She exercises with enough effort to increase her heart rate 9 or less minutes per day.  She exercises with enough effort to increase her heart rate 3 or less days per week.   She is taking medications regularly.     She saw " cardiology 8/10/22 (note reviewed today) and echo revealed decreased EF to 45%. Dyspnea has improved with addition of inhalers and she noticed additional improvement in her energy level and dyspnea after starting Entresto.  She was able to go grocery shopping without needing to lean on her cart.. She is undergoing further eval of cardiomyopathy with cardiology. Lisinopril was changed to entresto. Lasix was not changed. She has follow up planned after a month.     Reports her back pain has resolved.         Review of Systems          Objective           Vitals:  No vitals were obtained today due to virtual visit.    Physical Exam   GENERAL: Healthy, alert and no distress  EYES: Eyes grossly normal to inspection.  No discharge or erythema, or obvious scleral/conjunctival abnormalities.  RESP: No audible wheeze, cough, or visible cyanosis.  No visible retractions or increased work of breathing.    SKIN: Visible skin clear. No significant rash, abnormal pigmentation or lesions.  NEURO: Cranial nerves grossly intact.  Mentation and speech appropriate for age.  PSYCH: Mentation appears normal, affect normal/bright, judgement and insight intact, normal speech and appearance well-groomed.    Hospital Outpatient Visit on 08/05/2022   Component Date Value Ref Range Status     LVEF  08/05/2022 45-50%   Final                Video-Visit Details    Video Start Time: 8:09    Type of service:  Video Visit    Video End Time:8:24 AM    Originating Location (pt. Location): Home    Distant Location (provider location):  [facility]     Platform used for Video Visit: Keenjar    .  Ellen

## 2022-08-17 LAB
MDC_IDC_EPISODE_DTM: NORMAL
MDC_IDC_EPISODE_DTM: NORMAL
MDC_IDC_EPISODE_DURATION: 6 S
MDC_IDC_EPISODE_DURATION: NORMAL S
MDC_IDC_EPISODE_ID: NORMAL
MDC_IDC_EPISODE_ID: NORMAL
MDC_IDC_EPISODE_TYPE: NORMAL
MDC_IDC_EPISODE_TYPE: NORMAL
MDC_IDC_LEAD_IMPLANT_DT: NORMAL
MDC_IDC_LEAD_IMPLANT_DT: NORMAL
MDC_IDC_LEAD_LOCATION: NORMAL
MDC_IDC_LEAD_LOCATION: NORMAL
MDC_IDC_LEAD_LOCATION_DETAIL_1: NORMAL
MDC_IDC_LEAD_LOCATION_DETAIL_1: NORMAL
MDC_IDC_LEAD_MFG: NORMAL
MDC_IDC_LEAD_MFG: NORMAL
MDC_IDC_LEAD_MODEL: NORMAL
MDC_IDC_LEAD_MODEL: NORMAL
MDC_IDC_LEAD_POLARITY_TYPE: NORMAL
MDC_IDC_LEAD_POLARITY_TYPE: NORMAL
MDC_IDC_LEAD_SERIAL: NORMAL
MDC_IDC_LEAD_SERIAL: NORMAL
MDC_IDC_MSMT_BATTERY_DTM: NORMAL
MDC_IDC_MSMT_BATTERY_REMAINING_LONGEVITY: 76 MO
MDC_IDC_MSMT_BATTERY_REMAINING_PERCENTAGE: 59 %
MDC_IDC_MSMT_BATTERY_RRT_TRIGGER: NORMAL
MDC_IDC_MSMT_BATTERY_STATUS: NORMAL
MDC_IDC_MSMT_BATTERY_VOLTAGE: 2.98 V
MDC_IDC_MSMT_LEADCHNL_RA_IMPEDANCE_VALUE: 560 OHM
MDC_IDC_MSMT_LEADCHNL_RA_LEAD_CHANNEL_STATUS: NORMAL
MDC_IDC_MSMT_LEADCHNL_RA_PACING_THRESHOLD_AMPLITUDE: 0.38 V
MDC_IDC_MSMT_LEADCHNL_RA_PACING_THRESHOLD_PULSEWIDTH: 0.4 MS
MDC_IDC_MSMT_LEADCHNL_RA_SENSING_INTR_AMPL: 4.6 MV
MDC_IDC_MSMT_LEADCHNL_RV_IMPEDANCE_VALUE: 580 OHM
MDC_IDC_MSMT_LEADCHNL_RV_LEAD_CHANNEL_STATUS: NORMAL
MDC_IDC_MSMT_LEADCHNL_RV_PACING_THRESHOLD_AMPLITUDE: 0.62 V
MDC_IDC_MSMT_LEADCHNL_RV_PACING_THRESHOLD_PULSEWIDTH: 0.4 MS
MDC_IDC_MSMT_LEADCHNL_RV_SENSING_INTR_AMPL: 12 MV
MDC_IDC_PG_IMPLANT_DTM: NORMAL
MDC_IDC_PG_MFG: NORMAL
MDC_IDC_PG_MODEL: NORMAL
MDC_IDC_PG_SERIAL: NORMAL
MDC_IDC_PG_TYPE: NORMAL
MDC_IDC_SESS_CLINIC_NAME: NORMAL
MDC_IDC_SESS_DTM: NORMAL
MDC_IDC_SESS_REPROGRAMMED: NO
MDC_IDC_SESS_TYPE: NORMAL
MDC_IDC_SET_BRADY_AT_MODE_SWITCH_MODE: NORMAL
MDC_IDC_SET_BRADY_AT_MODE_SWITCH_RATE: 170 {BEATS}/MIN
MDC_IDC_SET_BRADY_LOWRATE: 60 {BEATS}/MIN
MDC_IDC_SET_BRADY_MAX_SENSOR_RATE: 120 {BEATS}/MIN
MDC_IDC_SET_BRADY_MAX_TRACKING_RATE: 120 {BEATS}/MIN
MDC_IDC_SET_BRADY_MODE: NORMAL
MDC_IDC_SET_BRADY_PAV_DELAY_LOW: 250 MS
MDC_IDC_SET_BRADY_SAV_DELAY_LOW: 225 MS
MDC_IDC_SET_LEADCHNL_RA_PACING_AMPLITUDE: 1.38
MDC_IDC_SET_LEADCHNL_RA_PACING_ANODE_ELECTRODE_1: NORMAL
MDC_IDC_SET_LEADCHNL_RA_PACING_ANODE_LOCATION_1: NORMAL
MDC_IDC_SET_LEADCHNL_RA_PACING_CAPTURE_MODE: NORMAL
MDC_IDC_SET_LEADCHNL_RA_PACING_CATHODE_ELECTRODE_1: NORMAL
MDC_IDC_SET_LEADCHNL_RA_PACING_CATHODE_LOCATION_1: NORMAL
MDC_IDC_SET_LEADCHNL_RA_PACING_POLARITY: NORMAL
MDC_IDC_SET_LEADCHNL_RA_PACING_PULSEWIDTH: 0.4 MS
MDC_IDC_SET_LEADCHNL_RA_SENSING_ADAPTATION_MODE: NORMAL
MDC_IDC_SET_LEADCHNL_RA_SENSING_ANODE_ELECTRODE_1: NORMAL
MDC_IDC_SET_LEADCHNL_RA_SENSING_ANODE_LOCATION_1: NORMAL
MDC_IDC_SET_LEADCHNL_RA_SENSING_CATHODE_ELECTRODE_1: NORMAL
MDC_IDC_SET_LEADCHNL_RA_SENSING_CATHODE_LOCATION_1: NORMAL
MDC_IDC_SET_LEADCHNL_RA_SENSING_POLARITY: NORMAL
MDC_IDC_SET_LEADCHNL_RA_SENSING_SENSITIVITY: 0.3 MV
MDC_IDC_SET_LEADCHNL_RV_PACING_AMPLITUDE: 0.88
MDC_IDC_SET_LEADCHNL_RV_PACING_ANODE_ELECTRODE_1: NORMAL
MDC_IDC_SET_LEADCHNL_RV_PACING_ANODE_LOCATION_1: NORMAL
MDC_IDC_SET_LEADCHNL_RV_PACING_CAPTURE_MODE: NORMAL
MDC_IDC_SET_LEADCHNL_RV_PACING_CATHODE_ELECTRODE_1: NORMAL
MDC_IDC_SET_LEADCHNL_RV_PACING_CATHODE_LOCATION_1: NORMAL
MDC_IDC_SET_LEADCHNL_RV_PACING_POLARITY: NORMAL
MDC_IDC_SET_LEADCHNL_RV_PACING_PULSEWIDTH: 0.4 MS
MDC_IDC_SET_LEADCHNL_RV_SENSING_ADAPTATION_MODE: NORMAL
MDC_IDC_SET_LEADCHNL_RV_SENSING_ANODE_ELECTRODE_1: NORMAL
MDC_IDC_SET_LEADCHNL_RV_SENSING_ANODE_LOCATION_1: NORMAL
MDC_IDC_SET_LEADCHNL_RV_SENSING_CATHODE_ELECTRODE_1: NORMAL
MDC_IDC_SET_LEADCHNL_RV_SENSING_CATHODE_LOCATION_1: NORMAL
MDC_IDC_SET_LEADCHNL_RV_SENSING_POLARITY: NORMAL
MDC_IDC_SET_LEADCHNL_RV_SENSING_SENSITIVITY: 0.5 MV
MDC_IDC_STAT_AT_BURDEN_PERCENT: 9 %
MDC_IDC_STAT_AT_DTM_END: NORMAL
MDC_IDC_STAT_AT_DTM_START: NORMAL
MDC_IDC_STAT_AT_MODE_SW_COUNT: 2
MDC_IDC_STAT_AT_MODE_SW_COUNT_PER_DAY: 0
MDC_IDC_STAT_AT_MODE_SW_MAX_DURATION: NORMAL S
MDC_IDC_STAT_AT_MODE_SW_PERCENT_TIME: 9 %
MDC_IDC_STAT_BRADY_AP_VP_PERCENT: 51 %
MDC_IDC_STAT_BRADY_AP_VS_PERCENT: 1 %
MDC_IDC_STAT_BRADY_AS_VP_PERCENT: 49 %
MDC_IDC_STAT_BRADY_AS_VS_PERCENT: 1 %
MDC_IDC_STAT_BRADY_DTM_END: NORMAL
MDC_IDC_STAT_BRADY_DTM_START: NORMAL
MDC_IDC_STAT_BRADY_RA_PERCENT_PACED: 46 %
MDC_IDC_STAT_BRADY_RV_PERCENT_PACED: 99 %
MDC_IDC_STAT_CRT_DTM_END: NORMAL
MDC_IDC_STAT_CRT_DTM_START: NORMAL
MDC_IDC_STAT_HEART_RATE_ATRIAL_MAX: 330 {BEATS}/MIN
MDC_IDC_STAT_HEART_RATE_ATRIAL_MEAN: 135 {BEATS}/MIN
MDC_IDC_STAT_HEART_RATE_ATRIAL_MIN: 40 {BEATS}/MIN
MDC_IDC_STAT_HEART_RATE_DTM_END: NORMAL
MDC_IDC_STAT_HEART_RATE_DTM_START: NORMAL
MDC_IDC_STAT_HEART_RATE_VENTRICULAR_MAX: 200 {BEATS}/MIN
MDC_IDC_STAT_HEART_RATE_VENTRICULAR_MEAN: 68 {BEATS}/MIN
MDC_IDC_STAT_HEART_RATE_VENTRICULAR_MIN: 40 {BEATS}/MIN

## 2022-09-01 ENCOUNTER — TELEPHONE (OUTPATIENT)
Dept: CARDIOLOGY | Facility: CLINIC | Age: 82
End: 2022-09-01

## 2022-09-01 DIAGNOSIS — R06.02 SOB (SHORTNESS OF BREATH): Primary | ICD-10-CM

## 2022-09-01 NOTE — TELEPHONE ENCOUNTER
----- Message from Pooja Vaughn RN sent at 9/1/2022  1:56 PM CDT -----  Regarding: FW: Lexiscan order    ----- Message -----  From: Viji Boucher  Sent: 9/1/2022   1:49 PM CDT  To: Sandrine Glover PA-C, #  Subject: Lexiscan order                                   Hamida is scheduled to come in tomorrow for a Lexiscan stress test. However, the order was canceled. In order to proceed with a Lexiscan Nuclear study, a new order needs to be placed. Could that be done before 9 am on 9/2/2022?   Call me in Nuclear if you have questions.  Thanks!  Viji    ======================  Forwarded to Sandrine Singh RN 09/01/22 2:18 PM

## 2022-09-02 ENCOUNTER — LAB (OUTPATIENT)
Dept: LAB | Facility: CLINIC | Age: 82
End: 2022-09-02
Payer: COMMERCIAL

## 2022-09-02 DIAGNOSIS — I50.20 HEART FAILURE WITH REDUCED EJECTION FRACTION, NYHA CLASS II (H): ICD-10-CM

## 2022-09-02 LAB
ANION GAP SERPL CALCULATED.3IONS-SCNC: 7 MMOL/L (ref 3–14)
BUN SERPL-MCNC: 19 MG/DL (ref 7–30)
CALCIUM SERPL-MCNC: 9.1 MG/DL (ref 8.5–10.1)
CHLORIDE BLD-SCNC: 107 MMOL/L (ref 94–109)
CO2 SERPL-SCNC: 26 MMOL/L (ref 20–32)
CREAT SERPL-MCNC: 0.92 MG/DL (ref 0.52–1.04)
GFR SERPL CREATININE-BSD FRML MDRD: 61 ML/MIN/1.73M2
GLUCOSE BLD-MCNC: 118 MG/DL (ref 70–99)
POTASSIUM BLD-SCNC: 3.9 MMOL/L (ref 3.4–5.3)
SODIUM SERPL-SCNC: 140 MMOL/L (ref 133–144)

## 2022-09-02 PROCEDURE — 80048 BASIC METABOLIC PNL TOTAL CA: CPT | Performed by: INTERNAL MEDICINE

## 2022-09-02 PROCEDURE — 36415 COLL VENOUS BLD VENIPUNCTURE: CPT | Performed by: INTERNAL MEDICINE

## 2022-09-12 ENCOUNTER — OFFICE VISIT (OUTPATIENT)
Dept: CARDIOLOGY | Facility: CLINIC | Age: 82
End: 2022-09-12
Attending: PHYSICIAN ASSISTANT
Payer: COMMERCIAL

## 2022-09-12 VITALS
BODY MASS INDEX: 31.89 KG/M2 | HEART RATE: 80 BPM | OXYGEN SATURATION: 95 % | DIASTOLIC BLOOD PRESSURE: 62 MMHG | SYSTOLIC BLOOD PRESSURE: 106 MMHG | WEIGHT: 180 LBS | HEIGHT: 63 IN

## 2022-09-12 DIAGNOSIS — I50.32 CHRONIC DIASTOLIC CHF (CONGESTIVE HEART FAILURE) (H): ICD-10-CM

## 2022-09-12 DIAGNOSIS — I50.20 HEART FAILURE WITH REDUCED EJECTION FRACTION, NYHA CLASS II (H): ICD-10-CM

## 2022-09-12 DIAGNOSIS — I48.0 PAF (PAROXYSMAL ATRIAL FIBRILLATION) (H): ICD-10-CM

## 2022-09-12 PROCEDURE — 99215 OFFICE O/P EST HI 40 MIN: CPT | Performed by: PHYSICIAN ASSISTANT

## 2022-09-12 RX ORDER — SACUBITRIL AND VALSARTAN 97; 103 MG/1; MG/1
1 TABLET, FILM COATED ORAL 2 TIMES DAILY
Qty: 60 TABLET | Refills: 11 | Status: SHIPPED | OUTPATIENT
Start: 2022-09-12 | End: 2023-01-09

## 2022-09-12 RX ORDER — FUROSEMIDE 20 MG
TABLET ORAL
Qty: 90 TABLET | Refills: 3 | Status: SHIPPED | OUTPATIENT
Start: 2022-09-12 | End: 2023-05-01

## 2022-09-12 RX ORDER — SACUBITRIL AND VALSARTAN 49; 51 MG/1; MG/1
1 TABLET, FILM COATED ORAL 2 TIMES DAILY
Qty: 60 TABLET | Refills: 11 | Status: CANCELLED | OUTPATIENT
Start: 2022-09-12

## 2022-09-12 NOTE — PATIENT INSTRUCTIONS
Call CORE nurse for any questions or concerns:  [phone]   *If you have concerns after hours, please call [phone], option 2 to speak with on call Cardiologist.    1. Medication changes and/or recommendations from today:  Stop taking amlodipine.    Increase Entresto to 97/103 mg twice a day.  We'll work on the paperwork to get it better covered for you.      2. Follow up plan: Do NOT drink coffee before your stress test this week.    We'll do the stress test this week, then labs an echocardiogram and visit with Dr. Mauri Chase on October.       3. Weigh yourself daily and write it down.     4. Call CORE nurse if your weight is up more than 2 pounds in one day or 5 pounds in one week.     5. Call CORE nurse if you feel more short of breath, have more abdominal bloating, or leg swelling.     6. Continue low sodium diet (less than 2000 mg daily). If you eat less salt, you will retain less fluid.     7. Alcohol can weaken your heart further. You should avoid alcohol or limit its use to special times, such as a holiday or birthday.      8. Do NOT take Aleve or ibuprofen without talking to your doctor first.      9. Lab Results:   Component      Latest Ref Rng & Units 9/2/2022   Sodium      133 - 144 mmol/L 140   Potassium      3.4 - 5.3 mmol/L 3.9   Chloride      94 - 109 mmol/L 107   Carbon Dioxide      20 - 32 mmol/L 26   Anion Gap      3 - 14 mmol/L 7   Urea Nitrogen      7 - 30 mg/dL 19   Creatinine      0.52 - 1.04 mg/dL 0.92   Calcium      8.5 - 10.1 mg/dL 9.1   Glucose      70 - 99 mg/dL 118 (H)   GFR Estimate      >60 mL/min/1.73m2 61        [facility]: Cardiomyopathy, Optimization, Rehabilitation, Education  The [facility] is a heart failure specialty clinic within the [facility] where you will work with specialized nurse practitioners, physician assistants, doctors, and registered nurses. They are dedicated to helping patients with heart failure to carefully adjust medications, receive  education, and learn who and when to call if symptoms develop. They specialize in helping you better understand your condition, slow the progression of your disease, improve the length and quality of your life, help you detect future heart problems before they become life threatening, and avoid hospitalizations.

## 2022-09-12 NOTE — NURSING NOTE
Completed and faxed [employer] paperwork.  Send copy to scan. Obdulia LAWLER([facility]) 09/12/22  1:59 PM

## 2022-09-12 NOTE — PROGRESS NOTES
Service Date: 2022    PRIMARY CARDIOLOGIST:  Dr. Mauri Chase.    REASON FOR VISIT:  Heart failure with mildly reduced ejection fraction followup.    HISTORY OF PRESENT ILLNESS:  Ms. Verma is a delightful 82-year-old woman with past medical history significant for the followin.  Atrial fibrillation with initial trial at rate control with difficulty maintaining rates and eventually underwent AV node ablation.  2.  Permanent pacemaker in place for post-AV node ablation.  3.  Hypertension.  4.  HFpEF.  Initially, last echocardiogram dated 2022 showed heart failure with mildly reduced ejection fraction at 45%.  5.  Potential coronary artery disease based on coronary calcifications in 2018 on CT of the chest with family history of coronary artery disease with son having a STEMI at the age of 57 and another son passing at the age of 57 from an MI.  6.  Mild tricuspid regurg.  7.  Likely pulmonary disease with possible COPD/emphysema as inhalers are helpful with her dyspnea on exertion.    I met Ms. Verma in August when she presented with progressive shortness of breath, so much so that she canceled a birthday dinner at [facility].  She described getting short of breath after running too many errands but less shortness of breath with ADLs.  We elected to discontinue her lisinopril and switch her to Entresto.  She was also started on inhalers by Dr. Philip.  She is here today for followup.    She states she feels much better now.  She has occasional shortness of breath and she is not quite back to herself.  She denies dyspnea on exertion with ADLs.  She can climb the stairs, carrying her laundry up and down the stairs, shower and get dressed without difficulty.  She notes though if she does too many errands, particularly after coming home from grocery shopping, she needs help carrying in the groceries, and she can be just exhausted and markedly short of breath.  She denies syncope or presyncope but  notes she must stand up slowly.  Otherwise, she gets lightheaded.  She denies leanna orthopnea or PND as well as peripheral edema.  She notes she has had difficulty getting her stress test done.  One day she showed up the wrong day and the next day she had coffee and it was canceled, and the following day she showed up late.  This is now scheduled for later this week.    SOCIAL HISTORY:  No significant alcohol, no street drugs.  Family history of alcoholism so she wanted to be sure to avoid that.  Former smoker, 67-pack-year history.  She lives in her own home.    PHYSICAL EXAMINATION:    GENERAL:  Well-developed, well-nourished woman in no acute distress.  HEENT:  Normocephalic, atraumatic.  HEART:  Regular in rate and rhythm.  I do not appreciate murmur, rub or gallop.  LUNGS:  Clear without wheezes, rales, or rhonchi.  EXTREMITIES:  Without peripheral edema.  NECK:  The neck veins are flat at 30 degrees.  SKIN:  Warm and dry.    Labs reviewed show creatinine 0.92, BUN 19, potassium 3.9, sodium 140.    ASSESSMENT AND PLAN:    1.  Heart failure with mildly reduced ejection fraction about 45%-50% with symptoms improved with the change of lisinopril to Entresto but still with class II symptoms.  It is difficult to determine if these may potentially be pulmonary, as inhalers improve her symptoms, versus mixed pulmonary and cardiac.  Today we are going to further optimize her cardiac medications by putting her on Entresto 97/103 mg b.i.d. and stopping her amlodipine to prevent hypotension.  The cause of her cardiomyopathy that would be high on her list, though possible RV pacing as she is 100% RV paced, and ischemia.  We agreed we will do workup for ischemia with a stress test this week.  She otherwise appears euvolemic on exam.  We will continue her on just Lasix 20 mg daily.  Her creatinine is tolerating this fine. Going forward if EF has not normalized, we would add spironolactone and if possible SGLT2 inhibitor as  well as increase in Toprol further.  2.  Concern for coronary artery disease as above with family history of 2 sons having coronary artery disease at age of 57, one dying from an MI and another apparently recently presenting with acute STEMI that was treated and is cared for on our team.  We discussed after stress test if this is significantly abnormal with a moderate to large area of ischemia, we may consider coronary angiogram.  The procedure was described in detail including risks and benefits of up to and including bruising, bleeding, renal failure, vascular damage, infection, palpitations, stroke, heart attack, up to and including emergency open heart surgery and death. If that is the case, she would be agreeable to proceed.  3.  Atrial fibrillation with history of AV node ablation and permanent pacemaker in place, on Xarelto appropriately for a CHADS-VASc of 4.  4.  Dyslipidemia, reasonable control.  Last LDL 70, HDL 69, total cholesterol 154.    We will get her stress test this week.  She will then get an echocardiogram and labs and follow up with Dr. Mauri Chase in October.  We will defer medication adjustments to that depending on stress test results.    Thank you for allowing me to participate in this delightful patient's care.    Sandrine Glover PA-C        D: 2022   T: 2022   MT: shin    Name:     ARTURO BOSWELL  MRN:      [id number]        Account:      [id number]   :      1940           Service Date: 2022       Document: [id number]

## 2022-09-12 NOTE — PROGRESS NOTES
[id number]      HPI and Plan:   See dictation    Orders this Visit:  No orders of the defined types were placed in this encounter.    Orders Placed This Encounter   Medications     sacubitril-valsartan (ENTRESTO)  MG per tablet     Sig: Take 1 tablet by mouth 2 times daily     Dispense:  60 tablet     Refill:  11     Replaces previous dose     furosemide (LASIX) 20 MG tablet     Sig: TAKE ONE TABLET BY MOUTH ONCE DAILY. Please request further refills through cardiology     Dispense:  90 tablet     Refill:  3     rivaroxaban ANTICOAGULANT (XARELTO ANTICOAGULANT) 20 MG TABS tablet     Sig: Take 1 tablet (20 mg) by mouth daily (with dinner)     Dispense:  90 tablet     Refill:  3     Do not fill- just sending in refills     Medications Discontinued During This Encounter   Medication Reason     amLODIPine (NORVASC) 10 MG tablet      sacubitril-valsartan (ENTRESTO) 49-51 MG per tablet      furosemide (LASIX) 20 MG tablet      rivaroxaban ANTICOAGULANT (XARELTO ANTICOAGULANT) 20 MG TABS tablet          Encounter Diagnoses   Name Primary?     Heart failure with reduced ejection fraction, NYHA class II (H)      Chronic diastolic CHF (congestive heart failure) (H)      PAF (paroxysmal atrial fibrillation) (H)        CURRENT MEDICATIONS:  Current Outpatient Medications   Medication Sig Dispense Refill     albuterol (PROAIR HFA/PROVENTIL HFA/VENTOLIN HFA) 108 (90 Base) MCG/ACT inhaler Inhale 2 puffs into the lungs every 6 hours 18 g 3     alendronate (FOSAMAX) 70 MG tablet Take 1 tablet (70 mg) by mouth every 7 days Take 60 minutes before am meal with 8 oz. water. Remain upright for 30 minutes. 12 tablet 3     atorvastatin (LIPITOR) 40 MG tablet Take 1 tablet (40 mg) by mouth daily 90 tablet 3     cholecalciferol (VITAMIN  -D) 1000 UNIT capsule Take 1 capsule by mouth daily.       Coral Calcium 133-66.7-133 MG-MG-UNIT CAPS Take 2 tablets by mouth 2 times daily        FLAX SEED OIL OR 1 capsule daily       furosemide  (LASIX) 20 MG tablet TAKE ONE TABLET BY MOUTH ONCE DAILY. Please request further refills through cardiology 90 tablet 3     metoprolol succinate ER (TOPROL-XL) 50 MG 24 hr tablet Take 1 tablet (50 mg) by mouth 2 times daily 180 tablet 3     Multiple Vitamins-Minerals (HAIR SKIN NAILS PO) Take 1 tablet by mouth At Bedtime       potassium chloride ER (KLOR-CON M) 10 MEQ CR tablet TAKE ONE TABLET BY MOUTH ONCE DAILY 90 tablet 1     rivaroxaban ANTICOAGULANT (XARELTO ANTICOAGULANT) 20 MG TABS tablet Take 1 tablet (20 mg) by mouth daily (with dinner) 90 tablet 3     sacubitril-valsartan (ENTRESTO)  MG per tablet Take 1 tablet by mouth 2 times daily 60 tablet 11     tiotropium (SPIRIVA) 18 MCG inhaled capsule Inhale 1 capsule (18 mcg) into the lungs daily 30 capsule 1       ALLERGIES     Allergies   Allergen Reactions     Strawberries [Strawberry] Anaphylaxis     Strawberry Flavor        PAST MEDICAL HISTORY:  Past Medical History:   Diagnosis Date     Atrial fibrillation (H)      Chronic diastolic CHF (congestive heart failure) (H)      Essential hypertension, benign      HYPERLIPIDEMIA NEC/NOS 3/30/2006     Pulmonary hypertension (H)      SSS (sick sinus syndrome) (H)     s/p PPM 3/2018       PAST SURGICAL HISTORY:  Past Surgical History:   Procedure Laterality Date     EP ABLATION AV NODE N/A 5/7/2019    Procedure: EP Ablation AV Node;  Surgeon: Roe Voss MD;  Location:  [facility]     EYE SURGERY       HYSTERECTOMY, VAGINAL      hysterectomy       FAMILY HISTORY:  Family History   Problem Relation Age of Onset     Cerebrovascular Disease Mother      Glaucoma Mother      Macular Degeneration Mother      Alcohol/Drug Father         alcohol     Myocardial Infarction Father 63        passed away from MI     Family History Negative Sister      Family History Negative Sister      Family History Negative Sister      Cerebrovascular Disease Sister      Family History Negative Brother      Family  History Negative Maternal Grandmother      Family History Negative Maternal Grandfather      Family History Negative Paternal Grandmother      Family History Negative Paternal Grandfather      Myocardial Infarction Son 57        passed away from MI     Dementia Daughter 57        early onset on [place]     Diabetes No family hx of      Breast Cancer No family hx of      Cancer - colorectal No family hx of        SOCIAL HISTORY:  Social History     Socioeconomic History     Marital status:    Tobacco Use     Smoking status: Former Smoker     Packs/day: 1.50     Years: 45.00     Pack years: 67.50     Types: Cigarettes     Start date: 10/28/1955     Quit date: 2000     Years since quittin.0     Smokeless tobacco: Never Used     Tobacco comment: quit 6 yrs ago   Substance and Sexual Activity     Alcohol use: No     Drug use: No     Sexual activity: Yes     Partners: Male   Other Topics Concern     Parent/sibling w/ CABG, MI or angioplasty before 65F 55M? No   Social History Narrative    Balanced Diet - Yes    Osteoporosis Prevention Measures - Dairy servings per day: 2    Regular Exercise -  Yes Describe walk, but not recently due to weather    Dental Exam - Yes    Eye Exam -No    Self Breast Exam - Yes    Abuse: Current or Past (Physical, Sexual or Emotional)- No    Do you feel safe in your environment - Yes    Guns stored in the home - No    Sunscreen used - Yes    Seatbelts used - Yes    Lipids -  1/10    Glucose -  1/10    Colon Cancer Screening - Yes, 08    Hemoccults - NO    Pap Test -  Many yrs ago, has hysterectomy    Do you have any concerns about STD's -  No    Mammography - 08    DEXA - 06    Immunizations reviewed and up to date - No    Jonathan Marshall MA                   Review of Systems:  Skin:        Eyes:       ENT:       Respiratory:       Cardiovascular:       Gastroenterology:      Genitourinary:       Musculoskeletal:       Neurologic:       Psychiatric:      "  Heme/Lymph/Imm:       Endocrine:         Physical Exam:  Vitals: /62   Pulse 80   Ht 1.6 m (5' 3\")   Wt 81.6 kg (180 lb)   LMP  (LMP Unknown)   SpO2 95%   BMI 31.89 kg/m     Please refer to dictation for physical exam    Recent Lab Results: all reviewed today  CBC RESULTS:  Lab Results   Component Value Date    WBC 8.7 07/27/2022    WBC 8.6 07/15/2020    RBC 4.70 07/27/2022    RBC 4.49 07/15/2020    HGB 13.1 07/27/2022    HGB 12.9 07/15/2020    HCT 40.5 07/27/2022    HCT 39.6 07/15/2020    MCV 86 07/27/2022    MCV 88 07/15/2020    MCH 27.9 07/27/2022    MCH 28.7 07/15/2020    MCHC 32.3 07/27/2022    MCHC 32.6 07/15/2020    RDW 13.3 07/27/2022    RDW 13.4 07/15/2020     (H) 07/27/2022     07/15/2020       BMP RESULTS:  Lab Results   Component Value Date     09/02/2022     07/15/2020    POTASSIUM 3.9 09/02/2022    POTASSIUM 4.1 07/15/2020    CHLORIDE 107 09/02/2022    CHLORIDE 105 07/15/2020    CO2 26 09/02/2022    CO2 26 07/15/2020    ANIONGAP 7 09/02/2022    ANIONGAP 6 07/15/2020     (H) 09/02/2022    GLC 92 07/15/2020    BUN 19 09/02/2022    BUN 24 07/15/2020    CR 0.92 09/02/2022    CR 1.11 (H) 07/15/2020    GFRESTIMATED 61 09/02/2022    GFRESTIMATED 47 (L) 07/15/2020    GFRESTBLACK 54 (L) 07/15/2020    GURWINDER 9.1 09/02/2022    GURWINDER 9.5 07/15/2020        INR RESULTS:  Lab Results   Component Value Date    INR 1.31 (H) 03/19/2018    INR 1.04 11/12/2014           CC  Sandrine Glover, PA-C  [id number]  [place] [id number]      "

## 2022-09-12 NOTE — LETTER
9/12/2022    Mikala Philip MD, MD  [address] [place] [address]    RE: Hamida Verma       Dear Colleague,     I had the pleasure of seeing Hamida MARQUEZ Luci in the [facility].  [id number]      HPI and Plan:   See dictation    Orders this Visit:  No orders of the defined types were placed in this encounter.    Orders Placed This Encounter   Medications     sacubitril-valsartan (ENTRESTO)  MG per tablet     Sig: Take 1 tablet by mouth 2 times daily     Dispense:  60 tablet     Refill:  11     Replaces previous dose     furosemide (LASIX) 20 MG tablet     Sig: TAKE ONE TABLET BY MOUTH ONCE DAILY. Please request further refills through cardiology     Dispense:  90 tablet     Refill:  3     rivaroxaban ANTICOAGULANT (XARELTO ANTICOAGULANT) 20 MG TABS tablet     Sig: Take 1 tablet (20 mg) by mouth daily (with dinner)     Dispense:  90 tablet     Refill:  3     Do not fill- just sending in refills     Medications Discontinued During This Encounter   Medication Reason     amLODIPine (NORVASC) 10 MG tablet      sacubitril-valsartan (ENTRESTO) 49-51 MG per tablet      furosemide (LASIX) 20 MG tablet      rivaroxaban ANTICOAGULANT (XARELTO ANTICOAGULANT) 20 MG TABS tablet          Encounter Diagnoses   Name Primary?     Heart failure with reduced ejection fraction, NYHA class II (H)      Chronic diastolic CHF (congestive heart failure) (H)      PAF (paroxysmal atrial fibrillation) (H)        CURRENT MEDICATIONS:  Current Outpatient Medications   Medication Sig Dispense Refill     albuterol (PROAIR HFA/PROVENTIL HFA/VENTOLIN HFA) 108 (90 Base) MCG/ACT inhaler Inhale 2 puffs into the lungs every 6 hours 18 g 3     alendronate (FOSAMAX) 70 MG tablet Take 1 tablet (70 mg) by mouth every 7 days Take 60 minutes before am meal with 8 oz. water. Remain upright for 30 minutes. 12 tablet 3     atorvastatin (LIPITOR) 40 MG tablet Take 1 tablet (40 mg) by mouth daily 90 tablet 3     cholecalciferol  (VITAMIN  -D) 1000 UNIT capsule Take 1 capsule by mouth daily.       Coral Calcium 133-66.7-133 MG-MG-UNIT CAPS Take 2 tablets by mouth 2 times daily        FLAX SEED OIL OR 1 capsule daily       furosemide (LASIX) 20 MG tablet TAKE ONE TABLET BY MOUTH ONCE DAILY. Please request further refills through cardiology 90 tablet 3     metoprolol succinate ER (TOPROL-XL) 50 MG 24 hr tablet Take 1 tablet (50 mg) by mouth 2 times daily 180 tablet 3     Multiple Vitamins-Minerals (HAIR SKIN NAILS PO) Take 1 tablet by mouth At Bedtime       potassium chloride ER (KLOR-CON M) 10 MEQ CR tablet TAKE ONE TABLET BY MOUTH ONCE DAILY 90 tablet 1     rivaroxaban ANTICOAGULANT (XARELTO ANTICOAGULANT) 20 MG TABS tablet Take 1 tablet (20 mg) by mouth daily (with dinner) 90 tablet 3     sacubitril-valsartan (ENTRESTO)  MG per tablet Take 1 tablet by mouth 2 times daily 60 tablet 11     tiotropium (SPIRIVA) 18 MCG inhaled capsule Inhale 1 capsule (18 mcg) into the lungs daily 30 capsule 1       ALLERGIES     Allergies   Allergen Reactions     Strawberries [Strawberry] Anaphylaxis     Strawberry Flavor        PAST MEDICAL HISTORY:  Past Medical History:   Diagnosis Date     Atrial fibrillation (H)      Chronic diastolic CHF (congestive heart failure) (H)      Essential hypertension, benign      HYPERLIPIDEMIA NEC/NOS 3/30/2006     Pulmonary hypertension (H)      SSS (sick sinus syndrome) (H)     s/p PPM 3/2018       PAST SURGICAL HISTORY:  Past Surgical History:   Procedure Laterality Date     EP ABLATION AV NODE N/A 5/7/2019    Procedure: EP Ablation AV Node;  Surgeon: Roe Voss MD;  Location: [facility] CARDIAC CATH LAB     EYE SURGERY       HYSTERECTOMY, VAGINAL      hysterectomy       FAMILY HISTORY:  Family History   Problem Relation Age of Onset     Cerebrovascular Disease Mother      Glaucoma Mother      Macular Degeneration Mother      Alcohol/Drug Father         alcohol     Myocardial Infarction Father 63        passed  away from MI     Family History Negative Sister      Family History Negative Sister      Family History Negative Sister      Cerebrovascular Disease Sister      Family History Negative Brother      Family History Negative Maternal Grandmother      Family History Negative Maternal Grandfather      Family History Negative Paternal Grandmother      Family History Negative Paternal Grandfather      Myocardial Infarction Son 57        passed away from MI     Dementia Daughter 57        early onset on [place]     Diabetes No family hx of      Breast Cancer No family hx of      Cancer - colorectal No family hx of        SOCIAL HISTORY:  Social History     Socioeconomic History     Marital status:    Tobacco Use     Smoking status: Former Smoker     Packs/day: 1.50     Years: 45.00     Pack years: 67.50     Types: Cigarettes     Start date: 10/28/1955     Quit date: 2000     Years since quittin.0     Smokeless tobacco: Never Used     Tobacco comment: quit 6 yrs ago   Substance and Sexual Activity     Alcohol use: No     Drug use: No     Sexual activity: Yes     Partners: Male   Other Topics Concern     Parent/sibling w/ CABG, MI or angioplasty before 65F 55M? No   Social History Narrative    Balanced Diet - Yes    Osteoporosis Prevention Measures - Dairy servings per day: 2    Regular Exercise -  Yes Describe walk, but not recently due to weather    Dental Exam - Yes    Eye Exam -No    Self Breast Exam - Yes    Abuse: Current or Past (Physical, Sexual or Emotional)- No    Do you feel safe in your environment - Yes    Guns stored in the home - No    Sunscreen used - Yes    Seatbelts used - Yes    Lipids -  1/10    Glucose -  1/10    Colon Cancer Screening - Yes, 08    Hemoccults - NO    Pap Test -  Many yrs ago, has hysterectomy    Do you have any concerns about STD's -  No    Mammography - 08    DEXA - 06    Immunizations reviewed and up to date - No    Jonathan Marshall MA                   Review  "of Systems:  Skin:        Eyes:       ENT:       Respiratory:       Cardiovascular:       Gastroenterology:      Genitourinary:       Musculoskeletal:       Neurologic:       Psychiatric:       Heme/Lymph/Imm:       Endocrine:         Physical Exam:  Vitals: /62   Pulse 80   Ht 1.6 m (5' 3\")   Wt 81.6 kg (180 lb)   LMP  (LMP Unknown)   SpO2 95%   BMI 31.89 kg/m     Please refer to dictation for physical exam    Recent Lab Results: all reviewed today  CBC RESULTS:  Lab Results   Component Value Date    WBC 8.7 07/27/2022    WBC 8.6 07/15/2020    RBC 4.70 07/27/2022    RBC 4.49 07/15/2020    HGB 13.1 07/27/2022    HGB 12.9 07/15/2020    HCT 40.5 07/27/2022    HCT 39.6 07/15/2020    MCV 86 07/27/2022    MCV 88 07/15/2020    MCH 27.9 07/27/2022    MCH 28.7 07/15/2020    MCHC 32.3 07/27/2022    MCHC 32.6 07/15/2020    RDW 13.3 07/27/2022    RDW 13.4 07/15/2020     (H) 07/27/2022     07/15/2020       BMP RESULTS:  Lab Results   Component Value Date     09/02/2022     07/15/2020    POTASSIUM 3.9 09/02/2022    POTASSIUM 4.1 07/15/2020    CHLORIDE 107 09/02/2022    CHLORIDE 105 07/15/2020    CO2 26 09/02/2022    CO2 26 07/15/2020    ANIONGAP 7 09/02/2022    ANIONGAP 6 07/15/2020     (H) 09/02/2022    GLC 92 07/15/2020    BUN 19 09/02/2022    BUN 24 07/15/2020    CR 0.92 09/02/2022    CR 1.11 (H) 07/15/2020    GFRESTIMATED 61 09/02/2022    GFRESTIMATED 47 (L) 07/15/2020    GFRESTBLACK 54 (L) 07/15/2020    GURWINDER 9.1 09/02/2022    GURWINDER 9.5 07/15/2020        INR RESULTS:  Lab Results   Component Value Date    INR 1.31 (H) 03/19/2018    INR 1.04 11/12/2014           CC  Sandrine Glover PA-C  [address]  [place] [address]        Service Date: 09/12/2022    PRIMARY CARDIOLOGIST:  Dr. Mauri Chase.    REASON FOR VISIT:  Heart failure with mildly reduced ejection fraction followup.    HISTORY OF PRESENT ILLNESS:  Ms. Verma is a delightful 82-year-old woman with past " medical history significant for the followin.  Atrial fibrillation with initial trial at rate control with difficulty maintaining rates and eventually underwent AV node ablation.  2.  Permanent pacemaker in place for post-AV node ablation.  3.  Hypertension.  4.  HFpEF.  Initially, last echocardiogram dated 2022 showed heart failure with mildly reduced ejection fraction at 45%.  5.  Potential coronary artery disease based on coronary calcifications in 2018 on CT of the chest with family history of coronary artery disease with son having a STEMI at the age of 57 and another son passing at the age of 57 from an MI.  6.  Mild tricuspid regurg.  7.  Likely pulmonary disease with possible COPD/emphysema as inhalers are helpful with her dyspnea on exertion.    I met Ms. Verma in August when she presented with progressive shortness of breath, so much so that she canceled a birthday dinner at [facility].  She described getting short of breath after running too many errands but less shortness of breath with ADLs.  We elected to discontinue her lisinopril and switch her to Entresto.  She was also started on inhalers by Dr. Philip.  She is here today for followup.    She states she feels much better now.  She has occasional shortness of breath and she is not quite back to herself.  She denies dyspnea on exertion with ADLs.  She can climb the stairs, carrying her laundry up and down the stairs, shower and get dressed without difficulty.  She notes though if she does too many errands, particularly after coming home from grocery shopping, she needs help carrying in the groceries, and she can be just exhausted and markedly short of breath.  She denies syncope or presyncope but notes she must stand up slowly.  Otherwise, she gets lightheaded.  She denies leanna orthopnea or PND as well as peripheral edema.  She notes she has had difficulty getting her stress test done.  One day she showed up the wrong day and the  next day she had coffee and it was canceled, and the following day she showed up late.  This is now scheduled for later this week.    SOCIAL HISTORY:  No significant alcohol, no street drugs.  Family history of alcoholism so she wanted to be sure to avoid that.  Former smoker, 67-pack-year history.  She lives in her own home.    PHYSICAL EXAMINATION:    GENERAL:  Well-developed, well-nourished woman in no acute distress.  HEENT:  Normocephalic, atraumatic.  HEART:  Regular in rate and rhythm.  I do not appreciate murmur, rub or gallop.  LUNGS:  Clear without wheezes, rales, or rhonchi.  EXTREMITIES:  Without peripheral edema.  NECK:  The neck veins are flat at 30 degrees.  SKIN:  Warm and dry.    Labs reviewed show creatinine 0.92, BUN 19, potassium 3.9, sodium 140.    ASSESSMENT AND PLAN:    1.  Heart failure with mildly reduced ejection fraction about 45%-50% with symptoms improved with the change of lisinopril to Entresto but still with class II symptoms.  It is difficult to determine if these may potentially be pulmonary, as inhalers improve her symptoms, versus mixed pulmonary and cardiac.  Today we are going to further optimize her cardiac medications by putting her on Entresto 97/103 mg b.i.d. and stopping her amlodipine to prevent hypotension.  The cause of her cardiomyopathy that would be high on her list, though possible RV pacing as she is 100% RV paced, and ischemia.  We agreed we will do workup for ischemia with a stress test this week.  She otherwise appears euvolemic on exam.  We will continue her on just Lasix 20 mg daily.  Her creatinine is tolerating this fine. Going forward if EF has not normalized, we would add spironolactone and if possible SGLT2 inhibitor as well as increase in Toprol further.  2.  Concern for coronary artery disease as above with family history of 2 sons having coronary artery disease at age of 57, one dying from an MI and another apparently recently presenting with acute  STEMI that was treated and is cared for on our team.  We discussed after stress test if this is significantly abnormal with a moderate to large area of ischemia, we may consider coronary angiogram.  The procedure was described in detail including risks and benefits of up to and including bruising, bleeding, renal failure, vascular damage, infection, palpitations, stroke, heart attack, up to and including emergency open heart surgery and death. If that is the case, she would be agreeable to proceed.  3.  Atrial fibrillation with history of AV node ablation and permanent pacemaker in place, on Xarelto appropriately for a CHADS-VASc of 4.  4.  Dyslipidemia, reasonable control.  Last LDL 70, HDL 69, total cholesterol 154.    We will get her stress test this week.  She will then get an echocardiogram and labs and follow up with Dr. Mauri Chase in October.  We will defer medication adjustments to that depending on stress test results.    Thank you for allowing me to participate in this delightful patient's care.    Sandrine Glover PA-C        D: 2022   T: 2022   MT: shin    Name:     ARTURO BOSWELL  MRN:      [id number]        Account:      [id number]   :      1940           Service Date: 2022       Document: [id number]      Thank you for allowing me to participate in the care of your patient.      Sincerely,     Sandrine Glover PA-C     [facility]  cc:   Sandrine Glover PA-C  [address]  [place] [id number]

## 2022-09-14 ENCOUNTER — CARE COORDINATION (OUTPATIENT)
Dept: CARDIOLOGY | Facility: CLINIC | Age: 82
End: 2022-09-14

## 2022-09-14 NOTE — PROGRESS NOTES
[facility]    Received the fax below. Note Hamida is already on Entresto, but needed cost assistance.     I called Hamida and left her a detailed VM with info below, asking her to call [facility] to request dispense, and left CORE RN number if questions.    I also sent a [employer] message with this update.        Future Appointments   Date Time Provider Department Center   10/17/2022  8:15 AM [facility]   10/17/2022  8:45 AM [id number] [facility]   10/21/2022  8:15 AM Roby Mallory MD [facility]   11/10/2022 12:00 AM [facility] TECH1 [facility]     Roselyn Smith RN BSN   3:14 PM 09/14/22  CORE nurse line M-F 8a-4p: [phone]

## 2022-09-20 ENCOUNTER — CARE COORDINATION (OUTPATIENT)
Dept: CARDIOLOGY | Facility: CLINIC | Age: 82
End: 2022-09-20

## 2022-09-20 DIAGNOSIS — I50.20 HEART FAILURE WITH REDUCED EJECTION FRACTION, NYHA CLASS II (H): Primary | ICD-10-CM

## 2022-09-20 NOTE — PROGRESS NOTES
[facility]    Incoming call from patient requesting help rescheduling her stress test. Patient missed her appt on 9/14 as she was ill. Order re-entered and I will forward request to scheduling. I also gave patient contact information to call scheduling. Patient verbalized understanding.  Pooja Vaughn, RN on 9/20/2022 at 12:01 PM

## 2022-10-03 ENCOUNTER — HOSPITAL ENCOUNTER (OUTPATIENT)
Dept: CARDIOLOGY | Facility: CLINIC | Age: 82
Discharge: HOME | End: 2022-10-03
Attending: PHYSICIAN ASSISTANT
Payer: COMMERCIAL

## 2022-10-03 VITALS
DIASTOLIC BLOOD PRESSURE: 84 MMHG | OXYGEN SATURATION: 98 % | SYSTOLIC BLOOD PRESSURE: 142 MMHG | HEIGHT: 63 IN | HEART RATE: 68 BPM | BODY MASS INDEX: 31.71 KG/M2 | WEIGHT: 179 LBS

## 2022-10-03 DIAGNOSIS — I50.20 HEART FAILURE WITH REDUCED EJECTION FRACTION, NYHA CLASS II (H): ICD-10-CM

## 2022-10-03 LAB
CV STRESS MAX HR HE: 75
RATE PRESSURE PRODUCT: NORMAL
STRESS ECHO BASELINE DIASTOLIC HE: 84
STRESS ECHO BASELINE HR: 65 BPM
STRESS ECHO BASELINE SYSTOLIC BP: 142
STRESS ECHO CALCULATED PERCENT HR: 55 %
STRESS ECHO LAST STRESS DIASTOLIC BP: 78
STRESS ECHO LAST STRESS SYSTOLIC BP: 140
STRESS ECHO TARGET HR: 137

## 2022-10-03 PROCEDURE — 93017 CV STRESS TEST TRACING ONLY: CPT

## 2022-10-03 PROCEDURE — 250N000011 HC RX IP 250 OP 636: Performed by: INTERNAL MEDICINE

## 2022-10-03 PROCEDURE — A9502 TC99M TETROFOSMIN: HCPCS | Performed by: PHYSICIAN ASSISTANT

## 2022-10-03 PROCEDURE — 78452 HT MUSCLE IMAGE SPECT MULT: CPT | Mod: 26 | Performed by: INTERNAL MEDICINE

## 2022-10-03 PROCEDURE — 93018 CV STRESS TEST I&R ONLY: CPT | Performed by: INTERNAL MEDICINE

## 2022-10-03 PROCEDURE — 93016 CV STRESS TEST SUPVJ ONLY: CPT | Performed by: PHYSICIAN ASSISTANT

## 2022-10-03 PROCEDURE — 343N000001 HC RX 343: Performed by: PHYSICIAN ASSISTANT

## 2022-10-03 RX ORDER — CAFFEINE CITRATE 20 MG/ML
60 SOLUTION INTRAVENOUS
Status: DISCONTINUED | OUTPATIENT
Start: 2022-10-03 | End: 2022-10-04 | Stop reason: HOSPADM

## 2022-10-03 RX ORDER — REGADENOSON 0.08 MG/ML
0.4 INJECTION, SOLUTION INTRAVENOUS ONCE
Status: COMPLETED | OUTPATIENT
Start: 2022-10-03 | End: 2022-10-03

## 2022-10-03 RX ORDER — ALBUTEROL SULFATE 90 UG/1
2 AEROSOL, METERED RESPIRATORY (INHALATION) EVERY 5 MIN PRN
Status: DISCONTINUED | OUTPATIENT
Start: 2022-10-03 | End: 2022-10-04 | Stop reason: HOSPADM

## 2022-10-03 RX ORDER — AMINOPHYLLINE 25 MG/ML
50-100 INJECTION, SOLUTION INTRAVENOUS
Status: DISCONTINUED | OUTPATIENT
Start: 2022-10-03 | End: 2022-10-04 | Stop reason: HOSPADM

## 2022-10-03 RX ORDER — ACYCLOVIR 200 MG/1
0-1 CAPSULE ORAL
Status: DISCONTINUED | OUTPATIENT
Start: 2022-10-03 | End: 2022-10-04 | Stop reason: HOSPADM

## 2022-10-03 RX ADMIN — TETROFOSMIN 9.35 MCI.: 1.38 INJECTION, POWDER, LYOPHILIZED, FOR SOLUTION INTRAVENOUS at 11:04

## 2022-10-03 RX ADMIN — REGADENOSON 0.4 MG: 0.08 INJECTION, SOLUTION INTRAVENOUS at 10:59

## 2022-10-03 RX ADMIN — TETROFOSMIN 3 MCI.: 1.38 INJECTION, POWDER, LYOPHILIZED, FOR SOLUTION INTRAVENOUS at 09:17

## 2022-10-10 ENCOUNTER — HEALTH MAINTENANCE LETTER (OUTPATIENT)
Age: 82
End: 2022-10-10

## 2022-10-17 ENCOUNTER — HOSPITAL ENCOUNTER (OUTPATIENT)
Dept: CARDIOLOGY | Facility: CLINIC | Age: 82
Discharge: HOME | End: 2022-10-17
Attending: INTERNAL MEDICINE | Admitting: INTERNAL MEDICINE
Payer: COMMERCIAL

## 2022-10-17 ENCOUNTER — LAB (OUTPATIENT)
Dept: LAB | Facility: CLINIC | Age: 82
End: 2022-10-17
Payer: COMMERCIAL

## 2022-10-17 DIAGNOSIS — I25.10 CORONARY ARTERY CALCIFICATION SEEN ON CT SCAN: ICD-10-CM

## 2022-10-17 DIAGNOSIS — I48.20 CHRONIC ATRIAL FIBRILLATION (H): ICD-10-CM

## 2022-10-17 DIAGNOSIS — I25.10 CORONARY ARTERY DISEASE INVOLVING NATIVE CORONARY ARTERY OF NATIVE HEART WITHOUT ANGINA PECTORIS: ICD-10-CM

## 2022-10-17 DIAGNOSIS — E87.6 HYPOKALEMIA: ICD-10-CM

## 2022-10-17 DIAGNOSIS — I50.32 CHRONIC DIASTOLIC CONGESTIVE HEART FAILURE (H): ICD-10-CM

## 2022-10-17 LAB
ALT SERPL W P-5'-P-CCNC: 27 U/L (ref 0–50)
ANION GAP SERPL CALCULATED.3IONS-SCNC: 5 MMOL/L (ref 3–14)
BUN SERPL-MCNC: 21 MG/DL (ref 7–30)
CALCIUM SERPL-MCNC: 9.6 MG/DL (ref 8.5–10.1)
CHLORIDE BLD-SCNC: 104 MMOL/L (ref 94–109)
CHOLEST SERPL-MCNC: 157 MG/DL
CO2 SERPL-SCNC: 30 MMOL/L (ref 20–32)
CREAT SERPL-MCNC: 1.05 MG/DL (ref 0.52–1.04)
FASTING STATUS PATIENT QL REPORTED: YES
GFR SERPL CREATININE-BSD FRML MDRD: 52 ML/MIN/1.73M2
GLUCOSE BLD-MCNC: 112 MG/DL (ref 70–99)
HDLC SERPL-MCNC: 68 MG/DL
LDLC SERPL CALC-MCNC: 65 MG/DL
LVEF ECHO: NORMAL
NONHDLC SERPL-MCNC: 89 MG/DL
POTASSIUM BLD-SCNC: 3.8 MMOL/L (ref 3.4–5.3)
SODIUM SERPL-SCNC: 139 MMOL/L (ref 133–144)
TRIGL SERPL-MCNC: 119 MG/DL

## 2022-10-17 PROCEDURE — 80061 LIPID PANEL: CPT | Performed by: INTERNAL MEDICINE

## 2022-10-17 PROCEDURE — 93321 DOPPLER ECHO F-UP/LMTD STD: CPT

## 2022-10-17 PROCEDURE — 93308 TTE F-UP OR LMTD: CPT | Mod: 26 | Performed by: INTERNAL MEDICINE

## 2022-10-17 PROCEDURE — 93325 DOPPLER ECHO COLOR FLOW MAPG: CPT

## 2022-10-17 PROCEDURE — 93321 DOPPLER ECHO F-UP/LMTD STD: CPT | Mod: 26 | Performed by: INTERNAL MEDICINE

## 2022-10-17 PROCEDURE — 84460 ALANINE AMINO (ALT) (SGPT): CPT | Performed by: INTERNAL MEDICINE

## 2022-10-17 PROCEDURE — 80048 BASIC METABOLIC PNL TOTAL CA: CPT | Performed by: INTERNAL MEDICINE

## 2022-10-17 PROCEDURE — 93325 DOPPLER ECHO COLOR FLOW MAPG: CPT | Mod: 26 | Performed by: INTERNAL MEDICINE

## 2022-10-17 PROCEDURE — 36415 COLL VENOUS BLD VENIPUNCTURE: CPT | Performed by: INTERNAL MEDICINE

## 2022-10-19 RX ORDER — ATORVASTATIN CALCIUM 40 MG/1
TABLET, FILM COATED ORAL
Qty: 90 TABLET | Refills: 2 | Status: SHIPPED | OUTPATIENT
Start: 2022-10-19 | End: 2023-05-01

## 2022-10-19 RX ORDER — POTASSIUM CHLORIDE 750 MG/1
TABLET, EXTENDED RELEASE ORAL
Qty: 90 TABLET | Refills: 2 | Status: SHIPPED | OUTPATIENT
Start: 2022-10-19 | End: 2023-05-01

## 2022-10-19 NOTE — TELEPHONE ENCOUNTER
Prescriptions approved per [facility] Refill Protocol.  DANIEL BlankN, RN-[facility]

## 2022-10-31 ENCOUNTER — OFFICE VISIT (OUTPATIENT)
Dept: CARDIOLOGY | Facility: CLINIC | Age: 82
End: 2022-10-31
Payer: COMMERCIAL

## 2022-10-31 VITALS
BODY MASS INDEX: 31.88 KG/M2 | SYSTOLIC BLOOD PRESSURE: 152 MMHG | DIASTOLIC BLOOD PRESSURE: 78 MMHG | HEART RATE: 67 BPM | WEIGHT: 179.9 LBS | HEIGHT: 63 IN | OXYGEN SATURATION: 97 %

## 2022-10-31 DIAGNOSIS — I25.10 CORONARY ARTERY DISEASE INVOLVING NATIVE CORONARY ARTERY OF NATIVE HEART WITHOUT ANGINA PECTORIS: ICD-10-CM

## 2022-10-31 DIAGNOSIS — E78.5 HYPERLIPIDEMIA LDL GOAL <70: ICD-10-CM

## 2022-10-31 DIAGNOSIS — I48.20 CHRONIC ATRIAL FIBRILLATION (H): ICD-10-CM

## 2022-10-31 DIAGNOSIS — Z95.0 CARDIAC PACEMAKER IN SITU: ICD-10-CM

## 2022-10-31 DIAGNOSIS — I42.8 OTHER CARDIOMYOPATHY (H): ICD-10-CM

## 2022-10-31 DIAGNOSIS — I50.32 CHRONIC DIASTOLIC CONGESTIVE HEART FAILURE (H): ICD-10-CM

## 2022-10-31 DIAGNOSIS — R03.0 ELEVATED BLOOD PRESSURE READING WITHOUT DIAGNOSIS OF HYPERTENSION: Primary | ICD-10-CM

## 2022-10-31 PROCEDURE — 99214 OFFICE O/P EST MOD 30 MIN: CPT | Performed by: INTERNAL MEDICINE

## 2022-10-31 NOTE — PROGRESS NOTES
Service Date: 10/31/2022    HISTORY OF PRESENT ILLNESS:  It was my pleasure to see your patient, Hamida Verma.  She is a very pleasant 82-year-old patient who I have seen in the past for atrial fibrillation with rapid ventricular response.  Because she failed medical therapy, we ultimately underwent an AV node ablation.  She had previously had a permanent pacemaker implanted for high-grade conduction system disease.      This patient also has a history of essential hypertension and diastolic congestive heart failure.  She also has coronary artery disease based upon the fact that she has got significant coronary calcification in her LAD and circumflex coronary arteries on CT scanning.      If you remember over the last year, she has been complaining of increasing shortness of breath on exertion.  She was seen by Sandrine Glover.  It was noted on echocardiography that her ejection fraction had dropped from previous EF of 55%-60% when she had her own intrinsic heart rhythm, to an EF of 45%-50% because she is now permanently paced and there was also some septal dyssynchrony.  One of the worries of course, was that the shortness of breath could be due to the patient being pacemaker dependent with essentially a new left bundle branch block because she is paced in the right ventricle.  However, the patient was switched from lisinopril to Entresto and she was continued on furosemide 20 mg per day.  The patient was also given an inhaler, which also has appeared to have improved her symptoms somewhat.    With that background in mind, the patient has significant improvement in her shortness of breath.  She does feel that her breathing is back towards baseline.  Nuclear stress testing did not show any evidence of ischemia.  The only significant abnormality that we did notice today is that her blood pressure is raised.      At the initial presentation, her blood pressure was 152/84 and on repeat at the end of our visit, which was  about half an hour later, it was 152/78, so her blood pressure does appear to be raised.  However, this is an unusually high blood pressure for her.  On 09/12/2022, which was essentially 6 weeks ago, her blood pressure was well controlled at 106/62 and all blood pressures prior to that have been in the normal range.  Her weight has not increased that she is at 179 pounds, which is where she has been in the past and significantly lighter than she was earlier this year when she was weight 187 pounds.      Her lipid profile was excellent on 10/17/2022 which is essentially 2 weeks ago with an LDL of 65, HDL of 68 and triglycerides of 119 with a total cholesterol of 157.  Her basic metabolic profile on the same date also showed mild renal insufficiency with a BUN of 21, creatinine 1.05 and GFR 52.  However, this is similar to what it was last September and also 2 years ago in 2020.  It is slightly less than it was in September when her GFR was 61.    IMPRESSION:    1.  Dyspnea on exertion.  This is significantly improved and she feels that she is back to baseline.  This may be a combination of more aggressive treatment of her diastolic heart failure, but she has also started on inhalers, which also appears to have improved her symptomatology.  2.  Mild idiopathic cardiomyopathy.  This is most likely due to the fact that she now has septal dyssynchrony from right ventricular pacing.  Given the fact that her symptoms have improved with medical therapy, it is unlikely that the cardiomyopathy is the cause of her shortness of breath.  3.  Essential hypertension.  It is unclear whether this is white coat hypertension or whether there has been a significant change in her blood pressure from previously.  Past blood pressure measurements have always been in the normal range.  4.  Mild renal insufficiency, but similar to previous renal investigations earlier this year and last year.  5.  Coronary artery disease.  The patient is  asymptomatic with respect to coronary artery disease with no objective evidence of ischemia on nuclear stress testing, which was performed about 2 months ago.    PLAN:  I will obtain a 24-hour blood pressure monitor to determine if her blood pressure is well controlled or not.  Blood pressure control is absolutely important in patients with diastolic heart failure.    I will have the patient return in 6 months' time to see Sandrine Glover with a basic metabolic profile at that time.  The patient will follow up with me in 1 years's time, also with a basic metabolic profile, lipid profile and an echocardiogram to follow left ventricular systolic function.    It is my pleasure to be involved the care of this nice patient.  As always, she has been told to contact us if she has any questions or any concerns.  We will contact her with the results of the 24-hour blood pressure monitor.    cc:   Mikala Philip MD   [facility]  [address] [place] [address]     Roby Chase MD, [facility]        D: 10/31/2022   T: 10/31/2022   MT: INDIRA    Name:     ARTURO BOSWELLOscar  MRN:      [id number]        Account:      [id number]   :      1940           Service Date: 10/31/2022       Document: [id number]

## 2022-10-31 NOTE — LETTER
10/31/2022    Mikala Philip MD, MD  [address] [place] [address]    RE: Hamida Verma       Dear Colleague,     I had the pleasure of seeing Hamida Verma in the [facility].  HPI and Plan:   See dictation        Orders Placed This Encounter   Procedures     Basic metabolic panel     Lipid Profile     ALT     Basic metabolic panel     Lipid Profile     ALT     Follow-Up with Cardiology GORGE     Follow-Up with Cardiology     24 Hour Blood Pressure Monitor - Adult     Echocardiogram Complete       No orders of the defined types were placed in this encounter.      There are no discontinued medications.      Encounter Diagnoses   Name Primary?     Hyperlipidemia LDL goal <70      Coronary artery disease involving native coronary artery of native heart without angina pectoris      Cardiac pacemaker in situ      Chronic diastolic congestive heart failure (H)      Chronic atrial fibrillation (H)      Elevated blood pressure reading without diagnosis of hypertension Yes     Other cardiomyopathy (H)        CURRENT MEDICATIONS:  Current Outpatient Medications   Medication Sig Dispense Refill     albuterol (PROAIR HFA/PROVENTIL HFA/VENTOLIN HFA) 108 (90 Base) MCG/ACT inhaler Inhale 2 puffs into the lungs every 6 hours 18 g 3     alendronate (FOSAMAX) 70 MG tablet Take 1 tablet (70 mg) by mouth every 7 days Take 60 minutes before am meal with 8 oz. water. Remain upright for 30 minutes. 12 tablet 3     atorvastatin (LIPITOR) 40 MG tablet TAKE ONE TABLET BY MOUTH ONCE DAILY 90 tablet 2     cholecalciferol (VITAMIN  -D) 1000 UNIT capsule Take 1 capsule by mouth daily.       Coral Calcium 133-66.7-133 MG-MG-UNIT CAPS Take 2 tablets by mouth 2 times daily        FLAX SEED OIL OR 1 capsule daily       furosemide (LASIX) 20 MG tablet TAKE ONE TABLET BY MOUTH ONCE DAILY. Please request further refills through cardiology 90 tablet 3     metoprolol succinate ER (TOPROL-XL) 50 MG 24 hr tablet Take 1 tablet  (50 mg) by mouth 2 times daily 180 tablet 3     Multiple Vitamins-Minerals (HAIR SKIN NAILS PO) Take 1 tablet by mouth At Bedtime       potassium chloride ER (KLOR-CON M) 10 MEQ CR tablet TAKE ONE TABLET BY MOUTH ONCE DAILY 90 tablet 2     rivaroxaban ANTICOAGULANT (XARELTO ANTICOAGULANT) 20 MG TABS tablet Take 1 tablet (20 mg) by mouth daily (with dinner) 90 tablet 3     sacubitril-valsartan (ENTRESTO)  MG per tablet Take 1 tablet by mouth 2 times daily 60 tablet 11     tiotropium (SPIRIVA) 18 MCG inhaled capsule Inhale 1 capsule (18 mcg) into the lungs daily 30 capsule 1       ALLERGIES     Allergies   Allergen Reactions     Strawberries [Strawberry] Anaphylaxis     Strawberry Flavor        PAST MEDICAL HISTORY:  Past Medical History:   Diagnosis Date     Atrial fibrillation (H)      Chronic diastolic CHF (congestive heart failure) (H)      Essential hypertension, benign      HYPERLIPIDEMIA NEC/NOS 3/30/2006     Pulmonary hypertension (H)      SSS (sick sinus syndrome) (H)     s/p PPM 3/2018       PAST SURGICAL HISTORY:  Past Surgical History:   Procedure Laterality Date     EP ABLATION AV NODE N/A 5/7/2019    Procedure: EP Ablation AV Node;  Surgeon: Roe Voss MD;  Location:  [facility]     EYE SURGERY       HYSTERECTOMY, VAGINAL      hysterectomy       FAMILY HISTORY:  Family History   Problem Relation Age of Onset     Cerebrovascular Disease Mother      Glaucoma Mother      Macular Degeneration Mother      Alcohol/Drug Father         alcohol     Myocardial Infarction Father 63        passed away from MI     Family History Negative Sister      Family History Negative Sister      Family History Negative Sister      Cerebrovascular Disease Sister      Family History Negative Brother      Family History Negative Maternal Grandmother      Family History Negative Maternal Grandfather      Family History Negative Paternal Grandmother      Family History Negative Paternal Grandfather       Myocardial Infarction Son 57        passed away from MI     Dementia Daughter 57        early onset on namenda     Diabetes No family hx of      Breast Cancer No family hx of      Cancer - colorectal No family hx of        SOCIAL HISTORY:  Social History     Socioeconomic History     Marital status:      Spouse name: None     Number of children: None     Years of education: None     Highest education level: None   Tobacco Use     Smoking status: Former     Packs/day: 1.50     Years: 45.00     Pack years: 67.50     Types: Cigarettes     Start date: 10/28/1955     Quit date: 2000     Years since quittin.1     Smokeless tobacco: Never     Tobacco comments:     quit 6 yrs ago   Substance and Sexual Activity     Alcohol use: No     Drug use: No     Sexual activity: Yes     Partners: Male   Other Topics Concern     Parent/sibling w/ CABG, MI or angioplasty before 65F 55M? No   Social History Narrative    Balanced Diet - Yes    Osteoporosis Prevention Measures - Dairy servings per day: 2    Regular Exercise -  Yes Describe walk, but not recently due to weather    Dental Exam - Yes    Eye Exam -No    Self Breast Exam - Yes    Abuse: Current or Past (Physical, Sexual or Emotional)- No    Do you feel safe in your environment - Yes    Guns stored in the home - No    Sunscreen used - Yes    Seatbelts used - Yes    Lipids -  1/10    Glucose -  1/10    Colon Cancer Screening - Yes, 08    Hemoccults - NO    Pap Test -  Many yrs ago, has hysterectomy    Do you have any concerns about STD's -  No    Mammography - 08    DEXA - 06    Immunizations reviewed and up to date - No    Jonathan Marshall MA                   Review of Systems:  Skin:          Eyes:         ENT:         Respiratory:          Cardiovascular:         Gastroenterology:        Genitourinary:         Musculoskeletal:         Neurologic:         Psychiatric:         Heme/Lymph/Imm:         Endocrine:           Physical Exam:  Vitals: BP (!)  "152/78   Pulse 67   Ht 1.6 m (5' 3\")   Wt 81.6 kg (179 lb 14.4 oz)   LMP  (LMP Unknown)   SpO2 97%   BMI 31.87 kg/m      Constitutional:  cooperative, alert and oriented, well developed, well nourished, in no acute distress        Skin:  warm and dry to the touch, no apparent skin lesions or masses noted   pacemaker incision in the left infraclavicular area was well-healed      Head:  normocephalic, no masses or lesions        Eyes:  pupils equal and round        Lymph:No Cervical lymphadenopathy present     ENT:  no pallor or cyanosis, dentition good        Neck:  carotid pulses are full and equal bilaterally, JVP normal, no carotid bruit        Respiratory:  normal breath sounds, clear to auscultation, normal A-P diameter, normal symmetry, normal respiratory excursion, no use of accessory muscles         Cardiac: no murmurs, gallops or rubs detected;regular rhythm;normal S1 and S2    (Variable S1 and normal S2)            pulses full and equal     right radial artery;2+       right dorsalis pedis artery;2+     left radial artery;2+       left dorsalis pedis artery;2+            GI:  not assessed this visit        Extremities and Muscular Skeletal:  no deformities, clubbing, cyanosis, erythema observed;no edema              Neurological:  no gross motor deficits;affect appropriate        Psych:  Alert and Oriented x 3        CC  Roby Mallory MD  [address] [address]    Thank you for allowing me to participate in the care of your patient.      Sincerely,     Roby Mallory MD, MD     [facility]  "

## 2022-10-31 NOTE — PROGRESS NOTES
HPI and Plan:   See dictation        Orders Placed This Encounter   Procedures     Basic metabolic panel     Lipid Profile     ALT     Basic metabolic panel     Lipid Profile     ALT     Follow-Up with Cardiology GORGE     Follow-Up with Cardiology     24 Hour Blood Pressure Monitor - Adult     Echocardiogram Complete       No orders of the defined types were placed in this encounter.      There are no discontinued medications.      Encounter Diagnoses   Name Primary?     Hyperlipidemia LDL goal <70      Coronary artery disease involving native coronary artery of native heart without angina pectoris      Cardiac pacemaker in situ      Chronic diastolic congestive heart failure (H)      Chronic atrial fibrillation (H)      Elevated blood pressure reading without diagnosis of hypertension Yes     Other cardiomyopathy (H)        CURRENT MEDICATIONS:  Current Outpatient Medications   Medication Sig Dispense Refill     albuterol (PROAIR HFA/PROVENTIL HFA/VENTOLIN HFA) 108 (90 Base) MCG/ACT inhaler Inhale 2 puffs into the lungs every 6 hours 18 g 3     alendronate (FOSAMAX) 70 MG tablet Take 1 tablet (70 mg) by mouth every 7 days Take 60 minutes before am meal with 8 oz. water. Remain upright for 30 minutes. 12 tablet 3     atorvastatin (LIPITOR) 40 MG tablet TAKE ONE TABLET BY MOUTH ONCE DAILY 90 tablet 2     cholecalciferol (VITAMIN  -D) 1000 UNIT capsule Take 1 capsule by mouth daily.       Coral Calcium 133-66.7-133 MG-MG-UNIT CAPS Take 2 tablets by mouth 2 times daily        FLAX SEED OIL OR 1 capsule daily       furosemide (LASIX) 20 MG tablet TAKE ONE TABLET BY MOUTH ONCE DAILY. Please request further refills through cardiology 90 tablet 3     metoprolol succinate ER (TOPROL-XL) 50 MG 24 hr tablet Take 1 tablet (50 mg) by mouth 2 times daily 180 tablet 3     Multiple Vitamins-Minerals (HAIR SKIN NAILS PO) Take 1 tablet by mouth At Bedtime       potassium chloride ER (KLOR-CON M) 10 MEQ CR tablet TAKE ONE TABLET BY  MOUTH ONCE DAILY 90 tablet 2     rivaroxaban ANTICOAGULANT (XARELTO ANTICOAGULANT) 20 MG TABS tablet Take 1 tablet (20 mg) by mouth daily (with dinner) 90 tablet 3     sacubitril-valsartan (ENTRESTO)  MG per tablet Take 1 tablet by mouth 2 times daily 60 tablet 11     tiotropium (SPIRIVA) 18 MCG inhaled capsule Inhale 1 capsule (18 mcg) into the lungs daily 30 capsule 1       ALLERGIES     Allergies   Allergen Reactions     Strawberries [Strawberry] Anaphylaxis     Strawberry Flavor        PAST MEDICAL HISTORY:  Past Medical History:   Diagnosis Date     Atrial fibrillation (H)      Chronic diastolic CHF (congestive heart failure) (H)      Essential hypertension, benign      HYPERLIPIDEMIA NEC/NOS 3/30/2006     Pulmonary hypertension (H)      SSS (sick sinus syndrome) (H)     s/p PPM 3/2018       PAST SURGICAL HISTORY:  Past Surgical History:   Procedure Laterality Date     EP ABLATION AV NODE N/A 5/7/2019    Procedure: EP Ablation AV Node;  Surgeon: Roe Voss MD;  Location:  [facility]     EYE SURGERY       HYSTERECTOMY, VAGINAL      hysterectomy       FAMILY HISTORY:  Family History   Problem Relation Age of Onset     Cerebrovascular Disease Mother      Glaucoma Mother      Macular Degeneration Mother      Alcohol/Drug Father         alcohol     Myocardial Infarction Father 63        passed away from MI     Family History Negative Sister      Family History Negative Sister      Family History Negative Sister      Cerebrovascular Disease Sister      Family History Negative Brother      Family History Negative Maternal Grandmother      Family History Negative Maternal Grandfather      Family History Negative Paternal Grandmother      Family History Negative Paternal Grandfather      Myocardial Infarction Son 57        passed away from MI     Dementia Daughter 57        early onset on namenda     Diabetes No family hx of      Breast Cancer No family hx of      Cancer - colorectal No family  "hx of        SOCIAL HISTORY:  Social History     Socioeconomic History     Marital status:      Spouse name: None     Number of children: None     Years of education: None     Highest education level: None   Tobacco Use     Smoking status: Former     Packs/day: 1.50     Years: 45.00     Pack years: 67.50     Types: Cigarettes     Start date: 10/28/1955     Quit date: 2000     Years since quittin.1     Smokeless tobacco: Never     Tobacco comments:     quit 6 yrs ago   Substance and Sexual Activity     Alcohol use: No     Drug use: No     Sexual activity: Yes     Partners: Male   Other Topics Concern     Parent/sibling w/ CABG, MI or angioplasty before 65F 55M? No   Social History Narrative    Balanced Diet - Yes    Osteoporosis Prevention Measures - Dairy servings per day: 2    Regular Exercise -  Yes Describe walk, but not recently due to weather    Dental Exam - Yes    Eye Exam -No    Self Breast Exam - Yes    Abuse: Current or Past (Physical, Sexual or Emotional)- No    Do you feel safe in your environment - Yes    Guns stored in the home - No    Sunscreen used - Yes    Seatbelts used - Yes    Lipids -  1/10    Glucose -  1/10    Colon Cancer Screening - Yes, 08    Hemoccults - NO    Pap Test -  Many yrs ago, has hysterectomy    Do you have any concerns about STD's -  No    Mammography - 08    DEXA - 06    Immunizations reviewed and up to date - No    Jonathan Marshall MA                   Review of Systems:  Skin:          Eyes:         ENT:         Respiratory:          Cardiovascular:         Gastroenterology:        Genitourinary:         Musculoskeletal:         Neurologic:         Psychiatric:         Heme/Lymph/Imm:         Endocrine:           Physical Exam:  Vitals: BP (!) 152/78   Pulse 67   Ht 1.6 m (5' 3\")   Wt 81.6 kg (179 lb 14.4 oz)   LMP  (LMP Unknown)   SpO2 97%   BMI 31.87 kg/m      Constitutional:  cooperative, alert and oriented, well developed, well nourished, " in no acute distress        Skin:  warm and dry to the touch, no apparent skin lesions or masses noted   pacemaker incision in the left infraclavicular area was well-healed      Head:  normocephalic, no masses or lesions        Eyes:  pupils equal and round        Lymph:No Cervical lymphadenopathy present     ENT:  no pallor or cyanosis, dentition good        Neck:  carotid pulses are full and equal bilaterally, JVP normal, no carotid bruit        Respiratory:  normal breath sounds, clear to auscultation, normal A-P diameter, normal symmetry, normal respiratory excursion, no use of accessory muscles         Cardiac: no murmurs, gallops or rubs detected;regular rhythm;normal S1 and S2    (Variable S1 and normal S2)            pulses full and equal     right radial artery;2+       right dorsalis pedis artery;2+     left radial artery;2+       left dorsalis pedis artery;2+            GI:  not assessed this visit        Extremities and Muscular Skeletal:  no deformities, clubbing, cyanosis, erythema observed;no edema              Neurological:  no gross motor deficits;affect appropriate        Psych:  Alert and Oriented x 3        CC  Roby Mallory MD  [address]  [place] [id number]

## 2022-11-01 DIAGNOSIS — I48.91 ATRIAL FIBRILLATION WITH RAPID VENTRICULAR RESPONSE (H): ICD-10-CM

## 2022-11-01 RX ORDER — METOPROLOL SUCCINATE 50 MG/1
50 TABLET, EXTENDED RELEASE ORAL 2 TIMES DAILY
Qty: 180 TABLET | Refills: 3 | Status: SHIPPED | OUTPATIENT
Start: 2022-11-01 | End: 2023-05-01

## 2022-11-04 DIAGNOSIS — I48.0 PAF (PAROXYSMAL ATRIAL FIBRILLATION) (H): ICD-10-CM

## 2022-11-04 NOTE — TELEPHONE ENCOUNTER
11/4/22  Call from Hamida asking the only the xarelto prescription be forwarded to [facility] as she can get it cheaper at [facility]. Hamida requests other prescriptions remain with [facility].  [facility] Refill Guideline reviewed. Medication meets criteria for refill. Refill sent.  Vanesa Singh RN 11/04/22 1:41 PM

## 2022-11-09 ENCOUNTER — HOSPITAL ENCOUNTER (OUTPATIENT)
Dept: CARDIOLOGY | Facility: CLINIC | Age: 82
Discharge: HOME | End: 2022-11-09
Attending: INTERNAL MEDICINE | Admitting: INTERNAL MEDICINE
Payer: COMMERCIAL

## 2022-11-09 ENCOUNTER — LAB (OUTPATIENT)
Dept: LAB | Facility: CLINIC | Age: 82
End: 2022-11-09
Payer: COMMERCIAL

## 2022-11-09 DIAGNOSIS — E78.5 HYPERLIPIDEMIA LDL GOAL <70: ICD-10-CM

## 2022-11-09 DIAGNOSIS — R03.0 ELEVATED BLOOD PRESSURE READING WITHOUT DIAGNOSIS OF HYPERTENSION: ICD-10-CM

## 2022-11-09 DIAGNOSIS — I25.10 CORONARY ARTERY DISEASE INVOLVING NATIVE CORONARY ARTERY OF NATIVE HEART WITHOUT ANGINA PECTORIS: ICD-10-CM

## 2022-11-09 LAB
ALT SERPL W P-5'-P-CCNC: 29 U/L (ref 0–50)
ANION GAP SERPL CALCULATED.3IONS-SCNC: 8 MMOL/L (ref 3–14)
BUN SERPL-MCNC: 18 MG/DL (ref 7–30)
CALCIUM SERPL-MCNC: 9.6 MG/DL (ref 8.5–10.1)
CHLORIDE BLD-SCNC: 105 MMOL/L (ref 94–109)
CHOLEST SERPL-MCNC: 159 MG/DL
CO2 SERPL-SCNC: 27 MMOL/L (ref 20–32)
CREAT SERPL-MCNC: 0.96 MG/DL (ref 0.52–1.04)
FASTING STATUS PATIENT QL REPORTED: YES
GFR SERPL CREATININE-BSD FRML MDRD: 58 ML/MIN/1.73M2
GLUCOSE BLD-MCNC: 109 MG/DL (ref 70–99)
HDLC SERPL-MCNC: 75 MG/DL
LDLC SERPL CALC-MCNC: 61 MG/DL
NONHDLC SERPL-MCNC: 84 MG/DL
POTASSIUM BLD-SCNC: 4 MMOL/L (ref 3.4–5.3)
SODIUM SERPL-SCNC: 140 MMOL/L (ref 133–144)
TRIGL SERPL-MCNC: 114 MG/DL

## 2022-11-09 PROCEDURE — 80061 LIPID PANEL: CPT | Performed by: INTERNAL MEDICINE

## 2022-11-09 PROCEDURE — 84460 ALANINE AMINO (ALT) (SGPT): CPT | Performed by: INTERNAL MEDICINE

## 2022-11-09 PROCEDURE — 93790 AMBL BP MNTR W/SW I&R: CPT | Performed by: INTERNAL MEDICINE

## 2022-11-09 PROCEDURE — 93786 AMBL BP MNTR W/SW REC ONLY: CPT

## 2022-11-09 PROCEDURE — 36415 COLL VENOUS BLD VENIPUNCTURE: CPT | Performed by: INTERNAL MEDICINE

## 2022-11-09 PROCEDURE — 80048 BASIC METABOLIC PNL TOTAL CA: CPT | Performed by: INTERNAL MEDICINE

## 2022-11-10 ENCOUNTER — ANCILLARY PROCEDURE (OUTPATIENT)
Dept: CARDIOLOGY | Facility: CLINIC | Age: 82
End: 2022-11-10
Attending: INTERNAL MEDICINE
Payer: COMMERCIAL

## 2022-11-10 DIAGNOSIS — Z95.0 CARDIAC PACEMAKER IN SITU: ICD-10-CM

## 2022-11-10 LAB
MDC_IDC_EPISODE_DTM: NORMAL
MDC_IDC_EPISODE_DTM: NORMAL
MDC_IDC_EPISODE_DURATION: 10 S
MDC_IDC_EPISODE_DURATION: 8 S
MDC_IDC_EPISODE_ID: NORMAL
MDC_IDC_EPISODE_ID: NORMAL
MDC_IDC_EPISODE_TYPE: NORMAL
MDC_IDC_EPISODE_TYPE: NORMAL
MDC_IDC_LEAD_IMPLANT_DT: NORMAL
MDC_IDC_LEAD_IMPLANT_DT: NORMAL
MDC_IDC_LEAD_LOCATION: NORMAL
MDC_IDC_LEAD_LOCATION: NORMAL
MDC_IDC_LEAD_LOCATION_DETAIL_1: NORMAL
MDC_IDC_LEAD_LOCATION_DETAIL_1: NORMAL
MDC_IDC_LEAD_MFG: NORMAL
MDC_IDC_LEAD_MFG: NORMAL
MDC_IDC_LEAD_MODEL: NORMAL
MDC_IDC_LEAD_MODEL: NORMAL
MDC_IDC_LEAD_POLARITY_TYPE: NORMAL
MDC_IDC_LEAD_POLARITY_TYPE: NORMAL
MDC_IDC_LEAD_SERIAL: NORMAL
MDC_IDC_LEAD_SERIAL: NORMAL
MDC_IDC_MSMT_BATTERY_DTM: NORMAL
MDC_IDC_MSMT_BATTERY_REMAINING_LONGEVITY: 72 MO
MDC_IDC_MSMT_BATTERY_REMAINING_PERCENTAGE: 56 %
MDC_IDC_MSMT_BATTERY_RRT_TRIGGER: NORMAL
MDC_IDC_MSMT_BATTERY_STATUS: NORMAL
MDC_IDC_MSMT_BATTERY_VOLTAGE: 2.98 V
MDC_IDC_MSMT_LEADCHNL_RA_IMPEDANCE_VALUE: 530 OHM
MDC_IDC_MSMT_LEADCHNL_RA_LEAD_CHANNEL_STATUS: NORMAL
MDC_IDC_MSMT_LEADCHNL_RA_PACING_THRESHOLD_AMPLITUDE: 0.38 V
MDC_IDC_MSMT_LEADCHNL_RA_PACING_THRESHOLD_PULSEWIDTH: 0.4 MS
MDC_IDC_MSMT_LEADCHNL_RA_SENSING_INTR_AMPL: 3 MV
MDC_IDC_MSMT_LEADCHNL_RV_IMPEDANCE_VALUE: 510 OHM
MDC_IDC_MSMT_LEADCHNL_RV_LEAD_CHANNEL_STATUS: NORMAL
MDC_IDC_MSMT_LEADCHNL_RV_PACING_THRESHOLD_AMPLITUDE: 0.62 V
MDC_IDC_MSMT_LEADCHNL_RV_PACING_THRESHOLD_PULSEWIDTH: 0.4 MS
MDC_IDC_MSMT_LEADCHNL_RV_SENSING_INTR_AMPL: 12 MV
MDC_IDC_PG_IMPLANT_DTM: NORMAL
MDC_IDC_PG_MFG: NORMAL
MDC_IDC_PG_MODEL: NORMAL
MDC_IDC_PG_SERIAL: NORMAL
MDC_IDC_PG_TYPE: NORMAL
MDC_IDC_SESS_CLINIC_NAME: NORMAL
MDC_IDC_SESS_DTM: NORMAL
MDC_IDC_SESS_REPROGRAMMED: NO
MDC_IDC_SESS_TYPE: NORMAL
MDC_IDC_SET_BRADY_AT_MODE_SWITCH_MODE: NORMAL
MDC_IDC_SET_BRADY_AT_MODE_SWITCH_RATE: 170 {BEATS}/MIN
MDC_IDC_SET_BRADY_LOWRATE: 60 {BEATS}/MIN
MDC_IDC_SET_BRADY_MAX_SENSOR_RATE: 120 {BEATS}/MIN
MDC_IDC_SET_BRADY_MAX_TRACKING_RATE: 120 {BEATS}/MIN
MDC_IDC_SET_BRADY_MODE: NORMAL
MDC_IDC_SET_BRADY_PAV_DELAY_LOW: 250 MS
MDC_IDC_SET_BRADY_SAV_DELAY_LOW: 225 MS
MDC_IDC_SET_LEADCHNL_RA_PACING_AMPLITUDE: 1.38
MDC_IDC_SET_LEADCHNL_RA_PACING_ANODE_ELECTRODE_1: NORMAL
MDC_IDC_SET_LEADCHNL_RA_PACING_ANODE_LOCATION_1: NORMAL
MDC_IDC_SET_LEADCHNL_RA_PACING_CAPTURE_MODE: NORMAL
MDC_IDC_SET_LEADCHNL_RA_PACING_CATHODE_ELECTRODE_1: NORMAL
MDC_IDC_SET_LEADCHNL_RA_PACING_CATHODE_LOCATION_1: NORMAL
MDC_IDC_SET_LEADCHNL_RA_PACING_POLARITY: NORMAL
MDC_IDC_SET_LEADCHNL_RA_PACING_PULSEWIDTH: 0.4 MS
MDC_IDC_SET_LEADCHNL_RA_SENSING_ADAPTATION_MODE: NORMAL
MDC_IDC_SET_LEADCHNL_RA_SENSING_ANODE_ELECTRODE_1: NORMAL
MDC_IDC_SET_LEADCHNL_RA_SENSING_ANODE_LOCATION_1: NORMAL
MDC_IDC_SET_LEADCHNL_RA_SENSING_CATHODE_ELECTRODE_1: NORMAL
MDC_IDC_SET_LEADCHNL_RA_SENSING_CATHODE_LOCATION_1: NORMAL
MDC_IDC_SET_LEADCHNL_RA_SENSING_POLARITY: NORMAL
MDC_IDC_SET_LEADCHNL_RA_SENSING_SENSITIVITY: 0.3 MV
MDC_IDC_SET_LEADCHNL_RV_PACING_AMPLITUDE: 0.88
MDC_IDC_SET_LEADCHNL_RV_PACING_ANODE_ELECTRODE_1: NORMAL
MDC_IDC_SET_LEADCHNL_RV_PACING_ANODE_LOCATION_1: NORMAL
MDC_IDC_SET_LEADCHNL_RV_PACING_CAPTURE_MODE: NORMAL
MDC_IDC_SET_LEADCHNL_RV_PACING_CATHODE_ELECTRODE_1: NORMAL
MDC_IDC_SET_LEADCHNL_RV_PACING_CATHODE_LOCATION_1: NORMAL
MDC_IDC_SET_LEADCHNL_RV_PACING_POLARITY: NORMAL
MDC_IDC_SET_LEADCHNL_RV_PACING_PULSEWIDTH: 0.4 MS
MDC_IDC_SET_LEADCHNL_RV_SENSING_ADAPTATION_MODE: NORMAL
MDC_IDC_SET_LEADCHNL_RV_SENSING_ANODE_ELECTRODE_1: NORMAL
MDC_IDC_SET_LEADCHNL_RV_SENSING_ANODE_LOCATION_1: NORMAL
MDC_IDC_SET_LEADCHNL_RV_SENSING_CATHODE_ELECTRODE_1: NORMAL
MDC_IDC_SET_LEADCHNL_RV_SENSING_CATHODE_LOCATION_1: NORMAL
MDC_IDC_SET_LEADCHNL_RV_SENSING_POLARITY: NORMAL
MDC_IDC_SET_LEADCHNL_RV_SENSING_SENSITIVITY: 0.5 MV
MDC_IDC_STAT_AT_BURDEN_PERCENT: 0 %
MDC_IDC_STAT_AT_DTM_END: NORMAL
MDC_IDC_STAT_AT_DTM_START: NORMAL
MDC_IDC_STAT_AT_MODE_SW_COUNT: 2
MDC_IDC_STAT_AT_MODE_SW_COUNT_PER_DAY: 0
MDC_IDC_STAT_AT_MODE_SW_MAX_DURATION: 10 S
MDC_IDC_STAT_AT_MODE_SW_PERCENT_TIME: 1 %
MDC_IDC_STAT_BRADY_AP_VP_PERCENT: 44 %
MDC_IDC_STAT_BRADY_AP_VS_PERCENT: 1 %
MDC_IDC_STAT_BRADY_AS_VP_PERCENT: 56 %
MDC_IDC_STAT_BRADY_AS_VS_PERCENT: 1 %
MDC_IDC_STAT_BRADY_DTM_END: NORMAL
MDC_IDC_STAT_BRADY_DTM_START: NORMAL
MDC_IDC_STAT_BRADY_RA_PERCENT_PACED: 44 %
MDC_IDC_STAT_BRADY_RV_PERCENT_PACED: 99 %
MDC_IDC_STAT_CRT_DTM_END: NORMAL
MDC_IDC_STAT_CRT_DTM_START: NORMAL
MDC_IDC_STAT_HEART_RATE_ATRIAL_MAX: 310 {BEATS}/MIN
MDC_IDC_STAT_HEART_RATE_ATRIAL_MEAN: 69 {BEATS}/MIN
MDC_IDC_STAT_HEART_RATE_ATRIAL_MIN: 40 {BEATS}/MIN
MDC_IDC_STAT_HEART_RATE_DTM_END: NORMAL
MDC_IDC_STAT_HEART_RATE_DTM_START: NORMAL
MDC_IDC_STAT_HEART_RATE_VENTRICULAR_MAX: 200 {BEATS}/MIN
MDC_IDC_STAT_HEART_RATE_VENTRICULAR_MEAN: 69 {BEATS}/MIN
MDC_IDC_STAT_HEART_RATE_VENTRICULAR_MIN: 40 {BEATS}/MIN

## 2022-11-10 PROCEDURE — 93296 REM INTERROG EVL PM/IDS: CPT | Performed by: INTERNAL MEDICINE

## 2022-11-10 PROCEDURE — 93294 REM INTERROG EVL PM/LDLS PM: CPT | Performed by: INTERNAL MEDICINE

## 2022-11-15 DIAGNOSIS — I25.10 CORONARY ARTERY DISEASE INVOLVING NATIVE CORONARY ARTERY OF NATIVE HEART WITHOUT ANGINA PECTORIS: ICD-10-CM

## 2022-11-15 DIAGNOSIS — I48.0 PAF (PAROXYSMAL ATRIAL FIBRILLATION) (H): Primary | ICD-10-CM

## 2022-11-15 DIAGNOSIS — I50.32 CHRONIC DIASTOLIC CONGESTIVE HEART FAILURE (H): ICD-10-CM

## 2022-11-15 DIAGNOSIS — E78.5 HYPERLIPIDEMIA LDL GOAL <70: ICD-10-CM

## 2022-11-17 ENCOUNTER — TELEPHONE (OUTPATIENT)
Dept: CARDIOLOGY | Facility: CLINIC | Age: 82
End: 2022-11-17

## 2022-11-17 NOTE — TELEPHONE ENCOUNTER
"Reviewed 24 hour BP monitor showing       Per office note dated 10/31/22, Dr. Mallory recommended, \"At the initial presentation, her blood pressure was 152/84 and on repeat at the end of our visit, which was about half an hour later, it was 152/78, so her blood pressure does appear to be raised.  However, this is an unusually high blood pressure for her.  On 09/12/2022, which was essentially 6 weeks ago, her blood pressure was well controlled at 106/62 and all blood pressures prior to that have been in the normal range...It is unclear whether this is white coat hypertension or whether there has been a significant change in her blood pressure from previously.  Past blood pressure measurements have always been in the normal range...I will obtain a 24-hour blood pressure monitor to determine if her blood pressure is well controlled or not.  Blood pressure control is absolutely important in patients with diastolic heart failure.\"     Will message Dr. Mallory to review. Wiley GRANADOS   "

## 2022-11-17 NOTE — TELEPHONE ENCOUNTER
Likely that BP is in upper normal range. No nighttime BPs recorded. Continue with present med rx. Thx

## 2022-11-18 NOTE — TELEPHONE ENCOUNTER
Called pt with results of the BP monitor & recommendations from Dr. Mallory to continue current medications. Discussed following a low sodium diet which pt states she is trying to do. Wiley GRANADOS

## 2022-11-27 ENCOUNTER — HEALTH MAINTENANCE LETTER (OUTPATIENT)
Age: 82
End: 2022-11-27

## 2023-01-09 DIAGNOSIS — I50.20 HEART FAILURE WITH REDUCED EJECTION FRACTION, NYHA CLASS II (H): ICD-10-CM

## 2023-01-09 RX ORDER — SACUBITRIL AND VALSARTAN 97; 103 MG/1; MG/1
1 TABLET, FILM COATED ORAL 2 TIMES DAILY
Qty: 180 TABLET | Refills: 3 | Status: SHIPPED | OUTPATIENT
Start: 2023-01-09 | End: 2023-05-01

## 2023-02-16 ENCOUNTER — ANCILLARY PROCEDURE (OUTPATIENT)
Dept: CARDIOLOGY | Facility: CLINIC | Age: 83
End: 2023-02-16
Attending: INTERNAL MEDICINE
Payer: COMMERCIAL

## 2023-02-16 DIAGNOSIS — Z95.0 CARDIAC PACEMAKER IN SITU: ICD-10-CM

## 2023-02-16 PROCEDURE — 93294 REM INTERROG EVL PM/LDLS PM: CPT | Performed by: INTERNAL MEDICINE

## 2023-02-16 PROCEDURE — 93296 REM INTERROG EVL PM/IDS: CPT | Performed by: INTERNAL MEDICINE

## 2023-02-23 LAB
MDC_IDC_EPISODE_DTM: NORMAL
MDC_IDC_EPISODE_DURATION: 4 S
MDC_IDC_EPISODE_ID: NORMAL
MDC_IDC_EPISODE_TYPE: NORMAL
MDC_IDC_LEAD_IMPLANT_DT: NORMAL
MDC_IDC_LEAD_IMPLANT_DT: NORMAL
MDC_IDC_LEAD_LOCATION: NORMAL
MDC_IDC_LEAD_LOCATION: NORMAL
MDC_IDC_LEAD_LOCATION_DETAIL_1: NORMAL
MDC_IDC_LEAD_LOCATION_DETAIL_1: NORMAL
MDC_IDC_LEAD_MFG: NORMAL
MDC_IDC_LEAD_MFG: NORMAL
MDC_IDC_LEAD_MODEL: NORMAL
MDC_IDC_LEAD_MODEL: NORMAL
MDC_IDC_LEAD_POLARITY_TYPE: NORMAL
MDC_IDC_LEAD_POLARITY_TYPE: NORMAL
MDC_IDC_LEAD_SERIAL: NORMAL
MDC_IDC_LEAD_SERIAL: NORMAL
MDC_IDC_MSMT_BATTERY_DTM: NORMAL
MDC_IDC_MSMT_BATTERY_REMAINING_LONGEVITY: 68 MO
MDC_IDC_MSMT_BATTERY_REMAINING_PERCENTAGE: 53 %
MDC_IDC_MSMT_BATTERY_RRT_TRIGGER: NORMAL
MDC_IDC_MSMT_BATTERY_STATUS: NORMAL
MDC_IDC_MSMT_BATTERY_VOLTAGE: 2.98 V
MDC_IDC_MSMT_LEADCHNL_RA_IMPEDANCE_VALUE: 530 OHM
MDC_IDC_MSMT_LEADCHNL_RA_LEAD_CHANNEL_STATUS: NORMAL
MDC_IDC_MSMT_LEADCHNL_RA_PACING_THRESHOLD_AMPLITUDE: 0.38 V
MDC_IDC_MSMT_LEADCHNL_RA_PACING_THRESHOLD_PULSEWIDTH: 0.4 MS
MDC_IDC_MSMT_LEADCHNL_RA_SENSING_INTR_AMPL: 2.5 MV
MDC_IDC_MSMT_LEADCHNL_RV_IMPEDANCE_VALUE: 490 OHM
MDC_IDC_MSMT_LEADCHNL_RV_LEAD_CHANNEL_STATUS: NORMAL
MDC_IDC_MSMT_LEADCHNL_RV_PACING_THRESHOLD_AMPLITUDE: 0.62 V
MDC_IDC_MSMT_LEADCHNL_RV_PACING_THRESHOLD_PULSEWIDTH: 0.4 MS
MDC_IDC_MSMT_LEADCHNL_RV_SENSING_INTR_AMPL: 12 MV
MDC_IDC_PG_IMPLANT_DTM: NORMAL
MDC_IDC_PG_MFG: NORMAL
MDC_IDC_PG_MODEL: NORMAL
MDC_IDC_PG_SERIAL: NORMAL
MDC_IDC_PG_TYPE: NORMAL
MDC_IDC_SESS_CLINIC_NAME: NORMAL
MDC_IDC_SESS_DTM: NORMAL
MDC_IDC_SESS_REPROGRAMMED: NO
MDC_IDC_SESS_TYPE: NORMAL
MDC_IDC_SET_BRADY_AT_MODE_SWITCH_MODE: NORMAL
MDC_IDC_SET_BRADY_AT_MODE_SWITCH_RATE: 170 {BEATS}/MIN
MDC_IDC_SET_BRADY_LOWRATE: 60 {BEATS}/MIN
MDC_IDC_SET_BRADY_MAX_SENSOR_RATE: 120 {BEATS}/MIN
MDC_IDC_SET_BRADY_MAX_TRACKING_RATE: 120 {BEATS}/MIN
MDC_IDC_SET_BRADY_MODE: NORMAL
MDC_IDC_SET_BRADY_PAV_DELAY_LOW: 250 MS
MDC_IDC_SET_BRADY_SAV_DELAY_LOW: 225 MS
MDC_IDC_SET_LEADCHNL_RA_PACING_AMPLITUDE: 1.38
MDC_IDC_SET_LEADCHNL_RA_PACING_ANODE_ELECTRODE_1: NORMAL
MDC_IDC_SET_LEADCHNL_RA_PACING_ANODE_LOCATION_1: NORMAL
MDC_IDC_SET_LEADCHNL_RA_PACING_CAPTURE_MODE: NORMAL
MDC_IDC_SET_LEADCHNL_RA_PACING_CATHODE_ELECTRODE_1: NORMAL
MDC_IDC_SET_LEADCHNL_RA_PACING_CATHODE_LOCATION_1: NORMAL
MDC_IDC_SET_LEADCHNL_RA_PACING_POLARITY: NORMAL
MDC_IDC_SET_LEADCHNL_RA_PACING_PULSEWIDTH: 0.4 MS
MDC_IDC_SET_LEADCHNL_RA_SENSING_ADAPTATION_MODE: NORMAL
MDC_IDC_SET_LEADCHNL_RA_SENSING_ANODE_ELECTRODE_1: NORMAL
MDC_IDC_SET_LEADCHNL_RA_SENSING_ANODE_LOCATION_1: NORMAL
MDC_IDC_SET_LEADCHNL_RA_SENSING_CATHODE_ELECTRODE_1: NORMAL
MDC_IDC_SET_LEADCHNL_RA_SENSING_CATHODE_LOCATION_1: NORMAL
MDC_IDC_SET_LEADCHNL_RA_SENSING_POLARITY: NORMAL
MDC_IDC_SET_LEADCHNL_RA_SENSING_SENSITIVITY: 0.3 MV
MDC_IDC_SET_LEADCHNL_RV_PACING_AMPLITUDE: 0.88
MDC_IDC_SET_LEADCHNL_RV_PACING_ANODE_ELECTRODE_1: NORMAL
MDC_IDC_SET_LEADCHNL_RV_PACING_ANODE_LOCATION_1: NORMAL
MDC_IDC_SET_LEADCHNL_RV_PACING_CAPTURE_MODE: NORMAL
MDC_IDC_SET_LEADCHNL_RV_PACING_CATHODE_ELECTRODE_1: NORMAL
MDC_IDC_SET_LEADCHNL_RV_PACING_CATHODE_LOCATION_1: NORMAL
MDC_IDC_SET_LEADCHNL_RV_PACING_POLARITY: NORMAL
MDC_IDC_SET_LEADCHNL_RV_PACING_PULSEWIDTH: 0.4 MS
MDC_IDC_SET_LEADCHNL_RV_SENSING_ADAPTATION_MODE: NORMAL
MDC_IDC_SET_LEADCHNL_RV_SENSING_ANODE_ELECTRODE_1: NORMAL
MDC_IDC_SET_LEADCHNL_RV_SENSING_ANODE_LOCATION_1: NORMAL
MDC_IDC_SET_LEADCHNL_RV_SENSING_CATHODE_ELECTRODE_1: NORMAL
MDC_IDC_SET_LEADCHNL_RV_SENSING_CATHODE_LOCATION_1: NORMAL
MDC_IDC_SET_LEADCHNL_RV_SENSING_POLARITY: NORMAL
MDC_IDC_SET_LEADCHNL_RV_SENSING_SENSITIVITY: 0.5 MV
MDC_IDC_STAT_AT_BURDEN_PERCENT: 0 %
MDC_IDC_STAT_AT_DTM_END: NORMAL
MDC_IDC_STAT_AT_DTM_START: NORMAL
MDC_IDC_STAT_AT_MODE_SW_COUNT: 1
MDC_IDC_STAT_AT_MODE_SW_COUNT_PER_DAY: 0
MDC_IDC_STAT_AT_MODE_SW_MAX_DURATION: 4 S
MDC_IDC_STAT_AT_MODE_SW_PERCENT_TIME: 1 %
MDC_IDC_STAT_BRADY_AP_VP_PERCENT: 50 %
MDC_IDC_STAT_BRADY_AP_VS_PERCENT: 1 %
MDC_IDC_STAT_BRADY_AS_VP_PERCENT: 49 %
MDC_IDC_STAT_BRADY_AS_VS_PERCENT: 1 %
MDC_IDC_STAT_BRADY_DTM_END: NORMAL
MDC_IDC_STAT_BRADY_DTM_START: NORMAL
MDC_IDC_STAT_BRADY_RA_PERCENT_PACED: 50 %
MDC_IDC_STAT_BRADY_RV_PERCENT_PACED: 99 %
MDC_IDC_STAT_CRT_DTM_END: NORMAL
MDC_IDC_STAT_CRT_DTM_START: NORMAL
MDC_IDC_STAT_HEART_RATE_ATRIAL_MAX: 330 {BEATS}/MIN
MDC_IDC_STAT_HEART_RATE_ATRIAL_MEAN: 68 {BEATS}/MIN
MDC_IDC_STAT_HEART_RATE_ATRIAL_MIN: 40 {BEATS}/MIN
MDC_IDC_STAT_HEART_RATE_DTM_END: NORMAL
MDC_IDC_STAT_HEART_RATE_DTM_START: NORMAL
MDC_IDC_STAT_HEART_RATE_VENTRICULAR_MAX: 190 {BEATS}/MIN
MDC_IDC_STAT_HEART_RATE_VENTRICULAR_MEAN: 69 {BEATS}/MIN
MDC_IDC_STAT_HEART_RATE_VENTRICULAR_MIN: 40 {BEATS}/MIN

## 2023-04-28 ENCOUNTER — LAB (OUTPATIENT)
Dept: LAB | Facility: CLINIC | Age: 83
End: 2023-04-28
Payer: COMMERCIAL

## 2023-04-28 DIAGNOSIS — I25.10 CORONARY ARTERY DISEASE INVOLVING NATIVE CORONARY ARTERY OF NATIVE HEART WITHOUT ANGINA PECTORIS: ICD-10-CM

## 2023-04-28 DIAGNOSIS — I50.32 CHRONIC DIASTOLIC CONGESTIVE HEART FAILURE (H): ICD-10-CM

## 2023-04-28 DIAGNOSIS — I48.0 PAF (PAROXYSMAL ATRIAL FIBRILLATION) (H): ICD-10-CM

## 2023-04-28 DIAGNOSIS — E78.5 HYPERLIPIDEMIA LDL GOAL <70: ICD-10-CM

## 2023-04-28 LAB
ALT SERPL W P-5'-P-CCNC: 17 U/L (ref 10–35)
ANION GAP SERPL CALCULATED.3IONS-SCNC: 12 MMOL/L (ref 7–15)
BUN SERPL-MCNC: 19.7 MG/DL (ref 8–23)
CALCIUM SERPL-MCNC: 9.6 MG/DL (ref 8.8–10.2)
CHLORIDE SERPL-SCNC: 102 MMOL/L (ref 98–107)
CHOLEST SERPL-MCNC: 161 MG/DL
CREAT SERPL-MCNC: 1 MG/DL (ref 0.51–0.95)
DEPRECATED HCO3 PLAS-SCNC: 27 MMOL/L (ref 22–29)
GFR SERPL CREATININE-BSD FRML MDRD: 56 ML/MIN/1.73M2
GLUCOSE SERPL-MCNC: 93 MG/DL (ref 70–99)
HDLC SERPL-MCNC: 64 MG/DL
LDLC SERPL CALC-MCNC: 81 MG/DL
NONHDLC SERPL-MCNC: 97 MG/DL
POTASSIUM SERPL-SCNC: 3.8 MMOL/L (ref 3.4–5.3)
SODIUM SERPL-SCNC: 141 MMOL/L (ref 136–145)
TRIGL SERPL-MCNC: 82 MG/DL

## 2023-04-28 PROCEDURE — 36415 COLL VENOUS BLD VENIPUNCTURE: CPT

## 2023-04-28 PROCEDURE — 80048 BASIC METABOLIC PNL TOTAL CA: CPT

## 2023-04-28 PROCEDURE — 80061 LIPID PANEL: CPT

## 2023-04-28 PROCEDURE — 84460 ALANINE AMINO (ALT) (SGPT): CPT

## 2023-05-01 ENCOUNTER — TRANSFERRED RECORDS (OUTPATIENT)
Dept: HEALTH INFORMATION MANAGEMENT | Facility: CLINIC | Age: 83
End: 2023-05-01

## 2023-05-01 ENCOUNTER — OFFICE VISIT (OUTPATIENT)
Dept: CARDIOLOGY | Facility: CLINIC | Age: 83
End: 2023-05-01
Attending: INTERNAL MEDICINE
Payer: COMMERCIAL

## 2023-05-01 VITALS
DIASTOLIC BLOOD PRESSURE: 70 MMHG | BODY MASS INDEX: 32.07 KG/M2 | HEIGHT: 63 IN | OXYGEN SATURATION: 96 % | HEART RATE: 64 BPM | WEIGHT: 181 LBS | SYSTOLIC BLOOD PRESSURE: 136 MMHG

## 2023-05-01 DIAGNOSIS — I48.20 CHRONIC ATRIAL FIBRILLATION (H): ICD-10-CM

## 2023-05-01 DIAGNOSIS — I49.5 SSS (SICK SINUS SYNDROME) (H): ICD-10-CM

## 2023-05-01 DIAGNOSIS — Z95.0 CARDIAC PACEMAKER IN SITU: ICD-10-CM

## 2023-05-01 DIAGNOSIS — I48.0 PAF (PAROXYSMAL ATRIAL FIBRILLATION) (H): Primary | ICD-10-CM

## 2023-05-01 DIAGNOSIS — I50.32 CHRONIC DIASTOLIC CHF (CONGESTIVE HEART FAILURE) (H): ICD-10-CM

## 2023-05-01 DIAGNOSIS — I50.20 HEART FAILURE WITH REDUCED EJECTION FRACTION, NYHA CLASS II (H): ICD-10-CM

## 2023-05-01 DIAGNOSIS — I48.91 ATRIAL FIBRILLATION WITH RAPID VENTRICULAR RESPONSE (H): ICD-10-CM

## 2023-05-01 DIAGNOSIS — E78.5 HYPERLIPIDEMIA LDL GOAL <70: ICD-10-CM

## 2023-05-01 DIAGNOSIS — E87.6 HYPOKALEMIA: ICD-10-CM

## 2023-05-01 DIAGNOSIS — I25.10 CORONARY ARTERY DISEASE INVOLVING NATIVE CORONARY ARTERY OF NATIVE HEART WITHOUT ANGINA PECTORIS: ICD-10-CM

## 2023-05-01 DIAGNOSIS — R03.0 ELEVATED BLOOD PRESSURE READING WITHOUT DIAGNOSIS OF HYPERTENSION: ICD-10-CM

## 2023-05-01 DIAGNOSIS — I48.0 PAF (PAROXYSMAL ATRIAL FIBRILLATION) (H): ICD-10-CM

## 2023-05-01 DIAGNOSIS — I50.32 CHRONIC DIASTOLIC CONGESTIVE HEART FAILURE (H): ICD-10-CM

## 2023-05-01 DIAGNOSIS — I25.10 CORONARY ARTERY CALCIFICATION SEEN ON CT SCAN: ICD-10-CM

## 2023-05-01 PROCEDURE — 93280 PM DEVICE PROGR EVAL DUAL: CPT | Performed by: INTERNAL MEDICINE

## 2023-05-01 PROCEDURE — 99214 OFFICE O/P EST MOD 30 MIN: CPT | Performed by: PHYSICIAN ASSISTANT

## 2023-05-01 RX ORDER — METOPROLOL SUCCINATE 50 MG/1
50 TABLET, EXTENDED RELEASE ORAL 2 TIMES DAILY
Qty: 180 TABLET | Refills: 3 | Status: ON HOLD | OUTPATIENT
Start: 2023-05-01 | End: 2023-12-10

## 2023-05-01 RX ORDER — ATORVASTATIN CALCIUM 80 MG/1
80 TABLET, FILM COATED ORAL DAILY
Qty: 90 TABLET | Refills: 3 | Status: SHIPPED | OUTPATIENT
Start: 2023-05-01 | End: 2023-11-06 | Stop reason: ALTCHOICE

## 2023-05-01 RX ORDER — SACUBITRIL AND VALSARTAN 97; 103 MG/1; MG/1
1 TABLET, FILM COATED ORAL 2 TIMES DAILY
Qty: 180 TABLET | Refills: 3 | Status: ON HOLD | OUTPATIENT
Start: 2023-05-01 | End: 2023-12-22

## 2023-05-01 RX ORDER — FUROSEMIDE 20 MG
20 TABLET ORAL DAILY
Qty: 90 TABLET | Refills: 3 | Status: SHIPPED | OUTPATIENT
Start: 2023-05-01 | End: 2024-01-26

## 2023-05-01 RX ORDER — POTASSIUM CHLORIDE 750 MG/1
10 TABLET, EXTENDED RELEASE ORAL DAILY
Qty: 90 TABLET | Refills: 3 | Status: SHIPPED | OUTPATIENT
Start: 2023-05-01 | End: 2024-01-26

## 2023-05-01 NOTE — PATIENT INSTRUCTIONS
Thank you for visiting with me today.    We discussed: keep up the good work!    Medication changes:  increase Lipitor/ atorvastatin to 80 mg once a day.  The new pills will be 80 mg so just take one of them.      Results: labs overall look good.  Cholesterol is just a bit too high.     Component      Latest Ref Rng 4/28/2023  6:59 AM   Sodium      136 - 145 mmol/L 141    Potassium      3.4 - 5.3 mmol/L 3.8    Chloride      98 - 107 mmol/L 102    Carbon Dioxide (CO2)      22 - 29 mmol/L 27    Anion Gap      7 - 15 mmol/L 12    Urea Nitrogen      8.0 - 23.0 mg/dL 19.7    Creatinine      0.51 - 0.95 mg/dL 1.00 (H)    Calcium      8.8 - 10.2 mg/dL 9.6    Glucose      70 - 99 mg/dL 93    GFR Estimate      >60 mL/min/1.73m2 56 (L)    Cholesterol      <200 mg/dL 161    Triglycerides      <150 mg/dL 82    HDL Cholesterol      >=50 mg/dL 64    LDL Cholesterol Calculated      <=100 mg/dL 81    Non HDL Cholesterol      <130 mg/dL 97    ALT      10 - 35 U/L 17       Follow up: with Dr. Mauri Chase with an echocardiogram, and labs.        Please call my nurse, Vanesa, at [phone] with any questions or concerns.    Scheduling phone number: [phone]  Reminder: Please bring in all current medications, over the counter supplements and vitamin bottles to your next appointment.

## 2023-05-01 NOTE — PROGRESS NOTES
Service Date: 2023    PRIMARY CARDIOLOGIST:  Roby Chase MD    REASON FOR VISIT:  Hypertension, heart failure with preserved ejection fraction.    HISTORY OF PRESENT ILLNESS:  Ms. Verma is an absolutely delightful 82-year-old woman with past medical history significant for the followin.  Atrial fibrillation with initial rate control difficult.  Finally, ending up with AV node ablation and permanent pacemaker.  2.  Permanent pacemaker placed.  3.  Hypertension.  4.  Heart failure with mildly reduced ejection fraction of about 45%, thought to be secondary to 100% RV pacing.  5.  Coronary calcifications on a CT in 2018 and strong family history for coronary artery disease with his son having a STEMI at age 57 and another passing at the age of 57 from an MI, but no anginal symptoms.  6.  Mild tricuspid regurg.  7.  Likely some underlying lung disease.    I had met Ms. Verma when she had developed progressive shortness of breath, so much that she cancelled dinner at her favorite restaurant, [id number].  We were able to get her on Entresto and was started on inhalers and with the combination of those 2 things, she has improved.    Today, she comes in saying she is short of breath if she runs up and down the stairs multiple times, but otherwise feels well.  She can climb the stairs and keep walking.  She denies orthopnea or PND, peripheral edema.  She is trying to watch her salt, but she really loves that the most.  She overall feels well.  She did have a stressful year, losing her best friend and her best friend's  and a cousin all within a matter of weeks.    SOCIAL HISTORY:  No significant alcohol or street drugs.  Former smoker with a 67-pack-year history.  She is , lives in her own home with her adult daughter who has Lewy body dementia and her granddaughter who is 33.      PHYSICAL EXAMINATION:    GENERAL:  Well-developed, well-nourished woman in no acute distress.  HEENT:   Normocephalic, atraumatic.  HEART:  Regular.  I do not appreciate murmur, rub or gallop.  LUNGS:  Clear, without murmur, rub or gallop.  EXTREMITIES:  Without peripheral edema.  SKIN:  Warm and dry.    Labs reviewed include Cr 1.0, BUN 19.7, sodium 141, potassium 3.8.  LDL 81, HDL 64, total cholesterol 161, triglycerides 82.    ASSESSMENT AND PLAN:    1.  Heart failure with mildly reduced ejection fraction of 45%-50% with class II symptoms and euvolemic.  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  She is tolerating these meds well and her creatinine is stable.  2.  Coronary artery calcifications with anginal symptoms and strong family history with her sons having MIs in their 50s.  Last stress test was done in 10/2022 without ischemia or infarct.  3.  AFib, but with history of difficult to control rates with history of AV node ablation and permanent pacemaker.  Device check today was normal 99%, RV paced appropriately on Xarelto for CHADS-VASc of 4.  4.  Dyslipidemia, slightly higher controlled than I would like given her coronary calcifications and family history.  She is on Lipitor 40 mg and we will increase this to 80 mg and repeat an ALT and LDL in 6 months.    She has an echocardiogram and repeat lipid panel visit with Dr. Mauri Chase in 6 months.  Meds were refilled today for a 1 year.  She will otherwise call with concerns.    Thank you for allowing me to participate in this delightful patient's care.    MICHEAL Morgan PA-C        D: 2023   T: 2023   MT: INDIRA    Name:     ARTURO BOSWELL  MRN:      [id number]        Account:      [id number]   :      1940           Service Date: 2023       Document: [id number]

## 2023-05-01 NOTE — LETTER
5/1/2023    Mikala Philip MD, MD  [address]  [place] [address]    RE: Hamida Verma       Dear Colleague,     I had the pleasure of seeing Hamida Verma in the [facility].  [phone]      HPI and Plan:   See dictation    Orders this Visit:  Orders Placed This Encounter   Procedures    Lipid Profile    ALT     Orders Placed This Encounter   Medications    atorvastatin (LIPITOR) 80 MG tablet     Sig: Take 1 tablet (80 mg) by mouth daily     Dispense:  90 tablet     Refill:  3    furosemide (LASIX) 20 MG tablet     Sig: Take 1 tablet (20 mg) by mouth daily     Dispense:  90 tablet     Refill:  3    metoprolol succinate ER (TOPROL XL) 50 MG 24 hr tablet     Sig: Take 1 tablet (50 mg) by mouth 2 times daily     Dispense:  180 tablet     Refill:  3    potassium chloride ER (KLOR-CON M) 10 MEQ CR tablet     Sig: Take 1 tablet (10 mEq) by mouth daily     Dispense:  90 tablet     Refill:  3    rivaroxaban ANTICOAGULANT (XARELTO ANTICOAGULANT) 20 MG TABS tablet     Sig: Take 1 tablet (20 mg) by mouth daily (with dinner)     Dispense:  90 tablet     Refill:  3    sacubitril-valsartan (ENTRESTO)  MG per tablet     Sig: Take 1 tablet by mouth 2 times daily     Dispense:  180 tablet     Refill:  3     Medications Discontinued During This Encounter   Medication Reason    atorvastatin (LIPITOR) 40 MG tablet     furosemide (LASIX) 20 MG tablet Reorder (No AVS / No eCancel)    potassium chloride ER (KLOR-CON M) 10 MEQ CR tablet     metoprolol succinate ER (TOPROL XL) 50 MG 24 hr tablet Reorder (No AVS / No eCancel)    rivaroxaban ANTICOAGULANT (XARELTO ANTICOAGULANT) 20 MG TABS tablet Reorder (No AVS / No eCancel)    sacubitril-valsartan (ENTRESTO)  MG per tablet Reorder (No AVS / No eCancel)         Encounter Diagnoses   Name Primary?    Hyperlipidemia LDL goal <70     Coronary artery disease involving native coronary artery of native heart without angina pectoris     Cardiac  pacemaker in situ     Chronic diastolic congestive heart failure (H)     Chronic atrial fibrillation (H)     Elevated blood pressure reading without diagnosis of hypertension     Coronary artery calcification seen on CT scan     Chronic diastolic CHF (congestive heart failure) (H)     Atrial fibrillation with rapid ventricular response (H)     Hypokalemia     PAF (paroxysmal atrial fibrillation) (H)     Heart failure with reduced ejection fraction, NYHA class II (H)        CURRENT MEDICATIONS:  Current Outpatient Medications   Medication Sig Dispense Refill    albuterol (PROAIR HFA/PROVENTIL HFA/VENTOLIN HFA) 108 (90 Base) MCG/ACT inhaler Inhale 2 puffs into the lungs every 6 hours 18 g 3    atorvastatin (LIPITOR) 80 MG tablet Take 1 tablet (80 mg) by mouth daily 90 tablet 3    cholecalciferol (VITAMIN  -D) 1000 UNIT capsule Take 1 capsule by mouth daily.      Coral Calcium 133-66.7-133 MG-MG-UNIT CAPS Take 2 tablets by mouth 2 times daily       FLAX SEED OIL OR 1 capsule daily      furosemide (LASIX) 20 MG tablet Take 1 tablet (20 mg) by mouth daily 90 tablet 3    metoprolol succinate ER (TOPROL XL) 50 MG 24 hr tablet Take 1 tablet (50 mg) by mouth 2 times daily 180 tablet 3    Multiple Vitamins-Minerals (HAIR SKIN NAILS PO) Take 1 tablet by mouth At Bedtime      potassium chloride ER (KLOR-CON M) 10 MEQ CR tablet Take 1 tablet (10 mEq) by mouth daily 90 tablet 3    rivaroxaban ANTICOAGULANT (XARELTO ANTICOAGULANT) 20 MG TABS tablet Take 1 tablet (20 mg) by mouth daily (with dinner) 90 tablet 3    sacubitril-valsartan (ENTRESTO)  MG per tablet Take 1 tablet by mouth 2 times daily 180 tablet 3    tiotropium (SPIRIVA) 18 MCG inhaled capsule Inhale 1 capsule (18 mcg) into the lungs daily 30 capsule 1    alendronate (FOSAMAX) 70 MG tablet Take 1 tablet (70 mg) by mouth every 7 days Take 60 minutes before am meal with 8 oz. water. Remain upright for 30 minutes. (Patient not taking: Reported on 5/1/2023) 12 tablet  3       ALLERGIES     Allergies   Allergen Reactions    Strawberries [Strawberry Extract] Anaphylaxis    Strawberry Flavor        PAST MEDICAL HISTORY:  Past Medical History:   Diagnosis Date    Atrial fibrillation (H)     Chronic diastolic CHF (congestive heart failure) (H)     Essential hypertension, benign     HYPERLIPIDEMIA NEC/NOS 3/30/2006    Pulmonary hypertension (H)     SSS (sick sinus syndrome) (H)     s/p PPM 3/2018       PAST SURGICAL HISTORY:  Past Surgical History:   Procedure Laterality Date    EP ABLATION AV NODE N/A 2019    Procedure: EP Ablation AV Node;  Surgeon: Roe Voss MD;  Location:  HEART CARDIAC CATH LAB    EYE SURGERY      HYSTERECTOMY, VAGINAL      hysterectomy       FAMILY HISTORY:  Family History   Problem Relation Age of Onset    Cerebrovascular Disease Mother     Glaucoma Mother     Macular Degeneration Mother     Alcohol/Drug Father         alcohol    Myocardial Infarction Father 63        passed away from MI    Family History Negative Sister     Family History Negative Sister     Family History Negative Sister     Cerebrovascular Disease Sister     Family History Negative Brother     Family History Negative Maternal Grandmother     Family History Negative Maternal Grandfather     Family History Negative Paternal Grandmother     Family History Negative Paternal Grandfather     Myocardial Infarction Son 57        passed away from MI    Dementia Daughter 57        early onset on namenda    Diabetes No family hx of     Breast Cancer No family hx of     Cancer - colorectal No family hx of        SOCIAL HISTORY:  Social History     Socioeconomic History    Marital status:    Tobacco Use    Smoking status: Former     Packs/day: 1.50     Years: 45.00     Pack years: 67.50     Types: Cigarettes     Start date: 10/28/1955     Quit date: 2000     Years since quittin.6    Smokeless tobacco: Never    Tobacco comments:     quit 6 yrs ago   Substance and Sexual Activity  "   Alcohol use: No    Drug use: No    Sexual activity: Yes     Partners: Male   Other Topics Concern    Parent/sibling w/ CABG, MI or angioplasty before 65F 55M? No   Social History Narrative    Balanced Diet - Yes    Osteoporosis Prevention Measures - Dairy servings per day: 2    Regular Exercise -  Yes Describe walk, but not recently due to weather    Dental Exam - Yes    Eye Exam -No    Self Breast Exam - Yes    Abuse: Current or Past (Physical, Sexual or Emotional)- No    Do you feel safe in your environment - Yes    Guns stored in the home - No    Sunscreen used - Yes    Seatbelts used - Yes    Lipids -  1/10    Glucose -  1/10    Colon Cancer Screening - Yes, 7/21/08    Hemoccults - NO    Pap Test -  Many yrs ago, has hysterectomy    Do you have any concerns about STD's -  No    Mammography - 6/18/08    DEXA - 4/13/06    Immunizations reviewed and up to date - No    Jonathan Marshall MA                   Review of Systems:  Skin:        Eyes:       ENT:       Respiratory:       Cardiovascular:       Gastroenterology:      Genitourinary:       Musculoskeletal:       Neurologic:       Psychiatric:       Heme/Lymph/Imm:       Endocrine:         Physical Exam:  Vitals: /70   Pulse 64   Ht 1.6 m (5' 3\")   Wt 82.1 kg (181 lb)   LMP  (LMP Unknown)   SpO2 96%   BMI 32.06 kg/m     Please refer to dictation for physical exam    Recent Lab Results: all reviewed today  CBC RESULTS:  Lab Results   Component Value Date    WBC 8.7 07/27/2022    WBC 8.6 07/15/2020    RBC 4.70 07/27/2022    RBC 4.49 07/15/2020    HGB 13.1 07/27/2022    HGB 12.9 07/15/2020    HCT 40.5 07/27/2022    HCT 39.6 07/15/2020    MCV 86 07/27/2022    MCV 88 07/15/2020    MCH 27.9 07/27/2022    MCH 28.7 07/15/2020    MCHC 32.3 07/27/2022    MCHC 32.6 07/15/2020    RDW 13.3 07/27/2022    RDW 13.4 07/15/2020     (H) 07/27/2022     07/15/2020       BMP RESULTS:  Lab Results   Component Value Date     04/28/2023     07/15/2020 "    POTASSIUM 3.8 2023    POTASSIUM 4.0 2022    POTASSIUM 4.1 07/15/2020    CHLORIDE 102 2023    CHLORIDE 105 2022    CHLORIDE 105 07/15/2020    CO2 27 2023    CO2 27 2022    CO2 26 07/15/2020    ANIONGAP 12 2023    ANIONGAP 8 2022    ANIONGAP 6 07/15/2020    GLC 93 2023     (H) 2022    GLC 92 07/15/2020    BUN 19.7 2023    BUN 18 2022    BUN 24 07/15/2020    CR 1.00 (H) 2023    CR 1.11 (H) 07/15/2020    GFRESTIMATED 56 (L) 2023    GFRESTIMATED 47 (L) 07/15/2020    GFRESTBLACK 54 (L) 07/15/2020    GURWINDER 9.6 2023    GURWINDER 9.5 07/15/2020        INR RESULTS:  Lab Results   Component Value Date    INR 1.31 (H) 2018    INR 1.04 2014           CC  Roby Mallory MD  [address] [facility] [address]  [place]  [place] [address]        Service Date: 2023    PRIMARY CARDIOLOGIST:  Roby Chase MD    REASON FOR VISIT:  Hypertension, heart failure with preserved ejection fraction.    HISTORY OF PRESENT ILLNESS:  Ms. Verma is an absolutely delightful 82-year-old woman with past medical history significant for the followin.  Atrial fibrillation with initial rate control difficult.  Finally, ending up with AV node ablation and permanent pacemaker.  2.  Permanent pacemaker placed.  3.  Hypertension.  4.  Heart failure with mildly reduced ejection fraction of about 45%, thought to be secondary to 100% RV pacing.  5.  Coronary calcifications on a CT in 2018 and strong family history for coronary artery disease with his son having a STEMI at age 57 and another passing at the age of 57 from an MI, but no anginal symptoms.  6.  Mild tricuspid regurg.  7.  Likely some underlying lung disease.    I had met Ms. Verma when she had developed progressive shortness of breath, so much that she cancelled dinner at her favorite restaurant, [employer].  We were able to get her on Entresto and was started on inhalers and with  the combination of those 2 things, she has improved.    Today, she comes in saying she is short of breath if she runs up and down the stairs multiple times, but otherwise feels well.  She can climb the stairs and keep walking.  She denies orthopnea or PND, peripheral edema.  She is trying to watch her salt, but she really loves that the most.  She overall feels well.  She did have a stressful year, losing her best friend and her best friend's  and a cousin all within a matter of weeks.    SOCIAL HISTORY:  No significant alcohol or street drugs.  Former smoker with a 67-pack-year history.  She is , lives in her own home with her adult daughter who has Lewy body dementia and her granddaughter who is 33.      PHYSICAL EXAMINATION:    GENERAL:  Well-developed, well-nourished woman in no acute distress.  HEENT:  Normocephalic, atraumatic.  HEART:  Regular.  I do not appreciate murmur, rub or gallop.  LUNGS:  Clear, without murmur, rub or gallop.  EXTREMITIES:  Without peripheral edema.  SKIN:  Warm and dry.    Labs reviewed include Cr 1.0, BUN 19.7, sodium 141, potassium 3.8.  LDL 81, HDL 64, total cholesterol 161, triglycerides 82.    ASSESSMENT AND PLAN:    1.  Heart failure with mildly reduced ejection fraction of 45%-50% with class II symptoms and euvolemic.  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  She is tolerating these meds well and her creatinine is stable.  2.  Coronary artery calcifications with anginal symptoms and strong family history with her sons having MIs in their 50s.  Last stress test was done in 10/2022 without ischemia or infarct.  3.  AFib, but with history of difficult to control rates with history of AV node ablation and permanent pacemaker.  Device check today was normal 99%, RV paced appropriately on Xarelto for CHADS-VASc of 4.  4.  Dyslipidemia, slightly higher controlled than I  would like given her coronary calcifications and family history.  She is on Lipitor 40 mg and we will increase this to 80 mg and repeat an ALT and LDL in 6 months.    She has an echocardiogram and repeat lipid panel visit with Dr. Mauri Chase in 6 months.  Meds were refilled today for a 1 year.  She will otherwise call with concerns.    Thank you for allowing me to participate in this delightful patient's care.    MICHEAL Morgan PA-C        D: 2023   T: 2023   MT: INDIRA    Name:     ARTURO BOSWELL  MRN:      [id number]        Account:      [id number]   :      1940           Service Date: 2023       Document: [id number]    Thank you for allowing me to participate in the care of your patient.      Sincerely,     MICHEAL Sandoval [facility]  cc:   Roby Mallory MD  [address]  [place] [id number]

## 2023-05-01 NOTE — PROGRESS NOTES
[id number]      HPI and Plan:   See dictation    Orders this Visit:  Orders Placed This Encounter   Procedures     Lipid Profile     ALT     Orders Placed This Encounter   Medications     atorvastatin (LIPITOR) 80 MG tablet     Sig: Take 1 tablet (80 mg) by mouth daily     Dispense:  90 tablet     Refill:  3     furosemide (LASIX) 20 MG tablet     Sig: Take 1 tablet (20 mg) by mouth daily     Dispense:  90 tablet     Refill:  3     metoprolol succinate ER (TOPROL XL) 50 MG 24 hr tablet     Sig: Take 1 tablet (50 mg) by mouth 2 times daily     Dispense:  180 tablet     Refill:  3     potassium chloride ER (KLOR-CON M) 10 MEQ CR tablet     Sig: Take 1 tablet (10 mEq) by mouth daily     Dispense:  90 tablet     Refill:  3     rivaroxaban ANTICOAGULANT (XARELTO ANTICOAGULANT) 20 MG TABS tablet     Sig: Take 1 tablet (20 mg) by mouth daily (with dinner)     Dispense:  90 tablet     Refill:  3     sacubitril-valsartan (ENTRESTO)  MG per tablet     Sig: Take 1 tablet by mouth 2 times daily     Dispense:  180 tablet     Refill:  3     Medications Discontinued During This Encounter   Medication Reason     atorvastatin (LIPITOR) 40 MG tablet      furosemide (LASIX) 20 MG tablet Reorder (No AVS / No eCancel)     potassium chloride ER (KLOR-CON M) 10 MEQ CR tablet      metoprolol succinate ER (TOPROL XL) 50 MG 24 hr tablet Reorder (No AVS / No eCancel)     rivaroxaban ANTICOAGULANT (XARELTO ANTICOAGULANT) 20 MG TABS tablet Reorder (No AVS / No eCancel)     sacubitril-valsartan (ENTRESTO)  MG per tablet Reorder (No AVS / No eCancel)         Encounter Diagnoses   Name Primary?     Hyperlipidemia LDL goal <70      Coronary artery disease involving native coronary artery of native heart without angina pectoris      Cardiac pacemaker in situ      Chronic diastolic congestive heart failure (H)      Chronic atrial fibrillation (H)      Elevated blood pressure reading without diagnosis of hypertension      Coronary artery  calcification seen on CT scan      Chronic diastolic CHF (congestive heart failure) (H)      Atrial fibrillation with rapid ventricular response (H)      Hypokalemia      PAF (paroxysmal atrial fibrillation) (H)      Heart failure with reduced ejection fraction, NYHA class II (H)        CURRENT MEDICATIONS:  Current Outpatient Medications   Medication Sig Dispense Refill     albuterol (PROAIR HFA/PROVENTIL HFA/VENTOLIN HFA) 108 (90 Base) MCG/ACT inhaler Inhale 2 puffs into the lungs every 6 hours 18 g 3     atorvastatin (LIPITOR) 80 MG tablet Take 1 tablet (80 mg) by mouth daily 90 tablet 3     cholecalciferol (VITAMIN  -D) 1000 UNIT capsule Take 1 capsule by mouth daily.       Coral Calcium 133-66.7-133 MG-MG-UNIT CAPS Take 2 tablets by mouth 2 times daily        FLAX SEED OIL OR 1 capsule daily       furosemide (LASIX) 20 MG tablet Take 1 tablet (20 mg) by mouth daily 90 tablet 3     metoprolol succinate ER (TOPROL XL) 50 MG 24 hr tablet Take 1 tablet (50 mg) by mouth 2 times daily 180 tablet 3     Multiple Vitamins-Minerals (HAIR SKIN NAILS PO) Take 1 tablet by mouth At Bedtime       potassium chloride ER (KLOR-CON M) 10 MEQ CR tablet Take 1 tablet (10 mEq) by mouth daily 90 tablet 3     rivaroxaban ANTICOAGULANT (XARELTO ANTICOAGULANT) 20 MG TABS tablet Take 1 tablet (20 mg) by mouth daily (with dinner) 90 tablet 3     sacubitril-valsartan (ENTRESTO)  MG per tablet Take 1 tablet by mouth 2 times daily 180 tablet 3     tiotropium (SPIRIVA) 18 MCG inhaled capsule Inhale 1 capsule (18 mcg) into the lungs daily 30 capsule 1     alendronate (FOSAMAX) 70 MG tablet Take 1 tablet (70 mg) by mouth every 7 days Take 60 minutes before am meal with 8 oz. water. Remain upright for 30 minutes. (Patient not taking: Reported on 5/1/2023) 12 tablet 3       ALLERGIES     Allergies   Allergen Reactions     Strawberries [Strawberry Extract] Anaphylaxis     Strawberry Flavor        PAST MEDICAL HISTORY:  Past Medical History:    Diagnosis Date     Atrial fibrillation (H)      Chronic diastolic CHF (congestive heart failure) (H)      Essential hypertension, benign      HYPERLIPIDEMIA NEC/NOS 3/30/2006     Pulmonary hypertension (H)      SSS (sick sinus syndrome) (H)     s/p PPM 3/2018       PAST SURGICAL HISTORY:  Past Surgical History:   Procedure Laterality Date     EP ABLATION AV NODE N/A 2019    Procedure: EP Ablation AV Node;  Surgeon: Roe Voss MD;  Location:  HEART CARDIAC CATH LAB     EYE SURGERY       HYSTERECTOMY, VAGINAL      hysterectomy       FAMILY HISTORY:  Family History   Problem Relation Age of Onset     Cerebrovascular Disease Mother      Glaucoma Mother      Macular Degeneration Mother      Alcohol/Drug Father         alcohol     Myocardial Infarction Father 63        passed away from MI     Family History Negative Sister      Family History Negative Sister      Family History Negative Sister      Cerebrovascular Disease Sister      Family History Negative Brother      Family History Negative Maternal Grandmother      Family History Negative Maternal Grandfather      Family History Negative Paternal Grandmother      Family History Negative Paternal Grandfather      Myocardial Infarction Son 57        passed away from MI     Dementia Daughter 57        early onset on namenda     Diabetes No family hx of      Breast Cancer No family hx of      Cancer - colorectal No family hx of        SOCIAL HISTORY:  Social History     Socioeconomic History     Marital status:    Tobacco Use     Smoking status: Former     Packs/day: 1.50     Years: 45.00     Pack years: 67.50     Types: Cigarettes     Start date: 10/28/1955     Quit date: 2000     Years since quittin.6     Smokeless tobacco: Never     Tobacco comments:     quit 6 yrs ago   Substance and Sexual Activity     Alcohol use: No     Drug use: No     Sexual activity: Yes     Partners: Male   Other Topics Concern     Parent/sibling w/ CABG, MI or  "angioplasty before 65F 55M? No   Social History Narrative    Balanced Diet - Yes    Osteoporosis Prevention Measures - Dairy servings per day: 2    Regular Exercise -  Yes Describe walk, but not recently due to weather    Dental Exam - Yes    Eye Exam -No    Self Breast Exam - Yes    Abuse: Current or Past (Physical, Sexual or Emotional)- No    Do you feel safe in your environment - Yes    Guns stored in the home - No    Sunscreen used - Yes    Seatbelts used - Yes    Lipids -  1/10    Glucose -  1/10    Colon Cancer Screening - Yes, 7/21/08    Hemoccults - NO    Pap Test -  Many yrs ago, has hysterectomy    Do you have any concerns about STD's -  No    Mammography - 6/18/08    DEXA - 4/13/06    Immunizations reviewed and up to date - No    Jonathan Marshall MA                   Review of Systems:  Skin:        Eyes:       ENT:       Respiratory:       Cardiovascular:       Gastroenterology:      Genitourinary:       Musculoskeletal:       Neurologic:       Psychiatric:       Heme/Lymph/Imm:       Endocrine:         Physical Exam:  Vitals: /70   Pulse 64   Ht 1.6 m (5' 3\")   Wt 82.1 kg (181 lb)   LMP  (LMP Unknown)   SpO2 96%   BMI 32.06 kg/m     Please refer to dictation for physical exam    Recent Lab Results: all reviewed today  CBC RESULTS:  Lab Results   Component Value Date    WBC 8.7 07/27/2022    WBC 8.6 07/15/2020    RBC 4.70 07/27/2022    RBC 4.49 07/15/2020    HGB 13.1 07/27/2022    HGB 12.9 07/15/2020    HCT 40.5 07/27/2022    HCT 39.6 07/15/2020    MCV 86 07/27/2022    MCV 88 07/15/2020    MCH 27.9 07/27/2022    MCH 28.7 07/15/2020    MCHC 32.3 07/27/2022    MCHC 32.6 07/15/2020    RDW 13.3 07/27/2022    RDW 13.4 07/15/2020     (H) 07/27/2022     07/15/2020       BMP RESULTS:  Lab Results   Component Value Date     04/28/2023     07/15/2020    POTASSIUM 3.8 04/28/2023    POTASSIUM 4.0 11/09/2022    POTASSIUM 4.1 07/15/2020    CHLORIDE 102 04/28/2023    CHLORIDE 105 " 11/09/2022    CHLORIDE 105 07/15/2020    CO2 27 04/28/2023    CO2 27 11/09/2022    CO2 26 07/15/2020    ANIONGAP 12 04/28/2023    ANIONGAP 8 11/09/2022    ANIONGAP 6 07/15/2020    GLC 93 04/28/2023     (H) 11/09/2022    GLC 92 07/15/2020    BUN 19.7 04/28/2023    BUN 18 11/09/2022    BUN 24 07/15/2020    CR 1.00 (H) 04/28/2023    CR 1.11 (H) 07/15/2020    GFRESTIMATED 56 (L) 04/28/2023    GFRESTIMATED 47 (L) 07/15/2020    GFRESTBLACK 54 (L) 07/15/2020    GURWINDER 9.6 04/28/2023    GURWINDER 9.5 07/15/2020        INR RESULTS:  Lab Results   Component Value Date    INR 1.31 (H) 03/19/2018    INR 1.04 11/12/2014           CC  Roby Mallory MD  [address]  [place] [id number]

## 2023-05-11 LAB
MDC_IDC_LEAD_IMPLANT_DT: NORMAL
MDC_IDC_LEAD_IMPLANT_DT: NORMAL
MDC_IDC_LEAD_LOCATION: NORMAL
MDC_IDC_LEAD_LOCATION: NORMAL
MDC_IDC_LEAD_LOCATION_DETAIL_1: NORMAL
MDC_IDC_LEAD_LOCATION_DETAIL_1: NORMAL
MDC_IDC_LEAD_MFG: NORMAL
MDC_IDC_LEAD_MFG: NORMAL
MDC_IDC_LEAD_MODEL: NORMAL
MDC_IDC_LEAD_MODEL: NORMAL
MDC_IDC_LEAD_POLARITY_TYPE: NORMAL
MDC_IDC_LEAD_POLARITY_TYPE: NORMAL
MDC_IDC_LEAD_SERIAL: NORMAL
MDC_IDC_LEAD_SERIAL: NORMAL
MDC_IDC_MSMT_BATTERY_REMAINING_LONGEVITY: 66 MO
MDC_IDC_MSMT_BATTERY_STATUS: NORMAL
MDC_IDC_MSMT_BATTERY_VOLTAGE: 2.98 V
MDC_IDC_MSMT_LEADCHNL_RA_IMPEDANCE_VALUE: 550 OHM
MDC_IDC_MSMT_LEADCHNL_RA_PACING_THRESHOLD_AMPLITUDE: 0.5 V
MDC_IDC_MSMT_LEADCHNL_RA_PACING_THRESHOLD_AMPLITUDE: 0.5 V
MDC_IDC_MSMT_LEADCHNL_RA_PACING_THRESHOLD_PULSEWIDTH: 0.4 MS
MDC_IDC_MSMT_LEADCHNL_RA_PACING_THRESHOLD_PULSEWIDTH: 0.4 MS
MDC_IDC_MSMT_LEADCHNL_RA_SENSING_INTR_AMPL: 4 MV
MDC_IDC_MSMT_LEADCHNL_RV_IMPEDANCE_VALUE: 600 OHM
MDC_IDC_MSMT_LEADCHNL_RV_PACING_THRESHOLD_AMPLITUDE: 0.75 V
MDC_IDC_MSMT_LEADCHNL_RV_PACING_THRESHOLD_AMPLITUDE: 0.75 V
MDC_IDC_MSMT_LEADCHNL_RV_PACING_THRESHOLD_PULSEWIDTH: 0.4 MS
MDC_IDC_MSMT_LEADCHNL_RV_PACING_THRESHOLD_PULSEWIDTH: 0.4 MS
MDC_IDC_PG_IMPLANT_DTM: NORMAL
MDC_IDC_PG_MFG: NORMAL
MDC_IDC_PG_MODEL: NORMAL
MDC_IDC_PG_SERIAL: NORMAL
MDC_IDC_PG_TYPE: NORMAL
MDC_IDC_SESS_CLINIC_NAME: NORMAL
MDC_IDC_SESS_DTM: NORMAL
MDC_IDC_SESS_TYPE: NORMAL
MDC_IDC_SET_BRADY_AT_MODE_SWITCH_MODE: NORMAL
MDC_IDC_SET_BRADY_AT_MODE_SWITCH_RATE: 170 {BEATS}/MIN
MDC_IDC_SET_BRADY_HYSTRATE: NORMAL
MDC_IDC_SET_BRADY_LOWRATE: 60 {BEATS}/MIN
MDC_IDC_SET_BRADY_MAX_SENSOR_RATE: 120 {BEATS}/MIN
MDC_IDC_SET_BRADY_MAX_TRACKING_RATE: 120 {BEATS}/MIN
MDC_IDC_SET_BRADY_MODE: NORMAL
MDC_IDC_SET_BRADY_NIGHT_RATE: NORMAL
MDC_IDC_SET_BRADY_PAV_DELAY_LOW: 250 MS
MDC_IDC_SET_BRADY_SAV_DELAY_LOW: 225 MS
MDC_IDC_SET_LEADCHNL_RA_PACING_AMPLITUDE: 1.5 V
MDC_IDC_SET_LEADCHNL_RA_PACING_CAPTURE_MODE: NORMAL
MDC_IDC_SET_LEADCHNL_RA_PACING_POLARITY: NORMAL
MDC_IDC_SET_LEADCHNL_RA_PACING_PULSEWIDTH: 0.4 MS
MDC_IDC_SET_LEADCHNL_RA_SENSING_ADAPTATION_MODE: NORMAL
MDC_IDC_SET_LEADCHNL_RA_SENSING_POLARITY: NORMAL
MDC_IDC_SET_LEADCHNL_RA_SENSING_SENSITIVITY: 0.3 MV
MDC_IDC_SET_LEADCHNL_RV_PACING_AMPLITUDE: 1 V
MDC_IDC_SET_LEADCHNL_RV_PACING_CAPTURE_MODE: NORMAL
MDC_IDC_SET_LEADCHNL_RV_PACING_POLARITY: NORMAL
MDC_IDC_SET_LEADCHNL_RV_PACING_PULSEWIDTH: 0.4 MS
MDC_IDC_SET_LEADCHNL_RV_SENSING_POLARITY: NORMAL
MDC_IDC_SET_LEADCHNL_RV_SENSING_SENSITIVITY: 0.5 MV
MDC_IDC_STAT_AT_MODE_SW_COUNT: 0
MDC_IDC_STAT_BRADY_RA_PERCENT_PACED: 55 %
MDC_IDC_STAT_BRADY_RV_PERCENT_PACED: 99.94 %

## 2023-05-30 ENCOUNTER — APPOINTMENT (OUTPATIENT)
Dept: ULTRASOUND IMAGING | Facility: CLINIC | Age: 83
DRG: 418 | End: 2023-05-30
Attending: EMERGENCY MEDICINE
Payer: COMMERCIAL

## 2023-05-30 ENCOUNTER — APPOINTMENT (OUTPATIENT)
Dept: CT IMAGING | Facility: CLINIC | Age: 83
DRG: 418 | End: 2023-05-30
Attending: EMERGENCY MEDICINE
Payer: COMMERCIAL

## 2023-05-30 ENCOUNTER — HOSPITAL ENCOUNTER (INPATIENT)
Facility: CLINIC | Age: 83
LOS: 2 days | Discharge: HOME | DRG: 418 | End: 2023-06-02
Attending: EMERGENCY MEDICINE | Admitting: HOSPITALIST
Payer: COMMERCIAL

## 2023-05-30 DIAGNOSIS — K80.50 BILIARY COLIC: ICD-10-CM

## 2023-05-30 DIAGNOSIS — G89.18 POSTOPERATIVE PAIN: ICD-10-CM

## 2023-05-30 DIAGNOSIS — N30.00 ACUTE CYSTITIS WITHOUT HEMATURIA: ICD-10-CM

## 2023-05-30 DIAGNOSIS — R10.11 ABDOMINAL PAIN, RIGHT UPPER QUADRANT: ICD-10-CM

## 2023-05-30 DIAGNOSIS — K81.0 ACUTE CHOLECYSTITIS: Primary | ICD-10-CM

## 2023-05-30 DIAGNOSIS — N28.1 KIDNEY CYSTS: ICD-10-CM

## 2023-05-30 LAB
ALBUMIN SERPL BCG-MCNC: 3.7 G/DL (ref 3.5–5.2)
ALBUMIN UR-MCNC: 20 MG/DL
ALP SERPL-CCNC: 49 U/L (ref 35–104)
ALT SERPL W P-5'-P-CCNC: 22 U/L (ref 10–35)
ANION GAP SERPL CALCULATED.3IONS-SCNC: 12 MMOL/L (ref 7–15)
APPEARANCE UR: ABNORMAL
AST SERPL W P-5'-P-CCNC: 25 U/L (ref 10–35)
ATRIAL RATE - MUSE: 62 BPM
BASOPHILS # BLD AUTO: 0.1 10E3/UL (ref 0–0.2)
BASOPHILS NFR BLD AUTO: 1 %
BILIRUB SERPL-MCNC: 0.3 MG/DL
BILIRUB UR QL STRIP: NEGATIVE
BUN SERPL-MCNC: 21.4 MG/DL (ref 8–23)
CALCIUM SERPL-MCNC: 9.5 MG/DL (ref 8.8–10.2)
CHLORIDE SERPL-SCNC: 104 MMOL/L (ref 98–107)
COLOR UR AUTO: ABNORMAL
CREAT SERPL-MCNC: 0.89 MG/DL (ref 0.51–0.95)
DEPRECATED HCO3 PLAS-SCNC: 23 MMOL/L (ref 22–29)
DIASTOLIC BLOOD PRESSURE - MUSE: NORMAL MMHG
EOSINOPHIL # BLD AUTO: 0.1 10E3/UL (ref 0–0.7)
EOSINOPHIL NFR BLD AUTO: 2 %
ERYTHROCYTE [DISTWIDTH] IN BLOOD BY AUTOMATED COUNT: 13.2 % (ref 10–15)
GFR SERPL CREATININE-BSD FRML MDRD: 64 ML/MIN/1.73M2
GLUCOSE SERPL-MCNC: 175 MG/DL (ref 70–99)
GLUCOSE UR STRIP-MCNC: NEGATIVE MG/DL
HCT VFR BLD AUTO: 37.8 % (ref 35–47)
HGB BLD-MCNC: 12.2 G/DL (ref 11.7–15.7)
HGB UR QL STRIP: ABNORMAL
IMM GRANULOCYTES # BLD: 0 10E3/UL
IMM GRANULOCYTES NFR BLD: 0 %
INTERPRETATION ECG - MUSE: NORMAL
KETONES UR STRIP-MCNC: ABNORMAL MG/DL
LEUKOCYTE ESTERASE UR QL STRIP: ABNORMAL
LIPASE SERPL-CCNC: 33 U/L (ref 13–60)
LYMPHOCYTES # BLD AUTO: 1.1 10E3/UL (ref 0.8–5.3)
LYMPHOCYTES NFR BLD AUTO: 13 %
MCH RBC QN AUTO: 29.3 PG (ref 26.5–33)
MCHC RBC AUTO-ENTMCNC: 32.3 G/DL (ref 31.5–36.5)
MCV RBC AUTO: 91 FL (ref 78–100)
MONOCYTES # BLD AUTO: 0.6 10E3/UL (ref 0–1.3)
MONOCYTES NFR BLD AUTO: 7 %
MUCOUS THREADS #/AREA URNS LPF: PRESENT /LPF
NEUTROPHILS # BLD AUTO: 6.7 10E3/UL (ref 1.6–8.3)
NEUTROPHILS NFR BLD AUTO: 77 %
NITRATE UR QL: POSITIVE
NRBC # BLD AUTO: 0 10E3/UL
NRBC BLD AUTO-RTO: 0 /100
P AXIS - MUSE: NORMAL DEGREES
PH UR STRIP: 7 [PH] (ref 5–7)
PLATELET # BLD AUTO: 307 10E3/UL (ref 150–450)
POTASSIUM SERPL-SCNC: 4.1 MMOL/L (ref 3.4–5.3)
PR INTERVAL - MUSE: 248 MS
PROT SERPL-MCNC: 6.2 G/DL (ref 6.4–8.3)
QRS DURATION - MUSE: 178 MS
QT - MUSE: 484 MS
QTC - MUSE: 491 MS
R AXIS - MUSE: -53 DEGREES
RBC # BLD AUTO: 4.17 10E6/UL (ref 3.8–5.2)
RBC URINE: >182 /HPF
SODIUM SERPL-SCNC: 139 MMOL/L (ref 136–145)
SP GR UR STRIP: 1.01 (ref 1–1.03)
SQUAMOUS EPITHELIAL: 3 /HPF
SYSTOLIC BLOOD PRESSURE - MUSE: NORMAL MMHG
T AXIS - MUSE: 96 DEGREES
TROPONIN T SERPL HS-MCNC: 10 NG/L
UROBILINOGEN UR STRIP-MCNC: NORMAL MG/DL
VENTRICULAR RATE- MUSE: 62 BPM
WBC # BLD AUTO: 8.6 10E3/UL (ref 4–11)
WBC CLUMPS #/AREA URNS HPF: PRESENT /HPF
WBC URINE: 162 /HPF

## 2023-05-30 PROCEDURE — 96375 TX/PRO/DX INJ NEW DRUG ADDON: CPT

## 2023-05-30 PROCEDURE — 250N000011 HC RX IP 250 OP 636: Performed by: EMERGENCY MEDICINE

## 2023-05-30 PROCEDURE — 74177 CT ABD & PELVIS W/CONTRAST: CPT

## 2023-05-30 PROCEDURE — 87088 URINE BACTERIA CULTURE: CPT | Performed by: HOSPITALIST

## 2023-05-30 PROCEDURE — 36415 COLL VENOUS BLD VENIPUNCTURE: CPT | Performed by: EMERGENCY MEDICINE

## 2023-05-30 PROCEDURE — 99223 1ST HOSP IP/OBS HIGH 75: CPT | Mod: 57 | Performed by: SURGERY

## 2023-05-30 PROCEDURE — G0378 HOSPITAL OBSERVATION PER HR: HCPCS

## 2023-05-30 PROCEDURE — 250N000013 HC RX MED GY IP 250 OP 250 PS 637: Performed by: HOSPITALIST

## 2023-05-30 PROCEDURE — 76705 ECHO EXAM OF ABDOMEN: CPT

## 2023-05-30 PROCEDURE — 85025 COMPLETE CBC W/AUTO DIFF WBC: CPT | Performed by: EMERGENCY MEDICINE

## 2023-05-30 PROCEDURE — 96376 TX/PRO/DX INJ SAME DRUG ADON: CPT

## 2023-05-30 PROCEDURE — 99285 EMERGENCY DEPT VISIT HI MDM: CPT | Mod: 25

## 2023-05-30 PROCEDURE — 250N000013 HC RX MED GY IP 250 OP 250 PS 637: Performed by: INTERNAL MEDICINE

## 2023-05-30 PROCEDURE — 83690 ASSAY OF LIPASE: CPT | Performed by: EMERGENCY MEDICINE

## 2023-05-30 PROCEDURE — 84484 ASSAY OF TROPONIN QUANT: CPT | Performed by: EMERGENCY MEDICINE

## 2023-05-30 PROCEDURE — 96361 HYDRATE IV INFUSION ADD-ON: CPT

## 2023-05-30 PROCEDURE — 250N000011 HC RX IP 250 OP 636: Performed by: HOSPITALIST

## 2023-05-30 PROCEDURE — 80053 COMPREHEN METABOLIC PANEL: CPT | Performed by: EMERGENCY MEDICINE

## 2023-05-30 PROCEDURE — 99223 1ST HOSP IP/OBS HIGH 75: CPT | Performed by: HOSPITALIST

## 2023-05-30 PROCEDURE — 96374 THER/PROPH/DIAG INJ IV PUSH: CPT

## 2023-05-30 PROCEDURE — 258N000003 HC RX IP 258 OP 636: Performed by: HOSPITALIST

## 2023-05-30 PROCEDURE — 250N000009 HC RX 250: Performed by: EMERGENCY MEDICINE

## 2023-05-30 PROCEDURE — 258N000003 HC RX IP 258 OP 636: Performed by: EMERGENCY MEDICINE

## 2023-05-30 PROCEDURE — 81001 URINALYSIS AUTO W/SCOPE: CPT | Performed by: EMERGENCY MEDICINE

## 2023-05-30 PROCEDURE — 93005 ELECTROCARDIOGRAM TRACING: CPT

## 2023-05-30 RX ORDER — ONDANSETRON 2 MG/ML
4 INJECTION INTRAMUSCULAR; INTRAVENOUS EVERY 30 MIN PRN
Status: DISCONTINUED | OUTPATIENT
Start: 2023-05-30 | End: 2023-05-30

## 2023-05-30 RX ORDER — ONDANSETRON 4 MG/1
4 TABLET, ORALLY DISINTEGRATING ORAL EVERY 6 HOURS PRN
Status: DISCONTINUED | OUTPATIENT
Start: 2023-05-30 | End: 2023-06-02 | Stop reason: HOSPADM

## 2023-05-30 RX ORDER — CEFTRIAXONE 1 G/1
1 INJECTION, POWDER, FOR SOLUTION INTRAMUSCULAR; INTRAVENOUS ONCE
Status: COMPLETED | OUTPATIENT
Start: 2023-05-30 | End: 2023-05-30

## 2023-05-30 RX ORDER — NALOXONE HYDROCHLORIDE 0.4 MG/ML
0.2 INJECTION, SOLUTION INTRAMUSCULAR; INTRAVENOUS; SUBCUTANEOUS
Status: DISCONTINUED | OUTPATIENT
Start: 2023-05-30 | End: 2023-06-02 | Stop reason: HOSPADM

## 2023-05-30 RX ORDER — HYDROMORPHONE HCL IN WATER/PF 6 MG/30 ML
0.4 PATIENT CONTROLLED ANALGESIA SYRINGE INTRAVENOUS
Status: DISCONTINUED | OUTPATIENT
Start: 2023-05-30 | End: 2023-06-02 | Stop reason: HOSPADM

## 2023-05-30 RX ORDER — CEFAZOLIN SODIUM 2 G/100ML
2 INJECTION, SOLUTION INTRAVENOUS
Status: CANCELLED | OUTPATIENT
Start: 2023-05-30

## 2023-05-30 RX ORDER — NALOXONE HYDROCHLORIDE 0.4 MG/ML
0.4 INJECTION, SOLUTION INTRAMUSCULAR; INTRAVENOUS; SUBCUTANEOUS
Status: DISCONTINUED | OUTPATIENT
Start: 2023-05-30 | End: 2023-06-02 | Stop reason: HOSPADM

## 2023-05-30 RX ORDER — ONDANSETRON 2 MG/ML
4 INJECTION INTRAMUSCULAR; INTRAVENOUS EVERY 6 HOURS PRN
Status: DISCONTINUED | OUTPATIENT
Start: 2023-05-30 | End: 2023-06-02 | Stop reason: HOSPADM

## 2023-05-30 RX ORDER — HYDROMORPHONE HYDROCHLORIDE 1 MG/ML
0.5 INJECTION, SOLUTION INTRAMUSCULAR; INTRAVENOUS; SUBCUTANEOUS
Status: COMPLETED | OUTPATIENT
Start: 2023-05-30 | End: 2023-05-30

## 2023-05-30 RX ORDER — AMOXICILLIN 250 MG
2 CAPSULE ORAL 2 TIMES DAILY PRN
Status: DISCONTINUED | OUTPATIENT
Start: 2023-05-30 | End: 2023-06-02 | Stop reason: HOSPADM

## 2023-05-30 RX ORDER — CEFAZOLIN SODIUM 2 G/100ML
2 INJECTION, SOLUTION INTRAVENOUS SEE ADMIN INSTRUCTIONS
Status: CANCELLED | OUTPATIENT
Start: 2023-05-30

## 2023-05-30 RX ORDER — ACETAMINOPHEN 325 MG/1
650 TABLET ORAL EVERY 6 HOURS PRN
Status: DISCONTINUED | OUTPATIENT
Start: 2023-05-30 | End: 2023-06-02 | Stop reason: HOSPADM

## 2023-05-30 RX ORDER — HYDROMORPHONE HYDROCHLORIDE 1 MG/ML
0.5 INJECTION, SOLUTION INTRAMUSCULAR; INTRAVENOUS; SUBCUTANEOUS ONCE
Status: COMPLETED | OUTPATIENT
Start: 2023-05-30 | End: 2023-05-30

## 2023-05-30 RX ORDER — METOPROLOL SUCCINATE 50 MG/1
50 TABLET, EXTENDED RELEASE ORAL 2 TIMES DAILY
Status: DISCONTINUED | OUTPATIENT
Start: 2023-05-30 | End: 2023-06-02 | Stop reason: HOSPADM

## 2023-05-30 RX ORDER — LANOLIN ALCOHOL/MO/W.PET/CERES
3 CREAM (GRAM) TOPICAL
Status: DISCONTINUED | OUTPATIENT
Start: 2023-05-30 | End: 2023-06-02 | Stop reason: HOSPADM

## 2023-05-30 RX ORDER — AMOXICILLIN 250 MG
1 CAPSULE ORAL 2 TIMES DAILY PRN
Status: DISCONTINUED | OUTPATIENT
Start: 2023-05-30 | End: 2023-06-02 | Stop reason: HOSPADM

## 2023-05-30 RX ORDER — LIDOCAINE 40 MG/G
CREAM TOPICAL
Status: DISCONTINUED | OUTPATIENT
Start: 2023-05-30 | End: 2023-06-02 | Stop reason: HOSPADM

## 2023-05-30 RX ORDER — SODIUM CHLORIDE 9 MG/ML
INJECTION, SOLUTION INTRAVENOUS CONTINUOUS
Status: ACTIVE | OUTPATIENT
Start: 2023-05-30 | End: 2023-05-30

## 2023-05-30 RX ORDER — CEFTRIAXONE 2 G/1
2 INJECTION, POWDER, FOR SOLUTION INTRAMUSCULAR; INTRAVENOUS EVERY 24 HOURS
Status: DISCONTINUED | OUTPATIENT
Start: 2023-05-31 | End: 2023-06-02 | Stop reason: HOSPADM

## 2023-05-30 RX ORDER — ALBUTEROL SULFATE 90 UG/1
2 AEROSOL, METERED RESPIRATORY (INHALATION) EVERY 6 HOURS PRN
Status: DISCONTINUED | OUTPATIENT
Start: 2023-05-30 | End: 2023-06-02 | Stop reason: HOSPADM

## 2023-05-30 RX ORDER — HYDROMORPHONE HCL IN WATER/PF 6 MG/30 ML
0.2 PATIENT CONTROLLED ANALGESIA SYRINGE INTRAVENOUS
Status: DISCONTINUED | OUTPATIENT
Start: 2023-05-30 | End: 2023-06-02 | Stop reason: HOSPADM

## 2023-05-30 RX ORDER — OXYCODONE HYDROCHLORIDE 5 MG/1
10 TABLET ORAL EVERY 4 HOURS PRN
Status: DISCONTINUED | OUTPATIENT
Start: 2023-05-30 | End: 2023-06-02 | Stop reason: HOSPADM

## 2023-05-30 RX ORDER — IOPAMIDOL 755 MG/ML
91 INJECTION, SOLUTION INTRAVASCULAR ONCE
Status: COMPLETED | OUTPATIENT
Start: 2023-05-30 | End: 2023-05-30

## 2023-05-30 RX ORDER — ACETAMINOPHEN 650 MG/1
650 SUPPOSITORY RECTAL EVERY 6 HOURS PRN
Status: DISCONTINUED | OUTPATIENT
Start: 2023-05-30 | End: 2023-06-02 | Stop reason: HOSPADM

## 2023-05-30 RX ORDER — OXYCODONE HYDROCHLORIDE 5 MG/1
5 TABLET ORAL EVERY 4 HOURS PRN
Status: DISCONTINUED | OUTPATIENT
Start: 2023-05-30 | End: 2023-06-02 | Stop reason: HOSPADM

## 2023-05-30 RX ADMIN — IOPAMIDOL 91 ML: 755 INJECTION, SOLUTION INTRAVENOUS at 03:16

## 2023-05-30 RX ADMIN — SODIUM CHLORIDE 500 ML: 9 INJECTION, SOLUTION INTRAVENOUS at 02:59

## 2023-05-30 RX ADMIN — HYDROMORPHONE HYDROCHLORIDE 0.4 MG: 0.2 INJECTION, SOLUTION INTRAMUSCULAR; INTRAVENOUS; SUBCUTANEOUS at 16:03

## 2023-05-30 RX ADMIN — ONDANSETRON 4 MG: 2 INJECTION INTRAMUSCULAR; INTRAVENOUS at 01:41

## 2023-05-30 RX ADMIN — METOPROLOL SUCCINATE 50 MG: 50 TABLET, EXTENDED RELEASE ORAL at 13:29

## 2023-05-30 RX ADMIN — SODIUM CHLORIDE: 9 INJECTION, SOLUTION INTRAVENOUS at 09:00

## 2023-05-30 RX ADMIN — SACUBITRIL AND VALSARTAN 1 TABLET: 97; 103 TABLET, FILM COATED ORAL at 13:29

## 2023-05-30 RX ADMIN — METOPROLOL SUCCINATE 50 MG: 50 TABLET, EXTENDED RELEASE ORAL at 20:28

## 2023-05-30 RX ADMIN — SODIUM CHLORIDE 66 ML: 9 INJECTION, SOLUTION INTRAVENOUS at 03:17

## 2023-05-30 RX ADMIN — HYDROMORPHONE HYDROCHLORIDE 0.5 MG: 1 INJECTION, SOLUTION INTRAMUSCULAR; INTRAVENOUS; SUBCUTANEOUS at 05:43

## 2023-05-30 RX ADMIN — ONDANSETRON 4 MG: 2 INJECTION INTRAMUSCULAR; INTRAVENOUS at 20:27

## 2023-05-30 RX ADMIN — ACETAMINOPHEN 650 MG: 325 TABLET ORAL at 09:13

## 2023-05-30 RX ADMIN — HYDROMORPHONE HYDROCHLORIDE 0.4 MG: 0.2 INJECTION, SOLUTION INTRAMUSCULAR; INTRAVENOUS; SUBCUTANEOUS at 09:13

## 2023-05-30 RX ADMIN — CEFTRIAXONE SODIUM 1 G: 1 INJECTION, POWDER, FOR SOLUTION INTRAMUSCULAR; INTRAVENOUS at 05:44

## 2023-05-30 RX ADMIN — SACUBITRIL AND VALSARTAN 1 TABLET: 97; 103 TABLET, FILM COATED ORAL at 20:28

## 2023-05-30 RX ADMIN — ONDANSETRON 4 MG: 4 TABLET, ORALLY DISINTEGRATING ORAL at 09:13

## 2023-05-30 RX ADMIN — HYDROMORPHONE HYDROCHLORIDE 0.4 MG: 0.2 INJECTION, SOLUTION INTRAMUSCULAR; INTRAVENOUS; SUBCUTANEOUS at 20:27

## 2023-05-30 RX ADMIN — HYDROMORPHONE HYDROCHLORIDE 0.5 MG: 1 INJECTION, SOLUTION INTRAMUSCULAR; INTRAVENOUS; SUBCUTANEOUS at 01:44

## 2023-05-30 ASSESSMENT — ACTIVITIES OF DAILY LIVING (ADL)
ADLS_ACUITY_SCORE: 37
ADLS_ACUITY_SCORE: 37
ADLS_ACUITY_SCORE: 35
ADLS_ACUITY_SCORE: 37
ADLS_ACUITY_SCORE: 35
ADLS_ACUITY_SCORE: 37
ADLS_ACUITY_SCORE: 35

## 2023-05-30 NOTE — PROGRESS NOTES
PRIMARY DIAGNOSIS: BILIARY COLIC/UNCOMPLICATED EARLY ACUTE CHOLECYSTITIS  OUTPATIENT/OBSERVATION GOALS TO BE MET BEFORE DISCHARGE:    1. Pain status: Improved but still requiring IV narcotics.  2. Stable vital signs and labs (if performed) at disposition: Yes  3. Tolerating adequate PO diet: No  4. Successful cholecystectomy or clear follow up plan with General Surgery team if immediate surgery not performed Yes  5. ADLs back to baseline?  Yes  6. Activity and level of assistance: Ambulating independently.  7. Barriers to discharge noted Yes     Discharge Planner Nurse   Safe discharge environment identified: Yes  Barriers to discharge: Yes       Entered by: Alexandra Monique RN 05/30/2023 3:54 PM     Please review provider order for any additional goals.   Nurse to notify provider when observation goals have been met and patient is ready for discharge.

## 2023-05-30 NOTE — PROGRESS NOTES
RECEIVING UNIT ED HANDOFF REVIEW    ED Nurse Handoff Report was reviewed by: Alexandra Monique RN on May 30, 2023 at 8:31 AM

## 2023-05-30 NOTE — ED NOTES
[facility]  ED Nurse Handoff Report    ED Chief complaint: Abdominal Pain      ED Diagnosis:   Final diagnoses:   Biliary colic   Acute cystitis without hematuria   Abdominal pain, right upper quadrant       Code Status: MD to plan    Allergies:   Allergies   Allergen Reactions     Strawberries [Strawberry Extract] Anaphylaxis     Strawberry Flavor        Patient Story: Presents from home via EMS. Sudden onset epigastric pain, nausea, vomit started at midnight. EMS administered aspirin 324mg and nitroglycerin 0.4mg SL x1 which made pain worse.  Focused Assessment:  A/Ox3, forgetful. [place], hearing aids left at home. VSS. IV Dilaudid given for back pain. Ambulates SBA/IND, steady gait, Pt calls to get help from unfamiliar bed. Voiding in BR. Intermittent nausea.     Treatments and/or interventions provided: PIV, Pain meds, CT, UA,   Medications   ondansetron (ZOFRAN) injection 4 mg (4 mg Intravenous $Given 5/30/23 0141)   cefTRIAXone (ROCEPHIN) 1 g vial to attach to  mL bag for ADULTS or NS 50 mL bag for PEDS (1 g Intravenous $New Bag 5/30/23 0544)   HYDROmorphone (PF) (DILAUDID) injection 0.5 mg (0.5 mg Intravenous $Given 5/30/23 0144)   0.9% sodium chloride BOLUS (500 mLs Intravenous $New Bag 5/30/23 0259)   iopamidol (ISOVUE-370) solution 91 mL (91 mLs Intravenous $Given 5/30/23 0316)   Saline (66 mLs Intravenous $Given 5/30/23 0317)   HYDROmorphone (PF) (DILAUDID) injection 0.5 mg (0.5 mg Intravenous $Given 5/30/23 0543)     Patient's response to treatments and/or interventions: Continue to monitor.   To be done/followed up on inpatient unit:     Does this patient have any cognitive concerns?: Forgetful    Activity level - Baseline/Home:  Independent  Activity Level - Current:   Stand with Assist/IND, steady gait. Pt calls to get help from unfamiliar bed.  unfamiliar bed.     Patient's Preferred language: English   Needed?: No    Isolation: None  Infection: Not Applicable  Patient  tested for COVID 19 prior to admission: NO  Bariatric?: No    Vital Signs:   Vitals:    05/30/23 0230 05/30/23 0245 05/30/23 0255 05/30/23 0300   BP: 97/51 126/60  123/54   Pulse: 60 63  66   Resp:   18 18   Temp:       TempSrc:       SpO2: 97% 94% 96% 95%   Weight:       Height:           Cardiac Rhythm:Cardiac Rhythm: A-V Sequential paced    Was the PSS-3 completed:   No   What interventions are required if any?               Family Comments: Pt's Grandaughter to bring pt belongings.   OBS brochure/video discussed/provided to patient/family: No              Name of person given brochure if not patient:               Relationship to patient:     For the majority of the shift this patient's behavior was Green.   Behavioral interventions performed were     ED NURSE PHONE NUMBER: [phone]

## 2023-05-30 NOTE — ED PROVIDER NOTES
"  History     Chief Complaint:  Abdominal Pain     The history is provided by the patient and the EMS personnel.      Hamida Verma is a 82 year old female on Xarelto with a history of atrial fibrillation, CHF, hypertension, and hyperlipidemia who presents via EMS with abdominal pain. EMS reports that the patient went to bed feeling normal and woke up at midnight with 10/10 abdominal pain. On route, she was given Aspirin and Nitroglycerin,which made her feel worse and had one episode of vomiting. The patient reports that the pain is located in the middle-upper region of her abdomen and radiates to her back. She reports that she felt normal yesterday. She denies any chest pain, shortness of breath, or fever. She has not taken any medications for her symptoms. She states that her bowel movements and urination have been normal. She has not had any abdominal surgeries. Of note, the patient has grown Klebsiella in her urine before.     Independent Historian:   EMS    Medications:    Albuterol  Atorvastatin  Furosemide  Metoprolol succinate  Potassium Chloride  Xarelto  Sacubitril-valsartan  Tiotropium    Past Medical History:    Atrial fibrillation    Chronic diastolic CHF   Essential hypertension, benign   Hyperlipidemia   Pulmonary hypertension   Sick sinus syndrome   Cataract  CKD, stage 3  Osteoporosis  COPD  Diabetes    Past Surgical History:    Vaginal hysterectomy  Cataract extraction  EB ablation AV node  Vitrectomy x 5  EP PPM    Physical Exam     Physical Exam  /54   Pulse 66   Temp 97.5  F (36.4  C) (Oral)   Resp 18   Ht 1.727 m (5' 8\")   Wt 81.6 kg (180 lb)   LMP  (LMP Unknown)   SpO2 95%   BMI 27.37 kg/m      General: Sitting up in bed  Eyes:  The pupils are equal and round    Conjunctivae and sclerae are normal  ENT:    Wearing a mask  Neck:  Normal range of motion  CV:  Regular rate, regular rhythm     Skin warm and well perfused   Resp:  Non labored breathing on room air    No " tachypnea    No cough heard, lungs clear bilaterally  GI:  Abdomen is soft, there is no rigidity    No distension    No rebound tenderness     RUQ and epigastric tenderness  MS:  Normal muscular tone  Skin:  No rash or acute skin lesions noted  Neuro:   Awake, alert.      Speech is normal and fluent.    Face is symmetric.     Moves all extremities equally  Psych: Normal affect.  Appropriate interactions.  Emergency Department Course     ECG results from 05/30/23 @ 0139   EKG 12 lead     Value    Systolic Blood Pressure     Diastolic Blood Pressure     Ventricular Rate 62    Atrial Rate 62    TX Interval 248    QRS Duration 178        QTc 491    P Axis     R AXIS -53    T Axis 96    Interpretation ECG      AV dual-paced rhythm with prolonged AV conduction  Abnormal ECG  Read by me at 0152.         Imaging:  Abdomen US, limited (RUQ only)   Final Result   IMPRESSION:   1.  1.3 cm nonmobile gallstone in the region of the gallbladder neck, likely impacted, with moderate distention of the gallbladder. No other secondary evidence of acute cholecystitis. Sonographic López's sign was negative.            CT Abdomen Pelvis w Contrast   Final Result   IMPRESSION:       1.  Diffusely thickened urinary bladder, suspicious for cystitis. Recommend correlation with urinalysis.    2.  Distended gallbladder with large gallstone possibly impacted in the region of the neck. Recommend right upper quadrant ultrasound for further evaluation.   3.  1.1 cm hyperattenuating lesion of the left anterior upper pole, possibly representing a hemorrhagic or proteinaceous cyst however technically indeterminate. Recommend renal mass protocol MRI or CT for further characterization, which may be performed    on a nonemergent outpatient basis.         Report per radiology    Laboratory:  Labs Ordered and Resulted from Time of ED Arrival to Time of ED Departure   COMPREHENSIVE METABOLIC PANEL - Abnormal       Result Value    Sodium 139       Potassium 4.1      Chloride 104      Carbon Dioxide (CO2) 23      Anion Gap 12      Urea Nitrogen 21.4      Creatinine 0.89      Calcium 9.5      Glucose 175 (*)     Alkaline Phosphatase 49      AST 25      ALT 22      Protein Total 6.2 (*)     Albumin 3.7      Bilirubin Total 0.3      GFR Estimate 64     URINE MACROSCOPIC WITH REFLEX TO MICRO - Abnormal    Color Urine Light Yellow      Appearance Urine Slightly Cloudy (*)     Glucose Urine Negative      Bilirubin Urine Negative      Ketones Urine Trace (*)     Specific Gravity Urine 1.010      Blood Urine Large (*)     pH Urine 7.0      Protein Albumin Urine 20 (*)     Urobilinogen Urine Normal      Nitrite Urine Positive (*)     Leukocyte Esterase Urine Large (*)     RBC Urine >182 (*)     WBC Urine 162 (*)     Squamous Epithelials Urine 3 (*)     WBC Clumps Urine Present (*)     Mucus Urine Present (*)    LIPASE - Normal    Lipase 33     TROPONIN T, HIGH SENSITIVITY - Normal    Troponin T, High Sensitivity 10     CBC WITH PLATELETS AND DIFFERENTIAL    WBC Count 8.6      RBC Count 4.17      Hemoglobin 12.2      Hematocrit 37.8      MCV 91      MCH 29.3      MCHC 32.3      RDW 13.2      Platelet Count 307      % Neutrophils 77      % Lymphocytes 13      % Monocytes 7      % Eosinophils 2      % Basophils 1      % Immature Granulocytes 0      NRBCs per 100 WBC 0      Absolute Neutrophils 6.7      Absolute Lymphocytes 1.1      Absolute Monocytes 0.6      Absolute Eosinophils 0.1      Absolute Basophils 0.1      Absolute Immature Granulocytes 0.0      Absolute NRBCs 0.0     URINE CULTURE        Emergency Department Course & Assessments:     Interventions:  Medications   ondansetron (ZOFRAN) injection 4 mg (4 mg Intravenous $Given 5/30/23 0141)   cefTRIAXone (ROCEPHIN) 1 g vial to attach to  mL bag for ADULTS or NS 50 mL bag for PEDS (1 g Intravenous $New Bag 5/30/23 9228)   HYDROmorphone (PF) (DILAUDID) injection 0.5 mg (0.5 mg Intravenous $Given 5/30/23 9794)    0.9% sodium chloride BOLUS (500 mLs Intravenous $New Bag 5/30/23 0259)   iopamidol (ISOVUE-370) solution 91 mL (91 mLs Intravenous $Given 5/30/23 0316)   Saline (66 mLs Intravenous $Given 5/30/23 0317)   HYDROmorphone (PF) (DILAUDID) injection 0.5 mg (0.5 mg Intravenous $Given 5/30/23 0543)      Independent Interpretation (X-rays, CTs, rhythm strip):  None    Consultations/Discussion of Management or Tests:  0544 I spoke with Dr. Otero, hospitalist, who accepts the patient for admission.     Assessments:  ED Course as of 05/30/23 0545   Tue May 30, 2023   0120 I obtained history and examined the patient as noted above.    0531 I rechecked and updated the patient. The patient is still having pain.      Social Determinants of Health affecting care:   None    Disposition:  The patient was admitted to the hospital under the care of Dr. Otero.     Impression & Plan      Medical Decision Making:  Hamida Verma is a 82-year-old female who presented to the emergency department with abdominal pain.  Laboratory studies obtained.  Laboratory studies overall unremarkable.  EKG shows no acute ischemic changes.  Troponin is normal.  CT abdomen performed that shows findings of likely gallbladder etiology of her pain.  Also has findings of urinary tract infection.  Urine analysis confirms infection.  Has grown Klebsiella in the past susceptible to ceftriaxone so she was given this.  Ultrasound confirms gallstone and likely neck of gallbladder.  On reevaluation she is still having discomfort and is tender in the right upper quadrant.  May be developing early cholecystitis with persistent pain so discussed with patient that she should stay in the hospital which she agrees with.  Discussed patient with hospitalist for admission.  Did have incidental finding of kidney cyst which was discussed with her and recommend follow-up as an outpatient.    Diagnosis:    ICD-10-CM    1. Biliary colic  K80.50       2. Acute cystitis without  hematuria  N30.00       3. Abdominal pain, right upper quadrant  R10.11            Scribe Disclosure:  I, JOLLY REEVES, am serving as a scribe at 1:23 AM on 5/30/2023 to document services personally performed by Aliya Guo MD based on my observations and the provider's statements to me.   5/30/2023     Aliya Guo MD Goertz, Maria Kristine, MD  05/30/23 0608

## 2023-05-30 NOTE — PLAN OF CARE
Goal Outcome Evaluation:    Orientation/Cognitive: AOx4  Observation Goals (Met/ Not Met): Not met  Mobility Level/Assist Equipment: IND  Fall Risk (Y/N): N  Behavior Concerns: Green  Pain Management: C/o abdominal and back pain, PRN dilaudid given x 2  Tele/VS/O2: VSS on RA  ABNL Lab/BG: See results  Diet: Full Liquids, decreased appetite d/t nausea, zofran given x 1  Bowel/Bladder: Continent, voiding using BR  Skin Concerns: None  Drains/Devices: PIV infusing at 75   Tests/Procedures for next shift: Lap appy scheduled for 6/1  Anticipated DC date & active delays: TBD

## 2023-05-30 NOTE — CONSULTS
[facility] General Surgery Consultation    Hamida Verma MRN# [id number]   YOB: 1940 Age: 82 year old      Date of Admission:  5/30/2023  Date of Consult: 5/30/2023         Assessment and Plan:   Patient is a 82 year old female with symptomatic cholelithiasis, likely early acute cholecystitis    PLAN:  - Treatment of cystitis per primary team.  - Last Xarelto use was last night. Will plan for laparoscopic cholecystectomy on Thursday.  - Continue IV fluids, analgesia, and IV rocephin. Okay for liquid diet today. NPO midnight day of surgery.  - The benefits and risks of surgical intervention versus non-operative management including but not limited to infection, bleeding, conversion to open, bile leak, bile duct injury, retained gallstones, injuring neighboring structures, and anesthesia complications with the patient. She expressed understanding and would like to proceed with surgery. All questions were answered to the patient's satisfaction.         Requesting Physician:      Dr. Otero        Chief Complaint:     Chief Complaint   Patient presents with     Abdominal Pain          History of Present Illness:   Hamida Verma is a 82 year old female who presented to the ED early this morning for sudden severe epigastric abdominal pain. The pain was 10/10 and woke her from sleep. The pain radiates to the back. She thought she was having a heart attack due to the severity. She denies having pain like this in the past. She had an episode of vomiting, denies fever/chills or changes in bowel habits. She denies changes in urination as well. Ultrasound visualized 1.3 cm nonmobile gallstone in the region of the gallbladder neck, likely impacted, with moderate distention of the gallbladder. There was no biliary dilatation or reported gallbladder wall thickening. CT visualized distended gallbladder containing a large stone as well as a diffusely thickened urinary bladder suspicious for  "cystitis. Labs were notable for normal white count, LFTs, and lipase.    Patient does have history of atrial fibrillation and stroke so she is on Xarelto with last dose yesterday evening. She denies problems with anesthesia. She has history of hysterectomy, no other abdominal/pelvic surgeries.          Physical Exam:   Blood pressure 116/56, pulse 69, temperature 98.1  F (36.7  C), temperature source Oral, resp. rate 18, height 1.626 m (5' 4\"), weight 83.4 kg (183 lb 14.4 oz), SpO2 96 %, not currently breastfeeding.  183 lbs 14.4 oz  General: Vital signs reviewed, in no apparent distress  Eyes: Anicteric  HENT: Normocephalic, atraumatic, trachea midline   Respiratory: Breathing nonlabored  Cardiovascular: Regular rate and rhythm  GI: Abdomen soft, nondistended, tender in RUQ with guarding, no rebound, nontender throughout rest of abdomen  Musculoskeletal: No gross deformities  Neurologic: Grossly nonfocal exam  Psychiatric: Normal mood, affect and insight  Integumentary: Warm and dry         Past Medical History:     Past Medical History:   Diagnosis Date     Atrial fibrillation (H)      Chronic diastolic CHF (congestive heart failure) (H)      COPD (chronic obstructive pulmonary disease) (H)      Essential hypertension, benign      HYPERLIPIDEMIA NEC/NOS 03/30/2006     Pulmonary hypertension (H)      Pyelonephritis 2019     SSS (sick sinus syndrome) (H)     s/p PPM 3/2018            Past Surgical History:     Past Surgical History:   Procedure Laterality Date     EP ABLATION AV NODE N/A 5/7/2019    Procedure: EP Ablation AV Node;  Surgeon: Roe Voss MD;  Location:  [facility]ATH LAB     EYE SURGERY       HYSTERECTOMY, VAGINAL      hysterectomy            Current Medications:           [START ON 5/31/2023] cefTRIAXone  2 g Intravenous Q24H     sodium chloride (PF)  3 mL Intracatheter Q8H       acetaminophen **OR** acetaminophen, HYDROmorphone, HYDROmorphone, lidocaine 4%, lidocaine (buffered or not " buffered), melatonin, naloxone **OR** naloxone **OR** naloxone **OR** naloxone, ondansetron **OR** ondansetron, oxyCODONE, oxyCODONE, senna-docusate **OR** senna-docusate, sodium chloride (PF)         Home Medications:     Prior to Admission medications    Medication Sig Last Dose Taking? Auth Provider Long Term End Date   albuterol (PROAIR HFA/PROVENTIL HFA/VENTOLIN HFA) 108 (90 Base) MCG/ACT inhaler Inhale 2 puffs into the lungs every 6 hours  at prn Yes HouseMikala MD Yes    atorvastatin (LIPITOR) 80 MG tablet Take 1 tablet (80 mg) by mouth daily 5/29/2023 at am Yes More, Sandrine Stovall PA-C Yes    cholecalciferol (VITAMIN  -D) 1000 UNIT capsule Take 1 capsule by mouth daily. 5/29/2023 at am Yes Reported, Patient     Coral Calcium 133-66.7-133 MG-MG-UNIT CAPS Take 2 tablets by mouth 2 times daily  5/29/2023 at pm Yes Reported, Patient     FLAX SEED OIL OR 1 capsule daily 5/29/2023 at am Yes Reported, Patient     furosemide (LASIX) 20 MG tablet Take 1 tablet (20 mg) by mouth daily 5/29/2023 at am Yes More, Sandrine Stovall PA-C Yes    metoprolol succinate ER (TOPROL XL) 50 MG 24 hr tablet Take 1 tablet (50 mg) by mouth 2 times daily 5/29/2023 at pm Yes More, Sandrine Stovall PA-C Yes    Multiple Vitamins-Minerals (HAIR SKIN NAILS PO) Take 1 tablet by mouth At Bedtime 5/29/2023 at hs Yes Unknown, Entered By History     potassium chloride ER (KLOR-CON M) 10 MEQ CR tablet Take 1 tablet (10 mEq) by mouth daily 5/29/2023 at am Yes More, Sandrine Stovall PA-C     rivaroxaban ANTICOAGULANT (XARELTO ANTICOAGULANT) 20 MG TABS tablet Take 1 tablet (20 mg) by mouth daily (with dinner) 5/29/2023 at pm Yes More, Sandrine Stovall PA-C Yes    sacubitril-valsartan (ENTRESTO)  MG per tablet Take 1 tablet by mouth 2 times daily 5/29/2023 at pm Yes More, Sandrine Stovall PA-C No    tiotropium (SPIRIVA) 18 MCG inhaled capsule Inhale 1 capsule (18 mcg) into the lungs daily  Patient taking differently: Inhale 18 mcg  into the lungs daily as needed More than a month at prn Yes Mikala Philip MD Yes             Allergies:     Allergies   Allergen Reactions     Strawberries [Strawberry Extract] Anaphylaxis     Strawberry Flavor             Family History:     Family History   Problem Relation Age of Onset     Cerebrovascular Disease Mother      Glaucoma Mother      Macular Degeneration Mother      Alcohol/Drug Father         alcohol     Myocardial Infarction Father 63        passed away from MI     Family History Negative Sister      Family History Negative Sister      Family History Negative Sister      Cerebrovascular Disease Sister      Family History Negative Brother      Family History Negative Maternal Grandmother      Family History Negative Maternal Grandfather      Family History Negative Paternal Grandmother      Family History Negative Paternal Grandfather      Myocardial Infarction Son 57        passed away from MI     Dementia Daughter 57        early onset on [place]     Diabetes No family hx of      Breast Cancer No family hx of      Cancer - colorectal No family hx of            Social History:   Hamida Verma  reports that she quit smoking about 22 years ago. Her smoking use included cigarettes. She started smoking about 67 years ago. She has a 67.50 pack-year smoking history. She has never used smokeless tobacco. She reports that she does not drink alcohol and does not use drugs.          Review of Systems:   The 12 point Review of Systems is negative other than noted in the HPI.         Labs/Imaging   All new lab and imaging data was reviewed.   Recent Labs   Lab 05/30/23  0138   WBC 8.6   HGB 12.2   HCT 37.8   MCV 91        Recent Labs   Lab 05/30/23  0230      POTASSIUM 4.1   CHLORIDE 104   CO2 23   ANIONGAP 12   *   BUN 21.4   CR 0.89   GFRESTIMATED 64   GURWINDER 9.5   PROTTOTAL 6.2*   ALBUMIN 3.7   BILITOTAL 0.3   ALKPHOS 49   AST 25   ALT 22       I have personally reviewed the imaging  studies-     US ABDOMEN LIMITED  IMPRESSION:  1.  1.3 cm nonmobile gallstone in the region of the gallbladder neck, likely impacted, with moderate distention of the gallbladder. No other secondary evidence of acute cholecystitis. Sonographic López's sign was negative.    CT ABDOMEN PELVIS  IMPRESSION:   1.  Diffusely thickened urinary bladder, suspicious for cystitis. Recommend correlation with urinalysis.   2.  Distended gallbladder with large gallstone possibly impacted in the region of the neck. Recommend right upper quadrant ultrasound for further evaluation.  3.  1.1 cm hyperattenuating lesion of the left anterior upper pole, possibly representing a hemorrhagic or proteinaceous cyst however technically indeterminate. Recommend renal mass protocol MRI or CT for further characterization, which may be performed   on a nonemergent outpatient basis.      Arcelia Irizarry PA-C    85 minutes spent on date of the encounter doing patient visit, chart review, and documentation.

## 2023-05-30 NOTE — PHARMACY-ADMISSION MEDICATION HISTORY
Pharmacist Admission Medication History    Admission medication history is complete. The information provided in this note is only as accurate as the sources available at the time of the update.    Medication reconciliation/reorder completed by provider prior to medication history? No    Information Source(s): Patient and CareEverywhere/SureScripts via in-person    Pertinent Information: Hamida had all her morning and evening medication doses yesterday before presenting to the ED.    Changes made to PTA medication list:    Added: None    Deleted: alendronate    Changed: Spiriva was changed to prn (has not been filled since 8/18/22--patient rarely uses it as needed)    Medication Affordability:  Not including over the counter (OTC) medications, was there a time in the past 3 months when you did not take your medications as prescribed because of cost?: No    Allergies reviewed with patient and updates made in EHR: reviewed by the ED RN yesterday    Medication History Completed By: Alison Rollins [facility] 5/30/2023 7:34 AM    Prior to Admission medications    Medication Sig Last Dose Taking? Auth Provider Long Term End Date   albuterol (PROAIR HFA/PROVENTIL HFA/VENTOLIN HFA) 108 (90 Base) MCG/ACT inhaler Inhale 2 puffs into the lungs every 6 hours  at prn Yes Mikala Philip MD Yes    atorvastatin (LIPITOR) 80 MG tablet Take 1 tablet (80 mg) by mouth daily 5/29/2023 at am Yes More, Sandrine Stovall PA-C Yes    cholecalciferol (VITAMIN  -D) 1000 UNIT capsule Take 1 capsule by mouth daily. 5/29/2023 at am Yes Reported, Patient     Coral Calcium 133-66.7-133 MG-MG-UNIT CAPS Take 2 tablets by mouth 2 times daily  5/29/2023 at pm Yes Reported, Patient     FLAX SEED OIL OR 1 capsule daily 5/29/2023 at am Yes Reported, Patient     furosemide (LASIX) 20 MG tablet Take 1 tablet (20 mg) by mouth daily 5/29/2023 at am Yes More, Sandrine Stovall PA-C Yes    metoprolol succinate ER (TOPROL XL) 50 MG 24 hr tablet Take 1 tablet (50  mg) by mouth 2 times daily 5/29/2023 at pm Yes More, Sandrine Stovall PA-C Yes    Multiple Vitamins-Minerals (HAIR SKIN NAILS PO) Take 1 tablet by mouth At Bedtime 5/29/2023 at hs Yes Unknown, Entered By History     potassium chloride ER (KLOR-CON M) 10 MEQ CR tablet Take 1 tablet (10 mEq) by mouth daily 5/29/2023 at am Yes More, Sandrine Stovall PA-C     rivaroxaban ANTICOAGULANT (XARELTO ANTICOAGULANT) 20 MG TABS tablet Take 1 tablet (20 mg) by mouth daily (with dinner) 5/29/2023 at pm Yes More, Sandrine Stovall PA-C Yes    sacubitril-valsartan (ENTRESTO)  MG per tablet Take 1 tablet by mouth 2 times daily 5/29/2023 at pm Yes More, Sandrine Stovall PA-C No    tiotropium (SPIRIVA) 18 MCG inhaled capsule Inhale 1 capsule (18 mcg) into the lungs daily  Patient taking differently: Inhale 18 mcg into the lungs daily as needed More than a month at prn Yes Mikala Philip MD Yes

## 2023-05-30 NOTE — ED NOTES
Bed: ED13  Expected date: 5/30/23  Expected time: 1:10 AM  Means of arrival: Ambulance  Comments:  HEMS [id number] epigastric pain

## 2023-05-30 NOTE — ED TRIAGE NOTES
Presents from home via EMS. Sudden onset epigastric pain, nausea, vomit started at midnight. EMS administered aspirin 324mg and nitroglycerin 0.4mg SL x1 which made pain worse.

## 2023-05-30 NOTE — PROGRESS NOTES
PRIMARY DIAGNOSIS: BILIARY COLIC/UNCOMPLICATED EARLY ACUTE CHOLECYSTITIS  OUTPATIENT/OBSERVATION GOALS TO BE MET BEFORE DISCHARGE:    1. Pain status: Improved but still requiring IV narcotics.  2. Stable vital signs and labs (if performed) at disposition: Yes  3. Tolerating adequate PO diet: No  4. Successful cholecystectomy or clear follow up plan with General Surgery team if immediate surgery not performed Yes  5. ADLs back to baseline?  Yes  6. Activity and level of assistance: Ambulating independently.  7. Barriers to discharge noted Yes     Discharge Planner Nurse   Safe discharge environment identified: Yes  Barriers to discharge: Yes       Entered by: Alexandra Monique RN 05/30/2023 12:35 PM     Please review provider order for any additional goals.   Nurse to notify provider when observation goals have been met and patient is ready for discharge.

## 2023-05-30 NOTE — PROGRESS NOTES
Pt seen and examined.  Admitted earlier this am ---H&P reviewed in detail.     Feeling better after pain meds and anti-emetics currently.  Still having mild RUQ and upper back pain.  NPO and awaiting gen surgery consult.      On IV rocpehin for UTI    Exam -   NAD, appears younger than stated age  Heart - RRR no loud murmur  Lungs - CTA posterior/anterior  Abd - slower but present BS, soft, mild reproducible tenderness in the RUQ and epigastric area w/o R/G/R    PPE worn:  Mask, gloves

## 2023-05-30 NOTE — H&P
[facility]    History and Physical  Hospitalist       Date of Admission:  5/30/2023  Date of Service (when I saw the patient): 05/30/23    ASSESSMENT  Hamida Verma is a markedly pleasant 82 year old woman with past medical history that is most notable for chronic atrial fibrillation status post pacemaker placement, on DOAC, among others; who presents with acute abdominal pain, nausea and vomiting; and is found to have suspected acute cholecystitis.    PLAN     Symptomatic cholelithiasis with suspected acute cholecystitis: Of note, Ms. Verma presents for acute epigastric abdominal pain with nausea and vomiting tonight. In the ED, she is afebrile. She is not hypoxic. WBC and CMP are largely normal. CT of abdomen and pelvis shows a distended gallbladder with large gallstone possibly impacted in the region of the neck. RUQ US shows a 1.3 cm nonmobile gallstone in the region of the gallbladder neck, likely impacted, with moderate distention of the gallbladder. Overall, we suspect she could have acute cholecystitis.       -- Observation. NPO. Surgery consulted. Empiric Ceftriaxone continued. 75 ml/hour NS IVF ordered cautiously given history of CHF (with a stop date for clinical reassessment after 10 hours today). Tylenol, Oxycodone, IV Dilaudid as needed for pain. Anti-emetics as needed.    UTI: Noting that Ms. Verma was hospitalized here in 2019 for pyelonephritis due to Klebsiella pneumoniae, resistant to ampicillin but sensitive to unasyn and other antibiotics. Tonight's CT also shows a diffusely thickened urinary bladder, and she has pyuria and hematuria on UA.     -- Continue Ceftriaxone as above; follow up urine cultures    Chronic atrial fibrillation: She is followed by [facility], status post AV node ablation and pacemaker placement; and is on Xarelto for stroke prevention; the last dose was yesterday.    -- Xarelto held for now pending surgical consultation    Chronic  diastolic CHF: Class II. TTE in 10/2022 showed LVEF 45-50%. Reportedly coronary artery calcifications have been seen on prior CT, though Lexiscan stress testing in 10/2022 showed no evidence of inducible ischemia.    -- Resume home lasix and entresto when verified and IV fluid completed.    Hypertension: Resume home toprol when verified    Hyperlipidemia: Resume home atorvastatin when verified    COPD: Diagnosed in 2019, seen in [facility] with PFT's showing mild obstruction and diminished diffusing capacity.    -- Resume home spiriva and albuterol when verified.    Kidney cyst: Incidentally seen tonight as a 1.1 cm hyperattenuating lesion of the left anterior upper pole, possibly representing a hemorrhagic or proteinaceous cyst     -- Outpatient follow up for consideration of further imaging will be recommended at discharge    I have spent 75 minutes on the date of service doing chart review, history, examination, documentation, and further activities per the note.    Chief Complaint   Abdominal pain    History is obtained from the patient and the ED physician whom I have spoken with    History of Present Illness   Hamida Verma is a markedly pleasant 82 year old woman who presents with acute onset abdominal pain, which started when it woke her from sleep tonight. It is sharp, severe, constant pain, located in the epigastrium, radiating to the right and left upper quadrants and the back. It is associated with nausea and she has had several episodes of vomiting tonight. She called EMS, thinking she might be haiving an MI (noting that two sons have had severe heart disease). EMS gave nitroglycerin en route, and the pain worsened further. IV Dilaudid and Zofran given in the ED have now partially improved her symptoms. She otherwise denies abdominal distension or constipation, or any fever, rigors, or sweats, or any chest pain, or any dysuria or hematuria, or any other acute complaints.    In the ED,   05/30 0130  123/55 97.5  F (36.4  C) 61 22 96 %     CBC and CMP were notable for Tprot 6.2, otherwise were within the normal reference range. WBC was 8.6. Lipase 33. Troponin T was 10. EKG showed a paced rhythm. UA showed 162 WBC, greater than 182 RBC, positive nitrite, large LE. Urine culture was sent.    She was also given Ceftriaxone and IV fluid in the ED.    Recent Results (from the past 24 hour(s))   CT Abdomen Pelvis w Contrast    Narrative    EXAM: CT ABDOMEN PELVIS W CONTRAST  LOCATION: [facility]  DATE/TIME: 5/30/2023 3:24 AM CDT    INDICATION: epigastric, RUQ, mid abdominal pain, nausea vomiting  COMPARISON: None.  TECHNIQUE: CT scan of the abdomen and pelvis was performed following injection of IV contrast. Multiplanar reformats were obtained. Dose reduction techniques were used.  CONTRAST: 91mL Isovue 370    FINDINGS:     LOWER CHEST: Pacemaker wires partially visualized. Emphysematous changes of the lung bases.    HEPATOBILIARY: Unremarkable liver. Distended gallbladder with large gallstone possibly impacted in the region of the neck. Fundal adenomyomatosis.    PANCREAS: Normal.    SPLEEN: Normal.    ADRENAL GLANDS: Normal.    KIDNEYS/BLADDER: Bilateral renal cortical scarring. Nonobstructing calyceal calculi of both kidneys the largest of which in the right inferior pole measures 9 mm. Bilateral benign renal cysts requiring no further follow-up. In addition, there is a 1.1 cm   hyperattenuating lesion of the left anterior upper pole (series 4 image 57). Diffusely thickened urinary bladder.    BOWEL: Colonic diverticulosis. Normal appendix. No bowel obstruction.    LYMPH NODES: Normal.    VASCULATURE: Diffuse atherosclerotic calcifications of the aortoiliac vessels without evidence of aneurysmal dilatation.    PELVIC ORGANS: Hysterectomy.    MUSCULOSKELETAL: T12 benign vertebral hemangioma. Mild degenerative changes of the spine. No acute osseous abnormality.      Impression     "IMPRESSION:     1.  Diffusely thickened urinary bladder, suspicious for cystitis. Recommend correlation with urinalysis.   2.  Distended gallbladder with large gallstone possibly impacted in the region of the neck. Recommend right upper quadrant ultrasound for further evaluation.  3.  1.1 cm hyperattenuating lesion of the left anterior upper pole, possibly representing a hemorrhagic or proteinaceous cyst however technically indeterminate. Recommend renal mass protocol MRI or CT for further characterization, which may be performed   on a nonemergent outpatient basis.   Abdomen US, limited (RUQ only)    Narrative    EXAM: US ABDOMEN LIMITED  LOCATION: [facility]  DATE/TIME: 5/30/2023 4:32 AM CDT    INDICATION: gallstones on US  COMPARISON: Same day CT  TECHNIQUE: Limited abdominal ultrasound.    FINDINGS:    GALLBLADDER: 1.3 cm nonmobile gallstone in the region of the neck of the gallbladder, likely impacted. Gallbladder is moderately distended. Sonographic López's sign was negative.    BILE DUCTS: No biliary dilatation. The common duct measures 5 mm.    LIVER: Normal parenchyma with smooth contour. No focal mass.    RIGHT KIDNEY: No hydronephrosis. Simple cysts measuring up to 5.3 cm, requiring no further follow-up.    PANCREAS: The visualized portions are normal.    No ascites.      Impression    IMPRESSION:  1.  1.3 cm nonmobile gallstone in the region of the gallbladder neck, likely impacted, with moderate distention of the gallbladder. No other secondary evidence of acute cholecystitis. Sonographic López's sign was negative.           PHYSICAL EXAM  Blood pressure 123/54, pulse 66, temperature 97.5  F (36.4  C), temperature source Oral, resp. rate 18, height 1.727 m (5' 8\"), weight 81.6 kg (180 lb), SpO2 95 %, not currently breastfeeding.  Constitutional: Alert and oriented to person, place and time, with re-direction early this AM; no apparent distress; some word finding difficulties " during the interview  Respiratory: lungs clear to auscultation bilaterally  Cardiovascular: regular S1 S2  GI: abdomen soft mildly diffusely tender non distended bowel sounds positive  Musculoskeletal: mild bilateral LE edema     DVT Prophylaxis: DOAC  Code Status: DNR / DNI, discussed and confirmed with her    Disposition: Expected discharge in 0-2 days    Christopher Otero MD, MD    Past Medical History    I have reviewed this patient's medical history and updated it with pertinent information if needed.   Past Medical History:   Diagnosis Date     Atrial fibrillation (H)      Chronic diastolic CHF (congestive heart failure) (H)      COPD (chronic obstructive pulmonary disease) (H)      Essential hypertension, benign      HYPERLIPIDEMIA NEC/NOS 2006     Pulmonary hypertension (H)      Pyelonephritis      SSS (sick sinus syndrome) (H)     s/p PPM 3/2018       Past Surgical History   I have reviewed this patient's surgical history and updated it with pertinent information if needed.  Past Surgical History:   Procedure Laterality Date     EP ABLATION AV NODE N/A 2019    Procedure: EP Ablation AV Node;  Surgeon: Roe Voss MD;  Location:  [facility]     EYE SURGERY       HYSTERECTOMY, VAGINAL      hysterectomy       Prior to Admission Medications   Prior to Admission Medications   Prescriptions Last Dose Informant Patient Reported? Taking?   Coral Calcium 133-66.7-133 MG-MG-UNIT CAPS  Self Yes No   Sig: Take 2 tablets by mouth 2 times daily    FLAX SEED OIL OR  Self Yes No   Si capsule daily   Multiple Vitamins-Minerals (HAIR SKIN NAILS PO)  Self Yes No   Sig: Take 1 tablet by mouth At Bedtime   albuterol (PROAIR HFA/PROVENTIL HFA/VENTOLIN HFA) 108 (90 Base) MCG/ACT inhaler   No No   Sig: Inhale 2 puffs into the lungs every 6 hours   alendronate (FOSAMAX) 70 MG tablet   No No   Sig: Take 1 tablet (70 mg) by mouth every 7 days Take 60 minutes before am meal with 8 oz. water.  Remain upright for 30 minutes.   Patient not taking: Reported on 5/1/2023   atorvastatin (LIPITOR) 80 MG tablet   No No   Sig: Take 1 tablet (80 mg) by mouth daily   cholecalciferol (VITAMIN  -D) 1000 UNIT capsule  Self Yes No   Sig: Take 1 capsule by mouth daily.   furosemide (LASIX) 20 MG tablet   No No   Sig: Take 1 tablet (20 mg) by mouth daily   metoprolol succinate ER (TOPROL XL) 50 MG 24 hr tablet   No No   Sig: Take 1 tablet (50 mg) by mouth 2 times daily   potassium chloride ER (KLOR-CON M) 10 MEQ CR tablet   No No   Sig: Take 1 tablet (10 mEq) by mouth daily   rivaroxaban ANTICOAGULANT (XARELTO ANTICOAGULANT) 20 MG TABS tablet   No No   Sig: Take 1 tablet (20 mg) by mouth daily (with dinner)   sacubitril-valsartan (ENTRESTO)  MG per tablet   No No   Sig: Take 1 tablet by mouth 2 times daily   tiotropium (SPIRIVA) 18 MCG inhaled capsule   No No   Sig: Inhale 1 capsule (18 mcg) into the lungs daily      Facility-Administered Medications: None     Allergies   Allergies   Allergen Reactions     Strawberries [Strawberry Extract] Anaphylaxis     Strawberry Flavor        Social History   I have reviewed this patient's social history and updated it with pertinent information if needed. Hamida Verma  reports that she quit smoking about 22 years ago. Her smoking use included cigarettes. She started smoking about 67 years ago. She has a 67.50 pack-year smoking history. She has never used smokeless tobacco. She reports that she does not drink alcohol and does not use drugs.    Family History   Family history assessed and, except as above, is non-contributory.    Family History   Problem Relation Age of Onset     Cerebrovascular Disease Mother      Glaucoma Mother      Macular Degeneration Mother      Alcohol/Drug Father         alcohol     Myocardial Infarction Father 63        passed away from MI     Family History Negative Sister      Family History Negative Sister      Family History Negative Sister       Cerebrovascular Disease Sister      Family History Negative Brother      Family History Negative Maternal Grandmother      Family History Negative Maternal Grandfather      Family History Negative Paternal Grandmother      Family History Negative Paternal Grandfather      Myocardial Infarction Son 57        passed away from MI     Dementia Daughter 57        early onset on namenda     Diabetes No family hx of      Breast Cancer No family hx of      Cancer - colorectal No family hx of        Review of Systems   The 10 point Review of Systems is negative other than noted in the HPI or here.     Primary Care Physician   Mikala Philip MD    Data   Labs Ordered and Resulted from Time of ED Arrival to Time of ED Departure   COMPREHENSIVE METABOLIC PANEL - Abnormal       Result Value    Sodium 139      Potassium 4.1      Chloride 104      Carbon Dioxide (CO2) 23      Anion Gap 12      Urea Nitrogen 21.4      Creatinine 0.89      Calcium 9.5      Glucose 175 (*)     Alkaline Phosphatase 49      AST 25      ALT 22      Protein Total 6.2 (*)     Albumin 3.7      Bilirubin Total 0.3      GFR Estimate 64     URINE MACROSCOPIC WITH REFLEX TO MICRO - Abnormal    Color Urine Light Yellow      Appearance Urine Slightly Cloudy (*)     Glucose Urine Negative      Bilirubin Urine Negative      Ketones Urine Trace (*)     Specific Gravity Urine 1.010      Blood Urine Large (*)     pH Urine 7.0      Protein Albumin Urine 20 (*)     Urobilinogen Urine Normal      Nitrite Urine Positive (*)     Leukocyte Esterase Urine Large (*)     RBC Urine >182 (*)     WBC Urine 162 (*)     Squamous Epithelials Urine 3 (*)     WBC Clumps Urine Present (*)     Mucus Urine Present (*)    LIPASE - Normal    Lipase 33     TROPONIN T, HIGH SENSITIVITY - Normal    Troponin T, High Sensitivity 10     CBC WITH PLATELETS AND DIFFERENTIAL    WBC Count 8.6      RBC Count 4.17      Hemoglobin 12.2      Hematocrit 37.8      MCV 91      MCH 29.3      MCHC 32.3       RDW 13.2      Platelet Count 307      % Neutrophils 77      % Lymphocytes 13      % Monocytes 7      % Eosinophils 2      % Basophils 1      % Immature Granulocytes 0      NRBCs per 100 WBC 0      Absolute Neutrophils 6.7      Absolute Lymphocytes 1.1      Absolute Monocytes 0.6      Absolute Eosinophils 0.1      Absolute Basophils 0.1      Absolute Immature Granulocytes 0.0      Absolute NRBCs 0.0         Data reviewed today:  I personally reviewed the EKG tracing showing paced rhythm and the abdominal CT image(s) showing cholelithiasis and bladder wall thickening.

## 2023-05-31 PROBLEM — N28.1 KIDNEY CYSTS: Status: ACTIVE | Noted: 2023-05-31

## 2023-05-31 PROBLEM — K81.0 ACUTE CHOLECYSTITIS: Status: ACTIVE | Noted: 2023-05-31

## 2023-05-31 PROCEDURE — 96375 TX/PRO/DX INJ NEW DRUG ADDON: CPT

## 2023-05-31 PROCEDURE — 250N000011 HC RX IP 250 OP 636: Performed by: STUDENT IN AN ORGANIZED HEALTH CARE EDUCATION/TRAINING PROGRAM

## 2023-05-31 PROCEDURE — 120N000001 HC R&B MED SURG/OB

## 2023-05-31 PROCEDURE — 99232 SBSQ HOSP IP/OBS MODERATE 35: CPT | Performed by: INTERNAL MEDICINE

## 2023-05-31 PROCEDURE — 250N000011 HC RX IP 250 OP 636: Performed by: HOSPITALIST

## 2023-05-31 PROCEDURE — 250N000013 HC RX MED GY IP 250 OP 250 PS 637: Performed by: HOSPITALIST

## 2023-05-31 PROCEDURE — 258N000003 HC RX IP 258 OP 636: Performed by: INTERNAL MEDICINE

## 2023-05-31 PROCEDURE — 250N000013 HC RX MED GY IP 250 OP 250 PS 637: Performed by: INTERNAL MEDICINE

## 2023-05-31 PROCEDURE — G0378 HOSPITAL OBSERVATION PER HR: HCPCS

## 2023-05-31 PROCEDURE — 96376 TX/PRO/DX INJ SAME DRUG ADON: CPT

## 2023-05-31 RX ORDER — SODIUM CHLORIDE 9 MG/ML
INJECTION, SOLUTION INTRAVENOUS CONTINUOUS
Status: DISCONTINUED | OUTPATIENT
Start: 2023-05-31 | End: 2023-06-02

## 2023-05-31 RX ORDER — METRONIDAZOLE 500 MG/100ML
500 INJECTION, SOLUTION INTRAVENOUS EVERY 12 HOURS
Status: DISCONTINUED | OUTPATIENT
Start: 2023-05-31 | End: 2023-06-01

## 2023-05-31 RX ADMIN — CEFTRIAXONE SODIUM 2 G: 2 INJECTION, POWDER, FOR SOLUTION INTRAMUSCULAR; INTRAVENOUS at 05:49

## 2023-05-31 RX ADMIN — METOPROLOL SUCCINATE 50 MG: 50 TABLET, EXTENDED RELEASE ORAL at 08:06

## 2023-05-31 RX ADMIN — HYDROMORPHONE HYDROCHLORIDE 0.4 MG: 0.2 INJECTION, SOLUTION INTRAMUSCULAR; INTRAVENOUS; SUBCUTANEOUS at 12:04

## 2023-05-31 RX ADMIN — ACETAMINOPHEN 650 MG: 325 TABLET ORAL at 08:09

## 2023-05-31 RX ADMIN — METOPROLOL SUCCINATE 50 MG: 50 TABLET, EXTENDED RELEASE ORAL at 20:39

## 2023-05-31 RX ADMIN — METRONIDAZOLE 500 MG: 500 INJECTION, SOLUTION INTRAVENOUS at 08:30

## 2023-05-31 RX ADMIN — OXYCODONE HYDROCHLORIDE 5 MG: 5 TABLET ORAL at 04:50

## 2023-05-31 RX ADMIN — SACUBITRIL AND VALSARTAN 1 TABLET: 97; 103 TABLET, FILM COATED ORAL at 20:39

## 2023-05-31 RX ADMIN — SACUBITRIL AND VALSARTAN 1 TABLET: 97; 103 TABLET, FILM COATED ORAL at 08:06

## 2023-05-31 RX ADMIN — METRONIDAZOLE 500 MG: 500 INJECTION, SOLUTION INTRAVENOUS at 20:40

## 2023-05-31 RX ADMIN — ACETAMINOPHEN 650 MG: 325 TABLET ORAL at 23:39

## 2023-05-31 RX ADMIN — SODIUM CHLORIDE: 9 INJECTION, SOLUTION INTRAVENOUS at 09:57

## 2023-05-31 ASSESSMENT — ACTIVITIES OF DAILY LIVING (ADL)
ADLS_ACUITY_SCORE: 35
DEPENDENT_IADLS:: INDEPENDENT;TRANSPORTATION
ADLS_ACUITY_SCORE: 35
ADLS_ACUITY_SCORE: 35

## 2023-05-31 NOTE — PLAN OF CARE
Goal Outcome Evaluation:    Orientation/Cognitive: AOx4  Observation Goals (Met/ Not Met): Not met  Mobility Level/Assist Equipment: IND  Fall Risk (Y/N): N  Behavior Concerns: Green  Pain Management: C/o abdominal and back pain, PRN dilaudid given   Tele/VS/O2: VSS on RA  ABNL Lab/BG: UTI +  Diet: Full Liquids, decreased appetite; NPO at midnight   Bowel/Bladder: Continent, voiding using BR  Skin Concerns: Scattered bruising   Drains/Devices: PIV infusing NS at 50   Tests/Procedures for next shift: Annabelle Lopez scheduled for 6/1 at 8:00 am  Anticipated DC date & active delays: TBD

## 2023-05-31 NOTE — PLAN OF CARE
Orientation/Cognitive:A&Ox4  Observation Goals (Met/ Not Met): Not met  Mobility Level/Assist Equipment: Independent  Fall Risk (Y/N): N  Behavior Concerns: NA  Pain Management: PRN tylenol given x1  Tele/VS/O2: VSS on RA  ABNL Lab/BG: UA abnormal pending UC  Diet: Full liquid; NPO at midnight for Lap brii  Bowel/Bladder: Continent  Skin Concerns: Scattered bruising   Drains/Devices: IVF 50mL/hr NS  Tests/Procedures for next shift:Lap brii 6/1  Anticipated DC date & active delays: TBD  Patient Stated Goal for Today: To get comfortable

## 2023-05-31 NOTE — PROGRESS NOTES
PRIMARY DIAGNOSIS: BILIARY COLIC/UNCOMPLICATED EARLY ACUTE CHOLECYSTITIS  OUTPATIENT/OBSERVATION GOALS TO BE MET BEFORE DISCHARGE:    1. Pain status: Improved but still requiring IV narcotics.  2. Stable vital signs and labs (if performed) at disposition: Yes  3. Tolerating adequate PO diet: No  4. Successful cholecystectomy or clear follow up plan with General Surgery team if immediate surgery not performed Yes  5. ADLs back to baseline?  Yes  6. Activity and level of assistance: Ambulating independently.  7. Barriers to discharge noted Yes     Discharge Planner Nurse   Safe discharge environment identified: Yes  Barriers to discharge: Yes       Entered by: Alexandra Monique RN 05/31/2023 4:00 PM     Please review provider order for any additional goals.   Nurse to notify provider when observation goals have been met and patient is ready for discharge.

## 2023-05-31 NOTE — PROGRESS NOTES
PRIMARY DIAGNOSIS: BILIARY COLIC/UNCOMPLICATED EARLY ACUTE CHOLECYSTITIS  OUTPATIENT/OBSERVATION GOALS TO BE MET BEFORE DISCHARGE:    1. Pain status: Improved but still requiring IV narcotics.  2. Stable vital signs and labs (if performed) at disposition: Yes  3. Tolerating adequate PO diet: No  4. Successful cholecystectomy or clear follow up plan with General Surgery team if immediate surgery not performed Yes  5. ADLs back to baseline?  Yes  6. Activity and level of assistance: Ambulating independently.  7. Barriers to discharge noted Yes     Discharge Planner Nurse   Safe discharge environment identified: Yes  Barriers to discharge: Yes       Entered by: Alexandra Monique RN 05/31/2023 12:00 PM     Please review provider order for any additional goals.   Nurse to notify provider when observation goals have been met and patient is ready for discharge.

## 2023-05-31 NOTE — PROGRESS NOTES
Observation goals  PRIOR TO DISCHARGE        Comments:   -diagnostic tests and consults completed and resulted:Not met   -vital signs normal or at patient baseline:met   -tolerating oral intake to maintain hydration:Met   -adequate pain control on oral analgesics:Met   -returns to baseline functional status:Met   -safe disposition plan has been identified:Not met  Nurse to notify provider when observation goals have been met and patient is ready for discharge.

## 2023-05-31 NOTE — UTILIZATION REVIEW
Admission Status; Secondary Review Determination    Under the authority of the Utilization Management Committee, the utilization review process indicated a secondary review on the above patient. The review outcome is based on review of the medical records, discussions with staff, and applying clinical experience noted on the date of the review.    (x) Inpatient Status Appropriate - This patient's medical care is consistent with medical management for inpatient care and reasonable inpatient medical practice.    RATIONALE FOR DETERMINATION: 82-year-old woman with chronic atrial fibrillation on Xarelto presented with significant acute abdominal pain with nausea and vomiting and found to have acute cholecystitis with impacted gallstone in the gallbladder neck.  Due to the anticoagulation patient will be managed medically for the first 2 days with IV fluids, requiring frequent doses of IV narcotics as well as IV antibiotics.  Then as the anticoagulation is adequately worn off, patient will proceed with surgery.  Thus due to the delay in definitive surgical management due to anticoagulation, patient has a more prolonged hospital management course appropriate for inpatient care.    At the time of admission with the information available to the attending physician more than 2 nights Hospital complex care was anticipated, based on patient risk of adverse outcome if treated as outpatient and complex care required. Inpatient admission is appropriate based on the Medicare guidelines.    This document was produced using voice recognition software    The information on this document is developed by the utilization review team in order for the business office to ensure compliance. This only denotes the appropriateness of proper admission status and does not reflect the quality of care rendered.    The definitions of Inpatient Status and Observation Status used in making the determination above are those provided in the CMS  Coverage Manual, Chapter 1 and Chapter 6, section 70.4.    Sincerely,    Dequan Egan MD  Utilization Review  Physician Advisor  [facility].

## 2023-05-31 NOTE — PROGRESS NOTES
"General surgery progress note    Subjective:  Patient denies nausea or emesis.  She does have ongoing right upper quadrant tenderness which is well managed with an ice pack and oral medications.  Ongoing flatus without bowel movement since admission.  Tolerating oral intake without issue.    Objective:  /67 (BP Location: Left arm)   Pulse 76   Temp 98.5  F (36.9  C) (Oral)   Resp 16   Ht 1.626 m (5' 4\")   Wt 83.5 kg (184 lb 1.4 oz)   LMP  (LMP Unknown)   SpO2 95%   BMI 31.60 kg/m    Gen:  Awake, Alert, NAD  Resp - nonlabored breathing on RA  Cardiac - Regular rate & rhythm  Abdomen - soft, nondistended, exquisitely tender to palpation in right upper quadrant with radiation remainder of abdomen.  Extremities - no lower extremity edema.      Assessment/plan:  Patient is an 82-year-old female with past history of atrial fibrillation on Xarelto, CHF, COPD, hypertension who presented with right upper quadrant tenderness and was found to have cholecystitis on imaging and laboratory evaluation.  Given that she last took her Xarelto on the evening of 5/29, we are electing to treat with antibiotics for the time being and proceed with a laparoscopic cholecystectomy on 6/1.  -N.p.o. at midnight tonight for laparoscopic cholecystectomy tomorrow  -Continue IV Rocephin, added Flagyl for anaerobic coverage in the setting of cholecystitis  -Okay for regular diet today from surgery perspective  -Pain management  -Continue to hold anticoagulation  -Hospitalist primary, appreciate cares    Patient discussed with Dr. Trixie Kenney MD, MS 05/31/23 11:20 AM   General Surgery Resident - [id number]    "

## 2023-05-31 NOTE — PROGRESS NOTES
Observation goals  PRIOR TO DISCHARGE           -diagnostic tests and consults completed and resulted:Not met   -vital signs normal or at patient baseline:met   -tolerating oral intake to maintain hydration:Met   -adequate pain control on oral analgesics:Met   -returns to baseline functional status:Met   -safe disposition plan has been identified:Not met    Nurse to notify provider when observation goals have been met and patient is ready for discharge.

## 2023-05-31 NOTE — PLAN OF CARE
Goal Outcome Evaluation:      Plan of Care Reviewed With: patient    Overall Patient Progress: no changeOverall Patient Progress: no change    Orientation/Cognitive:A&Ox4  Observation Goals (Met/ Not Met): Not met  Mobility Level/Assist Equipment:IND  Fall Risk (Y/N): N  Behavior Concerns: NA  Pain Management:5/10 abd pain managed w/ PRN oxy  Tele/VS/O2: VSS  ABNL Lab/BG: UA abnormal pending UC  Diet: Full liquid  Bowel/Bladder: Continent  Skin Concerns: Scattered bruising   Drains/Devices: PIV:S/L  Tests/Procedures for next shift:Annabelle brii 6/1  Anticipated DC date & active delays: TBD  Patient Stated Goal for Today: To get comfortable  Observation goals  PRIOR TO DISCHARGE        Comments:   -diagnostic tests and consults completed and resulted:Not met   -vital signs normal or at patient baseline:met   -tolerating oral intake to maintain hydration:Met   -adequate pain control on oral analgesics:Met   -returns to baseline functional status:Met   -safe disposition plan has been identified:Not met  Nurse to notify provider when observation goals have been met and patient is ready for discharge.                                                                                                                                                                                                                                                                                                                                                                                                                                                                                     [Fatigue] : no fatigue [Decreased Appetite] : appetite not decreased [Recent Weight Gain (___ Lbs)] : no recent weight gain [Recent Weight Loss (___ Lbs)] : no recent weight loss [Visual Field Defect] : no visual field defect [Dry Eyes] : no dryness [Dysphagia] : no dysphagia [Neck Pain] : no neck pain [Dysphonia] : no dysphonia [Nasal Congestion] : no nasal congestion [Chest Pain] : no chest pain [Slow Heart Rate] : heart rate is not slow [Palpitations] : no palpitations [Fast Heart Rate] : heart rate is not fast [Shortness Of Breath] : no shortness of breath [Cough] : no cough [Nausea] : no nausea [Constipation] : no constipation [Vomiting] : no vomiting [Diarrhea] : no diarrhea [Polyuria] : no polyuria [Irregular Menses] : regular menses [Joint Pain] : no joint pain [Muscle Weakness] : no muscle weakness [Acanthosis] : no acanthosis  [Acne] : no acne [Headaches] : no headaches [Dizziness] : no dizziness [Tremors] : no tremors [Pain/Numbness of Digits] : no pain/numbness of digits [Depression] : no depression [Polydipsia] : no polydipsia [Cold Intolerance] : no cold intolerance [Easy Bleeding] : no ~M tendency for easy bleeding [Easy Bruising] : no tendency for easy bruising

## 2023-05-31 NOTE — PROGRESS NOTES
[facility]    Medicine Progress Note - Hospitalist Service        Date of Admission:  5/30/2023  1:19 AM    Assessment & Plan:   Hamida Verma is a markedly pleasant 82 year old woman with past medical history that is most notable for chronic atrial fibrillation status post pacemaker placement, on DOAC, among others; who presents with acute abdominal pain, nausea and vomiting; and is found to have suspected acute cholecystitis.     Symptomatic cholelithiasis with suspected acute cholecystitis  -Presents with epigastric and right upper quadrant pain  -Patient initially had CT abdomen which showed diffusely thickened urinary bladder, suspicion for cystitis and distended gallbladder with large gallstone, follow-up ultrasound shows 1.3 cm nonmobile gallstone in the region of the gallbladder neck, likely impacted  -Evaluated by general surgery  -Plan for laparoscopic cholecystectomy tomorrow(deferred for tomorrow due to DOAC use please see discussion below)  -Liquid diet for today, n.p.o. after midnight  -Pain control as needed    UTI  -CT also shows a diffusely thickened urinary bladder, and she has pyuria and hematuria on UA.   -Continue empiric ceftriaxone, await urine culture     Chronic atrial fibrillation  Chronic diastolic congestive heart failure.  Essential hypertension  Hyperlipidemia  -She is followed by [facility], status post AV node ablation and pacemaker placement; and is on Xarelto for stroke prevention; the last dose was yesterday.  -Continue to hold Xarelto for now     Chronic diastolic CHF  -TTE in 10/2022 showed LVEF 45-50%. Reportedly coronary artery calcifications have been seen on prior CT, though Lexiscan stress testing in 10/2022 showed no evidence of inducible ischemia.  -Continue Entresto and metoprolol  -Resume Lasix after surgery   -Cautious hydration for now    COPD  -Continue prior to admission inhalers     Left kidney cyst  -Incidentally seen tonight as a 1.1 cm  "hyperattenuating lesion of the left anterior upper pole, possibly representing a hemorrhagic or proteinaceous cyst   -Outpatient follow up for consideration of further imaging will be recommended at discharge    Diet: NPO for Medical/Clinical Reasons  NPO per Anesthesia Guidelines for Procedure/Surgery Except for: Meds  Full Liquid Diet     DVT Prophylaxis: DOAC   Lopez Catheter: Not present  Code Status: No CPR- Do NOT Intubate     Disposition Plan       Expected Discharge Date: 06/02/2023        Discharge Comments: Gen surgery consult. Planned surgery 06/01 - holding Xarelto  CM/SW consult      Entered: Manny Solo MD 05/31/2023, 9:04 AM        Clinically Significant Risk Factors Present on Admission               # Drug Induced Coagulation Defect: home medication list includes an anticoagulant medication    # Hypertension: Noted on problem list      # Obesity: Estimated body mass index is 31.6 kg/m  as calculated from the following:    Height as of this encounter: 1.626 m (5' 4\").    Weight as of this encounter: 83.5 kg (184 lb 1.4 oz).                   The patient's care was discussed with the Bedside Nurse and Patient.    Medical Decision Making       **CLEAR ALL SELECTIONS**        Labs/Imaging Reviewed:  See Information above and Data section below    Time SPENT BY ME on the date of service doing chart review, history, exam, documentation & further activities per the note:  35 MINUTES    Manny Solo MD  Hospitalist Service  [facility]  Text Page 7AM-6PM  Securely message with the Vocera Web Console (learn more here)  Text page via [employer] Paging/Directory    ______________________________________________________________________    Interval History   Still complains of mild right upper quadrant pain.  Denies nausea or vomiting.  Afebrile.  Plan for laparoscopic cholecystectomy tomorrow.    Data reviewed today: I reviewed all medications, new labs and imaging results over " "the last 24 hours. I personally reviewed no images or EKG's today.    Physical Exam   Vital signs:  Temp: 98.5  F (36.9  C) Temp src: Oral BP: 132/67 Pulse: 76   Resp: 16 SpO2: 95 % O2 Device: None (Room air)   Height: 162.6 cm (5' 4\") Weight: 83.5 kg (184 lb 1.4 oz)  Estimated body mass index is 31.6 kg/m  as calculated from the following:    Height as of this encounter: 1.626 m (5' 4\").    Weight as of this encounter: 83.5 kg (184 lb 1.4 oz).      Wt Readings from Last 2 Encounters:   05/31/23 83.5 kg (184 lb 1.4 oz)   05/01/23 82.1 kg (181 lb)       Gen: AAOX3, NAD, comfortable  HEENT: Supple neck, moist oral mucosa, no pallor  Resp: CTA B/L, normal WOB, no crackles, no wheezes  CVS: RRR, no murmur  Abd/GI: Soft, mild right upper quadrant tenderness, no guarding or rebound.  Skin: Warm, dry no rashes  MSK: Trace pedal edema  Neuro- CN- intact. No focal deficits.     Data   Recent Labs   Lab 05/30/23  0230 05/30/23  0138   WBC  --  8.6   HGB  --  12.2   MCV  --  91   PLT  --  307     --    POTASSIUM 4.1  --    CHLORIDE 104  --    CO2 23  --    BUN 21.4  --    CR 0.89  --    ANIONGAP 12  --    GURWINDER 9.5  --    *  --    ALBUMIN 3.7  --    PROTTOTAL 6.2*  --    BILITOTAL 0.3  --    ALKPHOS 49  --    ALT 22  --    AST 25  --    LIPASE  --  33       No results found for this or any previous visit (from the past 24 hour(s)).  Medications       cefTRIAXone  2 g Intravenous Q24H     metoprolol succinate ER  50 mg Oral BID     metroNIDAZOLE  500 mg Intravenous Q12H     sacubitril-valsartan  1 tablet Oral BID     sodium chloride (PF)  3 mL Intracatheter Q8H     umeclidinium  1 puff Inhalation Daily                 "

## 2023-05-31 NOTE — PROVIDER NOTIFICATION
MD Notification    Notified Person: MD    Notified Person Name: MD Hugorhiannon Kirk    Notification Date/Time: 05/30/2032 @ 19:37    Notification Interaction: AMCOM    Purpose of Notification: per surgical note the plan for lap brii will be Thursday 6/1. Pt is after midnight NPO for Wednesday. Is there a way to change the order?     Orders Received:    Comments:

## 2023-05-31 NOTE — CONSULTS
Care Management Initial Consult    General Information  Assessment completed with: Hamida Penn  Type of CM/SW Visit: Initial Assessment    Primary Care Provider verified and updated as needed: Yes   Readmission within the last 30 days: no previous admission in last 30 days      Reason for Consult: discharge planning  Advance Care Planning:            Communication Assessment  Patient's communication style: spoken language (English or Bilingual)    Hearing Difficulty or Deaf: yes        Cognitive  Cognitive/Neuro/Behavioral: WDL                      Living Environment:   People in home: child(isidoro), adult, grandchild(isidoro)     Current living Arrangements: house      Able to return to prior arrangements: yes       Family/Social Support:  Care provided by: self, child(isidoro)  Provides care for:    Marital Status:              Description of Support System:           Current Resources:   Patient receiving home care services: No     Community Resources: None  Equipment currently used at home:    Supplies currently used at home: None    Employment/Financial:  Employment Status: retired        Financial Concerns: No concerns identified           Does the patient's insurance plan have a 3 day qualifying hospital stay waiver?  No    Lifestyle & Psychosocial Needs:  Social Determinants of Health     Tobacco Use: Medium Risk (5/30/2023)    Patient History      Smoking Tobacco Use: Former      Smokeless Tobacco Use: Never      Passive Exposure: Not on file   Alcohol Use: Not on file   Financial Resource Strain: Not on file   Food Insecurity: Not on file   Transportation Needs: Not on file   Physical Activity: Not on file   Stress: Not on file   Social Connections: Not on file   Intimate Partner Violence: Not on file   Depression: Not at risk (7/20/2022)    PHQ-2      PHQ-2 Score: 0   Housing Stability: Not on file       Functional Status:  Prior to admission patient needed assistance:   Dependent ADLs::  "Ambulation-cane  Dependent IADLs:: Independent, Transportation       Mental Health Status:  Mental Health Status: No Current Concerns       Chemical Dependency Status:      None noted          Values/Beliefs:  Spiritual, Cultural Beliefs, Druze Practices, Values that affect care: no               Additional Information:  Writer met with patient. Went over Medicare Outpatient Observation Notice. Patient tells writer she won't be discharging until Friday as they have to wath her one more day and restart her Xarelto.  She lives with her daughter and her granddaughter who is an EMT and \"called the ambulance\" for her. She feels well supported by her family.  Patient does not have any care coordinator/ needs at this time but our team will follow for any at discharge.    Penny Gonzalez RN        "

## 2023-05-31 NOTE — LETTER
Diagnosis:  Strep pharyngitis    You have strep throat.  You were given a steroid shot in the emergency department.  I am prescribing a course of antibiotics as well as numbing mouthwash and medicine for pain. You should take Tylenol as needed for pain up to 3 grams daily which is 6 of the 500 mg extra strength tablets.    Please schedule a follow-up appointment with your primary care doctor.  If you start have any worsening symptoms, or new symptoms such as inability to swallow, drooling other concerns please return to the emergency department.   Transition Communication Hand-off for Care Transitions to Next Level of Care Provider    Name: Hamida Verma  : 1940  MRN #: [id number]  Primary Care Provider: Mikala Philip MD     Primary Clinic: [address]  [facility] [address]     Reason for Hospitalization:  Pyelonephritis [N12]  UTI (urinary tract infection) [N39.0]  Admit Date/Time: 2019  3:05 PM  Discharge Date: 1/10/19  Payor Source: Payor: UCARE / Plan: UCARE FOR SENIORS / Product Type: HMO /     Readmission Assessment Measure (MATT) Risk Score/category: Elevated           Reason for Communication Hand-off Referral: Admission diagnoses: COPD(new),hx CHF, and Elevated MATT    Discharge Plan: Home today       Concern for non-adherence with plan of care: No  Discharge Needs Assessment:  Needs      Most Recent Value   Equipment Currently Used at Home  none          Already enrolled in Tele-monitoring program and name of program:NA  Follow-up specialty is recommended: Yes    Follow-up plan:    Future Appointments   Date Time Provider Department Center          1/15/2019  7:40 AM Mikala Philip MD [facility]   2019 11:20 AM [facility]   [facility] 12[facility]   2019  8:10 AM [facility]   [facility]   2019  8:00 AM Mikala Philip MD [facility]   [facility] 12[facility]       Any outstanding tests or procedures:  Final  results  Procedures     Future Labs/Procedures    Oxygen Adult     Comments:    [place] [facility] Order 2-3 liter(s) by nasal cannula continuously with use of portable tank. Expected treatment length is indefinite (99 months).. Test on conserving device as applicable.    Patients who qualify for home O2 coverage under the CMS guidelines require ABG tests or O2 sat readings obtained closest to, but no earlier than 2 days prior to the discharge, as evidence of the need for home  oxygen therapy. Testing must be performed while patient is in the chronic stable state. See notes for O2 sats.    I certify that this patient, Hamida Verma has been under my care and that I, or a nurse practitioner or physician's assistant working with me, had a face-to-face encounter that meets the face-to-face encounter requirements with this patient on 1/10/2019. The patient, Hamida Verma was evaluated or treated in whole, or in part, for the following medical condition, which necessitates the use of the ordered oxygen. Treatment Diagnosis: COPD    Attending Provider: Micki Lan  Physician signature: See electronic signature associated with these discharge orders  Date of Order: January 10, 2019                Key Recommendations: Pt was admitted with acute pyelonephritis with sepsis, secondary to Klebsiella pneumoniae and new COPD.  Pt also has a history of CHF and is now managed by the [facility].  All follow-up appts are made.  Pt had a recent cataract surgery. She has seen in f/u by the surgeon who discovered a small retinal tear which will require surgical repair, this was scheduled for this week, so she will need to reschedule this.  Pt is discharging on oxygen at 2L.  She was ambulating well and no home care needs were identified.  Her daughter and granddaughter live with her.  Discussed outpt Pulmonary rehab.  This was not ordered yet due to other appointments and need to get her left eye repaired.    Thank-you,    Sonya Gonzales  RN Care Coordinator  [facility]    AVS/Discharge Summary is the source of truth; this is a helpful guide for improved communication of patient story

## 2023-06-01 ENCOUNTER — ANESTHESIA EVENT (OUTPATIENT)
Dept: SURGERY | Facility: CLINIC | Age: 83
DRG: 418 | End: 2023-06-01
Payer: COMMERCIAL

## 2023-06-01 ENCOUNTER — ANESTHESIA (OUTPATIENT)
Dept: SURGERY | Facility: CLINIC | Age: 83
DRG: 418 | End: 2023-06-01
Payer: COMMERCIAL

## 2023-06-01 ENCOUNTER — APPOINTMENT (OUTPATIENT)
Dept: SURGERY | Facility: PHYSICIAN GROUP | Age: 83
End: 2023-06-01
Payer: COMMERCIAL

## 2023-06-01 PROCEDURE — 250N000013 HC RX MED GY IP 250 OP 250 PS 637: Performed by: STUDENT IN AN ORGANIZED HEALTH CARE EDUCATION/TRAINING PROGRAM

## 2023-06-01 PROCEDURE — 710N000009 HC RECOVERY PHASE 1, LEVEL 1, PER MIN: Performed by: SURGERY

## 2023-06-01 PROCEDURE — 272N000001 HC OR GENERAL SUPPLY STERILE: Performed by: SURGERY

## 2023-06-01 PROCEDURE — 370N000017 HC ANESTHESIA TECHNICAL FEE, PER MIN: Performed by: SURGERY

## 2023-06-01 PROCEDURE — 250N000009 HC RX 250: Performed by: NURSE ANESTHETIST, CERTIFIED REGISTERED

## 2023-06-01 PROCEDURE — 250N000011 HC RX IP 250 OP 636: Performed by: HOSPITALIST

## 2023-06-01 PROCEDURE — 47562 LAPAROSCOPIC CHOLECYSTECTOMY: CPT | Mod: 22 | Performed by: SURGERY

## 2023-06-01 PROCEDURE — 250N000013 HC RX MED GY IP 250 OP 250 PS 637: Performed by: HOSPITALIST

## 2023-06-01 PROCEDURE — 88304 TISSUE EXAM BY PATHOLOGIST: CPT | Mod: TC | Performed by: SURGERY

## 2023-06-01 PROCEDURE — 999N000141 HC STATISTIC PRE-PROCEDURE NURSING ASSESSMENT: Performed by: SURGERY

## 2023-06-01 PROCEDURE — 250N000013 HC RX MED GY IP 250 OP 250 PS 637: Performed by: INTERNAL MEDICINE

## 2023-06-01 PROCEDURE — 258N000003 HC RX IP 258 OP 636: Performed by: NURSE ANESTHETIST, CERTIFIED REGISTERED

## 2023-06-01 PROCEDURE — 250N000011 HC RX IP 250 OP 636: Performed by: SURGERY

## 2023-06-01 PROCEDURE — 250N000011 HC RX IP 250 OP 636: Performed by: STUDENT IN AN ORGANIZED HEALTH CARE EDUCATION/TRAINING PROGRAM

## 2023-06-01 PROCEDURE — 250N000011 HC RX IP 250 OP 636: Performed by: NURSE ANESTHETIST, CERTIFIED REGISTERED

## 2023-06-01 PROCEDURE — 120N000001 HC R&B MED SURG/OB

## 2023-06-01 PROCEDURE — 0FT44ZZ RESECTION OF GALLBLADDER, PERCUTANEOUS ENDOSCOPIC APPROACH: ICD-10-PCS | Performed by: SURGERY

## 2023-06-01 PROCEDURE — 99232 SBSQ HOSP IP/OBS MODERATE 35: CPT | Performed by: INTERNAL MEDICINE

## 2023-06-01 PROCEDURE — 360N000076 HC SURGERY LEVEL 3, PER MIN: Performed by: SURGERY

## 2023-06-01 PROCEDURE — 250N000009 HC RX 250: Performed by: SURGERY

## 2023-06-01 PROCEDURE — 258N000001 HC RX 258: Performed by: SURGERY

## 2023-06-01 PROCEDURE — 250N000025 HC SEVOFLURANE, PER MIN: Performed by: SURGERY

## 2023-06-01 RX ORDER — PROPOFOL 10 MG/ML
INJECTION, EMULSION INTRAVENOUS CONTINUOUS PRN
Status: DISCONTINUED | OUTPATIENT
Start: 2023-06-01 | End: 2023-06-01

## 2023-06-01 RX ORDER — SODIUM CHLORIDE, SODIUM LACTATE, POTASSIUM CHLORIDE, CALCIUM CHLORIDE 600; 310; 30; 20 MG/100ML; MG/100ML; MG/100ML; MG/100ML
INJECTION, SOLUTION INTRAVENOUS CONTINUOUS PRN
Status: DISCONTINUED | OUTPATIENT
Start: 2023-06-01 | End: 2023-06-01

## 2023-06-01 RX ORDER — FENTANYL CITRATE 50 UG/ML
INJECTION, SOLUTION INTRAMUSCULAR; INTRAVENOUS PRN
Status: DISCONTINUED | OUTPATIENT
Start: 2023-06-01 | End: 2023-06-01

## 2023-06-01 RX ORDER — PROPOFOL 10 MG/ML
INJECTION, EMULSION INTRAVENOUS PRN
Status: DISCONTINUED | OUTPATIENT
Start: 2023-06-01 | End: 2023-06-01

## 2023-06-01 RX ORDER — ONDANSETRON 2 MG/ML
INJECTION INTRAMUSCULAR; INTRAVENOUS PRN
Status: DISCONTINUED | OUTPATIENT
Start: 2023-06-01 | End: 2023-06-01

## 2023-06-01 RX ORDER — LIDOCAINE HYDROCHLORIDE 20 MG/ML
INJECTION, SOLUTION INFILTRATION; PERINEURAL PRN
Status: DISCONTINUED | OUTPATIENT
Start: 2023-06-01 | End: 2023-06-01

## 2023-06-01 RX ORDER — DEXAMETHASONE SODIUM PHOSPHATE 4 MG/ML
INJECTION, SOLUTION INTRA-ARTICULAR; INTRALESIONAL; INTRAMUSCULAR; INTRAVENOUS; SOFT TISSUE PRN
Status: DISCONTINUED | OUTPATIENT
Start: 2023-06-01 | End: 2023-06-01

## 2023-06-01 RX ORDER — OXYCODONE HYDROCHLORIDE 5 MG/1
2.5-5 TABLET ORAL EVERY 4 HOURS PRN
Qty: 8 TABLET | Refills: 0 | Status: SHIPPED | OUTPATIENT
Start: 2023-06-01 | End: 2023-06-04

## 2023-06-01 RX ORDER — MAGNESIUM HYDROXIDE 1200 MG/15ML
LIQUID ORAL PRN
Status: DISCONTINUED | OUTPATIENT
Start: 2023-06-01 | End: 2023-06-01 | Stop reason: HOSPADM

## 2023-06-01 RX ORDER — CEFAZOLIN SODIUM 1 G/3ML
INJECTION, POWDER, FOR SOLUTION INTRAMUSCULAR; INTRAVENOUS PRN
Status: DISCONTINUED | OUTPATIENT
Start: 2023-06-01 | End: 2023-06-01

## 2023-06-01 RX ORDER — SODIUM CHLORIDE, SODIUM LACTATE, POTASSIUM CHLORIDE, CALCIUM CHLORIDE 600; 310; 30; 20 MG/100ML; MG/100ML; MG/100ML; MG/100ML
INJECTION, SOLUTION INTRAVENOUS CONTINUOUS
Status: CANCELLED | OUTPATIENT
Start: 2023-06-01

## 2023-06-01 RX ORDER — BUPIVACAINE HYDROCHLORIDE 2.5 MG/ML
INJECTION, SOLUTION INFILTRATION; PERINEURAL PRN
Status: DISCONTINUED | OUTPATIENT
Start: 2023-06-01 | End: 2023-06-01 | Stop reason: HOSPADM

## 2023-06-01 RX ORDER — SENNOSIDES A AND B 8.6 MG/1
1 TABLET, FILM COATED ORAL 2 TIMES DAILY PRN
Qty: 20 TABLET | Refills: 0 | Status: SHIPPED | OUTPATIENT
Start: 2023-06-01 | End: 2023-12-06

## 2023-06-01 RX ADMIN — SODIUM CHLORIDE, POTASSIUM CHLORIDE, SODIUM LACTATE AND CALCIUM CHLORIDE: 600; 310; 30; 20 INJECTION, SOLUTION INTRAVENOUS at 08:50

## 2023-06-01 RX ADMIN — DEXAMETHASONE SODIUM PHOSPHATE 4 MG: 4 INJECTION, SOLUTION INTRA-ARTICULAR; INTRALESIONAL; INTRAMUSCULAR; INTRAVENOUS; SOFT TISSUE at 09:46

## 2023-06-01 RX ADMIN — PHENYLEPHRINE HYDROCHLORIDE 100 MCG: 10 INJECTION INTRAVENOUS at 10:32

## 2023-06-01 RX ADMIN — SACUBITRIL AND VALSARTAN 1 TABLET: 97; 103 TABLET, FILM COATED ORAL at 20:30

## 2023-06-01 RX ADMIN — PROPOFOL 25 MCG/KG/MIN: 10 INJECTION, EMULSION INTRAVENOUS at 09:46

## 2023-06-01 RX ADMIN — METRONIDAZOLE 500 MG: 500 INJECTION, SOLUTION INTRAVENOUS at 07:29

## 2023-06-01 RX ADMIN — FENTANYL CITRATE 50 MCG: 50 INJECTION, SOLUTION INTRAMUSCULAR; INTRAVENOUS at 09:25

## 2023-06-01 RX ADMIN — SACUBITRIL AND VALSARTAN 1 TABLET: 97; 103 TABLET, FILM COATED ORAL at 08:21

## 2023-06-01 RX ADMIN — ROCURONIUM BROMIDE 50 MG: 50 INJECTION, SOLUTION INTRAVENOUS at 09:31

## 2023-06-01 RX ADMIN — PHENYLEPHRINE HYDROCHLORIDE 50 MCG: 10 INJECTION INTRAVENOUS at 09:30

## 2023-06-01 RX ADMIN — FENTANYL CITRATE 50 MCG: 50 INJECTION, SOLUTION INTRAMUSCULAR; INTRAVENOUS at 10:04

## 2023-06-01 RX ADMIN — ONDANSETRON 4 MG: 2 INJECTION INTRAMUSCULAR; INTRAVENOUS at 10:16

## 2023-06-01 RX ADMIN — METOPROLOL SUCCINATE 50 MG: 50 TABLET, EXTENDED RELEASE ORAL at 08:21

## 2023-06-01 RX ADMIN — PHENYLEPHRINE HYDROCHLORIDE 150 MCG: 10 INJECTION INTRAVENOUS at 10:35

## 2023-06-01 RX ADMIN — RIVAROXABAN 20 MG: 10 TABLET, FILM COATED ORAL at 22:04

## 2023-06-01 RX ADMIN — PHENYLEPHRINE HYDROCHLORIDE 100 MCG: 10 INJECTION INTRAVENOUS at 10:29

## 2023-06-01 RX ADMIN — CEFAZOLIN 2 G: 1 INJECTION, POWDER, FOR SOLUTION INTRAMUSCULAR; INTRAVENOUS at 09:29

## 2023-06-01 RX ADMIN — PROPOFOL 100 MG: 10 INJECTION, EMULSION INTRAVENOUS at 09:31

## 2023-06-01 RX ADMIN — CEFTRIAXONE SODIUM 2 G: 2 INJECTION, POWDER, FOR SOLUTION INTRAMUSCULAR; INTRAVENOUS at 06:27

## 2023-06-01 RX ADMIN — HYDROMORPHONE HYDROCHLORIDE 0.5 MG: 1 INJECTION, SOLUTION INTRAMUSCULAR; INTRAVENOUS; SUBCUTANEOUS at 10:59

## 2023-06-01 RX ADMIN — METOPROLOL SUCCINATE 50 MG: 50 TABLET, EXTENDED RELEASE ORAL at 20:30

## 2023-06-01 RX ADMIN — LIDOCAINE HYDROCHLORIDE 60 MG: 20 INJECTION, SOLUTION INFILTRATION; PERINEURAL at 09:31

## 2023-06-01 RX ADMIN — ROCURONIUM BROMIDE 20 MG: 50 INJECTION, SOLUTION INTRAVENOUS at 10:17

## 2023-06-01 RX ADMIN — ACETAMINOPHEN 650 MG: 325 TABLET ORAL at 22:14

## 2023-06-01 ASSESSMENT — COPD QUESTIONNAIRES: COPD: 1

## 2023-06-01 ASSESSMENT — ACTIVITIES OF DAILY LIVING (ADL)
ADLS_ACUITY_SCORE: 35

## 2023-06-01 ASSESSMENT — ENCOUNTER SYMPTOMS: SEIZURES: 0

## 2023-06-01 NOTE — PROGRESS NOTES
Patient underwent uneventful laparoscopic cholecystectomy this morning. From a general surgery perspective, ok to resume oral anticoagulation this evening. Ok for discharge from surgery perspective, defer to hospitalist and appreciate other cares.     Mynor Kenney MD, MS 06/01/23 11:10 AM   General Surgery Resident - [id number]

## 2023-06-01 NOTE — ANESTHESIA PROCEDURE NOTES
Airway       Patient location during procedure: OR       Procedure Start/Stop Times: 6/1/2023 9:32 AM  Staff -        CRNA: Leticia Goldberg APRN CRNA       Performed By: CRNA  Consent for Airway        Urgency: elective  Indications and Patient Condition       Indications for airway management: jose g-procedural       Induction type:intravenous       Mask difficulty assessment: 2 - vent by mask + OA or adjuvant +/- NMBA    Final Airway Details       Final airway type: endotracheal airway       Successful airway: ETT - single  Endotracheal Airway Details        ETT size (mm): 7.0       Cuffed: yes       Successful intubation technique: direct laryngoscopy       DL Blade Type: MAC 3       Grade View of Cords: 1       Adjucts: stylet       Position: Right       Measured from: gums/teeth       Secured at (cm): 21       Bite block used: None    Post intubation assessment        Placement verified by: capnometry, equal breath sounds and chest rise        Number of attempts at approach: 1       Secured with: silk tape       Ease of procedure: easy       Dentition: Intact and Unchanged    Medication(s) Administered   Medication Administration Time: 6/1/2023 9:32 AM

## 2023-06-01 NOTE — PROGRESS NOTES
[facility]    Medicine Progress Note - Hospitalist Service        Date of Admission:  5/30/2023  1:19 AM    Assessment & Plan:   Hamida Verma is a markedly pleasant 82 year old woman with past medical history that is most notable for chronic atrial fibrillation status post pacemaker placement, on DOAC, among others; who presents with acute abdominal pain, nausea and vomiting; and is found to have suspected acute cholecystitis.     Symptomatic cholelithiasis with suspected acute cholecystitis  -Presents with epigastric and right upper quadrant pain  -Patient initially had CT abdomen which showed diffusely thickened urinary bladder, suspicion for cystitis and distended gallbladder with large gallstone, follow-up ultrasound shows 1.3 cm nonmobile gallstone in the region of the gallbladder neck, likely impacted  -Evaluated by general surgery  -General surgery recommended cholecystectomy, patient going for laparoscopic cholecystectomy today  -Pain control as needed    UTI  -CT also shows a diffusely thickened urinary bladder, and she has pyuria and hematuria on UA.   -Awaiting urine culture, continue empiric ceftriaxone    Chronic atrial fibrillation  Chronic diastolic congestive heart failure.  Essential hypertension  Hyperlipidemia  -She is followed by [facility], status post AV node ablation and pacemaker placement; and is on Xarelto for stroke prevention; the last dose was yesterday.  -Continue to hold Xarelto for now, will resume after surgery once cleared by them     Chronic diastolic CHF  -TTE in 10/2022 showed LVEF 45-50%. Reportedly coronary artery calcifications have been seen on prior CT, though Lexiscan stress testing in 10/2022 showed no evidence of inducible ischemia.  -Continue Entresto and metoprolol  -Resume Lasix after surgery     COPD  -Continue prior to admission inhalers     Left kidney cyst  -Incidentally seen tonight as a 1.1 cm hyperattenuating lesion of the left anterior  "upper pole, possibly representing a hemorrhagic or proteinaceous cyst   -Outpatient follow up for consideration of further imaging will be recommended at discharge    Diet: NPO for Medical/Clinical Reasons  NPO per Anesthesia Guidelines for Procedure/Surgery Except for: Meds     DVT Prophylaxis: DOAC   Lopez Catheter: Not present  Code Status: No CPR- Do NOT Intubate     Disposition Plan      Expected Discharge Date: 06/02/2023      Destination: home  Discharge Comments: Gen surgery consult. Planned surgery 06/01 - holding Xarelto  CM/SW consult      Entered: Manny Solo MD 06/01/2023, 9:04 AM        Clinically Significant Risk Factors Present on Admission               # Drug Induced Coagulation Defect: home medication list includes an anticoagulant medication    # Hypertension: Noted on problem list      # Obesity: Estimated body mass index is 31.91 kg/m  as calculated from the following:    Height as of this encounter: 1.626 m (5' 4\").    Weight as of this encounter: 84.3 kg (185 lb 14.4 oz).                   The patient's care was discussed with the Bedside Nurse and Patient.    Medical Decision Making       **CLEAR ALL SELECTIONS**        Labs/Imaging Reviewed:  See Information above and Data section below    Time SPENT BY ME on the date of service doing chart review, history, exam, documentation & further activities per the note:  35 MINUTES    Manny Solo MD  Hospitalist Service  [facility]  Text Page 7AM-6PM  Securely message with the Vocera Web Console (learn more here)  Text page via [employer] Paging/Directory    ______________________________________________________________________    Interval History   Still having mild right upper quadrant pain.  No nausea or vomiting.  Patient going for laparoscopic cholecystectomy today.  Low-grade fever of 100.1    Data reviewed today: I reviewed all medications, new labs and imaging results over the last 24 hours. I personally " "reviewed no images or EKG's today.    Physical Exam   Vital signs:  Temp: 99.2  F (37.3  C) Temp src: Oral BP: 130/62 Pulse: 67   Resp: 18 SpO2: 95 % O2 Device: None (Room air)   Height: 162.6 cm (5' 4\") Weight: 84.3 kg (185 lb 14.4 oz)  Estimated body mass index is 31.91 kg/m  as calculated from the following:    Height as of this encounter: 1.626 m (5' 4\").    Weight as of this encounter: 84.3 kg (185 lb 14.4 oz).      Wt Readings from Last 2 Encounters:   06/01/23 84.3 kg (185 lb 14.4 oz)   05/01/23 82.1 kg (181 lb)       Gen: AAOX3, NAD, comfortable  Resp: CTA B/L, normal WOB  CVS: RRR, no murmur  Abd/GI: Soft, mild right upper quadrant tenderness, no guarding or rebound.  Skin: Warm, dry no rashes  MSK: Trace pedal edema  Neuro- CN- intact. No focal deficits.     Data   Recent Labs   Lab 05/30/23  0230 05/30/23  0138   WBC  --  8.6   HGB  --  12.2   MCV  --  91   PLT  --  307     --    POTASSIUM 4.1  --    CHLORIDE 104  --    CO2 23  --    BUN 21.4  --    CR 0.89  --    ANIONGAP 12  --    GURWINDER 9.5  --    *  --    ALBUMIN 3.7  --    PROTTOTAL 6.2*  --    BILITOTAL 0.3  --    ALKPHOS 49  --    ALT 22  --    AST 25  --    LIPASE  --  33       No results found for this or any previous visit (from the past 24 hour(s)).  Medications     sodium chloride 50 mL/hr at 05/31/23 0957       [Auto Hold] cefTRIAXone  2 g Intravenous Q24H     [Auto Hold] metoprolol succinate ER  50 mg Oral BID     [Auto Hold] metroNIDAZOLE  500 mg Intravenous Q12H     [Auto Hold] sacubitril-valsartan  1 tablet Oral BID     [Auto Hold] sodium chloride (PF)  3 mL Intracatheter Q8H     [Auto Hold] umeclidinium  1 puff Inhalation Daily               "

## 2023-06-01 NOTE — OP NOTE
Surgeon: Sawyer Marcum MD  1st Assistant: MYRIAM Guzmán.  PREOPERATIVE DIAGNOSIS: acute cholecystitis.   POSTOPERATIVE DIAGNOSES: Same and intraabdominal adhesions  PROCEDURE:   1.  Laparoscopic cholecystectomy ( D2 - due to inflammation)  2.  Lysis of adhesions    ANESTHESIA: General.   ESTIMATED BLOOD LOSS: Less than 25 mL.   OPERATIVE PROCEDURE: After induction of general endotracheal anesthesia, Hamida [facility] abdomen was prepped and draped in the usual sterile fashion. With the Kati technique in an periumbilical location, the abdomen was entered and pneumoperitoneum was established.  There were many omental adhesions to the abdominal wall on the right abdomen.  One epigastric trocar was placed and the adhesions taken down sharply and bluntly.  Tissue examined and no injuries noted.  Two additional 5 mm trocars were placed along the right hypochondrium. The gallbladder was decomnpresed in order to grasp it.  The gallbladder fundus was retracted cephalad and lateral and the infundibulum was retracted caudad and lateral.  The following dissection was difficult and tedious given some chronic adhesions between the liver, gallbladder and duodenum.  The peritoneum investing the triangle of Calot was incised with electrocautery and dissected bluntly.  The inflammation was so severe that the critical view could not be safely archive.  Further dissection distal along the gallbladder was performed and ultimately we took the gallbladder with a top down approach.  The critical view of a single cystic duct, single cystic artery was achieved. The artery was doubly clipped on the patient's side, singly clipped on the gallbladder side and transected sharply.  The duct was controlled with clips and endoloop.  The specimen was removed from the patient's abdomen and sent to pathology. The gallbladder fossa was inspected. Hemostasis was secured, and no evidence of bile leakage noted. The abdomen was surveyed and no other  pathology seen. The trocars were then removed under direct visualization without evidence of bleeding. Periumbilical port was closed with multiple interrupted 0 Vicryl suture. Skin was approximated with absorbable sutures. Steri-Strips and sterile dressing applied. No immediate complications.   HORACIO PATINO MD

## 2023-06-01 NOTE — BRIEF OP NOTE
[facility]    Brief Operative Note    Pre-operative diagnosis: Acute cholecystitis [K81.0]  Post-operative diagnosis Same as pre-operative diagnosis    Procedure: Procedure(s):  Laparoscopic cholecystectomy with lysis of adhesions  Surgeon: Surgeon(s) and Role:     * Sawyer Marcum MD - Primary     * Mnyor Kenney MD - Resident - Assisting  Anesthesia: General   Estimated Blood Loss: 25cc  Drains: None  Specimens:   ID Type Source Tests Collected by Time Destination   1 : GALLBLADDER AND CONTENTS Tissue Gallbladder SURGICAL PATHOLOGY EXAM Sawyer Marcum MD 6/1/2023 10:45 AM      Findings:   laparoscopic lysis of adhesions, distended gallbladder requiring aspiration. .  Complications: None.  Implants: * No implants in log *

## 2023-06-01 NOTE — PLAN OF CARE
Goal Outcome Evaluation:    Orientation/Cognitive: A/O x4  Observation Goals (Met/ Not Met): Not met  Mobility Level/Assist Equipment: Independent   Fall Risk (Y/N): N  Behavior Concerns: Green  Pain Management: C/o abdominal pain, Prn Tylenol and ice    Tele/VS/O2: VSS on RA  ABNL Lab/BG: UA +ve, UC pending   Diet: NPO @ MN  Bowel/Bladder: Continent  Skin Concerns: Scattered bruising   Drains/Devices: IVF @ 50/hr   Tests/Procedures for next shift: Annabelle Lopez @ 09:30am today  Anticipated DC date & active delays: TBD

## 2023-06-01 NOTE — ANESTHESIA PREPROCEDURE EVALUATION
Anesthesia Pre-Procedure Evaluation    Patient: Hamida Verma   MRN: [id number] : 1940        Procedure : Procedure(s):  Laparoscopic cholecystectomy          Past Medical History:   Diagnosis Date     Atrial fibrillation (H)      Chronic diastolic CHF (congestive heart failure) (H)      COPD (chronic obstructive pulmonary disease) (H)      Essential hypertension, benign      HYPERLIPIDEMIA NEC/NOS 2006     Pulmonary hypertension (H)      Pyelonephritis      SSS (sick sinus syndrome) (H)     s/p PPM 3/2018      Past Surgical History:   Procedure Laterality Date     EP ABLATION AV NODE N/A 2019    Procedure: EP Ablation AV Node;  Surgeon: Roe Voss MD;  Location:  [facility]     EYE SURGERY       HYSTERECTOMY, VAGINAL      hysterectomy      Allergies   Allergen Reactions     Strawberries [Strawberry Extract] Anaphylaxis     Strawberry Flavor       Social History     Tobacco Use     Smoking status: Former     Packs/day: 1.50     Years: 45.00     Pack years: 67.50     Types: Cigarettes     Start date: 10/28/1955     Quit date: 2000     Years since quittin.7     Smokeless tobacco: Never     Tobacco comments:     quit 6 yrs ago   Vaping Use     Vaping status: Not on file   Substance Use Topics     Alcohol use: No      Wt Readings from Last 1 Encounters:   23 84.3 kg (185 lb 14.4 oz)        Anesthesia Evaluation            ROS/MED HX  ENT/Pulmonary:     (+) COPD,  (-) sleep apnea   Neurologic:    (-) no seizures and no CVA   Cardiovascular: Comment: Interpretation Summary     The visual ejection fraction is 45-50%.  Left ventricular systolic function is borderline reduced.  There is septal dyskinesis.  Septal wall motion abnormality may reflect pacemaker activation.  The rhythm was sinus with paced rhythm.  LV systolic function is similar to to22.    (+) Dyslipidemia hypertension--CAD ---CHF pacemaker, Reason placed: sss. pulmonary hypertension,      METS/Exercise Tolerance:     Hematologic:       Musculoskeletal:       GI/Hepatic:    (-) GERD   Renal/Genitourinary:     (+) renal disease,     Endo:     (+) Obesity,     Psychiatric/Substance Use:       Infectious Disease:       Malignancy:       Other:            Physical Exam    Airway        Mallampati: III   TM distance: > 3 FB   Neck ROM: full     Respiratory Devices and Support         Dental     Comment: dentures        Cardiovascular   cardiovascular exam normal          Pulmonary   pulmonary exam normal                OUTSIDE LABS:  CBC:   Lab Results   Component Value Date    WBC 8.6 05/30/2023    WBC 8.7 07/27/2022    HGB 12.2 05/30/2023    HGB 13.1 07/27/2022    HCT 37.8 05/30/2023    HCT 40.5 07/27/2022     05/30/2023     (H) 07/27/2022     BMP:   Lab Results   Component Value Date     05/30/2023     04/28/2023    POTASSIUM 4.1 05/30/2023    POTASSIUM 3.8 04/28/2023    CHLORIDE 104 05/30/2023    CHLORIDE 102 04/28/2023    CO2 23 05/30/2023    CO2 27 04/28/2023    BUN 21.4 05/30/2023    BUN 19.7 04/28/2023    CR 0.89 05/30/2023    CR 1.00 (H) 04/28/2023     (H) 05/30/2023    GLC 93 04/28/2023     COAGS:   Lab Results   Component Value Date    PTT 29 11/12/2014    INR 1.31 (H) 03/19/2018     POC:   Lab Results   Component Value Date    BGM 94 01/10/2019     HEPATIC:   Lab Results   Component Value Date    ALBUMIN 3.7 05/30/2023    PROTTOTAL 6.2 (L) 05/30/2023    ALT 22 05/30/2023    AST 25 05/30/2023    ALKPHOS 49 05/30/2023    BILITOTAL 0.3 05/30/2023     OTHER:   Lab Results   Component Value Date    PH 7.46 (H) 01/05/2019    LACT 0.9 01/05/2019    A1C 5.1 03/28/2019    GURWINDER 9.5 05/30/2023    PHOS 3.6 01/05/2019    MAG 2.5 (H) 05/03/2019    LIPASE 33 05/30/2023    TSH 0.72 07/20/2022       Anesthesia Plan    ASA Status:  3      Anesthesia Type: General.     - Airway: ETT              Consents    Anesthesia Plan(s) and associated risks, benefits, and realistic  alternatives discussed. Questions answered and patient/representative(s) expressed understanding.    - Discussed:     - Discussed with:  Patient         Postoperative Care       PONV prophylaxis: Ondansetron (or other 5HT-3)     Comments:                Phuong Nobles

## 2023-06-01 NOTE — ANESTHESIA POSTPROCEDURE EVALUATION
Patient: Hamida Verma    Procedure: Procedure(s):  Laparoscopic cholecystectomy with lysis of adhesions       Anesthesia Type:  General    Note:  Disposition: Outpatient   Postop Pain Control: Uneventful            Sign Out: Well controlled pain   PONV: No   Neuro/Psych: Uneventful            Sign Out: Acceptable/Baseline neuro status   Airway/Respiratory: Uneventful            Sign Out: Acceptable/Baseline resp. status   CV/Hemodynamics: Uneventful            Sign Out: Acceptable CV status   Other NRE:    DID A NON-ROUTINE EVENT OCCUR? No           Last vitals:  Vitals Value Taken Time   /63 06/01/23 1200   Temp 36.6  C (97.9  F) 06/01/23 1145   Pulse 64 06/01/23 1215   Resp 15 06/01/23 1215   SpO2 95 % 06/01/23 1215       Electronically Signed By: Phuong Nobles  June 1, 2023  2:58 PM

## 2023-06-01 NOTE — PLAN OF CARE
Orientation/Cognitive: A&Ox4  Observation Goals (Met/ Not Met): Inpatient  Mobility Level/Assist Equipment: Stand by  Fall Risk (Y/N): Fall Risk  Behavior Concerns: N/A  Pain Management: N/A, denies pains  Tele/VS/O2: VSS on RA  ABNL Lab/BG:UA positive for K pneumoniae  Diet: Regular  Bowel/Bladder: Continent, bowel sounds active  Skin Concerns:4 lap sites  Drains/Devices: IV fluids infusing  Tests/Procedures for next shift:  Post lap brii  Anticipated DC date & active delays: 06/02/23

## 2023-06-01 NOTE — ANESTHESIA CARE TRANSFER NOTE
Patient: Hamida Verma    Procedure: Procedure(s):  Laparoscopic cholecystectomy with lysis of adhesions       Diagnosis: Acute cholecystitis [K81.0]  Diagnosis Additional Information: No value filed.    Anesthesia Type:   General     Note:    Oropharynx: oropharynx clear of all foreign objects  Level of Consciousness: awake  Oxygen Supplementation: face mask  Level of Supplemental Oxygen (L/min / FiO2): 6  Independent Airway: airway patency satisfactory and stable  Dentition: dentition unchanged      Patient transferred to: PACU    Handoff Report: Identifed the Patient, Identified the Reponsible Provider, Reviewed the pertinent medical history, Discussed the surgical course, Reviewed Intra-OP anesthesia mangement and issues during anesthesia, Set expectations for post-procedure period and Allowed opportunity for questions and acknowledgement of understanding      Vitals:  Vitals Value Taken Time   BP     Temp     Pulse     Resp     SpO2         Electronically Signed By: NELY Leal CRNA  June 1, 2023  11:06 AM

## 2023-06-02 VITALS
BODY MASS INDEX: 31.34 KG/M2 | DIASTOLIC BLOOD PRESSURE: 59 MMHG | HEIGHT: 64 IN | TEMPERATURE: 97.9 F | SYSTOLIC BLOOD PRESSURE: 111 MMHG | OXYGEN SATURATION: 92 % | WEIGHT: 183.6 LBS | RESPIRATION RATE: 18 BRPM | HEART RATE: 71 BPM

## 2023-06-02 LAB
CREAT SERPL-MCNC: 1.04 MG/DL (ref 0.51–0.95)
GFR SERPL CREATININE-BSD FRML MDRD: 53 ML/MIN/1.73M2

## 2023-06-02 PROCEDURE — 250N000013 HC RX MED GY IP 250 OP 250 PS 637: Performed by: STUDENT IN AN ORGANIZED HEALTH CARE EDUCATION/TRAINING PROGRAM

## 2023-06-02 PROCEDURE — 82565 ASSAY OF CREATININE: CPT | Performed by: INTERNAL MEDICINE

## 2023-06-02 PROCEDURE — 36415 COLL VENOUS BLD VENIPUNCTURE: CPT | Performed by: INTERNAL MEDICINE

## 2023-06-02 PROCEDURE — 250N000011 HC RX IP 250 OP 636: Performed by: STUDENT IN AN ORGANIZED HEALTH CARE EDUCATION/TRAINING PROGRAM

## 2023-06-02 PROCEDURE — 99239 HOSP IP/OBS DSCHRG MGMT >30: CPT | Performed by: INTERNAL MEDICINE

## 2023-06-02 RX ORDER — CIPROFLOXACIN 250 MG/1
250 TABLET, FILM COATED ORAL 2 TIMES DAILY
Qty: 2 TABLET | Refills: 0 | Status: SHIPPED | OUTPATIENT
Start: 2023-06-03 | End: 2023-06-02

## 2023-06-02 RX ORDER — CIPROFLOXACIN 250 MG/1
250 TABLET, FILM COATED ORAL 2 TIMES DAILY
Qty: 6 TABLET | Refills: 0 | Status: SHIPPED | OUTPATIENT
Start: 2023-06-03 | End: 2023-11-06

## 2023-06-02 RX ADMIN — UMECLIDINIUM 1 PUFF: 62.5 AEROSOL, POWDER ORAL at 08:45

## 2023-06-02 RX ADMIN — ONDANSETRON 4 MG: 2 INJECTION INTRAMUSCULAR; INTRAVENOUS at 00:26

## 2023-06-02 RX ADMIN — SACUBITRIL AND VALSARTAN 1 TABLET: 97; 103 TABLET, FILM COATED ORAL at 08:44

## 2023-06-02 RX ADMIN — CEFTRIAXONE SODIUM 2 G: 2 INJECTION, POWDER, FOR SOLUTION INTRAMUSCULAR; INTRAVENOUS at 06:14

## 2023-06-02 RX ADMIN — METOPROLOL SUCCINATE 50 MG: 50 TABLET, EXTENDED RELEASE ORAL at 08:43

## 2023-06-02 ASSESSMENT — ACTIVITIES OF DAILY LIVING (ADL)
ADLS_ACUITY_SCORE: 35

## 2023-06-02 NOTE — PROGRESS NOTES
Summary:  Acute Cholecystitis,   Orientation: A/O x4  Observation Goals (met & not met): Impatient  Activity Level: SBA d/t post procedure, otherwise independent  Fall Risk: Y  Behavior & Aggression Tool Color: Green  Pain Management: Zofran PRN given for nausea. Lap sites x4 with adhesive dressing, CDI   ABNL VS/O2: VSS on RA  ABNL Lab/BG: UA+  Diet: Regular  Bowel/Bladder: Continent  Drains/Devices: PIV infusing NS at 50 ml/hr  Tests/Procedures for next shift: None  Anticipated DC date: Possibly discharge 06/2/23  Other Important Info: On IV ABX,

## 2023-06-02 NOTE — PROGRESS NOTES
Summary:  Acute Cholecystitis,   Orientation: A/O x4  Observation Goals (met & not met): Impatient  Activity Level: SBA d/t post procedure, otherwise independent  Fall Risk: Y  Behavior & Aggression Tool Color: Green  Pain Management: Gave PRN Tylenol for abdominal pain of 5/10 at lap brii sites.   ABNL VS/O2: /66, other VSS on RA  ABNL Lab/BG: UA+  Diet: Tolerating regular  Bowel/Bladder: Continent. With faint BS  Drains/Devices: PIV infusing NS at 50 ml/hr  Tests/Procedures for next shift: Lap brii site x4 with adhesive dressing, CDI  Anticipated DC date: Possibly tomorrow   Other Important Info: On IV ABX

## 2023-06-02 NOTE — PROGRESS NOTES
"General surgery progress note    Subjective:  Patient reports improved abdominal pain following surgery yesterday.  She does have appropriate tenderness around her incisions.  She had some mild nausea overnight but this is since resolved.  She has not ambulated since surgery.  Discussed and encouraged ambulation today.  Her Xarelto was restarted overnight.    Objective:  /67 (BP Location: Left arm)   Pulse 76   Temp 98.5  F (36.9  C) (Oral)   Resp 16   Ht 1.626 m (5' 4\")   Wt 83.5 kg (184 lb 1.4 oz)   LMP  (LMP Unknown)   SpO2 95%   BMI 31.60 kg/m    Gen:  Awake, Alert, NAD  Resp - nonlabored breathing on RA  Cardiac - Regular rate & rhythm  Abdomen - soft, nondistended, minimally tender to palpation around laparoscopic incision sites.  Incisions with Band-Aids intact, no drainage or surrounding erythema.  Extremities - no lower extremity edema.        Assessment/plan:  Patient is an 82-year-old female with past history of atrial fibrillation on Xarelto, CHF, COPD, hypertension who presented with right upper quadrant tenderness and was found to have cholecystitis on imaging and laboratory evaluation.  Surgery was delayed until 6/1 due to her Xarelto.  She underwent laparoscopic cholecystectomy on 6/1/2023 with Dr. Marcum.  -Okay for regular diet  -Anticoagulation resumed  -No further indication for IV antibiotics from a surgery perspective, defer to medicine for treatment of UTI  -Encourage ambulation, incentive spirometer  -Patient is okay for discharge from a general surgery perspective, appreciate hospitalist cares.     Patient discussed with Dr. Trixie Kenney MD, MS 06/02/23 9:11 AM   General Surgery Resident - PGY4    "

## 2023-06-02 NOTE — DISCHARGE SUMMARY
[facility]    Discharge Summary  Hospitalist    Date of Admission:  5/30/2023  Date of Discharge:  6/2/2023  Discharging Provider: Manny Solo MD, MD    Discharge Diagnoses      Symptomatic cholelithiasis with suspected acute cholecystitis s/p laparoscopic cholecystectomy on 6/1/2023  UTI  Chronic atrial fibrillation  Chronic diastolic congestive heart failure.  Essential hypertension  Hyperlipidemia  Chronic diastolic CHF  COPD  Left kidney cyst    Hospital Course:    Hamida Verma is a markedly pleasant 82 year old woman with past medical history that is most notable for chronic atrial fibrillation status post pacemaker placement, on DOAC, among others; who presents with acute abdominal pain, nausea and vomiting; and is found to have suspected acute cholecystitis.     Symptomatic cholelithiasis with suspected acute cholecystitis  -Presents with epigastric and right upper quadrant pain  -Patient initially had CT abdomen which showed diffusely thickened urinary bladder, suspicion for cystitis and distended gallbladder with large gallstone, follow-up ultrasound shows 1.3 cm nonmobile gallstone in the region of the gallbladder neck, likely impacted  -Evaluated by general surgery  -Patient underwent laparoscopic cholecystectomy on 6/1/2023, which was uneventful.  Progressing well, tolerating diet.  -Discharge home today.     UTI  -CT also shows a diffusely thickened urinary bladder, and she has pyuria and hematuria on UA.   -Urine culture growing 2 strains of Klebsiella pneumonia.  She will discharge on ciprofloxacin for 3 more days to complete 7-day course.     Chronic atrial fibrillation  Chronic diastolic congestive heart failure.  Essential hypertension  Hyperlipidemia  -She is followed by [facility], status post AV node ablation and pacemaker placement; and is on Xarelto for stroke prevention; the last dose was yesterday.  -Xarelto resumed after surgery on 6/1     Chronic  "diastolic CHF  -TTE in 10/2022 showed LVEF 45-50%. Reportedly coronary artery calcifications have been seen on prior CT, though Lexiscan stress testing in 10/2022 showed no evidence of inducible ischemia.  -Continue Entresto and metoprolol  -Resume Lasix at discharge     COPD  -Continue prior to admission inhalers     Left kidney cyst  -Incidentally seen tonight as a 1.1 cm hyperattenuating lesion of the left anterior upper pole, possibly representing a hemorrhagic or proteinaceous cyst   -Outpatient follow up for consideration of further imaging.       Manny Solo MD, MD    Significant Results and Procedures   See below    Pending Results     Unresulted Labs Ordered in the Past 30 Days of this Admission     Date and Time Order Name Status Description    6/1/2023 10:45 AM Surgical Pathology Exam In process     5/30/2023  5:49 AM Urine Culture Preliminary           Code Status   DNR / DNI       Primary Care Physician   Mikala Philip MD    Physical Exam   Temp: 98  F (36.7  C) Temp src: Oral BP: 113/54 Pulse: 69   Resp: 18 SpO2: 95 % O2 Device: None (Room air) Oxygen Delivery: 2 LPM    Constitutional: AAOX3, NAD  Respiratory: CTA B/L, Normal WOB  Cardiovascular: RRR, No murmur  GI: Soft, appropriately tender around surgical incision.  BS- normoactive  Neuro: CN- grossly intact, moving all 4 extremities.    Discharge Disposition   Discharged to home  Condition at discharge: Stable    Consultations This Hospital Stay   SURGERY GENERAL IP CONSULT  CARE MANAGEMENT / SOCIAL WORK IP CONSULT    Time Spent on this Encounter   I, Manny Solo MD, personally saw the patient today and spent greater than 30 minutes discharging this patient.    Discharge Orders      When to call - Contact Surgeon Team    You may experience symptoms that require follow-up before your scheduled appointment. Refer to the \"[employer]\" for instructions on when to contact your Surgeon Team if you are concerned about pain control, blood " clots, constipation, or if you are unable to urinate.     When to call - Reach out to Urgent Care    If you are not able to reach your Surgeon Team and you need immediate care, go to the Orthopedic Walk-in Clinic or Urgent Care at your Surgeon's office.  Do NOT go to the Emergency Room unless you have shortness of breath, chest pain, or other signs of a medical emergency.     When to call - Reasons to Call 911    Call 911 immediately if you experience sudden-onset chest pain, arm weakness/numbness, slurred speech, or shortness of breath     Discharge Instructions    Shower/Bathing - Restrictions: OK to remove bandaids Saturday and shower. Leave steristrips in place, they will fall off in 7-19 days. Let water run over incisions and pat dry. Do NOT soak in any body of water (lake, pool, bath, etc.) for 7-10 days postoperatively.    No lifting over 10 pounds and no strenuous physical activity for 4 weeks.    You have a clean dressing on your surgical wound. Dressing change instructions as follows: Remove bandaids Saturday, steristrips may be removed in 10 days if they do not fall off sooner.    Contact your Surgeon Team if you have increased redness, warmth around the surgical wound, and/or drainage from the surgical wound.     Reason for your hospital stay    Acute cholecystitis s/p laparoscopic cholecystectomy.     Follow-up and recommended labs and tests    Follow up with primary care provider, Mikala Philip MD, within 7 days for hospital follow- up.     Activity    Your activity upon discharge: activity as tolerated     NPO for Medical/Clinical Reasons     Diet    Follow this diet upon discharge: Orders Placed This Encounter      NPO for Medical/Clinical Reasons      Regular Diet Adult     Discharge Medications   Current Discharge Medication List      START taking these medications    Details   ciprofloxacin (CIPRO) 250 MG tablet Take 1 tablet (250 mg) by mouth 2 times daily  Qty: 6 tablet, Refills: 0     Associated Diagnoses: Acute cystitis without hematuria      oxyCODONE (ROXICODONE) 5 MG tablet Take 0.5-1 tablets (2.5-5 mg) by mouth every 4 hours as needed for pain  Qty: 8 tablet, Refills: 0    Associated Diagnoses: Postoperative pain      senna (SENOKOT) 8.6 MG tablet Take 1 tablet by mouth 2 times daily as needed for constipation (while taking oxycodone)  Qty: 20 tablet, Refills: 0    Associated Diagnoses: Postoperative pain         CONTINUE these medications which have NOT CHANGED    Details   albuterol (PROAIR HFA/PROVENTIL HFA/VENTOLIN HFA) 108 (90 Base) MCG/ACT inhaler Inhale 2 puffs into the lungs every 6 hours  Qty: 18 g, Refills: 3    Comments: Pharmacy may dispense brand covered by insurance (Proair, or proventil or ventolin or generic albuterol inhaler)  Associated Diagnoses: Chronic diastolic CHF (congestive heart failure) (H)      atorvastatin (LIPITOR) 80 MG tablet Take 1 tablet (80 mg) by mouth daily  Qty: 90 tablet, Refills: 3    Associated Diagnoses: Coronary artery calcification seen on CT scan      cholecalciferol (VITAMIN  -D) 1000 UNIT capsule Take 1 capsule by mouth daily.      Coral Calcium 133-66.7-133 MG-MG-UNIT CAPS Take 2 tablets by mouth 2 times daily       FLAX SEED OIL OR 1 capsule daily      furosemide (LASIX) 20 MG tablet Take 1 tablet (20 mg) by mouth daily  Qty: 90 tablet, Refills: 3    Associated Diagnoses: Chronic diastolic CHF (congestive heart failure) (H)      metoprolol succinate ER (TOPROL XL) 50 MG 24 hr tablet Take 1 tablet (50 mg) by mouth 2 times daily  Qty: 180 tablet, Refills: 3    Associated Diagnoses: Atrial fibrillation with rapid ventricular response (H)      Multiple Vitamins-Minerals (HAIR SKIN NAILS PO) Take 1 tablet by mouth At Bedtime      potassium chloride ER (KLOR-CON M) 10 MEQ CR tablet Take 1 tablet (10 mEq) by mouth daily  Qty: 90 tablet, Refills: 3    Associated Diagnoses: Hypokalemia      rivaroxaban ANTICOAGULANT (XARELTO ANTICOAGULANT) 20 MG TABS  tablet Take 1 tablet (20 mg) by mouth daily (with dinner)  Qty: 90 tablet, Refills: 3    Associated Diagnoses: PAF (paroxysmal atrial fibrillation) (H)      sacubitril-valsartan (ENTRESTO)  MG per tablet Take 1 tablet by mouth 2 times daily  Qty: 180 tablet, Refills: 3    Associated Diagnoses: Heart failure with reduced ejection fraction, NYHA class II (H)      tiotropium (SPIRIVA) 18 MCG inhaled capsule Inhale 1 capsule (18 mcg) into the lungs daily  Qty: 30 capsule, Refills: 1    Associated Diagnoses: Chronic obstructive pulmonary disease, unspecified COPD type (H)           Allergies   Allergies   Allergen Reactions     Strawberries [Strawberry Extract] Anaphylaxis     Strawberry Flavor      Data   Most Recent 3 CBC's:  Recent Labs   Lab Test 05/30/23  0138 07/27/22  0841 07/20/22  1246   WBC 8.6 8.7 9.9   HGB 12.2 13.1 12.5   MCV 91 86 86    545* 531*      Most Recent 3 BMP's:  Recent Labs   Lab Test 05/30/23  0230 04/28/23  0659 11/09/22  0817    141 140   POTASSIUM 4.1 3.8 4.0   CHLORIDE 104 102 105   CO2 23 27 27   BUN 21.4 19.7 18   CR 0.89 1.00* 0.96   ANIONGAP 12 12 8   GURWINDER 9.5 9.6 9.6   * 93 109*     Most Recent 2 LFT's:  Recent Labs   Lab Test 05/30/23  0230 04/28/23  0659 10/17/22  0738 07/20/22  1246   AST 25  --   --  18   ALT 22 17   < > 31   ALKPHOS 49  --   --  59   BILITOTAL 0.3  --   --  0.4    < > = values in this interval not displayed.     Most Recent INR's and Anticoagulation Dosing History:  Anticoagulation Dose History         Latest Ref Rng & Units 11/12/2014 3/19/2018   Recent Dosing and Labs   INR 0.86 - 1.14 1.04   1.31                Most Recent 3 Troponin's:  Recent Labs   Lab Test 01/05/19  2325 01/05/19  1926 01/05/19  1520   TROPI <0.015 <0.015 <0.015     Most Recent Cholesterol Panel:  Recent Labs   Lab Test 04/28/23  0659   CHOL 161   LDL 81   HDL 64   TRIG 82     Most Recent 6 Bacteria Isolates From Any Culture (See EPIC Reports for Culture  Details):  Recent Labs   Lab Test 01/05/19  1812 01/05/19  1800 01/05/19  1640   CULT No growth No growth >100,000 colonies/mL  Klebsiella pneumoniae  *  50,000 to 100,000 colonies/mL  Strain 2  Klebsiella pneumoniae  *     Most Recent TSH, T4 and A1c Labs:  Recent Labs   Lab Test 07/20/22  1246 05/01/19  0839 03/28/19  0905   TSH 0.72   < > 0.81   A1C  --   --  5.1    < > = values in this interval not displayed.       Results for orders placed or performed during the hospital encounter of 05/30/23   CT Abdomen Pelvis w Contrast    Narrative    EXAM: CT ABDOMEN PELVIS W CONTRAST  LOCATION: [facility]  DATE/TIME: 5/30/2023 3:24 AM CDT    INDICATION: epigastric, RUQ, mid abdominal pain, nausea vomiting  COMPARISON: None.  TECHNIQUE: CT scan of the abdomen and pelvis was performed following injection of IV contrast. Multiplanar reformats were obtained. Dose reduction techniques were used.  CONTRAST: 91mL Isovue 370    FINDINGS:     LOWER CHEST: Pacemaker wires partially visualized. Emphysematous changes of the lung bases.    HEPATOBILIARY: Unremarkable liver. Distended gallbladder with large gallstone possibly impacted in the region of the neck. Fundal adenomyomatosis.    PANCREAS: Normal.    SPLEEN: Normal.    ADRENAL GLANDS: Normal.    KIDNEYS/BLADDER: Bilateral renal cortical scarring. Nonobstructing calyceal calculi of both kidneys the largest of which in the right inferior pole measures 9 mm. Bilateral benign renal cysts requiring no further follow-up. In addition, there is a 1.1 cm   hyperattenuating lesion of the left anterior upper pole (series 4 image 57). Diffusely thickened urinary bladder.    BOWEL: Colonic diverticulosis. Normal appendix. No bowel obstruction.    LYMPH NODES: Normal.    VASCULATURE: Diffuse atherosclerotic calcifications of the aortoiliac vessels without evidence of aneurysmal dilatation.    PELVIC ORGANS: Hysterectomy.    MUSCULOSKELETAL: T12 benign vertebral  hemangioma. Mild degenerative changes of the spine. No acute osseous abnormality.      Impression    IMPRESSION:     1.  Diffusely thickened urinary bladder, suspicious for cystitis. Recommend correlation with urinalysis.   2.  Distended gallbladder with large gallstone possibly impacted in the region of the neck. Recommend right upper quadrant ultrasound for further evaluation.  3.  1.1 cm hyperattenuating lesion of the left anterior upper pole, possibly representing a hemorrhagic or proteinaceous cyst however technically indeterminate. Recommend renal mass protocol MRI or CT for further characterization, which may be performed   on a nonemergent outpatient basis.   Abdomen US, limited (RUQ only)    Narrative    EXAM: US ABDOMEN LIMITED  LOCATION: [facility]  DATE/TIME: 5/30/2023 4:32 AM CDT    INDICATION: gallstones on US  COMPARISON: Same day CT  TECHNIQUE: Limited abdominal ultrasound.    FINDINGS:    GALLBLADDER: 1.3 cm nonmobile gallstone in the region of the neck of the gallbladder, likely impacted. Gallbladder is moderately distended. Sonographic López's sign was negative.    BILE DUCTS: No biliary dilatation. The common duct measures 5 mm.    LIVER: Normal parenchyma with smooth contour. No focal mass.    RIGHT KIDNEY: No hydronephrosis. Simple cysts measuring up to 5.3 cm, requiring no further follow-up.    PANCREAS: The visualized portions are normal.    No ascites.      Impression    IMPRESSION:  1.  1.3 cm nonmobile gallstone in the region of the gallbladder neck, likely impacted, with moderate distention of the gallbladder. No other secondary evidence of acute cholecystitis. Sonographic López's sign was negative.

## 2023-06-02 NOTE — PLAN OF CARE
Orientation/Cognitive: A&O  Observation Goals (Met/ Not Met): Inp  Mobility Level/Assist Equipment: SBA  Fall Risk (Y/N): N  Behavior Concerns: N  Pain Management: denies  Tele/VS/O2: VSS on RA  ABNL Lab/BG: UA  Diet: reg tolerating  Bowel/Bladder: voiding urine  Skin Concerns: lap sites cdi  Drains/Devices: pacemaker  Tests/Procedures for next shift: no  Anticipated DC date & active delays: 6-2  Patient Stated Goal for Today: go home  AVS reviewed. Pt in agreement. Meds given. Discharged home w daughter

## 2023-06-03 LAB
BACTERIA UR CULT: ABNORMAL
BACTERIA UR CULT: ABNORMAL

## 2023-06-06 LAB
PATH REPORT.COMMENTS IMP SPEC: NORMAL
PATH REPORT.COMMENTS IMP SPEC: NORMAL
PATH REPORT.FINAL DX SPEC: NORMAL
PATH REPORT.GROSS SPEC: NORMAL
PATH REPORT.MICROSCOPIC SPEC OTHER STN: NORMAL
PATH REPORT.RELEVANT HX SPEC: NORMAL
PHOTO IMAGE: NORMAL

## 2023-06-06 PROCEDURE — 88304 TISSUE EXAM BY PATHOLOGIST: CPT | Mod: 26

## 2023-08-04 ENCOUNTER — ANCILLARY PROCEDURE (OUTPATIENT)
Dept: CARDIOLOGY | Facility: CLINIC | Age: 83
End: 2023-08-04
Attending: INTERNAL MEDICINE
Payer: COMMERCIAL

## 2023-08-04 DIAGNOSIS — Z95.0 CARDIAC PACEMAKER IN SITU: ICD-10-CM

## 2023-08-04 DIAGNOSIS — I48.0 PAF (PAROXYSMAL ATRIAL FIBRILLATION) (H): ICD-10-CM

## 2023-08-04 DIAGNOSIS — I50.32 CHRONIC DIASTOLIC CHF (CONGESTIVE HEART FAILURE) (H): ICD-10-CM

## 2023-08-04 DIAGNOSIS — I49.5 SSS (SICK SINUS SYNDROME) (H): ICD-10-CM

## 2023-08-04 PROCEDURE — 93294 REM INTERROG EVL PM/LDLS PM: CPT | Performed by: INTERNAL MEDICINE

## 2023-08-04 PROCEDURE — 93296 REM INTERROG EVL PM/IDS: CPT | Performed by: INTERNAL MEDICINE

## 2023-08-15 LAB
MDC_IDC_EPISODE_DTM: NORMAL
MDC_IDC_EPISODE_DURATION: 16 S
MDC_IDC_EPISODE_DURATION: 58 S
MDC_IDC_EPISODE_DURATION: NORMAL S
MDC_IDC_EPISODE_ID: NORMAL
MDC_IDC_EPISODE_TYPE: NORMAL
MDC_IDC_LEAD_IMPLANT_DT: NORMAL
MDC_IDC_LEAD_IMPLANT_DT: NORMAL
MDC_IDC_LEAD_LOCATION: NORMAL
MDC_IDC_LEAD_LOCATION: NORMAL
MDC_IDC_LEAD_LOCATION_DETAIL_1: NORMAL
MDC_IDC_LEAD_LOCATION_DETAIL_1: NORMAL
MDC_IDC_LEAD_MFG: NORMAL
MDC_IDC_LEAD_MFG: NORMAL
MDC_IDC_LEAD_MODEL: NORMAL
MDC_IDC_LEAD_MODEL: NORMAL
MDC_IDC_LEAD_POLARITY_TYPE: NORMAL
MDC_IDC_LEAD_POLARITY_TYPE: NORMAL
MDC_IDC_LEAD_SERIAL: NORMAL
MDC_IDC_LEAD_SERIAL: NORMAL
MDC_IDC_MSMT_BATTERY_DTM: NORMAL
MDC_IDC_MSMT_BATTERY_REMAINING_LONGEVITY: 61 MO
MDC_IDC_MSMT_BATTERY_REMAINING_PERCENTAGE: 48 %
MDC_IDC_MSMT_BATTERY_RRT_TRIGGER: NORMAL
MDC_IDC_MSMT_BATTERY_STATUS: NORMAL
MDC_IDC_MSMT_BATTERY_VOLTAGE: 2.98 V
MDC_IDC_MSMT_LEADCHNL_RA_IMPEDANCE_VALUE: 530 OHM
MDC_IDC_MSMT_LEADCHNL_RA_LEAD_CHANNEL_STATUS: NORMAL
MDC_IDC_MSMT_LEADCHNL_RA_PACING_THRESHOLD_AMPLITUDE: 0.38 V
MDC_IDC_MSMT_LEADCHNL_RA_PACING_THRESHOLD_PULSEWIDTH: 0.4 MS
MDC_IDC_MSMT_LEADCHNL_RA_SENSING_INTR_AMPL: 4 MV
MDC_IDC_MSMT_LEADCHNL_RV_IMPEDANCE_VALUE: 550 OHM
MDC_IDC_MSMT_LEADCHNL_RV_LEAD_CHANNEL_STATUS: NORMAL
MDC_IDC_MSMT_LEADCHNL_RV_PACING_THRESHOLD_AMPLITUDE: 0.75 V
MDC_IDC_MSMT_LEADCHNL_RV_PACING_THRESHOLD_PULSEWIDTH: 0.4 MS
MDC_IDC_MSMT_LEADCHNL_RV_SENSING_INTR_AMPL: 12 MV
MDC_IDC_PG_IMPLANT_DTM: NORMAL
MDC_IDC_PG_MFG: NORMAL
MDC_IDC_PG_MODEL: NORMAL
MDC_IDC_PG_SERIAL: NORMAL
MDC_IDC_PG_TYPE: NORMAL
MDC_IDC_SESS_CLINIC_NAME: NORMAL
MDC_IDC_SESS_DTM: NORMAL
MDC_IDC_SESS_REPROGRAMMED: NO
MDC_IDC_SESS_TYPE: NORMAL
MDC_IDC_SET_BRADY_AT_MODE_SWITCH_MODE: NORMAL
MDC_IDC_SET_BRADY_AT_MODE_SWITCH_RATE: 170 {BEATS}/MIN
MDC_IDC_SET_BRADY_LOWRATE: 60 {BEATS}/MIN
MDC_IDC_SET_BRADY_MAX_SENSOR_RATE: 120 {BEATS}/MIN
MDC_IDC_SET_BRADY_MAX_TRACKING_RATE: 120 {BEATS}/MIN
MDC_IDC_SET_BRADY_MODE: NORMAL
MDC_IDC_SET_BRADY_PAV_DELAY_LOW: 250 MS
MDC_IDC_SET_BRADY_SAV_DELAY_LOW: 225 MS
MDC_IDC_SET_LEADCHNL_RA_PACING_AMPLITUDE: 1.38
MDC_IDC_SET_LEADCHNL_RA_PACING_ANODE_ELECTRODE_1: NORMAL
MDC_IDC_SET_LEADCHNL_RA_PACING_ANODE_LOCATION_1: NORMAL
MDC_IDC_SET_LEADCHNL_RA_PACING_CAPTURE_MODE: NORMAL
MDC_IDC_SET_LEADCHNL_RA_PACING_CATHODE_ELECTRODE_1: NORMAL
MDC_IDC_SET_LEADCHNL_RA_PACING_CATHODE_LOCATION_1: NORMAL
MDC_IDC_SET_LEADCHNL_RA_PACING_POLARITY: NORMAL
MDC_IDC_SET_LEADCHNL_RA_PACING_PULSEWIDTH: 0.4 MS
MDC_IDC_SET_LEADCHNL_RA_SENSING_ADAPTATION_MODE: NORMAL
MDC_IDC_SET_LEADCHNL_RA_SENSING_ANODE_ELECTRODE_1: NORMAL
MDC_IDC_SET_LEADCHNL_RA_SENSING_ANODE_LOCATION_1: NORMAL
MDC_IDC_SET_LEADCHNL_RA_SENSING_CATHODE_ELECTRODE_1: NORMAL
MDC_IDC_SET_LEADCHNL_RA_SENSING_CATHODE_LOCATION_1: NORMAL
MDC_IDC_SET_LEADCHNL_RA_SENSING_POLARITY: NORMAL
MDC_IDC_SET_LEADCHNL_RA_SENSING_SENSITIVITY: 0.3 MV
MDC_IDC_SET_LEADCHNL_RV_PACING_AMPLITUDE: 1 V
MDC_IDC_SET_LEADCHNL_RV_PACING_ANODE_ELECTRODE_1: NORMAL
MDC_IDC_SET_LEADCHNL_RV_PACING_ANODE_LOCATION_1: NORMAL
MDC_IDC_SET_LEADCHNL_RV_PACING_CAPTURE_MODE: NORMAL
MDC_IDC_SET_LEADCHNL_RV_PACING_CATHODE_ELECTRODE_1: NORMAL
MDC_IDC_SET_LEADCHNL_RV_PACING_CATHODE_LOCATION_1: NORMAL
MDC_IDC_SET_LEADCHNL_RV_PACING_POLARITY: NORMAL
MDC_IDC_SET_LEADCHNL_RV_PACING_PULSEWIDTH: 0.4 MS
MDC_IDC_SET_LEADCHNL_RV_SENSING_ADAPTATION_MODE: NORMAL
MDC_IDC_SET_LEADCHNL_RV_SENSING_ANODE_ELECTRODE_1: NORMAL
MDC_IDC_SET_LEADCHNL_RV_SENSING_ANODE_LOCATION_1: NORMAL
MDC_IDC_SET_LEADCHNL_RV_SENSING_CATHODE_ELECTRODE_1: NORMAL
MDC_IDC_SET_LEADCHNL_RV_SENSING_CATHODE_LOCATION_1: NORMAL
MDC_IDC_SET_LEADCHNL_RV_SENSING_POLARITY: NORMAL
MDC_IDC_SET_LEADCHNL_RV_SENSING_SENSITIVITY: 0.5 MV
MDC_IDC_STAT_AT_BURDEN_PERCENT: 4 %
MDC_IDC_STAT_AT_DTM_END: NORMAL
MDC_IDC_STAT_AT_DTM_START: NORMAL
MDC_IDC_STAT_AT_MODE_SW_COUNT: 3
MDC_IDC_STAT_AT_MODE_SW_COUNT_PER_DAY: 0
MDC_IDC_STAT_AT_MODE_SW_MAX_DURATION: NORMAL S
MDC_IDC_STAT_AT_MODE_SW_PERCENT_TIME: 4 %
MDC_IDC_STAT_BRADY_AP_VP_PERCENT: 45 %
MDC_IDC_STAT_BRADY_AP_VS_PERCENT: 1 %
MDC_IDC_STAT_BRADY_AS_VP_PERCENT: 55 %
MDC_IDC_STAT_BRADY_AS_VS_PERCENT: 1 %
MDC_IDC_STAT_BRADY_DTM_END: NORMAL
MDC_IDC_STAT_BRADY_DTM_START: NORMAL
MDC_IDC_STAT_BRADY_RA_PERCENT_PACED: 44 %
MDC_IDC_STAT_BRADY_RV_PERCENT_PACED: 99 %
MDC_IDC_STAT_CRT_DTM_END: NORMAL
MDC_IDC_STAT_CRT_DTM_START: NORMAL
MDC_IDC_STAT_HEART_RATE_ATRIAL_MAX: 330 {BEATS}/MIN
MDC_IDC_STAT_HEART_RATE_ATRIAL_MEAN: 105 {BEATS}/MIN
MDC_IDC_STAT_HEART_RATE_ATRIAL_MIN: 40 {BEATS}/MIN
MDC_IDC_STAT_HEART_RATE_DTM_END: NORMAL
MDC_IDC_STAT_HEART_RATE_DTM_START: NORMAL
MDC_IDC_STAT_HEART_RATE_VENTRICULAR_MAX: 200 {BEATS}/MIN
MDC_IDC_STAT_HEART_RATE_VENTRICULAR_MEAN: 69 {BEATS}/MIN
MDC_IDC_STAT_HEART_RATE_VENTRICULAR_MIN: 40 {BEATS}/MIN

## 2023-10-30 ENCOUNTER — LAB (OUTPATIENT)
Dept: LAB | Facility: CLINIC | Age: 83
End: 2023-10-30
Attending: INTERNAL MEDICINE
Payer: COMMERCIAL

## 2023-10-30 ENCOUNTER — HOSPITAL ENCOUNTER (OUTPATIENT)
Dept: CARDIOLOGY | Facility: CLINIC | Age: 83
Discharge: HOME | End: 2023-10-30
Attending: INTERNAL MEDICINE
Payer: COMMERCIAL

## 2023-10-30 DIAGNOSIS — I50.32 CHRONIC DIASTOLIC CONGESTIVE HEART FAILURE (H): ICD-10-CM

## 2023-10-30 DIAGNOSIS — I25.10 CORONARY ARTERY DISEASE INVOLVING NATIVE CORONARY ARTERY OF NATIVE HEART WITHOUT ANGINA PECTORIS: ICD-10-CM

## 2023-10-30 DIAGNOSIS — E78.5 HYPERLIPIDEMIA LDL GOAL <70: ICD-10-CM

## 2023-10-30 DIAGNOSIS — I42.8 OTHER CARDIOMYOPATHY (H): Primary | ICD-10-CM

## 2023-10-30 LAB
ALT SERPL W P-5'-P-CCNC: 29 U/L (ref 0–50)
ANION GAP SERPL CALCULATED.3IONS-SCNC: 11 MMOL/L (ref 7–15)
BUN SERPL-MCNC: 15.3 MG/DL (ref 8–23)
CALCIUM SERPL-MCNC: 9.4 MG/DL (ref 8.8–10.2)
CHLORIDE SERPL-SCNC: 100 MMOL/L (ref 98–107)
CHOLEST SERPL-MCNC: 173 MG/DL
CREAT SERPL-MCNC: 0.97 MG/DL (ref 0.51–0.95)
DEPRECATED HCO3 PLAS-SCNC: 27 MMOL/L (ref 22–29)
EGFRCR SERPLBLD CKD-EPI 2021: 57 ML/MIN/1.73M2
GLUCOSE SERPL-MCNC: 121 MG/DL (ref 70–99)
HDLC SERPL-MCNC: 74 MG/DL
LDLC SERPL CALC-MCNC: 77 MG/DL
LVEF ECHO: NORMAL
NONHDLC SERPL-MCNC: 99 MG/DL
POTASSIUM SERPL-SCNC: 4.1 MMOL/L (ref 3.4–5.3)
SODIUM SERPL-SCNC: 138 MMOL/L (ref 135–145)
TRIGL SERPL-MCNC: 110 MG/DL

## 2023-10-30 PROCEDURE — 80061 LIPID PANEL: CPT

## 2023-10-30 PROCEDURE — 999N000208 ECHOCARDIOGRAM COMPLETE

## 2023-10-30 PROCEDURE — 84460 ALANINE AMINO (ALT) (SGPT): CPT

## 2023-10-30 PROCEDURE — 36415 COLL VENOUS BLD VENIPUNCTURE: CPT

## 2023-10-30 PROCEDURE — 255N000002 HC RX 255 OP 636: Performed by: INTERNAL MEDICINE

## 2023-10-30 PROCEDURE — 93306 TTE W/DOPPLER COMPLETE: CPT | Mod: 26 | Performed by: INTERNAL MEDICINE

## 2023-10-30 PROCEDURE — 80048 BASIC METABOLIC PNL TOTAL CA: CPT

## 2023-10-30 RX ADMIN — HUMAN ALBUMIN MICROSPHERES AND PERFLUTREN 3 ML: 10; .22 INJECTION, SOLUTION INTRAVENOUS at 11:45

## 2023-11-06 ENCOUNTER — OFFICE VISIT (OUTPATIENT)
Dept: CARDIOLOGY | Facility: CLINIC | Age: 83
End: 2023-11-06
Payer: COMMERCIAL

## 2023-11-06 VITALS
BODY MASS INDEX: 31.5 KG/M2 | OXYGEN SATURATION: 97 % | HEIGHT: 63 IN | DIASTOLIC BLOOD PRESSURE: 80 MMHG | HEART RATE: 68 BPM | WEIGHT: 177.8 LBS | SYSTOLIC BLOOD PRESSURE: 154 MMHG

## 2023-11-06 DIAGNOSIS — I48.20 CHRONIC ATRIAL FIBRILLATION (H): ICD-10-CM

## 2023-11-06 DIAGNOSIS — I48.0 PAF (PAROXYSMAL ATRIAL FIBRILLATION) (H): Primary | ICD-10-CM

## 2023-11-06 DIAGNOSIS — I42.8 OTHER CARDIOMYOPATHY (H): ICD-10-CM

## 2023-11-06 DIAGNOSIS — R03.0 ELEVATED BLOOD PRESSURE READING WITHOUT DIAGNOSIS OF HYPERTENSION: ICD-10-CM

## 2023-11-06 DIAGNOSIS — I50.32 CHRONIC DIASTOLIC CONGESTIVE HEART FAILURE (H): ICD-10-CM

## 2023-11-06 DIAGNOSIS — Z95.0 CARDIAC PACEMAKER IN SITU: ICD-10-CM

## 2023-11-06 DIAGNOSIS — E78.5 HYPERLIPIDEMIA LDL GOAL <70: ICD-10-CM

## 2023-11-06 DIAGNOSIS — I25.10 CORONARY ARTERY DISEASE INVOLVING NATIVE CORONARY ARTERY OF NATIVE HEART WITHOUT ANGINA PECTORIS: ICD-10-CM

## 2023-11-06 PROCEDURE — 99215 OFFICE O/P EST HI 40 MIN: CPT | Performed by: INTERNAL MEDICINE

## 2023-11-06 RX ORDER — ROSUVASTATIN CALCIUM 40 MG/1
40 TABLET, COATED ORAL DAILY
Qty: 90 TABLET | Refills: 3 | Status: SHIPPED | OUTPATIENT
Start: 2023-11-06

## 2023-11-06 NOTE — LETTER
11/6/2023    Mikala Philip MD, MD  [address]  [place] [address]    RE: Hamida Verma       Dear Colleague,     I had the pleasure of seeing Hamida Verma in the [facility].  HPI and Plan:     It is my pleasure to see your patient Hamida Verma who is a very pleasant 83-year-old patient with a past history of chronic atrial fibrillation with difficult to control ventricular rates.  She ultimately underwent AV node ablation with permanent pacemaker placement.  This patient also has a history of coronary artery disease based upon calcification noted on CT scanning of her coronary arteries.  She had a nuclear stress test performed this time last year which showed no evidence of ischemia.  This patient also has a diagnosis of diastolic congestive heart failure.  She also has a cardiomyopathy with an ejection fraction of 45%.  The development of the cardiomyopathy appears to be related to the placement of the permanent pacemaker and the AV node ablation and that her ejection fraction was normal prior to this and then dropped to 45% afterwards.  The nuclear stress test showed no evidence of ischemia as I mentioned and this was performed after the ejection fraction had dropped.  With the addition of Entresto, diuretic therapy and the use of inhalers her symptoms of congestive heart failure markedly improved last year.    With that background in mind the patient is doing well.  Her breathing still remains well and she can do all of her activities of daily living.  I reviewed her echocardiogram personally today.  This shows that her ejection fraction is 45% and I fully agree with the interpretation of the echocardiogram.  She has marked septal dyssynchrony from pacing.  This clearly is adding to the reduction in ejection fraction.  However, there has been no change from last year.  No significant valvular heart disease is present.    Her lipid profile was reasonably good.  Because  she has coronary artery disease we would like to see the LDL below 70.  Her LDL was 77.  In April it was 81.  Her HDL is excellent on most recent testing a week ago at 74 and her triglycerides are normal at 110 with a total cholesterol of 173.  Liver function tests are normal with an ALT of 29.  She has borderline renal dysfunction but better than it has been 4 months ago and better than it was last year.  GFR is 57.    Impression:  1.  Chronic atrial fibrillation and status post AV node ablation and permanent pacemaker placement.  Patient's ventricular rate is well controlled.  2.  Normally functioning permanent pacemaker with some mode switching and ventricular rates are still controlled when she mode switches.  3.  Cardiomyopathy.  Her ejection fraction is unchanged from last year at 45%.  There is no evidence of ischemia on nuclear stress testing.  This is possibly related to continuous RV pacing  4.  LDL cholesterol is not at goal.  We would prefer the LDL cholesterol to be less than 70.  She is at maximum dose atorvastatin.  5.  No evidence of hepatic toxicity on statin therapy.  6.  Coronary artery disease.  The patient is asymptomatic with respect to coronary artery disease with no symptoms suggestive angina pectoris.  7.  Probable whitecoat hypertension.  Blood pressure is raised here today but normally her blood pressures are in the 120s.    Plan:  1.  We will have the patient wear a 24-hour blood pressure monitor to determine if her blood pressure is well controlled  2.  I will switch the patient from atorvastatin 80 mg to rosuvastatin 40 mg and recheck her lipids and ALT in 6 weeks time.  3.  We will have the patient return to see us in 1 years time at that stage we will repeat her echocardiogram basic metabolic profile and lipid profile.    As always the patient is been told to contact us if she has any questions or any concerns.      Roby Chase MD, FACC, FRCPI  Orders Placed This Encounter    Procedures    Lipid Profile    ALT    Basic metabolic panel    Lipid Profile    ALT    Follow-Up with Cardiology    24 Hour Blood Pressure Monitor - Adult       Orders Placed This Encounter   Medications    rosuvastatin (CRESTOR) 40 MG tablet     Sig: Take 1 tablet (40 mg) by mouth daily     Dispense:  90 tablet     Refill:  3       Medications Discontinued During This Encounter   Medication Reason    ciprofloxacin (CIPRO) 250 MG tablet Therapy completed (No AVS)    atorvastatin (LIPITOR) 80 MG tablet Alternate therapy         Encounter Diagnoses   Name Primary?    PAF (paroxysmal atrial fibrillation) (H) Yes    Chronic diastolic congestive heart failure (H)     Coronary artery disease involving native coronary artery of native heart without angina pectoris     Hyperlipidemia LDL goal <70     Elevated blood pressure reading without diagnosis of hypertension     Cardiac pacemaker in situ     Chronic atrial fibrillation (H)     Other cardiomyopathy (H)        CURRENT MEDICATIONS:  Current Outpatient Medications   Medication Sig Dispense Refill    albuterol (PROAIR HFA/PROVENTIL HFA/VENTOLIN HFA) 108 (90 Base) MCG/ACT inhaler Inhale 2 puffs into the lungs every 6 hours 18 g 3    cholecalciferol (VITAMIN  -D) 1000 UNIT capsule Take 1 capsule by mouth daily.      Coral Calcium 133-66.7-133 MG-MG-UNIT CAPS Take 2 tablets by mouth 2 times daily       FLAX SEED OIL OR 1 capsule daily      furosemide (LASIX) 20 MG tablet Take 1 tablet (20 mg) by mouth daily 90 tablet 3    metoprolol succinate ER (TOPROL XL) 50 MG 24 hr tablet Take 1 tablet (50 mg) by mouth 2 times daily 180 tablet 3    Multiple Vitamins-Minerals (HAIR SKIN NAILS PO) Take 1 tablet by mouth At Bedtime      potassium chloride ER (KLOR-CON M) 10 MEQ CR tablet Take 1 tablet (10 mEq) by mouth daily 90 tablet 3    rivaroxaban ANTICOAGULANT (XARELTO ANTICOAGULANT) 20 MG TABS tablet Take 1 tablet (20 mg) by mouth daily (with dinner) 90 tablet 3    rosuvastatin  (CRESTOR) 40 MG tablet Take 1 tablet (40 mg) by mouth daily 90 tablet 3    sacubitril-valsartan (ENTRESTO)  MG per tablet Take 1 tablet by mouth 2 times daily 180 tablet 3    senna (SENOKOT) 8.6 MG tablet Take 1 tablet by mouth 2 times daily as needed for constipation (while taking oxycodone) 20 tablet 0    tiotropium (SPIRIVA) 18 MCG inhaled capsule Inhale 1 capsule (18 mcg) into the lungs daily (Patient taking differently: Inhale 18 mcg into the lungs daily as needed) 30 capsule 1       ALLERGIES     Allergies   Allergen Reactions    Strawberries [Strawberry Extract] Anaphylaxis    Strawberry Flavor        PAST MEDICAL HISTORY:  Past Medical History:   Diagnosis Date    Atrial fibrillation (H)     Chronic diastolic CHF (congestive heart failure) (H)     COPD (chronic obstructive pulmonary disease) (H)     Essential hypertension, benign     HYPERLIPIDEMIA NEC/NOS 03/30/2006    Pulmonary hypertension (H)     Pyelonephritis 2019    SSS (sick sinus syndrome) (H)     s/p PPM 3/2018       PAST SURGICAL HISTORY:  Past Surgical History:   Procedure Laterality Date    EP ABLATION AV NODE N/A 5/7/2019    Procedure: EP Ablation AV Node;  Surgeon: Roe Voss MD;  Location:  HEART CARDIAC CATH LAB    EYE SURGERY      HYSTERECTOMY, VAGINAL      hysterectomy    LAPAROSCOPIC CHOLECYSTECTOMY N/A 6/1/2023    Procedure: Laparoscopic cholecystectomy with lysis of adhesions;  Surgeon: Sawyer Marcum MD;  Location:  OR       FAMILY HISTORY:  Family History   Problem Relation Age of Onset    Cerebrovascular Disease Mother     Glaucoma Mother     Macular Degeneration Mother     Alcohol/Drug Father         alcohol    Myocardial Infarction Father 63        passed away from MI    Family History Negative Sister     Family History Negative Sister     Family History Negative Sister     Cerebrovascular Disease Sister     Family History Negative Brother     Family History Negative Maternal Grandmother     Family History Negative  Maternal Grandfather     Family History Negative Paternal Grandmother     Family History Negative Paternal Grandfather     Myocardial Infarction Son 57        passed away from MI    Dementia Daughter 57        early onset on namenda    Diabetes No family hx of     Breast Cancer No family hx of     Cancer - colorectal No family hx of        SOCIAL HISTORY:  Social History     Socioeconomic History    Marital status:      Spouse name: None    Number of children: None    Years of education: None    Highest education level: None   Tobacco Use    Smoking status: Former     Packs/day: 1.50     Years: 45.00     Additional pack years: 0.00     Total pack years: 67.50     Types: Cigarettes     Start date: 10/28/1955     Quit date: 2000     Years since quittin.1    Smokeless tobacco: Never    Tobacco comments:     quit 6 yrs ago   Substance and Sexual Activity    Alcohol use: No    Drug use: No    Sexual activity: Yes     Partners: Male   Other Topics Concern    Parent/sibling w/ CABG, MI or angioplasty before 65F 55M? No   Social History Narrative    Balanced Diet - Yes    Osteoporosis Prevention Measures - Dairy servings per day: 2    Regular Exercise -  Yes Describe walk, but not recently due to weather    Dental Exam - Yes    Eye Exam -No    Self Breast Exam - Yes    Abuse: Current or Past (Physical, Sexual or Emotional)- No    Do you feel safe in your environment - Yes    Guns stored in the home - No    Sunscreen used - Yes    Seatbelts used - Yes    Lipids -  1/10    Glucose -  1/10    Colon Cancer Screening - Yes, 08    Hemoccults - NO    Pap Test -  Many yrs ago, has hysterectomy    Do you have any concerns about STD's -  No    Mammography - 08    DEXA - 06    Immunizations reviewed and up to date - No    Jonathan Marshall MA                   Review of Systems:  Skin:          Eyes:         ENT:         Respiratory:          Cardiovascular:         Gastroenterology:        Genitourinary:       "   Musculoskeletal:         Neurologic:         Psychiatric:         Heme/Lymph/Imm:         Endocrine:           Physical Exam:  Vitals: BP (!) 154/80   Pulse 68   Ht 1.6 m (5' 3\")   Wt 80.6 kg (177 lb 12.8 oz)   LMP  (LMP Unknown)   SpO2 97%   BMI 31.50 kg/m      Constitutional:           Skin:             Head:           Eyes:           Lymph:      ENT:           Neck:           Respiratory:            Cardiac:                                                           GI:           Extremities and Muscular Skeletal:                 Neurological:           Psych:           CC  No referring provider defined for this encounter.    Thank you for allowing me to participate in the care of your patient.      Sincerely,     Roby Mallory MD, MD     [facility]  "

## 2023-11-06 NOTE — PROGRESS NOTES
HPI and Plan:     It is my pleasure to see your patient Hamida Verma who is a very pleasant 83-year-old patient with a past history of chronic atrial fibrillation with difficult to control ventricular rates.  She ultimately underwent AV node ablation with permanent pacemaker placement.  This patient also has a history of coronary artery disease based upon calcification noted on CT scanning of her coronary arteries.  She had a nuclear stress test performed this time last year which showed no evidence of ischemia.  This patient also has a diagnosis of diastolic congestive heart failure.  She also has a cardiomyopathy with an ejection fraction of 45%.  The development of the cardiomyopathy appears to be related to the placement of the permanent pacemaker and the AV node ablation and that her ejection fraction was normal prior to this and then dropped to 45% afterwards.  The nuclear stress test showed no evidence of ischemia as I mentioned and this was performed after the ejection fraction had dropped.  With the addition of Entresto, diuretic therapy and the use of inhalers her symptoms of congestive heart failure markedly improved last year.    With that background in mind the patient is doing well.  Her breathing still remains well and she can do all of her activities of daily living.  I reviewed her echocardiogram personally today.  This shows that her ejection fraction is 45% and I fully agree with the interpretation of the echocardiogram.  She has marked septal dyssynchrony from pacing.  This clearly is adding to the reduction in ejection fraction.  However, there has been no change from last year.  No significant valvular heart disease is present.    Her lipid profile was reasonably good.  Because she has coronary artery disease we would like to see the LDL below 70.  Her LDL was 77.  In April it was 81.  Her HDL is excellent on most recent testing a week ago at 74 and her triglycerides are normal at 110 with a  total cholesterol of 173.  Liver function tests are normal with an ALT of 29.  She has borderline renal dysfunction but better than it has been 4 months ago and better than it was last year.  GFR is 57.    Impression:  1.  Chronic atrial fibrillation and status post AV node ablation and permanent pacemaker placement.  Patient's ventricular rate is well controlled.  2.  Normally functioning permanent pacemaker with some mode switching and ventricular rates are still controlled when she mode switches.  3.  Cardiomyopathy.  Her ejection fraction is unchanged from last year at 45%.  There is no evidence of ischemia on nuclear stress testing.  This is possibly related to continuous RV pacing  4.  LDL cholesterol is not at goal.  We would prefer the LDL cholesterol to be less than 70.  She is at maximum dose atorvastatin.  5.  No evidence of hepatic toxicity on statin therapy.  6.  Coronary artery disease.  The patient is asymptomatic with respect to coronary artery disease with no symptoms suggestive angina pectoris.  7.  Probable whitecoat hypertension.  Blood pressure is raised here today but normally her blood pressures are in the 120s.    Plan:  1.  We will have the patient wear a 24-hour blood pressure monitor to determine if her blood pressure is well controlled  2.  I will switch the patient from atorvastatin 80 mg to rosuvastatin 40 mg and recheck her lipids and ALT in 6 weeks time.  3.  We will have the patient return to see us in 1 years time at that stage we will repeat her echocardiogram basic metabolic profile and lipid profile.    As always the patient is been told to contact us if she has any questions or any concerns.      Roby Chase MD, FACC, FRCPI  Orders Placed This Encounter   Procedures    Lipid Profile    ALT    Basic metabolic panel    Lipid Profile    ALT    Follow-Up with Cardiology    24 Hour Blood Pressure Monitor - Adult       Orders Placed This Encounter   Medications    rosuvastatin  (CRESTOR) 40 MG tablet     Sig: Take 1 tablet (40 mg) by mouth daily     Dispense:  90 tablet     Refill:  3       Medications Discontinued During This Encounter   Medication Reason    ciprofloxacin (CIPRO) 250 MG tablet Therapy completed (No AVS)    atorvastatin (LIPITOR) 80 MG tablet Alternate therapy         Encounter Diagnoses   Name Primary?    PAF (paroxysmal atrial fibrillation) (H) Yes    Chronic diastolic congestive heart failure (H)     Coronary artery disease involving native coronary artery of native heart without angina pectoris     Hyperlipidemia LDL goal <70     Elevated blood pressure reading without diagnosis of hypertension     Cardiac pacemaker in situ     Chronic atrial fibrillation (H)     Other cardiomyopathy (H)        CURRENT MEDICATIONS:  Current Outpatient Medications   Medication Sig Dispense Refill    albuterol (PROAIR HFA/PROVENTIL HFA/VENTOLIN HFA) 108 (90 Base) MCG/ACT inhaler Inhale 2 puffs into the lungs every 6 hours 18 g 3    cholecalciferol (VITAMIN  -D) 1000 UNIT capsule Take 1 capsule by mouth daily.      Coral Calcium 133-66.7-133 MG-MG-UNIT CAPS Take 2 tablets by mouth 2 times daily       FLAX SEED OIL OR 1 capsule daily      furosemide (LASIX) 20 MG tablet Take 1 tablet (20 mg) by mouth daily 90 tablet 3    metoprolol succinate ER (TOPROL XL) 50 MG 24 hr tablet Take 1 tablet (50 mg) by mouth 2 times daily 180 tablet 3    Multiple Vitamins-Minerals (HAIR SKIN NAILS PO) Take 1 tablet by mouth At Bedtime      potassium chloride ER (KLOR-CON M) 10 MEQ CR tablet Take 1 tablet (10 mEq) by mouth daily 90 tablet 3    rivaroxaban ANTICOAGULANT (XARELTO ANTICOAGULANT) 20 MG TABS tablet Take 1 tablet (20 mg) by mouth daily (with dinner) 90 tablet 3    rosuvastatin (CRESTOR) 40 MG tablet Take 1 tablet (40 mg) by mouth daily 90 tablet 3    sacubitril-valsartan (ENTRESTO)  MG per tablet Take 1 tablet by mouth 2 times daily 180 tablet 3    senna (SENOKOT) 8.6 MG tablet Take 1 tablet  by mouth 2 times daily as needed for constipation (while taking oxycodone) 20 tablet 0    tiotropium (SPIRIVA) 18 MCG inhaled capsule Inhale 1 capsule (18 mcg) into the lungs daily (Patient taking differently: Inhale 18 mcg into the lungs daily as needed) 30 capsule 1       ALLERGIES     Allergies   Allergen Reactions    Strawberries [Strawberry Extract] Anaphylaxis    Strawberry Flavor        PAST MEDICAL HISTORY:  Past Medical History:   Diagnosis Date    Atrial fibrillation (H)     Chronic diastolic CHF (congestive heart failure) (H)     COPD (chronic obstructive pulmonary disease) (H)     Essential hypertension, benign     HYPERLIPIDEMIA NEC/NOS 03/30/2006    Pulmonary hypertension (H)     Pyelonephritis 2019    SSS (sick sinus syndrome) (H)     s/p PPM 3/2018       PAST SURGICAL HISTORY:  Past Surgical History:   Procedure Laterality Date    EP ABLATION AV NODE N/A 5/7/2019    Procedure: EP Ablation AV Node;  Surgeon: Roe Voss MD;  Location:  HEART CARDIAC CATH LAB    EYE SURGERY      HYSTERECTOMY, VAGINAL      hysterectomy    LAPAROSCOPIC CHOLECYSTECTOMY N/A 6/1/2023    Procedure: Laparoscopic cholecystectomy with lysis of adhesions;  Surgeon: Sawyer Marcum MD;  Location:  OR       FAMILY HISTORY:  Family History   Problem Relation Age of Onset    Cerebrovascular Disease Mother     Glaucoma Mother     Macular Degeneration Mother     Alcohol/Drug Father         alcohol    Myocardial Infarction Father 63        passed away from MI    Family History Negative Sister     Family History Negative Sister     Family History Negative Sister     Cerebrovascular Disease Sister     Family History Negative Brother     Family History Negative Maternal Grandmother     Family History Negative Maternal Grandfather     Family History Negative Paternal Grandmother     Family History Negative Paternal Grandfather     Myocardial Infarction Son 57        passed away from MI    Dementia Daughter 57        early onset on  "[place]enda    Diabetes No family hx of     Breast Cancer No family hx of     Cancer - colorectal No family hx of        SOCIAL HISTORY:  Social History     Socioeconomic History    Marital status:      Spouse name: None    Number of children: None    Years of education: None    Highest education level: None   Tobacco Use    Smoking status: Former     Packs/day: 1.50     Years: 45.00     Additional pack years: 0.00     Total pack years: 67.50     Types: Cigarettes     Start date: 10/28/1955     Quit date: 2000     Years since quittin.1    Smokeless tobacco: Never    Tobacco comments:     quit 6 yrs ago   Substance and Sexual Activity    Alcohol use: No    Drug use: No    Sexual activity: Yes     Partners: Male   Other Topics Concern    Parent/sibling w/ CABG, MI or angioplasty before 65F 55M? No   Social History Narrative    Balanced Diet - Yes    Osteoporosis Prevention Measures - Dairy servings per day: 2    Regular Exercise -  Yes Describe walk, but not recently due to weather    Dental Exam - Yes    Eye Exam -No    Self Breast Exam - Yes    Abuse: Current or Past (Physical, Sexual or Emotional)- No    Do you feel safe in your environment - Yes    Guns stored in the home - No    Sunscreen used - Yes    Seatbelts used - Yes    Lipids -  1/10    Glucose -  1/10    Colon Cancer Screening - Yes, 08    Hemoccults - NO    Pap Test -  Many yrs ago, has hysterectomy    Do you have any concerns about STD's -  No    Mammography - 08    DEXA - 06    Immunizations reviewed and up to date - No    Jonathan Marshall MA                   Review of Systems:  Skin:          Eyes:         ENT:         Respiratory:          Cardiovascular:         Gastroenterology:        Genitourinary:         Musculoskeletal:         Neurologic:         Psychiatric:         Heme/Lymph/Imm:         Endocrine:           Physical Exam:  Vitals: BP (!) 154/80   Pulse 68   Ht 1.6 m (5' 3\")   Wt 80.6 kg (177 lb 12.8 oz)   LMP  " (LMP Unknown)   SpO2 97%   BMI 31.50 kg/m      Constitutional:           Skin:             Head:           Eyes:           Lymph:      ENT:           Neck:           Respiratory:            Cardiac:                                                           GI:           Extremities and Muscular Skeletal:                 Neurological:           Psych:           CC  No referring provider defined for this encounter.

## 2023-11-14 ENCOUNTER — ANCILLARY PROCEDURE (OUTPATIENT)
Dept: CARDIOLOGY | Facility: CLINIC | Age: 83
End: 2023-11-14
Attending: INTERNAL MEDICINE
Payer: COMMERCIAL

## 2023-11-14 DIAGNOSIS — I48.0 PAF (PAROXYSMAL ATRIAL FIBRILLATION) (H): ICD-10-CM

## 2023-11-14 DIAGNOSIS — Z95.0 CARDIAC PACEMAKER IN SITU: ICD-10-CM

## 2023-11-14 DIAGNOSIS — I49.5 SSS (SICK SINUS SYNDROME) (H): ICD-10-CM

## 2023-11-14 DIAGNOSIS — I50.32 CHRONIC DIASTOLIC CHF (CONGESTIVE HEART FAILURE) (H): ICD-10-CM

## 2023-11-14 PROCEDURE — 93294 REM INTERROG EVL PM/LDLS PM: CPT | Performed by: INTERNAL MEDICINE

## 2023-11-14 PROCEDURE — 93296 REM INTERROG EVL PM/IDS: CPT | Performed by: INTERNAL MEDICINE

## 2023-11-24 LAB
MDC_IDC_LEAD_CONNECTION_STATUS: NORMAL
MDC_IDC_LEAD_CONNECTION_STATUS: NORMAL
MDC_IDC_LEAD_IMPLANT_DT: NORMAL
MDC_IDC_LEAD_IMPLANT_DT: NORMAL
MDC_IDC_LEAD_LOCATION: NORMAL
MDC_IDC_LEAD_LOCATION: NORMAL
MDC_IDC_LEAD_LOCATION_DETAIL_1: NORMAL
MDC_IDC_LEAD_LOCATION_DETAIL_1: NORMAL
MDC_IDC_LEAD_MFG: NORMAL
MDC_IDC_LEAD_MFG: NORMAL
MDC_IDC_LEAD_MODEL: NORMAL
MDC_IDC_LEAD_MODEL: NORMAL
MDC_IDC_LEAD_POLARITY_TYPE: NORMAL
MDC_IDC_LEAD_POLARITY_TYPE: NORMAL
MDC_IDC_LEAD_SERIAL: NORMAL
MDC_IDC_LEAD_SERIAL: NORMAL
MDC_IDC_MSMT_BATTERY_DTM: NORMAL
MDC_IDC_MSMT_BATTERY_REMAINING_LONGEVITY: 58 MO
MDC_IDC_MSMT_BATTERY_REMAINING_PERCENTAGE: 45 %
MDC_IDC_MSMT_BATTERY_RRT_TRIGGER: NORMAL
MDC_IDC_MSMT_BATTERY_STATUS: NORMAL
MDC_IDC_MSMT_BATTERY_VOLTAGE: 2.96 V
MDC_IDC_MSMT_LEADCHNL_RA_IMPEDANCE_VALUE: 480 OHM
MDC_IDC_MSMT_LEADCHNL_RA_LEAD_CHANNEL_STATUS: NORMAL
MDC_IDC_MSMT_LEADCHNL_RA_PACING_THRESHOLD_AMPLITUDE: 0.38 V
MDC_IDC_MSMT_LEADCHNL_RA_PACING_THRESHOLD_PULSEWIDTH: 0.4 MS
MDC_IDC_MSMT_LEADCHNL_RA_SENSING_INTR_AMPL: 2.4 MV
MDC_IDC_MSMT_LEADCHNL_RV_IMPEDANCE_VALUE: 480 OHM
MDC_IDC_MSMT_LEADCHNL_RV_LEAD_CHANNEL_STATUS: NORMAL
MDC_IDC_MSMT_LEADCHNL_RV_PACING_THRESHOLD_AMPLITUDE: 0.62 V
MDC_IDC_MSMT_LEADCHNL_RV_PACING_THRESHOLD_PULSEWIDTH: 0.4 MS
MDC_IDC_MSMT_LEADCHNL_RV_SENSING_INTR_AMPL: 12 MV
MDC_IDC_PG_IMPLANT_DTM: NORMAL
MDC_IDC_PG_MFG: NORMAL
MDC_IDC_PG_MODEL: NORMAL
MDC_IDC_PG_SERIAL: NORMAL
MDC_IDC_PG_TYPE: NORMAL
MDC_IDC_SESS_CLINIC_NAME: NORMAL
MDC_IDC_SESS_DTM: NORMAL
MDC_IDC_SESS_REPROGRAMMED: NO
MDC_IDC_SESS_TYPE: NORMAL
MDC_IDC_SET_BRADY_AT_MODE_SWITCH_MODE: NORMAL
MDC_IDC_SET_BRADY_AT_MODE_SWITCH_RATE: 170 {BEATS}/MIN
MDC_IDC_SET_BRADY_LOWRATE: 60 {BEATS}/MIN
MDC_IDC_SET_BRADY_MAX_SENSOR_RATE: 120 {BEATS}/MIN
MDC_IDC_SET_BRADY_MAX_TRACKING_RATE: 120 {BEATS}/MIN
MDC_IDC_SET_BRADY_MODE: NORMAL
MDC_IDC_SET_BRADY_PAV_DELAY_LOW: 250 MS
MDC_IDC_SET_BRADY_SAV_DELAY_LOW: 225 MS
MDC_IDC_SET_LEADCHNL_RA_PACING_AMPLITUDE: 1.38
MDC_IDC_SET_LEADCHNL_RA_PACING_ANODE_ELECTRODE_1: NORMAL
MDC_IDC_SET_LEADCHNL_RA_PACING_ANODE_LOCATION_1: NORMAL
MDC_IDC_SET_LEADCHNL_RA_PACING_CAPTURE_MODE: NORMAL
MDC_IDC_SET_LEADCHNL_RA_PACING_CATHODE_ELECTRODE_1: NORMAL
MDC_IDC_SET_LEADCHNL_RA_PACING_CATHODE_LOCATION_1: NORMAL
MDC_IDC_SET_LEADCHNL_RA_PACING_POLARITY: NORMAL
MDC_IDC_SET_LEADCHNL_RA_PACING_PULSEWIDTH: 0.4 MS
MDC_IDC_SET_LEADCHNL_RA_SENSING_ADAPTATION_MODE: NORMAL
MDC_IDC_SET_LEADCHNL_RA_SENSING_ANODE_ELECTRODE_1: NORMAL
MDC_IDC_SET_LEADCHNL_RA_SENSING_ANODE_LOCATION_1: NORMAL
MDC_IDC_SET_LEADCHNL_RA_SENSING_CATHODE_ELECTRODE_1: NORMAL
MDC_IDC_SET_LEADCHNL_RA_SENSING_CATHODE_LOCATION_1: NORMAL
MDC_IDC_SET_LEADCHNL_RA_SENSING_POLARITY: NORMAL
MDC_IDC_SET_LEADCHNL_RA_SENSING_SENSITIVITY: 0.3 MV
MDC_IDC_SET_LEADCHNL_RV_PACING_AMPLITUDE: 0.88
MDC_IDC_SET_LEADCHNL_RV_PACING_ANODE_ELECTRODE_1: NORMAL
MDC_IDC_SET_LEADCHNL_RV_PACING_ANODE_LOCATION_1: NORMAL
MDC_IDC_SET_LEADCHNL_RV_PACING_CAPTURE_MODE: NORMAL
MDC_IDC_SET_LEADCHNL_RV_PACING_CATHODE_ELECTRODE_1: NORMAL
MDC_IDC_SET_LEADCHNL_RV_PACING_CATHODE_LOCATION_1: NORMAL
MDC_IDC_SET_LEADCHNL_RV_PACING_POLARITY: NORMAL
MDC_IDC_SET_LEADCHNL_RV_PACING_PULSEWIDTH: 0.4 MS
MDC_IDC_SET_LEADCHNL_RV_SENSING_ADAPTATION_MODE: NORMAL
MDC_IDC_SET_LEADCHNL_RV_SENSING_ANODE_ELECTRODE_1: NORMAL
MDC_IDC_SET_LEADCHNL_RV_SENSING_ANODE_LOCATION_1: NORMAL
MDC_IDC_SET_LEADCHNL_RV_SENSING_CATHODE_ELECTRODE_1: NORMAL
MDC_IDC_SET_LEADCHNL_RV_SENSING_CATHODE_LOCATION_1: NORMAL
MDC_IDC_SET_LEADCHNL_RV_SENSING_POLARITY: NORMAL
MDC_IDC_SET_LEADCHNL_RV_SENSING_SENSITIVITY: 0.5 MV
MDC_IDC_STAT_AT_BURDEN_PERCENT: 0 %
MDC_IDC_STAT_AT_DTM_END: NORMAL
MDC_IDC_STAT_AT_DTM_START: NORMAL
MDC_IDC_STAT_AT_MODE_SW_COUNT: 0
MDC_IDC_STAT_AT_MODE_SW_COUNT_PER_DAY: 0
MDC_IDC_STAT_AT_MODE_SW_PERCENT_TIME: 0 %
MDC_IDC_STAT_BRADY_AP_VP_PERCENT: 52 %
MDC_IDC_STAT_BRADY_AP_VS_PERCENT: 1 %
MDC_IDC_STAT_BRADY_AS_VP_PERCENT: 48 %
MDC_IDC_STAT_BRADY_AS_VS_PERCENT: 1 %
MDC_IDC_STAT_BRADY_DTM_END: NORMAL
MDC_IDC_STAT_BRADY_DTM_START: NORMAL
MDC_IDC_STAT_BRADY_RA_PERCENT_PACED: 52 %
MDC_IDC_STAT_BRADY_RV_PERCENT_PACED: 99 %
MDC_IDC_STAT_CRT_DTM_END: NORMAL
MDC_IDC_STAT_CRT_DTM_START: NORMAL
MDC_IDC_STAT_HEART_RATE_ATRIAL_MAX: 290 {BEATS}/MIN
MDC_IDC_STAT_HEART_RATE_ATRIAL_MEAN: 68 {BEATS}/MIN
MDC_IDC_STAT_HEART_RATE_ATRIAL_MIN: 50 {BEATS}/MIN
MDC_IDC_STAT_HEART_RATE_DTM_END: NORMAL
MDC_IDC_STAT_HEART_RATE_DTM_START: NORMAL
MDC_IDC_STAT_HEART_RATE_VENTRICULAR_MAX: 180 {BEATS}/MIN
MDC_IDC_STAT_HEART_RATE_VENTRICULAR_MEAN: 68 {BEATS}/MIN
MDC_IDC_STAT_HEART_RATE_VENTRICULAR_MIN: 40 {BEATS}/MIN

## 2023-12-06 ENCOUNTER — APPOINTMENT (OUTPATIENT)
Dept: GENERAL RADIOLOGY | Facility: CLINIC | Age: 83
DRG: 322 | End: 2023-12-06
Attending: EMERGENCY MEDICINE
Payer: COMMERCIAL

## 2023-12-06 ENCOUNTER — APPOINTMENT (OUTPATIENT)
Dept: CARDIOLOGY | Facility: CLINIC | Age: 83
DRG: 322 | End: 2023-12-06
Payer: COMMERCIAL

## 2023-12-06 ENCOUNTER — APPOINTMENT (OUTPATIENT)
Dept: GENERAL RADIOLOGY | Facility: CLINIC | Age: 83
DRG: 322 | End: 2023-12-06
Payer: COMMERCIAL

## 2023-12-06 ENCOUNTER — HOSPITAL ENCOUNTER (INPATIENT)
Facility: CLINIC | Age: 83
LOS: 4 days | Discharge: HOME | DRG: 322 | End: 2023-12-10
Attending: EMERGENCY MEDICINE | Admitting: HOSPITALIST
Payer: COMMERCIAL

## 2023-12-06 ENCOUNTER — APPOINTMENT (OUTPATIENT)
Dept: ULTRASOUND IMAGING | Facility: CLINIC | Age: 83
DRG: 322 | End: 2023-12-06
Payer: COMMERCIAL

## 2023-12-06 DIAGNOSIS — I21.4 NSTEMI (NON-ST ELEVATED MYOCARDIAL INFARCTION) (H): ICD-10-CM

## 2023-12-06 DIAGNOSIS — I48.0 PAF (PAROXYSMAL ATRIAL FIBRILLATION) (H): ICD-10-CM

## 2023-12-06 DIAGNOSIS — I25.110 CORONARY ARTERY DISEASE INVOLVING NATIVE CORONARY ARTERY OF NATIVE HEART WITH UNSTABLE ANGINA PECTORIS (H): Primary | ICD-10-CM

## 2023-12-06 LAB
ALBUMIN SERPL BCG-MCNC: 4.1 G/DL (ref 3.5–5.2)
ALP SERPL-CCNC: 63 U/L (ref 40–150)
ALT SERPL W P-5'-P-CCNC: 22 U/L (ref 0–50)
ANION GAP SERPL CALCULATED.3IONS-SCNC: 13 MMOL/L (ref 7–15)
APTT PPP: 57 SECONDS (ref 22–38)
APTT PPP: 77 SECONDS (ref 22–38)
AST SERPL W P-5'-P-CCNC: 26 U/L (ref 0–45)
ATRIAL RATE - MUSE: 74 BPM
BASOPHILS # BLD AUTO: 0.1 10E3/UL (ref 0–0.2)
BASOPHILS NFR BLD AUTO: 1 %
BILIRUB DIRECT SERPL-MCNC: <0.2 MG/DL (ref 0–0.3)
BILIRUB SERPL-MCNC: 0.4 MG/DL
BUN SERPL-MCNC: 13.5 MG/DL (ref 8–23)
CALCIUM SERPL-MCNC: 9.3 MG/DL (ref 8.8–10.2)
CHLORIDE SERPL-SCNC: 107 MMOL/L (ref 98–107)
CREAT SERPL-MCNC: 0.85 MG/DL (ref 0.51–0.95)
DEPRECATED HCO3 PLAS-SCNC: 23 MMOL/L (ref 22–29)
DIASTOLIC BLOOD PRESSURE - MUSE: NORMAL MMHG
EGFRCR SERPLBLD CKD-EPI 2021: 67 ML/MIN/1.73M2
EOSINOPHIL # BLD AUTO: 0.2 10E3/UL (ref 0–0.7)
EOSINOPHIL NFR BLD AUTO: 3 %
ERYTHROCYTE [DISTWIDTH] IN BLOOD BY AUTOMATED COUNT: 13.1 % (ref 10–15)
GLUCOSE SERPL-MCNC: 126 MG/DL (ref 70–99)
HCT VFR BLD AUTO: 39.7 % (ref 35–47)
HGB BLD-MCNC: 12.8 G/DL (ref 11.7–15.7)
HOLD SPECIMEN: NORMAL
HOLD SPECIMEN: NORMAL
IMM GRANULOCYTES # BLD: 0 10E3/UL
IMM GRANULOCYTES NFR BLD: 0 %
INTERPRETATION ECG - MUSE: NORMAL
LVEF ECHO: NORMAL
LYMPHOCYTES # BLD AUTO: 1.2 10E3/UL (ref 0.8–5.3)
LYMPHOCYTES NFR BLD AUTO: 14 %
MAGNESIUM SERPL-MCNC: 2 MG/DL (ref 1.7–2.3)
MCH RBC QN AUTO: 29.1 PG (ref 26.5–33)
MCHC RBC AUTO-ENTMCNC: 32.2 G/DL (ref 31.5–36.5)
MCV RBC AUTO: 90 FL (ref 78–100)
MONOCYTES # BLD AUTO: 0.7 10E3/UL (ref 0–1.3)
MONOCYTES NFR BLD AUTO: 8 %
NEUTROPHILS # BLD AUTO: 6.3 10E3/UL (ref 1.6–8.3)
NEUTROPHILS NFR BLD AUTO: 74 %
NRBC # BLD AUTO: 0 10E3/UL
NRBC BLD AUTO-RTO: 0 /100
NT-PROBNP SERPL-MCNC: 552 PG/ML (ref 0–1800)
P AXIS - MUSE: 38 DEGREES
PLATELET # BLD AUTO: 357 10E3/UL (ref 150–450)
POTASSIUM SERPL-SCNC: 3.6 MMOL/L (ref 3.4–5.3)
PR INTERVAL - MUSE: NORMAL MS
PROT SERPL-MCNC: 6.8 G/DL (ref 6.4–8.3)
QRS DURATION - MUSE: 176 MS
QT - MUSE: 474 MS
QTC - MUSE: 526 MS
R AXIS - MUSE: -40 DEGREES
RBC # BLD AUTO: 4.4 10E6/UL (ref 3.8–5.2)
SODIUM SERPL-SCNC: 143 MMOL/L (ref 135–145)
SYSTOLIC BLOOD PRESSURE - MUSE: NORMAL MMHG
T AXIS - MUSE: 129 DEGREES
TROPONIN T SERPL HS-MCNC: 111 NG/L
TROPONIN T SERPL HS-MCNC: 123 NG/L
TROPONIN T SERPL HS-MCNC: 135 NG/L
TROPONIN T SERPL HS-MCNC: 65 NG/L
TROPONIN T SERPL HS-MCNC: 95 NG/L
VENTRICULAR RATE- MUSE: 74 BPM
WBC # BLD AUTO: 8.5 10E3/UL (ref 4–11)

## 2023-12-06 PROCEDURE — 80048 BASIC METABOLIC PNL TOTAL CA: CPT | Performed by: EMERGENCY MEDICINE

## 2023-12-06 PROCEDURE — 93321 DOPPLER ECHO F-UP/LMTD STD: CPT

## 2023-12-06 PROCEDURE — 93321 DOPPLER ECHO F-UP/LMTD STD: CPT | Mod: 26 | Performed by: INTERNAL MEDICINE

## 2023-12-06 PROCEDURE — 85730 THROMBOPLASTIN TIME PARTIAL: CPT

## 2023-12-06 PROCEDURE — 93325 DOPPLER ECHO COLOR FLOW MAPG: CPT | Mod: 26 | Performed by: INTERNAL MEDICINE

## 2023-12-06 PROCEDURE — 36415 COLL VENOUS BLD VENIPUNCTURE: CPT | Performed by: EMERGENCY MEDICINE

## 2023-12-06 PROCEDURE — 83735 ASSAY OF MAGNESIUM: CPT

## 2023-12-06 PROCEDURE — 84484 ASSAY OF TROPONIN QUANT: CPT | Performed by: EMERGENCY MEDICINE

## 2023-12-06 PROCEDURE — 73060 X-RAY EXAM OF HUMERUS: CPT | Mod: RT

## 2023-12-06 PROCEDURE — 71046 X-RAY EXAM CHEST 2 VIEWS: CPT

## 2023-12-06 PROCEDURE — 258N000003 HC RX IP 258 OP 636

## 2023-12-06 PROCEDURE — 93325 DOPPLER ECHO COLOR FLOW MAPG: CPT

## 2023-12-06 PROCEDURE — 93005 ELECTROCARDIOGRAM TRACING: CPT

## 2023-12-06 PROCEDURE — 99223 1ST HOSP IP/OBS HIGH 75: CPT | Mod: 25 | Performed by: INTERNAL MEDICINE

## 2023-12-06 PROCEDURE — 36415 COLL VENOUS BLD VENIPUNCTURE: CPT | Performed by: INTERNAL MEDICINE

## 2023-12-06 PROCEDURE — 250N000013 HC RX MED GY IP 250 OP 250 PS 637

## 2023-12-06 PROCEDURE — 85025 COMPLETE CBC W/AUTO DIFF WBC: CPT | Performed by: EMERGENCY MEDICINE

## 2023-12-06 PROCEDURE — 36415 COLL VENOUS BLD VENIPUNCTURE: CPT

## 2023-12-06 PROCEDURE — 93971 EXTREMITY STUDY: CPT | Mod: RT

## 2023-12-06 PROCEDURE — 80053 COMPREHEN METABOLIC PANEL: CPT | Performed by: EMERGENCY MEDICINE

## 2023-12-06 PROCEDURE — 210N000002 HC R&B HEART CARE

## 2023-12-06 PROCEDURE — 85730 THROMBOPLASTIN TIME PARTIAL: CPT | Performed by: INTERNAL MEDICINE

## 2023-12-06 PROCEDURE — 83880 ASSAY OF NATRIURETIC PEPTIDE: CPT

## 2023-12-06 PROCEDURE — 84484 ASSAY OF TROPONIN QUANT: CPT

## 2023-12-06 PROCEDURE — 82040 ASSAY OF SERUM ALBUMIN: CPT

## 2023-12-06 PROCEDURE — 250N000011 HC RX IP 250 OP 636: Mod: JZ

## 2023-12-06 PROCEDURE — 73030 X-RAY EXAM OF SHOULDER: CPT | Mod: RT

## 2023-12-06 PROCEDURE — 99285 EMERGENCY DEPT VISIT HI MDM: CPT | Mod: 25

## 2023-12-06 PROCEDURE — 99223 1ST HOSP IP/OBS HIGH 75: CPT

## 2023-12-06 PROCEDURE — 93308 TTE F-UP OR LMTD: CPT | Mod: 26 | Performed by: INTERNAL MEDICINE

## 2023-12-06 RX ORDER — ACETAMINOPHEN 650 MG/1
650 SUPPOSITORY RECTAL EVERY 4 HOURS PRN
Status: DISCONTINUED | OUTPATIENT
Start: 2023-12-06 | End: 2023-12-10 | Stop reason: HOSPADM

## 2023-12-06 RX ORDER — VITAMIN B COMPLEX
25 TABLET ORAL DAILY
Status: DISCONTINUED | OUTPATIENT
Start: 2023-12-07 | End: 2023-12-10 | Stop reason: HOSPADM

## 2023-12-06 RX ORDER — ROSUVASTATIN CALCIUM 20 MG/1
40 TABLET, COATED ORAL DAILY
Status: DISCONTINUED | OUTPATIENT
Start: 2023-12-07 | End: 2023-12-10 | Stop reason: HOSPADM

## 2023-12-06 RX ORDER — NITROGLYCERIN 0.4 MG/1
0.4 TABLET SUBLINGUAL EVERY 5 MIN PRN
Status: DISCONTINUED | OUTPATIENT
Start: 2023-12-06 | End: 2023-12-08

## 2023-12-06 RX ORDER — ACETAMINOPHEN 325 MG/1
650 TABLET ORAL EVERY 4 HOURS PRN
Status: DISCONTINUED | OUTPATIENT
Start: 2023-12-06 | End: 2023-12-10 | Stop reason: HOSPADM

## 2023-12-06 RX ORDER — MULTIPLE VITAMINS W/ MINERALS TAB 9MG-400MCG
1 TAB ORAL AT BEDTIME
Status: DISCONTINUED | OUTPATIENT
Start: 2023-12-06 | End: 2023-12-10 | Stop reason: HOSPADM

## 2023-12-06 RX ORDER — AMOXICILLIN 250 MG
1 CAPSULE ORAL 2 TIMES DAILY PRN
Status: DISCONTINUED | OUTPATIENT
Start: 2023-12-06 | End: 2023-12-10 | Stop reason: HOSPADM

## 2023-12-06 RX ORDER — ACETAMINOPHEN 325 MG/1
975 TABLET ORAL ONCE
Status: COMPLETED | OUTPATIENT
Start: 2023-12-06 | End: 2023-12-06

## 2023-12-06 RX ORDER — LABETALOL HYDROCHLORIDE 5 MG/ML
10 INJECTION, SOLUTION INTRAVENOUS EVERY 4 HOURS PRN
Status: DISCONTINUED | OUTPATIENT
Start: 2023-12-06 | End: 2023-12-10 | Stop reason: HOSPADM

## 2023-12-06 RX ORDER — ONDANSETRON 4 MG/1
4 TABLET, ORALLY DISINTEGRATING ORAL EVERY 6 HOURS PRN
Status: DISCONTINUED | OUTPATIENT
Start: 2023-12-06 | End: 2023-12-06

## 2023-12-06 RX ORDER — HEPARIN SODIUM 10000 [USP'U]/100ML
0-5000 INJECTION, SOLUTION INTRAVENOUS CONTINUOUS
Status: DISCONTINUED | OUTPATIENT
Start: 2023-12-06 | End: 2023-12-07

## 2023-12-06 RX ORDER — AMOXICILLIN 250 MG
2 CAPSULE ORAL 2 TIMES DAILY PRN
Status: DISCONTINUED | OUTPATIENT
Start: 2023-12-06 | End: 2023-12-10 | Stop reason: HOSPADM

## 2023-12-06 RX ORDER — FUROSEMIDE 20 MG
20 TABLET ORAL DAILY
Status: DISCONTINUED | OUTPATIENT
Start: 2023-12-07 | End: 2023-12-10 | Stop reason: HOSPADM

## 2023-12-06 RX ORDER — ONDANSETRON 2 MG/ML
4 INJECTION INTRAMUSCULAR; INTRAVENOUS EVERY 6 HOURS PRN
Status: DISCONTINUED | OUTPATIENT
Start: 2023-12-06 | End: 2023-12-06

## 2023-12-06 RX ORDER — POTASSIUM CHLORIDE 750 MG/1
10 TABLET, EXTENDED RELEASE ORAL DAILY
Status: DISCONTINUED | OUTPATIENT
Start: 2023-12-07 | End: 2023-12-10 | Stop reason: HOSPADM

## 2023-12-06 RX ORDER — ALBUTEROL SULFATE 90 UG/1
2 AEROSOL, METERED RESPIRATORY (INHALATION) EVERY 6 HOURS PRN
Status: DISCONTINUED | OUTPATIENT
Start: 2023-12-06 | End: 2023-12-10 | Stop reason: HOSPADM

## 2023-12-06 RX ORDER — CALCIUM CARBONATE 500 MG/1
1000 TABLET, CHEWABLE ORAL 4 TIMES DAILY PRN
Status: DISCONTINUED | OUTPATIENT
Start: 2023-12-06 | End: 2023-12-10 | Stop reason: HOSPADM

## 2023-12-06 RX ORDER — METOPROLOL SUCCINATE 50 MG/1
50 TABLET, EXTENDED RELEASE ORAL 2 TIMES DAILY
Status: DISCONTINUED | OUTPATIENT
Start: 2023-12-06 | End: 2023-12-07

## 2023-12-06 RX ADMIN — HEPARIN SODIUM 1000 UNITS/HR: 10000 INJECTION, SOLUTION INTRAVENOUS at 08:29

## 2023-12-06 RX ADMIN — SACUBITRIL AND VALSARTAN 1 TABLET: 97; 103 TABLET, FILM COATED ORAL at 13:33

## 2023-12-06 RX ADMIN — ACETAMINOPHEN 975 MG: 325 TABLET, FILM COATED ORAL at 06:50

## 2023-12-06 RX ADMIN — METOPROLOL SUCCINATE 50 MG: 50 TABLET, EXTENDED RELEASE ORAL at 13:33

## 2023-12-06 RX ADMIN — METOPROLOL SUCCINATE 50 MG: 50 TABLET, EXTENDED RELEASE ORAL at 20:44

## 2023-12-06 RX ADMIN — SODIUM CHLORIDE 250 ML: 9 INJECTION, SOLUTION INTRAVENOUS at 10:53

## 2023-12-06 RX ADMIN — SACUBITRIL AND VALSARTAN 1 TABLET: 97; 103 TABLET, FILM COATED ORAL at 20:44

## 2023-12-06 ASSESSMENT — ACTIVITIES OF DAILY LIVING (ADL)
ADLS_ACUITY_SCORE: 24
ADLS_ACUITY_SCORE: 24
ADLS_ACUITY_SCORE: 37
ADLS_ACUITY_SCORE: 24
ADLS_ACUITY_SCORE: 37
ADLS_ACUITY_SCORE: 24
ADLS_ACUITY_SCORE: 37

## 2023-12-06 NOTE — CONSULTS
[facility]    Cardiology Consultation     Date of Admission:  12/6/2023    Assessment & Plan   #1 NSTEMI with anginal equivalent right shoulder pain  #2 HTN  #3 Hyperlipidemia - controlled  #4 Sick sinus syndrome S/P AV node ablation and pacemaker 2019  #5 COPD   #6 Atrial fibrillation on Xarelto - last dose 5 PM 12/5  #7 Coronary artery calcifications with no history of known CAD - nuclear stress 2022 negative  #8 Diastolic heart failure with EF 45% (due to pacing).      It was a pleasure to be involved in the care of Ms. Verma. I reviewed her history and symptoms in detail and testing.  Discussed that would recommend that she go to coronary angiogram, with her understanding risks and benefits. Discussed as well that given that she is stable that will wait till tomorrow so she will have been off Xarelto for >36 hours.  She will let us know should she have recurrent pain.  Echo was ordered in the ER and pending.     She understands and is agreement with the plan. It was a pleasure to see her. Please do not hesitate to contact me with questions.     High complexity     Marta Delgadillo MD    Primary Care Physician   Mikala Pihlip MD    Reason for Consult   Reason for consult: I was asked by Abhijit Morris NP to evaluate this patient for NSTEMI    History of Present Illness   Ms. Verma is an 83 year old woman with a history of HTN, Hyperlipidemia, chronic afib on coumadin, AV node ablation and pacemaker placement in 2019 with drop in EF to 45% after do to cardiac dyssynchrony ().      She was in her normal state of health doing well, last seeing Dr. Mauri Chase 11/2023, until the early AM 12/6/23 when she awoke from sleep with severe shoulder pain that radiated to her chest. The shoulder pain resolved with nitroglycerin and after troponin came back at 65 and subsequently cortney to 100, she was started on heparin. She has had no recurrent pain. Her last dose of Xarelto  was  at 5 PM.        Past Medical History   Past Medical History:   Diagnosis Date    Atrial fibrillation (H)     Chronic diastolic CHF (congestive heart failure) (H)     COPD (chronic obstructive pulmonary disease) (H)     Essential hypertension, benign     HYPERLIPIDEMIA NEC/NOS 2006    Pulmonary hypertension (H)     Pyelonephritis     SSS (sick sinus syndrome) (H)     s/p PPM 3/2018     Past Surgical History   Past Surgical History:   Procedure Laterality Date    EP ABLATION AV NODE N/A 2019    Procedure: EP Ablation AV Node;  Surgeon: Roe Voss MD;  Location:  HEART CARDIAC CATH LAB    EYE SURGERY      HYSTERECTOMY, VAGINAL      hysterectomy    LAPAROSCOPIC CHOLECYSTECTOMY N/A 2023    Procedure: Laparoscopic cholecystectomy with lysis of adhesions;  Surgeon: Sawyer Marcum MD;  Location:  OR       Prior to Admission Medications   Prior to Admission Medications   Prescriptions Last Dose Informant Patient Reported? Taking?   FLAX SEED OIL OR 2023 Self Yes Yes   Si capsule daily   Multiple Vitamins-Minerals (HAIR SKIN NAILS PO) 2023 Self Yes Yes   Sig: Take 1 tablet by mouth At Bedtime   Multiple Vitamins-Minerals (ICAPS AREDS 2 PO) 2023  Yes Yes   Sig: Take 1 tablet by mouth daily   albuterol (PROAIR HFA/PROVENTIL HFA/VENTOLIN HFA) 108 (90 Base) MCG/ACT inhaler Unknown at prn  No Yes   Sig: Inhale 2 puffs into the lungs every 6 hours   Patient taking differently: Inhale 2 puffs into the lungs every 6 hours as needed   cholecalciferol (VITAMIN  -D) 1000 UNIT capsule 2023 Self Yes Yes   Sig: Take 1 capsule by mouth daily.   furosemide (LASIX) 20 MG tablet 2023  No Yes   Sig: Take 1 tablet (20 mg) by mouth daily   metoprolol succinate ER (TOPROL XL) 50 MG 24 hr tablet 2023  No Yes   Sig: Take 1 tablet (50 mg) by mouth 2 times daily   potassium chloride ER (KLOR-CON M) 10 MEQ CR tablet 2023  No Yes   Sig: Take 1 tablet (10 mEq) by mouth daily    rivaroxaban ANTICOAGULANT (XARELTO ANTICOAGULANT) 20 MG TABS tablet 2023 at PM  No Yes   Sig: Take 1 tablet (20 mg) by mouth daily (with dinner)   rosuvastatin (CRESTOR) 40 MG tablet 2023  No Yes   Sig: Take 1 tablet (40 mg) by mouth daily   sacubitril-valsartan (ENTRESTO)  MG per tablet 2023  No Yes   Sig: Take 1 tablet by mouth 2 times daily   tiotropium (SPIRIVA) 18 MCG inhaled capsule Unknown at prn  No Yes   Sig: Inhale 1 capsule (18 mcg) into the lungs daily   Patient taking differently: Inhale 18 mcg into the lungs daily as needed      Facility-Administered Medications: None     Current Facility-Administered Medications   Medication Dose Route Frequency    metoprolol succinate ER  50 mg Oral BID    sacubitril-valsartan  1 tablet Oral BID     Current Facility-Administered Medications   Medication Last Rate    heparin 1,000 Units/hr (23 0829)     Allergies   Allergies   Allergen Reactions    Strawberries [Strawberry Extract] Anaphylaxis    Strawberry Flavor        Social History    reports that she quit smoking about 23 years ago. Her smoking use included cigarettes. She started smoking about 68 years ago. She has a 67.50 pack-year smoking history. She has never used smokeless tobacco. She reports that she does not drink alcohol and does not use drugs.  Her   of an MI at age 67  Her son  of an MI at age 51  Her other son it living and has had stents  Her daughter lives with her with Lewy Body dementia    Family History   I have reviewed this patient's family history and updated it with pertinent information if needed.  Family History   Problem Relation Age of Onset    Cerebrovascular Disease Mother     Glaucoma Mother     Macular Degeneration Mother     Alcohol/Drug Father         alcohol    Myocardial Infarction Father 63        passed away from MI    Family History Negative Sister     Family History Negative Sister     Family History Negative Sister      "Cerebrovascular Disease Sister     Family History Negative Brother     Family History Negative Maternal Grandmother     Family History Negative Maternal Grandfather     Family History Negative Paternal Grandmother     Family History Negative Paternal Grandfather     Myocardial Infarction Son 57        passed away from MI    Dementia Daughter 57        early onset on namenda    Diabetes No family hx of     Breast Cancer No family hx of     Cancer - colorectal No family hx of           Review of Systems   A comprehensive review of system was performed and is negative other than that noted in the HPI or here.     Physical Exam   Vital Signs with Ranges  Temp:  [97.6  F (36.4  C)] 97.6  F (36.4  C)  Pulse:  [90] 90  Resp:  [14] 14  BP: (158)/(87) 158/87  SpO2:  [94 %] 94 %  Wt Readings from Last 4 Encounters:   12/06/23 81.6 kg (180 lb)   11/06/23 80.6 kg (177 lb 12.8 oz)   06/02/23 83.3 kg (183 lb 9.6 oz)   05/01/23 82.1 kg (181 lb)     No intake/output data recorded.      Vitals: BP (!) 158/87   Pulse 90   Temp 97.6  F (36.4  C) (Temporal)   Resp 14   Ht 1.6 m (5' 3\")   Wt 81.6 kg (180 lb)   LMP  (LMP Unknown)   SpO2 94%   BMI 31.89 kg/m      Physical Exam:   General - Alert and oriented to time place and person in no acute distress  Eyes - No scleral icterus  HEENT - Neck supple, moist mucous membranes  Cardiovascular - irregularly irregular with regular rate   Extremities - There is no edema  Respiratory - clear   Skin - No pallor or cyanosis  Gastrointestinal - Non tender and non distended without rebound or guarding  Psych - Appropriate affect   Neurological - No gross motor neurological focal deficits    No lab results found in last 7 days.    Invalid input(s): \"TROPONINIES\"    Recent Labs   Lab 12/06/23  0533   WBC 8.5   HGB 12.8   MCV 90         POTASSIUM 3.6   CHLORIDE 107   CO2 23   BUN 13.5   CR 0.85   GFRESTIMATED 67   ANIONGAP 13   GURWINDER 9.3   *   ALBUMIN 4.1   PROTTOTAL 6.8 " "  BILITOTAL 0.4   ALKPHOS 63   ALT 22   AST 26     Recent Labs   Lab Test 10/30/23  1044 04/28/23  0659   CHOL 173 161   HDL 74 64   LDL 77 81   TRIG 110 82     Recent Labs   Lab 12/06/23  0533   WBC 8.5   HGB 12.8   HCT 39.7   MCV 90        No results for input(s): \"PH\", \"PHV\", \"PO2\", \"PO2V\", \"SAT\", \"PCO2\", \"PCO2V\", \"HCO3\", \"HCO3V\" in the last 168 hours.  Recent Labs   Lab 12/06/23  0533   NTBNPI 552     No results for input(s): \"DD\" in the last 168 hours.  No results for input(s): \"SED\", \"CRP\" in the last 168 hours.  Recent Labs   Lab 12/06/23  0533        No results for input(s): \"TSH\" in the last 168 hours.  No results for input(s): \"COLOR\", \"APPEARANCE\", \"URINEGLC\", \"URINEBILI\", \"URINEKETONE\", \"SG\", \"UBLD\", \"URINEPH\", \"PROTEIN\", \"UROBILINOGEN\", \"NITRITE\", \"LEUKEST\", \"RBCU\", \"WBCU\" in the last 168 hours.    Imaging:  Recent Results (from the past 48 hour(s))   XR Shoulder Right G/E 3 Views    Narrative    EXAM: XR SHOULDER RIGHT G/E 3 VIEWS  LOCATION: [facility]  DATE: 12/6/2023    INDICATION: right shoulder pain  COMPARISON: None.      Impression    IMPRESSION: Normal joint spaces and alignment. No fracture.   XR Chest 2 Views    Narrative    CHEST TWO VIEWS 12/6/2023 7:13 AM     HISTORY: Evaluation of right shoulder pain, crackles left base.    COMPARISON: Chest radiograph 1/7/2019.       Impression    IMPRESSION: Fine reticular changes at the bases could reflect  component of fibroatelectasis. No focal consolidation, pleural  effusion or pneumothorax. Cardiomediastinal silhouette is  unremarkable. Left chest wall cardiac device. No acute osseous  abnormality.     DICK RIVERA MD         SYSTEM ID:  [id number]   Humerus XR, G/E 2 views, right    Narrative    EXAM: XR HUMERUS RIGHT G/E 2 VIEWS  DATE/TIME: 12/6/2023 7:14 AM    INDICATION: Evaluation of right arm pain, atraumatic  COMPARISON: None available.       Impression    IMPRESSION: No acute fracture. Medial " "and lateral humeral epicondyle  enthesopathy. Small marginal osteophyte from the inferior humeral  head.    NAVEED MENDOZA DO         SYSTEM ID:  [id number]       Echo:  No results found for this or any previous visit (from the past 4320 hour(s)).    Clinically Significant Risk Factors Present on Admission               # Drug Induced Coagulation Defect: home medication list includes an anticoagulant medication    # Hypertension: Noted on problem list  # Heart failure, NOS: heart failure noted on the problem list and last echo with EF 40-50%     # Obesity: Estimated body mass index is 31.89 kg/m  as calculated from the following:    Height as of this encounter: 1.6 m (5' 3\").    Weight as of this encounter: 81.6 kg (180 lb).       # COPD: noted on problem list  # Pacemaker present      Cardiac Arrhythmia: Atrial fibrillation: Chronic    Chronic Fatigue and Other Debilities: Age-related physical debility                "

## 2023-12-06 NOTE — ED PROVIDER NOTES
History     Chief Complaint:  Shoulder Pain       HPI   Hamida Verma is a 83 year old female with a history of hyperlipidemia, hypertension, atrial fibrillation, sick sinus syndrome status post pacemaker placement, CHF, pulmonary hypertension, CAD, CKD, and COPD who presents for evaluation of right arm pain.  Patient states that at 0100 this morning she developed onset of right shoulder pain that extended down to her right elbow.  She denies any trauma prior to the development of the symptoms.  She notes that her granddaughter felt that her hands were clammy but otherwise denies any concurrent symptoms.  Due to concern for possible cardiac etiology the patient presented to the ED for evaluation.  Currently, the patient states that her pain has improved.  She has not had symptoms like this previously.  She notes that her pacemaker was placed approximately 5 years ago and has been on Xarelto for several years as well and has not missed any doses of this medication. Her last dose of Xarelto was yesterday at 1700. The patient denies fever, upper respiratory symptoms, diaphoresis, syncope, neck pain, back pain, chest pain, shortness of breath, leg swelling, abdominal pain, nausea, vomiting, numbness, weakness, or recent trauma/injury.      Independent Historian:   None - Patient Only    Review of External Notes:   11/6/23: Patient was evaluated by cardiology, blood pressure needed to be reassessed due to elevated levels in clinic and patient was switched from atorvastatin to rosuvastatin due to LDL cholesterol not at goal, recommended follow-up within 1 year for repeat echocardiogram, BMP, and lipid profile.  Ventricular rate was well-controlled and ejection fraction was unchanged from 45% compared to the previous year.    Medications:    albuterol (PROAIR HFA/PROVENTIL HFA/VENTOLIN HFA) 108 (90 Base) MCG/ACT inhaler  cholecalciferol (VITAMIN  -D) 1000 UNIT capsule  Coral Calcium 133-66.7-133 MG-MG-UNIT CAPS  FLAX  "SEED OIL OR  furosemide (LASIX) 20 MG tablet  metoprolol succinate ER (TOPROL XL) 50 MG 24 hr tablet  Multiple Vitamins-Minerals (HAIR SKIN NAILS PO)  potassium chloride ER (KLOR-CON M) 10 MEQ CR tablet  rivaroxaban ANTICOAGULANT (XARELTO ANTICOAGULANT) 20 MG TABS tablet  rosuvastatin (CRESTOR) 40 MG tablet  sacubitril-valsartan (ENTRESTO)  MG per tablet  senna (SENOKOT) 8.6 MG tablet  tiotropium (SPIRIVA) 18 MCG inhaled capsule      Past Medical History:    Past Medical History:   Diagnosis Date    Atrial fibrillation (H)     Chronic diastolic CHF (congestive heart failure) (H)     COPD (chronic obstructive pulmonary disease) (H)     Essential hypertension, benign     HYPERLIPIDEMIA NEC/NOS 03/30/2006    Pulmonary hypertension (H)     Pyelonephritis 2019    SSS (sick sinus syndrome) (H)        Past Surgical History:    Past Surgical History:   Procedure Laterality Date    EP ABLATION AV NODE N/A 5/7/2019    Procedure: EP Ablation AV Node;  Surgeon: Roe Voss MD;  Location:  HEART CARDIAC CATH LAB    EYE SURGERY      HYSTERECTOMY, VAGINAL      hysterectomy    LAPAROSCOPIC CHOLECYSTECTOMY N/A 6/1/2023    Procedure: Laparoscopic cholecystectomy with lysis of adhesions;  Surgeon: Sawyer Marcum MD;  Location:  OR        Physical Exam   Patient Vitals for the past 24 hrs:   BP Temp Temp src Pulse Resp SpO2 Height Weight   12/06/23 0516 (!) 158/87 97.6  F (36.4  C) Temporal 90 14 94 % 1.6 m (5' 3\") 81.6 kg (180 lb)        Physical Exam  General: Alert, appears well-developed and well-nourished. Cooperative.     In mild distress  HEENT:  Head:  Atraumatic  Ears:  External ears are normal  Eyes:   Conjunctivae normal and EOM are normal. No scleral icterus.    Pupils are equal, round, and reactive to light.   Neck:   Normal range of motion. Neck supple.  CV:  Normal rate, regular rhythm, normal heart sounds and radial and dorsalis pedis pulses are 2+ and symmetric.  No murmur.  Resp:  Crackles of left lung " base.     Non-labored, no retractions or accessory muscle use  GI:  Abdomen is soft, no distension, no tenderness. No rebound or guarding.  MS:  RUE: TTP throughout right humerus. No obvious deformity or evidence of trauma. Compartments soft and nontender. No TTP over SC joint, clavicle, AC joint, olecranon, medial/lateral epicondyle, forearm. Radial pulse 2+.  Peripheral sensation intact light touch distally of median, ulnar, and radial nerve distribution.  Normal  strength.  5/5 strength of biceps and triceps.  Full range of motion of shoulder and elbow without pain or difficulty. Distal CMS intact.  Normal range of motion. No edema.    Normal strength in all 4 extremities.     Back atraumatic.    No midline cervical, thoracic, or lumbar tenderness  Skin:  Warm and dry.  No rash or lesions noted.  Neuro:   Alert. Normal strength.  Sensation intact in all 4 extremities.  Psych: Normal mood and affect.    Emergency Department Course   ECG  ECG results from 12/06/23   EKG 12 lead     Value    Systolic Blood Pressure     Diastolic Blood Pressure     Ventricular Rate 74    Atrial Rate 74    NV Interval     QRS Duration 176        QTc 526    P Axis 38    R AXIS -40    T Axis 129    Interpretation ECG      Ventricular-paced rhythm  Abnormal ECG  When compared with ECG of 30-MAY-2023 01:39,  Vent. rate has increased BY  12 BPM  Confirmed by GENERATED REPORT, COMPUTER ([id number]),  Compa Garcia ([id number]) on 12/6/2023 5:51:14 AM  Interpreted by Dr. Solomon     Imaging:  Humerus XR, G/E 2 views, right   Final Result   IMPRESSION: No acute fracture. Medial and lateral humeral epicondyle   enthesopathy. Small marginal osteophyte from the inferior humeral   head.      NAVEED MENDOZA DO            SYSTEM ID:  [id number]      XR Chest 2 Views   Preliminary Result   IMPRESSION: Fine reticular changes at the bases could reflect   component of fibroatelectasis. No focal consolidation, pleural   effusion or  pneumothorax. Cardiomediastinal silhouette is   unremarkable. Left chest wall cardiac device. No acute osseous   abnormality.       XR Shoulder Right G/E 3 Views   Final Result   IMPRESSION: Normal joint spaces and alignment. No fracture.         Laboratory:  Labs Ordered and Resulted from Time of ED Arrival to Time of ED Departure   BASIC METABOLIC PANEL - Abnormal       Result Value    Sodium 143      Potassium 3.6      Chloride 107      Carbon Dioxide (CO2) 23      Anion Gap 13      Urea Nitrogen 13.5      Creatinine 0.85      GFR Estimate 67      Calcium 9.3      Glucose 126 (*)    TROPONIN T, HIGH SENSITIVITY - Abnormal    Troponin T, High Sensitivity 65 (*)    TROPONIN T, HIGH SENSITIVITY - Abnormal    Troponin T, High Sensitivity 95 (*)    HEPATIC FUNCTION PANEL - Normal    Protein Total 6.8      Albumin 4.1      Bilirubin Total 0.4      Alkaline Phosphatase 63      AST 26      ALT 22      Bilirubin Direct <0.20     NT PROBNP INPATIENT - Normal    N terminal Pro BNP Inpatient 552     CBC WITH PLATELETS AND DIFFERENTIAL    WBC Count 8.5      RBC Count 4.40      Hemoglobin 12.8      Hematocrit 39.7      MCV 90      MCH 29.1      MCHC 32.2      RDW 13.1      Platelet Count 357      % Neutrophils 74      % Lymphocytes 14      % Monocytes 8      % Eosinophils 3      % Basophils 1      % Immature Granulocytes 0      NRBCs per 100 WBC 0      Absolute Neutrophils 6.3      Absolute Lymphocytes 1.2      Absolute Monocytes 0.7      Absolute Eosinophils 0.2      Absolute Basophils 0.1      Absolute Immature Granulocytes 0.0      Absolute NRBCs 0.0     TROPONIN T, HIGH SENSITIVITY        Procedures   None    Emergency Department Course & Assessments:    Interventions:  Medications   heparin infusion 25,000 units in D5W 250 mL ANTICOAGULANT (1,000 Units/hr Intravenous $New Bag 12/6/23 0887)   acetaminophen (TYLENOL) tablet 975 mg (975 mg Oral $Given 12/6/23 0650)        Assessments:  0628: Initial evaluation and  assessment.    Independent Interpretation (X-rays, CTs, rhythm strip):  XR shoulder right: No obvious fracture, normal joint spaces and alignment.  XR Humerus: No obvious fracture.  XR chest: Pacemaker in place.  No obvious pleural effusion or pneumothorax.    Consultations/Discussion of Management or Tests:  Patient was seen in conjunction with Dr. Solomon.  0809: Discussed the case with Dr. Delgadillo, cardiologist who agreed with plan for heparin and admission, with likely cardiac catheterization today.  0823: Discussed the case with MICHEAL Lacey, admitting on behalf of Dr. Fagan, hospitalist, agreed to accept the patient for admission to cardiac inpatient floor.    Social Determinants of Health affecting care:   None    Disposition:  The patient was admitted to the hospital under the care of Dr. Fagan.     Impression & Plan    CMS Diagnoses: None    Medical Decision Making:  Hamida Verma is a 83 year old female with a history of hyperlipidemia, hypertension, atrial fibrillation, sick sinus syndrome status post pacemaker placement, CHF, pulmonary hypertension, CAD, CKD, and COPD who presents for evaluation of right arm pain.  The patient developed right shoulder pain last night around 1 AM and symptoms had nearly resolved while in the ED.  On exam, the patient had regular rate and rhythm with crackles of the left lung base and tenderness palpation throughout the right shoulder and humerus.  Vital signs show mild hypertension without fever, tachycardia, or hypoxia.  Blood work shows no evidence of leukocytosis, anemia, or electrolyte abnormalities.  BNP is within normal limits.  Right shoulder and humerus x-ray showed no evidence of fracture with normal joint spaces and alignment. Chest x-ray showed fine reticular changes at the bases possibly consistent with fibroatelectasis without any focal consolidation, effusion, or pneumothorax. EKG showed a ventricular paced rhythm without any obvious signs of ischemia.   Serial troponins however increased from 65-90 5 over 2 hours, indicative of NSTEMI.  Provided the patient with Tylenol for pain and started her on a heparin drip for initial treatment.  We discussed the case with Dr. Delgadillo from cardiology who was in agreement with plan for admission and stated that we should continue to trend troponins and patient will likely patient would likely have a cardiac catheterization today.  Her last dose of Xarelto was at 1700 yesterday.  We discussed the case with MICHEAL Lacey admitting on behalf of Dr. Fagan from the hospitalist team who agreed to accept the patient for admission on the cardiac floor.  These findings and plan were discussed with the patient and her daughter as well who are also in agreement.  All questions were answered.      Diagnosis:    ICD-10-CM    1. NSTEMI (non-ST elevated myocardial infarction) (H)  I21.4            Margaret Smith PA-C  12/6/2023        Margaret Smith PA-C  12/06/23 1345

## 2023-12-06 NOTE — Clinical Note
Hemodynamic equipment used: 5 lead ECG, [employer] With 3 Leads, Machine BP Cuff and pulse oximeter probe.

## 2023-12-06 NOTE — ED NOTES
Pt was complaining of chest pain under R breast; described it as sharp. Stated that the pain did not radiate to her shoulder and that they were two separate issues.

## 2023-12-06 NOTE — Clinical Note
The first balloon was inserted into the circumflex.Max pressure = 12 bright. Total duration = 11 seconds.     Max pressure = 16 bright. Total duration = 11 seconds.    Balloon reinflated a second time: Max pressure = 16 bright. Total duration = 11 seconds.  Balloon reinflated a third time: Max pressure = 16 bright. Total duration = 11 seconds.

## 2023-12-06 NOTE — PROGRESS NOTES
RECEIVING UNIT ED HANDOFF REVIEW    ED Nurse Handoff Report was reviewed by: Eva Rajan RN on December 6, 2023 at 2:06 PM

## 2023-12-06 NOTE — ED NOTES
"[facility]  ED Nurse Handoff Report    ED Chief complaint: Shoulder Pain      ED Diagnosis:   Final diagnoses:   NSTEMI (non-ST elevated myocardial infarction) (H)       Code Status: to be addressed by admitting MD    Allergies:   Allergies   Allergen Reactions    Strawberries [Strawberry Extract] Anaphylaxis    Strawberry Flavor        Patient Story: pt presents to ED for right shoulder pain- pt reports a fall 1 month ago but states she never had shoulder pain until last night  Focused Assessment:  pt is A & O X 4, on the cardiac monitoring, VSS, heparin gtt started for elevated troponin- plan for angiocath today. Pt NPO at this time.    Treatments and/or interventions provided:   Patient's response to treatments and/or interventions:     To be done/followed up on inpatient unit:      Does this patient have any cognitive concerns?:  none    Activity level - Baseline/Home:  Independent  Activity Level - Current:   Stand with Assist    Patient's Preferred language: English   Needed?: No    Isolation: None  Infection: Not Applicable  Patient tested for COVID 19 prior to admission: NO  Bariatric?: No    Vital Signs:   Vitals:    12/06/23 0516   BP: (!) 158/87   Pulse: 90   Resp: 14   Temp: 97.6  F (36.4  C)   TempSrc: Temporal   SpO2: 94%   Weight: 81.6 kg (180 lb)   Height: 1.6 m (5' 3\")       Cardiac Rhythm:     Was the PSS-3 completed:   Yes  What interventions are required if any?               Family Comments: granddaughter at bedside but has gone home as pt lives with daughter who has lewy body dementia and needs to be cared for  OBS brochure/video discussed/provided to patient/family: N/A              Name of person given brochure if not patient:               Relationship to patient:     For the majority of the shift this patient's behavior was Green.   Behavioral interventions performed were .    ED NURSE PHONE NUMBER: [id number]        "

## 2023-12-06 NOTE — ED NOTES
Pt placed on cardiac monitor, second troponin drawn and sent to lab, pt and daughter updated on plan of care, call light in reach   Dr. Marques: I performed a face to face bedside interview with patient regarding history of present illness, review of symptoms and past medical history. I completed an independent physical exam.  I have discussed patient's plan of care with PA.   I agree with note as stated above, having amended the EMR as needed to reflect my findings.   This includes HISTORY OF PRESENT ILLNESS, HIV, PAST MEDICAL/SURGICAL/FAMILY/SOCIAL HISTORY, ALLERGIES AND HOME MEDICATIONS, REVIEW OF SYSTEMS, PHYSICAL EXAM, and any PROGRESS NOTES during the time I functioned as the attending physician for this patient.    dr marques; pt pushed from swing by richard, landed on left arm. elbow pain, swelling. no head injury, no loc  xray, splint ortho f/u    chart written by dr marques

## 2023-12-06 NOTE — ED TRIAGE NOTES
"Patient states right shoulder pain, goes down into right arm.  Started at 1 am.    Seen by EMS at her home.  States \"they didn't know if it was from my heart or not\".   States \"I don't really have any pain anymore\"     Triage Assessment (Adult)       Row Name 12/06/23 0515          Triage Assessment    Airway WDL WDL        Respiratory WDL    Respiratory WDL WDL        Cardiac WDL    Cardiac WDL WDL        Cognitive/Neuro/Behavioral WDL    Cognitive/Neuro/Behavioral WDL WDL                     "

## 2023-12-06 NOTE — Clinical Note
The first balloon was inserted into the circumflex.Max pressure = 8 bright. Total duration = 24 seconds.     Max pressure = 6 bright. Total duration = 20 seconds.    Balloon reinflated a second time: Max pressure = 6 bright. Total duration = 20 seconds.

## 2023-12-06 NOTE — H&P
[facility]    History and Physical - Hospitalist Service       Date of Admission:  12/6/2023    Assessment & Plan      Hamida Verma is a 83 year old female with a PMH significant for hyperlipidemia, hypertension, atrial fibrillation, sick sinus syndrome s/p pacemaker placement, CHF, pulmonary hypertension, CAD, CKD, and COPD.  admitted on 12/6/2023 for right shoulder pain.      ED summary: In the ED diagnostic workup remarkable for , K3.6, creatinine 0.85, calcium 9.3, BUN 26, initial Trop 65, followed by 95, AST 26, ALT 22, alk phos 63, bilirubin direct less than 0.2, proBNP 552, WBC 8.5, Hgb 12.8, platelet 35.  Twelve-lead EKG shows ventricular paced rhythm without any new ischemic changes.  Right humerus x-ray shows medial lateral femoral epicondyle enthesopathy.  Small marginal osteophyte from the inferior humeral head.  Shoulder x-ray shows normal joint spaces and alignment, no acute fracture.  CXR shows no focal consolidation, pleural effusion, or pneumothorax.  Pulm reticular changes in the bases could reflect component of fibroatelectasis.  In the ED patient received 975 mg, was initiated on heparin infusion.    Right Shoulder Pain  NSTEMI  Patient woke up at 1 AM this morning with severe right shoulder pain.  She was given 324 mg ASA and 1 SL nitroglycerin by EMS. Right shoulder pain improved following SL nitroglycerin. Patient denied any exacerbating factors of her pain.  She did not take any medication for her pain prior to coming to the hospital.  She denies any recent trauma.  She does report she had a fall about a month and a half ago landing on her right side however she did not have any right radiated into her preceding the fall.  Patient denies missing any doses of Xarelto.  Last dose last evening at 1700.  Patient denied any pain radiating into her neck, chest, or back.  She also denies any associated SOB, palpitations, or diaphoresis.  She does report right  shoulder pain radiated into her right breast.  On exam right arm does appear to be slightly swollen compared to left arm.  She has full range of motion without range of motion limitations.  She does endorse tenderness along the medial aspect of her left arm.  Not cellulitic appearing; no erythema rashes, or lesions..  No tenderness in right breast on exam.  Differentials considered include ACS, fracture, musculoskeletal pain, tendinitis, and DVT.  Given x-ray imaging negative for acute fracture and troponin trending upward (65>95), suspect right arm pain due to acute coronary syndrome.  - Admit to inpatient to [facility]  - Heparin infusion  - Cardiology Consult  - Cardiac Rehab  - PRN sublingual nitroglycerin for chest pain  - NPO for angiogram  - Telemetry  - Echocardiogram  - Trend troponins to peak  - AM BMP and CBC  - RN managed K and mg replacement protocols  - PRN tylenol for pain  - PRN Zofran for nausea  - Fall precautions    CAD  Hypertension  Hyperlipidemia  Pulmonary Hypertension  Atrial Fibrillation  CHF  Sick sinus syndrome s/p pacemaker placement  PTA regimen includes: Furosemide 20 mg daily, metoprolol succinate ER 50 mg BID, Rivaroxaban 20 mg daily, rosuvastatin 40 mg daily, and Entresto  BID.   - Continue PTA furosemide, metoprolol, rosuvastatin, and Entresto  - Hold Rivaroxaban in setting of heparin infusion; appreciate recommendations from cardiology regarding anticoagulation and antiplatelet therapy.  - Telemetry    CKD, stage 3  Baseline creatinine 0.97-1.01. Creatinine on admission 0.85.   - Avoid nephrotoxic agents including NSAIDs and contrast dye  - Strict I/Os  - Will provide gentle 250 ml fluid bolus in setting of NPO and exposure to contrast dye during angiogram    COPD  PTA meds include PRN albuterol and spiriva.  - Continue PTA inhalers.          Diet:  NPO  DVT Prophylaxis: Pneumatic Compression Devices  Lopez Catheter: Not present  Lines: None     Cardiac Monitoring: None  Code  "Status: No CPR- Do NOT Intubate    Clinically Significant Risk Factors Present on Admission               # Drug Induced Coagulation Defect: home medication list includes an anticoagulant medication    # Hypertension: Noted on problem list  # Heart failure, NOS: heart failure noted on the problem list and last echo with EF 40-50%     # Obesity: Estimated body mass index is 31.89 kg/m  as calculated from the following:    Height as of this encounter: 1.6 m (5' 3\").    Weight as of this encounter: 81.6 kg (180 lb).       # COPD: noted on problem list    # Pacemaker present       Disposition Plan      Expected Discharge Date: 12/08/2023                The patient's care was discussed with the Attending Physician, Dr. Robertson .    Abhijit Morris NP  Hospitalist Service  [facility]  Securely message with [employer] (more info)  Text page via [facility] Paging/Directory     ______________________________________________________________________    Chief Complaint   Right shoulder pain    History is obtained from the patient and chart review.    History of Present Illness   Hamida Verma is a 83 year old female with a PMH significant for hyperlipidemia, hypertension, atrial fibrillation, sick sinus syndrome s/p pacemaker placement, CHF, pulmonary hypertension, CAD, CKD, and COPD.  admitted on 12/6/2023 for right shoulder pain.      Patient woke up at 1 AM this morning with severe right shoulder pain.  She denies any recent trauma.  She does report she had a fall about a month and a half ago landing on her right side however she did not have any right shoulder pain preceding the fall.  Patient denies missing any doses of Xarelto.  Last dose last evening at 1700.  Patient denied any pain radiating into her neck, chest, or back.  She does report she had pain in her right breast.    ED summary: In the ED diagnostic workup remarkable for , K3.6, creatinine 0.85, calcium 9.3, BUN 26, initial Trop " 65, followed by 95, AST 26, ALT 22, alk phos 63, bilirubin direct less than 0.2, proBNP 552, WBC 8.5, Hgb 12.8, platelet 35.  Twelve-lead EKG shows ventricular paced rhythm without any new ischemic changes.  Right humerus x-ray shows medial lateral femoral epicondyle enthesopathy.  Small marginal osteophyte from the inferior humeral head.  Shoulder x-ray shows normal joint spaces and alignment, no acute fracture.  CXR shows no focal consolidation, pleural effusion, or pneumothorax.  Pulm reticular changes in the bases could reflect component of fibroatelectasis.    Upon arrival patient is non-toxic appearing, not in acute distress. She denies fever, chills, chest pain, SOB, palpitations, headache, lightheadedness, dizziness, nausea, vomiting, diarrhea, dysuria, urinary urgency/frequency, or abnormal bruising/bleeding.     Past Medical History    Past Medical History:   Diagnosis Date    Atrial fibrillation (H)     Chronic diastolic CHF (congestive heart failure) (H)     COPD (chronic obstructive pulmonary disease) (H)     Essential hypertension, benign     HYPERLIPIDEMIA NEC/NOS 2006    Pulmonary hypertension (H)     Pyelonephritis     SSS (sick sinus syndrome) (H)     s/p PPM 3/2018       Past Surgical History   Past Surgical History:   Procedure Laterality Date    EP ABLATION AV NODE N/A 2019    Procedure: EP Ablation AV Node;  Surgeon: Roe Voss MD;  Location:  HEART CARDIAC CATH LAB    EYE SURGERY      HYSTERECTOMY, VAGINAL      hysterectomy    LAPAROSCOPIC CHOLECYSTECTOMY N/A 2023    Procedure: Laparoscopic cholecystectomy with lysis of adhesions;  Surgeon: Sawyer Marcum MD;  Location:  OR       Prior to Admission Medications   Prior to Admission Medications   Prescriptions Last Dose Informant Patient Reported? Taking?   FLAX SEED OIL OR 2023 Self Yes Yes   Si capsule daily   Multiple Vitamins-Minerals (HAIR SKIN NAILS PO) 2023 Self Yes Yes   Sig: Take 1 tablet by  mouth At Bedtime   Multiple Vitamins-Minerals (ICAPS AREDS 2 PO) 2023  Yes Yes   Sig: Take 1 tablet by mouth daily   albuterol (PROAIR HFA/PROVENTIL HFA/VENTOLIN HFA) 108 (90 Base) MCG/ACT inhaler Unknown at prn  No Yes   Sig: Inhale 2 puffs into the lungs every 6 hours   Patient taking differently: Inhale 2 puffs into the lungs every 6 hours as needed   cholecalciferol (VITAMIN  -D) 1000 UNIT capsule 2023 Self Yes Yes   Sig: Take 1 capsule by mouth daily.   furosemide (LASIX) 20 MG tablet 2023  No Yes   Sig: Take 1 tablet (20 mg) by mouth daily   metoprolol succinate ER (TOPROL XL) 50 MG 24 hr tablet 2023  No Yes   Sig: Take 1 tablet (50 mg) by mouth 2 times daily   potassium chloride ER (KLOR-CON M) 10 MEQ CR tablet 2023  No Yes   Sig: Take 1 tablet (10 mEq) by mouth daily   rivaroxaban ANTICOAGULANT (XARELTO ANTICOAGULANT) 20 MG TABS tablet 2023 at PM  No Yes   Sig: Take 1 tablet (20 mg) by mouth daily (with dinner)   rosuvastatin (CRESTOR) 40 MG tablet 2023  No Yes   Sig: Take 1 tablet (40 mg) by mouth daily   sacubitril-valsartan (ENTRESTO)  MG per tablet 2023  No Yes   Sig: Take 1 tablet by mouth 2 times daily   tiotropium (SPIRIVA) 18 MCG inhaled capsule Unknown at prn  No Yes   Sig: Inhale 1 capsule (18 mcg) into the lungs daily   Patient taking differently: Inhale 18 mcg into the lungs daily as needed      Facility-Administered Medications: None        Social History   I have reviewed this patient's social history and updated it with pertinent information if needed.  Social History     Tobacco Use    Smoking status: Former     Packs/day: 1.50     Years: 45.00     Additional pack years: 0.00     Total pack years: 67.50     Types: Cigarettes     Start date: 10/28/1955     Quit date: 2000     Years since quittin.2    Smokeless tobacco: Never    Tobacco comments:     quit 6 yrs ago   Substance Use Topics    Alcohol use: No    Drug use: No         Family  History   I have reviewed this patient's family history and updated it with pertinent information if needed.  Family History   Problem Relation Age of Onset    Cerebrovascular Disease Mother     Glaucoma Mother     Macular Degeneration Mother     Alcohol/Drug Father         alcohol    Myocardial Infarction Father 63        passed away from MI    Family History Negative Sister     Family History Negative Sister     Family History Negative Sister     Cerebrovascular Disease Sister     Family History Negative Brother     Family History Negative Maternal Grandmother     Family History Negative Maternal Grandfather     Family History Negative Paternal Grandmother     Family History Negative Paternal Grandfather     Myocardial Infarction Son 57        passed away from MI    Dementia Daughter 57        early onset on [place]    Diabetes No family hx of     Breast Cancer No family hx of     Cancer - colorectal No family hx of          Allergies   Allergies   Allergen Reactions    Strawberries [Strawberry Extract] Anaphylaxis    Strawberry Flavor         Physical Exam   Vital Signs: Temp: 97.6  F (36.4  C) Temp src: Temporal BP: (!) 158/87 Pulse: 90   Resp: 14 SpO2: 94 % O2 Device: None (Room air)    Weight: 180 lbs 0 oz    EXAM:   General:  Appears stated age, no acute distress. A&O x 3.  Skin:  Warm, dry. No erythema, rash, lesions, or deformities on RUE.  HEENT:  Normocephalic, atraumatic; EOMs grossly intact.  Neck:  Supple.  Chest:  Breath sounds CTA and no increased work of breathing on room air.  Cardiovascular:  HR irregular, no rub or murmur. No peripheral edema.  Abdomen:  Soft, non-tender, non-distended.  Musculoskeletal:  Moves all four extremities. Right upper extremities has full range of motion. No pain induced by flexion, extension, adduction, or abduction of RUE. RUE slightly swollen compared to LUE. +2 radial pulse in both upper extremities.   Neurological:  No focal neurological deficits.   Psychiatric:   Affect and mood congruent.      Medical Decision Making       75 MINUTES SPENT BY ME on the date of service doing chart review, history, exam, documentation & further activities per the note.      Data     I have personally reviewed the following data over the past 24 hrs:    8.5  \   12.8   / 357     143 107 13.5 /  126 (H)   3.6 23 0.85 \     ALT: 22 AST: 26 AP: 63 TBILI: 0.4   ALB: 4.1 TOT PROTEIN: 6.8 LIPASE: N/A     Trop: 95 (H) BNP: 552       Imaging results reviewed over the past 24 hrs:   Recent Results (from the past 24 hour(s))   XR Shoulder Right G/E 3 Views    Narrative    EXAM: XR SHOULDER RIGHT G/E 3 VIEWS  LOCATION: [facility]  DATE: 12/6/2023    INDICATION: right shoulder pain  COMPARISON: None.      Impression    IMPRESSION: Normal joint spaces and alignment. No fracture.   XR Chest 2 Views    Narrative    CHEST TWO VIEWS 12/6/2023 7:13 AM     HISTORY: Evaluation of right shoulder pain, crackles left base.    COMPARISON: Chest radiograph 1/7/2019.       Impression    IMPRESSION: Fine reticular changes at the bases could reflect  component of fibroatelectasis. No focal consolidation, pleural  effusion or pneumothorax. Cardiomediastinal silhouette is  unremarkable. Left chest wall cardiac device. No acute osseous  abnormality.     DICK RIVERA MD         SYSTEM ID:  [id number]   Humerus XR, G/E 2 views, right    Narrative    EXAM: XR HUMERUS RIGHT G/E 2 VIEWS  DATE/TIME: 12/6/2023 7:14 AM    INDICATION: Evaluation of right arm pain, atraumatic  COMPARISON: None available.       Impression    IMPRESSION: No acute fracture. Medial and lateral humeral epicondyle  enthesopathy. Small marginal osteophyte from the inferior humeral  head.    NAVEED MENDOZA DO         SYSTEM ID:  [id number]

## 2023-12-06 NOTE — PHARMACY-ADMISSION MEDICATION HISTORY
Pharmacist Admission Medication History    Admission medication history is complete. The information provided in this note is only as accurate as the sources available at the time of the update.    Information Source(s): Patient, Hospital records, and CareEverywhere/SureScripts via in-person    Pertinent Information: None    Changes made to PTA medication list:  Added: AREDs  Deleted: None  Changed: None    Medication Affordability:  Not including over the counter (OTC) medications, was there a time in the past 3 months when you did not take your medications as prescribed because of cost?: No      Medication History Completed By: Rudolph Alatorre [facility] 12/6/2023 9:22 AM    PTA Med List   Medication Sig Last Dose    albuterol (PROAIR HFA/PROVENTIL HFA/VENTOLIN HFA) 108 (90 Base) MCG/ACT inhaler Inhale 2 puffs into the lungs every 6 hours (Patient taking differently: Inhale 2 puffs into the lungs every 6 hours as needed) Unknown at prn    cholecalciferol (VITAMIN  -D) 1000 UNIT capsule Take 1 capsule by mouth daily. 12/5/2023    FLAX SEED OIL OR 1 capsule daily 12/5/2023    furosemide (LASIX) 20 MG tablet Take 1 tablet (20 mg) by mouth daily 12/5/2023    metoprolol succinate ER (TOPROL XL) 50 MG 24 hr tablet Take 1 tablet (50 mg) by mouth 2 times daily 12/5/2023    Multiple Vitamins-Minerals (HAIR SKIN NAILS PO) Take 1 tablet by mouth At Bedtime 12/5/2023    Multiple Vitamins-Minerals (ICAPS AREDS 2 PO) Take 1 tablet by mouth daily 12/5/2023    potassium chloride ER (KLOR-CON M) 10 MEQ CR tablet Take 1 tablet (10 mEq) by mouth daily 12/5/2023    rivaroxaban ANTICOAGULANT (XARELTO ANTICOAGULANT) 20 MG TABS tablet Take 1 tablet (20 mg) by mouth daily (with dinner) 12/5/2023 at PM    rosuvastatin (CRESTOR) 40 MG tablet Take 1 tablet (40 mg) by mouth daily 12/5/2023    sacubitril-valsartan (ENTRESTO)  MG per tablet Take 1 tablet by mouth 2 times daily 12/5/2023    tiotropium (SPIRIVA) 18 MCG inhaled capsule Inhale 1  capsule (18 mcg) into the lungs daily (Patient taking differently: Inhale 18 mcg into the lungs daily as needed) Unknown at prn

## 2023-12-06 NOTE — ED NOTES
Spoke with NP on hospitalist team, pt is going to have regular diet today with NPO tomorrow for cath lab, hold blood thinners

## 2023-12-06 NOTE — PLAN OF CARE
December 6, 2023  Shift:AM  Hamida Verma  83 year old  YOB: 1940    Reason for admission:NSTEMI (non-ST elevated myocardial infarction) (H) [I21.4]    Cognition/Mentation:A/Ox4  Neuros/CMS:intact  VS:stable   Cardiac:SR w/ 1 AVB and BBB  GI:Intact  :Intact  Pulmonary:diminished lower lobes  Pain:none  Drains/Lines:Hep gtt @1000u.   Skin:WDL, moist under breasts    Activity:independent   Diet:regular  Discharge:pending angio tomorrow     Shift summary:Pt was admitted to unit this shift. Has been stable and on hep gtt. Complains of no pain. No hypertension. Resting comfortably.     Eva Rajan RN on 12/6/2023 at 6:04 PM

## 2023-12-06 NOTE — ED PROVIDER NOTES
Emergency Department Attending Supervision Note  12/6/2023  6:34 AM      I evaluated this patient in conjunction with Margaret Smith PA-C    Briefly, the patient presented with right shoulder pain. Patient has a history of hyperlipidemia, hypertension, atrial fibrillation, sick sinus syndrome status post pacemaker placement, CHF, pulmonary hypertension, CAD, CKD, and COPD. Patient states that at 0100 this morning she developed onset of right shoulder pain that extended down to her right elbow.  She denies any trauma prior to the development of the symptoms.  She notes that her granddaughter felt that her hands were clammy but otherwise denies any concurrent symptoms.  Due to concern for possible cardiac etiology the patient presented to the ED for evaluation.  Currently, the patient states that her pain has improved.  She has not had symptoms like this previously.  She notes that her pacemaker was placed approximately 5 years ago and has been on Xarelto for several years as well and does not miss any doses of this medication.  The patient denies fever, upper respiratory symptoms, diaphoresis, syncope, neck pain, back pain, chest pain, shortness of breath, leg swelling, abdominal pain, nausea, vomiting, numbness, weakness, or recent trauma/injury.     On my exam,   Vitals: reviewed by me  General: Pt seen on [facility], pleasant, cooperative, and alert to conversation.  Not diaphoretic, and watching television.  Eyes: Tracking well, clear conjunctiva BL  Resp:  No tachypnea, no accessory muscle use.   MSK: no obvious peripheral edema or joint effusion.    Skin: No rash, normal turgor and temperature  Neuro: Clear speech and no facial droop.      My impression is that this is a very pleasant 83-year-old female with atraumatic right shoulder pain that I think we must presume is an NSTEMI at this time.  Her troponins are elevated and increasing, and while I do appreciate a history of A-fib and CHF, those 2  maladies do not seem to be an acute exacerbation, and we have started heparin for this reason.  I do think that she needs to be seen by cardiology, her EKG does not show any obvious evidence of a STEMI, notably however she is paced.  Will monitor her very carefully until the next inpatient bed is available, patient does not have any unstable vitals at this time, and understands that she will be admitted.  She received aspirin per EMS        Diagnosis    ICD-10-CM    1. NSTEMI (non-ST elevated myocardial infarction) (H)  I21.4             Mynor Solomon MD Fitzgerald, Michael Maxwell Kreofsky, MD  12/06/23 132

## 2023-12-06 NOTE — Clinical Note
The first balloon was inserted into the circumflex.Max pressure = 12 bright. Total duration = 12 seconds.

## 2023-12-07 LAB
ANION GAP SERPL CALCULATED.3IONS-SCNC: 7 MMOL/L (ref 7–15)
APTT PPP: 46 SECONDS (ref 22–38)
APTT PPP: 87 SECONDS (ref 22–38)
BUN SERPL-MCNC: 9.4 MG/DL (ref 8–23)
CALCIUM SERPL-MCNC: 8.9 MG/DL (ref 8.8–10.2)
CHLORIDE SERPL-SCNC: 108 MMOL/L (ref 98–107)
CREAT SERPL-MCNC: 0.83 MG/DL (ref 0.51–0.95)
DEPRECATED HCO3 PLAS-SCNC: 25 MMOL/L (ref 22–29)
EGFRCR SERPLBLD CKD-EPI 2021: 69 ML/MIN/1.73M2
ERYTHROCYTE [DISTWIDTH] IN BLOOD BY AUTOMATED COUNT: 13.2 % (ref 10–15)
ERYTHROCYTE [DISTWIDTH] IN BLOOD BY AUTOMATED COUNT: 13.2 % (ref 10–15)
GLUCOSE SERPL-MCNC: 99 MG/DL (ref 70–99)
HCT VFR BLD AUTO: 33.3 % (ref 35–47)
HCT VFR BLD AUTO: 36.2 % (ref 35–47)
HGB BLD-MCNC: 10.1 G/DL (ref 11.7–15.7)
HGB BLD-MCNC: 10.7 G/DL (ref 11.7–15.7)
HGB BLD-MCNC: 10.9 G/DL (ref 11.7–15.7)
HGB BLD-MCNC: 11.7 G/DL (ref 11.7–15.7)
MCH RBC QN AUTO: 29.1 PG (ref 26.5–33)
MCH RBC QN AUTO: 29.3 PG (ref 26.5–33)
MCHC RBC AUTO-ENTMCNC: 32.3 G/DL (ref 31.5–36.5)
MCHC RBC AUTO-ENTMCNC: 32.7 G/DL (ref 31.5–36.5)
MCV RBC AUTO: 89 FL (ref 78–100)
MCV RBC AUTO: 91 FL (ref 78–100)
PLATELET # BLD AUTO: 278 10E3/UL (ref 150–450)
PLATELET # BLD AUTO: 317 10E3/UL (ref 150–450)
POTASSIUM SERPL-SCNC: 4 MMOL/L (ref 3.4–5.3)
RBC # BLD AUTO: 3.74 10E6/UL (ref 3.8–5.2)
RBC # BLD AUTO: 4 10E6/UL (ref 3.8–5.2)
SODIUM SERPL-SCNC: 140 MMOL/L (ref 135–145)
WBC # BLD AUTO: 5.4 10E3/UL (ref 4–11)
WBC # BLD AUTO: 5.9 10E3/UL (ref 4–11)

## 2023-12-07 PROCEDURE — 85014 HEMATOCRIT: CPT | Performed by: NURSE PRACTITIONER

## 2023-12-07 PROCEDURE — 99233 SBSQ HOSP IP/OBS HIGH 50: CPT | Performed by: HOSPITALIST

## 2023-12-07 PROCEDURE — 258N000003 HC RX IP 258 OP 636: Performed by: NURSE PRACTITIONER

## 2023-12-07 PROCEDURE — 250N000011 HC RX IP 250 OP 636: Mod: JZ

## 2023-12-07 PROCEDURE — 272N000001 HC OR GENERAL SUPPLY STERILE: Performed by: INTERNAL MEDICINE

## 2023-12-07 PROCEDURE — 99153 MOD SED SAME PHYS/QHP EA: CPT | Performed by: INTERNAL MEDICINE

## 2023-12-07 PROCEDURE — 210N000002 HC R&B HEART CARE

## 2023-12-07 PROCEDURE — 99152 MOD SED SAME PHYS/QHP 5/>YRS: CPT | Performed by: INTERNAL MEDICINE

## 2023-12-07 PROCEDURE — 99233 SBSQ HOSP IP/OBS HIGH 50: CPT | Mod: 25 | Performed by: INTERNAL MEDICINE

## 2023-12-07 PROCEDURE — 250N000013 HC RX MED GY IP 250 OP 250 PS 637: Performed by: NURSE PRACTITIONER

## 2023-12-07 PROCEDURE — 36415 COLL VENOUS BLD VENIPUNCTURE: CPT | Performed by: NURSE PRACTITIONER

## 2023-12-07 PROCEDURE — 250N000011 HC RX IP 250 OP 636: Performed by: INTERNAL MEDICINE

## 2023-12-07 PROCEDURE — 93454 CORONARY ARTERY ANGIO S&I: CPT | Performed by: INTERNAL MEDICINE

## 2023-12-07 PROCEDURE — 85730 THROMBOPLASTIN TIME PARTIAL: CPT | Performed by: HOSPITALIST

## 2023-12-07 PROCEDURE — B2111ZZ FLUOROSCOPY OF MULTIPLE CORONARY ARTERIES USING LOW OSMOLAR CONTRAST: ICD-10-PCS | Performed by: INTERNAL MEDICINE

## 2023-12-07 PROCEDURE — 250N000013 HC RX MED GY IP 250 OP 250 PS 637

## 2023-12-07 PROCEDURE — 250N000011 HC RX IP 250 OP 636: Mod: JZ | Performed by: INTERNAL MEDICINE

## 2023-12-07 PROCEDURE — 250N000009 HC RX 250: Performed by: INTERNAL MEDICINE

## 2023-12-07 PROCEDURE — 36415 COLL VENOUS BLD VENIPUNCTURE: CPT

## 2023-12-07 PROCEDURE — 85027 COMPLETE CBC AUTOMATED: CPT

## 2023-12-07 PROCEDURE — 93454 CORONARY ARTERY ANGIO S&I: CPT | Mod: 26 | Performed by: INTERNAL MEDICINE

## 2023-12-07 PROCEDURE — 250N000013 HC RX MED GY IP 250 OP 250 PS 637: Performed by: INTERNAL MEDICINE

## 2023-12-07 PROCEDURE — 93005 ELECTROCARDIOGRAM TRACING: CPT

## 2023-12-07 PROCEDURE — 85018 HEMOGLOBIN: CPT | Performed by: NURSE PRACTITIONER

## 2023-12-07 PROCEDURE — 80048 BASIC METABOLIC PNL TOTAL CA: CPT

## 2023-12-07 RX ORDER — FENTANYL CITRATE 50 UG/ML
25 INJECTION, SOLUTION INTRAMUSCULAR; INTRAVENOUS
Status: DISCONTINUED | OUTPATIENT
Start: 2023-12-07 | End: 2023-12-08

## 2023-12-07 RX ORDER — ATROPINE SULFATE 0.1 MG/ML
0.5 INJECTION INTRAVENOUS
Status: ACTIVE | OUTPATIENT
Start: 2023-12-07 | End: 2023-12-07

## 2023-12-07 RX ORDER — OXYCODONE HYDROCHLORIDE 5 MG/1
10 TABLET ORAL EVERY 4 HOURS PRN
Status: DISCONTINUED | OUTPATIENT
Start: 2023-12-07 | End: 2023-12-10 | Stop reason: HOSPADM

## 2023-12-07 RX ORDER — SODIUM CHLORIDE 9 MG/ML
75 INJECTION, SOLUTION INTRAVENOUS CONTINUOUS
Status: ACTIVE | OUTPATIENT
Start: 2023-12-07 | End: 2023-12-07

## 2023-12-07 RX ORDER — LORAZEPAM 2 MG/ML
0.5 INJECTION INTRAMUSCULAR
Status: DISCONTINUED | OUTPATIENT
Start: 2023-12-07 | End: 2023-12-07 | Stop reason: HOSPADM

## 2023-12-07 RX ORDER — NALOXONE HYDROCHLORIDE 0.4 MG/ML
0.4 INJECTION, SOLUTION INTRAMUSCULAR; INTRAVENOUS; SUBCUTANEOUS
Status: ACTIVE | OUTPATIENT
Start: 2023-12-07 | End: 2023-12-07

## 2023-12-07 RX ORDER — CARVEDILOL 12.5 MG/1
12.5 TABLET ORAL 2 TIMES DAILY WITH MEALS
Status: DISCONTINUED | OUTPATIENT
Start: 2023-12-07 | End: 2023-12-10 | Stop reason: HOSPADM

## 2023-12-07 RX ORDER — FLUMAZENIL 0.1 MG/ML
0.2 INJECTION, SOLUTION INTRAVENOUS
Status: ACTIVE | OUTPATIENT
Start: 2023-12-07 | End: 2023-12-07

## 2023-12-07 RX ORDER — OXYCODONE HYDROCHLORIDE 5 MG/1
5 TABLET ORAL EVERY 4 HOURS PRN
Status: DISCONTINUED | OUTPATIENT
Start: 2023-12-07 | End: 2023-12-10 | Stop reason: HOSPADM

## 2023-12-07 RX ORDER — LIDOCAINE 40 MG/G
CREAM TOPICAL
Status: CANCELLED | OUTPATIENT
Start: 2023-12-07

## 2023-12-07 RX ORDER — NALOXONE HYDROCHLORIDE 0.4 MG/ML
0.2 INJECTION, SOLUTION INTRAMUSCULAR; INTRAVENOUS; SUBCUTANEOUS
Status: ACTIVE | OUTPATIENT
Start: 2023-12-07 | End: 2023-12-07

## 2023-12-07 RX ORDER — POTASSIUM CHLORIDE 1500 MG/1
20 TABLET, EXTENDED RELEASE ORAL
Status: DISCONTINUED | OUTPATIENT
Start: 2023-12-07 | End: 2023-12-07 | Stop reason: HOSPADM

## 2023-12-07 RX ORDER — HYDRALAZINE HYDROCHLORIDE 20 MG/ML
INJECTION INTRAMUSCULAR; INTRAVENOUS
Status: DISCONTINUED | OUTPATIENT
Start: 2023-12-07 | End: 2023-12-07 | Stop reason: HOSPADM

## 2023-12-07 RX ORDER — HEPARIN SODIUM 10000 [USP'U]/100ML
0-5000 INJECTION, SOLUTION INTRAVENOUS CONTINUOUS
Status: DISCONTINUED | OUTPATIENT
Start: 2023-12-07 | End: 2023-12-08

## 2023-12-07 RX ORDER — ASPIRIN 81 MG/1
243 TABLET, CHEWABLE ORAL ONCE
Status: COMPLETED | OUTPATIENT
Start: 2023-12-07 | End: 2023-12-07

## 2023-12-07 RX ORDER — SODIUM CHLORIDE 9 MG/ML
INJECTION, SOLUTION INTRAVENOUS CONTINUOUS
Status: DISCONTINUED | OUTPATIENT
Start: 2023-12-07 | End: 2023-12-07 | Stop reason: HOSPADM

## 2023-12-07 RX ORDER — ACETAMINOPHEN 325 MG/1
650 TABLET ORAL EVERY 4 HOURS PRN
Status: DISCONTINUED | OUTPATIENT
Start: 2023-12-07 | End: 2023-12-07

## 2023-12-07 RX ORDER — CLOPIDOGREL BISULFATE 75 MG/1
75 TABLET ORAL DAILY
Status: DISCONTINUED | OUTPATIENT
Start: 2023-12-07 | End: 2023-12-10 | Stop reason: HOSPADM

## 2023-12-07 RX ORDER — FENTANYL CITRATE 50 UG/ML
INJECTION, SOLUTION INTRAMUSCULAR; INTRAVENOUS
Status: DISCONTINUED | OUTPATIENT
Start: 2023-12-07 | End: 2023-12-07 | Stop reason: HOSPADM

## 2023-12-07 RX ORDER — LIDOCAINE 40 MG/G
CREAM TOPICAL
Status: DISCONTINUED | OUTPATIENT
Start: 2023-12-07 | End: 2023-12-07 | Stop reason: HOSPADM

## 2023-12-07 RX ORDER — ASPIRIN 325 MG
325 TABLET ORAL ONCE
Status: COMPLETED | OUTPATIENT
Start: 2023-12-07 | End: 2023-12-07

## 2023-12-07 RX ORDER — LORAZEPAM 0.5 MG/1
0.5 TABLET ORAL
Status: DISCONTINUED | OUTPATIENT
Start: 2023-12-07 | End: 2023-12-07 | Stop reason: HOSPADM

## 2023-12-07 RX ORDER — IOPAMIDOL 755 MG/ML
INJECTION, SOLUTION INTRAVASCULAR
Status: DISCONTINUED | OUTPATIENT
Start: 2023-12-07 | End: 2023-12-07 | Stop reason: HOSPADM

## 2023-12-07 RX ADMIN — MULTIPLE VITAMINS W/ MINERALS TAB 1 TABLET: TAB at 21:20

## 2023-12-07 RX ADMIN — HEPARIN SODIUM 800 UNITS/HR: 10000 INJECTION, SOLUTION INTRAVENOUS at 08:53

## 2023-12-07 RX ADMIN — ACETAMINOPHEN 650 MG: 325 TABLET, FILM COATED ORAL at 10:07

## 2023-12-07 RX ADMIN — CARVEDILOL 12.5 MG: 12.5 TABLET, FILM COATED ORAL at 18:01

## 2023-12-07 RX ADMIN — METOPROLOL SUCCINATE 50 MG: 50 TABLET, EXTENDED RELEASE ORAL at 08:14

## 2023-12-07 RX ADMIN — ASPIRIN 325 MG ORAL TABLET 325 MG: 325 PILL ORAL at 10:07

## 2023-12-07 RX ADMIN — POTASSIUM CHLORIDE 10 MEQ: 750 TABLET, EXTENDED RELEASE ORAL at 08:13

## 2023-12-07 RX ADMIN — SODIUM CHLORIDE: 9 INJECTION, SOLUTION INTRAVENOUS at 10:08

## 2023-12-07 RX ADMIN — SACUBITRIL AND VALSARTAN 1 TABLET: 97; 103 TABLET, FILM COATED ORAL at 21:20

## 2023-12-07 RX ADMIN — CLOPIDOGREL BISULFATE 75 MG: 75 TABLET ORAL at 15:01

## 2023-12-07 RX ADMIN — ACETAMINOPHEN 650 MG: 325 TABLET, FILM COATED ORAL at 01:02

## 2023-12-07 RX ADMIN — HEPARIN SODIUM 800 UNITS/HR: 10000 INJECTION, SOLUTION INTRAVENOUS at 10:08

## 2023-12-07 RX ADMIN — FUROSEMIDE 20 MG: 20 TABLET ORAL at 08:13

## 2023-12-07 RX ADMIN — Medication 25 MCG: at 08:15

## 2023-12-07 RX ADMIN — SACUBITRIL AND VALSARTAN 1 TABLET: 97; 103 TABLET, FILM COATED ORAL at 08:14

## 2023-12-07 RX ADMIN — OXYCODONE HYDROCHLORIDE 5 MG: 5 TABLET ORAL at 13:49

## 2023-12-07 RX ADMIN — ROSUVASTATIN CALCIUM 40 MG: 20 TABLET, FILM COATED ORAL at 08:13

## 2023-12-07 ASSESSMENT — ACTIVITIES OF DAILY LIVING (ADL)
ADLS_ACUITY_SCORE: 24
ADLS_ACUITY_SCORE: 25
ADLS_ACUITY_SCORE: 24
ADLS_ACUITY_SCORE: 25
ADLS_ACUITY_SCORE: 24
ADLS_ACUITY_SCORE: 25
ADLS_ACUITY_SCORE: 24

## 2023-12-07 NOTE — PROGRESS NOTES
Pt AO4.  SBA.  Denies CP.  Heparin gtt running at 1,000U/hr, recheck this AM.  VSS on RA.  Plan for angio today, has been NPO since 0000.

## 2023-12-07 NOTE — PROGRESS NOTES
[facility]  Cardiology Progress Note  Date of Service: 12/07/2023  Primary Cardiologist: Dr. Mallory    Assessment & Plan    Hamida Verma is a 83 year old female admitted on 12/6/2023 with NSTEMI    Assessment:  1.  NSTEMI with anginal equivalent right shoulder pain, troponin peak 135  2.  Heart failure low normal LV function - LVEF 50-55%   - Etiology: pacing vs. HTN vs. Ischemia   3. Hypertension - uncontrolled  4. Hyperlipidemia - LDL 77  5. Sick sinus syndrome s/p AV node ablation and PPM in 2019  6. Atrial fibrillation - on xarelto, last dose 5pm 12/5  7. Coronary artery calcifications with no history of known coronary artery disease   - Nuclear stress test negative in 2022  8. Drop in hemoglobin - no clear bleeding source    Patient presented with right shoulder/arm pain, troponin peak 135, right shoulder/arm pain controlled since admission. Recommend proceeding with coronary angiogram for ischemic evaluation.    Risks and benefits of coronary angiogram discussed today including, bleeding, bruising, infection, allergic reaction, kidney damage (including need for dialysis), stroke, heart attack, vascular damage, emergency open heart surgery, up to and including death.  Patient indicates understanding and is agreeable to proceed.       Reviewed code status and agreeable to rescind DNR for procedure.     Blood pressures have been elevated since admission, patient reports typically controlled, notes she has been experiencing back pain since admission with lying on stretcher in the ED for an extended period of time. Will adjust metoprolol to carvedilol for help with blood pressure control. Additionally if blood pressures remain elevated recommend consideration for spironolactone which will also optimize her GDMT for HF with low normal LV function. She received her AM dose of metoprolol so her first dose of carvedilol will be this evening.     Plan:   Coronary angiogram with  possible intervention (Last dose of xarelto 12/5 @ 5pm)  Stat repeat hemoglobin now  Furosemide 20mg daily   CHANGE metoprolol XL to carvedilol 12.5mg BID (titrate as needed for BP control)  Entresto 97/103mg BID  Rosuvastatin 40mg daily  Further recommendations after above testing.     NELY Levine CNP  [facility]  Pager: [phone]    ATTESTATION:  Ms. Verma was seen and examined. Agree with note and mutually determined plan of care. I have reviewed labs and vitals and meds personally. Discussed with interventionalist and appreciate GI input. They are not concerned about the drop in hemoglobin, despite her weight going down.  We will restart heparin and give her plavix tonight and in the AM and if her hemoglobin remains stable they believe ok to proceed with stent placement. Will check hemoglobin tonight and in AM. LUPE Delgadillo MD     Interval History   Denies chest pain. Denies shortness of breath. Complaints of back pain after laying on stretcher in ED for extended time waiting for bed.     Physical Exam   Temp: 97.9  F (36.6  C) Temp src: Oral BP: (!) 156/75 Pulse: 68   Resp: 18 SpO2: 96 % O2 Device: None (Room air)    Vitals:    12/06/23 0516 12/06/23 1504 12/07/23 0552   Weight: 81.6 kg (180 lb) 80.7 kg (177 lb 14.4 oz) 79.2 kg (174 lb 8 oz)     GEN: well nourished, in no acute distress.  HEENT:  Pupils equal, round. Sclerae nonicteric.   NECK: Supple, no masses appreciated.   C/V:  Regular rate and rhythm, no murmur, rub or gallop.    RESP: Respirations are unlabored. Clear to auscultation bilaterally without wheezing, rales, or rhonchi.  GI: Abdomen soft, nontender.  EXTREM: No LE edema.  NEURO: Alert and oriented, cooperative.  SKIN: Warm and dry.     Medications    Continuing ACE inhibitor/ARB/ARNI from home medication list OR ACE inhibitor/ARB order already placed during this visit      - MEDICATION INSTRUCTIONS -      - MEDICATION INSTRUCTIONS -      heparin Stopped (12/07/23 0748)     - MEDICATION INSTRUCTIONS -      reason aspirin not prescribed (intentional)        furosemide  20 mg Oral Daily    metoprolol succinate ER  50 mg Oral BID    multivitamin w/minerals  1 tablet Oral At Bedtime    potassium chloride ER  10 mEq Oral Daily    [Held by provider] rivaroxaban ANTICOAGULANT  20 mg Oral Daily with supper    rosuvastatin  40 mg Oral Daily    sacubitril-valsartan  1 tablet Oral BID    umeclidinium  1 puff Inhalation Daily    Vitamin D3  25 mcg Oral Daily       Data   Last 24 hours labs reviewed     Echo:   The left ventricle is normal in size.  Left ventricular systolic function is mildly reduced.  The visual ejection fraction is 50-55%.  Septal wall motion abnormality may reflect pacemaker activation.  The right ventricle is normal in structure, function and size.  Right ventricular systolic pressure is elevated, consistent with mild  pulmonary hypertension.  Compared to prior study, there is no significant change.

## 2023-12-07 NOTE — PROGRESS NOTES
I went to see the patient. Per nursing, patient was consulted by Dr. Raya PRESTON today.     Anay Gruber, CNP   [facility] ([facility])  Mobile [phone]

## 2023-12-07 NOTE — PROGRESS NOTES
[facility]    Hospitalist Progress Note    Interval History   Patient awake and alert.  Was seen after she underwent left heart cath.  Denies any right shoulder pain or chest pain at this time.    -Data reviewed today: I reviewed all new labs and imaging results o  No acute events overnight.  Denies any significant abdominal discomfort.  Denies any further chest pain or shoulder pain at this time.melody the last 24 hours. I personally reviewed the EKG tracing showing sinus rhythm with first-degree AV block .    Physical Exam   Temp: 98  F (36.7  C) Temp src: Oral BP: 127/64 Pulse: 64   Resp: 16 SpO2: 96 % O2 Device: None (Room air)    Vitals:    12/06/23 0516 12/06/23 1504 12/07/23 0552   Weight: 81.6 kg (180 lb) 80.7 kg (177 lb 14.4 oz) 79.2 kg (174 lb 8 oz)     Vital Signs with Ranges  Temp:  [97.9  F (36.6  C)-98.9  F (37.2  C)] 98  F (36.7  C)  Pulse:  [60-78] 64  Resp:  [15-18] 16  BP: (125-179)/() 127/64  SpO2:  [95 %-98 %] 96 %  No intake/output data recorded.    Physical Exam  Constitutional:       Comments: Old and frail   Cardiovascular:      Rate and Rhythm: Normal rate and regular rhythm.      Pulses: Normal pulses.      Heart sounds: Normal heart sounds.   Pulmonary:      Effort: Pulmonary effort is normal. No respiratory distress.      Breath sounds: Normal breath sounds.   Abdominal:      General: Abdomen is flat. Bowel sounds are normal. There is no distension.      Tenderness: There is no abdominal tenderness. There is no guarding.   Skin:     General: Skin is warm and dry.   Neurological:      General: No focal deficit present.           Medications    Continuing ACE inhibitor/ARB/ARNI from home medication list OR ACE inhibitor/ARB order already placed during this visit      - MEDICATION INSTRUCTIONS -      - MEDICATION INSTRUCTIONS -      heparin 800 Units/hr (12/07/23 1644)    - MEDICATION INSTRUCTIONS -      reason aspirin not prescribed (intentional)         carvedilol  12.5 mg Oral BID w/meals    clopidogrel  75 mg Oral Daily    furosemide  20 mg Oral Daily    multivitamin w/minerals  1 tablet Oral At Bedtime    potassium chloride ER  10 mEq Oral Daily    [Held by provider] rivaroxaban ANTICOAGULANT  20 mg Oral Daily with supper    rosuvastatin  40 mg Oral Daily    sacubitril-valsartan  1 tablet Oral BID    umeclidinium  1 puff Inhalation Daily    Vitamin D3  25 mcg Oral Daily       Data   Recent Labs   Lab 12/07/23  1440 12/07/23  1148 12/07/23  0627 12/06/23  0533   WBC 5.4  --  5.9 8.5   HGB 11.7 10.1* 10.9* 12.8   MCV 91  --  89 90     --  278 357   NA  --   --  140 143   POTASSIUM  --   --  4.0 3.6   CHLORIDE  --   --  108* 107   CO2  --   --  25 23   BUN  --   --  9.4 13.5   CR  --   --  0.83 0.85   ANIONGAP  --   --  7 13   GURWINDER  --   --  8.9 9.3   GLC  --   --  99 126*   ALBUMIN  --   --   --  4.1   PROTTOTAL  --   --   --  6.8   BILITOTAL  --   --   --  0.4   ALKPHOS  --   --   --  63   ALT  --   --   --  22   AST  --   --   --  26       Recent Results (from the past 24 hour(s))   Cardiac Catheterization    Narrative      Ost LAD to Prox LAD lesion is 25% stenosed.    Prox Cx to Mid Cx lesion is 90% stenosed.    Mid RCA lesion is 50% stenosed.    No intervention performed due to an acute drop in Hgb.            Assessment & Plan     Hamida Verma is a 83 year old female with a PMH significant for hyperlipidemia, hypertension, atrial fibrillation, sick sinus syndrome s/p pacemaker placement, CHF, pulmonary hypertension, CAD, CKD, and COPD.  admitted on 12/6/2023 for right shoulder pain.       ED summary: In the ED diagnostic workup remarkable for , K3.6, creatinine 0.85, calcium 9.3, BUN 26, initial Trop 65, followed by 95, AST 26, ALT 22, alk phos 63, bilirubin direct less than 0.2, proBNP 552, WBC 8.5, Hgb 12.8, platelet 35.  Twelve-lead EKG shows ventricular paced rhythm without any new ischemic changes.  Right humerus x-ray shows medial  lateral femoral epicondyle enthesopathy.  Small marginal osteophyte from the inferior humeral head.  Shoulder x-ray shows normal joint spaces and alignment, no acute fracture.  CXR shows no focal consolidation, pleural effusion, or pneumothorax.  Pulm reticular changes in the bases could reflect component of fibroatelectasis.  In the ED patient received 975 mg, was initiated on heparin infusion.     Right Shoulder Pain  NSTEMI  Patient woke up at 1 AM this morning with severe right shoulder pain.  She was given 324 mg ASA and 1 SL nitroglycerin by EMS. Right shoulder pain improved following SL nitroglycerin. Patient denied any exacerbating factors of her pain.  She did not take any medication for her pain prior to coming to the hospital.  She denies any recent trauma.  She does report she had a fall about a month and a half ago landing on her right side however she did not have any right radiated into her preceding the fall.  Patient denies missing any doses of Xarelto.  Last dose last evening at 1700.  Patient denied any pain radiating into her neck, chest, or back.  She also denies any associated SOB, palpitations, or diaphoresis.  She does report right shoulder pain radiated into her right breast.  On exam right arm does appear to be slightly swollen compared to left arm.  She has full range of motion without range of motion limitations.  She does endorse tenderness along the medial aspect of her left arm.  Not cellulitic appearing; no erythema rashes, or lesions..  No tenderness in right breast on exam.  Differentials considered include ACS, fracture, musculoskeletal pain, tendinitis, and DVT.  Given x-ray imaging negative for acute fracture and troponin trending upward (65>95), suspect right arm pain due to acute coronary syndrome.  - [place] [facility] [place]  -Was started on heparin drip.  Cardiology evaluated her.  -Underwent left heart cath that showed 90% stenosis from proximal circumflex to mid circumflex  lesion.  No intervention was performed.  -Patient's hemoglobin dropped from 12.8 yesterday to 10.8 this morning.  Concern for GI blood loss.  Cardiology requested emergent GI consultation for an EGD to rule out active GI bleed prior to any kind of intervention for the circumflex lesion.  -Plan on proceeding with EGD tomorrow morning.  Heparin drip to be discontinued 4 hours prior to EGD.  -Patient will have a repeat cardiac cath on Monday once GI evaluation is complete.  -Continue to monitor hemoglobin regularly.  Stool Hemoccult ordered.  IV pantoprazole ordered.  - Cardiac Rehab  - PRN sublingual nitroglycerin for chest pain  - Telemetry  - Echocardiogram showed EF of 50 to 55% with normal right ventricular structure and left ventricle structure.  -Troponin trend 111---135---123  - AM BMP and CBC  - RN managed K and mg replacement protocols  - PRN tylenol for pain  - PRN Zofran for nausea  - Fall precautions     CAD  Hypertension  Hyperlipidemia  Pulmonary Hypertension  Atrial Fibrillation  CHF  Sick sinus syndrome s/p pacemaker placement  PTA regimen includes: Furosemide 20 mg daily, metoprolol succinate ER 50 mg BID, Rivaroxaban 20 mg daily, rosuvastatin 40 mg daily, and Entresto  BID.   - Continue PTA furosemide, metoprolol, rosuvastatin, and Entresto  -Heparin drip discontinued after cardiac cath.  Started on Plavix 75 mg daily.  Holding off on restarting DOAC for now until GI workup is complete  - Telemetry     CKD, stage 3  Baseline creatinine 0.97-1.01. Creatinine on admission 0.85.   - Avoid nephrotoxic agents including NSAIDs and contrast dye  - Strict I/Os  -250 cc fluid bolus given prior to cardiac cath.  -Repeat BMP in the morning     COPD  PTA meds include PRN albuterol and spiriva.  - Continue PTA inhalers.           Diet:  regular diet   DVT Prophylaxis: Pneumatic Compression Devices  Lopez Catheter: Not present  Lines: None     Cardiac Monitoring: None  Code Status: No CPR- Do NOT Intubate    "    Clinically Significant Risk Factors                  # Hypertension: Noted on problem list  # Chronic heart failure with preserved ejection fraction: heart failure noted on problem list and last echo with EF >50%       # Obesity: Estimated body mass index is 30.91 kg/m  as calculated from the following:    Height as of this encounter: 1.6 m (5' 3\").    Weight as of this encounter: 79.2 kg (174 lb 8 oz)., PRESENT ON ADMISSION     # COPD: noted on problem list  # Pacemaker present            Shani Michelle MD, MD  [phone](p)   "

## 2023-12-07 NOTE — PLAN OF CARE
Hamida is alert, oriented, cooperative. BP elevated before scheduled meds; improved after meds. She denies chest pain, arm/shoulder pain, nausea, dyspnea. Heparin infusing. Plan for angio tomorrow. Last dose of Xarelto was 12/5 at 17:00.

## 2023-12-08 LAB
ACT BLD: 298 SECONDS (ref 74–150)
ACT BLD: 391 SECONDS (ref 74–150)
ANION GAP SERPL CALCULATED.3IONS-SCNC: 11 MMOL/L (ref 7–15)
APTT PPP: 64 SECONDS (ref 22–38)
BUN SERPL-MCNC: 11.5 MG/DL (ref 8–23)
CALCIUM SERPL-MCNC: 8.9 MG/DL (ref 8.8–10.2)
CHLORIDE SERPL-SCNC: 106 MMOL/L (ref 98–107)
CREAT SERPL-MCNC: 0.82 MG/DL (ref 0.51–0.95)
DEPRECATED HCO3 PLAS-SCNC: 24 MMOL/L (ref 22–29)
EGFRCR SERPLBLD CKD-EPI 2021: 70 ML/MIN/1.73M2
ERYTHROCYTE [DISTWIDTH] IN BLOOD BY AUTOMATED COUNT: 13.2 % (ref 10–15)
GLUCOSE SERPL-MCNC: 104 MG/DL (ref 70–99)
HCT VFR BLD AUTO: 34.1 % (ref 35–47)
HGB BLD-MCNC: 11 G/DL (ref 11.7–15.7)
MCH RBC QN AUTO: 29 PG (ref 26.5–33)
MCHC RBC AUTO-ENTMCNC: 32.3 G/DL (ref 31.5–36.5)
MCV RBC AUTO: 90 FL (ref 78–100)
PLATELET # BLD AUTO: 300 10E3/UL (ref 150–450)
POTASSIUM SERPL-SCNC: 3.9 MMOL/L (ref 3.4–5.3)
RBC # BLD AUTO: 3.79 10E6/UL (ref 3.8–5.2)
SODIUM SERPL-SCNC: 141 MMOL/L (ref 135–145)
WBC # BLD AUTO: 7.9 10E3/UL (ref 4–11)

## 2023-12-08 PROCEDURE — C1725 CATH, TRANSLUMIN NON-LASER: HCPCS | Performed by: INTERNAL MEDICINE

## 2023-12-08 PROCEDURE — 258N000003 HC RX IP 258 OP 636: Performed by: INTERNAL MEDICINE

## 2023-12-08 PROCEDURE — 210N000002 HC R&B HEART CARE

## 2023-12-08 PROCEDURE — 93458 L HRT ARTERY/VENTRICLE ANGIO: CPT | Performed by: INTERNAL MEDICINE

## 2023-12-08 PROCEDURE — C1887 CATHETER, GUIDING: HCPCS | Performed by: INTERNAL MEDICINE

## 2023-12-08 PROCEDURE — 80048 BASIC METABOLIC PNL TOTAL CA: CPT | Performed by: NURSE PRACTITIONER

## 2023-12-08 PROCEDURE — 85347 COAGULATION TIME ACTIVATED: CPT

## 2023-12-08 PROCEDURE — 36415 COLL VENOUS BLD VENIPUNCTURE: CPT | Performed by: NURSE PRACTITIONER

## 2023-12-08 PROCEDURE — C1894 INTRO/SHEATH, NON-LASER: HCPCS | Performed by: INTERNAL MEDICINE

## 2023-12-08 PROCEDURE — 250N000011 HC RX IP 250 OP 636: Mod: JZ | Performed by: INTERNAL MEDICINE

## 2023-12-08 PROCEDURE — 99153 MOD SED SAME PHYS/QHP EA: CPT | Performed by: INTERNAL MEDICINE

## 2023-12-08 PROCEDURE — 85027 COMPLETE CBC AUTOMATED: CPT | Performed by: HOSPITALIST

## 2023-12-08 PROCEDURE — 250N000011 HC RX IP 250 OP 636: Performed by: INTERNAL MEDICINE

## 2023-12-08 PROCEDURE — 99152 MOD SED SAME PHYS/QHP 5/>YRS: CPT | Performed by: INTERNAL MEDICINE

## 2023-12-08 PROCEDURE — C1769 GUIDE WIRE: HCPCS | Performed by: INTERNAL MEDICINE

## 2023-12-08 PROCEDURE — 93452 LEFT HRT CATH W/VENTRCLGRPHY: CPT | Performed by: INTERNAL MEDICINE

## 2023-12-08 PROCEDURE — 99232 SBSQ HOSP IP/OBS MODERATE 35: CPT | Mod: 25 | Performed by: INTERNAL MEDICINE

## 2023-12-08 PROCEDURE — 4A023N7 MEASUREMENT OF CARDIAC SAMPLING AND PRESSURE, LEFT HEART, PERCUTANEOUS APPROACH: ICD-10-PCS | Performed by: INTERNAL MEDICINE

## 2023-12-08 PROCEDURE — 36415 COLL VENOUS BLD VENIPUNCTURE: CPT | Performed by: HOSPITALIST

## 2023-12-08 PROCEDURE — 250N000009 HC RX 250: Performed by: INTERNAL MEDICINE

## 2023-12-08 PROCEDURE — 93010 ELECTROCARDIOGRAM REPORT: CPT | Performed by: INTERNAL MEDICINE

## 2023-12-08 PROCEDURE — C9600 PERC DRUG-EL COR STENT SING: HCPCS | Mod: LC | Performed by: INTERNAL MEDICINE

## 2023-12-08 PROCEDURE — 93005 ELECTROCARDIOGRAM TRACING: CPT

## 2023-12-08 PROCEDURE — C1874 STENT, COATED/COV W/DEL SYS: HCPCS | Performed by: INTERNAL MEDICINE

## 2023-12-08 PROCEDURE — 027034Z DILATION OF CORONARY ARTERY, ONE ARTERY WITH DRUG-ELUTING INTRALUMINAL DEVICE, PERCUTANEOUS APPROACH: ICD-10-PCS | Performed by: INTERNAL MEDICINE

## 2023-12-08 PROCEDURE — B2111ZZ FLUOROSCOPY OF MULTIPLE CORONARY ARTERIES USING LOW OSMOLAR CONTRAST: ICD-10-PCS | Performed by: INTERNAL MEDICINE

## 2023-12-08 PROCEDURE — 85730 THROMBOPLASTIN TIME PARTIAL: CPT | Performed by: HOSPITALIST

## 2023-12-08 PROCEDURE — 99233 SBSQ HOSP IP/OBS HIGH 50: CPT | Performed by: HOSPITALIST

## 2023-12-08 PROCEDURE — 250N000011 HC RX IP 250 OP 636: Performed by: HOSPITALIST

## 2023-12-08 PROCEDURE — C9113 INJ PANTOPRAZOLE SODIUM, VIA: HCPCS | Performed by: HOSPITALIST

## 2023-12-08 PROCEDURE — 250N000013 HC RX MED GY IP 250 OP 250 PS 637: Performed by: NURSE PRACTITIONER

## 2023-12-08 PROCEDURE — 250N000013 HC RX MED GY IP 250 OP 250 PS 637

## 2023-12-08 PROCEDURE — 272N000001 HC OR GENERAL SUPPLY STERILE: Performed by: INTERNAL MEDICINE

## 2023-12-08 PROCEDURE — 92928 PRQ TCAT PLMT NTRAC ST 1 LES: CPT | Mod: LC | Performed by: INTERNAL MEDICINE

## 2023-12-08 DEVICE — STENT CORONARY DES SYNERGY XD MR US 3.00X24MM H7493941824300: Type: IMPLANTABLE DEVICE | Status: FUNCTIONAL

## 2023-12-08 RX ORDER — NALOXONE HYDROCHLORIDE 0.4 MG/ML
0.4 INJECTION, SOLUTION INTRAMUSCULAR; INTRAVENOUS; SUBCUTANEOUS
Status: DISCONTINUED | OUTPATIENT
Start: 2023-12-08 | End: 2023-12-10 | Stop reason: HOSPADM

## 2023-12-08 RX ORDER — FLUMAZENIL 0.1 MG/ML
0.2 INJECTION, SOLUTION INTRAVENOUS
Status: ACTIVE | OUTPATIENT
Start: 2023-12-08 | End: 2023-12-08

## 2023-12-08 RX ORDER — LORAZEPAM 0.5 MG/1
0.5 TABLET ORAL
Status: DISCONTINUED | OUTPATIENT
Start: 2023-12-08 | End: 2023-12-08 | Stop reason: HOSPADM

## 2023-12-08 RX ORDER — SODIUM CHLORIDE 9 MG/ML
INJECTION, SOLUTION INTRAVENOUS CONTINUOUS
Status: ACTIVE | OUTPATIENT
Start: 2023-12-08 | End: 2023-12-08

## 2023-12-08 RX ORDER — ASPIRIN 81 MG/1
81 TABLET ORAL DAILY
Status: DISCONTINUED | OUTPATIENT
Start: 2023-12-09 | End: 2023-12-10

## 2023-12-08 RX ORDER — NALOXONE HYDROCHLORIDE 0.4 MG/ML
0.2 INJECTION, SOLUTION INTRAMUSCULAR; INTRAVENOUS; SUBCUTANEOUS
Status: DISCONTINUED | OUTPATIENT
Start: 2023-12-08 | End: 2023-12-10 | Stop reason: HOSPADM

## 2023-12-08 RX ORDER — ASPIRIN 81 MG/1
81 TABLET, CHEWABLE ORAL ONCE
Status: DISCONTINUED | OUTPATIENT
Start: 2023-12-08 | End: 2023-12-08

## 2023-12-08 RX ORDER — SODIUM CHLORIDE 9 MG/ML
INJECTION, SOLUTION INTRAVENOUS CONTINUOUS
Status: DISCONTINUED | OUTPATIENT
Start: 2023-12-08 | End: 2023-12-08 | Stop reason: HOSPADM

## 2023-12-08 RX ORDER — VERAPAMIL HYDROCHLORIDE 2.5 MG/ML
INJECTION, SOLUTION INTRAVENOUS
Status: DISCONTINUED | OUTPATIENT
Start: 2023-12-08 | End: 2023-12-08 | Stop reason: HOSPADM

## 2023-12-08 RX ORDER — NALOXONE HYDROCHLORIDE 0.4 MG/ML
0.4 INJECTION, SOLUTION INTRAMUSCULAR; INTRAVENOUS; SUBCUTANEOUS
Status: ACTIVE | OUTPATIENT
Start: 2023-12-08 | End: 2023-12-08

## 2023-12-08 RX ORDER — LORAZEPAM 2 MG/ML
0.5 INJECTION INTRAMUSCULAR
Status: DISCONTINUED | OUTPATIENT
Start: 2023-12-08 | End: 2023-12-08 | Stop reason: HOSPADM

## 2023-12-08 RX ORDER — HYDRALAZINE HYDROCHLORIDE 20 MG/ML
10 INJECTION INTRAMUSCULAR; INTRAVENOUS EVERY 4 HOURS PRN
Status: DISCONTINUED | OUTPATIENT
Start: 2023-12-08 | End: 2023-12-10 | Stop reason: HOSPADM

## 2023-12-08 RX ORDER — IOPAMIDOL 755 MG/ML
INJECTION, SOLUTION INTRAVASCULAR
Status: DISCONTINUED | OUTPATIENT
Start: 2023-12-08 | End: 2023-12-08 | Stop reason: HOSPADM

## 2023-12-08 RX ORDER — POTASSIUM CHLORIDE 1500 MG/1
20 TABLET, EXTENDED RELEASE ORAL
Status: COMPLETED | OUTPATIENT
Start: 2023-12-08 | End: 2023-12-08

## 2023-12-08 RX ORDER — ONDANSETRON 4 MG/1
4 TABLET, ORALLY DISINTEGRATING ORAL EVERY 6 HOURS PRN
Status: DISCONTINUED | OUTPATIENT
Start: 2023-12-08 | End: 2023-12-10 | Stop reason: HOSPADM

## 2023-12-08 RX ORDER — NALOXONE HYDROCHLORIDE 0.4 MG/ML
0.2 INJECTION, SOLUTION INTRAMUSCULAR; INTRAVENOUS; SUBCUTANEOUS
Status: ACTIVE | OUTPATIENT
Start: 2023-12-08 | End: 2023-12-08

## 2023-12-08 RX ORDER — HEPARIN SODIUM 1000 [USP'U]/ML
INJECTION, SOLUTION INTRAVENOUS; SUBCUTANEOUS
Status: DISCONTINUED | OUTPATIENT
Start: 2023-12-08 | End: 2023-12-08 | Stop reason: HOSPADM

## 2023-12-08 RX ORDER — FENTANYL CITRATE 50 UG/ML
25 INJECTION, SOLUTION INTRAMUSCULAR; INTRAVENOUS
Status: DISCONTINUED | OUTPATIENT
Start: 2023-12-08 | End: 2023-12-10 | Stop reason: HOSPADM

## 2023-12-08 RX ORDER — ATROPINE SULFATE 0.1 MG/ML
0.5 INJECTION INTRAVENOUS
Status: ACTIVE | OUTPATIENT
Start: 2023-12-08 | End: 2023-12-08

## 2023-12-08 RX ORDER — ONDANSETRON 2 MG/ML
4 INJECTION INTRAMUSCULAR; INTRAVENOUS EVERY 6 HOURS PRN
Status: DISCONTINUED | OUTPATIENT
Start: 2023-12-08 | End: 2023-12-10 | Stop reason: HOSPADM

## 2023-12-08 RX ORDER — LIDOCAINE 40 MG/G
CREAM TOPICAL
Status: CANCELLED | OUTPATIENT
Start: 2023-12-08

## 2023-12-08 RX ORDER — ASPIRIN 325 MG
325 TABLET ORAL ONCE
Status: COMPLETED | OUTPATIENT
Start: 2023-12-08 | End: 2023-12-08

## 2023-12-08 RX ORDER — NITROGLYCERIN 5 MG/ML
VIAL (ML) INTRAVENOUS
Status: DISCONTINUED | OUTPATIENT
Start: 2023-12-08 | End: 2023-12-08 | Stop reason: HOSPADM

## 2023-12-08 RX ORDER — ASPIRIN 81 MG/1
243 TABLET, CHEWABLE ORAL ONCE
Status: COMPLETED | OUTPATIENT
Start: 2023-12-08 | End: 2023-12-08

## 2023-12-08 RX ORDER — METOPROLOL TARTRATE 1 MG/ML
5 INJECTION, SOLUTION INTRAVENOUS
Status: DISCONTINUED | OUTPATIENT
Start: 2023-12-08 | End: 2023-12-10 | Stop reason: HOSPADM

## 2023-12-08 RX ORDER — ACETAMINOPHEN 325 MG/1
650 TABLET ORAL EVERY 4 HOURS PRN
Status: DISCONTINUED | OUTPATIENT
Start: 2023-12-08 | End: 2023-12-08

## 2023-12-08 RX ORDER — FENTANYL CITRATE 50 UG/ML
INJECTION, SOLUTION INTRAMUSCULAR; INTRAVENOUS
Status: DISCONTINUED | OUTPATIENT
Start: 2023-12-08 | End: 2023-12-08 | Stop reason: HOSPADM

## 2023-12-08 RX ORDER — NITROGLYCERIN 0.4 MG/1
0.4 TABLET SUBLINGUAL EVERY 5 MIN PRN
Status: DISCONTINUED | OUTPATIENT
Start: 2023-12-08 | End: 2023-12-10 | Stop reason: HOSPADM

## 2023-12-08 RX ADMIN — PANTOPRAZOLE SODIUM 40 MG: 40 INJECTION, POWDER, FOR SOLUTION INTRAVENOUS at 08:34

## 2023-12-08 RX ADMIN — Medication 25 MCG: at 08:33

## 2023-12-08 RX ADMIN — ASPIRIN 325 MG ORAL TABLET 325 MG: 325 PILL ORAL at 11:49

## 2023-12-08 RX ADMIN — SODIUM CHLORIDE: 9 INJECTION, SOLUTION INTRAVENOUS at 16:03

## 2023-12-08 RX ADMIN — POTASSIUM CHLORIDE 20 MEQ: 1500 TABLET, EXTENDED RELEASE ORAL at 13:39

## 2023-12-08 RX ADMIN — SACUBITRIL AND VALSARTAN 1 TABLET: 97; 103 TABLET, FILM COATED ORAL at 20:32

## 2023-12-08 RX ADMIN — FUROSEMIDE 20 MG: 20 TABLET ORAL at 08:34

## 2023-12-08 RX ADMIN — ROSUVASTATIN CALCIUM 40 MG: 20 TABLET, FILM COATED ORAL at 08:34

## 2023-12-08 RX ADMIN — CARVEDILOL 12.5 MG: 12.5 TABLET, FILM COATED ORAL at 17:20

## 2023-12-08 RX ADMIN — CLOPIDOGREL BISULFATE 75 MG: 75 TABLET ORAL at 08:34

## 2023-12-08 RX ADMIN — SACUBITRIL AND VALSARTAN 1 TABLET: 97; 103 TABLET, FILM COATED ORAL at 08:34

## 2023-12-08 RX ADMIN — ACETAMINOPHEN 650 MG: 325 TABLET, FILM COATED ORAL at 20:32

## 2023-12-08 RX ADMIN — MULTIPLE VITAMINS W/ MINERALS TAB 1 TABLET: TAB at 20:32

## 2023-12-08 RX ADMIN — POTASSIUM CHLORIDE 10 MEQ: 750 TABLET, EXTENDED RELEASE ORAL at 08:33

## 2023-12-08 RX ADMIN — CARVEDILOL 12.5 MG: 12.5 TABLET, FILM COATED ORAL at 08:34

## 2023-12-08 ASSESSMENT — ACTIVITIES OF DAILY LIVING (ADL)
ADLS_ACUITY_SCORE: 29
ADLS_ACUITY_SCORE: 25
ADLS_ACUITY_SCORE: 29
ADLS_ACUITY_SCORE: 25
ADLS_ACUITY_SCORE: 25
ADLS_ACUITY_SCORE: 29
ADLS_ACUITY_SCORE: 25
ADLS_ACUITY_SCORE: 25
ADLS_ACUITY_SCORE: 29
ADLS_ACUITY_SCORE: 25

## 2023-12-08 NOTE — PLAN OF CARE
Summary:    Patient Name: Hamida Verma     Room#: 0249/0249-01     Admit Date: 2023  Primary Diagnosis: NSTEMI (non-ST elevated myocardial infarction) (H)  Inpatient Status: Cardiac   Needed? No  Procedures This Admit:  Angio today  Drips:  Heparin   Drains & Devices:  NA  Rhythm:   V Paced  Activity Level:  SBA  Oxygen needs:  RA  Important Labs Since Admit: Trops  Significant Echo results:  NA EF50-55%  Anticipated D/C Date: Pending    Acuity Ratin  Requiring Extra Time? (Please explain):  NA    Other Concerns:  Right groin site looking good  Nurse: Harman Aguila RN

## 2023-12-08 NOTE — CONSULTS
[facility]  Gastroenterology Consultation         Hamida Verma  [address]  [facility] [address]  83 year old female    Admission Date/Time: 12/6/2023  Primary Care Provider: Mikala Philip  Referring / Attending Physician:  Dr. Delgadillo    We were asked to see the patient in consultation by Dr. Delgadillo for evaluation of anemia drop in hemoglobin.      CC: Anemia drop in hemoglobin    HPI:  Hamida Verma is a 83 year old female who was admitted yesterday with NSTEMI patient had angina excellent right shoulder pain and reported troponins peaked up to 135 history of heart failure health left ventricular ejection fraction of 50 to 55% hypertension hyperlipidemia sick sinus syndrome atrial fibrillation on Xarelto patient has been off since yesterday's patient has been on IV heparin.  Patient has coronary artery calcification patient had angiogram done this morning patient was noticed to have significant disease needing stent placement since admission patient's hemoglobin has dropped about 2 g.  Patient does not have any obvious signs or symptoms of active bleeding.  Patient is laying comfortably denying any active GI complaint denying any history of nausea vomiting hematemesis melena no previous history of any significant GI complaint.    ROS: A comprehensive ten point review of systems was negative aside from those in mentioned in the HPI.      PAST MED HX:  I have reviewed this patient's medical history and updated it with pertinent information if needed.   Past Medical History:   Diagnosis Date     Atrial fibrillation (H)      Chronic diastolic CHF (congestive heart failure) (H)      COPD (chronic obstructive pulmonary disease) (H)      Essential hypertension, benign      HYPERLIPIDEMIA NEC/NOS 03/30/2006     Pulmonary hypertension (H)      Pyelonephritis 2019     SSS (sick sinus syndrome) (H)     s/p PPM 3/2018       MEDICATIONS:   Prior to Admission Medications   Prescriptions Last  Dose Informant Patient Reported? Taking?   FLAX SEED OIL OR 2023 Self Yes Yes   Si capsule daily   Multiple Vitamins-Minerals (HAIR SKIN NAILS PO) 2023 Self Yes Yes   Sig: Take 1 tablet by mouth At Bedtime   Multiple Vitamins-Minerals (ICAPS AREDS 2 PO) 2023  Yes Yes   Sig: Take 1 tablet by mouth daily   albuterol (PROAIR HFA/PROVENTIL HFA/VENTOLIN HFA) 108 (90 Base) MCG/ACT inhaler Unknown at prn  No Yes   Sig: Inhale 2 puffs into the lungs every 6 hours   Patient taking differently: Inhale 2 puffs into the lungs every 6 hours as needed   cholecalciferol (VITAMIN  -D) 1000 UNIT capsule 2023 Self Yes Yes   Sig: Take 1 capsule by mouth daily.   furosemide (LASIX) 20 MG tablet 2023  No Yes   Sig: Take 1 tablet (20 mg) by mouth daily   metoprolol succinate ER (TOPROL XL) 50 MG 24 hr tablet 2023  No Yes   Sig: Take 1 tablet (50 mg) by mouth 2 times daily   potassium chloride ER (KLOR-CON M) 10 MEQ CR tablet 2023  No Yes   Sig: Take 1 tablet (10 mEq) by mouth daily   rivaroxaban ANTICOAGULANT (XARELTO ANTICOAGULANT) 20 MG TABS tablet 2023 at PM  No Yes   Sig: Take 1 tablet (20 mg) by mouth daily (with dinner)   rosuvastatin (CRESTOR) 40 MG tablet 2023  No Yes   Sig: Take 1 tablet (40 mg) by mouth daily   sacubitril-valsartan (ENTRESTO)  MG per tablet 2023  No Yes   Sig: Take 1 tablet by mouth 2 times daily   tiotropium (SPIRIVA) 18 MCG inhaled capsule Unknown at prn  No Yes   Sig: Inhale 1 capsule (18 mcg) into the lungs daily   Patient taking differently: Inhale 18 mcg into the lungs daily as needed      Facility-Administered Medications: None       ALLERGIES:   Allergies   Allergen Reactions     Strawberries [Strawberry Extract] Anaphylaxis     Strawberry Flavor        SOCIAL HISTORY:  Social History     Tobacco Use     Smoking status: Former     Packs/day: 1.50     Years: 45.00     Additional pack years: 0.00     Total pack years: 67.50     Types: Cigarettes      Start date: 10/28/1955     Quit date: 2000     Years since quittin.2     Smokeless tobacco: Never     Tobacco comments:     quit 6 yrs ago   Substance Use Topics     Alcohol use: No     Drug use: No       FAMILY HISTORY:  Family History   Problem Relation Age of Onset     Cerebrovascular Disease Mother      Glaucoma Mother      Macular Degeneration Mother      Alcohol/Drug Father         alcohol     Myocardial Infarction Father 63        passed away from MI     Family History Negative Sister      Family History Negative Sister      Family History Negative Sister      Cerebrovascular Disease Sister      Family History Negative Brother      Family History Negative Maternal Grandmother      Family History Negative Maternal Grandfather      Family History Negative Paternal Grandmother      Family History Negative Paternal Grandfather      Myocardial Infarction Son 57        passed away from MI     Dementia Daughter 57        early onset on [place]enda     Diabetes No family hx of      Breast Cancer No family hx of      Cancer - colorectal No family hx of        PHYSICAL EXAM:   General awake alert oriented comfortable  Vital Signs with Ranges  Temp: 98.2  F (36.8  C) Temp src: Oral BP: 113/55 Pulse: 65   Resp: 16 SpO2: 95 % O2 Device: None (Room air)    No intake/output data recorded.    Cardiovascular: negative, PMI normal. No lifts, heaves, or thrills. RRR. No murmurs, clicks gallops or rub  Respiratory: negative, Percussion normal. Good diaphragmatic excursion. Lungs clear  Head: Normocephalic. No masses, lesions, tenderness or abnormalities  Neck: Neck supple. No adenopathy. Thyroid symmetric, normal size,, Carotids without bruits.  Abdomen: Abdomen soft, non-tender. BS normal. No masses, organomegaly  SKIN: no suspicious lesions or rashes          ADDITIONAL COMMENTS:   I reviewed the patient's new clinical lab test results.   Recent Labs   Lab Test 23  1440 23  1148 23  0627  12/06/23  0533 10/11/18  1324 03/19/18  1055   WBC 5.4  --  5.9 8.5   < >  --    HGB 11.7 10.1* 10.9* 12.8   < >  --    MCV 91  --  89 90   < >  --      --  278 357   < >  --    INR  --   --   --   --   --  1.31*    < > = values in this interval not displayed.     Recent Labs   Lab Test 12/07/23  0627 12/06/23  0533 10/30/23  1044   POTASSIUM 4.0 3.6 4.1   CHLORIDE 108* 107 100   CO2 25 23 27   BUN 9.4 13.5 15.3   ANIONGAP 7 13 11     Recent Labs   Lab Test 12/06/23  0533 10/30/23  1044 05/30/23  0452 05/30/23  0230 05/30/23  0138 10/17/22  0738 07/20/22  1246 01/06/19  0527 01/05/19  1640   ALBUMIN 4.1  --   --  3.7  --   --  3.1*   < >  --    BILITOTAL 0.4  --   --  0.3  --   --  0.4   < >  --    ALT 22 29  --  22  --    < > 31   < >  --    AST 26  --   --  25  --   --  18   < >  --    PROTEIN  --   --  20*  --   --   --   --   --  Negative   LIPASE  --   --   --   --  33  --   --   --   --     < > = values in this interval not displayed.       I reviewed the patient's new imaging results.        CONSULTATION ASSESSMENT AND PLAN:    Principal Problem:    NSTEMI (non-ST elevated myocardial infarction) (H)    Assessment:   Very pleasant 83-year-old female who was admitted with acute cardiac syndrome NSTEMI patient has been off Xarelto patient is currently on IV heparin drip patient is currently being actively evaluated and managed by cardiology.  Patient has noted to have drop in hemoglobin from 12 Nepali to 10.  Patient is clinically asymptomatic patient is denying any active GI symptoms or findings of active GI bleeding however given patient drop in hemoglobin and need for stent placement and antiplatelet therapy patient was advised to be evaluated further in GI.  Patient is finding discussed in detail with patient and hospitalist team.  Plan to proceed with upper GI endoscopy tomorrow patient will need to be off heparin 4 hours prior to endoscopic evaluation.  Risk-benefit plan discussed in detail.  Thank  you very much for letting us participate in her care.                Moris Dos Santos MD, FACP  [facility].  Office: [phone]  Cell : [phone]      [facility]  Ph: [phone] Fax: [phone]

## 2023-12-08 NOTE — PLAN OF CARE
Summary: NSTEMI  Date & Time: 12/7/23 5994-3708  Orientation: A&Ox4  Activity Level: SBA  Fall Risk: N  Behavior & Aggression: Green  Pain Management: Denies  ABNL VS/O2: Vss on RA, pulses present  Tele: V PAced  ABNL Lab/BG: Hem 10.7, Pot 4.0, Mag 2.0, PTT 87, AM redraw  Diet: NPO, Low Fat  Bowel/Bladder: Continent B&B, no BM this shift  Drains/Devices: PIV infusing 950 units/hr  Tests/Procedures: EGD in AM,  off heparin 4 hours prior to endoscopic evaluation. Repeat cardio cath 12/11  Anticipated DC Date: pending  Other Important Info: Needs an Occult stool sample if able to have a BM

## 2023-12-08 NOTE — PROGRESS NOTES
[facility]  Cardiology Progress Note  Date of Service: 12/08/2023  Primary Cardiologist: Dr. Mallory    Assessment & Plan    Hamida Verma is a 83 year old female admitted on 12/6/2023 with NSTEMI    Assessment:  1.  NSTEMI with anginal equivalent right shoulder pain, troponin peak 135  2.  Heart failure low normal LV function - LVEF 50-55%   - Etiology: pacing vs. HTN vs. Ischemia   3. Hypertension - uncontrolled  4. Hyperlipidemia - LDL 77  5. Sick sinus syndrome s/p AV node ablation and PPM in 2019  6. Atrial fibrillation - on xarelto, last dose 5pm 12/5  7. Coronary artery calcifications with no history of known coronary artery disease   - Nuclear stress test negative in 2022  8. Drop in hemoglobin - no clear bleeding source    Coronary angiogram yesterday showed 90% proximal-mid circumflex stenosis.     She was noted to have a drop in her hemoglobin with no clear source of bleeding, she has been on heparin gtt and resumed antiplatelet therapy with plavix yesterday. Hemoglobin has remained stable and is now up to 11. GI did evaluate yesterday and open to further evaluation with EGD if needed. Given stable hemoglobin on plavix/heparin recommend proceeding with circumflex stenting today.     Risks and benefits of coronary angiogram discussed today including, bleeding, bruising, infection, allergic reaction, kidney damage (including need for dialysis), stroke, heart attack, vascular damage, emergency open heart surgery, up to and including death.  Patient indicates understanding and is agreeable to proceed.       Reviewed code status and agreeable to rescind DNR for procedure.     Blood pressure control improved with change in metoprolol to carvedilol.       Plan:   Coronary angiogram with planned intervention to circumflex.   BMP now  Furosemide 20mg daily   Carvedilol 12.5mg BID   Entresto 97/103mg BID  Rosuvastatin 40mg daily  Anticipate stable for discharge tomorrow  Post  hospital cardiology GORGE follow up, scheduling request placed, will need BMP and hgb at that time.    NELY Levine CNP  [facility]  Pager: [phone]    ATTESTATION:  Ms. Verma was seen and examined. Agree with note and mutually determined plan of care. I reviewed labs and meds and vitals. She has been started on Plavix yesterday and continued on heparin with no drop in hemoglobin so proceeding with intervention today.   LUPE Delgadillo MD     Interval History   Denies chest pain. Denies shortness of breath.     Physical Exam   Temp: 98  F (36.7  C) Temp src: Oral BP: 110/62 Pulse: 62   Resp: 16 SpO2: 96 % O2 Device: None (Room air)    Vitals:    12/06/23 0516 12/06/23 1504 12/07/23 0552   Weight: 81.6 kg (180 lb) 80.7 kg (177 lb 14.4 oz) 79.2 kg (174 lb 8 oz)     GEN: well nourished, in no acute distress.  HEENT:  Pupils equal, round. Sclerae nonicteric.   NECK: Supple, no masses appreciated.   C/V:  Regular rate and rhythm, no murmur, rub or gallop.    RESP: Respirations are unlabored. Clear to auscultation bilaterally without wheezing, rales, or rhonchi.  GI: Abdomen soft, nontender.  EXTREM: No LE edema. Right groin access soft, nontender.   NEURO: Alert and oriented, cooperative.  SKIN: Warm and dry.     Medications    Continuing ACE inhibitor/ARB/ARNI from home medication list OR ACE inhibitor/ARB order already placed during this visit      - MEDICATION INSTRUCTIONS -      - MEDICATION INSTRUCTIONS -      heparin 950 Units/hr (12/08/23 0738)    - MEDICATION INSTRUCTIONS -      reason aspirin not prescribed (intentional)        carvedilol  12.5 mg Oral BID w/meals    clopidogrel  75 mg Oral Daily    furosemide  20 mg Oral Daily    multivitamin w/minerals  1 tablet Oral At Bedtime    pantoprazole  40 mg Intravenous Daily with breakfast    potassium chloride ER  10 mEq Oral Daily    [Held by provider] rivaroxaban ANTICOAGULANT  20 mg Oral Daily with supper    rosuvastatin  40 mg Oral Daily     sacubitril-valsartan  1 tablet Oral BID    umeclidinium  1 puff Inhalation Daily    Vitamin D3  25 mcg Oral Daily       Data   Last 24 hours labs reviewed     Echo:   The left ventricle is normal in size.  Left ventricular systolic function is mildly reduced.  The visual ejection fraction is 50-55%.  Septal wall motion abnormality may reflect pacemaker activation.  The right ventricle is normal in structure, function and size.  Right ventricular systolic pressure is elevated, consistent with mild  pulmonary hypertension.  Compared to prior study, there is no significant change.

## 2023-12-08 NOTE — PROGRESS NOTES
Patient arrived from cath lab.  She is AO X 4; VSS; on RA; in no acute distress.  IVF 75 ml/hr X 4 hrs ; frequent vitals; frequent CMS check.

## 2023-12-08 NOTE — PROGRESS NOTES
[facility]    Hospitalist Progress Note    Interval History   Patient awake and alert.  No acute events overnight.  Denies any melanotic stools.  Denies any chest pain.    -Data reviewed today: I reviewed all new labs and imaging results o  No acute events overnight.  Denies any significant abdominal discomfort.  Denies any further chest pain or shoulder pain at this time.melody the last 24 hours. I personally reviewed the EKG tracing showing sinus rhythm with first-degree AV block .    Physical Exam   Temp: 98.1  F (36.7  C) Temp src: Oral BP: 129/63 Pulse: 64   Resp: 16 SpO2: 97 % O2 Device: None (Room air)    Vitals:    12/06/23 0516 12/06/23 1504 12/07/23 0552   Weight: 81.6 kg (180 lb) 80.7 kg (177 lb 14.4 oz) 79.2 kg (174 lb 8 oz)     Vital Signs with Ranges  Temp:  [98  F (36.7  C)-98.4  F (36.9  C)] 98.1  F (36.7  C)  Pulse:  [60-78] 64  Resp:  [16-18] 16  BP: (110-166)/(53-98) 129/63  SpO2:  [95 %-98 %] 97 %  I/O last 3 completed shifts:  In: -   Out: 950 [Urine:950]    Physical Exam  Constitutional:       Comments: Old and frail   Cardiovascular:      Rate and Rhythm: Normal rate and regular rhythm.      Pulses: Normal pulses.      Heart sounds: Normal heart sounds.   Pulmonary:      Effort: Pulmonary effort is normal. No respiratory distress.      Breath sounds: Normal breath sounds.   Abdominal:      General: Abdomen is flat. Bowel sounds are normal. There is no distension.      Tenderness: There is no abdominal tenderness. There is no guarding.   Skin:     General: Skin is warm and dry.   Neurological:      General: No focal deficit present.           Medications    Continuing ACE inhibitor/ARB/ARNI from home medication list OR ACE inhibitor/ARB order already placed during this visit      - MEDICATION INSTRUCTIONS -      - MEDICATION INSTRUCTIONS -      heparin 950 Units/hr (12/08/23 0738)    - MEDICATION INSTRUCTIONS -      reason aspirin not prescribed (intentional)      sodium  chloride        carvedilol  12.5 mg Oral BID w/meals    clopidogrel  75 mg Oral Daily    furosemide  20 mg Oral Daily    multivitamin w/minerals  1 tablet Oral At Bedtime    pantoprazole  40 mg Intravenous Daily with breakfast    potassium chloride ER  10 mEq Oral Daily    [Held by provider] rivaroxaban ANTICOAGULANT  20 mg Oral Daily with supper    rosuvastatin  40 mg Oral Daily    sacubitril-valsartan  1 tablet Oral BID    umeclidinium  1 puff Inhalation Daily    Vitamin D3  25 mcg Oral Daily       Data   Recent Labs   Lab 12/08/23  0924 12/08/23  0616 12/07/23  2242 12/07/23  1440 12/07/23  1148 12/07/23  0627 12/06/23  0533   WBC  --  7.9  --  5.4  --  5.9 8.5   HGB  --  11.0* 10.7* 11.7   < > 10.9* 12.8   MCV  --  90  --  91  --  89 90   PLT  --  300  --  317  --  278 357     --   --   --   --  140 143   POTASSIUM 3.9  --   --   --   --  4.0 3.6   CHLORIDE 106  --   --   --   --  108* 107   CO2 24  --   --   --   --  25 23   BUN 11.5  --   --   --   --  9.4 13.5   CR 0.82  --   --   --   --  0.83 0.85   ANIONGAP 11  --   --   --   --  7 13   GURWINDER 8.9  --   --   --   --  8.9 9.3   *  --   --   --   --  99 126*   ALBUMIN  --   --   --   --   --   --  4.1   PROTTOTAL  --   --   --   --   --   --  6.8   BILITOTAL  --   --   --   --   --   --  0.4   ALKPHOS  --   --   --   --   --   --  63   ALT  --   --   --   --   --   --  22   AST  --   --   --   --   --   --  26    < > = values in this interval not displayed.       No results found for this or any previous visit (from the past 24 hour(s)).        Assessment & Plan     Hamida Verma is a 83 year old female with a PMH significant for hyperlipidemia, hypertension, atrial fibrillation, sick sinus syndrome s/p pacemaker placement, CHF, pulmonary hypertension, CAD, CKD, and COPD.  admitted on 12/6/2023 for right shoulder pain.       ED summary: In the ED diagnostic workup remarkable for , K3.6, creatinine 0.85, calcium 9.3, BUN 26, initial Trop 65,  followed by 95, AST 26, ALT 22, alk phos 63, bilirubin direct less than 0.2, proBNP 552, WBC 8.5, Hgb 12.8, platelet 35.  Twelve-lead EKG shows ventricular paced rhythm without any new ischemic changes.  Right humerus x-ray shows medial lateral femoral epicondyle enthesopathy.  Small marginal osteophyte from the inferior humeral head.  Shoulder x-ray shows normal joint spaces and alignment, no acute fracture.  CXR shows no focal consolidation, pleural effusion, or pneumothorax.  Pulm reticular changes in the bases could reflect component of fibroatelectasis.  In the ED patient received 975 mg, was initiated on heparin infusion.     Right Shoulder Pain  NSTEMI  Patient woke up at 1 AM this morning with severe right shoulder pain.  She was given 324 mg ASA and 1 SL nitroglycerin by EMS. Right shoulder pain improved following SL nitroglycerin. Patient denied any exacerbating factors of her pain.  She did not take any medication for her pain prior to coming to the hospital.  She denies any recent trauma.  She does report she had a fall about a month and a half ago landing on her right side however she did not have any right radiated into her preceding the fall.  Patient denies missing any doses of Xarelto.  Last dose last evening at 1700.  Patient denied any pain radiating into her neck, chest, or back.  She also denies any associated SOB, palpitations, or diaphoresis.  She does report right shoulder pain radiated into her right breast.  On exam right arm does appear to be slightly swollen compared to left arm.  She has full range of motion without range of motion limitations.  She does endorse tenderness along the medial aspect of her left arm.  Not cellulitic appearing; no erythema rashes, or lesions..  No tenderness in right breast on exam.  Differentials considered include ACS, fracture, musculoskeletal pain, tendinitis, and DVT.  Given x-ray imaging negative for acute fracture and troponin trending upward (65>95),  suspect right arm pain due to acute coronary syndrome.  - Admit to inpatient to [facility]  -Was started on heparin drip.  Cardiology evaluated her.  -Underwent left heart cath that showed 90% stenosis from proximal circumflex to mid circumflex lesion.  No intervention was performed.  -Patient's hemoglobin dropped from 12.8 on admission to 10.8 the next day.  Cardiology team was quite concerned about GI bleed.  Emergent GI consultation for an EGD to rule out active GI bleed prior to any kind of intervention for the circumflex lesion.  However hemoglobin improved back to 11 with no signs of active GI bleed.  -Decided to hold off on further GI evaluation at this time.  -Plan to proceed with repeat left heart cath on 12/8/2023 to stent the circumflex lesion.  -Continued on heparin drip in the meantime..  -Continue to monitor hemoglobin regularly.  Stool Hemoccult ordered.  IV pantoprazole ordered.  - Cardiac Rehab  - PRN sublingual nitroglycerin for chest pain  - Telemetry  - Echocardiogram showed EF of 50 to 55% with normal right ventricular structure and left ventricle structure.  -Troponin trend 111---135---123  - AM BMP and CBC  - RN managed K and mg replacement protocols  - PRN tylenol for pain  - PRN Zofran for nausea  - Fall precautions     CAD  Hypertension  Hyperlipidemia  Pulmonary Hypertension  Atrial Fibrillation  CHF  Sick sinus syndrome s/p pacemaker placement  PTA regimen includes: Furosemide 20 mg daily, metoprolol succinate ER 50 mg BID, Rivaroxaban 20 mg daily, rosuvastatin 40 mg daily, and Entresto  BID.   - Continue PTA furosemide, metoprolol, rosuvastatin, and Entresto  -Heparin drip discontinued after cardiac cath.  Started on Plavix 75 mg daily.  Holding off on restarting DOAC for now until GI workup is complete  - Telemetry     CKD, stage 3  Baseline creatinine 0.97-1.01. Creatinine on admission 0.85.   - Avoid nephrotoxic agents including NSAIDs and contrast dye  - Strict I/Os  -250 cc fluid  "bolus given prior to cardiac cath.  -Creatinine stable.     COPD  PTA meds include PRN albuterol and spiriva.  - Continue PTA inhalers.           Diet:  regular diet   DVT Prophylaxis: Pneumatic Compression Devices  Lopez Catheter: Not present  Lines: None     Cardiac Monitoring: None  Code Status: No CPR- Do NOT Intubate       Clinically Significant Risk Factors                  # Hypertension: Noted on problem list  # Chronic heart failure with preserved ejection fraction: heart failure noted on problem list and last echo with EF >50%       # Obesity: Estimated body mass index is 30.91 kg/m  as calculated from the following:    Height as of this encounter: 1.6 m (5' 3\").    Weight as of this encounter: 79.2 kg (174 lb 8 oz)., PRESENT ON ADMISSION     # COPD: noted on problem list    # Pacemaker present            Shani Michelle MD, MD  [phone](p)   "

## 2023-12-09 ENCOUNTER — APPOINTMENT (OUTPATIENT)
Dept: OCCUPATIONAL THERAPY | Facility: CLINIC | Age: 83
DRG: 322 | End: 2023-12-09
Payer: COMMERCIAL

## 2023-12-09 LAB
ANION GAP SERPL CALCULATED.3IONS-SCNC: 10 MMOL/L (ref 7–15)
ATRIAL RATE - MUSE: 63 BPM
BUN SERPL-MCNC: 11.8 MG/DL (ref 8–23)
CALCIUM SERPL-MCNC: 8.9 MG/DL (ref 8.8–10.2)
CHLORIDE SERPL-SCNC: 104 MMOL/L (ref 98–107)
CREAT SERPL-MCNC: 0.83 MG/DL (ref 0.51–0.95)
DEPRECATED HCO3 PLAS-SCNC: 25 MMOL/L (ref 22–29)
DIASTOLIC BLOOD PRESSURE - MUSE: NORMAL MMHG
EGFRCR SERPLBLD CKD-EPI 2021: 69 ML/MIN/1.73M2
GLUCOSE SERPL-MCNC: 106 MG/DL (ref 70–99)
HGB BLD-MCNC: 11.1 G/DL (ref 11.7–15.7)
INTERPRETATION ECG - MUSE: NORMAL
MAGNESIUM SERPL-MCNC: 2.3 MG/DL (ref 1.7–2.3)
P AXIS - MUSE: 52 DEGREES
POTASSIUM SERPL-SCNC: 4.2 MMOL/L (ref 3.4–5.3)
PR INTERVAL - MUSE: 248 MS
QRS DURATION - MUSE: 174 MS
QT - MUSE: 438 MS
QTC - MUSE: 448 MS
R AXIS - MUSE: -81 DEGREES
SODIUM SERPL-SCNC: 139 MMOL/L (ref 135–145)
SYSTOLIC BLOOD PRESSURE - MUSE: NORMAL MMHG
T AXIS - MUSE: 206 DEGREES
VENTRICULAR RATE- MUSE: 63 BPM

## 2023-12-09 PROCEDURE — 210N000002 HC R&B HEART CARE

## 2023-12-09 PROCEDURE — 36415 COLL VENOUS BLD VENIPUNCTURE: CPT | Performed by: HOSPITALIST

## 2023-12-09 PROCEDURE — C9113 INJ PANTOPRAZOLE SODIUM, VIA: HCPCS | Performed by: HOSPITALIST

## 2023-12-09 PROCEDURE — 250N000013 HC RX MED GY IP 250 OP 250 PS 637: Performed by: NURSE PRACTITIONER

## 2023-12-09 PROCEDURE — 99232 SBSQ HOSP IP/OBS MODERATE 35: CPT | Performed by: INTERNAL MEDICINE

## 2023-12-09 PROCEDURE — 250N000013 HC RX MED GY IP 250 OP 250 PS 637: Performed by: INTERNAL MEDICINE

## 2023-12-09 PROCEDURE — 97535 SELF CARE MNGMENT TRAINING: CPT | Mod: GO | Performed by: OCCUPATIONAL THERAPIST

## 2023-12-09 PROCEDURE — 97165 OT EVAL LOW COMPLEX 30 MIN: CPT | Mod: GO | Performed by: OCCUPATIONAL THERAPIST

## 2023-12-09 PROCEDURE — 83735 ASSAY OF MAGNESIUM: CPT | Performed by: HOSPITALIST

## 2023-12-09 PROCEDURE — 250N000011 HC RX IP 250 OP 636: Performed by: HOSPITALIST

## 2023-12-09 PROCEDURE — 80048 BASIC METABOLIC PNL TOTAL CA: CPT | Performed by: HOSPITALIST

## 2023-12-09 PROCEDURE — 93010 ELECTROCARDIOGRAM REPORT: CPT | Performed by: INTERNAL MEDICINE

## 2023-12-09 PROCEDURE — 250N000013 HC RX MED GY IP 250 OP 250 PS 637

## 2023-12-09 PROCEDURE — 97110 THERAPEUTIC EXERCISES: CPT | Mod: GO | Performed by: OCCUPATIONAL THERAPIST

## 2023-12-09 PROCEDURE — 93005 ELECTROCARDIOGRAM TRACING: CPT

## 2023-12-09 PROCEDURE — 85018 HEMOGLOBIN: CPT | Performed by: HOSPITALIST

## 2023-12-09 RX ORDER — PANTOPRAZOLE SODIUM 40 MG/1
40 TABLET, DELAYED RELEASE ORAL
Status: DISCONTINUED | OUTPATIENT
Start: 2023-12-10 | End: 2023-12-10 | Stop reason: HOSPADM

## 2023-12-09 RX ADMIN — MULTIPLE VITAMINS W/ MINERALS TAB 1 TABLET: TAB at 20:44

## 2023-12-09 RX ADMIN — DOCUSATE SODIUM 50 MG AND SENNOSIDES 8.6 MG 2 TABLET: 8.6; 5 TABLET, FILM COATED ORAL at 17:16

## 2023-12-09 RX ADMIN — CARVEDILOL 12.5 MG: 12.5 TABLET, FILM COATED ORAL at 08:59

## 2023-12-09 RX ADMIN — SACUBITRIL AND VALSARTAN 1 TABLET: 97; 103 TABLET, FILM COATED ORAL at 08:59

## 2023-12-09 RX ADMIN — PANTOPRAZOLE SODIUM 40 MG: 40 INJECTION, POWDER, FOR SOLUTION INTRAVENOUS at 08:58

## 2023-12-09 RX ADMIN — CARVEDILOL 12.5 MG: 12.5 TABLET, FILM COATED ORAL at 17:16

## 2023-12-09 RX ADMIN — POTASSIUM CHLORIDE 10 MEQ: 750 TABLET, EXTENDED RELEASE ORAL at 08:59

## 2023-12-09 RX ADMIN — CLOPIDOGREL BISULFATE 75 MG: 75 TABLET ORAL at 08:59

## 2023-12-09 RX ADMIN — RIVAROXABAN 20 MG: 20 TABLET, FILM COATED ORAL at 17:16

## 2023-12-09 RX ADMIN — SACUBITRIL AND VALSARTAN 1 TABLET: 97; 103 TABLET, FILM COATED ORAL at 20:43

## 2023-12-09 RX ADMIN — Medication 25 MCG: at 08:59

## 2023-12-09 RX ADMIN — FUROSEMIDE 20 MG: 20 TABLET ORAL at 08:59

## 2023-12-09 RX ADMIN — ACETAMINOPHEN 650 MG: 325 TABLET, FILM COATED ORAL at 12:21

## 2023-12-09 RX ADMIN — ASPIRIN 81 MG: 81 TABLET, COATED ORAL at 08:59

## 2023-12-09 RX ADMIN — ROSUVASTATIN CALCIUM 40 MG: 20 TABLET, FILM COATED ORAL at 08:59

## 2023-12-09 ASSESSMENT — ACTIVITIES OF DAILY LIVING (ADL)
ADLS_ACUITY_SCORE: 29
ADLS_ACUITY_SCORE: 26
ADLS_ACUITY_SCORE: 26
ADLS_ACUITY_SCORE: 29
ADLS_ACUITY_SCORE: 29
ADLS_ACUITY_SCORE: 26
ADLS_ACUITY_SCORE: 29

## 2023-12-09 NOTE — PLAN OF CARE
"Goal Outcome Evaluation:      Plan of Care Reviewed With: patient    Overall Patient Progress: improvingOverall Patient Progress: improving    Outcome Evaluation: Patient's Hgb improved from 11.0 to 11.1.    Shift Note [id number]  Orientation/Neuro: A&Ox4  CV: Tele - 100% V Paced. VSS.  VS: /59 (BP Location: Left arm)   Pulse 67   Temp 97.9  F (36.6  C) (Oral)   Resp 16   Ht 1.6 m (5' 3\")   Wt 78.8 kg (173 lb 12.8 oz)   LMP  (LMP Unknown)   SpO2 96%   BMI 30.79 kg/m     Respiratory: WDL, on RA.   GI/: WDL  Skin: Scattered bruising, R radial site, ecchymotic but soft and CMS+  Activity: SBA  Diet: Low Saturated Fat Na <2400 mg  IV: PIV, SL  BG: n/a  Labs/Tests: K+ 4.2, Mg 2.3, Hgb 11.1  Pain: Denies ex mild low back pain - prn Tylenol and patient's home supply of biofreeze  Other:  Consults: Cardiology following, Cardiac Rehab  Plan: Restart Xarelto, Monitor Hgb, Possible discharge home tomorrow 12/10  "

## 2023-12-09 NOTE — PROGRESS NOTES
12/09/23 0900   Appointment Info   Signing Clinician's Name / Credentials (OT) Austin Rubin, Vida, OTR/L   Living Environment   People in Home grandchild(isidoro)   Current Living Arrangements house  (2 story home with basement.  Laundry in basement)   Home Accessibility stairs to enter home;stairs within home  (stairs to second floor bedroom, laundry in basement)   Number of Stairs, Main Entrance 3   Stair Railings, Main Entrance none   Number of Stairs, Within Home, Primary greater than 10 stairs   Stair Railings, Within Home, Primary railings safe and in good condition   Transportation Anticipated car, drives self;family or friend will provide   Self-Care   Usual Activity Tolerance good   Current Activity Tolerance good   Equipment Currently Used at Home none   Fall history within last six months no   General Information   Onset of Illness/Injury or Date of Surgery 12/08/23   Referring Physician David Monroy   Patient/Family Therapy Goal Statement (OT) return home, open to Phase II CR   Additional Occupational Profile Info/Pertinent History of Current Problem Pt is an 83 year old female admitted for fatigue and chest pain.  Angiogram done with placement of one stent.  PMH includes NSEMI, HTN, HLD controlled, sick sinus syndrome, COPD, Afib on xarelto, diastolic HF, pacemeaker placed in 2009.   Performance Patterns (Routines, Roles, Habits) pt states she is independent in basic ADLS.  Granddaughter is present and helps as needed.   Existing Precautions/Restrictions cardiac;fall   Limitations/Impairments hearing  (pt states she is [place], does not have hearing aides)   General Observations and Info pt in bed, willing to participate   Cognitive Status Examination   Orientation Status orientation to person, place and time   Visual Perception   Visual Impairment/Limitations corrective lenses full-time   Pain Assessment   Patient Currently in Pain No   Range of Motion Comprehensive   General Range of Motion no range of  "motion deficits identified   Strength Comprehensive (MMT)   General Manual Muscle Testing (MMT) Assessment no strength deficits identified   Comment, General Manual Muscle Testing (MMT) Assessment B UEs   Coordination   Upper Extremity Coordination No deficits were identified   Bed Mobility   Bed Mobility supine-sit;sit-supine   Supine-Sit [place] (Bed Mobility) independent   Sit-Supine [place] (Bed Mobility) independent   Transfers   Transfers sit-stand transfer   Sit-Stand Transfer   Sit-Stand [place] (Transfers) independent   Balance   Balance Comments pt made the comment that she was a \"little shakey\" once up, leonaor as needed.   Clinical Impression   Criteria for Skilled Therapeutic Interventions Met (OT) Yes, treatment indicated   OT Diagnosis decreased independence in ADLS secondary to a cardiac condition   OT Problem List-Impairments impacting ADL problems related to;activity tolerance impaired;hearing;post-surgical precautions  (monitor pacemaker and tolerance)   Assessment of Occupational Performance 1-3 Performance Deficits   Identified Performance Deficits decreased endurance for functional mobility, household chores, exercise level   Planned Therapy Interventions (OT) ADL retraining;home program guidelines;progressive activity/exercise   Clinical Decision Making Complexity (OT) problem focused assessment/low complexity   Risk & Benefits of therapy have been explained evaluation/treatment results reviewed;care plan/treatment goals reviewed;patient   OT Total Evaluation Time   OT Eval, Low Complexity Minutes (54886) 15   OT Goals   Therapy Frequency (OT) Daily   OT Predicted Duration/Target Date for Goal Attainment 12/11/23   OT Goals Cardiac Phase 1   OT: Understanding of cardiac education to maximize quality of life, condition management, and health outcomes Patient;Verbalize   OT: Perform aerobic activity with stable cardiovascular response intermittent;25 " minutes;ambulation;NuStep;treadmill   OT: Functional/aerobic ambulation tolerance with stable cardiovascular response in order to return to home and community environment Independent;200 feet   OT: Navigation of stairs simulating home set up with stable cardiovascular response in order to return to home and community environment Independent;Greater than 10 stairs;Rail on right   Interventions   Interventions Quick Adds Self-Care/Home Management;Therapeutic Procedures/Exercise;Cardiac Rehab   Self-Care/Home Management   Self-Care/Home Mgmt/ADL, Compensatory, Meal Prep Minutes (60893) 15   Symptoms Noted During/After Treatment (Meal Preparation/Planning Training) fatigue   Treatment Detail/Skilled Intervention OT/CR: Educated pt on CR POC including Phase I and II.  Provided handout.  Pt might want to do Phase II, has limited options for walking at home and has several stairs.  Is considering, granddaughter could do some of the driving.  Pt would want to come to [facility] if she does it.   Therapeutic Procedures/Exercise   Therapeutic Procedure: strength, endurance, ROM, flexibillity minutes (27027) 15   Symptoms Noted During/After Treatment fatigue;shortness of breath   Ambulation   Workload 275   Duration (minutes) 10 minutes   Effort Scale 4   Symptoms Fatigue;Dyspnea   Cardiovascular Response Hypertension at rest;Hypertensive response to exercise   Exercise Details ambulated in kong to and from [place].  Climbed 6 stairs in satellite   Vital Signs Details Initial /67, HR 77  Ending /68, HR 75   NuStep   Workload 3   Duration (minutes) 4 minutes   Effort Scale 4   Symptoms Fatigue;Dyspnea   Exercise Details Pt needed cues to avoid using arms and not pushing self too hard.  Fatigued easily.   Cardiac Education   Education Provided OMNI Scale;Outpatient Cardiac Rehab;Home exercise program   Education Packet Given to Patient Yes   All Patient Education Handouts Reviewed with Patient and/or Family Yes   Total  Session Time   Timed Code Treatment Minutes 30   Total Session Time (sum of timed and untimed services) 45   Psychosocial Support   Trust Relationship/Rapport care explained;reassurance provided

## 2023-12-09 NOTE — PROGRESS NOTES
Patient is AO X 4; VSS; on RA; TR band was removed uneventfully; tolerated diet.   Patient is in no acute distress,   IVF is still infusing.

## 2023-12-09 NOTE — PROGRESS NOTES
"       Assessment and Plan:   Ms. Verma is an 83 y.o female admitted to the hospital with NSTEMI and is now s/p PCI to the left circumflex    1.  NSTEMI s/p PCI to left circumflex 12/8  2. Paroxysmal trial fibrillation on anticoagulation - last dose 5pm 12/5  3. Hypertension - uncontrolled  4. Hyperlipidemia - LDL 77  5. Sick sinus syndrome s/p AV node ablation and PPM in 2019  6. Drop in hemoglobin - Hgb pending    Plan:   -Give asa 81mg, plavix 75mg today  -restart rivaroxaban 20mg tonight (pending hgb stability), then if Hgb stable tomorrow discharge on rivaroxaban 20mg, plavix 75mg for one year  -Continue rosuvastatin 40mg, entresto  BID, carvedilol 12.5mg BID, furosemide 20mg daily  -CV follow up after discharge ordered    Severino Kebede MD        Interval History:     Doing well without recurrent RA pain. No pain at access site. No CP, SOB, Presyncope           Medications:   I have reviewed this patient's current medications         Physical Exam:   Blood pressure (!) 152/73, pulse 67, temperature 97.8  F (36.6  C), temperature source Oral, resp. rate 16, height 1.6 m (5' 3\"), weight 78.8 kg (173 lb 12.8 oz), SpO2 95%, not currently breastfeeding.  Vitals:    12/06/23 0516 12/06/23 1504 12/07/23 0552 12/09/23 0400   Weight: 81.6 kg (180 lb) 80.7 kg (177 lb 14.4 oz) 79.2 kg (174 lb 8 oz) 78.8 kg (173 lb 12.8 oz)         Intake/Output Summary (Last 24 hours) at 12/9/2023 0536  Last data filed at 12/8/2023 1800  Gross per 24 hour   Intake 400 ml   Output --   Net 400 ml       12/03 2300 - 12/08 2259  In: 400 [P.O.:400]  Out: 950 [Urine:950]  Net: -550    Exam:  GENERAL APPEARANCE ADULT: Alert, in no acute distress  RESP: lungs clear to auscultation   CV: regular rate and rhythm, no murmur, rub, or gallop  EXTREMITIES: No LE edema, bruising at R radial site         Data:   LABS (Last four results)  CMP  Recent Labs   Lab 12/08/23  0924 12/07/23  0627 12/06/23  1207 12/06/23  0533    140  --  143 "   POTASSIUM 3.9 4.0  --  3.6   CHLORIDE 106 108*  --  107   CO2 24 25  --  23   ANIONGAP 11 7  --  13   * 99  --  126*   BUN 11.5 9.4  --  13.5   CR 0.82 0.83  --  0.85   GFRESTIMATED 70 69  --  67   GURWINDER 8.9 8.9  --  9.3   MAG  --   --  2.0  --    PROTTOTAL  --   --   --  6.8   ALBUMIN  --   --   --  4.1   BILITOTAL  --   --   --  0.4   ALKPHOS  --   --   --  63   AST  --   --   --  26   ALT  --   --   --  22     CBC  Recent Labs   Lab 12/08/23  0616 12/07/23  2242 12/07/23  1440 12/07/23  1148 12/07/23  0627 12/06/23  0533   WBC 7.9  --  5.4  --  5.9 8.5   RBC 3.79*  --  4.00  --  3.74* 4.40   HGB 11.0* 10.7* 11.7 10.1* 10.9* 12.8   HCT 34.1*  --  36.2  --  33.3* 39.7   MCV 90  --  91  --  89 90   MCH 29.0  --  29.3  --  29.1 29.1   MCHC 32.3  --  32.3  --  32.7 32.2   RDW 13.2  --  13.2  --  13.2 13.1     --  317  --  278 357     INRNo lab results found in last 7 days.  TROPONINS   Lab Results   Component Value Date    TROPI <0.015 01/05/2019    TROPI <0.015 01/05/2019    TROPI <0.015 01/05/2019    TROPI <0.015 10/11/2018    TROPI <0.015 03/14/2018    TROPONIN 0.00 11/12/2014    TROPONIN 0.01 11/12/2014                                                                                                               Severino Kebede MD

## 2023-12-09 NOTE — PROGRESS NOTES
[facility]    Hospitalist Progress Note    Interval History   Patient denies any chest pain. Right shoulder pain has resolved after PCA. She complains only about the lft posterior upper back pain. No complaints of SOB  She asked permission to  use her home Biofreeze which is allowed. IV Protonix changed to oral   Physical Exam   Temp: 97.9  F (36.6  C) Temp src: Oral BP: 123/59 Pulse: 67   Resp: 16 SpO2: 96 % O2 Device: None (Room air)    Vitals:    12/06/23 1504 12/07/23 0552 12/09/23 0400   Weight: 80.7 kg (177 lb 14.4 oz) 79.2 kg (174 lb 8 oz) 78.8 kg (173 lb 12.8 oz)     Vital Signs with Ranges  Temp:  [97.7  F (36.5  C)-97.9  F (36.6  C)] 97.9  F (36.6  C)  Pulse:  [64-75] 67  Resp:  [16] 16  BP: (115-166)/() 123/59  SpO2:  [92 %-100 %] 96 %  I/O last 3 completed shifts:  In: 400 [P.O.:400]  Out: -     Physical Exam  Constitutional:       Comments: Old and frail   Cardiovascular:      Rate and Rhythm: Normal rate and regular rhythm.      Pulses: Normal pulses.      Heart sounds: Normal heart sounds.   Pulmonary:      Effort: Pulmonary effort is normal. No respiratory distress.      Breath sounds: Normal breath sounds.   Abdominal:      General: Abdomen is flat. Bowel sounds are normal. There is no distension.      Tenderness: There is no abdominal tenderness. There is no guarding.   Skin:     General: Skin is warm and dry.   Neurological:      General: No focal deficit present.           Medications    Continuing ACE inhibitor/ARB/ARNI from home medication list OR ACE inhibitor/ARB order already placed during this visit      - MEDICATION INSTRUCTIONS -      - MEDICATION INSTRUCTIONS -      - MEDICATION INSTRUCTIONS -      - MEDICATION INSTRUCTIONS -      Percutaneous Coronary Intervention orders placed (this is information for BPA alerting)      reason aspirin not prescribed (intentional)        [Held by provider] aspirin  81 mg Oral Daily    carvedilol  12.5 mg Oral BID w/meals     clopidogrel  75 mg Oral Daily    furosemide  20 mg Oral Daily    multivitamin w/minerals  1 tablet Oral At Bedtime    [START ON 12/10/2023] pantoprazole  40 mg Oral QAM AC    potassium chloride ER  10 mEq Oral Daily    rivaroxaban ANTICOAGULANT  20 mg Oral Daily with supper    rosuvastatin  40 mg Oral Daily    sacubitril-valsartan  1 tablet Oral BID    umeclidinium  1 puff Inhalation Daily    Vitamin D3  25 mcg Oral Daily       Data   Recent Labs   Lab 12/09/23  1122 12/08/23  0924 12/08/23  0616 12/07/23  2242 12/07/23  1440 12/07/23  1148 12/07/23  0627 12/06/23  0533   WBC  --   --  7.9  --  5.4  --  5.9 8.5   HGB 11.1*  --  11.0* 10.7* 11.7   < > 10.9* 12.8   MCV  --   --  90  --  91  --  89 90   PLT  --   --  300  --  317  --  278 357    141  --   --   --   --  140 143   POTASSIUM 4.2 3.9  --   --   --   --  4.0 3.6   CHLORIDE 104 106  --   --   --   --  108* 107   CO2 25 24  --   --   --   --  25 23   BUN 11.8 11.5  --   --   --   --  9.4 13.5   CR 0.83 0.82  --   --   --   --  0.83 0.85   ANIONGAP 10 11  --   --   --   --  7 13   GURWINDER 8.9 8.9  --   --   --   --  8.9 9.3   * 104*  --   --   --   --  99 126*   ALBUMIN  --   --   --   --   --   --   --  4.1   PROTTOTAL  --   --   --   --   --   --   --  6.8   BILITOTAL  --   --   --   --   --   --   --  0.4   ALKPHOS  --   --   --   --   --   --   --  63   ALT  --   --   --   --   --   --   --  22   AST  --   --   --   --   --   --   --  26    < > = values in this interval not displayed.       Recent Results (from the past 24 hour(s))   Cardiac Catheterization    Narrative      Ost LAD to Prox LAD lesion is 25% stenosed.    Mid RCA lesion is 50% stenosed.    Prox Cx to Mid Cx lesion is 90% stenosed.    Percutaneous coronary intervention of the mid left circumflex artery with   cutting balloon angioplasty and placement of a SYNERGY XD  [id number]   drug-eluting stent.              Assessment & Plan     Hamida Verma is a 83 year old female with a  PMH significant for hyperlipidemia, hypertension, atrial fibrillation, sick sinus syndrome s/p pacemaker placement, CHF, pulmonary hypertension, CAD, CKD, and COPD.  admitted on 12/6/2023 for right shoulder pain.       ED summary: In the ED diagnostic workup remarkable for , K3.6, creatinine 0.85, calcium 9.3, BUN 26, initial Trop 65, followed by 95, AST 26, ALT 22, alk phos 63, bilirubin direct less than 0.2, proBNP 552, WBC 8.5, Hgb 12.8, platelet 35.  Twelve-lead EKG shows ventricular paced rhythm without any new ischemic changes.  Right humerus x-ray shows medial lateral femoral epicondyle enthesopathy.  Small marginal osteophyte from the inferior humeral head.  Shoulder x-ray shows normal joint spaces and alignment, no acute fracture.  CXR shows no focal consolidation, pleural effusion, or pneumothorax.  Pulm reticular changes in the bases could reflect component of fibroatelectasis.  In the ED patient received 975 mg, was initiated on heparin infusion.     Right Shoulder Pain, resolved, referred pain from the angina  NSTEMI s/p PCI to the left circumflex   Patient woke up at 1 AM this morning with severe right shoulder pain.  She was given 324 mg ASA and 1 SL nitroglycerin by EMS. Right shoulder pain improved following SL nitroglycerin. Patient denied any exacerbating factors of her pain.  She did not take any medication for her pain prior to coming to the hospital.  She denies any recent trauma.  She does report she had a fall about a month and a half ago landing on her right side however she did not have any right radiated into her preceding the fall.  Patient denies missing any doses of Xarelto.  Last dose last evening at 1700.  Patient denied any pain radiating into her neck, chest, or back.  She also denies any associated SOB, palpitations, or diaphoresis.  She does report right shoulder pain radiated into her right breast.  On exam right arm does appear to be slightly swollen compared to left arm.   She has full range of motion without range of motion limitations.  She does endorse tenderness along the medial aspect of her left arm.  Not cellulitic appearing; no erythema rashes, or lesions..  No tenderness in right breast on exam.  Differentials considered include ACS, fracture, musculoskeletal pain, tendinitis, and DVT.  Given x-ray imaging negative for acute fracture and troponin trending upward (65>95), suspect right arm pain due to acute coronary syndrome.  - Admit to inpatient to [facility]  -Was started on heparin drip.  Cardiology evaluated her.  -Underwent left heart cath that showed 90% stenosis from proximal circumflex to mid circumflex lesion.  No intervention was performed.  -Patient's hemoglobin dropped from 12.8 on admission to 10.8 the next day.  Cardiology team was quite concerned about GI bleed.  Hemoglobin was reassuring and therefore GI consult deferred.   - Echocardiogram showed EF of 50 to 55% with normal right ventricular structure and left ventricle structure.  -Troponin trend 111---135---123  - AM BMP and CBC  - RN managed K and mg replacement protocols  - PRN tylenol for pain  - PRN Zofran for nausea  - Fall precautions     CAD  Hypertension  Hyperlipidemia  Pulmonary Hypertension  Atrial Fibrillation  CHF  Sick sinus syndrome s/p pacemaker placement  PTA regimen includes: Furosemide 20 mg daily, metoprolol succinate ER 50 mg BID, Rivaroxaban 20 mg daily, rosuvastatin 40 mg daily, and Entresto  BID.   - Continue PTA furosemide, metoprolol, rosuvastatin, and Entresto  -Heparin drip discontinued after cardiac cath.  Started on Plavix 75 mg daily.  Holding off on restarting DOAC for now until GI workup is complete  - Telemetry     CKD, stage 3  Baseline creatinine 0.97-1.01. Creatinine on admission 0.85.   - Avoid nephrotoxic agents including NSAIDs and contrast dye  - Strict I/Os  -250 cc fluid bolus given prior to cardiac cath.  -Creatinine stable.     COPD  PTA meds include PRN albuterol  "and spiriva.  - Continue PTA inhalers.           Diet:  regular diet   DVT Prophylaxis: Pneumatic Compression Devices  Lopez Catheter: Not present  Lines: None     Cardiac Monitoring: None  Code Status: No CPR- Do NOT Intubate       Clinically Significant Risk Factors                  # Hypertension: Noted on problem list  # Chronic heart failure with preserved ejection fraction: heart failure noted on problem list and last echo with EF >50%       # Obesity: Estimated body mass index is 30.79 kg/m  as calculated from the following:    Height as of this encounter: 1.6 m (5' 3\").    Weight as of this encounter: 78.8 kg (173 lb 12.8 oz)., PRESENT ON ADMISSION     # COPD: noted on problem list    # Pacemaker present            Kirk Jean Baptiste MD, MD  [phone](p)   "

## 2023-12-09 NOTE — PLAN OF CARE
Pleasant lady. Pt aox4, SBA, RA and DNR/DNI. Tele v-paced. Pt denies chest pain and SOB. Angio completed with PCI to main circ. R radial bruised, soft and CMS intact.   Plan: cards rehab and possible discharge tomorrow.

## 2023-12-09 NOTE — PLAN OF CARE
"Goal Outcome Evaluation:  [id number]  Neuro- x4  Most Recent Vitals- /78 (BP Location: Left arm)   Pulse 67   Temp 98.1  F (36.7  C) (Oral)   Resp 17   Ht 1.6 m (5' 3\")   Wt 78.8 kg (173 lb 12.8 oz)   LMP  (LMP Unknown)   SpO2 99%   BMI 30.79 kg/m    Tele/Cardiac- A/V paced- occasionally A paced  Resp- RA denies sob/woo  Activity- sba  Pain- denies   Skin- R forearm and scattered bruising   GI/- WDL except constipation- received senna   Diet: Low Saturated Fat Na <2400 mg    Plan- monitor hgb w/ AM labs and possible dishcharge home     "

## 2023-12-09 NOTE — PROGRESS NOTES
12/09/23 1009   Appointment Info   Signing Clinician's Name / Credentials (OT) Austin Knox EdD, OTR/L   Living Environment   People in Home grandchild(isidoro)  (adult granddaughter)

## 2023-12-10 ENCOUNTER — APPOINTMENT (OUTPATIENT)
Dept: OCCUPATIONAL THERAPY | Facility: CLINIC | Age: 83
DRG: 322 | End: 2023-12-10
Payer: COMMERCIAL

## 2023-12-10 VITALS
BODY MASS INDEX: 30.88 KG/M2 | DIASTOLIC BLOOD PRESSURE: 70 MMHG | RESPIRATION RATE: 16 BRPM | TEMPERATURE: 97.7 F | HEIGHT: 63 IN | SYSTOLIC BLOOD PRESSURE: 140 MMHG | HEART RATE: 83 BPM | WEIGHT: 174.3 LBS | OXYGEN SATURATION: 96 %

## 2023-12-10 LAB
CREAT SERPL-MCNC: 0.87 MG/DL (ref 0.51–0.95)
EGFRCR SERPLBLD CKD-EPI 2021: 65 ML/MIN/1.73M2
HGB BLD-MCNC: 11 G/DL (ref 11.7–15.7)
MAGNESIUM SERPL-MCNC: 2.3 MG/DL (ref 1.7–2.3)
POTASSIUM SERPL-SCNC: 4 MMOL/L (ref 3.4–5.3)

## 2023-12-10 PROCEDURE — 84132 ASSAY OF SERUM POTASSIUM: CPT | Performed by: INTERNAL MEDICINE

## 2023-12-10 PROCEDURE — 36415 COLL VENOUS BLD VENIPUNCTURE: CPT | Performed by: HOSPITALIST

## 2023-12-10 PROCEDURE — 99231 SBSQ HOSP IP/OBS SF/LOW 25: CPT | Performed by: INTERNAL MEDICINE

## 2023-12-10 PROCEDURE — 85018 HEMOGLOBIN: CPT | Performed by: INTERNAL MEDICINE

## 2023-12-10 PROCEDURE — 82565 ASSAY OF CREATININE: CPT | Performed by: HOSPITALIST

## 2023-12-10 PROCEDURE — 250N000013 HC RX MED GY IP 250 OP 250 PS 637: Performed by: INTERNAL MEDICINE

## 2023-12-10 PROCEDURE — 83735 ASSAY OF MAGNESIUM: CPT | Performed by: INTERNAL MEDICINE

## 2023-12-10 PROCEDURE — 250N000013 HC RX MED GY IP 250 OP 250 PS 637: Performed by: NURSE PRACTITIONER

## 2023-12-10 PROCEDURE — 99239 HOSP IP/OBS DSCHRG MGMT >30: CPT | Performed by: INTERNAL MEDICINE

## 2023-12-10 PROCEDURE — 97110 THERAPEUTIC EXERCISES: CPT | Mod: GO | Performed by: OCCUPATIONAL THERAPIST

## 2023-12-10 PROCEDURE — 250N000013 HC RX MED GY IP 250 OP 250 PS 637

## 2023-12-10 RX ORDER — CLOPIDOGREL BISULFATE 75 MG/1
75 TABLET ORAL DAILY
Qty: 90 TABLET | Refills: 0 | Status: SHIPPED | OUTPATIENT
Start: 2023-12-11 | End: 2024-01-26

## 2023-12-10 RX ORDER — CARVEDILOL 12.5 MG/1
12.5 TABLET ORAL 2 TIMES DAILY WITH MEALS
Qty: 180 TABLET | Refills: 0 | Status: SHIPPED | OUTPATIENT
Start: 2023-12-10 | End: 2024-01-26

## 2023-12-10 RX ADMIN — CLOPIDOGREL BISULFATE 75 MG: 75 TABLET ORAL at 10:06

## 2023-12-10 RX ADMIN — POTASSIUM CHLORIDE 10 MEQ: 750 TABLET, EXTENDED RELEASE ORAL at 10:05

## 2023-12-10 RX ADMIN — SACUBITRIL AND VALSARTAN 1 TABLET: 97; 103 TABLET, FILM COATED ORAL at 10:05

## 2023-12-10 RX ADMIN — Medication 25 MCG: at 10:06

## 2023-12-10 RX ADMIN — ROSUVASTATIN CALCIUM 40 MG: 20 TABLET, FILM COATED ORAL at 10:06

## 2023-12-10 RX ADMIN — CARVEDILOL 12.5 MG: 12.5 TABLET, FILM COATED ORAL at 10:06

## 2023-12-10 RX ADMIN — FUROSEMIDE 20 MG: 20 TABLET ORAL at 10:05

## 2023-12-10 RX ADMIN — PANTOPRAZOLE SODIUM 40 MG: 40 TABLET, DELAYED RELEASE ORAL at 06:51

## 2023-12-10 ASSESSMENT — ACTIVITIES OF DAILY LIVING (ADL)
ADLS_ACUITY_SCORE: 26
ADLS_ACUITY_SCORE: 25

## 2023-12-10 NOTE — PLAN OF CARE
Goal Outcome Evaluation:    Summary: NSTEMI, admitted 12/6  Date & Time: 12/9/23 [id number]  Orientation: A&Ox4  Activity Level: SBA  Fall Risk: No  Behavior & Aggression: Green  Pain Management: Denies  ABNL VS/O2: Vss on RA, pulses present  Tele: V Paced  ABNL Lab/BG: Hem 11.1, Pot 4.2, Mag 2.3, PTT 64  Diet:Low Fat  Bowel/Bladder: Continent B&B, 1 BM this shift  Skin: Scattered bruising R TR   Drains/Devices: PIV SL  Tests/Procedures NA  Anticipated DC Date: pending  Other Important Info: Restarted Xarelto, Monitoring Hgb, Possible discharge home today

## 2023-12-10 NOTE — PLAN OF CARE
[id number]  Pleasant lady. Pt aox4, SBA, RA and DNR/DNI. Tele V-paced. Pt denies chest pain and SOB. PCI to circ on 12/8/23.  R radial bruised, soft and CMS intact. Xarelto restarted today.   Plan: monitor Hgb and possible discharge home tomorrow.

## 2023-12-10 NOTE — DISCHARGE SUMMARY
Medicine Discharge Summary       Hamida Verma MRN# [id number]   YOB: 1940 Age: 83 year old     Date of Admission:  12/6/2023  Date of Discharge:  12/10/2023  1:00 PM  Admitting Physician:  Dona Trejo MD  Discharge Physician:  Kirk Jean Baptiste  Discharging Service:  Hospital Medicine     Primary Provider: Mikala Philip              Discharge Diagnosis:   NSTEMI  PAF  Pulmonary hypertension  CKD stage III  Right shoulder pain, referred pain         Consultations:   Cardiology          Hospital Course         Hamida Verma is a 83 year old female with a PMH significant for hyperlipidemia, hypertension, atrial fibrillation, sick sinus syndrome s/p pacemaker placement, CHF, pulmonary hypertension, CAD, CKD, and COPD.  admitted on 12/6/2023 for right shoulder pain.       ED summary: In the ED diagnostic workup remarkable for , K3.6, creatinine 0.85, calcium 9.3, BUN 26, initial Trop 65, followed by 95, AST 26, ALT 22, alk phos 63, bilirubin direct less than 0.2, proBNP 552, WBC 8.5, Hgb 12.8, platelet 35.  Twelve-lead EKG shows ventricular paced rhythm without any new ischemic changes.  Right humerus x-ray shows medial lateral femoral epicondyle enthesopathy.  Small marginal osteophyte from the inferior humeral head.  Shoulder x-ray shows normal joint spaces and alignment, no acute fracture.  CXR shows no focal consolidation, pleural effusion, or pneumothorax.  Pulm reticular changes in the bases could reflect component of fibroatelectasis.  In the ED patient received 975 mg, was initiated on heparin infusion.     Right Shoulder Pain, resolved, referred pain from the angina  NSTEMI s/p PCI to the left circumflex   Patient woke up at 1 AM this morning with severe right shoulder pain.  She was given 324 mg ASA and 1 SL nitroglycerin by EMS.    -Underwent left heart cath that showed 90% stenosis from proximal circumflex to mid circumflex lesion.  No intervention was performed.  -Patient's  "hemoglobin dropped from 12.8 on admission to 10.8 the next day.  Cardiology team was quite concerned about GI bleed.  Hemoglobin was reassuring and therefore GI consult deferred.   - Echocardiogram showed EF of 50 to 55% with normal right ventricular structure and left ventricle structure.  -    Right shoulder pain improved following SL nitroglycerin. shoulder pain is due to referred pain.   Cardiology recommended rosustatinn 40 mg , coreg 12.5 mg po bid, plavix 75 mg po daily and xeralto reduced daily dose of 15 mg and entresto   CAD  Hypertension  Hyperlipidemia  Pulmonary Hypertension  Atrial Fibrillation  CHF  Sick sinus syndrome s/p pacemaker placement  PTA regimen includes: Furosemide 20 mg daily, metoprolol succinate ER 50 mg BID, Rivaroxaban 20 mg daily, rosuvastatin 40 mg daily, and Entresto  BID.        CKD, stage 3  Baseline creatinine 0.97-1.01. Creatinine on admission 0.85.   - Avoid nephrotoxic agents including NSAIDs and contrast dye  - Strict I/Os  -250 cc fluid bolus given prior to cardiac cath.  -Creatinine stable.     COPD  PTA meds include PRN albuterol and spiriva.  - Continue PTA inhalers.           On Exam ;   Alert and oriented . No acute distress  Vital signs:  Temp: 97.7  F (36.5  C) Temp src: Oral BP: (!) 144/64 Pulse: 83   Resp: 16 SpO2: 96 % O2 Device: None (Room air)   Height: 160 cm (5' 3\") Weight: 79.1 kg (174 lb 4.8 oz)  Estimated body mass index is 30.88 kg/m  as calculated from the following:    Height as of this encounter: 1.6 m (5' 3\").    Weight as of this encounter: 79.1 kg (174 lb 4.8 oz).  Neck ; supple , no JVD  Chest ; equal BS bilaterally , no rales or rhonchi   CVS; RRR, no murmur /rubs or gallops  GI ; soft abdomen, non tender, BS positive  Ext ; no edema , no cynosis  Neuro ; CN 2 to 12 grossly intact , No Facial asymmetry noticed  .  Psych ; appropriate mood and effect  Skin ; no rash or purpura on exposed skin     ROUTINE IP LABS (Last four results)  BMP  Recent " Labs   Lab 12/10/23  0659 12/09/23  1122 12/08/23  0924 12/07/23  0627 12/06/23  0533   NA  --  139 141 140 143   POTASSIUM 4.0 4.2 3.9 4.0 3.6   CHLORIDE  --  104 106 108* 107   GURWINDER  --  8.9 8.9 8.9 9.3   CO2  --  25 24 25 23   BUN  --  11.8 11.5 9.4 13.5   CR 0.87 0.83 0.82 0.83 0.85   GLC  --  106* 104* 99 126*     CBC  Recent Labs   Lab 12/10/23  0659 12/09/23  1122 12/08/23  0616 12/07/23  2242 12/07/23  1440 12/07/23  1148 12/07/23  0627 12/06/23  0533   WBC  --   --  7.9  --  5.4  --  5.9 8.5   RBC  --   --  3.79*  --  4.00  --  3.74* 4.40   HGB 11.0* 11.1* 11.0* 10.7* 11.7   < > 10.9* 12.8   HCT  --   --  34.1*  --  36.2  --  33.3* 39.7   MCV  --   --  90  --  91  --  89 90   MCH  --   --  29.0  --  29.3  --  29.1 29.1   MCHC  --   --  32.3  --  32.3  --  32.7 32.2   RDW  --   --  13.2  --  13.2  --  13.2 13.1   PLT  --   --  300  --  317  --  278 357    < > = values in this interval not displayed.     INRNo lab results found in last 7 days.                 Discharge Medications:     Current Discharge Medication List        START taking these medications    Details   carvedilol (COREG) 12.5 MG tablet Take 1 tablet (12.5 mg) by mouth 2 times daily (with meals) for 90 days  Qty: 180 tablet, Refills: 0    Associated Diagnoses: Coronary artery disease involving native coronary artery of native heart with unstable angina pectoris (H)      clopidogrel (PLAVIX) 75 MG tablet Take 1 tablet (75 mg) by mouth daily for 90 days  Qty: 90 tablet, Refills: 0    Associated Diagnoses: Coronary artery disease involving native coronary artery of native heart with unstable angina pectoris (H)           CONTINUE these medications which have CHANGED    Details   rivaroxaban ANTICOAGULANT (XARELTO) 15 MG TABS tablet Take 1 tablet (15 mg) by mouth daily (with dinner) for 90 days  Qty: 90 tablet, Refills: 0    Associated Diagnoses: Coronary artery disease involving native coronary artery of native heart with unstable angina pectoris  (H); PAF (paroxysmal atrial fibrillation) (H)           CONTINUE these medications which have NOT CHANGED    Details   albuterol (PROAIR HFA/PROVENTIL HFA/VENTOLIN HFA) 108 (90 Base) MCG/ACT inhaler Inhale 2 puffs into the lungs every 6 hours  Qty: 18 g, Refills: 3    Comments: Pharmacy may dispense brand covered by insurance (Proair, or proventil or ventolin or generic albuterol inhaler)  Associated Diagnoses: Chronic diastolic CHF (congestive heart failure) (H)      cholecalciferol (VITAMIN  -D) 1000 UNIT capsule Take 1 capsule by mouth daily.      FLAX SEED OIL OR 1 capsule daily      furosemide (LASIX) 20 MG tablet Take 1 tablet (20 mg) by mouth daily  Qty: 90 tablet, Refills: 3    Associated Diagnoses: Chronic diastolic CHF (congestive heart failure) (H)      !! Multiple Vitamins-Minerals (HAIR SKIN NAILS PO) Take 1 tablet by mouth At Bedtime      !! Multiple Vitamins-Minerals (ICAPS AREDS 2 PO) Take 1 tablet by mouth daily      potassium chloride ER (KLOR-CON M) 10 MEQ CR tablet Take 1 tablet (10 mEq) by mouth daily  Qty: 90 tablet, Refills: 3    Associated Diagnoses: Hypokalemia      rosuvastatin (CRESTOR) 40 MG tablet Take 1 tablet (40 mg) by mouth daily  Qty: 90 tablet, Refills: 3    Associated Diagnoses: Hyperlipidemia LDL goal <70      sacubitril-valsartan (ENTRESTO)  MG per tablet Take 1 tablet by mouth 2 times daily  Qty: 180 tablet, Refills: 3    Associated Diagnoses: Heart failure with reduced ejection fraction, NYHA class II (H)      tiotropium (SPIRIVA) 18 MCG inhaled capsule Inhale 1 capsule (18 mcg) into the lungs daily  Qty: 30 capsule, Refills: 1    Associated Diagnoses: Chronic obstructive pulmonary disease, unspecified COPD type (H)       !! - Potential duplicate medications found. Please discuss with provider.        STOP taking these medications       metoprolol succinate ER (TOPROL XL) 50 MG 24 hr tablet Comments:   Reason for Stopping:                    Discharge Instructions and  Follow-Up:     Discharge Procedure Orders   Basic metabolic panel   Standing Status: Future Standing Exp. Date: 12/08/24     Hemoglobin   Standing Status: Future Standing Exp. Date: 12/08/24     CARDIAC REHAB REFERRAL   Standing Status: Future   Referral Priority: Routine: Next available opening Referral Type: Rehab Therapy Cardiac Therapy   Number of Visits Requested: 1     Follow-Up with Cardiology [facility]   Standing Status: Future   Referral Priority: Routine: Next available opening Referral Type: Consultation   Requested Specialty: Cardiovascular Disease   Number of Visits Requested: 1     Medication Instructions - Anticoagulants   Order Comments: Do NOT stop your aspirin or platelet inhibitor unless directed by your Cardiologist.  These medications help to prevent platelets in your blood from sticking together and forming a clot.  Examples of these medications are:  Ticagrelor (Brilinta), Clopidigrel (Plavix), Prasugrel (Effient)     When to call - Contact the Heart Clinic   Order Comments: You may experience symptoms that require follow-up before your scheduled appointment. Contact the Heart Clinic if you develop: Fever over 100.4o Fahrenheit, that lasts more than one day; Redness, heat, or pus at the puncture site; Change in color or temperature in your hand or arm.     When to call - Reasons to Call 911   Order Comments: If your wrist puncture site starts bleeding after discharge, sit down and apply firm pressure with your thumb against the puncture site and fingers against the back of the wrist for 10 minutes. If the bleeding stops, continue to rest, keeping your wrist still for 2 hours. Notify your doctor as soon as possible.  IF BLEEDING DOES NOT STOP OR THERE IS A LARGER AMOUNT OF BLEEDING OR SPURTING CALL 9-1-1 immediately.DO NOT drive yourself to the hospital.     Precautions - Lifting   Order Comments: DO NOT lift more than 5 pounds with affected arm for 48 hours     Precautions - Household Activities    Order Comments: Avoid excessive bending or movement of your wrist for 72 hours.  Do not subject hand/arm to any forceful movements for 24 hours, such as supporting weight when rising from a chair or bed.     Remove the band-aid on the puncture site after 24 hours and leave open to air. If minor oozing, you may apply a band-aid and remove after 12 hours.     Precautions - Active Sports Activities   Order Comments: DO NOT engage in vigorous exercise using your affected arm for 3 days after discharge.  This includes golf, tennis or swimming.     Precautions - Operating yard equipment or vehicles   Order Comments: Do not operate a chainsaw, lawnmower, motorcycle, or all-terrain vehicle for 48 hours after the procedure.     Precautions - Elective Dental Work   Order Comments: NO elective dental work for 6 weeks after receiving a stent.     Comfort and Pain Management - Bruising after Surgery   Order Comments: Expect mild tingling of hand and tenderness at the wrist puncture site for up to 3 days. You may take Tylenol or a pain medicine recommended by your doctor.     Activity - Cardiac Rehab   Order Comments: You are encouraged to enroll in an Outpatient Cardiac Rehab program after discharge from the hospital.  Our Cardiac Rehab staff may visit briefly with you while you're in the hospital.  If they miss you, someone will contact you after you are home.     Return to Driving   Order Comments: Driving is NOT permitted for 24 hours after surgery     Return to work   Order Comments: You may return to work after 72 hours if you are feeling well and your job does not involve heavy lifting.     Shower / Bathing   Order Comments: You may shower on the day after your procedure.  DO NOT soak of wrist with the puncture site in water for 3 days to prevent infection. DO NOT take a tub bath or wash dishes for 3 days after the procedure     Dressing Removal   Order Comments: Remove the band-aid on the puncture site after 24 hours  and leave open to air. If minor oozing, you may apply a band-aid and remove after 12 hours     Reason aspirin not prescribed from this order set     Order Specific Question Answer Comments   Reason aspirin not prescribed from this order set Due to Triple Therapy (TT)      Reason Lipid Lowering Medications not prescribed from this order set   Order Comments: Defer discharge rx to inpt team     Order Specific Question Answer Comments   Reason not prescribed Other (see comments)      Reason for your hospital stay   Order Comments: NSTEMI s/p PCI     Follow-up and recommended labs and tests    Order Comments: Follow up with primary care provider, Mikala Philip MD, within 7 days for hospital follow- up.  The following labs/tests are recommended: BMP.     Activity   Order Comments: Your activity upon discharge: activity as tolerated     Order Specific Question Answer Comments   Is discharge order? Yes      Diet   Order Comments: Follow this diet upon discharge: Orders Placed This Encounter      Low Saturated Fat Na <2400 mg     Order Specific Question Answer Comments   Is discharge order? Yes          Basic metabolic panel     Hemoglobin     CARDIAC REHAB REFERRAL      Follow-Up with Cardiology [facility]      Medication Instructions - Anticoagulants    Do NOT stop your aspirin or platelet inhibitor unless directed by your Cardiologist.  These medications help to prevent platelets in your blood from sticking together and forming a clot.  Examples of these medications are:  Ticagrelor (Brilinta), Clopidigrel (Plavix), Prasugrel (Effient)     When to call - Contact the Heart Clinic    You may experience symptoms that require follow-up before your scheduled appointment. Contact the Heart Clinic if you develop: Fever over 100.4o Fahrenheit, that lasts more than one day; Redness, heat, or pus at the puncture site; Change in color or temperature in your hand or arm.     When to call - Reasons to Call 911    If your wrist puncture  site starts bleeding after discharge, sit down and apply firm pressure with your thumb against the puncture site and fingers against the back of the wrist for 10 minutes. If the bleeding stops, continue to rest, keeping your wrist still for 2 hours. Notify your doctor as soon as possible.  IF BLEEDING DOES NOT STOP OR THERE IS A LARGER AMOUNT OF BLEEDING OR SPURTING CALL 9-1-1 immediately.DO NOT drive yourself to the hospital.     Precautions - Lifting    DO NOT lift more than 5 pounds with affected arm for 48 hours     Precautions - Household Activities    Avoid excessive bending or movement of your wrist for 72 hours.  Do not subject hand/arm to any forceful movements for 24 hours, such as supporting weight when rising from a chair or bed.     Remove the band-aid on the puncture site after 24 hours and leave open to air. If minor oozing, you may apply a band-aid and remove after 12 hours.     Precautions - Active Sports Activities    DO NOT engage in vigorous exercise using your affected arm for 3 days after discharge.  This includes golf, tennis or swimming.     Precautions - Operating yard equipment or vehicles    Do not operate a chainsaw, lawnmower, motorcycle, or all-terrain vehicle for 48 hours after the procedure.     Precautions - Elective Dental Work    NO elective dental work for 6 weeks after receiving a stent.     Comfort and Pain Management - Bruising after Surgery    Expect mild tingling of hand and tenderness at the wrist puncture site for up to 3 days. You may take Tylenol or a pain medicine recommended by your doctor.     Activity - Cardiac Rehab    You are encouraged to enroll in an Outpatient Cardiac Rehab program after discharge from the hospital.  Our Cardiac Rehab staff may visit briefly with you while you're in the hospital.  If they miss you, someone will contact you after you are home.     Return to Driving    Driving is NOT permitted for 24 hours after surgery     Return to work    You  may return to work after 72 hours if you are feeling well and your job does not involve heavy lifting.     Shower / Bathing    You may shower on the day after your procedure.  DO NOT soak of wrist with the puncture site in water for 3 days to prevent infection. DO NOT take a tub bath or wash dishes for 3 days after the procedure     Dressing Removal    Remove the band-aid on the puncture site after 24 hours and leave open to air. If minor oozing, you may apply a band-aid and remove after 12 hours     Reason aspirin not prescribed from this order set     Reason Lipid Lowering Medications not prescribed from this order set    Defer discharge rx to inpt team     Reason for your hospital stay    NSTEMI s/p PCI     Follow-up and recommended labs and tests     Follow up with primary care provider, Mikala Philip MD, within 7 days for hospital follow- up.  The following labs/tests are recommended: BMP.     Activity    Your activity upon discharge: activity as tolerated     Diet    Follow this diet upon discharge: Orders Placed This Encounter      Low Saturated Fat Na <2400 mg                Discharge Disposition:   40 minutes spent in discharge, including >50% in counseling and coordination of care, medication review and plan of care recommended on follow up. Questions were answered to satisfaction       Kirk Jean Baptiste MD  Internal Medicine Hospitalist & Staff Physician  [facility]

## 2023-12-10 NOTE — DISCHARGE INSTRUCTIONS
Cardiac Angioplasty/Stent Discharge Instructions - Radial    After you go home:    Have an adult stay with you until tomorrow.  Drink extra fluids for 2 days.  You may resume your normal diet.  No smoking       For 24 hours - due to the sedation you received:  Relax and take it easy.  Do NOT make any important or legal decisions.  Do NOT drive or operate machines at home or at work.  Do NOT drink alcohol.    Care of Wrist Puncture Site:    For the first 24 hrs - check the puncture site every 1-2 hours while awake.  It is normal to have soreness at the puncture site and mild tingling in your hand for up to 3 days.  Remove the bandaid after 24 hours. If there is minor oozing, apply another bandaid and remove it after 12 hours.  You may shower tomorrow.  Do NOT take a bath, or use a hot tub or pool for at least 3 days. Do NOT scrub the site. Do not use lotion or powder near the puncture site.           Activity:          For 2 days:   do not use your hand or arm to support your weight (such as rising from a chair)   do not bend your wrist (such as lifting a garage door).  do not lift more than 5 pounds or exercise your arm (such as tennis, golf or bowling).  Do NOT do any heavy activity such as exercise, lifting, or straining.     Bleeding:    If you start bleeding from the site in your wrist, sit down and press firmly on/above the site for 10 minutes.   Once bleeding stops, keep arm still for 2 hours.   Call [facility] as soon as you can.       Call 911 right away if you have heavy bleeding or bleeding that does not stop.      Medicines:    If you are taking an antiplatelet medication such as Plavix, Brilinta or Effient, do not stop taking it until you talk to your cardiologist.      If you are on Metformin (Glucophage), do not restart it until you have blood tests (within 2 to 3 days after discharge).  After you have your blood drawn, you may restart the Metformin.   Take your medications, including blood thinners,  unless your provider tells you not to.    If you take Coumadin (Warfarin), have your INR checked by your provider in  3-5 days. Call your clinic to schedule this.  If you have stopped any medicines, check with your provider about when to restart them.    Follow Up Appointments:    Follow up with [facility] Nurse Practitioner at [facility] of patient preference in 7-10 days.  Cardiac Rehab will contact you for follow up care.    Call the clinic if:    You have a large or growing hard lump around the site.  The site is red, swollen, hot or tender.  Blood or fluid is draining from the site.  You have chills or a fever greater than 101 F (38 C).  Your arm feels numb, cool or changes color.  You have hives, a rash or unusual itching.  Any questions or concerns.    Other Instructions:    If you received a stent - carry your stent card with you at all times.      [facility] [place]:    [phone] [facility] (7 days a week)

## 2023-12-10 NOTE — PROGRESS NOTES
Neuro- A&OX4  Most Recent Vitals- Temp: 97.7  F (36.5  C) Temp src: Oral BP: (!) 140/70 Pulse: 83   Resp: 16 SpO2: 96 % O2 Device: None (Room air)   Tele/Cardiac- 100% V-PACED   Resp- RA  Activity- independent   Pain- Denies   Drips- none   Drains/Tubes- P-IV removed before discharge   Skin- Right radial bruised, CMS INTACT   GI/- WDL, Last BM today  Aggression Color- Green  COVID status- Negative  Plan- Discharge instructions reviewed with pt and answered all questions. Pt expressed understanding of all discharge information and follow ups. Pt was instructed to pick rx from home pharmacy. Pt was discharge home with her grand-daughter at 1230.  Deon-     Evon Babcock RN

## 2023-12-10 NOTE — PROGRESS NOTES
"       Assessment and Plan:   Ms. Verma is an 83 y.o female admitted to the hospital with NSTEMI and is now s/p PCI to the left circumflex    1.  NSTEMI s/p PCI to left circumflex 12/8  2. Paroxysmal trial fibrillation on anticoagulation - last dose 5pm 12/5  3. Hypertension - uncontrolled  4. Hyperlipidemia - LDL 77  5. Sick sinus syndrome s/p AV node ablation and PPM in 2019  6. Anemia -Hgb stable    Plan:   -continue plavix 75mg today, reduce rivaroxaban to 15mg daily due to Creatinine clearance. Continue regimen for one year  -Continue rosuvastatin 40mg, entresto  BID, carvedilol 12.5mg BID, furosemide 20mg daily  -CV follow up after discharge ordered  -Pt OK to discharge today from a CV standpoint.     Severino Kebede MD        Interval History:     Doing well without recurrent RA pain. . No CP, SOB, Presyncope           Medications:   I have reviewed this patient's current medications         Physical Exam:   Blood pressure (!) 147/55, pulse 70, temperature 98.3  F (36.8  C), temperature source Oral, resp. rate 16, height 1.6 m (5' 3\"), weight 79.1 kg (174 lb 4.8 oz), SpO2 94%, not currently breastfeeding.  Vitals:    12/06/23 0516 12/06/23 1504 12/07/23 0552 12/09/23 0400   Weight: 81.6 kg (180 lb) 80.7 kg (177 lb 14.4 oz) 79.2 kg (174 lb 8 oz) 78.8 kg (173 lb 12.8 oz)    12/10/23 0246 12/10/23 0443   Weight: 78.9 kg (173 lb 14.4 oz) 79.1 kg (174 lb 4.8 oz)         Intake/Output Summary (Last 24 hours) at 12/9/2023 0536  Last data filed at 12/8/2023 1800  Gross per 24 hour   Intake 400 ml   Output --   Net 400 ml       12/04 2300 - 12/09 2259  In: 1040 [P.O.:1040]  Out: 950 [Urine:950]  Net: 90    Exam:  GENERAL APPEARANCE ADULT: Alert, in no acute distress  RESP: lungs clear to auscultation   CV: regular rate and rhythm, no murmur, rub, or gallop  EXTREMITIES: No LE edema, bruising at R radial site         Data:   LABS (Last four results)  CMP  Recent Labs   Lab 12/09/23  1122 12/08/23  0924 " 12/07/23  0627 12/06/23  1207 12/06/23  0533    141 140  --  143   POTASSIUM 4.2 3.9 4.0  --  3.6   CHLORIDE 104 106 108*  --  107   CO2 25 24 25  --  23   ANIONGAP 10 11 7  --  13   * 104* 99  --  126*   BUN 11.8 11.5 9.4  --  13.5   CR 0.83 0.82 0.83  --  0.85   GFRESTIMATED 69 70 69  --  67   GURWINDER 8.9 8.9 8.9  --  9.3   MAG 2.3  --   --  2.0  --    PROTTOTAL  --   --   --   --  6.8   ALBUMIN  --   --   --   --  4.1   BILITOTAL  --   --   --   --  0.4   ALKPHOS  --   --   --   --  63   AST  --   --   --   --  26   ALT  --   --   --   --  22     CBC  Recent Labs   Lab 12/09/23  1122 12/08/23  0616 12/07/23  2242 12/07/23  1440 12/07/23  1148 12/07/23  0627 12/06/23  0533   WBC  --  7.9  --  5.4  --  5.9 8.5   RBC  --  3.79*  --  4.00  --  3.74* 4.40   HGB 11.1* 11.0* 10.7* 11.7   < > 10.9* 12.8   HCT  --  34.1*  --  36.2  --  33.3* 39.7   MCV  --  90  --  91  --  89 90   MCH  --  29.0  --  29.3  --  29.1 29.1   MCHC  --  32.3  --  32.3  --  32.7 32.2   RDW  --  13.2  --  13.2  --  13.2 13.1   PLT  --  300  --  317  --  278 357    < > = values in this interval not displayed.     INRNo lab results found in last 7 days.  TROPONINS   Lab Results   Component Value Date    TROPI <0.015 01/05/2019    TROPI <0.015 01/05/2019    TROPI <0.015 01/05/2019    TROPI <0.015 10/11/2018    TROPI <0.015 03/14/2018    TROPONIN 0.00 11/12/2014    TROPONIN 0.01 11/12/2014                                                                                                               Severino Kebede MD

## 2023-12-11 ENCOUNTER — TELEPHONE (OUTPATIENT)
Dept: CARDIOLOGY | Facility: CLINIC | Age: 83
End: 2023-12-11
Payer: COMMERCIAL

## 2023-12-11 NOTE — TELEPHONE ENCOUNTER
Patient was admitted to [facility]    Discharge Dx:  NSTEMI  PAF  Pulmonary hypertension  CKD stage III  Right shoulder pain, referred pain     Echo showed EF of 50-55%    Pt was started on Carvedilol and Plavix at time of discharge.    Called patient to discuss any post hospital d/c questions she may have, review medication changes, and confirm f/u appts. Patient denied any questions regarding new medications or changes to PTA medications.     Right radial cath site without bleeding, swelling, redness or signs of infection    Patient denied any SOB, chest pain, or light headedness.    RN discussed with patient that she will need follow up in cardiac clinic.  Patient requesting scheduling call to help her with that. RN did send message to scheduling to call patient.    Patient advised to call clinic with any cardiac related questions or concerns prior to this jose't. Patient verbalized understanding and agreed with plan.   Bing Boothe RN on 12/11/2023 at 2:21 PM

## 2023-12-12 ENCOUNTER — TELEPHONE (OUTPATIENT)
Dept: FAMILY MEDICINE | Facility: CLINIC | Age: 83
End: 2023-12-12
Payer: COMMERCIAL

## 2023-12-12 LAB
ATRIAL RATE - MUSE: 65 BPM
ATRIAL RATE - MUSE: 70 BPM
DIASTOLIC BLOOD PRESSURE - MUSE: NORMAL MMHG
DIASTOLIC BLOOD PRESSURE - MUSE: NORMAL MMHG
INTERPRETATION ECG - MUSE: NORMAL
INTERPRETATION ECG - MUSE: NORMAL
P AXIS - MUSE: 49 DEGREES
P AXIS - MUSE: 89 DEGREES
PR INTERVAL - MUSE: 246 MS
PR INTERVAL - MUSE: 252 MS
QRS DURATION - MUSE: 174 MS
QRS DURATION - MUSE: 184 MS
QT - MUSE: 478 MS
QT - MUSE: 504 MS
QTC - MUSE: 516 MS
QTC - MUSE: 524 MS
R AXIS - MUSE: -53 DEGREES
R AXIS - MUSE: -57 DEGREES
SYSTOLIC BLOOD PRESSURE - MUSE: NORMAL MMHG
SYSTOLIC BLOOD PRESSURE - MUSE: NORMAL MMHG
T AXIS - MUSE: 115 DEGREES
T AXIS - MUSE: 122 DEGREES
VENTRICULAR RATE- MUSE: 65 BPM
VENTRICULAR RATE- MUSE: 70 BPM

## 2023-12-12 NOTE — TELEPHONE ENCOUNTER
Reason for Call:  Appointment Request    Patient requesting this type of appt:  Hospital/ED Follow-Up     Requested provider: Mikala Philip    Reason patient unable to be scheduled: Not within requested timeframe    When does patient want to be seen/preferred time:  Patient is looking for sooner than end of February     Comments: Patient recently had a heart attack and needs to see Dr Cedrick MISHRA     Could we send this information to you in [facility] or would you prefer to receive a phone call?:   Patient would prefer a phone call   Okay to leave a detailed message?: Yes at Home number on file [phone] (home)    Call taken on 12/12/2023 at 7:15 AM by Shantelle Frederick

## 2023-12-13 NOTE — TELEPHONE ENCOUNTER
Writer called and left message on patient's voicemail to call back and speak with a triage nurse.    Okay to leave a detailed message?: Yes at Home number on file [phone] (home)     Message left regarding scheduled appointment for hospital f/up. If unable to keep this appointment please call and cancel.   Thursday 12/21 at 10:20 for 40 minutes.  Someone will call around 10 am.     DANIEL DengN DARWIN  [facility]

## 2023-12-14 ENCOUNTER — DOCUMENTATION ONLY (OUTPATIENT)
Dept: CARDIOLOGY | Facility: CLINIC | Age: 83
End: 2023-12-14
Payer: COMMERCIAL

## 2023-12-14 NOTE — PROGRESS NOTES
Courtesy Device Check for pt's a pacemaker that [facility]56 [facility] for right shoulder pain and was found to have 90% stenosis from proximal circumflex to mid circumflex lesion, no intervention was performed.     Device interrogation shows stable leads, normal device function, PAF episodes (pt has known hx and is on OAC).    Presenting: AS/ in the 80s  A: 3.6%  V: >99%  Battery: 45% remaining capacity to MIGUELINA Dow RN

## 2023-12-19 ENCOUNTER — HOSPITAL ENCOUNTER (INPATIENT)
Facility: CLINIC | Age: 83
LOS: 3 days | Discharge: HOME | DRG: 322 | End: 2023-12-22
Attending: EMERGENCY MEDICINE | Admitting: HOSPITALIST
Payer: COMMERCIAL

## 2023-12-19 ENCOUNTER — APPOINTMENT (OUTPATIENT)
Dept: GENERAL RADIOLOGY | Facility: CLINIC | Age: 83
DRG: 322 | End: 2023-12-19
Attending: EMERGENCY MEDICINE
Payer: COMMERCIAL

## 2023-12-19 ENCOUNTER — APPOINTMENT (OUTPATIENT)
Dept: CT IMAGING | Facility: CLINIC | Age: 83
DRG: 322 | End: 2023-12-19
Attending: EMERGENCY MEDICINE
Payer: COMMERCIAL

## 2023-12-19 DIAGNOSIS — R07.9 CHEST PAIN, UNSPECIFIED TYPE: ICD-10-CM

## 2023-12-19 DIAGNOSIS — I21.4 NSTEMI (NON-ST ELEVATED MYOCARDIAL INFARCTION) (H): Primary | ICD-10-CM

## 2023-12-19 DIAGNOSIS — J44.9 CHRONIC OBSTRUCTIVE PULMONARY DISEASE, UNSPECIFIED COPD TYPE (H): ICD-10-CM

## 2023-12-19 DIAGNOSIS — I50.32 CHRONIC DIASTOLIC CHF (CONGESTIVE HEART FAILURE) (H): ICD-10-CM

## 2023-12-19 DIAGNOSIS — R79.89 ELEVATED TROPONIN: ICD-10-CM

## 2023-12-19 LAB
ALBUMIN SERPL BCG-MCNC: 3.7 G/DL (ref 3.5–5.2)
ALP SERPL-CCNC: 61 U/L (ref 40–150)
ALT SERPL W P-5'-P-CCNC: 20 U/L (ref 0–50)
ANION GAP SERPL CALCULATED.3IONS-SCNC: 9 MMOL/L (ref 7–15)
APTT PPP: 37 SECONDS (ref 22–38)
AST SERPL W P-5'-P-CCNC: 19 U/L (ref 0–45)
ATRIAL RATE - MUSE: 63 BPM
ATRIAL RATE - MUSE: 72 BPM
BASOPHILS # BLD AUTO: 0.1 10E3/UL (ref 0–0.2)
BASOPHILS NFR BLD AUTO: 1 %
BILIRUB SERPL-MCNC: 0.3 MG/DL
BUN SERPL-MCNC: 14.5 MG/DL (ref 8–23)
CALCIUM SERPL-MCNC: 9.1 MG/DL (ref 8.8–10.2)
CHLORIDE SERPL-SCNC: 103 MMOL/L (ref 98–107)
CREAT SERPL-MCNC: 0.95 MG/DL (ref 0.51–0.95)
DEPRECATED HCO3 PLAS-SCNC: 26 MMOL/L (ref 22–29)
DIASTOLIC BLOOD PRESSURE - MUSE: NORMAL MMHG
DIASTOLIC BLOOD PRESSURE - MUSE: NORMAL MMHG
EGFRCR SERPLBLD CKD-EPI 2021: 59 ML/MIN/1.73M2
EOSINOPHIL # BLD AUTO: 0.4 10E3/UL (ref 0–0.7)
EOSINOPHIL NFR BLD AUTO: 5 %
ERYTHROCYTE [DISTWIDTH] IN BLOOD BY AUTOMATED COUNT: 12.8 % (ref 10–15)
ERYTHROCYTE [DISTWIDTH] IN BLOOD BY AUTOMATED COUNT: 12.9 % (ref 10–15)
GLUCOSE SERPL-MCNC: 155 MG/DL (ref 70–99)
HCT VFR BLD AUTO: 32.6 % (ref 35–47)
HCT VFR BLD AUTO: 35.1 % (ref 35–47)
HGB BLD-MCNC: 10.7 G/DL (ref 11.7–15.7)
HGB BLD-MCNC: 11.2 G/DL (ref 11.7–15.7)
HOLD SPECIMEN: NORMAL
IMM GRANULOCYTES # BLD: 0 10E3/UL
IMM GRANULOCYTES NFR BLD: 0 %
INTERPRETATION ECG - MUSE: NORMAL
INTERPRETATION ECG - MUSE: NORMAL
LYMPHOCYTES # BLD AUTO: 1.5 10E3/UL (ref 0.8–5.3)
LYMPHOCYTES NFR BLD AUTO: 20 %
MCH RBC QN AUTO: 29.1 PG (ref 26.5–33)
MCH RBC QN AUTO: 29.2 PG (ref 26.5–33)
MCHC RBC AUTO-ENTMCNC: 31.9 G/DL (ref 31.5–36.5)
MCHC RBC AUTO-ENTMCNC: 32.8 G/DL (ref 31.5–36.5)
MCV RBC AUTO: 89 FL (ref 78–100)
MCV RBC AUTO: 91 FL (ref 78–100)
MONOCYTES # BLD AUTO: 0.9 10E3/UL (ref 0–1.3)
MONOCYTES NFR BLD AUTO: 11 %
NEUTROPHILS # BLD AUTO: 4.7 10E3/UL (ref 1.6–8.3)
NEUTROPHILS NFR BLD AUTO: 63 %
NRBC # BLD AUTO: 0 10E3/UL
NRBC BLD AUTO-RTO: 0 /100
NT-PROBNP SERPL-MCNC: 468 PG/ML (ref 0–1800)
P AXIS - MUSE: 54 DEGREES
P AXIS - MUSE: 71 DEGREES
PLATELET # BLD AUTO: 372 10E3/UL (ref 150–450)
PLATELET # BLD AUTO: 405 10E3/UL (ref 150–450)
POTASSIUM SERPL-SCNC: 3.8 MMOL/L (ref 3.4–5.3)
PR INTERVAL - MUSE: 252 MS
PR INTERVAL - MUSE: NORMAL MS
PROT SERPL-MCNC: 6.2 G/DL (ref 6.4–8.3)
QRS DURATION - MUSE: 180 MS
QRS DURATION - MUSE: 188 MS
QT - MUSE: 484 MS
QT - MUSE: 504 MS
QTC - MUSE: 515 MS
QTC - MUSE: 529 MS
R AXIS - MUSE: -46 DEGREES
R AXIS - MUSE: -50 DEGREES
RBC # BLD AUTO: 3.66 10E6/UL (ref 3.8–5.2)
RBC # BLD AUTO: 3.85 10E6/UL (ref 3.8–5.2)
SODIUM SERPL-SCNC: 138 MMOL/L (ref 135–145)
SYSTOLIC BLOOD PRESSURE - MUSE: NORMAL MMHG
SYSTOLIC BLOOD PRESSURE - MUSE: NORMAL MMHG
T AXIS - MUSE: 105 DEGREES
T AXIS - MUSE: 99 DEGREES
TROPONIN T SERPL HS-MCNC: 29 NG/L
TROPONIN T SERPL HS-MCNC: 521 NG/L
TROPONIN T SERPL HS-MCNC: 857 NG/L
VENTRICULAR RATE- MUSE: 63 BPM
VENTRICULAR RATE- MUSE: 72 BPM
WBC # BLD AUTO: 6.9 10E3/UL (ref 4–11)
WBC # BLD AUTO: 7.6 10E3/UL (ref 4–11)

## 2023-12-19 PROCEDURE — 99223 1ST HOSP IP/OBS HIGH 75: CPT | Performed by: PHYSICIAN ASSISTANT

## 2023-12-19 PROCEDURE — 210N000002 HC R&B HEART CARE

## 2023-12-19 PROCEDURE — G0378 HOSPITAL OBSERVATION PER HR: HCPCS

## 2023-12-19 PROCEDURE — 250N000011 HC RX IP 250 OP 636: Performed by: EMERGENCY MEDICINE

## 2023-12-19 PROCEDURE — 80053 COMPREHEN METABOLIC PANEL: CPT | Performed by: EMERGENCY MEDICINE

## 2023-12-19 PROCEDURE — 250N000009 HC RX 250: Performed by: EMERGENCY MEDICINE

## 2023-12-19 PROCEDURE — 96365 THER/PROPH/DIAG IV INF INIT: CPT

## 2023-12-19 PROCEDURE — 93005 ELECTROCARDIOGRAM TRACING: CPT

## 2023-12-19 PROCEDURE — 36415 COLL VENOUS BLD VENIPUNCTURE: CPT | Performed by: EMERGENCY MEDICINE

## 2023-12-19 PROCEDURE — 250N000011 HC RX IP 250 OP 636: Mod: JZ | Performed by: EMERGENCY MEDICINE

## 2023-12-19 PROCEDURE — 84484 ASSAY OF TROPONIN QUANT: CPT | Performed by: PHYSICIAN ASSISTANT

## 2023-12-19 PROCEDURE — 85027 COMPLETE CBC AUTOMATED: CPT | Performed by: EMERGENCY MEDICINE

## 2023-12-19 PROCEDURE — 71275 CT ANGIOGRAPHY CHEST: CPT

## 2023-12-19 PROCEDURE — 36415 COLL VENOUS BLD VENIPUNCTURE: CPT | Performed by: PHYSICIAN ASSISTANT

## 2023-12-19 PROCEDURE — 85730 THROMBOPLASTIN TIME PARTIAL: CPT | Performed by: EMERGENCY MEDICINE

## 2023-12-19 PROCEDURE — 84484 ASSAY OF TROPONIN QUANT: CPT | Performed by: EMERGENCY MEDICINE

## 2023-12-19 PROCEDURE — 83880 ASSAY OF NATRIURETIC PEPTIDE: CPT | Performed by: EMERGENCY MEDICINE

## 2023-12-19 PROCEDURE — 85025 COMPLETE CBC W/AUTO DIFF WBC: CPT | Performed by: EMERGENCY MEDICINE

## 2023-12-19 PROCEDURE — 99291 CRITICAL CARE FIRST HOUR: CPT | Mod: 25

## 2023-12-19 PROCEDURE — 71045 X-RAY EXAM CHEST 1 VIEW: CPT

## 2023-12-19 PROCEDURE — 250N000013 HC RX MED GY IP 250 OP 250 PS 637: Performed by: PHYSICIAN ASSISTANT

## 2023-12-19 RX ORDER — HEPARIN SODIUM 10000 [USP'U]/100ML
0-5000 INJECTION, SOLUTION INTRAVENOUS CONTINUOUS
Status: DISCONTINUED | OUTPATIENT
Start: 2023-12-19 | End: 2023-12-20

## 2023-12-19 RX ORDER — IOPAMIDOL 755 MG/ML
69 INJECTION, SOLUTION INTRAVASCULAR ONCE
Status: COMPLETED | OUTPATIENT
Start: 2023-12-19 | End: 2023-12-19

## 2023-12-19 RX ADMIN — IOPAMIDOL 69 ML: 755 INJECTION, SOLUTION INTRAVENOUS at 21:45

## 2023-12-19 RX ADMIN — RIVAROXABAN 15 MG: 15 TABLET, FILM COATED ORAL at 20:02

## 2023-12-19 RX ADMIN — HEPARIN SODIUM 1000 UNITS/HR: 10000 INJECTION, SOLUTION INTRAVENOUS at 21:15

## 2023-12-19 RX ADMIN — SODIUM CHLORIDE 93 ML: 9 INJECTION, SOLUTION INTRAVENOUS at 21:45

## 2023-12-19 ASSESSMENT — ACTIVITIES OF DAILY LIVING (ADL)
ADLS_ACUITY_SCORE: 37

## 2023-12-19 NOTE — Clinical Note
The first balloon was inserted into the right coronary artery.Max pressure = 14 bright. Total duration = 16 seconds.     Max pressure = 16 bright. Total duration = 16 seconds.    Balloon reinflated a second time: Max pressure = 16 bright. Total duration = 16 seconds.  Balloon reinflated a third time: Max pressure = 18 bright. Total duration = 9 seconds.

## 2023-12-19 NOTE — ED PROVIDER NOTES
History     Chief Complaint:  Chest Pain       HPI   Hamida Verma is a 83 year old female who presents with right-sided chest pain that started shortly prior to arrival.  She had a single stent put in for heart attack that she had here in this hospital about a week ago, and states that she actually was doing quite well without much pain until today.  She was not exerting himself just had the pain, and states it is very similar to previous pain that she had with a heart attack.  It radiates from her right chest and right shoulder to her right neck.   EMS did give nitro sublingual and it did help with her pain.      Independent Historian:   EMS provides above history    Review of External Notes: Yes, have reviewed the patient's last cardiology note from 6 November 2023 the patient was seen for paroxysmal A-fib as well as some cardiovascular disease, as well as I reviewed her hospitalization to this hospital on 6 December for an NSTEMI.      Allergies:  Strawberries [Strawberry Extract]  Strawberry Flavor     Medications:    albuterol (PROAIR HFA/PROVENTIL HFA/VENTOLIN HFA) 108 (90 Base) MCG/ACT inhaler  carvedilol (COREG) 12.5 MG tablet  cholecalciferol (VITAMIN  -D) 1000 UNIT capsule  clopidogrel (PLAVIX) 75 MG tablet  FLAX SEED OIL OR  furosemide (LASIX) 20 MG tablet  Multiple Vitamins-Minerals (HAIR SKIN NAILS PO)  Multiple Vitamins-Minerals (ICAPS AREDS 2 PO)  potassium chloride ER (KLOR-CON M) 10 MEQ CR tablet  rivaroxaban ANTICOAGULANT (XARELTO) 15 MG TABS tablet  rosuvastatin (CRESTOR) 40 MG tablet  sacubitril-valsartan (ENTRESTO)  MG per tablet  tiotropium (SPIRIVA) 18 MCG inhaled capsule        Past Medical History:    Past Medical History:   Diagnosis Date    Atrial fibrillation (H)     Chronic diastolic CHF (congestive heart failure) (H)     COPD (chronic obstructive pulmonary disease) (H)     Essential hypertension, benign     HYPERLIPIDEMIA NEC/NOS 03/30/2006    Pulmonary hypertension (H)      Pyelonephritis 2019    SSS (sick sinus syndrome) (H)        Past Surgical History:    Past Surgical History:   Procedure Laterality Date    CV CORONARY ANGIOGRAM N/A 12/7/2023    Procedure: Coronary Angiogram;  Surgeon: Mookie Yates MD;  Location:  HEART CARDIAC CATH LAB    CV CORONARY ANGIOGRAM N/A 12/8/2023    Procedure: Coronary Angiogram;  Surgeon: Stephanie Monroy MD;  Location:  HEART CARDIAC CATH LAB    CV PCI N/A 12/8/2023    Procedure: Percutaneous Coronary Intervention;  Surgeon: Stephanie Monroy MD;  Location:  HEART CARDIAC CATH LAB    EP ABLATION AV NODE N/A 5/7/2019    Procedure: EP Ablation AV Node;  Surgeon: Roe Voss MD;  Location:  HEART CARDIAC CATH LAB    EYE SURGERY      HYSTERECTOMY, VAGINAL      hysterectomy    LAPAROSCOPIC CHOLECYSTECTOMY N/A 6/1/2023    Procedure: Laparoscopic cholecystectomy with lysis of adhesions;  Surgeon: Sawyer Marcum MD;  Location:  OR        Family History:    family history includes Alcohol/Drug in her father; Cerebrovascular Disease in her mother and sister; Dementia (age of onset: 57) in her daughter; Family History Negative in her brother, maternal grandfather, maternal grandmother, paternal grandfather, paternal grandmother, sister, sister, and sister; Glaucoma in her mother; Macular Degeneration in her mother; Myocardial Infarction (age of onset: 57) in her son; Myocardial Infarction (age of onset: 63) in her father.    Social History:   reports that she quit smoking about 23 years ago. Her smoking use included cigarettes. She started smoking about 68 years ago. She has a 67.50 pack-year smoking history. She has never used smokeless tobacco. She reports that she does not drink alcohol and does not use drugs.  PCP: Mikala Philip     Physical Exam   Patient Vitals for the past 24 hrs:   BP Temp Temp src Pulse Resp SpO2 Weight   12/19/23 1722 (!) 142/72 97.4  F (36.3  C) Temporal 68 18 95 % 84.4 kg (186 lb 1.1 oz)        Physical  Exam  Vitals: reviewed by me  General: Pt seen on hospital [facility], pleasant, cooperative, and alert to conversation  Eyes: Tracking well, clear conjunctiva BL  ENT: MMM, midline trachea.   Lungs: No tachypnea, no accessory muscle use. No respiratory distress.   CV: Rate as above  Abd: Soft, non tender, no guarding, no rebound. Non distended  MSK: no joint effusion.  No evidence of trauma  Skin: No rash  Neuro: Clear speech and no facial droop.  Psych: Not RIS, no e/o AH/VH    Emergency Department Course     ECG results from 12/19/23   EKG 12 lead     Value    Systolic Blood Pressure     Diastolic Blood Pressure     Ventricular Rate 72    Atrial Rate 72    DC Interval     QRS Duration 188        QTc 529    P Axis 54    R AXIS -46    T Axis 105    Interpretation ECG      Ventricular-paced rhythm  Abnormal ECG  When compared with ECG of 09-DEC-2023 07:57,  Electronic ventricular pacemaker has replaced Sinus rhythm  Confirmed by GENERATED REPORT, COMPUTER ([id number]),  Calixto Ragsdale ([id number]) on 12/19/2023 5:21:18 PM           Imaging:  XR Chest Port 1 View   Final Result   IMPRESSION: Dual-chamber pacemaker now present with leads appearing in good position on this single projection. Borderline cardiomegaly. There is mild central interstitial prominence and diffuse prominence of interstitium suggesting minimal interstitial    edema. No focal pneumonia or pleural effusion evident..      CT Chest Pulmonary Embolism w Contrast    (Results Pending)      Report per radiology    Laboratory:  Labs Ordered and Resulted from Time of ED Arrival to Time of ED Departure   TROPONIN T, HIGH SENSITIVITY - Abnormal       Result Value    Troponin T, High Sensitivity 29 (*)    CBC WITH PLATELETS AND DIFFERENTIAL - Abnormal    WBC Count 7.6      RBC Count 3.85      Hemoglobin 11.2 (*)     Hematocrit 35.1      MCV 91      MCH 29.1      MCHC 31.9      RDW 12.9      Platelet Count 405      % Neutrophils 63      % Lymphocytes 20       % Monocytes 11      % Eosinophils 5      % Basophils 1      % Immature Granulocytes 0      NRBCs per 100 WBC 0      Absolute Neutrophils 4.7      Absolute Lymphocytes 1.5      Absolute Monocytes 0.9      Absolute Eosinophils 0.4      Absolute Basophils 0.1      Absolute Immature Granulocytes 0.0      Absolute NRBCs 0.0     COMPREHENSIVE METABOLIC PANEL - Abnormal    Sodium 138      Potassium 3.8      Carbon Dioxide (CO2) 26      Anion Gap 9      Urea Nitrogen 14.5      Creatinine 0.95      GFR Estimate 59 (*)     Calcium 9.1      Chloride 103      Glucose 155 (*)     Alkaline Phosphatase 61      AST 19      ALT 20      Protein Total 6.2 (*)     Albumin 3.7      Bilirubin Total 0.3     NT PROBNP INPATIENT - Normal    N terminal Pro BNP Inpatient 468            Emergency Department Course & Assessments:        Interventions:  Medications - No data to display       Independent Interpretation (X-rays, CTs, rhythm strip):  Yes have independently reviewed the chest x-rays, no obvious pneumothorax noted    Consultations/Discussion of Management or Tests:  Yes I spoke to Dr. Lazaro of cardiology, who reviewed EKG, we both agree the team that this is not a candidate to go to the Cath Lab just yet, will trend troponins.        Social Determinants of Health affecting care:   Stress/Adjustment Disorders      Disposition:  The patient was admitted to the hospital under the care of Dr. Howard.     Impression & Plan        Medical Decision Making:  This is a very pleasant 83-year-old female who presents the emergency room with appears to be right-sided chest pain in the setting of having had a stent placed for an MI about 7 to 8 days ago.  Her pain is controlled here with a single dose of nitro and Tylenol, and her EKG does not meet STEMI criteria based on my conversation with the on-call cardiologist.  The patient did get a drug-eluting stent, and states that she has been compliant with all of her medications.  I do not see  any evidence of a ventricular free wall rupture or Dressler syndrome at this point, but she does need to have her troponins trended and have an echo and likely be seen by cardiology.  She is resting comfortably at this time, but I will be bringing her into the hospital to meet with cardiology, inpatient team will order the echo.  Will monitor very carefully until next inpatient bed is available.  Will defer nitro drip and heparin drip at this time unless patient's pain returns or she has blood pressure issues, or her clinical status changes in someway    11:30 PM  I do appreciate that the patient's troponin continues to trend up, though I did discuss the case with both Dr. Weaver and Dr. Gay.  Dr Weaver all this did not see an obvious reason to go immediately to the Cath Lab and with elevated troponin based on the repeat EKG, though she clearly left the ultimate decision with her primary team here in the ER.  I then spoke with Dr. Gay of the cardiology team who stated that since the patient has said that she has been compliant with all her medications, the likelihood of this being a meaningful new infarct is actually very low and also discourages taking the patient to the Cath Lab.  I went and reassessed the patient and she continues to be at this time without any chest pain or symptoms.  Therefore we will continue heparinization, trending troponins, and reassess should she develop any chest pain or discomfort or hemodynamic instability.  At this time, she is boarding under the hospitalist team.     Critical care time 30 minutes.    Diagnosis:    ICD-10-CM    1. Chest pain, unspecified type  R07.9       2. Elevated troponin  R79.89              12/19/2023   Mynor Solomon*        Mynor Solomon MD  12/19/23 1927       Mynor Solomon MD  12/19/23 5357

## 2023-12-19 NOTE — Clinical Note
Total IV Fluids Infused 300 mL. Detail Level: Generalized Detail Level: Simple Detail Level: Detailed Detail Level: Zone

## 2023-12-19 NOTE — ED NOTES
[facility]  ED Nurse Handoff Report    ED Chief complaint: Chest Pain      ED Diagnosis:   Final diagnoses:   None       Code Status: NO CPR do not intubate    Allergies:   Allergies   Allergen Reactions    Strawberries [Strawberry Extract] Anaphylaxis    Strawberry Flavor        Patient Story:    Focused Assessment:   Presents with right sided  chest pain that started this afternoon. Last week she had a STEMI and she had a single stent put in.Pain radiates to the right shoulder and neck,    Treatments and/or interventions provided: IV insertion, labs,  Patient's response to treatments and/or interventions:     To be done/followed up on inpatient unit:     Does this patient have any cognitive concerns?:  No    Activity level - Baseline/Home:  Stand with Assist  Activity Level - Current:   Stand with Assist    Patient's Preferred language: English   Needed?: No    Isolation: None  Infection: Not Applicable  Patient tested for COVID 19 prior to admission: NO  Bariatric?: No    Vital Signs:  Vital signs stable.  Vitals:    12/19/23 1722 12/19/23 1745   BP: (!) 142/72 (!) 156/77   Pulse: 68    Resp: 18    Temp: 97.4  F (36.3  C)    TempSrc: Temporal    SpO2: 95%    Weight: 84.4 kg (186 lb 1.1 oz)        Cardiac Rhythm: Paced rhythm , patient has a pacemaker.    Was the PSS-3 completed:   No   What interventions are required if any?               Family Comments  OBS brochure/video discussed/provided to patient/family: No              Name of person given brochure if not patient:              Relationship to patient:    For the majority of the shift this patient's behavior was Green.   Behavioral interventions performed were    ED NURSE PHONE NUMBER:

## 2023-12-19 NOTE — Clinical Note
Hemodynamic equipment used: 5 lead ECG, CondoGalaK With 3 Leads, Machine BP Cuff and pulse oximeter probe.

## 2023-12-19 NOTE — Clinical Note
The first balloon was inserted into the right coronary artery.Max pressure = 12 bright. Total duration = 21 seconds.     Max pressure = 16 bright. Total duration = 16 seconds.    Balloon reinflated a second time: Max pressure = 16 bright. Total duration = 16 seconds.  Balloon reinflated a third time: Max pressure = 16 bright. Total duration = 16 seconds.  Balloon reinflated a fourth time: Max pressure = 16 bright. Total duration = 16 seconds.  Balloon  reinflated a fourth time: Max pressure = 18 bright. Total duration = 12 seconds.

## 2023-12-19 NOTE — ED TRIAGE NOTES
Pt BIBA for eval of chest pain with radiation to R arm. Pt had STEMI last week with 1 stent placement. Pt also has pacemaker for A-fib. Pt received 324 mg of ASA and I Sl spray of nitroglycerin. Pt reports some relief after meds.       Triage Assessment (Adult)       Row Name 12/19/23 1718          Triage Assessment    Airway WDL WDL        Respiratory WDL    Respiratory WDL WDL        Skin Circulation/Temperature WDL    Skin Circulation/Temperature WDL WDL        Cardiac WDL    Cardiac WDL X;chest pain        Chest Pain Assessment    Chest Pain Location shoulder, right

## 2023-12-20 ENCOUNTER — APPOINTMENT (OUTPATIENT)
Dept: CARDIOLOGY | Facility: CLINIC | Age: 83
DRG: 322 | End: 2023-12-20
Attending: PHYSICIAN ASSISTANT
Payer: COMMERCIAL

## 2023-12-20 LAB
APTT PPP: 66 SECONDS (ref 22–38)
APTT PPP: 66 SECONDS (ref 22–38)
APTT PPP: 88 SECONDS (ref 22–38)
TROPONIN T SERPL HS-MCNC: 745 NG/L
TROPONIN T SERPL HS-MCNC: 907 NG/L

## 2023-12-20 PROCEDURE — 999N000208 ECHOCARDIOGRAM LIMITED

## 2023-12-20 PROCEDURE — 36415 COLL VENOUS BLD VENIPUNCTURE: CPT | Performed by: HOSPITALIST

## 2023-12-20 PROCEDURE — 99233 SBSQ HOSP IP/OBS HIGH 50: CPT | Performed by: INTERNAL MEDICINE

## 2023-12-20 PROCEDURE — 36415 COLL VENOUS BLD VENIPUNCTURE: CPT | Performed by: INTERNAL MEDICINE

## 2023-12-20 PROCEDURE — 250N000013 HC RX MED GY IP 250 OP 250 PS 637: Performed by: PHYSICIAN ASSISTANT

## 2023-12-20 PROCEDURE — 93325 DOPPLER ECHO COLOR FLOW MAPG: CPT | Mod: 26 | Performed by: INTERNAL MEDICINE

## 2023-12-20 PROCEDURE — 93321 DOPPLER ECHO F-UP/LMTD STD: CPT | Mod: 26 | Performed by: INTERNAL MEDICINE

## 2023-12-20 PROCEDURE — 210N000002 HC R&B HEART CARE

## 2023-12-20 PROCEDURE — 36415 COLL VENOUS BLD VENIPUNCTURE: CPT | Performed by: EMERGENCY MEDICINE

## 2023-12-20 PROCEDURE — G0378 HOSPITAL OBSERVATION PER HR: HCPCS

## 2023-12-20 PROCEDURE — 85730 THROMBOPLASTIN TIME PARTIAL: CPT | Performed by: HOSPITALIST

## 2023-12-20 PROCEDURE — 84484 ASSAY OF TROPONIN QUANT: CPT | Performed by: EMERGENCY MEDICINE

## 2023-12-20 PROCEDURE — 99223 1ST HOSP IP/OBS HIGH 75: CPT | Mod: 25 | Performed by: INTERNAL MEDICINE

## 2023-12-20 PROCEDURE — 255N000002 HC RX 255 OP 636: Performed by: HOSPITALIST

## 2023-12-20 PROCEDURE — C8924 2D TTE W OR W/O FOL W/CON,FU: HCPCS

## 2023-12-20 PROCEDURE — 85730 THROMBOPLASTIN TIME PARTIAL: CPT | Performed by: INTERNAL MEDICINE

## 2023-12-20 PROCEDURE — 84484 ASSAY OF TROPONIN QUANT: CPT | Performed by: HOSPITALIST

## 2023-12-20 PROCEDURE — 93308 TTE F-UP OR LMTD: CPT | Mod: 26 | Performed by: INTERNAL MEDICINE

## 2023-12-20 RX ORDER — NALOXONE HYDROCHLORIDE 0.4 MG/ML
0.4 INJECTION, SOLUTION INTRAMUSCULAR; INTRAVENOUS; SUBCUTANEOUS
Status: DISCONTINUED | OUTPATIENT
Start: 2023-12-20 | End: 2023-12-22 | Stop reason: HOSPADM

## 2023-12-20 RX ORDER — MAGNESIUM HYDROXIDE/ALUMINUM HYDROXICE/SIMETHICONE 120; 1200; 1200 MG/30ML; MG/30ML; MG/30ML
30 SUSPENSION ORAL EVERY 4 HOURS PRN
Status: DISCONTINUED | OUTPATIENT
Start: 2023-12-20 | End: 2023-12-22 | Stop reason: HOSPADM

## 2023-12-20 RX ORDER — CLOPIDOGREL BISULFATE 75 MG/1
75 TABLET ORAL DAILY
Status: DISCONTINUED | OUTPATIENT
Start: 2023-12-20 | End: 2023-12-22 | Stop reason: HOSPADM

## 2023-12-20 RX ORDER — NITROGLYCERIN 0.4 MG/1
0.4 TABLET SUBLINGUAL EVERY 5 MIN PRN
Status: DISCONTINUED | OUTPATIENT
Start: 2023-12-20 | End: 2023-12-22 | Stop reason: HOSPADM

## 2023-12-20 RX ORDER — LIDOCAINE 40 MG/G
CREAM TOPICAL
Status: DISCONTINUED | OUTPATIENT
Start: 2023-12-20 | End: 2023-12-21 | Stop reason: HOSPADM

## 2023-12-20 RX ORDER — LORAZEPAM 2 MG/ML
0.5 INJECTION INTRAMUSCULAR
Status: DISCONTINUED | OUTPATIENT
Start: 2023-12-20 | End: 2023-12-21 | Stop reason: HOSPADM

## 2023-12-20 RX ORDER — NALOXONE HYDROCHLORIDE 0.4 MG/ML
0.2 INJECTION, SOLUTION INTRAMUSCULAR; INTRAVENOUS; SUBCUTANEOUS
Status: DISCONTINUED | OUTPATIENT
Start: 2023-12-20 | End: 2023-12-22 | Stop reason: HOSPADM

## 2023-12-20 RX ORDER — POTASSIUM CHLORIDE 1500 MG/1
20 TABLET, EXTENDED RELEASE ORAL
Status: COMPLETED | OUTPATIENT
Start: 2023-12-20 | End: 2023-12-21

## 2023-12-20 RX ORDER — ACETAMINOPHEN 325 MG/1
650 TABLET ORAL EVERY 4 HOURS PRN
Status: DISCONTINUED | OUTPATIENT
Start: 2023-12-20 | End: 2023-12-22 | Stop reason: HOSPADM

## 2023-12-20 RX ORDER — MORPHINE SULFATE 2 MG/ML
1 INJECTION, SOLUTION INTRAMUSCULAR; INTRAVENOUS
Status: DISCONTINUED | OUTPATIENT
Start: 2023-12-20 | End: 2023-12-22 | Stop reason: HOSPADM

## 2023-12-20 RX ORDER — POTASSIUM CHLORIDE 750 MG/1
10 TABLET, EXTENDED RELEASE ORAL DAILY
Status: DISCONTINUED | OUTPATIENT
Start: 2023-12-20 | End: 2023-12-22 | Stop reason: HOSPADM

## 2023-12-20 RX ORDER — CARVEDILOL 12.5 MG/1
12.5 TABLET ORAL 2 TIMES DAILY WITH MEALS
Status: DISCONTINUED | OUTPATIENT
Start: 2023-12-20 | End: 2023-12-22 | Stop reason: HOSPADM

## 2023-12-20 RX ORDER — LORAZEPAM 0.5 MG/1
0.5 TABLET ORAL
Status: DISCONTINUED | OUTPATIENT
Start: 2023-12-20 | End: 2023-12-21 | Stop reason: HOSPADM

## 2023-12-20 RX ORDER — SODIUM CHLORIDE 9 MG/ML
INJECTION, SOLUTION INTRAVENOUS CONTINUOUS
Status: DISCONTINUED | OUTPATIENT
Start: 2023-12-21 | End: 2023-12-21 | Stop reason: HOSPADM

## 2023-12-20 RX ORDER — ROSUVASTATIN CALCIUM 20 MG/1
40 TABLET, COATED ORAL DAILY
Status: DISCONTINUED | OUTPATIENT
Start: 2023-12-20 | End: 2023-12-22 | Stop reason: HOSPADM

## 2023-12-20 RX ORDER — ACETAMINOPHEN 650 MG/1
650 SUPPOSITORY RECTAL EVERY 4 HOURS PRN
Status: DISCONTINUED | OUTPATIENT
Start: 2023-12-20 | End: 2023-12-22 | Stop reason: HOSPADM

## 2023-12-20 RX ORDER — ALBUTEROL SULFATE 90 UG/1
2 AEROSOL, METERED RESPIRATORY (INHALATION) EVERY 6 HOURS PRN
Status: DISCONTINUED | OUTPATIENT
Start: 2023-12-20 | End: 2023-12-22 | Stop reason: HOSPADM

## 2023-12-20 RX ORDER — ASPIRIN 81 MG/1
243 TABLET, CHEWABLE ORAL ONCE
Status: COMPLETED | OUTPATIENT
Start: 2023-12-21 | End: 2023-12-21

## 2023-12-20 RX ORDER — HEPARIN SODIUM 10000 [USP'U]/100ML
0-5000 INJECTION, SOLUTION INTRAVENOUS CONTINUOUS
Status: DISCONTINUED | OUTPATIENT
Start: 2023-12-20 | End: 2023-12-21

## 2023-12-20 RX ORDER — FUROSEMIDE 20 MG
20 TABLET ORAL DAILY
Status: DISCONTINUED | OUTPATIENT
Start: 2023-12-20 | End: 2023-12-22 | Stop reason: HOSPADM

## 2023-12-20 RX ORDER — ASPIRIN 325 MG
325 TABLET ORAL ONCE
Status: COMPLETED | OUTPATIENT
Start: 2023-12-21 | End: 2023-12-21

## 2023-12-20 RX ADMIN — ACETAMINOPHEN 650 MG: 325 TABLET, FILM COATED ORAL at 20:12

## 2023-12-20 RX ADMIN — FUROSEMIDE 20 MG: 20 TABLET ORAL at 08:51

## 2023-12-20 RX ADMIN — ACETAMINOPHEN 650 MG: 325 TABLET, FILM COATED ORAL at 11:23

## 2023-12-20 RX ADMIN — SACUBITRIL AND VALSARTAN 1 TABLET: 97; 103 TABLET, FILM COATED ORAL at 10:03

## 2023-12-20 RX ADMIN — CARVEDILOL 12.5 MG: 12.5 TABLET, FILM COATED ORAL at 18:35

## 2023-12-20 RX ADMIN — HUMAN ALBUMIN MICROSPHERES AND PERFLUTREN 9 ML: 10; .22 INJECTION, SOLUTION INTRAVENOUS at 10:33

## 2023-12-20 RX ADMIN — SACUBITRIL AND VALSARTAN 1 TABLET: 97; 103 TABLET, FILM COATED ORAL at 20:08

## 2023-12-20 RX ADMIN — POTASSIUM CHLORIDE 10 MEQ: 750 TABLET, EXTENDED RELEASE ORAL at 08:51

## 2023-12-20 RX ADMIN — ROSUVASTATIN CALCIUM 40 MG: 20 TABLET, FILM COATED ORAL at 08:51

## 2023-12-20 RX ADMIN — CARVEDILOL 12.5 MG: 12.5 TABLET, FILM COATED ORAL at 08:51

## 2023-12-20 RX ADMIN — CLOPIDOGREL BISULFATE 75 MG: 75 TABLET ORAL at 08:51

## 2023-12-20 ASSESSMENT — ACTIVITIES OF DAILY LIVING (ADL)
DEPENDENT_IADLS:: INDEPENDENT
ADLS_ACUITY_SCORE: 26
ADLS_ACUITY_SCORE: 39
ADLS_ACUITY_SCORE: 26
ADLS_ACUITY_SCORE: 39
ADLS_ACUITY_SCORE: 26
ADLS_ACUITY_SCORE: 26
ADLS_ACUITY_SCORE: 39
ADLS_ACUITY_SCORE: 26
ADLS_ACUITY_SCORE: 39
ADLS_ACUITY_SCORE: 26

## 2023-12-20 ASSESSMENT — COLUMBIA-SUICIDE SEVERITY RATING SCALE - C-SSRS
2. HAVE YOU ACTUALLY HAD ANY THOUGHTS OF KILLING YOURSELF IN THE PAST MONTH?: NO
6. HAVE YOU EVER DONE ANYTHING, STARTED TO DO ANYTHING, OR PREPARED TO DO ANYTHING TO END YOUR LIFE?: NO
1. IN THE PAST MONTH, HAVE YOU WISHED YOU WERE DEAD OR WISHED YOU COULD GO TO SLEEP AND NOT WAKE UP?: NO

## 2023-12-20 NOTE — PHARMACY-ADMISSION MEDICATION HISTORY
Pharmacist Admission Medication History    Admission medication history is complete. The information provided in this note is only as accurate as the sources available at the time of the update.    Information Source(s): Patient via in-person      Medication History Completed By: Lucina Pyle RPH 12/19/2023 8:30 PM    PTA Med List   Medication Sig Last Dose    albuterol (PROAIR HFA/PROVENTIL HFA/VENTOLIN HFA) 108 (90 Base) MCG/ACT inhaler Inhale 2 puffs into the lungs every 6 hours (Patient taking differently: Inhale 2 puffs into the lungs every 6 hours as needed)     carvedilol (COREG) 12.5 MG tablet Take 1 tablet (12.5 mg) by mouth 2 times daily (with meals) for 90 days 12/19/2023 at am    cholecalciferol (VITAMIN  -D) 1000 UNIT capsule Take 1 capsule by mouth daily. 12/19/2023    clopidogrel (PLAVIX) 75 MG tablet Take 1 tablet (75 mg) by mouth daily for 90 days 12/19/2023    FLAX SEED OIL OR 1 capsule daily 12/19/2023    furosemide (LASIX) 20 MG tablet Take 1 tablet (20 mg) by mouth daily 12/19/2023    Multiple Vitamins-Minerals (HAIR SKIN NAILS PO) Take 1 tablet by mouth At Bedtime 12/18/2023    Multiple Vitamins-Minerals (ICAPS AREDS 2 PO) Take 1 tablet by mouth 2 times daily 12/19/2023 at am    potassium chloride ER (KLOR-CON M) 10 MEQ CR tablet Take 1 tablet (10 mEq) by mouth daily 12/19/2023    rivaroxaban ANTICOAGULANT (XARELTO) 15 MG TABS tablet Take 1 tablet (15 mg) by mouth daily (with dinner) for 90 days 12/18/2023    rosuvastatin (CRESTOR) 40 MG tablet Take 1 tablet (40 mg) by mouth daily 12/19/2023    sacubitril-valsartan (ENTRESTO)  MG per tablet Take 1 tablet by mouth 2 times daily 12/19/2023 at am    tiotropium (SPIRIVA) 18 MCG inhaled capsule Inhale 1 capsule (18 mcg) into the lungs daily (Patient taking differently: Inhale 18 mcg into the lungs daily as needed)    Lucina Pyle, PharmD

## 2023-12-20 NOTE — H&P
[facility]    History and Physical - Hospitalist Service       Date of Admission:  12/19/2023    Assessment & Plan   Hamida Verma is a 83 year old female with below PMHx including recent cardiac stent to the L circumflex 12/8/23 admitted on 12/19/2023. She presented to the ED due to recurrent R shoulder pain which was compatible with her anginal equivalent. Trop marginally elevated at 29. EKG with V paced rhythm. Cardiology made aware of pt and recommended observation admission for further evaluation.     Chest pain, R shoulder pain  Recent NSTEMI s/p PCI to left circumflex (12/8/23):   *Trop 29, EKG with V paced rhythm. CXR with mild central itnerstitial prominence and diffuse prominence of interstitium suggesting minimal interstitial edema. No focal pneumonia or pleural effusion.   *Echo 12/6/23 with EF 50-55%, mild pulmonary hypertension noted, no significant valve disease.   *Pain improved with tylenol at home and 1 dose of nitroglycerin per EMS. Chest pain free on interview   - [place] to obs   - Follow up on CTPE study ordered by ED provider   - Serial trops   - Echo   - Telemetry   - Cardiology consult   - Maintained on Plavix 75 mg po every day and Xarelto 15 mg po every day X1 year, continue   - Continue rosuvastatin 40mg, entresto  BID, carvedilol 12.5mg BID, furosemide 20mg daily   - PRN nitroglycerin for recurrent pain   - Alert provider of any recurrent R arm pain or cardiac sx   - Cardiac diet     Paroxysmal atrial fibrillation  Chronic anticoagulation use  SSS s/p AV node ablation and PPM (2019):   Hypertension   Hyperlipidemia   Mild pulmonary hypertension  - Cardiac meds as above     Chronic normocytic anemia: Baseline Hgb 11. Stable. No active signs of bleeding.   - Monitor     CKD III: Baseline creatinine 0.97-1.01.   - Monitor      COPD: PTA meds include PRN albuterol and spiriva which will be continued         Diet: Low Saturated Fat Na <2400 mg  DVT  "Prophylaxis: DOAC  Lopez Catheter: Not present  Lines: None     Cardiac Monitoring: None  Code Status: No CPR- Do NOT Intubate    Clinically Significant Risk Factors Present on Admission               # Drug Induced Coagulation Defect: home medication list includes an anticoagulant medication  # Drug Induced Platelet Defect: home medication list includes an antiplatelet medication   # Hypertension: Noted on problem list  # Chronic heart failure with preserved ejection fraction: heart failure noted on problem list and last echo with EF >50%     # Obesity: Estimated body mass index is 32.96 kg/m  as calculated from the following:    Height as of 12/6/23: 1.6 m (5' 3\").    Weight as of this encounter: 84.4 kg (186 lb 1.1 oz).       # COPD: noted on problem list    # Pacemaker present       Disposition Plan      Expected Discharge Date: 12/20/2023                The patient's care was discussed with the Attending Physician, Dr. Torres, Bedside Nurse, and Patient.    Ivanna Goodwin PA-C  Hospitalist Service  [facility]  Securely message with [employer] (more info)  Text page via [employer] Paging/Directory   ___________________________________________________________________    Chief Complaint   Chest pain    History is obtained from the patient    History of Present Illness   Hamida Verma is a 83 year old female with below PMHx including recent cardiac stent to the L circumflex 12/8/23 admitted on 12/19/2023. She presented to the ED due to recurrent R shoulder pain which was compatible with her anginal equivalent. Trop marginally elevated at 29. EKG with V paced rhythm. Cardiology made aware of pt and recommended observation admission for further evaluation.     Pt had been doing well since hospital discharge. Denies any recurrent cardiac sx up until today when she was sitting in her house, watching TV this evening, she developed acute R shoulder and arm pain reminiscent of " her anginal equivalent. No associated SOB, dizziness, lightheadedness. Due to sx and recent cardiac stent she presented to the ED.     In the ED, pt was seen by Dr. Solomon. Trop 29, EKG with V paced rhythm. CXR with mild central itnerstitial prominence and diffuse prominence of interstitium suggesting minimal interstitial edema. No focal pneumonia or pleural effusion. Pain improved with tylenol at home and 1 dose of nitroglycerin per EMS. Chest pain free on interview.     Note Echo 12/6/23 with EF 50-55%, mild pulmonary hypertension noted, no significant valve disease. Pt reports compliance with cardiac medications including Plavix and Xarelto. Again, she is free of arm pain on my interview. She reports some midline back pain that she attributes to laying in the ED cot.     Past Medical History    Past Medical History:   Diagnosis Date    Atrial fibrillation (H)     Chronic diastolic CHF (congestive heart failure) (H)     COPD (chronic obstructive pulmonary disease) (H)     Essential hypertension, benign     HYPERLIPIDEMIA NEC/NOS 03/30/2006    Pulmonary hypertension (H)     Pyelonephritis 2019    SSS (sick sinus syndrome) (H)     s/p PPM 3/2018       Past Surgical History   Past Surgical History:   Procedure Laterality Date    CV CORONARY ANGIOGRAM N/A 12/7/2023    Procedure: Coronary Angiogram;  Surgeon: Mookie Yates MD;  Location: [facility] CARDIAC CATH LAB    CV CORONARY ANGIOGRAM N/A 12/8/2023    Procedure: Coronary Angiogram;  Surgeon: Stephanie Monroy MD;  Location: [facility] CARDIAC CATH LAB    CV PCI N/A 12/8/2023    Procedure: Percutaneous Coronary Intervention;  Surgeon: Stephanie Monroy MD;  Location: [facility] CARDIAC CATH LAB    EP ABLATION AV NODE N/A 5/7/2019    Procedure: EP Ablation AV Node;  Surgeon: Roe Voss MD;  Location: [facility] CARDIAC CATH LAB    EYE SURGERY      HYSTERECTOMY, VAGINAL      hysterectomy    LAPAROSCOPIC CHOLECYSTECTOMY N/A 6/1/2023    Procedure:  Laparoscopic cholecystectomy with lysis of adhesions;  Surgeon: Sawyer Marcum MD;  Location:  OR       Prior to Admission Medications   Prior to Admission Medications   Prescriptions Last Dose Informant Patient Reported? Taking?   FLAX SEED OIL OR  Self Yes No   Si capsule daily   Multiple Vitamins-Minerals (HAIR SKIN NAILS PO)  Self Yes No   Sig: Take 1 tablet by mouth At Bedtime   Multiple Vitamins-Minerals (ICAPS AREDS 2 PO)   Yes No   Sig: Take 1 tablet by mouth daily   albuterol (PROAIR HFA/PROVENTIL HFA/VENTOLIN HFA) 108 (90 Base) MCG/ACT inhaler   No No   Sig: Inhale 2 puffs into the lungs every 6 hours   Patient taking differently: Inhale 2 puffs into the lungs every 6 hours as needed   carvedilol (COREG) 12.5 MG tablet   No No   Sig: Take 1 tablet (12.5 mg) by mouth 2 times daily (with meals) for 90 days   cholecalciferol (VITAMIN  -D) 1000 UNIT capsule  Self Yes No   Sig: Take 1 capsule by mouth daily.   clopidogrel (PLAVIX) 75 MG tablet   No No   Sig: Take 1 tablet (75 mg) by mouth daily for 90 days   furosemide (LASIX) 20 MG tablet   No No   Sig: Take 1 tablet (20 mg) by mouth daily   potassium chloride ER (KLOR-CON M) 10 MEQ CR tablet   No No   Sig: Take 1 tablet (10 mEq) by mouth daily   rivaroxaban ANTICOAGULANT (XARELTO) 15 MG TABS tablet   No No   Sig: Take 1 tablet (15 mg) by mouth daily (with dinner) for 90 days   rosuvastatin (CRESTOR) 40 MG tablet   No No   Sig: Take 1 tablet (40 mg) by mouth daily   sacubitril-valsartan (ENTRESTO)  MG per tablet   No No   Sig: Take 1 tablet by mouth 2 times daily   tiotropium (SPIRIVA) 18 MCG inhaled capsule   No No   Sig: Inhale 1 capsule (18 mcg) into the lungs daily   Patient taking differently: Inhale 18 mcg into the lungs daily as needed      Facility-Administered Medications: None        Review of Systems    The 10 point Review of Systems is negative other than noted in the HPI.    Social History   I have reviewed this patient's social  history and updated it with pertinent information if needed.  Social History     Tobacco Use    Smoking status: Former     Packs/day: 1.50     Years: 45.00     Additional pack years: 0.00     Total pack years: 67.50     Types: Cigarettes     Start date: 10/28/1955     Quit date: 2000     Years since quittin.3    Smokeless tobacco: Never    Tobacco comments:     quit 6 yrs ago   Substance Use Topics    Alcohol use: No    Drug use: No     Family History   I have reviewed this patient's family history and updated it with pertinent information if needed.  Family History   Problem Relation Age of Onset    Cerebrovascular Disease Mother     Glaucoma Mother     Macular Degeneration Mother     Alcohol/Drug Father         alcohol    Myocardial Infarction Father 63        passed away from MI    Family History Negative Sister     Family History Negative Sister     Family History Negative Sister     Cerebrovascular Disease Sister     Family History Negative Brother     Family History Negative Maternal Grandmother     Family History Negative Maternal Grandfather     Family History Negative Paternal Grandmother     Family History Negative Paternal Grandfather     Myocardial Infarction Son 57        passed away from MI    Dementia Daughter 57        early onset on [place]    Diabetes No family hx of     Breast Cancer No family hx of     Cancer - colorectal No family hx of      Allergies   Allergies   Allergen Reactions    Strawberries [Strawberry Extract] Anaphylaxis    Strawberry Flavor         Physical Exam   Vital Signs: Temp: 97.4  F (36.3  C) Temp src: Temporal BP: 119/63 Pulse: 68   Resp: 16 SpO2: 95 % O2 Device: None (Room air)    Weight: 186 lbs 1.09 oz    CONSTITUTIONAL: Pt laying in bed, dressed in hospital garb. Appears comfortable. Cooperative with interview.   HEENT: Normocephalic, atraumatic.   CARDIOVASCULAR: RRR, no murmurs, rubs, or extra heart sounds appreciated. Pulses +2/4 and regular in upper and  lower extremities, bilaterally.   RESPIRATORY: No increased work of breathing.  CTA, bilat; no wheezes, rales, or rhonchi appreciated.  GASTROINTESTINAL:  Abdomen soft, non-distended. BS auscultated in all four quadrants. Negative for tenderness to palpation.    MUSCULOSKELETAL: Strength +5/5 in upper and lower extremities, bilaterally. No gross deformities noted. Normal muscle tone.   HEMATOLOGIC/LYMPHATIC/IMMUNOLOGIC: Trace TYE, bilat.   NEUROLOGIC: Alert and oriented to person, place, and time. No focal neuro deficits.   SKIN: Warm, dry, intact.     Medical Decision Making       75 MINUTES SPENT BY ME on the date of service doing chart review, history, exam, documentation & further activities per the note.      Data     I have personally reviewed the following data over the past 24 hrs:    7.6  \   11.2 (L)   / 405     138 103 14.5 /  155 (H)   3.8 26 0.95 \     ALT: 20 AST: 19 AP: 61 TBILI: 0.3   ALB: 3.7 TOT PROTEIN: 6.2 (L) LIPASE: N/A     Trop: 29 (H) BNP: 468       Imaging results reviewed over the past 24 hrs:   Recent Results (from the past 24 hour(s))   XR Chest Port 1 View    Narrative    EXAM: XR CHEST PORT 1 VIEW  LOCATION: [facility]  DATE: 12/19/2023    INDICATION: sob  COMPARISON: Chest CT 03/24/2018      Impression    IMPRESSION: Dual-chamber pacemaker now present with leads appearing in good position on this single projection. Borderline cardiomegaly. There is mild central interstitial prominence and diffuse prominence of interstitium suggesting minimal interstitial   edema. No focal pneumonia or pleural effusion evident..

## 2023-12-20 NOTE — CONSULTS
Care Management Initial Consult    General Information  Assessment completed with: Patient,    Type of CM/SW Visit: Initial Assessment    Primary Care Provider verified and updated as needed: Yes   Readmission within the last 30 days: previous discharge plan unsuccessful   Return Category: Post-op/Post-procedure complication  Reason for Consult: discharge planning  Advance Care Planning: Advance Care Planning Reviewed: no concerns identified          Communication Assessment  Patient's communication style: spoken language (English or Bilingual)    Hearing Difficulty or Deaf: yes   Wear Glasses or Blind: yes    Cognitive  Cognitive/Neuro/Behavioral: WDL                      Living Environment:   People in home: child(isidoro), adult, grandchild(isidoro)     Current living Arrangements: house      Able to return to prior arrangements: yes       Family/Social Support:  Care provided by: self  Provides care for: no one  Marital Status:   Children          Description of Support System: Supportive         Current Resources:   Patient receiving home care services: No     Community Resources: None  Equipment currently used at home: none  Supplies currently used at home: None    Employment/Financial:  Employment Status: retired        Financial Concerns: none   Referral to Financial Worker: No       Does the patient's insurance plan have a 3 day qualifying hospital stay waiver?  Yes     Which insurance plan 3 day waiver is available? Alternative insurance waiver    Will the waiver be used for post-acute placement? No    Lifestyle & Psychosocial Needs:  Social Determinants of Health     Food Insecurity: Not on file   Depression: Not at risk (11/6/2023)    PHQ-2     PHQ-2 Score: 0   Housing Stability: Not on file   Tobacco Use: Medium Risk (12/11/2023)    Patient History     Smoking Tobacco Use: Former     Smokeless Tobacco Use: Never     Passive Exposure: Not on file   Financial Resource Strain: Not on file   Alcohol Use: Not  on file   Transportation Needs: Not on file   Physical Activity: Not on file   Interpersonal Safety: Not on file   Stress: Not on file   Social Connections: Not on file       Functional Status:  Prior to admission patient needed assistance:   Dependent ADLs:: Independent  Dependent IADLs:: Independent       Mental Health Status:          Chemical Dependency Status:                Values/Beliefs:  Spiritual, Cultural Beliefs, Yazidism Practices, Values that affect care: no               Additional Information:  Writer met with patient and introduced self and role.  Patient states that she lives in a house with her adult daughter and adult grand daughter.  She states that her daughter has Lewy body dementia and her grand daughter is a full time caregiver for her.  Patient states that she does not provide any cares for her daughter.    Patient states that she is independent with mobility and all A/IADLs.  She lives in a 3 story home with her bedroom on upper level with full flight of stairs.  Laundry is in the basement with full flight of stairs.  There is no bathroom on the main level.  There are 3 stairs to enter home with railing.      Care coordination team will continue to follow and assist with a safe discharge plan.    Anahi Lozoya RN Care Coordinator  [facility]  [phone]

## 2023-12-20 NOTE — PROGRESS NOTES
FOLLOW-UP CHART CHECK: Repeat trop 29>521. Heparin ordered per ED provider, note pt received PTA Xarelto this evening, thus will need to monitor PTT. Continues to be chest pain free per bedside RN without recurrence of R arm pain since receiving nitroglycerin per EMS. EKG without change.   - Appreciate assistance from Dr. Solomon. He is in communication with Cardiology regarding need for cath this evening. Repeat troponin scheduled for 2200.   - Change to inpatient status   - Remainder of plan as per my H&P   - With any recurrent cardiac sx/R arm or shoulder pain notify provider immediately   - NPO until cardiology game plan finalized

## 2023-12-20 NOTE — CONSULTS
[facility]    Cardiology Consultation     Date of Admission:  12/19/2023    ASSESSMENT:  1.  NSTEMI:  currently chest pain free.  Troponin peak at 907 and down trending.  Dynamic T wave changes noted in inferior leads on ECG  2.  Coronary artery diseas:  S/P recent admission earlier this month for NSTEMI and is s/p ANGELES x1 to Lcx  3.  Permanent atrial fibrillation:  On xarelto  4.  Sick Sinus Syndrome S/P AV node ablation and pacemaker  5.  CHFpEF:  EF 50-55% by echocardiogram.  No S/S of CHF    PLAN/RECOMMENDATIONS:  -Hold xarelto.  Continue heparin gtt.  Continue plavix  -Continue carvedilol, lasix, entresto, rosuvastatin  -Plan for coronary angiogram tomorrow (after xarelto held >24 hours).  Risks/benefits/alternatives were discussed with Hamida and she is agreeable to proceeding    High complexity     Flaca Ghotra MD, [facility] Heart    Primary Care Physician   Mikala Philip MD    Reason for Consult   Reason for consult: I was asked by Dr. Torres to evaluate this patient for NSTEMI    History of Present Illness   Hamida Verma is a 83 year old female with PMH significant for recent admission for NSTEMI with PCI to culprit LCX ANGELES x1, permanent atrial fibrillation on xarelto, s/p AV node ablation with pacemaker, who presents with recurrent chest discomfort and ruled in for NSTEMI with positive biomarkers.  Hamida states after she was discharged she was feeling well. She went shopping yesterday without difficulty.  When she got home, she was at rest and had the sudden onset of right shoulder discomfort radiating to her right arm.  She called her granddaughter who then called EMS.  In the ambulance, she received SL nitroglycerin which resolved her discomfort.  It has not recurred.  ECG demonstrated paced ventricular rhythm.  Initial troponin was 29 and peaked at 907 and is now downtrending.  She reports compliance with plavix.  She received her usual dose of  xarelto yesterday evening.      Past Medical History   Past Medical History:   Diagnosis Date    Atrial fibrillation (H)     Chronic diastolic CHF (congestive heart failure) (H)     COPD (chronic obstructive pulmonary disease) (H)     Essential hypertension, benign     HYPERLIPIDEMIA NEC/NOS 2006    Pulmonary hypertension (H)     Pyelonephritis     SSS (sick sinus syndrome) (H)     s/p PPM 3/2018       Past Surgical History   Past Surgical History:   Procedure Laterality Date    CV CORONARY ANGIOGRAM N/A 2023    Procedure: Coronary Angiogram;  Surgeon: Mookie Yates MD;  Location:  HEART CARDIAC CATH LAB    CV CORONARY ANGIOGRAM N/A 2023    Procedure: Coronary Angiogram;  Surgeon: Stephanie Monroy MD;  Location:  HEART CARDIAC CATH LAB    CV PCI N/A 2023    Procedure: Percutaneous Coronary Intervention;  Surgeon: Stephanie Monroy MD;  Location:  HEART CARDIAC CATH LAB    EP ABLATION AV NODE N/A 2019    Procedure: EP Ablation AV Node;  Surgeon: Roe Voss MD;  Location:  HEART CARDIAC CATH LAB    EYE SURGERY      HYSTERECTOMY, VAGINAL      hysterectomy    LAPAROSCOPIC CHOLECYSTECTOMY N/A 2023    Procedure: Laparoscopic cholecystectomy with lysis of adhesions;  Surgeon: Sawyer Marcum MD;  Location:  OR       Prior to Admission Medications   Prior to Admission Medications   Prescriptions Last Dose Informant Patient Reported? Taking?   FLAX SEED OIL OR 2023 Self Yes Yes   Si capsule daily   Multiple Vitamins-Minerals (HAIR SKIN NAILS PO) 2023 Self Yes Yes   Sig: Take 1 tablet by mouth At Bedtime   Multiple Vitamins-Minerals (ICAPS AREDS 2 PO) 2023 at am  Yes Yes   Sig: Take 1 tablet by mouth 2 times daily   albuterol (PROAIR HFA/PROVENTIL HFA/VENTOLIN HFA) 108 (90 Base) MCG/ACT inhaler   No Yes   Sig: Inhale 2 puffs into the lungs every 6 hours   Patient taking differently: Inhale 2 puffs into the lungs every 6 hours as needed    carvedilol (COREG) 12.5 MG tablet 12/19/2023 at am  No Yes   Sig: Take 1 tablet (12.5 mg) by mouth 2 times daily (with meals) for 90 days   cholecalciferol (VITAMIN  -D) 1000 UNIT capsule 12/19/2023 Self Yes Yes   Sig: Take 1 capsule by mouth daily.   clopidogrel (PLAVIX) 75 MG tablet 12/19/2023  No Yes   Sig: Take 1 tablet (75 mg) by mouth daily for 90 days   furosemide (LASIX) 20 MG tablet 12/19/2023  No Yes   Sig: Take 1 tablet (20 mg) by mouth daily   potassium chloride ER (KLOR-CON M) 10 MEQ CR tablet 12/19/2023  No Yes   Sig: Take 1 tablet (10 mEq) by mouth daily   rivaroxaban ANTICOAGULANT (XARELTO) 15 MG TABS tablet 12/18/2023  No Yes   Sig: Take 1 tablet (15 mg) by mouth daily (with dinner) for 90 days   rosuvastatin (CRESTOR) 40 MG tablet 12/19/2023  No Yes   Sig: Take 1 tablet (40 mg) by mouth daily   sacubitril-valsartan (ENTRESTO)  MG per tablet 12/19/2023 at am  No Yes   Sig: Take 1 tablet by mouth 2 times daily   tiotropium (SPIRIVA) 18 MCG inhaled capsule   No Yes   Sig: Inhale 1 capsule (18 mcg) into the lungs daily   Patient taking differently: Inhale 18 mcg into the lungs daily as needed      Facility-Administered Medications: None     Current Facility-Administered Medications   Medication Dose Route Frequency    carvedilol  12.5 mg Oral BID w/meals    clopidogrel  75 mg Oral Daily    furosemide  20 mg Oral Daily    potassium chloride ER  10 mEq Oral Daily    [Held by provider] rivaroxaban ANTICOAGULANT  15 mg Oral Daily with supper    rosuvastatin  40 mg Oral Daily    sacubitril-valsartan  1 tablet Oral BID     Current Facility-Administered Medications   Medication Last Rate    Continuing ACE inhibitor/ARB/ARNI from home medication list OR ACE inhibitor/ARB order already placed during this visit      - MEDICATION INSTRUCTIONS -      - MEDICATION INSTRUCTIONS -      heparin 800 Units/hr (12/20/23 6341)    - MEDICATION INSTRUCTIONS -      reason aspirin not prescribed (intentional)    "    Allergies   Allergies   Allergen Reactions    Strawberries [Strawberry Extract] Anaphylaxis    Strawberry Flavor        Social History   Remote smoking history.  No significant ETOH      Family History   Assessed noncontributory         Review of Systems   A comprehensive review of system was performed and is negative other than that noted in the HPI or here.     Physical Exam   Vital Signs with Ranges  Temp:  [97.4  F (36.3  C)-98.5  F (36.9  C)] 98.5  F (36.9  C)  Pulse:  [62-77] 65  Resp:  [10-27] 18  BP: (119-156)/(63-80) 144/67  SpO2:  [95 %-98 %] 98 %  Wt Readings from Last 4 Encounters:   12/20/23 79.1 kg (174 lb 4.8 oz)   12/10/23 79.1 kg (174 lb 4.8 oz)   11/06/23 80.6 kg (177 lb 12.8 oz)   06/02/23 83.3 kg (183 lb 9.6 oz)     No intake/output data recorded.      Vitals: BP (!) 144/67 (BP Location: Left arm)   Pulse 65   Temp 98.5  F (36.9  C) (Oral)   Resp 18   Wt 79.1 kg (174 lb 4.8 oz)   LMP  (LMP Unknown)   SpO2 98%   BMI 30.88 kg/m      Physical Exam:   General - Alert and oriented to time place and person in no acute distress  Eyes - No scleral icterus  HEENT - Neck supple, moist mucous membranes  Cardiovascular - RRR no m/r/g, No JVD  Extremities - There is no edema  Respiratory - CTAB  Skin - No pallor or cyanosis  Gastrointestinal - Non tender and non distended without rebound or guarding  Psych - Appropriate affect   Neurological - No gross motor neurological focal deficits    No lab results found in last 7 days.    Invalid input(s): \"TROPONINIES\"    Recent Labs   Lab 12/19/23  2106 12/19/23  1721   WBC 6.9 7.6   HGB 10.7* 11.2*   MCV 89 91    405   NA  --  138   POTASSIUM  --  3.8   CHLORIDE  --  103   CO2  --  26   BUN  --  14.5   CR  --  0.95   GFRESTIMATED  --  59*   ANIONGAP  --  9   GURWINDER  --  9.1   GLC  --  155*   ALBUMIN  --  3.7   PROTTOTAL  --  6.2*   BILITOTAL  --  0.3   ALKPHOS  --  61   ALT  --  20   AST  --  19     Recent Labs   Lab Test 10/30/23  1044 04/28/23  0659 " "  CHOL 173 161   HDL 74 64   LDL 77 81   TRIG 110 82     Recent Labs   Lab 12/19/23 2106 12/19/23  1721   WBC 6.9 7.6   HGB 10.7* 11.2*   HCT 32.6* 35.1   MCV 89 91    405     No results for input(s): \"PH\", \"PHV\", \"PO2\", \"PO2V\", \"SAT\", \"PCO2\", \"PCO2V\", \"HCO3\", \"HCO3V\" in the last 168 hours.  Recent Labs   Lab 12/19/23  1721   NTBNPI 468     No results for input(s): \"DD\" in the last 168 hours.  No results for input(s): \"SED\", \"CRP\" in the last 168 hours.  Recent Labs   Lab 12/19/23 2106 12/19/23  1721    405     No results for input(s): \"TSH\" in the last 168 hours.  No results for input(s): \"COLOR\", \"APPEARANCE\", \"URINEGLC\", \"URINEBILI\", \"URINEKETONE\", \"SG\", \"UBLD\", \"URINEPH\", \"PROTEIN\", \"UROBILINOGEN\", \"NITRITE\", \"LEUKEST\", \"RBCU\", \"WBCU\" in the last 168 hours.    Imaging:  Recent Results (from the past 48 hour(s))   XR Chest Port 1 View    Narrative    EXAM: XR CHEST PORT 1 VIEW  LOCATION: [facility]  DATE: 12/19/2023    INDICATION: sob  COMPARISON: Chest CT 03/24/2018      Impression    IMPRESSION: Dual-chamber pacemaker now present with leads appearing in good position on this single projection. Borderline cardiomegaly. There is mild central interstitial prominence and diffuse prominence of interstitium suggesting minimal interstitial   edema. No focal pneumonia or pleural effusion evident..   CT Chest Pulmonary Embolism w Contrast    Narrative    EXAM: CT CHEST PULMONARY EMBOLISM W CONTRAST  LOCATION: [facility]  DATE: 12/19/2023    INDICATION: chest pain with poss PE or CHF  COMPARISON: 03/14/2018  TECHNIQUE: CT chest pulmonary angiogram during arterial phase injection of IV contrast. Multiplanar reformats and MIP reconstructions were performed. Dose reduction techniques were used.   CONTRAST: 69mL Isovue 370    FINDINGS:  ANGIOGRAM CHEST: Pulmonary arteries are normal caliber and negative for pulmonary emboli. Thoracic aorta is not well " opacified and is  indeterminate for dissection. No CT evidence of right heart strain.    LUNGS AND PLEURA: Biapical pleural-parenchymal scarring. Subpleural nodular atelectasis or nodule left upper lung anteriorly measuring approximately 4 mm (series 5 image 70). Mild to moderate emphysematous changes scattered throughout the lungs, greatest   in the mid and upper lungs. Mild hazy increased markings in the mid and lower lungs may represent some mild pulmonary edema. Dependent atelectasis in the posterior lung bases. Otherwise, Lungs are clear. No pleural effusion.    MEDIASTINUM/AXILLAE: No lymphadenopathy. Normal esophagus. No significant pericardial effusion. No thoracic aortic aneurysm.    CORONARY ARTERY CALCIFICATION: Previous intervention (stents or CABG).    UPPER ABDOMEN: Postcholecystectomy changes. Large right renal cysts.    MUSCULOSKELETAL: Mild to moderate kyphosis with mild to moderate spondylosis. Interval sclerotic focus in the posterior right T4 vertebral body is indeterminate. No other evidence for sclerotic foci or metastasis.      Impression    IMPRESSION:  1.  No pulmonary embolism.    2.  No evidence for thoracic aortic aneurysm.    3.  Subpleural nodular atelectasis or nodule left upper lung anteriorly measuring approximately 4 mm (series 5 image 70). Short-term follow-up examination recommended in 2-3 months to evaluate for interval change.      4.  Biapical pleural-parenchymal scarring.  Mild to moderate emphysematous changes scattered throughout the lungs, greatest in the mid and upper lungs. Mild hazy increased markings in the mid and lower lungs may represent some mild pulmonary edema.   Dependent atelectasis in the posterior lung bases. Otherwise, Lungs are clear. No pleural effusion.    5.  Interval sclerotic focus in the posterior right T4 vertebral body is indeterminate. No other evidence for sclerotic foci or metastasis.         Echo:  Formal report pending  I reviewed preliminary  "images.  This demonstrates low normal Lv function EF 50-55%, septal wall motion consistent with pacemaker activation    Clinically Significant Risk Factors Present on Admission               # Drug Induced Coagulation Defect: home medication list includes an anticoagulant medication  # Drug Induced Platelet Defect: home medication list includes an antiplatelet medication   # Hypertension: Noted on problem list  # Chronic heart failure with preserved ejection fraction: heart failure noted on problem list and last echo with EF >50%     # Obesity: Estimated body mass index is 30.88 kg/m  as calculated from the following:    Height as of 12/6/23: 1.6 m (5' 3\").    Weight as of this encounter: 79.1 kg (174 lb 4.8 oz).       # Financial/Environmental Concerns: none  # COPD: noted on problem list  # Pacemaker present         "

## 2023-12-20 NOTE — PROGRESS NOTES
RECEIVING UNIT ED HANDOFF REVIEW    ED Nurse Handoff Report was reviewed by: Shahab Trevino RN on December 20, 2023 at 12:30 AM

## 2023-12-20 NOTE — PLAN OF CARE
Pt stable over night, VSS, no c/o Chest pain or sob.  Heparin gtt infusing without problems, no bleeding noted.  Tele:V Paced.  Pt is NPO and will be seen by [facility] today.  No new issues noted, plan of care is on going.

## 2023-12-20 NOTE — PROGRESS NOTES
[facility]    Internal Medicine Hospitalist Progress Note  12/20/2023  I evaluated patient on the above date.    Jose Aguero Jr., MD  [phone] (p)  Text Page  Vocera        Assessment & Plan New actions/orders today (12/20/2023) are underlined. All lab results in the assessment and plan were reviewed.    Hamida Verma is a 83 year old female with PMHx including CAD with recent cardiac stent to the L circumflex (12/8/2023); COPD; HTN; PAF s/p AVN ablation and PPM; and CKD; who presented 12/19/2023 with recurrent R shoulder pain which was compatible with her anginal equivalent.     On initial evaluation, pt was afebrile, mildly hypertensive. EKG with V paced rhythm, no overt acute ischemic changes. Labs notable for CBC with hgb 11.2; BMP unremarkable; LFT's unremarkable; trop marginally elevated at 29.     Chest CT showed no pulmonary embolism; mild hazy increased markings in the mid and lower lungs may represent some mild pulmonary edema.      # Chest pain, R shoulder pain.  # NSTEMI.  # Recent NSTEMI s/p PCI to left circumflex (12/8/2023).  # Hypertension (benign essential).  # CHF (HFpEF).  # HLD.  # Paroxysmal atrial fibrillation.  # SSS s/p AV node ablation and PPM (2019).  [PTA BP meds: carvedilol 12.5 mg BID; furosemide 20 mg daily; sacubitril-valsartan  mg BID.]  * Echo 12/6/2023 showed LVEF 50-55%; mild pulmonary hypertension noted; no significant valve disease.   * Initial presentation as above. Symptoms similar to prior anginal equivalent. Pain improved with acetaminophen at home and 1 dose of nitroglycerin per EMS. Chest pain free on admit. Follow-up trop 857. Started on heparin gtt.  Recent Labs   Lab 12/20/23  0417 12/20/23  0055 12/19/23  2243 12/19/23 2013 12/19/23  1721   NTBNPI  --   --   --   --  468   CTROPT 745* 907* 857*   < > 29*    < > = values in this interval not displayed.   - Continue heparin gtt (hold PTA rivaroxaban).  - Continue clopidogrel,  rosuvastatin; PRN NTG.  - Continue carvedilol; furosemide; sacubitril-valsartan.  - Echo pending.  - Continue to monitor i/o's, daily wts.  - Cardiology consulted, appreciate help.    Subpleural nodular atelectasis or nodule, left upper lung, on CT.  * CT chest 12/19 also showed subpleural nodular atelectasis or nodule left upper lung anteriorly measuring approximately 4 mm (series 5 image 70), short-term follow-up examination recommended in 2-3 months to evaluate for interval change.  - Short-term follow-up examination recommended in 2-3 months to evaluate for interval change.    Sclerotic focus in the posterior right T4 vertebral body on CT, indeterminate.  * CT chest also noted sclerotic focus in the posterior right T4 vertebral body, indeterminate, no other evidence for sclerotic foci or metastasis.  - Continue to monitor clinically.  - Follow-up outpatient.    Chronic normocytic anemia.  * Baseline Hgb 11. Stable. No active signs of bleeding.   Recent Labs   Lab 12/19/23  2106 12/19/23  1721   HGB 10.7* 11.2*   - Continue to monitor CBC - repeat in am.    CKD III.  * Baseline creatinine 0.97-1.01.   * Cr normal on admit.  Recent Labs   Lab 12/19/23  1721   CR 0.95   - Continue to monitor BMP - repeat in am.  - Avoid nephrotoxic medications.    COPD.  - Continue tiotropium (or formulary equivalent), PRN albuterol.    Obesity.  * Body mass index is 30.88 kg/m .  - Needs to continue to pursue aggressive dietary and lifestyle modifications.      Clinically Significant Risk Factors Present on Admission               # Drug Induced Coagulation Defect: home medication list includes an anticoagulant medication  # Drug Induced Platelet Defect: home medication list includes an antiplatelet medication   # Hypertension: Noted on problem list  # Chronic heart failure with preserved ejection fraction: heart failure noted on problem list and last echo with EF >50%     # Obesity: Estimated body mass index is 30.88 kg/m  as  "calculated from the following:    Height as of 12/6/23: 1.6 m (5' 3\").    Weight as of this encounter: 79.1 kg (174 lb 4.8 oz).       # COPD: noted on problem list  # Pacemaker present         COVID-19 testing.  COVID-19 PCR Results           No data to display              COVID-19 Antibody Results, Testing for Immunity           No data to display                Diet: NPO per Anesthesia Guidelines for Procedure/Surgery Except for: Meds    Prophylaxis: PCD's, ambulation. On heparin gtt.  Lopez Catheter: Not present  Lines: None     Code Status: No CPR- Do NOT Intubate    Disposition Plan   Expected discharge: 1-2d recommended to prior living arrangement pending  above .  Entered: Jose Aguero MD 12/20/2023, 7:13 AM         Interval History   Doing OK.  No chest pain or shoulder pain.    -Data reviewed today: I reviewed all new labs and imaging over the last 24 hours. I personally reviewed the EKG tracing showing findings as above .    Physical Exam    , Blood pressure (!) 149/76, pulse 69, temperature 98  F (36.7  C), temperature source Oral, resp. rate 18, weight 79.1 kg (174 lb 4.8 oz), SpO2 95%, not currently breastfeeding. O2 Device: None (Room air)    Vitals:    12/19/23 1722 12/20/23 0536   Weight: 84.4 kg (186 lb 1.1 oz) 79.1 kg (174 lb 4.8 oz)     Vital Signs with Ranges  Temp:  [97.4  F (36.3  C)-98  F (36.7  C)] 98  F (36.7  C)  Pulse:  [62-77] 69  Resp:  [10-27] 18  BP: (119-156)/(63-80) 149/76  SpO2:  [95 %-97 %] 95 %  Patient Vitals for the past 24 hrs:   BP Temp Temp src Pulse Resp SpO2 Weight   12/20/23 0545 (!) 149/76 -- -- 69 18 -- --   12/20/23 0536 -- -- -- -- -- -- 79.1 kg (174 lb 4.8 oz)   12/20/23 0130 (!) 154/80 -- -- 77 18 95 % --   12/19/23 2340 (!) 154/71 98  F (36.7  C) Oral 65 18 96 % --   12/19/23 2237 (!) 150/72 -- -- 64 20 96 % --   12/19/23 2100 (!) 150/71 -- -- 62 10 96 % --   12/19/23 2053 -- -- -- 67 27 97 % --   12/19/23 1836 119/63 -- -- -- 16 -- --   12/19/23 1745 (!) " "156/77 -- -- -- -- -- --   12/19/23 1722 (!) 142/72 97.4  F (36.3  C) Temporal 68 18 95 % 84.4 kg (186 lb 1.1 oz)     I/O's Last 24 hours  No intake/output data recorded.    Constitutional: Awake, alert, oriented, pleasant.  Respiratory: Diminished in bases. No crackles or wheezes.  Cardiovascular: RRR, no m/r/g.  GI: Soft, nt, nd, +BS.  Skin/Integumen:   Other:        Data    Labs reviewed.  Recent Labs   Lab 12/19/23 2106 12/19/23  1721   WBC 6.9 7.6   HGB 10.7* 11.2*   MCV 89 91    405   NA  --  138   POTASSIUM  --  3.8   CHLORIDE  --  103   CO2  --  26   BUN  --  14.5   CR  --  0.95   ANIONGAP  --  9   GURWINDER  --  9.1   GLC  --  155*   ALBUMIN  --  3.7   PROTTOTAL  --  6.2*   BILITOTAL  --  0.3   ALKPHOS  --  61   ALT  --  20   AST  --  19     Recent Labs   Lab Test 12/20/23  0417 12/20/23  0055 12/19/23  2243 12/19/23 2013 12/19/23  1721 12/06/23  0727 12/06/23  0533 05/30/23  0230 01/06/19  0527   NT-PROBNP, INPATIENT  --   --   --   --  468  --  552  --  234   TROPONIN T HIGH SENSITIVITY 745* 907* 857*   < > 29*   < > 65*   < >  --     < > = values in this interval not displayed.     Recent Labs   Lab 12/19/23  1721   *      Recent Labs   Lab Test 03/28/19  0905 01/08/19  0814   A1C 5.1 6.9*        No results for input(s): \"INR\", \"[id number]\" in the last 168 hours.  Recent Labs   Lab 12/19/23 2106 12/19/23  1721   WBC 6.9 7.6       MICRO:  CULTURES (INCLUDING BLOOD AND URINE):  No lab results found in last 7 days.    Recent Results (from the past 24 hour(s))   XR Chest Port 1 View    Narrative    EXAM: XR CHEST PORT 1 VIEW  LOCATION: [facility]  DATE: 12/19/2023    INDICATION: sob  COMPARISON: Chest CT 03/24/2018      Impression    IMPRESSION: Dual-chamber pacemaker now present with leads appearing in good position on this single projection. Borderline cardiomegaly. There is mild central interstitial prominence and diffuse prominence of interstitium suggesting minimal " interstitial   edema. No focal pneumonia or pleural effusion evident..   CT Chest Pulmonary Embolism w Contrast    Narrative    EXAM: CT CHEST PULMONARY EMBOLISM W CONTRAST  LOCATION: [facility]  DATE: 12/19/2023    INDICATION: chest pain with poss PE or CHF  COMPARISON: 03/14/2018  TECHNIQUE: CT chest pulmonary angiogram during arterial phase injection of IV contrast. Multiplanar reformats and MIP reconstructions were performed. Dose reduction techniques were used.   CONTRAST: 69mL Isovue 370    FINDINGS:  ANGIOGRAM CHEST: Pulmonary arteries are normal caliber and negative for pulmonary emboli. Thoracic aorta is not well opacified and is  indeterminate for dissection. No CT evidence of right heart strain.    LUNGS AND PLEURA: Biapical pleural-parenchymal scarring. Subpleural nodular atelectasis or nodule left upper lung anteriorly measuring approximately 4 mm (series 5 image 70). Mild to moderate emphysematous changes scattered throughout the lungs, greatest   in the mid and upper lungs. Mild hazy increased markings in the mid and lower lungs may represent some mild pulmonary edema. Dependent atelectasis in the posterior lung bases. Otherwise, Lungs are clear. No pleural effusion.    MEDIASTINUM/AXILLAE: No lymphadenopathy. Normal esophagus. No significant pericardial effusion. No thoracic aortic aneurysm.    CORONARY ARTERY CALCIFICATION: Previous intervention (stents or CABG).    UPPER ABDOMEN: Postcholecystectomy changes. Large right renal cysts.    MUSCULOSKELETAL: Mild to moderate kyphosis with mild to moderate spondylosis. Interval sclerotic focus in the posterior right T4 vertebral body is indeterminate. No other evidence for sclerotic foci or metastasis.      Impression    IMPRESSION:  1.  No pulmonary embolism.    2.  No evidence for thoracic aortic aneurysm.    3.  Subpleural nodular atelectasis or nodule left upper lung anteriorly measuring approximately 4 mm (series 5 image  70). Short-term follow-up examination recommended in 2-3 months to evaluate for interval change.      4.  Biapical pleural-parenchymal scarring.  Mild to moderate emphysematous changes scattered throughout the lungs, greatest in the mid and upper lungs. Mild hazy increased markings in the mid and lower lungs may represent some mild pulmonary edema.   Dependent atelectasis in the posterior lung bases. Otherwise, Lungs are clear. No pleural effusion.    5.  Interval sclerotic focus in the posterior right T4 vertebral body is indeterminate. No other evidence for sclerotic foci or metastasis.         Medications   All medications were reviewed.    Infusions:   heparin Stopped (12/20/23 0540)     Scheduled Medications:   carvedilol  12.5 mg Oral BID w/meals    clopidogrel  75 mg Oral Daily    furosemide  20 mg Oral Daily    potassium chloride ER  10 mEq Oral Daily    [Held by provider] rivaroxaban ANTICOAGULANT  15 mg Oral Daily with supper    rosuvastatin  40 mg Oral Daily    sacubitril-valsartan  1 tablet Oral BID     PRN Medications:  acetaminophen **OR** acetaminophen, albuterol, alum & mag hydroxide-simethicone, nitroGLYcerin

## 2023-12-21 ENCOUNTER — APPOINTMENT (OUTPATIENT)
Dept: CT IMAGING | Facility: CLINIC | Age: 83
DRG: 322 | End: 2023-12-21
Attending: INTERNAL MEDICINE
Payer: COMMERCIAL

## 2023-12-21 ENCOUNTER — APPOINTMENT (OUTPATIENT)
Dept: CARDIOLOGY | Facility: CLINIC | Age: 83
DRG: 322 | End: 2023-12-21
Attending: INTERNAL MEDICINE
Payer: COMMERCIAL

## 2023-12-21 PROBLEM — R79.89 ELEVATED TROPONIN: Status: ACTIVE | Noted: 2023-12-21

## 2023-12-21 LAB
ACT BLD: 282 SECONDS (ref 74–150)
ACT BLD: 309 SECONDS (ref 74–150)
ANION GAP SERPL CALCULATED.3IONS-SCNC: 10 MMOL/L (ref 7–15)
APTT PPP: 55 SECONDS (ref 22–38)
BASOPHILS # BLD AUTO: 0.1 10E3/UL (ref 0–0.2)
BASOPHILS NFR BLD AUTO: 2 %
BUN SERPL-MCNC: 10.1 MG/DL (ref 8–23)
CALCIUM SERPL-MCNC: 9 MG/DL (ref 8.8–10.2)
CHLORIDE SERPL-SCNC: 105 MMOL/L (ref 98–107)
CREAT SERPL-MCNC: 0.88 MG/DL (ref 0.51–0.95)
DEPRECATED HCO3 PLAS-SCNC: 26 MMOL/L (ref 22–29)
EGFRCR SERPLBLD CKD-EPI 2021: 64 ML/MIN/1.73M2
EOSINOPHIL # BLD AUTO: 0.4 10E3/UL (ref 0–0.7)
EOSINOPHIL NFR BLD AUTO: 7 %
ERYTHROCYTE [DISTWIDTH] IN BLOOD BY AUTOMATED COUNT: 13.1 % (ref 10–15)
GLUCOSE SERPL-MCNC: 110 MG/DL (ref 70–99)
HCT VFR BLD AUTO: 33.8 % (ref 35–47)
HGB BLD-MCNC: 11.1 G/DL (ref 11.7–15.7)
IMM GRANULOCYTES # BLD: 0 10E3/UL
IMM GRANULOCYTES NFR BLD: 0 %
LVEF ECHO: NORMAL
LYMPHOCYTES # BLD AUTO: 1.1 10E3/UL (ref 0.8–5.3)
LYMPHOCYTES NFR BLD AUTO: 19 %
MCH RBC QN AUTO: 29.4 PG (ref 26.5–33)
MCHC RBC AUTO-ENTMCNC: 32.8 G/DL (ref 31.5–36.5)
MCV RBC AUTO: 90 FL (ref 78–100)
MONOCYTES # BLD AUTO: 0.7 10E3/UL (ref 0–1.3)
MONOCYTES NFR BLD AUTO: 12 %
NEUTROPHILS # BLD AUTO: 3.6 10E3/UL (ref 1.6–8.3)
NEUTROPHILS NFR BLD AUTO: 60 %
NRBC # BLD AUTO: 0 10E3/UL
NRBC BLD AUTO-RTO: 0 /100
PLATELET # BLD AUTO: 349 10E3/UL (ref 150–450)
POTASSIUM SERPL-SCNC: 3.8 MMOL/L (ref 3.4–5.3)
RBC # BLD AUTO: 3.77 10E6/UL (ref 3.8–5.2)
SODIUM SERPL-SCNC: 141 MMOL/L (ref 135–145)
WBC # BLD AUTO: 6 10E3/UL (ref 4–11)

## 2023-12-21 PROCEDURE — 80048 BASIC METABOLIC PNL TOTAL CA: CPT | Performed by: INTERNAL MEDICINE

## 2023-12-21 PROCEDURE — 93454 CORONARY ARTERY ANGIO S&I: CPT | Mod: 26 | Performed by: INTERNAL MEDICINE

## 2023-12-21 PROCEDURE — 93010 ELECTROCARDIOGRAM REPORT: CPT | Performed by: INTERNAL MEDICINE

## 2023-12-21 PROCEDURE — 93308 TTE F-UP OR LMTD: CPT | Mod: 26 | Performed by: INTERNAL MEDICINE

## 2023-12-21 PROCEDURE — 272N000001 HC OR GENERAL SUPPLY STERILE: Performed by: INTERNAL MEDICINE

## 2023-12-21 PROCEDURE — C1874 STENT, COATED/COV W/DEL SYS: HCPCS | Performed by: INTERNAL MEDICINE

## 2023-12-21 PROCEDURE — 36415 COLL VENOUS BLD VENIPUNCTURE: CPT | Performed by: INTERNAL MEDICINE

## 2023-12-21 PROCEDURE — 85730 THROMBOPLASTIN TIME PARTIAL: CPT | Performed by: HOSPITALIST

## 2023-12-21 PROCEDURE — 93325 DOPPLER ECHO COLOR FLOW MAPG: CPT | Mod: 26 | Performed by: INTERNAL MEDICINE

## 2023-12-21 PROCEDURE — 250N000013 HC RX MED GY IP 250 OP 250 PS 637: Performed by: HOSPITALIST

## 2023-12-21 PROCEDURE — 99152 MOD SED SAME PHYS/QHP 5/>YRS: CPT | Mod: GC | Performed by: INTERNAL MEDICINE

## 2023-12-21 PROCEDURE — 93321 DOPPLER ECHO F-UP/LMTD STD: CPT | Mod: 26 | Performed by: INTERNAL MEDICINE

## 2023-12-21 PROCEDURE — 99152 MOD SED SAME PHYS/QHP 5/>YRS: CPT | Performed by: INTERNAL MEDICINE

## 2023-12-21 PROCEDURE — 99233 SBSQ HOSP IP/OBS HIGH 50: CPT | Mod: 25 | Performed by: INTERNAL MEDICINE

## 2023-12-21 PROCEDURE — 250N000013 HC RX MED GY IP 250 OP 250 PS 637: Performed by: INTERNAL MEDICINE

## 2023-12-21 PROCEDURE — 85025 COMPLETE CBC W/AUTO DIFF WBC: CPT | Performed by: INTERNAL MEDICINE

## 2023-12-21 PROCEDURE — 92928 PRQ TCAT PLMT NTRAC ST 1 LES: CPT | Mod: RC | Performed by: INTERNAL MEDICINE

## 2023-12-21 PROCEDURE — G0378 HOSPITAL OBSERVATION PER HR: HCPCS

## 2023-12-21 PROCEDURE — 210N000002 HC R&B HEART CARE

## 2023-12-21 PROCEDURE — 250N000011 HC RX IP 250 OP 636: Performed by: INTERNAL MEDICINE

## 2023-12-21 PROCEDURE — 250N000009 HC RX 250: Performed by: INTERNAL MEDICINE

## 2023-12-21 PROCEDURE — C1894 INTRO/SHEATH, NON-LASER: HCPCS | Performed by: INTERNAL MEDICINE

## 2023-12-21 PROCEDURE — 93325 DOPPLER ECHO COLOR FLOW MAPG: CPT

## 2023-12-21 PROCEDURE — B2111ZZ FLUOROSCOPY OF MULTIPLE CORONARY ARTERIES USING LOW OSMOLAR CONTRAST: ICD-10-PCS | Performed by: INTERNAL MEDICINE

## 2023-12-21 PROCEDURE — C1725 CATH, TRANSLUMIN NON-LASER: HCPCS | Performed by: INTERNAL MEDICINE

## 2023-12-21 PROCEDURE — 92978 ENDOLUMINL IVUS OCT C 1ST: CPT | Mod: RC | Performed by: INTERNAL MEDICINE

## 2023-12-21 PROCEDURE — 93005 ELECTROCARDIOGRAM TRACING: CPT

## 2023-12-21 PROCEDURE — C1769 GUIDE WIRE: HCPCS | Performed by: INTERNAL MEDICINE

## 2023-12-21 PROCEDURE — 250N000013 HC RX MED GY IP 250 OP 250 PS 637: Performed by: PHYSICIAN ASSISTANT

## 2023-12-21 PROCEDURE — 258N000003 HC RX IP 258 OP 636: Performed by: HOSPITALIST

## 2023-12-21 PROCEDURE — 92978 ENDOLUMINL IVUS OCT C 1ST: CPT | Mod: 26 | Performed by: INTERNAL MEDICINE

## 2023-12-21 PROCEDURE — 99153 MOD SED SAME PHYS/QHP EA: CPT | Performed by: INTERNAL MEDICINE

## 2023-12-21 PROCEDURE — C1887 CATHETER, GUIDING: HCPCS | Performed by: INTERNAL MEDICINE

## 2023-12-21 PROCEDURE — 99233 SBSQ HOSP IP/OBS HIGH 50: CPT | Performed by: INTERNAL MEDICINE

## 2023-12-21 PROCEDURE — C1753 CATH, INTRAVAS ULTRASOUND: HCPCS | Performed by: INTERNAL MEDICINE

## 2023-12-21 PROCEDURE — 027034Z DILATION OF CORONARY ARTERY, ONE ARTERY WITH DRUG-ELUTING INTRALUMINAL DEVICE, PERCUTANEOUS APPROACH: ICD-10-PCS | Performed by: INTERNAL MEDICINE

## 2023-12-21 PROCEDURE — 250N000011 HC RX IP 250 OP 636: Mod: JZ | Performed by: PHYSICIAN ASSISTANT

## 2023-12-21 PROCEDURE — 85347 COAGULATION TIME ACTIVATED: CPT

## 2023-12-21 PROCEDURE — C9600 PERC DRUG-EL COR STENT SING: HCPCS | Mod: RC | Performed by: INTERNAL MEDICINE

## 2023-12-21 PROCEDURE — 93454 CORONARY ARTERY ANGIO S&I: CPT | Performed by: INTERNAL MEDICINE

## 2023-12-21 PROCEDURE — 71275 CT ANGIOGRAPHY CHEST: CPT

## 2023-12-21 DEVICE — STENT CORONARY DES SYNERGY XD MR US 3.00X38MM H7493941838300: Type: IMPLANTABLE DEVICE | Status: FUNCTIONAL

## 2023-12-21 RX ORDER — NITROGLYCERIN 5 MG/ML
VIAL (ML) INTRAVENOUS
Status: DISCONTINUED | OUTPATIENT
Start: 2023-12-21 | End: 2023-12-21 | Stop reason: HOSPADM

## 2023-12-21 RX ORDER — ATROPINE SULFATE 0.1 MG/ML
0.5 INJECTION INTRAVENOUS
Status: ACTIVE | OUTPATIENT
Start: 2023-12-21 | End: 2023-12-21

## 2023-12-21 RX ORDER — NALOXONE HYDROCHLORIDE 0.4 MG/ML
0.2 INJECTION, SOLUTION INTRAMUSCULAR; INTRAVENOUS; SUBCUTANEOUS
Status: DISCONTINUED | OUTPATIENT
Start: 2023-12-21 | End: 2023-12-21

## 2023-12-21 RX ORDER — NITROGLYCERIN 0.4 MG/1
0.4 TABLET SUBLINGUAL EVERY 5 MIN PRN
Status: DISCONTINUED | OUTPATIENT
Start: 2023-12-21 | End: 2023-12-21

## 2023-12-21 RX ORDER — ONDANSETRON 2 MG/ML
4 INJECTION INTRAMUSCULAR; INTRAVENOUS EVERY 6 HOURS PRN
Status: DISCONTINUED | OUTPATIENT
Start: 2023-12-21 | End: 2023-12-22 | Stop reason: HOSPADM

## 2023-12-21 RX ORDER — ONDANSETRON 4 MG/1
4 TABLET, ORALLY DISINTEGRATING ORAL EVERY 6 HOURS PRN
Status: DISCONTINUED | OUTPATIENT
Start: 2023-12-21 | End: 2023-12-22 | Stop reason: HOSPADM

## 2023-12-21 RX ORDER — VERAPAMIL HYDROCHLORIDE 2.5 MG/ML
INJECTION, SOLUTION INTRAVENOUS
Status: DISCONTINUED | OUTPATIENT
Start: 2023-12-21 | End: 2023-12-21 | Stop reason: HOSPADM

## 2023-12-21 RX ORDER — FENTANYL CITRATE 50 UG/ML
25 INJECTION, SOLUTION INTRAMUSCULAR; INTRAVENOUS
Status: DISCONTINUED | OUTPATIENT
Start: 2023-12-21 | End: 2023-12-22 | Stop reason: HOSPADM

## 2023-12-21 RX ORDER — OXYCODONE HYDROCHLORIDE 5 MG/1
10 TABLET ORAL EVERY 4 HOURS PRN
Status: DISCONTINUED | OUTPATIENT
Start: 2023-12-21 | End: 2023-12-22 | Stop reason: HOSPADM

## 2023-12-21 RX ORDER — FLUMAZENIL 0.1 MG/ML
0.2 INJECTION, SOLUTION INTRAVENOUS
Status: ACTIVE | OUTPATIENT
Start: 2023-12-21 | End: 2023-12-21

## 2023-12-21 RX ORDER — IOPAMIDOL 755 MG/ML
72 INJECTION, SOLUTION INTRAVASCULAR ONCE
Status: COMPLETED | OUTPATIENT
Start: 2023-12-21 | End: 2023-12-21

## 2023-12-21 RX ORDER — NALOXONE HYDROCHLORIDE 0.4 MG/ML
0.4 INJECTION, SOLUTION INTRAMUSCULAR; INTRAVENOUS; SUBCUTANEOUS
Status: DISCONTINUED | OUTPATIENT
Start: 2023-12-21 | End: 2023-12-21

## 2023-12-21 RX ORDER — OXYCODONE HYDROCHLORIDE 5 MG/1
5 TABLET ORAL EVERY 4 HOURS PRN
Status: DISCONTINUED | OUTPATIENT
Start: 2023-12-21 | End: 2023-12-22 | Stop reason: HOSPADM

## 2023-12-21 RX ORDER — HEPARIN SODIUM 1000 [USP'U]/ML
INJECTION, SOLUTION INTRAVENOUS; SUBCUTANEOUS
Status: DISCONTINUED | OUTPATIENT
Start: 2023-12-21 | End: 2023-12-21 | Stop reason: HOSPADM

## 2023-12-21 RX ORDER — LIDOCAINE 40 MG/G
CREAM TOPICAL
Status: DISCONTINUED | OUTPATIENT
Start: 2023-12-21 | End: 2023-12-22 | Stop reason: HOSPADM

## 2023-12-21 RX ORDER — SODIUM CHLORIDE 9 MG/ML
INJECTION, SOLUTION INTRAVENOUS CONTINUOUS
Status: ACTIVE | OUTPATIENT
Start: 2023-12-21 | End: 2023-12-21

## 2023-12-21 RX ORDER — ROSUVASTATIN CALCIUM 40 MG/1
40 TABLET, COATED ORAL DAILY
Qty: 90 TABLET | Refills: 3 | Status: SHIPPED | OUTPATIENT
Start: 2023-12-21 | End: 2023-12-22

## 2023-12-21 RX ORDER — METOPROLOL TARTRATE 1 MG/ML
5 INJECTION, SOLUTION INTRAVENOUS
Status: DISCONTINUED | OUTPATIENT
Start: 2023-12-21 | End: 2023-12-22 | Stop reason: HOSPADM

## 2023-12-21 RX ORDER — CLOPIDOGREL BISULFATE 75 MG/1
75 TABLET ORAL DAILY
Qty: 90 TABLET | Refills: 3 | Status: SHIPPED | OUTPATIENT
Start: 2023-12-22 | End: 2023-12-22

## 2023-12-21 RX ORDER — FENTANYL CITRATE 50 UG/ML
INJECTION, SOLUTION INTRAMUSCULAR; INTRAVENOUS
Status: DISCONTINUED | OUTPATIENT
Start: 2023-12-21 | End: 2023-12-21 | Stop reason: HOSPADM

## 2023-12-21 RX ORDER — IOPAMIDOL 755 MG/ML
INJECTION, SOLUTION INTRAVASCULAR
Status: DISCONTINUED | OUTPATIENT
Start: 2023-12-21 | End: 2023-12-21 | Stop reason: HOSPADM

## 2023-12-21 RX ORDER — ACETAMINOPHEN 325 MG/1
650 TABLET ORAL EVERY 4 HOURS PRN
Status: DISCONTINUED | OUTPATIENT
Start: 2023-12-21 | End: 2023-12-21

## 2023-12-21 RX ORDER — HYDRALAZINE HYDROCHLORIDE 20 MG/ML
10 INJECTION INTRAMUSCULAR; INTRAVENOUS EVERY 4 HOURS PRN
Status: DISCONTINUED | OUTPATIENT
Start: 2023-12-21 | End: 2023-12-22 | Stop reason: HOSPADM

## 2023-12-21 RX ADMIN — SACUBITRIL AND VALSARTAN 1 TABLET: 97; 103 TABLET, FILM COATED ORAL at 09:25

## 2023-12-21 RX ADMIN — SODIUM CHLORIDE: 9 INJECTION, SOLUTION INTRAVENOUS at 09:35

## 2023-12-21 RX ADMIN — CARVEDILOL 12.5 MG: 12.5 TABLET, FILM COATED ORAL at 09:26

## 2023-12-21 RX ADMIN — ROSUVASTATIN CALCIUM 40 MG: 20 TABLET, FILM COATED ORAL at 09:26

## 2023-12-21 RX ADMIN — ASPIRIN 325 MG ORAL TABLET 325 MG: 325 PILL ORAL at 09:24

## 2023-12-21 RX ADMIN — IOPAMIDOL 72 ML: 755 INJECTION, SOLUTION INTRAVENOUS at 16:12

## 2023-12-21 RX ADMIN — HEPARIN SODIUM 800 UNITS/HR: 10000 INJECTION, SOLUTION INTRAVENOUS at 03:44

## 2023-12-21 RX ADMIN — CARVEDILOL 12.5 MG: 12.5 TABLET, FILM COATED ORAL at 18:14

## 2023-12-21 RX ADMIN — CLOPIDOGREL BISULFATE 75 MG: 75 TABLET ORAL at 09:25

## 2023-12-21 RX ADMIN — POTASSIUM CHLORIDE 20 MEQ: 1500 TABLET, EXTENDED RELEASE ORAL at 09:25

## 2023-12-21 RX ADMIN — SACUBITRIL AND VALSARTAN 1 TABLET: 97; 103 TABLET, FILM COATED ORAL at 20:30

## 2023-12-21 RX ADMIN — FUROSEMIDE 20 MG: 20 TABLET ORAL at 09:26

## 2023-12-21 RX ADMIN — POTASSIUM CHLORIDE 10 MEQ: 750 TABLET, EXTENDED RELEASE ORAL at 09:26

## 2023-12-21 ASSESSMENT — ACTIVITIES OF DAILY LIVING (ADL)
ADLS_ACUITY_SCORE: 26

## 2023-12-21 NOTE — PLAN OF CARE
Status: Pt admitted for recurrent R shoulder pain which was compatible with her anginal equivalent.   Vitals: VSS on RA, tele: 100% V-paced  Neuros: AOx4.  IV: PIV infusing heparin gtt at 800 units/hour, ptt redraw for 2000 - last draw was within ranged.  Labs/Electrolytes: Trop trending down from 907 to 745  Resp/trach: LS clear  Diet: Low fat, low sodium diet, tolerating well, NPO at 0000.  Bowel status: LBM today  : Voiding without issue in BR  Skin: Bruising on forearms  Pain: Tylenol x1 for headache  Activity: Up SBA, gb  Social: No visitors this shift.  Plan: Echo done today, no s/s of CHF. Angio planned for tomorrow. Continue to monitor.

## 2023-12-21 NOTE — PLAN OF CARE
A&Ox4. /68 (BP Location: Left arm)   Pulse 87   Temp 97.6  F (36.4  C) (Oral)   Resp 16   Wt 79.1 kg (174 lb 4.8 oz)   LMP  (LMP Unknown)   SpO2 98%   BMI 30.88 kg/m   Tele 100% V-paced. Denies pain. Up with SBA. Heparin gtt infusing at 800 units/hr. PTT scheduled for 0600. NPO at [place] for angiogram.

## 2023-12-21 NOTE — PROGRESS NOTES
[facility]  Cardiology Progress Note  Date of Service: 12/21/2023  Primary Cardiologist: Dr. Mauri Chase     Assessment & Plan    Hamida Verma is a 83 year old female admitted on 12/19/2023 with NSTEMI.    Assessment:  1.  NSTEMI, troponin peak at 907. Now s/p PCI to RCA with ANGELES x 1 with questionable proximal dissection into aortic root. CTA chest without evidence of dissection. Remains chest pain free.   2.  Coronary artery disease:  S/P recent admission earlier this month for NSTEMI and is s/p ANGELES x1 to LCx  3.  Paroxysmal atrial fibrillation:  PTA on xarelto, currently on hold.   4.  Sick Sinus Syndrome S/P AV node ablation and pacemaker  5.  CHFpEF:  EF 50-55% by echocardiogram.  No S/S of CHF    Plan:   Medications   -continue Plavix 75 mg once daily    -resume Xarelto 15 mg once daily tonight    -decrease Entresto to 49/51 mg BID given symptomatic hypotension   -continue carvedilol 12.5 mg BID   -continue Lasix 20 mg once daily    -continue potassium chloride 10 mEq once daily    -continue rosuvastatin 40 mg once daily   Cardiac rehab  Cardiology follow up as scheduled 1/26/2024 with labs prior   Okay to discharge from cardiology perspective. Cardiology will sign off.     Savita Alford PA-C  [facility]  Pager: [phone]    Interval History   Now s/p ANGELES to RCA 12/21/2023. There was question of potential proximal dissection into aortic root.  CTA chest without evidence of dissection.    Patient remains chest pain free.  Denies shortness of breath, palpitations.  Episode of hypotension this morning, symptomatic.    Physical Exam   Temp: 98.3  F (36.8  C) Temp src: Oral BP: 138/71 Pulse: 68   Resp: 18 SpO2: 96 % O2 Device: None (Room air)    Vitals:    12/19/23 1722 12/20/23 0536 12/21/23 0553   Weight: 84.4 kg (186 lb 1.1 oz) 79.1 kg (174 lb 4.8 oz) 78.9 kg (174 lb)       GEN: well nourished, in no acute distress.  HEENT:  Pupils equal, round. Sclerae  nonicteric.   NECK: Supple, no masses appreciated.  No JVD  C/V:  Regular rate and rhythm, no murmur, rub or gallop.    RESP: Respirations are unlabored. Clear to auscultation bilaterally without wheezing, rales, or rhonchi.  GI: Abdomen soft, nontender.  EXTREM: No LE edema.  Right radial access site without evidence of bleeding or hematoma.  NEURO: Alert and oriented, cooperative.  SKIN: Warm and dry.     Medications    Continuing ACE inhibitor/ARB/ARNI from home medication list OR ACE inhibitor/ARB order already placed during this visit      - MEDICATION INSTRUCTIONS -      - MEDICATION INSTRUCTIONS -      heparin 800 Units/hr (12/21/23 0344)    - MEDICATION INSTRUCTIONS -      - MEDICATION INSTRUCTIONS -      reason aspirin not prescribed (intentional)      sodium chloride        aspirin  325 mg Oral Once    Or    aspirin  243 mg Oral Once    carvedilol  12.5 mg Oral BID w/meals    clopidogrel  75 mg Oral Daily    furosemide  20 mg Oral Daily    potassium chloride ER  10 mEq Oral Daily    [Held by provider] rivaroxaban ANTICOAGULANT  15 mg Oral Daily with supper    rosuvastatin  40 mg Oral Daily    sacubitril-valsartan  1 tablet Oral BID    sodium chloride (PF)  3 mL Intracatheter Q8H       Data   Last 24 hours labs reviewed     Tele: SR 60s    Echo 12/21/2023  1. The left ventricle is normal in structure, function and size. The visual  ejection fraction is estimated at 55%.  2. Ascending aorta 3.6cm. Some linear echo dense stranding several cm above  the AV. Suspect this is artifact, but views are limited.    CTA chest 12/21/2023  IMPRESSION:  1.  Normal caliber and appearance of the thoracic aorta. No evidence of acute aortic injury.  2.  Mild-moderate right subclavian artery origin stenosis.  3.  Moderate diffuse emphysema.  4.  Similar indeterminate subpleural subcentimeter nodular opacity in the left upper lobe anteriorly. Recommend short-term interval follow-up exam in 2-3 months to assess  stability.    Cor angio 12/21/2023  Culprit lesion is a severe stenosis of the mid-distal RCA.  Mild nonobstructive CAD elsewhere.  Recently placed LCx stent is widely patent.  Successful OCT guided PCI of the mid-distal RCA with placement of a single 3.0 x 38 mm Synergy ANGELES, postdilated with a 3.5 mm NC balloon.  Following PCI, a small contrast dye hang up was noted in the right coronary cusp.  This was investigated with a CTA of the chest which showed no evidence of aortic dissection or other acute aortic injury.

## 2023-12-21 NOTE — PLAN OF CARE
Neuro: A&Ox4  Tele/Cardiac: V-paced  Resp: WDL  Activity: SBA  Pain: denies  Drips/IV: heparin gtt 800 unit/hr  GI/: WDL  Skin: WDL  Diet: NPO  Test/Procedures: angio today  Plan: angio today

## 2023-12-21 NOTE — PROGRESS NOTES
[facility]    Internal Medicine Hospitalist Progress Note  12/21/2023  I evaluated patient on the above date.    Jose Aguero Jr., MD  [phone] (p)  Text Page  Vocera        Assessment & Plan New actions/orders today (12/21/2023) are underlined. All lab results in the assessment and plan were reviewed.    Hamida Verma is a 83 year old female with PMHx including CAD with recent cardiac stent to the L circumflex (12/8/2023); COPD; HTN; PAF s/p AVN ablation and PPM; and CKD; who presented 12/19/2023 with recurrent R shoulder pain which was compatible with her anginal equivalent.     On initial evaluation, pt was afebrile, mildly hypertensive. EKG with V paced rhythm, no overt acute ischemic changes. Labs notable for CBC with hgb 11.2; BMP unremarkable; LFT's unremarkable; trop marginally elevated at 29.     Chest CT showed no pulmonary embolism; mild hazy increased markings in the mid and lower lungs may represent some mild pulmonary edema.      # Chest pain, R shoulder pain.  # NSTEMI.  # Recent NSTEMI s/p PCI to left circumflex (12/8/2023).  # CHF with decrease in LVEF, possibly ischemic cardiomyopathy; HFrEF).  # Hypertension (benign essential).  # HLD.  # Paroxysmal atrial fibrillation.  # SSS s/p AV node ablation and PPM (2019).  [PTA BP meds: carvedilol 12.5 mg BID; furosemide 20 mg daily; sacubitril-valsartan  mg BID.]  * Echo 12/6/2023 showed LVEF 50-55%; mild pulmonary hypertension noted; no significant valve disease.   * Initial presentation as above. Symptoms similar to prior anginal equivalent. Pain improved with acetaminophen at home and 1 dose of nitroglycerin per EMS. Chest pain free on admit. Follow-up trop 857. Started on heparin gtt.   * On 12/21, echo showed LVEF 45%, possible WMA but difficult to assess given underlying dyssynchronous septal motion; RV OK; overall noted no change compared with previous study (though noted LVEF 50-55% in 12/6/2023).  Recent Labs    Lab 12/20/23  0417 12/20/23  0055 12/19/23  2243 12/19/23 2013 12/19/23  1721   NTBNPI  --   --   --   --  468   CTROPT 745* 907* 857*   < > 29*    < > = values in this interval not displayed.   - Plan angiogram today 12/21.  - Continue heparin gtt (hold PTA rivaroxaban).  - Continue clopidogrel, rosuvastatin; PRN NTG.  - Continue carvedilol; furosemide; sacubitril-valsartan.  - Continue to monitor i/o's, daily wts.  - Appreciate Cardiology help.    Subpleural nodular atelectasis or nodule, left upper lung, on CT.  * CT chest 12/19 also showed subpleural nodular atelectasis or nodule left upper lung anteriorly measuring approximately 4 mm (series 5 image 70), short-term follow-up examination recommended in 2-3 months to evaluate for interval change.  - Short-term follow-up examination recommended in 2-3 months to evaluate for interval change.    Sclerotic focus in the posterior right T4 vertebral body on CT, indeterminate.  * CT chest also noted sclerotic focus in the posterior right T4 vertebral body, indeterminate, no other evidence for sclerotic foci or metastasis.  - Continue to monitor clinically.  - Follow-up outpatient.    Chronic normocytic anemia.  * Baseline Hgb 11. Stable. No active signs of bleeding.   Recent Labs   Lab 12/21/23  0553 12/19/23  2106 12/19/23  1721   HGB 11.1* 10.7* 11.2*   - Continue to monitor CBC - repeat in am.    CKD III.  * Baseline creatinine 0.97-1.01.   * Cr normal on admit.  Recent Labs   Lab 12/21/23  0553 12/19/23  1721   CR 0.88 0.95   - Continue to monitor BMP - repeat in am.  - Avoid nephrotoxic medications.    COPD.  - Continue tiotropium (or formulary equivalent), PRN albuterol.    Obesity.  * Body mass index is 30.82 kg/m .  - Needs to continue to pursue aggressive dietary and lifestyle modifications.      Clinically Significant Risk Factors Present on Admission               # Drug Induced Coagulation Defect: home medication list includes an anticoagulant  "medication  # Drug Induced Platelet Defect: home medication list includes an antiplatelet medication   # Hypertension: Noted on problem list  # Chronic heart failure with preserved ejection fraction: heart failure noted on problem list and last echo with EF >50%     # Obesity: Estimated body mass index is 30.82 kg/m  as calculated from the following:    Height as of 12/6/23: 1.6 m (5' 3\").    Weight as of this encounter: 78.9 kg (174 lb).       # Financial/Environmental Concerns: none  # COPD: noted on problem list    # Pacemaker present         COVID-19 testing.  COVID-19 PCR Results           No data to display              COVID-19 Antibody Results, Testing for Immunity           No data to display                Diet: Low Saturated Fat Na <2400 mg  NPO for Medical/Clinical Reasons Except for: Meds    Prophylaxis: PCD's, ambulation. On heparin gtt.  Lopez Catheter: Not present  Lines: None     Code Status: No CPR- Do NOT Intubate    Disposition Plan   Expected discharge: Possibly tomorrow 12/22 recommended to prior living arrangement pending  angiogram results .  Entered: Jose Aguero MD 12/21/2023, 7:52 AM         Interval History   No acute events overnight.  Doing OK this am.  No chest/shoulder pain.    -Data reviewed today: I reviewed all new labs and imaging over the last 24 hours. I personally reviewed no images or EKG's today.    Physical Exam    , Blood pressure (!) 122/93, pulse 70, temperature 97.6  F (36.4  C), temperature source Oral, resp. rate 16, weight 78.9 kg (174 lb), SpO2 97%, not currently breastfeeding. O2 Device: None (Room air)    Vitals:    12/19/23 1722 12/20/23 0536 12/21/23 0553   Weight: 84.4 kg (186 lb 1.1 oz) 79.1 kg (174 lb 4.8 oz) 78.9 kg (174 lb)     Vital Signs with Ranges  Temp:  [97.6  F (36.4  C)-98.3  F (36.8  C)] 97.6  F (36.4  C)  Pulse:  [64-87] 70  Resp:  [16-18] 16  BP: (113-155)/(65-93) 122/93  SpO2:  [95 %-98 %] 97 %  Patient Vitals for the past 24 hrs:   BP " "Temp Temp src Pulse Resp SpO2 Weight   12/21/23 0553 -- -- -- -- -- -- 78.9 kg (174 lb)   12/21/23 0210 (!) 122/93 -- -- 70 16 97 % --   12/20/23 2200 -- -- -- -- 16 -- --   12/20/23 2012 134/68 97.6  F (36.4  C) Oral 87 16 98 % --   12/20/23 1835 (!) 155/85 -- -- 66 -- -- --   12/20/23 1532 129/65 98.3  F (36.8  C) Oral 64 16 95 % --   12/20/23 1245 113/79 98.3  F (36.8  C) Oral 66 18 98 % --     I/O's Last 24 hours  No intake/output data recorded.    Constitutional: Awake, alert, oriented, pleasant, conversant.  Respiratory: Diminished in bases. No crackles or wheezes.  Cardiovascular: RRR, no m/r/g.  GI: Soft, nt, nd, +BS.  Skin/Integumen: No bilateral lower extremity pitting edema.  Other:        Data    Labs reviewed.  Recent Labs   Lab 12/21/23  0553 12/19/23 2106 12/19/23  1721   WBC 6.0 6.9 7.6   HGB 11.1* 10.7* 11.2*   MCV 90 89 91    372 405     --  138   POTASSIUM 3.8  --  3.8   CHLORIDE 105  --  103   CO2 26  --  26   BUN 10.1  --  14.5   CR 0.88  --  0.95   ANIONGAP 10  --  9   GURWINDER 9.0  --  9.1   *  --  155*   ALBUMIN  --   --  3.7   PROTTOTAL  --   --  6.2*   BILITOTAL  --   --  0.3   ALKPHOS  --   --  61   ALT  --   --  20   AST  --   --  19     Recent Labs   Lab Test 12/20/23  0417 12/20/23  0055 12/19/23  2243 12/19/23 2013 12/19/23  1721 12/06/23  0727 12/06/23  0533 05/30/23  0230 01/06/19  0527   NT-PROBNP, INPATIENT  --   --   --   --  468  --  552  --  234   TROPONIN T HIGH SENSITIVITY 745* 907* 857*   < > 29*   < > 65*   < >  --     < > = values in this interval not displayed.     Recent Labs   Lab 12/21/23  0553 12/19/23  1721   * 155*      Recent Labs   Lab Test 03/28/19  0905 01/08/19  0814   A1C 5.1 6.9*        No results for input(s): \"INR\", \"[id number]\" in the last 168 hours.  Recent Labs   Lab 12/21/23  0553 12/19/23  2106 12/19/23  1721   WBC 6.0 6.9 7.6       MICRO:  CULTURES (INCLUDING BLOOD AND URINE):  No lab results found in last 7 days.    Recent " Results (from the past 24 hour(s))   Echocardiogram Limited    Narrative    [id number]  [id number]  [id number]  [id number]^BELTRAN^JIM^SHENA     [facility]  Echocardiography Laboratory  [address] [place] [id number]     Name: ARTURO BOSWELL  MRN: [id number]  : 1940  Study Date: 2023 10:07 AM  Age: 83 yrs  Gender: Female  Patient Location: [facility]  Reason For Study: Chest Pain, Chest Pressure, Chest Tightness  Ordering Physician: JIM GONG  Referring Physician: JIM GONG  Performed By: Aliza Rico     BSA: 1.8 m2  Height: 63 in  Weight: 174 lb  HR: 71  BP: 144/67 mmHg  ______________________________________________________________________________  Procedure  Limited Portable Echo Adult. Optison (NDC #[id number]) given intravenously.  ______________________________________________________________________________  Interpretation Summary     This was a limited echocardiogram done for chest pain.     Normal LV size with mildly reduced systolic function. Visually estimated  ejection fraction is around 45%.  Abnormal septal motion consistent with conduction abnormality. Mid distal  septal segments are hypokinetic. However true wall motion abnormality in this  segment is difficult to rule out due to significantly dyssynchronous septal  motion.  Normal RV size and systolic function.  Aortic root and proximal ascending aorta are normal in size.  Trileaflet aortic sclerosis without stenosis.  No pericardial effusion.     No significant change compared to prior study.  ______________________________________________________________________________  ______________________________________________________________________________  MMode/2D Measurements & Calculations  IVSd: 0.97 cm  LVIDd: 4.2 cm  LVIDs: 3.3 cm  LVPWd: 1.3 cm  FS: 21.6 %  LV mass(C)d: 161.9 grams  LV mass(C)dI: 88.8 grams/m2  Ao root diam: 3.2 cm  LA dimension: 3.0 cm  asc Aorta Diam: 3.7  cm  LA/Ao: 0.94  LVOT diam: 2.0 cm  LVOT area: 3.0 cm2  Ao root diam index Ht(cm/m): 2.0  Ao root diam index BSA (cm/m2): 1.7  Asc Ao diam index BSA (cm/m2): 2.0  Asc Ao diam index Ht(cm/m): 2.3  RWT: 0.60     Doppler Measurements & Calculations  MV E max agustín: 69.4 cm/sec  MV A max agustín: 135.0 cm/sec  MV E/A: 0.51     MV dec time: 0.22 sec  Ao V2 max: 152.0 cm/sec  Ao max P.0 mmHg  Ao V2 mean: 103.0 cm/sec  Ao mean P.0 mmHg  Ao V2 VTI: 33.8 cm  RONN(I,D): 1.7 cm2  RONN(V,D): 2.0 cm2  LV V1 max PG: 3.9 mmHg  LV V1 max: 99.0 cm/sec  LV V1 VTI: 19.2 cm  SV(LVOT): 57.5 ml  SI(LVOT): 31.5 ml/m2  TR max agustín: 282.3 cm/sec  TR max P.9 mmHg  AV Agustín Ratio (DI): 0.65  RONN Index (cm2/m2): 0.93  E/E' av.8  Lateral E/e': 7.8  Medial E/e': 11.8     ______________________________________________________________________________  Report approved by: Daniel Silverman 2023 03:15 PM             Medications   All medications were reviewed.    Infusions:   Continuing ACE inhibitor/ARB/ARNI from home medication list OR ACE inhibitor/ARB order already placed during this visit      - MEDICATION INSTRUCTIONS -      - MEDICATION INSTRUCTIONS -      heparin 800 Units/hr (23 0342)    - MEDICATION INSTRUCTIONS -      - MEDICATION INSTRUCTIONS -      reason aspirin not prescribed (intentional)      sodium chloride       Scheduled Medications:   aspirin  325 mg Oral Once    Or    aspirin  243 mg Oral Once    carvedilol  12.5 mg Oral BID w/meals    clopidogrel  75 mg Oral Daily    furosemide  20 mg Oral Daily    potassium chloride ER  10 mEq Oral Daily    [Held by provider] rivaroxaban ANTICOAGULANT  15 mg Oral Daily with supper    rosuvastatin  40 mg Oral Daily    sacubitril-valsartan  1 tablet Oral BID    sodium chloride (PF)  3 mL Intracatheter Q8H     PRN Medications:  acetaminophen **OR** acetaminophen, albuterol, alum & mag hydroxide-simethicone, Continuing ACE inhibitor/ARB/ARNI from home medication list OR ACE  inhibitor/ARB order already placed during this visit, - MEDICATION INSTRUCTIONS -, - MEDICATION INSTRUCTIONS -, HOLD MEDICATION, lidocaine 4%, lidocaine (buffered or not buffered), LORazepam **OR** LORazepam, - MEDICATION INSTRUCTIONS -, morphine, naloxone **OR** naloxone **OR** naloxone **OR** naloxone, nitroGLYcerin, - MEDICATION INSTRUCTIONS -, potassium chloride, reason aspirin not prescribed (intentional), sodium chloride (PF), sodium chloride (PF)

## 2023-12-21 NOTE — PROGRESS NOTES
[facility]    Cardiology Progress Note    Date of Service (when I saw the patient): 12/21/2023            Assessment and Plan:   ASSESSMENT:  1.  NSTEMI:  S/P PCI to RCA with ANGELES x1.  ? Of proximal dissection into aortic root; however, echocardiogram without clear evidence for dissection.  CT final report pending; no clear evidence for dissection by my interpretation.  Reassuringly, patient is chest pain free and entirely hemodynamically stable.    2.  Coronary artery diseas:  S/P recent admission earlier this month for NSTEMI and is s/p ANGELES x1 to Lcx; patent stent this admission.  Now s/p PCI of mid RCA with ANGELES x1.    3.  Permanent atrial fibrillation:  On xarelto  4.  Sick Sinus Syndrome S/P AV node ablation and pacemaker  5.  CHFpEF:  EF 50-55% by echocardiogram.  No S/S of CHF    PLAN/RECOMMENDATIONS:  -Resume xarelto tomorrow, continue plavix  -Formal chest CT read pending although clinically no concerns for significant dissection       Flaca Ghotra MD [facility]              Interval History:     No new issues overnight.  Returned from cath lab feeling well; no chest pain, chest discomfort or shortness of breath           Medications:      carvedilol  12.5 mg Oral BID w/meals    clopidogrel  75 mg Oral Daily    furosemide  20 mg Oral Daily    potassium chloride ER  10 mEq Oral Daily    [Held by provider] rivaroxaban ANTICOAGULANT  15 mg Oral Daily with supper    rosuvastatin  40 mg Oral Daily    sacubitril-valsartan  1 tablet Oral BID    sodium chloride 0.9 %  80 mL Intravenous Once    sodium chloride (PF)  3 mL Intracatheter Q8H              Physical Exam:   Blood pressure 136/69, pulse 67, temperature 98.6  F (37  C), temperature source Oral, resp. rate 16, weight 78.9 kg (174 lb), SpO2 97%, not currently breastfeeding.  Vitals:    12/19/23 1722 12/20/23 0536 12/21/23 0553   Weight: 84.4 kg (186 lb 1.1 oz) 79.1 kg (174 lb 4.8 oz) 78.9 kg (174 lb)     No  intake or output data in the 24 hours ending 23 1648        Vital Sign Ranges  Temperature Temp  Av.2  F (36.8  C)  Min: 97.6  F (36.4  C)  Max: 98.6  F (37  C)   Blood pressure Systolic (24hrs), Av , Min:122 , Max:155        Diastolic (24hrs), Av, Min:68, Max:93      Pulse Pulse  Av.6  Min: 66  Max: 87   Respirations Resp  Av.8  Min: 14  Max: 22   Pulse oximetry SpO2  Av %  Min: 96 %  Max: 98 %         EXAM:    Constitutional:    in no apparent distress    Skin:    normal    Head:    Normocephalic, atramatic    Eyes:    pupils equal, round and reactive to light, extra ocular muscles intact, sclera clear, conjunctiva normal    Neck:    No JVD   Lungs:    CTAB   Cardiovascular:    S1, S2    Abdomen:    normal bowel sounds    Extremities and Back:    no cyanosis or clubbing and No edema bilaterally   Neurological:    No gross or focal neurologic abnormalities               Data:     Recent Labs   Lab Test 23  0553 23  2106 10/11/18  1324 18  1055   WBC 6.0 6.9   < >  --    HGB 11.1* 10.7*   < >  --    MCV 90 89   < >  --     372   < >  --    INR  --   --   --  1.31*    < > = values in this interval not displayed.      Recent Labs   Lab Test 23  0553 23  1721    138   POTASSIUM 3.8 3.8   CHLORIDE 105 103   BUN 10.1 14.5   CR 0.88 0.95     Recent Labs   Lab 23  0553 23  1721   * 155*     Recent Labs   Lab Test 23  1721 23  0533   ALT 20 22   AST 19 26     Troponin I ES   Date Value Ref Range Status   2019 <0.015 0.000 - 0.045 ug/L Final     Comment:     The 99th percentile for upper reference range is 0.045 ug/L.  Troponin values   in the range of 0.045 - 0.120 ug/L may be associated with risks of adverse   clinical events.     2019 <0.015 0.000 - 0.045 ug/L Final     Comment:     The 99th percentile for upper reference range is 0.045 ug/L.  Troponin values   in the range of 0.045 - 0.120 ug/L may be  associated with risks of adverse   clinical events.     01/05/2019 <0.015 0.000 - 0.045 ug/L Final     Comment:     The 99th percentile for upper reference range is 0.045 ug/L.  Troponin values   in the range of 0.045 - 0.120 ug/L may be associated with risks of adverse   clinical events.     10/11/2018 <0.015 0.000 - 0.045 ug/L Final     Comment:     The 99th percentile for upper reference range is 0.045 ug/L.  Troponin values   in the range of 0.045 - 0.120 ug/L may be associated with risks of adverse   clinical events.     03/14/2018 <0.015 0.000 - 0.045 ug/L Final     Comment:     The 99th percentile for upper reference range is 0.045 ug/L.  Troponin values   in the range of 0.045 - 0.120 ug/L may be associated with risks of adverse   clinical events.           Recent Labs   Lab Test 12/10/23  0659 12/09/23  1122 12/06/23  1207   MAG 2.3 2.3 2.0       Lab Results   Component Value Date    CHOL 173 10/30/2023    CHOL 248 07/15/2020     Lab Results   Component Value Date    HDL 74 10/30/2023    HDL 60 07/15/2020     Lab Results   Component Value Date    LDL 77 10/30/2023     07/15/2020     Lab Results   Component Value Date    TRIG 110 10/30/2023    TRIG 115 07/15/2020     Lab Results   Component Value Date    CHOLHDLRATIO 2.8 05/07/2015          TSH   Date Value Ref Range Status   07/20/2022 0.72 0.40 - 4.00 mU/L Final   12/18/2019 0.88 0.40 - 4.00 mU/L Final           Flaca Ghotra MD, FACC  Cardiology

## 2023-12-22 ENCOUNTER — APPOINTMENT (OUTPATIENT)
Dept: OCCUPATIONAL THERAPY | Facility: CLINIC | Age: 83
DRG: 322 | End: 2023-12-22
Attending: STUDENT IN AN ORGANIZED HEALTH CARE EDUCATION/TRAINING PROGRAM
Payer: COMMERCIAL

## 2023-12-22 ENCOUNTER — APPOINTMENT (OUTPATIENT)
Dept: OCCUPATIONAL THERAPY | Facility: CLINIC | Age: 83
DRG: 322 | End: 2023-12-22
Payer: COMMERCIAL

## 2023-12-22 VITALS
RESPIRATION RATE: 18 BRPM | SYSTOLIC BLOOD PRESSURE: 123 MMHG | WEIGHT: 172 LBS | OXYGEN SATURATION: 98 % | HEART RATE: 106 BPM | DIASTOLIC BLOOD PRESSURE: 66 MMHG | BODY MASS INDEX: 30.47 KG/M2 | TEMPERATURE: 98.4 F

## 2023-12-22 LAB
ANION GAP SERPL CALCULATED.3IONS-SCNC: 10 MMOL/L (ref 7–15)
APTT PPP: 29 SECONDS (ref 22–38)
BASOPHILS # BLD AUTO: 0.1 10E3/UL (ref 0–0.2)
BASOPHILS NFR BLD AUTO: 1 %
BUN SERPL-MCNC: 9.7 MG/DL (ref 8–23)
CALCIUM SERPL-MCNC: 8.7 MG/DL (ref 8.8–10.2)
CHLORIDE SERPL-SCNC: 106 MMOL/L (ref 98–107)
CREAT SERPL-MCNC: 0.76 MG/DL (ref 0.51–0.95)
DEPRECATED HCO3 PLAS-SCNC: 22 MMOL/L (ref 22–29)
EGFRCR SERPLBLD CKD-EPI 2021: 77 ML/MIN/1.73M2
EOSINOPHIL # BLD AUTO: 0.3 10E3/UL (ref 0–0.7)
EOSINOPHIL NFR BLD AUTO: 3 %
ERYTHROCYTE [DISTWIDTH] IN BLOOD BY AUTOMATED COUNT: 13.1 % (ref 10–15)
GLUCOSE SERPL-MCNC: 101 MG/DL (ref 70–99)
HCT VFR BLD AUTO: 32.4 % (ref 35–47)
HGB BLD-MCNC: 10.7 G/DL (ref 11.7–15.7)
IMM GRANULOCYTES # BLD: 0 10E3/UL
IMM GRANULOCYTES NFR BLD: 1 %
LYMPHOCYTES # BLD AUTO: 0.8 10E3/UL (ref 0.8–5.3)
LYMPHOCYTES NFR BLD AUTO: 10 %
MCH RBC QN AUTO: 29.5 PG (ref 26.5–33)
MCHC RBC AUTO-ENTMCNC: 33 G/DL (ref 31.5–36.5)
MCV RBC AUTO: 89 FL (ref 78–100)
MONOCYTES # BLD AUTO: 0.9 10E3/UL (ref 0–1.3)
MONOCYTES NFR BLD AUTO: 11 %
NEUTROPHILS # BLD AUTO: 6.4 10E3/UL (ref 1.6–8.3)
NEUTROPHILS NFR BLD AUTO: 74 %
NRBC # BLD AUTO: 0 10E3/UL
NRBC BLD AUTO-RTO: 0 /100
PLATELET # BLD AUTO: 335 10E3/UL (ref 150–450)
POTASSIUM SERPL-SCNC: 3.8 MMOL/L (ref 3.4–5.3)
RBC # BLD AUTO: 3.63 10E6/UL (ref 3.8–5.2)
SODIUM SERPL-SCNC: 138 MMOL/L (ref 135–145)
WBC # BLD AUTO: 8.5 10E3/UL (ref 4–11)

## 2023-12-22 PROCEDURE — 97530 THERAPEUTIC ACTIVITIES: CPT | Mod: GO | Performed by: OCCUPATIONAL THERAPIST

## 2023-12-22 PROCEDURE — 250N000013 HC RX MED GY IP 250 OP 250 PS 637: Performed by: PHYSICIAN ASSISTANT

## 2023-12-22 PROCEDURE — 97535 SELF CARE MNGMENT TRAINING: CPT | Mod: GO | Performed by: OCCUPATIONAL THERAPIST

## 2023-12-22 PROCEDURE — 99238 HOSP IP/OBS DSCHRG MGMT 30/<: CPT | Performed by: INTERNAL MEDICINE

## 2023-12-22 PROCEDURE — 85025 COMPLETE CBC W/AUTO DIFF WBC: CPT | Performed by: INTERNAL MEDICINE

## 2023-12-22 PROCEDURE — 93010 ELECTROCARDIOGRAM REPORT: CPT | Performed by: INTERNAL MEDICINE

## 2023-12-22 PROCEDURE — 80048 BASIC METABOLIC PNL TOTAL CA: CPT | Performed by: INTERNAL MEDICINE

## 2023-12-22 PROCEDURE — 36415 COLL VENOUS BLD VENIPUNCTURE: CPT | Performed by: INTERNAL MEDICINE

## 2023-12-22 PROCEDURE — 99232 SBSQ HOSP IP/OBS MODERATE 35: CPT | Mod: FS | Performed by: INTERNAL MEDICINE

## 2023-12-22 PROCEDURE — 97166 OT EVAL MOD COMPLEX 45 MIN: CPT | Mod: GO | Performed by: OCCUPATIONAL THERAPIST

## 2023-12-22 PROCEDURE — 97110 THERAPEUTIC EXERCISES: CPT | Mod: GO | Performed by: OCCUPATIONAL THERAPIST

## 2023-12-22 PROCEDURE — 93005 ELECTROCARDIOGRAM TRACING: CPT

## 2023-12-22 PROCEDURE — 85730 THROMBOPLASTIN TIME PARTIAL: CPT | Performed by: INTERNAL MEDICINE

## 2023-12-22 RX ORDER — AMOXICILLIN 250 MG
2 CAPSULE ORAL 2 TIMES DAILY PRN
Status: DISCONTINUED | OUTPATIENT
Start: 2023-12-22 | End: 2023-12-22

## 2023-12-22 RX ORDER — AMOXICILLIN 250 MG
1-2 CAPSULE ORAL 2 TIMES DAILY PRN
Status: DISCONTINUED | OUTPATIENT
Start: 2023-12-22 | End: 2023-12-22 | Stop reason: HOSPADM

## 2023-12-22 RX ORDER — NITROGLYCERIN 0.4 MG/1
TABLET SUBLINGUAL
Qty: 25 TABLET | Refills: 11 | Status: SHIPPED | OUTPATIENT
Start: 2023-12-22

## 2023-12-22 RX ORDER — ALBUTEROL SULFATE 90 UG/1
2 AEROSOL, METERED RESPIRATORY (INHALATION) EVERY 6 HOURS PRN
Start: 2023-12-22

## 2023-12-22 RX ORDER — POLYETHYLENE GLYCOL 3350 17 G/17G
17 POWDER, FOR SOLUTION ORAL DAILY PRN
Status: DISCONTINUED | OUTPATIENT
Start: 2023-12-22 | End: 2023-12-22 | Stop reason: HOSPADM

## 2023-12-22 RX ORDER — TIOTROPIUM BROMIDE 18 UG/1
18 CAPSULE ORAL; RESPIRATORY (INHALATION) DAILY PRN
Start: 2023-12-22 | End: 2024-02-27

## 2023-12-22 RX ADMIN — ACETAMINOPHEN 650 MG: 325 TABLET, FILM COATED ORAL at 14:18

## 2023-12-22 RX ADMIN — ROSUVASTATIN CALCIUM 40 MG: 20 TABLET, FILM COATED ORAL at 08:19

## 2023-12-22 RX ADMIN — SACUBITRIL AND VALSARTAN 1 TABLET: 97; 103 TABLET, FILM COATED ORAL at 08:17

## 2023-12-22 RX ADMIN — POTASSIUM CHLORIDE 10 MEQ: 750 TABLET, EXTENDED RELEASE ORAL at 08:16

## 2023-12-22 RX ADMIN — CLOPIDOGREL BISULFATE 75 MG: 75 TABLET ORAL at 08:17

## 2023-12-22 RX ADMIN — FUROSEMIDE 20 MG: 20 TABLET ORAL at 08:16

## 2023-12-22 RX ADMIN — CARVEDILOL 12.5 MG: 12.5 TABLET, FILM COATED ORAL at 08:16

## 2023-12-22 ASSESSMENT — ACTIVITIES OF DAILY LIVING (ADL)
ADLS_ACUITY_SCORE: 26
ADLS_ACUITY_SCORE: 27
ADLS_ACUITY_SCORE: 26
ADLS_ACUITY_SCORE: 26
PREVIOUS_RESPONSIBILITIES: MEAL PREP;HOUSEKEEPING;LAUNDRY;SHOPPING;MEDICATION MANAGEMENT;FINANCES;DRIVING
ADLS_ACUITY_SCORE: 26

## 2023-12-22 ASSESSMENT — VISUAL ACUITY
OS: OTHER (SEE COMMENTS)
OS: OTHER (SEE COMMENTS)

## 2023-12-22 NOTE — PROGRESS NOTES
[facility]    Internal Medicine Hospitalist Progress Note  12/22/2023  I evaluated patient on the above date.    Jose Aguero Jr., MD  [phone] (p)  Text Page  Vocera        Assessment & Plan New actions/orders today (12/22/2023) are underlined. All lab results in the assessment and plan were reviewed.    Hamida Verma is a 83 year old female with PMHx including CAD with recent cardiac stent to the L circumflex (12/8/2023); COPD; HTN; PAF s/p AVN ablation and PPM; and CKD; who presented 12/19/2023 with recurrent R shoulder pain which was compatible with her anginal equivalent.     On initial evaluation, pt was afebrile, mildly hypertensive. EKG with V paced rhythm, no overt acute ischemic changes. Labs notable for CBC with hgb 11.2; BMP unremarkable; LFT's unremarkable; trop marginally elevated at 29.     Chest CT showed no pulmonary embolism; mild hazy increased markings in the mid and lower lungs may represent some mild pulmonary edema.    # NSTEMI - s/p PCI to RCA 12/22/2023.  # Recent NSTEMI s/p PCI to left circumflex (12/8/2023).  # CHF with decrease in LVEF, possibly ischemic cardiomyopathy; HFrEF).  # Hypertension (benign essential).  # HLD.  # Paroxysmal atrial fibrillation.  # SSS s/p AV node ablation and PPM (2019).  [PTA BP meds: carvedilol 12.5 mg BID; furosemide 20 mg daily; sacubitril-valsartan  mg BID.]  * Echo 12/6/2023 showed LVEF 50-55%; mild pulmonary hypertension noted; no significant valve disease.   * Initial presentation as above. Symptoms similar to prior anginal equivalent. Pain improved with acetaminophen at home and 1 dose of nitroglycerin per EMS. Chest pain free on admit. Follow-up trop 857. Started on heparin gtt.   * On 12/21, echo showed LVEF 45%, possible WMA but difficult to assess given underlying dyssynchronous septal motion; RV OK; overall noted no change compared with previous study (though noted LVEF 50-55% in 12/6/2023). Underwent C and  found with 70% culprit RCA lesion (type C, high risk), s/p stent; previous LCx stent patent.  Recent Labs   Lab 12/20/23  0417 12/20/23  0055 12/19/23  2243 12/19/23  2013 12/19/23  1721   NTBNPI  --   --   --   --  468   CTROPT 745* 907* 857*   < > 29*    < > = values in this interval not displayed.   - Continue clopidogrel, rosuvastatin; PRN NTG.  - Continue carvedilol; furosemide; sacubitril-valsartan.  - Continue rivaroxaban.  - Continue to monitor i/o's, daily wts.  - Appreciate Cardiology help.    Subpleural nodular atelectasis or nodule, left upper lung, on CT.  * CT chest 12/19 also showed subpleural nodular atelectasis or nodule left upper lung anteriorly measuring approximately 4 mm (series 5 image 70), short-term follow-up examination recommended in 2-3 months to evaluate for interval change.  - Short-term follow-up examination recommended in 2-3 months to evaluate for interval change.    Sclerotic focus in the posterior right T4 vertebral body on CT, indeterminate.  * CT chest also noted sclerotic focus in the posterior right T4 vertebral body, indeterminate, no other evidence for sclerotic foci or metastasis.  - Continue to monitor clinically.  - Follow-up outpatient.    Chronic normocytic anemia.  * Baseline Hgb 11. Stable. No active signs of bleeding.   Recent Labs   Lab 12/22/23  0609 12/21/23  0553 12/19/23  2106 12/19/23  1721   HGB 10.7* 11.1* 10.7* 11.2*   - Continue to monitor CBC.    CKD III.  * Baseline creatinine 0.97-1.01.   * Cr normal on admit.  * Cr stable 12/22.    COPD.  - Continue tiotropium (or formulary equivalent), PRN albuterol.    Obesity.  * Body mass index is 30.47 kg/m .  - Needs to continue to pursue aggressive dietary and lifestyle modifications.      Clinically Significant Risk Factors                  # Hypertension: Noted on problem list  # Chronic heart failure with preserved ejection fraction: heart failure noted on problem list and last echo with EF >50%       #  "Obesity: Estimated body mass index is 30.47 kg/m  as calculated from the following:    Height as of 12/6/23: 1.6 m (5' 3\").    Weight as of this encounter: 78 kg (172 lb)., PRESENT ON ADMISSION     # Financial/Environmental Concerns: none  # COPD: noted on problem list    # Pacemaker present         COVID-19 testing.  COVID-19 PCR Results           No data to display              COVID-19 Antibody Results, Testing for Immunity           No data to display                Diet: Low Saturated Fat Na <2400 mg  Diet    Prophylaxis: PCD's, ambulation.   Lopez Catheter: Not present  Lines: None     Code Status: No CPR- Do NOT Intubate    Disposition Plan   Expected discharge: Possibly today 12/22 recommended to prior living arrangement pending  Cardiology rec's .  Entered: Jose Aguero MD 12/22/2023, 10:02 AM         Interval History   Felt \"off balance\" when up this am.  Otherwise, doing OK.  Would like to go home.    -Data reviewed today: I reviewed all new labs and imaging over the last 24 hours. I personally reviewed no images or EKG's today.    Physical Exam    , Blood pressure 94/55, pulse 71, temperature 98.4  F (36.9  C), temperature source Oral, resp. rate 19, weight 78 kg (172 lb), SpO2 95%, not currently breastfeeding. O2 Device: None (Room air)    Vitals:    12/20/23 0536 12/21/23 0553 12/22/23 0400   Weight: 79.1 kg (174 lb 4.8 oz) 78.9 kg (174 lb) 78 kg (172 lb)     Vital Signs with Ranges  Temp:  [98.4  F (36.9  C)-98.6  F (37  C)] 98.4  F (36.9  C)  Pulse:  [62-81] 71  Resp:  [14-22] 19  BP: ()/() 94/55  SpO2:  [92 %-99 %] 95 %  Patient Vitals for the past 24 hrs:   BP Temp Temp src Pulse Resp SpO2 Weight   12/22/23 0922 94/55 -- -- 71 -- -- --   12/22/23 0919 (!) 85/56 -- -- 75 -- 95 % --   12/22/23 0912 92/56 -- -- 81 -- -- --   12/22/23 0855 120/70 -- -- 75 -- 96 % --   12/22/23 0800 139/66 -- -- 74 -- 98 % --   12/22/23 0756 139/66 98.4  F (36.9  C) Oral -- -- -- --   12/22/23 0600 " 129/56 -- -- 72 -- 98 % --   12/22/23 0400 130/57 -- -- 80 19 99 % 78 kg (172 lb)   12/22/23 0200 (!) 151/72 -- -- 70 -- 96 % --   12/22/23 0000 (!) 140/73 -- -- 70 19 96 % --   12/21/23 2200 128/59 -- -- 65 -- 95 % --   12/21/23 2100 128/58 -- -- 64 -- 97 % --   12/21/23 2030 128/59 -- -- 62 -- 99 % --   12/21/23 2000 122/66 98.5  F (36.9  C) Oral 63 18 92 % --   12/21/23 1930 112/59 -- -- 62 -- 98 % --   12/21/23 1900 117/56 -- -- 68 -- 94 % --   12/21/23 1830 117/62 -- -- 70 -- 96 % --   12/21/23 1815 128/66 -- -- 69 -- 97 % --   12/21/23 1800 128/53 -- -- 69 -- 96 % --   12/21/23 1745 (!) 148/103 -- -- 69 -- 97 % --   12/21/23 1730 (!) 145/71 -- -- 65 -- 97 % --   12/21/23 1715 (!) 146/64 -- -- 68 -- 97 % --   12/21/23 1700 (!) 149/74 -- -- 68 -- 96 % --   12/21/23 1645 (!) 145/92 -- -- 69 -- 96 % --   12/21/23 1559 -- -- -- -- 16 -- --   12/21/23 1554 -- -- -- -- 15 -- --   12/21/23 1548 -- -- -- -- 17 -- --   12/21/23 1544 -- -- -- -- 17 -- --   12/21/23 1540 -- -- -- -- 16 -- --   12/21/23 1535 -- -- -- -- 16 -- --   12/21/23 1529 -- -- -- -- 22 -- --   12/21/23 1525 -- -- -- -- 21 -- --   12/21/23 1520 -- -- -- -- 22 -- --   12/21/23 1515 -- -- -- -- 16 -- --   12/21/23 1511 -- -- -- -- 16 -- --   12/21/23 1506 -- -- -- -- 16 -- --   12/21/23 1501 -- -- -- -- 17 -- --   12/21/23 1450 -- -- -- -- 18 -- --   12/21/23 1444 -- -- -- -- 15 -- --   12/21/23 1438 -- -- -- -- 18 -- --   12/21/23 1432 -- -- -- -- 15 -- --   12/21/23 1427 -- -- -- -- 15 -- --   12/21/23 1423 -- -- -- -- 14 -- --   12/21/23 1418 -- -- -- -- 15 -- --   12/21/23 1413 -- -- -- -- 17 -- --   12/21/23 1408 -- -- -- -- 17 -- --   12/21/23 1356 -- -- -- -- 16 -- --   12/21/23 1212 136/69 98.6  F (37  C) Oral 67 18 97 % --     I/O's Last 24 hours  I/O last 3 completed shifts:  In: 300 [P.O.:300]  Out: 500 [Urine:500]    Constitutional: Awake, alert, oriented, pleasant.  Respiratory: Diminished in bases. No crackles or wheezes.  Cardiovascular:  "RRR, no m/r/g.  GI: Soft, nt, nd, +BS.  Skin/Integumen:   Other:        Data    Labs reviewed.  Recent Labs   Lab 12/22/23  0609 12/21/23  0553 12/19/23 2106 12/19/23  1721   WBC 8.5 6.0 6.9 7.6   HGB 10.7* 11.1* 10.7* 11.2*   MCV 89 90 89 91    349 372 405    141  --  138   POTASSIUM 3.8 3.8  --  3.8   CHLORIDE 106 105  --  103   CO2 22 26  --  26   BUN 9.7 10.1  --  14.5   CR 0.76 0.88  --  0.95   ANIONGAP 10 10  --  9   GURWINDER 8.7* 9.0  --  9.1   * 110*  --  155*   ALBUMIN  --   --   --  3.7   PROTTOTAL  --   --   --  6.2*   BILITOTAL  --   --   --  0.3   ALKPHOS  --   --   --  61   ALT  --   --   --  20   AST  --   --   --  19     Recent Labs   Lab Test 12/20/23  0417 12/20/23  0055 12/19/23  2243 12/19/23 2013 12/19/23  1721 12/06/23  0727 12/06/23  0533 05/30/23  0230 01/06/19  0527   NT-PROBNP, INPATIENT  --   --   --   --  468  --  552  --  234   TROPONIN T HIGH SENSITIVITY 745* 907* 857*   < > 29*   < > 65*   < >  --     < > = values in this interval not displayed.     Recent Labs   Lab 12/22/23  0609 12/21/23  0553 12/19/23  1721   * 110* 155*      Recent Labs   Lab Test 03/28/19  0905 01/08/19  0814   A1C 5.1 6.9*        No results for input(s): \"INR\", \"[id number]\" in the last 168 hours.  Recent Labs   Lab 12/22/23  0609 12/21/23  0553 12/19/23  2106 12/19/23  1721   WBC 8.5 6.0 6.9 7.6       MICRO:  CULTURES (INCLUDING BLOOD AND URINE):  No lab results found in last 7 days.    Recent Results (from the past 24 hour(s))   Cardiac Catheterization    Narrative    Culprit lesion is a severe stenosis of the mid-distal RCA.  Mild nonobstructive CAD elsewhere.  Recently placed LCx stent is widely   patent.  Successful OCT guided PCI of the mid-distal RCA with placement of a single   3.0 x 38 mm Synergy ANGELES, postdilated with a 3.5 mm NC balloon.  Following PCI, a small contrast dye hang up was noted in the right   coronary cusp.  This was investigated with a CTA of the chest which " showed   no evidence of aortic dissection or other acute aortic injury.     CTA Chest with Contrast    Narrative    EXAM: CTA CHEST WITH CONTRAST  LOCATION: [facility]  DATE: 12/21/2023    INDICATION: Aortic root dissection following PCI  COMPARISON: None.  TECHNIQUE: Helical acquisition through the chest was performed during the arterial phase of contrast enhancement. 2D and 3D reconstructions performed by the CT technologist. Dose reduction techniques were used.  CONTRAST: 72 mL Isovue 370    CT ANGIOGRAM CHEST: Normal caliber and appearance of the thoracic aorta with scattered atherosclerotic plaque. No evidence of aortic dissection, intramural hematoma, or penetrating atherosclerotic ulcer. Slight scatter and pulsation artifact above the   aortic root. Left aortic arch with patent and conventional 3 vessel branching. Mild-moderate stenosis of the right subclavian artery origin.     Coronary artery calcifications and stents. Left chest AV pacemaker.    Normal caliber central pulmonary arteries. Negative for acute pulmonary embolism.    LUNGS AND PLEURA: Moderate diffuse emphysema. Biapical scarring. Mild bibasilar atelectasis and/or scarring. No dense consolidations, pleural effusions, or pneumothorax. Similar subpleural subcentimeter nodular opacity in the left upper lobe anteriorly   (series 10/image 51). Mild diffuse interlobular septal thickening primarily in the lower lungs, may represent mild pulmonary edema. Central airways are clear. Mild diffuse bronchial wall thickening.     MEDIASTINUM/AXILLAE: Similar mildly prominent mediastinal lymph nodes measuring up to 12 mm in the short axis, which may be reactive. No pericardial effusion. Tiny hiatal hernia.    UPPER ABDOMEN: Partially visualized bilateral simple renal cysts. Cholecystectomy. Visualized upper abdomen is otherwise unremarkable.    MUSCULOSKELETAL: Mild degenerative changes of the spine with thoracic kyphosis. Presumed  benign enostosis T4 vertebral body.      Impression    IMPRESSION:  1.  Normal caliber and appearance of the thoracic aorta. No evidence of acute aortic injury.  2.  Mild-moderate right subclavian artery origin stenosis.  3.  Moderate diffuse emphysema.  4.  Similar indeterminate subpleural subcentimeter nodular opacity in the left upper lobe anteriorly. Recommend short-term interval follow-up exam in 2-3 months to assess stability.     Echo Limited   Result Value    LVEF  55%    Narrative    [id number]  [id number]  [id number]  [id number]^SIDNEY^FRANCIS^DARBY     [facility]  Echocardiography Laboratory  [address] [place] [id number]     Name: ARTURO BOSWELL  MRN: [id number]  : 1940  Study Date: 2023 03:48 PM  Age: 83 yrs  Gender: Female  Patient Location: [facility]  Reason For Study: Other, Please Specify in Comments  Ordering Physician: FRANCIS LITTLE  Performed By: Diane Antonio     ______________________________________________________________________________  Procedure  Limited Portable Echo Adult.  ______________________________________________________________________________  Interpretation Summary     Limited echo in cath lab.     1. The left ventricle is normal in structure, function and size. The visual  ejection fraction is estimated at 55%.  2. Ascending aorta 3.6cm. Some linear echo dense stranding several cm above  the AV. Suspect this is artifact, but views are limited.     No changes compared to echo from 23.  ______________________________________________________________________________  Left Ventricle  The left ventricle is normal in structure, function and size. The visual  ejection fraction is estimated at 55%. Normal left ventricular wall motion.     Mitral Valve  There is trace mitral regurgitation.     Vessels  Ascending aorta 3.6cm. Some linear echo dense stranding several cm above the  AV. Suspect this is artifact, but views are limited.      Pericardium  There is no pericardial effusion.     ______________________________________________________________________________  Report approved by: Daniel Hong 12/21/2023 04:40 PM     ______________________________________________________________________________          Medications   All medications were reviewed.    Infusions:   Continuing ACE inhibitor/ARB/ARNI from home medication list OR ACE inhibitor/ARB order already placed during this visit      - MEDICATION INSTRUCTIONS -      - MEDICATION INSTRUCTIONS -      - MEDICATION INSTRUCTIONS -      - MEDICATION INSTRUCTIONS -      - MEDICATION INSTRUCTIONS -      - MEDICATION INSTRUCTIONS -      Percutaneous Coronary Intervention orders placed (this is information for BPA alerting)      reason aspirin not prescribed (intentional)      reason aspirin not prescribed (intentional)       Scheduled Medications:   carvedilol  12.5 mg Oral BID w/meals    clopidogrel  75 mg Oral Daily    furosemide  20 mg Oral Daily    potassium chloride ER  10 mEq Oral Daily    rivaroxaban ANTICOAGULANT  15 mg Oral Daily with supper    rosuvastatin  40 mg Oral Daily    sacubitril-valsartan  1 tablet Oral BID    sodium chloride 0.9 %  80 mL Intravenous Once    sodium chloride (PF)  3 mL Intracatheter Q8H     PRN Medications:  acetaminophen **OR** acetaminophen, albuterol, alum & mag hydroxide-simethicone, Continuing ACE inhibitor/ARB/ARNI from home medication list OR ACE inhibitor/ARB order already placed during this visit, - MEDICATION INSTRUCTIONS -, - MEDICATION INSTRUCTIONS -, - MEDICATION INSTRUCTIONS -, - MEDICATION INSTRUCTIONS -, - MEDICATION INSTRUCTIONS -, fentaNYL, HOLD MEDICATION, hydrALAZINE, lidocaine 4%, lidocaine (buffered or not buffered), - MEDICATION INSTRUCTIONS -, metoprolol, midazolam, morphine, naloxone **OR** naloxone **OR** naloxone **OR** naloxone, nitroGLYcerin, ondansetron **OR** ondansetron, oxyCODONE **OR** oxyCODONE, Percutaneous Coronary  Intervention orders placed (this is information for BPA alerting), polyethylene glycol, reason aspirin not prescribed (intentional), reason aspirin not prescribed (intentional), senna-docusate, sodium chloride (PF)

## 2023-12-22 NOTE — PROGRESS NOTES
12/22/23 0854   Appointment Info   Signing Clinician's Name / Credentials (OT) Rosamaria Escalante OTR/L   Rehab Comments (OT) INiital Evaluation   Living Environment   People in Home child(isidoro), adult  (and granddtr ADULT)   Current Living Arrangements house   Home Accessibility stairs to enter home;stairs within home   Number of Stairs, Main Entrance 3   Number of Stairs, Within Home, Primary greater than 10 stairs  (approx 15 stairs to bedroom and basement.)   Transportation Anticipated family or friend will provide   Self-Care   Equipment Currently Used at Home none   Fall history within last six months no   Instrumental Activities of Daily Living (IADL)   Previous Responsibilities meal prep;housekeeping;laundry;shopping;medication management;finances;driving   General Information   Onset of Illness/Injury or Date of Surgery 12/19/23   Referring Physician River Romo MD   Patient/Family Therapy Goal Statement (OT) home   Additional Occupational Profile Info/Pertinent History of Current Problem 83-year-old woman with recent LCx PCI, here with recurrent chest discomfort and elevated troponin consistent with NSTEMI. 1. Culprit lesion is a severe stenosis of the mid-distal RCA.  2. Mild nonobstructive CAD elsewhere.  Recently placed LCx stent is widely patent.  3. Successful OCT guided PCI of the mid-distal RCA with placement of a single 3.0 x 38 mm Synergy ANGELES, postdilated with a 3.5 mm NC balloon.  4. Following PCI, a small contrast dye hang up was noted in the right coronary cusp.  This was investigated with a CTA of the chest which showed no evidence of aortic dissection or other acute aortic injury.   Existing Precautions/Restrictions fall;cardiac  (post MI precautions.)   Heart Disease Risk Factors High blood pressure;Lack of physical activity;Dislipidemia;Family history;Age   Cognitive Status Examination   Orientation Status orientation to person, place and time   Affect/Mental Status  (Cognitive) WFL   Follows Commands WFL   Cognitive Status Comments Continue to monitor.   Visual Perception   Visual Impairment/Limitations corrective lenses full-time   Sensory   Sensory Comments denies   Pain Assessment   Patient Currently in Pain No   Range of Motion Comprehensive   Comment, General Range of Motion B UE AROM WFL   Bed Mobility   Supine-Sit Motley (Bed Mobility) supervision   Sit-Supine Motley (Bed Mobility) supervision   Transfer Skill: Bed to Chair/Chair to Bed   Bed-Chair Motley (Transfers) contact guard   Sit-Stand Transfer   Sit-Stand Motley (Transfers)   (SBA)   Toilet Transfer   Motley Level (Toilet Transfer)   (SBA)   Lower Body Dressing Assessment/Training   Motley Level (Lower Body Dressing) contact guard assist   Clinical Impression   Criteria for Skilled Therapeutic Interventions Met (OT) Yes, treatment indicated   OT Diagnosis impaired Safety with strenuous IADL's   OT Problem List-Impairments impacting ADL problems related to;activity tolerance impaired;post-surgical precautions;balance  (impaired balance likely due to dizziness and hypotension)   Assessment of Occupational Performance 3-5 Performance Deficits   Identified Performance Deficits impaired safety with shopping, cleaning, heavier laundry, toilet transfer, etc.   Planned Therapy Interventions (OT) cognition;home program guidelines;progressive activity/exercise;risk factor education;transfer training;ADL retraining   Clinical Decision Making Complexity (OT) detailed assessment/moderate complexity   Risk & Benefits of therapy have been explained evaluation/treatment results reviewed;care plan/treatment goals reviewed;risks/benefits reviewed;participants voiced agreement with care plan;current/potential barriers reviewed;participants included;patient   OT Total Evaluation Time   OT Eval, Moderate Complexity Minutes (99353) 10   OT Goals   Therapy Frequency (OT) 2 times/day   OT Predicted  Duration/Target Date for Goal Attainment 12/24/23   OT Goals Cardiac Phase 1;Toilet Transfer/Toileting;Lower Body Dressing;Hygiene/Grooming;Cognition   OT: Hygiene/Grooming independent;while standing   OT: Lower Body Dressing Independent;Modified independent   OT: Toilet Transfer/Toileting Independent;Modified independent;toilet transfer;cleaning and garment management;using adaptive equipment   OT: Cognitive Patient/caregiver will verbalize understanding of cognitive assessment results/recommendations as needed for safe discharge planning   OT: Understanding of cardiac education to maximize quality of life, condition management, and health outcomes Patient;Verbalize  (post MI ed)   OT: Perform aerobic activity with stable cardiovascular response continuous;10 minutes   OT: Functional/aerobic ambulation tolerance with stable cardiovascular response in order to return to home and community environment Independent;Modified independent;Greater than 300 feet   OT: Navigation of stairs simulating home set up with stable cardiovascular response in order to return to home and community environment Independent;Modified independent;Greater than 10 stairs  (15 stairs)   Self-Care/Home Management   Self-Care/Home Mgmt/ADL, Compensatory, Meal Prep Minutes (90402) 18   Treatment Detail/Skilled Intervention OT: Iniitlated ed in post MI handout ed. see below for detaiils   Therapeutic Activities   Therapeutic Activity Minutes (24396) 23   Symptoms noted during/after treatment fatigue;significant change in vital signs;dizziness   Treatment Detail/Skilled Intervention OT: supine <> sit S/mod i, sit <> stand with SBA, pt needing to use BR and ambulated initially to BR with SBA, no LOB and completed toielt trasnfer SBA using grab bar. after toileting pt reports dizziness and assisted pt to sit down, pt reports dizziness decreased. After ed, completed BP standing however, midway through pt needing to sit due to dizziness and dizziness  subsided. BP hypotensiev. pt needing to use BR and again, sit <>S tand SBA and ambulated with CGA to min A. to BR and back 1 LOB due to dizziness, needing CGA/MIN A to regain balance. pt  compelted toilet transfer with SBA/CGA. pt returned to chair, pt again feeling better when sitting, BP taken again and even more hypotensive, pt assisted back to bed with CGA for safety and RN notified, deferred any ambulation this am.   Cardiac Education   Education Provided OMNI Scale;Outpatient Cardiac Rehab;Diagnosis;Risk factors;Precautions;Signs and symptoms   Education Packet Given to Patient Yes   All Patient Education Handouts Reviewed with Patient and/or Family No   Cardiac Rehab Phase II Plan   Date/Time 1/11   [facility]   Phase II Order Received Yes   Phase II Appointment Status Scheduled   OT Discharge Planning   OT Plan OT plan; monitor BP and symptoms, talk w/ RN, timed ambulation as able, cont ed in post MI handouts. etc, stairs if able   OT Discharge Recommendation (DC Rec) home with assist;home with outpatient cardiac rehab   OT Rationale for DC Rec Pt lives in a house with her adult dtr and adult grandson, pt I with all ADL/IADL's and ambulates independently wihtout AD. pt dizzy with BP hypotensive when standing and ambulation and deferred, RN notiifed. ANticipate once pt's dizziness subsides and futher medical mgmt then pt would be able to return home with A with strenuous IADL's ie heavier cleaning, shopping, etc and OP CR at [facility] for monitored progressive ex and risk factor ed and modification. Howver, if pt conts with dizziness and impaired balance then may need TCU, cont to [facility].   OT Brief overview of current status Pt dizzy with hypotensive when standing and ambulation needing CGA with 1 LOB and regained with CGA/MIN A. RN notiifed. see vs flow sheet.   Total Session Time   Timed Code Treatment Minutes 41   Total Session Time (sum of timed and untimed services) 51

## 2023-12-22 NOTE — PROGRESS NOTES
Attempted to remove air TR at 7 cc, and site bled. Reinflated with 3cc and still bleeding so additional 3cc added, back 13cc in TR band.

## 2023-12-22 NOTE — DISCHARGE SUMMARY
[facility]  Discharge Summary        Hamida Verma MRN# [id number]   YOB: 1940 Age: 83 year old     Date of Admission: 12/19/2023  Date of Discharge: 12/22/2023  Admitting Physician: Jose Aguero MD  Discharge Physician: Jose Aguero MD     Primary Provider: Mikala Philip  Primary Care Physician Phone Number: [phone]         Discharge Diagnoses:   1. NSTEMI - s/p PCI to RCA 12/22/2023.  2. Recent NSTEMI s/p PCI to left circumflex (12/8/2023).  3. CHF with decrease in LVEF, possibly ischemic cardiomyopathy; HFrEF).  4. Subpleural nodular atelectasis or nodule, left upper lung, on CT.  5. Sclerotic focus in the posterior right T4 vertebral body on CT, indeterminate.        Other Chronic Medical Problems:      1. COPD.  2. Hypertension (benign essential).  3. HLD.  4.  Paroxysmal atrial fibrillation.  5. SSS s/p AV node ablation and PPM (2019).  6. Chronic normocytic anemia.  7. CKD III.  8. Obesity.       Allergies:         Allergies   Allergen Reactions    Strawberries [Strawberry Extract] Anaphylaxis    Strawberry Flavor            Discharge Medications:        Current Discharge Medication List        START taking these medications    Details   nitroGLYcerin (NITROSTAT) 0.4 MG sublingual tablet For chest pain place 1 tablet under the tongue every 5 minutes for 3 doses. If symptoms persist 5 minutes after 1st dose call 911.  Qty: 25 tablet, Refills: 11    Associated Diagnoses: NSTEMI (non-ST elevated myocardial infarction) (H)      sacubitril-valsartan (ENTRESTO) 49-51 MG per tablet Take 1 tablet by mouth 2 times daily  Qty: 60 tablet, Refills: 3    Associated Diagnoses: NSTEMI (non-ST elevated myocardial infarction) (H); Chronic diastolic CHF (congestive heart failure) (H)           CONTINUE these medications which have CHANGED    Details   albuterol (PROAIR HFA/PROVENTIL HFA/VENTOLIN HFA) 108 (90 Base) MCG/ACT inhaler Inhale 2 puffs into the lungs  every 6 hours as needed    Comments: Pharmacy may dispense brand covered by insurance (Proair, or proventil or ventolin or generic albuterol inhaler)  Associated Diagnoses: Chronic diastolic CHF (congestive heart failure) (H)      tiotropium (SPIRIVA) 18 MCG inhaled capsule Inhale 1 capsule (18 mcg) into the lungs daily as needed    Associated Diagnoses: Chronic obstructive pulmonary disease, unspecified COPD type (H)           CONTINUE these medications which have NOT CHANGED    Details   carvedilol (COREG) 12.5 MG tablet Take 1 tablet (12.5 mg) by mouth 2 times daily (with meals) for 90 days  Qty: 180 tablet, Refills: 0    Associated Diagnoses: Coronary artery disease involving native coronary artery of native heart with unstable angina pectoris (H)      cholecalciferol (VITAMIN  -D) 1000 UNIT capsule Take 1 capsule by mouth daily.      clopidogrel (PLAVIX) 75 MG tablet Take 1 tablet (75 mg) by mouth daily for 90 days  Qty: 90 tablet, Refills: 0    Associated Diagnoses: Coronary artery disease involving native coronary artery of native heart with unstable angina pectoris (H)      FLAX SEED OIL OR 1 capsule daily      furosemide (LASIX) 20 MG tablet Take 1 tablet (20 mg) by mouth daily  Qty: 90 tablet, Refills: 3    Associated Diagnoses: Chronic diastolic CHF (congestive heart failure) (H)      !! Multiple Vitamins-Minerals (HAIR SKIN NAILS PO) Take 1 tablet by mouth At Bedtime      !! Multiple Vitamins-Minerals (ICAPS AREDS 2 PO) Take 1 tablet by mouth 2 times daily      potassium chloride ER (KLOR-CON M) 10 MEQ CR tablet Take 1 tablet (10 mEq) by mouth daily  Qty: 90 tablet, Refills: 3    Associated Diagnoses: Hypokalemia      rivaroxaban ANTICOAGULANT (XARELTO) 15 MG TABS tablet Take 1 tablet (15 mg) by mouth daily (with dinner) for 90 days  Qty: 90 tablet, Refills: 0    Associated Diagnoses: Coronary artery disease involving native coronary artery of native heart with unstable angina pectoris (H); PAF  (paroxysmal atrial fibrillation) (H)      rosuvastatin (CRESTOR) 40 MG tablet Take 1 tablet (40 mg) by mouth daily  Qty: 90 tablet, Refills: 3    Associated Diagnoses: Hyperlipidemia LDL goal <70       !! - Potential duplicate medications found. Please discuss with provider.        STOP taking these medications       sacubitril-valsartan (ENTRESTO)  MG per tablet Comments:   Reason for Stopping:                   Discharge Instructions and Follow-Up:      Discharge Orders      CARDIAC REHAB REFERRAL      Medication Instructions - Anticoagulants    Do NOT stop your aspirin or platelet inhibitor unless directed by your Cardiologist.  These medications help to prevent platelets in your blood from sticking together and forming a clot.  Examples of these medications are:  Ticagrelor (Brilinta), Clopidigrel (Plavix), Prasugrel (Effient)     When to call - Contact the Heart Clinic    You may experience symptoms that require follow-up before your scheduled appointment. Contact the Heart Clinic if you develop: Fever over 100.4o Fahrenheit, that lasts more than one day; Redness, heat, or pus at the puncture site; Change in color or temperature in your hand or arm.     When to call - Reasons to Call 911    If your wrist puncture site starts bleeding after discharge, sit down and apply firm pressure with your thumb against the puncture site and fingers against the back of the wrist for 10 minutes. If the bleeding stops, continue to rest, keeping your wrist still for 2 hours. Notify your doctor as soon as possible.  IF BLEEDING DOES NOT STOP OR THERE IS A LARGER AMOUNT OF BLEEDING OR SPURTING CALL 9-1-1 immediately.DO NOT drive yourself to the hospital.     Precautions - Lifting    DO NOT lift more than 5 pounds with affected arm for 48 hours     Precautions - Household Activities    Avoid excessive bending or movement of your wrist for 72 hours.  Do not subject hand/arm to any forceful movements for 24 hours, such as  supporting weight when rising from a chair or bed.     Remove the band-aid on the puncture site after 24 hours and leave open to air. If minor oozing, you may apply a band-aid and remove after 12 hours.     Precautions - Active Sports Activities    DO NOT engage in vigorous exercise using your affected arm for 3 days after discharge.  This includes golf, tennis or swimming.     Precautions - Operating yard equipment or vehicles    Do not operate a chainsaw, lawnmower, motorcycle, or all-terrain vehicle for 48 hours after the procedure.     Precautions - Elective Dental Work    NO elective dental work for 6 weeks after receiving a stent.     Comfort and Pain Management - Bruising after Surgery    Expect mild tingling of hand and tenderness at the wrist puncture site for up to 3 days. You may take Tylenol or a pain medicine recommended by your doctor.     Activity - Cardiac Rehab    You are encouraged to enroll in an Outpatient Cardiac Rehab program after discharge from the hospital.  Our Cardiac Rehab staff may visit briefly with you while you're in the hospital.  If they miss you, someone will contact you after you are home.     Return to Driving    Driving is NOT permitted for 24 hours after surgery     Return to work    You may return to work after 72 hours if you are feeling well and your job does not involve heavy lifting.     Shower / Bathing    You may shower on the day after your procedure.  DO NOT soak of wrist with the puncture site in water for 3 days to prevent infection. DO NOT take a tub bath or wash dishes for 3 days after the procedure     Dressing Removal    Remove the band-aid on the puncture site after 24 hours and leave open to air. If minor oozing, you may apply a band-aid and remove after 12 hours     Reason aspirin not prescribed from this order set     Activity    Your activity upon discharge: activity as tolerated     Follow-up and recommended labs and tests     1. Follow-up with primary care  "provider, Mikala Philip MD, within 7 days for hospital follow- up.  The following labs/tests are recommended: CBC, BMP. Recommend repeat CT chest with contrast in 2-3 months to assess \"subpleural nodular atelectasis or nodule left upper lung\"  2. Cardiac follow-up per [facility].     Reason for your hospital stay    1. NSTEMI - s/p PCI to RCA 12/22/2023.  2. Recent NSTEMI s/p PCI to left circumflex (12/8/2023).  3. CHF with decrease in LVEF, possibly ischemic cardiomyopathy; HFrEF).  4. Subpleural nodular atelectasis or nodule, left upper lung, on CT.  5. Sclerotic focus in the posterior right T4 vertebral body on CT, indeterminate.     Diet    Follow this diet upon discharge: Orders Placed This Encounter      Low Saturated Fat Na <2400 mg             Consultations This Hospital Stay:      PHARMACY IP CONSULT  PHYSICAL THERAPY ADULT IP CONSULT  CARE MANAGEMENT / SOCIAL WORK IP CONSULT  CARDIOLOGY IP CONSULT  CARDIAC REHAB IP CONSULT  PHARMACY IP CONSULT  HOSPITALIST IP CONSULT  NUTRITION SERVICES ADULT IP CONSULT  CARDIAC REHAB IP CONSULT  SMOKING CESSATION PROGRAM IP CONSULT  SMOKING CESSATION PROGRAM IP CONSULT        Admission History:      Please see the H&P by Jose Aguero MD on 12/19/2023 for complete details. Briefly, Hamida eVrma is a 83 year old female with PMHx including CAD with recent cardiac stent to the L circumflex (12/8/2023); COPD; HTN; PAF s/p AVN ablation and PPM; and CKD; who presented 12/19/2023 with recurrent R shoulder pain which was compatible with her anginal equivalent.     On initial evaluation, pt was afebrile, mildly hypertensive. EKG with V paced rhythm, no overt acute ischemic changes. Labs notable for CBC with hgb 11.2; BMP unremarkable; LFT's unremarkable; trop marginally elevated at 29.     Chest CT showed no pulmonary embolism; mild hazy increased markings in the mid and lower lungs may represent some mild pulmonary edema.        Problem Oriented Hospital Course:   "      # NSTEMI - s/p PCI to RCA 12/22/2023.  # Recent NSTEMI s/p PCI to left circumflex (12/8/2023).  # CHF with decrease in LVEF, possibly ischemic cardiomyopathy; HFrEF).  # Hypertension (benign essential).  # HLD.  # Paroxysmal atrial fibrillation.  # SSS s/p AV node ablation and PPM (2019).  [PTA BP meds: carvedilol 12.5 mg BID; furosemide 20 mg daily; sacubitril-valsartan  mg BID.]  * Echo 12/6/2023 showed LVEF 50-55%; mild pulmonary hypertension noted; no significant valve disease.   * Initial presentation as above. Symptoms similar to prior anginal equivalent. Pain improved with acetaminophen at home and 1 dose of nitroglycerin per EMS. Chest pain free on admit. Follow-up trop 857. Started on heparin gtt.   * On 12/21, echo showed LVEF 45%, possible WMA but difficult to assess given underlying dyssynchronous septal motion; RV OK; overall noted no change compared with previous study (though noted LVEF 50-55% in 12/6/2023). Underwent LHC and found with 70% culprit RCA lesion (type C, high risk), s/p stent; previous LCx stent patent.  * On 12/22, sacubitril-valsartan decreased due to low/soft BP's.  Recent Labs   Lab 12/20/23  0417 12/20/23  0055 12/19/23  2243 12/19/23 2013 12/19/23  1721   NTBNPI  --   --   --   --  468   CTROPT 745* 907* 857*   < > 29*    < > = values in this interval not displayed.   - Continue clopidogrel, rosuvastatin; PRN NTG.  - Continue carvedilol 12.5 mg BID; furosemide 20 mg daily; sacubitril-valsartan  mg BID (decreased).  - Continue rivaroxaban.  - Follow-up with Cardiology.    Subpleural nodular atelectasis or nodule, left upper lung, on CT.  * CT chest 12/19 also showed subpleural nodular atelectasis or nodule left upper lung anteriorly measuring approximately 4 mm (series 5 image 70), short-term follow-up examination recommended in 2-3 months to evaluate for interval change.  - Short-term follow-up examination recommended in 2-3 months to evaluate for interval  "change.    Sclerotic focus in the posterior right T4 vertebral body on CT, indeterminate.  * CT chest also noted sclerotic focus in the posterior right T4 vertebral body, indeterminate, no other evidence for sclerotic foci or metastasis.  - Continue to monitor clinically.  - Follow-up outpatient.    Chronic normocytic anemia.  * Baseline Hgb 11. Stable. No active signs of bleeding.   Recent Labs   Lab 12/22/23  0609 12/21/23  0553 12/19/23  2106 12/19/23  1721   HGB 10.7* 11.1* 10.7* 11.2*   - Continue to monitor CBC.    CKD III.  * Baseline creatinine 0.97-1.01.   * Cr normal on admit.  * Cr stable 12/22.    COPD.  - Continue tiotropium (or formulary equivalent), PRN albuterol.    Obesity.  * Body mass index is 30.47 kg/m .  - Needs to continue to pursue aggressive dietary and lifestyle modifications.    Clinically Significant Risk Factors                  # Hypertension: Noted on problem list  # Chronic heart failure with preserved ejection fraction: heart failure noted on problem list and last echo with EF >50%       # Obesity: Estimated body mass index is 30.47 kg/m  as calculated from the following:    Height as of 12/6/23: 1.6 m (5' 3\").    Weight as of this encounter: 78 kg (172 lb)., PRESENT ON ADMISSION       # Financial/Environmental Concerns: none  # COPD: noted on problem list  # Pacemaker present             Pending Results:        Unresulted Labs Ordered in the Past 30 Days of this Admission       No orders found from 11/19/2023 to 12/20/2023.                  Discharge Disposition:      Discharged to home.        Discharge Time:      Less than 30 minutes.          Condition and Physical on Discharge:    See progress note on the same date as this discharge summary.          Key Imaging Studies, Lab Findings and Procedures/Surgeries:        Results for orders placed or performed during the hospital encounter of 12/19/23   XR Chest Port 1 View    Narrative    EXAM: XR CHEST PORT 1 VIEW  LOCATION: [facility]  DATE: 12/19/2023    INDICATION: sob  COMPARISON: Chest CT 03/24/2018      Impression    IMPRESSION: Dual-chamber pacemaker now present with leads appearing in good position on this single projection. Borderline cardiomegaly. There is mild central interstitial prominence and diffuse prominence of interstitium suggesting minimal interstitial   edema. No focal pneumonia or pleural effusion evident..   CT Chest Pulmonary Embolism w Contrast    Narrative    EXAM: CT CHEST PULMONARY EMBOLISM W CONTRAST  LOCATION: [facility]  DATE: 12/19/2023    INDICATION: chest pain with poss PE or CHF  COMPARISON: 03/14/2018  TECHNIQUE: CT chest pulmonary angiogram during arterial phase injection of IV contrast. Multiplanar reformats and MIP reconstructions were performed. Dose reduction techniques were used.   CONTRAST: 69mL Isovue 370    FINDINGS:  ANGIOGRAM CHEST: Pulmonary arteries are normal caliber and negative for pulmonary emboli. Thoracic aorta is not well opacified and is  indeterminate for dissection. No CT evidence of right heart strain.    LUNGS AND PLEURA: Biapical pleural-parenchymal scarring. Subpleural nodular atelectasis or nodule left upper lung anteriorly measuring approximately 4 mm (series 5 image 70). Mild to moderate emphysematous changes scattered throughout the lungs, greatest   in the mid and upper lungs. Mild hazy increased markings in the mid and lower lungs may represent some mild pulmonary edema. Dependent atelectasis in the posterior lung bases. Otherwise, Lungs are clear. No pleural effusion.    MEDIASTINUM/AXILLAE: No lymphadenopathy. Normal esophagus. No significant pericardial effusion. No thoracic aortic aneurysm.    CORONARY ARTERY CALCIFICATION: Previous intervention (stents or CABG).    UPPER ABDOMEN: Postcholecystectomy changes. Large right renal cysts.    MUSCULOSKELETAL: Mild to moderate kyphosis with mild to moderate spondylosis.  Interval sclerotic focus in the posterior right T4 vertebral body is indeterminate. No other evidence for sclerotic foci or metastasis.      Impression    IMPRESSION:  1.  No pulmonary embolism.    2.  No evidence for thoracic aortic aneurysm.    3.  Subpleural nodular atelectasis or nodule left upper lung anteriorly measuring approximately 4 mm (series 5 image 70). Short-term follow-up examination recommended in 2-3 months to evaluate for interval change.      4.  Biapical pleural-parenchymal scarring.  Mild to moderate emphysematous changes scattered throughout the lungs, greatest in the mid and upper lungs. Mild hazy increased markings in the mid and lower lungs may represent some mild pulmonary edema.   Dependent atelectasis in the posterior lung bases. Otherwise, Lungs are clear. No pleural effusion.    5.  Interval sclerotic focus in the posterior right T4 vertebral body is indeterminate. No other evidence for sclerotic foci or metastasis.     CTA Chest with Contrast    Narrative    EXAM: CTA CHEST WITH CONTRAST  LOCATION: [facility]  DATE: 12/21/2023    INDICATION: Aortic root dissection following PCI  COMPARISON: None.  TECHNIQUE: Helical acquisition through the chest was performed during the arterial phase of contrast enhancement. 2D and 3D reconstructions performed by the CT technologist. Dose reduction techniques were used.  CONTRAST: 72 mL Isovue 370    CT ANGIOGRAM CHEST: Normal caliber and appearance of the thoracic aorta with scattered atherosclerotic plaque. No evidence of aortic dissection, intramural hematoma, or penetrating atherosclerotic ulcer. Slight scatter and pulsation artifact above the   aortic root. Left aortic arch with patent and conventional 3 vessel branching. Mild-moderate stenosis of the right subclavian artery origin.     Coronary artery calcifications and stents. Left chest AV pacemaker.    Normal caliber central pulmonary arteries. Negative for acute  pulmonary embolism.    LUNGS AND PLEURA: Moderate diffuse emphysema. Biapical scarring. Mild bibasilar atelectasis and/or scarring. No dense consolidations, pleural effusions, or pneumothorax. Similar subpleural subcentimeter nodular opacity in the left upper lobe anteriorly   (series 10/image 51). Mild diffuse interlobular septal thickening primarily in the lower lungs, may represent mild pulmonary edema. Central airways are clear. Mild diffuse bronchial wall thickening.     MEDIASTINUM/AXILLAE: Similar mildly prominent mediastinal lymph nodes measuring up to 12 mm in the short axis, which may be reactive. No pericardial effusion. Tiny hiatal hernia.    UPPER ABDOMEN: Partially visualized bilateral simple renal cysts. Cholecystectomy. Visualized upper abdomen is otherwise unremarkable.    MUSCULOSKELETAL: Mild degenerative changes of the spine with thoracic kyphosis. Presumed benign enostosis T4 vertebral body.      Impression    IMPRESSION:  1.  Normal caliber and appearance of the thoracic aorta. No evidence of acute aortic injury.  2.  Mild-moderate right subclavian artery origin stenosis.  3.  Moderate diffuse emphysema.  4.  Similar indeterminate subpleural subcentimeter nodular opacity in the left upper lobe anteriorly. Recommend short-term interval follow-up exam in 2-3 months to assess stability.     Echocardiogram Limited    Narrative    [id number]  [id number]  [id number]  [id number]^BELTRAN^JIM^SHENA     [facility]  Echocardiography Laboratory  [address] [place] [address]     Name: ARTURO BOSWELL  MRN: [id number]  : 1940  Study Date: 2023 10:07 AM  Age: 83 yrs  Gender: Female  Patient Location: [facility]  Reason For Study: Chest Pain, Chest Pressure, Chest Tightness  Ordering Physician: JIM GONG  Referring Physician: JIM GONG  Performed By: Aliza Rico     BSA: 1.8 m2  Height: 63 in  Weight: 174 lb  HR: 71  BP: 144/67  mmHg  ______________________________________________________________________________  Procedure  Limited Portable Echo Adult. Optison (NDC #5905-8192) given intravenously.  ______________________________________________________________________________  Interpretation Summary     This was a limited echocardiogram done for chest pain.     Normal LV size with mildly reduced systolic function. Visually estimated  ejection fraction is around 45%.  Abnormal septal motion consistent with conduction abnormality. Mid distal  septal segments are hypokinetic. However true wall motion abnormality in this  segment is difficult to rule out due to significantly dyssynchronous septal  motion.  Normal RV size and systolic function.  Aortic root and proximal ascending aorta are normal in size.  Trileaflet aortic sclerosis without stenosis.  No pericardial effusion.     No significant change compared to prior study.  ______________________________________________________________________________  ______________________________________________________________________________  MMode/2D Measurements & Calculations  IVSd: 0.97 cm  LVIDd: 4.2 cm  LVIDs: 3.3 cm  LVPWd: 1.3 cm  FS: 21.6 %  LV mass(C)d: 161.9 grams  LV mass(C)dI: 88.8 grams/m2  Ao root diam: 3.2 cm  LA dimension: 3.0 cm  asc Aorta Diam: 3.7 cm  LA/Ao: 0.94  LVOT diam: 2.0 cm  LVOT area: 3.0 cm2  Ao root diam index Ht(cm/m): 2.0  Ao root diam index BSA (cm/m2): 1.7  Asc Ao diam index BSA (cm/m2): 2.0  Asc Ao diam index Ht(cm/m): 2.3  RWT: 0.60     Doppler Measurements & Calculations  MV E max roslyn: 69.4 cm/sec  MV A max roslyn: 135.0 cm/sec  MV E/A: 0.51     MV dec time: 0.22 sec  Ao V2 max: 152.0 cm/sec  Ao max P.0 mmHg  Ao V2 mean: 103.0 cm/sec  Ao mean P.0 mmHg  Ao V2 VTI: 33.8 cm  RONN(I,D): 1.7 cm2  RONN(V,D): 2.0 cm2  LV V1 max PG: 3.9 mmHg  LV V1 max: 99.0 cm/sec  LV V1 VTI: 19.2 cm  SV(LVOT): 57.5 ml  SI(LVOT): 31.5 ml/m2  TR max roslyn: 282.3 cm/sec  TR max P.9  mmHg  AV Agustín Ratio (DI): 0.65  RONN Index (cm2/m2): 0.93  E/E' av.8  Lateral E/e': 7.8  Medial E/e': 11.8     ______________________________________________________________________________  Report approved by: Daniel Silverman 2023 03:15 PM         Echo Limited     Value    LVEF  55%    Narrative    [id number]  [id number]  [id number]  [id number]^SIDNEY^FRANCIS^DARBY     [facility]  Echocardiography Laboratory  [address] [place] [address]     Name: ARTURO BOSWELL  MRN: [id number]  : 1940  Study Date: 2023 03:48 PM  Age: 83 yrs  Gender: Female  Patient Location: [facility]  Reason For Study: Other, Please Specify in Comments  Ordering Physician: FRANCIS LITTLE  Performed By: Diane Antonio     ______________________________________________________________________________  Procedure  Limited Portable Echo Adult.  ______________________________________________________________________________  Interpretation Summary     Limited echo in cath lab.     1. The left ventricle is normal in structure, function and size. The visual  ejection fraction is estimated at 55%.  2. Ascending aorta 3.6cm. Some linear echo dense stranding several cm above  the AV. Suspect this is artifact, but views are limited.     No changes compared to echo from 12-20-23.  ______________________________________________________________________________  Left Ventricle  The left ventricle is normal in structure, function and size. The visual  ejection fraction is estimated at 55%. Normal left ventricular wall motion.     Mitral Valve  There is trace mitral regurgitation.     Vessels  Ascending aorta 3.6cm. Some linear echo dense stranding several cm above the  AV. Suspect this is artifact, but views are limited.     Pericardium  There is no pericardial effusion.     ______________________________________________________________________________  Report approved by: Daniel Hong 2023  04:40 PM     ______________________________________________________________________________      Cardiac Catheterization    Narrative    Culprit lesion is a severe stenosis of the mid-distal RCA.  Mild nonobstructive CAD elsewhere.  Recently placed LCx stent is widely   patent.  Successful OCT guided PCI of the mid-distal RCA with placement of a single   3.0 x 38 mm Synergy ANGELES, postdilated with a 3.5 mm NC balloon.  Following PCI, a small contrast dye hang up was noted in the right   coronary cusp.  This was investigated with a CTA of the chest which showed   no evidence of aortic dissection or other acute aortic injury.

## 2023-12-22 NOTE — PROGRESS NOTES
Discharge Nursing Note    Patient Information  Name: Hamida Verma  Age: 83 year old    Discharge Education:  Discharge instructions reviewed: Yes  Additional education/resources provided: angio discharge instructions, stent card given to patient   Patient/patient representative verbalizes understanding: Yes  Patient discharging on new medications: Yes  Medication education completed: Yes    Discharge Plans:   Discharge location: home  Discharge ride contacted: Yes  Approximate discharge time: 1530    Patient Belongs:  Patient belongings returned to patient: Yes - pt states she has all her belongings     Venita Saini RN

## 2023-12-22 NOTE — PLAN OF CARE
Physical Therapy: Orders received. Chart reviewed and discussed with care team.? Physical Therapy not indicated due to pt's mobility needs being met by OT/CR during hospital stay.? Defer discharge recommendations to medical team and OT/CR.? Will complete orders.

## 2023-12-22 NOTE — PLAN OF CARE
Goal Outcome Evaluation:      Plan of Care Reviewed With: patient    Overall Patient Progress: improvingOverall Patient Progress: improving    [facility] Nursing Note    Patient Information  Name: Hamida Verma  Age: 83 year old    Admission Information  Date: 12/19/2023   Reason:Elevated troponin [R79.89]  NSTEMI (non-ST elevated myocardial infarction) (H) [I21.4]  Chest pain, unspecified type [R07.9]     Assessment  Orientation/Neuro: Alert and Oriented x4  Cardiac/Tele: 100% V paced  Resp: WDL no shortness of breath  GI/: WDL No nausea, vomiting, abdominal pain, diarrhea, constipation or change in bowel habits   Mobility: walks with assist   Pain: denies   Diet: Orders Placed This Encounter      Low Saturated Fat Na <2400 mg      Diet    Vital Signs  B/P: 123/66, T: 98.4, P: 106, R: 18, O2: 95% on RA      Plan  BP low this am after morning meds - cards notified - pt asymptomatic - entresto dose decreased    Venita Saini RN

## 2023-12-22 NOTE — DISCHARGE INSTRUCTIONS
Cardiac Angiogram Discharge Instructions - Radial    After you go home:    Have an adult stay with you until tomorrow.  Drink extra fluids for 2 days.  You may resume your normal diet.  No smoking       For 24 hours - due to the sedation you received:  Relax and take it easy.  Do NOT make any important or legal decisions.  Do NOT drive or operate machines at home or at work.  Do NOT drink alcohol.    Care of Wrist Puncture Site:    For the first 24 hrs - check the puncture site every 1-2 hours while awake.  It is normal to have soreness at the puncture site and mild tingling in your hand for up to 3 days.  Remove the bandaid after 24 hours. If there is minor oozing, apply another bandaid and remove it after 12 hours.  You may shower tomorrow.  Do NOT take a bath, or use a hot tub or pool for at least 3 days. Do NOT scrub the site. Do not use lotion or powder near the puncture site.           Activity:        For 2 days:   do not use your hand or arm to support your weight (such as rising from a chair)   do not bend your wrist (such as lifting a garage door).  do not lift more than 5 pounds or exercise your arm (such as tennis, golf or bowling).  Do NOT do any heavy activity such as exercise, lifting, or straining.     Bleeding:    If you start bleeding from the site in your wrist, sit down and press firmly on/above the site for 10 minutes.   Once bleeding stops, keep arm still for 2 hours.   Call [facility] as soon as you can.       Call 911 right away if you have heavy bleeding or bleeding that does not stop.      Medicines:    If you are taking an antiplatelet medication such as Plavix, Brilinta or Effient, do not stop taking it until you talk to your cardiologist.      If you are on Metformin (Glucophage), do not restart it until you have blood tests (within 2 to 3 days after discharge).  After you have your blood drawn, you may restart the Metformin.   Take your medications, including blood thinners, unless your  provider tells you not to.    If you take Coumadin (Warfarin), have your INR checked by your provider in  3-5 days. Call your clinic to schedule this.  If you have stopped any medicines, check with your provider about when to restart them.    Follow Up Appointments:    Follow up with [facility] Nurse Practitioner at [facility] of patient preference in 7-10 days.    Call the clinic if:    You have a large or growing hard lump around the site.  The site is red, swollen, hot or tender.  Blood or fluid is draining from the site.  You have chills or a fever greater than 101 F (38 C).  Your arm feels numb, cool or changes color.  You have hives, a rash or unusual itching.  Any questions or concerns.          [facility] [place]:    [phone] [facility] (7 days a week)

## 2023-12-22 NOTE — PROVIDER NOTIFICATION
Notification     Notified Person: Carlos BOOGIE     Notification Time: 1930     Notification Interaction: AmCom     Purpose of Notification:     [facility] rm [address] K.[address]. MRN [id number]  Angio today w/ stent to RCA x1 w/ possible dissection all imaging negative. Heparin stopped post angio but still has active order.Takes Xarelto PTA for afib. TR band still on . Please advise regarding restarting heparin tonight or keeping it off until xarelto is started tomorrow. caroline GRANADOS [phone]    Orders Received: discontinue heparin order- plan to restart xarelto in AM

## 2023-12-22 NOTE — PLAN OF CARE
VSS. Tele; paced. Right radial site, TR band on. 10 cc of air remaining. Stent to RCA. IVMF running. Denies any chest pain or SOB. Possible discharge tomorrow. Will continue to monitor.

## 2023-12-22 NOTE — PLAN OF CARE
Goal Outcome Evaluation:  1900-0700  Neuro- x4  Most Recent Vitals- /56   Pulse 72   Temp 98.5  F (36.9  C) (Oral)   Resp 19   Wt 78 kg (172 lb)   LMP  (LMP Unknown)   SpO2 98%   BMI 30.47 kg/m    Tele/Cardiac- A paced- occasionally A/V paced   Resp- RA, denies sob/woo  Activity- sba   Skin- R radial site slightly ecchymotic - TR Band removed w/o issues   Pain- denies   GI/- External catheter in place until TR band removed. Voiding spontaneously   Labs pending   Diet: Low Saturated Fat Na <2400 mg    Plan- AM EKG and cardiac rehab. Restart Xarelto today and possible discharge.

## 2023-12-24 LAB
ATRIAL RATE - MUSE: 69 BPM
ATRIAL RATE - MUSE: 69 BPM
DIASTOLIC BLOOD PRESSURE - MUSE: NORMAL MMHG
DIASTOLIC BLOOD PRESSURE - MUSE: NORMAL MMHG
INTERPRETATION ECG - MUSE: NORMAL
INTERPRETATION ECG - MUSE: NORMAL
P AXIS - MUSE: -39 DEGREES
P AXIS - MUSE: 73 DEGREES
PR INTERVAL - MUSE: 250 MS
PR INTERVAL - MUSE: 250 MS
QRS DURATION - MUSE: 176 MS
QRS DURATION - MUSE: 180 MS
QT - MUSE: 510 MS
QT - MUSE: 562 MS
QTC - MUSE: 546 MS
QTC - MUSE: 565 MS
R AXIS - MUSE: -50 DEGREES
R AXIS - MUSE: -57 DEGREES
SYSTOLIC BLOOD PRESSURE - MUSE: NORMAL MMHG
SYSTOLIC BLOOD PRESSURE - MUSE: NORMAL MMHG
T AXIS - MUSE: 262 DEGREES
T AXIS - MUSE: 78 DEGREES
VENTRICULAR RATE- MUSE: 61 BPM
VENTRICULAR RATE- MUSE: 69 BPM

## 2023-12-26 ENCOUNTER — TELEPHONE (OUTPATIENT)
Dept: CARDIOLOGY | Facility: CLINIC | Age: 83
End: 2023-12-26
Payer: COMMERCIAL

## 2023-12-26 ENCOUNTER — PATIENT OUTREACH (OUTPATIENT)
Dept: CARE COORDINATION | Facility: CLINIC | Age: 83
End: 2023-12-26
Payer: COMMERCIAL

## 2023-12-26 NOTE — TELEPHONE ENCOUNTER
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     PMH: CAD with recent cardiac stent to the L circumflex (12/8/2023); COPD; HTN; PAF s/p AVN ablation and PPM; and CKD.    12/20/23: Echo showed EF of 45%, possible WMA but difficult to assess given underlying dyssynchronous septal motion; RV OK; overall noted no change compared with previous study     12/21/23: Coronary angiogram via A resulted in:    Culprit lesion is a severe stenosis of the mid-distal RCA.  Mild nonobstructive CAD elsewhere.  Recently placed LCx stent is widely patent.  Successful OCT guided PCI of the mid-distal RCA with placement of a single 3.0 x 38 mm Synergy ANGELES, post dilated with a 3.5 mm NC balloon.  Following PCI, a small contrast dye hang up was noted in the right coronary cusp.  This was investigated with a CTA of the chest which showed no evidence of aortic dissection or other acute aortic injury.    Pt was started on NTG. PTA Entresto was decreased secondary to soft BP's. Plavix and Xarelto were continued at time of discharge.    Called patient to discuss any post hospital d/c questions, review medication changes, and confirm f/u appts. Patient denied any questions regarding new medications or changes to PTA medications.     RN confirmed with patient that she has an adequate supply of her PTA antiplatelet Plavix, and reminded of importance of taking without interruption.     Pt has an Rx for PRN SL Nitroglycerin.     Patient denied any SOB, chest pain or light headedness.    [facility] cardiac cath site is without bleeding, swelling, redness or signs of infection.     RN confirmed with patient that she is scheduled for labs on 1/26/24 at 0830, followed with an OV at 0930 with GORGE Sandrine Glover at our [place] Office.    Cardiac rehab is scheduled on 1/11/24 at 1245 in [place].    Patient advised to call clinic with any cardiac related questions or concerns prior to this  jose't. Patient verbalized understanding and agreed with plan. MARKELL Emmanuel RN.

## 2023-12-26 NOTE — PROGRESS NOTES
"  [facility]: [facility]: Post-Discharge Note  SITUATION                                                      Admission:    Admission Date: 12/19/23   Reason for Admission: CP  Discharge:   Discharge Date: 12/22/23  Discharge Diagnosis: NSTEMI - s/p PCI to RCA 12/22/2023.    BACKGROUND                                                      Per hospital discharge summary and inpatient provider notes:Hamida Verma is a 83 year old female with PMHx including CAD with recent cardiac stent to the L circumflex (12/8/2023); COPD; HTN; PAF s/p AVN ablation and PPM; and CKD; who presented 12/19/2023 with recurrent R shoulder pain which was compatible with her anginal equivalent.       ASSESSMENT           Discharge Assessment  How are you doing now that you are home?: \" I am doing well \"  How are your symptoms? (Red Flag symptoms escalate to triage hotline per guidelines): Improved  Do you feel your condition is stable enough to be safe at home until your provider visit?: Yes  Does the patient have their discharge instructions? : Yes  Does the patient have questions regarding their discharge instructions? : No  Were you started on any new medications or were there changes to any of your previous medications? : Yes  Does the patient have all of their medications?: Yes  Do you have questions regarding any of your medications? : No  Do you have all of your needed medical supplies or equipment (DME)?  (i.e. oxygen tank, CPAP, cane, etc.): Yes  Discharge follow-up appointment scheduled within 14 calendar days? : Yes  Discharge Follow Up Appointment Date: 12/27/23  Discharge Follow Up Appointment Scheduled with?: Primary Care Provider    Post-op ([facility] CTA Only)  If the patient had a surgery or procedure, do they have any questions for a nurse?: No             PLAN                                                      Outpatient Plan:  Follow-up with primary care provider, Mikala Philip MD, within 7 days " "for hospital follow- up.  The following labs/tests are recommended: CBC, BMP. Recommend repeat CT chest with contrast in 2-3 months to assess \"subpleural nodular atelectasis or nodule left upper lung\"  2. Cardiac follow-up per [facility].    Future Appointments   Date Time Provider Department [place]   12/27/2023 11:30 AM Shravan Olivia MD [facility]   1/11/2024  1:00 PM 2, Sh [facility]   1/26/2024  8:30 AM [facility]   1/26/2024  9:30 AM Sandrine Glover PA-C [facility]   2/27/2024  8:00 AM Mikala Philip MD [facility]   3/19/2024 12:00 AM [facility] TECH[address] [place] [address] [facility]         For any urgent concerns, please contact our 24 hour nurse triage line: [phone] ([phone])         LISET Mandel                  "

## 2023-12-27 ENCOUNTER — OFFICE VISIT (OUTPATIENT)
Dept: FAMILY MEDICINE | Facility: CLINIC | Age: 83
End: 2023-12-27
Payer: COMMERCIAL

## 2023-12-27 VITALS
WEIGHT: 173.6 LBS | RESPIRATION RATE: 18 BRPM | HEIGHT: 63 IN | HEART RATE: 67 BPM | OXYGEN SATURATION: 98 % | DIASTOLIC BLOOD PRESSURE: 75 MMHG | TEMPERATURE: 96.8 F | SYSTOLIC BLOOD PRESSURE: 124 MMHG | BODY MASS INDEX: 30.76 KG/M2

## 2023-12-27 DIAGNOSIS — N18.30 STAGE 3 CHRONIC KIDNEY DISEASE, UNSPECIFIED WHETHER STAGE 3A OR 3B CKD (H): ICD-10-CM

## 2023-12-27 DIAGNOSIS — I50.32 CHRONIC DIASTOLIC CHF (CONGESTIVE HEART FAILURE) (H): ICD-10-CM

## 2023-12-27 DIAGNOSIS — R91.8 PULMONARY NODULES: ICD-10-CM

## 2023-12-27 DIAGNOSIS — I21.4 NSTEMI (NON-ST ELEVATED MYOCARDIAL INFARCTION) (H): Primary | ICD-10-CM

## 2023-12-27 DIAGNOSIS — I25.10 CORONARY ARTERY CALCIFICATION SEEN ON CT SCAN: ICD-10-CM

## 2023-12-27 DIAGNOSIS — R09.89 RUNNY NOSE: ICD-10-CM

## 2023-12-27 LAB
ANION GAP SERPL CALCULATED.3IONS-SCNC: 11 MMOL/L (ref 7–15)
BUN SERPL-MCNC: 13.5 MG/DL (ref 8–23)
CALCIUM SERPL-MCNC: 9.6 MG/DL (ref 8.8–10.2)
CHLORIDE SERPL-SCNC: 103 MMOL/L (ref 98–107)
CREAT SERPL-MCNC: 0.93 MG/DL (ref 0.51–0.95)
DEPRECATED HCO3 PLAS-SCNC: 24 MMOL/L (ref 22–29)
EGFRCR SERPLBLD CKD-EPI 2021: 60 ML/MIN/1.73M2
ERYTHROCYTE [DISTWIDTH] IN BLOOD BY AUTOMATED COUNT: 13.2 % (ref 10–15)
GLUCOSE SERPL-MCNC: 89 MG/DL (ref 70–99)
HCT VFR BLD AUTO: 35.8 % (ref 35–47)
HGB BLD-MCNC: 11.3 G/DL (ref 11.7–15.7)
MCH RBC QN AUTO: 29.3 PG (ref 26.5–33)
MCHC RBC AUTO-ENTMCNC: 31.6 G/DL (ref 31.5–36.5)
MCV RBC AUTO: 93 FL (ref 78–100)
PLATELET # BLD AUTO: 430 10E3/UL (ref 150–450)
POTASSIUM SERPL-SCNC: 4.2 MMOL/L (ref 3.4–5.3)
RBC # BLD AUTO: 3.86 10E6/UL (ref 3.8–5.2)
SODIUM SERPL-SCNC: 138 MMOL/L (ref 135–145)
WBC # BLD AUTO: 7.2 10E3/UL (ref 4–11)

## 2023-12-27 PROCEDURE — 90662 IIV NO PRSV INCREASED AG IM: CPT | Performed by: FAMILY MEDICINE

## 2023-12-27 PROCEDURE — 85027 COMPLETE CBC AUTOMATED: CPT | Performed by: FAMILY MEDICINE

## 2023-12-27 PROCEDURE — 91320 SARSCV2 VAC 30MCG TRS-SUC IM: CPT | Performed by: FAMILY MEDICINE

## 2023-12-27 PROCEDURE — 80048 BASIC METABOLIC PNL TOTAL CA: CPT | Performed by: FAMILY MEDICINE

## 2023-12-27 PROCEDURE — 99495 TRANSJ CARE MGMT MOD F2F 14D: CPT | Performed by: FAMILY MEDICINE

## 2023-12-27 PROCEDURE — 90480 ADMN SARSCOV2 VAC 1/ONLY CMP: CPT | Performed by: FAMILY MEDICINE

## 2023-12-27 PROCEDURE — G0008 ADMIN INFLUENZA VIRUS VAC: HCPCS | Performed by: FAMILY MEDICINE

## 2023-12-27 PROCEDURE — 36415 COLL VENOUS BLD VENIPUNCTURE: CPT | Performed by: FAMILY MEDICINE

## 2023-12-27 RX ORDER — RESPIRATORY SYNCYTIAL VIRUS VACCINE 120MCG/0.5
0.5 KIT INTRAMUSCULAR ONCE
Qty: 1 EACH | Refills: 0 | Status: CANCELLED | OUTPATIENT
Start: 2023-12-27 | End: 2023-12-27

## 2023-12-27 RX ORDER — AZELASTINE 1 MG/ML
1 SPRAY, METERED NASAL 2 TIMES DAILY
Qty: 30 ML | Refills: 1 | Status: SHIPPED | OUTPATIENT
Start: 2023-12-27

## 2023-12-27 ASSESSMENT — PAIN SCALES - GENERAL: PAINLEVEL: NO PAIN (0)

## 2023-12-27 NOTE — PROGRESS NOTES
Prior to immunization administration, verified patients identity using patient s name and date of birth. Please see Immunization Activity for additional information.     Screening Questionnaire for Adult Immunization    Are you sick today?   No   Do you have allergies to medications, food, a vaccine component or latex?   Yes, strawberries   Have you ever had a serious reaction after receiving a vaccination?   No   Do you have a long-term health problem with heart, lung, kidney, or metabolic disease (e.g., diabetes), asthma, a blood disorder, no spleen, complement component deficiency, a cochlear implant, or a spinal fluid leak?  Are you on long-term aspirin therapy?   Yes. CKD, COPD   Do you have cancer, leukemia, HIV/AIDS, or any other immune system problem?   No   Do you have a parent, brother, or sister with an immune system problem?   No   In the past 3 months, have you taken medications that affect  your immune system, such as prednisone, other steroids, or anticancer drugs; drugs for the treatment of rheumatoid arthritis, Crohn s disease, or psoriasis; or have you had radiation treatments?   No   Have you had a seizure, or a brain or other nervous system problem?   No   During the past year, have you received a transfusion of blood or blood    products, or been given immune (gamma) globulin or antiviral drug?   No   For women: Are you pregnant or is there a chance you could become       pregnant during the next month?   No   Have you received any vaccinations in the past 4 weeks?   No     Immunization questionnaire was positive for at least one answer.  Notified Dr. Olivia.      Patient instructed to remain in clinic for 15 minutes afterwards, and to report any adverse reactions.     Screening performed by Arturo Huerta RN on 12/27/2023 at 11:38 AM.

## 2023-12-27 NOTE — PROGRESS NOTES
"  Assessment & Plan     NSTEMI (non-ST elevated myocardial infarction) (H)  Had nstemi and PCI To left circumflex on 12/8 and hospitalized again on 12/22 with nstemi and needed PCI to RCA.   Feeling well. Denies any concerns. Has upcoming cardiac rehab appt and cardiology appt.     Chronic diastolic CHF (congestive heart failure) (H)  Mild decreased LVEF and concern of possible ischemic cardiomyopathy. Ha chronic afib and permanent pacemaker    CKD (chronic kidney disease) stage 3, GFR 30-59 ml/min (H)  Avoid nephrotoxic agents. Due for repeat bmp    Runny nose  Long standing. Flonase did not help. Could be allergic (has a cat) vs vasomotor rhinitis. Trial of azelastine and see if it helps.   - azelastine (ASTELIN) 0.1 % nasal spray; Spray 1 spray into both nostrils 2 times daily    Coronary artery calcification seen on CT scan  See above.   - CBC with platelets; Future  - Basic metabolic panel  (Ca, Cl, CO2, Creat, Gluc, K, Na, BUN); Future  - CBC with platelets  - Basic metabolic panel  (Ca, Cl, CO2, Creat, Gluc, K, Na, BUN)    Pulmonary nodules  Incidental findings. Has copd history and smoking history. Needs repeat ct scan in 3 months. Also sclerotic focus on T4. She is scheduled to see PCP next month and we agreed to hold off on ordering images until seen by PCP.            MED REC REQUIRED  Post Medication Reconciliation Status:  Discharge medications reconciled, continue medications without change  BMI:   Estimated body mass index is 30.99 kg/m  as calculated from the following:    Height as of this encounter: 1.594 m (5' 2.76\").    Weight as of this encounter: 78.7 kg (173 lb 9.6 oz).   Weight management plan: Discussed healthy diet and exercise guidelines         Shravan Olivia MD, MD  [facility]    Monisha Mei is a 83 year old, presenting for the following health issues:  Hospital F/U (Nose is always runny. )      12/27/2023    11:03 AM   Additional Questions " "  Roomed by Tracie MITCHELL       [facility]         12/26/2023     9:25 AM   Post Discharge Outreach   Admission Date 12/19/2023   Reason for Admission CP   Discharge Date 12/22/2023   Discharge Diagnosis NSTEMI - s/p PCI to RCA 12/22/2023.   How are you doing now that you are home? \" I am doing well \"   How are your symptoms? (Red Flag symptoms escalate to triage hotline per guidelines) Improved   Do you feel your condition is stable enough to be safe at home until your provider visit? Yes   Does the patient have their discharge instructions?  Yes   Does the patient have questions regarding their discharge instructions?  No   Were you started on any new medications or were there changes to any of your previous medications?  Yes   Does the patient have all of their medications? Yes   Do you have questions regarding any of your medications?  No   Do you have all of your needed medical supplies or equipment (DME)?  (i.e. oxygen tank, CPAP, cane, etc.) Yes   Discharge follow-up appointment scheduled within 14 calendar days?  Yes   Discharge Follow Up Appointment Date 12/27/2023   Discharge Follow Up Appointment Scheduled with? Primary Care Provider     Hospital Follow-up Visit:    Hospital/Nursing Home/IP Rehab Facility: [facility]  Date of Admission: 12/19/2023  Date of Discharge: 12/22/2023  Reason(s) for Admission: Right shoulder/arm pain    Was your hospitalization related to COVID-19? No   Problems taking medications regularly:  None  Medication changes since discharge: None  Problems adhering to non-medication therapy:  None    Summary of hospitalization:  [facility] discharge summary reviewed  Diagnostic Tests/Treatments reviewed.  Follow up needed: labs  Other Healthcare Providers Involved in Patient s Care:         Specialist appointment - cardiology  Update since discharge: improved.         Plan of care communicated with patient           Overall feeling well. Had back to back " "hospitalization for 2 different stents in heart.     Cardiac rehab and cardiology follow up already scheduled. Has a pacemaker.     Runny nose - going on for a year. Not improving. During young age, sinus problem. No problem with sinus for many years. Tried flonase and did not help. Has a cat.     2000 quit smoking. Started when in high school 1955. History of copd. Not currently an issue.     Able to do daily activities without any issues.       Review of Systems         Objective    /75 (BP Location: Right arm, Patient Position: Sitting, Cuff Size: Adult Regular)   Pulse 67   Temp 96.8  F (36  C) (Temporal)   Resp 18   Ht 1.594 m (5' 2.76\")   Wt 78.7 kg (173 lb 9.6 oz)   LMP  (LMP Unknown)   SpO2 98%   BMI 30.99 kg/m    Body mass index is 30.99 kg/m .  Physical Exam                         "

## 2024-01-06 ENCOUNTER — HEALTH MAINTENANCE LETTER (OUTPATIENT)
Age: 84
End: 2024-01-06

## 2024-01-26 ENCOUNTER — OFFICE VISIT (OUTPATIENT)
Dept: CARDIOLOGY | Facility: CLINIC | Age: 84
End: 2024-01-26
Payer: COMMERCIAL

## 2024-01-26 ENCOUNTER — LAB (OUTPATIENT)
Dept: LAB | Facility: CLINIC | Age: 84
End: 2024-01-26
Payer: COMMERCIAL

## 2024-01-26 VITALS
WEIGHT: 172 LBS | SYSTOLIC BLOOD PRESSURE: 138 MMHG | HEART RATE: 65 BPM | HEIGHT: 63 IN | BODY MASS INDEX: 30.48 KG/M2 | DIASTOLIC BLOOD PRESSURE: 72 MMHG | OXYGEN SATURATION: 96 %

## 2024-01-26 DIAGNOSIS — I25.110 CORONARY ARTERY DISEASE INVOLVING NATIVE CORONARY ARTERY OF NATIVE HEART WITH UNSTABLE ANGINA PECTORIS (H): ICD-10-CM

## 2024-01-26 DIAGNOSIS — E87.6 HYPOKALEMIA: ICD-10-CM

## 2024-01-26 DIAGNOSIS — I21.4 NSTEMI (NON-ST ELEVATED MYOCARDIAL INFARCTION) (H): ICD-10-CM

## 2024-01-26 DIAGNOSIS — I50.32 CHRONIC DIASTOLIC CHF (CONGESTIVE HEART FAILURE) (H): ICD-10-CM

## 2024-01-26 DIAGNOSIS — I48.0 PAF (PAROXYSMAL ATRIAL FIBRILLATION) (H): ICD-10-CM

## 2024-01-26 DIAGNOSIS — E78.5 DYSLIPIDEMIA, GOAL LDL BELOW 70: Primary | ICD-10-CM

## 2024-01-26 LAB
ANION GAP SERPL CALCULATED.3IONS-SCNC: 10 MMOL/L (ref 7–15)
BUN SERPL-MCNC: 13.1 MG/DL (ref 8–23)
CALCIUM SERPL-MCNC: 9.5 MG/DL (ref 8.8–10.2)
CHLORIDE SERPL-SCNC: 103 MMOL/L (ref 98–107)
CREAT SERPL-MCNC: 0.98 MG/DL (ref 0.51–0.95)
DEPRECATED HCO3 PLAS-SCNC: 27 MMOL/L (ref 22–29)
EGFRCR SERPLBLD CKD-EPI 2021: 57 ML/MIN/1.73M2
GLUCOSE SERPL-MCNC: 115 MG/DL (ref 70–99)
HGB BLD-MCNC: 11.5 G/DL (ref 11.7–15.7)
POTASSIUM SERPL-SCNC: 4.1 MMOL/L (ref 3.4–5.3)
SODIUM SERPL-SCNC: 140 MMOL/L (ref 135–145)

## 2024-01-26 PROCEDURE — 99214 OFFICE O/P EST MOD 30 MIN: CPT | Performed by: PHYSICIAN ASSISTANT

## 2024-01-26 PROCEDURE — 36415 COLL VENOUS BLD VENIPUNCTURE: CPT | Performed by: NURSE PRACTITIONER

## 2024-01-26 PROCEDURE — 80048 BASIC METABOLIC PNL TOTAL CA: CPT | Performed by: NURSE PRACTITIONER

## 2024-01-26 PROCEDURE — 85018 HEMOGLOBIN: CPT | Performed by: NURSE PRACTITIONER

## 2024-01-26 RX ORDER — FUROSEMIDE 20 MG
20 TABLET ORAL DAILY
Qty: 90 TABLET | Refills: 3 | Status: SHIPPED | OUTPATIENT
Start: 2024-01-26

## 2024-01-26 RX ORDER — CLOPIDOGREL BISULFATE 75 MG/1
75 TABLET ORAL DAILY
Qty: 90 TABLET | Refills: 3 | Status: SHIPPED | OUTPATIENT
Start: 2024-01-26

## 2024-01-26 RX ORDER — CARVEDILOL 12.5 MG/1
12.5 TABLET ORAL 2 TIMES DAILY WITH MEALS
Qty: 180 TABLET | Refills: 3 | Status: SHIPPED | OUTPATIENT
Start: 2024-01-26

## 2024-01-26 RX ORDER — POTASSIUM CHLORIDE 750 MG/1
10 TABLET, EXTENDED RELEASE ORAL DAILY
Qty: 90 TABLET | Refills: 3 | Status: SHIPPED | OUTPATIENT
Start: 2024-01-26

## 2024-01-26 NOTE — LETTER
2024    Mikala Philip MD, MD  [address] [facility] [address]  [place] [address]    RE: Hamida Verma       Dear Colleague,     I had the pleasure of seeing Hamida Verma in the [facility].    Cardiology Clinic Progress Note    Service Date: 2024    Primary Cardiologist: Dr. Mauri Chase      Reason for Visit: NSTEMI f/u     HPI:   Hamidaalban Verma Apsley delightful 83-year-old woman with past medical history significant for the followin.  A-fib with initially difficult to control rate finally ended up with AV node ablation and permanent pacemaker on anticoagulation  2.  Hypertension with orthostatic hypotension  3.  Heart failure with mildly reduced ejection fraction about 45% variable between that and normal thought to be secondary to RV pacing  4.  Coronary artery disease was initially the patient having just coronary calcifications but unfortunately suffering 2 NSTEMI's in 2023 the  where she had a drug-eluting stent to the proximal to mid circumflex for 90% lesion at that time her RCA was just 50% stenosed and she had just 25% ostial proximal LAD.  Unfortunately she presented with repeat pain  and had drug-eluting stent to the mid to distal RCA and had a patent stent to the circumflex.  5.  Strong family history of coronary disease with 1 son passing away at the age of 57 from an MI and her other son have been a STEMI at the age of 57  6.  Underlying lung disease with emphysema showing on CT.    I initially met Ms. Ku when she presented with progressive shortness of breath so much that she canceled dinner at her favorite restaurant of [employer].  We eventually been able to get her on Entresto inhalers get her pacemaker put in she has done well.  We watched her coronary disease closely and unfortunately presented with right shoulder plain in December and was found to have an MI.  She underwent interventions as detailed above.    Today  "she comes in overall feeling well.  She denies chest pain orthopnea or PND she has not had pedal edema.  She does get short of breath climbing stairs but can do this without stopping and continues walking.  Her granddaughter carries her laundry up and down for her.  She has not had any additional right shoulder pain which was her anginal equivalent.    Social History:  No significant EtOH former smoker with a 67-pack-year history.  She is .  She lives in her own home with her adult daughter who has Lewy body dementia and is 63 and her granddaughter who is 33 and is trained as an EMT who helps take care of both of them.    Physical Exam:  /72   Pulse 65   Ht 1.6 m (5' 3\")   Wt 78 kg (172 lb)   LMP  (LMP Unknown)   SpO2 96%   BMI 30.47 kg/m     Well-developed well-nourished woman in no acute distress.  Normocephalic atraumatic.  Heart is regularly irregular, I do not appreciate murmur rub or gallop.  Lungs are clear without wheezes rales or rhonchi extremities without peripheral edema skin is warm and dry.    Data reviewed:  Both angiograms, 2 echocardiograms, hemoglobin    Assessment and Plan:  1.  Coronary disease with 2 recent NSTEMI's and drug-eluting stents to the RCA and circumflex in 2 separate procedures.  Fortunately she is doing well now and her right arm pain is completely resolved.  She is appropriately on Plavix and Xarelto.  We discussed that she needs 3% if there is any additional similar symptoms.    2.  Hypertension with orthostatic hypotension some lightheadedness when standing up will allow blood pressures run in the 130s to prevent worsening symptoms.    3.  Heart failure with mildly reduced ejection fraction looked at 50 to 55% and 1 echo in the hospital 45% on the second she is already on Entresto and carvedilol and Lasix.  She is euvolemic.  If she develops worse symptoms could consider both spironolactone and SGLT2 inhibitor    4.  A-fib with difficult to control rates with " AV node ablation and pacemaker in place appropriately on Xarelto hemoglobin stable today.    5.  Dyslipidemia on full dose Crestor with last lipid panel showing LDL 77 HDL 74 total cholesterol 173.  The patient is very motivated to eat better we discussed American Heart Association cookbooks today and ongoing low sodium we will repeat a lipid panel in 6 months.    It is my great pleasure to participate in this delightful patient's care she can follow-up with Dr. Mauri Chase with an echo BMP lipid panel and hemoglobin before that visit.  She will call sooner with concerns.    Sandrine Glover PA-C  [facility] Cardiology     This note was written using Dragon voice recognition system, please excuse any misspelled names, or nonsensical words and call with any questions.        Orders this visit:  Orders Placed This Encounter   Procedures    Basic metabolic panel    Lipid Profile    ALT    Follow-Up with Cardiology    Echocardiogram Complete     Orders Placed This Encounter   Medications    carvedilol (COREG) 12.5 MG tablet     Sig: Take 1 tablet (12.5 mg) by mouth 2 times daily (with meals)     Dispense:  180 tablet     Refill:  3    clopidogrel (PLAVIX) 75 MG tablet     Sig: Take 1 tablet (75 mg) by mouth daily To protect your stent(s).  Do not stop taking unless directed by cardiology.     Dispense:  90 tablet     Refill:  3    furosemide (LASIX) 20 MG tablet     Sig: Take 1 tablet (20 mg) by mouth daily     Dispense:  90 tablet     Refill:  3    potassium chloride ER (KLOR-CON M) 10 MEQ CR tablet     Sig: Take 1 tablet (10 mEq) by mouth daily     Dispense:  90 tablet     Refill:  3    rivaroxaban ANTICOAGULANT (XARELTO) 15 MG TABS tablet     Sig: Take 1 tablet (15 mg) by mouth daily (with dinner)     Dispense:  90 tablet     Refill:  3    sacubitril-valsartan (ENTRESTO) 49-51 MG per tablet     Sig: Take 1 tablet by mouth 2 times daily     Dispense:  180 tablet     Refill:  3     Medications Discontinued During  This Encounter   Medication Reason    furosemide (LASIX) 20 MG tablet Reorder (No AVS)    potassium chloride ER (KLOR-CON M) 10 MEQ CR tablet Reorder (No AVS)    rivaroxaban ANTICOAGULANT (XARELTO) 15 MG TABS tablet     carvedilol (COREG) 12.5 MG tablet Reorder (No AVS)    clopidogrel (PLAVIX) 75 MG tablet     sacubitril-valsartan (ENTRESTO) 49-51 MG per tablet      Encounter Diagnoses   Name Primary?    NSTEMI (non-ST elevated myocardial infarction) (H)     Coronary artery disease involving native coronary artery of native heart with unstable angina pectoris (H)     Chronic diastolic CHF (congestive heart failure) (H)     Hypokalemia     PAF (paroxysmal atrial fibrillation) (H)     Dyslipidemia, goal LDL below 70 Yes       Current Medications:  Current Outpatient Medications   Medication Sig Dispense Refill    albuterol (PROAIR HFA/PROVENTIL HFA/VENTOLIN HFA) 108 (90 Base) MCG/ACT inhaler Inhale 2 puffs into the lungs every 6 hours as needed      azelastine (ASTELIN) 0.1 % nasal spray Spray 1 spray into both nostrils 2 times daily 30 mL 1    carvedilol (COREG) 12.5 MG tablet Take 1 tablet (12.5 mg) by mouth 2 times daily (with meals) 180 tablet 3    cholecalciferol (VITAMIN  -D) 1000 UNIT capsule Take 1 capsule by mouth daily.      clopidogrel (PLAVIX) 75 MG tablet Take 1 tablet (75 mg) by mouth daily To protect your stent(s).  Do not stop taking unless directed by cardiology. 90 tablet 3    FLAX SEED OIL OR 1 capsule daily      furosemide (LASIX) 20 MG tablet Take 1 tablet (20 mg) by mouth daily 90 tablet 3    Multiple Vitamins-Minerals (HAIR SKIN NAILS PO) Take 1 tablet by mouth At Bedtime      Multiple Vitamins-Minerals (ICAPS AREDS 2 PO) Take 1 tablet by mouth 2 times daily      nitroGLYcerin (NITROSTAT) 0.4 MG sublingual tablet For chest pain place 1 tablet under the tongue every 5 minutes for 3 doses. If symptoms persist 5 minutes after 1st dose call 911. 25 tablet 11    potassium chloride ER (KLOR-CON M) 10  MEQ CR tablet Take 1 tablet (10 mEq) by mouth daily 90 tablet 3    rivaroxaban ANTICOAGULANT (XARELTO) 15 MG TABS tablet Take 1 tablet (15 mg) by mouth daily (with dinner) 90 tablet 3    rosuvastatin (CRESTOR) 40 MG tablet Take 1 tablet (40 mg) by mouth daily 90 tablet 3    sacubitril-valsartan (ENTRESTO) 49-51 MG per tablet Take 1 tablet by mouth 2 times daily 180 tablet 3    tiotropium (SPIRIVA) 18 MCG inhaled capsule Inhale 1 capsule (18 mcg) into the lungs daily as needed (Patient not taking: Reported on 1/26/2024)         Allergies Reviewed and Updated:  Allergies   Allergen Reactions    Strawberries [Strawberry Extract] Anaphylaxis    Strawberry Flavor        Review of Systems:  Skin:  Positive for bruising   Eyes:  Positive for glasses  ENT:  Negative    Respiratory:  Negative shortness of breath  Cardiovascular:  Negative;palpitations;chest pain;edema    Gastroenterology: not assessed    Genitourinary:  Negative    Musculoskeletal:  Negative    Neurologic:  not assessed    Psychiatric:  Negative    Heme/Lymph/Imm:  Positive for allergies  Endocrine:  Negative       Pertinent Past Medical, Surgical and Family History Reviewed and noted above.     CC  Ashley Espitia, APRN CNP  [address]  [place] [address]    Thank you for allowing me to participate in the care of your patient.      Sincerely,     MICHEAL Sandoval [facility]

## 2024-01-26 NOTE — PATIENT INSTRUCTIONS
Thank you for visiting with me today.    We discussed:   Please talk to Dr. Philip about the inhaler.    Get some low sodium, heart healthy cookbooks.      Medication changes:   Continue current medications.      Next Steps:   Please see Dr. Mauri Chase with labs and an echocardiogram in about 6 months.      Please call my nurses, Delilah and Azeb, directly with any questions at  [phone].      *If you have concerns after hours, please call [phone], option 2 to speak with on call Cardiologist.

## 2024-01-26 NOTE — PROGRESS NOTES
Cardiology Clinic Progress Note    Service Date: 2024    Primary Cardiologist: Dr. Mauri Chase      Reason for Visit: NSTEMI f/u     HPI:   Hamida Verma Apsley delightful 83-year-old woman with past medical history significant for the followin.  A-fib with initially difficult to control rate finally ended up with AV node ablation and permanent pacemaker on anticoagulation  2.  Hypertension with orthostatic hypotension  3.  Heart failure with mildly reduced ejection fraction about 45% variable between that and normal thought to be secondary to RV pacing  4.  Coronary artery disease was initially the patient having just coronary calcifications but unfortunately suffering 2 NSTEMI's in 2023 the  where she had a drug-eluting stent to the proximal to mid circumflex for 90% lesion at that time her RCA was just 50% stenosed and she had just 25% ostial proximal LAD.  Unfortunately she presented with repeat pain  and had drug-eluting stent to the mid to distal RCA and had a patent stent to the circumflex.  5.  Strong family history of coronary disease with 1 son passing away at the age of 57 from an MI and her other son have been a STEMI at the age of 57  6.  Underlying lung disease with emphysema showing on CT.    I initially met Ms. Ku when she presented with progressive shortness of breath so much that she canceled dinner at her favorite restaurant of [employer].  We eventually been able to get her on Entresto inhalers get her pacemaker put in she has done well.  We watched her coronary disease closely and unfortunately presented with right shoulder plain in December and was found to have an MI.  She underwent interventions as detailed above.    Today she comes in overall feeling well.  She denies chest pain orthopnea or PND she has not had pedal edema.  She does get short of breath climbing stairs but can do this without stopping and continues walking.  Her  "granddaughter carries her laundry up and down for her.  She has not had any additional right shoulder pain which was her anginal equivalent.    Social History:  No significant EtOH former smoker with a 67-pack-year history.  She is .  She lives in her own home with her adult daughter who has Lewy body dementia and is 63 and her granddaughter who is 33 and is trained as an EMT who helps take care of both of them.    Physical Exam:  /72   Pulse 65   Ht 1.6 m (5' 3\")   Wt 78 kg (172 lb)   LMP  (LMP Unknown)   SpO2 96%   BMI 30.47 kg/m     Well-developed well-nourished woman in no acute distress.  Normocephalic atraumatic.  Heart is regularly irregular, I do not appreciate murmur rub or gallop.  Lungs are clear without wheezes rales or rhonchi extremities without peripheral edema skin is warm and dry.    Data reviewed:  Both angiograms, 2 echocardiograms, hemoglobin    Assessment and Plan:  1.  Coronary disease with 2 recent NSTEMI's and drug-eluting stents to the RCA and circumflex in 2 separate procedures.  Fortunately she is doing well now and her right arm pain is completely resolved.  She is appropriately on Plavix and Xarelto.  We discussed that she needs 3% if there is any additional similar symptoms.    2.  Hypertension with orthostatic hypotension some lightheadedness when standing up will allow blood pressures run in the 130s to prevent worsening symptoms.    3.  Heart failure with mildly reduced ejection fraction looked at 50 to 55% and 1 echo in the hospital 45% on the second she is already on Entresto and carvedilol and Lasix.  She is euvolemic.  If she develops worse symptoms could consider both spironolactone and SGLT2 inhibitor    4.  A-fib with difficult to control rates with AV node ablation and pacemaker in place appropriately on Xarelto hemoglobin stable today.    5.  Dyslipidemia on full dose Crestor with last lipid panel showing LDL 77 HDL 74 total cholesterol 173.  The patient " is very motivated to eat better we discussed American Heart Association cookbooks today and ongoing low sodium we will repeat a lipid panel in 6 months.    It is my great pleasure to participate in this delightful patient's care she can follow-up with Dr. Mauri Chase with an echo BMP lipid panel and hemoglobin before that visit.  She will call sooner with concerns.    Sandrine Glover PA-C  [facility] Cardiology     This note was written using Dragon voice recognition system, please excuse any misspelled names, or nonsensical words and call with any questions.        Orders this visit:  Orders Placed This Encounter   Procedures    Basic metabolic panel    Lipid Profile    ALT    Follow-Up with Cardiology    Echocardiogram Complete     Orders Placed This Encounter   Medications    carvedilol (COREG) 12.5 MG tablet     Sig: Take 1 tablet (12.5 mg) by mouth 2 times daily (with meals)     Dispense:  180 tablet     Refill:  3    clopidogrel (PLAVIX) 75 MG tablet     Sig: Take 1 tablet (75 mg) by mouth daily To protect your stent(s).  Do not stop taking unless directed by cardiology.     Dispense:  90 tablet     Refill:  3    furosemide (LASIX) 20 MG tablet     Sig: Take 1 tablet (20 mg) by mouth daily     Dispense:  90 tablet     Refill:  3    potassium chloride ER (KLOR-CON M) 10 MEQ CR tablet     Sig: Take 1 tablet (10 mEq) by mouth daily     Dispense:  90 tablet     Refill:  3    rivaroxaban ANTICOAGULANT (XARELTO) 15 MG TABS tablet     Sig: Take 1 tablet (15 mg) by mouth daily (with dinner)     Dispense:  90 tablet     Refill:  3    sacubitril-valsartan (ENTRESTO) 49-51 MG per tablet     Sig: Take 1 tablet by mouth 2 times daily     Dispense:  180 tablet     Refill:  3     Medications Discontinued During This Encounter   Medication Reason    furosemide (LASIX) 20 MG tablet Reorder (No AVS)    potassium chloride ER (KLOR-CON M) 10 MEQ CR tablet Reorder (No AVS)    rivaroxaban ANTICOAGULANT (XARELTO) 15 MG TABS  tablet     carvedilol (COREG) 12.5 MG tablet Reorder (No AVS)    clopidogrel (PLAVIX) 75 MG tablet     sacubitril-valsartan (ENTRESTO) 49-51 MG per tablet      Encounter Diagnoses   Name Primary?    NSTEMI (non-ST elevated myocardial infarction) (H)     Coronary artery disease involving native coronary artery of native heart with unstable angina pectoris (H)     Chronic diastolic CHF (congestive heart failure) (H)     Hypokalemia     PAF (paroxysmal atrial fibrillation) (H)     Dyslipidemia, goal LDL below 70 Yes       Current Medications:  Current Outpatient Medications   Medication Sig Dispense Refill    albuterol (PROAIR HFA/PROVENTIL HFA/VENTOLIN HFA) 108 (90 Base) MCG/ACT inhaler Inhale 2 puffs into the lungs every 6 hours as needed      azelastine (ASTELIN) 0.1 % nasal spray Spray 1 spray into both nostrils 2 times daily 30 mL 1    carvedilol (COREG) 12.5 MG tablet Take 1 tablet (12.5 mg) by mouth 2 times daily (with meals) 180 tablet 3    cholecalciferol (VITAMIN  -D) 1000 UNIT capsule Take 1 capsule by mouth daily.      clopidogrel (PLAVIX) 75 MG tablet Take 1 tablet (75 mg) by mouth daily To protect your stent(s).  Do not stop taking unless directed by cardiology. 90 tablet 3    FLAX SEED OIL OR 1 capsule daily      furosemide (LASIX) 20 MG tablet Take 1 tablet (20 mg) by mouth daily 90 tablet 3    Multiple Vitamins-Minerals (HAIR SKIN NAILS PO) Take 1 tablet by mouth At Bedtime      Multiple Vitamins-Minerals (ICAPS AREDS 2 PO) Take 1 tablet by mouth 2 times daily      nitroGLYcerin (NITROSTAT) 0.4 MG sublingual tablet For chest pain place 1 tablet under the tongue every 5 minutes for 3 doses. If symptoms persist 5 minutes after 1st dose call 911. 25 tablet 11    potassium chloride ER (KLOR-CON M) 10 MEQ CR tablet Take 1 tablet (10 mEq) by mouth daily 90 tablet 3    rivaroxaban ANTICOAGULANT (XARELTO) 15 MG TABS tablet Take 1 tablet (15 mg) by mouth daily (with dinner) 90 tablet 3    rosuvastatin (CRESTOR)  40 MG tablet Take 1 tablet (40 mg) by mouth daily 90 tablet 3    sacubitril-valsartan (ENTRESTO) 49-51 MG per tablet Take 1 tablet by mouth 2 times daily 180 tablet 3    tiotropium (SPIRIVA) 18 MCG inhaled capsule Inhale 1 capsule (18 mcg) into the lungs daily as needed (Patient not taking: Reported on 1/26/2024)         Allergies Reviewed and Updated:  Allergies   Allergen Reactions    Strawberries [Strawberry Extract] Anaphylaxis    Strawberry Flavor        Review of Systems:  Skin:  Positive for bruising   Eyes:  Positive for glasses  ENT:  Negative    Respiratory:  Negative shortness of breath  Cardiovascular:  Negative;palpitations;chest pain;edema    Gastroenterology: not assessed    Genitourinary:  Negative    Musculoskeletal:  Negative    Neurologic:  not assessed    Psychiatric:  Negative    Heme/Lymph/Imm:  Positive for allergies  Endocrine:  Negative       Pertinent Past Medical, Surgical and Family History Reviewed and noted above.     CC  NELY Carcamo CNP  [address]  [place] [id number]

## 2024-01-30 ENCOUNTER — HOSPITAL ENCOUNTER (OUTPATIENT)
Dept: CARDIAC REHAB | Facility: CLINIC | Age: 84
Discharge: HOME | End: 2024-01-30
Attending: STUDENT IN AN ORGANIZED HEALTH CARE EDUCATION/TRAINING PROGRAM
Payer: COMMERCIAL

## 2024-01-30 DIAGNOSIS — I21.4 NSTEMI (NON-ST ELEVATED MYOCARDIAL INFARCTION) (H): ICD-10-CM

## 2024-01-30 PROCEDURE — 93798 PHYS/QHP OP CAR RHAB W/ECG: CPT

## 2024-01-30 PROCEDURE — 93797 PHYS/QHP OP CAR RHAB WO ECG: CPT

## 2024-02-01 ENCOUNTER — HOSPITAL ENCOUNTER (OUTPATIENT)
Dept: CARDIAC REHAB | Facility: CLINIC | Age: 84
Discharge: HOME | End: 2024-02-01
Attending: STUDENT IN AN ORGANIZED HEALTH CARE EDUCATION/TRAINING PROGRAM
Payer: COMMERCIAL

## 2024-02-01 PROCEDURE — 93798 PHYS/QHP OP CAR RHAB W/ECG: CPT

## 2024-02-06 ENCOUNTER — HOSPITAL ENCOUNTER (OUTPATIENT)
Dept: CARDIAC REHAB | Facility: CLINIC | Age: 84
Discharge: HOME | End: 2024-02-06
Attending: STUDENT IN AN ORGANIZED HEALTH CARE EDUCATION/TRAINING PROGRAM
Payer: COMMERCIAL

## 2024-02-06 PROCEDURE — 93798 PHYS/QHP OP CAR RHAB W/ECG: CPT | Performed by: REHABILITATION PRACTITIONER

## 2024-02-23 ENCOUNTER — HOSPITAL ENCOUNTER (OUTPATIENT)
Dept: CARDIAC REHAB | Facility: CLINIC | Age: 84
Discharge: HOME | End: 2024-02-23
Attending: STUDENT IN AN ORGANIZED HEALTH CARE EDUCATION/TRAINING PROGRAM
Payer: COMMERCIAL

## 2024-02-23 PROCEDURE — 93798 PHYS/QHP OP CAR RHAB W/ECG: CPT

## 2024-02-27 ENCOUNTER — OFFICE VISIT (OUTPATIENT)
Dept: FAMILY MEDICINE | Facility: CLINIC | Age: 84
End: 2024-02-27
Payer: COMMERCIAL

## 2024-02-27 VITALS
TEMPERATURE: 96.8 F | HEIGHT: 63 IN | HEART RATE: 69 BPM | WEIGHT: 170.5 LBS | DIASTOLIC BLOOD PRESSURE: 80 MMHG | OXYGEN SATURATION: 98 % | RESPIRATION RATE: 16 BRPM | BODY MASS INDEX: 30.21 KG/M2 | SYSTOLIC BLOOD PRESSURE: 132 MMHG

## 2024-02-27 DIAGNOSIS — J44.9 CHRONIC OBSTRUCTIVE PULMONARY DISEASE, UNSPECIFIED COPD TYPE (H): ICD-10-CM

## 2024-02-27 DIAGNOSIS — R91.8 PULMONARY NODULES: Primary | ICD-10-CM

## 2024-02-27 DIAGNOSIS — I10 HYPERTENSION GOAL BP (BLOOD PRESSURE) < 140/90: ICD-10-CM

## 2024-02-27 DIAGNOSIS — N18.30 STAGE 3 CHRONIC KIDNEY DISEASE, UNSPECIFIED WHETHER STAGE 3A OR 3B CKD (H): ICD-10-CM

## 2024-02-27 PROCEDURE — 99214 OFFICE O/P EST MOD 30 MIN: CPT | Performed by: FAMILY MEDICINE

## 2024-02-27 RX ORDER — TIOTROPIUM BROMIDE 18 UG/1
18 CAPSULE ORAL; RESPIRATORY (INHALATION) DAILY
Qty: 90 CAPSULE | Refills: 3 | Status: SHIPPED | OUTPATIENT
Start: 2024-02-27

## 2024-02-27 NOTE — TELEPHONE ENCOUNTER
Motivated to work toward weight loss  Wants to see a dietician  Weight 292# past couple of years, but used to be 220-253#     Patient called today stating that she is on eliquis and that she cannot afford it. She took her last dose on Saturday morning. I supplied the patient with 1 week of eliquis, which she will  today. I gave her the names of xarelto, pradaxa, and warfarin and asked that she call her insurance company regarding cost. She is scheduled to see Dr. Mallory on 5/1/19 and will discuss which anticoagulation is best moving forward at that time.

## 2024-02-27 NOTE — PATIENT INSTRUCTIONS
Start a balance class at a local [facility].   Set a timer for you to get up and move around once every hour at least.   I sent refills of spiriva for you  You will receive a call in the next few days to schedule a CT scan for follow up of your lung nodule in March.
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
## 2024-03-04 ENCOUNTER — HOSPITAL ENCOUNTER (OUTPATIENT)
Dept: CARDIAC REHAB | Facility: CLINIC | Age: 84
Discharge: HOME | End: 2024-03-04
Attending: STUDENT IN AN ORGANIZED HEALTH CARE EDUCATION/TRAINING PROGRAM
Payer: COMMERCIAL

## 2024-03-04 PROCEDURE — 93798 PHYS/QHP OP CAR RHAB W/ECG: CPT

## 2024-03-07 ENCOUNTER — HOSPITAL ENCOUNTER (OUTPATIENT)
Dept: CARDIAC REHAB | Facility: CLINIC | Age: 84
Discharge: HOME | End: 2024-03-07
Attending: STUDENT IN AN ORGANIZED HEALTH CARE EDUCATION/TRAINING PROGRAM
Payer: COMMERCIAL

## 2024-03-07 PROCEDURE — 93798 PHYS/QHP OP CAR RHAB W/ECG: CPT

## 2024-03-08 ENCOUNTER — HOSPITAL ENCOUNTER (OUTPATIENT)
Dept: CARDIAC REHAB | Facility: CLINIC | Age: 84
Discharge: HOME | End: 2024-03-08
Attending: STUDENT IN AN ORGANIZED HEALTH CARE EDUCATION/TRAINING PROGRAM
Payer: COMMERCIAL

## 2024-03-08 PROCEDURE — 93798 PHYS/QHP OP CAR RHAB W/ECG: CPT | Performed by: REHABILITATION PRACTITIONER

## 2024-03-11 ENCOUNTER — HOSPITAL ENCOUNTER (OUTPATIENT)
Dept: CARDIAC REHAB | Facility: CLINIC | Age: 84
Discharge: HOME | End: 2024-03-11
Attending: STUDENT IN AN ORGANIZED HEALTH CARE EDUCATION/TRAINING PROGRAM
Payer: COMMERCIAL

## 2024-03-11 PROCEDURE — 93798 PHYS/QHP OP CAR RHAB W/ECG: CPT | Performed by: OCCUPATIONAL THERAPIST

## 2024-03-12 ENCOUNTER — ANCILLARY PROCEDURE (OUTPATIENT)
Dept: CT IMAGING | Facility: CLINIC | Age: 84
End: 2024-03-12
Attending: FAMILY MEDICINE
Payer: COMMERCIAL

## 2024-03-12 DIAGNOSIS — R91.8 PULMONARY NODULES: ICD-10-CM

## 2024-03-12 PROCEDURE — 71250 CT THORAX DX C-: CPT

## 2024-03-13 ENCOUNTER — HOSPITAL ENCOUNTER (OUTPATIENT)
Dept: CARDIAC REHAB | Facility: CLINIC | Age: 84
Discharge: HOME | End: 2024-03-13
Attending: STUDENT IN AN ORGANIZED HEALTH CARE EDUCATION/TRAINING PROGRAM
Payer: COMMERCIAL

## 2024-03-13 PROCEDURE — 93798 PHYS/QHP OP CAR RHAB W/ECG: CPT | Performed by: REHABILITATION PRACTITIONER

## 2024-03-13 PROCEDURE — 93797 PHYS/QHP OP CAR RHAB WO ECG: CPT | Mod: 59 | Performed by: REHABILITATION PRACTITIONER

## 2024-03-15 ENCOUNTER — HOSPITAL ENCOUNTER (OUTPATIENT)
Dept: CARDIAC REHAB | Facility: CLINIC | Age: 84
Discharge: HOME | End: 2024-03-15
Attending: STUDENT IN AN ORGANIZED HEALTH CARE EDUCATION/TRAINING PROGRAM
Payer: COMMERCIAL

## 2024-03-15 PROCEDURE — 93798 PHYS/QHP OP CAR RHAB W/ECG: CPT

## 2024-03-19 ENCOUNTER — ANCILLARY PROCEDURE (OUTPATIENT)
Dept: CARDIOLOGY | Facility: CLINIC | Age: 84
End: 2024-03-19
Attending: INTERNAL MEDICINE
Payer: COMMERCIAL

## 2024-03-19 DIAGNOSIS — I50.32 CHRONIC DIASTOLIC CHF (CONGESTIVE HEART FAILURE) (H): ICD-10-CM

## 2024-03-19 DIAGNOSIS — I49.5 SSS (SICK SINUS SYNDROME) (H): ICD-10-CM

## 2024-03-19 DIAGNOSIS — Z95.0 CARDIAC PACEMAKER IN SITU: ICD-10-CM

## 2024-03-19 DIAGNOSIS — I48.0 PAF (PAROXYSMAL ATRIAL FIBRILLATION) (H): ICD-10-CM

## 2024-03-19 LAB
MDC_IDC_LEAD_CONNECTION_STATUS: NORMAL
MDC_IDC_LEAD_CONNECTION_STATUS: NORMAL
MDC_IDC_LEAD_IMPLANT_DT: NORMAL
MDC_IDC_LEAD_IMPLANT_DT: NORMAL
MDC_IDC_LEAD_LOCATION: NORMAL
MDC_IDC_LEAD_LOCATION: NORMAL
MDC_IDC_LEAD_LOCATION_DETAIL_1: NORMAL
MDC_IDC_LEAD_LOCATION_DETAIL_1: NORMAL
MDC_IDC_LEAD_MFG: NORMAL
MDC_IDC_LEAD_MFG: NORMAL
MDC_IDC_LEAD_MODEL: NORMAL
MDC_IDC_LEAD_MODEL: NORMAL
MDC_IDC_LEAD_POLARITY_TYPE: NORMAL
MDC_IDC_LEAD_POLARITY_TYPE: NORMAL
MDC_IDC_LEAD_SERIAL: NORMAL
MDC_IDC_LEAD_SERIAL: NORMAL
MDC_IDC_MSMT_BATTERY_DTM: NORMAL
MDC_IDC_MSMT_BATTERY_REMAINING_LONGEVITY: 55 MO
MDC_IDC_MSMT_BATTERY_REMAINING_PERCENTAGE: 42 %
MDC_IDC_MSMT_BATTERY_RRT_TRIGGER: NORMAL
MDC_IDC_MSMT_BATTERY_STATUS: NORMAL
MDC_IDC_MSMT_BATTERY_VOLTAGE: 2.96 V
MDC_IDC_MSMT_LEADCHNL_RA_IMPEDANCE_VALUE: 550 OHM
MDC_IDC_MSMT_LEADCHNL_RA_LEAD_CHANNEL_STATUS: NORMAL
MDC_IDC_MSMT_LEADCHNL_RA_PACING_THRESHOLD_AMPLITUDE: 0.38 V
MDC_IDC_MSMT_LEADCHNL_RA_PACING_THRESHOLD_PULSEWIDTH: 0.4 MS
MDC_IDC_MSMT_LEADCHNL_RA_SENSING_INTR_AMPL: 3 MV
MDC_IDC_MSMT_LEADCHNL_RV_IMPEDANCE_VALUE: 560 OHM
MDC_IDC_MSMT_LEADCHNL_RV_LEAD_CHANNEL_STATUS: NORMAL
MDC_IDC_MSMT_LEADCHNL_RV_PACING_THRESHOLD_AMPLITUDE: 0.5 V
MDC_IDC_MSMT_LEADCHNL_RV_PACING_THRESHOLD_PULSEWIDTH: 0.4 MS
MDC_IDC_MSMT_LEADCHNL_RV_SENSING_INTR_AMPL: 10.8 MV
MDC_IDC_PG_IMPLANT_DTM: NORMAL
MDC_IDC_PG_MFG: NORMAL
MDC_IDC_PG_MODEL: NORMAL
MDC_IDC_PG_SERIAL: NORMAL
MDC_IDC_PG_TYPE: NORMAL
MDC_IDC_SESS_CLINIC_NAME: NORMAL
MDC_IDC_SESS_DTM: NORMAL
MDC_IDC_SESS_REPROGRAMMED: NO
MDC_IDC_SESS_TYPE: NORMAL
MDC_IDC_SET_BRADY_AT_MODE_SWITCH_MODE: NORMAL
MDC_IDC_SET_BRADY_AT_MODE_SWITCH_RATE: 170 {BEATS}/MIN
MDC_IDC_SET_BRADY_LOWRATE: 60 {BEATS}/MIN
MDC_IDC_SET_BRADY_MAX_SENSOR_RATE: 120 {BEATS}/MIN
MDC_IDC_SET_BRADY_MAX_TRACKING_RATE: 120 {BEATS}/MIN
MDC_IDC_SET_BRADY_MODE: NORMAL
MDC_IDC_SET_BRADY_PAV_DELAY_LOW: 250 MS
MDC_IDC_SET_BRADY_SAV_DELAY_LOW: 225 MS
MDC_IDC_SET_LEADCHNL_RA_PACING_AMPLITUDE: 1.38
MDC_IDC_SET_LEADCHNL_RA_PACING_ANODE_ELECTRODE_1: NORMAL
MDC_IDC_SET_LEADCHNL_RA_PACING_ANODE_LOCATION_1: NORMAL
MDC_IDC_SET_LEADCHNL_RA_PACING_CAPTURE_MODE: NORMAL
MDC_IDC_SET_LEADCHNL_RA_PACING_CATHODE_ELECTRODE_1: NORMAL
MDC_IDC_SET_LEADCHNL_RA_PACING_CATHODE_LOCATION_1: NORMAL
MDC_IDC_SET_LEADCHNL_RA_PACING_POLARITY: NORMAL
MDC_IDC_SET_LEADCHNL_RA_PACING_PULSEWIDTH: 0.4 MS
MDC_IDC_SET_LEADCHNL_RA_SENSING_ADAPTATION_MODE: NORMAL
MDC_IDC_SET_LEADCHNL_RA_SENSING_ANODE_ELECTRODE_1: NORMAL
MDC_IDC_SET_LEADCHNL_RA_SENSING_ANODE_LOCATION_1: NORMAL
MDC_IDC_SET_LEADCHNL_RA_SENSING_CATHODE_ELECTRODE_1: NORMAL
MDC_IDC_SET_LEADCHNL_RA_SENSING_CATHODE_LOCATION_1: NORMAL
MDC_IDC_SET_LEADCHNL_RA_SENSING_POLARITY: NORMAL
MDC_IDC_SET_LEADCHNL_RA_SENSING_SENSITIVITY: 0.3 MV
MDC_IDC_SET_LEADCHNL_RV_PACING_AMPLITUDE: 0.75 V
MDC_IDC_SET_LEADCHNL_RV_PACING_ANODE_ELECTRODE_1: NORMAL
MDC_IDC_SET_LEADCHNL_RV_PACING_ANODE_LOCATION_1: NORMAL
MDC_IDC_SET_LEADCHNL_RV_PACING_CAPTURE_MODE: NORMAL
MDC_IDC_SET_LEADCHNL_RV_PACING_CATHODE_ELECTRODE_1: NORMAL
MDC_IDC_SET_LEADCHNL_RV_PACING_CATHODE_LOCATION_1: NORMAL
MDC_IDC_SET_LEADCHNL_RV_PACING_POLARITY: NORMAL
MDC_IDC_SET_LEADCHNL_RV_PACING_PULSEWIDTH: 0.4 MS
MDC_IDC_SET_LEADCHNL_RV_SENSING_ADAPTATION_MODE: NORMAL
MDC_IDC_SET_LEADCHNL_RV_SENSING_ANODE_ELECTRODE_1: NORMAL
MDC_IDC_SET_LEADCHNL_RV_SENSING_ANODE_LOCATION_1: NORMAL
MDC_IDC_SET_LEADCHNL_RV_SENSING_CATHODE_ELECTRODE_1: NORMAL
MDC_IDC_SET_LEADCHNL_RV_SENSING_CATHODE_LOCATION_1: NORMAL
MDC_IDC_SET_LEADCHNL_RV_SENSING_POLARITY: NORMAL
MDC_IDC_SET_LEADCHNL_RV_SENSING_SENSITIVITY: 0.5 MV
MDC_IDC_STAT_AT_BURDEN_PERCENT: 0 %
MDC_IDC_STAT_AT_DTM_END: NORMAL
MDC_IDC_STAT_AT_DTM_START: NORMAL
MDC_IDC_STAT_AT_MODE_SW_COUNT: 7
MDC_IDC_STAT_AT_MODE_SW_COUNT_PER_DAY: 0
MDC_IDC_STAT_AT_MODE_SW_MAX_DURATION: 40 S
MDC_IDC_STAT_AT_MODE_SW_PERCENT_TIME: 1 %
MDC_IDC_STAT_BRADY_AP_VP_PERCENT: 18 %
MDC_IDC_STAT_BRADY_AP_VS_PERCENT: 1 %
MDC_IDC_STAT_BRADY_AS_VP_PERCENT: 82 %
MDC_IDC_STAT_BRADY_AS_VS_PERCENT: 1 %
MDC_IDC_STAT_BRADY_DTM_END: NORMAL
MDC_IDC_STAT_BRADY_DTM_START: NORMAL
MDC_IDC_STAT_BRADY_RA_PERCENT_PACED: 18 %
MDC_IDC_STAT_BRADY_RV_PERCENT_PACED: 99 %
MDC_IDC_STAT_CRT_DTM_END: NORMAL
MDC_IDC_STAT_CRT_DTM_START: NORMAL
MDC_IDC_STAT_HEART_RATE_ATRIAL_MAX: 330 {BEATS}/MIN
MDC_IDC_STAT_HEART_RATE_ATRIAL_MEAN: 73 {BEATS}/MIN
MDC_IDC_STAT_HEART_RATE_ATRIAL_MIN: 50 {BEATS}/MIN
MDC_IDC_STAT_HEART_RATE_DTM_END: NORMAL
MDC_IDC_STAT_HEART_RATE_DTM_START: NORMAL
MDC_IDC_STAT_HEART_RATE_VENTRICULAR_MAX: 220 {BEATS}/MIN
MDC_IDC_STAT_HEART_RATE_VENTRICULAR_MEAN: 73 {BEATS}/MIN
MDC_IDC_STAT_HEART_RATE_VENTRICULAR_MIN: 40 {BEATS}/MIN

## 2024-03-19 PROCEDURE — 93294 REM INTERROG EVL PM/LDLS PM: CPT | Performed by: INTERNAL MEDICINE

## 2024-03-19 PROCEDURE — 93296 REM INTERROG EVL PM/IDS: CPT | Performed by: INTERNAL MEDICINE

## 2024-03-20 ENCOUNTER — HOSPITAL ENCOUNTER (OUTPATIENT)
Dept: CARDIAC REHAB | Facility: CLINIC | Age: 84
Discharge: HOME | End: 2024-03-20
Attending: STUDENT IN AN ORGANIZED HEALTH CARE EDUCATION/TRAINING PROGRAM
Payer: COMMERCIAL

## 2024-03-20 PROCEDURE — 93798 PHYS/QHP OP CAR RHAB W/ECG: CPT

## 2024-03-25 ENCOUNTER — HOSPITAL ENCOUNTER (OUTPATIENT)
Dept: CARDIAC REHAB | Facility: CLINIC | Age: 84
Discharge: HOME | End: 2024-03-25
Attending: STUDENT IN AN ORGANIZED HEALTH CARE EDUCATION/TRAINING PROGRAM
Payer: COMMERCIAL

## 2024-03-25 PROCEDURE — 93798 PHYS/QHP OP CAR RHAB W/ECG: CPT | Performed by: REHABILITATION PRACTITIONER

## 2024-03-29 ENCOUNTER — HOSPITAL ENCOUNTER (OUTPATIENT)
Dept: CARDIAC REHAB | Facility: CLINIC | Age: 84
Discharge: HOME | End: 2024-03-29
Attending: STUDENT IN AN ORGANIZED HEALTH CARE EDUCATION/TRAINING PROGRAM
Payer: COMMERCIAL

## 2024-03-29 PROCEDURE — 93798 PHYS/QHP OP CAR RHAB W/ECG: CPT

## 2024-04-01 ENCOUNTER — HOSPITAL ENCOUNTER (OUTPATIENT)
Dept: CARDIAC REHAB | Facility: CLINIC | Age: 84
Discharge: HOME | End: 2024-04-01
Attending: STUDENT IN AN ORGANIZED HEALTH CARE EDUCATION/TRAINING PROGRAM
Payer: COMMERCIAL

## 2024-04-01 PROCEDURE — 93798 PHYS/QHP OP CAR RHAB W/ECG: CPT

## 2024-04-08 ENCOUNTER — HOSPITAL ENCOUNTER (OUTPATIENT)
Dept: CARDIAC REHAB | Facility: CLINIC | Age: 84
Discharge: HOME | End: 2024-04-08
Attending: STUDENT IN AN ORGANIZED HEALTH CARE EDUCATION/TRAINING PROGRAM
Payer: COMMERCIAL

## 2024-04-08 PROCEDURE — 93798 PHYS/QHP OP CAR RHAB W/ECG: CPT | Performed by: REHABILITATION PRACTITIONER

## 2024-04-10 ENCOUNTER — HOSPITAL ENCOUNTER (OUTPATIENT)
Dept: CARDIAC REHAB | Facility: CLINIC | Age: 84
Discharge: HOME | End: 2024-04-10
Attending: STUDENT IN AN ORGANIZED HEALTH CARE EDUCATION/TRAINING PROGRAM
Payer: COMMERCIAL

## 2024-04-10 PROCEDURE — 93798 PHYS/QHP OP CAR RHAB W/ECG: CPT | Performed by: REHABILITATION PRACTITIONER

## 2024-04-12 ENCOUNTER — HOSPITAL ENCOUNTER (OUTPATIENT)
Dept: CARDIAC REHAB | Facility: CLINIC | Age: 84
Discharge: HOME | End: 2024-04-12
Attending: STUDENT IN AN ORGANIZED HEALTH CARE EDUCATION/TRAINING PROGRAM
Payer: COMMERCIAL

## 2024-04-12 PROCEDURE — 93798 PHYS/QHP OP CAR RHAB W/ECG: CPT | Performed by: REHABILITATION PRACTITIONER

## 2024-04-15 ENCOUNTER — HOSPITAL ENCOUNTER (OUTPATIENT)
Dept: CARDIAC REHAB | Facility: CLINIC | Age: 84
Discharge: HOME | End: 2024-04-15
Attending: STUDENT IN AN ORGANIZED HEALTH CARE EDUCATION/TRAINING PROGRAM
Payer: COMMERCIAL

## 2024-04-15 PROCEDURE — 93798 PHYS/QHP OP CAR RHAB W/ECG: CPT

## 2024-04-19 ENCOUNTER — HOSPITAL ENCOUNTER (OUTPATIENT)
Dept: CARDIAC REHAB | Facility: CLINIC | Age: 84
Discharge: HOME | End: 2024-04-19
Attending: STUDENT IN AN ORGANIZED HEALTH CARE EDUCATION/TRAINING PROGRAM
Payer: COMMERCIAL

## 2024-04-19 PROCEDURE — 93798 PHYS/QHP OP CAR RHAB W/ECG: CPT | Performed by: CLINICAL EXERCISE PHYSIOLOGIST

## 2024-04-24 ENCOUNTER — VIRTUAL VISIT (OUTPATIENT)
Dept: PHARMACY | Facility: CLINIC | Age: 84
End: 2024-04-24
Payer: COMMERCIAL

## 2024-04-24 ENCOUNTER — TELEPHONE (OUTPATIENT)
Dept: PHARMACY | Facility: OTHER | Age: 84
End: 2024-04-24
Payer: COMMERCIAL

## 2024-04-24 DIAGNOSIS — Z78.9 TAKES DIETARY SUPPLEMENTS: ICD-10-CM

## 2024-04-24 DIAGNOSIS — I21.4 NSTEMI (NON-ST ELEVATED MYOCARDIAL INFARCTION) (H): ICD-10-CM

## 2024-04-24 DIAGNOSIS — J44.9 CHRONIC OBSTRUCTIVE PULMONARY DISEASE, UNSPECIFIED COPD TYPE (H): ICD-10-CM

## 2024-04-24 DIAGNOSIS — I48.0 PAF (PAROXYSMAL ATRIAL FIBRILLATION) (H): ICD-10-CM

## 2024-04-24 DIAGNOSIS — J30.2 SEASONAL ALLERGIC RHINITIS, UNSPECIFIED TRIGGER: ICD-10-CM

## 2024-04-24 DIAGNOSIS — I10 HYPERTENSION GOAL BP (BLOOD PRESSURE) < 140/90: ICD-10-CM

## 2024-04-24 DIAGNOSIS — E78.5 HYPERLIPIDEMIA LDL GOAL <70: ICD-10-CM

## 2024-04-24 DIAGNOSIS — I50.32 CHRONIC DIASTOLIC CHF (CONGESTIVE HEART FAILURE) (H): Primary | ICD-10-CM

## 2024-04-24 PROCEDURE — 99607 MTMS BY PHARM ADDL 15 MIN: CPT | Mod: 93 | Performed by: PHARMACIST

## 2024-04-24 PROCEDURE — 99605 MTMS BY PHARM NP 15 MIN: CPT | Mod: 93 | Performed by: PHARMACIST

## 2024-04-24 NOTE — LETTER
_  Medication List        Prepared on: 04/24/2024     Bring your Medication List when you go to the doctor, hospital, or   emergency room. And, share it with your family or caregivers.     Note any changes to how you take your medications.  Cross out medications when you no longer use them.    Medication How I take it Why I use it Prescriber   albuterol (PROAIR HFA/PROVENTIL HFA/VENTOLIN HFA) 108 (90 Base) MCG/ACT inhaler Inhale 2 puffs into the lungs every 6 hours as needed Chronic diastolic CHF (congestive heart failure) (H) Jose Aguero MD   azelastine (ASTELIN) 0.1 % nasal spray Spray 1 spray into both nostrils 2 times daily Runny Nose Shravan Ivanna Olivia MD   carvedilol (COREG) 12.5 MG tablet Take 1 tablet (12.5 mg) by mouth 2 times daily (with meals) Coronary artery disease involving native coronary artery of native heart with unstable angina pectoris (H) Sandrine Glover PA-C             cholecalciferol (VITAMIN  -D) 1000 UNIT capsule Take 1 capsule by mouth daily.  Immune support Patient Reported   clopidogrel (PLAVIX) 75 MG tablet Take 1 tablet (75 mg) by mouth daily To protect your stent(s).  Do not stop taking unless directed by cardiology. Coronary artery disease involving native coronary artery of native heart with unstable angina pectoris (H) Sandrine Glover PA-C   FLAX SEED OIL OR 1 capsule daily Skin/hair health Patient Reported   furosemide (LASIX) 20 MG tablet Take 1 tablet (20 mg) by mouth daily Chronic diastolic CHF (congestive heart failure) (H) Sandrine Glover PA-C   Multiple Vitamins-Minerals (HAIR SKIN NAILS PO) Take 1 tablet by mouth At Bedtime  Skin/hair health  Entered By History Unknown   Multiple Vitamins-Minerals (ICAPS AREDS 2 PO) Take 1 tablet by mouth 2 times daily  Macular degeneration  Entered By History Unknown   nitroGLYcerin (NITROSTAT) 0.4 MG sublingual tablet For chest pain place 1 tablet under the tongue every 5 minutes for 3 doses. If  symptoms persist 5 minutes after 1st dose call 911. NSTEMI (Non-ST Elevated Myocardial Infarction) (H) Jose Aguero MD   potassium chloride ER (KLOR-CON M) 10 MEQ CR tablet Take 1 tablet (10 mEq) by mouth daily Hypokalemia Sandrine Glover PA-C   rivaroxaban ANTICOAGULANT (XARELTO) 15 MG TABS tablet Take 1 tablet (15 mg) by mouth daily (with dinner) Coronary artery disease involving native coronary artery of native heart with unstable angina pectoris (H); PAF (Paroxysmal Atrial Fibrillation) (H) Sandrine Glover PA-C   rosuvastatin (CRESTOR) 40 MG tablet Take 1 tablet (40 mg) by mouth daily Hyperlipidemia LDL Goal <70 Roby Mallory MD   sacubitril-valsartan (ENTRESTO) 49-51 MG per tablet Take 1 tablet by mouth 2 times daily NSTEMI (Non-ST Elevated Myocardial Infarction) (H); Chronic diastolic CHF (congestive heart failure) (H) Sandrine Glover PA-C   tiotropium (SPIRIVA) 18 MCG inhaled capsule Inhale 1 capsule (18 mcg) into the lungs daily Chronic obstructive pulmonary disease, unspecified COPD type (H) Mikala Philip MD         Add new medications, over-the-counter drugs, herbals, vitamins, or  minerals in the blank rows below.    Medication How I take it Why I use it Prescriber                                      Allergies:      - Strawberries [strawberry Extract] - Anaphylaxis  - Strawberry Flavor        Side effects I have had:      Not on File        Other Information:              My notes and questions:

## 2024-04-24 NOTE — TELEPHONE ENCOUNTER
MTM Recruitment: [facility] insurance     Referral outreach attempt #1 on April 24, 2024      Outcome: visit completed

## 2024-04-24 NOTE — LETTER
April 24, 2024  Hamida Verma  [id number] [address]    Dear Ms. Verma, [facility] AND INFECTIOUS DISEASES [facility]     Thank you for talking with me on Apr 24, 2024 about your health and medications. As a follow-up to our conversation, I have included two documents:      Your Recommended To-Do List has steps you should take to get the best results from your medications.  Your Medication List will help you keep track of your medications and how to take them.    If you want to talk about these documents, please call [facility] at phone: [phone], Monday-Friday 8-4:30pm.    I look forward to working with you and your doctors to make sure your medications work well for you.    Sincerely,  HILL CHAVEZ RPH  [facility] Pharmacist, [facility]

## 2024-04-24 NOTE — LETTER
"Recommended To-Do List      Prepared on: 04/24/2024       You can get the best results from your medications by completing the items on this \"To-Do List.\"      Bring your To-Do List when you go to your doctor. And, share it with your family or caregivers.    My To-Do List:  What we talked about: What I should do:   The importance of taking your medication as intended    Education: check expiration dates for albuterol inhaler and nitroglycerin tablets and replace as needed           What we talked about: What I should do:                     "

## 2024-04-24 NOTE — PATIENT INSTRUCTIONS
"Recommendations from today's MTM visit:                                                      No medication changes   Check expiration dates on nitroglycerin and albuterol inhaler and replace as needed     Follow-up: 1 year     It was great speaking with you today.  I value your experience and would be very thankful for your time in providing feedback in our clinic survey. In the next few days, you may receive an email or text message from [facility] with a link to a survey related to your  clinical pharmacist.\"     To schedule another MTM appointment, please call the clinic directly or you may call the MTM scheduling line at [phone] or toll-free at [phone].     My Clinical Pharmacist's contact information:                                                      Please feel free to contact me with any questions or concerns you have.      Tiffani Lott, PharmD, AAHIVP  Medication Therapy Management Pharmacist      "

## 2024-04-24 NOTE — PROGRESS NOTES
Medication Therapy Management (MTM) Encounter    ASSESSMENT:                            Medication Adherence/Access: No issues identified    CHF/Hypertension/CAD/Afib with pacemaker:  Stable, reminded to check expiration on nitroglycerin and replace as needed     Hyperlipidemia:   stable    COPD:   Reminded to check expiration on albuterol inhaler and replace as needed    Allergies:   stable    Supplements:   stable    PLAN:                            No medication changes   Check expiration dates on nitroglycerin and albuterol inhaler and replace as needed     Follow-up: 1 year     SUBJECTIVE/OBJECTIVE:                          Hamida Verma is a 83 year old female called for an initial visit. She was referred to me from her insurance.     Reason for visit: medication therapy management review    Allergies/ADRs: Reviewed in chart  Past Medical History: Reviewed in chart  Tobacco: She reports that she quit smoking about 23 years ago. Her smoking use included cigarettes. She started smoking about 68 years ago. She has a 67.6 pack-year smoking history. She has never used smokeless tobacco.  Alcohol: none    Medication Adherence/Access: takes medications with food     CHF/Hypertension/CAD/Afib with pacemaker:  Carvedilol 12.5 mg 2 times daily   Entresto 49-51 mg 2 times daily   Furosemide 20 mg once daily   Potassium chloride 10 MEQ once daily   Clopidogrel 75 mg once daily   Rivaroxaban 15 mg once daily   Nitroglycerin SL tablets -hasn't needed to use these   Patient reports no current medication side effects. States she just finished cardiac rehab.  Patient self-monitors blood pressure.  Home BP monitoring 131/78.      Wt Readings from Last 2 Encounters:   02/27/24 170 lb 8 oz (77.3 kg)   01/26/24 172 lb (78 kg)     Hyperlipidemia:   rosuvastatin 40mg daily  Patient reports no current medication side effects.    COPD:   Spiriva 1 puff daily  Albuterol - not using very often   Patient reports no current medication  side effects.    Patient reports the following symptoms: stable; dyspnea with activity     Allergies:   Azelastine spray as needed- not currently using   No current issues    Supplements:   Multivitamin (hair, skin, nails) once daily   Areds 2 2 times daily   Flax seed oil once daily   Vitamin D 1000 units daily   No reported issues at this time.     Today's Vitals: LMP  (LMP Unknown)   ----------------      I spent 15 minutes with this patient today. All changes were made via collaborative practice agreement with Dr. Philip. A copy of the visit note was provided to the patient's provider(s).    A summary of these recommendations was sent via "[employer].     Medication Therapy Recommendations  No medication therapy recommendations to display      Medication Therapy Recommendations  Chronic obstructive pulmonary disease, unspecified COPD type (H)    Current Medication: albuterol (PROAIR HFA/PROVENTIL HFA/VENTOLIN HFA) 108 (90 Base) MCG/ACT inhaler   Rationale: Does not understand instructions - Adherence - Adherence   Recommendation: Provide Education   Status: Patient Agreed - Adherence/Education              Telemedicine Visit Details  Type of service:  Telephone visit  Start Time:  1:50 pm  End Time:  2:05 pm

## 2024-06-19 ENCOUNTER — ANCILLARY PROCEDURE (OUTPATIENT)
Dept: CARDIOLOGY | Facility: CLINIC | Age: 84
End: 2024-06-19
Attending: INTERNAL MEDICINE
Payer: COMMERCIAL

## 2024-06-19 DIAGNOSIS — I49.5 SSS (SICK SINUS SYNDROME) (H): ICD-10-CM

## 2024-06-19 DIAGNOSIS — I48.0 PAF (PAROXYSMAL ATRIAL FIBRILLATION) (H): ICD-10-CM

## 2024-06-19 DIAGNOSIS — I50.32 CHRONIC DIASTOLIC CHF (CONGESTIVE HEART FAILURE) (H): ICD-10-CM

## 2024-06-19 DIAGNOSIS — Z95.0 CARDIAC PACEMAKER IN SITU: ICD-10-CM

## 2024-06-19 PROCEDURE — 93296 REM INTERROG EVL PM/IDS: CPT | Performed by: INTERNAL MEDICINE

## 2024-06-19 PROCEDURE — 93294 REM INTERROG EVL PM/LDLS PM: CPT | Performed by: INTERNAL MEDICINE

## 2024-06-20 LAB
MDC_IDC_EPISODE_DTM: NORMAL
MDC_IDC_EPISODE_DTM: NORMAL
MDC_IDC_EPISODE_ID: NORMAL
MDC_IDC_EPISODE_ID: NORMAL
MDC_IDC_EPISODE_TYPE: NORMAL
MDC_IDC_EPISODE_TYPE: NORMAL
MDC_IDC_LEAD_CONNECTION_STATUS: NORMAL
MDC_IDC_LEAD_CONNECTION_STATUS: NORMAL
MDC_IDC_LEAD_IMPLANT_DT: NORMAL
MDC_IDC_LEAD_IMPLANT_DT: NORMAL
MDC_IDC_LEAD_LOCATION: NORMAL
MDC_IDC_LEAD_LOCATION: NORMAL
MDC_IDC_LEAD_LOCATION_DETAIL_1: NORMAL
MDC_IDC_LEAD_LOCATION_DETAIL_1: NORMAL
MDC_IDC_LEAD_MFG: NORMAL
MDC_IDC_LEAD_MFG: NORMAL
MDC_IDC_LEAD_MODEL: NORMAL
MDC_IDC_LEAD_MODEL: NORMAL
MDC_IDC_LEAD_POLARITY_TYPE: NORMAL
MDC_IDC_LEAD_POLARITY_TYPE: NORMAL
MDC_IDC_LEAD_SERIAL: NORMAL
MDC_IDC_LEAD_SERIAL: NORMAL
MDC_IDC_MSMT_BATTERY_DTM: NORMAL
MDC_IDC_MSMT_BATTERY_REMAINING_LONGEVITY: 50 MO
MDC_IDC_MSMT_BATTERY_REMAINING_PERCENTAGE: 39 %
MDC_IDC_MSMT_BATTERY_RRT_TRIGGER: NORMAL
MDC_IDC_MSMT_BATTERY_STATUS: NORMAL
MDC_IDC_MSMT_BATTERY_VOLTAGE: 2.96 V
MDC_IDC_MSMT_LEADCHNL_RA_IMPEDANCE_VALUE: 490 OHM
MDC_IDC_MSMT_LEADCHNL_RA_LEAD_CHANNEL_STATUS: NORMAL
MDC_IDC_MSMT_LEADCHNL_RA_PACING_THRESHOLD_AMPLITUDE: 0.5 V
MDC_IDC_MSMT_LEADCHNL_RA_PACING_THRESHOLD_PULSEWIDTH: 0.4 MS
MDC_IDC_MSMT_LEADCHNL_RA_SENSING_INTR_AMPL: 2.9 MV
MDC_IDC_MSMT_LEADCHNL_RV_IMPEDANCE_VALUE: 510 OHM
MDC_IDC_MSMT_LEADCHNL_RV_LEAD_CHANNEL_STATUS: NORMAL
MDC_IDC_MSMT_LEADCHNL_RV_PACING_THRESHOLD_AMPLITUDE: 0.62 V
MDC_IDC_MSMT_LEADCHNL_RV_PACING_THRESHOLD_PULSEWIDTH: 0.4 MS
MDC_IDC_MSMT_LEADCHNL_RV_SENSING_INTR_AMPL: 10.8 MV
MDC_IDC_PG_IMPLANT_DTM: NORMAL
MDC_IDC_PG_MFG: NORMAL
MDC_IDC_PG_MODEL: NORMAL
MDC_IDC_PG_SERIAL: NORMAL
MDC_IDC_PG_TYPE: NORMAL
MDC_IDC_SESS_CLINIC_NAME: NORMAL
MDC_IDC_SESS_DTM: NORMAL
MDC_IDC_SESS_REPROGRAMMED: NO
MDC_IDC_SESS_TYPE: NORMAL
MDC_IDC_SET_BRADY_AT_MODE_SWITCH_MODE: NORMAL
MDC_IDC_SET_BRADY_AT_MODE_SWITCH_RATE: 170 {BEATS}/MIN
MDC_IDC_SET_BRADY_LOWRATE: 60 {BEATS}/MIN
MDC_IDC_SET_BRADY_MAX_SENSOR_RATE: 120 {BEATS}/MIN
MDC_IDC_SET_BRADY_MAX_TRACKING_RATE: 120 {BEATS}/MIN
MDC_IDC_SET_BRADY_MODE: NORMAL
MDC_IDC_SET_BRADY_PAV_DELAY_LOW: 250 MS
MDC_IDC_SET_BRADY_SAV_DELAY_LOW: 225 MS
MDC_IDC_SET_LEADCHNL_RA_PACING_AMPLITUDE: 1.5 V
MDC_IDC_SET_LEADCHNL_RA_PACING_ANODE_ELECTRODE_1: NORMAL
MDC_IDC_SET_LEADCHNL_RA_PACING_ANODE_LOCATION_1: NORMAL
MDC_IDC_SET_LEADCHNL_RA_PACING_CAPTURE_MODE: NORMAL
MDC_IDC_SET_LEADCHNL_RA_PACING_CATHODE_ELECTRODE_1: NORMAL
MDC_IDC_SET_LEADCHNL_RA_PACING_CATHODE_LOCATION_1: NORMAL
MDC_IDC_SET_LEADCHNL_RA_PACING_POLARITY: NORMAL
MDC_IDC_SET_LEADCHNL_RA_PACING_PULSEWIDTH: 0.4 MS
MDC_IDC_SET_LEADCHNL_RA_SENSING_ADAPTATION_MODE: NORMAL
MDC_IDC_SET_LEADCHNL_RA_SENSING_ANODE_ELECTRODE_1: NORMAL
MDC_IDC_SET_LEADCHNL_RA_SENSING_ANODE_LOCATION_1: NORMAL
MDC_IDC_SET_LEADCHNL_RA_SENSING_CATHODE_ELECTRODE_1: NORMAL
MDC_IDC_SET_LEADCHNL_RA_SENSING_CATHODE_LOCATION_1: NORMAL
MDC_IDC_SET_LEADCHNL_RA_SENSING_POLARITY: NORMAL
MDC_IDC_SET_LEADCHNL_RA_SENSING_SENSITIVITY: 0.3 MV
MDC_IDC_SET_LEADCHNL_RV_PACING_AMPLITUDE: 0.88
MDC_IDC_SET_LEADCHNL_RV_PACING_ANODE_ELECTRODE_1: NORMAL
MDC_IDC_SET_LEADCHNL_RV_PACING_ANODE_LOCATION_1: NORMAL
MDC_IDC_SET_LEADCHNL_RV_PACING_CAPTURE_MODE: NORMAL
MDC_IDC_SET_LEADCHNL_RV_PACING_CATHODE_ELECTRODE_1: NORMAL
MDC_IDC_SET_LEADCHNL_RV_PACING_CATHODE_LOCATION_1: NORMAL
MDC_IDC_SET_LEADCHNL_RV_PACING_POLARITY: NORMAL
MDC_IDC_SET_LEADCHNL_RV_PACING_PULSEWIDTH: 0.4 MS
MDC_IDC_SET_LEADCHNL_RV_SENSING_ADAPTATION_MODE: NORMAL
MDC_IDC_SET_LEADCHNL_RV_SENSING_ANODE_ELECTRODE_1: NORMAL
MDC_IDC_SET_LEADCHNL_RV_SENSING_ANODE_LOCATION_1: NORMAL
MDC_IDC_SET_LEADCHNL_RV_SENSING_CATHODE_ELECTRODE_1: NORMAL
MDC_IDC_SET_LEADCHNL_RV_SENSING_CATHODE_LOCATION_1: NORMAL
MDC_IDC_SET_LEADCHNL_RV_SENSING_POLARITY: NORMAL
MDC_IDC_SET_LEADCHNL_RV_SENSING_SENSITIVITY: 0.5 MV
MDC_IDC_STAT_AT_BURDEN_PERCENT: 0 %
MDC_IDC_STAT_AT_DTM_END: NORMAL
MDC_IDC_STAT_AT_DTM_START: NORMAL
MDC_IDC_STAT_AT_MODE_SW_COUNT: 0
MDC_IDC_STAT_AT_MODE_SW_COUNT_PER_DAY: 0
MDC_IDC_STAT_AT_MODE_SW_PERCENT_TIME: 0 %
MDC_IDC_STAT_BRADY_AP_VP_PERCENT: 37 %
MDC_IDC_STAT_BRADY_AP_VS_PERCENT: 1 %
MDC_IDC_STAT_BRADY_AS_VP_PERCENT: 63 %
MDC_IDC_STAT_BRADY_AS_VS_PERCENT: 1 %
MDC_IDC_STAT_BRADY_DTM_END: NORMAL
MDC_IDC_STAT_BRADY_DTM_START: NORMAL
MDC_IDC_STAT_BRADY_RA_PERCENT_PACED: 37 %
MDC_IDC_STAT_BRADY_RV_PERCENT_PACED: 99 %
MDC_IDC_STAT_CRT_DTM_END: NORMAL
MDC_IDC_STAT_CRT_DTM_START: NORMAL
MDC_IDC_STAT_HEART_RATE_ATRIAL_MAX: 300 {BEATS}/MIN
MDC_IDC_STAT_HEART_RATE_ATRIAL_MEAN: 69 {BEATS}/MIN
MDC_IDC_STAT_HEART_RATE_ATRIAL_MIN: 50 {BEATS}/MIN
MDC_IDC_STAT_HEART_RATE_DTM_END: NORMAL
MDC_IDC_STAT_HEART_RATE_DTM_START: NORMAL
MDC_IDC_STAT_HEART_RATE_VENTRICULAR_MAX: 180 {BEATS}/MIN
MDC_IDC_STAT_HEART_RATE_VENTRICULAR_MEAN: 69 {BEATS}/MIN
MDC_IDC_STAT_HEART_RATE_VENTRICULAR_MIN: 40 {BEATS}/MIN

## 2024-07-22 ENCOUNTER — HOSPITAL ENCOUNTER (OUTPATIENT)
Dept: CARDIOLOGY | Facility: CLINIC | Age: 84
Discharge: HOME | End: 2024-07-22
Attending: PHYSICIAN ASSISTANT | Admitting: PHYSICIAN ASSISTANT
Payer: COMMERCIAL

## 2024-07-22 ENCOUNTER — LAB (OUTPATIENT)
Dept: LAB | Facility: CLINIC | Age: 84
End: 2024-07-22
Payer: COMMERCIAL

## 2024-07-22 DIAGNOSIS — E78.5 DYSLIPIDEMIA, GOAL LDL BELOW 70: ICD-10-CM

## 2024-07-22 DIAGNOSIS — I50.32 CHRONIC DIASTOLIC CHF (CONGESTIVE HEART FAILURE) (H): ICD-10-CM

## 2024-07-22 LAB
ALT SERPL W P-5'-P-CCNC: 18 U/L (ref 0–50)
ANION GAP SERPL CALCULATED.3IONS-SCNC: 9 MMOL/L (ref 7–15)
BI-PLANE LVEF ECHO: NORMAL
BUN SERPL-MCNC: 14.6 MG/DL (ref 8–23)
CALCIUM SERPL-MCNC: 9.9 MG/DL (ref 8.8–10.4)
CHLORIDE SERPL-SCNC: 103 MMOL/L (ref 98–107)
CHOLEST SERPL-MCNC: 155 MG/DL
CREAT SERPL-MCNC: 0.96 MG/DL (ref 0.51–0.95)
EGFRCR SERPLBLD CKD-EPI 2021: 58 ML/MIN/1.73M2
FASTING STATUS PATIENT QL REPORTED: YES
GLUCOSE SERPL-MCNC: 110 MG/DL (ref 70–99)
HCO3 SERPL-SCNC: 29 MMOL/L (ref 22–29)
HDLC SERPL-MCNC: 71 MG/DL
LDLC SERPL CALC-MCNC: 66 MG/DL
NONHDLC SERPL-MCNC: 84 MG/DL
POTASSIUM SERPL-SCNC: 4.1 MMOL/L (ref 3.4–5.3)
SODIUM SERPL-SCNC: 141 MMOL/L (ref 135–145)
TRIGL SERPL-MCNC: 88 MG/DL

## 2024-07-22 PROCEDURE — 80048 BASIC METABOLIC PNL TOTAL CA: CPT | Performed by: PHYSICIAN ASSISTANT

## 2024-07-22 PROCEDURE — 93306 TTE W/DOPPLER COMPLETE: CPT | Mod: 26 | Performed by: INTERNAL MEDICINE

## 2024-07-22 PROCEDURE — 84460 ALANINE AMINO (ALT) (SGPT): CPT | Performed by: PHYSICIAN ASSISTANT

## 2024-07-22 PROCEDURE — 93306 TTE W/DOPPLER COMPLETE: CPT

## 2024-07-22 PROCEDURE — 36415 COLL VENOUS BLD VENIPUNCTURE: CPT | Performed by: PHYSICIAN ASSISTANT

## 2024-07-22 PROCEDURE — 80061 LIPID PANEL: CPT | Performed by: PHYSICIAN ASSISTANT

## 2024-07-25 ENCOUNTER — OFFICE VISIT (OUTPATIENT)
Dept: CARDIOLOGY | Facility: CLINIC | Age: 84
End: 2024-07-25
Attending: PHYSICIAN ASSISTANT
Payer: COMMERCIAL

## 2024-07-25 VITALS
BODY MASS INDEX: 30.12 KG/M2 | SYSTOLIC BLOOD PRESSURE: 130 MMHG | HEART RATE: 61 BPM | DIASTOLIC BLOOD PRESSURE: 72 MMHG | HEIGHT: 63 IN | WEIGHT: 170 LBS

## 2024-07-25 DIAGNOSIS — I25.10 CORONARY ARTERY DISEASE INVOLVING NATIVE CORONARY ARTERY OF NATIVE HEART WITHOUT ANGINA PECTORIS: ICD-10-CM

## 2024-07-25 DIAGNOSIS — I49.5 SSS (SICK SINUS SYNDROME) (H): ICD-10-CM

## 2024-07-25 DIAGNOSIS — I50.32 CHRONIC DIASTOLIC CHF (CONGESTIVE HEART FAILURE) (H): ICD-10-CM

## 2024-07-25 DIAGNOSIS — I48.0 PAF (PAROXYSMAL ATRIAL FIBRILLATION) (H): ICD-10-CM

## 2024-07-25 DIAGNOSIS — Z95.0 CARDIAC PACEMAKER IN SITU: Primary | ICD-10-CM

## 2024-07-25 DIAGNOSIS — I21.4 NSTEMI (NON-ST ELEVATED MYOCARDIAL INFARCTION) (H): ICD-10-CM

## 2024-07-25 DIAGNOSIS — I42.8 OTHER CARDIOMYOPATHY (H): ICD-10-CM

## 2024-07-25 PROCEDURE — G2211 COMPLEX E/M VISIT ADD ON: HCPCS | Performed by: INTERNAL MEDICINE

## 2024-07-25 PROCEDURE — 99214 OFFICE O/P EST MOD 30 MIN: CPT | Performed by: INTERNAL MEDICINE

## 2024-07-25 RX ORDER — ACETAMINOPHEN 500 MG
500-1000 TABLET ORAL EVERY 6 HOURS PRN
COMMUNITY

## 2024-07-25 NOTE — PROGRESS NOTES
HPI and Plan:   It is my pleasure to see your patient Hamida Verma who is a very pleasant 84-year-old patient with a history of paroxysmal symptomatic atrial fibrillation with difficult to control ventricular rates and so she underwent AV node ablation with permanent pacemaker placement.  This patient also has a history of coronary artery disease and had stenting of the right coronary artery in the setting of a non-ST elevation myocardial infarct.  She also has a history of an idiopathic cardiomyopathy with ejection fractions in the past being 45%.  This occurred after the placement of the permanent pacemaker and initially was felt that this may have been pacemaker induced cardiomyopathy.  However with medical therapy the ejection fraction has risen into the low normal range.    With that background in mind the patient is doing well with no chest pains , no chest pressure no unusual shortness of breath.  Interrogation of her pacemaker which occurred last month showed that the pacemaker is functioning normally and the patient is in sinus rhythm.    Her blood pressure is well-controlled at 130/72 her pulse is 61 bpm regular rhythm and volume consistent with sinus rhythm.  Her lipid profile is excellent with an LDL of 66 HDL of 71 and triglycerides of 88 with a total cholesterol of 155 and liver function tests are normal with an ALT of 18 indicating no hepatic toxicity.  Her basic metabolic profile shows borderline renal dysfunction with a GFR 58, creatinine of 0.96 and BUN of 14.6.  Sodium and potassium are normal being 141 and 4.1 respectively.    Echocardiography was performed 3 days ago and this shows that the ejection fraction is in the low normal range in the 50 to 55%.  Mild hypokinesis of the apical septum was called but this is possibly pacemaker related.  There was clearly an error in the reading of the echocardiogram.  The mean gradient across the aortic valve was 5 mmHg and the calculated aortic valve area  is 2.2 cm .  This is normal valve area and normal gradient and no aortic stenosis.    Impression:  1.  Coronary artery disease and status post non-Q wave myocardial infarction stenting of the right coronary artery.  Patient is asymptomatic with respect to coronary artery disease.  2.  Cardiomyopathy.  Ejection fraction is in the low normal range.  3.  Excellent lipid profile well within secondary prevention guidelines.  4.  Borderline renal dysfunction which is stable compared to the previous renal functions.  5.  Normally functioning permanent pacemaker.  6.  Paroxysmal atrial fibrillation.    Plan:  1.  We will continue the patient on her present medical therapy.  2.  I will have the patient follow-up with Adriana Santos in 6 months time and repeat her basic metabolic profile and lipid profile.  3.  I will see the patient back again in 1 years time.  I will repeat her echocardiogram at that stage.  I will also recheck her lipids and basic metabolic profile at that time.  The device clinic will follow her pacemaker independently.    As always the patient is been told to contact me if she has any questions or any concerns.    The longitudinal plan of care for the diagnosis(es)/condition(s) as documented were addressed during this visit. Due to the added complexity in care, I will continue to support Hamida in the subsequent management and with ongoing continuity of care.    Roby Mallory MD, FACC, FRCPI      Orders Placed This Encounter   Procedures    Basic metabolic panel    Lipid Profile    ALT    Basic metabolic panel    Lipid Profile    ALT    Follow-Up with Cardiology GORGE    Follow-Up with Cardiology    Echocardiogram Complete       Orders Placed This Encounter   Medications    acetaminophen (TYLENOL) 500 MG tablet     Sig: Take 500-1,000 mg by mouth every 6 hours as needed for mild pain       There are no discontinued medications.      Encounter Diagnoses   Name Primary?    Chronic diastolic CHF (congestive  heart failure) (H)     Cardiac pacemaker in situ Yes    PAF (paroxysmal atrial fibrillation) (H)     SSS (sick sinus syndrome) (H)     NSTEMI (non-ST elevated myocardial infarction) (H)     Coronary artery disease involving native coronary artery of native heart without angina pectoris     Other cardiomyopathy (H)        CURRENT MEDICATIONS:  Current Outpatient Medications   Medication Sig Dispense Refill    acetaminophen (TYLENOL) 500 MG tablet Take 500-1,000 mg by mouth every 6 hours as needed for mild pain      albuterol (PROAIR HFA/PROVENTIL HFA/VENTOLIN HFA) 108 (90 Base) MCG/ACT inhaler Inhale 2 puffs into the lungs every 6 hours as needed      azelastine (ASTELIN) 0.1 % nasal spray Spray 1 spray into both nostrils 2 times daily 30 mL 1    carvedilol (COREG) 12.5 MG tablet Take 1 tablet (12.5 mg) by mouth 2 times daily (with meals) 180 tablet 3    cholecalciferol (VITAMIN  -D) 1000 UNIT capsule Take 1 capsule by mouth daily.      clopidogrel (PLAVIX) 75 MG tablet Take 1 tablet (75 mg) by mouth daily To protect your stent(s).  Do not stop taking unless directed by cardiology. 90 tablet 3    FLAX SEED OIL OR 1 capsule daily      furosemide (LASIX) 20 MG tablet Take 1 tablet (20 mg) by mouth daily 90 tablet 3    Multiple Vitamins-Minerals (HAIR SKIN NAILS PO) Take 1 tablet by mouth At Bedtime      Multiple Vitamins-Minerals (ICAPS AREDS 2 PO) Take 1 tablet by mouth 2 times daily      nitroGLYcerin (NITROSTAT) 0.4 MG sublingual tablet For chest pain place 1 tablet under the tongue every 5 minutes for 3 doses. If symptoms persist 5 minutes after 1st dose call 911. 25 tablet 11    potassium chloride ER (KLOR-CON M) 10 MEQ CR tablet Take 1 tablet (10 mEq) by mouth daily 90 tablet 3    rivaroxaban ANTICOAGULANT (XARELTO) 15 MG TABS tablet Take 1 tablet (15 mg) by mouth daily (with dinner) 90 tablet 3    rosuvastatin (CRESTOR) 40 MG tablet Take 1 tablet (40 mg) by mouth daily 90 tablet 3    sacubitril-valsartan  (ENTRESTO) 49-51 MG per tablet Take 1 tablet by mouth 2 times daily 180 tablet 3    tiotropium (SPIRIVA) 18 MCG inhaled capsule Inhale 1 capsule (18 mcg) into the lungs daily 90 capsule 3       ALLERGIES     Allergies   Allergen Reactions    Strawberries [Strawberry Extract] Anaphylaxis    Strawberry Flavor        PAST MEDICAL HISTORY:  Past Medical History:   Diagnosis Date    Atrial fibrillation (H)     Chronic diastolic CHF (congestive heart failure) (H)     COPD (chronic obstructive pulmonary disease) (H)     Essential hypertension, benign     HYPERLIPIDEMIA NEC/NOS 03/30/2006    Pulmonary hypertension (H)     Pyelonephritis 2019    SSS (sick sinus syndrome) (H)     s/p PPM 3/2018       PAST SURGICAL HISTORY:  Past Surgical History:   Procedure Laterality Date    CV CORONARY ANGIOGRAM N/A 12/7/2023    Procedure: Coronary Angiogram;  Surgeon: Mookie Yates MD;  Location: [facility] CARDIAC CATH LAB    CV CORONARY ANGIOGRAM N/A 12/8/2023    Procedure: Coronary Angiogram;  Surgeon: Stephanie Monroy MD;  Location: [facility] CARDIAC CATH LAB    CV CORONARY ANGIOGRAM N/A 12/21/2023    Procedure: Coronary Angiogram;  Surgeon: Eric Barajas MD;  Location: [facility] CARDIAC CATH LAB    CV PCI N/A 12/8/2023    Procedure: Percutaneous Coronary Intervention;  Surgeon: Stephanie Monroy MD;  Location:  HEART CARDIAC CATH LAB    CV PCI STENT DRUG ELUTING N/A 12/21/2023    Procedure: Percutaneous Coronary Intervention Stent;  Surgeon: Eric Barajas MD;  Location:  HEART CARDIAC CATH LAB    EP ABLATION AV NODE N/A 5/7/2019    Procedure: EP Ablation AV Node;  Surgeon: Roe Voss MD;  Location:  HEART CARDIAC CATH LAB    EYE SURGERY      HYSTERECTOMY, VAGINAL      hysterectomy    LAPAROSCOPIC CHOLECYSTECTOMY N/A 6/1/2023    Procedure: Laparoscopic cholecystectomy with lysis of adhesions;  Surgeon: Sawyer Marcum MD;  Location:  OR       FAMILY HISTORY:  Family History   Problem Relation Age  of Onset    Cerebrovascular Disease Mother     Glaucoma Mother     Macular Degeneration Mother     Alcohol/Drug Father         alcohol    Myocardial Infarction Father 63        passed away from MI    Family History Negative Sister     Family History Negative Sister     Family History Negative Sister     Cerebrovascular Disease Sister     Family History Negative Brother     Family History Negative Maternal Grandmother     Family History Negative Maternal Grandfather     Family History Negative Paternal Grandmother     Family History Negative Paternal Grandfather     Myocardial Infarction Son 57        passed away from MI    Dementia Daughter 57        early onset on [place]    Diabetes No family hx of     Breast Cancer No family hx of     Cancer - colorectal No family hx of        SOCIAL HISTORY:  Social History     Socioeconomic History    Marital status:      Spouse name: None    Number of children: None    Years of education: None    Highest education level: None   Tobacco Use    Smoking status: Former     Current packs/day: 0.00     Average packs/day: 1.5 packs/day for 45.0 years (67.6 ttl pk-yrs)     Types: Cigarettes     Start date: 10/28/1955     Quit date: 2000     Years since quittin.9    Smokeless tobacco: Never    Tobacco comments:     quit 24 yrs ago   Substance and Sexual Activity    Alcohol use: No    Drug use: No    Sexual activity: Yes     Partners: Male   Other Topics Concern    Parent/sibling w/ CABG, MI or angioplasty before 65F 55M? No   Social History Narrative    Balanced Diet - Yes    Osteoporosis Prevention Measures - Dairy servings per day: 2    Regular Exercise -  Yes Describe walk, but not recently due to weather    Dental Exam - Yes    Eye Exam -No    Self Breast Exam - Yes    Abuse: Current or Past (Physical, Sexual or Emotional)- No    Do you feel safe in your environment - Yes    Guns stored in the home - No    Sunscreen used - Yes    Seatbelts used - Yes    Lipids -   1/10    Glucose -  1/10    Colon Cancer Screening - Yes, 7/21/08    Hemoccults - NO    Pap Test -  Many yrs ago, has hysterectomy    Do you have any concerns about STD's -  No    Mammography - 6/18/08    DEXA - 4/13/06    Immunizations reviewed and up to date - No    Jonathan Marshall MA                 Social Determinants of Health     Financial Resource Strain: Low Risk  (12/27/2023)    Financial Resource Strain     Within the past 12 months, have you or your family members you live with been unable to get utilities (heat, electricity) when it was really needed?: No   Food Insecurity: Low Risk  (12/27/2023)    Food Insecurity     Within the past 12 months, did you worry that your food would run out before you got money to buy more?: No     Within the past 12 months, did the food you bought just not last and you didn t have money to get more?: No   Transportation Needs: Low Risk  (12/27/2023)    Transportation Needs     Within the past 12 months, has lack of transportation kept you from medical appointments, getting your medicines, non-medical meetings or appointments, work, or from getting things that you need?: No   Interpersonal Safety: Low Risk  (12/27/2023)    Interpersonal Safety     Do you feel physically and emotionally safe where you currently live?: Yes     Within the past 12 months, have you been hit, slapped, kicked or otherwise physically hurt by someone?: No     Within the past 12 months, have you been humiliated or emotionally abused in other ways by your partner or ex-partner?: No   Housing Stability: Low Risk  (12/27/2023)    Housing Stability     Do you have housing? : Yes     Are you worried about losing your housing?: No       Review of Systems:  Skin:          Eyes:         ENT:         Respiratory:          Cardiovascular:         Gastroenterology:        Genitourinary:         Musculoskeletal:         Neurologic:         Psychiatric:         Heme/Lymph/Imm:         Endocrine:           Physical  "Exam:  Vitals: /72   Pulse 61   Ht 1.6 m (5' 3\")   Wt 77.1 kg (170 lb)   LMP  (LMP Unknown)   BMI 30.11 kg/m      Constitutional:  cooperative, alert and oriented, well developed, well nourished, in no acute distress        Skin:  warm and dry to the touch, no apparent skin lesions or masses noted   pacemaker incision in the left infraclavicular area was well-healed      Head:  normocephalic, no masses or lesions        Eyes:  pupils equal and round        Lymph:No Cervical lymphadenopathy present     ENT:  no pallor or cyanosis, dentition good        Neck:  carotid pulses are full and equal bilaterally, JVP normal, no carotid bruit        Respiratory:  normal breath sounds, clear to auscultation, normal A-P diameter, normal symmetry, normal respiratory excursion, no use of accessory muscles diminished breath sounds bilaterally       Cardiac: no murmurs, gallops or rubs detected;regular rhythm;normal S1 and S2    (Variable S1 and normal S2)            pulses full and equal     right radial artery;2+       right dorsalis pedis artery;2+     left radial artery;2+       left dorsalis pedis artery;2+            GI:  not assessed this visit        Extremities and Muscular Skeletal:  no deformities, clubbing, cyanosis, erythema observed;no edema     trace trace      Neurological:  no gross motor deficits;affect appropriate        Psych:  Alert and Oriented x 3        CC  Sandrine Glover PA-C  [id number]  [place] [id number]                "

## 2024-07-25 NOTE — LETTER
7/25/2024    Mikala Philip MD, MD  [address]  [place] [address]    RE: Hamida Verma       Dear Colleague,     I had the pleasure of seeing Hamida Verma in the [facility].  HPI and Plan:   It is my pleasure to see your patient Hamida Verma who is a very pleasant 84-year-old patient with a history of paroxysmal symptomatic atrial fibrillation with difficult to control ventricular rates and so she underwent AV node ablation with permanent pacemaker placement.  This patient also has a history of coronary artery disease and had stenting of the right coronary artery in the setting of a non-ST elevation myocardial infarct.  She also has a history of an idiopathic cardiomyopathy with ejection fractions in the past being 45%.  This occurred after the placement of the permanent pacemaker and initially was felt that this may have been pacemaker induced cardiomyopathy.  However with medical therapy the ejection fraction has risen into the low normal range.    With that background in mind the patient is doing well with no chest pains , no chest pressure no unusual shortness of breath.  Interrogation of her pacemaker which occurred last month showed that the pacemaker is functioning normally and the patient is in sinus rhythm.    Her blood pressure is well-controlled at 130/72 her pulse is 61 bpm regular rhythm and volume consistent with sinus rhythm.  Her lipid profile is excellent with an LDL of 66 HDL of 71 and triglycerides of 88 with a total cholesterol of 155 and liver function tests are normal with an ALT of 18 indicating no hepatic toxicity.  Her basic metabolic profile shows borderline renal dysfunction with a GFR 58, creatinine of 0.96 and BUN of 14.6.  Sodium and potassium are normal being 141 and 4.1 respectively.    Echocardiography was performed 3 days ago and this shows that the ejection fraction is in the low normal range in the 50 to 55%.  Mild hypokinesis of the  apical septum was called but this is possibly pacemaker related.  There was clearly an error in the reading of the echocardiogram.  The mean gradient across the aortic valve was 5 mmHg and the calculated aortic valve area is 2.2 cm .  This is normal valve area and normal gradient and no aortic stenosis.    Impression:  1.  Coronary artery disease and status post non-Q wave myocardial infarction stenting of the right coronary artery.  Patient is asymptomatic with respect to coronary artery disease.  2.  Cardiomyopathy.  Ejection fraction is in the low normal range.  3.  Excellent lipid profile well within secondary prevention guidelines.  4.  Borderline renal dysfunction which is stable compared to the previous renal functions.  5.  Normally functioning permanent pacemaker.  6.  Paroxysmal atrial fibrillation.    Plan:  1.  We will continue the patient on her present medical therapy.  2.  I will have the patient follow-up with dAriana Santos in 6 months time and repeat her basic metabolic profile and lipid profile.  3.  I will see the patient back again in 1 years time.  I will repeat her echocardiogram at that stage.  I will also recheck her lipids and basic metabolic profile at that time.  The device clinic will follow her pacemaker independently.    As always the patient is been told to contact me if she has any questions or any concerns.    The longitudinal plan of care for the diagnosis(es)/condition(s) as documented were addressed during this visit. Due to the added complexity in care, I will continue to support Hamida in the subsequent management and with ongoing continuity of care.    Roby Mallory MD, FACC, FRCPI      Orders Placed This Encounter   Procedures    Basic metabolic panel    Lipid Profile    ALT    Basic metabolic panel    Lipid Profile    ALT    Follow-Up with Cardiology GORGE    Follow-Up with Cardiology    Echocardiogram Complete       Orders Placed This Encounter   Medications    acetaminophen  (TYLENOL) 500 MG tablet     Sig: Take 500-1,000 mg by mouth every 6 hours as needed for mild pain       There are no discontinued medications.      Encounter Diagnoses   Name Primary?    Chronic diastolic CHF (congestive heart failure) (H)     Cardiac pacemaker in situ Yes    PAF (paroxysmal atrial fibrillation) (H)     SSS (sick sinus syndrome) (H)     NSTEMI (non-ST elevated myocardial infarction) (H)     Coronary artery disease involving native coronary artery of native heart without angina pectoris     Other cardiomyopathy (H)        CURRENT MEDICATIONS:  Current Outpatient Medications   Medication Sig Dispense Refill    acetaminophen (TYLENOL) 500 MG tablet Take 500-1,000 mg by mouth every 6 hours as needed for mild pain      albuterol (PROAIR HFA/PROVENTIL HFA/VENTOLIN HFA) 108 (90 Base) MCG/ACT inhaler Inhale 2 puffs into the lungs every 6 hours as needed      azelastine (ASTELIN) 0.1 % nasal spray Spray 1 spray into both nostrils 2 times daily 30 mL 1    carvedilol (COREG) 12.5 MG tablet Take 1 tablet (12.5 mg) by mouth 2 times daily (with meals) 180 tablet 3    cholecalciferol (VITAMIN  -D) 1000 UNIT capsule Take 1 capsule by mouth daily.      clopidogrel (PLAVIX) 75 MG tablet Take 1 tablet (75 mg) by mouth daily To protect your stent(s).  Do not stop taking unless directed by cardiology. 90 tablet 3    FLAX SEED OIL OR 1 capsule daily      furosemide (LASIX) 20 MG tablet Take 1 tablet (20 mg) by mouth daily 90 tablet 3    Multiple Vitamins-Minerals (HAIR SKIN NAILS PO) Take 1 tablet by mouth At Bedtime      Multiple Vitamins-Minerals (ICAPS AREDS 2 PO) Take 1 tablet by mouth 2 times daily      nitroGLYcerin (NITROSTAT) 0.4 MG sublingual tablet For chest pain place 1 tablet under the tongue every 5 minutes for 3 doses. If symptoms persist 5 minutes after 1st dose call 911. 25 tablet 11    potassium chloride ER (KLOR-CON M) 10 MEQ CR tablet Take 1 tablet (10 mEq) by mouth daily 90 tablet 3    rivaroxaban  ANTICOAGULANT (XARELTO) 15 MG TABS tablet Take 1 tablet (15 mg) by mouth daily (with dinner) 90 tablet 3    rosuvastatin (CRESTOR) 40 MG tablet Take 1 tablet (40 mg) by mouth daily 90 tablet 3    sacubitril-valsartan (ENTRESTO) 49-51 MG per tablet Take 1 tablet by mouth 2 times daily 180 tablet 3    tiotropium (SPIRIVA) 18 MCG inhaled capsule Inhale 1 capsule (18 mcg) into the lungs daily 90 capsule 3       ALLERGIES     Allergies   Allergen Reactions    Strawberries [Strawberry Extract] Anaphylaxis    Strawberry Flavor        PAST MEDICAL HISTORY:  Past Medical History:   Diagnosis Date    Atrial fibrillation (H)     Chronic diastolic CHF (congestive heart failure) (H)     COPD (chronic obstructive pulmonary disease) (H)     Essential hypertension, benign     HYPERLIPIDEMIA NEC/NOS 03/30/2006    Pulmonary hypertension (H)     Pyelonephritis 2019    SSS (sick sinus syndrome) (H)     s/p PPM 3/2018       PAST SURGICAL HISTORY:  Past Surgical History:   Procedure Laterality Date    CV CORONARY ANGIOGRAM N/A 12/7/2023    Procedure: Coronary Angiogram;  Surgeon: Mookie Yates MD;  Location: [facility] CARDIAC CATH LAB    CV CORONARY ANGIOGRAM N/A 12/8/2023    Procedure: Coronary Angiogram;  Surgeon: Stephanie Monroy MD;  Location: [facility] CARDIAC CATH LAB    CV CORONARY ANGIOGRAM N/A 12/21/2023    Procedure: Coronary Angiogram;  Surgeon: Eric Barajas MD;  Location: [facility] CARDIAC CATH LAB    CV PCI N/A 12/8/2023    Procedure: Percutaneous Coronary Intervention;  Surgeon: Stephanie Monroy MD;  Location: [facility] CARDIAC CATH LAB    CV PCI STENT DRUG ELUTING N/A 12/21/2023    Procedure: Percutaneous Coronary Intervention Stent;  Surgeon: Eric Barajas MD;  Location: [facility] CARDIAC CATH LAB    EP ABLATION AV NODE N/A 5/7/2019    Procedure: EP Ablation AV Node;  Surgeon: Roe Voss MD;  Location: [facility] CARDIAC CATH LAB    EYE SURGERY      HYSTERECTOMY, VAGINAL      hysterectomy     LAPAROSCOPIC CHOLECYSTECTOMY N/A 2023    Procedure: Laparoscopic cholecystectomy with lysis of adhesions;  Surgeon: Sawyer Marcum MD;  Location: [facility] OR       FAMILY HISTORY:  Family History   Problem Relation Age of Onset    Cerebrovascular Disease Mother     Glaucoma Mother     Macular Degeneration Mother     Alcohol/Drug Father         alcohol    Myocardial Infarction Father 63        passed away from MI    Family History Negative Sister     Family History Negative Sister     Family History Negative Sister     Cerebrovascular Disease Sister     Family History Negative Brother     Family History Negative Maternal Grandmother     Family History Negative Maternal Grandfather     Family History Negative Paternal Grandmother     Family History Negative Paternal Grandfather     Myocardial Infarction Son 57        passed away from MI    Dementia Daughter 57        early onset on [place]    Diabetes No family hx of     Breast Cancer No family hx of     Cancer - colorectal No family hx of        SOCIAL HISTORY:  Social History     Socioeconomic History    Marital status:      Spouse name: None    Number of children: None    Years of education: None    Highest education level: None   Tobacco Use    Smoking status: Former     Current packs/day: 0.00     Average packs/day: 1.5 packs/day for 45.0 years (67.6 ttl pk-yrs)     Types: Cigarettes     Start date: 10/28/1955     Quit date: 2000     Years since quittin.9    Smokeless tobacco: Never    Tobacco comments:     quit 24 yrs ago   Substance and Sexual Activity    Alcohol use: No    Drug use: No    Sexual activity: Yes     Partners: Male   Other Topics Concern    Parent/sibling w/ CABG, MI or angioplasty before 65F 55M? No   Social History Narrative    Balanced Diet - Yes    Osteoporosis Prevention Measures - Dairy servings per day: 2    Regular Exercise -  Yes Describe walk, but not recently due to weather    Dental Exam - Yes    Eye Exam -No    Self  Breast Exam - Yes    Abuse: Current or Past (Physical, Sexual or Emotional)- No    Do you feel safe in your environment - Yes    Guns stored in the home - No    Sunscreen used - Yes    Seatbelts used - Yes    Lipids -  1/10    Glucose -  1/10    Colon Cancer Screening - Yes, 7/21/08    Hemoccults - NO    Pap Test -  Many yrs ago, has hysterectomy    Do you have any concerns about STD's -  No    Mammography - 6/18/08    DEXA - 4/13/06    Immunizations reviewed and up to date - No    Jonathan Marshall MA                 Social Determinants of Health     Financial Resource Strain: Low Risk  (12/27/2023)    Financial Resource Strain     Within the past 12 months, have you or your family members you live with been unable to get utilities (heat, electricity) when it was really needed?: No   Food Insecurity: Low Risk  (12/27/2023)    Food Insecurity     Within the past 12 months, did you worry that your food would run out before you got money to buy more?: No     Within the past 12 months, did the food you bought just not last and you didn t have money to get more?: No   Transportation Needs: Low Risk  (12/27/2023)    Transportation Needs     Within the past 12 months, has lack of transportation kept you from medical appointments, getting your medicines, non-medical meetings or appointments, work, or from getting things that you need?: No   Interpersonal Safety: Low Risk  (12/27/2023)    Interpersonal Safety     Do you feel physically and emotionally safe where you currently live?: Yes     Within the past 12 months, have you been hit, slapped, kicked or otherwise physically hurt by someone?: No     Within the past 12 months, have you been humiliated or emotionally abused in other ways by your partner or ex-partner?: No   Housing Stability: Low Risk  (12/27/2023)    Housing Stability     Do you have housing? : Yes     Are you worried about losing your housing?: No       Review of Systems:  Skin:          Eyes:         ENT:        "  Respiratory:          Cardiovascular:         Gastroenterology:        Genitourinary:         Musculoskeletal:         Neurologic:         Psychiatric:         Heme/Lymph/Imm:         Endocrine:           Physical Exam:  Vitals: /72   Pulse 61   Ht 1.6 m (5' 3\")   Wt 77.1 kg (170 lb)   LMP  (LMP Unknown)   BMI 30.11 kg/m      Constitutional:  cooperative, alert and oriented, well developed, well nourished, in no acute distress        Skin:  warm and dry to the touch, no apparent skin lesions or masses noted   pacemaker incision in the left infraclavicular area was well-healed      Head:  normocephalic, no masses or lesions        Eyes:  pupils equal and round        Lymph:No Cervical lymphadenopathy present     ENT:  no pallor or cyanosis, dentition good        Neck:  carotid pulses are full and equal bilaterally, JVP normal, no carotid bruit        Respiratory:  normal breath sounds, clear to auscultation, normal A-P diameter, normal symmetry, normal respiratory excursion, no use of accessory muscles diminished breath sounds bilaterally       Cardiac: no murmurs, gallops or rubs detected;regular rhythm;normal S1 and S2    (Variable S1 and normal S2)            pulses full and equal     right radial artery;2+       right dorsalis pedis artery;2+     left radial artery;2+       left dorsalis pedis artery;2+            GI:  not assessed this visit        Extremities and Muscular Skeletal:  no deformities, clubbing, cyanosis, erythema observed;no edema     trace trace      Neurological:  no gross motor deficits;affect appropriate        Psych:  Alert and Oriented x 3        CC  Sandrine Glover, PA-C  [address]  [place] [address]        Thank you for allowing me to participate in the care of your patient.      Sincerely,     Roby Mallory MD, MD     [facility]  "

## 2024-07-30 ENCOUNTER — OFFICE VISIT (OUTPATIENT)
Dept: FAMILY MEDICINE | Facility: CLINIC | Age: 84
End: 2024-07-30
Payer: COMMERCIAL

## 2024-07-30 VITALS
WEIGHT: 171.5 LBS | OXYGEN SATURATION: 97 % | HEIGHT: 64 IN | TEMPERATURE: 97.3 F | BODY MASS INDEX: 29.28 KG/M2 | HEART RATE: 62 BPM | SYSTOLIC BLOOD PRESSURE: 136 MMHG | DIASTOLIC BLOOD PRESSURE: 80 MMHG | RESPIRATION RATE: 16 BRPM

## 2024-07-30 DIAGNOSIS — Z95.0 S/P CARDIAC PACEMAKER PROCEDURE: ICD-10-CM

## 2024-07-30 DIAGNOSIS — J44.9 CHRONIC OBSTRUCTIVE PULMONARY DISEASE, UNSPECIFIED COPD TYPE (H): ICD-10-CM

## 2024-07-30 DIAGNOSIS — Z00.00 ENCOUNTER FOR MEDICARE ANNUAL WELLNESS EXAM: Primary | ICD-10-CM

## 2024-07-30 DIAGNOSIS — I48.0 PAF (PAROXYSMAL ATRIAL FIBRILLATION) (H): ICD-10-CM

## 2024-07-30 DIAGNOSIS — M81.0 AGE-RELATED OSTEOPOROSIS WITHOUT CURRENT PATHOLOGICAL FRACTURE: ICD-10-CM

## 2024-07-30 DIAGNOSIS — E78.5 HYPERLIPIDEMIA LDL GOAL <70: ICD-10-CM

## 2024-07-30 DIAGNOSIS — R91.8 PULMONARY NODULES: ICD-10-CM

## 2024-07-30 DIAGNOSIS — I50.32 CHRONIC DIASTOLIC CHF (CONGESTIVE HEART FAILURE) (H): ICD-10-CM

## 2024-07-30 DIAGNOSIS — I25.10 CORONARY ARTERY CALCIFICATION SEEN ON CT SCAN: ICD-10-CM

## 2024-07-30 DIAGNOSIS — N18.30 STAGE 3 CHRONIC KIDNEY DISEASE, UNSPECIFIED WHETHER STAGE 3A OR 3B CKD (H): ICD-10-CM

## 2024-07-30 DIAGNOSIS — H90.3 BILATERAL SENSORINEURAL HEARING LOSS: ICD-10-CM

## 2024-07-30 DIAGNOSIS — I10 HYPERTENSION GOAL BP (BLOOD PRESSURE) < 140/90: ICD-10-CM

## 2024-07-30 PROBLEM — R07.9 CHEST PAIN, UNSPECIFIED TYPE: Status: RESOLVED | Noted: 2023-12-19 | Resolved: 2024-07-30

## 2024-07-30 PROBLEM — K81.0 ACUTE CHOLECYSTITIS: Status: RESOLVED | Noted: 2023-05-31 | Resolved: 2024-07-30

## 2024-07-30 PROBLEM — K80.50 BILIARY COLIC: Status: RESOLVED | Noted: 2023-05-30 | Resolved: 2024-07-30

## 2024-07-30 PROBLEM — N30.00 ACUTE CYSTITIS WITHOUT HEMATURIA: Status: RESOLVED | Noted: 2023-05-30 | Resolved: 2024-07-30

## 2024-07-30 LAB — VIT D+METAB SERPL-MCNC: 57 NG/ML (ref 20–50)

## 2024-07-30 PROCEDURE — 99214 OFFICE O/P EST MOD 30 MIN: CPT | Mod: 25 | Performed by: FAMILY MEDICINE

## 2024-07-30 PROCEDURE — 82306 VITAMIN D 25 HYDROXY: CPT | Performed by: FAMILY MEDICINE

## 2024-07-30 PROCEDURE — 36415 COLL VENOUS BLD VENIPUNCTURE: CPT | Performed by: FAMILY MEDICINE

## 2024-07-30 PROCEDURE — 91320 SARSCV2 VAC 30MCG TRS-SUC IM: CPT | Performed by: FAMILY MEDICINE

## 2024-07-30 PROCEDURE — 90480 ADMN SARSCOV2 VAC 1/ONLY CMP: CPT | Performed by: FAMILY MEDICINE

## 2024-07-30 PROCEDURE — G0439 PPPS, SUBSEQ VISIT: HCPCS | Performed by: FAMILY MEDICINE

## 2024-07-30 SDOH — HEALTH STABILITY: PHYSICAL HEALTH: ON AVERAGE, HOW MANY MINUTES DO YOU ENGAGE IN EXERCISE AT THIS LEVEL?: 10 MIN

## 2024-07-30 SDOH — HEALTH STABILITY: PHYSICAL HEALTH: ON AVERAGE, HOW MANY DAYS PER WEEK DO YOU ENGAGE IN MODERATE TO STRENUOUS EXERCISE (LIKE A BRISK WALK)?: 7 DAYS

## 2024-07-30 ASSESSMENT — SOCIAL DETERMINANTS OF HEALTH (SDOH): HOW OFTEN DO YOU GET TOGETHER WITH FRIENDS OR RELATIVES?: ONCE A WEEK

## 2024-07-30 ASSESSMENT — PAIN SCALES - GENERAL: PAINLEVEL: NO PAIN (0)

## 2024-07-30 NOTE — NURSING NOTE
Prior to immunization administration, verified patients identity using patient s name and date of birth. Please see Immunization Activity for additional information.     Screening Questionnaire for Adult Immunization    Are you sick today?   No   Do you have allergies to medications, food, a vaccine component or latex?   Yes   Have you ever had a serious reaction after receiving a vaccination?   No   Do you have a long-term health problem with heart, lung, kidney, or metabolic disease (e.g., diabetes), asthma, a blood disorder, no spleen, complement component deficiency, a cochlear implant, or a spinal fluid leak?  Are you on long-term aspirin therapy?   No   Do you have cancer, leukemia, HIV/AIDS, or any other immune system problem?   No   Do you have a parent, brother, or sister with an immune system problem?   No   In the past 3 months, have you taken medications that affect  your immune system, such as prednisone, other steroids, or anticancer drugs; drugs for the treatment of rheumatoid arthritis, Crohn s disease, or psoriasis; or have you had radiation treatments?   No   Have you had a seizure, or a brain or other nervous system problem?   No   During the past year, have you received a transfusion of blood or blood    products, or been given immune (gamma) globulin or antiviral drug?   No   For women: Are you pregnant or is there a chance you could become       pregnant during the next month?   No   Have you received any vaccinations in the past 4 weeks?   No     Immunization questionnaire was positive for at least one answer.  Notified Dr. Cedrick MD.      Patient instructed to remain in clinic for 15 minutes afterwards, and to report any adverse reactions.     Screening performed by Jeaneth Guerrero MA on 7/30/2024 at 10:17 AM.

## 2024-07-30 NOTE — PATIENT INSTRUCTIONS
Patient Education   Schedule a bone density test (DEXA scan), preferably at [facility].    I recommend getting the RSV and Shingrix vaccine(s) at a local pharmacy.    Get back to your cardiac rehab home exercises - you said you would be willing to do this every day. We will check in on this in 6 months.   Schedule a CT scan of your lungs in November in [place].  I recommend getting new hearing aids  Preventive Care Advice   This is general advice given by our system to help you stay healthy. However, your care team may have specific advice just for you. Please talk to your care team about your preventive care needs.  Nutrition  Eat 5 or more servings of fruits and vegetables each day.  Try wheat bread, brown rice and whole grain pasta (instead of white bread, rice, and pasta).  Get enough calcium and vitamin D. Check the label on foods and aim for 100% of the RDA (recommended daily allowance).  Lifestyle  Exercise at least 150 minutes each week  (30 minutes a day, 5 days a week).  Do muscle strengthening activities 2 days a week. These help control your weight and prevent disease.  No smoking.  Wear sunscreen to prevent skin cancer.  Have a dental exam and cleaning every 6 months.  Yearly exams  See your health care team every year to talk about:  Any changes in your health.  Any medicines your care team has prescribed.  Preventive care, family planning, and ways to prevent chronic diseases.  Shots (vaccines)   HPV shots (up to age 26), if you've never had them before.  Hepatitis B shots (up to age 59), if you've never had them before.  COVID-19 shot: Get this shot when it's due.  Flu shot: Get a flu shot every year.  Tetanus shot: Get a tetanus shot every 10 years.  Pneumococcal, hepatitis A, and RSV shots: Ask your care team if you need these based on your risk.  Shingles shot (for age 50 and up)  General health tests  Diabetes screening:  Starting at age 35, Get screened for diabetes at least every 3 years.  If you  are younger than age 35, ask your care team if you should be screened for diabetes.  Cholesterol test: At age 39, start having a cholesterol test every 5 years, or more often if advised.  Bone density scan (DEXA): At age 50, ask your care team if you should have this scan for osteoporosis (brittle bones).  Hepatitis C: Get tested at least once in your life.  STIs (sexually transmitted infections)  Before age 24: Ask your care team if you should be screened for STIs.  After age 24: Get screened for STIs if you're at risk. You are at risk for STIs (including HIV) if:  You are sexually active with more than one person.  You don't use condoms every time.  You or a partner was diagnosed with a sexually transmitted infection.  If you are at risk for HIV, ask about PrEP medicine to prevent HIV.  Get tested for HIV at least once in your life, whether you are at risk for HIV or not.  Cancer screening tests  Cervical cancer screening: If you have a cervix, begin getting regular cervical cancer screening tests starting at age 21.  Breast cancer scan (mammogram): If you've ever had breasts, begin having regular mammograms starting at age 40. This is a scan to check for breast cancer.  Colon cancer screening: It is important to start screening for colon cancer at age 45.  Have a colonoscopy test every 10 years (or more often if you're at risk) Or, ask your provider about stool tests like a FIT test every year or Cologuard test every 3 years.  To learn more about your testing options, visit:   .  For help making a decision, visit:   https://bit.ly/bs16182.  Prostate cancer screening test: If you have a prostate, ask your care team if a prostate cancer screening test (PSA) at age 55 is right for you.  Lung cancer screening: If you are a current or former smoker ages 50 to 80, ask your care team if ongoing lung cancer screenings are right for you.  For informational purposes only. Not to replace the advice of your health care  provider. Copyright   2023 [facility]. All rights reserved. Clinically reviewed by the [facility] Transitions Program. [employer] [id number] - REV 01/24.

## 2024-07-30 NOTE — PROGRESS NOTES
Preventive Care Visit  [facility]  Mikala Philip MD Family Medicine  Jul 30, 2024      Assessment & Plan       ICD-10-CM    1. Encounter for Medicare annual wellness exam  Z00.00       2. Pulmonary nodules  R91.8 CT Chest w/o Contrast      3. Chronic obstructive pulmonary disease, unspecified COPD type (H)  J44.9       4. Age-related osteoporosis without current pathological fracture  M81.0 DX Bone Density     Vitamin D Deficiency      5. Stage 3 chronic kidney disease, unspecified whether stage 3a or 3b CKD (H)  N18.30       6. Hypertension goal BP (blood pressure) < 140/90  I10       7. Chronic diastolic CHF (congestive heart failure) (H)  I50.32       8. Coronary artery calcification seen on CT scan  I25.10       9. PAF (paroxysmal atrial fibrillation) (H)  I48.0       10. Hyperlipidemia LDL goal <70  E78.5       11. S/P cardiac pacemaker procedure  Z95.0          Wellness visit  Doing well   Immunizations Recommended RSV and shingrix vaccine , COVID-19 vaccine booster is also recommended    History of NSTEMI (non-ST elevated myocardial infarction) (H)  CAD  Had nstemi and PCI To left circumflex on 12/8/23 and hospitalized again on 12/22/23 with nstemi and needed PCI to RCA.   Right arm pain was her anginal equivalent and is completely resolved.   Continues plavix and xarelto.   Feeling well.   Denies any concerns. Has been attending cardiac rehab and had cardiology visit on 7/25/24 (note reviewed today)      Hypertension   Controlled    Previously reported orthostatic hypotension  lightheadedness with standing. Allow bps to run in the 130s to prevent worsening symptoms.   Continues entresto, furosemide, carvedilol, potassium      Chronic diastolic CHF (congestive heart failure) (H)  Mild decreased LVEF-monitored by cardiology  Has chronic afib and permanent pacemaker  Continues entresto, carvedilol and lasix. If worsening symptoms, could consider spironolactone and SGLT2 inhibitor      A-fib with initial difficult to control rates and AV node ablation and pacemater in place  Continues xarelto  Follows with cardiology      Dyslipidemia LDL goal <70 LDL 66 7/22/24  Continues rosuvastatin 40mg daily. Working on eating better   .   CKD (chronic kidney disease) stage 3, GFR 30-59 ml/min (H)  Avoid nephrotoxic agents. BMP stable       COPD   Dyspnea improved after starting spiriva and albuterol.  Continue inhalers. Refilled spiriva last visit. Follow up in 6 months   Does not use the spiriva all of the time. Too expensive, does not feel like she needs it. Has not needed to use albuterol for quite awhile now.      Denies wheezing or coughing. Tried Spiriva in the past, but was not sure if it made much difference.  When she saw the lung specialist in 2019 they did not feel like she necessarily needed to be on any inhaler at that time and last she was noticing benefit from the Spiriva.  She had been given Anoro, which had been cost prohibitive.          Pulmonary nodules  Incidental findings. Has copd history and smoking history. Needs repeat ct scan in 6-12 months . Also sclerotic focus on T4. CT scan completed 3/1/24 with stable nodules and recommendation to repeat CT in 6-12 months.         Osteoporosis  Had been on alendronate from  2017 and stopped it a couple of years ago on her own (she had about 5 years of treatment).  Last DEXA was in 10/21 unchanged to mildly improved. Will recheck DEXA this year. If if worsening, will refer to endo to discuss treatment options.   Continues calcium and vitamin d supplementation.   Check vitamin D today       Macular degeneration  Follows with ophthalmology           Patient Instructions   Patient Education  Schedule a bone density test (DEXA scan), preferably at [facility].    I recommend getting the RSV and Shingrix vaccine(s) at a local pharmacy.    Get back to your cardiac rehab home exercises - you said you would be willing to do this every day. We will  check in on this in 6 months.   Schedule a CT scan of your lungs in November in [place].  I recommend getting new hearing aids  Preventive Care Advice   This is general advice given by our system to help you stay healthy. However, your care team may have specific advice just for you. Please talk to your care team about your preventive care needs.  Nutrition  Eat 5 or more servings of fruits and vegetables each day.  Try wheat bread, brown rice and whole grain pasta (instead of white bread, rice, and pasta).  Get enough calcium and vitamin D. Check the label on foods and aim for 100% of the RDA (recommended daily allowance).  Lifestyle  Exercise at least 150 minutes each week  (30 minutes a day, 5 days a week).  Do muscle strengthening activities 2 days a week. These help control your weight and prevent disease.  No smoking.  Wear sunscreen to prevent skin cancer.  Have a dental exam and cleaning every 6 months.  Yearly exams  See your health care team every year to talk about:  Any changes in your health.  Any medicines your care team has prescribed.  Preventive care, family planning, and ways to prevent chronic diseases.  Shots (vaccines)   HPV shots (up to age 26), if you've never had them before.  Hepatitis B shots (up to age 59), if you've never had them before.  COVID-19 shot: Get this shot when it's due.  Flu shot: Get a flu shot every year.  Tetanus shot: Get a tetanus shot every 10 years.  Pneumococcal, hepatitis A, and RSV shots: Ask your care team if you need these based on your risk.  Shingles shot (for age 50 and up)  General health tests  Diabetes screening:  Starting at age 35, Get screened for diabetes at least every 3 years.  If you are younger than age 35, ask your care team if you should be screened for diabetes.  Cholesterol test: At age 39, start having a cholesterol test every 5 years, or more often if advised.  Bone density scan (DEXA): At age 50, ask your care team if you should have this scan  for osteoporosis (brittle bones).  Hepatitis C: Get tested at least once in your life.  STIs (sexually transmitted infections)  Before age 24: Ask your care team if you should be screened for STIs.  After age 24: Get screened for STIs if you're at risk. You are at risk for STIs (including HIV) if:  You are sexually active with more than one person.  You don't use condoms every time.  You or a partner was diagnosed with a sexually transmitted infection.  If you are at risk for HIV, ask about PrEP medicine to prevent HIV.  Get tested for HIV at least once in your life, whether you are at risk for HIV or not.  Cancer screening tests  Cervical cancer screening: If you have a cervix, begin getting regular cervical cancer screening tests starting at age 21.  Breast cancer scan (mammogram): If you've ever had breasts, begin having regular mammograms starting at age 40. This is a scan to check for breast cancer.  Colon cancer screening: It is important to start screening for colon cancer at age 45.  Have a colonoscopy test every 10 years (or more often if you're at risk) Or, ask your provider about stool tests like a FIT test every year or Cologuard test every 3 years.  To learn more about your testing options, visit:   .  For help making a decision, visit:   https://bit.ly/jb85307.  Prostate cancer screening test: If you have a prostate, ask your care team if a prostate cancer screening test (PSA) at age 55 is right for you.  Lung cancer screening: If you are a current or former smoker ages 50 to 80, ask your care team if ongoing lung cancer screenings are right for you.  For informational purposes only. Not to replace the advice of your health care provider. Copyright   2023 [employer]. All rights reserved. Clinically reviewed by the [facility] Transitions Program. [employer]mio [id number] - REV 01/24.            BMI  Estimated body mass index is 29.25 kg/m  as calculated from the following:    Height as of  "this encounter: 1.631 m (5' 4.2\").    Weight as of this encounter: 77.8 kg (171 lb 8 oz).       Counseling  Appropriate preventive services were addressed with this patient via screening, questionnaire, or discussion as appropriate for fall prevention, nutrition, physical activity, Tobacco-use cessation, weight loss and cognition.  Checklist reviewing preventive services available has been given to the patient.  Reviewed patient's diet, addressing concerns and/or questions.   The patient was instructed to see the dentist every 6 months.            Monisha Mei is a 84 year old, presenting for the following:  Annual Visit        7/30/2024     9:05 AM   Additional Questions   Roomed by Winter MACEDO   Accompanied by self         Health Care Directive  Patient does not have a Health Care Directive or Living Will: Discussed advance care planning with patient; however, patient declined at this time.    HPI  Doing well. Has not been doing recommended cardiac rehab home exercises since rehab finished.        7/30/2024   General Health   How would you rate your overall physical health? Good   Feel stress (tense, anxious, or unable to sleep) Not at all            7/30/2024   Nutrition   Diet: Regular (no restrictions)            7/30/2024   Exercise   Days per week of moderate/strenous exercise 7 days   Average minutes spent exercising at this level 10 min            7/30/2024   Social Factors   Frequency of gathering with friends or relatives Once a week   Worry food won't last until get money to buy more No   Food not last or not have enough money for food? No   Do you have housing? (Housing is defined as stable permanent housing and does not include staying ouside in a car, in a tent, in an abandoned building, in an overnight shelter, or couch-surfing.) Yes   Are you worried about losing your housing? No   Lack of transportation? No   Unable to get utilities (heat,electricity)? No            7/30/2024   Fall Risk   Fallen " 2 or more times in the past year? No   Trouble with walking or balance? No             2024   Activities of Daily Living- Home Safety   Needs help with the following daily activites None of the above   Safety concerns in the home None of the above            2024   Dental   Dentist two times every year? (!) NO            2024   Hearing Screening   Hearing concerns? None of the above            2024   Driving Risk Screening   Patient/family members have concerns about driving No            2024   General Alertness/Fatigue Screening   Have you been more tired than usual lately? No            2024   Urinary Incontinence Screening   Bothered by leaking urine in past 6 months No            2024   TB Screening   Were you born outside of the [place]? No            Today's PHQ-2 Score:       2024     9:04 AM   PHQ-2 (  [employer])   Q1: Little interest or pleasure in doing things 0   Q2: Feeling down, depressed or hopeless 0   PHQ-2 Score 0   Q1: Little interest or pleasure in doing things Not at all   Q2: Feeling down, depressed or hopeless Not at all   PHQ-2 Score 0           2024   Substance Use   Alcohol more than 3/day or more than 7/wk No   Do you have a current opioid prescription? No   How severe/bad is pain from 1 to 10? 0/10 (No Pain)   Do you use any other substances recreationally? No        Social History     Tobacco Use    Smoking status: Former     Current packs/day: 0.00     Average packs/day: 1.5 packs/day for 45.0 years (67.6 ttl pk-yrs)     Types: Cigarettes     Start date: 10/28/1955     Quit date: 2000     Years since quittin.9    Smokeless tobacco: Never    Tobacco comments:     quit 24 yrs ago   Substance Use Topics    Alcohol use: No    Drug use: No          Mammogram Screening - After age 74- determine frequency with patient based on health status, life expectancy and patient goals        Reviewed and updated as needed this visit by Provider       Med  Hx   Fam Hx   Sexual Activity          Past Medical History:   Diagnosis Date    Atrial fibrillation (H)     Chronic diastolic CHF (congestive heart failure) (H)     COPD (chronic obstructive pulmonary disease) (H)     Essential hypertension, benign     HYPERLIPIDEMIA NEC/NOS 03/30/2006    Pulmonary hypertension (H)     Pyelonephritis 2019    SSS (sick sinus syndrome) (H)     s/p PPM 3/2018     Past Surgical History:   Procedure Laterality Date    CV CORONARY ANGIOGRAM N/A 12/7/2023    Procedure: Coronary Angiogram;  Surgeon: Mookie Yates MD;  Location:  HEART CARDIAC CATH LAB    CV CORONARY ANGIOGRAM N/A 12/8/2023    Procedure: Coronary Angiogram;  Surgeon: Stephanie Monroy MD;  Location:  HEART CARDIAC CATH LAB    CV CORONARY ANGIOGRAM N/A 12/21/2023    Procedure: Coronary Angiogram;  Surgeon: Eric Barajas MD;  Location:  HEART CARDIAC CATH LAB    CV PCI N/A 12/8/2023    Procedure: Percutaneous Coronary Intervention;  Surgeon: Stephanie Monroy MD;  Location:  HEART CARDIAC CATH LAB    CV PCI STENT DRUG ELUTING N/A 12/21/2023    Procedure: Percutaneous Coronary Intervention Stent;  Surgeon: Eric Barajas MD;  Location:  HEART CARDIAC CATH LAB    EP ABLATION AV NODE N/A 5/7/2019    Procedure: EP Ablation AV Node;  Surgeon: Roe Voss MD;  Location:  HEART CARDIAC CATH LAB    EYE SURGERY      HYSTERECTOMY, VAGINAL      hysterectomy    LAPAROSCOPIC CHOLECYSTECTOMY N/A 6/1/2023    Procedure: Laparoscopic cholecystectomy with lysis of adhesions;  Surgeon: Sawyer Marcum MD;  Location:  OR     Current providers sharing in care for this patient include:  Patient Care Team:  Mikala Philip MD as PCP - General (Family Medicine)  Sandrine Glover PA-C as Assigned Heart and Vascular Provider  Mikala Philip MD as Assigned PCP  No Ref-Primary, Physician  Tiffani Lott RPH as Pharmacist (Pharmacist Ambulatory Care)  Tiffani Lott RPH as Assigned MT  "Pharmacist    The following health maintenance items are reviewed in Epic and correct as of today:  Health Maintenance   Topic Date Due    COPD ACTION PLAN  Never done    RSV VACCINE (Pregnancy & 60+) (1 - 1-dose 60+ series) Never done    ZOSTER IMMUNIZATION (2 of 3) 05/14/2013    HF ACTION PLAN  11/29/2021    MICROALBUMIN  09/14/2022    COVID-19 Vaccine (6 - 2023-24 season) 04/27/2024    INFLUENZA VACCINE (1) 09/01/2024    CMP  12/19/2024    ANNUAL REVIEW OF HM ORDERS  12/27/2024    CBC  12/27/2024    BMP  01/22/2025    HEMOGLOBIN  01/26/2025    DTAP/TDAP/TD IMMUNIZATION (2 - Td or Tdap) 05/07/2025    ALT  07/22/2025    LIPID  07/22/2025    MEDICARE ANNUAL WELLNESS VISIT  07/30/2025    FALL RISK ASSESSMENT  07/30/2025    ADVANCE CARE PLANNING  07/30/2029    DEXA  10/11/2036    TSH W/FREE T4 REFLEX  Completed    SPIROMETRY  Completed    PHQ-2 (once per calendar year)  Completed    Pneumococcal Vaccine: 65+ Years  Completed    URINALYSIS  Completed    Medicare Annual MTM Pharmacist Visit (once per calendar year)  Completed    IPV IMMUNIZATION  Aged Out    HPV IMMUNIZATION  Aged Out    MENINGITIS IMMUNIZATION  Aged Out    RSV MONOCLONAL ANTIBODY  Aged Out           Objective    Exam  /80 (BP Location: Right arm, Patient Position: Sitting, Cuff Size: Adult Large)   Pulse 62   Temp 97.3  F (36.3  C) (Temporal)   Resp 16   Ht 1.631 m (5' 4.2\")   Wt 77.8 kg (171 lb 8 oz)   LMP  (LMP Unknown)   SpO2 97%   BMI 29.25 kg/m     Estimated body mass index is 29.25 kg/m  as calculated from the following:    Height as of this encounter: 1.631 m (5' 4.2\").    Weight as of this encounter: 77.8 kg (171 lb 8 oz).    Physical Exam  GENERAL: alert and no distress  EYES: Eyes grossly normal to inspection, PERRL and conjunctivae and sclerae normal  HENT: ear canals and TM's normal, nose and mouth without ulcers or lesions  NECK: no adenopathy, no asymmetry, masses, or scars  RESP: lungs clear to auscultation - no rales, " rhonchi or wheezes  CV: regular rate and rhythm, normal S1 S2, no S3 or S4, no murmur, click or rub, no peripheral edema  ABDOMEN: soft, nontender, no hepatosplenomegaly, no masses and bowel sounds normal  MS: no gross musculoskeletal defects noted, no edema  SKIN: no suspicious lesions or rashes  NEURO: Normal strength and tone, mentation intact and speech normal  PSYCH: mentation appears normal, affect normal/bright        7/30/2024   Mini Cog   Clock Draw Score 2 Normal   3 Item Recall 3 objects recalled   Mini Cog Total Score 5                 Signed Electronically by: Mikala Philip MD

## 2024-08-01 ENCOUNTER — TRANSFERRED RECORDS (OUTPATIENT)
Dept: HEALTH INFORMATION MANAGEMENT | Facility: CLINIC | Age: 84
End: 2024-08-01

## 2024-09-25 ENCOUNTER — ANCILLARY PROCEDURE (OUTPATIENT)
Dept: CARDIOLOGY | Facility: CLINIC | Age: 84
End: 2024-09-25
Attending: INTERNAL MEDICINE
Payer: COMMERCIAL

## 2024-09-25 DIAGNOSIS — I48.0 PAF (PAROXYSMAL ATRIAL FIBRILLATION) (H): ICD-10-CM

## 2024-09-25 DIAGNOSIS — I49.5 SSS (SICK SINUS SYNDROME) (H): ICD-10-CM

## 2024-09-25 DIAGNOSIS — I50.32 CHRONIC DIASTOLIC CHF (CONGESTIVE HEART FAILURE) (H): ICD-10-CM

## 2024-09-25 DIAGNOSIS — Z95.0 CARDIAC PACEMAKER IN SITU: ICD-10-CM

## 2024-09-25 PROCEDURE — 93294 REM INTERROG EVL PM/LDLS PM: CPT | Performed by: INTERNAL MEDICINE

## 2024-09-25 PROCEDURE — 93296 REM INTERROG EVL PM/IDS: CPT | Performed by: INTERNAL MEDICINE

## 2024-09-27 LAB
MDC_IDC_LEAD_CONNECTION_STATUS: NORMAL
MDC_IDC_LEAD_CONNECTION_STATUS: NORMAL
MDC_IDC_LEAD_IMPLANT_DT: NORMAL
MDC_IDC_LEAD_IMPLANT_DT: NORMAL
MDC_IDC_LEAD_LOCATION: NORMAL
MDC_IDC_LEAD_LOCATION: NORMAL
MDC_IDC_LEAD_LOCATION_DETAIL_1: NORMAL
MDC_IDC_LEAD_LOCATION_DETAIL_1: NORMAL
MDC_IDC_LEAD_MFG: NORMAL
MDC_IDC_LEAD_MFG: NORMAL
MDC_IDC_LEAD_MODEL: NORMAL
MDC_IDC_LEAD_MODEL: NORMAL
MDC_IDC_LEAD_POLARITY_TYPE: NORMAL
MDC_IDC_LEAD_POLARITY_TYPE: NORMAL
MDC_IDC_LEAD_SERIAL: NORMAL
MDC_IDC_LEAD_SERIAL: NORMAL
MDC_IDC_MSMT_BATTERY_DTM: NORMAL
MDC_IDC_MSMT_BATTERY_REMAINING_LONGEVITY: 47 MO
MDC_IDC_MSMT_BATTERY_REMAINING_PERCENTAGE: 36 %
MDC_IDC_MSMT_BATTERY_RRT_TRIGGER: NORMAL
MDC_IDC_MSMT_BATTERY_STATUS: NORMAL
MDC_IDC_MSMT_BATTERY_VOLTAGE: 2.95 V
MDC_IDC_MSMT_LEADCHNL_RA_IMPEDANCE_VALUE: 530 OHM
MDC_IDC_MSMT_LEADCHNL_RA_LEAD_CHANNEL_STATUS: NORMAL
MDC_IDC_MSMT_LEADCHNL_RA_PACING_THRESHOLD_AMPLITUDE: 0.5 V
MDC_IDC_MSMT_LEADCHNL_RA_PACING_THRESHOLD_PULSEWIDTH: 0.4 MS
MDC_IDC_MSMT_LEADCHNL_RA_SENSING_INTR_AMPL: 3 MV
MDC_IDC_MSMT_LEADCHNL_RV_IMPEDANCE_VALUE: 510 OHM
MDC_IDC_MSMT_LEADCHNL_RV_LEAD_CHANNEL_STATUS: NORMAL
MDC_IDC_MSMT_LEADCHNL_RV_PACING_THRESHOLD_AMPLITUDE: 0.62 V
MDC_IDC_MSMT_LEADCHNL_RV_PACING_THRESHOLD_PULSEWIDTH: 0.4 MS
MDC_IDC_MSMT_LEADCHNL_RV_SENSING_INTR_AMPL: 12 MV
MDC_IDC_PG_IMPLANT_DTM: NORMAL
MDC_IDC_PG_MFG: NORMAL
MDC_IDC_PG_MODEL: NORMAL
MDC_IDC_PG_SERIAL: NORMAL
MDC_IDC_PG_TYPE: NORMAL
MDC_IDC_SESS_CLINIC_NAME: NORMAL
MDC_IDC_SESS_DTM: NORMAL
MDC_IDC_SESS_REPROGRAMMED: NO
MDC_IDC_SESS_TYPE: NORMAL
MDC_IDC_SET_BRADY_AT_MODE_SWITCH_MODE: NORMAL
MDC_IDC_SET_BRADY_AT_MODE_SWITCH_RATE: 170 {BEATS}/MIN
MDC_IDC_SET_BRADY_LOWRATE: 60 {BEATS}/MIN
MDC_IDC_SET_BRADY_MAX_SENSOR_RATE: 120 {BEATS}/MIN
MDC_IDC_SET_BRADY_MAX_TRACKING_RATE: 120 {BEATS}/MIN
MDC_IDC_SET_BRADY_MODE: NORMAL
MDC_IDC_SET_BRADY_PAV_DELAY_LOW: 250 MS
MDC_IDC_SET_BRADY_SAV_DELAY_LOW: 225 MS
MDC_IDC_SET_LEADCHNL_RA_PACING_AMPLITUDE: 1.5 V
MDC_IDC_SET_LEADCHNL_RA_PACING_ANODE_ELECTRODE_1: NORMAL
MDC_IDC_SET_LEADCHNL_RA_PACING_ANODE_LOCATION_1: NORMAL
MDC_IDC_SET_LEADCHNL_RA_PACING_CAPTURE_MODE: NORMAL
MDC_IDC_SET_LEADCHNL_RA_PACING_CATHODE_ELECTRODE_1: NORMAL
MDC_IDC_SET_LEADCHNL_RA_PACING_CATHODE_LOCATION_1: NORMAL
MDC_IDC_SET_LEADCHNL_RA_PACING_POLARITY: NORMAL
MDC_IDC_SET_LEADCHNL_RA_PACING_PULSEWIDTH: 0.4 MS
MDC_IDC_SET_LEADCHNL_RA_SENSING_ADAPTATION_MODE: NORMAL
MDC_IDC_SET_LEADCHNL_RA_SENSING_ANODE_ELECTRODE_1: NORMAL
MDC_IDC_SET_LEADCHNL_RA_SENSING_ANODE_LOCATION_1: NORMAL
MDC_IDC_SET_LEADCHNL_RA_SENSING_CATHODE_ELECTRODE_1: NORMAL
MDC_IDC_SET_LEADCHNL_RA_SENSING_CATHODE_LOCATION_1: NORMAL
MDC_IDC_SET_LEADCHNL_RA_SENSING_POLARITY: NORMAL
MDC_IDC_SET_LEADCHNL_RA_SENSING_SENSITIVITY: 0.3 MV
MDC_IDC_SET_LEADCHNL_RV_PACING_AMPLITUDE: 0.88
MDC_IDC_SET_LEADCHNL_RV_PACING_ANODE_ELECTRODE_1: NORMAL
MDC_IDC_SET_LEADCHNL_RV_PACING_ANODE_LOCATION_1: NORMAL
MDC_IDC_SET_LEADCHNL_RV_PACING_CAPTURE_MODE: NORMAL
MDC_IDC_SET_LEADCHNL_RV_PACING_CATHODE_ELECTRODE_1: NORMAL
MDC_IDC_SET_LEADCHNL_RV_PACING_CATHODE_LOCATION_1: NORMAL
MDC_IDC_SET_LEADCHNL_RV_PACING_POLARITY: NORMAL
MDC_IDC_SET_LEADCHNL_RV_PACING_PULSEWIDTH: 0.4 MS
MDC_IDC_SET_LEADCHNL_RV_SENSING_ADAPTATION_MODE: NORMAL
MDC_IDC_SET_LEADCHNL_RV_SENSING_ANODE_ELECTRODE_1: NORMAL
MDC_IDC_SET_LEADCHNL_RV_SENSING_ANODE_LOCATION_1: NORMAL
MDC_IDC_SET_LEADCHNL_RV_SENSING_CATHODE_ELECTRODE_1: NORMAL
MDC_IDC_SET_LEADCHNL_RV_SENSING_CATHODE_LOCATION_1: NORMAL
MDC_IDC_SET_LEADCHNL_RV_SENSING_POLARITY: NORMAL
MDC_IDC_SET_LEADCHNL_RV_SENSING_SENSITIVITY: 0.5 MV
MDC_IDC_STAT_AT_BURDEN_PERCENT: 0 %
MDC_IDC_STAT_AT_DTM_END: NORMAL
MDC_IDC_STAT_AT_DTM_START: NORMAL
MDC_IDC_STAT_AT_MODE_SW_COUNT: 0
MDC_IDC_STAT_AT_MODE_SW_COUNT_PER_DAY: 0
MDC_IDC_STAT_AT_MODE_SW_PERCENT_TIME: 0 %
MDC_IDC_STAT_BRADY_AP_VP_PERCENT: 50 %
MDC_IDC_STAT_BRADY_AP_VS_PERCENT: 1 %
MDC_IDC_STAT_BRADY_AS_VP_PERCENT: 50 %
MDC_IDC_STAT_BRADY_AS_VS_PERCENT: 1 %
MDC_IDC_STAT_BRADY_DTM_END: NORMAL
MDC_IDC_STAT_BRADY_DTM_START: NORMAL
MDC_IDC_STAT_BRADY_RA_PERCENT_PACED: 49 %
MDC_IDC_STAT_BRADY_RV_PERCENT_PACED: 99 %
MDC_IDC_STAT_CRT_DTM_END: NORMAL
MDC_IDC_STAT_CRT_DTM_START: NORMAL
MDC_IDC_STAT_HEART_RATE_ATRIAL_MAX: 280 {BEATS}/MIN
MDC_IDC_STAT_HEART_RATE_ATRIAL_MEAN: 68 {BEATS}/MIN
MDC_IDC_STAT_HEART_RATE_ATRIAL_MIN: 50 {BEATS}/MIN
MDC_IDC_STAT_HEART_RATE_DTM_END: NORMAL
MDC_IDC_STAT_HEART_RATE_DTM_START: NORMAL
MDC_IDC_STAT_HEART_RATE_VENTRICULAR_MAX: 190 {BEATS}/MIN
MDC_IDC_STAT_HEART_RATE_VENTRICULAR_MEAN: 68 {BEATS}/MIN
MDC_IDC_STAT_HEART_RATE_VENTRICULAR_MIN: 40 {BEATS}/MIN

## 2024-10-10 ENCOUNTER — APPOINTMENT (OUTPATIENT)
Dept: CT IMAGING | Facility: CLINIC | Age: 84
DRG: 660 | End: 2024-10-10
Attending: PHYSICIAN ASSISTANT
Payer: COMMERCIAL

## 2024-10-10 ENCOUNTER — HOSPITAL ENCOUNTER (INPATIENT)
Facility: CLINIC | Age: 84
LOS: 1 days | Discharge: HOME | DRG: 660 | End: 2024-10-12
Attending: PHYSICIAN ASSISTANT | Admitting: INTERNAL MEDICINE
Payer: COMMERCIAL

## 2024-10-10 DIAGNOSIS — N30.01 ACUTE CYSTITIS WITH HEMATURIA: Primary | ICD-10-CM

## 2024-10-10 DIAGNOSIS — N23 URETERAL COLIC: ICD-10-CM

## 2024-10-10 DIAGNOSIS — R11.2 NAUSEA AND VOMITING, UNSPECIFIED VOMITING TYPE: ICD-10-CM

## 2024-10-10 DIAGNOSIS — N20.1 URETEROLITHIASIS: ICD-10-CM

## 2024-10-10 LAB
ALBUMIN SERPL BCG-MCNC: 3.8 G/DL (ref 3.5–5.2)
ALBUMIN UR-MCNC: NEGATIVE MG/DL
ALP SERPL-CCNC: 57 U/L (ref 40–150)
ALT SERPL W P-5'-P-CCNC: 18 U/L (ref 0–50)
ANION GAP SERPL CALCULATED.3IONS-SCNC: 12 MMOL/L (ref 7–15)
APPEARANCE UR: ABNORMAL
AST SERPL W P-5'-P-CCNC: 25 U/L (ref 0–45)
ATRIAL RATE - MUSE: 72 BPM
BASOPHILS # BLD AUTO: 0.1 10E3/UL (ref 0–0.2)
BASOPHILS NFR BLD AUTO: 1 %
BILIRUB SERPL-MCNC: 0.4 MG/DL
BILIRUB UR QL STRIP: NEGATIVE
BUN SERPL-MCNC: 12.7 MG/DL (ref 8–23)
CALCIUM SERPL-MCNC: 9.8 MG/DL (ref 8.8–10.4)
CHLORIDE SERPL-SCNC: 103 MMOL/L (ref 98–107)
COLOR UR AUTO: ABNORMAL
CREAT SERPL-MCNC: 0.93 MG/DL (ref 0.51–0.95)
DIASTOLIC BLOOD PRESSURE - MUSE: NORMAL MMHG
EGFRCR SERPLBLD CKD-EPI 2021: 60 ML/MIN/1.73M2
EOSINOPHIL # BLD AUTO: 0.1 10E3/UL (ref 0–0.7)
EOSINOPHIL NFR BLD AUTO: 1 %
ERYTHROCYTE [DISTWIDTH] IN BLOOD BY AUTOMATED COUNT: 13.9 % (ref 10–15)
GLUCOSE SERPL-MCNC: 132 MG/DL (ref 70–99)
GLUCOSE UR STRIP-MCNC: NEGATIVE MG/DL
HCO3 SERPL-SCNC: 25 MMOL/L (ref 22–29)
HCT VFR BLD AUTO: 35.7 % (ref 35–47)
HGB BLD-MCNC: 11.7 G/DL (ref 11.7–15.7)
HGB UR QL STRIP: ABNORMAL
IMM GRANULOCYTES # BLD: 0 10E3/UL
IMM GRANULOCYTES NFR BLD: 0 %
INTERPRETATION ECG - MUSE: NORMAL
KETONES UR STRIP-MCNC: 20 MG/DL
LEUKOCYTE ESTERASE UR QL STRIP: ABNORMAL
LIPASE SERPL-CCNC: 17 U/L (ref 13–60)
LYMPHOCYTES # BLD AUTO: 0.9 10E3/UL (ref 0.8–5.3)
LYMPHOCYTES NFR BLD AUTO: 10 %
MCH RBC QN AUTO: 29.4 PG (ref 26.5–33)
MCHC RBC AUTO-ENTMCNC: 32.8 G/DL (ref 31.5–36.5)
MCV RBC AUTO: 90 FL (ref 78–100)
MONOCYTES # BLD AUTO: 0.5 10E3/UL (ref 0–1.3)
MONOCYTES NFR BLD AUTO: 6 %
NEUTROPHILS # BLD AUTO: 7.5 10E3/UL (ref 1.6–8.3)
NEUTROPHILS NFR BLD AUTO: 83 %
NITRATE UR QL: POSITIVE
NRBC # BLD AUTO: 0 10E3/UL
NRBC BLD AUTO-RTO: 0 /100
P AXIS - MUSE: 59 DEGREES
PH UR STRIP: 8 [PH] (ref 5–7)
PLATELET # BLD AUTO: 383 10E3/UL (ref 150–450)
POTASSIUM SERPL-SCNC: 4.4 MMOL/L (ref 3.4–5.3)
PR INTERVAL - MUSE: 252 MS
PROT SERPL-MCNC: 6.6 G/DL (ref 6.4–8.3)
QRS DURATION - MUSE: 178 MS
QT - MUSE: 456 MS
QTC - MUSE: 499 MS
R AXIS - MUSE: -54 DEGREES
RBC # BLD AUTO: 3.98 10E6/UL (ref 3.8–5.2)
RBC URINE: 23 /HPF
SODIUM SERPL-SCNC: 140 MMOL/L (ref 135–145)
SP GR UR STRIP: 1.01 (ref 1–1.03)
SQUAMOUS EPITHELIAL: <1 /HPF
SYSTOLIC BLOOD PRESSURE - MUSE: NORMAL MMHG
T AXIS - MUSE: 114 DEGREES
UROBILINOGEN UR STRIP-MCNC: NORMAL MG/DL
VENTRICULAR RATE- MUSE: 72 BPM
WBC # BLD AUTO: 9 10E3/UL (ref 4–11)
WBC URINE: 21 /HPF

## 2024-10-10 PROCEDURE — 96376 TX/PRO/DX INJ SAME DRUG ADON: CPT

## 2024-10-10 PROCEDURE — 250N000009 HC RX 250: Performed by: PHYSICIAN ASSISTANT

## 2024-10-10 PROCEDURE — 258N000003 HC RX IP 258 OP 636: Performed by: INTERNAL MEDICINE

## 2024-10-10 PROCEDURE — 93005 ELECTROCARDIOGRAM TRACING: CPT

## 2024-10-10 PROCEDURE — 96375 TX/PRO/DX INJ NEW DRUG ADDON: CPT

## 2024-10-10 PROCEDURE — G0378 HOSPITAL OBSERVATION PER HR: HCPCS

## 2024-10-10 PROCEDURE — 99223 1ST HOSP IP/OBS HIGH 75: CPT | Performed by: INTERNAL MEDICINE

## 2024-10-10 PROCEDURE — 87186 SC STD MICRODIL/AGAR DIL: CPT | Performed by: EMERGENCY MEDICINE

## 2024-10-10 PROCEDURE — 83690 ASSAY OF LIPASE: CPT | Performed by: PHYSICIAN ASSISTANT

## 2024-10-10 PROCEDURE — 85025 COMPLETE CBC W/AUTO DIFF WBC: CPT | Performed by: EMERGENCY MEDICINE

## 2024-10-10 PROCEDURE — 99285 EMERGENCY DEPT VISIT HI MDM: CPT | Mod: 25

## 2024-10-10 PROCEDURE — 96365 THER/PROPH/DIAG IV INF INIT: CPT

## 2024-10-10 PROCEDURE — 80053 COMPREHEN METABOLIC PANEL: CPT | Performed by: EMERGENCY MEDICINE

## 2024-10-10 PROCEDURE — 250N000013 HC RX MED GY IP 250 OP 250 PS 637: Performed by: PHYSICIAN ASSISTANT

## 2024-10-10 PROCEDURE — 250N000011 HC RX IP 250 OP 636: Performed by: PHYSICIAN ASSISTANT

## 2024-10-10 PROCEDURE — 81003 URINALYSIS AUTO W/O SCOPE: CPT | Performed by: EMERGENCY MEDICINE

## 2024-10-10 PROCEDURE — 36415 COLL VENOUS BLD VENIPUNCTURE: CPT | Performed by: EMERGENCY MEDICINE

## 2024-10-10 PROCEDURE — 74177 CT ABD & PELVIS W/CONTRAST: CPT

## 2024-10-10 PROCEDURE — 250N000013 HC RX MED GY IP 250 OP 250 PS 637: Performed by: INTERNAL MEDICINE

## 2024-10-10 RX ORDER — HYDROMORPHONE HYDROCHLORIDE 1 MG/ML
0.3 INJECTION, SOLUTION INTRAMUSCULAR; INTRAVENOUS; SUBCUTANEOUS ONCE
Status: COMPLETED | OUTPATIENT
Start: 2024-10-10 | End: 2024-10-10

## 2024-10-10 RX ORDER — IOPAMIDOL 755 MG/ML
83 INJECTION, SOLUTION INTRAVASCULAR ONCE
Status: COMPLETED | OUTPATIENT
Start: 2024-10-10 | End: 2024-10-10

## 2024-10-10 RX ORDER — ALBUTEROL SULFATE 90 UG/1
2 INHALANT RESPIRATORY (INHALATION) EVERY 6 HOURS PRN
Status: DISCONTINUED | OUTPATIENT
Start: 2024-10-10 | End: 2024-10-12 | Stop reason: HOSPADM

## 2024-10-10 RX ORDER — HYDRALAZINE HYDROCHLORIDE 20 MG/ML
10 INJECTION INTRAMUSCULAR; INTRAVENOUS EVERY 4 HOURS PRN
Status: DISCONTINUED | OUTPATIENT
Start: 2024-10-10 | End: 2024-10-12 | Stop reason: HOSPADM

## 2024-10-10 RX ORDER — ONDANSETRON 2 MG/ML
4 INJECTION INTRAMUSCULAR; INTRAVENOUS ONCE
Status: COMPLETED | OUTPATIENT
Start: 2024-10-10 | End: 2024-10-10

## 2024-10-10 RX ORDER — OXYCODONE HYDROCHLORIDE 5 MG/1
5 TABLET ORAL EVERY 4 HOURS PRN
Status: DISCONTINUED | OUTPATIENT
Start: 2024-10-10 | End: 2024-10-12 | Stop reason: HOSPADM

## 2024-10-10 RX ORDER — CARVEDILOL 12.5 MG/1
12.5 TABLET ORAL 2 TIMES DAILY WITH MEALS
Status: DISCONTINUED | OUTPATIENT
Start: 2024-10-10 | End: 2024-10-12 | Stop reason: HOSPADM

## 2024-10-10 RX ORDER — HYDROMORPHONE HCL IN WATER/PF 6 MG/30 ML
0.2 PATIENT CONTROLLED ANALGESIA SYRINGE INTRAVENOUS
Status: DISCONTINUED | OUTPATIENT
Start: 2024-10-10 | End: 2024-10-12 | Stop reason: HOSPADM

## 2024-10-10 RX ORDER — CEFTRIAXONE 1 G/1
1 INJECTION, POWDER, FOR SOLUTION INTRAMUSCULAR; INTRAVENOUS ONCE
Status: COMPLETED | OUTPATIENT
Start: 2024-10-10 | End: 2024-10-10

## 2024-10-10 RX ORDER — NALOXONE HYDROCHLORIDE 0.4 MG/ML
0.4 INJECTION, SOLUTION INTRAMUSCULAR; INTRAVENOUS; SUBCUTANEOUS
Status: DISCONTINUED | OUTPATIENT
Start: 2024-10-10 | End: 2024-10-12 | Stop reason: HOSPADM

## 2024-10-10 RX ORDER — ACETAMINOPHEN 325 MG/1
650 TABLET ORAL EVERY 4 HOURS PRN
Status: DISCONTINUED | OUTPATIENT
Start: 2024-10-10 | End: 2024-10-12 | Stop reason: HOSPADM

## 2024-10-10 RX ORDER — ONDANSETRON 4 MG/1
4 TABLET, ORALLY DISINTEGRATING ORAL EVERY 6 HOURS PRN
Status: DISCONTINUED | OUTPATIENT
Start: 2024-10-10 | End: 2024-10-12 | Stop reason: HOSPADM

## 2024-10-10 RX ORDER — LIDOCAINE 40 MG/G
CREAM TOPICAL
Status: DISCONTINUED | OUTPATIENT
Start: 2024-10-10 | End: 2024-10-12 | Stop reason: HOSPADM

## 2024-10-10 RX ORDER — TAMSULOSIN HYDROCHLORIDE 0.4 MG/1
0.4 CAPSULE ORAL ONCE
Status: COMPLETED | OUTPATIENT
Start: 2024-10-10 | End: 2024-10-10

## 2024-10-10 RX ORDER — AMOXICILLIN 250 MG
2 CAPSULE ORAL 2 TIMES DAILY PRN
Status: DISCONTINUED | OUTPATIENT
Start: 2024-10-10 | End: 2024-10-12 | Stop reason: HOSPADM

## 2024-10-10 RX ORDER — NALOXONE HYDROCHLORIDE 0.4 MG/ML
0.2 INJECTION, SOLUTION INTRAMUSCULAR; INTRAVENOUS; SUBCUTANEOUS
Status: DISCONTINUED | OUTPATIENT
Start: 2024-10-10 | End: 2024-10-12 | Stop reason: HOSPADM

## 2024-10-10 RX ORDER — TAMSULOSIN HYDROCHLORIDE 0.4 MG/1
0.4 CAPSULE ORAL DAILY
Status: DISCONTINUED | OUTPATIENT
Start: 2024-10-11 | End: 2024-10-12 | Stop reason: HOSPADM

## 2024-10-10 RX ORDER — ONDANSETRON 2 MG/ML
4 INJECTION INTRAMUSCULAR; INTRAVENOUS EVERY 6 HOURS PRN
Status: DISCONTINUED | OUTPATIENT
Start: 2024-10-10 | End: 2024-10-12 | Stop reason: HOSPADM

## 2024-10-10 RX ORDER — SODIUM CHLORIDE 9 MG/ML
INJECTION, SOLUTION INTRAVENOUS CONTINUOUS
Status: DISCONTINUED | OUTPATIENT
Start: 2024-10-10 | End: 2024-10-12

## 2024-10-10 RX ORDER — CEFTRIAXONE 1 G/1
1 INJECTION, POWDER, FOR SOLUTION INTRAMUSCULAR; INTRAVENOUS EVERY 24 HOURS
Status: DISCONTINUED | OUTPATIENT
Start: 2024-10-11 | End: 2024-10-12 | Stop reason: HOSPADM

## 2024-10-10 RX ORDER — AMOXICILLIN 250 MG
1 CAPSULE ORAL 2 TIMES DAILY PRN
Status: DISCONTINUED | OUTPATIENT
Start: 2024-10-10 | End: 2024-10-12 | Stop reason: HOSPADM

## 2024-10-10 RX ORDER — ACETAMINOPHEN 650 MG/1
650 SUPPOSITORY RECTAL EVERY 4 HOURS PRN
Status: DISCONTINUED | OUTPATIENT
Start: 2024-10-10 | End: 2024-10-12 | Stop reason: HOSPADM

## 2024-10-10 RX ADMIN — HYDROMORPHONE HYDROCHLORIDE 0.3 MG: 1 INJECTION, SOLUTION INTRAMUSCULAR; INTRAVENOUS; SUBCUTANEOUS at 18:01

## 2024-10-10 RX ADMIN — SODIUM CHLORIDE: 9 INJECTION, SOLUTION INTRAVENOUS at 22:58

## 2024-10-10 RX ADMIN — ONDANSETRON 4 MG: 2 INJECTION INTRAMUSCULAR; INTRAVENOUS at 18:01

## 2024-10-10 RX ADMIN — HYDROMORPHONE HYDROCHLORIDE 0.3 MG: 1 INJECTION, SOLUTION INTRAMUSCULAR; INTRAVENOUS; SUBCUTANEOUS at 20:24

## 2024-10-10 RX ADMIN — TAMSULOSIN HYDROCHLORIDE 0.4 MG: 0.4 CAPSULE ORAL at 20:49

## 2024-10-10 RX ADMIN — CARVEDILOL 12.5 MG: 12.5 TABLET, FILM COATED ORAL at 23:00

## 2024-10-10 RX ADMIN — SACUBITRIL AND VALSARTAN 1 TABLET: 49; 51 TABLET, FILM COATED ORAL at 23:00

## 2024-10-10 RX ADMIN — SODIUM CHLORIDE 64 ML: 9 INJECTION, SOLUTION INTRAVENOUS at 18:53

## 2024-10-10 RX ADMIN — IOPAMIDOL 83 ML: 755 INJECTION, SOLUTION INTRAVENOUS at 18:53

## 2024-10-10 RX ADMIN — CEFTRIAXONE SODIUM 1 G: 1 INJECTION, POWDER, FOR SOLUTION INTRAMUSCULAR; INTRAVENOUS at 20:49

## 2024-10-10 RX ADMIN — ONDANSETRON 4 MG: 2 INJECTION INTRAMUSCULAR; INTRAVENOUS at 20:22

## 2024-10-10 ASSESSMENT — ACTIVITIES OF DAILY LIVING (ADL)
ADLS_ACUITY_SCORE: 40
ADLS_ACUITY_SCORE: 36
ADLS_ACUITY_SCORE: 40
ADLS_ACUITY_SCORE: 40

## 2024-10-10 ASSESSMENT — COLUMBIA-SUICIDE SEVERITY RATING SCALE - C-SSRS
1. IN THE PAST MONTH, HAVE YOU WISHED YOU WERE DEAD OR WISHED YOU COULD GO TO SLEEP AND NOT WAKE UP?: NO
2. HAVE YOU ACTUALLY HAD ANY THOUGHTS OF KILLING YOURSELF IN THE PAST MONTH?: NO
6. HAVE YOU EVER DONE ANYTHING, STARTED TO DO ANYTHING, OR PREPARED TO DO ANYTHING TO END YOUR LIFE?: NO

## 2024-10-10 NOTE — ED TRIAGE NOTES
Patient arrived via EMS from home with complaints of lower abdominal pain, nausea and vomiting that started arounds 12:30 today. Patient had a small BM yesterday, none today, denied any blood in her stool and denied blood in her urine or other urinary symptoms.      Triage Assessment (Adult)       Row Name 10/10/24 [id number]          Triage Assessment    Airway WDL WDL        Respiratory WDL    Respiratory WDL WDL        Skin Circulation/Temperature WDL    Skin Circulation/Temperature WDL WDL        Cardiac WDL    Cardiac WDL WDL        Peripheral/Neurovascular WDL    Peripheral Neurovascular WDL WDL        Cognitive/Neuro/Behavioral WDL    Cognitive/Neuro/Behavioral WDL WDL

## 2024-10-10 NOTE — ED PROVIDER NOTES
Emergency Department Note      History of Present Illness     Chief Complaint   Abdominal Pain and Nausea & Vomiting      HPI   Hamida Verma is a 84 year old female with history of COPD, NSTEMI, atrial fibrillation anticoagulated with Xarelto and Plavix who presents with nausea, vomiting, and abdominal pain.  Patient reports sudden onset low right abdominal pain with nausea and vomiting at 12: 30 p.m today. Patient reports that she had a small, nonbloody bowel movement this morning but feels like she has to pass a bowel movement.  She is urinating normally.  Denies any fevers.  Patient reports that she had a gallbladder issue in the past but is unsure if this was ever removed.  Denies any chest pain or shortness of breath. Denies recent falls. States she did not eat today.    Independent Historian   None    Review of External Notes   None    Past Medical History     Medical History and Problem List   Past Medical History:   Diagnosis Date    Acute cholecystitis 05/31/2023    Acute cystitis without hematuria 05/30/2023    Atrial fibrillation (H)     Biliary colic 05/30/2023    Chest pain, unspecified type 12/19/2023    Chronic diastolic CHF (congestive heart failure) (H)     COPD (chronic obstructive pulmonary disease) (H)     Essential hypertension, benign     HYPERLIPIDEMIA NEC/NOS 03/30/2006    Pulmonary hypertension (H)     Pyelonephritis 2019    SSS (sick sinus syndrome) (H)        Medications   acetaminophen (TYLENOL) 500 MG tablet  albuterol (PROAIR HFA/PROVENTIL HFA/VENTOLIN HFA) 108 (90 Base) MCG/ACT inhaler  carvedilol (COREG) 12.5 MG tablet  cholecalciferol (VITAMIN  -D) 1000 UNIT capsule  clopidogrel (PLAVIX) 75 MG tablet  FLAX SEED OIL OR  furosemide (LASIX) 20 MG tablet  Multiple Vitamins-Minerals (HAIR SKIN NAILS PO)  Multiple Vitamins-Minerals (ICAPS AREDS 2 PO)  nitroGLYcerin (NITROSTAT) 0.4 MG sublingual tablet  potassium chloride ER (KLOR-CON M) 10 MEQ CR tablet  rivaroxaban ANTICOAGULANT  (XARELTO) 15 MG TABS tablet  rosuvastatin (CRESTOR) 40 MG tablet  sacubitril-valsartan (ENTRESTO) 49-51 MG per tablet  tiotropium (SPIRIVA) 18 MCG inhaled capsule        Surgical History   Past Surgical History:   Procedure Laterality Date    CV CORONARY ANGIOGRAM N/A 12/7/2023    Procedure: Coronary Angiogram;  Surgeon: Mookie Yates MD;  Location:  [facility]    CV CORONARY ANGIOGRAM N/A 12/8/2023    Procedure: Coronary Angiogram;  Surgeon: Stephanie Monroy MD;  Location:  [facility]    CV CORONARY ANGIOGRAM N/A 12/21/2023    Procedure: Coronary Angiogram;  Surgeon: Eric Barajas MD;  Location:  [facility]    CV PCI N/A 12/8/2023    Procedure: Percutaneous Coronary Intervention;  Surgeon: Stephanie Monroy MD;  Location:  [facility]    CV PCI STENT DRUG ELUTING N/A 12/21/2023    Procedure: Percutaneous Coronary Intervention Stent;  Surgeon: Eric Barajas MD;  Location:  [facility]    EP ABLATION AV NODE N/A 5/7/2019    Procedure: EP Ablation AV Node;  Surgeon: Roe Voss MD;  Location:  [facility]    EYE SURGERY      HYSTERECTOMY, VAGINAL      hysterectomy    LAPAROSCOPIC CHOLECYSTECTOMY N/A 6/1/2023    Procedure: Laparoscopic cholecystectomy with lysis of adhesions;  Surgeon: Sawyer Marcum MD;  Location:  OR       Physical Exam     Patient Vitals for the past 24 hrs:   BP Temp Temp src Pulse Resp SpO2 Weight   10/10/24 2130 (!) 168/81 -- -- 73 -- 93 % --   10/10/24 2100 (!) 174/70 -- -- 78 -- 96 % --   10/10/24 2000 (!) 190/94 -- -- 70 -- 96 % --   10/10/24 1815 -- -- -- -- -- 95 % --   10/10/24 1801 (!) 151/95 -- -- 73 -- 97 % --   10/10/24 1800 -- -- -- -- -- 97 % --   10/10/24 1754 -- -- -- -- -- 97 % --   10/10/24 1722 (!) 185/82 98  F (36.7  C) Oral 76 16 98 % 75.3 kg (166 lb)     Physical Exam  Constitutional: Alert, attentive, GCS 15  HENT:    Nose: Nose normal.    Mouth/Throat:  Oropharynx is clear, mucous membranes are moist  Eyes:  EOM are normal.    CV:  regular rate and rhythm; no murmurs, rubs or gallups  Chest: Effort normal and breath sounds normal.   GI:   Epigastrium - no tenderness, no guarding   RUQ - no tenderness or López's sign   RLQ - tenderness and guarding   Suprapubic area - no tenderness, no guarding    LLQ - tenderness and guarding   LUQ - no tenderness, no guarding   No distension. Normal bowel sounds  : No CVA tenderness.  MSK: Normal range of motion.   Neurological: Alert, attentive  Skin: Skin is warm and dry.      Diagnostics     Lab Results   Labs Ordered and Resulted from Time of ED Arrival to Time of ED Departure   COMPREHENSIVE METABOLIC PANEL - Abnormal       Result Value    Sodium 140      Potassium 4.4      Carbon Dioxide (CO2) 25      Anion Gap 12      Urea Nitrogen 12.7      Creatinine 0.93      GFR Estimate 60 (*)     Calcium 9.8      Chloride 103      Glucose 132 (*)     Alkaline Phosphatase 57      AST 25      ALT 18      Protein Total 6.6      Albumin 3.8      Bilirubin Total 0.4     ROUTINE UA WITH MICROSCOPIC REFLEX TO CULTURE - Abnormal    Color Urine Straw      Appearance Urine Slightly Cloudy (*)     Glucose Urine Negative      Bilirubin Urine Negative      Ketones Urine 20 (*)     Specific Gravity Urine 1.012      Blood Urine Large (*)     pH Urine 8.0 (*)     Protein Albumin Urine Negative      Urobilinogen Urine Normal      Nitrite Urine Positive (*)     Leukocyte Esterase Urine Small (*)     RBC Urine 23 (*)     WBC Urine 21 (*)     Squamous Epithelials Urine <1     LIPASE - Normal    Lipase 17     CBC WITH PLATELETS AND DIFFERENTIAL    WBC Count 9.0      RBC Count 3.98      Hemoglobin 11.7      Hematocrit 35.7      MCV 90      MCH 29.4      MCHC 32.8      RDW 13.9      Platelet Count 383      % Neutrophils 83      % Lymphocytes 10      % Monocytes 6      % Eosinophils 1      % Basophils 1      % Immature Granulocytes 0      NRBCs per 100  WBC 0      Absolute Neutrophils 7.5      Absolute Lymphocytes 0.9      Absolute Monocytes 0.5      Absolute Eosinophils 0.1      Absolute Basophils 0.1      Absolute Immature Granulocytes 0.0      Absolute NRBCs 0.0     EXTRA PURPLE TOP ON ICE   URINE CULTURE       Imaging   CT Abdomen Pelvis w Contrast   Final Result   IMPRESSION:    1.  Right hydronephrosis secondary to a 4 mm distal right ureteral stone.   2.  Additional right-sided kidney stones persist.   3.  Mild compression L2 vertebral body age indeterminant but new compared to prior.   4.  Small indeterminate lesion upper pole right kidney unchanged compared to exam of 17 months ago and very likely to be benign.          EKG   ECG results from 10/10/24   EKG 12 lead     Value    Systolic Blood Pressure     Diastolic Blood Pressure     Ventricular Rate 72    Atrial Rate 72    WI Interval 252    QRS Duration 178        QTc 499    P Axis 59    R AXIS -54    T Axis 114    Interpretation ECG      Atrial-sensed ventricular-paced rhythm with prolonged AV conduction  Abnormal ECG  When compared with ECG of 22-Dec-2023 07:18,  Vent. rate has increased by   3 bpm  Confirmed by GENERATED REPORT, COMPUTER ([id number]),  Calixto Ragsdale ([id number]) on 10/10/2024 9:03:30 PM       Independent Interpretation   None    ED Course      Medications Administered   Medications   acetaminophen (TYLENOL) tablet 650 mg (has no administration in time range)     Or   acetaminophen (TYLENOL) Suppository 650 mg (has no administration in time range)   oxyCODONE IR (ROXICODONE) half-tab 2.5 mg (has no administration in time range)   HYDROmorphone (DILAUDID) injection 0.2 mg (has no administration in time range)   carvedilol (COREG) tablet 12.5 mg (has no administration in time range)   sacubitril-valsartan (ENTRESTO) 49-51 MG per tablet 1 tablet (has no administration in time range)   hydrALAZINE (APRESOLINE) injection 10 mg (has no administration in time range)   naloxone (NARCAN)  injection 0.2 mg (has no administration in time range)     Or   naloxone (NARCAN) injection 0.4 mg (has no administration in time range)     Or   naloxone (NARCAN) injection 0.2 mg (has no administration in time range)     Or   naloxone (NARCAN) injection 0.4 mg (has no administration in time range)   ondansetron (ZOFRAN) injection 4 mg (4 mg Intravenous $Given 10/10/24 1801)   HYDROmorphone (PF) (DILAUDID) injection 0.3 mg (0.3 mg Intravenous $Given 10/10/24 1801)   iopamidol (ISOVUE-370) solution 83 mL (83 mLs Intravenous $Given 10/10/24 1853)   Saline Flush (64 mLs Intravenous $Given 10/10/24 1853)   HYDROmorphone (PF) (DILAUDID) injection 0.3 mg (0.3 mg Intravenous $Given 10/10/24 2024)   ondansetron (ZOFRAN) injection 4 mg (4 mg Intravenous $Given 10/10/24 2022)   cefTRIAXone (ROCEPHIN) 1 g vial to attach to  mL bag for ADULTS or NS 50 mL bag for PEDS (0 g Intravenous Stopped 10/10/24 2128)   tamsulosin (FLOMAX) capsule 0.4 mg (0.4 mg Oral $Given 10/10/24 2049)       Procedures   Procedures     Discussion of Management   Dr. Trierweiler staffing for admission  Hospitalist team Dr. Denis    ED Course        Additional Documentation  None    Medical Decision Making / Diagnosis     CMS Diagnoses: None    MIPS       None    [facility]   Hamida Verma is a 84 year old female with history of COPD, NSTEMI, atrial fibrillation on anticoagulation with Xarelto who presents with abdominal pain, nausea, and vomiting.  She is afebrile, hypertensive at 185/82, nonhypoxic.  Physical exam reveals diffuse right-sided abdominal pain. No CVA tenderness. Differential includes appendicitis, nephrolithiasis, colitis.  Labs today reveal no leukocytosis or anemia.  Electrolytes are grossly normal.  UA is positive for nitrate positive infection.  CT scan of the abdomen reveals a 4 mm ureteral stone with right-sided hydronephrosis.  The stone is located about 1 cm centimeter from the UVJ. Several stones noted in the right renal pelvis  up to 7 mm.  Results discussed with patient.  She had persistent nausea and pain despite above interventions and so will be admitted for pain control.  Given evidence of UTI, Rocephin is also provided.  No evidence of urosepsis here today.  Patient accepted by hospitalist service under Dr. Denis and she remains hemodynamically stable.    Disposition   The patient was admitted to the hospital.     Diagnosis     ICD-10-CM    1. Ureterolithiasis  N20.1       2. Ureteral colic  N23       3. Nausea and vomiting, unspecified vomiting type  R11.2            Discharge Medications   New Prescriptions    No medications on file         5:33 PM  October 10, 2024  MICHEAL HURTADO Emily, PA-C  10/10/24 [id number]

## 2024-10-10 NOTE — ED NOTES
Bed: ED27  Expected date:   Expected time:   Means of arrival:   Comments:  Hems [address] 84f abd pain with n/v

## 2024-10-10 NOTE — ED NOTES
Patient was placed on a purewiuck as she notes that she has to go to the bathroom but moving causes her pain. Patient was cleaned and placed on a purewick. UA sent.

## 2024-10-10 NOTE — ED NOTES
[facility]  ED Nurse Handoff Report    ED Chief complaint: Abdominal Pain and Nausea & Vomiting      ED Diagnosis:   Final diagnoses:   None       Code Status:  Admitting MD to assess.      Allergies:   Allergies   Allergen Reactions    Strawberries [Strawberry Extract] Anaphylaxis    Strawberry Flavor        Patient Story:   Patient arrived via EMS from home with complaints of lower abdominal pain, nausea and vomiting that started arounds 12:30 today. Patient had a small BM yesterday, none today, denied any blood in her stool and denied blood in her urine or other urinary symptoms.      Focused Assessment:  Patient is alert and oriented x 4, calm and cooperative. VS are stable, skin is warm and dry, respirations are even and non-labored on room air. Patient denied chest pain, shortness of breath, dizziness, and nausea. Patient complains of lower abdominal pain, improved somewhat with IV pain medication, nausea improved with IV antiemetics.      Treatments and/or interventions provided:   Medications   ondansetron (ZOFRAN) injection 4 mg (4 mg Intravenous $Given 10/10/24 1801)   HYDROmorphone (PF) (DILAUDID) injection 0.3 mg (0.3 mg Intravenous $Given 10/10/24 1801)   iopamidol (ISOVUE-370) solution 83 mL (83 mLs Intravenous $Given 10/10/24 1853)   Saline Flush (64 mLs Intravenous $Given 10/10/24 1853)       Patient's response to treatments and/or interventions: Improved pain and nausea    To be done/followed up on inpatient unit:  Monitor    Does this patient have any cognitive concerns?: Forgetful    Activity level - Baseline/Home:  Unknown  Activity Level - Current:   Unknown    Patient's Preferred language: English   Needed?: No    Isolation: None  Infection: Not Applicable  Patient tested for COVID 19 prior to admission: NO  Bariatric?: No    Vital Signs:   Vitals:    10/10/24 1754 10/10/24 1800 10/10/24 1801 10/10/24 1815   BP:   (!) 151/95    Pulse:   73    Resp:       Temp:        TempSrc:       SpO2: 97% 97% 97% 95%   Weight:           Cardiac Rhythm:     Was the PSS-3 completed:   Yes  What interventions are required if any?               Family Comments: No family present at this time.    OBS brochure/video discussed/provided to patient/family: No              Name of person given brochure if not patient: NA              Relationship to patient: NA    For the majority of the shift this patient's behavior was Green.   Behavioral interventions performed were  information and reassurance provided.  .    ED NURSE PHONE NUMBER: [phone]

## 2024-10-11 ENCOUNTER — APPOINTMENT (OUTPATIENT)
Dept: GENERAL RADIOLOGY | Facility: CLINIC | Age: 84
DRG: 660 | End: 2024-10-11
Attending: INTERNAL MEDICINE
Payer: COMMERCIAL

## 2024-10-11 ENCOUNTER — ANESTHESIA EVENT (OUTPATIENT)
Dept: SURGERY | Facility: CLINIC | Age: 84
DRG: 660 | End: 2024-10-11
Payer: COMMERCIAL

## 2024-10-11 ENCOUNTER — ANESTHESIA (OUTPATIENT)
Dept: SURGERY | Facility: CLINIC | Age: 84
DRG: 660 | End: 2024-10-11
Payer: COMMERCIAL

## 2024-10-11 PROBLEM — N30.01 ACUTE CYSTITIS WITH HEMATURIA: Status: ACTIVE | Noted: 2024-10-11

## 2024-10-11 LAB
ANION GAP SERPL CALCULATED.3IONS-SCNC: 11 MMOL/L (ref 7–15)
BACTERIA UR CULT: ABNORMAL
BUN SERPL-MCNC: 14.8 MG/DL (ref 8–23)
CALCIUM SERPL-MCNC: 9.3 MG/DL (ref 8.8–10.4)
CHLORIDE SERPL-SCNC: 104 MMOL/L (ref 98–107)
CREAT SERPL-MCNC: 0.95 MG/DL (ref 0.51–0.95)
EGFRCR SERPLBLD CKD-EPI 2021: 58 ML/MIN/1.73M2
ERYTHROCYTE [DISTWIDTH] IN BLOOD BY AUTOMATED COUNT: 14.3 % (ref 10–15)
GLUCOSE SERPL-MCNC: 119 MG/DL (ref 70–99)
HCO3 SERPL-SCNC: 24 MMOL/L (ref 22–29)
HCT VFR BLD AUTO: 36.4 % (ref 35–47)
HGB BLD-MCNC: 11.4 G/DL (ref 11.7–15.7)
MCH RBC QN AUTO: 28.7 PG (ref 26.5–33)
MCHC RBC AUTO-ENTMCNC: 31.3 G/DL (ref 31.5–36.5)
MCV RBC AUTO: 92 FL (ref 78–100)
PLATELET # BLD AUTO: 309 10E3/UL (ref 150–450)
POTASSIUM SERPL-SCNC: 4.3 MMOL/L (ref 3.4–5.3)
RBC # BLD AUTO: 3.97 10E6/UL (ref 3.8–5.2)
SODIUM SERPL-SCNC: 139 MMOL/L (ref 135–145)
WBC # BLD AUTO: 13.8 10E3/UL (ref 4–11)

## 2024-10-11 PROCEDURE — 255N000002 HC RX 255 OP 636: Performed by: STUDENT IN AN ORGANIZED HEALTH CARE EDUCATION/TRAINING PROGRAM

## 2024-10-11 PROCEDURE — G0378 HOSPITAL OBSERVATION PER HR: HCPCS

## 2024-10-11 PROCEDURE — 52332 CYSTOSCOPY AND TREATMENT: CPT | Performed by: STUDENT IN AN ORGANIZED HEALTH CARE EDUCATION/TRAINING PROGRAM

## 2024-10-11 PROCEDURE — 52332 CYSTOSCOPY AND TREATMENT: CPT

## 2024-10-11 PROCEDURE — 74420 UROGRAPHY RTRGR +-KUB: CPT | Mod: 26 | Performed by: STUDENT IN AN ORGANIZED HEALTH CARE EDUCATION/TRAINING PROGRAM

## 2024-10-11 PROCEDURE — 370N000017 HC ANESTHESIA TECHNICAL FEE, PER MIN: Performed by: STUDENT IN AN ORGANIZED HEALTH CARE EDUCATION/TRAINING PROGRAM

## 2024-10-11 PROCEDURE — 250N000025 HC SEVOFLURANE, PER MIN: Performed by: STUDENT IN AN ORGANIZED HEALTH CARE EDUCATION/TRAINING PROGRAM

## 2024-10-11 PROCEDURE — 250N000013 HC RX MED GY IP 250 OP 250 PS 637: Performed by: STUDENT IN AN ORGANIZED HEALTH CARE EDUCATION/TRAINING PROGRAM

## 2024-10-11 PROCEDURE — 99100 ANES PT EXTEME AGE<1 YR&>70: CPT

## 2024-10-11 PROCEDURE — 99232 SBSQ HOSP IP/OBS MODERATE 35: CPT | Performed by: PHYSICIAN ASSISTANT

## 2024-10-11 PROCEDURE — 250N000011 HC RX IP 250 OP 636

## 2024-10-11 PROCEDURE — C1758 CATHETER, URETERAL: HCPCS | Performed by: STUDENT IN AN ORGANIZED HEALTH CARE EDUCATION/TRAINING PROGRAM

## 2024-10-11 PROCEDURE — C2617 STENT, NON-COR, TEM W/O DEL: HCPCS | Performed by: STUDENT IN AN ORGANIZED HEALTH CARE EDUCATION/TRAINING PROGRAM

## 2024-10-11 PROCEDURE — 85027 COMPLETE CBC AUTOMATED: CPT | Performed by: INTERNAL MEDICINE

## 2024-10-11 PROCEDURE — 250N000013 HC RX MED GY IP 250 OP 250 PS 637: Performed by: INTERNAL MEDICINE

## 2024-10-11 PROCEDURE — 710N000009 HC RECOVERY PHASE 1, LEVEL 1, PER MIN: Performed by: STUDENT IN AN ORGANIZED HEALTH CARE EDUCATION/TRAINING PROGRAM

## 2024-10-11 PROCEDURE — 99207 PR APP CREDIT; MD BILLING SHARED VISIT: CPT | Performed by: HOSPITALIST

## 2024-10-11 PROCEDURE — 258N000001 HC RX 258: Performed by: STUDENT IN AN ORGANIZED HEALTH CARE EDUCATION/TRAINING PROGRAM

## 2024-10-11 PROCEDURE — 258N000003 HC RX IP 258 OP 636

## 2024-10-11 PROCEDURE — 80048 BASIC METABOLIC PNL TOTAL CA: CPT | Performed by: INTERNAL MEDICINE

## 2024-10-11 PROCEDURE — 360N000075 HC SURGERY LEVEL 2, PER MIN: Performed by: STUDENT IN AN ORGANIZED HEALTH CARE EDUCATION/TRAINING PROGRAM

## 2024-10-11 PROCEDURE — 250N000009 HC RX 250

## 2024-10-11 PROCEDURE — 99140 ANES COMP EMERGENCY COND: CPT

## 2024-10-11 PROCEDURE — 999N000141 HC STATISTIC PRE-PROCEDURE NURSING ASSESSMENT: Performed by: STUDENT IN AN ORGANIZED HEALTH CARE EDUCATION/TRAINING PROGRAM

## 2024-10-11 PROCEDURE — 36415 COLL VENOUS BLD VENIPUNCTURE: CPT | Performed by: INTERNAL MEDICINE

## 2024-10-11 PROCEDURE — 999N000179 XR SURGERY CARM FLUORO LESS THAN 5 MIN W STILLS: Mod: TC

## 2024-10-11 PROCEDURE — 258N000003 HC RX IP 258 OP 636: Performed by: INTERNAL MEDICINE

## 2024-10-11 PROCEDURE — 272N000001 HC OR GENERAL SUPPLY STERILE: Performed by: STUDENT IN AN ORGANIZED HEALTH CARE EDUCATION/TRAINING PROGRAM

## 2024-10-11 PROCEDURE — 120N000001 HC R&B MED SURG/OB

## 2024-10-11 PROCEDURE — 250N000011 HC RX IP 250 OP 636: Performed by: STUDENT IN AN ORGANIZED HEALTH CARE EDUCATION/TRAINING PROGRAM

## 2024-10-11 PROCEDURE — 99222 1ST HOSP IP/OBS MODERATE 55: CPT | Mod: 25 | Performed by: STUDENT IN AN ORGANIZED HEALTH CARE EDUCATION/TRAINING PROGRAM

## 2024-10-11 PROCEDURE — C1769 GUIDE WIRE: HCPCS | Performed by: STUDENT IN AN ORGANIZED HEALTH CARE EDUCATION/TRAINING PROGRAM

## 2024-10-11 PROCEDURE — 0T768DZ DILATION OF RIGHT URETER WITH INTRALUMINAL DEVICE, VIA NATURAL OR ARTIFICIAL OPENING ENDOSCOPIC: ICD-10-PCS | Performed by: STUDENT IN AN ORGANIZED HEALTH CARE EDUCATION/TRAINING PROGRAM

## 2024-10-11 PROCEDURE — 52332 CYSTOSCOPY AND TREATMENT: CPT | Mod: RT | Performed by: STUDENT IN AN ORGANIZED HEALTH CARE EDUCATION/TRAINING PROGRAM

## 2024-10-11 PROCEDURE — 250N000009 HC RX 250: Performed by: STUDENT IN AN ORGANIZED HEALTH CARE EDUCATION/TRAINING PROGRAM

## 2024-10-11 DEVICE — URETERAL STENT
Type: IMPLANTABLE DEVICE | Site: URETHRA | Status: FUNCTIONAL
Brand: POLARIS™ ULTRA

## 2024-10-11 RX ORDER — NALOXONE HYDROCHLORIDE 0.4 MG/ML
0.1 INJECTION, SOLUTION INTRAMUSCULAR; INTRAVENOUS; SUBCUTANEOUS
Status: DISCONTINUED | OUTPATIENT
Start: 2024-10-11 | End: 2024-10-11 | Stop reason: HOSPADM

## 2024-10-11 RX ORDER — DEXAMETHASONE SODIUM PHOSPHATE 4 MG/ML
INJECTION, SOLUTION INTRA-ARTICULAR; INTRALESIONAL; INTRAMUSCULAR; INTRAVENOUS; SOFT TISSUE PRN
Status: DISCONTINUED | OUTPATIENT
Start: 2024-10-11 | End: 2024-10-11

## 2024-10-11 RX ORDER — FENTANYL CITRATE 50 UG/ML
INJECTION, SOLUTION INTRAMUSCULAR; INTRAVENOUS PRN
Status: DISCONTINUED | OUTPATIENT
Start: 2024-10-11 | End: 2024-10-11

## 2024-10-11 RX ORDER — FENTANYL CITRATE 0.05 MG/ML
25 INJECTION, SOLUTION INTRAMUSCULAR; INTRAVENOUS EVERY 5 MIN PRN
Status: DISCONTINUED | OUTPATIENT
Start: 2024-10-11 | End: 2024-10-11 | Stop reason: HOSPADM

## 2024-10-11 RX ORDER — HYDROMORPHONE HCL IN WATER/PF 6 MG/30 ML
0.2 PATIENT CONTROLLED ANALGESIA SYRINGE INTRAVENOUS EVERY 5 MIN PRN
Status: DISCONTINUED | OUTPATIENT
Start: 2024-10-11 | End: 2024-10-11 | Stop reason: HOSPADM

## 2024-10-11 RX ORDER — MAGNESIUM HYDROXIDE 1200 MG/15ML
LIQUID ORAL PRN
Status: DISCONTINUED | OUTPATIENT
Start: 2024-10-11 | End: 2024-10-11 | Stop reason: HOSPADM

## 2024-10-11 RX ORDER — ONDANSETRON 2 MG/ML
INJECTION INTRAMUSCULAR; INTRAVENOUS PRN
Status: DISCONTINUED | OUTPATIENT
Start: 2024-10-11 | End: 2024-10-11

## 2024-10-11 RX ORDER — SODIUM CHLORIDE, SODIUM LACTATE, POTASSIUM CHLORIDE, CALCIUM CHLORIDE 600; 310; 30; 20 MG/100ML; MG/100ML; MG/100ML; MG/100ML
INJECTION, SOLUTION INTRAVENOUS CONTINUOUS PRN
Status: DISCONTINUED | OUTPATIENT
Start: 2024-10-11 | End: 2024-10-11

## 2024-10-11 RX ORDER — DEXAMETHASONE SODIUM PHOSPHATE 4 MG/ML
4 INJECTION, SOLUTION INTRA-ARTICULAR; INTRALESIONAL; INTRAMUSCULAR; INTRAVENOUS; SOFT TISSUE
Status: DISCONTINUED | OUTPATIENT
Start: 2024-10-11 | End: 2024-10-11 | Stop reason: HOSPADM

## 2024-10-11 RX ORDER — LIDOCAINE HYDROCHLORIDE 20 MG/ML
INJECTION, SOLUTION INFILTRATION; PERINEURAL PRN
Status: DISCONTINUED | OUTPATIENT
Start: 2024-10-11 | End: 2024-10-11

## 2024-10-11 RX ORDER — LABETALOL HYDROCHLORIDE 5 MG/ML
10 INJECTION, SOLUTION INTRAVENOUS
Status: DISCONTINUED | OUTPATIENT
Start: 2024-10-11 | End: 2024-10-11 | Stop reason: HOSPADM

## 2024-10-11 RX ORDER — CEFAZOLIN SODIUM/WATER 2 G/20 ML
SYRINGE (ML) INTRAVENOUS PRN
Status: DISCONTINUED | OUTPATIENT
Start: 2024-10-11 | End: 2024-10-11

## 2024-10-11 RX ORDER — PROPOFOL 10 MG/ML
INJECTION, EMULSION INTRAVENOUS PRN
Status: DISCONTINUED | OUTPATIENT
Start: 2024-10-11 | End: 2024-10-11

## 2024-10-11 RX ORDER — IOPAMIDOL 612 MG/ML
INJECTION, SOLUTION INTRAVASCULAR PRN
Status: DISCONTINUED | OUTPATIENT
Start: 2024-10-11 | End: 2024-10-11 | Stop reason: HOSPADM

## 2024-10-11 RX ORDER — HYDROMORPHONE HCL IN WATER/PF 6 MG/30 ML
0.4 PATIENT CONTROLLED ANALGESIA SYRINGE INTRAVENOUS EVERY 5 MIN PRN
Status: DISCONTINUED | OUTPATIENT
Start: 2024-10-11 | End: 2024-10-11 | Stop reason: HOSPADM

## 2024-10-11 RX ORDER — ONDANSETRON 2 MG/ML
4 INJECTION INTRAMUSCULAR; INTRAVENOUS EVERY 30 MIN PRN
Status: DISCONTINUED | OUTPATIENT
Start: 2024-10-11 | End: 2024-10-11 | Stop reason: HOSPADM

## 2024-10-11 RX ORDER — SODIUM CHLORIDE, SODIUM LACTATE, POTASSIUM CHLORIDE, CALCIUM CHLORIDE 600; 310; 30; 20 MG/100ML; MG/100ML; MG/100ML; MG/100ML
INJECTION, SOLUTION INTRAVENOUS CONTINUOUS
Status: DISCONTINUED | OUTPATIENT
Start: 2024-10-11 | End: 2024-10-11 | Stop reason: HOSPADM

## 2024-10-11 RX ORDER — FENTANYL CITRATE 0.05 MG/ML
50 INJECTION, SOLUTION INTRAMUSCULAR; INTRAVENOUS EVERY 5 MIN PRN
Status: DISCONTINUED | OUTPATIENT
Start: 2024-10-11 | End: 2024-10-11 | Stop reason: HOSPADM

## 2024-10-11 RX ORDER — ONDANSETRON 4 MG/1
4 TABLET, ORALLY DISINTEGRATING ORAL EVERY 30 MIN PRN
Status: DISCONTINUED | OUTPATIENT
Start: 2024-10-11 | End: 2024-10-11 | Stop reason: HOSPADM

## 2024-10-11 RX ADMIN — Medication 2 G: at 12:10

## 2024-10-11 RX ADMIN — CEFTRIAXONE SODIUM 1 G: 1 INJECTION, POWDER, FOR SOLUTION INTRAMUSCULAR; INTRAVENOUS at 20:48

## 2024-10-11 RX ADMIN — CARVEDILOL 12.5 MG: 12.5 TABLET, FILM COATED ORAL at 17:48

## 2024-10-11 RX ADMIN — OXYCODONE HYDROCHLORIDE 2.5 MG: 5 TABLET ORAL at 07:56

## 2024-10-11 RX ADMIN — ACETAMINOPHEN 650 MG: 325 TABLET ORAL at 16:26

## 2024-10-11 RX ADMIN — PHENYLEPHRINE HYDROCHLORIDE 100 MCG: 10 INJECTION INTRAVENOUS at 12:28

## 2024-10-11 RX ADMIN — SODIUM CHLORIDE, POTASSIUM CHLORIDE, SODIUM LACTATE AND CALCIUM CHLORIDE: 600; 310; 30; 20 INJECTION, SOLUTION INTRAVENOUS at 12:00

## 2024-10-11 RX ADMIN — LIDOCAINE HYDROCHLORIDE 60 MG: 20 INJECTION, SOLUTION INFILTRATION; PERINEURAL at 12:06

## 2024-10-11 RX ADMIN — CARVEDILOL 12.5 MG: 12.5 TABLET, FILM COATED ORAL at 07:56

## 2024-10-11 RX ADMIN — DEXAMETHASONE SODIUM PHOSPHATE 4 MG: 4 INJECTION, SOLUTION INTRA-ARTICULAR; INTRALESIONAL; INTRAMUSCULAR; INTRAVENOUS; SOFT TISSUE at 12:12

## 2024-10-11 RX ADMIN — PHENYLEPHRINE HYDROCHLORIDE 100 MCG: 10 INJECTION INTRAVENOUS at 12:33

## 2024-10-11 RX ADMIN — SACUBITRIL AND VALSARTAN 1 TABLET: 49; 51 TABLET, FILM COATED ORAL at 07:57

## 2024-10-11 RX ADMIN — PHENYLEPHRINE HYDROCHLORIDE 100 MCG: 10 INJECTION INTRAVENOUS at 12:20

## 2024-10-11 RX ADMIN — FENTANYL CITRATE 25 MCG: 50 INJECTION INTRAMUSCULAR; INTRAVENOUS at 12:06

## 2024-10-11 RX ADMIN — SACUBITRIL AND VALSARTAN 1 TABLET: 49; 51 TABLET, FILM COATED ORAL at 20:48

## 2024-10-11 RX ADMIN — PHENYLEPHRINE HYDROCHLORIDE 100 MCG: 10 INJECTION INTRAVENOUS at 12:22

## 2024-10-11 RX ADMIN — PROPOFOL 100 MG: 10 INJECTION, EMULSION INTRAVENOUS at 12:06

## 2024-10-11 RX ADMIN — ONDANSETRON 4 MG: 2 INJECTION INTRAMUSCULAR; INTRAVENOUS at 12:12

## 2024-10-11 RX ADMIN — TAMSULOSIN HYDROCHLORIDE 0.4 MG: 0.4 CAPSULE ORAL at 07:56

## 2024-10-11 RX ADMIN — FENTANYL CITRATE 25 MCG: 50 INJECTION INTRAMUSCULAR; INTRAVENOUS at 12:18

## 2024-10-11 RX ADMIN — PHENYLEPHRINE HYDROCHLORIDE 150 MCG: 10 INJECTION INTRAVENOUS at 12:25

## 2024-10-11 RX ADMIN — SODIUM CHLORIDE: 9 INJECTION, SOLUTION INTRAVENOUS at 13:39

## 2024-10-11 ASSESSMENT — ACTIVITIES OF DAILY LIVING (ADL)
ADLS_ACUITY_SCORE: 38
ADLS_ACUITY_SCORE: 38
ADLS_ACUITY_SCORE: 36
ADLS_ACUITY_SCORE: 38
ADLS_ACUITY_SCORE: 36
ADLS_ACUITY_SCORE: 38
ADLS_ACUITY_SCORE: 36
ADLS_ACUITY_SCORE: 36
ADLS_ACUITY_SCORE: 38
ADLS_ACUITY_SCORE: 36
ADLS_ACUITY_SCORE: 38
ADLS_ACUITY_SCORE: 36

## 2024-10-11 ASSESSMENT — ENCOUNTER SYMPTOMS: SEIZURES: 0

## 2024-10-11 ASSESSMENT — COPD QUESTIONNAIRES: COPD: 1

## 2024-10-11 NOTE — CONSULTS
[facility] Consultation by [facility]    Hamida Verma MRN# [id number]   Age: 84 year old YOB: 1940     Date of Admission:  10/10/2024    Reason for consult: Renal/ureteral calculus       Requesting GORGE/MD: Dr. Denis       Level of consult: Consult, follow and place orders           Assessment and Plan:   Assessment:   (N30.01) Acute cystitis with hematuria  (primary encounter diagnosis)  (N20.1) Ureterolithiasis   Renal colic      Plan:   NPO  Strain urine  Flomax 0.4mg, SE reviewed.     To OR later today for urgent cysto and right stent.  Risks including need for secondary procedure, incomplete stone clearance, ureteral or bladder injury, hematuria, stent pain and irritation were discussed.    Will return to floor post op for continued IV abx. Await final C&S prior to transitioning to 14 day PO course. Our team will contact her for secondary procedure next week.     Discussed with RN, Dr. Yuen and granddaughter, Zoe (via phone).     Sheree Allen PA-C  [facility]  Office: [phone]  After business hours: [phone]               Chief Complaint:   Ureteral stone     History is obtained from the patient and EMR.         History of Present Illness:     This patient is a 84 year old female admitted with right ureteral stone with concern for UTI.  First stone episode.     CT noted right hydronephrosis/hydroureter secondary to a 4 mm obstructing distal right ureteral stone, within 1 cm of the UVJ as well as an irregular 7 mm stone versus a more likely cluster of residual small stones within right lower pole calyx.    Cr 0.95, UA nitrite +, large blood, small leuk est. Tc 100.3, HR 83, /60.     Feeling fatigued. Urine is cloudy with grade II hematuria in Purewick collection container.                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             Past Surgical History:     Past Surgical History:   Procedure Laterality Date    CV CORONARY ANGIOGRAM N/A 2023    Procedure: Coronary Angiogram;  Surgeon: Mookie Yates MD;  Location:  HE[facility]ARDI[facility] CATH LAB    CV CORONARY ANGIOGRAM N/A 2023    Procedure: Coronary Angiogram;  Surgeon: Stephanie Mornoy MD;  Location:  HE[facility]ARDI[facility] CATH LAB    CV CORONARY ANGIOGRAM N/A 2023    Procedure: Coronary Angiogram;  Surgeon: Eric Barajas MD;  Location:  HE[facility]ARDI[facility] CATH LAB    CV PCI N/A 2023    Procedure: Percutaneous Coronary Intervention;  Surgeon: Stephanie Monroy MD;  Location:  HE[facility]ARDI[facility] CATH LAB    CV PCI STENT DRUG ELUTING N/A 2023    Procedure: Percutaneous Coronary Intervention Stent;  Surgeon: Eric Barajas MD;  Location:  HE[facility]ARDI[facility] CATH LAB    EP ABLATION AV NODE N/A 2019    Procedure: EP Ablation AV Node;  Surgeon: Roe Voss MD;  Location:  HEART CARDIAC CATH LAB    EYE SURGERY      HYSTERECTOMY, VAGINAL      hysterectomy    LAPAROSCOPIC CHOLECYSTECTOMY N/A 2023    Procedure: Laparoscopic cholecystectomy with lysis of adhesions;  Surgeon: Sawyer Marcum MD;  Location:  OR             Social History:     Social History     Tobacco Use    Smoking status: Former     Current packs/day: 0.00     Average packs/day: 1.5 packs/day for 45.0 years (67.6 ttl pk-yrs)     Types: Cigarettes     Start date: 10/28/1955     Quit date: 2000     Years since quittin.1    Smokeless tobacco: Never    Tobacco comments:     quit 24 yrs ago   Substance Use  Topics    Alcohol use: No             Family History:     Family History   Problem Relation Age of Onset    Cerebrovascular Disease Mother     Glaucoma Mother     Macular Degeneration Mother     Alcohol/Drug Father         alcohol    Myocardial Infarction Father 63        passed away from MI    Family History Negative Sister     Family History Negative Sister     Family History Negative Sister     Cerebrovascular Disease Sister     Family History Negative Brother     Family History Negative Maternal Grandmother     Family History Negative Maternal Grandfather     Family History Negative Paternal Grandmother     Family History Negative Paternal Grandfather     Myocardial Infarction Son 57        passed away from MI    Dementia Daughter 57        early onset on [place]enda    Diabetes No family hx of     Breast Cancer No family hx of     Cancer - colorectal No family hx of      Family history reviewed and updated in EPIC and reviewed            Allergies:     Allergies   Allergen Reactions    Strawberries [Strawberry Extract] Anaphylaxis    Strawberry Flavor              Medications:     Current Facility-Administered Medications   Medication Dose Route Frequency Provider Last Rate Last Admin    acetaminophen (TYLENOL) tablet 650 mg  650 mg Oral Q4H PRN Pattie Denis MD        Or    acetaminophen (TYLENOL) Suppository 650 mg  650 mg Rectal Q4H PRN Pattie Denis MD        albuterol (PROVENTIL HFA/VENTOLIN HFA) inhaler  2 puff Inhalation Q6H PRN Pattie Denis MD        carvedilol (COREG) tablet 12.5 mg  12.5 mg Oral BID w/meals Pattie Denis MD   12.5 mg at 10/11/24 0756    cefTRIAXone (ROCEPHIN) 1 g vial to attach to  mL bag for ADULTS or NS 50 mL bag for PEDS  1 g Intravenous Q24H Pattie Denis MD        hydrALAZINE (APRESOLINE) injection 10 mg  10 mg Intravenous Q4H PRN Pattie Denis MD        HYDROmorphone (DILAUDID) injection 0.2 mg  0.2 mg Intravenous Q3H PRN  Pattie Denis MD        lidocaine (LMX4) cream   Topical Q1H PRN Pattie Denis MD        lidocaine 1 % 0.1-1 mL  0.1-1 mL Other Q1H PRN Pattie Denis MD        naloxone (NARCAN) injection 0.2 mg  0.2 mg Intravenous Q2 Min PRN Joyce Torres PA-C        Or    naloxone (NARCAN) injection 0.4 mg  0.4 mg Intravenous Q2 Min PRN Joyce Torres PA-C        Or    naloxone (NARCAN) injection 0.2 mg  0.2 mg Intramuscular Q2 Min PRN Joyce Torres PA-C        Or    naloxone (NARCAN) injection 0.4 mg  0.4 mg Intramuscular Q2 Min PRN Joyce Torres PA-C        ondansetron (ZOFRAN ODT) ODT tab 4 mg  4 mg Oral Q6H PRN Pattie Denis MD        Or    ondansetron (ZOFRAN) injection 4 mg  4 mg Intravenous Q6H PRN Pattie Denis MD        oxyCODONE (ROXICODONE) tablet 5 mg  5 mg Oral Q4H PRN Pattie Denis MD        oxyCODONE IR (ROXICODONE) half-tab 2.5 mg  2.5 mg Oral Q4H PRN Pattie Denis MD   2.5 mg at 10/11/24 0756    sacubitril-valsartan (ENTRESTO) 49-51 MG per tablet 1 tablet  1 tablet Oral BID Pattie Denis MD   1 tablet at 10/11/24 0757    senna-docusate (SENOKOT-S/PERICOLACE) 8.6-50 MG per tablet 1 tablet  1 tablet Oral BID PRN Pattie Denis MD        Or    senna-docusate (SENOKOT-S/PERICOLACE) 8.6-50 MG per tablet 2 tablet  2 tablet Oral BID PRN Pattie Denis MD        sodium chloride (PF) 0.9% PF flush 3 mL  3 mL Intracatheter q1 min prn Pattie Denis MD        sodium chloride (PF) 0.9% PF flush 3 mL  3 mL Intracatheter Q8H Pattie Denis MD   3 mL at 10/10/24 2302    sodium chloride 0.9 % infusion   Intravenous Continuous Pattie Denis MD 75 mL/hr at 10/10/24 2258 New Bag at 10/10/24 2258    tamsulosin (FLOMAX) capsule 0.4 mg  0.4 mg Oral Daily Pattie Denis MD   0.4 mg at 10/11/24 2086    umeclidinium (INCRUSE ELLIPTA) 62.5 MCG/ACT inhaler 1 puff  1 puff Inhalation Daily Pattie Denis MD                  Review of Systems:   A comprehensive review of systems was performed and found to be negative except as described in the HPI.    /60 (BP Location: Right arm, Patient Position: Semi-Florez's, Cuff Size: Adult Regular)   Pulse 83   Temp 100.3  F (37.9  C) (Oral)   Resp 18   Wt 75.3 kg (166 lb)   LMP  (LMP Unknown)   SpO2 92%   BMI 28.32 kg/m    PSYCH: NAD  EYES: EOMI  MOUTH: MMM  NEURO: AAO x3  : Urine is cloudy with grade II hematuria in Purewick collection container.          Data:     Lab Results   Component Value Date    WBC 13.8 (H) 10/11/2024    HGB 11.4 (L) 10/11/2024    HCT 36.4 10/11/2024    MCV 92 10/11/2024     10/11/2024     Lab Results   Component Value Date    CR 0.95 10/11/2024     EXAM: CT ABDOMEN PELVIS W CONTRAST  LOCATION: [facility]  DATE: 10/10/2024     INDICATION: low abdominal pain with nausea and vomiting  COMPARISON: 5/30/2023  TECHNIQUE: CT scan of the abdomen and pelvis was performed following injection of IV contrast. Multiplanar reformats were obtained. Dose reduction techniques were used.  CONTRAST: 83 mL Isovue   370     FINDINGS:   LOWER CHEST: Pacemaker present.     HEPATOBILIARY: Interval cholecystectomy.     PANCREAS: Normal.     SPLEEN: Normal.     ADRENAL GLANDS: Normal.     KIDNEYS/BLADDER: New mild right hydronephrosis/hydroureter secondary to a 4 mm obstructing distal right ureteral stone, within 1 cm of the UVJ. There appears to be an irregular 7 mm stone versus a more likely cluster of residual small stones within right   lower pole calyx. Tiny right upper pole stone unchanged. 6.4 cm right upper pole cyst unchanged and needs no follow-up.     Scarring involving left kidney unchanged with small cortical calcification. No left hydronephrosis. Small benign cysts are stable in need no follow-up. 1.1 cm indeterminate low-attenuation focus left upper pole is unchanged and very likely benign     BOWEL: No obstruction or  inflammatory change.     LYMPH NODES: Normal.     VASCULATURE: Moderately heavy atherosclerotic calcifications.     PELVIC ORGANS: Hysterectomy. No pelvic mass or free fluid.     MUSCULOSKELETAL: Interval mild compression superior endplate of L2, age indeterminant but new compared to prior study.                                                                      IMPRESSION:   1.  Right hydronephrosis secondary to a 4 mm distal right ureteral stone.  2.  Additional right-sided kidney stones persist.  3.  Mild compression L2 vertebral body age indeterminant but new compared to prior.  4.  Small indeterminate lesion upper pole right kidney unchanged compared to exam of 17 months ago and very likely to be benign.

## 2024-10-11 NOTE — ANESTHESIA CARE TRANSFER NOTE
Patient: Hamida Verma    Procedure: Procedure(s):  Cystoscopy, With Retrograde Pyelogram and Insert Right Ureteral Stent       Diagnosis: Ureterolithiasis [N20.1]  Acute cystitis with hematuria [N30.01]  Diagnosis Additional Information: No value filed.    Anesthesia Type:   General     Note:    Oropharynx: oropharynx clear of all foreign objects and spontaneously breathing  Level of Consciousness: drowsy  Oxygen Supplementation: face mask  Level of Supplemental Oxygen (L/min / FiO2): 6  Independent Airway: airway patency satisfactory and stable  Dentition: dentition unchanged  Vital Signs Stable: post-procedure vital signs reviewed and stable  Report to RN Given: handoff report given  Patient transferred to: PACU    Handoff Report: Identifed the Patient, Identified the Reponsible Provider, Reviewed the pertinent medical history, Discussed the surgical course, Reviewed Intra-OP anesthesia mangement and issues during anesthesia, Set expectations for post-procedure period and Allowed opportunity for questions and acknowledgement of understanding      Vitals:  Vitals Value Taken Time   /58    Temp     Pulse 67 10/11/24 1241   Resp 14    SpO2 100 % 10/11/24 1241   Vitals shown include unfiled device data.    Electronically Signed By: NELY Mitchell CRNA  October 11, 2024  12:42 PM

## 2024-10-11 NOTE — PROGRESS NOTES
Observation goals  PRIOR TO DISCHARGE        Comments: -diagnostic tests and consults completed and resulted-Not met, waiting for urology procedure.  -vital signs normal or at patient baseline-met.   -adequate pain control on oral analgesics-met.   Nurse to notify provider when observation goals have been met and patient is ready for discharge.

## 2024-10-11 NOTE — PROGRESS NOTES
PRIMARY Concern: Admitted for abdominal pain  SAFETY RISK Concerns (fall risk, behaviors, etc.): fall risk     Aggression Tool Color: Green  Isolation/Type: None   Tests/Procedures for NEXT shift: Am labs  Consults? (Pending/following, signed-off?) Urology consult   Where is patient from? (Home, TCU, etc.): Home   Other Important info for NEXT shift:   Anticipated DC date & active delays: TBD    SUMMARY NOTE:                Orientation/Cognitive: AXO4. Forgetful   Observation Goals (Met/ Not Met):   Mobility Level/Assist Equipment: Not OOB this shift  Antibiotics & Plan (IV/po, length of tx left): Rocephin   Pain Management: Denies pain   Tele/VS/O2: VSS on RA  ABNL Lab/BG: See results  Diet: NPO  Bowel/Bladder: Purewrick in place.  Skin Concerns: Red buttock/ coccyx    Drains/Devices: PIV infusing @ 75ml/hr  Patient Stated Goal for Today: Sleep

## 2024-10-11 NOTE — PLAN OF CARE
Goal Outcome Evaluation:         Observation goals  PRIOR TO DISCHARGE        Comments: -diagnostic tests and consults completed and resulted- not met  -vital signs normal or at patient baseline- not met  -adequate pain control on oral analgesics- notme  Nurse to notify provider when observation goals have been met and patient is ready for discharge.

## 2024-10-11 NOTE — ED PROVIDER NOTES
ED APC SUPERVISION NOTE:   I evaluated this patient in conjunction with Joyce Torres PA-C  I have participated in the care of the patient and personally performed key elements of the history, exam, and medical decision making.      HPI:   Hamida Verma is a 84 year old female presenting with concerns for having abdominal discomfort, chiefly in the right lower quadrant which began suddenly this afternoon.    Independent Historian:   None    Review of External Notes: None      EXAM:   Delightful elderly woman resting comfortably.  However, I assessed her after pain medication and been given.  No reproducible pain on palpation.    Independent Interpretation (X-rays, CTs, rhythm strip):  None    Assessments/Consultations/Discussion of Management or Tests:       MEDICAL DECISION MAKING/ASSESSMENT AND PLAN:   This elderly woman presents with evidence of ureterolithiasis.  Her urine is positive for some pyuria, though without a fever or elevated white blood cell count I have left concerned that she would require emergent stenting.  She was treated with Rocephin.  She will be admitted for pain control and expulsive therapy.  My physician assistant spoke with the admitting hospital group who will admit the patient.     DIAGNOSIS:     ICD-10-CM    1. Ureterolithiasis  N20.1       2. Ureteral colic  N23       3. Nausea and vomiting, unspecified vomiting type  R11.2         Chad A. Trierweiler, MD  10/10/2024  [facility] EMERGENCY DEPT     Trierweiler, Chad A, MD  10/11/24 0015

## 2024-10-11 NOTE — PROGRESS NOTES
PRIMARY Concern: Admitted for abdominal pain, ureter stone present.   SAFETY RISK Concerns (fall risk, behaviors, etc.): fall risk     Aggression Tool Color: Green  Isolation/Type: None   Tests/Procedures for NEXT shift: Am labs  Consults? (Pending/following, signed-off?) Urology consult   Where is patient from? (Home, TCU, etc.): Home   Other Important info for NEXT shift:   Anticipated DC date & active delays: TBD     SUMMARY NOTE: Patient waiting for urology procedure.                 Orientation/Cognitive: AXO4. Forgetful   Observation Goals (Met/ Not Met):   Mobility Level/Assist Equipment: Not OOB this shift  Antibiotics & Plan (IV/po, length of tx left): Rocephin started for abnormal UA, UC pending.   Pain Management: 2.5 mg oxycodone given for low middle back pain.   Tele/VS/O2: VSS on RA  ABNL Lab/BG: See results  Diet: NPO  Bowel/Bladder: Purewrick in place  Skin Concerns: Red buttock/ coccyx    Drains/Devices: PIV infusing @ 75ml/hr  Patient Stated Goal for Today: Sleep

## 2024-10-11 NOTE — ANESTHESIA POSTPROCEDURE EVALUATION
Patient: Hamida Verma    Procedure: Procedure(s):  Cystoscopy, With Retrograde Pyelogram and Insert Right Ureteral Stent       Anesthesia Type:  General    Note:  Disposition: Inpatient   Postop Pain Control: Uneventful            Sign Out: Well controlled pain   PONV: No   Neuro/Psych: Uneventful            Sign Out: Acceptable/Baseline neuro status   Airway/Respiratory: Uneventful            Sign Out: Acceptable/Baseline resp. status   CV/Hemodynamics: Uneventful            Sign Out: Acceptable CV status; No obvious hypovolemia; No obvious fluid overload   Other NRE: NONE   DID A NON-ROUTINE EVENT OCCUR? No           Last vitals:  Vitals Value Taken Time   /55 10/11/24 1300   Temp 36.1  C (97  F) 10/11/24 1300   Pulse 70 10/11/24 1308   Resp 14 10/11/24 1250   SpO2 95 % 10/11/24 1308   Vitals shown include unfiled device data.    Electronically Signed By: Lakshmi Hutson MD  October 11, 2024  1:09 PM

## 2024-10-11 NOTE — ANESTHESIA PROCEDURE NOTES
Airway       Patient location during procedure: OR       Procedure Start/Stop Times: 10/11/2024 12:08 PM  Staff -        CRNA: Tania East APRN CRNA       Performed By: CRNA  Consent for Airway        Urgency: elective  Indications and Patient Condition       Indications for airway management: jose g-procedural       Induction type:intravenous       Mask difficulty assessment: 0 - not attempted    Final Airway Details       Final airway type: supraglottic airway    Supraglottic Airway Details        Type: LMA       Brand: I-Gel       LMA size: 4    Post intubation assessment        Placement verified by: capnometry, equal breath sounds and chest rise        Number of attempts at approach: 1       Number of other approaches attempted: 0       Secured with: tape       Ease of procedure: easy       Dentition: Intact and Unchanged    Medication(s) Administered   Medication Administration Time: 10/11/2024 12:08 PM

## 2024-10-11 NOTE — PROGRESS NOTES
Observation goals  PRIOR TO DISCHARGE        Comments: -diagnostic tests and consults completed and resulted- not met  -vital signs normal or at patient baseline- not met  -adequate pain control on oral analgesics- notme  Nurse to notify provider when observation goals have been met and patient is ready for discharge.

## 2024-10-11 NOTE — OP NOTE
OPERATIVE REPORT    PREOPERATIVE DIAGNOSIS: Right ureteral stone(s)    POSTOPERATIVE DIAGNOSIS:  Same    PROCEDURES PERFORMED:   1. Cystourethroscopy with right retrograde pyelography  2. Placement of right ureteral stent.  3. Intraoperative interpretation of fluoroscopic imaging.     STAFF SURGEON: Toni Yuen MD, present for entire case.     ANESTHESIA: General    ESTIMATED BLOOD LOSS: 0 mL.     DRAINS:  6Fr 26cm Double J Stent      OPERATIVE INDICATIONS:   Hamida Verma is a 84 year old female who presented with a right obstructing ureteral stone.  The patient was counseled on the alternatives, risks, and benefits and elected to proceed with the above stated procedure.    DESCRIPTION OF PROCEDURE:    After informed consent was obtained, the patient was taken to the operating room, and moved to the operating table.  After adequate anesthesia was induced, the patient was repositioned in dorsal lithotomy position and prepped and draped in the usual sterile fashion. A timeout was taken to confirm correct patient, procedure and laterality.     A 22-English cystoscope was inserted into a well lubricated urethra. The urethra was unremarkable.  The bladder was free of tumors, stones or diverticuli.  The media was turbid.  Bilateral ureteral orifices were orthotopic. Ureteral stone was seen at the RT UO.  A Sensor guidewire was advanced into the right renal pelvis with the aid of a 5-English open ended ureteral catheter.  A retrograde pyelogram demonstrated mild   hydronephrosis or filling defects.  The wire was replaced and a 6 Fr 26cm Double J Stent was advanced over the guidewire under fluoroscopic guidance with a good curl in the renal pelvis and bladder.  The stent passed up easily with no appreciable resistance.  The bladder was then drained.    The patient tolerated the procedure well.  There were no complications.         PLAN:   - Admit overnight for monitoring  - Follow-up with Dr Yuen for definitive stone  management in next 2-3 weeks

## 2024-10-11 NOTE — PROGRESS NOTES
[facility]  Hospitalist Progress Note    Assessment & Plan   Hamida Verma is a 84 year old female with PMH of HTN, HLD, CAD s/p RCA stent, cardiomyopathy, pAF, COPD who was admitted on 10/10/2024 for obstructing right ureteral stone with associated UTI.    Right 4 mm distal right ureteral stone with hydronephrosis  Urinary tract infection  *Afebrile with no leukocytosis on 10/10, now with temp 100.3 F and WBC 13.8. UA with infectious markers and blood.  - Continue IV Rocephin  - Continue IVF  - Continue Flomax 0.4 mg daily, started 10/11  - Urine culture pending  - Analgesics and antiemetics as needed  - NPO for now  - Urology consulted, plan for urgent cysto and right stent placement. Await urine C&S prior to transitioning to 14 day PO course.    CAD s/p RCA stent (12/2023)  HTN  HLD   *No complaints of chest pain or dyspnea  - Hold PTA Plavix for procedure  - Continue PTA Coreg and Entresto  - Hold PTA statin while on observation status    History of cardiomyopathy  *Echo 07/2024 with EF 53% (improved from 45% in Dec 2023). Septal motion is consistent with conduction abnormality. Mild hypokinesis of apical septum. There is mild tricuspid regurgitation. Borderline ascending aorta dilatation present.  - Hold PTA Lasix while receiving IVF  - Monitor fluid status    Paroxysmal atrial fibrillation  S/p AV node ablation and pacemaker placement  *EKG atrial sensed ventricular paced rhythm  - Continue PTA Coreg  - Hold PTA Xarelto for procedure    COPD  *Stable without current exacerbation  - Continue PTA inhalers    Clinically Significant Risk Factors Present on Admission                # Drug Induced Coagulation Defect: home medication list includes an anticoagulant medication  # Drug Induced Platelet Defect: home medication list includes an antiplatelet medication   # Hypertension: Noted on problem list  # Chronic heart failure with preserved ejection fraction: heart failure noted on  problem list and last echo with EF >50%            # Financial/Environmental Concerns:    # COPD: noted on problem list  # Pacemaker present         Diet: NPO per Anesthesia Guidelines for Procedure/Surgery Except for: Meds     DVT Prophylaxis: Pneumatic Compression Devices   Lopez Catheter: Not present  Lines: None     Cardiac Monitoring: None  Code Status: No CPR- Do NOT Intubate      Disposition Plan       Expected Discharge Date: 10/11/2024      Destination: home        Entered: Pura Montoya PA-C 10/11/2024, 9:40 AM        Medically Ready for Discharge: Anticipated Tomorrow      The patient's care was discussed with the Attending Physician, Dr. Michelle and Patient.    The patient has been discussed with Dr. Michelle, who agrees with the assessment and plan at this time.    Pura Montoya PA-C  [facility]  Securely message with the Vocera Web Console (learn more here)        Interval History   Patient is frustrated her IV site is in her dominant arm, making it difficult to utilize her remote. She denies any nausea or vomiting. Still having abdominal pain. No chest pain or dyspnea. Wondering when she will be able to eat and why some medications are being held. Discussed  urology consult for possible procedure.     -Data reviewed today: I reviewed all new labs and imaging results over the last 24 hours. I personally reviewed no images or EKG's today.    Physical Exam   Temp: 100.3  F (37.9  C) (Nurse was notify.) Temp src: Oral BP: 109/60 Pulse: 83   Resp: 18 SpO2: 92 % O2 Device: None (Room air)    Vitals:    10/10/24 1722   Weight: 75.3 kg (166 lb)     Vital Signs with Ranges  Temp:  [98  F (36.7  C)-100.3  F (37.9  C)] 100.3  F (37.9  C)  Pulse:  [70-83] 83  Resp:  [16-18] 18  BP: (109-190)/(56-95) 109/60  SpO2:  [92 %-98 %] 92 %  I/O last 3 completed shifts:  In: 400 [I.V.:400]  Out: -       Constitutional: Awake, alert, cooperative, no apparent distress.    ENT:  Normocephalic, without obvious abnormality, atraumatic. Normal sclera.    Neck: Supple, symmetrical, trachea midline  Pulmonary: No increased work of breathing, good air exchange, clear to auscultation bilaterally  Cardiovascular: Regular rate and rhythm  GI: Normal bowel sounds, soft, non-distended, non-tender.   Skin: Visualized skin appeared clear.  Neuro: Oriented x 3. Moves all extremities spontaneously. No focal neuro deficits.  Psych:  Normal affect and mood  Extremities: Normal muscle tone. No gross deformities noted. Calves non-tender b/l. No pitting edema b/l LE    Medical Decision Making       35 MINUTES SPENT BY ME on the date of service doing chart review, history, exam, documentation & further activities per the note.         Medications   Current Facility-Administered Medications   Medication Dose Route Frequency Provider Last Rate Last Admin    sodium chloride 0.9 % infusion   Intravenous Continuous Pattie Denis MD 75 mL/hr at 10/10/24 2258 New Bag at 10/10/24 2258     Current Facility-Administered Medications   Medication Dose Route Frequency Provider Last Rate Last Admin    carvedilol (COREG) tablet 12.5 mg  12.5 mg Oral BID w/meals Pattie Denis MD   12.5 mg at 10/11/24 0756    cefTRIAXone (ROCEPHIN) 1 g vial to attach to  mL bag for ADULTS or NS 50 mL bag for PEDS  1 g Intravenous Q24H Pattie Denis MD        sacubitril-valsartan (ENTRESTO) 49-51 MG per tablet 1 tablet  1 tablet Oral BID Pattie Denis MD   1 tablet at 10/11/24 0757    sodium chloride (PF) 0.9% PF flush 3 mL  3 mL Intracatheter Q8H Pattie Denis MD   3 mL at 10/10/24 2302    tamsulosin (FLOMAX) capsule 0.4 mg  0.4 mg Oral Daily Pattie Denis MD   0.4 mg at 10/11/24 0756    umeclidinium (INCRUSE ELLIPTA) 62.5 MCG/ACT inhaler 1 puff  1 puff Inhalation Daily Pattie Denis MD           Data   Recent Labs   Lab 10/11/24  0622 10/10/24  1725   WBC 13.8* 9.0   HGB 11.4* 11.7    MCV 92 90    383    140   POTASSIUM 4.3 4.4   CHLORIDE 104 103   CO2 24 25   BUN 14.8 12.7   CR 0.95 0.93   ANIONGAP 11 12   GURWINDER 9.3 9.8   * 132*   ALBUMIN  --  3.8   PROTTOTAL  --  6.6   BILITOTAL  --  0.4   ALKPHOS  --  57   ALT  --  18   AST  --  25   LIPASE  --  17       Recent Results (from the past 24 hour(s))   CT Abdomen Pelvis w Contrast    Narrative    EXAM: CT ABDOMEN PELVIS W CONTRAST  LOCATION: [facility]  DATE: 10/10/2024    INDICATION: low abdominal pain with nausea and vomiting  COMPARISON: 5/30/2023  TECHNIQUE: CT scan of the abdomen and pelvis was performed following injection of IV contrast. Multiplanar reformats were obtained. Dose reduction techniques were used.  CONTRAST: 83 mL Isovue   370    FINDINGS:   LOWER CHEST: Pacemaker present.    HEPATOBILIARY: Interval cholecystectomy.    PANCREAS: Normal.    SPLEEN: Normal.    ADRENAL GLANDS: Normal.    KIDNEYS/BLADDER: New mild right hydronephrosis/hydroureter secondary to a 4 mm obstructing distal right ureteral stone, within 1 cm of the UVJ. There appears to be an irregular 7 mm stone versus a more likely cluster of residual small stones within right   lower pole calyx. Tiny right upper pole stone unchanged. 6.4 cm right upper pole cyst unchanged and needs no follow-up.    Scarring involving left kidney unchanged with small cortical calcification. No left hydronephrosis. Small benign cysts are stable in need no follow-up. 1.1 cm indeterminate low-attenuation focus left upper pole is unchanged and very likely benign    BOWEL: No obstruction or inflammatory change.    LYMPH NODES: Normal.    VASCULATURE: Moderately heavy atherosclerotic calcifications.    PELVIC ORGANS: Hysterectomy. No pelvic mass or free fluid.    MUSCULOSKELETAL: Interval mild compression superior endplate of L2, age indeterminant but new compared to prior study.      Impression    IMPRESSION:   1.  Right hydronephrosis secondary  to a 4 mm distal right ureteral stone.  2.  Additional right-sided kidney stones persist.  3.  Mild compression L2 vertebral body age indeterminant but new compared to prior.  4.  Small indeterminate lesion upper pole right kidney unchanged compared to exam of 17 months ago and very likely to be benign.

## 2024-10-11 NOTE — ANESTHESIA PREPROCEDURE EVALUATION
Anesthesia Pre-Procedure Evaluation    Patient: Hamida Verma   MRN: [id number] : 1940        Procedure : Procedure(s):  Combined Cystoscopy, Insert Right Ureteral Stent          Past Medical History:   Diagnosis Date    Acute cholecystitis 2023    Acute cystitis without hematuria 2023    Atrial fibrillation (H)     Biliary colic 2023    Chest pain, unspecified type 2023    Chronic diastolic CHF (congestive heart failure) (H)     COPD (chronic obstructive pulmonary disease) (H)     Essential hypertension, benign     HYPERLIPIDEMIA NEC/NOS 2006    Pulmonary hypertension (H)     Pyelonephritis     SSS (sick sinus syndrome) (H)     s/p PPM 3/2018      Past Surgical History:   Procedure Laterality Date    CV CORONARY ANGIOGRAM N/A 2023    Procedure: Coronary Angiogram;  Surgeon: Mookie Yates MD;  Location: [facility] CARDIAC CATH LAB    CV CORONARY ANGIOGRAM N/A 2023    Procedure: Coronary Angiogram;  Surgeon: Stephanie Monroy MD;  Location: [facility] CARDIAC CATH LAB    CV CORONARY ANGIOGRAM N/A 2023    Procedure: Coronary Angiogram;  Surgeon: Eric Barajas MD;  Location: [facility] CARDIAC CATH LAB    CV PCI N/A 2023    Procedure: Percutaneous Coronary Intervention;  Surgeon: Stephanie Monroy MD;  Location: [facility] CARDIAC CATH LAB    CV PCI STENT DRUG ELUTING N/A 2023    Procedure: Percutaneous Coronary Intervention Stent;  Surgeon: Eric Barajas MD;  Location: [facility] CARDIAC CATH LAB    EP ABLATION AV NODE N/A 2019    Procedure: EP Ablation AV Node;  Surgeon: Roe Voss MD;  Location:  HEART CARDIAC CATH LAB    EYE SURGERY      HYSTERECTOMY, VAGINAL      hysterectomy    LAPAROSCOPIC CHOLECYSTECTOMY N/A 2023    Procedure: Laparoscopic cholecystectomy with lysis of adhesions;  Surgeon: Sawyer Marcum MD;  Location:  OR      Allergies   Allergen Reactions    Strawberries [Strawberry Extract]  Anaphylaxis    Strawberry Flavor       Social History     Tobacco Use    Smoking status: Former     Current packs/day: 0.00     Average packs/day: 1.5 packs/day for 45.0 years (67.6 ttl pk-yrs)     Types: Cigarettes     Start date: 10/28/1955     Quit date: 2000     Years since quittin.1    Smokeless tobacco: Never    Tobacco comments:     quit 24 yrs ago   Substance Use Topics    Alcohol use: No      Wt Readings from Last 1 Encounters:   10/10/24 75.3 kg (166 lb)        Anesthesia Evaluation            ROS/MED HX  ENT/Pulmonary:     (+)                          COPD,           (-) sleep apnea   Neurologic:    (-) no seizures and no CVA   Cardiovascular: Comment: History of hypertension, dyslipidemia, coronary artery disease status post RCA stenting, cardiomyopathy with borderline low EF, paroxysmal A-fib s/p AV node ablation and pacemaker placement on Xarelt    (+) Dyslipidemia hypertension- -  CAD -  - stent-      CHF        pacemaker, Reason placed: sss.                 pulmonary hypertension,      METS/Exercise Tolerance:     Hematologic:       Musculoskeletal:       GI/Hepatic:    (-) GERD   Renal/Genitourinary:     (+) renal disease,             Endo:     (+)               Obesity,       Psychiatric/Substance Use:       Infectious Disease:       Malignancy:       Other:            Physical Exam    Airway        Mallampati: III   TM distance: > 3 FB   Neck ROM: full     Respiratory Devices and Support         Dental     Comment: dentures        Cardiovascular   cardiovascular exam normal          Pulmonary   pulmonary exam normal                OUTSIDE LABS:  CBC:   Lab Results   Component Value Date    WBC 13.8 (H) 10/11/2024    WBC 9.0 10/10/2024    HGB 11.4 (L) 10/11/2024    HGB 11.7 10/10/2024    HCT 36.4 10/11/2024    HCT 35.7 10/10/2024     10/11/2024     10/10/2024     BMP:   Lab Results   Component Value Date     10/11/2024     10/10/2024    POTASSIUM 4.3 10/11/2024     POTASSIUM 4.4 10/10/2024    CHLORIDE 104 10/11/2024    CHLORIDE 103 10/10/2024    CO2 24 10/11/2024    CO2 25 10/10/2024    BUN 14.8 10/11/2024    BUN 12.7 10/10/2024    CR 0.95 10/11/2024    CR 0.93 10/10/2024     (H) 10/11/2024     (H) 10/10/2024     COAGS:   Lab Results   Component Value Date    PTT 29 12/22/2023    INR 1.31 (H) 03/19/2018     POC:   Lab Results   Component Value Date    BGM 94 01/10/2019     HEPATIC:   Lab Results   Component Value Date    ALBUMIN 3.8 10/10/2024    PROTTOTAL 6.6 10/10/2024    ALT 18 10/10/2024    AST 25 10/10/2024    ALKPHOS 57 10/10/2024    BILITOTAL 0.4 10/10/2024     OTHER:   Lab Results   Component Value Date    PH 7.46 (H) 01/05/2019    LACT 0.9 01/05/2019    A1C 5.1 03/28/2019    GURWINDER 9.3 10/11/2024    PHOS 3.6 01/05/2019    MAG 2.3 12/10/2023    LIPASE 17 10/10/2024    TSH 0.72 07/20/2022       Anesthesia Plan    ASA Status:  3, emergent    NPO Status:  NPO Appropriate    Anesthesia Type: General.     - Airway: LMA   Induction: Intravenous, Propofol.   Maintenance: Balanced.        Consents    Anesthesia Plan(s) and associated risks, benefits, and realistic alternatives discussed. Questions answered and patient/representative(s) expressed understanding.     - Discussed:     - Discussed with:  Patient      - Extended Intubation/Ventilatory Support Discussed: No.      - Patient is DNR/DNI Status: No     Use of blood products discussed: Yes.     - Discussed with: Patient.     - Consented: consented to blood products            Reason for refusal: other.     Postoperative Care    Pain management: IV analgesics, Oral pain medications, Multi-modal analgesia.   PONV prophylaxis: Ondansetron (or other 5HT-3), Dexamethasone or Solumedrol, Background Propofol Infusion     Comments:               Luis Mujica, DO    I have reviewed the pertinent notes and labs in the chart from the past 30 days and (re)examined the patient.  Any updates or changes from those notes  are reflected in this note.            # Drug Induced Coagulation Defect: home medication list includes an anticoagulant medication  # Drug Induced Platelet Defect: home medication list includes an antiplatelet medication   # Hypertension: Noted on problem list  # Chronic heart failure with preserved ejection fraction: heart failure noted on problem list and last echo with EF >50%            # Financial/Environmental Concerns:    # COPD: noted on problem list  # Pacemaker present

## 2024-10-11 NOTE — PLAN OF CARE
Problem: Adult Inpatient Plan of Care  Goal: Plan of Care Review  Description: The Plan of Care Review/Shift note should be completed every shift.  The Outcome Evaluation is a brief statement about your assessment that the patient is improving, declining, or no change.  This information will be displayed automatically on your shift  note.  Outcome: Progressing     Problem: Infection  Goal: Absence of Infection Signs and Symptoms  Outcome: Progressing   Goal Outcome Evaluation:       Pt is doing well post ureter sent placement.  Sleeping at this time, awaiting first void post procedure.  Vitally stable, denying pain.  IV fluids are infusing as well.  UC came back with E Coli growing as well.  IV antibiotics being given per MD order.

## 2024-10-11 NOTE — H&P
[facility]    History and Physical - Hospitalist Service       Date of Admission:  10/10/2024    Assessment & Plan      Hamida Verma is a 84 year old female admitted on 10/10/2024.   Hamida Verma is a 84 year old female with a past medical history of hypertension, dyslipidemia, coronary artery disease status post RCA stenting, cardiomyopathy with borderline low EF, paroxysmal A-fib s/p AV node ablation and pacemaker placement on Xarelto pulmonary hypertension and COPD who presented to ER for evaluation of abdominal pain.    # Renal colic   # Obstructing kidney stone  # Right hydronephrosis  # UTI  -She presented with severe lower abdominal pain; states that she never had similar pain before; denies prior history of kidney stone  -CT abdomen and pelvis showed- Right hydronephrosis secondary to a 4 mm distal right ureteral stone; additional right-sided kidney stones persist.  -UA abnormal with white blood cells 21, small leukocyte esterase, positive nitrates, large blood  -She was given Dilaudid 0.3 mg IV x 2, Zofran IV x 2, Flomax 0.4 mg p.o. in ER; she was also given 1 dose of ceftriaxone for suspected UTI  -No fever, no leukocytosis, no evidence of sepsis  -Admit under observational status  -N.p.o. for now  -IV fluids NS at 75 cc per  -Flomax 0.4 mg p.o. daily  -Strain urine  -Continue with ceftriaxone for now  -Follow-up urine culture and adjust antibiotics as indicated  -Antiemetics as needed  -Pain management: Tylenol p.o. oxycodone as needed, IV Dilaudid as needed  -Urology consult  -Hold PTA Plavix and Xarelto for now  -CBC/BMP in a.m.    # Hypertension  # Dyslipidemia   # History of CAD s/p RCA stenting  -Follows with Dr. Mauri Chase  -Denies chest pain, no shortness of breath  [PTA on Plavix 75 mg p.o. daily, Coreg 12.5 mg p.o. twice daily, Entresto 1 tablet p.o. twice daily, Crestor 40 mg p.o. daily]  -Hold PTA Plavix for now given possible or procedure  -Resume PTA Coreg  and Entresto with holding parameters  -Hydralazine IV as needed  -Hold PTA statin given observational stay, likely resume after discharge    # History of cardiomyopathy  *last echocardiogram in 7/24 with EF 53% (improved from EF 45% in December 2023). Septal motion is consistent with conduction abnormality. Mild hypokinesis of apical septum. There is mild (1+) tricuspid regurgitation. Borderline ascending aorta dilatation is present.  -PTA on Lasix 20 mg p.o. daily and KCl 10 mEq p.o. daily  -Hold PTA Lasix given n.p.o. status and need for IV fluids  -Monitor fluid status    # History of paroxysmal atrial fibrillation  # Status post AV node ablation and pacemaker placement  -EKG with atrial sensed ventricular paced rhythm  -Resume PTA Coreg  -Hold PTA Xarelto given possible OR procedure    # History of COPD  -No acute issue, denies shortness of breath, no wheezing  -Resume PTA inhalers           Diet: NPO for Medical/Clinical Reasons Except for: No Exceptions    DVT Prophylaxis: Pneumatic Compression Devices  Lopez Catheter: Not present  Lines: None     Cardiac Monitoring: None  Code Status:  DNR/DNI, discussed with the patient    Clinically Significant Risk Factors Present on Admission                # Drug Induced Coagulation Defect: home medication list includes an anticoagulant medication  # Drug Induced Platelet Defect: home medication list includes an antiplatelet medication   # Hypertension: Noted on problem list  # Chronic heart failure with preserved ejection fraction: heart failure noted on problem list and last echo with EF >50%            # Financial/Environmental Concerns:    # COPD: noted on problem list  # Pacemaker present       Disposition Plan     Medically Ready for Discharge: Anticipated Tomorrow, possible 1-2 days, pending Urology plans           Pattie Denis MD  Hospitalist Service    Securely message with ServiceRelated (more info)  Text page via to be  "Paging/Directory     ______________________________________________________________________    Chief Complaint   Lower abdominal pain    History is obtained from the patient who is a good historian.  I discussed with Joyce Torres PA-C in ER and I reviewed her EMR    History of Present Illness   Hamida Verma is a 84 year old female with a past medical history of hypertension, dyslipidemia, coronary artery disease status post RCA stenting, cardiomyopathy with borderline low EF, paroxysmal A-fib s/p AV node ablation and pacemaker placement on Xarelto pulmonary hypertension and COPD who presented to ER for evaluation of abdominal pain.    The patient states that she was in her usual state of health until earlier today.  She states that around 12:30 PM she started having abdominal pain located in lower abdomen.  She reports associated nausea and vomiting.  She reports she never had similar pain before.  She denies prior history of kidney stone.  She thought that she might be constipated.  She had a small bowel movement today.  She denies fever.  She denies chest pain, no shortness of breath, no dysuria.  She denies leg swellings.    In ER she was seen by Joyce Torres PA-C  Vital signs:  Temp: 98  F (36.7  C) Temp src: Oral BP: (!) 190/94 Pulse: 70   Resp: 16 SpO2: 96 % O2 Device: None (Room air)     Weight: 75.3 kg (166 lb)  Estimated body mass index is 28.32 kg/m  as calculated from the following:    Height as of 7/30/24: 1.631 m (5' 4.2\").    Weight as of this encounter: 75.3 kg (166 lb).     CBC RESULTS:   Recent Labs   Lab Test 10/10/24  1725   WBC 9.0   RBC 3.98   HGB 11.7   HCT 35.7   MCV 90   MCH 29.4   MCHC 32.8   RDW 13.9         Last Comprehensive Metabolic Panel:  Lab Results   Component Value Date     10/10/2024    POTASSIUM 4.4 10/10/2024    CHLORIDE 103 10/10/2024    CO2 25 10/10/2024    ANIONGAP 12 10/10/2024     (H) 10/10/2024    BUN 12.7 10/10/2024    CR 0.93 10/10/2024 "    GFRESTIMATED 60 (L) 10/10/2024    GURWINDER 9.8 10/10/2024      Liver Function Studies -   Recent Labs   Lab Test 10/10/24  1725   PROTTOTAL 6.6   ALBUMIN 3.8   BILITOTAL 0.4   ALKPHOS 57   AST 25   ALT 18      Lipase 17    UA RESULTS:  Recent Labs   Lab Test 10/10/24  1753   COLOR Straw   APPEARANCE Slightly Cloudy*   URINEGLC Negative   URINEBILI Negative   URINEKETONE 20*   SG 1.012   UBLD Large*   URINEPH 8.0*   PROTEIN Negative   NITRITE Positive*   LEUKEST Small*   RBCU 23*   WBCU 21*      CT abd/pelvis with IV contrast    1.  Right hydronephrosis secondary to a 4 mm distal right ureteral stone.  2.  Additional right-sided kidney stones persist.  3.  Mild compression L2 vertebral body age indeterminant but new compared to prior.  4.  Small indeterminate lesion upper pole right kidney unchanged compared to exam of 17 months ago and very likely to be benign.    EKG-atrial sensed ventricular paced rhythm with prolonged AV conduction.    In ER she was given 2 doses of IV Dilaudid 0.3 mg, IV Zofran x 2, Flomax 0.4 mg p.o. x 1 and 1 dose of ceftriaxone and hospital service was called regarding the admission.      Past Medical History    Past Medical History:   Diagnosis Date    Acute cholecystitis 05/31/2023    Acute cystitis without hematuria 05/30/2023    Atrial fibrillation (H)     Biliary colic 05/30/2023    Chest pain, unspecified type 12/19/2023    Chronic diastolic CHF (congestive heart failure) (H)     COPD (chronic obstructive pulmonary disease) (H)     Essential hypertension, benign     HYPERLIPIDEMIA NEC/NOS 03/30/2006    Pulmonary hypertension (H)     Pyelonephritis 2019    SSS (sick sinus syndrome) (H)     s/p PPM 3/2018       Past Surgical History   Past Surgical History:   Procedure Laterality Date    CV CORONARY ANGIOGRAM N/A 12/7/2023    Procedure: Coronary Angiogram;  Surgeon: Mookie Yates MD;  Location: [facility] CARDIAC CATH LAB    CV CORONARY ANGIOGRAM N/A 12/8/2023    Procedure: Coronary  Angiogram;  Surgeon: Stephanie Monroy MD;  Location:  HEART CARDIAC CATH LAB    CV CORONARY ANGIOGRAM N/A 2023    Procedure: Coronary Angiogram;  Surgeon: Eric Barajas MD;  Location:  HEART CARDIAC CATH LAB    CV PCI N/A 2023    Procedure: Percutaneous Coronary Intervention;  Surgeon: Stephanie Monroy MD;  Location:  HEART CARDIAC CATH LAB    CV PCI STENT DRUG ELUTING N/A 2023    Procedure: Percutaneous Coronary Intervention Stent;  Surgeon: Eric Barajas MD;  Location:  HEART CARDIAC CATH LAB    EP ABLATION AV NODE N/A 2019    Procedure: EP Ablation AV Node;  Surgeon: Roe Voss MD;  Location:  HEART CARDIAC CATH LAB    EYE SURGERY      HYSTERECTOMY, VAGINAL      hysterectomy    LAPAROSCOPIC CHOLECYSTECTOMY N/A 2023    Procedure: Laparoscopic cholecystectomy with lysis of adhesions;  Surgeon: Sawyer Marcum MD;  Location:  OR       Prior to Admission Medications   Prior to Admission Medications   Prescriptions Last Dose Informant Patient Reported? Taking?   FLAX SEED OIL OR 10/10/2024 at am Self Yes Yes   Si capsule daily   Multiple Vitamins-Minerals (HAIR SKIN NAILS PO) 10/9/2024 at hs Self Yes Yes   Sig: Take 1 tablet by mouth At Bedtime   Multiple Vitamins-Minerals (ICAPS AREDS 2 PO) 10/10/2024 at am  Yes Yes   Sig: Take 1 tablet by mouth 2 times daily   acetaminophen (TYLENOL) 500 MG tablet prn  Yes Yes   Sig: Take 500-1,000 mg by mouth every 6 hours as needed for mild pain   albuterol (PROAIR HFA/PROVENTIL HFA/VENTOLIN HFA) 108 (90 Base) MCG/ACT inhaler prn  No Yes   Sig: Inhale 2 puffs into the lungs every 6 hours as needed   carvedilol (COREG) 12.5 MG tablet 10/10/2024 at am  No Yes   Sig: Take 1 tablet (12.5 mg) by mouth 2 times daily (with meals)   cholecalciferol (VITAMIN  -D) 1000 UNIT capsule 10/10/2024 at am Self Yes Yes   Sig: Take 1 capsule by mouth daily.   clopidogrel (PLAVIX) 75 MG tablet 10/10/2024 at am  No Yes   Sig: Take 1  tablet (75 mg) by mouth daily To protect your stent(s).  Do not stop taking unless directed by cardiology.   furosemide (LASIX) 20 MG tablet 10/10/2024 at am  No Yes   Sig: Take 1 tablet (20 mg) by mouth daily   nitroGLYcerin (NITROSTAT) 0.4 MG sublingual tablet prn  No Yes   Sig: For chest pain place 1 tablet under the tongue every 5 minutes for 3 doses. If symptoms persist 5 minutes after 1st dose call 911.   potassium chloride ER (KLOR-CON M) 10 MEQ CR tablet 10/10/2024 at am  No Yes   Sig: Take 1 tablet (10 mEq) by mouth daily   rivaroxaban ANTICOAGULANT (XARELTO) 15 MG TABS tablet 10/9/2024 at pm  No Yes   Sig: Take 1 tablet (15 mg) by mouth daily (with dinner)   rosuvastatin (CRESTOR) 40 MG tablet 10/10/2024 at am  No Yes   Sig: Take 1 tablet (40 mg) by mouth daily   sacubitril-valsartan (ENTRESTO) 49-51 MG per tablet 10/10/2024 at am  No Yes   Sig: Take 1 tablet by mouth 2 times daily   tiotropium (SPIRIVA) 18 MCG inhaled capsule prn  No Yes   Sig: Inhale 1 capsule (18 mcg) into the lungs daily   Patient taking differently: Inhale 18 mcg into the lungs daily as needed (shortness of breath).      Facility-Administered Medications: None        Review of Systems    The 10 point Review of Systems is negative other than noted in the HPI or here.     Social History   I have reviewed this patient's social history and updated it with pertinent information if needed.  Social History     Tobacco Use    Smoking status: Former     Current packs/day: 0.00     Average packs/day: 1.5 packs/day for 45.0 years (67.6 ttl pk-yrs)     Types: Cigarettes     Start date: 10/28/1955     Quit date: 2000     Years since quittin.1    Smokeless tobacco: Never    Tobacco comments:     quit 24 yrs ago   Substance Use Topics    Alcohol use: No    Drug use: No         Family History   I have reviewed this patient's family history and updated it with pertinent information if needed.  Family History   Problem Relation Age of Onset     Cerebrovascular Disease Mother     Glaucoma Mother     Macular Degeneration Mother     Alcohol/Drug Father         alcohol    Myocardial Infarction Father 63        passed away from MI    Family History Negative Sister     Family History Negative Sister     Family History Negative Sister     Cerebrovascular Disease Sister     Family History Negative Brother     Family History Negative Maternal Grandmother     Family History Negative Maternal Grandfather     Family History Negative Paternal Grandmother     Family History Negative Paternal Grandfather     Myocardial Infarction Son 57        passed away from MI    Dementia Daughter 57        early onset on [place]    Diabetes No family hx of     Breast Cancer No family hx of     Cancer - colorectal No family hx of          Allergies   Allergies   Allergen Reactions    Strawberries [Strawberry Extract] Anaphylaxis    Strawberry Flavor         Physical Exam   Vital Signs: Temp: 98  F (36.7  C) Temp src: Oral BP: (!) 190/94 Pulse: 70   Resp: 16 SpO2: 96 % O2 Device: None (Room air)    Weight: 166 lbs 0 oz    General Appearance: Awake/alert, looks mildly uncomfortable  Respiratory: Bilateral air entry, no wheezing, no rales, no crackles  Cardiovascular: S1-S2, RRR, no murmurs, no rubs  GI: Abdomen is soft, moderate tender to palpation lower abdomen no rebound, bowel sounds are present  Skin: No rashes, no cyanosis  Neuro: AAOx3, no focal neurological deficit    Medical Decision Making       MANAGEMENT DISCUSSED with the following over the past 24 hours: The patient, ER staff   NOTE(S)/MEDICAL RECORDS REVIEWED over the past 24 hours: Outpatient cardiology note  Tests REVIEWED in the past 24 hours:  - BMP  - CBC  - LFTs  - UA  Tests personally interpreted in the past 24 hours:  - ABDOMINAL CT showing as above       Data     I have personally reviewed the following data over the past 24 hrs:    9.0  \   11.7   / 383     140 103 12.7 /  132 (H)   4.4 25 0.93 \     ALT: 18 AST:  25 AP: 57 TBILI: 0.4   ALB: 3.8 TOT PROTEIN: 6.6 LIPASE: 17       Imaging results reviewed over the past 24 hrs:   Recent Results (from the past 24 hour(s))   CT Abdomen Pelvis w Contrast    Narrative    EXAM: CT ABDOMEN PELVIS W CONTRAST  LOCATION: [facility]  DATE: 10/10/2024    INDICATION: low abdominal pain with nausea and vomiting  COMPARISON: 5/30/2023  TECHNIQUE: CT scan of the abdomen and pelvis was performed following injection of IV contrast. Multiplanar reformats were obtained. Dose reduction techniques were used.  CONTRAST: 83 mL Isovue   370    FINDINGS:   LOWER CHEST: Pacemaker present.    HEPATOBILIARY: Interval cholecystectomy.    PANCREAS: Normal.    SPLEEN: Normal.    ADRENAL GLANDS: Normal.    KIDNEYS/BLADDER: New mild right hydronephrosis/hydroureter secondary to a 4 mm obstructing distal right ureteral stone, within 1 cm of the UVJ. There appears to be an irregular 7 mm stone versus a more likely cluster of residual small stones within right   lower pole calyx. Tiny right upper pole stone unchanged. 6.4 cm right upper pole cyst unchanged and needs no follow-up.    Scarring involving left kidney unchanged with small cortical calcification. No left hydronephrosis. Small benign cysts are stable in need no follow-up. 1.1 cm indeterminate low-attenuation focus left upper pole is unchanged and very likely benign    BOWEL: No obstruction or inflammatory change.    LYMPH NODES: Normal.    VASCULATURE: Moderately heavy atherosclerotic calcifications.    PELVIC ORGANS: Hysterectomy. No pelvic mass or free fluid.    MUSCULOSKELETAL: Interval mild compression superior endplate of L2, age indeterminant but new compared to prior study.      Impression    IMPRESSION:   1.  Right hydronephrosis secondary to a 4 mm distal right ureteral stone.  2.  Additional right-sided kidney stones persist.  3.  Mild compression L2 vertebral body age indeterminant but new compared to prior.  4.   Small indeterminate lesion upper pole right kidney unchanged compared to exam of 17 months ago and very likely to be benign.

## 2024-10-11 NOTE — PROGRESS NOTES
RECEIVING UNIT ED HANDOFF REVIEW    ED Nurse Handoff Report was reviewed by: Ewa Savage RN on October 10, 2024 at 9:54 PM

## 2024-10-11 NOTE — PHARMACY-ADMISSION MEDICATION HISTORY
Pharmacist Admission Medication History    Admission medication history is complete. The information provided in this note is only as accurate as the sources available at the time of the update.    Information Source(s): Patient and CareEverywhere/SureScripts via in-person    Pertinent Information:   --  Pt reported that she is only using Spiriva prn.  Per Surescripts, it was last filled in Feb 2024.    Changes made to PTA medication list:  Added: None  Deleted: Astelin.  Changed: Spiriva --> PRN.    Allergies reviewed with patient and updates made in EHR: no    Medication History Completed By: Jessica Javed, Cornelio 10/10/2024 9:02 PM    PTA Med List   Medication Sig Last Dose    acetaminophen (TYLENOL) 500 MG tablet Take 500-1,000 mg by mouth every 6 hours as needed for mild pain prn    albuterol (PROAIR HFA/PROVENTIL HFA/VENTOLIN HFA) 108 (90 Base) MCG/ACT inhaler Inhale 2 puffs into the lungs every 6 hours as needed prn    carvedilol (COREG) 12.5 MG tablet Take 1 tablet (12.5 mg) by mouth 2 times daily (with meals) 10/10/2024 at am    cholecalciferol (VITAMIN  -D) 1000 UNIT capsule Take 1 capsule by mouth daily. 10/10/2024 at am    clopidogrel (PLAVIX) 75 MG tablet Take 1 tablet (75 mg) by mouth daily To protect your stent(s).  Do not stop taking unless directed by cardiology. 10/10/2024 at am    FLAX SEED OIL OR 1 capsule daily 10/10/2024 at am    furosemide (LASIX) 20 MG tablet Take 1 tablet (20 mg) by mouth daily 10/10/2024 at am    Multiple Vitamins-Minerals (HAIR SKIN NAILS PO) Take 1 tablet by mouth At Bedtime 10/9/2024 at hs    Multiple Vitamins-Minerals (ICAPS AREDS 2 PO) Take 1 tablet by mouth 2 times daily 10/10/2024 at am    nitroGLYcerin (NITROSTAT) 0.4 MG sublingual tablet For chest pain place 1 tablet under the tongue every 5 minutes for 3 doses. If symptoms persist 5 minutes after 1st dose call 911. prn    potassium chloride ER (KLOR-CON M) 10 MEQ CR tablet Take 1 tablet (10 mEq) by mouth daily  10/10/2024 at am    rivaroxaban ANTICOAGULANT (XARELTO) 15 MG TABS tablet Take 1 tablet (15 mg) by mouth daily (with dinner) 10/9/2024 at pm    rosuvastatin (CRESTOR) 40 MG tablet Take 1 tablet (40 mg) by mouth daily 10/10/2024 at am    sacubitril-valsartan (ENTRESTO) 49-51 MG per tablet Take 1 tablet by mouth 2 times daily 10/10/2024 at am    tiotropium (SPIRIVA) 18 MCG inhaled capsule Inhale 1 capsule (18 mcg) into the lungs daily (Patient taking differently: Inhale 18 mcg into the lungs daily as needed (shortness of breath).) prn

## 2024-10-12 VITALS
TEMPERATURE: 98.3 F | OXYGEN SATURATION: 95 % | WEIGHT: 166 LBS | DIASTOLIC BLOOD PRESSURE: 63 MMHG | BODY MASS INDEX: 28.32 KG/M2 | SYSTOLIC BLOOD PRESSURE: 124 MMHG | HEART RATE: 67 BPM | RESPIRATION RATE: 18 BRPM

## 2024-10-12 LAB
ANION GAP SERPL CALCULATED.3IONS-SCNC: 9 MMOL/L (ref 7–15)
BASOPHILS # BLD AUTO: 0 10E3/UL (ref 0–0.2)
BASOPHILS NFR BLD AUTO: 0 %
BUN SERPL-MCNC: 17.7 MG/DL (ref 8–23)
CALCIUM SERPL-MCNC: 8.6 MG/DL (ref 8.8–10.4)
CHLORIDE SERPL-SCNC: 105 MMOL/L (ref 98–107)
CREAT SERPL-MCNC: 0.96 MG/DL (ref 0.51–0.95)
EGFRCR SERPLBLD CKD-EPI 2021: 58 ML/MIN/1.73M2
EOSINOPHIL # BLD AUTO: 0 10E3/UL (ref 0–0.7)
EOSINOPHIL NFR BLD AUTO: 0 %
ERYTHROCYTE [DISTWIDTH] IN BLOOD BY AUTOMATED COUNT: 14.3 % (ref 10–15)
GLUCOSE SERPL-MCNC: 94 MG/DL (ref 70–99)
HCO3 SERPL-SCNC: 25 MMOL/L (ref 22–29)
HCT VFR BLD AUTO: 30.6 % (ref 35–47)
HGB BLD-MCNC: 9.8 G/DL (ref 11.7–15.7)
IMM GRANULOCYTES # BLD: 0.1 10E3/UL
IMM GRANULOCYTES NFR BLD: 1 %
LYMPHOCYTES # BLD AUTO: 0.8 10E3/UL (ref 0.8–5.3)
LYMPHOCYTES NFR BLD AUTO: 7 %
MCH RBC QN AUTO: 29.1 PG (ref 26.5–33)
MCHC RBC AUTO-ENTMCNC: 32 G/DL (ref 31.5–36.5)
MCV RBC AUTO: 91 FL (ref 78–100)
MONOCYTES # BLD AUTO: 1.3 10E3/UL (ref 0–1.3)
MONOCYTES NFR BLD AUTO: 12 %
NEUTROPHILS # BLD AUTO: 8.9 10E3/UL (ref 1.6–8.3)
NEUTROPHILS NFR BLD AUTO: 81 %
NRBC # BLD AUTO: 0 10E3/UL
NRBC BLD AUTO-RTO: 0 /100
PLATELET # BLD AUTO: 258 10E3/UL (ref 150–450)
POTASSIUM SERPL-SCNC: 3.7 MMOL/L (ref 3.4–5.3)
RBC # BLD AUTO: 3.37 10E6/UL (ref 3.8–5.2)
SODIUM SERPL-SCNC: 139 MMOL/L (ref 135–145)
WBC # BLD AUTO: 11.1 10E3/UL (ref 4–11)

## 2024-10-12 PROCEDURE — 250N000013 HC RX MED GY IP 250 OP 250 PS 637: Performed by: HOSPITALIST

## 2024-10-12 PROCEDURE — 250N000013 HC RX MED GY IP 250 OP 250 PS 637: Performed by: STUDENT IN AN ORGANIZED HEALTH CARE EDUCATION/TRAINING PROGRAM

## 2024-10-12 PROCEDURE — 85025 COMPLETE CBC W/AUTO DIFF WBC: CPT | Performed by: STUDENT IN AN ORGANIZED HEALTH CARE EDUCATION/TRAINING PROGRAM

## 2024-10-12 PROCEDURE — 80048 BASIC METABOLIC PNL TOTAL CA: CPT | Performed by: STUDENT IN AN ORGANIZED HEALTH CARE EDUCATION/TRAINING PROGRAM

## 2024-10-12 PROCEDURE — 36415 COLL VENOUS BLD VENIPUNCTURE: CPT | Performed by: STUDENT IN AN ORGANIZED HEALTH CARE EDUCATION/TRAINING PROGRAM

## 2024-10-12 PROCEDURE — 99239 HOSP IP/OBS DSCHRG MGMT >30: CPT | Performed by: HOSPITALIST

## 2024-10-12 RX ORDER — TAMSULOSIN HYDROCHLORIDE 0.4 MG/1
0.4 CAPSULE ORAL DAILY
Qty: 30 CAPSULE | Refills: 2 | Status: SHIPPED | OUTPATIENT
Start: 2024-10-13

## 2024-10-12 RX ORDER — CEFDINIR 300 MG/1
300 CAPSULE ORAL 2 TIMES DAILY
Qty: 12 CAPSULE | Refills: 0 | Status: SHIPPED | OUTPATIENT
Start: 2024-10-12 | End: 2024-10-13

## 2024-10-12 RX ORDER — CLOPIDOGREL BISULFATE 75 MG/1
75 TABLET ORAL DAILY
Status: DISCONTINUED | OUTPATIENT
Start: 2024-10-12 | End: 2024-10-12 | Stop reason: HOSPADM

## 2024-10-12 RX ADMIN — TAMSULOSIN HYDROCHLORIDE 0.4 MG: 0.4 CAPSULE ORAL at 08:04

## 2024-10-12 RX ADMIN — UMECLIDINIUM 1 PUFF: 62.5 AEROSOL, POWDER ORAL at 08:04

## 2024-10-12 RX ADMIN — CLOPIDOGREL BISULFATE 75 MG: 75 TABLET ORAL at 12:00

## 2024-10-12 RX ADMIN — CARVEDILOL 12.5 MG: 12.5 TABLET, FILM COATED ORAL at 08:04

## 2024-10-12 RX ADMIN — SACUBITRIL AND VALSARTAN 1 TABLET: 49; 51 TABLET, FILM COATED ORAL at 08:04

## 2024-10-12 ASSESSMENT — ACTIVITIES OF DAILY LIVING (ADL)
DIFFICULTY_COMMUNICATING: NO
ADLS_ACUITY_SCORE: 38
CONCENTRATING,_REMEMBERING_OR_MAKING_DECISIONS_DIFFICULTY: NO
TOILETING_ISSUES: NO
DOING_ERRANDS_INDEPENDENTLY_DIFFICULTY: NO
CHANGE_IN_FUNCTIONAL_STATUS_SINCE_ONSET_OF_CURRENT_ILLNESS/INJURY: NO
ADLS_ACUITY_SCORE: 34
ADLS_ACUITY_SCORE: 38
ADLS_ACUITY_SCORE: 34
ADLS_ACUITY_SCORE: 38
ADLS_ACUITY_SCORE: 38
ADLS_ACUITY_SCORE: 34
ADLS_ACUITY_SCORE: 38
FALL_HISTORY_WITHIN_LAST_SIX_MONTHS: NO
ADLS_ACUITY_SCORE: 38
DRESSING/BATHING_DIFFICULTY: OTHER (SEE COMMENTS)
WALKING_OR_CLIMBING_STAIRS_DIFFICULTY: OTHER (SEE COMMENTS)
DIFFICULTY_EATING/SWALLOWING: NO
ADLS_ACUITY_SCORE: 38
ADLS_ACUITY_SCORE: 38
ADLS_ACUITY_SCORE: 39
ADLS_ACUITY_SCORE: 38

## 2024-10-12 NOTE — PROGRESS NOTES
Top portion must ALWAYS be completed  PRIMARY Concern: Abdominal pain, R ureter stone  SAFETY RISK Concerns (fall risk, behaviors, etc.): fall risk      Aggression Tool Color: Green  Isolation/Type: n/a  Tests/Procedures for NEXT shift: cystoscopy with R ureteral stent 10/11.  Consults? (Pending/following, signed-off?) Urology following  Where is patient from? (Home, TCU, etc.): home  Other Important info for NEXT shift:   Anticipated DC date & active delays: TBD  ______________________________________________________________________  SUMMARY NOTE:          Orientation/Cognitive: AOX4  Observation Goals (Met/ Not Met): Inpt  Mobility Level/Assist Equipment: AX1 gb.  Antibiotics & Plan (IV/po, length of tx left): IV Rocephin  Pain Management: Prn tylenol.  Tele/VS/O2: VSS on RA.  ABNL Lab/BG: WBC- 13.8 UTI, UC positive for Ecoli.  Diet: Reg  Bowel/Bladder: Continent. Up to bathroom  Skin Concerns: intact  Drains/Devices: PIV SL  Patient Stated Goal for Today: none

## 2024-10-12 NOTE — PLAN OF CARE
Goal Outcome Evaluation:         Pt discharge and went home with all the belongings .Discharge paperwork and medication explained and given to pt.Periferal line removed,

## 2024-10-12 NOTE — PROGRESS NOTES
Top portion must ALWAYS be completed  PRIMARY Concern: Abdominal pain, R ureter stone  SAFETY RISK Concerns (fall risk, behaviors, etc.): fall risk      Aggression Tool Color: Green  Isolation/Type: n/a  Tests/Procedures for NEXT shift: cystoscopy with R ureteral stent placement done this afternoon.  Consults? (Pending/following, signed-off?) Urology following  Where is patient from? (Home, TCU, etc.): home  Other Important info for NEXT shift:   Anticipated DC date & active delays: TBD  ______________________________________________________________________  SUMMARY NOTE:              (either paste note here OR complete the info below- RN to choose one- delete info below if not used)  Orientation/Cognitive: AOX4  Observation Goals (Met/ Not Met): Inpt  Mobility Level/Assist Equipment: AX1 gb.  Antibiotics & Plan (IV/po, length of tx left): IV Rocephin  Pain Management: Prn tylenol.  Tele/VS/O2: VSS on RA. No tele  ABNL Lab/BG: WBC- 13.8 UTI, UC positive for Ecoli.  Diet: Reg  Bowel/Bladder: Continent. Up to bathroom  Skin Concerns: intact  Drains/Devices: PIV SL  Patient Stated Goal for Today: none

## 2024-10-12 NOTE — DISCHARGE SUMMARY
[facility]    Discharge Summary  Hospitalist    Date of Admission:  10/10/2024  Date of Discharge:  10/12/2024    Discharge Diagnoses      Ureterolithiasis  Ureteral colic  Nausea and vomiting, unspecified vomiting type  Acute cystitis with hematuria    History of Present Illness   Hamida Verma is an 84 year old female who presented with acute renal calculi with UTI with hydronephrosis    Hospital Course   Hamida Verma was admitted on 10/10/2024.  The following problems were addressed during her hospitalization:    Hamida Verma is a 84 year old female with PMH of HTN, HLD, CAD s/p RCA stent, cardiomyopathy, pAF, COPD who was admitted on 10/10/2024 for obstructing right ureteral stone with associated UTI.    Right 4 mm distal right ureteral stone with hydronephrosis  Urinary tract infection  *Afebrile with no leukocytosis on 10/10, now with temp 100.3 F and WBC 13.8. UA with infectious markers and blood.  - Continue IV Rocephin  - Continue IVF  - Continue Flomax 0.4 mg daily, started 10/11  - Urine culture pending  - Analgesics and antiemetics as needed  - NPO for now  - Urology consulted, underwent cystoscopy ureteroscopy with right retrograde pyelography and placement of right ureteral stent.  Plan for follow-up with Dr. Villegas for definitive stone treatment in 2 to 3 weeks.  -Urine culture came back positive for E. coli.  Was transition to oral cefdinir to complete a 14-day course.  Initially only 1 week course was sent.  Patient was called back and corrected with updated course and prescription sent to [facility]    CAD s/p RCA stent (12/2023)  HTN  HLD   *No complaints of chest pain or dyspnea  - Hold PTA Plavix for procedure  - Continue PTA Coreg and Entresto  - Hold PTA statin while on observation status  -All the meds restarted upon discharge    History of cardiomyopathy  *Echo 07/2024 with EF 53% (improved from 45% in Dec 2023). Septal motion is consistent with  conduction abnormality. Mild hypokinesis of apical septum. There is mild tricuspid regurgitation. Borderline ascending aorta dilatation present.  - Hold PTA Lasix while receiving IVF  - Monitor fluid status    Paroxysmal atrial fibrillation  S/p AV node ablation and pacemaker placement  *EKG atrial sensed ventricular paced rhythm  - Continue PTA Coreg  - Hold PTA Xarelto for procedure.  Restarted upon discharge    COPD  *Stable without current exacerbation  - Continue PTA inhalers     Clinically Significant Risk Factors        # Hypocalcemia: Lowest Ca = 8.6 mg/dL in last 2 days, will monitor and replace as appropriate         # Hypertension: Noted on problem list  # Chronic heart failure with preserved ejection fraction: heart failure noted on problem list and last echo with EF >50%              # Financial/Environmental Concerns:    # COPD: noted on problem list  # Pacemaker present        Shani Michelle MD, MD      Code Status   DNR / DNI       Primary Care Physician   Mikala Philip MD    Physical Exam   Temp: 98.3  F (36.8  C) Temp src: Oral BP: 124/63 Pulse: 67   Resp: 18 SpO2: 95 % O2 Device: None (Room air) Oxygen Delivery: 1 LPM  Vitals:    10/10/24 1722   Weight: 75.3 kg (166 lb)     Vital Signs with Ranges  Temp:  [97  F (36.1  C)-99.3  F (37.4  C)] 98.3  F (36.8  C)  Pulse:  [67-78] 67  Resp:  [14-18] 18  BP: (112-148)/(54-74) 124/63  SpO2:  [92 %-100 %] 95 %  I/O last 3 completed shifts:  In: 270 [P.O.:240; I.V.:30]  Out: 400 [Urine:400]    Physical Exam  Constitutional:       Appearance: Normal appearance.   Cardiovascular:      Rate and Rhythm: Normal rate and regular rhythm.      Pulses: Normal pulses.      Heart sounds: Normal heart sounds.   Pulmonary:      Effort: Pulmonary effort is normal. No respiratory distress.      Breath sounds: Normal breath sounds.   Abdominal:      General: Abdomen is flat. Bowel sounds are normal. There is no distension.      Tenderness: There is no abdominal  tenderness. There is no guarding.   Skin:     General: Skin is warm and dry.   Neurological:      General: No focal deficit present.           Discharge Disposition   Discharged to home  Condition at discharge: Stable    Consultations This Hospital Stay   UROLOGY IP CONSULT    Time Spent on this Encounter   IShani MD, personally saw the patient today and spent greater than 30 minutes discharging this patient.    Discharge Orders      Reason for your hospital stay    Right renal calculi     Follow-up and recommended labs and tests     Follow up with primary care provider, Mikala Philip MD, within 7 days for hospital follow- up.  The following labs/tests are recommended: cbc, bmp in 1 week .   Follow-up with Dr Yuen for definitive stone management in next 2-3 weeks     Activity    Your activity upon discharge: activity as tolerated     Diet    Follow this diet upon discharge: Current Diet:Orders Placed This Encounter      Regular Diet Adult     Discharge Medications   Current Discharge Medication List        START taking these medications    Details   cefdinir (OMNICEF) 300 MG capsule Take 1 capsule (300 mg) by mouth 2 times daily for 6 days.  Qty: 12 capsule, Refills: 0    Associated Diagnoses: Acute cystitis with hematuria      tamsulosin (FLOMAX) 0.4 MG capsule Take 1 capsule (0.4 mg) by mouth daily.  Qty: 30 capsule, Refills: 2    Associated Diagnoses: Acute cystitis with hematuria           CONTINUE these medications which have NOT CHANGED    Details   acetaminophen (TYLENOL) 500 MG tablet Take 500-1,000 mg by mouth every 6 hours as needed for mild pain      albuterol (PROAIR HFA/PROVENTIL HFA/VENTOLIN HFA) 108 (90 Base) MCG/ACT inhaler Inhale 2 puffs into the lungs every 6 hours as needed    Comments: Pharmacy may dispense brand covered by insurance (Proair, or proventil or ventolin or generic albuterol inhaler)  Associated Diagnoses: Chronic diastolic CHF (congestive heart failure) (H)       carvedilol (COREG) 12.5 MG tablet Take 1 tablet (12.5 mg) by mouth 2 times daily (with meals)  Qty: 180 tablet, Refills: 3    Associated Diagnoses: Coronary artery disease involving native coronary artery of native heart with unstable angina pectoris (H)      cholecalciferol (VITAMIN  -D) 1000 UNIT capsule Take 1 capsule by mouth daily.      clopidogrel (PLAVIX) 75 MG tablet Take 1 tablet (75 mg) by mouth daily To protect your stent(s).  Do not stop taking unless directed by cardiology.  Qty: 90 tablet, Refills: 3    Associated Diagnoses: Coronary artery disease involving native coronary artery of native heart with unstable angina pectoris (H)      FLAX SEED OIL OR 1 capsule daily      furosemide (LASIX) 20 MG tablet Take 1 tablet (20 mg) by mouth daily  Qty: 90 tablet, Refills: 3    Associated Diagnoses: Chronic diastolic CHF (congestive heart failure) (H)      !! Multiple Vitamins-Minerals (HAIR SKIN NAILS PO) Take 1 tablet by mouth At Bedtime      !! Multiple Vitamins-Minerals (ICAPS AREDS 2 PO) Take 1 tablet by mouth 2 times daily      nitroGLYcerin (NITROSTAT) 0.4 MG sublingual tablet For chest pain place 1 tablet under the tongue every 5 minutes for 3 doses. If symptoms persist 5 minutes after 1st dose call 911.  Qty: 25 tablet, Refills: 11    Associated Diagnoses: NSTEMI (non-ST elevated myocardial infarction) (H)      potassium chloride ER (KLOR-CON M) 10 MEQ CR tablet Take 1 tablet (10 mEq) by mouth daily  Qty: 90 tablet, Refills: 3    Associated Diagnoses: Hypokalemia      rivaroxaban ANTICOAGULANT (XARELTO) 15 MG TABS tablet Take 1 tablet (15 mg) by mouth daily (with dinner)  Qty: 90 tablet, Refills: 3    Associated Diagnoses: Coronary artery disease involving native coronary artery of native heart with unstable angina pectoris (H); PAF (paroxysmal atrial fibrillation) (H)      rosuvastatin (CRESTOR) 40 MG tablet Take 1 tablet (40 mg) by mouth daily  Qty: 90 tablet, Refills: 3    Associated Diagnoses:  Hyperlipidemia LDL goal <70      sacubitril-valsartan (ENTRESTO) 49-51 MG per tablet Take 1 tablet by mouth 2 times daily  Qty: 180 tablet, Refills: 3    Associated Diagnoses: NSTEMI (non-ST elevated myocardial infarction) (H); Chronic diastolic CHF (congestive heart failure) (H)      tiotropium (SPIRIVA) 18 MCG inhaled capsule Inhale 1 capsule (18 mcg) into the lungs daily  Qty: 90 capsule, Refills: 3    Associated Diagnoses: Chronic obstructive pulmonary disease, unspecified COPD type (H)       !! - Potential duplicate medications found. Please discuss with provider.        Allergies   Allergies   Allergen Reactions    Strawberries [Strawberry Extract] Anaphylaxis    Strawberry Flavor      Data   Recent Labs   Lab Test 10/12/24  0653 10/11/24  0622 10/10/24  1725 10/11/18  1324 03/19/18  1055   WBC 11.1* 13.8* 9.0   < >  --    HGB 9.8* 11.4* 11.7   < >  --    MCV 91 92 90   < >  --     309 383   < >  --    INR  --   --   --   --  1.31*    < > = values in this interval not displayed.      Recent Labs   Lab Test 10/12/24  0653 10/11/24  0622 10/10/24  1725    139 140   POTASSIUM 3.7 4.3 4.4   CHLORIDE 105 104 103   CO2 25 24 25   BUN 17.7 14.8 12.7   CR 0.96* 0.95 0.93   ANIONGAP 9 11 12   GURWINDER 8.6* 9.3 9.8   GLC 94 119* 132*         Results for orders placed or performed during the hospital encounter of 10/10/24   CT Abdomen Pelvis w Contrast    Narrative    EXAM: CT ABDOMEN PELVIS W CONTRAST  LOCATION: [facility]  DATE: 10/10/2024    INDICATION: low abdominal pain with nausea and vomiting  COMPARISON: 5/30/2023  TECHNIQUE: CT scan of the abdomen and pelvis was performed following injection of IV contrast. Multiplanar reformats were obtained. Dose reduction techniques were used.  CONTRAST: 83 mL Isovue   370    FINDINGS:   LOWER CHEST: Pacemaker present.    HEPATOBILIARY: Interval cholecystectomy.    PANCREAS: Normal.    SPLEEN: Normal.    ADRENAL GLANDS:  Normal.    KIDNEYS/BLADDER: New mild right hydronephrosis/hydroureter secondary to a 4 mm obstructing distal right ureteral stone, within 1 cm of the UVJ. There appears to be an irregular 7 mm stone versus a more likely cluster of residual small stones within right   lower pole calyx. Tiny right upper pole stone unchanged. 6.4 cm right upper pole cyst unchanged and needs no follow-up.    Scarring involving left kidney unchanged with small cortical calcification. No left hydronephrosis. Small benign cysts are stable in need no follow-up. 1.1 cm indeterminate low-attenuation focus left upper pole is unchanged and very likely benign    BOWEL: No obstruction or inflammatory change.    LYMPH NODES: Normal.    VASCULATURE: Moderately heavy atherosclerotic calcifications.    PELVIC ORGANS: Hysterectomy. No pelvic mass or free fluid.    MUSCULOSKELETAL: Interval mild compression superior endplate of L2, age indeterminant but new compared to prior study.      Impression    IMPRESSION:   1.  Right hydronephrosis secondary to a 4 mm distal right ureteral stone.  2.  Additional right-sided kidney stones persist.  3.  Mild compression L2 vertebral body age indeterminant but new compared to prior.  4.  Small indeterminate lesion upper pole right kidney unchanged compared to exam of 17 months ago and very likely to be benign.   XR Surgery CARLY Fluoro Less Than 5 Min w Stills    Narrative    This exam was marked as non-reportable because it will not be read by a   radiologist or a [facility] non-radiologist provider.

## 2024-10-13 RX ORDER — CEFDINIR 300 MG/1
300 CAPSULE ORAL 2 TIMES DAILY
Qty: 28 CAPSULE | Refills: 0 | Status: SHIPPED | OUTPATIENT
Start: 2024-10-13 | End: 2024-10-27

## 2024-10-14 ENCOUNTER — PATIENT OUTREACH (OUTPATIENT)
Dept: FAMILY MEDICINE | Facility: CLINIC | Age: 84
End: 2024-10-14
Payer: COMMERCIAL

## 2024-10-14 NOTE — TELEPHONE ENCOUNTER
Transitions of Care Outreach  Chief Complaint   Patient presents with    Hospital F/U     Discharge on 10/12/24       Most Recent Admission Date: 10/10/2024   Most Recent Admission Diagnosis: Ureterolithiasis - N20.1  Ureteral colic - N23  Nausea and vomiting, unspecified vomiting type - R11.2     Most Recent Discharge Date: 10/12/2024   Most Recent Discharge Diagnosis: Ureterolithiasis - N20.1  Ureteral colic - N23  Nausea and vomiting, unspecified vomiting type - R11.2  Acute cystitis with hematuria - N30.01     Transitions of Care Assessment    Discharge Assessment  How are you doing now that you are home?: doing fine.Voiced some frustrations about care at [facility].  How are your symptoms? (Red Flag symptoms escalate to triage hotline per guidelines): Improved  Do you know how to contact your clinic care team if you have future questions or changes to your health status? : Yes  Does the patient have their discharge instructions? : Yes  Does the patient have questions regarding their discharge instructions? : No  Were you started on any new medications or were there changes to any of your previous medications? : Yes  Does the patient have all of their medications?: Yes  Do you have questions regarding any of your medications? : No  Do you have all of your needed medical supplies or equipment (DME)?  (i.e. oxygen tank, CPAP, cane, etc.): No - What equipment or supplies are needed?    Follow up Plan     Discharge Follow-Up  Discharge follow up appointment scheduled in alignment with recommended follow up timeframe or Transitions of Risk Category? (Low = within 30 days; Moderate= within 14 days; High= within 7 days): Yes  Discharge Follow Up Appointment Date: 10/22/24  Discharge Follow Up Appointment Scheduled with?: Primary Care Provider    Future Appointments   Date Time Provider Department Center   10/22/2024 10:40 AM Mikala Philip MD [facility]   1/7/2025  2:30 PM [facility]   1/7/2025  3:30  PM Belen, MICHEAL [facility]   2/4/2025  8:20 AM Mikala Philip MD [facility]       Outpatient Plan as outlined on AVS reviewed with patient.    For any urgent concerns, please contact our 24 hour nurse triage line: [phone] ([phone])       Jin Marin RN

## 2024-10-31 ENCOUNTER — OFFICE VISIT (OUTPATIENT)
Dept: FAMILY MEDICINE | Facility: CLINIC | Age: 84
End: 2024-10-31
Payer: COMMERCIAL

## 2024-10-31 VITALS
OXYGEN SATURATION: 99 % | BODY MASS INDEX: 28.47 KG/M2 | HEART RATE: 87 BPM | RESPIRATION RATE: 16 BRPM | WEIGHT: 160.7 LBS | DIASTOLIC BLOOD PRESSURE: 81 MMHG | HEIGHT: 63 IN | TEMPERATURE: 97.1 F | SYSTOLIC BLOOD PRESSURE: 127 MMHG

## 2024-10-31 DIAGNOSIS — N20.1 URETEROLITHIASIS: ICD-10-CM

## 2024-10-31 DIAGNOSIS — Z23 ENCOUNTER FOR IMMUNIZATION: ICD-10-CM

## 2024-10-31 DIAGNOSIS — Z09 HOSPITAL DISCHARGE FOLLOW-UP: Primary | ICD-10-CM

## 2024-10-31 DIAGNOSIS — N30.01 ACUTE CYSTITIS WITH HEMATURIA: ICD-10-CM

## 2024-10-31 LAB
ERYTHROCYTE [DISTWIDTH] IN BLOOD BY AUTOMATED COUNT: 13.7 % (ref 10–15)
HCT VFR BLD AUTO: 37.8 % (ref 35–47)
HGB BLD-MCNC: 11.9 G/DL (ref 11.7–15.7)
MCH RBC QN AUTO: 28.7 PG (ref 26.5–33)
MCHC RBC AUTO-ENTMCNC: 31.5 G/DL (ref 31.5–36.5)
MCV RBC AUTO: 91 FL (ref 78–100)
PLATELET # BLD AUTO: 504 10E3/UL (ref 150–450)
RBC # BLD AUTO: 4.14 10E6/UL (ref 3.8–5.2)
WBC # BLD AUTO: 11.9 10E3/UL (ref 4–11)

## 2024-10-31 PROCEDURE — 36415 COLL VENOUS BLD VENIPUNCTURE: CPT

## 2024-10-31 PROCEDURE — 85027 COMPLETE CBC AUTOMATED: CPT

## 2024-10-31 PROCEDURE — 99214 OFFICE O/P EST MOD 30 MIN: CPT | Mod: 25

## 2024-10-31 PROCEDURE — G0008 ADMIN INFLUENZA VIRUS VAC: HCPCS

## 2024-10-31 PROCEDURE — 80048 BASIC METABOLIC PNL TOTAL CA: CPT

## 2024-10-31 PROCEDURE — 90662 IIV NO PRSV INCREASED AG IM: CPT

## 2024-10-31 RX ORDER — NEOMYCIN SULFATE, POLYMYXIN B SULFATE, AND DEXAMETHASONE 3.5; 10000; 1 MG/G; [USP'U]/G; MG/G
OINTMENT OPHTHALMIC
COMMUNITY
Start: 2024-09-16

## 2024-10-31 ASSESSMENT — PAIN SCALES - GENERAL: PAINLEVEL_OUTOF10: NO PAIN (0)

## 2024-10-31 NOTE — PROGRESS NOTES
Assessment & Plan     Hospital discharge follow-up  Ureterolithiasis  Acute cystitis with hematuria  Patient doing well post discharge, no longer having any nausea, vomiting, urinary symptoms or pain. Last dose of cefdinir is tonight.   Rechecking CBC and BMP per discharge recommendations, will follow-up with any abnormal results.   Follow-up in clinic with any new urinary symptoms, recurrent nausea, vomiting, pain, or fevers.  She is to follow-up with urology- Dr. Yuen for definitive stone management which is not yet scheduled- made sure she has the # to schedule and she will call today.  - Basic metabolic panel  (Ca, Cl, CO2, Creat, Gluc, K, Na, BUN)  - CBC with platelets    Encounter for immunization  - INFLUENZA HIGH DOSE, TRIVALENT, PF (FLUZONE)      32 minutes spent on the date of the encounter doing chart review, history and exam, documentation and further activities per the note.       MED REC REQUIRED  Post Medication Reconciliation Status: discharge medications reconciled, continue medications without change        Subjective   Hamida is a 84 year old, presenting for the following health issues:  Hospital F/U      10/31/2024     7:22 AM   Additional Questions   Roomed by Tracie MITCHELL     [facility]         Hospital Follow-up Visit:    Hospital/Nursing Home/IP Rehab Facility: [facility]  Date of Admission: 10/10/2024  Date of Discharge: 10/12/2024  Reason(s) for Admission: Kidney stones  Was the patient in the ICU or did the patient experience delirium during hospitalization?  No  Do you have any other stressors you would like to discuss with your provider? No    Problems taking medications regularly:  None  Medication changes since discharge: Finished the cefdinir  Problems adhering to non-medication therapy:  None    Summary of hospitalization:  [facility] discharge summary reviewed  Diagnostic Tests/Treatments reviewed.  Follow up needed: urology. CBC, BMP  Other  "Healthcare Providers Involved in Patient s Care:         Specialist appointment - urology  Update since discharge: improved.       Patient here for hospital follow-up  Pt was admitted to [facility] for obstructing right ureteral stone with associated UTI. Discharged 10/12.  Urology consulted, underwent cystoscopy ureteroscopy with right retrograde pyelography and placement of right ureteral stent.  Plan for follow-up with Dr. Villegas for definitive stone treatment in 2 to 3 weeks.  Transitioned to oral cefdinir x 14 day course for UTI    Feeling well post-discharge. No urinary symptoms, no blood in urine, no fevers, no low back pain/flank pain or nausea/vomiting.   Last dose of cefdinir is tonight       Plan of care communicated with patient                   Review of Systems  Constitutional, HEENT, cardiovascular, pulmonary, gi and gu systems are negative, except as otherwise noted.      Objective    /81   Pulse 87   Temp 97.1  F (36.2  C) (Temporal)   Resp 16   Ht 1.61 m (5' 3.39\")   Wt 72.9 kg (160 lb 11.2 oz)   LMP  (LMP Unknown)   SpO2 99%   BMI 28.12 kg/m    Body mass index is 28.12 kg/m .  Physical Exam   GENERAL: alert and no distress  RESP: lungs clear to auscultation - no rales, rhonchi or wheezes  CV: regular rate and rhythm, normal S1 S2, no S3 or S4, no murmur, click or rub  ABDOMEN: soft, nontender, no hepatosplenomegaly, no masses and bowel sounds normal  MS: no gross musculoskeletal defects noted  BACK: no CVA tenderness  PSYCH: mentation appears normal, affect normal/bright            Signed Electronically by: NELY Ocampo CNP    "

## 2024-11-01 LAB
ANION GAP SERPL CALCULATED.3IONS-SCNC: 19 MMOL/L (ref 7–15)
BUN SERPL-MCNC: 12 MG/DL (ref 8–23)
CALCIUM SERPL-MCNC: 9.9 MG/DL (ref 8.8–10.4)
CHLORIDE SERPL-SCNC: 104 MMOL/L (ref 98–107)
CREAT SERPL-MCNC: 1.05 MG/DL (ref 0.51–0.95)
EGFRCR SERPLBLD CKD-EPI 2021: 52 ML/MIN/1.73M2
GLUCOSE SERPL-MCNC: 105 MG/DL (ref 70–99)
HCO3 SERPL-SCNC: 20 MMOL/L (ref 22–29)
POTASSIUM SERPL-SCNC: 4.2 MMOL/L (ref 3.4–5.3)
SODIUM SERPL-SCNC: 143 MMOL/L (ref 135–145)

## 2024-11-04 ENCOUNTER — TELEPHONE (OUTPATIENT)
Dept: FAMILY MEDICINE | Facility: CLINIC | Age: 84
End: 2024-11-04
Payer: COMMERCIAL

## 2024-11-04 NOTE — TELEPHONE ENCOUNTER
----- Message from Valeria Vela sent at 11/4/2024  8:19 AM CST -----  RN team-- please call pt (not active on myChart since 2022), labs still with some minor abnormalities but hemoglobin is back to normal. Would like her to come in for recheck in 2 weeks for lab only appt to make sure white count is trending down. She's also seeing urology on 11/15.     Valeria Vela, DNP

## 2024-11-15 ENCOUNTER — OFFICE VISIT (OUTPATIENT)
Dept: UROLOGY | Facility: CLINIC | Age: 84
End: 2024-11-15
Payer: COMMERCIAL

## 2024-11-15 VITALS
DIASTOLIC BLOOD PRESSURE: 83 MMHG | BODY MASS INDEX: 24.4 KG/M2 | WEIGHT: 161 LBS | HEART RATE: 65 BPM | OXYGEN SATURATION: 97 % | SYSTOLIC BLOOD PRESSURE: 156 MMHG | HEIGHT: 68 IN

## 2024-11-15 DIAGNOSIS — N20.0 KIDNEY STONE: Primary | ICD-10-CM

## 2024-11-15 LAB
ALBUMIN UR-MCNC: 100 MG/DL
APPEARANCE UR: ABNORMAL
BILIRUB UR QL STRIP: NEGATIVE
COLOR UR AUTO: ABNORMAL
GLUCOSE UR STRIP-MCNC: NEGATIVE MG/DL
HGB UR QL STRIP: ABNORMAL
KETONES UR STRIP-MCNC: NEGATIVE MG/DL
LEUKOCYTE ESTERASE UR QL STRIP: ABNORMAL
NITRATE UR QL: NEGATIVE
PH UR STRIP: 7 [PH] (ref 5–7)
SP GR UR STRIP: 1.02 (ref 1–1.03)
UROBILINOGEN UR STRIP-ACNC: 0.2 E.U./DL

## 2024-11-15 PROCEDURE — 81003 URINALYSIS AUTO W/O SCOPE: CPT | Mod: QW | Performed by: STUDENT IN AN ORGANIZED HEALTH CARE EDUCATION/TRAINING PROGRAM

## 2024-11-15 ASSESSMENT — PAIN SCALES - GENERAL: PAINLEVEL_OUTOF10: NO PAIN (0)

## 2024-11-15 NOTE — NURSING NOTE
Chief Complaint   Patient presents with    hx kidney stone     10- ct abd done     Talk about the ct results   And when urinating it stings .  Kyle Dubose, CMA

## 2024-11-15 NOTE — LETTER
"11/15/2024       RE: Hamida Verma  [address]  [facility] [address]     Dear Colleague,    Thank you for referring your patient, Hamida Verma, to the [facility] at [facility]. Please see a copy of my visit note below.    CHIEF COMPLAINT   It was my pleasure to see Hamida Verma who is a 84 year old female for follow-up of ureteral stenting for distal ureteral stone.      HPI:  Hamida Verma is a 84 year old female being seen for post stenting follow-up.  Duration of problem: 1 month  Previous treatments: Cystoscopy and stent placement      Reviewed previous notes from Dr. Philip  Hamida is here for post cystoscopy follow-up and plan for the ureteral stent that was placed about a month ago  She denies any significant issue but occasionally has bladder spasms and hematuria  Denies any fever    Exam:  BP (!) 156/83   Pulse 65   Ht 1.727 m (5' 8\")   Wt 73 kg (161 lb)   LMP  (LMP Unknown)   SpO2 97%   BMI 24.48 kg/m    General: age-appropriate appearing female in NAD sitting in an exam chair  Resp: no respiratory distress  CV: heart rate regular  Abdomen: Degree of obesity is mild. Abdomen is soft and nontender. No organomegaly.   : not performed  Neuro: grossly non focal. Normal reflexes  Motor: excellent strength throughout    Review of Imaging:  The following imaging exams were independently viewed and interpreted by me and discussed with patient:  CT Scan Abd/Pelvis:   Narrative & Impression   EXAM: CT ABDOMEN PELVIS W CONTRAST  LOCATION: [facility]  DATE: 10/10/2024     INDICATION: low abdominal pain with nausea and vomiting  COMPARISON: 5/30/2023  TECHNIQUE: CT scan of the abdomen and pelvis was performed following injection of IV contrast. Multiplanar reformats were obtained. Dose reduction techniques were used.  CONTRAST: 83 mL Isovue   370     FINDINGS:   LOWER CHEST: Pacemaker present.   "   HEPATOBILIARY: Interval cholecystectomy.     PANCREAS: Normal.     SPLEEN: Normal.     ADRENAL GLANDS: Normal.     KIDNEYS/BLADDER: New mild right hydronephrosis/hydroureter secondary to a 4 mm obstructing distal right ureteral stone, within 1 cm of the UVJ. There appears to be an irregular 7 mm stone versus a more likely cluster of residual small stones within right   lower pole calyx. Tiny right upper pole stone unchanged. 6.4 cm right upper pole cyst unchanged and needs no follow-up.     Scarring involving left kidney unchanged with small cortical calcification. No left hydronephrosis. Small benign cysts are stable in need no follow-up. 1.1 cm indeterminate low-attenuation focus left upper pole is unchanged and very likely benign     BOWEL: No obstruction or inflammatory change.     LYMPH NODES: Normal.     VASCULATURE: Moderately heavy atherosclerotic calcifications.     PELVIC ORGANS: Hysterectomy. No pelvic mass or free fluid.     MUSCULOSKELETAL: Interval mild compression superior endplate of L2, age indeterminant but new compared to prior study.                                                                      IMPRESSION:   1.  Right hydronephrosis secondary to a 4 mm distal right ureteral stone.  2.  Additional right-sided kidney stones persist.  3.  Mild compression L2 vertebral body age indeterminant but new compared to prior.  4.  Small indeterminate lesion upper pole right kidney unchanged compared to exam of 17 months ago and very likely to be benign.       Review of Labs:  The following labs were reviewed by me and discussed with the patient:  Component      Latest Ref Rng 11/15/2024  9:55 AM   Color Urine      Colorless, Straw, Light Yellow, Yellow  Red !    Appearance Urine      Clear  Bloody !    Glucose Urine      Negative mg/dL Negative    Bilirubin Urine      Negative  Negative    Ketones Urine      Negative mg/dL Negative    Specific Gravity Urine      1.003 - 1.035  1.020    Blood Urine       Negative  Large !    pH Urine      5.0 - 7.0  7.0    Protein Albumin Urine      Negative mg/dL 100 !    Nitrite Urine      Negative  Negative    Leukocyte Esterase Urine      Negative  Trace !    Urobilinogen Urine      0.2, 1.0 E.U./dL 0.2       Legend:  ! Abnormal    Assessment & Plan    Kidney stone  Multiple kidney stones in the lower pole which seem to be asymptomatic  She did present with an obstructing stone on the distal ureter on the  right and then needed a stent placement  She now is doing well and is here to discuss about further treatment options  Based on the small size of the stone in the distal nature I would probably want to do a CT first to ensure that the stone is still there and has not passed on its own.  If there is no documented evidence of the stone we would consider her for stent removal as the stone burden up in the kidney though moderate seems to be nonobstructing and we can wait and watch those  If she however has persistent stone we can go ahead and plan for ureteroscopy.  I briefly discussed about ureteroscopy and the need for a stent exchange subsequent to that  We will go ahead and treat the kidney stones 2 if he have to do ureteroscopy for her  She expressed understanding and was agreeable to this plan I will update her once her CT results- UA without Microscopic [[id number]]; Future  - UA without Microscopic [[id number]]  - CT Abdomen Pelvis w/o Contrast [[id number]]; Future  - Urine Culture Aerobic Bacterial; Future  - Urine Culture Aerobic Bacterial      Toni Yuen MD  [facility]      ==========================    Additional Billing and Coding Information:  Review of external notes as documented above   Review of the result(s) of each unique test - CT, UA                20 minutes spent by me on the date of the encounter doing chart review, review of test results, interpretation of tests, patient visit, and documentation      ==========================      Again, thank you for allowing me to participate in the care of your patient.      Sincerely,    Toni Yuen MD

## 2024-11-15 NOTE — PROGRESS NOTES
"CHIEF COMPLAINT   It was my pleasure to see Hamida Verma who is a 84 year old female for follow-up of ureteral stenting for distal ureteral stone.      HPI:  Hamida Verma is a 84 year old female being seen for post stenting follow-up.  Duration of problem: 1 month  Previous treatments: Cystoscopy and stent placement      Reviewed previous notes from Dr. Philip  Hamida is here for post cystoscopy follow-up and plan for the ureteral stent that was placed about a month ago  She denies any significant issue but occasionally has bladder spasms and hematuria  Denies any fever    Exam:  BP (!) 156/83   Pulse 65   Ht 1.727 m (5' 8\")   Wt 73 kg (161 lb)   LMP  (LMP Unknown)   SpO2 97%   BMI 24.48 kg/m    General: age-appropriate appearing female in NAD sitting in an exam chair  Resp: no respiratory distress  CV: heart rate regular  Abdomen: Degree of obesity is mild. Abdomen is soft and nontender. No organomegaly.   : not performed  Neuro: grossly non focal. Normal reflexes  Motor: excellent strength throughout    Review of Imaging:  The following imaging exams were independently viewed and interpreted by me and discussed with patient:  CT Scan Abd/Pelvis:   Narrative & Impression   EXAM: CT ABDOMEN PELVIS W CONTRAST  LOCATION: [facility]  DATE: 10/10/2024     INDICATION: low abdominal pain with nausea and vomiting  COMPARISON: 5/30/2023  TECHNIQUE: CT scan of the abdomen and pelvis was performed following injection of IV contrast. Multiplanar reformats were obtained. Dose reduction techniques were used.  CONTRAST: 83 mL Isovue   370     FINDINGS:   LOWER CHEST: Pacemaker present.     HEPATOBILIARY: Interval cholecystectomy.     PANCREAS: Normal.     SPLEEN: Normal.     ADRENAL GLANDS: Normal.     KIDNEYS/BLADDER: New mild right hydronephrosis/hydroureter secondary to a 4 mm obstructing distal right ureteral stone, within 1 cm of the UVJ. There appears to be an irregular 7 mm stone " versus a more likely cluster of residual small stones within right   lower pole calyx. Tiny right upper pole stone unchanged. 6.4 cm right upper pole cyst unchanged and needs no follow-up.     Scarring involving left kidney unchanged with small cortical calcification. No left hydronephrosis. Small benign cysts are stable in need no follow-up. 1.1 cm indeterminate low-attenuation focus left upper pole is unchanged and very likely benign     BOWEL: No obstruction or inflammatory change.     LYMPH NODES: Normal.     VASCULATURE: Moderately heavy atherosclerotic calcifications.     PELVIC ORGANS: Hysterectomy. No pelvic mass or free fluid.     MUSCULOSKELETAL: Interval mild compression superior endplate of L2, age indeterminant but new compared to prior study.                                                                      IMPRESSION:   1.  Right hydronephrosis secondary to a 4 mm distal right ureteral stone.  2.  Additional right-sided kidney stones persist.  3.  Mild compression L2 vertebral body age indeterminant but new compared to prior.  4.  Small indeterminate lesion upper pole right kidney unchanged compared to exam of 17 months ago and very likely to be benign.       Review of Labs:  The following labs were reviewed by me and discussed with the patient:  Component      Latest Ref Rng 11/15/2024  9:55 AM   Color Urine      Colorless, Straw, Light Yellow, Yellow  Red !    Appearance Urine      Clear  Bloody !    Glucose Urine      Negative mg/dL Negative    Bilirubin Urine      Negative  Negative    Ketones Urine      Negative mg/dL Negative    Specific Gravity Urine      1.003 - 1.035  1.020    Blood Urine      Negative  Large !    pH Urine      5.0 - 7.0  7.0    Protein Albumin Urine      Negative mg/dL 100 !    Nitrite Urine      Negative  Negative    Leukocyte Esterase Urine      Negative  Trace !    Urobilinogen Urine      0.2, 1.0 E.U./dL 0.2       Legend:  ! Abnormal    Assessment & Plan     Kidney  stone  Multiple kidney stones in the lower pole which seem to be asymptomatic  She did present with an obstructing stone on the distal ureter on the  right and then needed a stent placement  She now is doing well and is here to discuss about further treatment options  Based on the small size of the stone in the distal nature I would probably want to do a CT first to ensure that the stone is still there and has not passed on its own.  If there is no documented evidence of the stone we would consider her for stent removal as the stone burden up in the kidney though moderate seems to be nonobstructing and we can wait and watch those  If she however has persistent stone we can go ahead and plan for ureteroscopy.  I briefly discussed about ureteroscopy and the need for a stent exchange subsequent to that  We will go ahead and treat the kidney stones 2 if he have to do ureteroscopy for her  She expressed understanding and was agreeable to this plan I will update her once her CT results- UA without Microscopic [[id number]]; Future  - UA without Microscopic [[id number]]  - CT Abdomen Pelvis w/o Contrast [[id number]]; Future  - Urine Culture Aerobic Bacterial; Future  - Urine Culture Aerobic Bacterial      Toni Yuen MD  [facility]      ==========================    Additional Billing and Coding Information:  Review of external notes as documented above   Review of the result(s) of each unique test - CT, UA                20 minutes spent by me on the date of the encounter doing chart review, review of test results, interpretation of tests, patient visit, and documentation     ==========================

## 2024-11-15 NOTE — PATIENT INSTRUCTIONS
CT to be done  IF stone persists then plan for the ureteroscopy  IF she has passed the stone  plan for stent removal

## 2024-11-16 LAB — BACTERIA UR CULT: NORMAL

## 2024-11-19 ENCOUNTER — LAB (OUTPATIENT)
Dept: LAB | Facility: CLINIC | Age: 84
End: 2024-11-19
Payer: COMMERCIAL

## 2024-11-19 DIAGNOSIS — N20.1 URETEROLITHIASIS: ICD-10-CM

## 2024-11-19 LAB
ERYTHROCYTE [DISTWIDTH] IN BLOOD BY AUTOMATED COUNT: 14.8 % (ref 10–15)
HCT VFR BLD AUTO: 36.9 % (ref 35–47)
HGB BLD-MCNC: 11.4 G/DL (ref 11.7–15.7)
MCH RBC QN AUTO: 28.3 PG (ref 26.5–33)
MCHC RBC AUTO-ENTMCNC: 30.9 G/DL (ref 31.5–36.5)
MCV RBC AUTO: 92 FL (ref 78–100)
PLATELET # BLD AUTO: 431 10E3/UL (ref 150–450)
RBC # BLD AUTO: 4.03 10E6/UL (ref 3.8–5.2)
WBC # BLD AUTO: 7.3 10E3/UL (ref 4–11)

## 2024-11-19 PROCEDURE — 85027 COMPLETE CBC AUTOMATED: CPT

## 2024-11-19 PROCEDURE — 36415 COLL VENOUS BLD VENIPUNCTURE: CPT

## 2024-11-25 ENCOUNTER — ANCILLARY PROCEDURE (OUTPATIENT)
Dept: CT IMAGING | Facility: CLINIC | Age: 84
End: 2024-11-25
Attending: STUDENT IN AN ORGANIZED HEALTH CARE EDUCATION/TRAINING PROGRAM
Payer: COMMERCIAL

## 2024-11-25 DIAGNOSIS — N20.0 KIDNEY STONE: ICD-10-CM

## 2024-11-25 PROCEDURE — 74176 CT ABD & PELVIS W/O CONTRAST: CPT

## 2024-12-04 ENCOUNTER — PREP FOR PROCEDURE (OUTPATIENT)
Dept: UROLOGY | Facility: CLINIC | Age: 84
End: 2024-12-04
Payer: COMMERCIAL

## 2024-12-04 DIAGNOSIS — N20.2 RENAL AND URETERIC CALCULUS: Primary | ICD-10-CM

## 2024-12-04 RX ORDER — CEFAZOLIN SODIUM 2 G/50ML
2 SOLUTION INTRAVENOUS SEE ADMIN INSTRUCTIONS
OUTPATIENT
Start: 2024-12-04

## 2024-12-04 RX ORDER — ACETAMINOPHEN 650 MG/1
650 SUPPOSITORY RECTAL ONCE
OUTPATIENT
Start: 2024-12-04

## 2024-12-04 RX ORDER — ACETAMINOPHEN 325 MG/1
975 TABLET ORAL ONCE
OUTPATIENT
Start: 2024-12-04 | End: 2024-12-04

## 2024-12-04 RX ORDER — CEFAZOLIN SODIUM 2 G/50ML
2 SOLUTION INTRAVENOUS
OUTPATIENT
Start: 2024-12-04

## 2024-12-12 ENCOUNTER — TELEPHONE (OUTPATIENT)
Dept: UROLOGY | Facility: CLINIC | Age: 84
End: 2024-12-12
Payer: COMMERCIAL

## 2024-12-17 DIAGNOSIS — E78.5 HYPERLIPIDEMIA LDL GOAL <70: ICD-10-CM

## 2024-12-17 RX ORDER — ROSUVASTATIN CALCIUM 40 MG/1
40 TABLET, COATED ORAL DAILY
Qty: 90 TABLET | Refills: 3 | Status: SHIPPED | OUTPATIENT
Start: 2024-12-17

## 2025-01-02 ENCOUNTER — TELEPHONE (OUTPATIENT)
Dept: CARDIOLOGY | Facility: CLINIC | Age: 85
End: 2025-01-02
Payer: COMMERCIAL

## 2025-01-02 NOTE — TELEPHONE ENCOUNTER
Patient is needing pre-op done, not necessarily cardiac clearance.  RN did advise that a pre-op physical would need to be done by her primary care office.  Patient verbalized understanding.  Bing Boothe RN on 1/2/2025 at 12:41 PM

## 2025-01-02 NOTE — TELEPHONE ENCOUNTER
[facility] Call Center    Phone Message    May a detailed message be left on voicemail: yes     Reason for Call: Other: Patient is seeing Sandrine More 1/7/25. She is asking if Sandrine could also do a cardiac clearance with this appt for upcoming surgery 1/17/25. Please call her back to discuss.      Action Taken: Other: cardiology    Travel Screening: Not Applicable  Thank you!  [facility]       Date of Service:

## 2025-01-07 ENCOUNTER — OFFICE VISIT (OUTPATIENT)
Dept: CARDIOLOGY | Facility: CLINIC | Age: 85
End: 2025-01-07
Attending: INTERNAL MEDICINE
Payer: COMMERCIAL

## 2025-01-07 VITALS
HEIGHT: 63 IN | DIASTOLIC BLOOD PRESSURE: 72 MMHG | WEIGHT: 158.2 LBS | OXYGEN SATURATION: 96 % | RESPIRATION RATE: 16 BRPM | BODY MASS INDEX: 28.03 KG/M2 | HEART RATE: 68 BPM | SYSTOLIC BLOOD PRESSURE: 136 MMHG

## 2025-01-07 DIAGNOSIS — I49.5 SSS (SICK SINUS SYNDROME) (H): ICD-10-CM

## 2025-01-07 DIAGNOSIS — I25.10 CORONARY ARTERY DISEASE INVOLVING NATIVE CORONARY ARTERY OF NATIVE HEART WITHOUT ANGINA PECTORIS: ICD-10-CM

## 2025-01-07 DIAGNOSIS — I48.0 PAF (PAROXYSMAL ATRIAL FIBRILLATION) (H): ICD-10-CM

## 2025-01-07 DIAGNOSIS — Z95.0 CARDIAC PACEMAKER IN SITU: ICD-10-CM

## 2025-01-07 DIAGNOSIS — I21.4 NSTEMI (NON-ST ELEVATED MYOCARDIAL INFARCTION) (H): ICD-10-CM

## 2025-01-07 DIAGNOSIS — E78.5 HYPERLIPIDEMIA LDL GOAL <70: Primary | ICD-10-CM

## 2025-01-07 DIAGNOSIS — I50.32 CHRONIC DIASTOLIC CHF (CONGESTIVE HEART FAILURE) (H): ICD-10-CM

## 2025-01-07 NOTE — LETTER
2025    Mikala Philip MD, MD  [address] [place] [address]    RE: Hamida Verma       Dear Colleague,     I had the [facility] of seeing Ham[facility] in [facility].    Cardiology Clinic Progress Note    Service Date: 2025     Primary Cardiologist: Dr. Mauri Chase      Reason for Visit: CAD, ICM f/u    HPI:   Hamida Verma delightful 84-year-old woman with past medical history significant for the followin.  A-fib with initially difficult to control rate finally ended up with AV node ablation and permanent pacemaker on anticoagulation  2.  Hypertension with orthostatic hypotension  3.  Heart failure with mildly reduced ejection fraction about 45% variable between that and normal thought to be secondary to RV pacing  4.  Coronary artery disease was initially the patient having just coronary calcifications but unfortunately suffering 2 NSTEMI's in 2023 the  where she had a drug-eluting stent to the proximal to mid circumflex for 90% lesion at that time her RCA was just 50% stenosed and she had just 25% ostial proximal LAD.  Unfortunately she presented with repeat pain  and had drug-eluting stent to the mid to distal RCA and had a patent stent to the circumflex.  5.  Strong family history of coronary disease with 1 son passing away at the age of 57 from an MI and her other son have been a STEMI at the age of 57  6.  Underlying lung disease with emphysema showing on CT.  7.  Kidney stone with stent placed in 10/24 with plan for lithotripsy and stent replacement upcoming.    I initially met Ms. Ku when she presented with progressive shortness of breath so much that she canceled dinner at her favorite restaurant of [employer].  I eventually got her pacemaker put in and she has had stents since with her presenting symptom being right shoulder pain for angina.    Comes in today for routine follow-up.  She continues to have  "intermittent back pain and hematuria secondary to her kidney stone.  She denies chest pain, shortness of breath, orthopnea, PND.  She can climb the stairs and get short of breath but can continue walking without stopping.  From a cardiac standpoint she feels stable.  She has not checked recent blood pressures.  She did well with anesthesia with her previous renal stent placement.    Social History:  No significant EtOH former smoker with a 67-pack-year history.  She is .  She lives in her own home with her adult daughter who has Lewy body dementia and is 63 and her granddaughter who is 33 and is trained as an EMT who helps take care of both of them.    Physical Exam:  /72   Pulse 68   Resp 16   Ht 1.6 m (5' 3\")   Wt 71.8 kg (158 lb 3.2 oz)   LMP  (LMP Unknown)   SpO2 96%   BMI 28.02 kg/m     Well-developed well-nourished woman in no acute distress.  Normocephalic atraumatic.  Heart is regularly irregular, I do not appreciate murmur rub or gallop.  Lungs are clear without wheezes rales or rhonchi extremities without peripheral edema skin is warm and dry.    Data reviewed:  Echocardiogram 7/24 shows EF 53% mild TR, mild hypokinesis of apical septum.    Last lipid panel 7/24 shows LDL 66 HDL 71 total cholesterol 155    Assessment and Plan:  1.  Coronary disease with hx of  NSTEMI and drug-eluting stents to the RCA and circumflex in 2 separate procedures.  Her last stent was placed 12/21/2024 so she is not here out for antiplatelet.  Given she is also on Xarelto for her A-fib it is reasonable to discontinue Plavix at this time.  Her anginal equivalent is right shoulder pain and she denies at this time.    2.  Hypertension with orthostatic hypotension, reasonable blood pressures at this time.    3.  HFpEF, chronic, EF 50 to 55%, no symptoms of heart failure and euvolemic we will continue current medications.  She has not been on SGLT2 inhibitor which I do not want to start out given her renal stones " nor spironolactone.    4.  A-fib with difficult to control rates with AV node ablation and pacemaker in place appropriately on Xarelto.  She has been asked to hold her Xarelto for upcoming lithotripsy and replacement of ureteral stent, this is reasonable, she has had a slightly increased risk of CVA during these days but also increased risk of bleeding.  She should restart this as soon as urology is agreeable.    5.  Dyslipidemia, well-controlled we will continue full dose Crestor.    6.  Preoperative discussion, patient has a low normal EF, is euvolemic and has no anginal symptoms.  She can easily do 4 METS of activity.  I do not recommend any additional testing before her procedure.  Using the revised cardiac risk index she has risk factors of coronary disease and heart failure this would give her about a 2.4% risk of cardiac death nonfatal MI or cardiac arrest in the 3.6% chance of MI, pulmonary edema, VF cardiac arrest or heart block.  Do note she has a permanent pacemaker in place, typical surgical protocol should be used.  This may be overestimating as her heart failure has been well-controlled for quite some time.  She will need close assessment of volume status and should continue her statin and beta-blocker in the perioperative period.  Okay to hold Xarelto as discussed above.    7.  Permanent pacemaker in place, device check today showed 99% V paced with 3.7 years battery left known underlying A-fib.    Thank you for allowing me to participate in this delightful patient's care.  She can follow-up in 6 months with lipid panel BMP and visit with Dr. Mauri Chase.  Sooner with concerns.    Sandrine Glover PA-C  [facility]     This note was written using Dragon voice recognition system, please excuse any misspelled names, or nonsensical words and call with any questions.        Orders this visit:  Orders Placed This Encounter   Procedures     Lipid Profile     ALT     Basic metabolic panel     N  terminal pro BNP outpatient     Follow-Up with Cardiology     No orders of the defined types were placed in this encounter.    Medications Discontinued During This Encounter   Medication Reason     clopidogrel (PLAVIX) 75 MG tablet        Encounter Diagnoses   Name Primary?     Chronic diastolic CHF (congestive heart failure) (H)      Cardiac pacemaker in situ      PAF (paroxysmal atrial fibrillation) (H)      SSS (sick sinus syndrome) (H)      NSTEMI (non-ST elevated myocardial infarction) (H)      Coronary artery disease involving native coronary artery of native heart without angina pectoris      Hyperlipidemia LDL goal <70 Yes         Current Medications:  Current Outpatient Medications   Medication Sig Dispense Refill     acetaminophen (TYLENOL) 500 MG tablet Take 500-1,000 mg by mouth every 6 hours as needed for mild pain       albuterol (PROAIR HFA/PROVENTIL HFA/VENTOLIN HFA) 108 (90 Base) MCG/ACT inhaler Inhale 2 puffs into the lungs every 6 hours as needed       carvedilol (COREG) 12.5 MG tablet Take 1 tablet (12.5 mg) by mouth 2 times daily (with meals) 180 tablet 3     cholecalciferol (VITAMIN  -D) 1000 UNIT capsule Take 1 capsule by mouth daily.       FLAX SEED OIL OR 1 capsule daily       furosemide (LASIX) 20 MG tablet Take 1 tablet (20 mg) by mouth daily 90 tablet 3     Multiple Vitamins-Minerals (HAIR SKIN NAILS PO) Take 1 tablet by mouth At Bedtime       Multiple Vitamins-Minerals (ICAPS AREDS 2 PO) Take 1 tablet by mouth 2 times daily       neomycin-polymyxin-dexAMETHasone (MAXITROL) 3.5-40779-7.1 ophthalmic ointment APPLY TO LID TWO TIMES DAILY AS NEEDED       nitroGLYcerin (NITROSTAT) 0.4 MG sublingual tablet For chest pain place 1 tablet under the tongue every 5 minutes for 3 doses. If symptoms persist 5 minutes after 1st dose call 911. 25 tablet 11     potassium chloride ER (KLOR-CON M) 10 MEQ CR tablet Take 1 tablet (10 mEq) by mouth daily 90 tablet 3     rivaroxaban ANTICOAGULANT (XARELTO) 15  MG TABS tablet Take 1 tablet (15 mg) by mouth daily (with dinner) 90 tablet 3     rosuvastatin (CRESTOR) 40 MG tablet Take 1 tablet (40 mg) by mouth daily. 90 tablet 3     sacubitril-valsartan (ENTRESTO) 49-51 MG per tablet Take 1 tablet by mouth 2 times daily 180 tablet 3     tiotropium (SPIRIVA) 18 MCG inhaled capsule Inhale 1 capsule (18 mcg) into the lungs daily 90 capsule 3     tamsulosin (FLOMAX) 0.4 MG capsule Take 1 capsule (0.4 mg) by mouth daily. (Patient not taking: Reported on 1/7/2025) 30 capsule 2       Allergies Reviewed and Updated:  Allergies   Allergen Reactions     Strawberries [Strawberry Extract] Anaphylaxis     Strawberry Flavoring Agent (Non-Screening)        Review of Systems:  Skin:  Negative     Eyes:  Positive for glasses  ENT:  Positive for hearing loss  Respiratory:  Negative shortness of breath  Cardiovascular:  Negative, chest pain, edema, palpitations, lightheadedness, dizziness    Gastroenterology:      Genitourinary:  Positive for    Musculoskeletal:  not assessed    Neurologic:  not assessed    Psychiatric:  Negative    Heme/Lymph/Imm:  Positive for allergies  Endocrine:  Negative       Pertinent Past Medical, Surgical and Family History Reviewed and noted above.     CC  Ashley Espitia, APRN CNP  [address]  [place] [address]      Thank you for allowing me to participate in the care of your patient.      Sincerely,     Sandrine Glover PA-C     [facility]  cc:   Emiliana Hastings MD  [address]  [place] [id number]

## 2025-01-07 NOTE — PATIENT INSTRUCTIONS
Call C[facility] nurse for any questions or concerns Mon-Fri 8am-4pm:                                                #[phone]                                       For concerns after hours:                                               #[phone]     1: Medication changes: Stop taking plavix/ clopidogrel.    It's ok to hold Xarelto for 3 days prior to the surgery, please restart as soon as the surgeon is ok with it.      Continue other medications.      2: Plan from today: Please get labs and have a visit with Dr. Mauri Chase in July.  Please call sooner with concerns.      Your pacemaker looks good.

## 2025-01-07 NOTE — PROGRESS NOTES
Cardiology Clinic Progress Note    Service Date: 2025     Primary Cardiologist: Dr. Mauri Chase      Reason for Visit: CAD, ICM f/u    HPI:   Hamida Verma delightful 84-year-old woman with past medical history significant for the followin.  A-fib with initially difficult to control rate finally ended up with AV node ablation and permanent pacemaker on anticoagulation  2.  Hypertension with orthostatic hypotension  3.  Heart failure with mildly reduced ejection fraction about 45% variable between that and normal thought to be secondary to RV pacing  4.  Coronary artery disease was initially the patient having just coronary calcifications but unfortunately suffering 2 NSTEMI's in 2023 the  where she had a drug-eluting stent to the proximal to mid circumflex for 90% lesion at that time her RCA was just 50% stenosed and she had just 25% ostial proximal LAD.  Unfortunately she presented with repeat pain  and had drug-eluting stent to the mid to distal RCA and had a patent stent to the circumflex.  5.  Strong family history of coronary disease with 1 son passing away at the age of 57 from an MI and her other son have been a STEMI at the age of 57  6.  Underlying lung disease with emphysema showing on CT.  7.  Kidney stone with stent placed in 10/24 with plan for lithotripsy and stent replacement upcoming.    I initially met Ms. Ku when she presented with progressive shortness of breath so much that she canceled dinner at her favorite restaurant of [employer].  I eventually got her pacemaker put in and she has had stents since with her presenting symptom being right shoulder pain for angina.    Comes in today for routine follow-up.  She continues to have intermittent back pain and hematuria secondary to her kidney stone.  She denies chest pain, shortness of breath, orthopnea, PND.  She can climb the stairs and get short of breath but can continue walking without stopping.   "From a cardiac standpoint she feels stable.  She has not checked recent blood pressures.  She did well with anesthesia with her previous renal stent placement.    Social History:  No significant EtOH former smoker with a 67-pack-year history.  She is .  She lives in her own home with her adult daughter who has Lewy body dementia and is 63 and her granddaughter who is 33 and is trained as an EMT who helps take care of both of them.    Physical Exam:  /72   Pulse 68   Resp 16   Ht 1.6 m (5' 3\")   Wt 71.8 kg (158 lb 3.2 oz)   LMP  (LMP Unknown)   SpO2 96%   BMI 28.02 kg/m     Well-developed well-nourished woman in no acute distress.  Normocephalic atraumatic.  Heart is regularly irregular, I do not appreciate murmur rub or gallop.  Lungs are clear without wheezes rales or rhonchi extremities without peripheral edema skin is warm and dry.    Data reviewed:  Echocardiogram 7/24 shows EF 53% mild TR, mild hypokinesis of apical septum.    Last lipid panel 7/24 shows LDL 66 HDL 71 total cholesterol 155    Assessment and Plan:  1.  Coronary disease with hx of  NSTEMI and drug-eluting stents to the RCA and circumflex in 2 separate procedures.  Her last stent was placed 12/21/2024 so she is not here out for antiplatelet.  Given she is also on Xarelto for her A-fib it is reasonable to discontinue Plavix at this time.  Her anginal equivalent is right shoulder pain and she denies at this time.    2.  Hypertension with orthostatic hypotension, reasonable blood pressures at this time.    3.  HFpEF, chronic, EF 50 to 55%, no symptoms of heart failure and euvolemic we will continue current medications.  She has not been on SGLT2 inhibitor which I do not want to start out given her renal stones nor spironolactone.    4.  A-fib with difficult to control rates with AV node ablation and pacemaker in place appropriately on Xarelto.  She has been asked to hold her Xarelto for upcoming lithotripsy and replacement of " ureteral stent, this is reasonable, she has had a slightly increased risk of CVA during these days but also increased risk of bleeding.  She should restart this as soon as urology is agreeable.    5.  Dyslipidemia, well-controlled we will continue full dose Crestor.    6.  Preoperative discussion, patient has a low normal EF, is euvolemic and has no anginal symptoms.  She can easily do 4 METS of activity.  I do not recommend any additional testing before her procedure.  Using the revised cardiac risk index she has risk factors of coronary disease and heart failure this would give her about a 2.4% risk of cardiac death nonfatal MI or cardiac arrest in the 3.6% chance of MI, pulmonary edema, VF cardiac arrest or heart block.  Do note she has a permanent pacemaker in place, typical surgical protocol should be used.  This may be overestimating as her heart failure has been well-controlled for quite some time.  She will need close assessment of volume status and should continue her statin and beta-blocker in the perioperative period.  Okay to hold Xarelto as discussed above.    7.  Permanent pacemaker in place, device check today showed 99% V paced with 3.7 years battery left known underlying A-fib.    Thank you for allowing me to participate in this delightful patient's care.  She can follow-up in 6 months with lipid panel BMP and visit with Dr. Mauri Chase.  Sooner with concerns.    Sandrine Glover PA-C  [facility] Cardiology     This note was written using Dragon voice recognition system, please excuse any misspelled names, or nonsensical words and call with any questions.        Orders this visit:  Orders Placed This Encounter   Procedures    Lipid Profile    ALT    Basic metabolic panel    N terminal pro BNP outpatient    Follow-Up with Cardiology     No orders of the defined types were placed in this encounter.    Medications Discontinued During This Encounter   Medication Reason    clopidogrel (PLAVIX) 75 MG  tablet        Encounter Diagnoses   Name Primary?    Chronic diastolic CHF (congestive heart failure) (H)     Cardiac pacemaker in situ     PAF (paroxysmal atrial fibrillation) (H)     SSS (sick sinus syndrome) (H)     NSTEMI (non-ST elevated myocardial infarction) (H)     Coronary artery disease involving native coronary artery of native heart without angina pectoris     Hyperlipidemia LDL goal <70 Yes         Current Medications:  Current Outpatient Medications   Medication Sig Dispense Refill    acetaminophen (TYLENOL) 500 MG tablet Take 500-1,000 mg by mouth every 6 hours as needed for mild pain      albuterol (PROAIR HFA/PROVENTIL HFA/VENTOLIN HFA) 108 (90 Base) MCG/ACT inhaler Inhale 2 puffs into the lungs every 6 hours as needed      carvedilol (COREG) 12.5 MG tablet Take 1 tablet (12.5 mg) by mouth 2 times daily (with meals) 180 tablet 3    cholecalciferol (VITAMIN  -D) 1000 UNIT capsule Take 1 capsule by mouth daily.      FLAX SEED OIL OR 1 capsule daily      furosemide (LASIX) 20 MG tablet Take 1 tablet (20 mg) by mouth daily 90 tablet 3    Multiple Vitamins-Minerals (HAIR SKIN NAILS PO) Take 1 tablet by mouth At Bedtime      Multiple Vitamins-Minerals (ICAPS AREDS 2 PO) Take 1 tablet by mouth 2 times daily      neomycin-polymyxin-dexAMETHasone (MAXITROL) 3.5-63296-5.1 ophthalmic ointment APPLY TO LID TWO TIMES DAILY AS NEEDED      nitroGLYcerin (NITROSTAT) 0.4 MG sublingual tablet For chest pain place 1 tablet under the tongue every 5 minutes for 3 doses. If symptoms persist 5 minutes after 1st dose call 911. 25 tablet 11    potassium chloride ER (KLOR-CON M) 10 MEQ CR tablet Take 1 tablet (10 mEq) by mouth daily 90 tablet 3    rivaroxaban ANTICOAGULANT (XARELTO) 15 MG TABS tablet Take 1 tablet (15 mg) by mouth daily (with dinner) 90 tablet 3    rosuvastatin (CRESTOR) 40 MG tablet Take 1 tablet (40 mg) by mouth daily. 90 tablet 3    sacubitril-valsartan (ENTRESTO) 49-51 MG per tablet Take 1 tablet by  mouth 2 times daily 180 tablet 3    tiotropium (SPIRIVA) 18 MCG inhaled capsule Inhale 1 capsule (18 mcg) into the lungs daily 90 capsule 3    tamsulosin (FLOMAX) 0.4 MG capsule Take 1 capsule (0.4 mg) by mouth daily. (Patient not taking: Reported on 1/7/2025) 30 capsule 2       Allergies Reviewed and Updated:  Allergies   Allergen Reactions    Strawberries [Strawberry Extract] Anaphylaxis    Strawberry Flavoring Agent (Non-Screening)        Review of Systems:  Skin:  Negative     Eyes:  Positive for glasses  ENT:  Positive for hearing loss  Respiratory:  Negative shortness of breath  Cardiovascular:  Negative, chest pain, edema, palpitations, lightheadedness, dizziness    Gastroenterology:      Genitourinary:  Positive for    Musculoskeletal:  not assessed    Neurologic:  not assessed    Psychiatric:  Negative    Heme/Lymph/Imm:  Positive for allergies  Endocrine:  Negative       Pertinent Past Medical, Surgical and Family History Reviewed and noted above.     CC  Ashley Espitia APRN CNP  [address]  [place] [address]

## 2025-01-13 ENCOUNTER — OFFICE VISIT (OUTPATIENT)
Dept: FAMILY MEDICINE | Facility: CLINIC | Age: 85
End: 2025-01-13
Payer: COMMERCIAL

## 2025-01-13 VITALS
WEIGHT: 161.6 LBS | DIASTOLIC BLOOD PRESSURE: 79 MMHG | HEIGHT: 63 IN | SYSTOLIC BLOOD PRESSURE: 129 MMHG | BODY MASS INDEX: 28.63 KG/M2 | TEMPERATURE: 97.5 F | OXYGEN SATURATION: 98 % | HEART RATE: 66 BPM | RESPIRATION RATE: 16 BRPM

## 2025-01-13 DIAGNOSIS — Z01.818 PREOP GENERAL PHYSICAL EXAM: Primary | ICD-10-CM

## 2025-01-13 DIAGNOSIS — I25.10 CORONARY ARTERY DISEASE INVOLVING NATIVE CORONARY ARTERY OF NATIVE HEART WITHOUT ANGINA PECTORIS: ICD-10-CM

## 2025-01-13 DIAGNOSIS — Z95.0 CARDIAC PACEMAKER IN SITU: ICD-10-CM

## 2025-01-13 DIAGNOSIS — I50.32 CHRONIC DIASTOLIC CHF (CONGESTIVE HEART FAILURE) (H): ICD-10-CM

## 2025-01-13 DIAGNOSIS — N20.1 URETEROLITHIASIS: ICD-10-CM

## 2025-01-13 LAB
ANION GAP SERPL CALCULATED.3IONS-SCNC: 9 MMOL/L (ref 7–15)
BUN SERPL-MCNC: 12.7 MG/DL (ref 8–23)
CALCIUM SERPL-MCNC: 9.7 MG/DL (ref 8.8–10.4)
CHLORIDE SERPL-SCNC: 104 MMOL/L (ref 98–107)
CREAT SERPL-MCNC: 0.92 MG/DL (ref 0.51–0.95)
EGFRCR SERPLBLD CKD-EPI 2021: 61 ML/MIN/1.73M2
GLUCOSE SERPL-MCNC: 117 MG/DL (ref 70–99)
HCO3 SERPL-SCNC: 27 MMOL/L (ref 22–29)
POTASSIUM SERPL-SCNC: 4.3 MMOL/L (ref 3.4–5.3)
SODIUM SERPL-SCNC: 140 MMOL/L (ref 135–145)

## 2025-01-13 PROCEDURE — 80048 BASIC METABOLIC PNL TOTAL CA: CPT | Performed by: NURSE PRACTITIONER

## 2025-01-13 PROCEDURE — 99214 OFFICE O/P EST MOD 30 MIN: CPT | Performed by: NURSE PRACTITIONER

## 2025-01-13 PROCEDURE — 36415 COLL VENOUS BLD VENIPUNCTURE: CPT | Performed by: NURSE PRACTITIONER

## 2025-01-13 ASSESSMENT — PAIN SCALES - GENERAL: PAINLEVEL_OUTOF10: NO PAIN (0)

## 2025-01-13 NOTE — PATIENT INSTRUCTIONS
How to Take Your Medication Before Surgery  Preoperative Medication Instructions   Antiplatelet or Anticoagulation Medication Instructions   - Patient is on no antiplatelet or anticoagulation medications.    Additional Medication Instructions  Only take carvedilol the morning of procedure          Patient Education   Preparing for Your Surgery  For Adults  Getting started  In most cases, a nurse will call to review your health history and instructions. They will give you an arrival time based on your scheduled surgery time. Please be ready to share:  Your doctor's clinic name and phone number  Your medical, surgical, and anesthesia history  A list of allergies and sensitivities  A list of medicines, including herbal treatments and over-the-counter drugs  Whether the patient has a legal guardian (ask how to send us the papers in advance)  Note: You may not receive a call if you were seen at our PAC (Preoperative Assessment Center).  Please tell us if you're pregnant--or if there's any chance you might be pregnant. Some surgeries may injure a fetus (unborn baby), so they require a pregnancy test. Surgeries that are safe for a fetus don't always need a test, and you can choose whether to have one.   Preparing for surgery  Within 10 to 30 days of surgery: Have a pre-op exam (sometimes called an H&P, or History and Physical). This can be done at a clinic or pre-operative center.  If you're having a , you may not need this exam. Talk to your care team.  At your pre-op exam, talk to your care team about all medicines you take. (This includes CBD oil and any drugs, such as THC, marijuana, and other forms of cannabis.) If you need to stop any medicine before surgery, ask when to start taking it again.  This is for your safety. Many medicines and drugs can make you bleed too much during surgery. Some change how well surgery (anesthesia) drugs work.  Call your insurance company to let them know you're having surgery.  (If you don't have insurance, call [phone].)  Call your clinic if there's any change in your health. This includes a scrape or scratch near the surgery site, or any signs of a cold (sore throat, runny nose, cough, rash, fever).  Eating and drinking guidelines  For your safety: Unless your surgeon tells you otherwise, follow the guidelines below.  Eat and drink as normal until 8 hours before you arrive for surgery. After that, no food or milk. You can spit out gum when you arrive.  Drink clear liquids until 2 hours before you arrive. These are liquids you can see through, like water, Gatorade, and Propel Water. They also include plain black coffee and tea (no cream or milk).  No alcohol for 24 hours before you arrive. The night before surgery, stop any drinks that contain THC.  If your care team tells you to take medicine on the morning of surgery, it's okay to take it with a sip of water. No other medicines or drugs are allowed (including CBD oil)--follow your care team's instructions.  If you have questions the day of surgery, call your hospital or surgery center.   Preventing infection  Shower or bathe the night before and the morning of surgery. Follow the instructions your clinic gave you. (If no instructions, use regular soap.)  Don't shave or clip hair near your surgery site. We'll remove the hair if needed.  Don't smoke or vape the morning of surgery. No chewing tobacco for 6 hours before you arrive. A nicotine patch is okay. You may spit out nicotine gum when you arrive.  For some surgeries, the surgeon will tell you to fully quit smoking and nicotine.  We will make every effort to keep you safe from infection. We will:  Clean our hands often with soap and water (or an alcohol-based hand rub).  Clean the skin at your surgery site with a special soap that kills germs.  Give you a special gown to keep you warm. (Cold raises the risk of infection.)  Wear hair covers, masks, gowns, and gloves during  surgery.  Give antibiotic medicine, if prescribed. Not all surgeries need this medicine.  What to bring on the day of surgery  Photo ID and insurance card  Copy of your health care directive, if you have one  Glasses and hearing aids (bring cases)  You can't wear contacts during surgery  Inhaler and eye drops, if you use them (tell us about these when you arrive)  CPAP machine or breathing device, if you use them  A few personal items, if spending the night  If you have . . .  A pacemaker, ICD (cardiac defibrillator), or other implant: Bring the ID card.  An implanted stimulator: Bring the remote control.  A legal guardian: Bring a copy of the certified (court-stamped) guardianship papers.  Please remove any jewelry, including body piercings. Leave jewelry and other valuables at home.  If you're going home the day of surgery  You must have a responsible adult drive you home. They should stay with you overnight as well.  If you don't have someone to stay with you, and you aren't safe to go home alone, we may keep you overnight. Insurance often won't pay for this.  After surgery  If it's hard to control your pain or you need more pain medicine, please call your surgeon's office.  Questions?   If you have any questions for your care team, list them here:   ____________________________________________________________________________________________________________________________________________________________________________________________________________________________________________________________  For informational purposes only. Not to replace the advice of your health care provider. Copyright   2003, 2019 [facility]. All rights reserved. Clinically reviewed by Theron Zelaya MD. [facility] [id number] - REV 08/24.

## 2025-01-13 NOTE — PROGRESS NOTES
Preoperative Evaluation  [facility]  [address] [facility], [address]  [facility] [address]  Phone: [phone]  Primary Provider: Mikala Philip MD, MD  Pre-op Performing Provider: NELY Hurst CNP  Jan 13, 2025 1/13/2025   Surgical Information   What procedure is being done? CYSTOURETEROSCOPY, WITH RETROGRADE PYELOGRAM, HOLMIUM LASER LITHOTRIPSY OF URETERAL and renal  CALCULUS, AND STENT INSERTION possible removal    Facility or Hospital where procedure/surgery will be performed: [facility]    Who is doing the procedure / surgery? Toni Yuen MD    Date of surgery / procedure: 1/17/25    Time of surgery / procedure: 12 PM    Where do you plan to recover after surgery? at home with family        Proxy-reported     Fax number for surgical facility: Note does not need to be faxed, will be available electronically in Epic.    Assessment & Plan     The proposed surgical procedure is considered INTERMEDIATE risk.    Preop general physical exam  Ok for procedure. No concerns   - Basic metabolic panel  (Ca, Cl, CO2, Creat, Gluc, K, Na, BUN); Future    Ureterolithiasis  Plan procedure   - Basic metabolic panel  (Ca, Cl, CO2, Creat, Gluc, K, Na, BUN); Future    Chronic diastolic CHF (congestive heart failure) (H)  Stable with low normal EF. Works with cardiology     Cardiac pacemaker in situ  Stable. Paced rhythm.     Coronary artery disease involving native coronary artery of native heart without angina pectoris  Stable        Implanted Device   - Type of device: pacer. Patient advised to bring device information on day of surgery.       - No identified additional risk factors other than previously addressed    Preoperative Medication Instructions  Antiplatelet or Anticoagulation Medication Instructions   - Patient is on no antiplatelet or anticoagulation medications.    Additional Medication Instructions  Only take carvedilol the morning of  procedure     Recommendation  Approval given to proceed with proposed procedure, without further diagnostic evaluation.    Monisha Mei is a 84 year old, presenting for the following:  Pre-Op Exam          1/13/2025     8:34 AM   Additional Questions   Roomed by LETHA Rao related to upcoming procedure:   Going for kidney stone removal   Feeling well   Had a recent URI but is well now.   No CP  Gets slightly SOB with stairs that comes and goes. She goes up and down the stairs a lot at home as the bathroom is upstairs. Daughter always tells her she holds her breath when she walks upstairs. She does follow closely with cardiology and has been stable           1/13/2025   Pre-Op Questionnaire   Have you ever had a heart attack or stroke? (!) YES  NSTEMI    Have you ever had surgery on your heart or blood vessels, such as a stent placement, a coronary artery bypass, or surgery on an artery in your head, neck, heart, or legs? (!) YES  Stent x2 12/2023. pacer   Do you have chest pain with activity? No    Do you have a history of heart failure? No    Do you currently have a cold, bronchitis or symptoms of other infection? No. Just resolved    Do you have a cough, shortness of breath, or wheezing? No. Just resolved    Do you or anyone in your family have previous history of blood clots? No    Do you or does anyone in your family have a serious bleeding problem such as prolonged bleeding following surgeries or cuts? No    Have you ever had problems with anemia or been told to take iron pills? No    Have you had any abnormal blood loss such as black, tarry or bloody stools, or abnormal vaginal bleeding? Yes. Hematuria from the stone    Have you ever had a blood transfusion? No    Are you willing to have a blood transfusion if it is medically needed before, during, or after your surgery? Yes    Have you or any of your relatives ever had problems with anesthesia? No    Do you have sleep apnea, excessive snoring or  daytime drowsiness? No    Do you have any artifical heart valves or other implanted medical devices like a pacemaker, defibrillator, or continuous glucose monitor? (!) YES    What type of device do you have? pace maker    Name of the clinic that manages your device [facility]    Do you have artificial joints? No    Are you allergic to latex? No        Proxy-reported     Health Care Directive  Patient does not have a Health Care Directive:     Preoperative Review of    reviewed - no record of controlled substances prescribed.      Status of Chronic Conditions:  See problem list for active medical problems.  Problems all longstanding and stable, except as noted/documented.  See ROS for pertinent symptoms related to these conditions.    Patient Active Problem List    Diagnosis Date Noted    Acute cystitis with hematuria 10/11/2024     Priority: Medium    Ureterolithiasis 10/10/2024     Priority: Medium    Ureteral colic 10/10/2024     Priority: Medium    Nausea and vomiting, unspecified vomiting type 10/10/2024     Priority: Medium    Bilateral sensorineural hearing loss 07/30/2024     Priority: Medium    NSTEMI (non-ST elevated myocardial infarction) (H) 12/06/2023     Priority: Medium    Kidney cysts 05/31/2023     Priority: Medium    Chronic obstructive pulmonary disease, unspecified COPD type (H) 10/08/2019     Priority: Medium    CKD (chronic kidney disease) stage 3, GFR 30-59 ml/min (H) 06/04/2019     Priority: Medium    Hypotension 01/05/2019     Priority: Medium    Coronary artery calcification seen on CT scan 10/25/2018     Priority: Medium    SSS (sick sinus syndrome) (H)      Priority: Medium     s/p PPM 3/2018      Chronic diastolic CHF (congestive heart failure) (H)      Priority: Medium    Pulmonary hypertension (H)      Priority: Medium    S/P cardiac pacemaker procedure 10/17/2018     Priority: Medium    PAF (paroxysmal atrial fibrillation) (H) 03/06/2018     Priority: Medium    Obesity 11/25/2014      Priority: Medium    Knee pain 04/30/2013     Priority: Medium    Hypertension goal BP (blood pressure) < 140/90 02/21/2011     Priority: Medium    Hyperlipidemia LDL goal <70 05/09/2010     Priority: Medium    Symptomatic menopausal or female climacteric states 03/30/2006     Priority: Medium      Past Medical History:   Diagnosis Date    Acute cholecystitis 05/31/2023    Acute cystitis without hematuria 05/30/2023    Atrial fibrillation (H)     Biliary colic 05/30/2023    Chest pain, unspecified type 12/19/2023    Chronic diastolic CHF (congestive heart failure) (H)     COPD (chronic obstructive pulmonary disease) (H)     Essential hypertension, benign     HYPERLIPIDEMIA NEC/NOS 03/30/2006    Pulmonary hypertension (H)     Pyelonephritis 2019    SSS (sick sinus syndrome) (H)     s/p PPM 3/2018     Past Surgical History:   Procedure Laterality Date    COMBINED CYSTOSCOPY, INSERT STENT URETER(S) Right 10/11/2024    Procedure: Cystoscopy, With Retrograde Pyelogram and Insert Right Ureteral Stent;  Surgeon: Toni Yuen MD;  Location:  OR    CV CORONARY ANGIOGRAM N/A 12/7/2023    Procedure: Coronary Angiogram;  Surgeon: Mookie Yates MD;  Location: [facility] CARDIAC CATH LAB    CV CORONARY ANGIOGRAM N/A 12/8/2023    Procedure: Coronary Angiogram;  Surgeon: Stephanie Monroy MD;  Location: [facility] CARDIAC CATH LAB    CV CORONARY ANGIOGRAM N/A 12/21/2023    Procedure: Coronary Angiogram;  Surgeon: Eric Barajas MD;  Location: [facility] CARDIAC CATH LAB    CV PCI N/A 12/8/2023    Procedure: Percutaneous Coronary Intervention;  Surgeon: Stephanie Monroy MD;  Location: [facility] CARDIAC CATH LAB    CV PCI STENT DRUG ELUTING N/A 12/21/2023    Procedure: Percutaneous Coronary Intervention Stent;  Surgeon: Eric Barajas MD;  Location: [facility] CARDIAC CATH LAB    EP ABLATION AV NODE N/A 5/7/2019    Procedure: EP Ablation AV Node;  Surgeon: Roe Voss MD;  Location: [facility]  CARDIAC CATH LAB    EYE SURGERY      HYSTERECTOMY, VAGINAL      hysterectomy    LAPAROSCOPIC CHOLECYSTECTOMY N/A 6/1/2023    Procedure: Laparoscopic cholecystectomy with lysis of adhesions;  Surgeon: Sawyer Marcum MD;  Location:  OR     Current Outpatient Medications   Medication Sig Dispense Refill    acetaminophen (TYLENOL) 500 MG tablet Take 500-1,000 mg by mouth every 6 hours as needed for mild pain      albuterol (PROAIR HFA/PROVENTIL HFA/VENTOLIN HFA) 108 (90 Base) MCG/ACT inhaler Inhale 2 puffs into the lungs every 6 hours as needed      carvedilol (COREG) 12.5 MG tablet Take 1 tablet (12.5 mg) by mouth 2 times daily (with meals) 180 tablet 3    cholecalciferol (VITAMIN  -D) 1000 UNIT capsule Take 1 capsule by mouth daily.      FLAX SEED OIL OR 1 capsule daily      furosemide (LASIX) 20 MG tablet Take 1 tablet (20 mg) by mouth daily 90 tablet 3    Multiple Vitamins-Minerals (HAIR SKIN NAILS PO) Take 1 tablet by mouth At Bedtime      Multiple Vitamins-Minerals (ICAPS AREDS 2 PO) Take 1 tablet by mouth 2 times daily      neomycin-polymyxin-dexAMETHasone (MAXITROL) 3.5-83354-0.1 ophthalmic ointment APPLY TO LID TWO TIMES DAILY AS NEEDED      nitroGLYcerin (NITROSTAT) 0.4 MG sublingual tablet For chest pain place 1 tablet under the tongue every 5 minutes for 3 doses. If symptoms persist 5 minutes after 1st dose call 911. 25 tablet 11    potassium chloride ER (KLOR-CON M) 10 MEQ CR tablet Take 1 tablet (10 mEq) by mouth daily 90 tablet 3    rivaroxaban ANTICOAGULANT (XARELTO) 15 MG TABS tablet Take 1 tablet (15 mg) by mouth daily (with dinner) 90 tablet 3    rosuvastatin (CRESTOR) 40 MG tablet Take 1 tablet (40 mg) by mouth daily. 90 tablet 3    sacubitril-valsartan (ENTRESTO) 49-51 MG per tablet Take 1 tablet by mouth 2 times daily 180 tablet 3    tiotropium (SPIRIVA) 18 MCG inhaled capsule Inhale 1 capsule (18 mcg) into the lungs daily 90 capsule 3    tamsulosin (FLOMAX) 0.4 MG capsule Take 1 capsule (0.4  "mg) by mouth daily. (Patient not taking: Reported on 2025) 30 capsule 2       Allergies   Allergen Reactions    Strawberries [Strawberry Extract] Anaphylaxis    Strawberry Flavoring Agent (Non-Screening)         Social History     Tobacco Use    Smoking status: Former     Current packs/day: 0.00     Average packs/day: 1.5 packs/day for 45.0 years (67.6 ttl pk-yrs)     Types: Cigarettes     Start date: 10/28/1955     Quit date: 2000     Years since quittin.3    Smokeless tobacco: Never    Tobacco comments:     quit 24 yrs ago   Substance Use Topics    Alcohol use: No     Family History   Problem Relation Age of Onset    Cerebrovascular Disease Mother     Glaucoma Mother     Macular Degeneration Mother     Alcohol/Drug Father         alcohol    Myocardial Infarction Father 63        passed away from MI    Family History Negative Sister     Family History Negative Sister     Family History Negative Sister     Cerebrovascular Disease Sister     Family History Negative Brother     Family History Negative Maternal Grandmother     Family History Negative Maternal Grandfather     Family History Negative Paternal Grandmother     Family History Negative Paternal Grandfather     Myocardial Infarction Son 57        passed away from MI    Dementia Daughter 57        early onset on [place]    Diabetes No family hx of     Breast Cancer No family hx of     Cancer - colorectal No family hx of      History   Drug Use No             Review of Systems  Constitutional, neuro, ENT, endocrine, pulmonary, cardiac, gastrointestinal, genitourinary, musculoskeletal, integument and psychiatric systems are negative, except as otherwise noted.    Objective    /79 (BP Location: Right arm, Patient Position: Sitting, Cuff Size: Adult Regular)   Pulse 66   Temp 97.5  F (36.4  C) (Oral)   Resp 16   Ht 1.6 m (5' 3\")   Wt 73.3 kg (161 lb 9.6 oz)   LMP  (LMP Unknown)   SpO2 98%   BMI 28.63 kg/m     Estimated body mass index " "is 28.02 kg/m  as calculated from the following:    Height as of 1/7/25: 1.6 m (5' 3\").    Weight as of 1/7/25: 71.8 kg (158 lb 3.2 oz).  Physical Exam  GENERAL: alert and no distress  EYES: Eyes grossly normal to inspection, conjunctivae and sclerae normal  RESP: lungs clear to auscultation - no rales, rhonchi or wheezes  CV: regular rate and rhythm, normal S1 S2, no S3 or S4, no murmur, click or rub, no peripheral edema  MS: no gross musculoskeletal defects noted, no edema  SKIN: no suspicious lesions or rashes  NEURO: Normal strength and tone, mentation intact and speech normal  PSYCH: mentation appears normal, affect normal/bright    Recent Labs   Lab Test 11/19/24  0748 10/31/24  0808 10/12/24  0653   HGB 11.4* 11.9 9.8*    504* 258   NA  --  143 139   POTASSIUM  --  4.2 3.7   CR  --  1.05* 0.96*        Diagnostics  Recent Results (from the past 24 hours)   Basic metabolic panel  (Ca, Cl, CO2, Creat, Gluc, K, Na, BUN)    Collection Time: 01/13/25  9:25 AM   Result Value Ref Range    Sodium 140 135 - 145 mmol/L    Potassium 4.3 3.4 - 5.3 mmol/L    Chloride 104 98 - 107 mmol/L    Carbon Dioxide (CO2) 27 22 - 29 mmol/L    Anion Gap 9 7 - 15 mmol/L    Urea Nitrogen 12.7 8.0 - 23.0 mg/dL    Creatinine 0.92 0.51 - 0.95 mg/dL    GFR Estimate 61 >60 mL/min/1.73m2    Calcium 9.7 8.8 - 10.4 mg/dL    Glucose 117 (H) 70 - 99 mg/dL      No EKG required for low risk surgery (cataract, skin procedure, breast biopsy, etc). Pacer dependent    Revised Cardiac Risk Index (RCRI)  The patient has the following serious cardiovascular risks for perioperative complications:   - Coronary Artery Disease (MI, positive stress test, angina, Qs on EKG) = 1 point     RCRI Interpretation: 1 point: Class II (low risk - 0.9% complication rate)         Signed Electronically by: NELY Hurst CNP  A copy of this evaluation report is provided to the requesting physician.         "

## 2025-01-17 ENCOUNTER — HOSPITAL ENCOUNTER (OUTPATIENT)
Facility: CLINIC | Age: 85
Discharge: HOME | End: 2025-01-17
Attending: STUDENT IN AN ORGANIZED HEALTH CARE EDUCATION/TRAINING PROGRAM | Admitting: STUDENT IN AN ORGANIZED HEALTH CARE EDUCATION/TRAINING PROGRAM
Payer: COMMERCIAL

## 2025-01-17 ENCOUNTER — APPOINTMENT (OUTPATIENT)
Dept: GENERAL RADIOLOGY | Facility: CLINIC | Age: 85
End: 2025-01-17
Attending: STUDENT IN AN ORGANIZED HEALTH CARE EDUCATION/TRAINING PROGRAM
Payer: COMMERCIAL

## 2025-01-17 VITALS
RESPIRATION RATE: 16 BRPM | TEMPERATURE: 96.8 F | DIASTOLIC BLOOD PRESSURE: 65 MMHG | HEART RATE: 62 BPM | SYSTOLIC BLOOD PRESSURE: 128 MMHG | WEIGHT: 159 LBS | HEIGHT: 63 IN | OXYGEN SATURATION: 96 % | BODY MASS INDEX: 28.17 KG/M2

## 2025-01-17 DIAGNOSIS — N20.1 URETEROLITHIASIS: Primary | ICD-10-CM

## 2025-01-17 PROCEDURE — 710N000012 HC RECOVERY PHASE 2, PER MINUTE: Performed by: STUDENT IN AN ORGANIZED HEALTH CARE EDUCATION/TRAINING PROGRAM

## 2025-01-17 PROCEDURE — 360N000077 HC SURGERY LEVEL 4, PER MIN: Performed by: STUDENT IN AN ORGANIZED HEALTH CARE EDUCATION/TRAINING PROGRAM

## 2025-01-17 PROCEDURE — C2617 STENT, NON-COR, TEM W/O DEL: HCPCS | Performed by: STUDENT IN AN ORGANIZED HEALTH CARE EDUCATION/TRAINING PROGRAM

## 2025-01-17 PROCEDURE — C1747 HC OR ENDOSCOPE, SGL USE,URINARY TRACT, IMAGING/ILLUMINATION DEVICE: HCPCS | Performed by: STUDENT IN AN ORGANIZED HEALTH CARE EDUCATION/TRAINING PROGRAM

## 2025-01-17 PROCEDURE — 250N000009 HC RX 250: Performed by: STUDENT IN AN ORGANIZED HEALTH CARE EDUCATION/TRAINING PROGRAM

## 2025-01-17 PROCEDURE — 258N000003 HC RX IP 258 OP 636: Performed by: SURGERY

## 2025-01-17 PROCEDURE — 250N000025 HC SEVOFLURANE, PER MIN: Performed by: STUDENT IN AN ORGANIZED HEALTH CARE EDUCATION/TRAINING PROGRAM

## 2025-01-17 PROCEDURE — 272N000001 HC OR GENERAL SUPPLY STERILE: Performed by: STUDENT IN AN ORGANIZED HEALTH CARE EDUCATION/TRAINING PROGRAM

## 2025-01-17 PROCEDURE — C1758 CATHETER, URETERAL: HCPCS | Performed by: STUDENT IN AN ORGANIZED HEALTH CARE EDUCATION/TRAINING PROGRAM

## 2025-01-17 PROCEDURE — 250N000013 HC RX MED GY IP 250 OP 250 PS 637: Performed by: STUDENT IN AN ORGANIZED HEALTH CARE EDUCATION/TRAINING PROGRAM

## 2025-01-17 PROCEDURE — 74420 UROGRAPHY RTRGR +-KUB: CPT | Mod: 26 | Performed by: STUDENT IN AN ORGANIZED HEALTH CARE EDUCATION/TRAINING PROGRAM

## 2025-01-17 PROCEDURE — C1769 GUIDE WIRE: HCPCS | Performed by: STUDENT IN AN ORGANIZED HEALTH CARE EDUCATION/TRAINING PROGRAM

## 2025-01-17 PROCEDURE — 255N000002 HC RX 255 OP 636: Performed by: STUDENT IN AN ORGANIZED HEALTH CARE EDUCATION/TRAINING PROGRAM

## 2025-01-17 PROCEDURE — 999N000141 HC STATISTIC PRE-PROCEDURE NURSING ASSESSMENT: Performed by: STUDENT IN AN ORGANIZED HEALTH CARE EDUCATION/TRAINING PROGRAM

## 2025-01-17 PROCEDURE — 52356 CYSTO/URETERO W/LITHOTRIPSY: CPT | Mod: RT | Performed by: STUDENT IN AN ORGANIZED HEALTH CARE EDUCATION/TRAINING PROGRAM

## 2025-01-17 PROCEDURE — 999N000180 XR SURGERY CARM FLUORO LESS THAN 5 MIN

## 2025-01-17 PROCEDURE — C1894 INTRO/SHEATH, NON-LASER: HCPCS | Performed by: STUDENT IN AN ORGANIZED HEALTH CARE EDUCATION/TRAINING PROGRAM

## 2025-01-17 PROCEDURE — 82365 CALCULUS SPECTROSCOPY: CPT | Performed by: STUDENT IN AN ORGANIZED HEALTH CARE EDUCATION/TRAINING PROGRAM

## 2025-01-17 PROCEDURE — 258N000001 HC RX 258: Performed by: STUDENT IN AN ORGANIZED HEALTH CARE EDUCATION/TRAINING PROGRAM

## 2025-01-17 PROCEDURE — 710N000009 HC RECOVERY PHASE 1, LEVEL 1, PER MIN: Performed by: STUDENT IN AN ORGANIZED HEALTH CARE EDUCATION/TRAINING PROGRAM

## 2025-01-17 PROCEDURE — 370N000017 HC ANESTHESIA TECHNICAL FEE, PER MIN: Performed by: STUDENT IN AN ORGANIZED HEALTH CARE EDUCATION/TRAINING PROGRAM

## 2025-01-17 DEVICE — URETERAL STENT
Type: IMPLANTABLE DEVICE | Site: URETER | Status: FUNCTIONAL
Brand: POLARIS™ ULTRA

## 2025-01-17 RX ORDER — CIPROFLOXACIN 500 MG/1
500 TABLET, FILM COATED ORAL ONCE
Qty: 1 TABLET | Refills: 0 | Status: SHIPPED | OUTPATIENT
Start: 2025-01-17 | End: 2025-01-17

## 2025-01-17 RX ORDER — ONDANSETRON 4 MG/1
4 TABLET, ORALLY DISINTEGRATING ORAL EVERY 30 MIN PRN
Status: DISCONTINUED | OUTPATIENT
Start: 2025-01-17 | End: 2025-01-17 | Stop reason: HOSPADM

## 2025-01-17 RX ORDER — CEFAZOLIN SODIUM/WATER 2 G/20 ML
2 SYRINGE (ML) INTRAVENOUS
Status: COMPLETED | OUTPATIENT
Start: 2025-01-17 | End: 2025-01-17

## 2025-01-17 RX ORDER — IOPAMIDOL 612 MG/ML
INJECTION, SOLUTION INTRAVASCULAR PRN
Status: DISCONTINUED | OUTPATIENT
Start: 2025-01-17 | End: 2025-01-17 | Stop reason: HOSPADM

## 2025-01-17 RX ORDER — NALOXONE HYDROCHLORIDE 0.4 MG/ML
0.1 INJECTION, SOLUTION INTRAMUSCULAR; INTRAVENOUS; SUBCUTANEOUS
Status: CANCELLED | OUTPATIENT
Start: 2025-01-17

## 2025-01-17 RX ORDER — SODIUM CHLORIDE, SODIUM LACTATE, POTASSIUM CHLORIDE, CALCIUM CHLORIDE 600; 310; 30; 20 MG/100ML; MG/100ML; MG/100ML; MG/100ML
INJECTION, SOLUTION INTRAVENOUS CONTINUOUS
Status: DISCONTINUED | OUTPATIENT
Start: 2025-01-17 | End: 2025-01-17 | Stop reason: HOSPADM

## 2025-01-17 RX ORDER — HYDRALAZINE HYDROCHLORIDE 20 MG/ML
2.5-5 INJECTION INTRAMUSCULAR; INTRAVENOUS EVERY 10 MIN PRN
Status: DISCONTINUED | OUTPATIENT
Start: 2025-01-17 | End: 2025-01-17 | Stop reason: HOSPADM

## 2025-01-17 RX ORDER — ONDANSETRON 4 MG/1
4 TABLET, ORALLY DISINTEGRATING ORAL EVERY 8 HOURS PRN
Qty: 4 TABLET | Refills: 0 | Status: SHIPPED | OUTPATIENT
Start: 2025-01-17

## 2025-01-17 RX ORDER — ONDANSETRON 4 MG/1
4 TABLET, ORALLY DISINTEGRATING ORAL EVERY 30 MIN PRN
Status: CANCELLED | OUTPATIENT
Start: 2025-01-17

## 2025-01-17 RX ORDER — FENTANYL CITRATE 0.05 MG/ML
50 INJECTION, SOLUTION INTRAMUSCULAR; INTRAVENOUS EVERY 5 MIN PRN
Status: DISCONTINUED | OUTPATIENT
Start: 2025-01-17 | End: 2025-01-17 | Stop reason: HOSPADM

## 2025-01-17 RX ORDER — DEXAMETHASONE SODIUM PHOSPHATE 4 MG/ML
4 INJECTION, SOLUTION INTRA-ARTICULAR; INTRALESIONAL; INTRAMUSCULAR; INTRAVENOUS; SOFT TISSUE
Status: DISCONTINUED | OUTPATIENT
Start: 2025-01-17 | End: 2025-01-17 | Stop reason: HOSPADM

## 2025-01-17 RX ORDER — MAGNESIUM HYDROXIDE 1200 MG/15ML
LIQUID ORAL PRN
Status: DISCONTINUED | OUTPATIENT
Start: 2025-01-17 | End: 2025-01-17 | Stop reason: HOSPADM

## 2025-01-17 RX ORDER — TAMSULOSIN HYDROCHLORIDE 0.4 MG/1
0.4 CAPSULE ORAL DAILY
Qty: 7 CAPSULE | Refills: 0 | Status: SHIPPED | OUTPATIENT
Start: 2025-01-17 | End: 2025-01-24

## 2025-01-17 RX ORDER — FENTANYL CITRATE 0.05 MG/ML
25 INJECTION, SOLUTION INTRAMUSCULAR; INTRAVENOUS EVERY 5 MIN PRN
Status: DISCONTINUED | OUTPATIENT
Start: 2025-01-17 | End: 2025-01-17 | Stop reason: HOSPADM

## 2025-01-17 RX ORDER — CEFAZOLIN SODIUM/WATER 2 G/20 ML
2 SYRINGE (ML) INTRAVENOUS SEE ADMIN INSTRUCTIONS
Status: DISCONTINUED | OUTPATIENT
Start: 2025-01-17 | End: 2025-01-17 | Stop reason: HOSPADM

## 2025-01-17 RX ORDER — DEXAMETHASONE SODIUM PHOSPHATE 4 MG/ML
4 INJECTION, SOLUTION INTRA-ARTICULAR; INTRALESIONAL; INTRAMUSCULAR; INTRAVENOUS; SOFT TISSUE
Status: CANCELLED | OUTPATIENT
Start: 2025-01-17

## 2025-01-17 RX ORDER — NALOXONE HYDROCHLORIDE 0.4 MG/ML
0.1 INJECTION, SOLUTION INTRAMUSCULAR; INTRAVENOUS; SUBCUTANEOUS
Status: DISCONTINUED | OUTPATIENT
Start: 2025-01-17 | End: 2025-01-17 | Stop reason: HOSPADM

## 2025-01-17 RX ORDER — AMOXICILLIN 250 MG
1-2 CAPSULE ORAL 2 TIMES DAILY
Qty: 30 TABLET | Refills: 0 | Status: SHIPPED | OUTPATIENT
Start: 2025-01-17

## 2025-01-17 RX ORDER — HYDROMORPHONE HCL IN WATER/PF 6 MG/30 ML
0.2 PATIENT CONTROLLED ANALGESIA SYRINGE INTRAVENOUS EVERY 5 MIN PRN
Status: DISCONTINUED | OUTPATIENT
Start: 2025-01-17 | End: 2025-01-17 | Stop reason: HOSPADM

## 2025-01-17 RX ORDER — HYDROMORPHONE HCL IN WATER/PF 6 MG/30 ML
0.4 PATIENT CONTROLLED ANALGESIA SYRINGE INTRAVENOUS EVERY 5 MIN PRN
Status: DISCONTINUED | OUTPATIENT
Start: 2025-01-17 | End: 2025-01-17 | Stop reason: HOSPADM

## 2025-01-17 RX ORDER — FENTANYL CITRATE 0.05 MG/ML
25 INJECTION, SOLUTION INTRAMUSCULAR; INTRAVENOUS
Status: CANCELLED | OUTPATIENT
Start: 2025-01-17

## 2025-01-17 RX ORDER — OXYBUTYNIN CHLORIDE 5 MG/1
5 TABLET, EXTENDED RELEASE ORAL DAILY
Qty: 7 TABLET | Refills: 0 | Status: SHIPPED | OUTPATIENT
Start: 2025-01-17 | End: 2025-01-24

## 2025-01-17 RX ORDER — HYDROXYZINE HYDROCHLORIDE 10 MG/1
10 TABLET, FILM COATED ORAL EVERY 6 HOURS PRN
Status: DISCONTINUED | OUTPATIENT
Start: 2025-01-17 | End: 2025-01-17 | Stop reason: HOSPADM

## 2025-01-17 RX ORDER — ONDANSETRON 2 MG/ML
4 INJECTION INTRAMUSCULAR; INTRAVENOUS EVERY 30 MIN PRN
Status: DISCONTINUED | OUTPATIENT
Start: 2025-01-17 | End: 2025-01-17 | Stop reason: HOSPADM

## 2025-01-17 RX ORDER — ACETAMINOPHEN 325 MG/1
975 TABLET ORAL ONCE
Status: COMPLETED | OUTPATIENT
Start: 2025-01-17 | End: 2025-01-17

## 2025-01-17 RX ORDER — ACETAMINOPHEN 650 MG/1
650 SUPPOSITORY RECTAL ONCE
Status: COMPLETED | OUTPATIENT
Start: 2025-01-17 | End: 2025-01-17

## 2025-01-17 RX ORDER — ONDANSETRON 2 MG/ML
4 INJECTION INTRAMUSCULAR; INTRAVENOUS EVERY 30 MIN PRN
Status: CANCELLED | OUTPATIENT
Start: 2025-01-17

## 2025-01-17 RX ORDER — LABETALOL HYDROCHLORIDE 5 MG/ML
10 INJECTION, SOLUTION INTRAVENOUS
Status: DISCONTINUED | OUTPATIENT
Start: 2025-01-17 | End: 2025-01-17 | Stop reason: HOSPADM

## 2025-01-17 RX ADMIN — ACETAMINOPHEN 975 MG: 325 TABLET, FILM COATED ORAL at 10:50

## 2025-01-17 RX ADMIN — SODIUM CHLORIDE, POTASSIUM CHLORIDE, SODIUM LACTATE AND CALCIUM CHLORIDE: 600; 310; 30; 20 INJECTION, SOLUTION INTRAVENOUS at 10:50

## 2025-01-17 ASSESSMENT — ACTIVITIES OF DAILY LIVING (ADL)
ADLS_ACUITY_SCORE: 55

## 2025-01-17 NOTE — OP NOTE
OPERATIVE NOTE    PREOPERATIVE DIAGNOSIS:  Right-sided ureteral and renal stone    POSTOPERATIVE DIAGNOSIS:  Same    PROCEDURES PERFORMED:   1. Cystourethroscopy  2.  Right ureteroscopy  3.  Right retrograde pyelogram with interpretation of intraoperative fluoroscopic imaging  4. Holmium laser lithotripsy with basket stone extraction  5.  Right ureteral stent placement  6.  Removal of pre-existing ureteral stent    STAFF SURGEON:  Dr. Toni Yuen MD, present for the entire case.     ANESTHESIA:  General    ESTIMATED BLOOD LOSS: 1 cc  DRAINS/TUBES:  6 Tunisian x 26 cm double-J ureteral stent, on a string         IV FLUIDS:   Please see dictated anesthesia record  COMPLICATIONS:  None.   SPECIMEN:   Stones for analysis    SIGNIFICANT FINDINGS: Multiple stones in the distal ureter, multiple small stones in the lower pole calyx of the right kidney.  Proximal curl of the ureteral stent seen in the renal pelvis under fluoroscopy and distal curl seen in the bladder fluoroscopically and under direct vision.     BRIEF OPERATIVE INDICATIONS:  Hamida Verma is a(n) 84 year old female with history of obstructing distal ureteral stone with a stent in place and presents today for stone removal procedure.  After a discussion of all risks, benefits, and alternatives, the patient elected to proceed with definitive stone management. The patient understands the potential need for more than one procedure to eliminate all stone burden.     DESCRIPTION OF PROCEDURE:  After informed consent was obtained, the patient was transported to the operating room & placed supine on the table. Pneumoboots were applied.  After adequate anesthesia was induced, she was placed in lithotomy and prepped and draped in the usual sterile fashion. A timeout was taken to confirm correct patient, procedure and laterality. Pre-operative IV antibiotics were administered.     A 22-Tunisian rigid cystoscope was inserted into a well-lubricated urethra. The  anterior urethra was unremarkable,. The right ureteral orifice was identified and stent was partially pulled out and cannulated with a Sensor wire. The wire passed without resistance into the upper pole and the stent was removed.  A 5 Spanish open-ended catheter was used to perform a retrograde pyelogram   The Semirigid ureteroscope was used to identify the stones in the distal ureter.  These were then removed with the ZeroTip basket and sent for stone analysis.  I then used the rigid ureteroscope to perform proximal ureteroscopy which revealed no additional stones.  A second sensor wire was left and ureteroscope withdrawn.  A 12/14 Spanish 36 cm access sheath was inserted into the ureter and advanced into the proximal ureter under fluoroscopic guidance.  We used a disposable lithovue ureteroscope for identification of ureteral stones. A 200 micron laser fiber was used at a setting of 0.3 J and 50 hz and lithotripsy was performed. A Halo basket was used to remove all fragments greater than 1 mm. Pullback ureteroscopy was performed and showed no retained stone fragments or ureteral injury. A 6 Spanish 26-cm double-J stent was advanced over the Sensor wire, and a good proximal curl was seen in the renal pelvis fluoroscopically and the distal curl was seen in the bladder fluoroscopically and under direct vision.  Stent was on a string and was taped to the pubic area with Tegaderm.  The bladder was drained.  The patient tolerated the procedure well and there were no apparent complications. The patient  was transported to the postanesthesia care unit in stable condition.     POST-OPERATIVE PLAN: Following recovery in the PACU, the patient can be discharged.  She can remove the stent on her own in 7 days.  She can follow-up with urology clinic in 3 months      Toni Yuen MD  [facility], Urology

## 2025-01-17 NOTE — DISCHARGE INSTRUCTIONS
Today you were given 975 mg of Tylenol at 10:50 am. The recommended daily maximum dose is 4000 mg.         Same Day Surgery Discharge Instructions for  Sedation and General Anesthesia     It's not unusual to feel dizzy, light-headed or faint for up to 24 hours after surgery or while taking pain medication.  If you have these symptoms: sit for a few minutes before standing and have someone assist you when you get up to walk or use the bathroom.    You should rest and relax for the next 24 hours. We recommend you make arrangements to have an adult stay with you for at least 24 hours after your discharge.  Avoid hazardous and strenuous activity.    DO NOT DRIVE any vehicle or operate mechanical equipment for 24 hours following the end of your surgery.  Even though you may feel normal, your reactions may be affected by the medication you have received.    Do not drink alcoholic beverages for 24 hours following surgery.     Slowly progress to your regular diet as you feel able. It's not unusual to feel nauseated and/or vomit after receiving anesthesia.  If you develop these symptoms, drink clear liquids (apple juice, ginger ale, broth, 7-up, etc. ) until you feel better.  If your nausea and vomiting persists for 24 hours, please notify your surgeon.      All narcotic pain medications, along with inactivity and anesthesia, can cause constipation. Drinking plenty of liquids and increasing fiber intake will help.    For any questions of a medical nature, call your surgeon.    Do not make important decisions for 24 hours.    If you had general anesthesia, you may have a sore throat for a couple of days related to the breathing tube used during surgery.  You may use Cepacol lozenges to help with this discomfort.  If it worsens or if you develop a fever, contact your surgeon.     If you feel your pain is not well managed with the pain medications prescribed by your surgeon, please contact your surgeon's office to let them know  so they can address your concerns.      **If you have questions or concerns about your procedure,   call Dr. Yuen at [phone]**

## 2025-01-21 LAB
APPEARANCE STONE: NORMAL
COMPN STONE: NORMAL
SPECIMEN WT: 179 MG

## 2025-01-23 ENCOUNTER — TELEPHONE (OUTPATIENT)
Dept: UROLOGY | Facility: CLINIC | Age: 85
End: 2025-01-23

## 2025-01-30 DIAGNOSIS — I25.110 CORONARY ARTERY DISEASE INVOLVING NATIVE CORONARY ARTERY OF NATIVE HEART WITH UNSTABLE ANGINA PECTORIS (H): ICD-10-CM

## 2025-01-30 DIAGNOSIS — E87.6 HYPOKALEMIA: ICD-10-CM

## 2025-01-30 DIAGNOSIS — I50.32 CHRONIC DIASTOLIC CHF (CONGESTIVE HEART FAILURE) (H): ICD-10-CM

## 2025-01-30 DIAGNOSIS — I48.0 PAF (PAROXYSMAL ATRIAL FIBRILLATION) (H): ICD-10-CM

## 2025-01-30 DIAGNOSIS — I21.4 NSTEMI (NON-ST ELEVATED MYOCARDIAL INFARCTION) (H): ICD-10-CM

## 2025-01-30 RX ORDER — FUROSEMIDE 20 MG/1
20 TABLET ORAL DAILY
Qty: 90 TABLET | Refills: 3 | Status: SHIPPED | OUTPATIENT
Start: 2025-01-30

## 2025-01-30 RX ORDER — POTASSIUM CHLORIDE 750 MG/1
10 TABLET, EXTENDED RELEASE ORAL DAILY
Qty: 90 TABLET | Refills: 3 | Status: SHIPPED | OUTPATIENT
Start: 2025-01-30

## 2025-01-30 RX ORDER — CARVEDILOL 12.5 MG/1
12.5 TABLET ORAL 2 TIMES DAILY WITH MEALS
Qty: 180 TABLET | Refills: 3 | Status: SHIPPED | OUTPATIENT
Start: 2025-01-30

## 2025-02-04 ENCOUNTER — OFFICE VISIT (OUTPATIENT)
Dept: FAMILY MEDICINE | Facility: CLINIC | Age: 85
End: 2025-02-04
Payer: COMMERCIAL

## 2025-02-04 VITALS
HEART RATE: 64 BPM | TEMPERATURE: 97.1 F | RESPIRATION RATE: 16 BRPM | OXYGEN SATURATION: 99 % | WEIGHT: 161.1 LBS | BODY MASS INDEX: 28.54 KG/M2 | DIASTOLIC BLOOD PRESSURE: 77 MMHG | SYSTOLIC BLOOD PRESSURE: 165 MMHG | HEIGHT: 63 IN

## 2025-02-04 DIAGNOSIS — E78.5 DYSLIPIDEMIA: ICD-10-CM

## 2025-02-04 DIAGNOSIS — N20.1 URETEROLITHIASIS: ICD-10-CM

## 2025-02-04 DIAGNOSIS — I25.10 CORONARY ARTERY CALCIFICATION SEEN ON CT SCAN: ICD-10-CM

## 2025-02-04 DIAGNOSIS — R91.8 PULMONARY NODULES: ICD-10-CM

## 2025-02-04 DIAGNOSIS — H35.30 MACULAR DEGENERATION, UNSPECIFIED LATERALITY, UNSPECIFIED TYPE: ICD-10-CM

## 2025-02-04 DIAGNOSIS — I48.0 PAF (PAROXYSMAL ATRIAL FIBRILLATION) (H): ICD-10-CM

## 2025-02-04 DIAGNOSIS — Z95.0 S/P CARDIAC PACEMAKER PROCEDURE: ICD-10-CM

## 2025-02-04 DIAGNOSIS — M81.0 AGE-RELATED OSTEOPOROSIS WITHOUT CURRENT PATHOLOGICAL FRACTURE: ICD-10-CM

## 2025-02-04 DIAGNOSIS — J44.9 CHRONIC OBSTRUCTIVE PULMONARY DISEASE, UNSPECIFIED COPD TYPE (H): Primary | ICD-10-CM

## 2025-02-04 DIAGNOSIS — N18.30 STAGE 3 CHRONIC KIDNEY DISEASE, UNSPECIFIED WHETHER STAGE 3A OR 3B CKD (H): ICD-10-CM

## 2025-02-04 DIAGNOSIS — I10 HYPERTENSION GOAL BP (BLOOD PRESSURE) < 140/90: ICD-10-CM

## 2025-02-04 DIAGNOSIS — E78.5 HYPERLIPIDEMIA LDL GOAL <70: ICD-10-CM

## 2025-02-04 DIAGNOSIS — I50.32 CHRONIC DIASTOLIC CHF (CONGESTIVE HEART FAILURE) (H): ICD-10-CM

## 2025-02-04 PROCEDURE — 99214 OFFICE O/P EST MOD 30 MIN: CPT | Performed by: FAMILY MEDICINE

## 2025-02-04 RX ORDER — TIOTROPIUM BROMIDE 18 UG/1
18 CAPSULE ORAL; RESPIRATORY (INHALATION) DAILY
Qty: 90 CAPSULE | Refills: 3 | Status: CANCELLED | OUTPATIENT
Start: 2025-02-04

## 2025-02-04 ASSESSMENT — PAIN SCALES - GENERAL: PAINLEVEL_OUTOF10: NO PAIN (0)

## 2025-02-04 NOTE — PROGRESS NOTES
Assessment & Plan      Kidney stones  Feels so much better since her 1/17/25 procedure.   She was hospitalized for acute cystitis and ureterolithiasis in 10/24. She underwent cystoscopy and stent placement. On 1/17/25, she had laser lithotripsy with basket stone extraction, removal of previous ureteral stent and right ureteral stent placement   Plan is to follow up with urology in April.     History of NSTEMI (non-ST elevated myocardial infarction) (H)  CAD  Had nstemi and PCI To left circumflex on 12/8/23 and hospitalized again on 12/22/23 with nstemi and needed PCI to RCA.   Right arm pain was her anginal equivalent and is completely resolved.   Continues  xarelto.   Feeling well.   Denies any concerns.   Last cardiology visit note from 1/7/2025 reviewed today.     Hypertension   Initially elevated, will ask MA to recheck.  Managed by cardiology  Also has h/o orthostatic hypotension  lightheadedness with standing.   Allow bps to run in the 130s to prevent worsening symptoms.   Continues entresto, furosemide, carvedilol, potassium     Chronic diastolic CHF (congestive heart failure) (H)  Mild decreased LVEF-monitored by cardiology  Has chronic afib and permanent pacemaker  Continues entresto, carvedilol and lasix. If worsening symptoms, could consider spironolactone and SGLT2 inhibitor     A-fib with initial difficult to control rates and AV node ablation and pacemater in place  Continues xarelto  Follows with cardiology      Dyslipidemia LDL goal <70 LDL 66 7/22/24  Continues rosuvastatin 40mg daily. Working on eating better   .   CKD (chronic kidney disease) stage 3, GFR 30-59 ml/min (H)  Avoid nephrotoxic agents. BMP stable       COPD   Dyspnea previously improved after starting spiriva and albuterol.  Today she reports that she does not use the spiriva - does not feel like she needs it and it is too expensive Has not needed to use albuterol for quite awhile now.      Denies wheezing or coughing. Tried Spiriva  "in the past, but was not sure if it made much difference.  When she saw the lung specialist in 2019 they did not feel like she necessarily needed to be on any inhaler at that time and last she was noticing benefit from the Spiriva.  She had been given Anoro, which had been cost prohibitive.          Pulmonary nodules  Incidental findings. Has copd history and smoking history. Needs repeat ct scan in 6-12 months . Also sclerotic focus on T4. CT scan completed 3/1/24 with stable nodules and recommendation to repeat CT in 6-12 months.  Order previously placed.         Osteoporosis  Had been on alendronate from  2017 and stopped it in 2022 (she had about 5 years of treatment).  Last DEXA was in 10/21 unchanged to mildly improved. Will recheck DEXA this year. If if worsening, may refer to endo to discuss treatment options.   Continues calcium and vitamin d supplementation.   Vitamin D was mildly elevated in July.  Last calcium in January was normal       Macular degeneration  Follows with ophthalmology                 BMI  Estimated body mass index is 28.18 kg/m  as calculated from the following:    Height as of this encounter: 1.61 m (5' 3.4\").    Weight as of this encounter: 73.1 kg (161 lb 1.6 oz).           Monisha Mei is a 84 year old, presenting for the following health issues:  Follow Up (Chronic Condition/)    History of Present Illness       Reason for visit:  Chronic Condition    She eats 2-3 servings of fruits and vegetables daily.She consumes 1 sweetened beverage(s) daily.She exercises with enough effort to increase her heart rate 9 or less minutes per day.  She exercises with enough effort to increase her heart rate 3 or less days per week.   She is taking medications regularly.      Became a great great grandmother. Has pictures. They live in [place] and they may come up this summer.     Notes she has lost weight.  Is not that hungry. Does not have an appetite like she used to. Weight stable since " "November. Tries to prioritize protein.     Her daughter's dementia is getting worse. She may be moving into assisted living. She has a hard time with stairs. Currently her daughter and granddaughter live with her, but she may be living on her own soon, which she is looking forward to. Would like to have her own space.        Wt Readings from Last 5 Encounters:   02/04/25 73.1 kg (161 lb 1.6 oz)   01/17/25 72.1 kg (159 lb)   01/13/25 73.3 kg (161 lb 9.6 oz)   01/07/25 71.8 kg (158 lb 3.2 oz)   11/15/24 73 kg (161 lb)             Objective    BP (!) 150/76 (BP Location: Right arm, Patient Position: Sitting, Cuff Size: Adult Large)   Pulse 64   Temp 97.1  F (36.2  C) (Temporal)   Resp 16   Ht 1.61 m (5' 3.4\")   Wt 73.1 kg (161 lb 1.6 oz)   LMP  (LMP Unknown)   SpO2 99%   BMI 28.18 kg/m    Body mass index is 28.18 kg/m .  Physical Exam   GENERAL: alert and no distress    Admission on 01/17/2025, Discharged on 01/17/2025   Component Date Value Ref Range Status    Stone Mass 01/17/2025 179  mg Final    Calculi Description 01/17/2025 See Note   Final    Specimen consists of numerous brown and tan calculi   fragments.  The total weight is 179 mg.    Stone Composition 01/17/2025 See Note   Final    Calculi composed primarily of:  30% calcium oxalate monohydrate,  50% calcium oxalate dihydrate, and  20% calcium phosphate (hydroxy- and carbonate- apatite).  INTERPRETIVE INFORMATION: Calculi (Stone) analysis    Calculi are the products of physiological processes that   yield crystalline compounds in a matrix of biological   compounds and blood.  Matrix components are not reported.    The clinically significant crystalline components   identified in calculi specimens are reported.  Gross   description may not be consistent with composition   determined by FTIR analysis.  Performed By: [employer]  [address] [place] [id number]  : Eugene Stevens MD, PhD  CLIA Number: [id number] "           Signed Electronically by: Mikala Philip MD

## 2025-03-01 ENCOUNTER — APPOINTMENT (OUTPATIENT)
Dept: GENERAL RADIOLOGY | Facility: CLINIC | Age: 85
End: 2025-03-01
Attending: EMERGENCY MEDICINE
Payer: COMMERCIAL

## 2025-03-01 ENCOUNTER — HOSPITAL ENCOUNTER (EMERGENCY)
Facility: CLINIC | Age: 85
Discharge: HOME | End: 2025-03-01
Attending: EMERGENCY MEDICINE | Admitting: EMERGENCY MEDICINE
Payer: COMMERCIAL

## 2025-03-01 ENCOUNTER — DOCUMENTATION ONLY (OUTPATIENT)
Dept: CARDIOLOGY | Facility: CLINIC | Age: 85
End: 2025-03-01

## 2025-03-01 VITALS
TEMPERATURE: 97.8 F | HEART RATE: 68 BPM | OXYGEN SATURATION: 97 % | RESPIRATION RATE: 14 BRPM | DIASTOLIC BLOOD PRESSURE: 80 MMHG | SYSTOLIC BLOOD PRESSURE: 169 MMHG

## 2025-03-01 DIAGNOSIS — R55 NEAR SYNCOPE: ICD-10-CM

## 2025-03-01 LAB
ALBUMIN SERPL BCG-MCNC: 3.5 G/DL (ref 3.5–5.2)
ALP SERPL-CCNC: 52 U/L (ref 40–150)
ALT SERPL W P-5'-P-CCNC: 11 U/L (ref 0–50)
ANION GAP SERPL CALCULATED.3IONS-SCNC: 10 MMOL/L (ref 7–15)
AST SERPL W P-5'-P-CCNC: 18 U/L (ref 0–45)
BASOPHILS # BLD AUTO: 0.1 10E3/UL (ref 0–0.2)
BASOPHILS NFR BLD AUTO: 2 %
BILIRUB SERPL-MCNC: 0.2 MG/DL
BUN SERPL-MCNC: 11.8 MG/DL (ref 8–23)
CALCIUM SERPL-MCNC: 9 MG/DL (ref 8.8–10.4)
CHLORIDE SERPL-SCNC: 101 MMOL/L (ref 98–107)
CREAT SERPL-MCNC: 0.85 MG/DL (ref 0.51–0.95)
EGFRCR SERPLBLD CKD-EPI 2021: 67 ML/MIN/1.73M2
EOSINOPHIL # BLD AUTO: 0.3 10E3/UL (ref 0–0.7)
EOSINOPHIL NFR BLD AUTO: 5 %
ERYTHROCYTE [DISTWIDTH] IN BLOOD BY AUTOMATED COUNT: 14.1 % (ref 10–15)
FLUAV RNA SPEC QL NAA+PROBE: NEGATIVE
FLUBV RNA RESP QL NAA+PROBE: NEGATIVE
GLUCOSE SERPL-MCNC: 118 MG/DL (ref 70–99)
HCO3 SERPL-SCNC: 26 MMOL/L (ref 22–29)
HCT VFR BLD AUTO: 32 % (ref 35–47)
HGB BLD-MCNC: 10.3 G/DL (ref 11.7–15.7)
HOLD SPECIMEN: NORMAL
HOLD SPECIMEN: NORMAL
IMM GRANULOCYTES # BLD: 0 10E3/UL
IMM GRANULOCYTES NFR BLD: 0 %
LYMPHOCYTES # BLD AUTO: 1 10E3/UL (ref 0.8–5.3)
LYMPHOCYTES NFR BLD AUTO: 18 %
MCH RBC QN AUTO: 26.5 PG (ref 26.5–33)
MCHC RBC AUTO-ENTMCNC: 32.2 G/DL (ref 31.5–36.5)
MCV RBC AUTO: 82 FL (ref 78–100)
MONOCYTES # BLD AUTO: 0.6 10E3/UL (ref 0–1.3)
MONOCYTES NFR BLD AUTO: 11 %
NEUTROPHILS # BLD AUTO: 3.4 10E3/UL (ref 1.6–8.3)
NEUTROPHILS NFR BLD AUTO: 63 %
NRBC # BLD AUTO: 0 10E3/UL
NRBC BLD AUTO-RTO: 0 /100
NT-PROBNP SERPL-MCNC: 645 PG/ML (ref 0–1800)
PLATELET # BLD AUTO: 349 10E3/UL (ref 150–450)
POTASSIUM SERPL-SCNC: 3.8 MMOL/L (ref 3.4–5.3)
PROT SERPL-MCNC: 6.1 G/DL (ref 6.4–8.3)
RBC # BLD AUTO: 3.89 10E6/UL (ref 3.8–5.2)
RSV RNA SPEC NAA+PROBE: NEGATIVE
SARS-COV-2 RNA RESP QL NAA+PROBE: NEGATIVE
SODIUM SERPL-SCNC: 137 MMOL/L (ref 135–145)
TROPONIN T SERPL HS-MCNC: 15 NG/L
TROPONIN T SERPL HS-MCNC: 18 NG/L
WBC # BLD AUTO: 5.3 10E3/UL (ref 4–11)

## 2025-03-01 PROCEDURE — 83880 ASSAY OF NATRIURETIC PEPTIDE: CPT | Performed by: EMERGENCY MEDICINE

## 2025-03-01 PROCEDURE — 99285 EMERGENCY DEPT VISIT HI MDM: CPT | Mod: 25

## 2025-03-01 PROCEDURE — 93005 ELECTROCARDIOGRAM TRACING: CPT

## 2025-03-01 PROCEDURE — 84484 ASSAY OF TROPONIN QUANT: CPT | Performed by: EMERGENCY MEDICINE

## 2025-03-01 PROCEDURE — 85025 COMPLETE CBC W/AUTO DIFF WBC: CPT | Performed by: EMERGENCY MEDICINE

## 2025-03-01 PROCEDURE — 71046 X-RAY EXAM CHEST 2 VIEWS: CPT

## 2025-03-01 PROCEDURE — 82040 ASSAY OF SERUM ALBUMIN: CPT | Performed by: EMERGENCY MEDICINE

## 2025-03-01 PROCEDURE — 87637 SARSCOV2&INF A&B&RSV AMP PRB: CPT | Performed by: EMERGENCY MEDICINE

## 2025-03-01 PROCEDURE — 36415 COLL VENOUS BLD VENIPUNCTURE: CPT | Performed by: EMERGENCY MEDICINE

## 2025-03-01 ASSESSMENT — ACTIVITIES OF DAILY LIVING (ADL)
ADLS_ACUITY_SCORE: 55

## 2025-03-02 LAB
ATRIAL RATE - MUSE: 60 BPM
DIASTOLIC BLOOD PRESSURE - MUSE: NORMAL MMHG
INTERPRETATION ECG - MUSE: NORMAL
P AXIS - MUSE: -63 DEGREES
PR INTERVAL - MUSE: 248 MS
QRS DURATION - MUSE: 188 MS
QT - MUSE: 464 MS
QTC - MUSE: 464 MS
R AXIS - MUSE: -47 DEGREES
SYSTOLIC BLOOD PRESSURE - MUSE: NORMAL MMHG
T AXIS - MUSE: 113 DEGREES
VENTRICULAR RATE- MUSE: 60 BPM

## 2025-03-02 NOTE — ED PROVIDER NOTES
Emergency Department Note      History of Present Illness     Chief Complaint   Chest Pain      HPI   Hamida Verma is a 84 year old female with a history of COPD, CKD, CHF, NSTEMI with stents to the left circumflex as well as RCA, on Xarelto, history of paroxysmal A-fib, hypertension, who presents to the emergency room after feeling very lightheaded when she was watching TV this evening.  She did not lose consciousness but felt like she was in a pass out for about 5 minutes.  She has had a cough for couple days and a runny nose but no fevers.  She is on anticoagulation but has not noted any blood in her stool.  Denies any abdominal pain she denies any chest pain or fevers.  No change in her medications.    Independent Historian   None    Review of External Notes   From February 4, 2025 Mikala avery MD patient was seen for follow-up from being hospitalized with acute cystitis and ureterolithiasis.    Past Medical History     Medical History and Problem List   Past Medical History:   Diagnosis Date    Acute cholecystitis 05/31/2023    Acute cystitis without hematuria 05/30/2023    Atrial fibrillation (H)     Biliary colic 05/30/2023    Chest pain, unspecified type 12/19/2023    Chronic diastolic CHF (congestive heart failure) (H)     COPD (chronic obstructive pulmonary disease) (H)     Essential hypertension, benign     HYPERLIPIDEMIA NEC/NOS 03/30/2006    Pulmonary hypertension (H)     Pyelonephritis 2019    SSS (sick sinus syndrome) (H)        Medications   acetaminophen (TYLENOL) 500 MG tablet  albuterol (PROAIR HFA/PROVENTIL HFA/VENTOLIN HFA) 108 (90 Base) MCG/ACT inhaler  carvedilol (COREG) 12.5 MG tablet  cholecalciferol (VITAMIN  -D) 1000 UNIT capsule  FLAX SEED OIL OR  furosemide (LASIX) 20 MG tablet  Multiple Vitamins-Minerals (HAIR SKIN NAILS PO)  Multiple Vitamins-Minerals (ICAPS AREDS 2 PO)  neomycin-polymyxin-dexAMETHasone (MAXITROL) 3.5-41077-6.1 ophthalmic ointment  nitroGLYcerin (NITROSTAT) 0.4 MG  sublingual tablet  ondansetron (ZOFRAN ODT) 4 MG ODT tab  potassium chloride carley ER (KLOR-CON M10) 10 MEQ CR tablet  rivaroxaban ANTICOAGULANT (XARELTO) 15 MG TABS tablet  rosuvastatin (CRESTOR) 40 MG tablet  sacubitril-valsartan (ENTRESTO) 49-51 MG per tablet  senna-docusate (SENOKOT-S/PERICOLACE) 8.6-50 MG tablet  tamsulosin (FLOMAX) 0.4 MG capsule        Surgical History   Past Surgical History:   Procedure Laterality Date    COMBINED CYSTOSCOPY, INSERT STENT URETER(S) Right 10/11/2024    Procedure: Cystoscopy, With Retrograde Pyelogram and Insert Right Ureteral Stent;  Surgeon: Toni Yuen MD;  Location:  OR    COMBINED CYSTOSCOPY, RETROGRADES, URETEROSCOPY, LASER HOLMIUM LITHOTRIPSY URETER(S), INSERT STENT Right 1/17/2025    Procedure: CYSTOURETEROSCOPY, WITH RETROGRADE PYELOGRAM, HOLMIUM LASER LITHOTRIPSY OF URETERAL AND RENAL CALCULUS, AND STENT EXCHANGE, RIGHT;  Surgeon: Toni Yuen MD;  Location:  OR    CV CORONARY ANGIOGRAM N/A 12/7/2023    Procedure: Coronary Angiogram;  Surgeon: Mookie Yates MD;  Location:  HEART CARDIAC CATH LAB    CV CORONARY ANGIOGRAM N/A 12/8/2023    Procedure: Coronary Angiogram;  Surgeon: Stephanie Monroy MD;  Location: [facility] CARDIAC CATH LAB    CV CORONARY ANGIOGRAM N/A 12/21/2023    Procedure: Coronary Angiogram;  Surgeon: Eric Barajas MD;  Location: [facility] CARDIAC CATH LAB    CV PCI N/A 12/8/2023    Procedure: Percutaneous Coronary Intervention;  Surgeon: Stephanie Monroy MD;  Location: [facility] CARDIAC CATH LAB    CV PCI STENT DRUG ELUTING N/A 12/21/2023    Procedure: Percutaneous Coronary Intervention Stent;  Surgeon: Eric Barajas MD;  Location: [facility] CARDIAC CATH LAB    EP ABLATION AV NODE N/A 5/7/2019    Procedure: EP Ablation AV Node;  Surgeon: Roe Voss MD;  Location:  HEART CARDIAC CATH LAB    EYE SURGERY      HYSTERECTOMY, VAGINAL      hysterectomy    LAPAROSCOPIC CHOLECYSTECTOMY N/A 6/1/2023     Procedure: Laparoscopic cholecystectomy with lysis of adhesions;  Surgeon: Sawyer Marcum MD;  Location:  OR       Physical Exam     Patient Vitals for the past 24 hrs:   BP Temp Temp src Pulse Resp SpO2   03/01/25 2200 (!) 169/80 -- -- 68 14 97 %   03/01/25 2030 -- -- -- 60 -- --   03/01/25 1925 (!) 151/90 -- -- 61 18 96 %   03/01/25 1924 (!) 151/90 97.8  F (36.6  C) Oral 61 -- --     Lying Orthostatic BP  Lying Orthostatic BP: 167/70  Lying Orthostatic Pulse: 61 bpm  Sitting Orthostatic BP  Sitting Orthostatic BP: 174/86  Sitting Orthostatic Pulse: 64 bpm  Standing Orthostatic BP  Standing Orthostatic BP: 160/87  Standing Orthostatic Pulse: 69 bpm      Physical Exam    Physical Exam   Constitutional:  Patient is oriented to person, place, and time. They appear well-developed and well-nourished.    HENT:   Mouth/Throat:   Oropharynx is clear and moist.   Eyes:    Conjunctivae normal and EOM are normal. Pupils are equal, round, and reactive to light.   Neck:    Normal range of motion.   Cardiovascular: Normal rate, regular rhythm and normal heart sounds.  Exam reveals no gallop and no friction rub.  No murmur heard.  Pulmonary/Chest:  Effort normal and breath sounds normal. Patient has no wheezes. Patient has no rales.   Abdominal:   Soft. Bowel sounds are normal. Patient exhibits no mass. There is no tenderness. There is no rebound and no guarding.   Musculoskeletal:  Normal range of motion. Patient exhibits no edema.   Neurological:   Patient is alert and oriented to person, place, and time. Patient generally weak. No cranial nerve deficit or sensory deficit. GCS 15  Skin:   Skin is warm and dry. No rash noted. No erythema.   Psychiatric:   Patient has a normal mood    Lab Results   Labs Ordered and Resulted from Time of ED Arrival to Time of ED Departure   COMPREHENSIVE METABOLIC PANEL - Abnormal       Result Value    Sodium 137      Potassium 3.8      Carbon Dioxide (CO2) 26      Anion Gap 10      Urea  Nitrogen 11.8      Creatinine 0.85      GFR Estimate 67      Calcium 9.0      Chloride 101      Glucose 118 (*)     Alkaline Phosphatase 52      AST 18      ALT 11      Protein Total 6.1 (*)     Albumin 3.5      Bilirubin Total 0.2     TROPONIN T, HIGH SENSITIVITY - Abnormal    Troponin T, High Sensitivity 15 (*)    CBC WITH PLATELETS AND DIFFERENTIAL - Abnormal    WBC Count 5.3      RBC Count 3.89      Hemoglobin 10.3 (*)     Hematocrit 32.0 (*)     MCV 82      MCH 26.5      MCHC 32.2      RDW 14.1      Platelet Count 349      % Neutrophils 63      % Lymphocytes 18      % Monocytes 11      % Eosinophils 5      % Basophils 2      % Immature Granulocytes 0      NRBCs per 100 WBC 0      Absolute Neutrophils 3.4      Absolute Lymphocytes 1.0      Absolute Monocytes 0.6      Absolute Eosinophils 0.3      Absolute Basophils 0.1      Absolute Immature Granulocytes 0.0      Absolute NRBCs 0.0     TROPONIN T, HIGH SENSITIVITY - Abnormal    Troponin T, High Sensitivity 18 (*)    NT PROBNP INPATIENT - Normal    N terminal Pro BNP Inpatient 645     INFLUENZA A/B, RSV AND SARS-COV2 PCR - Normal    Influenza A PCR Negative      Influenza B PCR Negative      RSV PCR Negative      SARS CoV2 PCR Negative         Imaging   XR Chest 2 Views   Final Result   IMPRESSION: Heart size and pulmonary vessels are normal. Dual-chamber pacemaker present. Heavy aortic calcifications. Lungs are clear.          EKG   ECG results from 03/01/25   EKG 12-lead, tracing only     Value    Systolic Blood Pressure     Diastolic Blood Pressure     Ventricular Rate 60    Atrial Rate 60    WI Interval 248    QRS Duration 188        QTc 464    P Axis -63    R AXIS -47    T Axis 113    Interpretation ECG      AV dual-paced rhythm with prolonged AV conduction  Abnormal ECG  When compared with ECG of 10-Oct-2024 17:35,  Vent. rate has decreased by  12 bpm           Independent Interpretation   None    ED Course      Medications Administered   Medications  - No data to display    Procedures   Procedures     Discussion of Management     [address] [employer] representative.  We did interrogate the pacemaker there are no abnormalities.  She is on 100% dependent on the pacing.  ED Course        Additional Documentation  None    Medical Decision Making / Diagnosis     CMS Diagnoses: None    MIPS       None    MDM   Hamida Verma is a 84 year old female who presents to the emergency department feeling faint and like she was going to pass out she did not have any chest pain it is noted that her chief complaint was chest pain but she tells me she had no discomfort no shortness of breath.  She had no focal deficits on exam.  Her EKG was performed that shows that she is paced this looks unchanged from previous.  Blood work was obtained as well as imaging.  Her chest x-ray does not show any acute congestive heart failure or infiltrate.  She is influenza COVID RSV negative.  Her BNP is within normal limits.  Her troponins while detectable are flat.  She has chronic anemia she tends to run between 9 and 12.  This is stable for her.  Interrogated her pacemaker it is [employer] there were no abnormalities she is 100% dependent on pacing.  Orthostatics were performed on her she was asymptomatic and these were normal.  At this time given her reassuring workup her lack of symptoms at this time we will discharge her close follow-up with primary care doctor.    Disposition   The patient was discharged.     Diagnosis     ICD-10-CM    1. Near syncope  R55            Discharge Medications   New Prescriptions    No medications on file         MD Liseth Rodriguez Michelle Ann, MD  03/01/25 8530

## 2025-03-02 NOTE — ED NOTES
Bed: ED09  Expected date:   Expected time:   Means of arrival:   Comments:  HEMS 412 84F chest discomfort, took own nitroglycerin, EKG normal, eta 1905

## 2025-03-02 NOTE — ED TRIAGE NOTES
BIBA from home after having dizzy spell and near syncopal episode. Reports no loss of consciousness. Pt had mild chest discomfort radiating to left arm following and took 1 dose of nitroglycerin a home. Pt has Hx of BBB, pacemaker, previous MI. Pt now feeling back to baseline, denies CP, SOB.

## 2025-03-04 ENCOUNTER — OFFICE VISIT (OUTPATIENT)
Dept: FAMILY MEDICINE | Facility: CLINIC | Age: 85
End: 2025-03-04
Payer: COMMERCIAL

## 2025-03-04 VITALS
HEIGHT: 63 IN | RESPIRATION RATE: 18 BRPM | WEIGHT: 155.5 LBS | BODY MASS INDEX: 27.55 KG/M2 | HEART RATE: 68 BPM | SYSTOLIC BLOOD PRESSURE: 146 MMHG | TEMPERATURE: 96.4 F | OXYGEN SATURATION: 98 % | DIASTOLIC BLOOD PRESSURE: 70 MMHG

## 2025-03-04 DIAGNOSIS — R42 DIZZINESS: Primary | ICD-10-CM

## 2025-03-04 DIAGNOSIS — I10 HYPERTENSION GOAL BP (BLOOD PRESSURE) < 140/90: ICD-10-CM

## 2025-03-04 PROCEDURE — 90480 ADMN SARSCOV2 VAC 1/ONLY CMP: CPT | Performed by: FAMILY MEDICINE

## 2025-03-04 PROCEDURE — 3078F DIAST BP <80 MM HG: CPT | Performed by: FAMILY MEDICINE

## 2025-03-04 PROCEDURE — 91320 SARSCV2 VAC 30MCG TRS-SUC IM: CPT | Performed by: FAMILY MEDICINE

## 2025-03-04 PROCEDURE — 1126F AMNT PAIN NOTED NONE PRSNT: CPT | Performed by: FAMILY MEDICINE

## 2025-03-04 PROCEDURE — 3077F SYST BP >= 140 MM HG: CPT | Performed by: FAMILY MEDICINE

## 2025-03-04 PROCEDURE — 99214 OFFICE O/P EST MOD 30 MIN: CPT | Performed by: FAMILY MEDICINE

## 2025-03-04 ASSESSMENT — PAIN SCALES - GENERAL: PAINLEVEL_OUTOF10: NO PAIN (0)

## 2025-03-04 NOTE — PROGRESS NOTES
"  Assessment & Plan     Dizziness  Reviewed ER workup.  Discussed with patient and granddaughter.  They understood.  Negative workup.  We discussed symptomatic care.  Monitor.  When to seek follow-up care discussed.  Possible etiologies including furosemide and tamsulosin can contribute.  At this point no medication changes but if symptoms are persistent or recurrent we can adjust Rx if needed.  Also on Entresto.    Htn  Bp above goal.   We agreed not to make any changes in rx due to above.       MED REC REQUIRED  Post Medication Reconciliation Status:  Discharge medications reconciled, continue medications without change  BMI  Estimated body mass index is 27.55 kg/m  as calculated from the following:    Height as of this encounter: 1.6 m (5' 3\").    Weight as of this encounter: 70.5 kg (155 lb 8 oz).              Monisha Mei is a 84 year old, presenting for the following health issues:  Hospital F/U      3/4/2025    10:00 AM   Additional Questions   Roomed by Grant   Accompanied by Yamila \"Zoe\"     \[facility] [place] [facility]\""        ED/UC Followup:    Facility:  [facility]  Date of visit: 03/01/2025  Reason for visit: Near Syncope and Chest pain  Current Status: Doing good, still feeling off balance occasionally.    Here with her granddaughter.    Was having dizziness even when not standing up and had to call 911. No specific trigger.    Previously some dizziness with position change. Usually takes it slowly.   Granddaughter lives with her.     Using lasix daily.     Coreg bid. Flomax daily.     Pacemaker was also checked out.     One great great grandkid and 5 grand kids.        Objective    LMP  (LMP Unknown)   There is no height or weight on file to calculate BMI.  Physical Exam   Speech clear  Lungs clear  Heart RRR            Signed Electronically by: Shravan Olivia MD, MD    "

## 2025-03-27 NOTE — PROGRESS NOTES
[facility] received from [facility] ED on 3/1/25.  Patient seen for dizzy spell and near syncopal episode and PPM interrogation requested. No abnormal findings, discharged to home.     (late entry on 3/27/25)     Presenting rhythm: AP/  Battery: 3.5 years  Lead Measurements: stable  Atrial Arrhythmias: none  Ventricular Arrhythmias: none     Follow-up Care: Followup remote on 4/8/25.     JULY Best

## 2025-04-08 ENCOUNTER — ANCILLARY PROCEDURE (OUTPATIENT)
Dept: CARDIOLOGY | Facility: CLINIC | Age: 85
End: 2025-04-08
Attending: INTERNAL MEDICINE
Payer: COMMERCIAL

## 2025-04-08 DIAGNOSIS — Z95.0 CARDIAC PACEMAKER IN SITU: ICD-10-CM

## 2025-04-08 DIAGNOSIS — Z98.890 S/P AV NODAL ABLATION: ICD-10-CM

## 2025-04-08 PROCEDURE — 93296 REM INTERROG EVL PM/IDS: CPT | Performed by: INTERNAL MEDICINE

## 2025-04-08 PROCEDURE — 93294 REM INTERROG EVL PM/LDLS PM: CPT | Performed by: INTERNAL MEDICINE

## 2025-04-09 LAB
MDC_IDC_LEAD_CONNECTION_STATUS: NORMAL
MDC_IDC_LEAD_CONNECTION_STATUS: NORMAL
MDC_IDC_LEAD_IMPLANT_DT: NORMAL
MDC_IDC_LEAD_IMPLANT_DT: NORMAL
MDC_IDC_LEAD_LOCATION: NORMAL
MDC_IDC_LEAD_LOCATION: NORMAL
MDC_IDC_LEAD_LOCATION_DETAIL_1: NORMAL
MDC_IDC_LEAD_LOCATION_DETAIL_1: NORMAL
MDC_IDC_LEAD_MFG: NORMAL
MDC_IDC_LEAD_MFG: NORMAL
MDC_IDC_LEAD_MODEL: NORMAL
MDC_IDC_LEAD_MODEL: NORMAL
MDC_IDC_LEAD_POLARITY_TYPE: NORMAL
MDC_IDC_LEAD_POLARITY_TYPE: NORMAL
MDC_IDC_LEAD_SERIAL: NORMAL
MDC_IDC_LEAD_SERIAL: NORMAL
MDC_IDC_MSMT_BATTERY_DTM: NORMAL
MDC_IDC_MSMT_BATTERY_REMAINING_LONGEVITY: 41 MO
MDC_IDC_MSMT_BATTERY_REMAINING_PERCENTAGE: 31 %
MDC_IDC_MSMT_BATTERY_RRT_TRIGGER: NORMAL
MDC_IDC_MSMT_BATTERY_STATUS: NORMAL
MDC_IDC_MSMT_BATTERY_VOLTAGE: 2.93 V
MDC_IDC_MSMT_LEADCHNL_RA_IMPEDANCE_VALUE: 490 OHM
MDC_IDC_MSMT_LEADCHNL_RA_LEAD_CHANNEL_STATUS: NORMAL
MDC_IDC_MSMT_LEADCHNL_RA_PACING_THRESHOLD_AMPLITUDE: 0.5 V
MDC_IDC_MSMT_LEADCHNL_RA_PACING_THRESHOLD_PULSEWIDTH: 0.4 MS
MDC_IDC_MSMT_LEADCHNL_RA_SENSING_INTR_AMPL: 2.2 MV
MDC_IDC_MSMT_LEADCHNL_RV_IMPEDANCE_VALUE: 490 OHM
MDC_IDC_MSMT_LEADCHNL_RV_LEAD_CHANNEL_STATUS: NORMAL
MDC_IDC_MSMT_LEADCHNL_RV_PACING_THRESHOLD_AMPLITUDE: 0.62 V
MDC_IDC_MSMT_LEADCHNL_RV_PACING_THRESHOLD_PULSEWIDTH: 0.4 MS
MDC_IDC_MSMT_LEADCHNL_RV_SENSING_INTR_AMPL: 12 MV
MDC_IDC_PG_IMPLANT_DTM: NORMAL
MDC_IDC_PG_MFG: NORMAL
MDC_IDC_PG_MODEL: NORMAL
MDC_IDC_PG_SERIAL: NORMAL
MDC_IDC_PG_TYPE: NORMAL
MDC_IDC_SESS_CLINIC_NAME: NORMAL
MDC_IDC_SESS_DTM: NORMAL
MDC_IDC_SESS_REPROGRAMMED: NO
MDC_IDC_SESS_TYPE: NORMAL
MDC_IDC_SET_BRADY_AT_MODE_SWITCH_MODE: NORMAL
MDC_IDC_SET_BRADY_AT_MODE_SWITCH_RATE: 170 {BEATS}/MIN
MDC_IDC_SET_BRADY_LOWRATE: 60 {BEATS}/MIN
MDC_IDC_SET_BRADY_MAX_SENSOR_RATE: 120 {BEATS}/MIN
MDC_IDC_SET_BRADY_MAX_TRACKING_RATE: 120 {BEATS}/MIN
MDC_IDC_SET_BRADY_MODE: NORMAL
MDC_IDC_SET_BRADY_PAV_DELAY_LOW: 250 MS
MDC_IDC_SET_BRADY_SAV_DELAY_LOW: 225 MS
MDC_IDC_SET_LEADCHNL_RA_PACING_AMPLITUDE: 1.5 V
MDC_IDC_SET_LEADCHNL_RA_PACING_ANODE_ELECTRODE_1: NORMAL
MDC_IDC_SET_LEADCHNL_RA_PACING_ANODE_LOCATION_1: NORMAL
MDC_IDC_SET_LEADCHNL_RA_PACING_CAPTURE_MODE: NORMAL
MDC_IDC_SET_LEADCHNL_RA_PACING_CATHODE_ELECTRODE_1: NORMAL
MDC_IDC_SET_LEADCHNL_RA_PACING_CATHODE_LOCATION_1: NORMAL
MDC_IDC_SET_LEADCHNL_RA_PACING_POLARITY: NORMAL
MDC_IDC_SET_LEADCHNL_RA_PACING_PULSEWIDTH: 0.4 MS
MDC_IDC_SET_LEADCHNL_RA_SENSING_ADAPTATION_MODE: NORMAL
MDC_IDC_SET_LEADCHNL_RA_SENSING_ANODE_ELECTRODE_1: NORMAL
MDC_IDC_SET_LEADCHNL_RA_SENSING_ANODE_LOCATION_1: NORMAL
MDC_IDC_SET_LEADCHNL_RA_SENSING_CATHODE_ELECTRODE_1: NORMAL
MDC_IDC_SET_LEADCHNL_RA_SENSING_CATHODE_LOCATION_1: NORMAL
MDC_IDC_SET_LEADCHNL_RA_SENSING_POLARITY: NORMAL
MDC_IDC_SET_LEADCHNL_RA_SENSING_SENSITIVITY: 0.3 MV
MDC_IDC_SET_LEADCHNL_RV_PACING_AMPLITUDE: 0.88
MDC_IDC_SET_LEADCHNL_RV_PACING_ANODE_ELECTRODE_1: NORMAL
MDC_IDC_SET_LEADCHNL_RV_PACING_ANODE_LOCATION_1: NORMAL
MDC_IDC_SET_LEADCHNL_RV_PACING_CAPTURE_MODE: NORMAL
MDC_IDC_SET_LEADCHNL_RV_PACING_CATHODE_ELECTRODE_1: NORMAL
MDC_IDC_SET_LEADCHNL_RV_PACING_CATHODE_LOCATION_1: NORMAL
MDC_IDC_SET_LEADCHNL_RV_PACING_POLARITY: NORMAL
MDC_IDC_SET_LEADCHNL_RV_PACING_PULSEWIDTH: 0.4 MS
MDC_IDC_SET_LEADCHNL_RV_SENSING_ADAPTATION_MODE: NORMAL
MDC_IDC_SET_LEADCHNL_RV_SENSING_ANODE_ELECTRODE_1: NORMAL
MDC_IDC_SET_LEADCHNL_RV_SENSING_ANODE_LOCATION_1: NORMAL
MDC_IDC_SET_LEADCHNL_RV_SENSING_CATHODE_ELECTRODE_1: NORMAL
MDC_IDC_SET_LEADCHNL_RV_SENSING_CATHODE_LOCATION_1: NORMAL
MDC_IDC_SET_LEADCHNL_RV_SENSING_POLARITY: NORMAL
MDC_IDC_SET_LEADCHNL_RV_SENSING_SENSITIVITY: 0.5 MV
MDC_IDC_STAT_AT_BURDEN_PERCENT: 0 %
MDC_IDC_STAT_AT_DTM_END: NORMAL
MDC_IDC_STAT_AT_DTM_START: NORMAL
MDC_IDC_STAT_AT_MODE_SW_COUNT: 0
MDC_IDC_STAT_AT_MODE_SW_COUNT_PER_DAY: 0
MDC_IDC_STAT_AT_MODE_SW_PERCENT_TIME: 0 %
MDC_IDC_STAT_BRADY_AP_VP_PERCENT: 38 %
MDC_IDC_STAT_BRADY_AP_VS_PERCENT: 1 %
MDC_IDC_STAT_BRADY_AS_VP_PERCENT: 62 %
MDC_IDC_STAT_BRADY_AS_VS_PERCENT: 1 %
MDC_IDC_STAT_BRADY_DTM_END: NORMAL
MDC_IDC_STAT_BRADY_DTM_START: NORMAL
MDC_IDC_STAT_BRADY_RA_PERCENT_PACED: 37 %
MDC_IDC_STAT_BRADY_RV_PERCENT_PACED: 99 %
MDC_IDC_STAT_CRT_DTM_END: NORMAL
MDC_IDC_STAT_CRT_DTM_START: NORMAL
MDC_IDC_STAT_HEART_RATE_ATRIAL_MAX: 320 {BEATS}/MIN
MDC_IDC_STAT_HEART_RATE_ATRIAL_MEAN: 69 {BEATS}/MIN
MDC_IDC_STAT_HEART_RATE_ATRIAL_MIN: 50 {BEATS}/MIN
MDC_IDC_STAT_HEART_RATE_DTM_END: NORMAL
MDC_IDC_STAT_HEART_RATE_DTM_START: NORMAL
MDC_IDC_STAT_HEART_RATE_VENTRICULAR_MAX: 200 {BEATS}/MIN
MDC_IDC_STAT_HEART_RATE_VENTRICULAR_MEAN: 69 {BEATS}/MIN
MDC_IDC_STAT_HEART_RATE_VENTRICULAR_MIN: 40 {BEATS}/MIN

## 2025-04-14 NOTE — CONSULTS
NUTRITION EDUCATION    REASON FOR ASSESSMENT:  Nutrition education on American Heart Association (AHA) Heart Healthy Diet.    NUTRITION HISTORY:  Information obtained from patient in room.   She reports limited knowledge of the heart healthy diet, but she has been working on limiting salt in her diet. She was very receptive to education session and tips provided, though did note some barriers.   - She lives with her daughter and granddaughter. Daughter was recently diagnosed with lewy body dementia, so although she used to do a lot of the cooking she no longer can.   - Patient's granddaughter takes care of the daughter/her mother, but when she cooks it is typically less healthy meals - hamburgers and fries.   - Hamida is set up to attend the cardiac rehab clinic. She is motivated to make changes (such as limit salt), and even try some Mediterranean recipes.     CURRENT DIET ORDER:  Low Sat Fat, Na <2400 mg    NUTRITION DIAGNOSIS:  Food- and nutrition-related knowledge deficit R/t heart healthy diet AEB patient reports minimal prior knowledge.      INTERVENTIONS:  Nutrition Prescription:  Recommended AHA Heart Healthy Diet    Implementation:   Nutrition Education (Content):  reviewed Heart Healthy Diet guidelines  provided heart healthy diet handout  Nutrition Education (Application):  Discussed current eating habits and recommended alternative food choices  Anticipate good compliance  Diet Education - refer to Education flowsheet    Goals:  Patient verbalizes understanding of diet  All of the above goals met during education session    Follow Up/Monitoring:  Provided RD contact information for future questions    Liz Childers RD, LD  Pager: [phone]  Weekend Pager: [phone]      Oriented - self; Oriented - place; Oriented - time

## 2025-07-08 ENCOUNTER — OFFICE VISIT (OUTPATIENT)
Dept: FAMILY MEDICINE | Facility: CLINIC | Age: 85
End: 2025-07-08
Payer: COMMERCIAL

## 2025-07-08 VITALS
SYSTOLIC BLOOD PRESSURE: 120 MMHG | RESPIRATION RATE: 19 BRPM | WEIGHT: 162 LBS | OXYGEN SATURATION: 97 % | HEIGHT: 64 IN | TEMPERATURE: 97.4 F | BODY MASS INDEX: 27.66 KG/M2 | DIASTOLIC BLOOD PRESSURE: 80 MMHG | HEART RATE: 70 BPM

## 2025-07-08 DIAGNOSIS — Z00.00 ENCOUNTER FOR MEDICARE ANNUAL WELLNESS EXAM: Primary | ICD-10-CM

## 2025-07-08 DIAGNOSIS — Z23 NEED FOR VACCINATION: ICD-10-CM

## 2025-07-08 DIAGNOSIS — R91.8 PULMONARY NODULES: ICD-10-CM

## 2025-07-08 DIAGNOSIS — Z79.899 MEDICATION MANAGEMENT: ICD-10-CM

## 2025-07-08 DIAGNOSIS — Z78.0 POST-MENOPAUSE: ICD-10-CM

## 2025-07-08 DIAGNOSIS — H91.93 BILATERAL HEARING LOSS, UNSPECIFIED HEARING LOSS TYPE: ICD-10-CM

## 2025-07-08 DIAGNOSIS — N18.31 STAGE 3A CHRONIC KIDNEY DISEASE (H): ICD-10-CM

## 2025-07-08 LAB
CREAT UR-MCNC: 80.7 MG/DL
ERYTHROCYTE [DISTWIDTH] IN BLOOD BY AUTOMATED COUNT: 14.4 % (ref 10–15)
HCT VFR BLD AUTO: 36.5 % (ref 35–47)
HGB BLD-MCNC: 11.4 G/DL (ref 11.7–15.7)
MCH RBC QN AUTO: 27.5 PG (ref 26.5–33)
MCHC RBC AUTO-ENTMCNC: 31.2 G/DL (ref 31.5–36.5)
MCV RBC AUTO: 88 FL (ref 78–100)
MICROALBUMIN UR-MCNC: 46.9 MG/L
MICROALBUMIN/CREAT UR: 58.12 MG/G CR (ref 0–25)
PLATELET # BLD AUTO: 339 10E3/UL (ref 150–450)
RBC # BLD AUTO: 4.14 10E6/UL (ref 3.8–5.2)
WBC # BLD AUTO: 6.7 10E3/UL (ref 4–11)

## 2025-07-08 PROCEDURE — 1126F AMNT PAIN NOTED NONE PRSNT: CPT | Performed by: FAMILY MEDICINE

## 2025-07-08 PROCEDURE — 82570 ASSAY OF URINE CREATININE: CPT | Performed by: FAMILY MEDICINE

## 2025-07-08 PROCEDURE — 36415 COLL VENOUS BLD VENIPUNCTURE: CPT | Performed by: FAMILY MEDICINE

## 2025-07-08 PROCEDURE — G2211 COMPLEX E/M VISIT ADD ON: HCPCS | Performed by: FAMILY MEDICINE

## 2025-07-08 PROCEDURE — 3074F SYST BP LT 130 MM HG: CPT | Performed by: FAMILY MEDICINE

## 2025-07-08 PROCEDURE — G0439 PPPS, SUBSEQ VISIT: HCPCS | Performed by: FAMILY MEDICINE

## 2025-07-08 PROCEDURE — 82043 UR ALBUMIN QUANTITATIVE: CPT | Performed by: FAMILY MEDICINE

## 2025-07-08 PROCEDURE — 85027 COMPLETE CBC AUTOMATED: CPT | Performed by: FAMILY MEDICINE

## 2025-07-08 PROCEDURE — 99214 OFFICE O/P EST MOD 30 MIN: CPT | Mod: 25 | Performed by: FAMILY MEDICINE

## 2025-07-08 PROCEDURE — 3079F DIAST BP 80-89 MM HG: CPT | Performed by: FAMILY MEDICINE

## 2025-07-08 ASSESSMENT — PAIN SCALES - GENERAL: PAINLEVEL_OUTOF10: NO PAIN (0)

## 2025-07-08 NOTE — PROGRESS NOTES
Preventive Care Visit  [facility]  Mikala Philip MD  Family Medicine  Jul 8, 2025      Assessment & Plan       ICD-10-CM    1. Encounter for Medicare annual wellness exam  Z00.00       2. Stage 3a chronic kidney disease (H)  N18.31 Albumin Random Urine Quantitative with Creat Ratio     CBC with platelets     Albumin Random Urine Quantitative with Creat Ratio     CBC with platelets      3. Need for vaccination  Z23       4. Medication management  Z79.899 Med Therapy Management Referral      5. Post-menopause  Z78.0 DX Bone Density      6. Pulmonary nodules  R91.8 CT Chest w/o Contrast      7. Bilateral hearing loss, unspecified hearing loss type  H91.93 Adult Audiology  Referral           Wellness visit  Doing well   Immunizations Recommended RSV, tdap and shingrix vaccine       Kidney stones  Feels so much better since her 1/17/25 procedure.   She was hospitalized for acute cystitis and ureterolithiasis in 10/24. She underwent cystoscopy and stent placement. On 1/17/25, she had laser lithotripsy with basket stone extraction, removal of previous ureteral stent and right ureteral stent placement   Plan was to follow up with urology in April-overdue for follow up  .      History of NSTEMI (non-ST elevated myocardial infarction) (H)  CAD  Had nstemi and PCI To left circumflex on 12/8/23 and hospitalized again on 12/22/23 with nstemi and needed PCI to RCA.   Right arm pain was her anginal equivalent and is completely resolved.   Continues  xarelto.   Feeling well.   Denies any concerns.   Last cardiology visit note from 1/7/2025      Hypertension   controlled  Managed by cardiology  Also has h/o orthostatic hypotension  lightheadedness with standing.    Allow bps to run in the 130s to prevent worsening symptoms.   Continues entresto, furosemide, carvedilol, potassium     Chronic diastolic CHF (congestive heart failure) (H)  Mild decreased LVEF-monitored by cardiology  Has chronic  afib and permanent pacemaker  Continues entresto, carvedilol and lasix. If worsening symptoms, could consider spironolactone and SGLT2 inhibitor     A-fib with initial difficult to control rates and AV node ablation and pacemater in place  Continues xarelto  Follows with cardiology      Dyslipidemia LDL goal <70 LDL 66 7/22/24  Continues rosuvastatin 40mg daily. Working on eating better   .   CKD (chronic kidney disease) stage 3, GFR 30-59 ml/min (H)  Avoid nephrotoxic agents. CMP stable  3/25     COPD   Dyspnea previously improved after starting spiriva and albuterol.    She reports that she does not use the spiriva - does not feel like she needs it and it is too expensive Has not needed to use albuterol for quite awhile now.      Denies wheezing or coughing. Tried Spiriva in the past, but was not sure if it made much difference.  When she saw the lung specialist in 2019 they did not feel like she necessarily needed to be on any inhaler at that time and last she was noticing benefit from the Spiriva.  She had been given Anoro, which had been cost prohibitive.          Pulmonary nodules  Incidental findings. Has copd history and smoking history. Also sclerotic focus on T4. CT scan completed 3/1/24 with stable nodules and recommendation to repeat CT in 6-12 months.  Order placed.         Osteoporosis  Had been on alendronate from  2017 and stopped it in 2022 (she had about 5 years of treatment).  Last DEXA was in 10/21 unchanged to mildly improved.    Will recheck DEXA this year. If if worsening, may refer to endo to discuss treatment options.   Continues calcium and vitamin d supplementation.        Macular degeneration  Follows with ophthalmology                 Subjective   Hamida is a 84 year old, presenting for the following:  Annual Visit        7/8/2025     8:58 AM   Additional Questions   Roomed by Azeb ANDRADE CMA   Accompanied by Self         7/8/2025   Forms   Any forms needing to be completed Yes          "2025     8:58 AM   Patient Reported Additional Medications   Patient reports taking the following new medications No          HPI   Hamida Verma, 84 years    Fall with Head Injury  - In March, woke up in the middle of the night to use the bathroom. After urinating, felt the need to urinate more and sat back down. Patient is unsure if she passed out or fell asleep while sitting; next recalled being on the floor. She hit her head, elbows, and knees during the fall. Granddaughter Zoe heard the fall and checked on her. The next morning, Zoe noted a large \"goose egg\" on the patient's forehead, and two days later, a black eye developed. Patient declined to go to the hospital despite family urging, stating she did not want to \"lay on those awful beds.\" Family was concerned due to a friend's recent death from a brain bleed. The fall occurred in the context of nighttime urination.  ultimately she did go to the ER      Hearing Loss  - Has not had recent hearing testing. Owns hearing aids, but one is broken and not in use. Patient reports difficulty with hearing aids, stating they do not work well, and relates to her mother's similar experience. She is open to getting help with hearing aids and a hearing assessment.    Weight  - Reports weight has been stable, with recent weight at 162 lbs, consistent with prior measurements. This is her usual range.    Family Events and Bereavement  - Held her great-great-granddaughter, who was 10.5 months old at the time of visit. Attended family gatherings, including a five-generation photograph. Friend Tushar passed away in 2019; his brother  of a brain bleed recently, and Tushar's sister passed away shortly after, described as dying of a \"broken heart.\" Attending the  of Tushar's sister this week.     Advance Care Planning    Discussed advance care planning with patient; however, patient declined at this time.        2024   General Health   How would you rate " your overall physical health? Good   Feel stress (tense, anxious, or unable to sleep) Not at all         7/30/2024   Nutrition   Diet: Regular (no restrictions)         7/30/2024   Exercise   Days per week of moderate/strenous exercise 7 days   Average minutes spent exercising at this level 10 min         7/30/2024   Social Factors   Frequency of gathering with friends or relatives Once a week   Worry food won't last until get money to buy more No   Food not last or not have enough money for food? No   Do you have housing? (Housing is defined as stable permanent housing and does not include staying outside in a car, in a tent, in an abandoned building, in an overnight shelter, or couch-surfing.) Yes   Are you worried about losing your housing? No   Lack of transportation? No   Unable to get utilities (heat,electricity)? No         7/30/2024   Activities of Daily Living- Home Safety   Needs help with the following daily activites None of the above   Safety concerns in the home None of the above         7/30/2024   Dental   Dentist two times every year? (!) NO         7/30/2024   Hearing Screening   Hearing concerns? None of the above           7/30/2024   Urinary Incontinence Screening   Bothered by leaking urine in past 6 months No           Today's PHQ-2 Score:       2/4/2025     8:02 AM   PHQ-2 ( 1999 [employer])   Q1: Little interest or pleasure in doing things 0    Q2: Feeling down, depressed or hopeless 0    PHQ-2 Score 0    Q1: Little interest or pleasure in doing things Not at all   Q2: Feeling down, depressed or hopeless Not at all   PHQ-2 Score 0       Proxy-reported         7/30/2024   Substance Use   Alcohol more than 3/day or more than 7/wk No   Do you have a current opioid prescription? No   How severe/bad is pain from 1 to 10? 0/10 (No Pain)   Do you use any other substances recreationally? No     Social History     Tobacco Use    Smoking status: Former     Current packs/day: 0.00     Average packs/day:  1.5 packs/day for 45.0 years (67.6 ttl pk-yrs)     Types: Cigarettes     Start date: 10/28/1955     Quit date: 2000     Years since quittin.8    Smokeless tobacco: Never    Tobacco comments:     quit 24 yrs ago   Substance Use Topics    Alcohol use: No    Drug use: No                       Reviewed and updated as needed this visit by Provider           Sexual Activity           Current providers sharing in care for this patient include:  Patient Care Team:  Mikala Philip MD as PCP - General (Family Medicine)  Mikala Philip MD as Assigned PCP  No Ref-Primary, Physician  Tiffani Lott RPH as Pharmacist (Pharmacist Ambulatory Care)  Tiffani Lott RPH as Assigned MTM Pharmacist  Toni Yuen MD as Assigned Surgical Provider  Sandrine Glover PA-C as Assigned Heart and Vascular Provider    The following health maintenance items are reviewed in Epic and correct as of today:  Health Maintenance   Topic Date Due    COPD ACTION PLAN  Never done    ZOSTER VACCINE (2 of 3) 2013    RSV VACCINE (1 - 1-dose 75+ series) Never done    HF ACTION PLAN  2021    MICROALBUMIN  2022    Medicare Annual MTM Pharmacist Visit (once per calendar year)  2025    DTAP/TDAP/TD VACCINE (2 - Td or Tdap) 2025    LIPID  2025    FALL RISK ASSESSMENT  2025    BMP  2025    INFLUENZA VACCINE (1) 2025    COVID-19 VACCINE ( season) 2025    ALT  2026    CMP  2026    ANNUAL REVIEW OF HM ORDERS  2026    MEDICARE ANNUAL WELLNESS VISIT  2026    CBC  2026    HEMOGLOBIN  2026    ADVANCE CARE PLANNING  2030    DEXA  10/11/2036    TSH W/FREE T4 REFLEX  Completed    SPIROMETRY  Completed    PHQ-2 (once per calendar year)  Completed    PNEUMOCOCCAL VACCINE 50+ YEARS  Completed    URINALYSIS  Completed    HPV VACCINE  Aged Out    MENINGITIS VACCINE  Aged Out           Objective    Exam  /80 (BP Location: Right arm,  "Patient Position: Sitting, Cuff Size: Adult Regular)   Pulse 70   Temp 97.4  F (36.3  C) (Temporal)   Resp 19   Ht 1.613 m (5' 3.5\")   Wt 73.5 kg (162 lb)   LMP  (LMP Unknown)   SpO2 97%   BMI 28.25 kg/m     Estimated body mass index is 28.25 kg/m  as calculated from the following:    Height as of this encounter: 1.613 m (5' 3.5\").    Weight as of this encounter: 73.5 kg (162 lb).  Wt Readings from Last 5 Encounters:   07/08/25 73.5 kg (162 lb)   03/04/25 70.5 kg (155 lb 8 oz)   02/04/25 73.1 kg (161 lb 1.6 oz)   01/17/25 72.1 kg (159 lb)   01/13/25 73.3 kg (161 lb 9.6 oz)      Physical Exam  GENERAL: alert and no distress  EYES: Eyes grossly normal to inspection, PERRL and conjunctivae and sclerae normal  HENT: ear canals and TM's normal, nose and mouth without ulcers or lesions  NECK: no adenopathy, no asymmetry, masses, or scars  RESP: lungs clear to auscultation - no rales, rhonchi or wheezes  CV: regular rate and rhythm, normal S1 S2,  no peripheral edema  ABDOMEN: soft, nontender, no hepatosplenomegaly, no masses and bowel sounds normal  MS: no gross musculoskeletal defects noted, no edema  SKIN: no suspicious lesions or rashes  NEURO: Normal strength and tone, mentation intact and speech normal  PSYCH: mentation appears normal, affect normal/bright        7/30/2024   Mini Cog   Clock Draw Score 2 Normal   3 Item Recall 3 objects recalled   Mini Cog Total Score 5              Signed Electronically by: Mikala Philip MD     "

## 2025-07-08 NOTE — PATIENT INSTRUCTIONS
Patient Education   Schedule a bone density test.   Schedule follow up with urology  I recommend getting the tetanus, RSV and shingles vaccine(s) at a local pharmacy.    Schedule an appointment with urology for follow up.   Schedule a visit with Sandra Morgan, our clinic pharmacist.   Schedule your follow up CT scan for your lung nodules.   Preventive Care Advice   This is general advice given by our system to help you stay healthy. However, your care team may have specific advice just for you. Please talk to your care team about your preventive care needs.  Nutrition  Eat 5 or more servings of fruits and vegetables each day.  Try wheat bread, brown rice and whole grain pasta (instead of white bread, rice, and pasta).  Get enough calcium and vitamin D. Check the label on foods and aim for 100% of the RDA (recommended daily allowance).  Lifestyle  Exercise at least 150 minutes each week  (30 minutes a day, 5 days a week).  Do muscle strengthening activities 2 days a week. These help control your weight and prevent disease.  No smoking.  Wear sunscreen to prevent skin cancer.  Have a dental exam and cleaning every 6 months.  Yearly exams  See your health care team every year to talk about:  Any changes in your health.  Any medicines your care team has prescribed.  Preventive care, family planning, and ways to prevent chronic diseases.  Shots (vaccines)   HPV shots (up to age 26), if you've never had them before.  Hepatitis B shots (up to age 59), if you've never had them before.  COVID-19 shot: Get this shot when it's due.  Flu shot: Get a flu shot every year.  Tetanus shot: Get a tetanus shot every 10 years.  Pneumococcal, hepatitis A, and RSV shots: Ask your care team if you need these based on your risk.  Shingles shot (for age 50 and up)  General health tests  Diabetes screening:  Starting at age 35, Get screened for diabetes at least every 3 years.  If you are younger than age 35, ask your care team if you  should be screened for diabetes.  Cholesterol test: At age 39, start having a cholesterol test every 5 years, or more often if advised.  Bone density scan (DEXA): At age 50, ask your care team if you should have this scan for osteoporosis (brittle bones).  Hepatitis C: Get tested at least once in your life.  STIs (sexually transmitted infections)  Before age 24: Ask your care team if you should be screened for STIs.  After age 24: Get screened for STIs if you're at risk. You are at risk for STIs (including HIV) if:  You are sexually active with more than one person.  You don't use condoms every time.  You or a partner was diagnosed with a sexually transmitted infection.  If you are at risk for HIV, ask about PrEP medicine to prevent HIV.  Get tested for HIV at least once in your life, whether you are at risk for HIV or not.  Cancer screening tests  Cervical cancer screening: If you have a cervix, begin getting regular cervical cancer screening tests starting at age 21.  Breast cancer scan (mammogram): If you've ever had breasts, begin having regular mammograms starting at age 40. This is a scan to check for breast cancer.  Colon cancer screening: It is important to start screening for colon cancer at age 45.  Have a colonoscopy test every 10 years (or more often if you're at risk) Or, ask your provider about stool tests like a FIT test every year or Cologuard test every 3 years.  To learn more about your testing options, visit:   .  For help making a decision, visit:   https://bit.ly/dj70256.  Prostate cancer screening test: If you have a prostate, ask your care team if a prostate cancer screening test (PSA) at age 55 is right for you.  Lung cancer screening: If you are a current or former smoker ages 50 to 80, ask your care team if ongoing lung cancer screenings are right for you.  For informational purposes only. Not to replace the advice of your health care provider. Copyright   2023 [employer]. All  rights reserved. Clinically reviewed by the [facility] Transitions Program. [employer] [id number] - REV 01/24.

## 2025-07-14 ENCOUNTER — OFFICE VISIT (OUTPATIENT)
Dept: AUDIOLOGY | Facility: CLINIC | Age: 85
End: 2025-07-14
Attending: FAMILY MEDICINE
Payer: COMMERCIAL

## 2025-07-14 DIAGNOSIS — H90.3 SENSORINEURAL HEARING LOSS, BILATERAL: Primary | ICD-10-CM

## 2025-07-14 DIAGNOSIS — H91.93 BILATERAL HEARING LOSS, UNSPECIFIED HEARING LOSS TYPE: ICD-10-CM

## 2025-07-14 NOTE — PROGRESS NOTES
AUDIOLOGY REPORT    SUBJECTIVE:  Hamida Verma is a 85 year old female who was seen in the Audiology Clinic at the [facility] for audiologic evaluation, referred by Mikala Philip M.D. The patient reports a known bilateral hearing loss, she has bilateral [employer] Jump hearing aids from an outside clinic (serial numbers suggest they are from 2019). She feels that she has never noticed benefit with the hearing aids and that one is currently not functional. The patient denies  bilateral tinnitus, bilateral otalgia, bilateral drainage, and bilateral aural fullness.  The patient notes difficulty with communication in a variety of listening situations.    OBJECTIVE:  Falls Risk Screening  Falls Risk Completed by: Audiology  Have you fallen 2 or more times in the past year? No  Have you fallen and had an injury in the past year? Yes  Fall Screen Comments: Patient's PCP is aware of the injury and managing the fall       Otoscopic exam indicates ears are clear of cerumen bilaterally     Pure Tone Thresholds assessed using conventional audiometry with good  reliability from 250-8000 Hz bilaterally using insert earphones and circumaural headphones     RIGHT:  mild sloping to severe sensorineural hearing loss    LEFT:    mild sloping to severe sensorineural hearing loss    Tympanogram:    RIGHT: normal eardrum mobility    LEFT:   normal eardrum mobility    Speech Reception Threshold:    RIGHT: 55 dB HL    LEFT:   55 dB HL    Word Recognition Score:     RIGHT: 64% at 85 dB HL using NU-6 recorded word list.    LEFT:   76% at 85 dB HL using NU-6 recorded word list.      ASSESSMENT:     ICD-10-CM    1. Bilateral hearing loss, unspecified hearing loss type  H91.93 Adult Audiology  Referral         Today's results revealed a bilateral sensorineural hearing loss. Today s results were discussed with the patient in detail. The patient was only scheduled for a hearing test  toady but a hearing aid check was going to be completed as well, unfortunately the hearing aids would not turn on (possibly not charged).    The options for establishing care for the hearing aids to see if they could become functional for her or pursuing a new hearing aid were discussed. The is interested in pursuing new hearing aids. She would like to find a clinic more convenient for her. She was provided a list of Audiology Clinics within [facility] should she desire to continue within this system.    PLAN:  Patient was counseled regarding hearing loss and impact on communication.  Patient is a good candidate for amplification at this time.  Handout on good communication strategies, and hearing aid use was given to patient. It is recommended that the patient repeat the hearing evaluation in one year, sooner if concerns arise.  Trial with new hearing aids was discussed, she is welcome to return for a consultation should she desire.  Please call this clinic with questions regarding these results or recommendations.        Jase Jurado  Audiologist  [place] License  #[id number]

## 2025-07-15 ENCOUNTER — ANCILLARY PROCEDURE (OUTPATIENT)
Dept: CARDIOLOGY | Facility: CLINIC | Age: 85
End: 2025-07-15
Attending: INTERNAL MEDICINE
Payer: COMMERCIAL

## 2025-07-15 DIAGNOSIS — Z98.890 S/P AV NODAL ABLATION: ICD-10-CM

## 2025-07-15 DIAGNOSIS — Z95.0 CARDIAC PACEMAKER IN SITU: ICD-10-CM

## 2025-07-15 LAB
MDC_IDC_EPISODE_DTM: NORMAL
MDC_IDC_EPISODE_DURATION: NORMAL S
MDC_IDC_EPISODE_ID: NORMAL
MDC_IDC_EPISODE_TYPE: NORMAL
MDC_IDC_LEAD_CONNECTION_STATUS: NORMAL
MDC_IDC_LEAD_CONNECTION_STATUS: NORMAL
MDC_IDC_LEAD_IMPLANT_DT: NORMAL
MDC_IDC_LEAD_IMPLANT_DT: NORMAL
MDC_IDC_LEAD_LOCATION: NORMAL
MDC_IDC_LEAD_LOCATION: NORMAL
MDC_IDC_LEAD_LOCATION_DETAIL_1: NORMAL
MDC_IDC_LEAD_LOCATION_DETAIL_1: NORMAL
MDC_IDC_LEAD_MFG: NORMAL
MDC_IDC_LEAD_MFG: NORMAL
MDC_IDC_LEAD_MODEL: NORMAL
MDC_IDC_LEAD_MODEL: NORMAL
MDC_IDC_LEAD_POLARITY_TYPE: NORMAL
MDC_IDC_LEAD_POLARITY_TYPE: NORMAL
MDC_IDC_LEAD_SERIAL: NORMAL
MDC_IDC_LEAD_SERIAL: NORMAL
MDC_IDC_MSMT_BATTERY_DTM: NORMAL
MDC_IDC_MSMT_BATTERY_REMAINING_LONGEVITY: 36 MO
MDC_IDC_MSMT_BATTERY_REMAINING_PERCENTAGE: 28 %
MDC_IDC_MSMT_BATTERY_RRT_TRIGGER: NORMAL
MDC_IDC_MSMT_BATTERY_STATUS: NORMAL
MDC_IDC_MSMT_BATTERY_VOLTAGE: 2.92 V
MDC_IDC_MSMT_LEADCHNL_RA_IMPEDANCE_VALUE: 460 OHM
MDC_IDC_MSMT_LEADCHNL_RA_LEAD_CHANNEL_STATUS: NORMAL
MDC_IDC_MSMT_LEADCHNL_RA_PACING_THRESHOLD_AMPLITUDE: 0.5 V
MDC_IDC_MSMT_LEADCHNL_RA_PACING_THRESHOLD_PULSEWIDTH: 0.4 MS
MDC_IDC_MSMT_LEADCHNL_RA_SENSING_INTR_AMPL: 3.1 MV
MDC_IDC_MSMT_LEADCHNL_RV_IMPEDANCE_VALUE: 490 OHM
MDC_IDC_MSMT_LEADCHNL_RV_LEAD_CHANNEL_STATUS: NORMAL
MDC_IDC_MSMT_LEADCHNL_RV_PACING_THRESHOLD_AMPLITUDE: 0.62 V
MDC_IDC_MSMT_LEADCHNL_RV_PACING_THRESHOLD_PULSEWIDTH: 0.4 MS
MDC_IDC_MSMT_LEADCHNL_RV_SENSING_INTR_AMPL: 12 MV
MDC_IDC_PG_IMPLANT_DTM: NORMAL
MDC_IDC_PG_MFG: NORMAL
MDC_IDC_PG_MODEL: NORMAL
MDC_IDC_PG_SERIAL: NORMAL
MDC_IDC_PG_TYPE: NORMAL
MDC_IDC_SESS_CLINIC_NAME: NORMAL
MDC_IDC_SESS_DTM: NORMAL
MDC_IDC_SESS_REPROGRAMMED: NO
MDC_IDC_SESS_TYPE: NORMAL
MDC_IDC_SET_BRADY_AT_MODE_SWITCH_MODE: NORMAL
MDC_IDC_SET_BRADY_AT_MODE_SWITCH_RATE: 170 {BEATS}/MIN
MDC_IDC_SET_BRADY_LOWRATE: 60 {BEATS}/MIN
MDC_IDC_SET_BRADY_MAX_SENSOR_RATE: 120 {BEATS}/MIN
MDC_IDC_SET_BRADY_MAX_TRACKING_RATE: 120 {BEATS}/MIN
MDC_IDC_SET_BRADY_MODE: NORMAL
MDC_IDC_SET_BRADY_PAV_DELAY_LOW: 250 MS
MDC_IDC_SET_BRADY_SAV_DELAY_LOW: 225 MS
MDC_IDC_SET_LEADCHNL_RA_PACING_AMPLITUDE: 1.5 V
MDC_IDC_SET_LEADCHNL_RA_PACING_ANODE_ELECTRODE_1: NORMAL
MDC_IDC_SET_LEADCHNL_RA_PACING_ANODE_LOCATION_1: NORMAL
MDC_IDC_SET_LEADCHNL_RA_PACING_CAPTURE_MODE: NORMAL
MDC_IDC_SET_LEADCHNL_RA_PACING_CATHODE_ELECTRODE_1: NORMAL
MDC_IDC_SET_LEADCHNL_RA_PACING_CATHODE_LOCATION_1: NORMAL
MDC_IDC_SET_LEADCHNL_RA_PACING_POLARITY: NORMAL
MDC_IDC_SET_LEADCHNL_RA_PACING_PULSEWIDTH: 0.4 MS
MDC_IDC_SET_LEADCHNL_RA_SENSING_ADAPTATION_MODE: NORMAL
MDC_IDC_SET_LEADCHNL_RA_SENSING_ANODE_ELECTRODE_1: NORMAL
MDC_IDC_SET_LEADCHNL_RA_SENSING_ANODE_LOCATION_1: NORMAL
MDC_IDC_SET_LEADCHNL_RA_SENSING_CATHODE_ELECTRODE_1: NORMAL
MDC_IDC_SET_LEADCHNL_RA_SENSING_CATHODE_LOCATION_1: NORMAL
MDC_IDC_SET_LEADCHNL_RA_SENSING_POLARITY: NORMAL
MDC_IDC_SET_LEADCHNL_RA_SENSING_SENSITIVITY: 0.3 MV
MDC_IDC_SET_LEADCHNL_RV_PACING_AMPLITUDE: 0.88
MDC_IDC_SET_LEADCHNL_RV_PACING_ANODE_ELECTRODE_1: NORMAL
MDC_IDC_SET_LEADCHNL_RV_PACING_ANODE_LOCATION_1: NORMAL
MDC_IDC_SET_LEADCHNL_RV_PACING_CAPTURE_MODE: NORMAL
MDC_IDC_SET_LEADCHNL_RV_PACING_CATHODE_ELECTRODE_1: NORMAL
MDC_IDC_SET_LEADCHNL_RV_PACING_CATHODE_LOCATION_1: NORMAL
MDC_IDC_SET_LEADCHNL_RV_PACING_POLARITY: NORMAL
MDC_IDC_SET_LEADCHNL_RV_PACING_PULSEWIDTH: 0.4 MS
MDC_IDC_SET_LEADCHNL_RV_SENSING_ADAPTATION_MODE: NORMAL
MDC_IDC_SET_LEADCHNL_RV_SENSING_ANODE_ELECTRODE_1: NORMAL
MDC_IDC_SET_LEADCHNL_RV_SENSING_ANODE_LOCATION_1: NORMAL
MDC_IDC_SET_LEADCHNL_RV_SENSING_CATHODE_ELECTRODE_1: NORMAL
MDC_IDC_SET_LEADCHNL_RV_SENSING_CATHODE_LOCATION_1: NORMAL
MDC_IDC_SET_LEADCHNL_RV_SENSING_POLARITY: NORMAL
MDC_IDC_SET_LEADCHNL_RV_SENSING_SENSITIVITY: 0.5 MV
MDC_IDC_STAT_AT_BURDEN_PERCENT: 40 %
MDC_IDC_STAT_AT_DTM_END: NORMAL
MDC_IDC_STAT_AT_DTM_START: NORMAL
MDC_IDC_STAT_AT_MODE_SW_COUNT: 1
MDC_IDC_STAT_AT_MODE_SW_COUNT_PER_DAY: 0
MDC_IDC_STAT_AT_MODE_SW_MAX_DURATION: NORMAL S
MDC_IDC_STAT_AT_MODE_SW_PERCENT_TIME: 39 %
MDC_IDC_STAT_BRADY_AP_VP_PERCENT: 44 %
MDC_IDC_STAT_BRADY_AP_VS_PERCENT: 1 %
MDC_IDC_STAT_BRADY_AS_VP_PERCENT: 55 %
MDC_IDC_STAT_BRADY_AS_VS_PERCENT: 1 %
MDC_IDC_STAT_BRADY_DTM_END: NORMAL
MDC_IDC_STAT_BRADY_DTM_START: NORMAL
MDC_IDC_STAT_BRADY_RA_PERCENT_PACED: 27 %
MDC_IDC_STAT_BRADY_RV_PERCENT_PACED: 99 %
MDC_IDC_STAT_CRT_DTM_END: NORMAL
MDC_IDC_STAT_CRT_DTM_START: NORMAL
MDC_IDC_STAT_HEART_RATE_ATRIAL_MAX: 330 {BEATS}/MIN
MDC_IDC_STAT_HEART_RATE_ATRIAL_MEAN: 221 {BEATS}/MIN
MDC_IDC_STAT_HEART_RATE_ATRIAL_MIN: 40 {BEATS}/MIN
MDC_IDC_STAT_HEART_RATE_DTM_END: NORMAL
MDC_IDC_STAT_HEART_RATE_DTM_START: NORMAL
MDC_IDC_STAT_HEART_RATE_VENTRICULAR_MAX: 180 {BEATS}/MIN
MDC_IDC_STAT_HEART_RATE_VENTRICULAR_MEAN: 69 {BEATS}/MIN
MDC_IDC_STAT_HEART_RATE_VENTRICULAR_MIN: 40 {BEATS}/MIN

## 2025-07-15 PROCEDURE — 93296 REM INTERROG EVL PM/IDS: CPT | Performed by: INTERNAL MEDICINE

## 2025-07-15 PROCEDURE — 93294 REM INTERROG EVL PM/LDLS PM: CPT | Performed by: INTERNAL MEDICINE

## 2025-07-21 ENCOUNTER — OFFICE VISIT (OUTPATIENT)
Dept: CARDIOLOGY | Facility: CLINIC | Age: 85
End: 2025-07-21
Payer: COMMERCIAL

## 2025-07-21 VITALS
HEART RATE: 69 BPM | SYSTOLIC BLOOD PRESSURE: 129 MMHG | BODY MASS INDEX: 28.24 KG/M2 | DIASTOLIC BLOOD PRESSURE: 84 MMHG | HEIGHT: 64 IN | OXYGEN SATURATION: 98 %

## 2025-07-21 DIAGNOSIS — I42.8 OTHER CARDIOMYOPATHY (H): ICD-10-CM

## 2025-07-21 DIAGNOSIS — I21.4 NSTEMI (NON-ST ELEVATED MYOCARDIAL INFARCTION) (H): ICD-10-CM

## 2025-07-21 DIAGNOSIS — I49.5 SSS (SICK SINUS SYNDROME) (H): ICD-10-CM

## 2025-07-21 DIAGNOSIS — I48.0 PAF (PAROXYSMAL ATRIAL FIBRILLATION) (H): ICD-10-CM

## 2025-07-21 DIAGNOSIS — E78.5 DYSLIPIDEMIA, GOAL LDL BELOW 70: Primary | ICD-10-CM

## 2025-07-21 DIAGNOSIS — R55 SYNCOPE, UNSPECIFIED SYNCOPE TYPE: ICD-10-CM

## 2025-07-21 DIAGNOSIS — Z95.0 CARDIAC PACEMAKER IN SITU: ICD-10-CM

## 2025-07-21 DIAGNOSIS — I50.32 CHRONIC DIASTOLIC CHF (CONGESTIVE HEART FAILURE) (H): ICD-10-CM

## 2025-07-21 DIAGNOSIS — I25.10 CORONARY ARTERY DISEASE INVOLVING NATIVE CORONARY ARTERY OF NATIVE HEART WITHOUT ANGINA PECTORIS: ICD-10-CM

## 2025-07-21 PROCEDURE — 3074F SYST BP LT 130 MM HG: CPT | Performed by: PHYSICIAN ASSISTANT

## 2025-07-21 PROCEDURE — G2211 COMPLEX E/M VISIT ADD ON: HCPCS | Performed by: PHYSICIAN ASSISTANT

## 2025-07-21 PROCEDURE — 3079F DIAST BP 80-89 MM HG: CPT | Performed by: PHYSICIAN ASSISTANT

## 2025-07-21 PROCEDURE — 99214 OFFICE O/P EST MOD 30 MIN: CPT | Performed by: PHYSICIAN ASSISTANT

## 2025-07-21 NOTE — PATIENT INSTRUCTIONS
Thank you for visiting with me today.    We discussed:   Please call if you have another episode of passing out.      Medication changes:   Continue current medications.      Next Steps:   Make an appointment for an ultrasound of your carotid arteries to see if they may be narrowed causing you to pass out.      Please keep your appointment for your echo, labs and visit with Dr. Mauri Chase.      Please call my nurses, Roselyn Porter Stacy, or Raven at [phone] with questions.      *If you have concerns after hours, please call [phone], option 2 to speak with on call Cardiologist.

## 2025-07-21 NOTE — PROGRESS NOTES
Cardiology Clinic Progress Note    Service Date: 2025     Primary Cardiologist: Dr. Mauri Chase      Reason for Visit: CAD, ICM f/u    HPI:   Hamida Verma delightful 85-year-old woman with past medical history significant for the followin.  A-fib with initially difficult to control rate finally ended up with AV node ablation and permanent pacemaker on anticoagulation  2.  Hypertension with orthostatic hypotension  3.  Heart failure with mildly reduced ejection fraction about 45% variable between that and normal thought to be secondary to RV pacing  4.  Coronary artery disease was initially the patient having just coronary calcifications but unfortunately suffering 2 NSTEMI's in 2023 the  where she had a drug-eluting stent to the proximal to mid circumflex for 90% lesion at that time her RCA was just 50% stenosed and she had just 25% ostial proximal LAD.  Unfortunately she presented with repeat pain  and had drug-eluting stent to the mid to distal RCA and had a patent stent to the circumflex.  5.  Strong family history of coronary disease with 1 son passing away at the age of 57 from an MI and her other son have been a STEMI at the age of 57  6.  Underlying lung disease with emphysema showing on CT.  7.  History kidney stone with stent placed in 10/24 lithotripsy    I initially met Ms. Ku when she presented with progressive shortness of breath so much that she canceled dinner at her favorite restaurant of [employer].  She eventually got her pacemaker put in and she has had stents since with her presenting symptom being right shoulder pain for angina.  Since I last saw her in January she had an ER visit for near syncope.  Per notes her pacemaker check was normal, her hemoglobin was stable at about 10.3 kidney function was normal troponin was just positive at 15, N-terminal proBNP was normal influenza testing was also normal    Today, she states she had another episode  "and this was in early June she had gotten up to go to the bathroom at night, will urinate and then stood up felt like she had to urinate again sat down leaned forward and she is unclear if she fell asleep or passed out but woke up on the floor.  She refused to go to the ER but felt okay the next morning except for a goose egg on her forehead.  She has not had any syncopal episodes since then and otherwise has felt well.  She denies chest pain, shoulder pain, orthopnea, PND or peripheral edema.    Social History:  No significant EtOH former smoker with a 67-pack-year history.  She is .  She lives in her own home with her adult daughter who has Lewy body dementia and is 63 and her granddaughter who is 33 and is trained as an EMT who helps take care of both of them.    Physical Exam:  Ht 1.613 m (5' 3.5\")   LMP  (LMP Unknown)   BMI 28.24 kg/m     Well-developed well-nourished woman in no acute distress.  Normocephalic atraumatic.  Heart is regularly irregular, I do not appreciate murmur rub or gallop.  Lungs are clear without wheezes rales or rhonchi extremities without peripheral edema skin is warm and dry.  Carotids are quiet.      Data reviewed:  Echocardiogram 7/24 shows EF 53% mild TR, mild hypokinesis of apical septum.    Last lipid panel 7/24 shows LDL 66 HDL 71 total cholesterol 155    ER data    Assessment and Plan:  1.  History of near syncope and recurrent episode of either falling asleep or syncope while urinating.  We discussed that her device checks have been normal without any tachyarrhythmias and she bradycardic.  She does have some orthostatic hypotension which certainly may be part of it and I suspect the second episode may be micturition syncope versus falling asleep on the toilet.  Will get a carotid ultrasound to be completed she otherwise has an echocardiogram scheduled in the next several weeks and we can look for any other issues.    2.  Coronary disease with history of stenting no " recurrent anginal symptoms which for her is right shoulder pain.    3.  Hypertension reasonable control    4.  A-fib with AV node ablation permanent pacemaker in place appropriately on Xarelto    5.  Dyslipidemia last lipid panel 1 year ago getting repeat lipid panel next week.    6.  Heart failure with mildly reduced ejection fraction with EF about 45% with the drops thought to be secondary to RV pacing will get repeat echocardiogram, euvolemic on exam continue current meds.    Thank you for allow me to precipitate in this delightful patient's care.  She will keep her visit with Dr. Mauri Chase which follows her echocardiogram and labs in August, she will call sooner with repeat syncopal episodes.    Sandrine Glover PA-C  [facility]     This note was written using Dragon voice recognition system, please excuse any misspelled names, or nonsensical words and call with any questions.      The longitudinal plan of care for the diagnosis(es)/condition(s) as documented were addressed during this visit. Due to the added complexity in care, I will continue to support Hamida in the subsequent management and with ongoing continuity of care.        Orders this visit:  No orders of the defined types were placed in this encounter.    No orders of the defined types were placed in this encounter.    There are no discontinued medications.      Encounter Diagnoses   Name Primary?    Chronic diastolic CHF (congestive heart failure) (H)     Cardiac pacemaker in situ     PAF (paroxysmal atrial fibrillation) (H)     SSS (sick sinus syndrome) (H)     NSTEMI (non-ST elevated myocardial infarction) (H)     Coronary artery disease involving native coronary artery of native heart without angina pectoris     Other cardiomyopathy (H)          Current Medications:  Current Outpatient Medications   Medication Sig Dispense Refill    acetaminophen (TYLENOL) 500 MG tablet Take 500-1,000 mg by mouth every 6 hours as needed for mild  pain      albuterol (PROAIR HFA/PROVENTIL HFA/VENTOLIN HFA) 108 (90 Base) MCG/ACT inhaler Inhale 2 puffs into the lungs every 6 hours as needed      carvedilol (COREG) 12.5 MG tablet Take 1 tablet (12.5 mg) by mouth 2 times daily (with meals). 180 tablet 3    cholecalciferol (VITAMIN  -D) 1000 UNIT capsule Take 1 capsule by mouth daily.      FLAX SEED OIL OR 1 capsule daily      furosemide (LASIX) 20 MG tablet Take 1 tablet (20 mg) by mouth daily. 90 tablet 3    Multiple Vitamins-Minerals (HAIR SKIN NAILS PO) Take 1 tablet by mouth At Bedtime      Multiple Vitamins-Minerals (ICAPS AREDS 2 PO) Take 1 tablet by mouth 2 times daily      neomycin-polymyxin-dexAMETHasone (MAXITROL) 3.5-47779-6.1 ophthalmic ointment APPLY TO LID TWO TIMES DAILY AS NEEDED      nitroGLYcerin (NITROSTAT) 0.4 MG sublingual tablet For chest pain place 1 tablet under the tongue every 5 minutes for 3 doses. If symptoms persist 5 minutes after 1st dose call 911. 25 tablet 11    ondansetron (ZOFRAN ODT) 4 MG ODT tab Take 1 tablet (4 mg) by mouth every 8 hours as needed for nausea. 4 tablet 0    potassium chloride carley ER (KLOR-CON M10) 10 MEQ CR tablet Take 1 tablet (10 mEq) by mouth daily. 90 tablet 3    rivaroxaban ANTICOAGULANT (XARELTO) 15 MG TABS tablet Take 1 tablet (15 mg) by mouth daily (with dinner). 90 tablet 3    rosuvastatin (CRESTOR) 40 MG tablet Take 1 tablet (40 mg) by mouth daily. 90 tablet 3    sacubitril-valsartan (ENTRESTO) 49-51 MG per tablet Take 1 tablet by mouth 2 times daily. 180 tablet 3    senna-docusate (SENOKOT-S/PERICOLACE) 8.6-50 MG tablet Take 1-2 tablets by mouth 2 times daily. 30 tablet 0    tamsulosin (FLOMAX) 0.4 MG capsule Take 1 capsule (0.4 mg) by mouth daily. 30 capsule 2       Allergies Reviewed and Updated:  Allergies   Allergen Reactions    Strawberries [Strawberry Extract] Anaphylaxis    Strawberry Flavoring Agent (Non-Screening)        Review of Systems:  Skin:        Eyes:       ENT:       Respiratory:        Cardiovascular:       Gastroenterology:      Genitourinary:       Musculoskeletal:       Neurologic:       Psychiatric:       Heme/Lymph/Imm:       Endocrine:          Pertinent Past Medical, Surgical and Family History Reviewed and noted above.     CC  Ashley Espitia, NELY CNP  [address]  [place] [id number]

## 2025-07-21 NOTE — LETTER
2025    Mikala Philip MD, MD  [address]  [place] [address]    RE: Hamida Verma       Dear Colleague,     I had the pleasure of seeing Hamida Verma in the [facility].    Cardiology Clinic Progress Note    Service Date: 2025     Primary Cardiologist: Dr. Mauri Chase      Reason for Visit: CAD, ICM f/u    HPI:   Hamida Verma delightful 85-year-old woman with past medical history significant for the followin.  A-fib with initially difficult to control rate finally ended up with AV node ablation and permanent pacemaker on anticoagulation  2.  Hypertension with orthostatic hypotension  3.  Heart failure with mildly reduced ejection fraction about 45% variable between that and normal thought to be secondary to RV pacing  4.  Coronary artery disease was initially the patient having just coronary calcifications but unfortunately suffering 2 NSTEMI's in 2023 the  where she had a drug-eluting stent to the proximal to mid circumflex for 90% lesion at that time her RCA was just 50% stenosed and she had just 25% ostial proximal LAD.  Unfortunately she presented with repeat pain  and had drug-eluting stent to the mid to distal RCA and had a patent stent to the circumflex.  5.  Strong family history of coronary disease with 1 son passing away at the age of 57 from an MI and her other son have been a STEMI at the age of 57  6.  Underlying lung disease with emphysema showing on CT.  7.  History kidney stone with stent placed in 10/24 lithotripsy    I initially met Ms. Ku when she presented with progressive shortness of breath so much that she canceled dinner at her favorite restaurant of [employer].  She eventually got her pacemaker put in and she has had stents since with her presenting symptom being right shoulder pain for angina.  Since I last saw her in January she had an ER visit for near syncope.  Per notes her pacemaker check was  "normal, her hemoglobin was stable at about 10.3 kidney function was normal troponin was just positive at 15, N-terminal proBNP was normal influenza testing was also normal    Today, she states she had another episode and this was in early June she had gotten up to go to the bathroom at night, will urinate and then stood up felt like she had to urinate again sat down leaned forward and she is unclear if she fell asleep or passed out but woke up on the floor.  She refused to go to the ER but felt okay the next morning except for a goose egg on her forehead.  She has not had any syncopal episodes since then and otherwise has felt well.  She denies chest pain, shoulder pain, orthopnea, PND or peripheral edema.    Social History:  No significant EtOH former smoker with a 67-pack-year history.  She is .  She lives in her own home with her adult daughter who has Lewy body dementia and is 63 and her granddaughter who is 33 and is trained as an EMT who helps take care of both of them.    Physical Exam:  Ht 1.613 m (5' 3.5\")   LMP  (LMP Unknown)   BMI 28.24 kg/m     Well-developed well-nourished woman in no acute distress.  Normocephalic atraumatic.  Heart is regularly irregular, I do not appreciate murmur rub or gallop.  Lungs are clear without wheezes rales or rhonchi extremities without peripheral edema skin is warm and dry.  Carotids are quiet.      Data reviewed:  Echocardiogram 7/24 shows EF 53% mild TR, mild hypokinesis of apical septum.    Last lipid panel 7/24 shows LDL 66 HDL 71 total cholesterol 155    ER data    Assessment and Plan:  1.  History of near syncope and recurrent episode of either falling asleep or syncope while urinating.  We discussed that her device checks have been normal without any tachyarrhythmias and she bradycardic.  She does have some orthostatic hypotension which certainly may be part of it and I suspect the second episode may be micturition syncope versus falling asleep on the " toilet.  Will get a carotid ultrasound to be completed she otherwise has an echocardiogram scheduled in the next several weeks and we can look for any other issues.    2.  Coronary disease with history of stenting no recurrent anginal symptoms which for her is right shoulder pain.    3.  Hypertension reasonable control    4.  A-fib with AV node ablation permanent pacemaker in place appropriately on Xarelto    5.  Dyslipidemia last lipid panel 1 year ago getting repeat lipid panel next week.    6.  Heart failure with mildly reduced ejection fraction with EF about 45% with the drops thought to be secondary to RV pacing will get repeat echocardiogram, euvolemic on exam continue current meds.    Thank you for allow me to precipitate in this delightful patient's care.  She will keep her visit with Dr. Mauri Chase which follows her echocardiogram and labs in August, she will call sooner with repeat syncopal episodes.    Sandrine Glover PA-C  [facility]     This note was written using Dragon voice recognition system, please excuse any misspelled names, or nonsensical words and call with any questions.      The longitudinal plan of care for the diagnosis(es)/condition(s) as documented were addressed during this visit. Due to the added complexity in care, I will continue to support Hamida in the subsequent management and with ongoing continuity of care.        Orders this visit:  No orders of the defined types were placed in this encounter.    No orders of the defined types were placed in this encounter.    There are no discontinued medications.      Encounter Diagnoses   Name Primary?     Chronic diastolic CHF (congestive heart failure) (H)      Cardiac pacemaker in situ      PAF (paroxysmal atrial fibrillation) (H)      SSS (sick sinus syndrome) (H)      NSTEMI (non-ST elevated myocardial infarction) (H)      Coronary artery disease involving native coronary artery of native heart without angina pectoris       Other cardiomyopathy (H)          Current Medications:  Current Outpatient Medications   Medication Sig Dispense Refill     acetaminophen (TYLENOL) 500 MG tablet Take 500-1,000 mg by mouth every 6 hours as needed for mild pain       albuterol (PROAIR HFA/PROVENTIL HFA/VENTOLIN HFA) 108 (90 Base) MCG/ACT inhaler Inhale 2 puffs into the lungs every 6 hours as needed       carvedilol (COREG) 12.5 MG tablet Take 1 tablet (12.5 mg) by mouth 2 times daily (with meals). 180 tablet 3     cholecalciferol (VITAMIN  -D) 1000 UNIT capsule Take 1 capsule by mouth daily.       FLAX SEED OIL OR 1 capsule daily       furosemide (LASIX) 20 MG tablet Take 1 tablet (20 mg) by mouth daily. 90 tablet 3     Multiple Vitamins-Minerals (HAIR SKIN NAILS PO) Take 1 tablet by mouth At Bedtime       Multiple Vitamins-Minerals (ICAPS AREDS 2 PO) Take 1 tablet by mouth 2 times daily       neomycin-polymyxin-dexAMETHasone (MAXITROL) 3.5-43537-0.1 ophthalmic ointment APPLY TO LID TWO TIMES DAILY AS NEEDED       nitroGLYcerin (NITROSTAT) 0.4 MG sublingual tablet For chest pain place 1 tablet under the tongue every 5 minutes for 3 doses. If symptoms persist 5 minutes after 1st dose call 911. 25 tablet 11     ondansetron (ZOFRAN ODT) 4 MG ODT tab Take 1 tablet (4 mg) by mouth every 8 hours as needed for nausea. 4 tablet 0     potassium chloride carley ER (KLOR-CON M10) 10 MEQ CR tablet Take 1 tablet (10 mEq) by mouth daily. 90 tablet 3     rivaroxaban ANTICOAGULANT (XARELTO) 15 MG TABS tablet Take 1 tablet (15 mg) by mouth daily (with dinner). 90 tablet 3     rosuvastatin (CRESTOR) 40 MG tablet Take 1 tablet (40 mg) by mouth daily. 90 tablet 3     sacubitril-valsartan (ENTRESTO) 49-51 MG per tablet Take 1 tablet by mouth 2 times daily. 180 tablet 3     senna-docusate (SENOKOT-S/PERICOLACE) 8.6-50 MG tablet Take 1-2 tablets by mouth 2 times daily. 30 tablet 0     tamsulosin (FLOMAX) 0.4 MG capsule Take 1 capsule (0.4 mg) by mouth daily. 30 capsule 2        Allergies Reviewed and Updated:  Allergies   Allergen Reactions     Strawberries [Strawberry Extract] Anaphylaxis     Strawberry Flavoring Agent (Non-Screening)        Review of Systems:  Skin:        Eyes:       ENT:       Respiratory:       Cardiovascular:       Gastroenterology:      Genitourinary:       Musculoskeletal:       Neurologic:       Psychiatric:       Heme/Lymph/Imm:       Endocrine:          Pertinent Past Medical, Surgical and Family History Reviewed and noted above.     CC  Ashley Espitia, APRN CNP  [address]  [place] [id number]    Thank you for allowing me to participate in the care of your patient.      Sincerely,     Sandrine Glover PA-C     [facility]  cc:   Roby Mallory MD  [address]  [place] [id number]

## 2025-07-22 ENCOUNTER — HOSPITAL ENCOUNTER (OUTPATIENT)
Dept: BONE DENSITY | Facility: CLINIC | Age: 85
Discharge: HOME | End: 2025-07-22
Attending: FAMILY MEDICINE
Payer: COMMERCIAL

## 2025-07-22 DIAGNOSIS — Z78.0 POST-MENOPAUSE: ICD-10-CM

## 2025-07-22 PROCEDURE — 77080 DXA BONE DENSITY AXIAL: CPT

## 2025-07-23 ENCOUNTER — RESULTS FOLLOW-UP (OUTPATIENT)
Dept: FAMILY MEDICINE | Facility: CLINIC | Age: 85
End: 2025-07-23
Payer: COMMERCIAL

## 2025-07-23 DIAGNOSIS — M81.0 AGE-RELATED OSTEOPOROSIS WITHOUT CURRENT PATHOLOGICAL FRACTURE: Primary | ICD-10-CM

## 2025-07-23 NOTE — TELEPHONE ENCOUNTER
RN - please call Hamida about her results. Her bone density test shows a decrease in bone density at the spine and hips. She did complete 5 years of treatment with alendronate from 2017 through 2021. I recommend visiting with endo to discuss treatment options. Endo referral placed. Please let Hamida know. Mikala Philip M.D.        DX Bone Density  Narrative: EXAM: DX AXIAL HIPS/SPINE  LOCATION: [facility]  DATE: 7/22/2025    INDICATION: BMD screening, follow-up. On Fosamax.  DEMOGRAPHICS: Age- 85 years. Gender- Female. Menopausal status- Postmenopausal.  COMPARISON: 10/11/2021  TECHNIQUE: Dual-energy x-ray absorptiometry (DXA) performed with routine technique.     FINDINGS:    DXA RESULTS  -Lumbar Spine: L1-L4: BMD: 0.958 g/cm2. T-score: -1.9. Z-score: -0.2. Degenerative change may artifactually increase BMD.  -RIGHT Hip Total: BMD: 0.818 g/cm2. T-score: -1.5. Z-score: 0.6.  -RIGHT Hip Femoral neck: BMD: 0.758 g/cm2. T-score: -2.0. Z-score: 0.2.  -LEFT Hip Total: BMD: 0.735 g/cm2. T-score: -2.2. Z-score: 0.0.  -LEFT Hip Femoral neck: BMD: 0.704 g/cm2. T-score: -2.4. Z-score: -0.1.    WHO T-SCORE CRITERIA  -Normal: T score at or above -1 SD  -Osteopenia: T score between -1 and -2.5 SD  -Osteoporosis: T score at or below -2.5 SD    The World Health Organization (WHO) criteria is applicable to perimenopausal females, postmenopausal females, and men aged 50 years or older.    INTERVAL CHANGE  -There has been a 8.6% decrease in lumbar spine BMD.  -There has been a 4.2% decrease in bilateral hip BMD.    FRACTURE RISK  -The FRAX risk calculator is not applicable due to medication that is used for treatment of osteopenia/osteoporosis or can otherwise affect bone mineral density.  Impression: IMPRESSION: Low bone density (OSTEOPENIA). T score meets the WHO criteria for low bone density (osteopenia) at one or more measured sites. The risk of osteoporotic fracture increases approximately two-fold for  each standard deviation decrease in T-score.

## 2025-07-23 NOTE — TELEPHONE ENCOUNTER
"Called and relayed clinician message to patient. She is unsure about the endocrinology referral. \"Is it really necessary?\". Writer provided education on risk factors for low bone density.     Patient will think about it.    Nancy Laughlin RN  [facility]    "

## 2025-07-29 ENCOUNTER — LAB (OUTPATIENT)
Dept: LAB | Facility: CLINIC | Age: 85
End: 2025-07-29
Payer: COMMERCIAL

## 2025-07-29 ENCOUNTER — HOSPITAL ENCOUNTER (OUTPATIENT)
Dept: CARDIOLOGY | Facility: CLINIC | Age: 85
Discharge: HOME | End: 2025-07-29
Attending: INTERNAL MEDICINE
Payer: COMMERCIAL

## 2025-07-29 DIAGNOSIS — I50.32 CHRONIC DIASTOLIC CHF (CONGESTIVE HEART FAILURE) (H): ICD-10-CM

## 2025-07-29 DIAGNOSIS — E78.5 DYSLIPIDEMIA, GOAL LDL BELOW 70: ICD-10-CM

## 2025-07-29 DIAGNOSIS — I42.8 OTHER CARDIOMYOPATHY (H): ICD-10-CM

## 2025-07-29 LAB
ALT SERPL W P-5'-P-CCNC: 15 U/L (ref 0–50)
ANION GAP SERPL CALCULATED.3IONS-SCNC: 13 MMOL/L (ref 7–15)
BUN SERPL-MCNC: 12.3 MG/DL (ref 8–23)
CALCIUM SERPL-MCNC: 9.7 MG/DL (ref 8.8–10.4)
CHLORIDE SERPL-SCNC: 104 MMOL/L (ref 98–107)
CHOLEST SERPL-MCNC: 146 MG/DL
CREAT SERPL-MCNC: 1.07 MG/DL (ref 0.51–0.95)
EGFRCR SERPLBLD CKD-EPI 2021: 50 ML/MIN/1.73M2
FASTING STATUS PATIENT QL REPORTED: YES
GLUCOSE SERPL-MCNC: 101 MG/DL (ref 70–99)
HCO3 SERPL-SCNC: 25 MMOL/L (ref 22–29)
HDLC SERPL-MCNC: 72 MG/DL
LDLC SERPL CALC-MCNC: 62 MG/DL
NONHDLC SERPL-MCNC: 74 MG/DL
NT-PROBNP SERPL-MCNC: 954 PG/ML (ref 0–624)
POTASSIUM SERPL-SCNC: 4.1 MMOL/L (ref 3.4–5.3)
SODIUM SERPL-SCNC: 142 MMOL/L (ref 135–145)
TRIGL SERPL-MCNC: 60 MG/DL

## 2025-07-29 PROCEDURE — 83880 ASSAY OF NATRIURETIC PEPTIDE: CPT | Performed by: PHYSICIAN ASSISTANT

## 2025-07-29 PROCEDURE — 93306 TTE W/DOPPLER COMPLETE: CPT | Mod: 26 | Performed by: INTERNAL MEDICINE

## 2025-07-29 PROCEDURE — 93306 TTE W/DOPPLER COMPLETE: CPT

## 2025-07-29 PROCEDURE — 80061 LIPID PANEL: CPT | Performed by: PHYSICIAN ASSISTANT

## 2025-07-29 PROCEDURE — 84460 ALANINE AMINO (ALT) (SGPT): CPT | Performed by: PHYSICIAN ASSISTANT

## 2025-07-29 PROCEDURE — 36415 COLL VENOUS BLD VENIPUNCTURE: CPT | Performed by: PHYSICIAN ASSISTANT

## 2025-07-29 PROCEDURE — 80048 BASIC METABOLIC PNL TOTAL CA: CPT | Performed by: PHYSICIAN ASSISTANT

## 2025-07-30 ENCOUNTER — TELEPHONE (OUTPATIENT)
Dept: CARDIOLOGY | Facility: CLINIC | Age: 85
End: 2025-07-30
Payer: COMMERCIAL

## 2025-07-30 NOTE — TELEPHONE ENCOUNTER
"Received recommendations from JOSUE Morgan, \"History of EF of 45% had improved to about 50 to 55% last year. Patient has recent syncopal episodes. She also has known coronary disease. As long as she feels well Dr. Mauri Chase can decide at his visit if he wants to do a repeat angiogram on her. If she does not feel well, please let me know and I will do a phone visit with her on Friday.\"    Attempted to call patient to assess symptoms, left message for patient to call back.  VALERIA Marcos -[phone]  "

## 2025-07-31 NOTE — TELEPHONE ENCOUNTER
Patient called back, denies any shortness of breath, states she has been feeling fine.  Patient advised to follow-up with Dr. Mauri Chase 8/14/25 as scheduled. Wiley GRANADOS

## 2025-08-04 ENCOUNTER — VIRTUAL VISIT (OUTPATIENT)
Dept: PHARMACY | Facility: CLINIC | Age: 85
End: 2025-08-04
Attending: FAMILY MEDICINE
Payer: COMMERCIAL

## 2025-08-04 DIAGNOSIS — I48.0 PAF (PAROXYSMAL ATRIAL FIBRILLATION) (H): ICD-10-CM

## 2025-08-04 DIAGNOSIS — Z23 ENCOUNTER FOR IMMUNIZATION: ICD-10-CM

## 2025-08-04 DIAGNOSIS — K59.00 CONSTIPATION, UNSPECIFIED CONSTIPATION TYPE: ICD-10-CM

## 2025-08-04 DIAGNOSIS — I10 HYPERTENSION GOAL BP (BLOOD PRESSURE) < 140/90: ICD-10-CM

## 2025-08-04 DIAGNOSIS — I50.32 CHRONIC DIASTOLIC CHF (CONGESTIVE HEART FAILURE) (H): Primary | ICD-10-CM

## 2025-08-04 DIAGNOSIS — E78.5 HYPERLIPIDEMIA LDL GOAL <70: ICD-10-CM

## 2025-08-04 PROCEDURE — 99605 MTMS BY PHARM NP 15 MIN: CPT | Mod: 93 | Performed by: PHARMACIST

## 2025-08-04 PROCEDURE — 99607 MTMS BY PHARM ADDL 15 MIN: CPT | Mod: 93 | Performed by: PHARMACIST

## 2025-08-14 ENCOUNTER — OFFICE VISIT (OUTPATIENT)
Dept: CARDIOLOGY | Facility: CLINIC | Age: 85
End: 2025-08-14
Payer: COMMERCIAL

## 2025-08-14 VITALS
DIASTOLIC BLOOD PRESSURE: 90 MMHG | BODY MASS INDEX: 27.52 KG/M2 | WEIGHT: 161.2 LBS | OXYGEN SATURATION: 99 % | SYSTOLIC BLOOD PRESSURE: 149 MMHG | HEART RATE: 69 BPM | HEIGHT: 64 IN

## 2025-08-14 DIAGNOSIS — I21.4 NSTEMI (NON-ST ELEVATED MYOCARDIAL INFARCTION) (H): ICD-10-CM

## 2025-08-14 DIAGNOSIS — I25.110 CORONARY ARTERY DISEASE INVOLVING NATIVE CORONARY ARTERY OF NATIVE HEART WITH UNSTABLE ANGINA PECTORIS (H): ICD-10-CM

## 2025-08-14 DIAGNOSIS — I48.0 PAF (PAROXYSMAL ATRIAL FIBRILLATION) (H): ICD-10-CM

## 2025-08-14 DIAGNOSIS — I25.10 CORONARY ARTERY DISEASE INVOLVING NATIVE CORONARY ARTERY OF NATIVE HEART WITHOUT ANGINA PECTORIS: ICD-10-CM

## 2025-08-14 DIAGNOSIS — E78.5 HYPERLIPIDEMIA LDL GOAL <70: ICD-10-CM

## 2025-08-14 DIAGNOSIS — I42.8 OTHER CARDIOMYOPATHY (H): ICD-10-CM

## 2025-08-14 DIAGNOSIS — E87.6 HYPOKALEMIA: ICD-10-CM

## 2025-08-14 DIAGNOSIS — N28.9 RENAL DYSFUNCTION: Primary | ICD-10-CM

## 2025-08-14 DIAGNOSIS — I50.32 CHRONIC DIASTOLIC CHF (CONGESTIVE HEART FAILURE) (H): ICD-10-CM

## 2025-08-14 RX ORDER — ROSUVASTATIN CALCIUM 40 MG/1
40 TABLET, COATED ORAL DAILY
Qty: 90 TABLET | Refills: 4 | Status: SHIPPED | OUTPATIENT
Start: 2025-08-14

## 2025-08-14 RX ORDER — FUROSEMIDE 20 MG/1
20 TABLET ORAL DAILY
Qty: 90 TABLET | Refills: 4 | Status: SHIPPED | OUTPATIENT
Start: 2025-08-14

## 2025-08-14 RX ORDER — POTASSIUM CHLORIDE 750 MG/1
10 TABLET, EXTENDED RELEASE ORAL DAILY
Qty: 90 TABLET | Refills: 4 | Status: SHIPPED | OUTPATIENT
Start: 2025-08-14

## 2025-08-14 RX ORDER — SACUBITRIL AND VALSARTAN 49; 51 MG/1; MG/1
1 TABLET ORAL 2 TIMES DAILY
Qty: 180 TABLET | Refills: 4 | Status: SHIPPED | OUTPATIENT
Start: 2025-08-14

## 2025-08-14 RX ORDER — CARVEDILOL 12.5 MG/1
12.5 TABLET ORAL 2 TIMES DAILY WITH MEALS
Qty: 180 TABLET | Refills: 4 | Status: SHIPPED | OUTPATIENT
Start: 2025-08-14

## (undated) DEVICE — ENDO TROCAR FIRST ENTRY KII FIOS Z-THRD 05X100MM CTF03

## (undated) DEVICE — CATH DIAGNOSTIC RADIAL 5FR TIG 4.0

## (undated) DEVICE — ENDO TROCAR BLUNT TIP KII BALLOON 12X100MM C0R47

## (undated) DEVICE — TUBING SUCTION MEDI-VAC SOFT 3/16"X20' N520A

## (undated) DEVICE — ESU ENDO SCISSORS 5MM CVD 5DCS

## (undated) DEVICE — ESU HOLDER LAP INST DISP PURPLE LONG 330MM H-PRO-330

## (undated) DEVICE — ENDO POUCH UNIV RETRIEVAL SYSTEM INZII 10MM CD001

## (undated) DEVICE — CATH ANGIO INFINITI 3DRC 4FRX100CM 538476

## (undated) DEVICE — CATH DIAG 4FR JL 4.5 538417

## (undated) DEVICE — CATH GUIDING 6FR AL .75 LA6AL75

## (undated) DEVICE — GUIDEWIRE VASC 0.014INX180CM RUNTHROUGH 25-1011

## (undated) DEVICE — CATH BALLOON NC EMERGE 3.25X15MM H7493926715320

## (undated) DEVICE — ENDO TROCAR SLEEVE KII Z-THREADED 05X100MM CTS02

## (undated) DEVICE — KIT HAND CONTROL ANGIOTOUCH ACIST 65CM AT-P65

## (undated) DEVICE — Device

## (undated) DEVICE — DRAPE GYN/UROLOGY FLUID POUCH TUR 29455

## (undated) DEVICE — INFL DVC BASIXCOMPAK PLYCRB 30 ATM 13IN 20ML IN4530

## (undated) DEVICE — PAD CHUX UNDERPAD 23X24" 7136

## (undated) DEVICE — PACK TUR CUSTOM SBA15RUFSE

## (undated) DEVICE — PACK LAP CHOLE SLC15LCFSD

## (undated) DEVICE — EVAC SYSTEM CLEAR FLOW SC082500

## (undated) DEVICE — CLIP APPLIER ENDO 5MM M/L LIGAMAX EL5ML

## (undated) DEVICE — GLIDEWIRE TERUMO .035X180CM 1.5,, J-TIP GR3525

## (undated) DEVICE — CATH LAUNCHER 6FR EBU 3.5 LA6EBU35

## (undated) DEVICE — RAD RX ISOVUE 300 (50ML) 61% IOPAMIDOL CHARGE PER ML

## (undated) DEVICE — SU VICRYL 0 UR-6 27" J603H

## (undated) DEVICE — DRSG TELFA 2X3"

## (undated) DEVICE — LASER FIBER HOLMIUM FLEXIVA 365 M006L8405920

## (undated) DEVICE — DEFIB PRO-PADZ LVP LQD GEL ADULT 8900-2105-01

## (undated) DEVICE — DRAPE LITHO LINGMAN W/POUCH 3" 1-0425

## (undated) DEVICE — BAG DRAIN URO FOR SIEMENS 8MM ADAPTER NS CC164NS-A

## (undated) DEVICE — GUIDEWIRE SENSOR DUAL FLEX STR 0.035"X150CM M0066703080

## (undated) DEVICE — CATH EP 110CM 7FR BLZR2 HTD 2

## (undated) DEVICE — ESU GROUND PAD UNIVERSAL W/O CORD

## (undated) DEVICE — DEVICE SUTURE PASSER 14GA WECK EFX EFXSP2

## (undated) DEVICE — SUCTION IRR STRYKERFLOW II W/TIP 250-070-520

## (undated) DEVICE — RAD INFLATOR BASIC COMPAK  IN4130

## (undated) DEVICE — WIRE GUIDE LUNDERQUIST 0.035"X260CM DBL CVD

## (undated) DEVICE — CATH URETERAL FLEX TIP TIGERTAIL 06FRX70CM 139006

## (undated) DEVICE — VALVE HEMOSTASIS GUARDIAN II OD8 FR GUIDEWIRE 8215

## (undated) DEVICE — TOTE ANGIO CORP PC15AT SAN32CC83O

## (undated) DEVICE — PACK EP SRG PROC LF DISP SAN32EPFSR

## (undated) DEVICE — MANIFOLD KIT ANGIO AUTOMATED 014613

## (undated) DEVICE — SOL NACL 0.9% IRRIG 3000ML BAG 2B7477

## (undated) DEVICE — CATH ANGIO INFINITI MPA2 4FRX100CM 2 SH 538442

## (undated) DEVICE — CATH URETERAL TIGERTAIL 05FR 70CM 139005

## (undated) DEVICE — ENDO SCOPE WARMER LF TM500

## (undated) DEVICE — CATH GUIDING BLUE YELLOW PTFE XB3 6FRX100CM 67005200

## (undated) DEVICE — SU PDS II 0 ENDOLOOP EZ10G

## (undated) DEVICE — INTRO SHEATH 4FRX10CM PINNACLE RSS402

## (undated) DEVICE — CATH GUIDELINER 6FR 5571

## (undated) DEVICE — INTRO CATH 12CM 8.5FR FST-CATH

## (undated) DEVICE — SU MONOCRYL 4-0 PS-2 18" UND Y496G

## (undated) DEVICE — PREP CHLORAPREP 26ML TINTED HI-LITE ORANGE 930815

## (undated) DEVICE — BASKET STONE RETRIEVAL NTNL ZERO TIP 1.9FRX90CM M0063901050

## (undated) DEVICE — SLEEVE TR BAND RADIAL COMPRESSION DEVICE 24CM TRB24-REG

## (undated) DEVICE — LINEN TOWEL PACK X5 5464

## (undated) DEVICE — SYR 20ML LL W/O NDL 302830

## (undated) DEVICE — URETEROSCOPE FLEX SLG USE 7.7FR LITHOVUE M0067913500

## (undated) DEVICE — SOL WATER IRRIG 1000ML BOTTLE 2F7114

## (undated) DEVICE — CATH BALLOON CUTTING WOLVERINE 3.25X10MH74939401103250

## (undated) DEVICE — CATHETER DIAGNOSTIC DRAGONFLY OPSTAR IMAGING STERILE 1014651

## (undated) DEVICE — NDL PERC ENTRY THINWALL 18GA 7.0" G00166

## (undated) DEVICE — SOL NACL 0.9% INJ 1000ML BAG 2B1324X

## (undated) DEVICE — GLOVE BIOGEL PI MICRO INDICATOR UNDERGLOVE SZ 7.5 48975

## (undated) DEVICE — CATH GUIDING 100CM 6FR LNCHR COR

## (undated) DEVICE — LASER FIBER HOLMIUM MOSES 200 D/F/L AC-10030100

## (undated) DEVICE — SU VICRYL 0 ENDOLOOP 18" EJ10

## (undated) DEVICE — CATH BALLOON NC EMERGE 3.50X20MM H7493926720350

## (undated) DEVICE — INTRO SHEATH 7FRX10CM PINNACLE RSS702

## (undated) DEVICE — CATH ANGIO JUDKINS R4 6FRX100CM INFINITI 534621T

## (undated) DEVICE — INTRO GLIDESHEATH SLENDER 6FR 10X45CM 60-1060

## (undated) DEVICE — MANIFOLD NEPTUNE 4 PORT 700-20

## (undated) DEVICE — GLOVE BIOGEL PI MICRO SZ 7.5 48575

## (undated) DEVICE — SHEATH URETERAL ACCESS NAVIGATOR HD 12/14FRX36CM M0062502250

## (undated) DEVICE — CATH BALLOON EMERGE 3.0X12MM H7493918912300

## (undated) DEVICE — CATHETER BALLOON DILATATION TAKERU PT 3X15MM DC-RZ3015UA2

## (undated) DEVICE — ENDO SEAL BX PORT BPS-A

## (undated) RX ORDER — BUPIVACAINE HYDROCHLORIDE 2.5 MG/ML
INJECTION, SOLUTION EPIDURAL; INFILTRATION; INTRACAUDAL
Status: DISPENSED
Start: 2023-06-01

## (undated) RX ORDER — FENTANYL CITRATE 50 UG/ML
INJECTION, SOLUTION INTRAMUSCULAR; INTRAVENOUS
Status: DISPENSED
Start: 2025-01-17

## (undated) RX ORDER — NEOSTIGMINE METHYLSULFATE 1 MG/ML
VIAL (ML) INJECTION
Status: DISPENSED
Start: 2023-06-01

## (undated) RX ORDER — LIDOCAINE HYDROCHLORIDE 10 MG/ML
INJECTION, SOLUTION EPIDURAL; INFILTRATION; INTRACAUDAL; PERINEURAL
Status: DISPENSED
Start: 2023-12-07

## (undated) RX ORDER — HYDRALAZINE HYDROCHLORIDE 20 MG/ML
INJECTION INTRAMUSCULAR; INTRAVENOUS
Status: DISPENSED
Start: 2023-12-07

## (undated) RX ORDER — FENTANYL CITRATE 50 UG/ML
INJECTION, SOLUTION INTRAMUSCULAR; INTRAVENOUS
Status: DISPENSED
Start: 2023-12-21

## (undated) RX ORDER — PROPOFOL 10 MG/ML
INJECTION, EMULSION INTRAVENOUS
Status: DISPENSED
Start: 2023-06-01

## (undated) RX ORDER — FENTANYL CITRATE 50 UG/ML
INJECTION, SOLUTION INTRAMUSCULAR; INTRAVENOUS
Status: DISPENSED
Start: 2019-05-03

## (undated) RX ORDER — HEPARIN SODIUM 1000 [USP'U]/ML
INJECTION, SOLUTION INTRAVENOUS; SUBCUTANEOUS
Status: DISPENSED
Start: 2023-12-07

## (undated) RX ORDER — REGADENOSON 0.08 MG/ML
INJECTION, SOLUTION INTRAVENOUS
Status: DISPENSED
Start: 2022-10-03

## (undated) RX ORDER — GLYCOPYRROLATE 0.2 MG/ML
INJECTION, SOLUTION INTRAMUSCULAR; INTRAVENOUS
Status: DISPENSED
Start: 2023-06-01

## (undated) RX ORDER — EPHEDRINE SULFATE 50 MG/ML
INJECTION, SOLUTION INTRAMUSCULAR; INTRAVENOUS; SUBCUTANEOUS
Status: DISPENSED
Start: 2025-01-17

## (undated) RX ORDER — NALOXONE HYDROCHLORIDE 0.4 MG/ML
INJECTION, SOLUTION INTRAMUSCULAR; INTRAVENOUS; SUBCUTANEOUS
Status: DISPENSED
Start: 2018-03-09

## (undated) RX ORDER — FENTANYL CITRATE 50 UG/ML
INJECTION, SOLUTION INTRAMUSCULAR; INTRAVENOUS
Status: DISPENSED
Start: 2024-10-11

## (undated) RX ORDER — AMINOPHYLLINE 25 MG/ML
INJECTION, SOLUTION INTRAVENOUS
Status: DISPENSED
Start: 2018-03-08

## (undated) RX ORDER — DEXTROSE MONOHYDRATE 25 G/50ML
INJECTION, SOLUTION INTRAVENOUS
Status: DISPENSED
Start: 2018-03-09

## (undated) RX ORDER — FLUMAZENIL 0.1 MG/ML
INJECTION, SOLUTION INTRAVENOUS
Status: DISPENSED
Start: 2019-05-03

## (undated) RX ORDER — HEPARIN SODIUM 1000 [USP'U]/ML
INJECTION, SOLUTION INTRAVENOUS; SUBCUTANEOUS
Status: DISPENSED
Start: 2023-12-21

## (undated) RX ORDER — ONDANSETRON 2 MG/ML
INJECTION INTRAMUSCULAR; INTRAVENOUS
Status: DISPENSED
Start: 2023-06-01

## (undated) RX ORDER — FLUMAZENIL 0.1 MG/ML
INJECTION, SOLUTION INTRAVENOUS
Status: DISPENSED
Start: 2018-03-09

## (undated) RX ORDER — HEPARIN SODIUM 200 [USP'U]/100ML
INJECTION, SOLUTION INTRAVENOUS
Status: DISPENSED
Start: 2023-12-21

## (undated) RX ORDER — FENTANYL CITRATE 50 UG/ML
INJECTION, SOLUTION INTRAMUSCULAR; INTRAVENOUS
Status: DISPENSED
Start: 2018-03-20

## (undated) RX ORDER — FENTANYL CITRATE 50 UG/ML
INJECTION, SOLUTION INTRAMUSCULAR; INTRAVENOUS
Status: DISPENSED
Start: 2023-12-08

## (undated) RX ORDER — LIDOCAINE HYDROCHLORIDE 10 MG/ML
INJECTION, SOLUTION EPIDURAL; INFILTRATION; INTRACAUDAL; PERINEURAL
Status: DISPENSED
Start: 2023-12-21

## (undated) RX ORDER — PROPOFOL 10 MG/ML
INJECTION, EMULSION INTRAVENOUS
Status: DISPENSED
Start: 2025-01-17

## (undated) RX ORDER — LIDOCAINE HYDROCHLORIDE 10 MG/ML
INJECTION, SOLUTION EPIDURAL; INFILTRATION; INTRACAUDAL; PERINEURAL
Status: DISPENSED
Start: 2019-05-07

## (undated) RX ORDER — GLYCOPYRROLATE 0.2 MG/ML
INJECTION, SOLUTION INTRAMUSCULAR; INTRAVENOUS
Status: DISPENSED
Start: 2018-03-09

## (undated) RX ORDER — LIDOCAINE HYDROCHLORIDE 10 MG/ML
INJECTION, SOLUTION EPIDURAL; INFILTRATION; INTRACAUDAL; PERINEURAL
Status: DISPENSED
Start: 2018-03-20

## (undated) RX ORDER — ONDANSETRON 2 MG/ML
INJECTION INTRAMUSCULAR; INTRAVENOUS
Status: DISPENSED
Start: 2025-01-17

## (undated) RX ORDER — LIDOCAINE HYDROCHLORIDE 10 MG/ML
INJECTION, SOLUTION EPIDURAL; INFILTRATION; INTRACAUDAL; PERINEURAL
Status: DISPENSED
Start: 2023-12-08

## (undated) RX ORDER — REGADENOSON 0.08 MG/ML
INJECTION, SOLUTION INTRAVENOUS
Status: DISPENSED
Start: 2018-03-08

## (undated) RX ORDER — HEPARIN SODIUM 1000 [USP'U]/ML
INJECTION, SOLUTION INTRAVENOUS; SUBCUTANEOUS
Status: DISPENSED
Start: 2023-12-08

## (undated) RX ORDER — VERAPAMIL HYDROCHLORIDE 2.5 MG/ML
INJECTION, SOLUTION INTRAVENOUS
Status: DISPENSED
Start: 2023-12-08

## (undated) RX ORDER — LIDOCAINE HYDROCHLORIDE 40 MG/ML
SOLUTION TOPICAL
Status: DISPENSED
Start: 2019-05-03

## (undated) RX ORDER — FENTANYL CITRATE 50 UG/ML
INJECTION, SOLUTION INTRAMUSCULAR; INTRAVENOUS
Status: DISPENSED
Start: 2023-12-07

## (undated) RX ORDER — HEPARIN SODIUM 200 [USP'U]/100ML
INJECTION, SOLUTION INTRAVENOUS
Status: DISPENSED
Start: 2023-12-08

## (undated) RX ORDER — FENTANYL CITRATE 50 UG/ML
INJECTION, SOLUTION INTRAMUSCULAR; INTRAVENOUS
Status: DISPENSED
Start: 2023-06-01

## (undated) RX ORDER — FENTANYL CITRATE 50 UG/ML
INJECTION, SOLUTION INTRAMUSCULAR; INTRAVENOUS
Status: DISPENSED
Start: 2019-05-07

## (undated) RX ORDER — HEPARIN SODIUM 200 [USP'U]/100ML
INJECTION, SOLUTION INTRAVENOUS
Status: DISPENSED
Start: 2023-12-07

## (undated) RX ORDER — VERAPAMIL HYDROCHLORIDE 2.5 MG/ML
INJECTION, SOLUTION INTRAVENOUS
Status: DISPENSED
Start: 2023-12-21

## (undated) RX ORDER — BUPIVACAINE HYDROCHLORIDE 2.5 MG/ML
INJECTION, SOLUTION EPIDURAL; INFILTRATION; INTRACAUDAL
Status: DISPENSED
Start: 2018-03-20

## (undated) RX ORDER — HYDROMORPHONE HYDROCHLORIDE 1 MG/ML
INJECTION, SOLUTION INTRAMUSCULAR; INTRAVENOUS; SUBCUTANEOUS
Status: DISPENSED
Start: 2023-06-01

## (undated) RX ORDER — FENTANYL CITRATE 50 UG/ML
INJECTION, SOLUTION INTRAMUSCULAR; INTRAVENOUS
Status: DISPENSED
Start: 2018-03-09

## (undated) RX ORDER — NITROGLYCERIN 5 MG/ML
VIAL (ML) INTRAVENOUS
Status: DISPENSED
Start: 2023-12-21

## (undated) RX ORDER — NALOXONE HYDROCHLORIDE 0.4 MG/ML
INJECTION, SOLUTION INTRAMUSCULAR; INTRAVENOUS; SUBCUTANEOUS
Status: DISPENSED
Start: 2019-05-03

## (undated) RX ORDER — CEFAZOLIN SODIUM/WATER 2 G/20 ML
SYRINGE (ML) INTRAVENOUS
Status: DISPENSED
Start: 2023-06-01

## (undated) RX ORDER — GLYCOPYRROLATE 0.2 MG/ML
INJECTION, SOLUTION INTRAMUSCULAR; INTRAVENOUS
Status: DISPENSED
Start: 2019-05-03

## (undated) RX ORDER — DEXAMETHASONE SODIUM PHOSPHATE 4 MG/ML
INJECTION, SOLUTION INTRA-ARTICULAR; INTRALESIONAL; INTRAMUSCULAR; INTRAVENOUS; SOFT TISSUE
Status: DISPENSED
Start: 2023-06-01

## (undated) RX ORDER — ACETAMINOPHEN 325 MG/1
TABLET ORAL
Status: DISPENSED
Start: 2025-01-17